# Patient Record
Sex: MALE | Race: WHITE | NOT HISPANIC OR LATINO | Employment: FULL TIME | URBAN - METROPOLITAN AREA
[De-identification: names, ages, dates, MRNs, and addresses within clinical notes are randomized per-mention and may not be internally consistent; named-entity substitution may affect disease eponyms.]

---

## 2018-04-26 ENCOUNTER — OFFICE VISIT (OUTPATIENT)
Dept: URGENT CARE | Facility: CLINIC | Age: 53
End: 2018-04-26
Payer: COMMERCIAL

## 2018-04-26 VITALS
HEART RATE: 100 BPM | OXYGEN SATURATION: 93 % | WEIGHT: 315 LBS | HEIGHT: 73 IN | SYSTOLIC BLOOD PRESSURE: 135 MMHG | RESPIRATION RATE: 22 BRPM | TEMPERATURE: 99 F | DIASTOLIC BLOOD PRESSURE: 86 MMHG | BODY MASS INDEX: 41.75 KG/M2

## 2018-04-26 DIAGNOSIS — R06.02 SHORTNESS OF BREATH: Primary | ICD-10-CM

## 2018-04-26 PROCEDURE — G0381 LEV 2 HOSP TYPE B ED VISIT: HCPCS | Performed by: PHYSICIAN ASSISTANT

## 2018-04-26 RX ORDER — ATORVASTATIN CALCIUM 40 MG/1
40 TABLET, FILM COATED ORAL DAILY
COMMUNITY
End: 2022-02-04

## 2018-04-26 RX ORDER — FUROSEMIDE 40 MG/1
60 TABLET ORAL 2 TIMES DAILY
Refills: 2 | COMMUNITY
Start: 2018-03-16 | End: 2022-02-24 | Stop reason: HOSPADM

## 2018-04-26 RX ORDER — LIRAGLUTIDE 6 MG/ML
1.8 INJECTION SUBCUTANEOUS DAILY
COMMUNITY
Start: 2018-04-21 | End: 2022-02-04

## 2018-04-26 RX ORDER — TRAMADOL HYDROCHLORIDE 50 MG/1
TABLET ORAL
COMMUNITY
Start: 2018-03-27 | End: 2020-01-23 | Stop reason: HOSPADM

## 2018-04-26 RX ORDER — METFORMIN HYDROCHLORIDE 500 MG/1
TABLET, EXTENDED RELEASE ORAL EVERY OTHER DAY
Status: ON HOLD | COMMUNITY
Start: 2018-03-16 | End: 2021-11-16 | Stop reason: CLARIF

## 2018-04-26 NOTE — PROGRESS NOTES
[facility] Now        NAME: Phuong Torres a 45 y o  male  : 1965    MRN: [id number]  DATE: 2018  TIME: 5:37 PM    Assessment and Plan   Shortness of breath [R06 02]  1  Shortness of breath           Patient Instructions       TO ER Now refused transport  Chief Complaint     Chief Complaint   Patient presents with    Cough     couch, chest congestion, fever, cold sweats and headache for 3 days         History of Present Illness       Pt with cough, wheezing and increasing SOB over the last 3 days  Today increased SOB  Has pacemaker  Denies selling of feet, Some chest pain over the last 2 days  Had tried nebulizer at home without success  Review of Systems   Review of Systems   Respiratory: Positive for cough  All other systems reviewed and are negative  Current Medications       Current Outpatient Prescriptions:     atorvastatin (LIPITOR) 40 mg tablet, Take 40 mg by mouth daily, Disp: , Rfl:     furosemide (LASIX) 40 mg tablet, Take 60 mg by mouth 2 (two) times a day, Disp: , Rfl: 2    metFORMIN (GLUCOPHAGE-XR) 500 mg 24 hr tablet, , Disp: , Rfl:     traMADol (ULTRAM) 50 mg tablet, , Disp: , Rfl:     VICTOZA 18 MG/3ML SOPN, , Disp: , Rfl:     Current Allergies     Allergies as of 2018    (No Known Allergies)            The following portions of the patient's history were reviewed and updated as appropriate: allergies, current medications, past family history, past medical history, past social history, past surgical history and problem list      Past Medical History:   Diagnosis Date    Diabetes mellitus ([address] )     High cholesterol     Hypertension        Past Surgical History:   Procedure Laterality Date    APPENDECTOMY      ATRIAL CARDIAC PACEMAKER INSERTION         No family history on file  Medications have been verified          Objective   /86   Pulse 100   Temp 99 °F (37 2 °C) (Tympanic)   Resp 22   Ht 6' 1" (1 854 m)   Wt (!) 171 kg (376 lb)   SpO2 93%   BMI 49 61 kg/m²        Physical Exam     Physical Exam   Constitutional: He appears well-developed and well-nourished  Cardiovascular: Normal rate, regular rhythm and normal heart sounds  Pulmonary/Chest: He has wheezes (bilaterally throughout)  Nursing note and vitals reviewed

## 2020-01-22 ENCOUNTER — HOSPITAL ENCOUNTER (INPATIENT)
Facility: HOSPITAL | Age: 55
LOS: 1 days | Discharge: HOME | DRG: 292 | End: 2020-01-23
Attending: EMERGENCY MEDICINE | Admitting: INTERNAL MEDICINE
Payer: COMMERCIAL

## 2020-01-22 ENCOUNTER — APPOINTMENT (EMERGENCY)
Dept: RADIOLOGY | Facility: HOSPITAL | Age: 55
DRG: 292 | End: 2020-01-22
Payer: COMMERCIAL

## 2020-01-22 DIAGNOSIS — R09.02 HYPOXIA: Primary | ICD-10-CM

## 2020-01-22 DIAGNOSIS — R06.02 SHORTNESS OF BREATH: ICD-10-CM

## 2020-01-22 DIAGNOSIS — R07.9 CHEST PAIN, UNSPECIFIED TYPE: ICD-10-CM

## 2020-01-22 DIAGNOSIS — I50.9 CHF (CONGESTIVE HEART FAILURE) (HCC): ICD-10-CM

## 2020-01-22 DIAGNOSIS — I50.33 ACUTE ON CHRONIC DIASTOLIC HEART FAILURE (HCC): ICD-10-CM

## 2020-01-22 DIAGNOSIS — G47.33 OSA (OBSTRUCTIVE SLEEP APNEA): ICD-10-CM

## 2020-01-22 PROBLEM — E66.01 MORBID OBESITY (HCC): Status: ACTIVE | Noted: 2020-01-22

## 2020-01-22 PROBLEM — I50.32 CHRONIC DIASTOLIC HEART FAILURE (HCC): Status: ACTIVE | Noted: 2020-01-22

## 2020-01-22 PROBLEM — IMO0002 TYPE 2 DM MILD NONPROLIFERATIVE RETINOPATHY, MACULAR EDEMA, UNCONTROL: Status: ACTIVE | Noted: 2020-01-22

## 2020-01-22 PROBLEM — I10 HYPERTENSION: Status: ACTIVE | Noted: 2020-01-22

## 2020-01-22 LAB
ALBUMIN SERPL BCP-MCNC: 3.6 G/DL (ref 3.5–5)
ALP SERPL-CCNC: 83 U/L (ref 46–116)
ALT SERPL W P-5'-P-CCNC: 32 U/L (ref 12–78)
ANION GAP SERPL CALCULATED.3IONS-SCNC: 4 MMOL/L (ref 4–13)
AST SERPL W P-5'-P-CCNC: 32 U/L (ref 5–45)
BASOPHILS # BLD AUTO: 0.05 THOUSANDS/ΜL (ref 0–0.1)
BASOPHILS NFR BLD AUTO: 1 % (ref 0–1)
BILIRUB SERPL-MCNC: 0.36 MG/DL (ref 0.2–1)
BUN SERPL-MCNC: 15 MG/DL (ref 5–25)
CALCIUM SERPL-MCNC: 8.3 MG/DL (ref 8.3–10.1)
CHLORIDE SERPL-SCNC: 104 MMOL/L (ref 100–108)
CO2 SERPL-SCNC: 34 MMOL/L (ref 21–32)
CREAT SERPL-MCNC: 1.16 MG/DL (ref 0.6–1.3)
EOSINOPHIL # BLD AUTO: 0.23 THOUSAND/ΜL (ref 0–0.61)
EOSINOPHIL NFR BLD AUTO: 3 % (ref 0–6)
ERYTHROCYTE [DISTWIDTH] IN BLOOD BY AUTOMATED COUNT: 14.1 % (ref 11.6–15.1)
FLUAV RNA NPH QL NAA+PROBE: NORMAL
FLUBV RNA NPH QL NAA+PROBE: NORMAL
GFR SERPL CREATININE-BSD FRML MDRD: 70 ML/MIN/1.73SQ M
GLUCOSE SERPL-MCNC: 85 MG/DL (ref 65–140)
GLUCOSE SERPL-MCNC: 98 MG/DL (ref 65–140)
HCT VFR BLD AUTO: 48.1 % (ref 36.5–49.3)
HGB BLD-MCNC: 15.4 G/DL (ref 12–17)
IMM GRANULOCYTES # BLD AUTO: 0.04 THOUSAND/UL (ref 0–0.2)
IMM GRANULOCYTES NFR BLD AUTO: 1 % (ref 0–2)
LYMPHOCYTES # BLD AUTO: 2.08 THOUSANDS/ΜL (ref 0.6–4.47)
LYMPHOCYTES NFR BLD AUTO: 24 % (ref 14–44)
MCH RBC QN AUTO: 28.9 PG (ref 26.8–34.3)
MCHC RBC AUTO-ENTMCNC: 32 G/DL (ref 31.4–37.4)
MCV RBC AUTO: 90 FL (ref 82–98)
MONOCYTES # BLD AUTO: 1.31 THOUSAND/ΜL (ref 0.17–1.22)
MONOCYTES NFR BLD AUTO: 15 % (ref 4–12)
NEUTROPHILS # BLD AUTO: 5.1 THOUSANDS/ΜL (ref 1.85–7.62)
NEUTS SEG NFR BLD AUTO: 56 % (ref 43–75)
NRBC BLD AUTO-RTO: 0 /100 WBCS
NT-PROBNP SERPL-MCNC: 356 PG/ML
PLATELET # BLD AUTO: 206 THOUSANDS/UL (ref 149–390)
PMV BLD AUTO: 9.5 FL (ref 8.9–12.7)
POTASSIUM SERPL-SCNC: 3.6 MMOL/L (ref 3.5–5.3)
PROT SERPL-MCNC: 7.3 G/DL (ref 6.4–8.2)
RBC # BLD AUTO: 5.32 MILLION/UL (ref 3.88–5.62)
RSV RNA NPH QL NAA+PROBE: NORMAL
SODIUM SERPL-SCNC: 142 MMOL/L (ref 136–145)
TROPONIN I SERPL-MCNC: <0.02 NG/ML
TROPONIN I SERPL-MCNC: <0.02 NG/ML
WBC # BLD AUTO: 8.81 THOUSAND/UL (ref 4.31–10.16)

## 2020-01-22 PROCEDURE — 94640 AIRWAY INHALATION TREATMENT: CPT

## 2020-01-22 PROCEDURE — 85025 COMPLETE CBC W/AUTO DIFF WBC: CPT | Performed by: EMERGENCY MEDICINE

## 2020-01-22 PROCEDURE — 82948 REAGENT STRIP/BLOOD GLUCOSE: CPT

## 2020-01-22 PROCEDURE — 84484 ASSAY OF TROPONIN QUANT: CPT | Performed by: EMERGENCY MEDICINE

## 2020-01-22 PROCEDURE — 99285 EMERGENCY DEPT VISIT HI MDM: CPT | Performed by: EMERGENCY MEDICINE

## 2020-01-22 PROCEDURE — 71046 X-RAY EXAM CHEST 2 VIEWS: CPT

## 2020-01-22 PROCEDURE — 99223 1ST HOSP IP/OBS HIGH 75: CPT | Performed by: INTERNAL MEDICINE

## 2020-01-22 PROCEDURE — 84484 ASSAY OF TROPONIN QUANT: CPT | Performed by: INTERNAL MEDICINE

## 2020-01-22 PROCEDURE — 36415 COLL VENOUS BLD VENIPUNCTURE: CPT

## 2020-01-22 PROCEDURE — 80053 COMPREHEN METABOLIC PANEL: CPT | Performed by: EMERGENCY MEDICINE

## 2020-01-22 PROCEDURE — 83880 ASSAY OF NATRIURETIC PEPTIDE: CPT | Performed by: EMERGENCY MEDICINE

## 2020-01-22 PROCEDURE — 87631 RESP VIRUS 3-5 TARGETS: CPT | Performed by: EMERGENCY MEDICINE

## 2020-01-22 PROCEDURE — 96374 THER/PROPH/DIAG INJ IV PUSH: CPT

## 2020-01-22 PROCEDURE — 36415 COLL VENOUS BLD VENIPUNCTURE: CPT | Performed by: INTERNAL MEDICINE

## 2020-01-22 PROCEDURE — 94760 N-INVAS EAR/PLS OXIMETRY 1: CPT

## 2020-01-22 PROCEDURE — 93005 ELECTROCARDIOGRAM TRACING: CPT

## 2020-01-22 PROCEDURE — 99285 EMERGENCY DEPT VISIT HI MDM: CPT

## 2020-01-22 RX ORDER — FUROSEMIDE 10 MG/ML
60 INJECTION INTRAMUSCULAR; INTRAVENOUS EVERY 12 HOURS
Status: DISCONTINUED | OUTPATIENT
Start: 2020-01-23 | End: 2020-01-23 | Stop reason: HOSPADM

## 2020-01-22 RX ORDER — METFORMIN HYDROCHLORIDE 500 MG/1
500 TABLET, EXTENDED RELEASE ORAL
Status: DISCONTINUED | OUTPATIENT
Start: 2020-01-23 | End: 2020-01-23 | Stop reason: HOSPADM

## 2020-01-22 RX ORDER — LEVALBUTEROL INHALATION SOLUTION 0.63 MG/3ML
0.63 SOLUTION RESPIRATORY (INHALATION)
Status: DISCONTINUED | OUTPATIENT
Start: 2020-01-23 | End: 2020-01-23 | Stop reason: HOSPADM

## 2020-01-22 RX ORDER — PREGABALIN 75 MG/1
75 CAPSULE ORAL
Status: ON HOLD | COMMUNITY
Start: 2018-09-10 | End: 2021-11-16 | Stop reason: CLARIF

## 2020-01-22 RX ORDER — FUROSEMIDE 10 MG/ML
40 INJECTION INTRAMUSCULAR; INTRAVENOUS ONCE
Status: COMPLETED | OUTPATIENT
Start: 2020-01-22 | End: 2020-01-22

## 2020-01-22 RX ORDER — ATORVASTATIN CALCIUM 40 MG/1
40 TABLET, FILM COATED ORAL
Status: DISCONTINUED | OUTPATIENT
Start: 2020-01-23 | End: 2020-01-23 | Stop reason: HOSPADM

## 2020-01-22 RX ORDER — PREGABALIN 75 MG/1
75 CAPSULE ORAL DAILY
Status: DISCONTINUED | OUTPATIENT
Start: 2020-01-23 | End: 2020-01-23 | Stop reason: HOSPADM

## 2020-01-22 RX ORDER — ALBUTEROL SULFATE 90 UG/1
AEROSOL, METERED RESPIRATORY (INHALATION)
COMMUNITY
Start: 2014-03-11 | End: 2020-01-23 | Stop reason: HOSPADM

## 2020-01-22 RX ORDER — LEVALBUTEROL INHALATION SOLUTION 0.63 MG/3ML
0.63 SOLUTION RESPIRATORY (INHALATION) EVERY 6 HOURS PRN
Status: DISCONTINUED | OUTPATIENT
Start: 2020-01-22 | End: 2020-01-22

## 2020-01-22 RX ORDER — MONTELUKAST SODIUM 10 MG/1
10 TABLET ORAL
COMMUNITY
Start: 2020-01-22 | End: 2022-02-04

## 2020-01-22 RX ORDER — ALBUTEROL SULFATE 90 UG/1
2 AEROSOL, METERED RESPIRATORY (INHALATION) EVERY 4 HOURS PRN
Status: DISCONTINUED | OUTPATIENT
Start: 2020-01-22 | End: 2020-01-23 | Stop reason: HOSPADM

## 2020-01-22 RX ORDER — LANOLIN ALCOHOL/MO/W.PET/CERES
3 CREAM (GRAM) TOPICAL
Status: DISCONTINUED | OUTPATIENT
Start: 2020-01-23 | End: 2020-01-23 | Stop reason: HOSPADM

## 2020-01-22 RX ORDER — LEVALBUTEROL INHALATION SOLUTION 0.63 MG/3ML
0.63 SOLUTION RESPIRATORY (INHALATION) EVERY 6 HOURS
Status: DISCONTINUED | OUTPATIENT
Start: 2020-01-22 | End: 2020-01-22

## 2020-01-22 RX ORDER — NITROGLYCERIN 0.4 MG/1
0.4 TABLET SUBLINGUAL ONCE
Status: COMPLETED | OUTPATIENT
Start: 2020-01-22 | End: 2020-01-22

## 2020-01-22 RX ORDER — LORAZEPAM 0.5 MG/1
0.5 TABLET ORAL EVERY 8 HOURS PRN
COMMUNITY
End: 2022-02-04

## 2020-01-22 RX ORDER — TRAMADOL HYDROCHLORIDE 50 MG/1
50 TABLET ORAL EVERY 6 HOURS PRN
Status: DISCONTINUED | OUTPATIENT
Start: 2020-01-22 | End: 2020-01-23 | Stop reason: HOSPADM

## 2020-01-22 RX ORDER — FUROSEMIDE 10 MG/ML
40 INJECTION INTRAMUSCULAR; INTRAVENOUS EVERY 12 HOURS
Status: DISCONTINUED | OUTPATIENT
Start: 2020-01-23 | End: 2020-01-22

## 2020-01-22 RX ORDER — MONTELUKAST SODIUM 10 MG/1
10 TABLET ORAL
Status: DISCONTINUED | OUTPATIENT
Start: 2020-01-22 | End: 2020-01-23 | Stop reason: HOSPADM

## 2020-01-22 RX ADMIN — NITROGLYCERIN 1 INCH: 20 OINTMENT TOPICAL at 16:51

## 2020-01-22 RX ADMIN — FUROSEMIDE 40 MG: 10 INJECTION, SOLUTION INTRAMUSCULAR; INTRAVENOUS at 16:52

## 2020-01-22 RX ADMIN — MONTELUKAST SODIUM 10 MG: 10 TABLET, FILM COATED ORAL at 23:38

## 2020-01-22 RX ADMIN — IPRATROPIUM BROMIDE 0.5 MG: 0.5 SOLUTION RESPIRATORY (INHALATION) at 20:21

## 2020-01-22 RX ADMIN — NITROGLYCERIN 0.4 MG: 0.4 TABLET SUBLINGUAL at 17:32

## 2020-01-22 RX ADMIN — NITROGLYCERIN 0.4 MG: 0.4 TABLET SUBLINGUAL at 16:51

## 2020-01-22 RX ADMIN — LEVALBUTEROL HYDROCHLORIDE 0.63 MG: 0.63 SOLUTION RESPIRATORY (INHALATION) at 20:21

## 2020-01-22 NOTE — ED NOTES
Respiratory at bedside   Pt refusing to put bipap on, states hx of anxiety and not being able to tolerate it     Ramses Tompkins, ANNETTE  01/22/20 9146

## 2020-01-22 NOTE — RESPIRATORY THERAPY NOTE
Pt refused bipap due to being claustrophobic   I informed he should he breathing became more labored that it would be beneficial for him to give it a try   Nurse in room and aware of refusal

## 2020-01-22 NOTE — ED PROVIDER NOTES
History  Chief Complaint   Patient presents with    Shortness of Breath     pt states cough and cold like symptoms started last week but today got worse with chest tightness  pt diaphoretic and labored breathing at time of triage     Dyspnea worse with the past week with 14 lb weight gain, orthopnea and PND  Began with cough and cold symptoms similar to why  Denies chest pain abdominal pain  Takes furosemide daily, no history of CHF  Using CPAP recently resolution of symptoms    Prior to Admission Medications   Prescriptions Last Dose Informant Patient Reported? Taking? Rivaroxaban (XARELTO PO)   Yes No   Sig: Xarelto   VICTOZA 18 MG/3ML SOPN  Self Yes No   albuterol (PROAIR HFA) 90 mcg/act inhaler   Yes Yes   Sig: Inhale   atorvastatin (LIPITOR) 40 mg tablet  Self Yes No   Sig: Take 40 mg by mouth daily   furosemide (LASIX) 40 mg tablet 1/22/2020 at Unknown time Self Yes Yes   Sig: Take 60 mg by mouth 2 (two) times a day   metFORMIN (GLUCOPHAGE-XR) 500 mg 24 hr tablet  Self Yes No   montelukast (SINGULAIR) 10 mg tablet   Yes Yes   Sig: Take 10 mg by mouth   pregabalin (LYRICA) 75 mg capsule   Yes Yes   Sig: Take 75 mg by mouth   traMADol (ULTRAM) 50 mg tablet  Self Yes No      Facility-Administered Medications: None       Past Medical History:   Diagnosis Date    Diabetes mellitus ([address] )     High cholesterol     Hyperlipidemia     Hypertension        Past Surgical History:   Procedure Laterality Date    APPENDECTOMY      ATRIAL CARDIAC PACEMAKER INSERTION         History reviewed  No pertinent family history  I have reviewed and agree with the history as documented  Social History     Tobacco Use    Smoking status: Never Smoker    Smokeless tobacco: Never Used   Substance Use Topics    Alcohol use: Never     Frequency: Never    Drug use: Never        Review of Systems   Constitutional: Negative for fever ( he has anxiety component)  Respiratory: Positive for shortness of breath      Cardiovascular: Negative for chest pain  Gastrointestinal: Negative for abdominal pain  All other systems reviewed and are negative  Physical Exam  Physical Exam   Constitutional: He is oriented to person, place, and time  Non-toxic appearance  He does not appear ill  Generally larger, elevated BMI with thick and obese abdomen   HENT:   Head: Normocephalic and atraumatic  Mouth/Throat: Oropharynx is clear and moist    Eyes: Pupils are equal, round, and reactive to light  Conjunctivae and EOM are normal    Neck: Neck supple  Cardiovascular: Regular rhythm and normal heart sounds  No murmur heard  Pulmonary/Chest: He exhibits no tenderness  Mildly tachypneic, decreased breath sounds at bases bilaterally, improves mid upper lungs bilaterally, lower extremity edema pitting up to knees bilaterally with chronic venous stasis changes bilaterally   Abdominal: Soft  Bowel sounds are normal  He exhibits no distension  There is no tenderness  Musculoskeletal: Normal range of motion  Right lower leg: He exhibits edema  He exhibits no tenderness  Left lower leg: He exhibits edema  He exhibits no tenderness  Neurological: He is alert and oriented to person, place, and time  No cranial nerve deficit  Coordination normal    Skin: Skin is warm and dry  Psychiatric: His behavior is normal  Judgment and thought content normal  His mood appears anxious  Nursing note and vitals reviewed        Vital Signs  ED Triage Vitals   Temperature Pulse Respirations Blood Pressure SpO2   01/22/20 1629 01/22/20 1620 01/22/20 1620 01/22/20 1620 01/22/20 1620   (!) 96 °F (35 6 °C) 89 (!) 26 137/87 90 %      Temp Source Heart Rate Source Patient Position - Orthostatic VS BP Location FiO2 (%)   01/22/20 1629 01/22/20 1620 01/22/20 1620 01/22/20 1620 --   Temporal Monitor Sitting Left arm       Pain Score       01/22/20 1620       7           Vitals:    01/22/20 1620 01/22/20 1656 01/22/20 1734   BP: 137/87 123/58 117/81 Pulse: 89 93 88   Patient Position - Orthostatic VS: Sitting Sitting Sitting         Visual Acuity      ED Medications  Medications   furosemide (LASIX) injection 40 mg (40 mg Intravenous Given 1/22/20 1652)   nitroglycerin (NITRO-BID) 2 % TD ointment 1 inch (1 inch Topical Given 1/22/20 1651)   nitroglycerin (NITROSTAT) SL tablet 0 4 mg (0 4 mg Sublingual Given 1/22/20 1651)   nitroglycerin (NITROSTAT) SL tablet 0 4 mg (0 4 mg Sublingual Given 1/22/20 1732)       Diagnostic Studies  Results Reviewed     Procedure Component Value Units Date/Time    NT-BNP PRO [[id number]]  (Abnormal) Collected:  01/22/20 1654    Lab Status:  Final result Specimen:  Blood from Arm, Left Updated:  01/22/20 1734     NT-proBNP 356 pg/mL     Influenza A/B and RSV PCR [[id number]]  (Normal) Collected:  01/22/20 1650    Lab Status:  Final result Specimen:  Nasopharyngeal Swab Updated:  01/22/20 1732     INFLUENZA A PCR None Detected     INFLUENZA B PCR None Detected     RSV PCR None Detected    Comprehensive metabolic panel [[id number]]  (Abnormal) Collected:  01/22/20 1628    Lab Status:  Final result Specimen:  Blood from Arm, Left Updated:  01/22/20 1655     Sodium 142 mmol/L      Potassium 3 6 mmol/L      Chloride 104 mmol/L      CO2 34 mmol/L      ANION GAP 4 mmol/L      BUN 15 mg/dL      Creatinine 1 16 mg/dL      Glucose 85 mg/dL      Calcium 8 3 mg/dL      AST 32 U/L      ALT 32 U/L      Alkaline Phosphatase 83 U/L      Total Protein 7 3 g/dL      Albumin 3 6 g/dL      Total Bilirubin 0 36 mg/dL      eGFR 70 ml/min/1 73sq m     Narrative:       Joe guidelines for Chronic Kidney Disease (CKD):     Stage 1 with normal or high GFR (GFR > 90 mL/min/1 73 square meters)    Stage 2 Mild CKD (GFR = 60-89 mL/min/1 73 square meters)    Stage 3A Moderate CKD (GFR = 45-59 mL/min/1 73 square meters)    Stage 3B Moderate CKD (GFR = 30-44 mL/min/1 73 square meters)    Stage 4 Severe CKD (GFR = 15-29 mL/min/1 73 square meters)    Stage 5 End Stage CKD (GFR <15 mL/min/1 73 square meters)  Note: GFR calculation is accurate only with a steady state creatinine    Troponin I [[id number]]  (Normal) Collected:  01/22/20 1628    Lab Status:  Final result Specimen:  Blood from Arm, Left Updated:  01/22/20 1651     Troponin I <0 02 ng/mL     CBC and differential [[id number]]  (Abnormal) Collected:  01/22/20 1628    Lab Status:  Final result Specimen:  Blood from Arm, Left Updated:  01/22/20 1633     WBC 8 81 Thousand/uL      RBC 5 32 Million/uL      Hemoglobin 15 4 g/dL      Hematocrit 48 1 %      MCV 90 fL      MCH 28 9 pg      MCHC 32 0 g/dL      RDW 14 1 %      MPV 9 5 fL      Platelets 684 Thousands/uL      nRBC 0 /100 WBCs      Neutrophils Relative 56 %      Immat GRANS % 1 %      Lymphocytes Relative 24 %      Monocytes Relative 15 %      Eosinophils Relative 3 %      Basophils Relative 1 %      Neutrophils Absolute 5 10 Thousands/µL      Immature Grans Absolute 0 04 Thousand/uL      Lymphocytes Absolute 2 08 Thousands/µL      Monocytes Absolute 1 31 Thousand/µL      Eosinophils Absolute 0 23 Thousand/µL      Basophils Absolute 0 05 Thousands/µL                  XR chest 2 views   ED Interpretation by Rand Dawson DO (01/22 1731)   CM, probable chf changes      Final Result by Ariel Chavarria MD (01/22 1742)      No acute abnormality in the chest       Unusual position of pacemaker lead as described above  Questionable right-sided pleural or extrapleural mass as described above              Workstation performed: [id number]                    Procedures  ECG 12 Lead Documentation Only  Date/Time: 1/22/2020 4:44 PM  Performed by: Rand Dawson DO  Authorized by: Rand Dawson DO     Comments:      4:20 p m  atrial fibrillation rate 93 rate axis 0° is normal intervals age indeterminate septal wall MI changes no ST elevation or depression Q-wave lead 3, lower voltage             ED Course  ED Course as of Jan 22 1829 Wed Jan 22, 2020   1731 Did not tolerate BiPAP, will use humidified high-flow oxygenation, respiratory aware      1755 Tolerating high-flow humidified oxygen 70% with pulse oximetry improved to 95%, feels better, appears more comfortable                                  MDM      Disposition  Final diagnoses:   Hypoxia   CHF (congestive heart failure) ([facility] [address] )     Time reflects when diagnosis was documented in both MDM as applicable and the Disposition within this note     Time User Action Codes Description Comment    1/22/2020  6:28 PM Ralph Marte Add [R09 02] Hypoxia     1/22/2020  6:28 PM Ry Stout Add [I50 9] CHF (congestive heart failure) [facility])       ED Disposition     ED Disposition Condition Date/Time Comment    Admit Stable Wed Jan 22, 2020  6:28 PM Case was discussed with teresita and the patient's admission status was agreed to be Admission Status: inpatient status to the service of Dr Monse Card   Follow-up Information    None         Patient's Medications   Discharge Prescriptions    No medications on file     No discharge procedures on file      ED Provider  Electronically Signed by           Silvia Avery DO  01/22/20 4274

## 2020-01-23 ENCOUNTER — APPOINTMENT (INPATIENT)
Dept: NON INVASIVE DIAGNOSTICS | Facility: HOSPITAL | Age: 55
DRG: 292 | End: 2020-01-23
Payer: COMMERCIAL

## 2020-01-23 VITALS
TEMPERATURE: 98.1 F | WEIGHT: 315 LBS | RESPIRATION RATE: 18 BRPM | SYSTOLIC BLOOD PRESSURE: 112 MMHG | HEART RATE: 78 BPM | BODY MASS INDEX: 40.43 KG/M2 | OXYGEN SATURATION: 99 % | HEIGHT: 74 IN | DIASTOLIC BLOOD PRESSURE: 58 MMHG

## 2020-01-23 LAB
ALBUMIN SERPL BCP-MCNC: 3.6 G/DL (ref 3.5–5)
ALP SERPL-CCNC: 70 U/L (ref 46–116)
ALT SERPL W P-5'-P-CCNC: 33 U/L (ref 12–78)
ANION GAP SERPL CALCULATED.3IONS-SCNC: 4 MMOL/L (ref 4–13)
AST SERPL W P-5'-P-CCNC: 44 U/L (ref 5–45)
BASOPHILS # BLD AUTO: 0.05 THOUSANDS/ΜL (ref 0–0.1)
BASOPHILS NFR BLD AUTO: 1 % (ref 0–1)
BILIRUB SERPL-MCNC: 0.98 MG/DL (ref 0.2–1)
BUN SERPL-MCNC: 13 MG/DL (ref 5–25)
CALCIUM SERPL-MCNC: 8.3 MG/DL (ref 8.3–10.1)
CHLORIDE SERPL-SCNC: 104 MMOL/L (ref 100–108)
CO2 SERPL-SCNC: 34 MMOL/L (ref 21–32)
CREAT SERPL-MCNC: 0.95 MG/DL (ref 0.6–1.3)
EOSINOPHIL # BLD AUTO: 0.17 THOUSAND/ΜL (ref 0–0.61)
EOSINOPHIL NFR BLD AUTO: 2 % (ref 0–6)
ERYTHROCYTE [DISTWIDTH] IN BLOOD BY AUTOMATED COUNT: 14.2 % (ref 11.6–15.1)
GFR SERPL CREATININE-BSD FRML MDRD: 89 ML/MIN/1.73SQ M
GLUCOSE SERPL-MCNC: 106 MG/DL (ref 65–140)
GLUCOSE SERPL-MCNC: 108 MG/DL (ref 65–140)
GLUCOSE SERPL-MCNC: 97 MG/DL (ref 65–140)
HCT VFR BLD AUTO: 49.5 % (ref 36.5–49.3)
HGB BLD-MCNC: 15.6 G/DL (ref 12–17)
IMM GRANULOCYTES # BLD AUTO: 0.03 THOUSAND/UL (ref 0–0.2)
IMM GRANULOCYTES NFR BLD AUTO: 0 % (ref 0–2)
LYMPHOCYTES # BLD AUTO: 1.73 THOUSANDS/ΜL (ref 0.6–4.47)
LYMPHOCYTES NFR BLD AUTO: 21 % (ref 14–44)
MAGNESIUM SERPL-MCNC: 2.2 MG/DL (ref 1.6–2.6)
MCH RBC QN AUTO: 28.9 PG (ref 26.8–34.3)
MCHC RBC AUTO-ENTMCNC: 31.5 G/DL (ref 31.4–37.4)
MCV RBC AUTO: 92 FL (ref 82–98)
MONOCYTES # BLD AUTO: 1 THOUSAND/ΜL (ref 0.17–1.22)
MONOCYTES NFR BLD AUTO: 12 % (ref 4–12)
NEUTROPHILS # BLD AUTO: 5.26 THOUSANDS/ΜL (ref 1.85–7.62)
NEUTS SEG NFR BLD AUTO: 64 % (ref 43–75)
NRBC BLD AUTO-RTO: 0 /100 WBCS
PHOSPHATE SERPL-MCNC: 3.8 MG/DL (ref 2.7–4.5)
PLATELET # BLD AUTO: 200 THOUSANDS/UL (ref 149–390)
PMV BLD AUTO: 9.7 FL (ref 8.9–12.7)
POTASSIUM SERPL-SCNC: 4.2 MMOL/L (ref 3.5–5.3)
PROT SERPL-MCNC: 7.6 G/DL (ref 6.4–8.2)
RBC # BLD AUTO: 5.4 MILLION/UL (ref 3.88–5.62)
SODIUM SERPL-SCNC: 142 MMOL/L (ref 136–145)
TROPONIN I SERPL-MCNC: <0.02 NG/ML
WBC # BLD AUTO: 8.24 THOUSAND/UL (ref 4.31–10.16)

## 2020-01-23 PROCEDURE — 85025 COMPLETE CBC W/AUTO DIFF WBC: CPT | Performed by: INTERNAL MEDICINE

## 2020-01-23 PROCEDURE — 84484 ASSAY OF TROPONIN QUANT: CPT | Performed by: INTERNAL MEDICINE

## 2020-01-23 PROCEDURE — 84100 ASSAY OF PHOSPHORUS: CPT | Performed by: INTERNAL MEDICINE

## 2020-01-23 PROCEDURE — 83735 ASSAY OF MAGNESIUM: CPT | Performed by: INTERNAL MEDICINE

## 2020-01-23 PROCEDURE — 94760 N-INVAS EAR/PLS OXIMETRY 1: CPT

## 2020-01-23 PROCEDURE — 80053 COMPREHEN METABOLIC PANEL: CPT | Performed by: INTERNAL MEDICINE

## 2020-01-23 PROCEDURE — 93306 TTE W/DOPPLER COMPLETE: CPT

## 2020-01-23 PROCEDURE — 82948 REAGENT STRIP/BLOOD GLUCOSE: CPT

## 2020-01-23 PROCEDURE — 99239 HOSP IP/OBS DSCHRG MGMT >30: CPT | Performed by: INTERNAL MEDICINE

## 2020-01-23 PROCEDURE — 94640 AIRWAY INHALATION TREATMENT: CPT

## 2020-01-23 PROCEDURE — 93306 TTE W/DOPPLER COMPLETE: CPT | Performed by: INTERNAL MEDICINE

## 2020-01-23 RX ORDER — LEVALBUTEROL INHALATION SOLUTION 0.63 MG/3ML
0.63 SOLUTION RESPIRATORY (INHALATION)
Qty: 15 VIAL | Refills: 0 | Status: ON HOLD | OUTPATIENT
Start: 2020-01-23 | End: 2022-02-10

## 2020-01-23 RX ORDER — TRAMADOL HYDROCHLORIDE 50 MG/1
50 TABLET ORAL DAILY
COMMUNITY
Start: 2018-11-10 | End: 2022-02-24 | Stop reason: HOSPADM

## 2020-01-23 RX ORDER — POTASSIUM CHLORIDE 20 MEQ/1
20 TABLET, EXTENDED RELEASE ORAL DAILY
Status: ON HOLD | COMMUNITY
Start: 2019-05-30 | End: 2020-01-23 | Stop reason: SDUPTHER

## 2020-01-23 RX ORDER — POTASSIUM CHLORIDE 20 MEQ/1
20 TABLET, EXTENDED RELEASE ORAL DAILY
Qty: 20 TABLET | Refills: 0 | Status: SHIPPED | OUTPATIENT
Start: 2020-01-23 | End: 2022-02-04

## 2020-01-23 RX ADMIN — MELATONIN 3 MG: at 00:15

## 2020-01-23 RX ADMIN — IPRATROPIUM BROMIDE 0.5 MG: 0.5 SOLUTION RESPIRATORY (INHALATION) at 13:25

## 2020-01-23 RX ADMIN — LEVALBUTEROL HYDROCHLORIDE 0.63 MG: 0.63 SOLUTION RESPIRATORY (INHALATION) at 07:54

## 2020-01-23 RX ADMIN — PREGABALIN 75 MG: 75 CAPSULE ORAL at 09:27

## 2020-01-23 RX ADMIN — PERFLUTREN 1 ML/MIN: 6.52 INJECTION, SUSPENSION INTRAVENOUS at 11:56

## 2020-01-23 RX ADMIN — FUROSEMIDE 60 MG: 10 INJECTION, SOLUTION INTRAMUSCULAR; INTRAVENOUS at 09:28

## 2020-01-23 RX ADMIN — LEVALBUTEROL HYDROCHLORIDE 0.63 MG: 0.63 SOLUTION RESPIRATORY (INHALATION) at 13:25

## 2020-01-23 RX ADMIN — IPRATROPIUM BROMIDE 0.5 MG: 0.5 SOLUTION RESPIRATORY (INHALATION) at 07:54

## 2020-01-23 NOTE — H&P
H&P- Elsy Rodriguez 1965, 62 y o  male MRN: [id number]    Unit/Bed#: ED 04 Encounter: [id number]    Primary Care Provider: Jasvir Malik DO   Date and time admitted to hospital: 1/22/2020  4:19 PM    * Shortness of breath  Assessment & Plan  Dyspnea on exertion for the last few days  Recent URI  Associated with wheezing  First set troponin and EKG nonischemic  Chest x-ray with no acute finding  Morbid obesity with DAYAN not on CPAP  Shortness of breath could be multifactorial including DAYAN, obesity ventilation syndrome, URI, diastolic CHF  Patient claims he had recent normal stress test as an outpatient  Patient placed on observation to rule out CAD, continue telemetry, trend troponin  Echocardiogram pending  Chest pain  Assessment & Plan  Atypical chest pain  Troponin and EKG nonischemic  Continue to trend troponin  Acute on chronic diastolic heart failure [facility])  Assessment & Plan  Wt Readings from Last 3 Encounters:   01/22/20 (!) 189 kg (416 lb 3 7 oz)   04/26/18 (!) 171 kg (376 lb)     Significant increased weight, dyspnea, orthopnea  Continue Lasix 60 mg IV q 12  Monitor serum electrolytes, renal function, urine output, daily weight  Cardiology consult  Morbid obesity ([facility] [address] )  Assessment & Plan  Morbid obesity; weight loss management as an outpatient    Type 2 DM mild nonproliferative retinopathy, macular edema, uncontrol (HCC)  Assessment & Plan  No results found for: HGBA1C    No results for input(s): POCGLU in the last 72 hours      Blood Sugar Average: Last 72 hrs:    Blood glucose check 4 times daily with corrective insulin    DAYAN (obstructive sleep apnea)  Assessment & Plan  DAYAN not on BiPAP/CPAP (claustrophobic)    VTE Prophylaxis: Rivaroxaban (Xarelto)  / sequential compression device   Code Status:  Full code  POLST: There is no POLST form on file for this patient (pre-hospital)    Anticipated Length of Stay:  Patient will be admitted on an Inpatient basis with an anticipated length of stay of  greater than 2 midnights  Justification for Hospital Stay:  Acute CHF    Total Time for Visit, including Counseling / Coordination of Care: 1 hour  Greater than 50% of this total time spent on direct patient counseling and coordination of care  History of Present Illness:    Nakul Cedillo is a 62 y o  male who presents with shortness of breath  Patient started to have shortness of breath for the last few days associated with orthopnea, increased weight, worsening lower extremity edema  Past medical history significant for atrial fibrillation on Xarelto, syncope status post ppm, diabetes, morbid obesity, DAYAN  Patient follows Cardiology as an outpatient for bilateral lymphedema  Patient claims he had stress test done less than a year ago as an outpatient and was normal   Patient is claustrophobic and does not use CPAP or BiPAP  Had recent URI  Denies fever, chills, headache, dizziness, syncope, abdominal pain, urinary or bowel habit change  Review of Systems:    Review of Systems   Constitutional: Negative  HENT: Negative  Eyes: Negative  Respiratory: Positive for shortness of breath  Negative for apnea, cough, choking, chest tightness, wheezing and stridor  Cardiovascular: Positive for chest pain  Negative for palpitations and leg swelling  Gastrointestinal: Negative  Endocrine: Negative  Genitourinary: Negative  Musculoskeletal: Negative  Neurological: Negative  Hematological: Negative  Psychiatric/Behavioral: Negative  Past Medical and Surgical History:     Past Medical History:   Diagnosis Date    Diabetes mellitus ([address] )     High cholesterol     Hyperlipidemia     Hypertension        Past Surgical History:   Procedure Laterality Date    APPENDECTOMY      ATRIAL CARDIAC PACEMAKER INSERTION         Meds/Allergies:    Prior to Admission medications    Medication Sig Start Date End Date Taking?  Authorizing Provider   albuterol [facility]) 90 mcg/act inhaler Inhale 3/11/14  Yes Historical Provider, MD   furosemide (LASIX) 40 mg tablet Take 60 mg by mouth 2 (two) times a day 3/16/18  Yes Historical Provider, MD   montelukast (SINGULAIR) 10 mg tablet Take 10 mg by mouth 1/22/20  Yes Historical Provider, MD   pregabalin (LYRICA) 75 mg capsule Take 75 mg by mouth 9/10/18  Yes Historical Provider, MD   atorvastatin (LIPITOR) 40 mg tablet Take 40 mg by mouth daily    Historical Provider, MD   metFORMIN (GLUCOPHAGE-XR) 500 mg 24 hr tablet  3/16/18   Historical Provider, MD   Rivaroxaban (Nadira Fitting PO) Xarelto    Historical Provider, MD   traMADol Lauryn Brighter) 50 mg tablet  3/27/18   Historical Provider, MD   Bernradames Phi 18 MG/3ML SOPN  4/21/18   Historical Provider, MD     I have reviewed home medications with patient personally  Allergies: No Known Allergies    Social History:     Substance Use History:   Social History     Substance and Sexual Activity   Alcohol Use Never    Frequency: Never     Social History     Tobacco Use   Smoking Status Never Smoker   Smokeless Tobacco Never Used     Social History     Substance and Sexual Activity   Drug Use Never       Family History:    non-contributory    Physical Exam:     Vitals:   Blood Pressure: 115/82 (01/22/20 1847)  Pulse: 96 (01/22/20 1847)  Temperature: (!) 96 °F (35 6 °C) (01/22/20 1629)  Temp Source: Temporal (01/22/20 1629)  Respirations: (!) 24 (01/22/20 1847)  Weight - Scale: (!) 189 kg (416 lb 3 7 oz) (01/22/20 1630)  SpO2: 96 % (01/22/20 1847)    Physical Exam    General appearance: alert, appears stated age and cooperative  Skin: Skin color, texture, turgor normal  No rashes or lesions  Head: Normocephalic, without obvious abnormality, atraumatic  Eyes: conjunctivae/corneas clear  PERRL, EOM's intact    Lungs: clear to auscultation bilaterally  Heart: regular rate and rhythm, S1, S2 normal, no murmur, click, rub or gallop  Abdomen: soft, non-tender; bowel sounds normal; no masses,  no organomegaly  Back: symmetric, no curvature  ROM normal  No CVA tenderness  Extremities: Grade 2-3 bilateral pretibial and pedal edema  Neurologic: Alert and oriented X 3, normal strength and tone  Normal symmetric reflexes  Normal coordination and gait  Psychiatric:  Normal mood and affect    Additional Data:     Lab Results: I have personally reviewed pertinent reports  Results from last 7 days   Lab Units 01/22/20  1628   WBC Thousand/uL 8 81   HEMOGLOBIN g/dL 15 4   HEMATOCRIT % 48 1   PLATELETS Thousands/uL 206   NEUTROS PCT % 56   LYMPHS PCT % 24   MONOS PCT % 15*   EOS PCT % 3     Results from last 7 days   Lab Units 01/22/20  1628   SODIUM mmol/L 142   POTASSIUM mmol/L 3 6   CHLORIDE mmol/L 104   CO2 mmol/L 34*   BUN mg/dL 15   CREATININE mg/dL 1 16   ANION GAP mmol/L 4   CALCIUM mg/dL 8 3   ALBUMIN g/dL 3 6   TOTAL BILIRUBIN mg/dL 0 36   ALK PHOS U/L 83   ALT U/L 32   AST U/L 32   GLUCOSE RANDOM mg/dL 85                       Imaging: I have personally reviewed pertinent reports  XR chest 2 views   ED Interpretation by Bebo Grier DO (01/22 1731)   CM, probable chf changes      Final Result by De Rubalcava MD (01/22 1742)      No acute abnormality in the chest       Unusual position of pacemaker lead as described above  Questionable right-sided pleural or extrapleural mass as described above  Workstation performed: [id number]             EKG, Pathology, and Other Studies Reviewed on Admission: yes    Allscripts / Epic Records Reviewed: Yes     ** Please Note: This note has been constructed using a voice recognition system   **

## 2020-01-23 NOTE — UTILIZATION REVIEW
Initial Clinical Review    Admission: Date/Time/Statement: Inpatient Admission Orders (From admission, onward)     Ordered        01/22/20 1827  Inpatient Admission (expected length of stay for this patient Order details is greater than two midnights)  Once                   Orders Placed This Encounter   Procedures    Inpatient Admission (expected length of stay for this patient Order details is greater than two midnights)     Standing Status:   Standing     Number of Occurrences:   1     Order Specific Question:   Admitting Physician     Answer:   Kike Zuleta [[id number]]     Order Specific Question:   Level of Care     Answer:   Med Surg [16]     Order Specific Question:   Estimated length of stay     Answer:   More than 2 Midnights     Order Specific Question:   Certification     Answer:   I certify that inpatient services are medically necessary for this patient for a duration of greater than two midnights  See H&P and MD Progress Notes for additional information about the patient's course of treatment  ED Arrival Information     Expected Arrival Acuity Means of Arrival Escorted By Service Admission Type    - 1/22/2020 16:13 Emergent Walk-In Spouse Hospitalist Emergency    Arrival Complaint    Difficulty Breathing        Chief Complaint   Patient presents with    Shortness of Breath     pt states cough and cold like symptoms started last week but today got worse with chest tightness  pt diaphoretic and labored breathing at time of triage     Assessment/Plan: 62year old male to the ED from home with complaints of shortness of breath with dyspnea worse in the past week and 14 lb weight gain  Admitted to inpatient for shortness of breath, chest pain, CHF  Recent URi with wheezing  Arrives tachypneic with decreased breath sounds bibasilarly with bilateral lower extremity edema  He appears anxious  Arrives to the ED with hypoxia  trialed on Bi pap, but didn't tolerate    Placed on high flow O2 70% with improved pulse ox to 95%  chec K ECHO  Give Lasix IV  Strict I/O    ED Triage Vitals   Temperature Pulse Respirations Blood Pressure SpO2   01/22/20 1629 01/22/20 1620 01/22/20 1620 01/22/20 1620 01/22/20 1620   (!) 96 °F (35 6 °C) 89 (!) 26 137/87 90 %      Temp Source Heart Rate Source Patient Position - Orthostatic VS BP Location FiO2 (%)   01/22/20 1629 01/22/20 1620 01/22/20 1620 01/22/20 1620 01/22/20 1847   Temporal Monitor Sitting Left arm 70      Pain Score       01/22/20 1620       7        Wt Readings from Last 1 Encounters:   01/23/20 (!) 182 kg (402 lb 1 9 oz)     Additional Vital Signs:   Date/Time  Temp  Pulse  Resp  BP  MAP (mmHg)  SpO2 O2 Device Patient Position - Orthostatic VS   01/23/20 1200    78  18  112/58  79  99 %  Lying   01/23/20 0800    70  21  121/63  86  96 %     01/23/20 0755    66  20      96 % Nasal cannula 5LNC    01/23/20 0354  97 2 °F (36 2 °C)Abnormal   62  20  128/68  93  94 % Nasal cannula Lying   01/23/20 0140    81  14      97 % Nasal cannula    01/22/20 2245            93 % Nasal cannula    01/22/20 2230    64    107/66  78  96 % Nasal cannula    01/22/20 2219  97 7 °F (36 5 °C)  70  16  107/66    97 % Nasal cannula    01/22/20 2059    75  22  119/66    96 % Nasal cannula Lying   01/22/20 2022            96 %     01/22/20 1847    96  24Abnormal   115/82    96 % High flow nasal cannula    01/22/20 1734    88  25Abnormal   117/81    94 % Nasal cannula Sitting   01/22/20 1656    93  25Abnormal   123/58           Pertinent Labs/Diagnostic Test Results:   CXR 1/22:    No acute abnormality in the chest     Unusual position of pacemaker lead as described above  Suggestion of faint 3 5 cm pleural or extrapleural mass in the lateral right hemithorax  This may be a pleural lipoma   Questionable right-sided pleural or extrapleural mass as described abov  EKG 1/22:  atrial fibrillation rate 93 rate axis 0° is normal intervals age indeterminate septal wall MI changes no ST elevation or depression Q-wave lead 3, lower voltage  Results from last 7 days   Lab Units 01/23/20  0445 01/22/20  1628   WBC Thousand/uL 8 24 8 81   HEMOGLOBIN g/dL 15 6 15 4   HEMATOCRIT % 49 5* 48 1   PLATELETS Thousands/uL 200 206   NEUTROS ABS Thousands/µL 5 26 5 10         Results from last 7 days   Lab Units 01/23/20  0445 01/22/20  1628   SODIUM mmol/L 142 142   POTASSIUM mmol/L 4 2 3 6   CHLORIDE mmol/L 104 104   CO2 mmol/L 34* 34*   ANION GAP mmol/L 4 4   BUN mg/dL 13 15   CREATININE mg/dL 0 95 1 16   EGFR ml/min/1 73sq m 89 70   CALCIUM mg/dL 8 3 8 3   MAGNESIUM mg/dL 2 2  --    PHOSPHORUS mg/dL 3 8  --      Results from last 7 days   Lab Units 01/23/20  0445 01/22/20  1628   AST U/L 44 32   ALT U/L 33 32   ALK PHOS U/L 70 83   TOTAL PROTEIN g/dL 7 6 7 3   ALBUMIN g/dL 3 6 3 6   TOTAL BILIRUBIN mg/dL 0 98 0 36     Results from last 7 days   Lab Units 01/23/20  0805 01/22/20  2058   POC GLUCOSE mg/dl 97 98     Results from last 7 days   Lab Units 01/23/20  0445 01/22/20  1628   GLUCOSE RANDOM mg/dL 108 85     Results from last 7 days   Lab Units 01/23/20  0445 01/22/20  2017 01/22/20  1628   TROPONIN I ng/mL <0 02 <0 02 <0 02     Results from last 7 days   Lab Units 01/22/20  1654   NT-PRO BNP pg/mL 356*     Results from last 7 days   Lab Units 01/22/20  1650   INFLUENZA A PCR  None Detected   RSV PCR  None Detected     ED Treatment:   Medication Administration from 01/22/2020 1613 to 01/22/2020 2215       Date/Time Order Dose Route Action     01/22/2020 1652 furosemide (LASIX) injection 40 mg 40 mg Intravenous Given     01/22/2020 1651 nitroglycerin (NITRO-BID) 2 % TD ointment 1 inch 1 inch Topical Given     01/22/2020 1651 nitroglycerin (NITROSTAT) SL tablet 0 4 mg 0 4 mg Sublingual Given     01/22/2020 1732 nitroglycerin (NITROSTAT) SL tablet 0 4 mg 0 4 mg Sublingual Given     01/22/2020 2021 ipratropium (ATROVENT) 0 02 % inhalation solution 0 5 mg 0 5 mg Nebulization Given 01/22/2020 2021 levalbuterol (XOPENEX) inhalation solution 0 63 mg 0 63 mg Nebulization Given        Past Medical History:   Diagnosis Date    Diabetes mellitus ([address] )     High cholesterol     Hyperlipidemia     Hypertension      Admitting Diagnosis: Shortness of breath [R06 02]  CHF (congestive heart failure) ([facility]) [I50 9]  Hypoxia [R09 02]  Difficulty breathing [R06 89]  Age/Sex: 62 y o  male  Admission Orders:  Tele  I/O SCDs  ECHO  Scheduled Medications:    Medications:  atorvastatin 40 mg Oral Daily With Dinner   furosemide 60 mg Intravenous Q12H   insulin lispro 1-5 Units Subcutaneous 4x Daily (AC & HS)   ipratropium 0 5 mg Nebulization TID   levalbuterol 0 63 mg Nebulization TID   melatonin 3 mg Oral HS   metFORMIN 500 mg Oral Daily With Breakfast   montelukast 10 mg Oral HS   pregabalin 75 mg Oral Daily   rivaroxaban 20 mg Oral Daily With Dinner     Continuous IV Infusions:     PRN Meds:    albuterol 2 puff Inhalation Q4H PRN   traMADol 50 mg Oral Q6H PRN       IP CONSULT TO PULMONOLOGY  IP CONSULT TO CARDIOLOGY    Network Utilization Review Department  [email or web address]  ATTENTION: Please call with any questions or concerns to [phone] and carefully listen to the prompts so that you are directed to the right person  All voicemails are confidential   Tony Flor all requests for admission clinical reviews, approved or denied determinations and any other requests to dedicated fax number below belonging to the campus where the patient is receiving treatment   List of dedicated fax numbers for the Facilities:  FACILITY NAME UR FAX NUMBER   ADMISSION DENIALS (Administrative/Medical Necessity) [phone]   [address]  (Maternity/NICU/Pediatrics) [phone]   Xander Aguero [phone]   [facility] Promise [phone]   Diaz Suresh [phone]   Elicia Crawley [phone]     Dhara Johnson [phone]   [facility]  [phone]   [address] [facility]  [phone]   [address] [address] [address] [facility] [phone]

## 2020-01-23 NOTE — PLAN OF CARE
Problem: PAIN - ADULT  Goal: Verbalizes/displays adequate comfort level or baseline comfort level  Description  Interventions:  - Encourage patient to monitor pain and request assistance  - Assess pain using appropriate pain scale  - Administer analgesics based on type and severity of pain and evaluate response  - Implement non-pharmacological measures as appropriate and evaluate response  - Consider cultural and social influences on pain and pain management  - Notify physician/advanced practitioner if interventions unsuccessful or patient reports new pain  Outcome: Adequate for Discharge     Problem: INFECTION - ADULT  Goal: Absence or prevention of progression during hospitalization  Description  INTERVENTIONS:  - Assess and monitor for signs and symptoms of infection  - Monitor lab/diagnostic results  - Monitor all insertion sites, i e  indwelling lines, tubes, and drains  - Administer medications as ordered  - Instruct and encourage patient and family to use good hand hygiene technique   Outcome: Adequate for Discharge     Problem: SAFETY ADULT  Goal: Patient will remain free of falls  Description  INTERVENTIONS:  - Assess patient frequently for physical needs  -  Identify cognitive and physical deficits and behaviors that affect risk of falls    -  [place] fall precautions as indicated by assessment   - Educate patient/family on patient safety including physical limitations  - Instruct patient to call for assistance with activity based on assessment  - Modify environment to reduce risk of injury  - Consider OT/PT consult to assist with strengthening/mobility  Outcome: Adequate for Discharge  Goal: Maintain or return to baseline ADL function  Description  INTERVENTIONS:  -  Assess patient's ability to carry out ADLs; assess patient's baseline for ADL function and identify physical deficits which impact ability to perform ADLs (bathing, care of mouth/teeth, toileting, grooming, dressing, etc )  - Assess/evaluate cause of self-care deficits   - Assess range of motion  - Assess patient's mobility; develop plan if impaired  - Assess patient's need for assistive devices and provide as appropriate  - Encourage maximum independence but intervene and supervise when necessary  - Involve family in performance of ADLs  - Assess for home care needs following discharge   - Consider OT consult to assist with ADL evaluation and planning for discharge  - Provide patient education as appropriate  Outcome: Adequate for Discharge  Goal: Maintain or return mobility status to optimal level  Description  INTERVENTIONS:  - Assess patient's baseline mobility status (ambulation, transfers, stairs, etc )    - Identify cognitive and physical deficits and behaviors that affect mobility  - Identify mobility aids required to assist with transfers and/or ambulation (gait belt, sit-to-stand, lift, walker, cane, etc )  - [place] fall precautions as indicated by assessment  - Record patient progress and toleration of activity level on Mobility SBAR; progress patient to next Phase/Stage  - Instruct patient to call for assistance with activity based on assessment  - Consider rehabilitation consult to assist with strengthening/weightbearing, etc   Outcome: Adequate for Discharge

## 2020-01-23 NOTE — NURSING NOTE
Patient ready for discharge and waiting for Family to pick him up  AAOx4, no pain, IV discontinued to  Lt antecub, dressed with dressing  No SOB on room air and no distress

## 2020-01-23 NOTE — ASSESSMENT & PLAN NOTE
Wt Readings from Last 3 Encounters:   01/23/20 (!) 182 kg (402 lb 1 9 oz)   04/26/18 (!) 171 kg (376 lb)     Patient given Lasix 60 mg IV q 12 and it showed significant improvement  Continue with 60 mg q 12 p o  Lasix and follow-up with cardiology and primary care physician as an outpatient  Continue potassium 20 mEq daily  Patient needs to check serum electrolytes and renal function within 1 week of discharge and follow-up with primary care physician

## 2020-01-23 NOTE — RESPIRATORY THERAPY NOTE
RT Protocol Note  No Grady 62 y o  male MRN: [id number]  Unit/Bed#: ED 04 Encounter: [id number]    Assessment    Principal Problem:    Shortness of breath  Active Problems:    DAYAN (obstructive sleep apnea)    Chest pain    Acute on chronic diastolic heart failure (HCC)    Type 2 DM mild nonproliferative retinopathy, macular edema, uncontrol (HCC)    Morbid obesity (HCC)      Home Pulmonary Medications:  Proair PRN       Past Medical History:   Diagnosis Date    Diabetes mellitus ([address] )     High cholesterol     Hyperlipidemia     Hypertension      Social History     Socioeconomic History    Marital status: /Civil Union     Spouse name: None    Number of children: None    Years of education: None    Highest education level: None   Occupational History    None   Social Needs    Financial resource strain: None    Food insecurity:     Worry: None     Inability: None    Transportation needs:     Medical: None     Non-medical: None   Tobacco Use    Smoking status: Never Smoker    Smokeless tobacco: Never Used   Substance and Sexual Activity    Alcohol use: Never     Frequency: Never    Drug use: Never    Sexual activity: None   Lifestyle    Physical activity:     Days per week: None     Minutes per session: None    Stress: None   Relationships    Social connections:     Talks on phone: None     Gets together: None     Attends Yazidi service: None     Active member of club or organization: None     Attends meetings of clubs or organizations: None     Relationship status: None    Intimate partner violence:     Fear of current or ex partner: None     Emotionally abused: None     Physically abused: None     Forced sexual activity: None   Other Topics Concern    None   Social History Narrative    None       Subjective         Objective    Physical Exam:   Assessment Type: Pre-treatment  General Appearance: Alert, Awake  Respiratory Pattern: Normal  Chest Assessment: Chest expansion symmetrical  Bilateral Breath Sounds: Diminished  Cough: None  O2 Device: 5 L NC, pt taken off HFNC placed on reg NC    Vitals:  Blood pressure 115/82, pulse 96, temperature (!) 96 °F (35 6 °C), temperature source Temporal, resp  rate (!) 24, weight (!) 189 kg (416 lb 3 7 oz), SpO2 96 %  Imaging and other studies: I have personally reviewed pertinent reports  O2 Device: 5 L NC, pt taken off HFNC placed on reg NC     Plan    Respiratory Plan: Mild Distress pathway    Pt admitted to hospital for SOB  Pt has hx of DAYAN and Heart problems  Pt was on HFNC but transitioned to 5L NC  Pt SpO2 to be kept greater than 87%  Pt ordered on Q6 tx but going to change to TID tx with PRN  He is tolerating NC well  CXR done lungs clear 3 5mm nodule in R hemithorax

## 2020-01-23 NOTE — ED NOTES
Pt placed on nasal cannula 5L at this time per respiratory, pt tolerating well  Will monitor for any problems       Kimberlee Romero RN  01/22/20 2039

## 2020-01-23 NOTE — ASSESSMENT & PLAN NOTE
Atypical chest pain  Troponin and EKG nonischemic  Echo done and showed normal EF  Difficult to assess wall motion abnormality  Reported as no significant difference from the echo in 2014  Continue to follow with Cardiology as an outpatient

## 2020-01-23 NOTE — ASSESSMENT & PLAN NOTE
Dyspnea on exertion for the last few days  Recent URI  Associated with wheezing  First set troponin and EKG nonischemic  Chest x-ray with no acute finding  Morbid obesity with DAYAN not on CPAP  Shortness of breath could be multifactorial including DAYAN, obesity ventilation syndrome, URI, diastolic CHF  Patient claims he had recent normal stress test as an outpatient  Patient placed on observation to rule out CAD, continue telemetry, trend troponin  Echocardiogram pending

## 2020-01-23 NOTE — PLAN OF CARE
Problem: PAIN - ADULT  Goal: Verbalizes/displays adequate comfort level or baseline comfort level  Description  Interventions:  - Encourage patient to monitor pain and request assistance  - Assess pain using appropriate pain scale  - Administer analgesics based on type and severity of pain and evaluate response  - Implement non-pharmacological measures as appropriate and evaluate response  - Consider cultural and social influences on pain and pain management  - Notify physician/advanced practitioner if interventions unsuccessful or patient reports new pain  Outcome: Progressing     Problem: INFECTION - ADULT  Goal: Absence or prevention of progression during hospitalization  Description  INTERVENTIONS:  - Assess and monitor for signs and symptoms of infection  - Monitor lab/diagnostic results  - Monitor all insertion sites, i e  indwelling lines, tubes, and drains  - Administer medications as ordered  - Instruct and encourage patient and family to use good hand hygiene technique   Outcome: Progressing     Problem: SAFETY ADULT  Goal: Patient will remain free of falls  Description  INTERVENTIONS:  - Assess patient frequently for physical needs  -  Identify cognitive and physical deficits and behaviors that affect risk of falls    -  [place] fall precautions as indicated by assessment   - Educate patient/family on patient safety including physical limitations  - Instruct patient to call for assistance with activity based on assessment  - Modify environment to reduce risk of injury  - Consider OT/PT consult to assist with strengthening/mobility  Outcome: Progressing  Goal: Maintain or return to baseline ADL function  Description  INTERVENTIONS:  -  Assess patient's ability to carry out ADLs; assess patient's baseline for ADL function and identify physical deficits which impact ability to perform ADLs (bathing, care of mouth/teeth, toileting, grooming, dressing, etc )  - Assess/evaluate cause of self-care deficits - Assess range of motion  - Assess patient's mobility; develop plan if impaired  - Assess patient's need for assistive devices and provide as appropriate  - Encourage maximum independence but intervene and supervise when necessary  - Involve family in performance of ADLs  - Assess for home care needs following discharge   - Consider OT consult to assist with ADL evaluation and planning for discharge  - Provide patient education as appropriate  Outcome: Progressing  Goal: Maintain or return mobility status to optimal level  Description  INTERVENTIONS:  - Assess patient's baseline mobility status (ambulation, transfers, stairs, etc )    - Identify cognitive and physical deficits and behaviors that affect mobility  - Identify mobility aids required to assist with transfers and/or ambulation (gait belt, sit-to-stand, lift, walker, cane, etc )  - [place] fall precautions as indicated by assessment  - Record patient progress and toleration of activity level on Mobility SBAR; progress patient to next Phase/Stage  - Instruct patient to call for assistance with activity based on assessment  - Consider rehabilitation consult to assist with strengthening/weightbearing, etc   Outcome: Progressing     Problem: DISCHARGE PLANNING  Goal: Discharge to home or other facility with appropriate resources  Description  INTERVENTIONS:  - Identify barriers to discharge w/patient and caregiver  - Arrange for needed discharge resources and transportation as appropriate  - Identify discharge learning needs (meds, wound care, etc )  - Arrange for interpretive services to assist at discharge as needed  - Refer to Case Management Department for coordinating discharge planning if the patient needs post-hospital services based on physician/advanced practitioner order or complex needs related to functional status, cognitive ability, or social support system  Outcome: Progressing     Problem: Knowledge Deficit  Goal: Patient/family/caregiver demonstrates understanding of disease process, treatment plan, medications, and discharge instructions  Description  Complete learning assessment and assess knowledge base    Interventions:  - Provide teaching at level of understanding  - Provide teaching via preferred learning methods  Outcome: Progressing     Problem: RESPIRATORY - ADULT  Goal: Achieves optimal ventilation and oxygenation  Description  INTERVENTIONS:  - Assess for changes in respiratory status  - Assess for changes in mentation and behavior  - Position to facilitate oxygenation and minimize respiratory effort  - Oxygen administered by appropriate delivery if ordered  - Encourage broncho-pulmonary hygiene including cough, deep breathe, Incentive Spirometry  - Assess and instruct to report SOB or any respiratory difficulty  - Respiratory Therapy support as indicated   Outcome: Progressing

## 2020-01-23 NOTE — DISCHARGE SUMMARY
Discharge- Liliana Garcia 1965, 62 y o  male MRN: [id number]    Unit/Bed#: -01 Encounter: [id number]    Primary Care Provider: Fady Fernandez DO   Date and time admitted to hospital: 1/22/2020  4:19 PM    * Shortness of breath  Assessment & Plan  Dyspnea on exertion for the last few days  Recent URI  Associated with wheezing  First set troponin and EKG nonischemic  Chest x-ray with no acute finding  Morbid obesity with DAYAN not on CPAP  Shortness of breath could be multifactorial including DAYAN, obesity ventilation syndrome, URI, diastolic CHF  Patient claims he had recent normal stress test as an outpatient  Patient placed on observation to rule out CAD, continue telemetry, trend troponin  Echocardiogram pending  Shortness of breath significantly improved with diuresis and bronchodilator  Patient agreed for BiPAP and will follow-up with his on pulmonary clinic follow-up  Continue to follow with primary care physician within a week of discharge  Chest pain  Assessment & Plan  Atypical chest pain  Troponin and EKG nonischemic  Echo done and showed normal EF  Difficult to assess wall motion abnormality  Reported as no significant difference from the echo in 2014  Continue to follow with Cardiology as an outpatient  Acute on chronic diastolic heart failure [facility])  Assessment & Plan  Wt Readings from Last 3 Encounters:   01/23/20 (!) 182 kg (402 lb 1 9 oz)   04/26/18 (!) 171 kg (376 lb)     Patient given Lasix 60 mg IV q 12 and it showed significant improvement  Continue with 60 mg q 12 p o  Lasix and follow-up with cardiology and primary care physician as an outpatient  Continue potassium 20 mEq daily  Patient needs to check serum electrolytes and renal function within 1 week of discharge and follow-up with primary care physician      Morbid obesity ([facility] [address] )  Assessment & Plan  Morbid obesity; weight loss management as an outpatient    Type 2 DM mild nonproliferative retinopathy, macular edema, uncontrol ([facility] [address] )  Assessment & Plan  No results found for: HGBA1C    Recent Labs     01/22/20 2058 01/23/20  0805 01/23/20  1212   POCGLU 98 97 106       Blood Sugar Average: Last 72 hrs:  (P) 747 [id number]  Continue outpatient regimen on discharge    DAYAN (obstructive sleep apnea)  Assessment & Plan  DAYAN not on BiPAP/CPAP (claustrophobic) however patient agreed for a BiPAP now and would follow with his pulmonary clinic as an outpatient  Discharging Physician / Practitioner: Andres Littlejohn MD  PCP: Samantha Mead DO  Admission Date:   Admission Orders (From admission, onward)     Ordered        01/22/20 1827  Inpatient Admission (expected length of stay for this patient Order details is greater than two midnights)  Once                   Discharge Date: 01/23/20    Disposition:      Other: Home    For Discharges to [facility] SNF:   · Not Applicable to this Patient - Not Applicable to this Patient    Reason for Admission:  Shortness of breath    Discharge Diagnoses:  chronic diastolic CHF    Please see assessment and plan section above for further details regarding discharge diagnoses  Resolved Problems  Date Reviewed: 4/26/2018    None          Consultations During Hospital Stay:  Deboraha Cabot TO PULMONOLOGY  IP CONSULT TO CARDIOLOGY     Procedures Performed:   * No surgery found *      Xr Chest 2 Views    Result Date: 1/22/2020  Narrative: CHEST INDICATION:   Chest Pain  COMPARISON:  None EXAM PERFORMED/VIEWS:  XR CHEST PA & LATERAL FINDINGS: The heart is enlarged  A single chamber pacemaker is present with the tip directed superiorly, possibly in the region of the pulmonary outflow tract  Pulmonary vessels unremarkable  The lungs are clear  No pneumothorax or pleural effusion  Suggestion of faint 3 5 cm pleural or extrapleural mass in the lateral right hemithorax  This may be a pleural lipoma    If clinically indicated, this can be evaluated more accurately with CT of  the chest  Osseous structures appear within normal limits for patient age  Impression: No acute abnormality in the chest  Unusual position of pacemaker lead as described above  Questionable right-sided pleural or extrapleural mass as described above  Workstation performed: [id number]        Medication Adjustments and Discharge Medications:  · Summary of Medication Adjustments made as a result of this hospitalization:  None  · Medication Dosing Tapers - Please refer to Discharge Medication List for details on any medication dosing tapers (if applicable to patient)  · Discharge Medication List: See after visit summary for reconciled discharge medications  Wound Care Recommendations:  When applicable, please see wound care section of After Visit Summary  Diet Recommendations at Discharge:  Diet -        Diet Orders   (From admission, onward)             Start     Ordered    01/23/20 1000  Diet [place]/CHO Controlled; Consistent Carbohydrate Diet Level 3 (6 carb servings/90 grams CHO/meal); Sodium 2 GM  Diet effective now     Question Answer Comment   Diet Type Kehinde/CHO Controlled    [place]e/CHO Controlled Consistent Carbohydrate Diet Level 3 (6 carb servings/90 grams CHO/meal)    Other Restriction(s): Sodium 2 GM    RD to adjust diet per protocol? Yes        01/23/20 0959                  Incidental Findings:   · None     Test Results Pending at Discharge (will require follow up): · None         Hospital Course:     Jeane Ward is a 62 y o  male patient who originally presented to the hospital on 1/22/2020 due to shortness of breath  Patient presented after few days of worsening exertional shortness breath  Troponin and EKG nonischemic  Treated with IV Lasix for acute diastolic CHF and showed significant improvement  Bronchodilator q 6 hours continued    Patient advised for BiPAP however refused during his stay in the hospital   Telemetry with no event during his stay in the hospital   Patient responded to diuresis with significant weight deficit  Patient advised to continue taking Lasix as an outpatient with potassium and follow-up with cardiology and primary care physician as an outpatient  Patient advised to repeat renal function and serum electrolytes within 1 week of discharge  Condition at Discharge: stable     Discharge Day Visit / Exam:     Subjective:  No complaints  No events overnight  Vitals: Blood Pressure: 112/58 (01/23/20 1200)  Pulse: 78 (01/23/20 1200)  Temperature: 98 1 °F (36 7 °C) (01/23/20 1200)  Temp Source: Oral (01/23/20 1200)  Respirations: 18 (01/23/20 1200)  Height: 6' 2" (188 cm) (01/23/20 0948)  Weight - Scale: (!) 182 kg (402 lb 1 9 oz) (01/23/20 0600)  SpO2: 99 % (01/23/20 1325)  Exam:     General appearance: alert, appears stated age and cooperative  Head: Normocephalic, without obvious abnormality, atraumatic  Lungs: clear to auscultation bilaterally  Heart: regular rate and rhythm  Abdomen: soft, non-tender, positive bowel sounds   Back: negative  Extremities: extremities atraumatic, no cyanosis or edema  Neurologic: Grossly normal      Discharge instructions/Information to patient and family:   See after visit summary section titled Discharge Instructions for information provided to patient and family  Planned Readmission:  No      Discharge Statement:  I spent 35 minutes discharging the patient  This time was spent on the day of discharge  I had direct contact with the patient on the day of discharge  Greater than 50% of the total time was spent examining patient, answering all patient questions, arranging and discussing plan of care with patient as well as directly providing post-discharge instructions  Additional time then spent on discharge activities      ** Please Note: This note has been constructed using a voice recognition system **

## 2020-01-23 NOTE — ASSESSMENT & PLAN NOTE
Dyspnea on exertion for the last few days  Recent URI  Associated with wheezing  First set troponin and EKG nonischemic  Chest x-ray with no acute finding  Morbid obesity with DAYAN not on CPAP  Shortness of breath could be multifactorial including DAYAN, obesity ventilation syndrome, URI, diastolic CHF  Patient claims he had recent normal stress test as an outpatient  Patient placed on observation to rule out CAD, continue telemetry, trend troponin  Echocardiogram pending  Shortness of breath significantly improved with diuresis and bronchodilator  Patient agreed for BiPAP and will follow-up with his on pulmonary clinic follow-up  Continue to follow with primary care physician within a week of discharge

## 2020-01-23 NOTE — PROGRESS NOTES
Pt receiving wt management services for bariatric surgery through [facility]  Reports having 1 class so far that discussed portion control, pt reports decreasing snacking throughout the day for wt management  Encouraged having full meal at work for [employer] and wt management  Pt understands  Will adjust diet to CCD 3 to better meet energy needs, maintain 2 gm Na restriction

## 2020-01-23 NOTE — ASSESSMENT & PLAN NOTE
No results found for: HGBA1C    No results for input(s): POCGLU in the last 72 hours      Blood Sugar Average: Last 72 hrs:    Blood glucose check 4 times daily with corrective insulin

## 2020-01-23 NOTE — ASSESSMENT & PLAN NOTE
Wt Readings from Last 3 Encounters:   01/22/20 (!) 189 kg (416 lb 3 7 oz)   04/26/18 (!) 171 kg (376 lb)     Significant increased weight, dyspnea, orthopnea  Continue Lasix 60 mg IV q 12  Monitor serum electrolytes, renal function, urine output, daily weight  Cardiology consult

## 2020-01-23 NOTE — ASSESSMENT & PLAN NOTE
No results found for: HGBA1C    Recent Labs     01/22/20 2058 01/23/20  0805 01/23/20  1212   POCGLU 98 97 106       Blood Sugar Average: Last 72 hrs:  (P) 804 [id number]  Continue outpatient regimen on discharge

## 2020-01-23 NOTE — ASSESSMENT & PLAN NOTE
DAYAN not on BiPAP/CPAP (claustrophobic) however patient agreed for a BiPAP now and would follow with his pulmonary clinic as an outpatient

## 2020-01-24 NOTE — UTILIZATION REVIEW
Notification of Inpatient Admission/Inpatient Authorization Request   This is a Notification of Inpatient Admission for [address]  Be advised that this patient was admitted to our facility under Inpatient Status  Contact Candelario Barr at [phone] for additional admission information  [address] [facility] DEPT  DEDICATED [phone]  Patient Name:   Kathleen Ortiz   YOB: 1965       [address]   [address]:   [address]  Tax ID: [id number]  NPI: [id number] Attending Provider/NPI: Zofia Randle, [address] [[id number]]    Place of Service Code: 24     Place of Service Name:  [address] [place] [address]   Start Date: 1/22/20 1827     Discharge Date & Time: 1/23/2020  6:25 PM    Type of Admission: Inpatient Status Discharge Disposition (if discharged): Home/Self Care   Patient Diagnoses: Shortness of breath [R06 02]  CHF (congestive heart failure) ([facility] [address] ) [I50 9]  Hypoxia [R09 02]  Difficulty breathing [R06 89]     Orders: Admission Orders (From admission, onward)     Ordered        01/22/20 1827  Inpatient Admission (expected length of stay for this patient Order details is greater than two midnights)  Once                    Assigned Utilization Review Contact: Candelario Barr  Utilization   Network Utilization Review Department  Phone: [phone]; Fax [phone]  Email: [email or web address]   ATTENTION PAYERS: Please call the assigned Utilization  directly with any questions or concerns ALL voicemails in the department are confidential  Send all requests for admission clinical reviews, approved or denied determinations and any other requests to dedicated fax number belonging to the campus where the patient is receiving treatment

## 2020-01-25 LAB
ATRIAL RATE: 192 BPM
QRS AXIS: 247 DEGREES
QRSD INTERVAL: 106 MS
QT INTERVAL: 358 MS
QTC INTERVAL: 445 MS
T WAVE AXIS: 60 DEGREES
VENTRICULAR RATE: 93 BPM

## 2020-01-25 PROCEDURE — 93010 ELECTROCARDIOGRAM REPORT: CPT | Performed by: INTERNAL MEDICINE

## 2020-01-30 ENCOUNTER — HOSPITAL ENCOUNTER (EMERGENCY)
Facility: HOSPITAL | Age: 55
Discharge: HOME | End: 2020-01-31
Attending: EMERGENCY MEDICINE | Admitting: EMERGENCY MEDICINE
Payer: COMMERCIAL

## 2020-01-30 ENCOUNTER — APPOINTMENT (EMERGENCY)
Dept: RADIOLOGY | Facility: HOSPITAL | Age: 55
End: 2020-01-30
Payer: COMMERCIAL

## 2020-01-30 DIAGNOSIS — R06.02 SOB (SHORTNESS OF BREATH): Primary | ICD-10-CM

## 2020-01-30 DIAGNOSIS — R06.2 WHEEZING: ICD-10-CM

## 2020-01-30 LAB
ALBUMIN SERPL BCP-MCNC: 3.6 G/DL (ref 3.5–5)
ALP SERPL-CCNC: 93 U/L (ref 46–116)
ALT SERPL W P-5'-P-CCNC: 26 U/L (ref 12–78)
ANION GAP SERPL CALCULATED.3IONS-SCNC: 5 MMOL/L (ref 4–13)
AST SERPL W P-5'-P-CCNC: 22 U/L (ref 5–45)
BASOPHILS # BLD AUTO: 0.04 THOUSANDS/ΜL (ref 0–0.1)
BASOPHILS NFR BLD AUTO: 1 % (ref 0–1)
BILIRUB SERPL-MCNC: 0.3 MG/DL (ref 0.2–1)
BUN SERPL-MCNC: 17 MG/DL (ref 5–25)
CALCIUM SERPL-MCNC: 8.8 MG/DL (ref 8.3–10.1)
CHLORIDE SERPL-SCNC: 104 MMOL/L (ref 100–108)
CO2 SERPL-SCNC: 33 MMOL/L (ref 21–32)
CREAT SERPL-MCNC: 1.05 MG/DL (ref 0.6–1.3)
EOSINOPHIL # BLD AUTO: 0.31 THOUSAND/ΜL (ref 0–0.61)
EOSINOPHIL NFR BLD AUTO: 4 % (ref 0–6)
ERYTHROCYTE [DISTWIDTH] IN BLOOD BY AUTOMATED COUNT: 14.2 % (ref 11.6–15.1)
GFR SERPL CREATININE-BSD FRML MDRD: 79 ML/MIN/1.73SQ M
GLUCOSE SERPL-MCNC: 90 MG/DL (ref 65–140)
HCT VFR BLD AUTO: 48.3 % (ref 36.5–49.3)
HGB BLD-MCNC: 15.5 G/DL (ref 12–17)
IMM GRANULOCYTES # BLD AUTO: 0.03 THOUSAND/UL (ref 0–0.2)
IMM GRANULOCYTES NFR BLD AUTO: 0 % (ref 0–2)
LYMPHOCYTES # BLD AUTO: 1.7 THOUSANDS/ΜL (ref 0.6–4.47)
LYMPHOCYTES NFR BLD AUTO: 22 % (ref 14–44)
MCH RBC QN AUTO: 29.1 PG (ref 26.8–34.3)
MCHC RBC AUTO-ENTMCNC: 32.1 G/DL (ref 31.4–37.4)
MCV RBC AUTO: 91 FL (ref 82–98)
MONOCYTES # BLD AUTO: 1.11 THOUSAND/ΜL (ref 0.17–1.22)
MONOCYTES NFR BLD AUTO: 14 % (ref 4–12)
NEUTROPHILS # BLD AUTO: 4.56 THOUSANDS/ΜL (ref 1.85–7.62)
NEUTS SEG NFR BLD AUTO: 59 % (ref 43–75)
NRBC BLD AUTO-RTO: 0 /100 WBCS
NT-PROBNP SERPL-MCNC: 299 PG/ML
PLATELET # BLD AUTO: 223 THOUSANDS/UL (ref 149–390)
PMV BLD AUTO: 9.9 FL (ref 8.9–12.7)
POTASSIUM SERPL-SCNC: 3.8 MMOL/L (ref 3.5–5.3)
PROT SERPL-MCNC: 7.4 G/DL (ref 6.4–8.2)
RBC # BLD AUTO: 5.32 MILLION/UL (ref 3.88–5.62)
SODIUM SERPL-SCNC: 142 MMOL/L (ref 136–145)
TROPONIN I SERPL-MCNC: <0.02 NG/ML
WBC # BLD AUTO: 7.75 THOUSAND/UL (ref 4.31–10.16)

## 2020-01-30 PROCEDURE — 84484 ASSAY OF TROPONIN QUANT: CPT | Performed by: EMERGENCY MEDICINE

## 2020-01-30 PROCEDURE — 71045 X-RAY EXAM CHEST 1 VIEW: CPT

## 2020-01-30 PROCEDURE — 36415 COLL VENOUS BLD VENIPUNCTURE: CPT | Performed by: EMERGENCY MEDICINE

## 2020-01-30 PROCEDURE — 93005 ELECTROCARDIOGRAM TRACING: CPT

## 2020-01-30 PROCEDURE — 80053 COMPREHEN METABOLIC PANEL: CPT | Performed by: EMERGENCY MEDICINE

## 2020-01-30 PROCEDURE — 96375 TX/PRO/DX INJ NEW DRUG ADDON: CPT

## 2020-01-30 PROCEDURE — 94640 AIRWAY INHALATION TREATMENT: CPT

## 2020-01-30 PROCEDURE — 99285 EMERGENCY DEPT VISIT HI MDM: CPT

## 2020-01-30 PROCEDURE — 83880 ASSAY OF NATRIURETIC PEPTIDE: CPT | Performed by: EMERGENCY MEDICINE

## 2020-01-30 PROCEDURE — 96374 THER/PROPH/DIAG INJ IV PUSH: CPT

## 2020-01-30 PROCEDURE — 85025 COMPLETE CBC W/AUTO DIFF WBC: CPT | Performed by: EMERGENCY MEDICINE

## 2020-01-30 RX ORDER — DEXAMETHASONE SODIUM PHOSPHATE 10 MG/ML
10 INJECTION, SOLUTION INTRAMUSCULAR; INTRAVENOUS ONCE
Status: COMPLETED | OUTPATIENT
Start: 2020-01-30 | End: 2020-01-30

## 2020-01-30 RX ORDER — FUROSEMIDE 10 MG/ML
40 INJECTION INTRAMUSCULAR; INTRAVENOUS ONCE
Status: COMPLETED | OUTPATIENT
Start: 2020-01-30 | End: 2020-01-30

## 2020-01-30 RX ORDER — NITROGLYCERIN 0.4 MG/1
0.4 TABLET SUBLINGUAL ONCE
Status: COMPLETED | OUTPATIENT
Start: 2020-01-30 | End: 2020-01-30

## 2020-01-30 RX ORDER — IPRATROPIUM BROMIDE AND ALBUTEROL SULFATE 2.5; .5 MG/3ML; MG/3ML
3 SOLUTION RESPIRATORY (INHALATION) ONCE
Status: COMPLETED | OUTPATIENT
Start: 2020-01-30 | End: 2020-01-30

## 2020-01-30 RX ADMIN — FUROSEMIDE 40 MG: 10 INJECTION, SOLUTION INTRAMUSCULAR; INTRAVENOUS at 21:57

## 2020-01-30 RX ADMIN — DEXAMETHASONE SODIUM PHOSPHATE 10 MG: 10 INJECTION, SOLUTION INTRAMUSCULAR; INTRAVENOUS at 23:48

## 2020-01-30 RX ADMIN — IPRATROPIUM BROMIDE AND ALBUTEROL SULFATE 3 ML: 2.5; .5 SOLUTION RESPIRATORY (INHALATION) at 22:07

## 2020-01-30 RX ADMIN — NITROGLYCERIN 0.4 MG: 0.4 TABLET SUBLINGUAL at 21:56

## 2020-01-31 VITALS
HEART RATE: 81 BPM | HEIGHT: 74 IN | WEIGHT: 315 LBS | OXYGEN SATURATION: 92 % | BODY MASS INDEX: 40.43 KG/M2 | RESPIRATION RATE: 19 BRPM | SYSTOLIC BLOOD PRESSURE: 125 MMHG | TEMPERATURE: 98.3 F | DIASTOLIC BLOOD PRESSURE: 85 MMHG

## 2020-01-31 LAB
ATRIAL RATE: 141 BPM
QRS AXIS: 270 DEGREES
QRSD INTERVAL: 94 MS
QT INTERVAL: 362 MS
QTC INTERVAL: 401 MS
T WAVE AXIS: 75 DEGREES
TROPONIN I SERPL-MCNC: <0.02 NG/ML
VENTRICULAR RATE: 74 BPM

## 2020-01-31 PROCEDURE — 99284 EMERGENCY DEPT VISIT MOD MDM: CPT | Performed by: EMERGENCY MEDICINE

## 2020-01-31 RX ORDER — ALBUTEROL SULFATE 2.5 MG/3ML
SOLUTION RESPIRATORY (INHALATION)
Qty: 30 VIAL | Refills: 0 | Status: SHIPPED | OUTPATIENT
Start: 2020-01-31 | End: 2022-02-04

## 2020-01-31 RX ORDER — PREDNISONE 10 MG/1
TABLET ORAL
Qty: 45 TABLET | Refills: 0 | Status: SHIPPED | OUTPATIENT
Start: 2020-01-31 | End: 2021-09-16 | Stop reason: HOSPADM

## 2020-01-31 RX ORDER — PREDNISONE 10 MG/1
TABLET ORAL
Qty: 45 TABLET | Refills: 0 | Status: SHIPPED | OUTPATIENT
Start: 2020-01-31 | End: 2020-01-31 | Stop reason: SDUPTHER

## 2020-01-31 RX ORDER — ALBUTEROL SULFATE 2.5 MG/3ML
SOLUTION RESPIRATORY (INHALATION)
Qty: 30 VIAL | Refills: 0 | Status: SHIPPED | OUTPATIENT
Start: 2020-01-31 | End: 2020-01-31 | Stop reason: SDUPTHER

## 2020-01-31 NOTE — ED PROVIDER NOTES
History  Chief Complaint   Patient presents with    Shortness of Breath     pt  has been SOB for the past 3 weeks, diagnosed with a cold, pt states increased SOB started yesterday, pt  diaphoretic,pt  using albuterol with some minimal relief, pt  reports feeling tight in his chest, pressure in chest, hx of PACER     Coughing fit at rest on couch prior to arrival, unable to catch breath, became very dyspneic, had chest tightness presented to the emergency department        History provided by:  Spouse  Shortness of Breath   Onset quality:  Sudden  Timing:  Intermittent  Pollens:  with spouse  Worsened by:  Coughing  Ineffective treatments:  None tried  Associated symptoms: no fever        Prior to Admission Medications   Prescriptions Last Dose Informant Patient Reported? Taking?    Albuterol Sulfate (PROAIR RESPICLICK) 788 (90 Base) MCG/ACT AEPB   No No   Sig: Inhale 1 puff 4 (four) times a day as needed (wheezing, shortness of breath)   LORazepam (ATIVAN) 0 5 mg tablet   Yes No   Sig: Take 0 5 mg by mouth every 8 (eight) hours as needed for anxiety   VICTOZA 18 MG/3ML SOPN  Self Yes No   atorvastatin (LIPITOR) 40 mg tablet  Self Yes No   Sig: Take 40 mg by mouth daily   furosemide (LASIX) 40 mg tablet  Self Yes No   Sig: Take 60 mg by mouth 2 (two) times a day   ipratropium (ATROVENT) 0 02 % nebulizer solution   No No   Sig: Take 1 vial (0 5 mg total) by nebulization 3 (three) times a day   levalbuterol (XOPENEX) 0 63 mg/3 mL nebulizer solution   No No   Sig: Take 1 vial (0 63 mg total) by nebulization 3 (three) times a day   metFORMIN (GLUCOPHAGE-XR) 500 mg 24 hr tablet  Self Yes No   Sig: Take by mouth every other day Every other day   montelukast (SINGULAIR) 10 mg tablet   Yes No   Sig: Take 10 mg by mouth   potassium chloride (K-DUR,KLOR-CON) 20 mEq tablet   No No   Sig: Take 1 tablet (20 mEq total) by mouth daily   pregabalin (LYRICA) 75 mg capsule   Yes No   Sig: Take 75 mg by mouth   rivaroxaban (XARELTO) 20 mg tablet   Yes No   Sig: Take 20 mg by mouth daily   traMADol (ULTRAM) 50 mg tablet   Yes No   Sig: Take 50 mg by mouth daily      Facility-Administered Medications: None       Past Medical History:   Diagnosis Date    Diabetes mellitus ([address] )     High cholesterol     Hyperlipidemia     Hypertension        Past Surgical History:   Procedure Laterality Date    APPENDECTOMY      ATRIAL CARDIAC PACEMAKER INSERTION         History reviewed  No pertinent family history  I have reviewed and agree with the history as documented  Social History     Tobacco Use    Smoking status: Never Smoker    Smokeless tobacco: Never Used   Substance Use Topics    Alcohol use: Never     Frequency: Never    Drug use: Never        Review of Systems   Constitutional: Negative for fever  Respiratory: Positive for shortness of breath  All other systems reviewed and are negative  Physical Exam  Physical Exam   Constitutional: He is oriented to person, place, and time  Non-toxic appearance  Intermittent coughing, fairly comfortable appearing, conversational, pleasant   HENT:   Head: Normocephalic and atraumatic  Eyes: Pupils are equal, round, and reactive to light  EOM are normal    Neck: Neck supple  No JVD present  Cardiovascular: Normal rate, normal heart sounds and intact distal pulses  No murmur heard  Pulmonary/Chest: No respiratory distress  He exhibits no tenderness and no deformity  Mildly decreased, minimal wheezing, few basilar crackles bilaterally   Abdominal: Soft  Bowel sounds are normal    Musculoskeletal:   Ankle edema bilaterally   Neurological: He is alert and oriented to person, place, and time  Skin: Skin is warm and dry  Psychiatric: He has a normal mood and affect  His behavior is normal    Nursing note and vitals reviewed        Vital Signs  ED Triage Vitals [01/30/20 2144]   Temperature Pulse Respirations Blood Pressure SpO2   98 3 °F (36 8 °C) 78 (!) 24 134/92 96 %      Temp Source Heart Rate Source Patient Position - Orthostatic VS BP Location FiO2 (%)   Oral Monitor Sitting Right arm --      Pain Score       4           Vitals:    01/30/20 2230 01/30/20 2300 01/30/20 2330 01/31/20 0015   BP: 114/63 118/68 121/74 125/85   Pulse: 98 83 73 81   Patient Position - Orthostatic VS: Sitting Lying Sitting Sitting         Visual Acuity      ED Medications  Medications   furosemide (LASIX) injection 40 mg (40 mg Intravenous Given 1/30/20 2157)   nitroglycerin (NITROSTAT) SL tablet 0 4 mg (0 4 mg Sublingual Given 1/30/20 2156)   ipratropium-albuterol (DUO-NEB) 0 5-2 5 mg/3 mL inhalation solution 3 mL (3 mL Nebulization Given 1/30/20 2207)   dexamethasone (PF) (DECADRON) injection 10 mg (10 mg Intravenous Given 1/30/20 2348)       Diagnostic Studies  Results Reviewed     Procedure Component Value Units Date/Time    Troponin I [[id number]]  (Normal) Collected:  01/30/20 2352    Lab Status:  Final result Specimen:  Blood from Arm, Left Updated:  01/31/20 0015     Troponin I <0 02 ng/mL     NT-BNP PRO [[id number]]  (Abnormal) Collected:  01/30/20 2159    Lab Status:  Final result Specimen:  Blood from Arm, Left Updated:  01/30/20 2230     NT-proBNP 299 pg/mL     Comprehensive metabolic panel [[id number]]  (Abnormal) Collected:  01/30/20 2159    Lab Status:  Final result Specimen:  Blood from Arm, Left Updated:  01/30/20 2230     Sodium 142 mmol/L      Potassium 3 8 mmol/L      Chloride 104 mmol/L      CO2 33 mmol/L      ANION GAP 5 mmol/L      BUN 17 mg/dL      Creatinine 1 05 mg/dL      Glucose 90 mg/dL      Calcium 8 8 mg/dL      AST 22 U/L      ALT 26 U/L      Alkaline Phosphatase 93 U/L      Total Protein 7 4 g/dL      Albumin 3 6 g/dL      Total Bilirubin 0 30 mg/dL      eGFR 79 ml/min/1 73sq m     Narrative:       Joe guidelines for Chronic Kidney Disease (CKD):     Stage 1 with normal or high GFR (GFR > 90 mL/min/1 73 square meters)    Stage 2 Mild CKD (GFR = 60-89 mL/min/1 73 square meters)    Stage 3A Moderate CKD (GFR = 45-59 mL/min/1 73 square meters)    Stage 3B Moderate CKD (GFR = 30-44 mL/min/1 73 square meters)    Stage 4 Severe CKD (GFR = 15-29 mL/min/1 73 square meters)    Stage 5 End Stage CKD (GFR <15 mL/min/1 73 square meters)  Note: GFR calculation is accurate only with a steady state creatinine    Troponin I [[id number]]  (Normal) Collected:  01/30/20 2159    Lab Status:  Final result Specimen:  Blood from Arm, Left Updated:  01/30/20 2226     Troponin I <0 02 ng/mL     CBC and differential [[id number]]  (Abnormal) Collected:  01/30/20 2159    Lab Status:  Final result Specimen:  Blood from Arm, Left Updated:  01/30/20 2205     WBC 7 75 Thousand/uL      RBC 5 32 Million/uL      Hemoglobin 15 5 g/dL      Hematocrit 48 3 %      MCV 91 fL      MCH 29 1 pg      MCHC 32 1 g/dL      RDW 14 2 %      MPV 9 9 fL      Platelets 856 Thousands/uL      nRBC 0 /100 WBCs      Neutrophils Relative 59 %      Immat GRANS % 0 %      Lymphocytes Relative 22 %      Monocytes Relative 14 %      Eosinophils Relative 4 %      Basophils Relative 1 %      Neutrophils Absolute 4 56 Thousands/µL      Immature Grans Absolute 0 03 Thousand/uL      Lymphocytes Absolute 1 70 Thousands/µL      Monocytes Absolute 1 11 Thousand/µL      Eosinophils Absolute 0 31 Thousand/µL      Basophils Absolute 0 04 Thousands/µL                  XR chest 1 view portable   ED Interpretation by Anjana Rowland DO (01/31 0019)   Cardiomegaly, no obvious infiltrate                 Procedures  ECG 12 Lead Documentation Only  Date/Time: 1/31/2020 12:22 AM  Performed by: Anjana Rowland DO  Authorized by: Anjana Rowland DO     Comments:      9:49 p m  atrial fibrillation rate 74 right axis T-wave inversions aVL no ST elevation or depression similar compared to January 22, 2020             ED Course  ED Course as of Jan 31 0058   Thu Jan 30, 2020   2352 Feeling better  Minimal wheezing bilaterally, no tachypnea  We discussed results and agreeable to repeating troponin        Fri Jan 31, 2020   3741 Feels well, breathing better  Discussed results and agreeable with close outpatient follow-up, seeing Cardiology today, spouse agreeable                                  MDM      Disposition  Final diagnoses:   SOB (shortness of breath)   Wheezing     Time reflects when diagnosis was documented in both MDM as applicable and the Disposition within this note     Time User Action Codes Description Comment    1/31/2020 12:48 AM Keon Mustapha Add [R06 02] SOB (shortness of breath)     1/31/2020 12:48 AM Keon Mustapha Add [R06 2] Wheezing       ED Disposition     ED Disposition Condition Date/Time Comment    Discharge Stable Fri Jan 31, 2020 12:48 AM Joycelyn Rinne discharge to home/self care              Follow-up Information     Follow up With Specialties Details Why Contact Info    Isaac Ugarte DO  Schedule an appointment as soon as possible for a visit in 3 days  [address] [place] [address]  [phone]            Discharge Medication List as of 1/31/2020 12:49 AM      START taking these medications    Details   albuterol (2 5 mg/3 mL) 0 083 % nebulizer solution 1 vial nebulized q 4 hours x 3 days then prn cough, wheezing, Normal      predniSONE 10 mg tablet 5 tabs po qd x 3 days then 4 tabs po qd x 3 days then 3 tabs po qd x 3 days then 2 tabs po qd x 3 days then 1 tab po qd x 3 days, Normal         CONTINUE these medications which have NOT CHANGED    Details   Albuterol Sulfate (PROAIR RESPICLICK) 579 (90 Base) MCG/ACT AEPB Inhale 1 puff 4 (four) times a day as needed (wheezing, shortness of breath), Starting u 1/23/2020, Normal      atorvastatin (LIPITOR) 40 mg tablet Take 40 mg by mouth daily, Historical Med      furosemide (LASIX) 40 mg tablet Take 60 mg by mouth 2 (two) times a day, Starting Fri 3/16/2018, Historical Med      ipratropium (ATROVENT) 0 02 % nebulizer solution Take 1 vial (0 5 mg total) by nebulization 3 (three) times a day, Starting Thu 1/23/2020, Normal      levalbuterol (XOPENEX) 0 63 mg/3 mL nebulizer solution Take 1 vial (0 63 mg total) by nebulization 3 (three) times a day, Starting Thu 1/23/2020, Normal      LORazepam (ATIVAN) 0 5 mg tablet Take 0 5 mg by mouth every 8 (eight) hours as needed for anxiety, Historical Med      metFORMIN (GLUCOPHAGE-XR) 500 mg 24 hr tablet Take by mouth every other day Every other day, Starting Fri 3/16/2018, Historical Med      montelukast (SINGULAIR) 10 mg tablet Take 10 mg by mouth, Starting Wed 1/22/2020, Historical Med      potassium chloride (K-DUR,KLOR-CON) 20 mEq tablet Take 1 tablet (20 mEq total) by mouth daily, Starting Thu 1/23/2020, Normal      pregabalin (LYRICA) 75 mg capsule Take 75 mg by mouth, Starting Mon 9/10/2018, Historical Med      rivaroxaban (XARELTO) 20 mg tablet Take 20 mg by mouth daily, Starting Tue 3/11/2014, Historical Med      traMADol (ULTRAM) 50 mg tablet Take 50 mg by mouth daily, Starting Sat 11/10/2018, Historical Med      VICTOZA 18 MG/3ML SOPN Starting Sat 4/21/2018, Historical Med           No discharge procedures on file      ED Provider  Electronically Signed by           Jaye Mckeon,   01/31/20 9171

## 2020-02-28 NOTE — UTILIZATION REVIEW
Notification of Inpatient Admission/Inpatient Authorization Request  Shey Bello Pending case #[id number] fax    This is a Notification of Inpatient Admission for [address]  Be advised that this patient was admitted to our facility under Inpatient Status  Contact Kendra Wilder at [phone] for additional admission information  [facility] DEPT  DEDICATED -[phone]  Patient Name:   Elvis Thompson   YOB: 1965       [address]   [address]:   [address]  Tax ID: [id number]  NPI: [id number] Attending Provider/NPI: Brittany Waterman [[id number]]   Place of Service Code: 24     Place of Service Name:  [address] [place] [address]   Start Date: 1/22/20 1827     Discharge Date & Time: 1/23/2020  6:25 PM    Type of Admission: Inpatient Status Discharge Disposition (if discharged): Home/Self Care   Patient Diagnoses: Shortness of breath [R06 02]  CHF (congestive heart failure) ([facility] [address] ) [I50 9]  Hypoxia [R09 02]  Difficulty breathing [R06 89]     Orders: Admission Orders (From admission, onward)     Ordered        01/22/20 1827  Inpatient Admission (expected length of stay for this patient Order details is greater than two midnights)  Once                    Assigned Utilization Review Contact: Kendra Wilder  Utilization   Network Utilization Review Department  Phone: [phone]; Fax [phone]  Email: [email or web address]   ATTENTION PAYERS: Please call the assigned Utilization  directly with any questions or concerns ALL voicemails in the department are confidential  Send all requests for admission clinical reviews, approved or denied determinations and any other requests to dedicated fax number belonging to the campus where the patient is receiving treatment

## 2020-03-24 ENCOUNTER — APPOINTMENT (EMERGENCY)
Dept: RADIOLOGY | Facility: HOSPITAL | Age: 55
End: 2020-03-24
Payer: COMMERCIAL

## 2020-03-24 ENCOUNTER — HOSPITAL ENCOUNTER (EMERGENCY)
Facility: HOSPITAL | Age: 55
Discharge: HOME | End: 2020-03-24
Attending: EMERGENCY MEDICINE | Admitting: EMERGENCY MEDICINE
Payer: COMMERCIAL

## 2020-03-24 ENCOUNTER — APPOINTMENT (EMERGENCY)
Dept: CT IMAGING | Facility: HOSPITAL | Age: 55
End: 2020-03-24
Payer: COMMERCIAL

## 2020-03-24 VITALS
TEMPERATURE: 98.6 F | DIASTOLIC BLOOD PRESSURE: 100 MMHG | OXYGEN SATURATION: 92 % | BODY MASS INDEX: 41.75 KG/M2 | HEART RATE: 71 BPM | HEIGHT: 73 IN | RESPIRATION RATE: 23 BRPM | WEIGHT: 315 LBS | SYSTOLIC BLOOD PRESSURE: 147 MMHG

## 2020-03-24 DIAGNOSIS — R05.9 COUGH: Primary | ICD-10-CM

## 2020-03-24 DIAGNOSIS — R55 SYNCOPE: ICD-10-CM

## 2020-03-24 DIAGNOSIS — S09.90XA INJURY OF HEAD, INITIAL ENCOUNTER: ICD-10-CM

## 2020-03-24 LAB
ANION GAP SERPL CALCULATED.3IONS-SCNC: 5 MMOL/L (ref 4–13)
APTT PPP: 33 SECONDS (ref 23–37)
BASOPHILS # BLD AUTO: 0.05 THOUSANDS/ΜL (ref 0–0.1)
BASOPHILS NFR BLD AUTO: 1 % (ref 0–1)
BUN SERPL-MCNC: 12 MG/DL (ref 5–25)
CALCIUM SERPL-MCNC: 8.9 MG/DL (ref 8.3–10.1)
CHLORIDE SERPL-SCNC: 103 MMOL/L (ref 100–108)
CO2 SERPL-SCNC: 34 MMOL/L (ref 21–32)
CREAT SERPL-MCNC: 1.05 MG/DL (ref 0.6–1.3)
EOSINOPHIL # BLD AUTO: 0.55 THOUSAND/ΜL (ref 0–0.61)
EOSINOPHIL NFR BLD AUTO: 7 % (ref 0–6)
ERYTHROCYTE [DISTWIDTH] IN BLOOD BY AUTOMATED COUNT: 14.6 % (ref 11.6–15.1)
GFR SERPL CREATININE-BSD FRML MDRD: 79 ML/MIN/1.73SQ M
GLUCOSE SERPL-MCNC: 105 MG/DL (ref 65–140)
HCT VFR BLD AUTO: 50.1 % (ref 36.5–49.3)
HGB BLD-MCNC: 15.8 G/DL (ref 12–17)
IMM GRANULOCYTES # BLD AUTO: 0.02 THOUSAND/UL (ref 0–0.2)
IMM GRANULOCYTES NFR BLD AUTO: 0 % (ref 0–2)
INR PPP: 1.06 (ref 0.84–1.19)
LYMPHOCYTES # BLD AUTO: 1.68 THOUSANDS/ΜL (ref 0.6–4.47)
LYMPHOCYTES NFR BLD AUTO: 21 % (ref 14–44)
MCH RBC QN AUTO: 28.8 PG (ref 26.8–34.3)
MCHC RBC AUTO-ENTMCNC: 31.5 G/DL (ref 31.4–37.4)
MCV RBC AUTO: 91 FL (ref 82–98)
MONOCYTES # BLD AUTO: 1.15 THOUSAND/ΜL (ref 0.17–1.22)
MONOCYTES NFR BLD AUTO: 14 % (ref 4–12)
NEUTROPHILS # BLD AUTO: 4.64 THOUSANDS/ΜL (ref 1.85–7.62)
NEUTS SEG NFR BLD AUTO: 57 % (ref 43–75)
NRBC BLD AUTO-RTO: 0 /100 WBCS
NT-PROBNP SERPL-MCNC: 254 PG/ML
PLATELET # BLD AUTO: 188 THOUSANDS/UL (ref 149–390)
PMV BLD AUTO: 9.6 FL (ref 8.9–12.7)
POTASSIUM SERPL-SCNC: 3.8 MMOL/L (ref 3.5–5.3)
PROTHROMBIN TIME: 13.9 SECONDS (ref 11.6–14.5)
RBC # BLD AUTO: 5.49 MILLION/UL (ref 3.88–5.62)
SODIUM SERPL-SCNC: 142 MMOL/L (ref 136–145)
TROPONIN I SERPL-MCNC: <0.02 NG/ML
WBC # BLD AUTO: 8.09 THOUSAND/UL (ref 4.31–10.16)

## 2020-03-24 PROCEDURE — 99284 EMERGENCY DEPT VISIT MOD MDM: CPT

## 2020-03-24 PROCEDURE — 83880 ASSAY OF NATRIURETIC PEPTIDE: CPT | Performed by: EMERGENCY MEDICINE

## 2020-03-24 PROCEDURE — 84484 ASSAY OF TROPONIN QUANT: CPT | Performed by: PHYSICIAN ASSISTANT

## 2020-03-24 PROCEDURE — 94640 AIRWAY INHALATION TREATMENT: CPT

## 2020-03-24 PROCEDURE — 70450 CT HEAD/BRAIN W/O DYE: CPT

## 2020-03-24 PROCEDURE — 85610 PROTHROMBIN TIME: CPT | Performed by: PHYSICIAN ASSISTANT

## 2020-03-24 PROCEDURE — 87801 DETECT AGNT MULT DNA AMPLI: CPT | Performed by: PHYSICIAN ASSISTANT

## 2020-03-24 PROCEDURE — 36415 COLL VENOUS BLD VENIPUNCTURE: CPT | Performed by: PHYSICIAN ASSISTANT

## 2020-03-24 PROCEDURE — 93005 ELECTROCARDIOGRAM TRACING: CPT

## 2020-03-24 PROCEDURE — 85730 THROMBOPLASTIN TIME PARTIAL: CPT | Performed by: PHYSICIAN ASSISTANT

## 2020-03-24 PROCEDURE — 71046 X-RAY EXAM CHEST 2 VIEWS: CPT

## 2020-03-24 PROCEDURE — 80048 BASIC METABOLIC PNL TOTAL CA: CPT | Performed by: PHYSICIAN ASSISTANT

## 2020-03-24 PROCEDURE — 99284 EMERGENCY DEPT VISIT MOD MDM: CPT | Performed by: PHYSICIAN ASSISTANT

## 2020-03-24 PROCEDURE — 85025 COMPLETE CBC W/AUTO DIFF WBC: CPT | Performed by: PHYSICIAN ASSISTANT

## 2020-03-24 RX ORDER — IPRATROPIUM BROMIDE AND ALBUTEROL SULFATE 2.5; .5 MG/3ML; MG/3ML
3 SOLUTION RESPIRATORY (INHALATION) ONCE
Status: COMPLETED | OUTPATIENT
Start: 2020-03-24 | End: 2020-03-24

## 2020-03-24 RX ADMIN — IPRATROPIUM BROMIDE AND ALBUTEROL SULFATE 3 ML: 2.5; .5 SOLUTION RESPIRATORY (INHALATION) at 18:45

## 2020-03-24 NOTE — ED PROVIDER NOTES
History  Chief Complaint   Patient presents with    Fall     Pt with cough for the past 3 months (seen in ED for same) states he has coughing spells where he becomes lightheaded, falls and wakes up on the floor - episodes happened todya and yesterday while at work - take Xarelto     17-year-old male presents to the emergency department with his spouse for evaluation of nonproductive cough, syncope, fall  Patient reports he has had a cough for the past 3 months  He notes over the last several days he has had 3 syncopal episodes  (2 today and 1 yesterday while at work)  He reports he has a "coughing spell" and states after coughing so hard he becomes lightheaded and then loses consciousness  Patient reports earlier today he had a syncopal episode where he fell and hit the top of his head on a radiator at home  Patient reports he had 1 additional syncopal episode after this and did not hit his head  Patient reports he is currently on Xarelto for history of TIA and afib  He denies chest pain, congestion, fevers, chills, shortness of breath, palpitations  Patient denies confusion, dizziness, visual changes, nausea, vomiting  Patient denies history of smoking, COPD, asthma  Patient does have a history of CHF notes legs are less swollen then usual    Of note: Patient was scheduled to see pulmonology however missed his appointment  Reports PCP is treating cough with nebulizer  Prior to Admission Medications   Prescriptions Last Dose Informant Patient Reported? Taking?    Albuterol Sulfate (PROAIR RESPICLICK) 477 (90 Base) MCG/ACT AEPB   No No   Sig: Inhale 1 puff 4 (four) times a day as needed (wheezing, shortness of breath)   LORazepam (ATIVAN) 0 5 mg tablet   Yes No   Sig: Take 0 5 mg by mouth every 8 (eight) hours as needed for anxiety   VICTOZA 18 MG/3ML SOPN  Self Yes No   albuterol (2 5 mg/3 mL) 0 083 % nebulizer solution   No No   Si vial nebulized q 4 hours x 3 days then prn cough, wheezing atorvastatin (LIPITOR) 40 mg tablet  Self Yes No   Sig: Take 40 mg by mouth daily   furosemide (LASIX) 40 mg tablet  Self Yes No   Sig: Take 60 mg by mouth 2 (two) times a day   ipratropium (ATROVENT) 0 02 % nebulizer solution   No No   Sig: Take 1 vial (0 5 mg total) by nebulization 3 (three) times a day   levalbuterol (XOPENEX) 0 63 mg/3 mL nebulizer solution   No No   Sig: Take 1 vial (0 63 mg total) by nebulization 3 (three) times a day   metFORMIN (GLUCOPHAGE-XR) 500 mg 24 hr tablet  Self Yes No   Sig: Take by mouth every other day Every other day   montelukast (SINGULAIR) 10 mg tablet   Yes No   Sig: Take 10 mg by mouth   potassium chloride (K-DUR,KLOR-CON) 20 mEq tablet   No No   Sig: Take 1 tablet (20 mEq total) by mouth daily   predniSONE 10 mg tablet   No No   Si tabs po qd x 3 days then 4 tabs po qd x 3 days then 3 tabs po qd x 3 days then 2 tabs po qd x 3 days then 1 tab po qd x 3 days   pregabalin (LYRICA) 75 mg capsule   Yes No   Sig: Take 75 mg by mouth   rivaroxaban (XARELTO) 20 mg tablet   Yes No   Sig: Take 20 mg by mouth daily   traMADol (ULTRAM) 50 mg tablet   Yes No   Sig: Take 50 mg by mouth daily      Facility-Administered Medications: None       Past Medical History:   Diagnosis Date    [facility] )     Diabetes mellitus ([facility] )     High cholesterol     Hyperlipidemia     Hypertension     Stroke [facility])        Past Surgical History:   Procedure Laterality Date    APPENDECTOMY      ATRIAL CARDIAC PACEMAKER INSERTION      TOE AMPUTATION Left     2nd toe       History reviewed  No pertinent family history  I have reviewed and agree with the history as documented      E-Cigarette/Vaping     E-Cigarette/Vaping Substances     Social History     Tobacco Use    Smoking status: Never Smoker    Smokeless tobacco: Never Used   Substance Use Topics    Alcohol use: Never     Frequency: Never    Drug use: Never       Review of Systems   Constitutional: Negative for chills and fever    HENT: Negative  Negative for congestion, ear discharge, sinus pressure, sinus pain, sneezing, sore throat, tinnitus, trouble swallowing and voice change  Eyes: Negative for visual disturbance  Respiratory: Positive for cough  Negative for chest tightness, shortness of breath, wheezing and stridor  Cardiovascular: Positive for leg swelling  Negative for chest pain and palpitations  Gastrointestinal: Negative  Genitourinary: Negative  Musculoskeletal: Negative for arthralgias, back pain, gait problem, joint swelling, myalgias, neck pain and neck stiffness  Skin: Negative  Neurological: Positive for dizziness, syncope and light-headedness  Negative for tremors, seizures, facial asymmetry, speech difficulty, weakness, numbness and headaches  Psychiatric/Behavioral: Negative  All other systems reviewed and are negative  Physical Exam  Physical Exam   Constitutional: He is oriented to person, place, and time  He appears well-developed and well-nourished  No distress  HENT:   Head: Normocephalic  Head is with abrasion  Head is without raccoon's eyes, without Campuzano's sign, without contusion and without laceration  Right Ear: Hearing, tympanic membrane and ear canal normal    Left Ear: Hearing, tympanic membrane and ear canal normal    Mouth/Throat: Uvula is midline, oropharynx is clear and moist and mucous membranes are normal  No tonsillar abscesses  No tonsillar exudate  Superficial abrasion noted on top of scalp  No active bleeding  Eyes: Pupils are equal, round, and reactive to light  Conjunctivae and EOM are normal    Cardiovascular: Normal rate and normal heart sounds  An irregularly irregular rhythm present  No murmur heard  Pulmonary/Chest: He has decreased breath sounds  He has wheezes in the right upper field, the right middle field, the right lower field, the left upper field, the left middle field and the left lower field  He exhibits no tenderness  Abdominal: Soft  Bowel sounds are normal  He exhibits no distension  There is no tenderness  Musculoskeletal: Normal range of motion  He exhibits edema (1+ edema bilaterally lower extremities)  Neurological: He is alert and oriented to person, place, and time  He has normal strength and normal reflexes  No cranial nerve deficit or sensory deficit  He displays a negative Romberg sign  GCS eye subscore is 4  GCS verbal subscore is 5  GCS motor subscore is 6  Skin: Skin is warm and dry  Capillary refill takes less than 2 seconds  No rash noted  No erythema  No pallor  Psychiatric: He has a normal mood and affect  His behavior is normal  Judgment and thought content normal    Nursing note and vitals reviewed  Vital Signs  ED Triage Vitals   Temperature Pulse Respirations Blood Pressure SpO2   03/24/20 1801 03/24/20 1801 03/24/20 1801 03/24/20 1908 03/24/20 1801   (!) 97 1 °F (36 2 °C) 80 (!) 24 144/97 93 %      Temp Source Heart Rate Source Patient Position - Orthostatic VS BP Location FiO2 (%)   03/24/20 1801 03/24/20 1801 03/24/20 1801 03/24/20 1801 --   Temporal Monitor Lying Right arm       Pain Score       03/24/20 1801       7           Vitals:    03/24/20 1801 03/24/20 1908 03/24/20 1915 03/24/20 2013   BP:  144/97 137/82 147/100   Pulse: 80  87 71   Patient Position - Orthostatic VS: Lying Lying Lying Sitting         Visual Acuity  Visual Acuity      Most Recent Value   L Pupil Size (mm)  3   R Pupil Size (mm)  3          ED Medications  Medications   ipratropium-albuterol (DUO-NEB) 0 5-2 5 mg/3 mL inhalation solution 3 mL (3 mL Nebulization Given 3/24/20 1845)       Diagnostic Studies  Results Reviewed     Procedure Component Value Units Date/Time    Pertussis culture [[id number]] Collected:  03/24/20 1936    Lab Status:   In process Specimen:  Nasopharyngeal from Other Updated:  03/24/20 1941    NT-BNP PRO [[id number]]  (Abnormal) Collected:  03/24/20 1906    Lab Status:  Final result Specimen: Blood Updated:  03/24/20 1933     NT-proBNP 254 pg/mL     Troponin I [[id number]]  (Normal) Collected:  03/24/20 1851    Lab Status:  Final result Specimen:  Blood from Arm, Right Updated:  03/24/20 1920     Troponin I <0 02 ng/mL     Basic metabolic panel [[id number]]  (Abnormal) Collected:  03/24/20 1851    Lab Status:  Final result Specimen:  Blood from Arm, Right Updated:  03/24/20 1914     Sodium 142 mmol/L      Potassium 3 8 mmol/L      Chloride 103 mmol/L      CO2 34 mmol/L      ANION GAP 5 mmol/L      BUN 12 mg/dL      Creatinine 1 05 mg/dL      Glucose 105 mg/dL      Calcium 8 9 mg/dL      eGFR 79 ml/min/1 73sq m     Narrative:       [place] guidelines for Chronic Kidney Disease (CKD):     Stage 1 with normal or high GFR (GFR > 90 mL/min/1 73 square meters)    Stage 2 Mild CKD (GFR = 60-89 mL/min/1 73 square meters)    Stage 3A Moderate CKD (GFR = 45-59 mL/min/1 73 square meters)    Stage 3B Moderate CKD (GFR = 30-44 mL/min/1 73 square meters)    Stage 4 Severe CKD (GFR = 15-29 mL/min/1 73 square meters)    Stage 5 End Stage CKD (GFR <15 mL/min/1 73 square meters)  Note: GFR calculation is accurate only with a steady state creatinine    Protime-INR [[id number]]  (Normal) Collected:  03/24/20 1851    Lab Status:  Final result Specimen:  Blood from Arm, Right Updated:  03/24/20 1912     Protime 13 9 seconds      INR 1 06    APTT [[id number]]  (Normal) Collected:  03/24/20 1851    Lab Status:  Final result Specimen:  Blood from Arm, Right Updated:  03/24/20 1912     PTT 33 seconds     CBC and differential [[id number]]  (Abnormal) Collected:  03/24/20 1851    Lab Status:  Final result Specimen:  Blood from Arm, Right Updated:  03/24/20 1900     WBC 8 09 Thousand/uL      RBC 5 49 Million/uL      Hemoglobin 15 8 g/dL      Hematocrit 50 1 %      MCV 91 fL      MCH 28 8 pg      MCHC 31 5 g/dL      RDW 14 6 %      MPV 9 6 fL      Platelets 666 Thousands/uL      nRBC 0 /100 WBCs Neutrophils Relative 57 %      Immat GRANS % 0 %      Lymphocytes Relative 21 %      Monocytes Relative 14 %      Eosinophils Relative 7 %      Basophils Relative 1 %      Neutrophils Absolute 4 64 Thousands/µL      Immature Grans Absolute 0 02 Thousand/uL      Lymphocytes Absolute 1 68 Thousands/µL      Monocytes Absolute 1 15 Thousand/µL      Eosinophils Absolute 0 55 Thousand/µL      Basophils Absolute 0 05 Thousands/µL                  XR chest 2 views   ED Interpretation by Ashley Barker PA-C (03/24 1955)   No acute cardiopulmonary findings  Final Result by Andrews Pulliam MD (03/24 2003)      No acute cardiopulmonary disease  Workstation performed: [id number]         CT head without contrast   Final Result by Renetta Nolen MD (03/24 1930)      No acute intracranial abnormality  Workstation performed: [id number]                    Procedures  ECG 12 Lead Documentation Only  Date/Time: 3/24/2020 7:36 PM  Performed by: Ashley Barker PA-C  Authorized by: Ashley Barker PA-C     Indications / Diagnosis:  Syncope  ECG reviewed by me, the ED Provider: yes    Patient location:  ED  Previous ECG:     Previous ECG:  Compared to current    Similarity:  No change  Interpretation:     Interpretation: normal    Rate:     ECG rate:  71    ECG rate assessment: normal    Rhythm:     Rhythm: atrial fibrillation    Ectopy:     Ectopy: none    QRS:     QRS axis:  Normal  Conduction:     Conduction: normal    ST segments:     ST segments:  Normal  T waves:     T waves: normal    Comments:      RBBB             ED Course  ED Course as of Mar 24 2022   Tue Mar 24, 2020   1903 CBC relatively unremarkable      1913 EKG: a fib, vent rate 71 bpm, no acute ischemic changes      1914 PT/INR and PTT all within normal limits      1915 BMP relatively unremarkable      1935 CT head: No acute intracranial abnormality  1942       1945 No acute cardiopulmonary findings on chest xary         1953 Results and findings discussed with patient  We discussed inpatient versus outpatient treatment  Patient requesting discharge  We discussed symptomatic treatment and symptoms that require prompt return to the ED for further evaluation and patient verbalized understanding  We discussed the significance and importance of follow-up with pulmonology  Patient reports he has nebulizer at home  Patient agreed with this treatment plan, he remained well ED and was discharged home  MDM  Number of Diagnoses or Management Options  Cough: new and requires workup  Injury of head, initial encounter: new and requires workup  Syncope: new and requires workup     Amount and/or Complexity of Data Reviewed  Clinical lab tests: ordered and reviewed  Tests in the radiology section of CPT®: ordered and reviewed          Disposition  Final diagnoses:   Cough   Syncope   Injury of head, initial encounter     Time reflects when diagnosis was documented in both MDM as applicable and the Disposition within this note     Time User Action Codes Description Comment    3/24/2020  7:56 PM Farideh Hernández [R05] Cough     3/24/2020  7:56 PM Farideh Hernández [R55] Syncope     3/24/2020  7:56 PM Farideh Sainz Add [S09 90XA] Injury of head, initial encounter       ED Disposition     ED Disposition Condition Date/Time Comment    Discharge Stable Tue Mar 24, 2020  7:56 PM Susanne Mora discharge to home/self care              Follow-up Information     Follow up With Specialties Details Why Contact Danial Hooper DO  In 1 week For continued care [address] [place] [address]  [phone]            Discharge Medication List as of 3/24/2020  7:57 PM      CONTINUE these medications which have NOT CHANGED    Details   albuterol (2 5 mg/3 mL) 0 083 % nebulizer solution 1 vial nebulized q 4 hours x 3 days then prn cough, wheezing, Normal      Albuterol Sulfate (PROAIR RESPICLICK) 393 (90 Base) MCG/ACT AEPB Inhale 1 puff 4 (four) times a day as needed (wheezing, shortness of breath), Starting Thu 1/23/2020, Normal      atorvastatin (LIPITOR) 40 mg tablet Take 40 mg by mouth daily, Historical Med      furosemide (LASIX) 40 mg tablet Take 60 mg by mouth 2 (two) times a day, Starting Fri 3/16/2018, Historical Med      ipratropium (ATROVENT) 0 02 % nebulizer solution Take 1 vial (0 5 mg total) by nebulization 3 (three) times a day, Starting Thu 1/23/2020, Normal      levalbuterol (XOPENEX) 0 63 mg/3 mL nebulizer solution Take 1 vial (0 63 mg total) by nebulization 3 (three) times a day, Starting Thu 1/23/2020, Normal      LORazepam (ATIVAN) 0 5 mg tablet Take 0 5 mg by mouth every 8 (eight) hours as needed for anxiety, Historical Med      metFORMIN (GLUCOPHAGE-XR) 500 mg 24 hr tablet Take by mouth every other day Every other day, Starting Fri 3/16/2018, Historical Med      montelukast (SINGULAIR) 10 mg tablet Take 10 mg by mouth, Starting Wed 1/22/2020, Historical Med      potassium chloride (K-DUR,KLOR-CON) 20 mEq tablet Take 1 tablet (20 mEq total) by mouth daily, Starting Thu 1/23/2020, Normal      predniSONE 10 mg tablet 5 tabs po qd x 3 days then 4 tabs po qd x 3 days then 3 tabs po qd x 3 days then 2 tabs po qd x 3 days then 1 tab po qd x 3 days, Normal      pregabalin (LYRICA) 75 mg capsule Take 75 mg by mouth, Starting Mon 9/10/2018, Historical Med      rivaroxaban (XARELTO) 20 mg tablet Take 20 mg by mouth daily, Starting Tue 3/11/2014, Historical Med      traMADol (ULTRAM) 50 mg tablet Take 50 mg by mouth daily, Starting Sat 11/10/2018, Historical Med      VICTOZA 18 MG/3ML SOPN Starting Sat 4/21/2018, Historical Med               PDMP Review     None          ED Provider  Electronically Signed by           Ann Marie Busby PA-C  03/24/20 2022

## 2020-03-26 ENCOUNTER — TELEPHONE (OUTPATIENT)
Dept: EMERGENCY DEPT | Facility: HOSPITAL | Age: 55
End: 2020-03-26

## 2020-03-26 DIAGNOSIS — J40 BRONCHITIS: Primary | ICD-10-CM

## 2020-03-26 LAB
ATRIAL RATE: 300 BPM
B PARAPERT DNA SPEC QL NAA+PROBE: NOT DETECTED
B PERT DNA SPEC QL NAA+PROBE: NOT DETECTED
QRS AXIS: 251 DEGREES
QRSD INTERVAL: 98 MS
QT INTERVAL: 390 MS
QTC INTERVAL: 423 MS
T WAVE AXIS: 72 DEGREES
VENTRICULAR RATE: 71 BPM

## 2020-03-26 RX ORDER — AZITHROMYCIN 250 MG/1
TABLET, FILM COATED ORAL
Qty: 6 TABLET | Refills: 0 | Status: SHIPPED | OUTPATIENT
Start: 2020-03-26 | End: 2020-03-30

## 2020-03-26 NOTE — TELEPHONE ENCOUNTER
Patient still complaining of cough, discussed negative pertussis PCR  Patient offered [facility] and I sent over a prescription to [facility]  Patient agreeable with treatment plan  Instructed to return if anything worsens

## 2020-05-13 ENCOUNTER — TRANSCRIBE ORDERS (OUTPATIENT)
Dept: ADMINISTRATIVE | Facility: HOSPITAL | Age: 55
End: 2020-05-13

## 2020-05-13 DIAGNOSIS — I48.20 CHRONIC ATRIAL FIBRILLATION (HCC): ICD-10-CM

## 2020-05-13 DIAGNOSIS — I10 ESSENTIAL HYPERTENSION, MALIGNANT: Primary | ICD-10-CM

## 2020-05-16 ENCOUNTER — TRANSCRIBE ORDERS (OUTPATIENT)
Dept: ADMINISTRATIVE | Facility: HOSPITAL | Age: 55
End: 2020-05-16

## 2020-05-16 DIAGNOSIS — E11.9 DM TYPE 2, GOAL A1C TO BE DETERMINED (HCC): ICD-10-CM

## 2020-05-16 DIAGNOSIS — Z95.0 PRESENCE OF CARDIAC PACEMAKER: ICD-10-CM

## 2020-05-16 DIAGNOSIS — I48.91 ATRIAL FIBRILLATION, UNSPECIFIED TYPE (HCC): ICD-10-CM

## 2020-05-16 DIAGNOSIS — R06.00 DYSPNEA, UNSPECIFIED TYPE: ICD-10-CM

## 2020-05-16 DIAGNOSIS — F41.1 ANXIETY STATE: Primary | ICD-10-CM

## 2020-05-16 DIAGNOSIS — R06.00 DOE (DYSPNEA ON EXERTION): ICD-10-CM

## 2020-05-16 DIAGNOSIS — E11.610 TYPE 2 DIABETES MELLITUS WITH CHARCOT'S JOINT OF LEFT FOOT (HCC): ICD-10-CM

## 2020-05-16 DIAGNOSIS — Z01.810 PRE-OPERATIVE CARDIOVASCULAR EXAMINATION: ICD-10-CM

## 2020-05-16 DIAGNOSIS — E66.01 MORBID OBESITY (HCC): ICD-10-CM

## 2020-05-16 DIAGNOSIS — G47.33 OSA (OBSTRUCTIVE SLEEP APNEA): ICD-10-CM

## 2020-05-16 DIAGNOSIS — I50.30: ICD-10-CM

## 2020-05-16 DIAGNOSIS — I10 ESSENTIAL HYPERTENSION WITH GOAL BLOOD PRESSURE LESS THAN 140/90: Primary | ICD-10-CM

## 2020-05-16 DIAGNOSIS — I10 ESSENTIAL HYPERTENSION WITH GOAL BLOOD PRESSURE LESS THAN 130/80: Primary | ICD-10-CM

## 2020-05-16 DIAGNOSIS — E78.5 DYSLIPIDEMIA, GOAL LDL BELOW 100: ICD-10-CM

## 2020-05-16 DIAGNOSIS — I48.20 CHRONIC ATRIAL FIBRILLATION (HCC): ICD-10-CM

## 2020-05-16 DIAGNOSIS — E55.9 VITAMIN D DEFICIENCY: Primary | ICD-10-CM

## 2020-05-19 ENCOUNTER — TELEPHONE (OUTPATIENT)
Dept: NON INVASIVE DIAGNOSTICS | Facility: HOSPITAL | Age: 55
End: 2020-05-19

## 2021-05-26 ENCOUNTER — APPOINTMENT (EMERGENCY)
Dept: CT IMAGING | Facility: HOSPITAL | Age: 56
End: 2021-05-26
Payer: COMMERCIAL

## 2021-05-26 ENCOUNTER — HOSPITAL ENCOUNTER (EMERGENCY)
Facility: HOSPITAL | Age: 56
Discharge: HOME | End: 2021-05-26
Attending: EMERGENCY MEDICINE | Admitting: EMERGENCY MEDICINE
Payer: COMMERCIAL

## 2021-05-26 VITALS
RESPIRATION RATE: 20 BRPM | DIASTOLIC BLOOD PRESSURE: 91 MMHG | TEMPERATURE: 98.1 F | OXYGEN SATURATION: 93 % | WEIGHT: 315 LBS | HEART RATE: 68 BPM | BODY MASS INDEX: 52.11 KG/M2 | SYSTOLIC BLOOD PRESSURE: 145 MMHG

## 2021-05-26 DIAGNOSIS — S31.109A OPEN ABDOMINAL WALL WOUND: Primary | ICD-10-CM

## 2021-05-26 LAB
ALBUMIN SERPL BCP-MCNC: 3.8 G/DL (ref 3.5–5)
ALP SERPL-CCNC: 70 U/L (ref 46–116)
ALT SERPL W P-5'-P-CCNC: 33 U/L (ref 12–78)
ANION GAP SERPL CALCULATED.3IONS-SCNC: 7 MMOL/L (ref 4–13)
AST SERPL W P-5'-P-CCNC: 25 U/L (ref 5–45)
BASOPHILS # BLD AUTO: 0.04 THOUSANDS/ΜL (ref 0–0.1)
BASOPHILS NFR BLD AUTO: 1 % (ref 0–1)
BILIRUB SERPL-MCNC: 0.67 MG/DL (ref 0.2–1)
BUN SERPL-MCNC: 8 MG/DL (ref 5–25)
CALCIUM SERPL-MCNC: 8.9 MG/DL (ref 8.3–10.1)
CHLORIDE SERPL-SCNC: 104 MMOL/L (ref 100–108)
CO2 SERPL-SCNC: 27 MMOL/L (ref 21–32)
CREAT SERPL-MCNC: 0.95 MG/DL (ref 0.6–1.3)
EOSINOPHIL # BLD AUTO: 0.22 THOUSAND/ΜL (ref 0–0.61)
EOSINOPHIL NFR BLD AUTO: 3 % (ref 0–6)
ERYTHROCYTE [DISTWIDTH] IN BLOOD BY AUTOMATED COUNT: 14.3 % (ref 11.6–15.1)
GFR SERPL CREATININE-BSD FRML MDRD: 88 ML/MIN/1.73SQ M
GLUCOSE SERPL-MCNC: 98 MG/DL (ref 65–140)
HCT VFR BLD AUTO: 49 % (ref 36.5–49.3)
HGB BLD-MCNC: 15.8 G/DL (ref 12–17)
IMM GRANULOCYTES # BLD AUTO: 0.02 THOUSAND/UL (ref 0–0.2)
IMM GRANULOCYTES NFR BLD AUTO: 0 % (ref 0–2)
LYMPHOCYTES # BLD AUTO: 1.8 THOUSANDS/ΜL (ref 0.6–4.47)
LYMPHOCYTES NFR BLD AUTO: 26 % (ref 14–44)
MCH RBC QN AUTO: 28.8 PG (ref 26.8–34.3)
MCHC RBC AUTO-ENTMCNC: 32.2 G/DL (ref 31.4–37.4)
MCV RBC AUTO: 89 FL (ref 82–98)
MONOCYTES # BLD AUTO: 0.77 THOUSAND/ΜL (ref 0.17–1.22)
MONOCYTES NFR BLD AUTO: 11 % (ref 4–12)
NEUTROPHILS # BLD AUTO: 3.98 THOUSANDS/ΜL (ref 1.85–7.62)
NEUTS SEG NFR BLD AUTO: 59 % (ref 43–75)
NRBC BLD AUTO-RTO: 0 /100 WBCS
PLATELET # BLD AUTO: 227 THOUSANDS/UL (ref 149–390)
PMV BLD AUTO: 10.3 FL (ref 8.9–12.7)
POTASSIUM SERPL-SCNC: 3.8 MMOL/L (ref 3.5–5.3)
PROT SERPL-MCNC: 7 G/DL (ref 6.4–8.2)
RBC # BLD AUTO: 5.48 MILLION/UL (ref 3.88–5.62)
SODIUM SERPL-SCNC: 138 MMOL/L (ref 136–145)
WBC # BLD AUTO: 6.83 THOUSAND/UL (ref 4.31–10.16)

## 2021-05-26 PROCEDURE — 80053 COMPREHEN METABOLIC PANEL: CPT | Performed by: EMERGENCY MEDICINE

## 2021-05-26 PROCEDURE — 74177 CT ABD & PELVIS W/CONTRAST: CPT

## 2021-05-26 PROCEDURE — 99284 EMERGENCY DEPT VISIT MOD MDM: CPT | Performed by: EMERGENCY MEDICINE

## 2021-05-26 PROCEDURE — 71260 CT THORAX DX C+: CPT

## 2021-05-26 PROCEDURE — 99284 EMERGENCY DEPT VISIT MOD MDM: CPT

## 2021-05-26 PROCEDURE — 36415 COLL VENOUS BLD VENIPUNCTURE: CPT | Performed by: EMERGENCY MEDICINE

## 2021-05-26 PROCEDURE — 85025 COMPLETE CBC W/AUTO DIFF WBC: CPT | Performed by: EMERGENCY MEDICINE

## 2021-05-26 RX ORDER — CEPHALEXIN 250 MG/1
500 CAPSULE ORAL ONCE
Status: DISCONTINUED | OUTPATIENT
Start: 2021-05-26 | End: 2021-05-26 | Stop reason: HOSPADM

## 2021-05-26 RX ORDER — CEPHALEXIN 500 MG/1
500 CAPSULE ORAL EVERY 6 HOURS SCHEDULED
Qty: 28 CAPSULE | Refills: 0 | Status: SHIPPED | OUTPATIENT
Start: 2021-05-26 | End: 2021-06-02

## 2021-05-26 RX ADMIN — IOHEXOL 100 ML: 350 INJECTION, SOLUTION INTRAVENOUS at 20:22

## 2021-05-26 RX ADMIN — IOHEXOL 50 ML: 240 INJECTION, SOLUTION INTRATHECAL; INTRAVASCULAR; INTRAVENOUS; ORAL at 20:22

## 2021-05-27 NOTE — DISCHARGE INSTRUCTIONS
Draining abdominal wound without obvious infection  Please call your surgeon tomorrow to arrange evaluation within 1-2 days  Change dressing daily and more frequently if soiled, use gauze

## 2021-05-27 NOTE — ED PROVIDER NOTES
History  Chief Complaint   Patient presents with    Surgical Problem Re-Evaluation     Pt had bariatric surgeryo on the  having discharge, swelling around surgical site    Arm Injury     Pt fell on left elbow months ago, was seen by PCP and ruled out for fx but is still having pain in left elbow      57-year-old male complains of central abdominal wound fluid leak all day copious amounts soaking 4 x 4 hourly, yellow fluid, no bleeding, no fever chills  Notes he is supposed to drink 64 oz fluid daily and drinking less secondary to discomfort, crampy pain fairly soon after  Urinating normally  Little rectal bleeding with straining while stooling, brown stool  Status post jet bypass [facility]   Also notes left elbow swelling ongoing for 1 month, had xrays      History provided by:  Patient and spouse  Medical Problem  Location:  Abdominal wound  Quality:  Leaking fluid  Onset quality:  Sudden (Today, this morning)  Timing:  Constant  Progression:  Unchanged  Chronicity:  New  Associated symptoms: no abdominal pain, no chest pain, no fever and no shortness of breath        Prior to Admission Medications   Prescriptions Last Dose Informant Patient Reported? Taking?    Albuterol Sulfate (PROAIR RESPICLICK) 530 (90 Base) MCG/ACT AEPB   No No   Sig: Inhale 1 puff 4 (four) times a day as needed (wheezing, shortness of breath)   LORazepam (ATIVAN) 0 5 mg tablet   Yes No   Sig: Take 0 5 mg by mouth every 8 (eight) hours as needed for anxiety   VICTOZA 18 MG/3ML SOPN  Self Yes No   albuterol (2 5 mg/3 mL) 0 083 % nebulizer solution   No No   Si vial nebulized q 4 hours x 3 days then prn cough, wheezing   atorvastatin (LIPITOR) 40 mg tablet  Self Yes No   Sig: Take 40 mg by mouth daily   furosemide (LASIX) 40 mg tablet  Self Yes No   Sig: Take 60 mg by mouth 2 (two) times a day   ipratropium (ATROVENT) 0 02 % nebulizer solution   No No   Sig: Take 1 vial (0 5 mg total) by nebulization 3 (three) times a day   levalbuterol (XOPENEX) 0 63 mg/3 mL nebulizer solution   No No   Sig: Take 1 vial (0 63 mg total) by nebulization 3 (three) times a day   metFORMIN (GLUCOPHAGE-XR) 500 mg 24 hr tablet  Self Yes No   Sig: Take by mouth every other day Every other day   montelukast (SINGULAIR) 10 mg tablet   Yes No   Sig: Take 10 mg by mouth   potassium chloride (K-DUR,KLOR-CON) 20 mEq tablet   No No   Sig: Take 1 tablet (20 mEq total) by mouth daily   predniSONE 10 mg tablet   No No   Si tabs po qd x 3 days then 4 tabs po qd x 3 days then 3 tabs po qd x 3 days then 2 tabs po qd x 3 days then 1 tab po qd x 3 days   pregabalin (LYRICA) 75 mg capsule   Yes No   Sig: Take 75 mg by mouth   rivaroxaban (XARELTO) 20 mg tablet   Yes No   Sig: Take 20 mg by mouth daily   traMADol (ULTRAM) 50 mg tablet   Yes No   Sig: Take 50 mg by mouth daily      Facility-Administered Medications: None       Past Medical History:   Diagnosis Date    [facility]     [facility]     High cholesterol     Hyperlipidemia     Hypertension     Stroke [facility])        Past Surgical History:   Procedure Laterality Date    APPENDECTOMY      ATRIAL CARDIAC PACEMAKER INSERTION      TOE AMPUTATION Left     2nd toe       History reviewed  No pertinent family history  I have reviewed and agree with the history as documented  E-Cigarette/Vaping     E-Cigarette/Vaping Substances     Social History     Tobacco Use    Smoking status: Never Smoker    Smokeless tobacco: Never Used   Substance Use Topics    Alcohol use: Never     Frequency: Never    Drug use: Never       Review of Systems   Constitutional: Negative for fever  Respiratory: Negative for shortness of breath  Cardiovascular: Negative for chest pain  Gastrointestinal: Negative for abdominal pain  All other systems reviewed and are negative  Physical Exam  Physical Exam  Vitals signs and nursing note reviewed     Constitutional:       Comments: Pleasant, comfortable-appearing, mildly decreased hearing   HENT:      Head: Normocephalic and atraumatic  Eyes:      Conjunctiva/sclera: Conjunctivae normal       Pupils: Pupils are equal, round, and reactive to light  Neck:      Musculoskeletal: Neck supple  Cardiovascular:      Rate and Rhythm: Normal rate and regular rhythm  Heart sounds: Normal heart sounds  Pulmonary:      Effort: Pulmonary effort is normal       Breath sounds: Normal breath sounds  Abdominal:      General: Bowel sounds are normal  There is no distension  Palpations: Abdomen is soft  Tenderness: There is no abdominal tenderness  Comments: Protuberant abdomen multiple laparoscopic surgical wounds, with central wound leaking clear yellow fluid expressed with superior aspect pressure, not tender, not red or infected appearing no purulence changes   Musculoskeletal: Normal range of motion  Comments: Left elbow protuberant olecranon soft tissues nontender, central abrasion intact elbow range of motion no deformity   Skin:     General: Skin is warm and dry  Neurological:      Mental Status: He is alert and oriented to person, place, and time  Cranial Nerves: No cranial nerve deficit  Coordination: Coordination normal    Psychiatric:         Behavior: Behavior normal          Thought Content:  Thought content normal          Judgment: Judgment normal          Vital Signs  ED Triage Vitals   Temperature Pulse Respirations Blood Pressure SpO2   05/26/21 1952 05/26/21 1958 05/26/21 1952 05/26/21 1952 05/26/21 1952   98 1 °F (36 7 °C) 60 20 145/86 97 %      Temp Source Heart Rate Source Patient Position - Orthostatic VS BP Location FiO2 (%)   05/26/21 1952 05/26/21 1952 05/26/21 1952 05/26/21 1952 --   Temporal Monitor Lying Right arm       Pain Score       05/26/21 1952       6           Vitals:    05/26/21 1952 05/26/21 1958 05/26/21 2000   BP: 145/86  145/91   Pulse:  60 68   Patient Position - Orthostatic VS: Lying Lying         Visual Acuity      ED Medications  Medications   cephalexin (KEFLEX) capsule 500 mg (has no administration in time range)   iohexol (OMNIPAQUE) 350 MG/ML injection (SINGLE-DOSE) 100 mL (100 mL Intravenous Given 5/26/21 2022)   iohexol (OMNIPAQUE) 240 MG/ML solution 50 mL (50 mL Oral Given 5/26/21 2022)       Diagnostic Studies  Results Reviewed     Procedure Component Value Units Date/Time    Comprehensive metabolic panel [[id number]] Collected: 05/26/21 2011    Lab Status: Final result Specimen: Blood from Arm, Right Updated: 05/26/21 2031     Sodium 138 mmol/L      Potassium 3 8 mmol/L      Chloride 104 mmol/L      CO2 27 mmol/L      ANION GAP 7 mmol/L      BUN 8 mg/dL      Creatinine 0 95 mg/dL      Glucose 98 mg/dL      Calcium 8 9 mg/dL      AST 25 U/L      ALT 33 U/L      Alkaline Phosphatase 70 U/L      Total Protein 7 0 g/dL      Albumin 3 8 g/dL      Total Bilirubin 0 67 mg/dL      eGFR 88 ml/min/1 73sq m     Narrative:      [place] guidelines for Chronic Kidney Disease (CKD):     Stage 1 with normal or high GFR (GFR > 90 mL/min/1 73 square meters)    Stage 2 Mild CKD (GFR = 60-89 mL/min/1 73 square meters)    Stage 3A Moderate CKD (GFR = 45-59 mL/min/1 73 square meters)    Stage 3B Moderate CKD (GFR = 30-44 mL/min/1 73 square meters)    Stage 4 Severe CKD (GFR = 15-29 mL/min/1 73 square meters)    Stage 5 End Stage CKD (GFR <15 mL/min/1 73 square meters)  Note: GFR calculation is accurate only with a steady state creatinine    CBC and differential [[id number]] Collected: 05/26/21 2011    Lab Status: Final result Specimen: Blood from Arm, Right Updated: 05/26/21 2017     WBC 6 83 Thousand/uL      RBC 5 48 Million/uL      Hemoglobin 15 8 g/dL      Hematocrit 49 0 %      MCV 89 fL      MCH 28 8 pg      MCHC 32 2 g/dL      RDW 14 3 %      MPV 10 3 fL      Platelets 212 Thousands/uL      nRBC 0 /100 WBCs      Neutrophils Relative 59 %      Immat GRANS % 0 % Lymphocytes Relative 26 %      Monocytes Relative 11 %      Eosinophils Relative 3 %      Basophils Relative 1 %      Neutrophils Absolute 3 98 Thousands/µL      Immature Grans Absolute 0 02 Thousand/uL      Lymphocytes Absolute 1 80 Thousands/µL      Monocytes Absolute 0 77 Thousand/µL      Eosinophils Absolute 0 22 Thousand/µL      Basophils Absolute 0 04 Thousands/µL                  CT chest abdomen pelvis w contrast   Final Result by Annie Koroma MD (05/26 2049)      Nonspecific areas of subcutaneous edema throughout the anterior abdominal wall without an associated collection  No acute findings in the chest       No apparent complication status post gastric bypass  Workstation performed: [id number]                    Procedures  Procedures         ED Course  ED Course as of May 26 2106   Wed May 26, 2021   2053 IMPRESSION:     Nonspecific areas of subcutaneous edema throughout the anterior abdominal wall without an associated collection      No acute findings in the chest      No apparent complication status post gastric bypass          CT chest abdomen pelvis w contrast   2057 We discussed results, probable seroma draining, possible infection will start antibiotics and patient will call surgeon tomorrow to arrange evaluation within the next few days, return immediately if worse or new symptoms, fever shaking chills                                SBIRT 20yo+      Most Recent Value   SBIRT (25 yo +)   In order to provide better care to our patients, we are screening all of our patients for alcohol and drug use  Would it be okay to ask you these screening questions?   No Filed at: 05/26/2021 2006                    MDM    Disposition  Final diagnoses:   Open abdominal wall wound     Time reflects when diagnosis was documented in both MDM as applicable and the Disposition within this note     Time User Action Codes Description Comment    5/26/2021  8:54 PM Syl Noland Add [S31 109A] Open abdominal wall wound       ED Disposition     ED Disposition Condition Date/Time Comment    Discharge Stable Wed May 26, 2021  8:54 PM Carlota Montoya discharge to home/self care  Follow-up Information     Follow up With Specialties Details Why Contact Info    Isaac Ugarte DO  Schedule an appointment as soon as possible for a visit in 1 week  [address] [place] [address]  [phone]            Patient's Medications   Discharge Prescriptions    CEPHALEXIN (KEFLEX) 500 MG CAPSULE    Take 1 capsule (500 mg total) by mouth every 6 (six) hours for 7 days       Start Date: 5/26/2021 End Date: 6/2/2021       Order Dose: 500 mg       Quantity: 28 capsule    Refills: 0     No discharge procedures on file      PDMP Review     None          ED Provider  Electronically Signed by           Anton Nevarez DO  05/26/21 2057       Anton Nevarez DO  05/26/21 2057       Anton Nevarez DO  05/26/21 2106

## 2021-05-27 NOTE — ED NOTES
Patient transported to [address] [place] [address], [address] [place] [address]  05/26/21 2018

## 2021-08-16 ENCOUNTER — OFFICE VISIT (OUTPATIENT)
Dept: OBGYN CLINIC | Facility: CLINIC | Age: 56
End: 2021-08-16
Payer: COMMERCIAL

## 2021-08-16 VITALS
HEIGHT: 73 IN | TEMPERATURE: 97.8 F | DIASTOLIC BLOOD PRESSURE: 70 MMHG | SYSTOLIC BLOOD PRESSURE: 120 MMHG | HEART RATE: 74 BPM | BODY MASS INDEX: 41.75 KG/M2 | WEIGHT: 315 LBS

## 2021-08-16 DIAGNOSIS — E11.42 TYPE 2 DIABETES MELLITUS WITH DIABETIC POLYNEUROPATHY, WITHOUT LONG-TERM CURRENT USE OF INSULIN (HCC): ICD-10-CM

## 2021-08-16 DIAGNOSIS — E11.42 DIABETIC POLYNEUROPATHY ASSOCIATED WITH TYPE 2 DIABETES MELLITUS (HCC): ICD-10-CM

## 2021-08-16 DIAGNOSIS — M25.572 PAIN, JOINT, ANKLE AND FOOT, LEFT: Primary | ICD-10-CM

## 2021-08-16 PROCEDURE — 99203 OFFICE O/P NEW LOW 30 MIN: CPT | Performed by: ORTHOPAEDIC SURGERY

## 2021-08-16 RX ORDER — HYDROXYZINE HYDROCHLORIDE 25 MG/1
25 TABLET, FILM COATED ORAL 4 TIMES DAILY PRN
COMMUNITY
Start: 2021-07-26

## 2021-08-16 NOTE — PROGRESS NOTES
ASSESSMENT/PLAN:    Diagnoses and all orders for this visit:    Pain, joint, ankle and foot, left    Diabetic polyneuropathy associated with type 2 diabetes mellitus ([facility] )    Type 2 diabetes mellitus with diabetic polyneuropathy, without long-term current use of insulin ([facility] )        Plan:  I would recommend follow-up with Dr Fina Huerta or his choice of Podiatry or Foot and Ankle surgery at [facility]  At this time, I have nothing to specifically offer him  He does have a small, punctate wound of the mid shaft of his left lower extremity which he states occurred yesterday  I have recommended he contact his primary care physician due to his diabetes  He is scheduled to be seen at [address] [facility] for a an endoscopist procedure today but states he will contact his primary care physician regarding his left lower extremity  In addition, I have suggested he contact his primary care physician to discuss referral to a podiatrist or foot and ankle surgeon at [facility] if he prefers  Otherwise, we will make arrangements for him to see Dr Fina Huerta  Return if symptoms worsen or fail to improve, for refer to Dr Fina Huerta       _____________________________________________________  CHIEF COMPLAINT:  Chief Complaint   Patient presents with    Left Great Toe - Pain         SUBJECTIVE:  Owen Erickson is a 60y o  year old male who presents   For evaluation of left great toe pain  He states he has had pain and swelling in the left great toe for approximately 8 months  He denies injury  He underwent amputation of his 2nd toe through an [address] in the past   He does have diabetic neuropathy  He states he has activity or weight-bearing related pain that he describes as an aching pain  He denies pain at rest   He denies any systemic symptoms  He denies any redness or warmth in the area    He had x-rays done by his primary care physician and presents with a note indicating the findings of the x-ray but the x-rays were not made available for my review  PAST MEDICAL HISTORY:  Past Medical History:   Diagnosis Date    Atrial fibrillation ([facility] )     Diabetes mellitus ([facility] )     High cholesterol     Hyperlipidemia     Hypertension     Stroke ([facility] )        PAST SURGICAL HISTORY:  Past Surgical History:   Procedure Laterality Date    APPENDECTOMY      ATRIAL CARDIAC PACEMAKER INSERTION      TOE AMPUTATION Left     2nd toe       FAMILY HISTORY:  History reviewed  No pertinent family history      SOCIAL HISTORY:  Social History     Tobacco Use    Smoking status: Never Smoker    Smokeless tobacco: Never Used   Vaping Use    Vaping Use: Never used   Substance Use Topics    Alcohol use: Never    Drug use: Never       MEDICATIONS:    Current Outpatient Medications:     Albuterol Sulfate (PROAIR RESPICLICK) 667 (90 Base) MCG/ACT AEPB, Inhale 1 puff 4 (four) times a day as needed (wheezing, shortness of breath), Disp: 1 each, Rfl: 0    atorvastatin (LIPITOR) 40 mg tablet, Take 40 mg by mouth daily, Disp: , Rfl:     furosemide (LASIX) 40 mg tablet, Take 60 mg by mouth 2 (two) times a day, Disp: , Rfl: 2    hydrOXYzine HCL (ATARAX) 25 mg tablet, Take 25 mg by mouth 4 (four) times a day as needed, Disp: , Rfl:     levalbuterol (XOPENEX) 0 63 mg/3 mL nebulizer solution, Take 1 vial (0 63 mg total) by nebulization 3 (three) times a day, Disp: 15 vial, Rfl: 0    LORazepam (ATIVAN) 0 5 mg tablet, Take 0 5 mg by mouth every 8 (eight) hours as needed for anxiety, Disp: , Rfl:     metFORMIN (GLUCOPHAGE-XR) 500 mg 24 hr tablet, Take by mouth every other day Every other day, Disp: , Rfl:     pregabalin (LYRICA) 75 mg capsule, Take 75 mg by mouth, Disp: , Rfl:     rivaroxaban (XARELTO) 20 mg tablet, Take 20 mg by mouth daily, Disp: , Rfl:     VICTOZA 18 MG/3ML SOPN, , Disp: , Rfl:     albuterol (2 5 mg/3 mL) 0 083 % nebulizer solution, 1 vial nebulized q 4 hours x 3 days then prn cough, wheezing (Patient not taking: Reported on 8/16/2021), Disp: 30 vial, Rfl: 0    ipratropium (ATROVENT) 0 02 % nebulizer solution, Take 1 vial (0 5 mg total) by nebulization 3 (three) times a day (Patient not taking: Reported on 8/16/2021), Disp: 15 vial, Rfl: 0    montelukast (SINGULAIR) 10 mg tablet, Take 10 mg by mouth (Patient not taking: Reported on 8/16/2021), Disp: , Rfl:     potassium chloride (K-DUR,KLOR-CON) 20 mEq tablet, Take 1 tablet (20 mEq total) by mouth daily (Patient not taking: Reported on 8/16/2021), Disp: 20 tablet, Rfl: 0    predniSONE 10 mg tablet, 5 tabs po qd x 3 days then 4 tabs po qd x 3 days then 3 tabs po qd x 3 days then 2 tabs po qd x 3 days then 1 tab po qd x 3 days (Patient not taking: Reported on 8/16/2021), Disp: 45 tablet, Rfl: 0    traMADol (ULTRAM) 50 mg tablet, Take 50 mg by mouth daily (Patient not taking: Reported on 8/16/2021), Disp: , Rfl:     ALLERGIES:  No Known Allergies    Review of systems:   Constitutional: Negative for fatigue, fever or loss of apetite  HENT: Negative  Respiratory: Negative for shortness of breath, dyspnea  Cardiovascular: Negative for chest pain/tightness  Gastrointestinal: Negative for abdominal pain, N/V  Endocrine: Negative for cold/heat intolerance, unexplained weight loss/gain  Genitourinary: Negative for flank pain, dysuria, hematuria  Musculoskeletal:   Positive as in the HPI   Skin: Negative for rash  Neurological:  Positive as in the HPI  Psychiatric/Behavioral: Negative for agitation  _____________________________________________________  PHYSICAL EXAMINATION:    Blood pressure 120/70, pulse 74, temperature 97 8 °F (36 6 °C), height 6' 1" (1 854 m), weight (!) 179 kg (395 lb)      General: well developed and well nourished, alert, oriented times 3 and appears comfortable  Psychiatric: Normal  HEENT: Benign  Cardiovascular: Regular    Pulmonary: No wheezing or stridor  Abdomen: Soft, Nontender  Skin:  There is a punctate wound of the mid tibia without drainage, erythema or warmth  Neurovascular: Motor and sensory exams are grossly intact although he does have subjectively decreased sensation of the lower extremities consistent with his diabetic neuropathy  Pulses were difficult to palpate  MUSCULOSKELETAL EXAMINATION:      The left foot exam demonstrates significant swelling over the dorsum of the great toe, primarily the IP joint  He denies any tenderness to palpation  He had good motion of the MTP joint without complaints  There is no instability to collateral testing of the IP joint he has diffuse swelling of the lower extremities  He has no tenderness to palpation of the 3rd, 4th or 5th toes with an absent 2nd toe consistent with his history of amputation  Metatarsals are nontender there is limited motion of the IP joint but no pain during attempted motion of the IP joint of the left great toe       _____________________________________________________  STUDIES REVIEWED:    No x-rays were available for my review  The patient has a note from his primary care physician indicating that the x-rays showed soft tissue swelling, degenerative changes and mild bunion deformity          Astrid Hess

## 2021-09-05 ENCOUNTER — APPOINTMENT (EMERGENCY)
Dept: CT IMAGING | Facility: HOSPITAL | Age: 56
End: 2021-09-05
Payer: COMMERCIAL

## 2021-09-05 ENCOUNTER — APPOINTMENT (EMERGENCY)
Dept: ULTRASOUND IMAGING | Facility: HOSPITAL | Age: 56
End: 2021-09-05
Payer: COMMERCIAL

## 2021-09-05 ENCOUNTER — HOSPITAL ENCOUNTER (EMERGENCY)
Facility: HOSPITAL | Age: 56
Discharge: HOME | End: 2021-09-05
Attending: EMERGENCY MEDICINE | Admitting: FAMILY MEDICINE
Payer: COMMERCIAL

## 2021-09-05 VITALS
WEIGHT: 315 LBS | HEIGHT: 73 IN | RESPIRATION RATE: 11 BRPM | OXYGEN SATURATION: 97 % | HEART RATE: 66 BPM | DIASTOLIC BLOOD PRESSURE: 67 MMHG | TEMPERATURE: 97.5 F | BODY MASS INDEX: 41.75 KG/M2 | SYSTOLIC BLOOD PRESSURE: 115 MMHG

## 2021-09-05 DIAGNOSIS — R10.11 RUQ PAIN: Primary | ICD-10-CM

## 2021-09-05 DIAGNOSIS — R91.1 PULMONARY NODULE: ICD-10-CM

## 2021-09-05 LAB
ALBUMIN SERPL BCP-MCNC: 3.3 G/DL (ref 3.5–5)
ALP SERPL-CCNC: 96 U/L (ref 46–116)
ALT SERPL W P-5'-P-CCNC: 20 U/L (ref 12–78)
ANION GAP SERPL CALCULATED.3IONS-SCNC: 4 MMOL/L (ref 4–13)
APTT PPP: 34 SECONDS (ref 23–37)
AST SERPL W P-5'-P-CCNC: 19 U/L (ref 5–45)
BASOPHILS # BLD AUTO: 0.05 THOUSANDS/ΜL (ref 0–0.1)
BASOPHILS NFR BLD AUTO: 1 % (ref 0–1)
BILIRUB SERPL-MCNC: 0.46 MG/DL (ref 0.2–1)
BILIRUB UR QL STRIP: NEGATIVE
BUN SERPL-MCNC: 11 MG/DL (ref 5–25)
CALCIUM ALBUM COR SERPL-MCNC: 8.8 MG/DL (ref 8.3–10.1)
CALCIUM SERPL-MCNC: 8.2 MG/DL (ref 8.3–10.1)
CHLORIDE SERPL-SCNC: 104 MMOL/L (ref 100–108)
CLARITY UR: CLEAR
CO2 SERPL-SCNC: 30 MMOL/L (ref 21–32)
COLOR UR: YELLOW
CREAT SERPL-MCNC: 0.85 MG/DL (ref 0.6–1.3)
EOSINOPHIL # BLD AUTO: 0.23 THOUSAND/ΜL (ref 0–0.61)
EOSINOPHIL NFR BLD AUTO: 4 % (ref 0–6)
ERYTHROCYTE [DISTWIDTH] IN BLOOD BY AUTOMATED COUNT: 14.5 % (ref 11.6–15.1)
GFR SERPL CREATININE-BSD FRML MDRD: 96 ML/MIN/1.73SQ M
GLUCOSE SERPL-MCNC: 101 MG/DL (ref 65–140)
GLUCOSE UR STRIP-MCNC: NEGATIVE MG/DL
HCT VFR BLD AUTO: 45.5 % (ref 36.5–49.3)
HGB BLD-MCNC: 14.8 G/DL (ref 12–17)
HGB UR QL STRIP.AUTO: NEGATIVE
IMM GRANULOCYTES # BLD AUTO: 0.02 THOUSAND/UL (ref 0–0.2)
IMM GRANULOCYTES NFR BLD AUTO: 0 % (ref 0–2)
INR PPP: 0.97 (ref 0.84–1.19)
KETONES UR STRIP-MCNC: NEGATIVE MG/DL
LACTATE SERPL-SCNC: 1.2 MMOL/L (ref 0.5–2)
LEUKOCYTE ESTERASE UR QL STRIP: NEGATIVE
LIPASE SERPL-CCNC: 65 U/L (ref 73–393)
LYMPHOCYTES # BLD AUTO: 1.82 THOUSANDS/ΜL (ref 0.6–4.47)
LYMPHOCYTES NFR BLD AUTO: 28 % (ref 14–44)
MAGNESIUM SERPL-MCNC: 2 MG/DL (ref 1.6–2.6)
MCH RBC QN AUTO: 28.8 PG (ref 26.8–34.3)
MCHC RBC AUTO-ENTMCNC: 32.5 G/DL (ref 31.4–37.4)
MCV RBC AUTO: 89 FL (ref 82–98)
MONOCYTES # BLD AUTO: 0.94 THOUSAND/ΜL (ref 0.17–1.22)
MONOCYTES NFR BLD AUTO: 15 % (ref 4–12)
NEUTROPHILS # BLD AUTO: 3.36 THOUSANDS/ΜL (ref 1.85–7.62)
NEUTS SEG NFR BLD AUTO: 52 % (ref 43–75)
NITRITE UR QL STRIP: NEGATIVE
NRBC BLD AUTO-RTO: 0 /100 WBCS
NT-PROBNP SERPL-MCNC: 1709 PG/ML
PH UR STRIP.AUTO: 7.5 [PH]
PLATELET # BLD AUTO: 235 THOUSANDS/UL (ref 149–390)
PMV BLD AUTO: 10.3 FL (ref 8.9–12.7)
POTASSIUM SERPL-SCNC: 3.5 MMOL/L (ref 3.5–5.3)
PROT SERPL-MCNC: 6.5 G/DL (ref 6.4–8.2)
PROT UR STRIP-MCNC: NEGATIVE MG/DL
PROTHROMBIN TIME: 12.8 SECONDS (ref 11.6–14.5)
RBC # BLD AUTO: 5.13 MILLION/UL (ref 3.88–5.62)
SODIUM SERPL-SCNC: 138 MMOL/L (ref 136–145)
SP GR UR STRIP.AUTO: <=1.005 (ref 1–1.03)
TROPONIN I SERPL-MCNC: <0.02 NG/ML
UROBILINOGEN UR QL STRIP.AUTO: 1 E.U./DL
WBC # BLD AUTO: 6.42 THOUSAND/UL (ref 4.31–10.16)

## 2021-09-05 PROCEDURE — 85730 THROMBOPLASTIN TIME PARTIAL: CPT | Performed by: PHYSICIAN ASSISTANT

## 2021-09-05 PROCEDURE — 96374 THER/PROPH/DIAG INJ IV PUSH: CPT

## 2021-09-05 PROCEDURE — 84484 ASSAY OF TROPONIN QUANT: CPT | Performed by: PHYSICIAN ASSISTANT

## 2021-09-05 PROCEDURE — 85610 PROTHROMBIN TIME: CPT | Performed by: PHYSICIAN ASSISTANT

## 2021-09-05 PROCEDURE — 71275 CT ANGIOGRAPHY CHEST: CPT

## 2021-09-05 PROCEDURE — 83690 ASSAY OF LIPASE: CPT | Performed by: PHYSICIAN ASSISTANT

## 2021-09-05 PROCEDURE — 93005 ELECTROCARDIOGRAM TRACING: CPT

## 2021-09-05 PROCEDURE — 81003 URINALYSIS AUTO W/O SCOPE: CPT | Performed by: PHYSICIAN ASSISTANT

## 2021-09-05 PROCEDURE — 99284 EMERGENCY DEPT VISIT MOD MDM: CPT

## 2021-09-05 PROCEDURE — 74177 CT ABD & PELVIS W/CONTRAST: CPT

## 2021-09-05 PROCEDURE — 85025 COMPLETE CBC W/AUTO DIFF WBC: CPT | Performed by: PHYSICIAN ASSISTANT

## 2021-09-05 PROCEDURE — 76705 ECHO EXAM OF ABDOMEN: CPT

## 2021-09-05 PROCEDURE — 96361 HYDRATE IV INFUSION ADD-ON: CPT

## 2021-09-05 PROCEDURE — 80053 COMPREHEN METABOLIC PANEL: CPT | Performed by: PHYSICIAN ASSISTANT

## 2021-09-05 PROCEDURE — 83605 ASSAY OF LACTIC ACID: CPT | Performed by: PHYSICIAN ASSISTANT

## 2021-09-05 PROCEDURE — 99285 EMERGENCY DEPT VISIT HI MDM: CPT | Performed by: PHYSICIAN ASSISTANT

## 2021-09-05 PROCEDURE — 96375 TX/PRO/DX INJ NEW DRUG ADDON: CPT

## 2021-09-05 PROCEDURE — G1004 CDSM NDSC: HCPCS

## 2021-09-05 PROCEDURE — 83880 ASSAY OF NATRIURETIC PEPTIDE: CPT | Performed by: PHYSICIAN ASSISTANT

## 2021-09-05 PROCEDURE — 83735 ASSAY OF MAGNESIUM: CPT | Performed by: PHYSICIAN ASSISTANT

## 2021-09-05 PROCEDURE — 36415 COLL VENOUS BLD VENIPUNCTURE: CPT | Performed by: PHYSICIAN ASSISTANT

## 2021-09-05 RX ORDER — DOXYCYCLINE HYCLATE 100 MG/1
100 CAPSULE ORAL EVERY 12 HOURS SCHEDULED
Qty: 14 CAPSULE | Refills: 0 | Status: SHIPPED | OUTPATIENT
Start: 2021-09-05 | End: 2021-09-12

## 2021-09-05 RX ORDER — FUROSEMIDE 10 MG/ML
20 INJECTION INTRAMUSCULAR; INTRAVENOUS ONCE
Status: COMPLETED | OUTPATIENT
Start: 2021-09-05 | End: 2021-09-05

## 2021-09-05 RX ORDER — MORPHINE SULFATE 4 MG/ML
4 INJECTION, SOLUTION INTRAMUSCULAR; INTRAVENOUS ONCE
Status: COMPLETED | OUTPATIENT
Start: 2021-09-05 | End: 2021-09-05

## 2021-09-05 RX ADMIN — FUROSEMIDE 20 MG: 10 INJECTION, SOLUTION INTRAMUSCULAR; INTRAVENOUS at 14:33

## 2021-09-05 RX ADMIN — IOHEXOL 25 ML: 240 INJECTION, SOLUTION INTRATHECAL; INTRAVASCULAR; INTRAVENOUS; ORAL at 14:06

## 2021-09-05 RX ADMIN — MORPHINE SULFATE 2 MG: 2 INJECTION, SOLUTION INTRAMUSCULAR; INTRAVENOUS at 15:25

## 2021-09-05 RX ADMIN — MORPHINE SULFATE 4 MG: 4 INJECTION INTRAVENOUS at 12:52

## 2021-09-05 RX ADMIN — SODIUM CHLORIDE 1000 ML: 0.9 INJECTION, SOLUTION INTRAVENOUS at 12:51

## 2021-09-05 RX ADMIN — IOHEXOL 100 ML: 350 INJECTION, SOLUTION INTRAVENOUS at 14:06

## 2021-09-05 NOTE — Clinical Note
Case was discussed with Dr Elmer Arias, Dr Jaylene Crigler  and the patient's admission status was agreed to be Admission Status: observation status to the service of Dr Elmer Arias

## 2021-09-05 NOTE — DISCHARGE INSTRUCTIONS
"Irregular pulmonary nodular opacity in the right lower lobe  This is likely infectious or inflammatory in nature  Recommend 3 month follow-up noncontrast CT of the chest to assess for stability/resolution "  We are treating you with antibiotics and then you will need to follow up with your PCP for a repeat CT of your chest      Please return if anything worsens put follow-up with General surgery to have outpatient evaluation of your gallbladder    We have given you a referral as well to a general surgeon about your RUQ pain, but if you wanted to you could call your bariatric surgeon for follow up for this

## 2021-09-05 NOTE — ED PROVIDER NOTES
History  Chief Complaint   Patient presents with    Abdominal Pain     pt c/o right upper abdominal pain radiating to back for past 8 days  hx bariatric surgery [id number] states complications with recent endoscopy showing infection and currently taking abx  denies travel/sob/fevers/cough/n/v/d     The patient is a 77-year-old male with a past medical history of AFib, cardiac pacemaker on Xarelto, hypertension, diabetes, acid reflux who presents emergency department with the complaint of right upper quadrant abdominal pain x1 week  Patient states that he had gastric bypass surgery completed at [facility] in May of this year approximately 4 months ago  He states that, 3 weeks ago he did have an endoscopy performed and was found to have inflammation and possible ulcer formation and was placed on several anti acid medications  Patient has been taking this as directed  He states that over the last week he has been having increasing right upper quadrant abdominal pain that intermittently radiates into his right back  He denies any falls or traumas  He states that the pain is constant but worse with deep inspiration and food intake  He denies any nausea or vomiting, fevers or chills, cough, chest pain or shortness of breath  Patient has been taking tramadol without relief at home  Patient has attempted to rest and take tramadol as well as Tylenol at home without relief        Abdominal Pain  Pain location:  RUQ  Pain quality: cramping and shooting    Pain radiates to:  Back  Pain severity:  Moderate  Onset quality:  Gradual  Timing:  Constant  Progression:  Worsening  Chronicity:  New  Relieved by:  Nothing  Worsened by:  Deep breathing, palpation and eating  Ineffective treatments:  Not moving  Associated symptoms: no anorexia, no belching, no chest pain, no chills, no constipation, no cough, no diarrhea, no dysuria, no fatigue, no fever, no flatus, no hematemesis, no hematochezia, no hematuria, no melena, no nausea, no shortness of breath, no sore throat and no vomiting    Risk factors: obesity and recent hospitalization        Prior to Admission Medications   Prescriptions Last Dose Informant Patient Reported? Taking?    Albuterol Sulfate (PROAIR RESPICLICK) 644 (90 Base) MCG/ACT AEPB   No No   Sig: Inhale 1 puff 4 (four) times a day as needed (wheezing, shortness of breath)   LORazepam (ATIVAN) 0 5 mg tablet   Yes No   Sig: Take 0 5 mg by mouth every 8 (eight) hours as needed for anxiety   VICTOZA 18 MG/3ML SOPN  Self Yes No   albuterol (2 5 mg/3 mL) 0 083 % nebulizer solution   No No   Si vial nebulized q 4 hours x 3 days then prn cough, wheezing   Patient not taking: Reported on 2021   atorvastatin (LIPITOR) 40 mg tablet  Self Yes No   Sig: Take 40 mg by mouth daily   furosemide (LASIX) 40 mg tablet  Self Yes No   Sig: Take 60 mg by mouth 2 (two) times a day   hydrOXYzine HCL (ATARAX) 25 mg tablet   Yes No   Sig: Take 25 mg by mouth 4 (four) times a day as needed   ipratropium (ATROVENT) 0 02 % nebulizer solution   No No   Sig: Take 1 vial (0 5 mg total) by nebulization 3 (three) times a day   Patient not taking: Reported on 2021   levalbuterol (XOPENEX) 0 63 mg/3 mL nebulizer solution   No No   Sig: Take 1 vial (0 63 mg total) by nebulization 3 (three) times a day   metFORMIN (GLUCOPHAGE-XR) 500 mg 24 hr tablet  Self Yes No   Sig: Take by mouth every other day Every other day   montelukast (SINGULAIR) 10 mg tablet   Yes No   Sig: Take 10 mg by mouth   Patient not taking: Reported on 2021   potassium chloride (K-DUR,KLOR-CON) 20 mEq tablet   No No   Sig: Take 1 tablet (20 mEq total) by mouth daily   Patient not taking: Reported on 2021   predniSONE 10 mg tablet   No No   Si tabs po qd x 3 days then 4 tabs po qd x 3 days then 3 tabs po qd x 3 days then 2 tabs po qd x 3 days then 1 tab po qd x 3 days   Patient not taking: Reported on 2021   pregabalin (LYRICA) 75 mg capsule   Yes No Sig: Take 75 mg by mouth   rivaroxaban (XARELTO) 20 mg tablet   Yes No   Sig: Take 20 mg by mouth daily   traMADol (ULTRAM) 50 mg tablet   Yes No   Sig: Take 50 mg by mouth daily   Patient not taking: Reported on 8/16/2021      Facility-Administered Medications: None       Past Medical History:   Diagnosis Date    Atrial fibrillation ([facility] )     Diabetes mellitus ([facility] )     High cholesterol     Hyperlipidemia     Hypertension     Stroke [facility])        Past Surgical History:   Procedure Laterality Date    APPENDECTOMY      ATRIAL CARDIAC PACEMAKER INSERTION      BARIATRIC SURGERY  05/2021    TOE AMPUTATION Left     2nd toe       History reviewed  No pertinent family history  I have reviewed and agree with the history as documented  E-Cigarette/Vaping    E-Cigarette Use Never User      E-Cigarette/Vaping Substances     Social History     Tobacco Use    Smoking status: Never Smoker    Smokeless tobacco: Never Used   Vaping Use    Vaping Use: Never used   Substance Use Topics    Alcohol use: Never    Drug use: Never       Review of Systems   Constitutional: Negative for chills, fatigue and fever  HENT: Negative for ear pain and sore throat  Eyes: Negative for pain and visual disturbance  Respiratory: Negative for cough and shortness of breath  Cardiovascular: Negative for chest pain and palpitations  Gastrointestinal: Positive for abdominal pain  Negative for anorexia, constipation, diarrhea, flatus, hematemesis, hematochezia, melena, nausea and vomiting  Genitourinary: Negative for dysuria and hematuria  Musculoskeletal: Negative for arthralgias and back pain  Skin: Negative for color change and rash  Neurological: Negative for seizures and syncope  All other systems reviewed and are negative  Physical Exam  Physical Exam  Vitals and nursing note reviewed  Constitutional:       Appearance: He is well-developed  He is obese  HENT:      Head: Normocephalic and atraumatic  Eyes:      Extraocular Movements: Extraocular movements intact  Conjunctiva/sclera: Conjunctivae normal       Pupils: Pupils are equal, round, and reactive to light  Cardiovascular:      Rate and Rhythm: Normal rate and regular rhythm  Heart sounds: Normal heart sounds  No murmur heard  Pulmonary:      Effort: Pulmonary effort is normal  No respiratory distress  Breath sounds: Normal breath sounds  Abdominal:      General: A surgical scar is present  Palpations: Abdomen is soft  Tenderness: There is abdominal tenderness in the right upper quadrant  There is no right CVA tenderness, left CVA tenderness, guarding or rebound  Negative signs include Huber's sign  Hernia: No hernia is present  Musculoskeletal:      Cervical back: Neck supple  Skin:     General: Skin is warm and dry  Capillary Refill: Capillary refill takes less than 2 seconds  Neurological:      General: No focal deficit present  Mental Status: He is alert and oriented to person, place, and time           Vital Signs  ED Triage Vitals [09/05/21 1230]   Temperature Pulse Respirations Blood Pressure SpO2   97 5 °F (36 4 °C) 65 17 129/71 95 %      Temp Source Heart Rate Source Patient Position - Orthostatic VS BP Location FiO2 (%)   Temporal Monitor Lying Right arm --      Pain Score       Worst Possible Pain           Vitals:    09/05/21 1230 09/05/21 1330 09/05/21 1515   BP: 129/71 117/69 115/67   Pulse: 65 (!) 51 66   Patient Position - Orthostatic VS: Lying  Lying         Visual Acuity      ED Medications  Medications   sodium chloride 0 9 % bolus 1,000 mL (0 mL Intravenous Stopped 9/5/21 1351)   morphine (PF) 4 mg/mL injection 4 mg (4 mg Intravenous Given 9/5/21 1252)   furosemide (LASIX) injection 20 mg (20 mg Intravenous Given 9/5/21 1433)   iohexol (OMNIPAQUE) 350 MG/ML injection (SINGLE-DOSE) 100 mL (100 mL Intravenous Given 9/5/21 1406)   iohexol (OMNIPAQUE) 240 MG/ML solution 50 mL (25 mL Oral Given 9/5/21 1406)   morphine injection 2 mg (2 mg Intravenous Given 9/5/21 1525)       Diagnostic Studies  Results Reviewed     Procedure Component Value Units Date/Time    UA w Reflex to Microscopic w Reflex to Culture [[id number]] Collected: 09/05/21 1453    Lab Status: Final result Specimen: Urine, Clean Catch Updated: 09/05/21 1501     Color, UA Yellow     Clarity, UA Clear     Specific Gravity, UA <=1 005     pH, UA 7 5     Leukocytes, UA Negative     Nitrite, UA Negative     Protein, UA Negative mg/dl      Glucose, UA Negative mg/dl      Ketones, UA Negative mg/dl      Urobilinogen, UA 1 0 E U /dl      Bilirubin, UA Negative     Blood, UA Negative    Comprehensive metabolic panel [[id number]]  (Abnormal) Collected: 09/05/21 1250    Lab Status: Final result Specimen: Blood from Line, Venous Updated: 09/05/21 1330     Sodium 138 mmol/L      Potassium 3 5 mmol/L      Chloride 104 mmol/L      CO2 30 mmol/L      ANION GAP 4 mmol/L      BUN 11 mg/dL      Creatinine 0 85 mg/dL      Glucose 101 mg/dL      Calcium 8 2 mg/dL      Corrected Calcium 8 8 mg/dL      AST 19 U/L      ALT 20 U/L      Alkaline Phosphatase 96 U/L      Total Protein 6 5 g/dL      Albumin 3 3 g/dL      Total Bilirubin 0 46 mg/dL      eGFR 96 ml/min/1 73sq m     Narrative:      [place] guidelines for Chronic Kidney Disease (CKD):     Stage 1 with normal or high GFR (GFR > 90 mL/min/1 73 square meters)    Stage 2 Mild CKD (GFR = 60-89 mL/min/1 73 square meters)    Stage 3A Moderate CKD (GFR = 45-59 mL/min/1 73 square meters)    Stage 3B Moderate CKD (GFR = 30-44 mL/min/1 73 square meters)    Stage 4 Severe CKD (GFR = 15-29 mL/min/1 73 square meters)    Stage 5 End Stage CKD (GFR <15 mL/min/1 73 square meters)  Note: GFR calculation is accurate only with a steady state creatinine    Magnesium [[id number]]  (Normal) Collected: 09/05/21 1250    Lab Status: Final result Specimen: Blood from Line, Venous Updated: 09/05/21 1330     Magnesium 2 0 mg/dL     NT-BNP PRO [[id number]]  (Abnormal) Collected: 09/05/21 1250    Lab Status: Final result Specimen: Blood from Line, Venous Updated: 09/05/21 1330     NT-proBNP 1,709 pg/mL     Lipase [[id number]]  (Abnormal) Collected: 09/05/21 1250    Lab Status: Final result Specimen: Blood from Line, Venous Updated: 09/05/21 1330     Lipase 65 u/L     Troponin I [[id number]]  (Normal) Collected: 09/05/21 1250    Lab Status: Final result Specimen: Blood from Line, Venous Updated: 09/05/21 1329     Troponin I <0 02 ng/mL     Lactic acid [[id number]]  (Normal) Collected: 09/05/21 1250    Lab Status: Final result Specimen: Blood from Line, Venous Updated: 09/05/21 1328     LACTIC ACID 1 2 mmol/L     Narrative:      Result may be elevated if tourniquet was used during collection      Protime-INR [[id number]]  (Normal) Collected: 09/05/21 1250    Lab Status: Final result Specimen: Blood from Line, Venous Updated: 09/05/21 1323     Protime 12 8 seconds      INR 0 97    APTT [[id number]]  (Normal) Collected: 09/05/21 1250    Lab Status: Final result Specimen: Blood from Line, Venous Updated: 09/05/21 1323     PTT 34 seconds     CBC and differential [[id number]]  (Abnormal) Collected: 09/05/21 1250    Lab Status: Final result Specimen: Blood from Line, Venous Updated: 09/05/21 1309     WBC 6 42 Thousand/uL      RBC 5 13 Million/uL      Hemoglobin 14 8 g/dL      Hematocrit 45 5 %      MCV 89 fL      MCH 28 8 pg      MCHC 32 5 g/dL      RDW 14 5 %      MPV 10 3 fL      Platelets 657 Thousands/uL      nRBC 0 /100 WBCs      Neutrophils Relative 52 %      Immat GRANS % 0 %      Lymphocytes Relative 28 %      Monocytes Relative 15 %      Eosinophils Relative 4 %      Basophils Relative 1 %      Neutrophils Absolute 3 36 Thousands/µL      Immature Grans Absolute 0 02 Thousand/uL      Lymphocytes Absolute 1 82 Thousands/µL      Monocytes Absolute 0 94 Thousand/µL      Eosinophils Absolute 0 23 Thousand/µL Basophils Absolute 0 05 Thousands/µL                  US gallbladder   Final Result by Ramirez Mota MD (09/05 1636)      Mild gallbladder wall thickening by strict ultrasound criteria, but no gallstones or other secondary signs of cholecystitis  Evidence of mild fatty liver  Workstation performed: [id number]         PE Study with CT Abdomen and Pelvis with contrast   Final Result by Domenico Elder DO (09/05 3997)      No pulmonary embolus to the level of the proximal segmental pulmonary arteries  Irregular pulmonary nodular opacity in the right lower lobe  This is likely infectious or inflammatory in nature  Recommend 3 month follow-up noncontrast CT of the chest to assess for stability/resolution  Mesenteric panniculitis, 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  Status post gastric bypass without CT evidence of complication  Gallbladder mildly distended  No gallstones are seen  There is no pericholecystic inflammatory change  If there is concern for acute cholecystitis, right upper quadrant ultrasound should be performed  The study was marked in [facility] for immediate notification  Workstation performed: [id number]                    Procedures  ECG 12 Lead Documentation Only    Date/Time: 9/5/2021 1:07 PM  Performed by: Ghazala Otero PA-C  Authorized by: Ghazala Otero PA-C     Indications / Diagnosis:  Ruq   Patient location:  ED  Previous ECG:     Previous ECG:  Unavailable  Interpretation:     Interpretation: abnormal    Rate:     ECG rate:  55    ECG rate assessment: normal    Rhythm:     Rhythm: paced    Ectopy:     Ectopy: PVCs    Conduction:     Conduction: abnormal      Abnormal conduction: complete LBBB               ED Course  ED Course as of Sep 05 1809   Sun Sep 05, 2021   1522 No pulmonary embolus to the level of the proximal segmental pulmonary arteries      Irregular pulmonary nodular opacity in the right lower lobe    This is likely infectious or inflammatory in nature  Recommend 3 month follow-up noncontrast CT of the chest to assess for stability/resolution      Mesenteric panniculitis, 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      Status post gastric bypass without CT evidence of complication      Gallbladder mildly distended  No gallstones are seen  There is no pericholecystic inflammatory change  If there is concern for acute cholecystitis, right upper quadrant ultrasound should be performed          1522 Ultrasound ordered of the RUQ       1522 Did give additional dose of lasix, but patient denies at SOB, chest pain, increasing lower leg edema    NT-proBNP(!): 1,709   1643 Pain with general surgery Dr Sanford, about ultrasound illustrating gall bladder wall thickening      1652 Recommendation was to admit for pain control and consult surgery per General surgery recommendations      1807 6:07 PM patient refusing to stay                                 SBIRT 22yo+      Most Recent Value   SBIRT (22 yo +)   In order to provide better care to our patients, we are screening all of our patients for alcohol and drug use  Would it be okay to ask you these screening questions? Yes Filed at: 09/05/2021 1345   Initial Alcohol Screen: US AUDIT-C    1  How often do you have a drink containing alcohol?  0 Filed at: 09/05/2021 1345   2  How many drinks containing alcohol do you have on a typical day you are drinking? 0 Filed at: 09/05/2021 1345   3a  Male UNDER 65: How often do you have five or more drinks on one occasion? 0 Filed at: 09/05/2021 1345   3b  FEMALE Any Age, or MALE 65+: How often do you have 4 or more drinks on one occassion? 0 Filed at: 09/05/2021 1345   Audit-C Score  0 Filed at: 09/05/2021 1345   ANGELO: How many times in the past year have you    Used an illegal drug or used a prescription medication for non-medical reasons?   Never Filed at: 09/05/2021 1345                    MDM  Number of Diagnoses or Management Options  Pulmonary nodule  RUQ pain  Diagnosis management comments: Patient had continuing pain in the right upper quadrant despite multiple doses of IV medications  CT scan illustrated distended gallbladder ultrasound illustrated wall thickening  Due to these findings and persistent pain in the right upper quadrant discussion with General surgery was obtained  Dr Cornelius Flor recommendation was to admit for pain control and observation  Dr Isaac Granados with medicine accepted admission       6:08 PM  Patient now was refusing to stay for observation  Patient was discharged on doxycycline due to the pulmonary nodular opacity  Patient was given referral to General surgery as an outpatient  Patient was educated on risks of leaving including worsening infection, sepsis  Was instructed to return immediately if anything worsens and he expressed understanding was in agreement         Amount and/or Complexity of Data Reviewed  Clinical lab tests: ordered and reviewed  Tests in the radiology section of CPT®: ordered and reviewed  Decide to obtain previous medical records or to obtain history from someone other than the patient: yes  Review and summarize past medical records: yes  Discuss the patient with other providers: yes  Independent visualization of images, tracings, or specimens: yes    Risk of Complications, Morbidity, and/or Mortality  Presenting problems: moderate  Diagnostic procedures: moderate  Management options: moderate    Patient Progress  Patient progress: stable      Disposition  Final diagnoses:   RUQ pain   Pulmonary nodule     Time reflects when diagnosis was documented in both MDM as applicable and the Disposition within this note     Time User Action Codes Description Comment    9/5/2021  3:32 PM Jaime Dasilva Add [R10 11] RUQ pain     9/5/2021  6:07 PM Jaime Dasilva Add [R91 1] Pulmonary nodule       ED Disposition     ED Disposition Condition Date/Time Comment    Discharge Stable Sun Sep 5, 2021  6:05 PM Case was discussed with Dr Roseanna Martinez, Dr Erin Schuster  and the patient's admission status was agreed to be Admission Status: observation status to the service of Dr Roseanna Martinez    Follow-up Information     Follow up With Specialties Details Why Contact Danial Woo DO General Surgery Schedule an appointment as soon as possible for a visit   [address] [place] [address]  [phone]            Patient's Medications   Discharge Prescriptions    DOXYCYCLINE HYCLATE (VIBRAMYCIN) 100 MG CAPSULE    Take 1 capsule (100 mg total) by mouth every 12 (twelve) hours for 7 days       Start Date: 9/5/2021  End Date: 9/12/2021       Order Dose: 100 mg       Quantity: 14 capsule    Refills: 0     No discharge procedures on file      PDMP Review       Value Time User    PDMP Reviewed  Yes 9/5/2021  3:44 PM Cindy Larry PA-C          ED Provider  Electronically Signed by           Cindy Larry PA-C  09/05/21 8513 Azalia Hameed PA-C  09/05/21 9680

## 2021-09-05 NOTE — ED ATTENDING ATTESTATION
9/5/2021  Mary Jon DO, saw and evaluated the patient  I have discussed the patient with the resident/non-physician practitioner and agree with the resident's/non-physician practitioner's findings, Plan of Care, and MDM as documented in the resident's/non-physician practitioner's note, except where noted  All available labs and Radiology studies were reviewed  I was present for key portions of any procedure(s) performed by the resident/non-physician practitioner and I was immediately available to provide assistance  At this point I agree with the current assessment done in the Emergency Department    I have conducted an independent evaluation of this patient    ED Course  ED Course as of Sep 05 1439   Sun Sep 05, 2021   1255 EKG 1254 atrial fibrillation rate 55 intermittently paced rhythm interpreted by me

## 2021-09-06 LAB
ATRIAL RATE: 277 BPM
QRS AXIS: 112 DEGREES
QRSD INTERVAL: 166 MS
QT INTERVAL: 476 MS
QTC INTERVAL: 455 MS
T WAVE AXIS: -70 DEGREES
VENTRICULAR RATE: 55 BPM

## 2021-09-07 DIAGNOSIS — R10.11 RIGHT UPPER QUADRANT PAIN: ICD-10-CM

## 2021-09-13 ENCOUNTER — HOSPITAL ENCOUNTER (OUTPATIENT)
Dept: NUCLEAR MEDICINE | Facility: HOSPITAL | Age: 56
Discharge: HOME | End: 2021-09-13
Attending: SURGERY
Payer: COMMERCIAL

## 2021-09-13 VITALS — WEIGHT: 315 LBS | BODY MASS INDEX: 45.78 KG/M2

## 2021-09-13 DIAGNOSIS — R10.11 RIGHT UPPER QUADRANT PAIN: ICD-10-CM

## 2021-09-13 PROCEDURE — A9537 TC99M MEBROFENIN: HCPCS

## 2021-09-13 PROCEDURE — 78227 HEPATOBIL SYST IMAGE W/DRUG: CPT

## 2021-09-13 RX ADMIN — SINCALIDE 3.1 MCG: 5 INJECTION, POWDER, LYOPHILIZED, FOR SOLUTION INTRAVENOUS at 09:44

## 2021-09-14 ENCOUNTER — ANESTHESIA EVENT (INPATIENT)
Dept: PERIOP | Facility: HOSPITAL | Age: 56
DRG: 616 | End: 2021-09-14
Payer: COMMERCIAL

## 2021-09-14 ENCOUNTER — APPOINTMENT (EMERGENCY)
Dept: RADIOLOGY | Facility: HOSPITAL | Age: 56
DRG: 616 | End: 2021-09-14
Payer: COMMERCIAL

## 2021-09-14 ENCOUNTER — HOSPITAL ENCOUNTER (INPATIENT)
Facility: HOSPITAL | Age: 56
LOS: 2 days | Discharge: HOME | DRG: 616 | End: 2021-09-16
Attending: EMERGENCY MEDICINE | Admitting: PODIATRIST
Payer: COMMERCIAL

## 2021-09-14 DIAGNOSIS — I50.33 ACUTE ON CHRONIC DIASTOLIC HEART FAILURE (HCC): ICD-10-CM

## 2021-09-14 DIAGNOSIS — M79.675 TOE PAIN, LEFT: ICD-10-CM

## 2021-09-14 DIAGNOSIS — M86.472 CHRONIC OSTEOMYELITIS OF LEFT FOOT WITH DRAINING SINUS (HCC): ICD-10-CM

## 2021-09-14 DIAGNOSIS — E11.42 TYPE 2 DIABETES MELLITUS WITH DIABETIC POLYNEUROPATHY, WITHOUT LONG-TERM CURRENT USE OF INSULIN (HCC): ICD-10-CM

## 2021-09-14 DIAGNOSIS — E87.6 ACUTE HYPOKALEMIA: ICD-10-CM

## 2021-09-14 DIAGNOSIS — L97.529: Primary | ICD-10-CM

## 2021-09-14 LAB
ANION GAP SERPL CALCULATED.3IONS-SCNC: 7 MMOL/L (ref 4–13)
ANION GAP SERPL CALCULATED.3IONS-SCNC: 8 MMOL/L (ref 4–13)
BASOPHILS # BLD AUTO: 0.03 THOUSANDS/ΜL (ref 0–0.1)
BASOPHILS NFR BLD AUTO: 0 % (ref 0–1)
BUN SERPL-MCNC: 14 MG/DL (ref 5–25)
BUN SERPL-MCNC: 14 MG/DL (ref 5–25)
CALCIUM SERPL-MCNC: 8.7 MG/DL (ref 8.3–10.1)
CALCIUM SERPL-MCNC: 9 MG/DL (ref 8.3–10.1)
CHLORIDE SERPL-SCNC: 100 MMOL/L (ref 100–108)
CHLORIDE SERPL-SCNC: 99 MMOL/L (ref 100–108)
CO2 SERPL-SCNC: 32 MMOL/L (ref 21–32)
CO2 SERPL-SCNC: 33 MMOL/L (ref 21–32)
CREAT SERPL-MCNC: 1.05 MG/DL (ref 0.6–1.3)
CREAT SERPL-MCNC: 1.14 MG/DL (ref 0.6–1.3)
CRP SERPL QL: 25.9 MG/L
EOSINOPHIL # BLD AUTO: 0.2 THOUSAND/ΜL (ref 0–0.61)
EOSINOPHIL NFR BLD AUTO: 3 % (ref 0–6)
ERYTHROCYTE [DISTWIDTH] IN BLOOD BY AUTOMATED COUNT: 14.2 % (ref 11.6–15.1)
ERYTHROCYTE [SEDIMENTATION RATE] IN BLOOD: 42 MM/HOUR (ref 0–19)
GFR SERPL CREATININE-BSD FRML MDRD: 70 ML/MIN/1.73SQ M
GFR SERPL CREATININE-BSD FRML MDRD: 78 ML/MIN/1.73SQ M
GLUCOSE SERPL-MCNC: 141 MG/DL (ref 65–140)
GLUCOSE SERPL-MCNC: 153 MG/DL (ref 65–140)
HCT VFR BLD AUTO: 46.2 % (ref 36.5–49.3)
HGB BLD-MCNC: 14.9 G/DL (ref 12–17)
IMM GRANULOCYTES # BLD AUTO: 0.03 THOUSAND/UL (ref 0–0.2)
IMM GRANULOCYTES NFR BLD AUTO: 0 % (ref 0–2)
LYMPHOCYTES # BLD AUTO: 1.97 THOUSANDS/ΜL (ref 0.6–4.47)
LYMPHOCYTES NFR BLD AUTO: 26 % (ref 14–44)
MCH RBC QN AUTO: 28.3 PG (ref 26.8–34.3)
MCHC RBC AUTO-ENTMCNC: 32.3 G/DL (ref 31.4–37.4)
MCV RBC AUTO: 88 FL (ref 82–98)
MONOCYTES # BLD AUTO: 0.57 THOUSAND/ΜL (ref 0.17–1.22)
MONOCYTES NFR BLD AUTO: 8 % (ref 4–12)
NEUTROPHILS # BLD AUTO: 4.7 THOUSANDS/ΜL (ref 1.85–7.62)
NEUTS SEG NFR BLD AUTO: 63 % (ref 43–75)
NRBC BLD AUTO-RTO: 0 /100 WBCS
PLATELET # BLD AUTO: 237 THOUSANDS/UL (ref 149–390)
PLATELET # BLD AUTO: 238 THOUSANDS/UL (ref 149–390)
PMV BLD AUTO: 9.3 FL (ref 8.9–12.7)
PMV BLD AUTO: 9.6 FL (ref 8.9–12.7)
POTASSIUM SERPL-SCNC: 2.6 MMOL/L (ref 3.5–5.3)
POTASSIUM SERPL-SCNC: 3.1 MMOL/L (ref 3.5–5.3)
RBC # BLD AUTO: 5.27 MILLION/UL (ref 3.88–5.62)
SODIUM SERPL-SCNC: 139 MMOL/L (ref 136–145)
SODIUM SERPL-SCNC: 140 MMOL/L (ref 136–145)
WBC # BLD AUTO: 7.5 THOUSAND/UL (ref 4.31–10.16)

## 2021-09-14 PROCEDURE — 80048 BASIC METABOLIC PNL TOTAL CA: CPT | Performed by: EMERGENCY MEDICINE

## 2021-09-14 PROCEDURE — 87077 CULTURE AEROBIC IDENTIFY: CPT | Performed by: STUDENT IN AN ORGANIZED HEALTH CARE EDUCATION/TRAINING PROGRAM

## 2021-09-14 PROCEDURE — 99284 EMERGENCY DEPT VISIT MOD MDM: CPT

## 2021-09-14 PROCEDURE — 87070 CULTURE OTHR SPECIMN AEROBIC: CPT | Performed by: STUDENT IN AN ORGANIZED HEALTH CARE EDUCATION/TRAINING PROGRAM

## 2021-09-14 PROCEDURE — 73630 X-RAY EXAM OF FOOT: CPT

## 2021-09-14 PROCEDURE — 85025 COMPLETE CBC W/AUTO DIFF WBC: CPT | Performed by: EMERGENCY MEDICINE

## 2021-09-14 PROCEDURE — 36415 COLL VENOUS BLD VENIPUNCTURE: CPT | Performed by: EMERGENCY MEDICINE

## 2021-09-14 PROCEDURE — 85652 RBC SED RATE AUTOMATED: CPT | Performed by: EMERGENCY MEDICINE

## 2021-09-14 PROCEDURE — 86140 C-REACTIVE PROTEIN: CPT | Performed by: EMERGENCY MEDICINE

## 2021-09-14 PROCEDURE — 80048 BASIC METABOLIC PNL TOTAL CA: CPT

## 2021-09-14 PROCEDURE — 87147 CULTURE TYPE IMMUNOLOGIC: CPT | Performed by: STUDENT IN AN ORGANIZED HEALTH CARE EDUCATION/TRAINING PROGRAM

## 2021-09-14 PROCEDURE — 87186 SC STD MICRODIL/AGAR DIL: CPT | Performed by: STUDENT IN AN ORGANIZED HEALTH CARE EDUCATION/TRAINING PROGRAM

## 2021-09-14 PROCEDURE — 99285 EMERGENCY DEPT VISIT HI MDM: CPT | Performed by: EMERGENCY MEDICINE

## 2021-09-14 PROCEDURE — 87205 SMEAR GRAM STAIN: CPT | Performed by: STUDENT IN AN ORGANIZED HEALTH CARE EDUCATION/TRAINING PROGRAM

## 2021-09-14 PROCEDURE — 85049 AUTOMATED PLATELET COUNT: CPT | Performed by: PODIATRIST

## 2021-09-14 RX ORDER — OXYCODONE HYDROCHLORIDE 5 MG/1
5 TABLET ORAL EVERY 4 HOURS PRN
Status: DISCONTINUED | OUTPATIENT
Start: 2021-09-14 | End: 2021-09-16 | Stop reason: HOSPADM

## 2021-09-14 RX ORDER — POTASSIUM CHLORIDE 20 MEQ/1
40 TABLET, EXTENDED RELEASE ORAL ONCE
Status: COMPLETED | OUTPATIENT
Start: 2021-09-14 | End: 2021-09-14

## 2021-09-14 RX ORDER — OXYCODONE HYDROCHLORIDE 10 MG/1
10 TABLET ORAL EVERY 4 HOURS PRN
Status: DISCONTINUED | OUTPATIENT
Start: 2021-09-14 | End: 2021-09-16 | Stop reason: HOSPADM

## 2021-09-14 RX ORDER — MONTELUKAST SODIUM 10 MG/1
10 TABLET ORAL DAILY
Status: DISCONTINUED | OUTPATIENT
Start: 2021-09-15 | End: 2021-09-16 | Stop reason: HOSPADM

## 2021-09-14 RX ORDER — POTASSIUM CHLORIDE AND SODIUM CHLORIDE 900; 300 MG/100ML; MG/100ML
125 INJECTION, SOLUTION INTRAVENOUS CONTINUOUS
Status: DISCONTINUED | OUTPATIENT
Start: 2021-09-14 | End: 2021-09-14

## 2021-09-14 RX ORDER — POTASSIUM CHLORIDE 20 MEQ/1
40 TABLET, EXTENDED RELEASE ORAL ONCE
Status: CANCELLED | OUTPATIENT
Start: 2021-09-14 | End: 2021-09-14

## 2021-09-14 RX ORDER — SODIUM CHLORIDE 9 MG/ML
50 INJECTION, SOLUTION INTRAVENOUS CONTINUOUS
Status: DISCONTINUED | OUTPATIENT
Start: 2021-09-15 | End: 2021-09-15

## 2021-09-14 RX ORDER — LORAZEPAM 1 MG/1
0.5 TABLET ORAL EVERY 8 HOURS PRN
Status: DISCONTINUED | OUTPATIENT
Start: 2021-09-14 | End: 2021-09-16 | Stop reason: HOSPADM

## 2021-09-14 RX ORDER — ALBUTEROL SULFATE 2.5 MG/3ML
5 SOLUTION RESPIRATORY (INHALATION) EVERY 6 HOURS PRN
Status: DISCONTINUED | OUTPATIENT
Start: 2021-09-14 | End: 2021-09-14

## 2021-09-14 RX ORDER — POTASSIUM CHLORIDE 20 MEQ/1
20 TABLET, EXTENDED RELEASE ORAL DAILY
Status: DISCONTINUED | OUTPATIENT
Start: 2021-09-15 | End: 2021-09-15

## 2021-09-14 RX ORDER — PREGABALIN 75 MG/1
75 CAPSULE ORAL DAILY
Status: DISCONTINUED | OUTPATIENT
Start: 2021-09-15 | End: 2021-09-16 | Stop reason: HOSPADM

## 2021-09-14 RX ORDER — HEPARIN SODIUM 5000 [USP'U]/ML
7500 INJECTION, SOLUTION INTRAVENOUS; SUBCUTANEOUS EVERY 8 HOURS SCHEDULED
Status: DISCONTINUED | OUTPATIENT
Start: 2021-09-14 | End: 2021-09-14 | Stop reason: SDUPTHER

## 2021-09-14 RX ORDER — LEVALBUTEROL INHALATION SOLUTION 0.63 MG/3ML
0.63 SOLUTION RESPIRATORY (INHALATION)
Status: DISCONTINUED | OUTPATIENT
Start: 2021-09-14 | End: 2021-09-14

## 2021-09-14 RX ORDER — ATORVASTATIN CALCIUM 40 MG/1
40 TABLET, FILM COATED ORAL DAILY
Status: DISCONTINUED | OUTPATIENT
Start: 2021-09-15 | End: 2021-09-16 | Stop reason: HOSPADM

## 2021-09-14 RX ORDER — TRAMADOL HYDROCHLORIDE 50 MG/1
50 TABLET ORAL DAILY
Status: DISCONTINUED | OUTPATIENT
Start: 2021-09-15 | End: 2021-09-16 | Stop reason: HOSPADM

## 2021-09-14 RX ORDER — OXYCODONE HYDROCHLORIDE 5 MG/1
5 TABLET ORAL EVERY 4 HOURS PRN
Status: DISCONTINUED | OUTPATIENT
Start: 2021-09-14 | End: 2021-09-14

## 2021-09-14 RX ORDER — ACETAMINOPHEN 325 MG/1
975 TABLET ORAL EVERY 6 HOURS SCHEDULED
Status: DISCONTINUED | OUTPATIENT
Start: 2021-09-14 | End: 2021-09-16 | Stop reason: HOSPADM

## 2021-09-14 RX ORDER — ALBUTEROL SULFATE 2.5 MG/3ML
2.5 SOLUTION RESPIRATORY (INHALATION) EVERY 6 HOURS PRN
Status: DISCONTINUED | OUTPATIENT
Start: 2021-09-14 | End: 2021-09-16 | Stop reason: HOSPADM

## 2021-09-14 RX ORDER — SODIUM CHLORIDE 9 MG/ML
50 INJECTION, SOLUTION INTRAVENOUS CONTINUOUS
Status: DISCONTINUED | OUTPATIENT
Start: 2021-09-14 | End: 2021-09-14

## 2021-09-14 RX ORDER — HYDROXYZINE HYDROCHLORIDE 25 MG/1
25 TABLET, FILM COATED ORAL EVERY 6 HOURS PRN
Status: DISCONTINUED | OUTPATIENT
Start: 2021-09-14 | End: 2021-09-16 | Stop reason: HOSPADM

## 2021-09-14 RX ADMIN — ACETAMINOPHEN 975 MG: 325 TABLET, FILM COATED ORAL at 23:37

## 2021-09-14 RX ADMIN — CEFAZOLIN SODIUM 3000 MG: 10 INJECTION, POWDER, FOR SOLUTION INTRAVENOUS at 18:33

## 2021-09-14 RX ADMIN — SODIUM CHLORIDE 50 ML/HR: 0.9 INJECTION, SOLUTION INTRAVENOUS at 23:38

## 2021-09-14 RX ADMIN — OXYCODONE HYDROCHLORIDE 10 MG: 10 TABLET ORAL at 23:37

## 2021-09-14 RX ADMIN — OXYCODONE HYDROCHLORIDE 5 MG: 5 TABLET ORAL at 16:42

## 2021-09-14 RX ADMIN — INSULIN DETEMIR 23 UNITS: 100 INJECTION, SOLUTION SUBCUTANEOUS at 22:39

## 2021-09-14 RX ADMIN — POTASSIUM CHLORIDE 40 MEQ: 1500 TABLET, EXTENDED RELEASE ORAL at 22:34

## 2021-09-14 RX ADMIN — POTASSIUM CHLORIDE 40 MEQ: 1500 TABLET, EXTENDED RELEASE ORAL at 16:06

## 2021-09-14 RX ADMIN — ACETAMINOPHEN 975 MG: 325 TABLET, FILM COATED ORAL at 19:38

## 2021-09-14 RX ADMIN — POTASSIUM CHLORIDE AND SODIUM CHLORIDE 125 ML/HR: 900; 300 INJECTION, SOLUTION INTRAVENOUS at 16:28

## 2021-09-14 RX ADMIN — OXYCODONE HYDROCHLORIDE 10 MG: 10 TABLET ORAL at 19:38

## 2021-09-14 NOTE — H&P
Podiatry - H&P  Anthony Marks 60 y o  male MRN: [id number]  Unit/Bed#: EWR 01 Encounter: [id number]  Admission Date: 09/14/21    ASSESSMENT:    Anthony Marks is a 60 y o  male with:    1  Left 4th toe DM ulcer with underlying OM - hodge 3  2  DM type 2  3  Obesity  4  HTN  5  CHF    PLAN:    · Patient will be admitted under podiatry service of Dr Jerardo Escobedo for IV antibiotics and amputation of left 4th toe tomorrow 9/15/21  · NPO midnight, Light IVFs in preparation for surgery tomorrow  · X-rays of left foot were personally reviewed  Per my read there is evidence of cortical erosion and osteomyelitis of the proximal phalanx of the left 4th toe  · Apply local wound care to left 4th toe, no purulence or overt signs severe infection noted  · Wound cultures taken of left 4th toe, await results  · Start patient on IV Ancef, p o  Metronidazole until wound cultures can be appreciated antibiotics can be adjusted  · Weight-bearing as tolerated to bilateral lower extremities  · Patient's home medical regimen was continued with exception of oral hypoglycemics  Continue home medical regimen until Internal Medicine recommendations can be appreciated  · Consult placed to Internal Medicine for medical management and preoperative clearance  · This plan was discussed with my attending Dr Jerardo Escobedo  Antibiotics started: Ancef and metronidazole  Pharmacologic VTE Prophylaxis: Enoxaparin (Lovenox)   Mechanical VTE Prophylaxis: sequential compression device       Disposition:  Patient requires >2 midnight stay for IV antibiotics and left 4th toe amputation  SUBJECTIVE:    History of Present Illness:    Anthony Marks is a 60 y o  male who is being admitted 9/14/2021 due to osteomyelitis of left 4th toe  Patient has a past medical history of diabetes mellitus, obesity, hypertension, acute on chronic heart failure  Patient reports he has had a wound left 4th and 5th toe for approximately 2 months now    He reports seeing his podiatrist Dr Theodore Mario for local wound care  He reports despite appropriate local wound care to the wound has not healed addressed  Patient reports increased recently in swelling the left forefoot as well as pain of left 4th toe  Patient was seen in the office by Dr Theodore Mario today who noted that wound on the lateral aspect of left 4th toe probe to bone and recommended the patient reports the emergency room for evaluation and admission to the hospital IV antibiotics as well as surgical workup for amputation of left 4th toe  Patient denies any nausea, vomiting, fever, chills, night sweats  Patient reports history of left 2nd toe amputation due to similar issue  Patient has no further podiatric complaints this time  Review of Systems:    Constitutional: Negative  HENT: Negative  Eyes: Negative  Respiratory: Negative  Cardiovascular: Negative  Gastrointestinal: Negative  Musculoskeletal:  Left 4th toe pain   Skin:  Ulceration left 5th toe, ulceration left 4th toe   Neurological:  Peripheral neuropathy  Psych: Negative  Past Medical and Surgical History:     Past Medical History:   Diagnosis Date    [facility]rial fibrillation [facility] )     Diabetes mellitus ([facility] )     High cholesterol     Hyperlipidemia     Hypertension     Stroke [facility])        Past Surgical History:   Procedure Laterality Date    APPENDECTOMY      ATRIAL CARDIAC PACEMAKER INSERTION      BARIATRIC SURGERY  05/2021    TOE AMPUTATION Left     2nd toe       Meds/Allergies:    (Not in a hospital admission)      No Known Allergies    Social History:    Social History     Marital Status: /Civil Union    Substance Use History:   Social History     Substance and Sexual Activity   Alcohol Use Never     Social History     Tobacco Use   Smoking Status Never Smoker   Smokeless Tobacco Never Used     Social History     Substance and Sexual Activity   Drug Use Never       Family History:    History reviewed   No pertinent family history  OBJECTIVE:    First Vitals:   Blood Pressure: 147/85 (09/14/21 1328)  Pulse: 82 (09/14/21 1328)  Temperature: 97 9 °F (36 6 °C) (09/14/21 1328)  Temp Source: Oral (09/14/21 1328)  Respirations: 19 (09/14/21 1328)  Height: 6' 1" (185 4 cm) (09/14/21 1325)  Weight - Scale: (!) 153 kg (337 lb 4 9 oz) (09/14/21 1325)  SpO2: 95 % (09/14/21 1328)    Current Vitals:   Blood Pressure: 147/85 (09/14/21 1328)  Pulse: 82 (09/14/21 1328)  Temperature: 97 9 °F (36 6 °C) (09/14/21 1328)  Temp Source: Oral (09/14/21 1328)  Respirations: 19 (09/14/21 1328)  Height: 6' 1" (185 4 cm) (09/14/21 1325)  Weight - Scale: (!) 153 kg (337 lb 4 9 oz) (09/14/21 1325)  SpO2: 95 % (09/14/21 1328)      Physical Exam:    General Appearance: Alert, cooperative, no distress  HEENT: Head normocephalic, atraumatic, without obvious abnormality  Heart: Normal rate and rhythm  No murmurs or gallops noted  Lungs: Non-labored breathing  No respiratory distress  No wheezing, rhonchi, or rales  Abdomen:  Soft, nontender, without distension  Psychiatric: AAOx3  Lower Extremity:  Vascular:   Right DP and PT pulses are present  Left DP and PT pulses are present  CRT < 3 seconds at the digits  +3/4 edema noted at bilateral lower extremities  Pedal hair is absent  Skin temperature is WNL bilaterally  Musculoskeletal:  MMT is 5/5 in all muscle compartments bilaterally  ROM at the 1st MPJ and ankle joint are reduced bilaterally with the leg extended  Pain on palpation of left 4th toe  No pain with range of motion of bilateral ankles  History of left 2nd toe amputation  Dermatological:  No open lesions noted of right lower extremity  Edema noted bilateral lower extremities  Superficial abrasion noted of the lateral aspect of left 5th toe  Lower extremity wound(s) as noted below:    Wound 1 located left 4th toe, measures approximately 0 6 cm x 0 4 cm x 0 4 cm  with sinus tracking or undermining   Wound bed appears fibrotic and underlying structures with slight Serous exudate  Deepest tissue noted Bone  Malodor is not noticed  Wound edge appears Attached  Katy-wound skin appears intact, erythematous and Edematous  Probe to bone is,  positive  Signs of infection are present at this time  Neurological:  Gross sensation is intact  Protective sensation is diminished  Patient Reports numbness and/or paresthesias  Clinical Images 09/14/21:    Left 4th toe    Left 5th toe      Additional Data:    Lab Results:   Admission on 09/14/2021   Component Date Value    WBC 09/14/2021 7 50     RBC 09/14/2021 5 27     Hemoglobin 09/14/2021 14 9     Hematocrit 09/14/2021 46 2     MCV 09/14/2021 88     MCH 09/14/2021 28 3     MCHC 09/14/2021 32 3     RDW 09/14/2021 14 2     MPV 09/14/2021 9 6     Platelets 79/64/5776 238     nRBC 09/14/2021 0     Neutrophils Relative 09/14/2021 63     Immat GRANS % 09/14/2021 0     Lymphocytes Relative 09/14/2021 26     Monocytes Relative 09/14/2021 8     Eosinophils Relative 09/14/2021 3     Basophils Relative 09/14/2021 0     Neutrophils Absolute 09/14/2021 4 70     Immature Grans Absolute 09/14/2021 0 03     Lymphocytes Absolute 09/14/2021 1 97     Monocytes Absolute 09/14/2021 0 57     Eosinophils Absolute 09/14/2021 0 20     Basophils Absolute 09/14/2021 0 03     Sed Rate 09/14/2021 42*       Cultures:            Imaging: I have personally reviewed pertinent reports in PACS  No results found  EKG, Pathology, and Other Studies: I have personally reviewed pertinent reports        Code Status: Prior  Advance Directive and Living Will:      Power of :    POLST:

## 2021-09-14 NOTE — ED PROVIDER NOTES
History  Chief Complaint   Patient presents with    Toe Pain     left foot 4th toe pain and drainage, hx of amputation of other toe  sent to ED for eval by podiatry  concern of osteomyelitis     60 y o  M w/h/o DM p/w left 4th toe wound  Pt sent in by Dr Priya Hatfield (podiatry) for r/o osteo  Between his 4th/5th toes on the left  Pt states he's had this wound for "a while "  Having drainage  He was evaluated by Dr Priya Hatfield today  Denies F/C, N/V  History provided by:  Patient   used: No    Toe Pain  Location:  Left, between 4th and 5th toes  Timing:  Constant  Progression:  Worsening  Chronicity:  New  Ineffective treatments:  Nothing tried  Associated symptoms: no fever, no nausea and no vomiting        Prior to Admission Medications   Prescriptions Last Dose Informant Patient Reported? Taking?    Albuterol Sulfate (PROAIR RESPICLICK) 687 (90 Base) MCG/ACT AEPB 2021  No Yes   Sig: Inhale 1 puff 4 (four) times a day as needed (wheezing, shortness of breath)   LORazepam (ATIVAN) 0 5 mg tablet 2021  Yes Yes   Sig: Take 0 5 mg by mouth every 8 (eight) hours as needed for anxiety   VICTOZA 18 MG/3ML SOPN 2021 Self Yes Yes   albuterol (2 5 mg/3 mL) 0 083 % nebulizer solution 2021  No Yes   Si vial nebulized q 4 hours x 3 days then prn cough, wheezing   atorvastatin (LIPITOR) 40 mg tablet 2021 Self Yes Yes   Sig: Take 40 mg by mouth daily   furosemide (LASIX) 40 mg tablet 2021 Self Yes Yes   Sig: Take 60 mg by mouth 2 (two) times a day   hydrOXYzine HCL (ATARAX) 25 mg tablet 2021  Yes Yes   Sig: Take 25 mg by mouth 4 (four) times a day as needed   ipratropium (ATROVENT) 0 02 % nebulizer solution 2021  No Yes   Sig: Take 1 vial (0 5 mg total) by nebulization 3 (three) times a day   levalbuterol (XOPENEX) 0 63 mg/3 mL nebulizer solution 2021  No Yes   Sig: Take 1 vial (0 63 mg total) by nebulization 3 (three) times a day   metFORMIN (GLUCOPHAGE-XR) 500 mg 24 hr tablet 2021 Self Yes Yes   Sig: Take by mouth every other day Every other day   montelukast (SINGULAIR) 10 mg tablet 2021  Yes Yes   Sig: Take 10 mg by mouth    potassium chloride (K-DUR,KLOR-CON) 20 mEq tablet 2021  No Yes   Sig: Take 1 tablet (20 mEq total) by mouth daily   predniSONE 10 mg tablet 2021  No Yes   Si tabs po qd x 3 days then 4 tabs po qd x 3 days then 3 tabs po qd x 3 days then 2 tabs po qd x 3 days then 1 tab po qd x 3 days   pregabalin (LYRICA) 75 mg capsule 2021  Yes Yes   Sig: Take 75 mg by mouth   rivaroxaban (XARELTO) 20 mg tablet 2021  Yes Yes   Sig: Take 20 mg by mouth daily   traMADol (ULTRAM) 50 mg tablet 2021  Yes Yes   Sig: Take 50 mg by mouth daily       Facility-Administered Medications: None       Past Medical History:   Diagnosis Date    [facility] )     Diabetes mellitus [facility]     High cholesterol     Hyperlipidemia     Hypertension     Stroke [facility])        Past Surgical History:   Procedure Laterality Date    APPENDECTOMY      ATRIAL CARDIAC PACEMAKER INSERTION      BARIATRIC SURGERY  2021    TOE AMPUTATION Left     2nd toe       History reviewed  No pertinent family history  I have reviewed and agree with the history as documented  E-Cigarette/Vaping    E-Cigarette Use Never User      E-Cigarette/Vaping Substances     Social History     Tobacco Use    Smoking status: Never Smoker    Smokeless tobacco: Never Used   Vaping Use    Vaping Use: Never used   Substance Use Topics    Alcohol use: Never    Drug use: Never       Review of Systems   Constitutional: Negative for fever  Gastrointestinal: Negative for nausea and vomiting  Musculoskeletal:        Left 4th toe pain   All other systems reviewed and are negative  Physical Exam  Physical Exam  Vitals and nursing note reviewed  Constitutional:       General: He is not in acute distress  Appearance: Normal appearance   He is not ill-appearing, toxic-appearing or diaphoretic  Interventions: He is not intubated  HENT:      Head: Normocephalic and atraumatic  No raccoon eyes  Right Ear: External ear normal       Left Ear: External ear normal       Nose: Nose normal    Eyes:      General: No scleral icterus  Conjunctiva/sclera:      Right eye: Right conjunctiva is not injected  Left eye: Left conjunctiva is not injected  Neck:      Vascular: No JVD  Trachea: Phonation normal  No tracheal deviation  Cardiovascular:      Rate and Rhythm: Normal rate and regular rhythm  Pulmonary:      Effort: Pulmonary effort is normal  No accessory muscle usage, respiratory distress or retractions  He is not intubated  Breath sounds: No stridor  Musculoskeletal:         General: No deformity  Skin:     Coloration: Skin is not cyanotic or jaundiced  Neurological:      Mental Status: He is alert  GCS: GCS eye subscore is 4  GCS verbal subscore is 5  GCS motor subscore is 6  Cranial Nerves: No dysarthria  Motor: No seizure activity        Gait: Gait normal    Psychiatric:         Mood and Affect: Mood normal          Behavior: Behavior normal          Vital Signs  ED Triage Vitals   Temperature Pulse Respirations Blood Pressure SpO2   09/14/21 1328 09/14/21 1328 09/14/21 1328 09/14/21 1328 09/14/21 1328   97 9 °F (36 6 °C) 82 19 147/85 95 %      Temp Source Heart Rate Source Patient Position - Orthostatic VS BP Location FiO2 (%)   09/14/21 1328 09/14/21 1328 09/14/21 1607 09/14/21 1607 --   Oral Monitor Sitting Right arm       Pain Score       09/14/21 1328       6           Vitals:    09/14/21 1328 09/14/21 1607   BP: 147/85 113/57   Pulse: 82 70   Patient Position - Orthostatic VS:  Sitting         Visual Acuity      ED Medications  Medications   sodium chloride 0 9 % with KCl 40 mEq/L infusion (premix) (125 mL/hr Intravenous New Bag 9/14/21 1628)   oxyCODONE (ROXICODONE) IR tablet 5 mg (5 mg Oral Given 9/14/21 1642)   potassium chloride (K-DUR,KLOR-CON) CR tablet 40 mEq (40 mEq Oral Given 9/14/21 1606)       Diagnostic Studies  Results Reviewed     Procedure Component Value Units Date/Time    Basic metabolic panel [[id number]]  (Abnormal) Collected: 09/14/21 1421    Lab Status: Final result Specimen: Blood from Arm, Right Updated: 09/14/21 1517     Sodium 139 mmol/L      Potassium 2 6 mmol/L      Chloride 99 mmol/L      CO2 32 mmol/L      ANION GAP 8 mmol/L      BUN 14 mg/dL      Creatinine 1 05 mg/dL      Glucose 153 mg/dL      Calcium 9 0 mg/dL      eGFR 78 ml/min/1 73sq m     Narrative:      [place] guidelines for Chronic Kidney Disease (CKD):     Stage 1 with normal or high GFR (GFR > 90 mL/min/1 73 square meters)    Stage 2 Mild CKD (GFR = 60-89 mL/min/1 73 square meters)    Stage 3A Moderate CKD (GFR = 45-59 mL/min/1 73 square meters)    Stage 3B Moderate CKD (GFR = 30-44 mL/min/1 73 square meters)    Stage 4 Severe CKD (GFR = 15-29 mL/min/1 73 square meters)    Stage 5 End Stage CKD (GFR <15 mL/min/1 73 square meters)  Note: GFR calculation is accurate only with a steady state creatinine    C-reactive protein [[id number]]  (Abnormal) Collected: 09/14/21 1421    Lab Status: Final result Specimen: Blood from Arm, Right Updated: 09/14/21 1515     CRP 25 9 mg/L     Wound culture and Gram stain [[id number]] Collected: 09/14/21 1451    Lab Status:  In process Specimen: Wound from Toe, Left Updated: 09/14/21 1455    Sedimentation rate, automated [[id number]]  (Abnormal) Collected: 09/14/21 1421    Lab Status: Final result Specimen: Blood from Arm, Right Updated: 09/14/21 1435     Sed Rate 42 mm/hour     CBC and differential [[id number]] Collected: 09/14/21 1421    Lab Status: Final result Specimen: Blood from Arm, Right Updated: 09/14/21 1426     WBC 7 50 Thousand/uL      RBC 5 27 Million/uL      Hemoglobin 14 9 g/dL      Hematocrit 46 2 %      MCV 88 fL      MCH 28 3 pg      MCHC 32 3 g/dL      RDW 14 2 %      MPV 9 6 fL      Platelets 217 Thousands/uL      nRBC 0 /100 WBCs      Neutrophils Relative 63 %      Immat GRANS % 0 %      Lymphocytes Relative 26 %      Monocytes Relative 8 %      Eosinophils Relative 3 %      Basophils Relative 0 %      Neutrophils Absolute 4 70 Thousands/µL      Immature Grans Absolute 0 03 Thousand/uL      Lymphocytes Absolute 1 97 Thousands/µL      Monocytes Absolute 0 57 Thousand/µL      Eosinophils Absolute 0 20 Thousand/µL      Basophils Absolute 0 03 Thousands/µL                  XR foot 3+ views LEFT   ED Interpretation by Anna Ambriz 24, DO (09/14 1536)   Abnormal   Erosion of proximal phalanx of the left 4th toe                 Procedures  Procedures         ED Course  ED Course as of Sep 14 1650   Tue Sep 14, 2021   [address] [place] Text sent to podiatry      [phone] want labs and xray  [address] coming to see pt       1522 Will replete  Potassium(!!): 2 6                                           MDM    Disposition  Final diagnoses: Toe pain, left   Acute hypokalemia   Skin ulcer of fourth toe of left foot ([facility] )     Time reflects when diagnosis was documented in both MDM as applicable and the Disposition within this note     Time User Action Codes Description Comment    9/14/2021  2:06 PM Lorelei Duggan Add [M79 675] Toe pain, left     9/14/2021  3:23 PM Alison Duggan 48 [E87 6] Acute hypokalemia     9/14/2021  3:28 PM Salud Duggan Add [L97 529] Skin ulcer of fourth toe of left foot ([address] )     9/14/2021  3:28 PM Salud Duggan Modify [M79 675] Toe pain, left     9/14/2021  3:28 PM Salud Duggan Modify [J69 972] Skin ulcer of fourth toe of left foot [facility])       ED Disposition     ED Disposition Condition Date/Time Comment    Admit Stable Tue Sep 14, 2021  3:29 PM Case was discussed with podiatry resident and the patient's admission status was agreed to be Admission Status: inpatient status to the service of Dr Acacia Parra   Follow-up Information    None         Patient's Medications   Discharge Prescriptions    No medications on file     No discharge procedures on file      PDMP Review       Value Time User    PDMP Reviewed  Yes 9/5/2021  3:44 PM Stephanie Jones PA-C          ED Provider  Electronically Signed by           Anna Ambriz 24, DO  09/14/21 3668

## 2021-09-15 ENCOUNTER — APPOINTMENT (INPATIENT)
Dept: RADIOLOGY | Facility: HOSPITAL | Age: 56
DRG: 616 | End: 2021-09-15
Payer: COMMERCIAL

## 2021-09-15 ENCOUNTER — ANESTHESIA (INPATIENT)
Dept: PERIOP | Facility: HOSPITAL | Age: 56
DRG: 616 | End: 2021-09-15
Payer: COMMERCIAL

## 2021-09-15 LAB
ANION GAP SERPL CALCULATED.3IONS-SCNC: 4 MMOL/L (ref 4–13)
BUN SERPL-MCNC: 13 MG/DL (ref 5–25)
CALCIUM SERPL-MCNC: 8.6 MG/DL (ref 8.3–10.1)
CHLORIDE SERPL-SCNC: 101 MMOL/L (ref 100–108)
CO2 SERPL-SCNC: 34 MMOL/L (ref 21–32)
CREAT SERPL-MCNC: 1.14 MG/DL (ref 0.6–1.3)
ERYTHROCYTE [DISTWIDTH] IN BLOOD BY AUTOMATED COUNT: 14.4 % (ref 11.6–15.1)
GFR SERPL CREATININE-BSD FRML MDRD: 70 ML/MIN/1.73SQ M
GLUCOSE SERPL-MCNC: 73 MG/DL (ref 65–140)
GLUCOSE SERPL-MCNC: 88 MG/DL (ref 65–140)
GLUCOSE SERPL-MCNC: 89 MG/DL (ref 65–140)
GLUCOSE SERPL-MCNC: 92 MG/DL (ref 65–140)
GLUCOSE SERPL-MCNC: 92 MG/DL (ref 65–140)
HCT VFR BLD AUTO: 48.2 % (ref 36.5–49.3)
HGB BLD-MCNC: 15.1 G/DL (ref 12–17)
MCH RBC QN AUTO: 28.1 PG (ref 26.8–34.3)
MCHC RBC AUTO-ENTMCNC: 31.3 G/DL (ref 31.4–37.4)
MCV RBC AUTO: 90 FL (ref 82–98)
PLATELET # BLD AUTO: 248 THOUSANDS/UL (ref 149–390)
PMV BLD AUTO: 9.7 FL (ref 8.9–12.7)
POTASSIUM SERPL-SCNC: 3.4 MMOL/L (ref 3.5–5.3)
RBC # BLD AUTO: 5.38 MILLION/UL (ref 3.88–5.62)
SODIUM SERPL-SCNC: 139 MMOL/L (ref 136–145)
WBC # BLD AUTO: 7.57 THOUSAND/UL (ref 4.31–10.16)

## 2021-09-15 PROCEDURE — 88311 DECALCIFY TISSUE: CPT | Performed by: PATHOLOGY

## 2021-09-15 PROCEDURE — 85027 COMPLETE CBC AUTOMATED: CPT | Performed by: PODIATRIST

## 2021-09-15 PROCEDURE — 99253 IP/OBS CNSLTJ NEW/EST LOW 45: CPT | Performed by: INTERNAL MEDICINE

## 2021-09-15 PROCEDURE — 80048 BASIC METABOLIC PNL TOTAL CA: CPT | Performed by: PODIATRIST

## 2021-09-15 PROCEDURE — 88305 TISSUE EXAM BY PATHOLOGIST: CPT | Performed by: PATHOLOGY

## 2021-09-15 PROCEDURE — 82948 REAGENT STRIP/BLOOD GLUCOSE: CPT

## 2021-09-15 PROCEDURE — 0Y6W0Z0 DETACHMENT AT LEFT 4TH TOE, COMPLETE, OPEN APPROACH: ICD-10-PCS | Performed by: PODIATRIST

## 2021-09-15 PROCEDURE — 73630 X-RAY EXAM OF FOOT: CPT

## 2021-09-15 RX ORDER — KETAMINE HYDROCHLORIDE 50 MG/ML
INJECTION, SOLUTION, CONCENTRATE INTRAMUSCULAR; INTRAVENOUS AS NEEDED
Status: DISCONTINUED | OUTPATIENT
Start: 2021-09-15 | End: 2021-09-15

## 2021-09-15 RX ORDER — ONDANSETRON 2 MG/ML
INJECTION INTRAMUSCULAR; INTRAVENOUS AS NEEDED
Status: DISCONTINUED | OUTPATIENT
Start: 2021-09-15 | End: 2021-09-15

## 2021-09-15 RX ORDER — PROMETHAZINE HYDROCHLORIDE 25 MG/ML
6.25 INJECTION, SOLUTION INTRAMUSCULAR; INTRAVENOUS ONCE AS NEEDED
Status: DISCONTINUED | OUTPATIENT
Start: 2021-09-15 | End: 2021-09-15 | Stop reason: HOSPADM

## 2021-09-15 RX ORDER — MIDAZOLAM HYDROCHLORIDE 2 MG/2ML
INJECTION, SOLUTION INTRAMUSCULAR; INTRAVENOUS AS NEEDED
Status: DISCONTINUED | OUTPATIENT
Start: 2021-09-15 | End: 2021-09-15

## 2021-09-15 RX ORDER — PROPOFOL 10 MG/ML
INJECTION, EMULSION INTRAVENOUS AS NEEDED
Status: DISCONTINUED | OUTPATIENT
Start: 2021-09-15 | End: 2021-09-15

## 2021-09-15 RX ORDER — MEPERIDINE HYDROCHLORIDE 25 MG/ML
12.5 INJECTION INTRAMUSCULAR; INTRAVENOUS; SUBCUTANEOUS ONCE AS NEEDED
Status: DISCONTINUED | OUTPATIENT
Start: 2021-09-15 | End: 2021-09-15 | Stop reason: HOSPADM

## 2021-09-15 RX ORDER — SODIUM CHLORIDE 9 MG/ML
125 INJECTION, SOLUTION INTRAVENOUS CONTINUOUS
Status: DISCONTINUED | OUTPATIENT
Start: 2021-09-15 | End: 2021-09-15

## 2021-09-15 RX ORDER — MAGNESIUM HYDROXIDE 1200 MG/15ML
LIQUID ORAL AS NEEDED
Status: DISCONTINUED | OUTPATIENT
Start: 2021-09-15 | End: 2021-09-15 | Stop reason: HOSPADM

## 2021-09-15 RX ORDER — HYDROMORPHONE HCL/PF 1 MG/ML
0.5 SYRINGE (ML) INJECTION
Status: DISCONTINUED | OUTPATIENT
Start: 2021-09-15 | End: 2021-09-15 | Stop reason: HOSPADM

## 2021-09-15 RX ORDER — FENTANYL CITRATE/PF 50 MCG/ML
50 SYRINGE (ML) INJECTION
Status: DISCONTINUED | OUTPATIENT
Start: 2021-09-15 | End: 2021-09-15 | Stop reason: HOSPADM

## 2021-09-15 RX ORDER — FENTANYL CITRATE 50 UG/ML
INJECTION, SOLUTION INTRAMUSCULAR; INTRAVENOUS AS NEEDED
Status: DISCONTINUED | OUTPATIENT
Start: 2021-09-15 | End: 2021-09-15

## 2021-09-15 RX ORDER — ONDANSETRON 2 MG/ML
4 INJECTION INTRAMUSCULAR; INTRAVENOUS ONCE AS NEEDED
Status: DISCONTINUED | OUTPATIENT
Start: 2021-09-15 | End: 2021-09-15 | Stop reason: HOSPADM

## 2021-09-15 RX ADMIN — CEFAZOLIN SODIUM 3000 MG: 10 INJECTION, POWDER, FOR SOLUTION INTRAVENOUS at 10:05

## 2021-09-15 RX ADMIN — ACETAMINOPHEN 975 MG: 325 TABLET, FILM COATED ORAL at 05:13

## 2021-09-15 RX ADMIN — FUROSEMIDE 60 MG: 40 TABLET ORAL at 08:40

## 2021-09-15 RX ADMIN — HYDROXYZINE HYDROCHLORIDE 25 MG: 25 TABLET ORAL at 03:27

## 2021-09-15 RX ADMIN — PROPOFOL 40 MG: 10 INJECTION, EMULSION INTRAVENOUS at 14:44

## 2021-09-15 RX ADMIN — OXYCODONE HYDROCHLORIDE 10 MG: 10 TABLET ORAL at 10:15

## 2021-09-15 RX ADMIN — MIDAZOLAM 2 MG: 1 INJECTION INTRAMUSCULAR; INTRAVENOUS at 14:31

## 2021-09-15 RX ADMIN — OXYCODONE HYDROCHLORIDE 10 MG: 10 TABLET ORAL at 20:17

## 2021-09-15 RX ADMIN — KETAMINE HYDROCHLORIDE 10 MG: 50 INJECTION INTRAMUSCULAR; INTRAVENOUS at 14:44

## 2021-09-15 RX ADMIN — SODIUM CHLORIDE 50 ML/HR: 0.9 INJECTION, SOLUTION INTRAVENOUS at 15:54

## 2021-09-15 RX ADMIN — KETAMINE HYDROCHLORIDE 20 MG: 50 INJECTION INTRAMUSCULAR; INTRAVENOUS at 14:37

## 2021-09-15 RX ADMIN — ACETAMINOPHEN 975 MG: 325 TABLET, FILM COATED ORAL at 11:48

## 2021-09-15 RX ADMIN — ATORVASTATIN CALCIUM 40 MG: 40 TABLET, FILM COATED ORAL at 08:35

## 2021-09-15 RX ADMIN — FENTANYL CITRATE 100 MCG: 50 INJECTION INTRAMUSCULAR; INTRAVENOUS at 14:31

## 2021-09-15 RX ADMIN — ONDANSETRON 4 MG: 2 INJECTION INTRAMUSCULAR; INTRAVENOUS at 14:44

## 2021-09-15 RX ADMIN — POTASSIUM CHLORIDE 20 MEQ: 1500 TABLET, EXTENDED RELEASE ORAL at 08:36

## 2021-09-15 RX ADMIN — TRAMADOL HYDROCHLORIDE 50 MG: 50 TABLET, FILM COATED ORAL at 08:35

## 2021-09-15 RX ADMIN — INSULIN DETEMIR 23 UNITS: 100 INJECTION, SOLUTION SUBCUTANEOUS at 23:37

## 2021-09-15 RX ADMIN — CEFAZOLIN SODIUM 3000 MG: 10 INJECTION, POWDER, FOR SOLUTION INTRAVENOUS at 17:56

## 2021-09-15 RX ADMIN — CEFAZOLIN SODIUM 3000 MG: 10 INJECTION, POWDER, FOR SOLUTION INTRAVENOUS at 01:52

## 2021-09-15 RX ADMIN — PREGABALIN 75 MG: 75 CAPSULE ORAL at 08:36

## 2021-09-15 RX ADMIN — ACETAMINOPHEN 975 MG: 325 TABLET, FILM COATED ORAL at 23:38

## 2021-09-15 RX ADMIN — MONTELUKAST 10 MG: 10 TABLET, FILM COATED ORAL at 08:36

## 2021-09-15 NOTE — UTILIZATION REVIEW
Inpatient Admission Authorization Request   NOTIFICATION OF INPATIENT ADMISSION/INPATIENT AUTHORIZATION REQUEST   SERVICING FACILITY:   [address] [place] [address], [facility], [facility]  Tax ID: [id number]  NPI: [id number]  Place of Service: Inpatient [address] [facility]  [address]  Place of Service Code: 24     ATTENDING PROVIDER:  Attending Name and NPI#: Asuncion Mitchell [[id number]]  Address: [address] [place] [address], [facility], [facility]  Phone: [phone]     UTILIZATION REVIEW CONTACT:  Olayinka Green Utilization   Network Utilization Review Department  Phone: [phone]  Fax: [phone]  Email: [email or web address]     PHYSICIAN ADVISORY SERVICES:  FOR [id number] REVIEW - MEDICAL NECESSITY DENIAL  Phone: [phone]  Fax: [phone]  Email: [email or web address]     TYPE OF REQUEST:  Inpatient Status     ADMISSION INFORMATION:  ADMISSION DATE/TIME: 9/14/21  3:55 PM  PATIENT DIAGNOSIS CODE/DESCRIPTION:  Acute on chronic diastolic heart failure (HCC) [I50 33]  Acute hypokalemia [E87 6]  SOB (shortness of breath) [R06 02]  Toe pain, left [M79 675]  Skin ulcer of fourth toe of left foot ([address] ) [L97 529]  Type 2 diabetes mellitus with diabetic polyneuropathy, without long-term current use of insulin (HCC) [E11 42]  Chronic osteomyelitis of left foot with draining sinus (HCC) [T37 977]  DISCHARGE DATE/TIME: No discharge date for patient encounter  DISCHARGE DISPOSITION (IF DISCHARGED): Home/Self Care     IMPORTANT INFORMATION:  Please contact the [facility] directly with any questions or concerns regarding this request  Department voicemails are confidential     Send requests for admission clinical reviews, concurrent reviews, approvals, and administrative denials due to lack of clinical to fax [phone]

## 2021-09-15 NOTE — UTILIZATION REVIEW
Initial Clinical Review    Admission: Date/Time/Statement:   Admission Orders (From admission, onward)     Ordered        09/14/21 1555  Inpatient Admission  Once                   Orders Placed This Encounter   Procedures    Inpatient Admission     Standing Status:   Standing     Number of Occurrences:   1     Order Specific Question:   Level of Care     Answer:   Med Surg [16]     Order Specific Question:   Estimated length of stay     Answer:   More than 2 Midnights     Order Specific Question:   Certification     Answer:   I certify that inpatient services are medically necessary for this patient for a duration of greater than two midnights  See H&P and MD Progress Notes for additional information about the patient's course of treatment  ED Arrival Information     Expected Arrival Acuity    9/14/2021 9/14/2021 13:15 Urgent         Means of arrival Escorted by Service Admission type    Walk-In Self Podiatry Urgent         Arrival complaint    ESRD (end stage renal disease) on dialysis [facility]) ESRD (end stage renal disease) on dialysis ([facility] [address] )  Harles Claude, DO 09/14/2021 0849  Associated Orders            IP CONSULT TO NEPHROLOGY      SBO (small bowel obstruction) [facility])            Chief Complaint   Patient presents with    Toe Pain     left foot 4th toe pain and drainage, hx of amputation of other toe  sent to ED for eval by podiatry  concern of osteomyelitis       Initial Presentation: 60  Y O male  Sent to  ED  From podiatry  Office with a  Wound on left 4th and 5th toe for  Approximately 2 months  Has been following Op with Podiatry  Area has not improved despite  OP local wound  Care  Has noticed increased swelling in left forefoot and left  4th toe  Saw podiatry the day of admission, wound probes to bone and sent to ED  PMH  Is  Left 2nd toe amputation for similar issue, DM, HTN and  Heart failure     Admit  Ip with   Left 4th toe DM  Ulcer with underlying osteo - ohdge  3 and plan is  KETURAH, monitor labs, wound cultures, IM consult and surgical intervention  Date:  9/15      Day 2:   Plan or  This pm  For left 4th digit amputation  Continue KETURAH      ED Triage Vitals   Temperature Pulse Respirations Blood Pressure SpO2   09/14/21 1328 09/14/21 1328 09/14/21 1328 09/14/21 1328 09/14/21 1328   97 9 °F (36 6 °C) 82 19 147/85 95 %      Temp Source Heart Rate Source Patient Position - Orthostatic VS BP Location FiO2 (%)   09/14/21 1328 09/14/21 1328 09/14/21 1607 09/14/21 1607 --   Oral Monitor Sitting Right arm       Pain Score       09/14/21 1328       6          Wt Readings from Last 1 Encounters:   09/14/21 (!) 153 kg (337 lb 4 9 oz)     Additional Vital Signs:   97 5 °F (36 4 °C)  49Abnormal   --  112/63  79  95 %  --  --     09/14/21 23:34:01  --  62  --  105/68  80  92 %  --  --   09/14/21 19:29:13  97 7 °F (36 5 °C)  65  18  120/74  89  93 %  --  --   09/14/21 1643  --  --  --  --  --  --  None (Room air)  --   09/14/21 1607  --  70  18  113/57  --  94 %  None (Room air)  Sitting   09/14/21 1328  97 9 °F (36 6 °C)  82  19  147/85  105  95 %  None (Room air)         Pertinent Labs/Diagnostic Test Results:   X ray  L foot   ( 9/15)    Cystic changes in the proximal 3rd 4th and 5th proximal phalanges   Possible bone erosion along the lateral cortex of the proximal phalanx of the 4th digit   If clinically appropriate, further evaluation with nuclear medicine imaging may be helpful for   further evaluation      Results from last 7 days   Lab Units 09/15/21  0522 09/14/21 2103 09/14/21  1421   WBC Thousand/uL 7 57  --  7 50   HEMOGLOBIN g/dL 15 1  --  14 9   HEMATOCRIT % 48 2  --  46 2   PLATELETS Thousands/uL 248 237 238   NEUTROS ABS Thousands/µL  --   --  4 70         Results from last 7 days   Lab Units 09/15/21  0523 09/14/21 2103 09/14/21  1421   SODIUM mmol/L 139 140 139   POTASSIUM mmol/L 3 4* 3 1* 2 6*   CHLORIDE mmol/L 101 100 99*   CO2 mmol/L 34* 33* 32   ANION GAP mmol/L 4 7 8   BUN mg/dL 13 14 14   CREATININE mg/dL 1 14 1 14 1 05   EGFR ml/min/1 73sq m 70 70 78   CALCIUM mg/dL 8 6 8 7 9 0         Results from last 7 days   Lab Units 09/15/21  0759   POC GLUCOSE mg/dl 92     Results from last 7 days   Lab Units 09/15/21  0523 09/14/21  2103 09/14/21  1421   GLUCOSE RANDOM mg/dL 88 141* 153*               Results from last 7 days   Lab Units 09/14/21  1421   CRP mg/L 25 9*   SED RATE mm/hour 42*               Results from last 7 days   Lab Units 09/14/21  1451   GRAM STAIN RESULT  No polys seen*  2+ Gram positive cocci in clusters*  2+ Gram negative rods*               ED Treatment:   Medication Administration from 09/14/2021 1234 to 09/14/2021 1913       Date/Time Order Dose Route Action Comments     09/14/2021 1606 potassium chloride (K-DUR,KLOR-CON) CR tablet 40 mEq 40 mEq Oral Given      09/14/2021 1628 sodium chloride 0 9 % with KCl 40 mEq/L infusion (premix) 125 mL/hr Intravenous New Bag      09/14/2021 1642 oxyCODONE (ROXICODONE) IR tablet 5 mg 5 mg Oral Given      09/14/2021 1833 ceFAZolin (ANCEF) 3,000 mg in dextrose 5 % 100 mL IVPB 3,000 mg Intravenous New Bag         Present on Admission:   Type 2 diabetes mellitus with diabetic polyneuropathy, without long-term current use of insulin ([facility])   Morbid obesity (HCC)   Acute on chronic diastolic heart failure ([facility])   DAYAN (obstructive sleep apnea)      Admitting Diagnosis: Acute on chronic diastolic heart failure ([facility]) [I50 33]  Acute hypokalemia [E87 6]  SOB (shortness of breath) [R06 02]  Toe pain, left [M79 675]  Skin ulcer of fourth toe of left foot ([facility] [address] ) [L97 529]  Type 2 diabetes mellitus with diabetic polyneuropathy, without long-term current use of insulin ([facility]) [E11 42]  Chronic osteomyelitis of left foot with draining sinus ([facility] [address] ) [E30 256]  Age/Sex: 60 y o  male  Admission Orders:  Scheduled Medications:  acetaminophen, 975 mg, Oral, Q6H [address]  atorvastatin, 40 mg, Oral, Daily  cefazolin, 3,000 mg, Intravenous, Q8H  furosemide, 60 mg, Oral, BID  insulin detemir, 23 Units, Subcutaneous, HS  insulin lispro, 2-12 Units, Subcutaneous, HS  insulin lispro, 5-25 Units, Subcutaneous, TID AC  montelukast, 10 mg, Oral, Daily  potassium chloride, 20 mEq, Oral, Daily  pregabalin, 75 mg, Oral, Daily  traMADol, 50 mg, Oral, Daily      Continuous IV Infusions:  sodium chloride, 50 mL/hr, Intravenous, Continuous      PRN Meds:  albuterol, 2 5 mg, Nebulization, Q6H PRN  hydrOXYzine HCL, 25 mg, Oral, Q6H PRN  LORazepam, 0 5 mg, Oral, Q8H PRN  oxyCODONE, 10 mg, Oral, Q4H PRN  oxyCODONE, 5 mg, Oral, Q4H PRN        IP CONSULT TO INTERNAL MEDICINE    Network Utilization Review Department  ATTENTION: Please call with any questions or concerns to [phone] and carefully listen to the prompts so that you are directed to the right person  All voicemails are confidential   [place] Patient all requests for admission clinical reviews, approved or denied determinations and any other requests to dedicated fax number below belonging to the campus where the patient is receiving treatment   List of dedicated fax numbers for the Facilities:  [address] DENIALS (Administrative/Medical Necessity) [phone]   [address] (Maternity/NICU/Pediatrics) [phone]   [address] Tammi Irizarry 9052 [address] [place] [address] Dheeraj Gray 1481 [address] [facility] [facility] [phone]

## 2021-09-15 NOTE — PLAN OF CARE
Problem: MOBILITY - ADULT  Goal: Maintain or return to baseline ADL function  Description: INTERVENTIONS:  -  Assess patient's ability to carry out ADLs; assess patient's baseline for ADL function and identify physical deficits which impact ability to perform ADLs (bathing, care of mouth/teeth, toileting, grooming, dressing, etc )  - Assess/evaluate cause of self-care deficits   - Assess range of motion  - Assess patient's mobility; develop plan if impaired  - Assess patient's need for assistive devices and provide as appropriate  - Encourage maximum independence but intervene and supervise when necessary  - Involve family in performance of ADLs  - Assess for home care needs following discharge   - Consider OT consult to assist with ADL evaluation and planning for discharge  - Provide patient education as appropriate  Outcome: Progressing  Goal: Maintains/Returns to pre admission functional level  Description: INTERVENTIONS:  - Perform BMAT or MOVE assessment daily    - Set and communicate daily mobility goal to care team and patient/family/caregiver  - Collaborate with rehabilitation services on mobility goals if consulted  - Perform Range of Motion 3 times a day  - Reposition patient every 2 hours    - Dangle patient 3 times a day  - Stand patient 3 times a day  - Ambulate patient 3 times a day  - Out of bed to chair 3 times a day   - Out of bed for meals 3 times a day  - Out of bed for toileting  - Record patient progress and toleration of activity level   Outcome: Progressing     Problem: PAIN - ADULT  Goal: Verbalizes/displays adequate comfort level or baseline comfort level  Description: Interventions:  - Encourage patient to monitor pain and request assistance  - Assess pain using appropriate pain scale  - Administer analgesics based on type and severity of pain and evaluate response  - Implement non-pharmacological measures as appropriate and evaluate response  - Consider cultural and social influences on pain and pain management  - Notify physician/advanced practitioner if interventions unsuccessful or patient reports new pain  Outcome: Progressing     Problem: INFECTION - ADULT  Goal: Absence or prevention of progression during hospitalization  Description: INTERVENTIONS:  - Assess and monitor for signs and symptoms of infection  - Monitor lab/diagnostic results  - Monitor all insertion sites, i e  indwelling lines, tubes, and drains  - Monitor endotracheal if appropriate and nasal secretions for changes in amount and color  - [place] appropriate cooling/warming therapies per order  - Administer medications as ordered  - Instruct and encourage patient and family to use good hand hygiene technique  - Identify and instruct in appropriate isolation precautions for identified infection/condition  Outcome: Progressing  Goal: Absence of fever/infection during neutropenic period  Description: INTERVENTIONS:  - Monitor WBC    Outcome: Progressing     Problem: Potential for Falls  Goal: Patient will remain free of falls  Description: INTERVENTIONS:  - Educate patient/family on patient safety including physical limitations  - Instruct patient to call for assistance with activity   - Consult OT/PT to assist with strengthening/mobility   - Keep Call bell within reach  - Keep bed low and locked with side rails adjusted as appropriate  - Keep care items and personal belongings within reach  - Initiate and maintain comfort rounds  - Make Fall Risk Sign visible to staff  - Offer Toileting every 2 Hours, in advance of need  - Initiate/Maintain  bed alarm  - Obtain necessary fall risk management equipment: yellow sock  - Apply yellow socks and bracelet for high fall risk patients  - Consider moving patient to room near nurses station  Outcome: Progressing

## 2021-09-15 NOTE — OP NOTE
OPERATIVE REPORT - Podiatry  PATIENT NAME: Chaya Monsalve    :  1965  MRN: [id number]  Pt Location: [facility] OR ROOM 03    SURGERY DATE: 9/15/2021    Surgeon(s) and Role: * Tomasa Nagy DPM - Primary     * WOLF Moreno - Assisting    Pre-op Diagnosis:  Chronic osteomyelitis of left foot with draining sinus ([address] ) [M86 472]    Post-Op Diagnosis Codes:     * Chronic osteomyelitis of left foot with draining sinus ([address] ) [M86 472]    Procedure(s) (LRB):  AMPUTATION LEFT 4TH TOE (Left)   Derotational flap (left)    Specimen(s):  ID Type Source Tests Collected by Time Destination   1 : left 4th toe Tissue Toe, Left TISSUE EXAM Tomasa Nagy DPM 9/15/2021 1440        Estimated Blood Loss:   25 mL    Drains:  * No LDAs found *    Anesthesia Type:   Choice with 13 ml of 1% Lidocaine and 0 5% Bupivacaine in a 1:1 mixture    Hemostasis:  Surgical dissection  Pneumatic ankle tourniquet placed but not infalted  Materials:  * No implants in log *  nylon    Operative Findings:  Viable bleeding tissue  No purulence noted  Head of 4th metatarsal was noted to be hard and viable  Complications:   None    Procedure and Technique:     Under mild sedation, the patient was brought into the operating room and placed on the operating room table in the supine position  A pneumatic tourniquet was then placed around the patient's left lower extremity with ample webril padding  A time out was performed to confirm the correct patient, procedure and site with all parties in agreement  Following IV sedation, a local block was performed consisting of 13 ml of 1% Lidocaine and 0 5% Bupivacaine in a 1:1 mixture  The foot was then scrubbed, prepped and draped in the usual aseptic manner  The foot was placed on the operating room table  Attention was directed to the left, 4th toe wound was noted to the lateral base of 4th toe    Due to soft tissue deficit noted to lateral aspect of 4th toe base, a derotational flap was found to be the best fit  Therefor decision was made to perform a derotation skin flap  A racket type of incision was made biased medially  Utilizing a sterile 15 blade, this incision was carried deep straight to bone  Soft tissue structures were then reflected off the proximal phalanx  Utilizing a sterile 15 blade, all capsular structures were severed and the toe was then disarticulated at the level of the MTPJ and then placed on the back table  It was noted that all tissue margins were bleeding and viable  No deep sinus tracts or areas of purulence were visualized  The remaining bone on the proximal aspect of the joint was noted to be of hard and viable quality  Any remaining tendinous structures were identified and severed proximally to remove any nidus of infection  The surgical incision was irrigated with copious amounts of normal sterile saline  Derotational flap was sutured to lateral aspect of incision and closure was obtained utilizing interrupted retention sutures utilizing 3-0  Nylon  The foot was then cleansed and dried  The incision site was dressed with Xeroform, 4x4 gauze, and ABD  This was then covered with a Kerlix and an coban wrap  The patient tolerated the procedure and anesthesia well and was transported to the PACU with vital signs stable  Dr Lorena Zayas was present during the entire procedure and participated in all key aspects  SIGNATURE: Lyn Ruiz  DATE: September 15, 2021  TIME: 3:39 PM      Portions of the record may have been created with voice recognition software  Occasional wrong word or "sound a like" substitutions may have occurred due to the inherent limitations of voice recognition software  Read the chart carefully and recognize, using context, where substitutions have occurred

## 2021-09-15 NOTE — PROGRESS NOTES
[facility] Podiatry - Progress Note  Patient: Diony Ocampo 60 y o  male   MRN: [id number]  PCP: Lydia Bey DO  Unit/Bed#: E5 -01 Encounter: [id number]  Date Of Visit: 09/15/21    ASSESSMENT:    Diony Ocampo is a 60 y o  male with:    1  Left 4th toe DM ulcer with underlying OM - hodge 3  2  DM type 2  3  Obesity  4  HTN  5  CHF      PLAN:    · Patient to go to OR today, 09/15/2020, for left 4th digit amputation with Dr Maged Garrett  · NPO/IVF since midnight  · Written consent to be obtained surgeon prior to procedure  · Continue antibiotic therapy  Will update postprocedure as indicated  · Weight-bearing status to be updated postprocedure  · PT/OT to be evaluated postprocedure  · Appreciate consulting services for recs  Disposition: Patient will require continued inpatient stay for the above    SUBJECTIVE:     The patient was seen, evaluated, and assessed at bedside today  The patient was awake, alert, and in no acute distress  No acute events overnight  The patient reports no pain from a understands the plan and risks involved for OR today, all questions and concerns addressed  Patient denies N/V/F/chills/SOB/CP  OBJECTIVE:     Vitals:   /68   Pulse 62   Temp 97 7 °F (36 5 °C)   Resp 18   Ht 6' 1" (1 854 m)   Wt (!) 153 kg (337 lb 4 9 oz)   SpO2 92%   BMI 44 50 kg/m²     Temp (24hrs), Av 8 °F (36 6 °C), Min:97 7 °F (36 5 °C), Max:97 9 °F (36 6 °C)      Physical Exam:     General:  Alert, cooperative, and in no distress  Lower extremity exam:  LLE dressing CDI, left intact for the OR      Additional Data:     Labs:    Results from last 7 days   Lab Units 09/15/21  0522 21  1421   WBC Thousand/uL 7 57 7 50   HEMOGLOBIN g/dL 15 1 14 9   HEMATOCRIT % 48 2 46 2   PLATELETS Thousands/uL 248 238   NEUTROS PCT %  --  63   LYMPHS PCT %  --  26   MONOS PCT %  --  8   EOS PCT %  --  3     Results from last 7 days   Lab Units 09/15/21  0523   POTASSIUM mmol/L 3 4*   CHLORIDE mmol/L 101   CO2 mmol/L 34*   BUN mg/dL 13   CREATININE mg/dL 1 14   CALCIUM mg/dL 8 6           * I Have Reviewed All Lab Data Listed Above  Recent Cultures (last 7 days):               Imaging: I have personally reviewed pertinent films in PACS  EKG, Pathology, and Other Studies: I have personally reviewed pertinent reports  ** Please Note: Portions of the record may have been created with voice recognition software  Occasional wrong word or "sound a like" substitutions may have occurred due to the inherent limitations of voice recognition software  Read the chart carefully and recognize, using context, where substitutions have occurred   **

## 2021-09-15 NOTE — ANESTHESIA PREPROCEDURE EVALUATION
Procedure:  AMPUTATION LEFT 4TH TOE (Left Toe)    Relevant Problems   CARDIO  (+) [place] scientifiic single lead pacemaker   (+) Hypertension      ENDO   (+) Type 2 diabetes mellitus with diabetic polyneuropathy, without long-term current use of insulin (HCC)      PULMONARY   (+) DAYAN (obstructive sleep apnea)      Other   (+) Chronic osteomyelitis of left foot with draining sinus [facility])        Physical Exam    Airway  Comment: Thick neck  Mallampati score: III  TM Distance: >3 FB  Neck ROM: full     Dental       Cardiovascular  Rhythm: regular, Rate: normal,     Pulmonary  Breath sounds clear to auscultation,     Other Findings        Anesthesia Plan  ASA Score- 3 Emergent    Anesthesia Type- general and IV sedation with anesthesia with ASA Monitors  Additional Monitors:   Airway Plan:           Plan Factors-Exercise tolerance (METS): >4 METS  Chart reviewed  Existing labs reviewed  Induction- intravenous  Postoperative Plan-     Informed Consent- Anesthetic plan and risks discussed with patient  I personally reviewed this patient with the CRNA  Discussed and agreed on the Anesthesia Plan with the CRNA  Ezequiel Chris

## 2021-09-16 VITALS
TEMPERATURE: 97.5 F | SYSTOLIC BLOOD PRESSURE: 107 MMHG | HEIGHT: 73 IN | BODY MASS INDEX: 41.75 KG/M2 | OXYGEN SATURATION: 94 % | HEART RATE: 57 BPM | RESPIRATION RATE: 18 BRPM | DIASTOLIC BLOOD PRESSURE: 54 MMHG | WEIGHT: 315 LBS

## 2021-09-16 PROBLEM — M86.472 CHRONIC OSTEOMYELITIS OF LEFT FOOT WITH DRAINING SINUS (HCC): Status: RESOLVED | Noted: 2021-09-14 | Resolved: 2021-09-16

## 2021-09-16 LAB
ANION GAP SERPL CALCULATED.3IONS-SCNC: 2 MMOL/L (ref 4–13)
BUN SERPL-MCNC: 11 MG/DL (ref 5–25)
CALCIUM SERPL-MCNC: 8.6 MG/DL (ref 8.3–10.1)
CHLORIDE SERPL-SCNC: 103 MMOL/L (ref 100–108)
CO2 SERPL-SCNC: 37 MMOL/L (ref 21–32)
CREAT SERPL-MCNC: 1.02 MG/DL (ref 0.6–1.3)
GFR SERPL CREATININE-BSD FRML MDRD: 81 ML/MIN/1.73SQ M
GLUCOSE SERPL-MCNC: 104 MG/DL (ref 65–140)
GLUCOSE SERPL-MCNC: 163 MG/DL (ref 65–140)
GLUCOSE SERPL-MCNC: 82 MG/DL (ref 65–140)
GLUCOSE SERPL-MCNC: 90 MG/DL (ref 65–140)
POTASSIUM SERPL-SCNC: 3.6 MMOL/L (ref 3.5–5.3)
SODIUM SERPL-SCNC: 142 MMOL/L (ref 136–145)

## 2021-09-16 PROCEDURE — 80048 BASIC METABOLIC PNL TOTAL CA: CPT | Performed by: STUDENT IN AN ORGANIZED HEALTH CARE EDUCATION/TRAINING PROGRAM

## 2021-09-16 PROCEDURE — 82948 REAGENT STRIP/BLOOD GLUCOSE: CPT

## 2021-09-16 PROCEDURE — 99232 SBSQ HOSP IP/OBS MODERATE 35: CPT | Performed by: INTERNAL MEDICINE

## 2021-09-16 RX ORDER — CEPHALEXIN 500 MG/1
500 CAPSULE ORAL EVERY 6 HOURS SCHEDULED
Qty: 40 CAPSULE | Refills: 0 | Status: SHIPPED | OUTPATIENT
Start: 2021-09-16 | End: 2021-09-26

## 2021-09-16 RX ADMIN — CEFAZOLIN SODIUM 3000 MG: 10 INJECTION, POWDER, FOR SOLUTION INTRAVENOUS at 10:57

## 2021-09-16 RX ADMIN — MONTELUKAST 10 MG: 10 TABLET, FILM COATED ORAL at 10:55

## 2021-09-16 RX ADMIN — FUROSEMIDE 60 MG: 40 TABLET ORAL at 10:54

## 2021-09-16 RX ADMIN — TRAMADOL HYDROCHLORIDE 50 MG: 50 TABLET, FILM COATED ORAL at 10:55

## 2021-09-16 RX ADMIN — ACETAMINOPHEN 975 MG: 325 TABLET, FILM COATED ORAL at 12:56

## 2021-09-16 RX ADMIN — PREGABALIN 75 MG: 75 CAPSULE ORAL at 10:55

## 2021-09-16 RX ADMIN — CEFAZOLIN SODIUM 3000 MG: 10 INJECTION, POWDER, FOR SOLUTION INTRAVENOUS at 02:17

## 2021-09-16 RX ADMIN — ACETAMINOPHEN 975 MG: 325 TABLET, FILM COATED ORAL at 06:17

## 2021-09-16 RX ADMIN — OXYCODONE HYDROCHLORIDE 10 MG: 10 TABLET ORAL at 02:14

## 2021-09-16 RX ADMIN — ATORVASTATIN CALCIUM 40 MG: 40 TABLET, FILM COATED ORAL at 10:54

## 2021-09-16 NOTE — DISCHARGE INSTRUCTIONS
Discharge Instructions - Podiatry    Weight Bearing Status: Weight bearing as tolerated on surgical shoes  Only minimal weight bearing until seen by Dr Weller Rater in the office  Minimal weight bearing  Rest and elevate as much as possible the left lower extremity until seen by Dr Weller Rater in the office  Follow-up appointment instructions: Please make an appointment within one week of discharge with Dr Weller Rater  Contact sooner if any increase in pain, or signs of infection occur    Wound Care: Leave dressings clean, dry, and intact  If dressings become wet change dressings immediately  Nursing Instructions: Please apply betadine paint to incision site followed by adaptic  Then cover with Gauze and secure with Kerlix and tape  Please change dressing 2 times a week       Antibiotics: Keflex for 10 days

## 2021-09-16 NOTE — PROGRESS NOTES
Podiatry - Progress Note  Patient: Willie Lim 60 y o  male   MRN: [id number]  PCP: Laila Freeman DO  Unit/Bed#: E5 -01 Encounter: [id number]  Date Of Visit: 21    ASSESSMENT:    Willie Lim is a 60 y o  male with:    1  Left 4th toe DM ulcer with underlying OM - hodge 3       -S/p 4th toe amputation with derotational skin flap (DOS: 9/15/21)  2  DM type 2  3  Obesity  4  HTN  5  CHF      PLAN:    · Post-surgical dressing located on surgical site were changed today  Incision site assessed to left 4th toe amputation site appears well coapted w/o dehiscence with all sutures intact, no SOI  · Pain is well controlled  Continue current pain management regimen  · Elevation on green foam wedges or pillows when non-ambulatory    Weightbearing status: wbat with surgical shoe       SUBJECTIVE:     The patient was seen, evaluated, and assessed at bedside today  The patient was awake, alert, and in no acute distress  The patient reports no pain at this time  Pain is well controlled with current pain management regimen  Patient reports normal appetite and using the restroom postoperatively  Patient denies N/V/F/chills/SOB/CP  OBJECTIVE:     Vitals:   /54   Pulse 57   Temp 97 5 °F (36 4 °C)   Resp 18   Ht 6' 1" (1 854 m)   Wt (!) 153 kg (337 lb 4 9 oz)   SpO2 94%   BMI 44 50 kg/m²     Temp (24hrs), Av 4 °F (36 3 °C), Min:96 7 °F (35 9 °C), Max:98 3 °F (36 8 °C)      Physical Exam:     General:  Alert, cooperative, and in no distress  Lower extremity exam:  Cardiovascular status at baseline  Neurological status at baseline  Musculoskeletal status at baseline  No erythema, edema, or lymphangitis noted today    Incision site assessed to left 4th toe appears well coapted w/o dehiscence with all sutures intact, no SOI  no drainage, no crepitus, no fluctuance noted at this time         Additional Data:     Labs:    Results from last 7 days   Lab Units 09/15/21  0522 21  6642 WBC Thousand/uL 7 57 7 50   HEMOGLOBIN g/dL 15 1 14 9   HEMATOCRIT % 48 2 46 2   PLATELETS Thousands/uL 248 238   NEUTROS PCT %  --  63   LYMPHS PCT %  --  26   MONOS PCT %  --  8   EOS PCT %  --  3     Results from last 7 days   Lab Units 09/15/21  0523   POTASSIUM mmol/L 3 4*   CHLORIDE mmol/L 101   CO2 mmol/L 34*   BUN mg/dL 13   CREATININE mg/dL 1 14   CALCIUM mg/dL 8 6           * I Have Reviewed All Lab Data Listed Above  Recent Cultures (last 7 days):     Results from last 7 days   Lab Units 09/14/21  1451   GRAM STAIN RESULT  No polys seen*  2+ Gram positive cocci in clusters*  2+ Gram negative rods*           Imaging: I have personally reviewed pertinent films in PACS  EKG, Pathology, and Other Studies: I have personally reviewed pertinent reports  ** Please Note: Portions of the record may have been created with voice recognition software  Occasional wrong word or "sound a like" substitutions may have occurred due to the inherent limitations of voice recognition software  Read the chart carefully and recognize, using context, where substitutions have occurred   **

## 2021-09-16 NOTE — PROGRESS NOTES
Progress Note - Kem Chol 60 y o  male MRN: [id number]    Unit/Bed#: E5 -01 Encounter: [id number]      Subjective: The patient is feeling reasonably well  He does have mild throbbing in his foot  He has no chest pain, shortness of breath, abdominal pain, nausea, or vomiting  Physical Exam:   Temp:  [96 7 °F (35 9 °C)-98 3 °F (36 8 °C)] 97 5 °F (36 4 °C)  HR:  [49-75] 57  Resp:  [12-18] 18  BP: ()/(51-63) 107/54    Gen:  Well-developed, obese, in no distress  Neck:  Supple  No lymphadenopathy, goiter, or bruit  Heart:  Irregular rhythm  No murmur, gallop, or rub  Lungs:  Clear to auscultation and percussion  No wheezing, rales, or rhonchi   Abd:  Soft with active bowel sounds  No mass, tenderness, or organomegaly  Extremities:  No clubbing, cyanosis, or edema  No calf tenderness  Left foot is wrapped  Neuro:  Alert and oriented  No focal sign  Skin:  Warm and dry      LABS:   CMP:   Lab Results   Component Value Date    SODIUM 142 09/16/2021    K 3 6 09/16/2021     09/16/2021    CO2 37 (H) 09/16/2021    BUN 11 09/16/2021    CREATININE 1 02 09/16/2021    CALCIUM 8 6 09/16/2021    EGFR 81 09/16/2021           Patient Active Problem List   Diagnosis    DAYAN (obstructive sleep apnea)    Acute on chronic diastolic heart failure ([facility] [address] )    Hypertension    Type 2 diabetes mellitus with diabetic polyneuropathy, without long-term current use of insulin (HCC)    Morbid obesity (HCC)    Pain, joint, ankle and foot, left    Chronic osteomyelitis of left foot with draining sinus (HCC)       Assessment/Plan:  1  Osteomyelitis left 4th toe, status post amputation  2  Type 2 diabetes  3  Chronic atrial fibrillation  4  Hypercholesterolemia  5  Obesity, status post gastric bypass surgery  6  Helicobacter pylori gastritis    The patient is doing well postoperatively  He is medically stable for discharge  He has not been using rivaroxaban for several weeks    Apparently this was placed on hold when he started treatment for Helicobacter pylori or eye  He will be discussing this with his outpatient doctors in the near future

## 2021-09-16 NOTE — DISCHARGE SUMMARY
PODIATRY DISCHARGE SUMMARY     Patient Name: Virginia Scruggs   Age & Sex: 60 y o  male   MRN: [id number]  Unit/Bed#: E5 -01   Encounter: [id number]  Length of Stay: 2 days    Principal Problem:    Chronic osteomyelitis of left foot with draining sinus (HCC)  Active Problems:    DAYAN (obstructive sleep apnea)    Acute on chronic diastolic heart failure ([facility] [address] )    Type 2 diabetes mellitus with diabetic polyneuropathy, without long-term current use of insulin ([facility] [address] )    Morbid obesity ([facility] [address] )      HPI from Admission:    Virginia Scruggs is a 60 y o  male who is being admitted 9/14/2021 due to osteomyelitis of left 4th toe  Patient has a past medical history of diabetes mellitus, obesity, hypertension, acute on chronic heart failure  Patient reports he has had a wound left 4th and 5th toe for approximately 2 months now  He reports seeing his podiatrist Dr Jed Grande for local wound care  He reports despite appropriate local wound care to the wound has not healed addressed  Patient reports increased recently in swelling the left forefoot as well as pain of left 4th toe  Patient was seen in the office by Dr Jed Garnde today who noted that wound on the lateral aspect of left 4th toe probe to bone and recommended the patient reports the emergency room for evaluation and admission to the hospital IV antibiotics as well as surgical workup for amputation of left 4th toe  Patient denies any nausea, vomiting, fever, chills, night sweats  Patient reports history of left 2nd toe amputation due to similar issue  Patient has no further podiatric complaints this time        [address] [facility] [place] is a 60 y o  male who was admitted on 9/14/2021 for left 4th toe amputation and IV antibiotics  Throughout hospital adm wound cx were taken, Pt was started on IV Ancef and PO metronidazole  On adm potassium was 2 6 which improved after Potassium chloride tablets   Internal medicine was consulted for pre op clearance and medical management  Pt was taken to OR 9/15/21 for left 4th toe amputation  Pt is hemodynamically stable  Pt d/c on Keflex for 10 days post op  DISCHARGE INFORMATION     PCP at Discharge: Hoda Shahid DO    Admitting Provider: Dr Nate Perez DPM  Admission Date: 9/14/2021     Discharge Provider: Dr Nate Perez DPM  Discharge Date: 09/16/21    Discharge Disposition: Home  Discharge Condition: Stable  Discharge with Lines: No  Discharge Diet: Diabetic diet   Activity Restrictions: WBAT with rest and elevation to LLE as much as possible  Test Results Pending at Discharge: none  Medications at Discharge: See after visit summary for reconciled discharge medications provided to patient and family  Discharge Diagnoses:  Principal Problem:    Chronic osteomyelitis of left foot with draining sinus (HCC)  Active Problems:    DAYAN (obstructive sleep apnea)    Acute on chronic diastolic heart failure (HCC)    Type 2 diabetes mellitus with diabetic polyneuropathy, without long-term current use of insulin (HCC)    Morbid obesity ([facility] [address] )      Consulting Providers:  SLIM    Diagnostic & Therapeutic Procedures Performed:  XR foot left 3+ views    Result Date: 9/15/2021  Impression: New postsurgical changes of left 4th toe resection  Otherwise, no new acute findings  Workstation performed: [id number]     XR foot 3+ views LEFT    Result Date: 9/15/2021  Impression: Cystic changes in the proximal 3rd 4th and 5th proximal phalanges  Possible bone erosion along the lateral cortex of the proximal phalanx of the 4th digit  If clinically appropriate, further evaluation with nuclear medicine imaging may be helpful for further evaluation   Workstation performed: [id number]       Code Status: Level 1 - Full Code  Advance Directive and Living Will:      Power of :    POLST:      FOLLOW-UP     PCP Outpatient Follow-up:  Dr Aime Rios Providers Follow-up:  none    Active Issues Requiring Follow-Up:  Post surgical check left 4th toe amputation  Discharge Statement:  I spent 25 minutes discharging the patient  This time was spent on the day of discharge  I had direct contact with the patient on the day of discharge  Additional documentation is required if more than 30 minutes were spent on discharge  Portions of the record may have been created with voice recognition software  Occasional wrong word or "sound a like" substitutions may have occurred due to the inherent limitations of voice recognition software    Read the chart carefully and recognize, using context, where substitutions have occurred   ==  Dmitri Reveles, [address] [place] [address]  Podiatric Medicine & Surgery

## 2021-09-17 LAB
BACTERIA WND AEROBE CULT: ABNORMAL
GRAM STN SPEC: ABNORMAL

## 2021-11-15 ENCOUNTER — HOSPITAL ENCOUNTER (INPATIENT)
Facility: HOSPITAL | Age: 56
LOS: 3 days | Discharge: HOME | DRG: 617 | End: 2021-11-18
Attending: EMERGENCY MEDICINE | Admitting: PODIATRIST
Payer: COMMERCIAL

## 2021-11-15 ENCOUNTER — ANESTHESIA EVENT (INPATIENT)
Dept: PERIOP | Facility: HOSPITAL | Age: 56
DRG: 617 | End: 2021-11-15
Payer: COMMERCIAL

## 2021-11-15 DIAGNOSIS — E87.6 ACUTE HYPOKALEMIA: ICD-10-CM

## 2021-11-15 DIAGNOSIS — M86.9 TOE OSTEOMYELITIS, LEFT (HCC): Primary | ICD-10-CM

## 2021-11-15 DIAGNOSIS — E11.42 TYPE 2 DIABETES MELLITUS WITH DIABETIC POLYNEUROPATHY, WITHOUT LONG-TERM CURRENT USE OF INSULIN (HCC): ICD-10-CM

## 2021-11-15 DIAGNOSIS — M86.172 OTHER ACUTE OSTEOMYELITIS, LEFT ANKLE AND FOOT (HCC): ICD-10-CM

## 2021-11-15 DIAGNOSIS — G89.18 ACUTE POST-OPERATIVE PAIN: ICD-10-CM

## 2021-11-15 PROBLEM — L97.524 DIABETIC ULCER OF TOE OF LEFT FOOT WITH NECROSIS OF BONE (HCC): Status: ACTIVE | Noted: 2021-11-15

## 2021-11-15 PROBLEM — E11.621 DIABETIC ULCER OF TOE OF LEFT FOOT WITH NECROSIS OF BONE (HCC): Status: ACTIVE | Noted: 2021-11-15

## 2021-11-15 PROBLEM — E11.9 DIABETES MELLITUS (HCC): Status: ACTIVE | Noted: 2020-01-22

## 2021-11-15 LAB
ANION GAP SERPL CALCULATED.3IONS-SCNC: 8 MMOL/L (ref 4–13)
BASOPHILS # BLD AUTO: 0.05 THOUSANDS/ΜL (ref 0–0.1)
BASOPHILS NFR BLD AUTO: 1 % (ref 0–1)
BUN SERPL-MCNC: 16 MG/DL (ref 5–25)
CALCIUM SERPL-MCNC: 9 MG/DL (ref 8.3–10.1)
CHLORIDE SERPL-SCNC: 96 MMOL/L (ref 100–108)
CO2 SERPL-SCNC: 35 MMOL/L (ref 21–32)
CREAT SERPL-MCNC: 0.92 MG/DL (ref 0.6–1.3)
EOSINOPHIL # BLD AUTO: 0.2 THOUSAND/ΜL (ref 0–0.61)
EOSINOPHIL NFR BLD AUTO: 2 % (ref 0–6)
ERYTHROCYTE [DISTWIDTH] IN BLOOD BY AUTOMATED COUNT: 13.6 % (ref 11.6–15.1)
GFR SERPL CREATININE-BSD FRML MDRD: 91 ML/MIN/1.73SQ M
GLUCOSE SERPL-MCNC: 148 MG/DL (ref 65–140)
GLUCOSE SERPL-MCNC: 172 MG/DL (ref 65–140)
GLUCOSE SERPL-MCNC: 98 MG/DL (ref 65–140)
HCT VFR BLD AUTO: 48 % (ref 36.5–49.3)
HGB BLD-MCNC: 15.7 G/DL (ref 12–17)
IMM GRANULOCYTES # BLD AUTO: 0.03 THOUSAND/UL (ref 0–0.2)
IMM GRANULOCYTES NFR BLD AUTO: 0 % (ref 0–2)
LYMPHOCYTES # BLD AUTO: 2.7 THOUSANDS/ΜL (ref 0.6–4.47)
LYMPHOCYTES NFR BLD AUTO: 27 % (ref 14–44)
MCH RBC QN AUTO: 28.2 PG (ref 26.8–34.3)
MCHC RBC AUTO-ENTMCNC: 32.7 G/DL (ref 31.4–37.4)
MCV RBC AUTO: 86 FL (ref 82–98)
MONOCYTES # BLD AUTO: 1.26 THOUSAND/ΜL (ref 0.17–1.22)
MONOCYTES NFR BLD AUTO: 12 % (ref 4–12)
NEUTROPHILS # BLD AUTO: 5.93 THOUSANDS/ΜL (ref 1.85–7.62)
NEUTS SEG NFR BLD AUTO: 58 % (ref 43–75)
NRBC BLD AUTO-RTO: 0 /100 WBCS
PLATELET # BLD AUTO: 280 THOUSANDS/UL (ref 149–390)
PMV BLD AUTO: 9.9 FL (ref 8.9–12.7)
POTASSIUM SERPL-SCNC: 2.8 MMOL/L (ref 3.5–5.3)
RBC # BLD AUTO: 5.56 MILLION/UL (ref 3.88–5.62)
SODIUM SERPL-SCNC: 139 MMOL/L (ref 136–145)
WBC # BLD AUTO: 10.17 THOUSAND/UL (ref 4.31–10.16)

## 2021-11-15 PROCEDURE — 99284 EMERGENCY DEPT VISIT MOD MDM: CPT | Performed by: EMERGENCY MEDICINE

## 2021-11-15 PROCEDURE — 82948 REAGENT STRIP/BLOOD GLUCOSE: CPT

## 2021-11-15 PROCEDURE — 87077 CULTURE AEROBIC IDENTIFY: CPT | Performed by: STUDENT IN AN ORGANIZED HEALTH CARE EDUCATION/TRAINING PROGRAM

## 2021-11-15 PROCEDURE — 87070 CULTURE OTHR SPECIMN AEROBIC: CPT | Performed by: STUDENT IN AN ORGANIZED HEALTH CARE EDUCATION/TRAINING PROGRAM

## 2021-11-15 PROCEDURE — 36415 COLL VENOUS BLD VENIPUNCTURE: CPT | Performed by: EMERGENCY MEDICINE

## 2021-11-15 PROCEDURE — 85025 COMPLETE CBC W/AUTO DIFF WBC: CPT | Performed by: EMERGENCY MEDICINE

## 2021-11-15 PROCEDURE — 87040 BLOOD CULTURE FOR BACTERIA: CPT | Performed by: PODIATRIST

## 2021-11-15 PROCEDURE — 99284 EMERGENCY DEPT VISIT MOD MDM: CPT

## 2021-11-15 PROCEDURE — 87205 SMEAR GRAM STAIN: CPT | Performed by: STUDENT IN AN ORGANIZED HEALTH CARE EDUCATION/TRAINING PROGRAM

## 2021-11-15 PROCEDURE — 80048 BASIC METABOLIC PNL TOTAL CA: CPT | Performed by: EMERGENCY MEDICINE

## 2021-11-15 PROCEDURE — 87186 SC STD MICRODIL/AGAR DIL: CPT | Performed by: STUDENT IN AN ORGANIZED HEALTH CARE EDUCATION/TRAINING PROGRAM

## 2021-11-15 RX ORDER — PREGABALIN 75 MG/1
75 CAPSULE ORAL DAILY
Status: DISCONTINUED | OUTPATIENT
Start: 2021-11-16 | End: 2021-11-16

## 2021-11-15 RX ORDER — INSULIN GLARGINE 100 [IU]/ML
40 INJECTION, SOLUTION SUBCUTANEOUS
Status: DISCONTINUED | OUTPATIENT
Start: 2021-11-15 | End: 2021-11-17

## 2021-11-15 RX ORDER — POTASSIUM CHLORIDE 20 MEQ/1
40 TABLET, EXTENDED RELEASE ORAL ONCE
Status: DISCONTINUED | OUTPATIENT
Start: 2021-11-15 | End: 2021-11-16

## 2021-11-15 RX ORDER — LORAZEPAM 0.5 MG/1
0.5 TABLET ORAL EVERY 8 HOURS PRN
Status: DISCONTINUED | OUTPATIENT
Start: 2021-11-15 | End: 2021-11-18 | Stop reason: HOSPADM

## 2021-11-15 RX ORDER — METRONIDAZOLE 500 MG/1
500 TABLET ORAL EVERY 8 HOURS SCHEDULED
Status: DISCONTINUED | OUTPATIENT
Start: 2021-11-15 | End: 2021-11-18 | Stop reason: HOSPADM

## 2021-11-15 RX ORDER — SENNOSIDES 8.6 MG
1 TABLET ORAL DAILY
Status: DISCONTINUED | OUTPATIENT
Start: 2021-11-16 | End: 2021-11-18 | Stop reason: HOSPADM

## 2021-11-15 RX ORDER — POLYETHYLENE GLYCOL 3350 17 G/17G
17 POWDER, FOR SOLUTION ORAL DAILY PRN
Status: DISCONTINUED | OUTPATIENT
Start: 2021-11-15 | End: 2021-11-18 | Stop reason: HOSPADM

## 2021-11-15 RX ORDER — CEFAZOLIN SODIUM 2 G/50ML
2000 SOLUTION INTRAVENOUS EVERY 8 HOURS
Status: DISCONTINUED | OUTPATIENT
Start: 2021-11-15 | End: 2021-11-18 | Stop reason: HOSPADM

## 2021-11-15 RX ORDER — ONDANSETRON 2 MG/ML
4 INJECTION INTRAMUSCULAR; INTRAVENOUS EVERY 6 HOURS PRN
Status: DISCONTINUED | OUTPATIENT
Start: 2021-11-15 | End: 2021-11-18 | Stop reason: HOSPADM

## 2021-11-15 RX ORDER — OXYCODONE HYDROCHLORIDE 5 MG/1
5 TABLET ORAL EVERY 4 HOURS PRN
Status: DISCONTINUED | OUTPATIENT
Start: 2021-11-15 | End: 2021-11-18 | Stop reason: HOSPADM

## 2021-11-15 RX ORDER — ATORVASTATIN CALCIUM 40 MG/1
40 TABLET, FILM COATED ORAL
Status: DISCONTINUED | OUTPATIENT
Start: 2021-11-15 | End: 2021-11-18 | Stop reason: HOSPADM

## 2021-11-15 RX ORDER — MONTELUKAST SODIUM 10 MG/1
10 TABLET ORAL DAILY
Status: DISCONTINUED | OUTPATIENT
Start: 2021-11-16 | End: 2021-11-18 | Stop reason: HOSPADM

## 2021-11-15 RX ORDER — ALBUTEROL SULFATE 2.5 MG/3ML
2.5 SOLUTION RESPIRATORY (INHALATION) EVERY 4 HOURS PRN
Status: DISCONTINUED | OUTPATIENT
Start: 2021-11-15 | End: 2021-11-18 | Stop reason: HOSPADM

## 2021-11-15 RX ORDER — POTASSIUM CHLORIDE 20 MEQ/1
20 TABLET, EXTENDED RELEASE ORAL DAILY
Status: DISCONTINUED | OUTPATIENT
Start: 2021-11-16 | End: 2021-11-18 | Stop reason: HOSPADM

## 2021-11-15 RX ORDER — HYDROXYZINE HYDROCHLORIDE 25 MG/1
25 TABLET, FILM COATED ORAL EVERY 6 HOURS PRN
Status: DISCONTINUED | OUTPATIENT
Start: 2021-11-15 | End: 2021-11-18 | Stop reason: HOSPADM

## 2021-11-15 RX ORDER — POTASSIUM CHLORIDE 20 MEQ/1
40 TABLET, EXTENDED RELEASE ORAL ONCE
Status: COMPLETED | OUTPATIENT
Start: 2021-11-15 | End: 2021-11-15

## 2021-11-15 RX ORDER — ACETAMINOPHEN 325 MG/1
650 TABLET ORAL EVERY 6 HOURS PRN
Status: DISCONTINUED | OUTPATIENT
Start: 2021-11-15 | End: 2021-11-18 | Stop reason: HOSPADM

## 2021-11-15 RX ADMIN — POTASSIUM CHLORIDE 40 MEQ: 1500 TABLET, EXTENDED RELEASE ORAL at 17:48

## 2021-11-15 RX ADMIN — INSULIN GLARGINE 40 UNITS: 100 INJECTION, SOLUTION SUBCUTANEOUS at 22:30

## 2021-11-15 RX ADMIN — CEFAZOLIN SODIUM 2000 MG: 2 SOLUTION INTRAVENOUS at 19:05

## 2021-11-15 RX ADMIN — OXYCODONE HYDROCHLORIDE 5 MG: 5 TABLET ORAL at 22:30

## 2021-11-15 RX ADMIN — ATORVASTATIN CALCIUM 40 MG: 40 TABLET, FILM COATED ORAL at 19:09

## 2021-11-15 RX ADMIN — METRONIDAZOLE 500 MG: 500 TABLET ORAL at 22:30

## 2021-11-15 RX ADMIN — OXYCODONE HYDROCHLORIDE 5 MG: 5 TABLET ORAL at 18:44

## 2021-11-16 ENCOUNTER — ANESTHESIA (INPATIENT)
Dept: PERIOP | Facility: HOSPITAL | Age: 56
DRG: 617 | End: 2021-11-16
Payer: COMMERCIAL

## 2021-11-16 ENCOUNTER — APPOINTMENT (INPATIENT)
Dept: RADIOLOGY | Facility: HOSPITAL | Age: 56
DRG: 617 | End: 2021-11-16
Payer: COMMERCIAL

## 2021-11-16 PROBLEM — F41.9 ANXIETY: Status: ACTIVE | Noted: 2021-11-16

## 2021-11-16 PROBLEM — E87.6 HYPOKALEMIA: Status: ACTIVE | Noted: 2021-11-16

## 2021-11-16 LAB
ATRIAL RATE: 60 BPM
ATRIAL RATE: 78 BPM
GLUCOSE SERPL-MCNC: 111 MG/DL (ref 65–140)
GLUCOSE SERPL-MCNC: 137 MG/DL (ref 65–140)
GLUCOSE SERPL-MCNC: 165 MG/DL (ref 65–140)
GLUCOSE SERPL-MCNC: 82 MG/DL (ref 65–140)
POTASSIUM SERPL-SCNC: 3.1 MMOL/L (ref 3.5–5.3)
QRS AXIS: 187 DEGREES
QRS AXIS: 227 DEGREES
QRSD INTERVAL: 102 MS
QRSD INTERVAL: 120 MS
QT INTERVAL: 424 MS
QT INTERVAL: 430 MS
QTC INTERVAL: 414 MS
QTC INTERVAL: 424 MS
T WAVE AXIS: 19 DEGREES
T WAVE AXIS: 34 DEGREES
VENTRICULAR RATE: 56 BPM
VENTRICULAR RATE: 60 BPM

## 2021-11-16 PROCEDURE — 0Y6U0Z0 DETACHMENT AT LEFT 3RD TOE, COMPLETE, OPEN APPROACH: ICD-10-PCS | Performed by: PODIATRIST

## 2021-11-16 PROCEDURE — 84132 ASSAY OF SERUM POTASSIUM: CPT | Performed by: INTERNAL MEDICINE

## 2021-11-16 PROCEDURE — 73630 X-RAY EXAM OF FOOT: CPT

## 2021-11-16 PROCEDURE — 82948 REAGENT STRIP/BLOOD GLUCOSE: CPT

## 2021-11-16 PROCEDURE — 93005 ELECTROCARDIOGRAM TRACING: CPT

## 2021-11-16 PROCEDURE — 88305 TISSUE EXAM BY PATHOLOGIST: CPT | Performed by: PATHOLOGY

## 2021-11-16 PROCEDURE — 99254 IP/OBS CNSLTJ NEW/EST MOD 60: CPT | Performed by: INTERNAL MEDICINE

## 2021-11-16 PROCEDURE — 88311 DECALCIFY TISSUE: CPT | Performed by: PATHOLOGY

## 2021-11-16 PROCEDURE — 93010 ELECTROCARDIOGRAM REPORT: CPT | Performed by: INTERNAL MEDICINE

## 2021-11-16 RX ORDER — PROPOFOL 10 MG/ML
INJECTION, EMULSION INTRAVENOUS AS NEEDED
Status: DISCONTINUED | OUTPATIENT
Start: 2021-11-16 | End: 2021-11-16

## 2021-11-16 RX ORDER — BUPIVACAINE HYDROCHLORIDE 5 MG/ML
INJECTION, SOLUTION PERINEURAL AS NEEDED
Status: DISCONTINUED | OUTPATIENT
Start: 2021-11-16 | End: 2021-11-16 | Stop reason: HOSPADM

## 2021-11-16 RX ORDER — POTASSIUM CHLORIDE 20 MEQ/1
40 TABLET, EXTENDED RELEASE ORAL ONCE
Status: COMPLETED | OUTPATIENT
Start: 2021-11-16 | End: 2021-11-16

## 2021-11-16 RX ORDER — SODIUM CHLORIDE 9 MG/ML
INJECTION, SOLUTION INTRAVENOUS CONTINUOUS PRN
Status: DISCONTINUED | OUTPATIENT
Start: 2021-11-16 | End: 2021-11-16

## 2021-11-16 RX ORDER — METOLAZONE 2.5 MG/1
2.5 TABLET ORAL 3 TIMES WEEKLY
COMMUNITY
Start: 2021-08-27 | End: 2022-02-04

## 2021-11-16 RX ORDER — OMEPRAZOLE 20 MG/1
20 CAPSULE, DELAYED RELEASE ORAL 2 TIMES DAILY
COMMUNITY
End: 2022-02-04

## 2021-11-16 RX ORDER — ONDANSETRON 2 MG/ML
4 INJECTION INTRAMUSCULAR; INTRAVENOUS ONCE AS NEEDED
Status: DISCONTINUED | OUTPATIENT
Start: 2021-11-16 | End: 2021-11-16 | Stop reason: HOSPADM

## 2021-11-16 RX ORDER — MIDAZOLAM HYDROCHLORIDE 2 MG/2ML
INJECTION, SOLUTION INTRAMUSCULAR; INTRAVENOUS AS NEEDED
Status: DISCONTINUED | OUTPATIENT
Start: 2021-11-16 | End: 2021-11-16

## 2021-11-16 RX ORDER — METOLAZONE 2.5 MG/1
2.5 TABLET ORAL 3 TIMES WEEKLY
Status: DISCONTINUED | OUTPATIENT
Start: 2021-11-17 | End: 2021-11-18 | Stop reason: HOSPADM

## 2021-11-16 RX ORDER — PANTOPRAZOLE SODIUM 20 MG/1
20 TABLET, DELAYED RELEASE ORAL
Status: DISCONTINUED | OUTPATIENT
Start: 2021-11-17 | End: 2021-11-18 | Stop reason: HOSPADM

## 2021-11-16 RX ORDER — LIDOCAINE HYDROCHLORIDE 20 MG/ML
INJECTION, SOLUTION EPIDURAL; INFILTRATION; INTRACAUDAL; PERINEURAL AS NEEDED
Status: DISCONTINUED | OUTPATIENT
Start: 2021-11-16 | End: 2021-11-16

## 2021-11-16 RX ORDER — FENTANYL CITRATE 50 UG/ML
INJECTION, SOLUTION INTRAMUSCULAR; INTRAVENOUS AS NEEDED
Status: DISCONTINUED | OUTPATIENT
Start: 2021-11-16 | End: 2021-11-16

## 2021-11-16 RX ORDER — LIDOCAINE HYDROCHLORIDE 10 MG/ML
INJECTION, SOLUTION EPIDURAL; INFILTRATION; INTRACAUDAL; PERINEURAL AS NEEDED
Status: DISCONTINUED | OUTPATIENT
Start: 2021-11-16 | End: 2021-11-16 | Stop reason: HOSPADM

## 2021-11-16 RX ORDER — FENTANYL CITRATE/PF 50 MCG/ML
25 SYRINGE (ML) INJECTION
Status: DISCONTINUED | OUTPATIENT
Start: 2021-11-16 | End: 2021-11-16 | Stop reason: HOSPADM

## 2021-11-16 RX ADMIN — MIDAZOLAM 2 MG: 1 INJECTION INTRAMUSCULAR; INTRAVENOUS at 17:08

## 2021-11-16 RX ADMIN — OXYCODONE HYDROCHLORIDE 5 MG: 5 TABLET ORAL at 14:42

## 2021-11-16 RX ADMIN — CEFAZOLIN SODIUM 2000 MG: 2 SOLUTION INTRAVENOUS at 10:35

## 2021-11-16 RX ADMIN — PREGABALIN 75 MG: 75 CAPSULE ORAL at 08:12

## 2021-11-16 RX ADMIN — METRONIDAZOLE 500 MG: 500 TABLET ORAL at 21:01

## 2021-11-16 RX ADMIN — METRONIDAZOLE 500 MG: 500 TABLET ORAL at 06:01

## 2021-11-16 RX ADMIN — OXYCODONE HYDROCHLORIDE 5 MG: 5 TABLET ORAL at 02:56

## 2021-11-16 RX ADMIN — ENOXAPARIN SODIUM 40 MG: 40 INJECTION SUBCUTANEOUS at 21:01

## 2021-11-16 RX ADMIN — MONTELUKAST 10 MG: 10 TABLET, FILM COATED ORAL at 08:12

## 2021-11-16 RX ADMIN — METRONIDAZOLE 500 MG: 500 TABLET ORAL at 14:40

## 2021-11-16 RX ADMIN — OXYCODONE HYDROCHLORIDE 5 MG: 5 TABLET ORAL at 08:15

## 2021-11-16 RX ADMIN — ONDANSETRON 4 MG: 2 INJECTION INTRAMUSCULAR; INTRAVENOUS at 17:19

## 2021-11-16 RX ADMIN — PROPOFOL 200 MG: 10 INJECTION, EMULSION INTRAVENOUS at 17:10

## 2021-11-16 RX ADMIN — POTASSIUM CHLORIDE 20 MEQ: 1500 TABLET, EXTENDED RELEASE ORAL at 08:12

## 2021-11-16 RX ADMIN — CEFAZOLIN SODIUM 2000 MG: 2 SOLUTION INTRAVENOUS at 02:56

## 2021-11-16 RX ADMIN — POTASSIUM CHLORIDE 40 MEQ: 1500 TABLET, EXTENDED RELEASE ORAL at 15:08

## 2021-11-16 RX ADMIN — CEFAZOLIN SODIUM 3000 MG: 2 SOLUTION INTRAVENOUS at 17:00

## 2021-11-16 RX ADMIN — FENTANYL CITRATE 25 MCG: 50 INJECTION INTRAMUSCULAR; INTRAVENOUS at 17:17

## 2021-11-16 RX ADMIN — LIDOCAINE HYDROCHLORIDE 100 MG: 20 INJECTION, SOLUTION EPIDURAL; INFILTRATION; INTRACAUDAL; PERINEURAL at 17:10

## 2021-11-16 RX ADMIN — ATORVASTATIN CALCIUM 40 MG: 40 TABLET, FILM COATED ORAL at 21:01

## 2021-11-16 RX ADMIN — INSULIN GLARGINE 40 UNITS: 100 INJECTION, SOLUTION SUBCUTANEOUS at 21:01

## 2021-11-16 RX ADMIN — SODIUM CHLORIDE: 0.9 INJECTION, SOLUTION INTRAVENOUS at 17:01

## 2021-11-16 RX ADMIN — SENNOSIDES 8.6 MG: 8.6 TABLET ORAL at 08:12

## 2021-11-17 LAB
ALBUMIN SERPL BCP-MCNC: 2.9 G/DL (ref 3.5–5)
ALP SERPL-CCNC: 91 U/L (ref 46–116)
ALT SERPL W P-5'-P-CCNC: 16 U/L (ref 12–78)
ANION GAP SERPL CALCULATED.3IONS-SCNC: 8 MMOL/L (ref 4–13)
AST SERPL W P-5'-P-CCNC: 19 U/L (ref 5–45)
BILIRUB SERPL-MCNC: 0.29 MG/DL (ref 0.2–1)
BUN SERPL-MCNC: 13 MG/DL (ref 5–25)
CALCIUM ALBUM COR SERPL-MCNC: 9.4 MG/DL (ref 8.3–10.1)
CALCIUM SERPL-MCNC: 8.5 MG/DL (ref 8.3–10.1)
CHLORIDE SERPL-SCNC: 103 MMOL/L (ref 100–108)
CO2 SERPL-SCNC: 30 MMOL/L (ref 21–32)
CREAT SERPL-MCNC: 0.93 MG/DL (ref 0.6–1.3)
ERYTHROCYTE [DISTWIDTH] IN BLOOD BY AUTOMATED COUNT: 13.9 % (ref 11.6–15.1)
GFR SERPL CREATININE-BSD FRML MDRD: 90 ML/MIN/1.73SQ M
GLUCOSE SERPL-MCNC: 139 MG/DL (ref 65–140)
GLUCOSE SERPL-MCNC: 176 MG/DL (ref 65–140)
GLUCOSE SERPL-MCNC: 64 MG/DL (ref 65–140)
GLUCOSE SERPL-MCNC: 69 MG/DL (ref 65–140)
GLUCOSE SERPL-MCNC: 76 MG/DL (ref 65–140)
GLUCOSE SERPL-MCNC: 94 MG/DL (ref 65–140)
GLUCOSE SERPL-MCNC: 97 MG/DL (ref 65–140)
HCT VFR BLD AUTO: 45 % (ref 36.5–49.3)
HGB BLD-MCNC: 14.3 G/DL (ref 12–17)
MCH RBC QN AUTO: 28.9 PG (ref 26.8–34.3)
MCHC RBC AUTO-ENTMCNC: 31.8 G/DL (ref 31.4–37.4)
MCV RBC AUTO: 91 FL (ref 82–98)
PLATELET # BLD AUTO: 232 THOUSANDS/UL (ref 149–390)
PMV BLD AUTO: 9.7 FL (ref 8.9–12.7)
POTASSIUM SERPL-SCNC: 3.4 MMOL/L (ref 3.5–5.3)
PROT SERPL-MCNC: 6.6 G/DL (ref 6.4–8.2)
RBC # BLD AUTO: 4.95 MILLION/UL (ref 3.88–5.62)
SODIUM SERPL-SCNC: 141 MMOL/L (ref 136–145)
WBC # BLD AUTO: 9.42 THOUSAND/UL (ref 4.31–10.16)

## 2021-11-17 PROCEDURE — 85027 COMPLETE CBC AUTOMATED: CPT | Performed by: STUDENT IN AN ORGANIZED HEALTH CARE EDUCATION/TRAINING PROGRAM

## 2021-11-17 PROCEDURE — 80053 COMPREHEN METABOLIC PANEL: CPT | Performed by: STUDENT IN AN ORGANIZED HEALTH CARE EDUCATION/TRAINING PROGRAM

## 2021-11-17 PROCEDURE — 99232 SBSQ HOSP IP/OBS MODERATE 35: CPT | Performed by: INTERNAL MEDICINE

## 2021-11-17 PROCEDURE — 82948 REAGENT STRIP/BLOOD GLUCOSE: CPT

## 2021-11-17 RX ORDER — INSULIN GLARGINE 100 [IU]/ML
20 INJECTION, SOLUTION SUBCUTANEOUS
Status: DISCONTINUED | OUTPATIENT
Start: 2021-11-17 | End: 2021-11-18 | Stop reason: HOSPADM

## 2021-11-17 RX ORDER — CIPROFLOXACIN 2 MG/ML
400 INJECTION, SOLUTION INTRAVENOUS EVERY 12 HOURS
Status: DISCONTINUED | OUTPATIENT
Start: 2021-11-17 | End: 2021-11-18 | Stop reason: HOSPADM

## 2021-11-17 RX ADMIN — SENNOSIDES 8.6 MG: 8.6 TABLET ORAL at 08:34

## 2021-11-17 RX ADMIN — CEFAZOLIN SODIUM 2000 MG: 2 SOLUTION INTRAVENOUS at 03:04

## 2021-11-17 RX ADMIN — CIPROFLOXACIN 400 MG: 2 INJECTION, SOLUTION INTRAVENOUS at 17:43

## 2021-11-17 RX ADMIN — OXYCODONE HYDROCHLORIDE 5 MG: 5 TABLET ORAL at 14:14

## 2021-11-17 RX ADMIN — MONTELUKAST 10 MG: 10 TABLET, FILM COATED ORAL at 08:34

## 2021-11-17 RX ADMIN — PANTOPRAZOLE SODIUM 20 MG: 20 TABLET, DELAYED RELEASE ORAL at 05:03

## 2021-11-17 RX ADMIN — METRONIDAZOLE 500 MG: 500 TABLET ORAL at 14:13

## 2021-11-17 RX ADMIN — METRONIDAZOLE 500 MG: 500 TABLET ORAL at 21:44

## 2021-11-17 RX ADMIN — METRONIDAZOLE 500 MG: 500 TABLET ORAL at 05:03

## 2021-11-17 RX ADMIN — FUROSEMIDE 60 MG: 40 TABLET ORAL at 16:41

## 2021-11-17 RX ADMIN — ENOXAPARIN SODIUM 40 MG: 40 INJECTION SUBCUTANEOUS at 08:34

## 2021-11-17 RX ADMIN — CEFAZOLIN SODIUM 2000 MG: 2 SOLUTION INTRAVENOUS at 19:13

## 2021-11-17 RX ADMIN — CEFAZOLIN SODIUM 2000 MG: 2 SOLUTION INTRAVENOUS at 10:51

## 2021-11-17 RX ADMIN — OXYCODONE HYDROCHLORIDE 5 MG: 5 TABLET ORAL at 03:04

## 2021-11-17 RX ADMIN — OXYCODONE HYDROCHLORIDE 5 MG: 5 TABLET ORAL at 21:47

## 2021-11-17 RX ADMIN — ATORVASTATIN CALCIUM 40 MG: 40 TABLET, FILM COATED ORAL at 16:41

## 2021-11-17 RX ADMIN — INSULIN GLARGINE 20 UNITS: 100 INJECTION, SOLUTION SUBCUTANEOUS at 21:44

## 2021-11-17 RX ADMIN — OXYCODONE HYDROCHLORIDE 5 MG: 5 TABLET ORAL at 08:38

## 2021-11-17 RX ADMIN — POTASSIUM CHLORIDE 20 MEQ: 1500 TABLET, EXTENDED RELEASE ORAL at 08:35

## 2021-11-18 VITALS
RESPIRATION RATE: 17 BRPM | HEIGHT: 73 IN | SYSTOLIC BLOOD PRESSURE: 124 MMHG | TEMPERATURE: 97.2 F | HEART RATE: 66 BPM | OXYGEN SATURATION: 100 % | BODY MASS INDEX: 41.75 KG/M2 | WEIGHT: 315 LBS | DIASTOLIC BLOOD PRESSURE: 86 MMHG

## 2021-11-18 PROBLEM — L97.524 DIABETIC ULCER OF TOE OF LEFT FOOT WITH NECROSIS OF BONE (HCC): Status: RESOLVED | Noted: 2021-11-15 | Resolved: 2021-11-18

## 2021-11-18 PROBLEM — E11.621 DIABETIC ULCER OF TOE OF LEFT FOOT WITH NECROSIS OF BONE (HCC): Status: RESOLVED | Noted: 2021-11-15 | Resolved: 2021-11-18

## 2021-11-18 LAB
GLUCOSE SERPL-MCNC: 100 MG/DL (ref 65–140)
GLUCOSE SERPL-MCNC: 117 MG/DL (ref 65–140)

## 2021-11-18 PROCEDURE — 82948 REAGENT STRIP/BLOOD GLUCOSE: CPT

## 2021-11-18 RX ORDER — OXYCODONE HYDROCHLORIDE AND ACETAMINOPHEN 5; 325 MG/1; MG/1
1 TABLET ORAL EVERY 4 HOURS PRN
Qty: 10 TABLET | Refills: 0 | Status: SHIPPED | OUTPATIENT
Start: 2021-11-18 | End: 2021-11-28

## 2021-11-18 RX ORDER — METHOCARBAMOL 500 MG/1
500 TABLET, FILM COATED ORAL EVERY 6 HOURS PRN
Status: DISCONTINUED | OUTPATIENT
Start: 2021-11-18 | End: 2021-11-18 | Stop reason: HOSPADM

## 2021-11-18 RX ORDER — TIZANIDINE 4 MG/1
4 TABLET ORAL ONCE
Status: COMPLETED | OUTPATIENT
Start: 2021-11-18 | End: 2021-11-18

## 2021-11-18 RX ADMIN — TIZANIDINE 4 MG: 4 TABLET ORAL at 09:32

## 2021-11-18 RX ADMIN — MONTELUKAST 10 MG: 10 TABLET, FILM COATED ORAL at 08:53

## 2021-11-18 RX ADMIN — ENOXAPARIN SODIUM 40 MG: 40 INJECTION SUBCUTANEOUS at 08:53

## 2021-11-18 RX ADMIN — METHOCARBAMOL 500 MG: 500 TABLET ORAL at 06:47

## 2021-11-18 RX ADMIN — CEFAZOLIN SODIUM 2000 MG: 2 SOLUTION INTRAVENOUS at 02:40

## 2021-11-18 RX ADMIN — CIPROFLOXACIN 400 MG: 2 INJECTION, SOLUTION INTRAVENOUS at 06:02

## 2021-11-18 RX ADMIN — FUROSEMIDE 60 MG: 40 TABLET ORAL at 07:47

## 2021-11-18 RX ADMIN — CEFAZOLIN SODIUM 2000 MG: 2 SOLUTION INTRAVENOUS at 10:26

## 2021-11-18 RX ADMIN — OXYCODONE HYDROCHLORIDE 5 MG: 5 TABLET ORAL at 07:47

## 2021-11-18 RX ADMIN — POTASSIUM CHLORIDE 20 MEQ: 1500 TABLET, EXTENDED RELEASE ORAL at 08:53

## 2021-11-18 RX ADMIN — PANTOPRAZOLE SODIUM 20 MG: 20 TABLET, DELAYED RELEASE ORAL at 06:02

## 2021-11-18 RX ADMIN — METRONIDAZOLE 500 MG: 500 TABLET ORAL at 06:02

## 2021-11-18 RX ADMIN — SENNOSIDES 8.6 MG: 8.6 TABLET ORAL at 08:53

## 2021-11-19 LAB
BACTERIA WND AEROBE CULT: ABNORMAL
GRAM STN SPEC: ABNORMAL
GRAM STN SPEC: ABNORMAL

## 2021-11-20 LAB
BACTERIA BLD CULT: NORMAL
BACTERIA BLD CULT: NORMAL

## 2021-11-22 ENCOUNTER — HOSPITAL ENCOUNTER (EMERGENCY)
Facility: HOSPITAL | Age: 56
Discharge: HOME | End: 2021-11-22
Attending: EMERGENCY MEDICINE | Admitting: EMERGENCY MEDICINE
Payer: COMMERCIAL

## 2021-11-22 VITALS
HEIGHT: 74 IN | SYSTOLIC BLOOD PRESSURE: 155 MMHG | OXYGEN SATURATION: 97 % | HEART RATE: 63 BPM | BODY MASS INDEX: 40.43 KG/M2 | DIASTOLIC BLOOD PRESSURE: 106 MMHG | TEMPERATURE: 98 F | RESPIRATION RATE: 18 BRPM | WEIGHT: 315 LBS

## 2021-11-22 DIAGNOSIS — M54.40 ACUTE RIGHT-SIDED LOW BACK PAIN WITH SCIATICA: Primary | ICD-10-CM

## 2021-11-22 PROCEDURE — 99284 EMERGENCY DEPT VISIT MOD MDM: CPT | Performed by: EMERGENCY MEDICINE

## 2021-11-22 PROCEDURE — 99283 EMERGENCY DEPT VISIT LOW MDM: CPT

## 2021-11-22 PROCEDURE — 96372 THER/PROPH/DIAG INJ SC/IM: CPT

## 2021-11-22 RX ORDER — OXYCODONE HYDROCHLORIDE AND ACETAMINOPHEN 5; 325 MG/1; MG/1
1 TABLET ORAL EVERY 4 HOURS PRN
Qty: 15 TABLET | Refills: 0 | Status: SHIPPED | OUTPATIENT
Start: 2021-11-22 | End: 2022-02-04

## 2021-11-22 RX ORDER — KETOROLAC TROMETHAMINE 30 MG/ML
30 INJECTION, SOLUTION INTRAMUSCULAR; INTRAVENOUS ONCE
Status: COMPLETED | OUTPATIENT
Start: 2021-11-22 | End: 2021-11-22

## 2021-11-22 RX ORDER — HYDROMORPHONE HCL/PF 1 MG/ML
1 SYRINGE (ML) INJECTION ONCE
Status: COMPLETED | OUTPATIENT
Start: 2021-11-22 | End: 2021-11-22

## 2021-11-22 RX ADMIN — HYDROMORPHONE HYDROCHLORIDE 1 MG: 1 INJECTION, SOLUTION INTRAMUSCULAR; INTRAVENOUS; SUBCUTANEOUS at 23:01

## 2021-11-22 RX ADMIN — KETOROLAC TROMETHAMINE 30 MG: 30 INJECTION, SOLUTION INTRAMUSCULAR at 23:00

## 2022-01-10 ENCOUNTER — APPOINTMENT (INPATIENT)
Dept: NON INVASIVE DIAGNOSTICS | Facility: HOSPITAL | Age: 57
DRG: 617 | End: 2022-01-10
Payer: COMMERCIAL

## 2022-01-10 ENCOUNTER — APPOINTMENT (INPATIENT)
Dept: RADIOLOGY | Facility: HOSPITAL | Age: 57
DRG: 617 | End: 2022-01-10
Payer: COMMERCIAL

## 2022-01-10 ENCOUNTER — ANESTHESIA EVENT (OUTPATIENT)
Dept: PERIOP | Facility: HOSPITAL | Age: 57
End: 2022-01-10

## 2022-01-10 ENCOUNTER — HOSPITAL ENCOUNTER (INPATIENT)
Facility: HOSPITAL | Age: 57
LOS: 2 days | Discharge: HOME | DRG: 617 | End: 2022-01-12
Attending: PODIATRIST | Admitting: PODIATRIST
Payer: COMMERCIAL

## 2022-01-10 DIAGNOSIS — E11.42 TYPE 2 DIABETES MELLITUS WITH DIABETIC POLYNEUROPATHY, WITH LONG-TERM CURRENT USE OF INSULIN (HCC): ICD-10-CM

## 2022-01-10 DIAGNOSIS — I48.91 ATRIAL FIBRILLATION, UNSPECIFIED TYPE (HCC): Primary | Chronic | ICD-10-CM

## 2022-01-10 DIAGNOSIS — M86.472 CHRONIC OSTEOMYELITIS OF LEFT FOOT WITH DRAINING SINUS (HCC): ICD-10-CM

## 2022-01-10 DIAGNOSIS — Z79.4 TYPE 2 DIABETES MELLITUS WITH DIABETIC POLYNEUROPATHY, WITH LONG-TERM CURRENT USE OF INSULIN (HCC): ICD-10-CM

## 2022-01-10 DIAGNOSIS — Z98.890 POST-OPERATIVE STATE: ICD-10-CM

## 2022-01-10 DIAGNOSIS — L03.115 CELLULITIS OF RIGHT LOWER EXTREMITY: ICD-10-CM

## 2022-01-10 DIAGNOSIS — M86.9 OSTEOMYELITIS OF THIRD TOE OF RIGHT FOOT (HCC): ICD-10-CM

## 2022-01-10 LAB
GLUCOSE SERPL-MCNC: 102 MG/DL (ref 65–140)
GLUCOSE SERPL-MCNC: 87 MG/DL (ref 65–140)

## 2022-01-10 PROCEDURE — 87070 CULTURE OTHR SPECIMN AEROBIC: CPT | Performed by: PODIATRIST

## 2022-01-10 PROCEDURE — 87040 BLOOD CULTURE FOR BACTERIA: CPT | Performed by: PODIATRIST

## 2022-01-10 PROCEDURE — 82948 REAGENT STRIP/BLOOD GLUCOSE: CPT

## 2022-01-10 PROCEDURE — 87186 SC STD MICRODIL/AGAR DIL: CPT | Performed by: PODIATRIST

## 2022-01-10 PROCEDURE — 87077 CULTURE AEROBIC IDENTIFY: CPT | Performed by: PODIATRIST

## 2022-01-10 PROCEDURE — 73630 X-RAY EXAM OF FOOT: CPT

## 2022-01-10 PROCEDURE — 87076 CULTURE ANAEROBE IDENT EACH: CPT | Performed by: PODIATRIST

## 2022-01-10 PROCEDURE — 87205 SMEAR GRAM STAIN: CPT | Performed by: PODIATRIST

## 2022-01-10 PROCEDURE — 94640 AIRWAY INHALATION TREATMENT: CPT

## 2022-01-10 PROCEDURE — 87075 CULTR BACTERIA EXCEPT BLOOD: CPT | Performed by: PODIATRIST

## 2022-01-10 RX ORDER — MONTELUKAST SODIUM 10 MG/1
10 TABLET ORAL EVERY EVENING
Status: DISCONTINUED | OUTPATIENT
Start: 2022-01-10 | End: 2022-01-12 | Stop reason: HOSPADM

## 2022-01-10 RX ORDER — LORAZEPAM 1 MG/1
0.5 TABLET ORAL EVERY 8 HOURS PRN
Status: DISCONTINUED | OUTPATIENT
Start: 2022-01-10 | End: 2022-01-12 | Stop reason: HOSPADM

## 2022-01-10 RX ORDER — POTASSIUM CHLORIDE 20 MEQ/1
20 TABLET, EXTENDED RELEASE ORAL DAILY
Status: DISCONTINUED | OUTPATIENT
Start: 2022-01-10 | End: 2022-01-12 | Stop reason: HOSPADM

## 2022-01-10 RX ORDER — HYDROXYZINE HYDROCHLORIDE 25 MG/1
25 TABLET, FILM COATED ORAL EVERY 6 HOURS PRN
Status: DISCONTINUED | OUTPATIENT
Start: 2022-01-10 | End: 2022-01-12 | Stop reason: HOSPADM

## 2022-01-10 RX ORDER — ALBUTEROL SULFATE 2.5 MG/3ML
5 SOLUTION RESPIRATORY (INHALATION) EVERY 4 HOURS PRN
Status: DISCONTINUED | OUTPATIENT
Start: 2022-01-10 | End: 2022-01-12

## 2022-01-10 RX ORDER — OXYCODONE HYDROCHLORIDE AND ACETAMINOPHEN 5; 325 MG/1; MG/1
1 TABLET ORAL EVERY 4 HOURS PRN
Status: DISCONTINUED | OUTPATIENT
Start: 2022-01-10 | End: 2022-01-12 | Stop reason: HOSPADM

## 2022-01-10 RX ORDER — POLYETHYLENE GLYCOL 3350 17 G/17G
17 POWDER, FOR SOLUTION ORAL DAILY
Status: DISCONTINUED | OUTPATIENT
Start: 2022-01-10 | End: 2022-01-12 | Stop reason: HOSPADM

## 2022-01-10 RX ORDER — METOLAZONE 5 MG/1
2.5 TABLET ORAL 3 TIMES WEEKLY
Status: DISCONTINUED | OUTPATIENT
Start: 2022-01-10 | End: 2022-01-11

## 2022-01-10 RX ORDER — TRAMADOL HYDROCHLORIDE 50 MG/1
50 TABLET ORAL DAILY
Status: DISCONTINUED | OUTPATIENT
Start: 2022-01-10 | End: 2022-01-12 | Stop reason: HOSPADM

## 2022-01-10 RX ORDER — SODIUM CHLORIDE 9 MG/ML
50 INJECTION, SOLUTION INTRAVENOUS CONTINUOUS
Status: DISCONTINUED | OUTPATIENT
Start: 2022-01-11 | End: 2022-01-11

## 2022-01-10 RX ORDER — HEPARIN SODIUM 5000 [USP'U]/ML
5000 INJECTION, SOLUTION INTRAVENOUS; SUBCUTANEOUS EVERY 8 HOURS SCHEDULED
Status: DISCONTINUED | OUTPATIENT
Start: 2022-01-10 | End: 2022-01-10

## 2022-01-10 RX ORDER — LEVALBUTEROL INHALATION SOLUTION 0.63 MG/3ML
0.63 SOLUTION RESPIRATORY (INHALATION)
Status: DISCONTINUED | OUTPATIENT
Start: 2022-01-10 | End: 2022-01-11

## 2022-01-10 RX ORDER — ATORVASTATIN CALCIUM 40 MG/1
40 TABLET, FILM COATED ORAL
Status: DISCONTINUED | OUTPATIENT
Start: 2022-01-10 | End: 2022-01-12 | Stop reason: HOSPADM

## 2022-01-10 RX ORDER — PANTOPRAZOLE SODIUM 40 MG/1
40 TABLET, DELAYED RELEASE ORAL
Status: DISCONTINUED | OUTPATIENT
Start: 2022-01-10 | End: 2022-01-12 | Stop reason: HOSPADM

## 2022-01-10 RX ORDER — SENNOSIDES 8.6 MG
1 TABLET ORAL DAILY
Status: DISCONTINUED | OUTPATIENT
Start: 2022-01-10 | End: 2022-01-12 | Stop reason: HOSPADM

## 2022-01-10 RX ADMIN — POTASSIUM CHLORIDE 20 MEQ: 1500 TABLET, EXTENDED RELEASE ORAL at 09:49

## 2022-01-10 RX ADMIN — SENNOSIDES 8.6 MG: 8.6 TABLET ORAL at 09:49

## 2022-01-10 RX ADMIN — CEFEPIME HYDROCHLORIDE 2000 MG: 2 INJECTION, POWDER, FOR SOLUTION INTRAVENOUS at 12:24

## 2022-01-10 RX ADMIN — LEVALBUTEROL HYDROCHLORIDE 0.63 MG: 0.63 SOLUTION RESPIRATORY (INHALATION) at 13:46

## 2022-01-10 RX ADMIN — INSULIN LISPRO 1 UNITS: 100 INJECTION, SOLUTION INTRAVENOUS; SUBCUTANEOUS at 21:02

## 2022-01-10 RX ADMIN — ATORVASTATIN CALCIUM 40 MG: 40 TABLET, FILM COATED ORAL at 18:05

## 2022-01-10 RX ADMIN — CEFEPIME HYDROCHLORIDE 2000 MG: 2 INJECTION, POWDER, FOR SOLUTION INTRAVENOUS at 21:01

## 2022-01-10 RX ADMIN — LEVALBUTEROL HYDROCHLORIDE 0.63 MG: 0.63 SOLUTION RESPIRATORY (INHALATION) at 19:03

## 2022-01-10 RX ADMIN — OXYCODONE HYDROCHLORIDE AND ACETAMINOPHEN 1 TABLET: 5; 325 TABLET ORAL at 16:34

## 2022-01-10 RX ADMIN — FUROSEMIDE 60 MG: 40 TABLET ORAL at 09:49

## 2022-01-10 RX ADMIN — METOLAZONE 2.5 MG: 5 TABLET ORAL at 09:49

## 2022-01-10 RX ADMIN — IPRATROPIUM BROMIDE 0.5 MG: 0.5 SOLUTION RESPIRATORY (INHALATION) at 19:03

## 2022-01-10 RX ADMIN — POLYETHYLENE GLYCOL 3350 17 G: 17 POWDER, FOR SOLUTION ORAL at 09:49

## 2022-01-10 RX ADMIN — PANTOPRAZOLE SODIUM 40 MG: 40 TABLET, DELAYED RELEASE ORAL at 09:49

## 2022-01-10 RX ADMIN — OXYCODONE HYDROCHLORIDE AND ACETAMINOPHEN 1 TABLET: 5; 325 TABLET ORAL at 12:24

## 2022-01-10 RX ADMIN — TRAMADOL HYDROCHLORIDE 50 MG: 50 TABLET, FILM COATED ORAL at 09:49

## 2022-01-10 RX ADMIN — IPRATROPIUM BROMIDE 0.5 MG: 0.5 SOLUTION RESPIRATORY (INHALATION) at 13:46

## 2022-01-10 RX ADMIN — RIVAROXABAN 20 MG: 20 TABLET, FILM COATED ORAL at 09:49

## 2022-01-10 NOTE — ANESTHESIA PREPROCEDURE EVALUATION
Procedure:  AMPUTATION TOE (Right Toe)    Relevant Problems   CARDIO   (+) Atrial fibrillation (HCC)   (+) Hypertension      NEURO/PSYCH   (+) Anxiety      PULMONARY   (+) DAYAN (obstructive sleep apnea)      Other   (+) Chronic osteomyelitis of left foot with draining sinus (HCC)   (+) Osteomyelitis of third toe of right foot (HCC)        Physical Exam    Airway    Mallampati score: III  TM Distance: >3 FB  Neck ROM: full     Dental   No notable dental hx     Cardiovascular  Rhythm: regular, Rate: normal, Cardiovascular exam normal    Pulmonary  Pulmonary exam normal Breath sounds clear to auscultation,     Other Findings        Anesthesia Plan  ASA Score- 3     Anesthesia Type- general with ASA Monitors.         Additional Monitors:   Airway Plan: LMA.          Plan Factors-    Chart reviewed. Patient summary reviewed.            Induction- intravenous.    Postoperative Plan- Plan for postoperative opioid use.     Informed Consent- Anesthetic plan and risks discussed with patient.

## 2022-01-10 NOTE — H&P
Agip U  91  H&P  David Murdocksydanuj Magallon 60 y o  male MRN: [id number]  Unit/Bed#: E5 -01 Encounter: [id number]  Admission Date: 01/10/22    ASSESSMENT:    Jazlyn Mcdaniels is a 60 y o  male with:    1  Right 3rd digit OM with positive probe to bone, previous wound cultures showed pseudomonas growth  Previous amputations left foot  2  PAD  3  T2DM last a1c 6 2 % 4/14/21  4  HTN  5  DAYAN    PLAN:    · MRI pending for surgical planning 1/11/2022  · LEADs pending  · Wound cultures pending  · A1c pending  · Appreciate SLIM for medical clearance and diabetes medication assistance  · Will discuss this plan with my attending and update as needed  Antibiotics started: cefepime  Pharmacologic VTE Prophylaxis: Xarelto  Mechanical VTE Prophylaxis: sequential compression device   Weight Bearing Status: WBAT      Disposition:  Patient requires >2 midnight stay for surgical planning and care  Code Status: Level 1 - Full Code      SUBJECTIVE    History of Present Illness:    Jazlyn Mcdaniels is a 60 y o  male with pmh of multiple amputations, PAD, diabetes who presents with new onset worsening right digit ulceration  He states he saw Dr Taryn Moy on Friday for the first time with this ulceration on Friday 1/ 7/2022 and cesario started him on oral antibiotics and ordered an MRI to be completed outpatient  He recommended admission to the hospital for antibiotic therapy and surgical planning  Mr Maya Otero was reluctant over the weekend and arrived this morning  He states he has been taking antibiotics as prescribed  He has been doing his own wound care at home  He has not had worsening redness  He denies fevers, chills, nausea, vomiting  Review of Systems:    Constitutional: Negative  HENT: Negative  Eyes: Negative  Respiratory: Negative  Cardiovascular: Negative  Gastrointestinal: Negative  Musculoskeletal: left foot amputation   Skin:right digit ulceration   Neurological: numbness  Psych: Negative  Past Medical and Surgical History:     Past Medical History:   Diagnosis Date    Atrial fibrillation (HCC)     Chronic diastolic (congestive) heart failure (HCC)     Diabetes mellitus ([facility] [address] )     High cholesterol     Hyperlipidemia     Hypertension     Stroke [facility])        Past Surgical History:   Procedure Laterality Date    APPENDECTOMY      ATRIAL CARDIAC PACEMAKER INSERTION      BARIATRIC SURGERY  05/2021    CA AMPUTATION TOE,I-P JT Left 11/16/2021    Procedure: AMPUTATION LESSER TOE;  Surgeon: Shakeel Brown DPM;  Location: [facility];  Service: Podiatry    TOE AMPUTATION Left     2nd toe    TOE AMPUTATION Left 9/15/2021    Procedure: AMPUTATION LEFT 4TH TOE;  Surgeon: Shakeel Brown DPM;  Location: [facility];  Service: Podiatry       Meds/Allergies:    Medications Prior to Admission   Medication    albuterol (2 5 mg/3 mL) 0 083 % nebulizer solution    Albuterol Sulfate (PROAIR RESPICLICK) 943 (90 Base) MCG/ACT AEPB    atorvastatin (LIPITOR) 40 mg tablet    furosemide (LASIX) 40 mg tablet    hydrOXYzine HCL (ATARAX) 25 mg tablet    LORazepam (ATIVAN) 0 5 mg tablet    omeprazole (PriLOSEC) 20 mg delayed release capsule    potassium chloride (K-DUR,KLOR-CON) 20 mEq tablet    traMADol (ULTRAM) 50 mg tablet    ipratropium (ATROVENT) 0 02 % nebulizer solution    levalbuterol (XOPENEX) 0 63 mg/3 mL nebulizer solution    metolazone (ZAROXOLYN) 2 5 mg tablet    montelukast (SINGULAIR) 10 mg tablet    oxyCODONE-acetaminophen (PERCOCET) 5-325 mg per tablet    rivaroxaban (XARELTO) 20 mg tablet    VICTOZA 18 MG/3ML SOPN       No Known Allergies    Social History:    Social History     Marital Status: /Civil Union    Substance Use History:   Social History     Substance and Sexual Activity   Alcohol Use Never     Social History     Tobacco Use   Smoking Status Never Smoker   Smokeless Tobacco Never Used     Social History     Substance and Sexual Activity   Drug Use Never Family History:    History reviewed  No pertinent family history  OBJECTIVE:    First Vitals:   Blood Pressure: 125/75 (01/10/22 0711)  Pulse: 92 (01/10/22 0711)  Temperature: 97 5 °F (36 4 °C) (01/10/22 0711)  Respirations: 18 (01/10/22 0711)  SpO2: 98 % (01/10/22 0711)    Current Vitals:   Blood Pressure: 125/75 (01/10/22 0711)  Pulse: 92 (01/10/22 0711)  Temperature: 97 5 °F (36 4 °C) (01/10/22 0711)  Respirations: 18 (01/10/22 0711)  SpO2: 98 % (01/10/22 0711)      Physical Exam :    General Appearance: Alert, cooperative, no distress  HEENT: Head normocephalic, atraumatic, without obvious abnormality  Heart: Normal rate and rhythm  Lungs: Non-labored breathing  No respiratory distress  Abdomen: Without distension  Psychiatric: AAOx3  Lower Extremity:  Vascular:   DP/PT pedal pulses on the left are present  DP/PT pedal pulses on the right are present  CRT brisk at the digits  Pedal hair is absent  negative edema noted at bilateral lower extremities  Skin temperature is abnormal warm to the right foot  lipodermatosclerosis noted of the bilateral lower extremities    Musculoskeletal:  MMT is 4/5 in all muscle compartments bilaterally  Passive ROM at the 1st MPJ and ankle joint are Decreased bilaterally with the leg extended  Active ROM at the lesser digits is diminished  No Pain on palpation of right 3rd digit  Left foot digital amputations    Dermatological:    Right 3rd digit ulceration with full thickness tissue loss  Exposed bone noted with undermining circumferentially with only exposed distal phalanx    Neurological:  Gross sensation is absent  Protective sensation is diminished  Patient Reports numbness and/or paresthesias  Clinical Images 01/10/22:      Right foot    Lab Results:   No visits with results within 1 Day(s) from this visit     Latest known visit with results is:   Admission on 11/15/2021, Discharged on 11/18/2021   Component Date Value    WBC 11/15/2021 10 17*    RBC 11/15/2021 5 56     Hemoglobin 11/15/2021 15 7     Hematocrit 11/15/2021 48 0     MCV 11/15/2021 86     MCH 11/15/2021 28 2     MCHC 11/15/2021 32 7     RDW 11/15/2021 13 6     MPV 11/15/2021 9 9     Platelets 42/04/5563 280     nRBC 11/15/2021 0     Neutrophils Relative 11/15/2021 58     Immat GRANS % 11/15/2021 0     Lymphocytes Relative 11/15/2021 27     Monocytes Relative 11/15/2021 12     Eosinophils Relative 11/15/2021 2     Basophils Relative 11/15/2021 1     Neutrophils Absolute 11/15/2021 5 93     Immature Grans Absolute 11/15/2021 0 03     Lymphocytes Absolute 11/15/2021 2 70     Monocytes Absolute 11/15/2021 1 26*    Eosinophils Absolute 11/15/2021 0 20     Basophils Absolute 11/15/2021 0 05     Sodium 11/15/2021 139     Potassium 11/15/2021 2 8*    Chloride 11/15/2021 96*    CO2 11/15/2021 35*    ANION GAP 11/15/2021 8     BUN 11/15/2021 16     Creatinine 11/15/2021 0 92     Glucose 11/15/2021 98     Calcium 11/15/2021 9 0     eGFR 11/15/2021 91     Blood Culture 11/15/2021 No Growth After 5 Days   Blood Culture 11/15/2021 No Growth After 5 Days       POC Glucose 11/15/2021 148*    Wound Culture 11/15/2021 1+ Growth of Pseudomonas aeruginosa*    Wound Culture 11/15/2021 Few Colonies of Staphylococcus aureus*    Wound Culture 11/15/2021 2+ Growth of      Gram Stain Result 11/15/2021 1+ Gram positive cocci in pairs and chains*    Gram Stain Result 11/15/2021 No polys seen*    POC Glucose 11/15/2021 172*    POC Glucose 11/16/2021 165*    Potassium 11/16/2021 3 1*    Ventricular Rate 11/16/2021 56     Atrial Rate 11/16/2021 60     QRSD Interval 11/16/2021 102     QT Interval 11/16/2021 430     QTC Interval 11/16/2021 414     QRS Axis 11/16/2021 187     T Wave Axis 11/16/2021 19     Ventricular Rate 11/16/2021 60     Atrial Rate 11/16/2021 78     QRSD Interval 11/16/2021 120     QT Interval 11/16/2021 424     QTC Interval 11/16/2021 424     QRS Axis 11/16/2021 227     T Wave Axis 11/16/2021 34     POC Glucose 11/16/2021 111     Case Report 11/16/2021                      Value:Surgical Pathology Report                         Case: [id number]                                   Authorizing Provider:  Shakeel Brown DPM        Collected:           11/16/2021 1734              Ordering Location:     [facility]        Received:            11/16/2021 [address]  Operating Room                                                     Pathologist:           Ruslan Diamond MD                                                        Specimen:    Toe, Left, LEFT LESSER TOE                                                                 Final Diagnosis 11/16/2021                      Value: This result contains rich text formatting which cannot be displayed here   Additional Information 11/16/2021                      Value: This result contains rich text formatting which cannot be displayed here  Rodriguez Gross Description 11/16/2021                      Value: This result contains rich text formatting which cannot be displayed here      POC Glucose 11/16/2021 82     POC Glucose 11/16/2021 137     WBC 11/17/2021 9 42     RBC 11/17/2021 4 95     Hemoglobin 11/17/2021 14 3     Hematocrit 11/17/2021 45 0     MCV 11/17/2021 91     MCH 11/17/2021 28 9     MCHC 11/17/2021 31 8     RDW 11/17/2021 13 9     Platelets 72/35/3750 232     MPV 11/17/2021 9 7     Sodium 11/17/2021 141     Potassium 11/17/2021 3 4*    Chloride 11/17/2021 103     CO2 11/17/2021 30     ANION GAP 11/17/2021 8     BUN 11/17/2021 13     Creatinine 11/17/2021 0 93     Glucose 11/17/2021 94     Calcium 11/17/2021 8 5     Corrected Calcium 11/17/2021 9 4     AST 11/17/2021 19     ALT 11/17/2021 16     Alkaline Phosphatase 11/17/2021 91     Total Protein 11/17/2021 6 6     Albumin 11/17/2021 2 9*    Total Bilirubin 11/17/2021 0 29     eGFR 11/17/2021 90     POC Glucose 11/17/2021 69     POC Glucose 11/17/2021 176*    POC Glucose 11/17/2021 64*    POC Glucose 11/17/2021 97     POC Glucose 11/17/2021 76     POC Glucose 11/17/2021 139     POC Glucose 11/18/2021 117     POC Glucose 11/18/2021 100        Cultures:            Imaging: I have personally reviewed pertinent films in PACS  No results found  EKG, Pathology, and Other Studies: I have personally reviewed pertinent reports

## 2022-01-10 NOTE — PLAN OF CARE
Problem: Potential for Falls  Goal: Patient will remain free of falls  Description: INTERVENTIONS:  - Educate patient/family on patient safety including physical limitations  - Instruct patient to call for assistance with activity   - Consult OT/PT to assist with strengthening/mobility   - Keep Call bell within reach  - Keep bed low and locked with side rails adjusted as appropriate  - Keep care items and personal belongings within reach  - Initiate and maintain comfort rounds  - Make Fall Risk Sign visible to staff  - Offer Toileting every  Hours, in advance of need  - Initiate/Maintain alarm  - Obtain necessary fall risk management equipment:   - Apply yellow socks and bracelet for high fall risk patients  - Consider moving patient to room near nurses station  Outcome: Progressing     Problem: PAIN - ADULT  Goal: Verbalizes/displays adequate comfort level or baseline comfort level  Description: Interventions:  - Encourage patient to monitor pain and request assistance  - Assess pain using appropriate pain scale  - Administer analgesics based on type and severity of pain and evaluate response  - Implement non-pharmacological measures as appropriate and evaluate response  - Consider cultural and social influences on pain and pain management  - Notify physician/advanced practitioner if interventions unsuccessful or patient reports new pain  Outcome: Progressing     Problem: INFECTION - ADULT  Goal: Absence or prevention of progression during hospitalization  Description: INTERVENTIONS:  - Assess and monitor for signs and symptoms of infection  - Monitor lab/diagnostic results  - Monitor all insertion sites, i e  indwelling lines, tubes, and drains  - Monitor endotracheal if appropriate and nasal secretions for changes in amount and color  - [place] appropriate cooling/warming therapies per order  - Administer medications as ordered  - Instruct and encourage patient and family to use good hand hygiene technique  - Identify and instruct in appropriate isolation precautions for identified infection/condition  Outcome: Progressing  Goal: Absence of fever/infection during neutropenic period  Description: INTERVENTIONS:  - Monitor WBC    Outcome: Progressing     Problem: DISCHARGE PLANNING  Goal: Discharge to home or other facility with appropriate resources  Description: INTERVENTIONS:  - Identify barriers to discharge w/patient and caregiver  - Arrange for needed discharge resources and transportation as appropriate  - Identify discharge learning needs (meds, wound care, etc )  - Arrange for interpretive services to assist at discharge as needed  - Refer to Case Management Department for coordinating discharge planning if the patient needs post-hospital services based on physician/advanced practitioner order or complex needs related to functional status, cognitive ability, or social support system  Outcome: Progressing     Problem: Knowledge Deficit  Goal: Patient/family/caregiver demonstrates understanding of disease process, treatment plan, medications, and discharge instructions  Description: Complete learning assessment and assess knowledge base    Interventions:  - Provide teaching at level of understanding  - Provide teaching via preferred learning methods  Outcome: Progressing     Problem: MUSCULOSKELETAL - ADULT  Goal: Maintain or return mobility to safest level of function  Description: INTERVENTIONS:  - Assess patient's ability to carry out ADLs; assess patient's baseline for ADL function and identify physical deficits which impact ability to perform ADLs (bathing, care of mouth/teeth, toileting, grooming, dressing, etc )  - Assess/evaluate cause of self-care deficits   - Assess range of motion  - Assess patient's mobility  - Assess patient's need for assistive devices and provide as appropriate  - Encourage maximum independence but intervene and supervise when necessary  - Involve family in performance of ADLs  - Assess for home care needs following discharge   - Consider OT consult to assist with ADL evaluation and planning for discharge  - Provide patient education as appropriate  Outcome: Progressing  Goal: Maintain proper alignment of affected body part  Description: INTERVENTIONS:  - Support, maintain and protect limb and body alignment  - Provide patient/ family with appropriate education  Outcome: Progressing

## 2022-01-11 ENCOUNTER — APPOINTMENT (INPATIENT)
Dept: NON INVASIVE DIAGNOSTICS | Facility: HOSPITAL | Age: 57
DRG: 617 | End: 2022-01-11
Payer: COMMERCIAL

## 2022-01-11 ENCOUNTER — ANESTHESIA (OUTPATIENT)
Dept: PERIOP | Facility: HOSPITAL | Age: 57
End: 2022-01-11

## 2022-01-11 ENCOUNTER — ANESTHESIA EVENT (INPATIENT)
Dept: PERIOP | Facility: HOSPITAL | Age: 57
DRG: 617 | End: 2022-01-11
Payer: COMMERCIAL

## 2022-01-11 LAB
ALBUMIN SERPL BCP-MCNC: 3.3 G/DL (ref 3.5–5)
ALP SERPL-CCNC: 97 U/L (ref 46–116)
ALT SERPL W P-5'-P-CCNC: 22 U/L (ref 12–78)
ANION GAP SERPL CALCULATED.3IONS-SCNC: 7 MMOL/L (ref 4–13)
AST SERPL W P-5'-P-CCNC: 39 U/L (ref 5–45)
BASOPHILS # BLD AUTO: 0.05 THOUSANDS/ΜL (ref 0–0.1)
BASOPHILS NFR BLD AUTO: 1 % (ref 0–1)
BILIRUB SERPL-MCNC: 0.49 MG/DL (ref 0.2–1)
BUN SERPL-MCNC: 14 MG/DL (ref 5–25)
CALCIUM ALBUM COR SERPL-MCNC: 9.4 MG/DL (ref 8.3–10.1)
CALCIUM SERPL-MCNC: 8.8 MG/DL (ref 8.3–10.1)
CHLORIDE SERPL-SCNC: 98 MMOL/L (ref 100–108)
CO2 SERPL-SCNC: 34 MMOL/L (ref 21–32)
CREAT SERPL-MCNC: 1.13 MG/DL (ref 0.6–1.3)
EOSINOPHIL # BLD AUTO: 0.2 THOUSAND/ΜL (ref 0–0.61)
EOSINOPHIL NFR BLD AUTO: 3 % (ref 0–6)
ERYTHROCYTE [DISTWIDTH] IN BLOOD BY AUTOMATED COUNT: 13.9 % (ref 11.6–15.1)
EST. AVERAGE GLUCOSE BLD GHB EST-MCNC: 120 MG/DL
GFR SERPL CREATININE-BSD FRML MDRD: 71 ML/MIN/1.73SQ M
GLUCOSE SERPL-MCNC: 110 MG/DL (ref 65–140)
GLUCOSE SERPL-MCNC: 110 MG/DL (ref 65–140)
GLUCOSE SERPL-MCNC: 125 MG/DL (ref 65–140)
GLUCOSE SERPL-MCNC: 151 MG/DL (ref 65–140)
GLUCOSE SERPL-MCNC: 161 MG/DL (ref 65–140)
GLUCOSE SERPL-MCNC: 75 MG/DL (ref 65–140)
GLUCOSE SERPL-MCNC: 94 MG/DL (ref 65–140)
HBA1C MFR BLD: 5.8 %
HCT VFR BLD AUTO: 47 % (ref 36.5–49.3)
HGB BLD-MCNC: 15.5 G/DL (ref 12–17)
IMM GRANULOCYTES # BLD AUTO: 0.02 THOUSAND/UL (ref 0–0.2)
IMM GRANULOCYTES NFR BLD AUTO: 0 % (ref 0–2)
LYMPHOCYTES # BLD AUTO: 1.7 THOUSANDS/ΜL (ref 0.6–4.47)
LYMPHOCYTES NFR BLD AUTO: 28 % (ref 14–44)
MCH RBC QN AUTO: 28.7 PG (ref 26.8–34.3)
MCHC RBC AUTO-ENTMCNC: 33 G/DL (ref 31.4–37.4)
MCV RBC AUTO: 87 FL (ref 82–98)
MONOCYTES # BLD AUTO: 0.82 THOUSAND/ΜL (ref 0.17–1.22)
MONOCYTES NFR BLD AUTO: 14 % (ref 4–12)
NEUTROPHILS # BLD AUTO: 3.23 THOUSANDS/ΜL (ref 1.85–7.62)
NEUTS SEG NFR BLD AUTO: 54 % (ref 43–75)
NRBC BLD AUTO-RTO: 0 /100 WBCS
PLATELET # BLD AUTO: 269 THOUSANDS/UL (ref 149–390)
PMV BLD AUTO: 9.3 FL (ref 8.9–12.7)
POTASSIUM SERPL-SCNC: 3.2 MMOL/L (ref 3.5–5.3)
PROT SERPL-MCNC: 7.5 G/DL (ref 6.4–8.2)
RBC # BLD AUTO: 5.4 MILLION/UL (ref 3.88–5.62)
SODIUM SERPL-SCNC: 139 MMOL/L (ref 136–145)
WBC # BLD AUTO: 6.02 THOUSAND/UL (ref 4.31–10.16)

## 2022-01-11 PROCEDURE — 83036 HEMOGLOBIN GLYCOSYLATED A1C: CPT | Performed by: PODIATRIST

## 2022-01-11 PROCEDURE — 93925 LOWER EXTREMITY STUDY: CPT

## 2022-01-11 PROCEDURE — 82948 REAGENT STRIP/BLOOD GLUCOSE: CPT

## 2022-01-11 PROCEDURE — 93925 LOWER EXTREMITY STUDY: CPT | Performed by: SURGERY

## 2022-01-11 PROCEDURE — 93923 UPR/LXTR ART STDY 3+ LVLS: CPT

## 2022-01-11 PROCEDURE — 85025 COMPLETE CBC W/AUTO DIFF WBC: CPT | Performed by: PODIATRIST

## 2022-01-11 PROCEDURE — 99254 IP/OBS CNSLTJ NEW/EST MOD 60: CPT | Performed by: INTERNAL MEDICINE

## 2022-01-11 PROCEDURE — 93922 UPR/L XTREMITY ART 2 LEVELS: CPT | Performed by: SURGERY

## 2022-01-11 PROCEDURE — 80053 COMPREHEN METABOLIC PANEL: CPT | Performed by: PODIATRIST

## 2022-01-11 RX ORDER — HEPARIN SODIUM 5000 [USP'U]/ML
5000 INJECTION, SOLUTION INTRAVENOUS; SUBCUTANEOUS EVERY 8 HOURS SCHEDULED
Status: DISCONTINUED | OUTPATIENT
Start: 2022-01-11 | End: 2022-01-12

## 2022-01-11 RX ORDER — SODIUM CHLORIDE 9 MG/ML
50 INJECTION, SOLUTION INTRAVENOUS CONTINUOUS
Status: DISCONTINUED | OUTPATIENT
Start: 2022-01-12 | End: 2022-01-11

## 2022-01-11 RX ADMIN — OXYCODONE HYDROCHLORIDE AND ACETAMINOPHEN 1 TABLET: 5; 325 TABLET ORAL at 16:28

## 2022-01-11 RX ADMIN — SODIUM CHLORIDE 50 ML/HR: 0.9 INJECTION, SOLUTION INTRAVENOUS at 05:49

## 2022-01-11 RX ADMIN — FUROSEMIDE 60 MG: 40 TABLET ORAL at 17:34

## 2022-01-11 RX ADMIN — CEFEPIME HYDROCHLORIDE 2000 MG: 2 INJECTION, POWDER, FOR SOLUTION INTRAVENOUS at 09:10

## 2022-01-11 RX ADMIN — OXYCODONE HYDROCHLORIDE AND ACETAMINOPHEN 1 TABLET: 5; 325 TABLET ORAL at 21:37

## 2022-01-11 RX ADMIN — POTASSIUM CHLORIDE 20 MEQ: 1500 TABLET, EXTENDED RELEASE ORAL at 09:14

## 2022-01-11 RX ADMIN — CEFEPIME HYDROCHLORIDE 2000 MG: 2 INJECTION, POWDER, FOR SOLUTION INTRAVENOUS at 02:41

## 2022-01-11 RX ADMIN — OXYCODONE HYDROCHLORIDE AND ACETAMINOPHEN 1 TABLET: 5; 325 TABLET ORAL at 02:46

## 2022-01-11 RX ADMIN — ATORVASTATIN CALCIUM 40 MG: 40 TABLET, FILM COATED ORAL at 16:32

## 2022-01-11 RX ADMIN — HEPARIN SODIUM 5000 UNITS: 5000 INJECTION INTRAVENOUS; SUBCUTANEOUS at 21:37

## 2022-01-11 RX ADMIN — OXYCODONE HYDROCHLORIDE AND ACETAMINOPHEN 1 TABLET: 5; 325 TABLET ORAL at 09:14

## 2022-01-11 RX ADMIN — CEFEPIME HYDROCHLORIDE 2000 MG: 2 INJECTION, POWDER, FOR SOLUTION INTRAVENOUS at 17:34

## 2022-01-11 NOTE — ASSESSMENT & PLAN NOTE
Patient scheduled for definitive toe amputation tomorrow, 01/11/2022  No objection to proceeding to surgery  Continue cefepime, recommend perioperative cultures  Continue cefepime VEEG Pruned and ready to be read after 4:00

## 2022-01-11 NOTE — ASSESSMENT & PLAN NOTE
Continue treatment with cefepime for cellulitis of the right lower extremity complicated with OM with positive probe to bone, previous wound cultures showed pseudomonas growth   Previous amputations left foot due to diabetic foot ulcer

## 2022-01-11 NOTE — PROGRESS NOTES
[facility] Podiatry - Progress Note  Patient: Arlyn Rizo 60 y o  male   MRN: [id number]  PCP: Aure Carreno, DO  Unit/Bed#: E5 -01 Encounter: [id number]  Date Of Visit: 22    ASSESSMENT:    Arlyn Rizo is a 60 y o  male with:    1  Right 3rd digit OM with positive probe to bone, previous wound cultures showed pseudomonas growth  Previous amputations left foot  2  PAD  3  T2DM last a1c 6 2 % 21  4  HTN  5  DAYAN      PLAN:    · MRI most likely unable to be performed due to pacemaker  · LEADs pending and should be completed prior to surgery  · a1c pending  · Appreciate SLIM for medical clearance  · Follow up wound cultures  · Consent in the chart  He understands risks of residual infection without treatment and benefits of pursuing surgery    Weight bearing status: WBAT bilatearlly   DVT prophylaxis: subq heparin  Antibiotics: cefepime IV    Disposition: Patient will require continued inpatient stay for the above    SUBJECTIVE:     The patient was seen, evaluated, and assessed at bedside today  The patient was awake, alert, and in no acute distress  No acute events overnight  The patient reports no pain at this time    Patient denies N/V/F/chills/SOB/CP  OBJECTIVE:     Vitals:   BP 94/60   Pulse 61   Temp (!) 97 3 °F (36 3 °C)   Resp 18   SpO2 96%     Temp (24hrs), Av 3 °F (36 3 °C), Min:97 3 °F (36 3 °C), Max:97 3 °F (36 3 °C)      Physical Exam :     General:  Alert, cooperative, and in no distress  Lower extremity exam:  vascualr status at baseline, neuro function at baseline, MSK function at baseline                Additional Data:     Labs:    Results from last 7 days   Lab Units 22  0456   WBC Thousand/uL 6 02   HEMOGLOBIN g/dL 15 5   HEMATOCRIT % 47 0   PLATELETS Thousands/uL 269   NEUTROS PCT % 54   LYMPHS PCT % 28   MONOS PCT % 14*   EOS PCT % 3     Results from last 7 days   Lab Units 22  0456   POTASSIUM mmol/L 3 2*   CHLORIDE mmol/L 98*   CO2 mmol/L 34* BUN mg/dL 14   CREATININE mg/dL 1 13   CALCIUM mg/dL 8 8   ALK PHOS U/L 97   ALT U/L 22   AST U/L 39           * I Have Reviewed All Lab Data Listed Above  Recent Cultures (last 7 days):     Results from last 7 days   Lab Units 01/10/22  1103 01/10/22  1055 01/10/22  1009   BLOOD CULTURE  Received in Microbiology Lab  Culture in Progress  Received in Microbiology Lab  Culture in Progress  --    GRAM STAIN RESULT   --   --  No Polys or Bacteria seen     Results from last 7 days   Lab Units 01/10/22  1009   ANAEROBIC CULTURE  Culture results to follow  Imaging: I have personally reviewed pertinent films in PACS  EKG, Pathology, and Other Studies: I have personally reviewed pertinent reports  ** Please Note: Portions of the record may have been created with voice recognition software  Occasional wrong word or "sound a like" substitutions may have occurred due to the inherent limitations of voice recognition software  Read the chart carefully and recognize, using context, where substitutions have occurred   **

## 2022-01-11 NOTE — ASSESSMENT & PLAN NOTE
Lab Results   Component Value Date    HGBA1C 6 2 (H) 04/14/2021       Recent Labs     01/10/22  2101 01/11/22  0801 01/11/22  1046 01/11/22  1607   POCGLU 161* 94 125 110       Blood Sugar Average: Last 72 hrs:  (P) 052 [id number]   Home regimen reviewed  Hold Metformin if applicable    Start SSI and Basal bolus protocol

## 2022-01-11 NOTE — UTILIZATION REVIEW
Initial Clinical Review    Admission: Date/Time/Statement:   Admission Orders (From admission, onward)     Ordered        01/10/22 0920  Inpatient Admission  Once                      Orders Placed This Encounter   Procedures    Inpatient Admission     Standing Status:   Standing     Number of Occurrences:   1     Order Specific Question:   Level of Care     Answer:   Med Surg [16]     Order Specific Question:   Estimated length of stay     Answer:   More than 2 Midnights     Order Specific Question:   Certification     Answer:   I certify that inpatient services are medically necessary for this patient for a duration of greater than two midnights  See H&P and MD Progress Notes for additional information about the patient's course of treatment  Initial Presentation: 60 y o  male with pmh of multiple amputations, PAD, diabetes who presents with new onset worsening right digit ulceration  He states he saw Dr Helen Houston on Friday for the first time with this ulceration on Friday 1/7/2022 and  started him on oral antibiotics and ordered an MRI to be completed outpatient  He recommended admission to the hospital for antibiotic therapy and surgical planning  Mr Valentín Kaur was reluctant over the weekend and arrived this morning  He states he has been taking antibiotics as prescribed  He has been doing his own wound care at home  He has not had worsening redness  Admit Inpatient med surg, Right 3rd digit OM with positive probe to bone, previous wound cultures showed pseudomonas growth  MRI pending for sx planning on 1/11/22, wound cxs and A1c pending, hospitalist for medical clearance, NPO, scd, accuchecks  Date: 1/11   Day 2: Podiatry Note  No acute events overnight, no pain  Lower extremity exam:  vascualr status at baseline, neuro function at baseline, MSK function at baseline  Cannot perform MRI d/t pacemaker  F/up on wound cxs, continue iv abx and sq heparin    WBAT as trinity          ED Triage Vitals   Temperature Pulse Respirations Blood Pressure SpO2   01/10/22 0711 01/10/22 0711 01/10/22 0711 01/10/22 0711 01/10/22 0711   97 5 °F (36 4 °C) 92 18 125/75 98 %      Temp src Heart Rate Source Patient Position - Orthostatic VS BP Location FiO2 (%)   -- -- -- -- --             Pain Score       01/10/22 0734       7          Wt Readings from Last 1 Encounters:   01/10/22 (!) 147 kg (323 lb)     Additional Vital Signs:   Date/Time Temp Pulse Resp BP MAP (mmHg) SpO2 O2 Device   01/11/22 08:00:58 97 3 °F (36 3 °C) Abnormal  61 18 94/60 71 96 % --   01/10/22 23:36:33 -- 58 -- 101/62 75 93 % --   01/10/22 1905 -- -- -- -- -- 92 % None (Room air)   01/10/22 16:23:01 -- 79 -- 101/63 76 96 % --     Pertinent Labs/Diagnostic Test Results:   1/10 xr right foot:  Osteomyelitis with destruction of the third distal phalanx  1/11 vas lower limb arterial duplex bl:  Pending  1/11 mri right foot/forefoot toes:  Pending        Results from last 7 days   Lab Units 01/11/22  0456   WBC Thousand/uL 6 02   HEMOGLOBIN g/dL 15 5   HEMATOCRIT % 47 0   PLATELETS Thousands/uL 269   NEUTROS ABS Thousands/µL 3 23     Results from last 7 days   Lab Units 01/11/22  0456   SODIUM mmol/L 139   POTASSIUM mmol/L 3 2*   CHLORIDE mmol/L 98*   CO2 mmol/L 34*   ANION GAP mmol/L 7   BUN mg/dL 14   CREATININE mg/dL 1 13   EGFR ml/min/1 73sq m 71   CALCIUM mg/dL 8 8     Results from last 7 days   Lab Units 01/11/22  0456   AST U/L 39   ALT U/L 22   ALK PHOS U/L 97   TOTAL PROTEIN g/dL 7 5   ALBUMIN g/dL 3 3*   TOTAL BILIRUBIN mg/dL 0 49     Results from last 7 days   Lab Units 01/11/22  0801 01/10/22  2101 01/10/22  1651 01/10/22  1118   POC GLUCOSE mg/dl 94 161* 102 87     Results from last 7 days   Lab Units 01/11/22  0456   GLUCOSE RANDOM mg/dL 110         Results from last 7 days   Lab Units 01/10/22  1103 01/10/22  1055 01/10/22  1009   BLOOD CULTURE  Received in Microbiology Lab  Culture in Progress  Received in Microbiology Lab  Culture in Progress    --    [facility] STAIN RESULT   --   --  No Polys or Bacteria seen       Past Medical History:   Diagnosis Date    Atrial fibrillation (HCC)     Chronic diastolic (congestive) heart failure (HCC)     Diabetes mellitus (HCC)     High cholesterol     Hyperlipidemia     Hypertension     Stroke [facility])      Present on Admission:   Atrial fibrillation (HCC)   DAYAN (obstructive sleep apnea)   Chronic diastolic heart failure (HCC)   Diabetes mellitus (HCC)      Admitting Diagnosis: Osteomyelitis of right foot (HCC) [M86 9]  Age/Sex: 60 y o  male  Admission Orders:  Scheduled Medications:  atorvastatin, 40 mg, Oral, Daily With Dinner  cefepime, 2,000 mg, Intravenous, Q8H  furosemide, 60 mg, Oral, BID  insulin lispro, 1-6 Units, Subcutaneous, HS  insulin lispro, 2-12 Units, Subcutaneous, TID AC  metolazone, 2 5 mg, Oral, Once per day on Mon Wed Fri  montelukast, 10 mg, Oral, QPM  pantoprazole, 40 mg, Oral, Early Morning  polyethylene glycol, 17 g, Oral, Daily  potassium chloride, 20 mEq, Oral, Daily  rivaroxaban, 20 mg, Oral, Daily  senna, 1 tablet, Oral, Daily  traMADol, 50 mg, Oral, Daily      Continuous IV Infusions:  sodium chloride, 50 mL/hr, Intravenous, Continuous      PRN Meds:  albuterol, 5 mg, Nebulization, Q4H PRN  hydrOXYzine HCL, 25 mg, Oral, Q6H PRN  LORazepam, 0 5 mg, Oral, Q8H PRN  oxyCODONE-acetaminophen, 1 tablet, Oral, Q4H PRN x3 thus far        IP CONSULT TO INTERNAL MEDICINE    Network Utilization Review Department  ATTENTION: Please call with any questions or concerns to [phone] and carefully listen to the prompts so that you are directed to the right person  All voicemails are confidential   Candia Siemens all requests for admission clinical reviews, approved or denied determinations and any other requests to dedicated fax number below belonging to the campus where the patient is receiving treatment   List of dedicated fax numbers for the Facilities:  FACILITY NAME UR FAX NUMBER   ADMISSION DENIALS (Administrative/Medical Necessity) [phone]   [address] (Maternity/NICU/Pediatrics) [address] [facility] [facility] [address] [facility]  [phone]   Rosalind Beth [address] [id number] [place] [address] [address]heeraj [address] [facility] [address] [facility] [phone]

## 2022-01-11 NOTE — CONSULTS
Jarek 1965, 60 y o  male MRN: [id number]  Unit/Bed#: E5 -01 Encounter: [id number]  Primary Care Provider: Haydee Emery DO   Date and time admitted to hospital: 1/10/2022  7:11 AM    Inpatient consult to Internal Medicine  Consult performed by: Navneet Saravia DO  Consult ordered by: Marleen Davis DPM          * Osteomyelitis of third toe of right foot [facility])  Assessment & Plan  Patient scheduled for definitive toe amputation tomorrow, 01/11/2022  No objection to proceeding to surgery  Continue cefepime, recommend perioperative cultures  Continue cefepime    Cellulitis of right lower extremity  Assessment & Plan  Continue treatment with cefepime for cellulitis of the right lower extremity complicated with OM with positive probe to bone, previous wound cultures showed pseudomonas growth  Previous amputations left foot due to diabetic foot ulcer      Atrial fibrillation [facility])  Assessment & Plan  Ventricular rate control  Okay to hold Xarelto  CHADS2 Vasc score of 5 - does not require bridging    Diabetes mellitus [facility])  Assessment & Plan  Lab Results   Component Value Date    HGBA1C 6 2 (H) 04/14/2021       Recent Labs     01/10/22  2101 01/11/22  0801 01/11/22  1046 01/11/22  1607   POCGLU 161* 94 125 110       Blood Sugar Average: Last 72 hrs:  (P) 586 [id number]   Home regimen reviewed  Hold Metformin if applicable    Start SSI and Basal bolus protocol    Chronic diastolic heart failure ([facility] [address] )  Assessment & Plan  Wt Readings from Last 3 Encounters:   01/10/22 (!) 147 kg (323 lb)   11/22/21 (!) 150 kg (331 lb)   11/15/21 (!) 147 kg (324 lb 1 2 oz)     Appears euvolemic  No evidence of heart failure, no chest pain or shortness of birth        DAYAN (obstructive sleep apnea)  Assessment & Plan  Resume CPAP at bedtime        VTE Prophylaxis: Heparin  / sequential compression device     Recommendations for Discharge:  · Recommend hold Xarelto given upcoming surgical procedure  · Place on DVT prophylaxis heparin  · Hold furosemide and metolazone  · Recheck labs in a m  · Repeat potassium  · Patient is considered intermediate to low risk for surgical procedure, he has no chest pain shortness of breath difficulty breathing  He can proceed to surgery without any additional surgical intervention    Counseling / Coordination of Care Time: 30 minutes  Greater than 50% of total time spent on patient counseling and coordination of care  Collaboration of Care: Were Recommendations Directly Discussed with Primary Treatment Team? - Yes     History of Present Illness:    Silvestre Hodges is a 60 y o  male who is originally admitted to the podiatry service due to osteomyelitis and skin and soft tissue infection of the right foot  We are consulted for medical management of diabetes mellitus, this is well controlled as an outpatient with an A1c of around 6 2  His other medical problems include diastolic congestive heart failure, denies any shortness of breath difficulty breathing, he does take metolazone Monday Wednesday Friday when he has significant weight gain or shortness of breath  At the time examination denies any chest pain, is able to ambulate without any significant dyspnea or angina  He does have a history of atrial fibrillation maintained on Xarelto,  Review of Systems:    Review of Systems   A complete and comprehensive 14 point organ system review has been performed and all other systems are negative other than stated above      Past Medical and Surgical History:     Past Medical History:   Diagnosis Date    Atrial fibrillation (HCC)     Chronic diastolic (congestive) heart failure (HCC)     Diabetes mellitus ([address] )     High cholesterol     Hyperlipidemia     Hypertension     Stroke [facility])        Past Surgical History:   Procedure Laterality Date    APPENDECTOMY      ATRIAL CARDIAC PACEMAKER INSERTION      BARIATRIC SURGERY  05/2021    KS AMPUTATION TOE,I-P JT Left 2021    Procedure: AMPUTATION LESSER TOE;  Surgeon: Ruby Tran DPM;  Location: [facility];  Service: Podiatry    TOE AMPUTATION Left     2nd toe    TOE AMPUTATION Left 9/15/2021    Procedure: AMPUTATION LEFT 4TH TOE;  Surgeon: Ruby Tran DPM;  Location: [facility];  Service: Podiatry       Meds/Allergies:    PTA meds:   Prior to Admission Medications   Prescriptions Last Dose Informant Patient Reported? Taking?    Albuterol Sulfate (PROAIR RESPICLICK) 704 (90 Base) MCG/ACT AEPB 1/10/2022 at Unknown time  No Yes   Sig: Inhale 1 puff 4 (four) times a day as needed (wheezing, shortness of breath)   LORazepam (ATIVAN) 0 5 mg tablet 2022 at Unknown time  Yes Yes   Sig: Take 0 5 mg by mouth every 8 (eight) hours as needed for anxiety   VICTOZA 18 MG/3ML SOPN Not Taking at Unknown time Self Yes No   Sig: Inject 1 8 mg under the skin daily     Patient not taking: Reported on 1/10/2022    albuterol (2 5 mg/3 mL) 0 083 % nebulizer solution 1/10/2022 at Unknown time  No Yes   Si vial nebulized q 4 hours x 3 days then prn cough, wheezing   atorvastatin (LIPITOR) 40 mg tablet 1/10/2022 at Unknown time Self Yes Yes   Sig: Take 40 mg by mouth daily     furosemide (LASIX) 40 mg tablet 2022 at Unknown time Self Yes Yes   Sig: Take 60 mg by mouth 2 (two) times a day   hydrOXYzine HCL (ATARAX) 25 mg tablet 2022 at Unknown time  Yes Yes   Sig: Take 25 mg by mouth 4 (four) times a day as needed   ipratropium (ATROVENT) 0 02 % nebulizer solution Not Taking at Unknown time  No No   Sig: Take 1 vial (0 5 mg total) by nebulization 3 (three) times a day   Patient not taking: Reported on 1/10/2022    levalbuterol (XOPENEX) 0 63 mg/3 mL nebulizer solution Not Taking at Unknown time  No No   Sig: Take 1 vial (0 63 mg total) by nebulization 3 (three) times a day   Patient not taking: Reported on 1/10/2022    metolazone (ZAROXOLYN) 2 5 mg tablet Not Taking at Unknown time  Yes No   Sig: Take 2 5 mg by mouth 3 (three) times a week   Patient not taking: Reported on 1/10/2022    montelukast (SINGULAIR) 10 mg tablet Not Taking at Unknown time  Yes No   Sig: Take 10 mg by mouth    Patient not taking: Reported on 1/10/2022    omeprazole (PriLOSEC) 20 mg delayed release capsule 1/9/2022 at Unknown time  Yes Yes   Sig: Take 20 mg by mouth 2 (two) times a day   oxyCODONE-acetaminophen (PERCOCET) 5-325 mg per tablet Not Taking at Unknown time  No No   Sig: Take 1 tablet by mouth every 4 (four) hours as needed for moderate pain for up to 15 doses Max Daily Amount: 6 tablets   Patient not taking: Reported on 1/10/2022    potassium chloride (K-DUR,KLOR-CON) 20 mEq tablet 1/9/2022 at Unknown time  No Yes   Sig: Take 1 tablet (20 mEq total) by mouth daily   rivaroxaban (XARELTO) 20 mg tablet More than a month at Unknown time  Yes No   Sig: Take 20 mg by mouth daily   traMADol (ULTRAM) 50 mg tablet Past Week at Unknown time  Yes Yes   Sig: Take 50 mg by mouth daily       Facility-Administered Medications: None       Allergies: No Known Allergies    Social History:     Marital Status: /Civil Union    Substance Use History:   Social History     Substance and Sexual Activity   Alcohol Use Never     Social History     Tobacco Use   Smoking Status Never Smoker   Smokeless Tobacco Never Used     Social History     Substance and Sexual Activity   Drug Use Never       Family History:    History reviewed  No pertinent family history      Physical Exam:     Vitals:   Blood Pressure: 132/84 (01/11/22 1724)  Pulse: 86 (01/11/22 1724)  Temperature: (!) 97 3 °F (36 3 °C) (01/11/22 0800)  Respirations: 18 (01/11/22 0800)  SpO2: 100 % (01/11/22 1724)    Physical Exam    General: well appearing, no acute distress  HEENT: atraumatic, PERRLA, moist mucosa, normal pharynx, normal tonsils and adenoids, normal tongue, no fluid in sinuses  Neck: Trachea midline, no carotid bruit, no masses  Respiratory: normal chest wall expansion, CTA B, no r/r/w, no rubs  Cardiovascular: RRR, no m/r/g, Normal S1 and S2  Abdomen: Soft, non-tender, non-distended, normal bowel sounds in all quadrants, no hepatosplenomegaly, no tympany  Rectal: deferred  Musculoskeletal: normal ROM in upper and lower extremities  Integumentary:  Dressing clear dry and intact   Heme/Lymph: no lymphadenopathy, no bruises  Neurological: Cranial Nerves II-XII grossly intact, no tics, normal sensation to pressure and light touch  Psychiatric: cooperative with normal mood, affect, and cognition      Additional Data:     Lab Results: I have personally reviewed pertinent reports  Results from last 7 days   Lab Units 01/11/22  0456   WBC Thousand/uL 6 02   HEMOGLOBIN g/dL 15 5   HEMATOCRIT % 47 0   PLATELETS Thousands/uL 269   NEUTROS PCT % 54   LYMPHS PCT % 28   MONOS PCT % 14*   EOS PCT % 3     Results from last 7 days   Lab Units 01/11/22  0456   SODIUM mmol/L 139   POTASSIUM mmol/L 3 2*   CHLORIDE mmol/L 98*   CO2 mmol/L 34*   BUN mg/dL 14   CREATININE mg/dL 1 13   ANION GAP mmol/L 7   CALCIUM mg/dL 8 8   ALBUMIN g/dL 3 3*   TOTAL BILIRUBIN mg/dL 0 49   ALK PHOS U/L 97   ALT U/L 22   AST U/L 39   GLUCOSE RANDOM mg/dL 110             Lab Results   Component Value Date/Time    HGBA1C 6 2 (H) 04/14/2021 10:43 AM    HGBA1C 6 2 (H) 02/06/2019 01:35 PM    HGBA1C 6 4 (H) 10/30/2018 10:16 AM     Results from last 7 days   Lab Units 01/11/22  1607 01/11/22  1046 01/11/22  0801 01/10/22  2101 01/10/22  1651 01/10/22  1118   POC GLUCOSE mg/dl 110 125 94 161* 102 87           Imaging: I have personally reviewed pertinent reports  VAS lower limb arterial duplex, complete bilateral   Final Result by Maite Pa DO (01/11 1316)      XR foot 3+ vw right   Final Result by Rosa Hammonds MD (01/10 1207)      Osteomyelitis with destruction of the third distal phalanx  The study was marked in [facility] for immediate notification              Workstation performed: [id number] EKG, Pathology, and Other Studies Reviewed on Admission:   · X-ray with osteomyelitis of the foot    ** Please Note: This note has been constructed using a voice recognition system   **

## 2022-01-11 NOTE — QUICK NOTE
Patient considered low to intermediate risk for surgical complication  Per ACC/AHA can proceed to surgery to surgery with out any additional work up  Formal consult to follow up

## 2022-01-11 NOTE — PLAN OF CARE
EMERGENCY DEPARTMENT HISTORY AND PHYSICAL EXAM      Date: 8/12/2021  Patient Name: Sagar Ramirez    History of Presenting Illness     Chief Complaint   Patient presents with    Headache       History Provided By: Patient    HPI: Sagar Ramirez, 66 y.o. male with a past medical history significant hypertension and ESRD presents to the ED with cc of global headache. Patient states that started last night. He says it is a 10 out of 10. It is a sharp pain. Is associated with nausea but no vomiting. He denies any fevers or chills. He denies any unilateral weakness. He denies chest pain. There are no other complaints, changes, or physical findings at this time. PCP: Gustabo Aguilar NP    No current facility-administered medications on file prior to encounter. Current Outpatient Medications on File Prior to Encounter   Medication Sig Dispense Refill    metoprolol succinate (TOPROL-XL) 25 mg XL tablet Take 1 Tablet by mouth daily.  minoxidiL (LONITEN) 2.5 mg tablet TAKE 1 TABLET BY MOUTH TWICE DAILY 180 Tablet 0    amLODIPine (NORVASC) 5 mg tablet TAKE 2 TABLETS BY MOUTH ONCE A  Tablet 0    glipiZIDE (GLUCOTROL) 10 mg tablet Take 1 Tablet by mouth two (2) times a day. 180 Tablet 0    sodium bicarbonate 650 mg tablet TAKE 1 TABLET BY MOUTH TWICE DAILY 60 Tablet 1    furosemide (LASIX) 40 mg tablet TAKE 2 TABLETS BY MOUTH DAILY 30 Tablet 1    allopurinoL (ZYLOPRIM) 100 mg tablet TAKE 2 TABLETS BY MOUTH DAILY 60 Tablet 3    hydrALAZINE (APRESOLINE) 50 mg tablet TAKE 2 TABLETS BY MOUTH THREE TIMES DAILY 90 Tablet 1    [DISCONTINUED] metoprolol succinate (TOPROL-XL) 50 mg XL tablet TAKE 1 TABLET BY MOUTH DAILY 30 Tablet 2    omeprazole (PRILOSEC) 40 mg capsule TAKE 1 CAPSULE BY MOUTH DAILY 90 Capsule 1    polyethylene glycol (Miralax) 17 gram packet Take 1 Packet by mouth daily. 10 Packet 0    apixaban (Eliquis) 2.5 mg tablet Take 1 Tablet by mouth two (2) times a day. Problem: Potential for Falls  Goal: Patient will remain free of falls  Description: INTERVENTIONS:  - Educate patient/family on patient safety including physical limitations  - Instruct patient to call for assistance with activity   - Consult OT/PT to assist with strengthening/mobility   - Keep Call bell within reach  - Keep bed low and locked with side rails adjusted as appropriate  - Keep care items and personal belongings within reach  - Initiate and maintain comfort rounds  - Make Fall Risk Sign visible to staff  - Offer Toileting every  Hours, in advance of need  - Initiate/Maintain alarm  - Obtain necessary fall risk management equipment:   - Apply yellow socks and bracelet for high fall risk patients  - Consider moving patient to room near nurses station  Outcome: Progressing     Problem: PAIN - ADULT  Goal: Verbalizes/displays adequate comfort level or baseline comfort level  Description: Interventions:  - Encourage patient to monitor pain and request assistance  - Assess pain using appropriate pain scale  - Administer analgesics based on type and severity of pain and evaluate response  - Implement non-pharmacological measures as appropriate and evaluate response  - Consider cultural and social influences on pain and pain management  - Notify physician/advanced practitioner if interventions unsuccessful or patient reports new pain  Outcome: Progressing     Problem: INFECTION - ADULT  Goal: Absence or prevention of progression during hospitalization  Description: INTERVENTIONS:  - Assess and monitor for signs and symptoms of infection  - Monitor lab/diagnostic results  - Monitor all insertion sites, i e  indwelling lines, tubes, and drains  - Monitor endotracheal if appropriate and nasal secretions for changes in amount and color  - Morton appropriate cooling/warming therapies per order  - Administer medications as ordered  - Instruct and encourage patient and family to use good hand hygiene technique  - 180 Tablet 1    glucose blood VI test strips (ASCENSIA AUTODISC VI, ONE TOUCH ULTRA TEST VI) strip Check blood glucose 3 times daily, give Accu chek Precious test strips 300 Strip 3    insulin glargine (Lantus Solostar U-100 Insulin) 100 unit/mL (3 mL) inpn INJECT 25 UNITS SUBCUTANEOUSLY ONCE nightly 9 mL 1    ergocalciferol (ERGOCALCIFEROL) 1,250 mcg (50,000 unit) capsule TAKE ONE CAPSULE BY MOUTH EVERY 7 DAYS 16 Cap 1    simvastatin (ZOCOR) 40 mg tablet Take 1 Tab by mouth nightly. 90 Tab 1    BD Ultra-Fine Mini Pen Needle 31 gauge x 3/16\" ndle USE TO INJECT  Pen Needle 3    fluticasone propionate (FLONASE) 50 mcg/actuation nasal spray 1 [place] by Both Nostrils route daily as needed.          Past History     Past Medical History:  Past Medical History:   Diagnosis Date    Acid reflux     Arthritis     Bladder cancer (HCC)     Congestive heart failure (HCC)     Deep vein thrombosis (DVT) of proximal vein of both lower extremities ([facility] [address] 2020    Diabetes mellitus (HCC)     GERD (gastroesophageal reflux disease)     Gout     High cholesterol     Hypertension     Kidney carcinoma, left (HCC)     Kidney disease     Pituitary mass ([address]     Reflux esophagitis     Unspecified deficiency anemia        Past Surgical History:  Past Surgical History:   Procedure Laterality Date    HX CYSTECTOMY  09    Dr. Juan M Mayfield HX NEPHRECTOMY Left 2009    Nephroureterectomy by Dr. Elke Lr HX OTHER SURGICAL      surgery on pituitary gland       Family History:  Family History   Problem Relation Age of Onset    Heart Disease Father     Heart Disease Mother        Social History:  Social History     Tobacco Use    Smoking status: Former Smoker     Packs/day: 0.50     Years: 40.00     Pack years: 20.00     Types: Cigarettes     Quit date: 1999     Years since quittin.6    Smokeless tobacco: Never Used   Vaping Use    Vaping Use: Never used   Substance Use Topics    Identify and instruct in appropriate isolation precautions for identified infection/condition  Outcome: Progressing  Goal: Absence of fever/infection during neutropenic period  Description: INTERVENTIONS:  - Monitor WBC    Outcome: Progressing     Problem: DISCHARGE PLANNING  Goal: Discharge to home or other facility with appropriate resources  Description: INTERVENTIONS:  - Identify barriers to discharge w/patient and caregiver  - Arrange for needed discharge resources and transportation as appropriate  - Identify discharge learning needs (meds, wound care, etc )  - Arrange for interpretive services to assist at discharge as needed  - Refer to Case Management Department for coordinating discharge planning if the patient needs post-hospital services based on physician/advanced practitioner order or complex needs related to functional status, cognitive ability, or social support system  Outcome: Progressing     Problem: Knowledge Deficit  Goal: Patient/family/caregiver demonstrates understanding of disease process, treatment plan, medications, and discharge instructions  Description: Complete learning assessment and assess knowledge base    Interventions:  - Provide teaching at level of understanding  - Provide teaching via preferred learning methods  Outcome: Progressing     Problem: MUSCULOSKELETAL - ADULT  Goal: Maintain or return mobility to safest level of function  Description: INTERVENTIONS:  - Assess patient's ability to carry out ADLs; assess patient's baseline for ADL function and identify physical deficits which impact ability to perform ADLs (bathing, care of mouth/teeth, toileting, grooming, dressing, etc )  - Assess/evaluate cause of self-care deficits   - Assess range of motion  - Assess patient's mobility  - Assess patient's need for assistive devices and provide as appropriate  - Encourage maximum independence but intervene and supervise when necessary  - Involve family in performance of ADLs  - Alcohol use: No    Drug use: No       Allergies:  No Known Allergies      Review of Systems     Review of Systems   Constitutional: Negative for fatigue and fever. HENT: Negative for rhinorrhea and sore throat. Respiratory: Negative for cough and shortness of breath. Cardiovascular: Negative for chest pain and palpitations. Gastrointestinal: Positive for nausea. Negative for abdominal pain, diarrhea and vomiting. Genitourinary: Negative for difficulty urinating and dysuria. Musculoskeletal: Negative for arthralgias and myalgias. Skin: Negative for color change and rash. Neurological: Positive for headaches. Negative for light-headedness. Physical Exam     Physical Exam  Vitals and nursing note reviewed. Constitutional:       General: He is awake. He is in acute distress. Appearance: Normal appearance. He is well-developed. He is obese. He is not ill-appearing, toxic-appearing or diaphoretic. Interventions: Face mask in place. HENT:      Head: Normocephalic and atraumatic. Eyes:      Conjunctiva/sclera: Conjunctivae normal.      Pupils: Pupils are equal, round, and reactive to light. Cardiovascular:      Rate and Rhythm: Normal rate and regular rhythm. Pulses: Normal pulses. Heart sounds: Normal heart sounds. Pulmonary:      Effort: Pulmonary effort is normal.      Breath sounds: Normal breath sounds. Abdominal:      General: Abdomen is flat. Palpations: Abdomen is soft. Tenderness: There is no abdominal tenderness. Musculoskeletal:         General: Normal range of motion. Cervical back: Normal range of motion and neck supple. Skin:     General: Skin is warm and dry. Neurological:      General: No focal deficit present. Mental Status: He is alert and oriented to person, place, and time. GCS: GCS eye subscore is 4. GCS verbal subscore is 5. GCS motor subscore is 6.    Psychiatric:         Mood and Affect: Mood and affect normal. Assess for home care needs following discharge   - Consider OT consult to assist with ADL evaluation and planning for discharge  - Provide patient education as appropriate  Outcome: Progressing  Goal: Maintain proper alignment of affected body part  Description: INTERVENTIONS:  - Support, maintain and protect limb and body alignment  - Provide patient/ family with appropriate education  Outcome: Progressing Behavior: Behavior normal. Behavior is cooperative. Thought Content: Thought content normal.         Lab and Diagnostic Study Results     Labs -     Recent Results (from the past 12 hour(s))   CBC WITH AUTOMATED DIFF    Collection Time: 08/12/21  8:24 AM   Result Value Ref Range    WBC 8.6 4.6 - 13.2 K/uL    RBC 4.16 (L) 4.70 - 5.50 M/uL    HGB 10.9 (L) 13.0 - 16.0 g/dL    HCT 34.8 (L) 36.0 - 48.0 %    MCV 83.7 74.0 - 97.0 FL    MCH 26.2 24.0 - 34.0 PG    MCHC 31.3 31.0 - 37.0 g/dL    RDW 15.5 (H) 11.6 - 14.5 %    PLATELET 717 (H) 380 - 420 K/uL    MPV 9.5 9.2 - 11.8 FL    NEUTROPHILS 80 (H) 40 - 73 %    LYMPHOCYTES 12 (L) 21 - 52 %    MONOCYTES 6 3 - 10 %    EOSINOPHILS 1 0 - 5 %    BASOPHILS 1 0 - 2 %    IMMATURE GRANULOCYTES 0 %    ABS. NEUTROPHILS 6.9 1.8 - 8.0 K/UL    ABS. LYMPHOCYTES 1.0 0.9 - 3.6 K/UL    ABS. MONOCYTES 0.6 0.05 - 1.2 K/UL    ABS. EOSINOPHILS 0.1 0.0 - 0.4 K/UL    ABS. BASOPHILS 0.1 0.0 - 0.1 K/UL    ABS. IMM. GRANS. 0.0 K/UL   METABOLIC PANEL, BASIC    Collection Time: 08/12/21  8:24 AM   Result Value Ref Range    Sodium 137 135 - 145 mmol/L    Potassium 4.4 3.2 - 5.1 mmol/L    Chloride 104 94 - 111 mmol/L    CO2 23 21 - 33 mmol/L    Anion gap 10 mmol/L    Glucose 188 (H) 70 - 110 mg/dL    BUN 60 (H) 9 - 21 mg/dL    Creatinine 3.30 (H) 0.8 - 1.50 mg/dL    BUN/Creatinine ratio 18      GFR est AA 22 ml/min/1.73m2    GFR est non-AA 18 ml/min/1.73m2    Calcium 9.4 8.5 - 10.5 mg/dL       Radiologic Studies -   @lastxrresult@  CT Results  (Last 48 hours)               08/12/21 0849  CT HEAD WO CONT Final result    Impression:      No acute intracranial abnormality. Chronic left sinusitis. Narrative:  EXAM: CT head       INDICATION: Headache.        COMPARISON: 11/21/2017       TECHNIQUE: Axial CT imaging of the head was performed without intravenous   contrast. Standard multiplanar coronal and sagittal reformatted images were   obtained and are included in interpretation. One or more dose reduction techniques were used on this CT: automated exposure   control, adjustment of the mAs and/or kVp according to patient size, and   iterative reconstruction techniques. The specific techniques used on this CT   exam have been documented in the patient's electronic medical record. Digital   Imaging and Communications in Medicine (DICOM) format image data are available   to nonaffiliated external healthcare facilities or entities on a secure, media   free, reciprocally searchable basis with patient authorization for at least a   12-month period after this study. _______________       FINDINGS:       BRAIN AND POSTERIOR FOSSA: No evidence of acute large vessel transcortical   infarct or acute parenchymal hemorrhage. No midline shift or hydrocephalus. Pituitary mass redemonstrated. EXTRA-AXIAL SPACES AND MENINGES: There are no abnormal extra-axial fluid   collections. CALVARIUM: Intact. SINUSES: Chronic left sinusitis. OTHER: None.       _______________               CXR Results  (Last 48 hours)    None            Medical Decision Making   - I am the first provider for this patient. - I reviewed the vital signs, available nursing notes, past medical history, past surgical history, family history and social history. - Initial assessment performed. The patients presenting problems have been discussed, and they are in agreement with the care plan formulated and outlined with them. I have encouraged them to ask questions as they arise throughout their visit. Vital Signs-Reviewed the patient's vital signs.   Patient Vitals for the past 12 hrs:   Temp Pulse Resp BP SpO2   08/12/21 0821 98.3 °F (36.8 °C) 73 21 (!) 184/79 100 %       Records Reviewed: Nursing Notes    The patient presents with headache with a differential diagnosis of  cerebral hemorrhage, CVA, migraine, tension headache and vascular headache      ED Course:          Provider Notes (Medical Decision Making):     Patient presented with a global headache. His blood pressure was slightly elevated. He was given meds and had good relief of his symptoms. CT of the head was negative for intracranial bleed. Patient be discharged home. MDM       Procedures   Medical Decision Makingedical Decision Making  Performed by: Blayne Lee MD  PROCEDURES:  Procedures       Disposition   Disposition:     Discharged    DISCHARGE PLAN:  1. Current Discharge Medication List      CONTINUE these medications which have NOT CHANGED    Details   metoprolol succinate (TOPROL-XL) 25 mg XL tablet Take 1 Tablet by mouth daily. minoxidiL (LONITEN) 2.5 mg tablet TAKE 1 TABLET BY MOUTH TWICE DAILY  Qty: 180 Tablet, Refills: 0    Associated Diagnoses: Essential hypertension      amLODIPine (NORVASC) 5 mg tablet TAKE 2 TABLETS BY MOUTH ONCE A DAY  Qty: 180 Tablet, Refills: 0    Associated Diagnoses: Essential hypertension      glipiZIDE (GLUCOTROL) 10 mg tablet Take 1 Tablet by mouth two (2) times a day. Qty: 180 Tablet, Refills: 0    Associated Diagnoses: Type 2 diabetes mellitus with stage 4 chronic kidney disease, with long-term current use of insulin (HCC)      sodium bicarbonate 650 mg tablet TAKE 1 TABLET BY MOUTH TWICE DAILY  Qty: 60 Tablet, Refills: 1      furosemide (LASIX) 40 mg tablet TAKE 2 TABLETS BY MOUTH DAILY  Qty: 30 Tablet, Refills: 1    Associated Diagnoses: Essential hypertension      allopurinoL (ZYLOPRIM) 100 mg tablet TAKE 2 TABLETS BY MOUTH DAILY  Qty: 60 Tablet, Refills: 3      hydrALAZINE (APRESOLINE) 50 mg tablet TAKE 2 TABLETS BY MOUTH THREE TIMES DAILY  Qty: 90 Tablet, Refills: 1      omeprazole (PRILOSEC) 40 mg capsule TAKE 1 CAPSULE BY MOUTH DAILY  Qty: 90 Capsule, Refills: 1    Associated Diagnoses: Medication refill      polyethylene glycol (Miralax) 17 gram packet Take 1 Packet by mouth daily.   Qty: 10 Packet, Refills: 0    Associated Diagnoses: Medication refill apixaban (Eliquis) 2.5 mg tablet Take 1 Tablet by mouth two (2) times a day. Qty: 180 Tablet, Refills: 1    Associated Diagnoses: Medication refill      glucose blood VI test strips (ASCENSIA AUTODISC VI, ONE TOUCH ULTRA TEST VI) strip Check blood glucose 3 times daily, give Accu chek Precious test strips  Qty: 300 Strip, Refills: 3    Associated Diagnoses: Type 2 diabetes mellitus with stage 4 chronic kidney disease, with long-term current use of insulin ([facility])      insulin glargine (Lantus Solostar U-100 Insulin) 100 unit/mL (3 mL) inpn INJECT 25 UNITS SUBCUTANEOUSLY ONCE nightly  Qty: 9 mL, Refills: 1      ergocalciferol (ERGOCALCIFEROL) 1,250 mcg (50,000 unit) capsule TAKE ONE CAPSULE BY MOUTH EVERY 7 DAYS  Qty: 16 Cap, Refills: 1    Associated Diagnoses: Stage 3 chronic kidney disease, unspecified whether stage 3a or 3b CKD ([facility])      simvastatin (ZOCOR) 40 mg tablet Take 1 Tab by mouth nightly. Qty: 90 Tab, Refills: 1      BD Ultra-Fine Mini Pen Needle 31 gauge x 3/16\" ndle USE TO INJECT TID  Qty: 100 Pen Needle, Refills: 3      fluticasone propionate (FLONASE) 50 mcg/actuation nasal spray 1 [place] by Both Nostrils route daily as needed. 2.   Follow-up Information     Follow up With Specialties Details Why Contact Info    Beth Smith NP Nurse Practitioner  As needed [address] [address]  [phone]          3. Return to ED if worse   4. Current Discharge Medication List            Diagnosis     Clinical Impression:   1. Acute nonintractable headache, unspecified headache type        Attestations:    Freddy Ponce MD    Please note that this dictation was completed with Marley Spoon, the [employer] voice recognition software. Quite often unanticipated grammatical, syntax, homophones, and other interpretive errors are inadvertently transcribed by the computer software. Please disregard these errors. Please excuse any errors that have escaped final proofreading.   Thank you.

## 2022-01-11 NOTE — ANESTHESIA PREPROCEDURE EVALUATION
Procedure:  AMPUTATION TOE (Right Toe)    Relevant Problems   CARDIO   (+) Atrial fibrillation (HCC)   (+) Hypertension      NEURO/PSYCH   (+) Anxiety      PULMONARY   (+) DAYAN (obstructive sleep apnea)      Other   (+) Chronic osteomyelitis of left foot with draining sinus (HCC)   (+) Osteomyelitis of third toe of right foot (HCC)        Physical Exam    Airway    Mallampati score: II  TM Distance: >3 FB  Neck ROM: full     Dental       Cardiovascular  Rhythm: irregular, Rate: normal, Cardiovascular exam normal    Pulmonary  Pulmonary exam normal Breath sounds clear to auscultation,     Other Findings        Anesthesia Plan  ASA Score- 3     Anesthesia Type- general with ASA Monitors  Additional Monitors:   Airway Plan: LMA  Comment: TIVA  Vs  LMA   Plan Factors-Exercise tolerance (METS): <4 METS  Chart reviewed  Existing labs reviewed  Patient is not a current smoker  Obstructive sleep apnea risk education given perioperatively  Induction- intravenous  Postoperative Plan-     Informed Consent- Anesthetic plan and risks discussed with patient

## 2022-01-11 NOTE — TREATMENT PLAN
Treatment update:    Surgery rescheduled for 1/12/22 at 7:30am  Will await pre-operative risk assessment by [facility]  Patient has not yet been evaluated at this time  Resume diet  Will place NPO at midnight

## 2022-01-11 NOTE — ASSESSMENT & PLAN NOTE
Wt Readings from Last 3 Encounters:   01/10/22 (!) 147 kg (323 lb)   11/22/21 (!) 150 kg (331 lb)   11/15/21 (!) 147 kg (324 lb 1 2 oz)     Appears euvolemic  No evidence of heart failure, no chest pain or shortness of birth  Prior to admission on furosemide and metolazone    Will hold furosemide and metolazone doses patient will currently be NPO

## 2022-01-12 ENCOUNTER — APPOINTMENT (INPATIENT)
Dept: RADIOLOGY | Facility: HOSPITAL | Age: 57
DRG: 617 | End: 2022-01-12
Payer: COMMERCIAL

## 2022-01-12 ENCOUNTER — ANESTHESIA (INPATIENT)
Dept: PERIOP | Facility: HOSPITAL | Age: 57
DRG: 617 | End: 2022-01-12
Payer: COMMERCIAL

## 2022-01-12 VITALS
HEART RATE: 64 BPM | TEMPERATURE: 97.5 F | DIASTOLIC BLOOD PRESSURE: 66 MMHG | RESPIRATION RATE: 16 BRPM | OXYGEN SATURATION: 96 % | SYSTOLIC BLOOD PRESSURE: 99 MMHG

## 2022-01-12 PROBLEM — M86.9 OSTEOMYELITIS OF THIRD TOE OF RIGHT FOOT (HCC): Status: RESOLVED | Noted: 2022-01-10 | Resolved: 2022-01-12

## 2022-01-12 PROBLEM — L03.115 CELLULITIS OF RIGHT LOWER EXTREMITY: Status: RESOLVED | Noted: 2022-01-10 | Resolved: 2022-01-12

## 2022-01-12 LAB
ANION GAP SERPL CALCULATED.3IONS-SCNC: 9 MMOL/L (ref 4–13)
BUN SERPL-MCNC: 15 MG/DL (ref 5–25)
CALCIUM SERPL-MCNC: 8.7 MG/DL (ref 8.3–10.1)
CHLORIDE SERPL-SCNC: 100 MMOL/L (ref 100–108)
CO2 SERPL-SCNC: 30 MMOL/L (ref 21–32)
CREAT SERPL-MCNC: 1.04 MG/DL (ref 0.6–1.3)
ERYTHROCYTE [DISTWIDTH] IN BLOOD BY AUTOMATED COUNT: 13.8 % (ref 11.6–15.1)
GFR SERPL CREATININE-BSD FRML MDRD: 78 ML/MIN/1.73SQ M
GLUCOSE SERPL-MCNC: 142 MG/DL (ref 65–140)
GLUCOSE SERPL-MCNC: 92 MG/DL (ref 65–140)
GLUCOSE SERPL-MCNC: 97 MG/DL (ref 65–140)
GLUCOSE SERPL-MCNC: 99 MG/DL (ref 65–140)
HCT VFR BLD AUTO: 48.3 % (ref 36.5–49.3)
HGB BLD-MCNC: 15.1 G/DL (ref 12–17)
MCH RBC QN AUTO: 27.8 PG (ref 26.8–34.3)
MCHC RBC AUTO-ENTMCNC: 31.3 G/DL (ref 31.4–37.4)
MCV RBC AUTO: 89 FL (ref 82–98)
PLATELET # BLD AUTO: 303 THOUSANDS/UL (ref 149–390)
PMV BLD AUTO: 9.6 FL (ref 8.9–12.7)
POTASSIUM SERPL-SCNC: 3.4 MMOL/L (ref 3.5–5.3)
RBC # BLD AUTO: 5.43 MILLION/UL (ref 3.88–5.62)
SODIUM SERPL-SCNC: 139 MMOL/L (ref 136–145)
WBC # BLD AUTO: 6.81 THOUSAND/UL (ref 4.31–10.16)

## 2022-01-12 PROCEDURE — 88311 DECALCIFY TISSUE: CPT | Performed by: PATHOLOGY

## 2022-01-12 PROCEDURE — 87186 SC STD MICRODIL/AGAR DIL: CPT | Performed by: PODIATRIST

## 2022-01-12 PROCEDURE — 87075 CULTR BACTERIA EXCEPT BLOOD: CPT | Performed by: PODIATRIST

## 2022-01-12 PROCEDURE — 73630 X-RAY EXAM OF FOOT: CPT

## 2022-01-12 PROCEDURE — 85027 COMPLETE CBC AUTOMATED: CPT | Performed by: INTERNAL MEDICINE

## 2022-01-12 PROCEDURE — 87077 CULTURE AEROBIC IDENTIFY: CPT | Performed by: PODIATRIST

## 2022-01-12 PROCEDURE — 87070 CULTURE OTHR SPECIMN AEROBIC: CPT | Performed by: PODIATRIST

## 2022-01-12 PROCEDURE — 88305 TISSUE EXAM BY PATHOLOGIST: CPT | Performed by: PATHOLOGY

## 2022-01-12 PROCEDURE — 82948 REAGENT STRIP/BLOOD GLUCOSE: CPT

## 2022-01-12 PROCEDURE — 87205 SMEAR GRAM STAIN: CPT | Performed by: PODIATRIST

## 2022-01-12 PROCEDURE — 0Y6T0Z1 DETACHMENT AT RIGHT 3RD TOE, HIGH, OPEN APPROACH: ICD-10-PCS | Performed by: PODIATRIST

## 2022-01-12 PROCEDURE — 80048 BASIC METABOLIC PNL TOTAL CA: CPT | Performed by: INTERNAL MEDICINE

## 2022-01-12 RX ORDER — SODIUM CHLORIDE, SODIUM LACTATE, POTASSIUM CHLORIDE, CALCIUM CHLORIDE 600; 310; 30; 20 MG/100ML; MG/100ML; MG/100ML; MG/100ML
125 INJECTION, SOLUTION INTRAVENOUS CONTINUOUS
Status: DISCONTINUED | OUTPATIENT
Start: 2022-01-12 | End: 2022-01-12 | Stop reason: HOSPADM

## 2022-01-12 RX ORDER — ONDANSETRON 2 MG/ML
4 INJECTION INTRAMUSCULAR; INTRAVENOUS ONCE AS NEEDED
Status: DISCONTINUED | OUTPATIENT
Start: 2022-01-12 | End: 2022-01-12 | Stop reason: HOSPADM

## 2022-01-12 RX ORDER — HYDROMORPHONE HCL/PF 1 MG/ML
0.5 SYRINGE (ML) INJECTION
Status: DISCONTINUED | OUTPATIENT
Start: 2022-01-12 | End: 2022-01-12 | Stop reason: HOSPADM

## 2022-01-12 RX ORDER — LIDOCAINE HYDROCHLORIDE 10 MG/ML
INJECTION, SOLUTION EPIDURAL; INFILTRATION; INTRACAUDAL; PERINEURAL AS NEEDED
Status: DISCONTINUED | OUTPATIENT
Start: 2022-01-12 | End: 2022-01-12 | Stop reason: HOSPADM

## 2022-01-12 RX ORDER — IPRATROPIUM BROMIDE AND ALBUTEROL SULFATE 2.5; .5 MG/3ML; MG/3ML
3 SOLUTION RESPIRATORY (INHALATION) EVERY 6 HOURS PRN
Status: DISCONTINUED | OUTPATIENT
Start: 2022-01-12 | End: 2022-01-12 | Stop reason: HOSPADM

## 2022-01-12 RX ORDER — MEPERIDINE HYDROCHLORIDE 25 MG/ML
12.5 INJECTION INTRAMUSCULAR; INTRAVENOUS; SUBCUTANEOUS
Status: DISCONTINUED | OUTPATIENT
Start: 2022-01-12 | End: 2022-01-12 | Stop reason: HOSPADM

## 2022-01-12 RX ORDER — CIPROFLOXACIN 750 MG/1
750 TABLET, FILM COATED ORAL EVERY 12 HOURS SCHEDULED
Qty: 10 TABLET | Refills: 0 | Status: SHIPPED | OUTPATIENT
Start: 2022-01-12 | End: 2022-01-13

## 2022-01-12 RX ORDER — BUPIVACAINE HYDROCHLORIDE 5 MG/ML
INJECTION, SOLUTION PERINEURAL AS NEEDED
Status: DISCONTINUED | OUTPATIENT
Start: 2022-01-12 | End: 2022-01-12 | Stop reason: HOSPADM

## 2022-01-12 RX ORDER — SODIUM CHLORIDE 9 MG/ML
INJECTION, SOLUTION INTRAVENOUS CONTINUOUS PRN
Status: DISCONTINUED | OUTPATIENT
Start: 2022-01-12 | End: 2022-01-12

## 2022-01-12 RX ORDER — FENTANYL CITRATE 50 UG/ML
INJECTION, SOLUTION INTRAMUSCULAR; INTRAVENOUS AS NEEDED
Status: DISCONTINUED | OUTPATIENT
Start: 2022-01-12 | End: 2022-01-12

## 2022-01-12 RX ORDER — FENTANYL CITRATE/PF 50 MCG/ML
25 SYRINGE (ML) INJECTION
Status: DISCONTINUED | OUTPATIENT
Start: 2022-01-12 | End: 2022-01-12 | Stop reason: HOSPADM

## 2022-01-12 RX ORDER — MIDAZOLAM HYDROCHLORIDE 2 MG/2ML
INJECTION, SOLUTION INTRAMUSCULAR; INTRAVENOUS AS NEEDED
Status: DISCONTINUED | OUTPATIENT
Start: 2022-01-12 | End: 2022-01-12

## 2022-01-12 RX ORDER — PROPOFOL 10 MG/ML
INJECTION, EMULSION INTRAVENOUS CONTINUOUS PRN
Status: DISCONTINUED | OUTPATIENT
Start: 2022-01-12 | End: 2022-01-12

## 2022-01-12 RX ORDER — POTASSIUM CHLORIDE 20 MEQ/1
20 TABLET, EXTENDED RELEASE ORAL ONCE
Status: COMPLETED | OUTPATIENT
Start: 2022-01-12 | End: 2022-01-12

## 2022-01-12 RX ORDER — CEPHALEXIN 500 MG/1
500 CAPSULE ORAL EVERY 6 HOURS SCHEDULED
Qty: 20 CAPSULE | Refills: 0 | Status: SHIPPED | OUTPATIENT
Start: 2022-01-12 | End: 2022-01-13

## 2022-01-12 RX ADMIN — FENTANYL CITRATE 25 MCG: 50 INJECTION INTRAMUSCULAR; INTRAVENOUS at 08:28

## 2022-01-12 RX ADMIN — POTASSIUM CHLORIDE 20 MEQ: 1500 TABLET, EXTENDED RELEASE ORAL at 09:17

## 2022-01-12 RX ADMIN — OXYCODONE HYDROCHLORIDE AND ACETAMINOPHEN 1 TABLET: 5; 325 TABLET ORAL at 15:57

## 2022-01-12 RX ADMIN — POTASSIUM CHLORIDE 20 MEQ: 1500 TABLET, EXTENDED RELEASE ORAL at 09:41

## 2022-01-12 RX ADMIN — SODIUM CHLORIDE: 0.9 INJECTION, SOLUTION INTRAVENOUS at 07:12

## 2022-01-12 RX ADMIN — SODIUM CHLORIDE, SODIUM LACTATE, POTASSIUM CHLORIDE, AND CALCIUM CHLORIDE 125 ML/HR: .6; .31; .03; .02 INJECTION, SOLUTION INTRAVENOUS at 01:29

## 2022-01-12 RX ADMIN — MIDAZOLAM 2 MG: 1 INJECTION INTRAMUSCULAR; INTRAVENOUS at 07:18

## 2022-01-12 RX ADMIN — CEFEPIME HYDROCHLORIDE 2000 MG: 2 INJECTION, POWDER, FOR SOLUTION INTRAVENOUS at 09:18

## 2022-01-12 RX ADMIN — PROPOFOL 30 MCG/KG/MIN: 10 INJECTION, EMULSION INTRAVENOUS at 07:21

## 2022-01-12 RX ADMIN — FENTANYL CITRATE 50 MCG: 50 INJECTION INTRAMUSCULAR; INTRAVENOUS at 07:24

## 2022-01-12 RX ADMIN — FENTANYL CITRATE 25 MCG: 50 INJECTION INTRAMUSCULAR; INTRAVENOUS at 08:16

## 2022-01-12 RX ADMIN — CEFEPIME HYDROCHLORIDE 2000 MG: 2 INJECTION, POWDER, FOR SOLUTION INTRAVENOUS at 01:29

## 2022-01-12 NOTE — ANESTHESIA POSTPROCEDURE EVALUATION
Post-Op Assessment Note    CV Status:  Stable    Pain management: adequate     Mental Status:  Alert and awake   Hydration Status:  Euvolemic   PONV Controlled:  Controlled   Airway Patency:  Patent      Post Op Vitals Reviewed: Yes      Staff: Anesthesiologist         No complications documented      /65 (01/12/22 0902)    Temp 97 5 °F (36 4 °C) (01/12/22 0902)    Pulse 70 (01/12/22 0902)   Resp 18 (01/12/22 0902)    SpO2 93 % (01/12/22 0902)

## 2022-01-12 NOTE — DISCHARGE SUMMARY
Discharge Summary -   Richard Campos 60 y o  male MRN: [id number]  Unit/Bed#: E5 -01 Encounter: [id number]    Admission Date: 1/10/2022     Admitting Diagnosis: Osteomyelitis of right foot ([address] ) [M86 9]    HPI as seen in H&P:     Procedures Performed: AMPUTATION TOE:     Hospital Course: Richard Campos is a 60 y o  male with pmh of multiple amputations, PAD, diabetes who presents with new onset worsening right digit ulceration  He states he saw Dr Helen Houston on Friday for the first time with this ulceration on Friday 1/ 7/2022 and cesario started him on oral antibiotics and ordered an MRI to be completed outpatient  He recommended admission to the hospital for antibiotic therapy and surgical planning  Mr Valentín Kaur was reluctant over the weekend and arrived this morning  He states he has been taking antibiotics as prescribed  He has been doing his own wound care at home  He has not had worsening redness  He denies fevers, chills, nausea, vomiting        Significant Findings, Care, Treatment and Services Provided: Osteomyelitis right 3rd digit distal phalanx  Previous cultures showing pseudomonas    Complications: none    Discharge Diagnosis: cellulitis and post operative right 3rd digit amputation    Condition at Discharge: good     Discharge instructions/Information to patient and family:   See after visit summary for information provided to patient and family  Provisions for Follow-Up Care/Important appointments: Follow up with puja nassar in one week  Take antibioitcs by mouth until then and do not change dressing    See after visit summary for information related to follow-up care and any pertinent home health orders  Disposition: Home    Planned Readmission: No    Discharge Statement   I spent 30 minutes discharging the patient  This time was spent on the day of discharge  I had direct contact with the patient on the day of discharge   The details of this patient's discharge     Discharge Medications:    Keflex/ciprofloxacin  See after visit summary for reconciled discharge medications provided to patient and family

## 2022-01-12 NOTE — NURSING NOTE
Discharge instructions reviewed and questions answered  Surgical shoe given to patient  Discharged to home

## 2022-01-12 NOTE — UTILIZATION REVIEW
Inpatient Admission Authorization Request   NOTIFICATION OF INPATIENT ADMISSION/INPATIENT AUTHORIZATION REQUEST   SERVICING FACILITY:   [address], [facility], [address] [facility]  Tax ID: [id number]  NPI: [id number]  Place of Service: Inpatient [facility]  [address]  Place of Service Code: 24     ATTENDING PROVIDER:  Attending Name and NPI#: Caroline Stoneport [[id number]]  Address: [address] [place] [address], [facility], [facility]  Phone: [phone]     UTILIZATION REVIEW CONTACT:  Kennedi Hammer Utilization   Network Utilization Review Department  Phone: [phone]  Fax: [phone]  Email: [email or web address]     PHYSICIAN ADVISORY SERVICES:  FOR VACN-FX-OZVK REVIEW - MEDICAL NECESSITY DENIAL  Phone: [phone]  Fax: [phone]  Email: [email or web address]     TYPE OF REQUEST:  Inpatient Status     ADMISSION INFORMATION:  ADMISSION DATE/TIME: 1/10/22  7:11 AM  PATIENT DIAGNOSIS CODE/DESCRIPTION:  Osteomyelitis of right foot ([facility] [address] ) [M86 9]  DISCHARGE DATE/TIME: No discharge date for patient encounter  DISCHARGE DISPOSITION (IF DISCHARGED): Home/Self Care     IMPORTANT INFORMATION:  Please contact the Kennedi Hammer directly with any questions or concerns regarding this request  Department voicemails are confidential     Send requests for admission clinical reviews, concurrent reviews, approvals, and administrative denials due to lack of clinical to fax [phone]         Initial Clinical Review     Admission: Date/Time/Statement:       Admission Orders (From admission, onward)              Ordered          01/10/22 0920   Inpatient Admission  Once                                Orders Placed This Encounter   Procedures    Inpatient Admission       Standing Status:   Standing       Number of Occurrences:   1       Order Specific Question:   Level of Care       Answer:   Med Surg [16]       Order Specific Question:   Estimated length of stay       Answer:   More than 2 Midnights       Order Specific Question:   Certification       Answer:   I certify that inpatient services are medically necessary for this patient for a duration of greater than two midnights  See H&P and MD Progress Notes for additional information about the patient's course of treatment       Initial Presentation: 60 y  o  male with pmh of multiple amputations, PAD, diabetes who presents with new onset worsening right digit ulceration  He states he saw Dr Fozia Miller on Friday for the first time with this ulceration on Friday 1/7/2022 and  started him on oral antibiotics and ordered an MRI to be completed outpatient  He recommended admission to the hospital for antibiotic therapy and surgical planning  Mr Long Section was reluctant over the weekend and arrived this morning  He states he has been taking antibiotics as prescribed  He has been doing his own wound care at home  He has not had worsening redness  Admit Inpatient med surg, Right 3rd digit OM with positive probe to bone, previous wound cultures showed pseudomonas growth  MRI pending for sx planning on 1/11/22, wound cxs and A1c pending, hospitalist for medical clearance, NPO, scd, accuchecks      Date: 1/11   Day 2: Podiatry Note  No acute events overnight, no pain  Lower extremity exam:  vascualr status at baseline, neuro function at baseline, MSK function at baseline  Cannot perform MRI d/t pacemaker  F/up on wound cxs, continue iv abx and sq heparin    WBAT as trinity                  ED Triage Vitals   Temperature Pulse Respirations Blood Pressure SpO2   01/10/22 0711 01/10/22 0711 01/10/22 0711 01/10/22 0711 01/10/22 0711   97 5 °F (36 4 °C) 92 18 125/75 98 %       Temp src Heart Rate Source Patient Position - Orthostatic VS BP Location FiO2 (%)   -- -- -- -- --                   Pain Score           01/10/22 0734           7                  Wt Readings from Last 1 Encounters:   01/10/22 (!) 147 kg (323 lb)    Additional Vital Signs:   Date/Time Temp Pulse Resp BP MAP (mmHg) SpO2 O2 Device   01/11/22 08:00:58 97 3 °F (36 3 °C) Abnormal  61 18 94/60 71 96 % --   01/10/22 23:36:33 -- 58 -- 101/62 75 93 % --   01/10/22 1905 -- -- -- -- -- 92 % None (Room air)   01/10/22 16:23:01 -- 79 -- 101/63 76 96 % --      Pertinent Labs/Diagnostic Test Results:   1/10 xr right foot:  Osteomyelitis with destruction of the third distal phalanx  1/11 vas lower limb arterial duplex bl:  Pending  1/11 mri right foot/forefoot toes:  Pending              Results from last 7 days   Lab Units 01/11/22  0456   WBC Thousand/uL 6 02   HEMOGLOBIN g/dL 15 5   HEMATOCRIT % 47 0   PLATELETS Thousands/uL 269   NEUTROS ABS Thousands/µL 3 23           Results from last 7 days   Lab Units 01/11/22  0456   SODIUM mmol/L 139   POTASSIUM mmol/L 3 2*   CHLORIDE mmol/L 98*   CO2 mmol/L 34*   ANION GAP mmol/L 7   BUN mg/dL 14   CREATININE mg/dL 1 13   EGFR ml/min/1 73sq m 71   CALCIUM mg/dL 8 8           Results from last 7 days   Lab Units 01/11/22  0456   AST U/L 39   ALT U/L 22   ALK PHOS U/L 97   TOTAL PROTEIN g/dL 7 5   ALBUMIN g/dL 3 3*   TOTAL BILIRUBIN mg/dL 0 49              Results from last 7 days   Lab Units 01/11/22  0801 01/10/22  2101 01/10/22  1651 01/10/22  1118   POC GLUCOSE mg/dl 94 161* 102 87           Results from last 7 days   Lab Units 01/11/22  0456   GLUCOSE RANDOM mg/dL 110                Results from last 7 days   Lab Units 01/10/22  1103 01/10/22  1055 01/10/22  1009   BLOOD CULTURE   Received in Microbiology Lab  Culture in Progress  Received in Microbiology Lab  Culture in Progress    --    GRAM STAIN RESULT    --   --  No Polys or Bacteria seen         Medical History        Past Medical History:   Diagnosis Date    Atrial fibrillation (HCC)      Chronic diastolic (congestive) heart failure (HCC)      Diabetes mellitus (HCC)      High cholesterol      Hyperlipidemia      Hypertension      Stroke [facility])           Present on Admission:   Atrial fibrillation ([facility] [address] )   DAYAN (obstructive sleep apnea)   Chronic diastolic heart failure (HCC)   Diabetes mellitus (HCC)        Admitting Diagnosis: Osteomyelitis of right foot (HCC) [M86 9]  Age/Sex: 60 y o  male  Admission Orders:  Scheduled Medications:  atorvastatin, 40 mg, Oral, Daily With Dinner  cefepime, 2,000 mg, Intravenous, Q8H  furosemide, 60 mg, Oral, BID  insulin lispro, 1-6 Units, Subcutaneous, HS  insulin lispro, 2-12 Units, Subcutaneous, TID AC  metolazone, 2 5 mg, Oral, Once per day on Mon Wed Fri  montelukast, 10 mg, Oral, QPM  pantoprazole, 40 mg, Oral, Early Morning  polyethylene glycol, 17 g, Oral, Daily  potassium chloride, 20 mEq, Oral, Daily  rivaroxaban, 20 mg, Oral, Daily  senna, 1 tablet, Oral, Daily  traMADol, 50 mg, Oral, Daily        Continuous IV Infusions:  sodium chloride, 50 mL/hr, Intravenous, Continuous        PRN Meds:  albuterol, 5 mg, Nebulization, Q4H PRN  hydrOXYzine HCL, 25 mg, Oral, Q6H PRN  LORazepam, 0 5 mg, Oral, Q8H PRN  oxyCODONE-acetaminophen, 1 tablet, Oral, Q4H PRN x3 thus far           IP CONSULT TO INTERNAL MEDICINE     Network Utilization Review Department  ATTENTION: Please call with any questions or concerns to [phone] and carefully listen to the prompts so that you are directed to the right person  All voicemails are confidential   Harlene Pair all requests for admission clinical reviews, approved or denied determinations and any other requests to dedicated fax number below belonging to the campus where the patient is receiving treatment   List of dedicated fax numbers for the Facilities:  [address] [place] [address] DENIALS (Administrative/Medical Necessity) [phone]   [address] (Maternity/NICU/Pediatrics) [address] [facility],[address] Floor [facility] [address] [address] [place] [address] Dheeraj Gray [address] [place] [address] [phone]

## 2022-01-12 NOTE — DISCHARGE INSTRUCTIONS
[facility]  Dr Dawood Colmenares Instructions    1  Take your prescribed medication as directed  2  Upon arrival at home, lie down and elevate your surgical foot on 2 pillows  3  Remain quiet, off your feet as much as possible, for the first 24-48 hours  This is when your feet first swell and may become painful  After 48 hours you may begin limited walking following these restrictions:   Weightbear as tolerated to surgical foot in a surgical shoe  4  Drink large quantities of water  Consume no alcohol  Continue a well-balanced diet  5  Report any unusual discomfort or fever to this office  6  A limited amount of discomfort and swelling is to be expected  In some cases the skin may take on a bruised appearance  The surgical solution that was applied to your foot prior to the operation is dark in color and the operation site may appear to be oozing when it actually is not  7  A slight amount of blood is to be expected, and is no cause for alarm  Do not remove the dressings  If there is active bleeding and if the bleeding persists, add additional gauze to the bandage, apply direct pressure, elevate your feet and call this office  8  Do not get the dressings wet  As regular bathing may be inconvenient, sponge baths are recommended  9  When anesthesia wears off and if any discomfort should be present, apply an ice pack directly over the operated area for 15 minute intervals for several hours or until the pain leaves  (USE IN EXCESS OF 15 MINUTES COULD CAUSE FROSTBITE)  Do not use hot water bags or electric pads  A convenient icepack can be made by placing ice cubes in a plastic bag and covering this with a towel  10  If necessary, take a mild laxative before retiring  11  Wear your special open shoes anytime you put weight on your foot, even if it is just to walk to the bathroom and back  It will probably be 2 or 3 weeks before you will be permitted to try regular shoes    12  Having performed the operation, we are interested in a prompt recovery  Please cooperate by following the above instructions  13  Please call to confirm your post-op appointment or call with any other questions

## 2022-01-12 NOTE — OP NOTE
OPERATIVE REPORT - Podiatry  PATIENT NAME: Rashel Ponce    :  1965  MRN: [id number]  Pt Location: [facility] OR ROOM 06    SURGERY DATE: 2022    Surgeon(s) and Role:     * Evaristo Patterson DPM - Primary     * Jackelyn Birch DPM - Assisting    Pre-op Diagnosis:  Osteomyelitis of third toe of right foot ([address] ) [M86 9]    Post-Op Diagnosis Codes:     * Osteomyelitis of third toe of right foot ([address] ) [M86 9]    Procedure(s) (LRB):  AMPUTATION TOE (Right)        Specimen(s):  ID Type Source Tests Collected by Time Destination   1 :  Tissue Toe, Right TISSUE EXAM Evaristo Patterson DPM 2022 0745    A :  Tissue Toe, Right ANAEROBIC CULTURE AND GRAM STAIN, CULTURE, TISSUE AND GRAM STAIN Evaristo Patterson DPM 2022 0745        Estimated Blood Loss:   Minimal    Drains:  * No LDAs found *    Implants:  * No implants in log *    Anesthesia Type:   Choice with 10 ml of 1% Lidocaine and 0 5% Bupivacaine in a 1:1 mixture    Hemostasis:  Surgical dissection  Materials:  Vicryl and nylon suture    Operative Findings:  Adequate bleeding for healing potential  Proximal phalanx whie and hard tot he touch  Some necortic tissue noted on plantar fat pad  Complications:   None    Procedure and Technique:     Under mild sedation, the patient was brought into the operating room and placed on the operating room table in the supine position  A time out was performed to confirm the correct patient, procedure and site with all parties in agreement  Following IV sedation, a digital block was performed consisting of 10 ml of 1% Lidocaine and 0 5% Bupivacaine in a 1:1 mixture  The foot was then scrubbed, prepped and draped in the usual aseptic manner  Attention was directed to the 3 digit where a fishmouth type of incision was made  Utilizing a sterile 15 blade, this incision was carried deep straight to bone  Soft tissue structures were then reflected off the proximal phalanx   Toe was disarticulated at the PIPJ and proximal phalanx cartilage was observed noting to be healthy and intact  The severed digit was then passed off to the back table  It was noted that all tissue margins were bleeding and viable  No deep sinus tracts or areas of purulence were visualized  The remaining bone on the proximal aspect of the cut was noted to be of hard and viable quality  Any remaining tendinous structures were identified and severed proximally to remove any nidus of infection  The surgical incision was irrigated with copious amounts of normal sterile saline  Subcutaneous closure was obtained utilizing 4-0 Vicryl in an interrupted suture technique  Skin edges were reapproximated and closure was obtained utilizing interrupted retention sutures utilizing 3-0  Nylon  The foot was then cleansed and dried  Placed directly on the incision 4x4 gauze  This was then covered with a Kerlix and an ACE wrap  The patient tolerated the procedure and anesthesia well and was transported to the PACU with vital signs stable  As with many limb salvage procedures, we contemplate the possibility of performing further stages to this procedure  Procedures may include debridements, delayed closure, plastic surgery techniques, or more proximal amputations  This procedure may be considered part of a multi-staged limb salvage treatment plan  Dr Devi Parekh was present during the entire procedure and participated in all key aspects  SIGNATURE: Alem Greer DPM  DATE: January 12, 2022  TIME: 8:02 AM      Portions of the record may have been created with voice recognition software  Occasional wrong word or "sound a like" substitutions may have occurred due to the inherent limitations of voice recognition software  Read the chart carefully and recognize, using context, where substitutions have occurred

## 2022-01-12 NOTE — PROGRESS NOTES
[facility] Podiatry - Progress Note  Patient: Nazanin Esposito 60 y o  male   MRN: [id number]  PCP: Shaun Owusu DO  Unit/Bed#: OR POOL Encounter: [id number]  Date Of Visit: 22    ASSESSMENT:    Nazanin Esposito is a 60 y o  male with:    1  Right 3rd digit OM with positive probe to bone, previous wound cultures showed pseudomonas growth  Previous amputations left foot  2  PAD  3  T2DM last a1c 6 2 % 21  4  HTN  5  DAYAN    PLAN:    · MRI unable to be performed due to pacemaker   · Leads showed adequate healing potential  · Consent in chart  · Medical clearance from slim    Weight bearing status: WBAT  DVT prophylaxis: subq heparin  Antibiotics: cefepime     Disposition: Patient will require continued inpatient stay for the above    SUBJECTIVE:     The patient was seen, evaluated, and assessed at bedside today  The patient was awake, alert, and in no acute distress  No acute events overnight  The patient reports ready for surgery  Patient denies N/V/F/chills/SOB/CP  OBJECTIVE:     Vitals:   /86 (BP Location: Left arm)   Pulse 80   Temp 97 5 °F (36 4 °C) (Temporal)   Resp 18   SpO2 100%     Temp (24hrs), Av 4 °F (36 3 °C), Min:97 3 °F (36 3 °C), Max:97 5 °F (36 4 °C)      Physical Exam :     General:  Alert, cooperative, and in no distress  Lower extremity exam:      Dressing clean dry and intact with brisk capillary refill        Additional Data:     Labs:    Results from last 7 days   Lab Units 22   WBC Thousand/uL 6 81   < > 6 02   HEMOGLOBIN g/dL 15 1   < > 15 5   HEMATOCRIT % 48 3   < > 47 0   PLATELETS Thousands/uL 303   < > 269   NEUTROS PCT %  --   --  54   LYMPHS PCT %  --   --  28   MONOS PCT %  --   --  14*   EOS PCT %  --   --  3    < > = values in this interval not displayed       Results from last 7 days   Lab Units 22   POTASSIUM mmol/L 3 4*   < > 3 2*   CHLORIDE mmol/L 100   < > 98*   CO2 mmol/L 30   < > 34*   BUN mg/dL 15   < > 14   CREATININE mg/dL 1 04   < > 1 13   CALCIUM mg/dL 8 7   < > 8 8   ALK PHOS U/L  --   --  97   ALT U/L  --   --  22   AST U/L  --   --  39    < > = values in this interval not displayed  * I Have Reviewed All Lab Data Listed Above  Recent Cultures (last 7 days):     Results from last 7 days   Lab Units 01/10/22  1103 01/10/22  1055 01/10/22  1009   BLOOD CULTURE  No Growth at 24 hrs  No Growth at 24 hrs   --    GRAM STAIN RESULT   --   --  No Polys or Bacteria seen   WOUND CULTURE   --   --  1+ Growth of      Results from last 7 days   Lab Units 01/10/22  1009   ANAEROBIC CULTURE  Culture results to follow  Imaging: I have personally reviewed pertinent films in PACS  EKG, Pathology, and Other Studies: I have personally reviewed pertinent reports  ** Please Note: Portions of the record may have been created with voice recognition software  Occasional wrong word or "sound a like" substitutions may have occurred due to the inherent limitations of voice recognition software  Read the chart carefully and recognize, using context, where substitutions have occurred   **

## 2022-01-13 DIAGNOSIS — L03.115 CELLULITIS OF RIGHT LOWER EXTREMITY: ICD-10-CM

## 2022-01-13 DIAGNOSIS — Z98.890 POST-OPERATIVE STATE: Primary | ICD-10-CM

## 2022-01-13 LAB
BACTERIA WND AEROBE CULT: ABNORMAL
BACTERIA WND AEROBE CULT: ABNORMAL
GRAM STN SPEC: ABNORMAL

## 2022-01-13 RX ORDER — CIPROFLOXACIN 750 MG/1
750 TABLET, FILM COATED ORAL EVERY 12 HOURS SCHEDULED
Qty: 14 TABLET | Refills: 0 | Status: SHIPPED | OUTPATIENT
Start: 2022-01-13 | End: 2022-01-13

## 2022-01-13 RX ORDER — CEPHALEXIN 500 MG/1
500 CAPSULE ORAL EVERY 6 HOURS SCHEDULED
Qty: 28 CAPSULE | Refills: 0 | Status: SHIPPED | OUTPATIENT
Start: 2022-01-13 | End: 2022-01-13

## 2022-01-13 RX ORDER — CIPROFLOXACIN 750 MG/1
750 TABLET, FILM COATED ORAL EVERY 12 HOURS SCHEDULED
Qty: 14 TABLET | Refills: 0 | Status: SHIPPED | OUTPATIENT
Start: 2022-01-13 | End: 2022-01-20

## 2022-01-13 RX ORDER — CEPHALEXIN 500 MG/1
500 CAPSULE ORAL EVERY 6 HOURS SCHEDULED
Qty: 28 CAPSULE | Refills: 0 | Status: SHIPPED | OUTPATIENT
Start: 2022-01-13 | End: 2022-01-20

## 2022-01-14 LAB
BACTERIA SPEC ANAEROBE CULT: ABNORMAL
BACTERIA SPEC ANAEROBE CULT: ABNORMAL

## 2022-01-15 LAB
BACTERIA BLD CULT: NORMAL
BACTERIA BLD CULT: NORMAL
BACTERIA SPEC ANAEROBE CULT: NO GROWTH

## 2022-01-16 LAB
BACTERIA TISS AEROBE CULT: ABNORMAL
GRAM STN SPEC: ABNORMAL

## 2022-02-02 ENCOUNTER — HOSPITAL ENCOUNTER (OUTPATIENT)
Dept: RADIOLOGY | Facility: HOSPITAL | Age: 57
Discharge: HOME | End: 2022-02-02
Payer: COMMERCIAL

## 2022-02-02 DIAGNOSIS — S16.1XXS STRAIN OF NECK MUSCLE, SEQUELA: ICD-10-CM

## 2022-02-02 PROCEDURE — 72050 X-RAY EXAM NECK SPINE 4/5VWS: CPT

## 2022-02-03 RX ORDER — METHYLPREDNISOLONE 4 MG/1
TABLET ORAL
COMMUNITY
End: 2022-02-04

## 2022-02-03 RX ORDER — ALBUTEROL SULFATE 90 UG/1
AEROSOL, METERED RESPIRATORY (INHALATION)
COMMUNITY
End: 2022-02-04

## 2022-02-03 RX ORDER — LEVOFLOXACIN 750 MG/1
TABLET ORAL
Status: ON HOLD | COMMUNITY
Start: 2021-11-15 | End: 2022-02-10

## 2022-02-03 RX ORDER — CYCLOBENZAPRINE HCL 10 MG
TABLET ORAL
Status: ON HOLD | COMMUNITY
End: 2022-02-10

## 2022-02-03 RX ORDER — BENZONATATE 100 MG/1
CAPSULE ORAL
COMMUNITY
End: 2022-02-04

## 2022-02-03 RX ORDER — SULFAMETHOXAZOLE AND TRIMETHOPRIM 800; 160 MG/1; MG/1
TABLET ORAL
COMMUNITY
Start: 2022-01-07 | End: 2022-02-04

## 2022-02-03 RX ORDER — CEPHALEXIN 500 MG/1
CAPSULE ORAL
Status: ON HOLD | COMMUNITY
End: 2022-02-10

## 2022-02-03 RX ORDER — MISOPROSTOL 200 UG/1
TABLET ORAL
COMMUNITY
Start: 2021-08-16 | End: 2022-02-04

## 2022-02-03 RX ORDER — AMOXICILLIN AND CLAVULANATE POTASSIUM 500; 125 MG/1; MG/1
TABLET, FILM COATED ORAL
COMMUNITY
End: 2022-02-04

## 2022-02-03 RX ORDER — FLUTICASONE PROPIONATE 44 MCG
AEROSOL WITH ADAPTER (GRAM) INHALATION
Status: ON HOLD | COMMUNITY
End: 2022-02-10

## 2022-02-03 RX ORDER — FLUOXETINE HYDROCHLORIDE 40 MG/1
CAPSULE ORAL
COMMUNITY
End: 2022-02-04

## 2022-02-03 RX ORDER — FLUOXETINE HYDROCHLORIDE 40 MG/1
CAPSULE ORAL
COMMUNITY
Start: 2022-01-19

## 2022-02-03 RX ORDER — METHOCARBAMOL 500 MG/1
TABLET, FILM COATED ORAL
COMMUNITY
End: 2022-02-04

## 2022-02-03 RX ORDER — SUCRALFATE 1 G/1
TABLET ORAL
COMMUNITY
Start: 2021-08-16 | End: 2022-02-04

## 2022-02-03 RX ORDER — ACETAMINOPHEN 500 MG
TABLET ORAL
Status: ON HOLD | COMMUNITY
End: 2022-02-10

## 2022-02-03 RX ORDER — LORAZEPAM 1 MG/1
TABLET ORAL
Status: ON HOLD | COMMUNITY
Start: 2022-01-19 | End: 2022-02-10

## 2022-02-03 RX ORDER — SULFAMETHOXAZOLE AND TRIMETHOPRIM 800; 160 MG/1; MG/1
TABLET ORAL
COMMUNITY
End: 2022-02-04

## 2022-02-03 RX ORDER — DOXYCYCLINE HYCLATE 100 MG/1
CAPSULE ORAL
Status: ON HOLD | COMMUNITY
End: 2022-02-10

## 2022-02-03 RX ORDER — LOSARTAN POTASSIUM 25 MG/1
TABLET ORAL
COMMUNITY
End: 2022-02-04

## 2022-02-03 RX ORDER — ONDANSETRON 4 MG/1
TABLET, FILM COATED ORAL
COMMUNITY
End: 2022-02-04

## 2022-02-03 RX ORDER — BUSPIRONE HYDROCHLORIDE 5 MG/1
TABLET ORAL
Status: ON HOLD | COMMUNITY
End: 2022-02-10

## 2022-02-04 ENCOUNTER — CONSULT (OUTPATIENT)
Dept: PAIN MEDICINE | Facility: CLINIC | Age: 57
End: 2022-02-04
Payer: COMMERCIAL

## 2022-02-04 VITALS
HEART RATE: 84 BPM | TEMPERATURE: 97.4 F | BODY MASS INDEX: 40.43 KG/M2 | WEIGHT: 315 LBS | DIASTOLIC BLOOD PRESSURE: 58 MMHG | HEIGHT: 74 IN | SYSTOLIC BLOOD PRESSURE: 98 MMHG | RESPIRATION RATE: 20 BRPM

## 2022-02-04 DIAGNOSIS — E11.42 TYPE 2 DIABETES MELLITUS WITH DIABETIC POLYNEUROPATHY, WITH LONG-TERM CURRENT USE OF INSULIN (HCC): ICD-10-CM

## 2022-02-04 DIAGNOSIS — M47.812 FACET ARTHROPATHY, CERVICAL: Primary | ICD-10-CM

## 2022-02-04 DIAGNOSIS — Z79.4 TYPE 2 DIABETES MELLITUS WITH DIABETIC POLYNEUROPATHY, WITH LONG-TERM CURRENT USE OF INSULIN (HCC): ICD-10-CM

## 2022-02-04 PROCEDURE — 99204 OFFICE O/P NEW MOD 45 MIN: CPT | Performed by: ANESTHESIOLOGY

## 2022-02-04 RX ORDER — BUPIVACAINE HCL/PF 2.5 MG/ML
10 VIAL (ML) INJECTION ONCE
Status: CANCELLED | OUTPATIENT
Start: 2022-02-04 | End: 2022-02-04

## 2022-02-04 RX ORDER — LIDOCAINE HYDROCHLORIDE 10 MG/ML
10 INJECTION, SOLUTION EPIDURAL; INFILTRATION; INTRACAUDAL; PERINEURAL ONCE
Status: CANCELLED | OUTPATIENT
Start: 2022-02-04 | End: 2022-02-04

## 2022-02-04 RX ORDER — LISINOPRIL 5 MG/1
TABLET ORAL
COMMUNITY
Start: 2022-02-02 | End: 2022-02-04

## 2022-02-04 RX ORDER — GABAPENTIN 100 MG/1
CAPSULE ORAL
COMMUNITY
Start: 2022-02-02 | End: 2022-03-16 | Stop reason: SDUPTHER

## 2022-02-04 RX ORDER — METHYLPREDNISOLONE ACETATE 80 MG/ML
80 INJECTION, SUSPENSION INTRA-ARTICULAR; INTRALESIONAL; INTRAMUSCULAR; PARENTERAL; SOFT TISSUE ONCE
Status: CANCELLED | OUTPATIENT
Start: 2022-02-04 | End: 2022-02-04

## 2022-02-04 NOTE — H&P (VIEW-ONLY)
Assessment  1  Facet arthropathy, cervical  -     Case request operating room: BLOCK MEDIAL BRANCH C3, C4, C5 #1; Standing  -     Case request operating room: BLOCK MEDIAL BRANCH C3, C4, C5 #1  -     Ambulatory referral to Physical Therapy; Future    2  Type 2 diabetes mellitus with diabetic polyneuropathy, with long-term current use of insulin (HCC)    Neck pain described primarily arthritic features  Yet to start physical therapy for cervical facet arthropathy seen on xray cervical spine  Distribution of pain follows the right C3-C6 medial branch nerve regions  + right sided facet loading maneuvers consistent with multilevel spondyloarthropathy and osteophytes seen in cervical spine  Reasonable at this time to trial medial branch blocks to target site of cervical facet mediated pain and pursue radiofrequency ablation of successful  Risks, benefits and alternatives of procedure in conjunction with multimodal pain therapy plan thoroughly discussed with patient  Questions answered to patient's satisfaction  Plan  -Right C3, C4, C5 medial branch blocks #1  -gabapentin 100 mg t i d  previously ordered for patient; counseled regarding sedative effects of taking this medication and provided up titration calendar  Counseled not to take medication while driving or operating heavy machinery/using stairs  -physical therapy for right-sided cervical facet arthropathy; Physician directed home exercise plan as per [employer] demonstrated and handouts provided that patient plans to participate with for 1 hour, twice a week for the next 6 weeks  There are risks associated with opioid medications, including dependence, addiction and tolerance  The patient understands and agrees to use these medications only as prescribed  Potential side effects of the medications include, but are not limited to, constipation, drowsiness, addiction, impaired judgment and risk of fatal overdose if not taken as prescribed   The patient was warned against driving while taking sedation medications  Sharing medications is a felony  At this point in time, the patient is showing no signs of addiction, abuse, diversion or suicidal ideation  [place] Prescription Drug Monitoring Program report was reviewed and was appropriate      Complete risks and benefits including bleeding, infection, tissue reaction, nerve injury and allergic reaction were discussed  The approach was demonstrated using models and literature was provided  Verbal and written consent was obtained  My impressions and treatment recommendations were discussed in detail with the patient who verbalized understanding and had no further questions  Discharge instructions were provided  I personally saw and examined the patient and I agree with the above discussed plan of care  My impressions and treatment recommendations were discussed in detail with the patient who verbalized understanding and had no further questions  Discharge instructions were provided  I personally saw and examined the patient and I agree with the above discussed plan of care      New Medications Ordered This Visit   Medications    acetaminophen (TYLENOL) 500 mg tablet     Sig: acetaminophen 500 mg tablet    busPIRone (BUSPAR) 5 mg tablet     Sig: buspirone 5 mg tablet   TAKE 1 TABLET BY MOUTH TWICE A DAY    cephalexin (KEFLEX) 500 mg capsule     Sig: cephalexin 500 mg capsule   TAKE 1 CAPSULE (500 MG TOTAL) BY MOUTH EVERY 6 (SIX) HOURS FOR 7 DAYS    cyclobenzaprine (FLEXERIL) 10 mg tablet     Sig: cyclobenzaprine 10 mg tablet    doxycycline hyclate (VIBRAMYCIN) 100 mg capsule     Sig: doxycycline hyclate 100 mg capsule   TAKE 1 CAPSULE (100 MG TOTAL) BY MOUTH EVERY 12 (TWELVE) HOURS FOR 7 DAYS    enoxaparin (LOVENOX) 60 mg/0 6 mL     Sig: enoxaparin 60 mg/0 6 mL subcutaneous syringe    FLUoxetine (PROzac) 40 MG capsule    fluticasone (Flovent HFA) 44 mcg/act inhaler     Sig: Flovent HFA 44 mcg/actuation aerosol inhaler    levofloxacin (LEVAQUIN) 750 mg tablet    LORazepam (ATIVAN) 1 mg tablet    gabapentin (NEURONTIN) 100 mg capsule       History of Present Illness    Dorothy Mendez is a 60 y o  male with pmhx of afib with pacemaker on xarelto, obesity, DAYAN, DM-2, HTN, depression/anxiety presenting with right-sided neck pain described primarily arthritic features  Describes progressive neck pain since November  The patient describes predominantly aching, nagging, indolent crampy, stabbing axial neck pain without radicular features of electric shock-like and shooting pain  He denies any weakness numbness and paresthesias  The pain is 8/10 nature and is worse with overhead maneuvers as well as lateral rotation and extension of the neck  The patient has been to physical therapy, and has failed conservative medical management including naproxen 500 mg b i d  and gabapentin 300 mg t i d  The pain is significant source of disability and compromises independent activities of daily living as well as overall function  The patient has difficulty with sleep disturbance as well since the pain often wakes him up  Has trialed gabapentin, flexeril and tylenol as well as tramadol with limited benefit but has never trialed cervical epidural steroid injections or medial branch blocks  He denies any bowel or bladder issues/incontinence, gait instability  I have personally reviewed and/or updated the patient's past medical history, past surgical history, family history, social history, current medications, allergies, and vital signs today  Review of Systems   Constitutional: Positive for activity change  HENT: Negative  Eyes: Negative  Respiratory: Negative  Cardiovascular: Negative  Gastrointestinal: Negative  Endocrine: Negative  Genitourinary: Negative  Musculoskeletal: Positive for arthralgias, myalgias, neck pain and neck stiffness  Skin: Negative      Allergic/Immunologic: Negative  Neurological: Negative for weakness and numbness  Hematological: Negative  Psychiatric/Behavioral: Negative  All other systems reviewed and are negative        Patient Active Problem List   Diagnosis    DAYAN (obstructive sleep apnea)    [facility] diastolic [facility] failure [facility]    Hypertension    [facility]    Morbid obesity (HCC)    Pain, joint, ankle and foot, left    Chronic osteomyelitis of left foot with draining sinus (HCC)    Atrial fibrillation (HCC)    Anxiety    Hypokalemia    Type 2 diabetes mellitus with diabetic polyneuropathy, with long-term current use of insulin (HCC)       Past Medical History:   Diagnosis Date    Atrial fibrillation (HCC)     Chronic diastolic (congestive) heart failure (HCC)     Diabetes mellitus ([facility]     High cholesterol     Hyperlipidemia     Hypertension     Pacemaker     Stroke [facility])        Past Surgical History:   Procedure Laterality Date    APPENDECTOMY      ATRIAL CARDIAC PACEMAKER INSERTION      BARIATRIC SURGERY  05/2021    MS AMPUTATION TOE,I-P JT Left 11/16/2021    Procedure: AMPUTATION LESSER TOE;  Surgeon: Priscila Myles DPM;  Location: [facility];  Service: Podiatry    TOE AMPUTATION Left     2nd toe    TOE AMPUTATION Left 9/15/2021    Procedure: AMPUTATION LEFT 4TH TOE;  Surgeon: Priscila Myles DPM;  [facility];  Service: Podiatry    TOE AMPUTATION Right 1/12/2022    Procedure: AMPUTATION TOE;  Surgeon: Evaristo Patterson DPM;  [facility]: [facility];  Service: Podiatry       Family History   Problem Relation Age of Onset    No Known Problems Mother     No Known Problems Father        Social History     Occupational History    Occupation: Maintenance Tech     Employer: [employer]   Tobacco Use    Smoking status: Never Smoker    Smokeless tobacco: Never Used   Vaping Use    Vaping Use: Never used   Substance and Sexual Activity    Alcohol use: Never    Drug use: Never    Sexual activity: Not on file       Current Outpatient Medications on File Prior to Visit   Medication Sig    acetaminophen (TYLENOL) 500 mg tablet acetaminophen 500 mg tablet    busPIRone (BUSPAR) 5 mg tablet buspirone 5 mg tablet   TAKE 1 TABLET BY MOUTH TWICE A DAY    cephalexin (KEFLEX) 500 mg capsule cephalexin 500 mg capsule   TAKE 1 CAPSULE (500 MG TOTAL) BY MOUTH EVERY 6 (SIX) HOURS FOR 7 DAYS    cyclobenzaprine (FLEXERIL) 10 mg tablet cyclobenzaprine 10 mg tablet    doxycycline hyclate (VIBRAMYCIN) 100 mg capsule doxycycline hyclate 100 mg capsule   TAKE 1 CAPSULE (100 MG TOTAL) BY MOUTH EVERY 12 (TWELVE) HOURS FOR 7 DAYS    enoxaparin (LOVENOX) 60 mg/0 6 mL enoxaparin 60 mg/0 6 mL subcutaneous syringe    FLUoxetine (PROzac) 40 MG capsule     fluticasone (Flovent HFA) 44 mcg/act inhaler Flovent HFA 44 mcg/actuation aerosol inhaler    furosemide (LASIX) 40 mg tablet Take 60 mg by mouth 2 (two) times a day    gabapentin (NEURONTIN) 100 mg capsule     hydrOXYzine HCL (ATARAX) 25 mg tablet Take 25 mg by mouth 4 (four) times a day as needed    levofloxacin (LEVAQUIN) 750 mg tablet     LORazepam (ATIVAN) 1 mg tablet     traMADol (ULTRAM) 50 mg tablet Take 50 mg by mouth daily     [DISCONTINUED] albuterol (2 5 mg/3 mL) 0 083 % nebulizer solution 1 vial nebulized q 4 hours x 3 days then prn cough, wheezing    [DISCONTINUED] albuterol (PROVENTIL HFA,VENTOLIN HFA) 90 mcg/act inhaler albuterol sulfate HFA 90 mcg/actuation aerosol inhaler    [DISCONTINUED] lisinopril (ZESTRIL) 5 mg tablet     [DISCONTINUED] LORazepam (ATIVAN) 0 5 mg tablet Take 0 5 mg by mouth every 8 (eight) hours as needed for anxiety    [DISCONTINUED] misoprostol (CYTOTEC) 200 mcg tablet misoprostol 200 mcg tablet   TAKE 1 TABLET BY MOUTH 4 TIMES A DAY    [DISCONTINUED] sucralfate (CARAFATE) 1 g tablet sucralfate 1 gram tablet   TAKE 1 TABLET BY MOUTH 4 TIMES A DAY    levalbuterol (XOPENEX) 0 63 mg/3 mL nebulizer solution Take 1 vial (0 63 mg total) by nebulization 3 (three) times a day (Patient not taking: Reported on 1/10/2022 )    rivaroxaban (XARELTO) 20 mg tablet Take 20 mg by mouth daily (Patient not taking: Reported on 2/4/2022 )    [DISCONTINUED] Albuterol Sulfate (PROAIR RESPICLICK) 405 (90 Base) MCG/ACT AEPB Inhale 1 puff 4 (four) times a day as needed (wheezing, shortness of breath)    [DISCONTINUED] amoxicillin-clavulanate (AUGMENTIN) 500-125 mg per tablet amoxicillin 500 mg-potassium clavulanate 125 mg tablet   TAKE 1 TABLET BY MOUTH EVERY 12 HOURS FOR 10 DAYS    [DISCONTINUED] atorvastatin (LIPITOR) 40 mg tablet Take 40 mg by mouth daily      [DISCONTINUED] benzonatate (TESSALON PERLES) 100 mg capsule benzonatate 100 mg capsule   TAKE 2 CAPS BY MOUTH 3 TIMES A DAY AS NEEDED FOR COUGH      [DISCONTINUED] FLUoxetine (PROzac) 40 MG capsule fluoxetine 40 mg capsule   1 CAP(S) ORALLY EVERY MORNING X4 WEEKS    [DISCONTINUED] ipratropium (ATROVENT) 0 02 % nebulizer solution Take 1 vial (0 5 mg total) by nebulization 3 (three) times a day (Patient not taking: Reported on 1/10/2022 )    [DISCONTINUED] losartan (COZAAR) 25 mg tablet losartan 25 mg tablet   TAKE 1 TABLET BY MOUTH EVERY DAY    [DISCONTINUED] methocarbamol (ROBAXIN) 500 mg tablet methocarbamol 500 mg tablet    [DISCONTINUED] methylprednisolone (MEDROL) 4 mg tablet methylprednisolone 4 mg tablets in a dose pack   TAKE 6 TABLETS ON DAY 1 AS DIRECTED ON PACKAGE AND DECREASE BY 1 TAB EACH DAY FOR A TOTAL OF 6 DAYS    [DISCONTINUED] metolazone (ZAROXOLYN) 2 5 mg tablet Take 2 5 mg by mouth 3 (three) times a week (Patient not taking: Reported on 1/10/2022 )    [DISCONTINUED] montelukast (SINGULAIR) 10 mg tablet Take 10 mg by mouth  (Patient not taking: Reported on 1/10/2022 )    [DISCONTINUED] omeprazole (PriLOSEC) 20 mg delayed release capsule Take 20 mg by mouth 2 (two) times a day    [DISCONTINUED] ondansetron (ZOFRAN) 4 mg tablet ondansetron HCl 4 mg tablet   1 TABLET BY MOUTH EVERY 8 HOURS AS NEEDED FOR NAUSEA AFTER SURGERY   [DISCONTINUED] oxyCODONE-acetaminophen (PERCOCET) 5-325 mg per tablet Take 1 tablet by mouth every 4 (four) hours as needed for moderate pain for up to 15 doses Max Daily Amount: 6 tablets (Patient not taking: Reported on 1/10/2022 )    [DISCONTINUED] potassium chloride (K-DUR,KLOR-CON) 20 mEq tablet Take 1 tablet (20 mEq total) by mouth daily    [DISCONTINUED] sulfamethoxazole-trimethoprim (BACTRIM DS) 800-160 mg per tablet sulfamethoxazole 800 mg-trimethoprim 160 mg tablet   Take 1 tablet every 12 hours by oral route around the clock for 10 days   [DISCONTINUED] sulfamethoxazole-trimethoprim (BACTRIM DS) 800-160 mg per tablet     [DISCONTINUED] VICTOZA 18 MG/3ML SOPN Inject 1 8 mg under the skin daily   (Patient not taking: Reported on 1/10/2022 )     No current facility-administered medications on file prior to visit  No Known Allergies      Physical Exam    BP 98/58   Pulse 84   Temp (!) 97 4 °F (36 3 °C)   Resp 20   Ht 6' 1 5" (1 867 m)   Wt (!) 146 kg (322 lb)   BMI 41 91 kg/m²     Constitutional: normal, well developed, well nourished, alert, in no distress and non-toxic and no overt pain behavior  and obese  Eyes: anicteric  HEENT: grossly intact  Neck: supple, symmetric, trachea midline and no masses   Pulmonary:even and unlabored  Cardiovascular:No edema or pitting edema present  Skin:Normal without rashes or lesions and well hydrated  Psychiatric:Mood and affect appropriate  Neurologic:Cranial Nerves II-XII grossly intact Sensation grossly intact; no clonus negative morton's  Reflexes 2+ and brisk  Spurling's maneuver negative bilaterally  Musculoskeletal:normal gait  5/5 strength bilaterally with AROM in upper extremities  Significant pain with right-sided cervical facet loading bilaterally and with lateral spine rotation  TTP over right-sided cervical paraspinal muscles   Negative epifanio's test, negative gaenslen's negative SIJ loading bilaterally      Imaging    Multilevel spondyloarthropathy/degenerative changes seen throughout cervical spine x-ray

## 2022-02-04 NOTE — PROGRESS NOTES
Assessment  1  Facet arthropathy, cervical  -     Case request operating room: BLOCK MEDIAL BRANCH C3, C4, C5 #1; Standing  -     Case request operating room: BLOCK MEDIAL BRANCH C3, C4, C5 #1  -     Ambulatory referral to Physical Therapy; Future    2  Type 2 diabetes mellitus with diabetic polyneuropathy, with long-term current use of insulin (HCC)    Neck pain described primarily arthritic features  Yet to start physical therapy for cervical facet arthropathy seen on xray cervical spine  Distribution of pain follows the right C3-C6 medial branch nerve regions  + right sided facet loading maneuvers consistent with multilevel spondyloarthropathy and osteophytes seen in cervical spine  Reasonable at this time to trial medial branch blocks to target site of cervical facet mediated pain and pursue radiofrequency ablation of successful  Risks, benefits and alternatives of procedure in conjunction with multimodal pain therapy plan thoroughly discussed with patient  Questions answered to patient's satisfaction  Plan  -Right C3, C4, C5 medial branch blocks #1  -gabapentin 100 mg t i d  previously ordered for patient; counseled regarding sedative effects of taking this medication and provided up titration calendar  Counseled not to take medication while driving or operating heavy machinery/using stairs  -physical therapy for right-sided cervical facet arthropathy; Physician directed home exercise plan as per [employer] demonstrated and handouts provided that patient plans to participate with for 1 hour, twice a week for the next 6 weeks  There are risks associated with opioid medications, including dependence, addiction and tolerance  The patient understands and agrees to use these medications only as prescribed  Potential side effects of the medications include, but are not limited to, constipation, drowsiness, addiction, impaired judgment and risk of fatal overdose if not taken as prescribed   The patient was warned against driving while taking sedation medications  Sharing medications is a felony  At this point in time, the patient is showing no signs of addiction, abuse, diversion or suicidal ideation  [place] Prescription Drug Monitoring Program report was reviewed and was appropriate      Complete risks and benefits including bleeding, infection, tissue reaction, nerve injury and allergic reaction were discussed  The approach was demonstrated using models and literature was provided  Verbal and written consent was obtained  My impressions and treatment recommendations were discussed in detail with the patient who verbalized understanding and had no further questions  Discharge instructions were provided  I personally saw and examined the patient and I agree with the above discussed plan of care  My impressions and treatment recommendations were discussed in detail with the patient who verbalized understanding and had no further questions  Discharge instructions were provided  I personally saw and examined the patient and I agree with the above discussed plan of care      New Medications Ordered This Visit   Medications    acetaminophen (TYLENOL) 500 mg tablet     Sig: acetaminophen 500 mg tablet    busPIRone (BUSPAR) 5 mg tablet     Sig: buspirone 5 mg tablet   TAKE 1 TABLET BY MOUTH TWICE A DAY    cephalexin (KEFLEX) 500 mg capsule     Sig: cephalexin 500 mg capsule   TAKE 1 CAPSULE (500 MG TOTAL) BY MOUTH EVERY 6 (SIX) HOURS FOR 7 DAYS    cyclobenzaprine (FLEXERIL) 10 mg tablet     Sig: cyclobenzaprine 10 mg tablet    doxycycline hyclate (VIBRAMYCIN) 100 mg capsule     Sig: doxycycline hyclate 100 mg capsule   TAKE 1 CAPSULE (100 MG TOTAL) BY MOUTH EVERY 12 (TWELVE) HOURS FOR 7 DAYS    enoxaparin (LOVENOX) 60 mg/0 6 mL     Sig: enoxaparin 60 mg/0 6 mL subcutaneous syringe    FLUoxetine (PROzac) 40 MG capsule    fluticasone (Flovent HFA) 44 mcg/act inhaler     Sig: Flovent HFA 44 mcg/actuation aerosol inhaler    levofloxacin (LEVAQUIN) 750 mg tablet    LORazepam (ATIVAN) 1 mg tablet    gabapentin (NEURONTIN) 100 mg capsule       History of Present Illness    Evelia Chanel is a 60 y o  male with pmhx of afib with pacemaker on xarelto, obesity, DAYAN, DM-2, HTN, depression/anxiety presenting with right-sided neck pain described primarily arthritic features  Describes progressive neck pain since November  The patient describes predominantly aching, nagging, indolent crampy, stabbing axial neck pain without radicular features of electric shock-like and shooting pain  He denies any weakness numbness and paresthesias  The pain is 8/10 nature and is worse with overhead maneuvers as well as lateral rotation and extension of the neck  The patient has been to physical therapy, and has failed conservative medical management including naproxen 500 mg b i d  and gabapentin 300 mg t i d  The pain is significant source of disability and compromises independent activities of daily living as well as overall function  The patient has difficulty with sleep disturbance as well since the pain often wakes him up  Has trialed gabapentin, flexeril and tylenol as well as tramadol with limited benefit but has never trialed cervical epidural steroid injections or medial branch blocks  He denies any bowel or bladder issues/incontinence, gait instability  I have personally reviewed and/or updated the patient's past medical history, past surgical history, family history, social history, current medications, allergies, and vital signs today  Review of Systems   Constitutional: Positive for activity change  HENT: Negative  Eyes: Negative  Respiratory: Negative  Cardiovascular: Negative  Gastrointestinal: Negative  Endocrine: Negative  Genitourinary: Negative  Musculoskeletal: Positive for arthralgias, myalgias, neck pain and neck stiffness  Skin: Negative      Allergic/Immunologic: Negative  Neurological: Negative for weakness and numbness  Hematological: Negative  Psychiatric/Behavioral: Negative  All other systems reviewed and are negative        Patient Active Problem List   Diagnosis    DAYAN (obstructive sleep apnea)    Chronic diastolic heart failure [facility] [address] [facility]    Hypertension    [facility]    Morbid obesity (HCC)    Pain, joint, ankle and foot, left    Chronic osteomyelitis of left foot with draining sinus (HCC)    Atrial fibrillation (HCC)    Anxiety    Hypokalemia    Type 2 diabetes mellitus with diabetic polyneuropathy, with long-term current use of insulin (HCC)       Past Medical History:   Diagnosis Date    Atrial fibrillation (HCC)     Chronic diastolic (congestive) heart failure (HCC)     Diabetes mellitus ([facility] [address] )     High cholesterol     Hyperlipidemia     Hypertension     Pacemaker     Stroke [facility])        Past Surgical History:   Procedure Laterality Date    APPENDECTOMY      ATRIAL CARDIAC PACEMAKER INSERTION      BARIATRIC SURGERY  05/2021    OH AMPUTATION TOE,I-P JT Left 11/16/2021    Procedure: AMPUTATION LESSER TOE;  Surgeon: Priscila Myles DPM;  [facility]: [facility];  Service: Podiatry    TOE AMPUTATION Left     2nd toe    TOE AMPUTATION Left 9/15/2021    Procedure: AMPUTATION LEFT 4TH TOE;  Surgeon: Priscila Myles DPM;  [facility];  Service: Podiatry    TOE AMPUTATION Right 1/12/2022    Procedure: AMPUTATION TOE;  Surgeon: Evaristo Patterson DPM;  [facility];  Service: Podiatry       Family History   Problem Relation Age of Onset    No Known Problems Mother     No Known Problems Father        Social History     Occupational History    Occupation: Maintenance Tech     Employer: [employer]   Tobacco Use    Smoking status: Never Smoker    Smokeless tobacco: Never Used   Vaping Use    Vaping Use: Never used   Substance and Sexual Activity    Alcohol use: Never    Drug use: Never    Sexual activity: Not on file       Current Outpatient Medications on File Prior to Visit   Medication Sig    acetaminophen (TYLENOL) 500 mg tablet acetaminophen 500 mg tablet    busPIRone (BUSPAR) 5 mg tablet buspirone 5 mg tablet   TAKE 1 TABLET BY MOUTH TWICE A DAY    cephalexin (KEFLEX) 500 mg capsule cephalexin 500 mg capsule   TAKE 1 CAPSULE (500 MG TOTAL) BY MOUTH EVERY 6 (SIX) HOURS FOR 7 DAYS    cyclobenzaprine (FLEXERIL) 10 mg tablet cyclobenzaprine 10 mg tablet    doxycycline hyclate (VIBRAMYCIN) 100 mg capsule doxycycline hyclate 100 mg capsule   TAKE 1 CAPSULE (100 MG TOTAL) BY MOUTH EVERY 12 (TWELVE) HOURS FOR 7 DAYS    enoxaparin (LOVENOX) 60 mg/0 6 mL enoxaparin 60 mg/0 6 mL subcutaneous syringe    FLUoxetine (PROzac) 40 MG capsule     fluticasone (Flovent HFA) 44 mcg/act inhaler Flovent HFA 44 mcg/actuation aerosol inhaler    furosemide (LASIX) 40 mg tablet Take 60 mg by mouth 2 (two) times a day    gabapentin (NEURONTIN) 100 mg capsule     hydrOXYzine HCL (ATARAX) 25 mg tablet Take 25 mg by mouth 4 (four) times a day as needed    levofloxacin (LEVAQUIN) 750 mg tablet     LORazepam (ATIVAN) 1 mg tablet     traMADol (ULTRAM) 50 mg tablet Take 50 mg by mouth daily     [DISCONTINUED] albuterol (2 5 mg/3 mL) 0 083 % nebulizer solution 1 vial nebulized q 4 hours x 3 days then prn cough, wheezing    [DISCONTINUED] albuterol (PROVENTIL HFA,VENTOLIN HFA) 90 mcg/act inhaler albuterol sulfate HFA 90 mcg/actuation aerosol inhaler    [DISCONTINUED] lisinopril (ZESTRIL) 5 mg tablet     [DISCONTINUED] LORazepam (ATIVAN) 0 5 mg tablet Take 0 5 mg by mouth every 8 (eight) hours as needed for anxiety    [DISCONTINUED] misoprostol (CYTOTEC) 200 mcg tablet misoprostol 200 mcg tablet   TAKE 1 TABLET BY MOUTH 4 TIMES A DAY    [DISCONTINUED] sucralfate (CARAFATE) 1 g tablet sucralfate 1 gram tablet   TAKE 1 TABLET BY MOUTH 4 TIMES A DAY    levalbuterol (XOPENEX) 0 63 mg/3 mL nebulizer solution Take 1 vial (0 63 mg total) by nebulization 3 (three) times a day (Patient not taking: Reported on 1/10/2022 )    rivaroxaban (XARELTO) 20 mg tablet Take 20 mg by mouth daily (Patient not taking: Reported on 2/4/2022 )    [DISCONTINUED] Albuterol Sulfate (PROAIR RESPICLICK) 243 (90 Base) MCG/ACT AEPB Inhale 1 puff 4 (four) times a day as needed (wheezing, shortness of breath)    [DISCONTINUED] amoxicillin-clavulanate (AUGMENTIN) 500-125 mg per tablet amoxicillin 500 mg-potassium clavulanate 125 mg tablet   TAKE 1 TABLET BY MOUTH EVERY 12 HOURS FOR 10 DAYS    [DISCONTINUED] atorvastatin (LIPITOR) 40 mg tablet Take 40 mg by mouth daily      [DISCONTINUED] benzonatate (TESSALON PERLES) 100 mg capsule benzonatate 100 mg capsule   TAKE 2 CAPS BY MOUTH 3 TIMES A DAY AS NEEDED FOR COUGH      [DISCONTINUED] FLUoxetine (PROzac) 40 MG capsule fluoxetine 40 mg capsule   1 CAP(S) ORALLY EVERY MORNING X4 WEEKS    [DISCONTINUED] ipratropium (ATROVENT) 0 02 % nebulizer solution Take 1 vial (0 5 mg total) by nebulization 3 (three) times a day (Patient not taking: Reported on 1/10/2022 )    [DISCONTINUED] losartan (COZAAR) 25 mg tablet losartan 25 mg tablet   TAKE 1 TABLET BY MOUTH EVERY DAY    [DISCONTINUED] methocarbamol (ROBAXIN) 500 mg tablet methocarbamol 500 mg tablet    [DISCONTINUED] methylprednisolone (MEDROL) 4 mg tablet methylprednisolone 4 mg tablets in a dose pack   TAKE 6 TABLETS ON DAY 1 AS DIRECTED ON PACKAGE AND DECREASE BY 1 TAB EACH DAY FOR A TOTAL OF 6 DAYS    [DISCONTINUED] metolazone (ZAROXOLYN) 2 5 mg tablet Take 2 5 mg by mouth 3 (three) times a week (Patient not taking: Reported on 1/10/2022 )    [DISCONTINUED] montelukast (SINGULAIR) 10 mg tablet Take 10 mg by mouth  (Patient not taking: Reported on 1/10/2022 )    [DISCONTINUED] omeprazole (PriLOSEC) 20 mg delayed release capsule Take 20 mg by mouth 2 (two) times a day    [DISCONTINUED] ondansetron (ZOFRAN) 4 mg tablet ondansetron HCl 4 mg tablet   1 TABLET BY MOUTH EVERY 8 HOURS AS NEEDED FOR NAUSEA AFTER SURGERY   [DISCONTINUED] oxyCODONE-acetaminophen (PERCOCET) 5-325 mg per tablet Take 1 tablet by mouth every 4 (four) hours as needed for moderate pain for up to 15 doses Max Daily Amount: 6 tablets (Patient not taking: Reported on 1/10/2022 )    [DISCONTINUED] potassium chloride (K-DUR,KLOR-CON) 20 mEq tablet Take 1 tablet (20 mEq total) by mouth daily    [DISCONTINUED] sulfamethoxazole-trimethoprim (BACTRIM DS) 800-160 mg per tablet sulfamethoxazole 800 mg-trimethoprim 160 mg tablet   Take 1 tablet every 12 hours by oral route around the clock for 10 days   [DISCONTINUED] sulfamethoxazole-trimethoprim (BACTRIM DS) 800-160 mg per tablet     [DISCONTINUED] VICTOZA 18 MG/3ML SOPN Inject 1 8 mg under the skin daily   (Patient not taking: Reported on 1/10/2022 )     No current facility-administered medications on file prior to visit  No Known Allergies      Physical Exam    BP 98/58   Pulse 84   Temp (!) 97 4 °F (36 3 °C)   Resp 20   Ht 6' 1 5" (1 867 m)   Wt (!) 146 kg (322 lb)   BMI 41 91 kg/m²     Constitutional: normal, well developed, well nourished, alert, in no distress and non-toxic and no overt pain behavior  and obese  Eyes: anicteric  HEENT: grossly intact  Neck: supple, symmetric, trachea midline and no masses   Pulmonary:even and unlabored  Cardiovascular:No edema or pitting edema present  Skin:Normal without rashes or lesions and well hydrated  Psychiatric:Mood and affect appropriate  Neurologic:Cranial Nerves II-XII grossly intact Sensation grossly intact; no clonus negative morton's  Reflexes 2+ and brisk  Spurling's maneuver negative bilaterally  Musculoskeletal:normal gait  5/5 strength bilaterally with AROM in upper extremities  Significant pain with right-sided cervical facet loading bilaterally and with lateral spine rotation  TTP over right-sided cervical paraspinal muscles   Negative epifanio's test, negative gaenslen's negative SIJ loading bilaterally      Imaging    Multilevel spondyloarthropathy/degenerative changes seen throughout cervical spine x-ray

## 2022-02-04 NOTE — PATIENT INSTRUCTIONS
Neck Exercises   AMBULATORY CARE:   Neck exercises  help reduce neck pain, and improve neck movement and strength  Neck exercises also help prevent long-term neck problems  What you need to know about neck exercises:   · Do the exercises every day,  or as often as directed by your healthcare provider  · Move slowly, gently, and smoothly  Avoid fast or jerky motions  · Stand and sit the way your healthcare provider shows you  Good posture may reduce your neck pain  Check your posture often, even when you are not doing your neck exercises  How to perform neck exercises safely:   · Exercise position:  You may sit or stand while you do neck exercises  Face forward  Your shoulders should be straight and relaxed, with a good posture  · Head tilts, forward and back:  Gently bow your head and try to touch your chin to your chest  Your healthcare provider may tell you to push on the back of your neck to help bow your head  Raise your chin back to the starting position  Tilt your head back as far as possible so you are looking up at the ceiling  Your healthcare provider may tell you to lift your chin to help tilt your head back  Return your head to the starting position  · Head tilts, side to side:  Tilt your head, bringing your ear toward your shoulder  Then tilt your head toward the other shoulder  · Head turns:  Turn your head to look over your shoulder  Tilt your chin down and try to touch it to your shoulder  Do not raise your shoulder to your chin  Face forward again  Do the same on the other side  · Head rolls:  Slowly bring your chin toward your chest  Next, roll your head to the right  Your ear should be positioned over your shoulder  Hold this position for 5 seconds  Roll your head back toward your chest and to the left into the same position  Hold for 5 seconds  Gently roll your head back and around in a clockwise [place] 3 times   Next, move your head in the reverse direction (counterclockwise) in a [place] 3 times  Do not shrug your shoulders upwards while you do this exercise  Contact your healthcare provider if:   · Your pain does not get better, or gets worse  · You have questions or concerns about your condition, care, or exercise program     © Copyright [employer] 2021 Information is for End User's use only and may not be sold, redistributed or otherwise used for commercial purposes  All illustrations and images included in CareNotes® are the copyrighted property of A D A M [employer] Inc  or Eli Mason   The above information is an  only  It is not intended as medical advice for individual conditions or treatments  Talk to your doctor, nurse or pharmacist before following any medical regimen to see if it is safe and effective for you  Cervical Facet Block   WHAT YOU NEED TO KNOW:   A cervical facet block is a procedure to inject medicine at the facet joints in your cervical (neck) spine  Facet joints are found at the back of each vertebrae  HOW TO PREPARE:   The week before your procedure:   · Arrange to have someone drive you home after your procedure  · Tell your healthcare provider about all the medicines you currently take  He or she will tell you if you need to stop any medicine before the procedure, and when to stop  He or she will tell you which medicines to take or not take on the day of the procedure  · Tell your provider about all your allergies  Tell him or her if you had an allergic reaction to anesthesia, antibiotics, or contrast liquid  · You may need to have blood and urine tests  Tests such as x-rays, a CT scan, or an MRI may also be done  The night before your procedure: You may be told not to eat or drink anything after midnight  The day of your procedure:   · Take only the medicines your healthcare provider told you to take      · You or a close family member will be asked to sign a legal document called a consent form  It gives healthcare providers permission to do the procedure or surgery  It also explains the problems that may happen, and your choices  Make sure all your questions are answered before you sign this form  · Healthcare providers may insert an intravenous tube (IV) into your vein  A vein in the arm is usually chosen  Through the IV tube, you may be given liquids and medicine  · An anesthesiologist will talk to you before your surgery  You may need medicine to keep you asleep or numb an area of your body during surgery  Tell healthcare providers if you or anyone in your family has had a problem with anesthesia in the past     WHAT WILL HAPPEN:   What will happen:   · You will lie on your stomach, with your head and body slightly turned to the side  Your healthcare provider will insert a thin needle near your cervical spine and into the facet joint  He or she will use an x-ray with contrast liquid or a CT scan to help guide the needle  · Your provider will place the needle tip inside or just outside the facet joint and inject the medicine  The medicine may include steroids and anesthesia  Your healthcare provider may inject medicine into more than one area  · Bandages will be placed over the areas where the needles were inserted  After your procedure: You will be taken to a recovery room to rest  Healthcare providers will watch you closely for any problems  Do not get out of bed until your healthcare provider says it is okay  When healthcare providers see that you are okay, you may be able to go home  · Bandages will cover the procedure area  The bandages keep the area clean and dry to prevent infection  A healthcare provider may remove the bandages soon after your procedure to check the injection sites  · Medicines may be given to treat pain, swelling, or fever, or to prevent an infection  CONTACT YOUR HEALTHCARE PROVIDER IF:   · You have a fever      · You have a skin infection or an infected wound on or near the back of your neck  · You have questions or concerns about your procedure  RISKS:   You may have bleeding at the injection site  Nerves, blood vessels, ligaments, muscles, and bones near your spine may be damaged  The medicine may spread past the facet joint and cause numbness in other areas  You may have trouble breathing  Even after you have this procedure, you may still have shoulder or back pain  You may also develop a headache from the procedure  CARE AGREEMENT:   You have the right to help plan your care  Learn about your health condition and how it may be treated  Discuss treatment options with your healthcare providers to decide what care you want to receive  You always have the right to refuse treatment  © Copyright [employer] 2021 Information is for End User's use only and may not be sold, redistributed or otherwise used for commercial purposes  All illustrations and images included in CareNotes® are the copyrighted property of A D A M , Inc  or [facility] Mitch Mason   The above information is an  only  It is not intended as medical advice for individual conditions or treatments  Talk to your doctor, nurse or pharmacist before following any medical regimen to see if it is safe and effective for you  Cervical Radiofrequency Ablation   WHAT YOU NEED TO KNOW:   What do I need to know about  cervicalradiofrequency ablation? cervicalradiofrequency ablation (RFA) is a procedure used to treat facet joint pain in your  neck  Facet joints are found at the back of each vertebra  A needle electrode is used to send electrical currents to the nerves in your facet joint  The electrical currents create heat that damages the nerve so it cannot send pain signals  How do I prepare for cervical RFA? Your healthcare provider will talk to you about how to prepare for this procedure   He may tell you not to eat or drink anything after midnight on the day of your procedure  He will tell you what medicines to take or not take on the day of your procedure  What will happen during cervical RFA? · You will lie on your stomach  You will be given local anesthesia to numb the area of your  Neck where the needle electrode will be inserted  You may be given a sedative to help keep you relaxed  You may still feel pressure or pushing during the procedure, but you should not feel any pain  Your healthcare provider will use fluoroscopy (a type of x-ray) to guide the needle electrode to the nerves near your facet joint  · Your healthcare provider may touch the affected nerve to make sure the needle electrode is in the right place  You will feel tingling or pressure when he does this  He will then apply local anesthesia to the nerve to numb it  This will prevent you from feeling pain when he applies heat to the nerve  Your healthcare provider will then apply heat to the nerve using the needle electrode  He may need to apply heat to more than one nerve  He will remove the needle electrode and apply a bandage over the area  What are the risks of  cervical RFA? You may have pain, numbness, tingling, or burning in the area where the lumbar RFA was done  These normally go away within 6 weeks  The needle electrode may injure your spinal nerves  This may cause permanent arm weakness or nerve pain  CARE AGREEMENT:   You have the right to help plan your care  Learn about your health condition and how it may be treated  Discuss treatment options with your healthcare providers to decide what care you want to receive  You always have the right to refuse treatment  The above information is an  only  It is not intended as medical advice for individual conditions or treatments  Talk to your doctor, nurse or pharmacist before following any medical regimen to see if it is safe and effective for you    © Copyright [employer] 2020 Information is for End User's use only and may not be sold, redistributed or otherwise used for commercial purposes   All illustrations and images included in CareNotes® are the copyrighted property of A D A M , Inc  or [facility] Mitch Tanner

## 2022-02-10 ENCOUNTER — HOSPITAL ENCOUNTER (OUTPATIENT)
Facility: HOSPITAL | Age: 57
Setting detail: OUTPATIENT SURGERY
Discharge: HOME | End: 2022-02-10
Attending: ANESTHESIOLOGY | Admitting: ANESTHESIOLOGY
Payer: COMMERCIAL

## 2022-02-10 ENCOUNTER — APPOINTMENT (OUTPATIENT)
Dept: RADIOLOGY | Facility: HOSPITAL | Age: 57
End: 2022-02-10
Payer: COMMERCIAL

## 2022-02-10 VITALS
DIASTOLIC BLOOD PRESSURE: 80 MMHG | BODY MASS INDEX: 40.43 KG/M2 | WEIGHT: 315 LBS | OXYGEN SATURATION: 97 % | HEART RATE: 68 BPM | SYSTOLIC BLOOD PRESSURE: 115 MMHG | RESPIRATION RATE: 20 BRPM | HEIGHT: 74 IN | TEMPERATURE: 97.8 F

## 2022-02-10 PROCEDURE — 64491 INJ PARAVERT F JNT C/T 2 LEV: CPT | Performed by: ANESTHESIOLOGY

## 2022-02-10 PROCEDURE — 64490 INJ PARAVERT F JNT C/T 1 LEV: CPT | Performed by: ANESTHESIOLOGY

## 2022-02-10 PROCEDURE — 76000 FLUOROSCOPY <1 HR PHYS/QHP: CPT

## 2022-02-10 RX ORDER — MISOPROSTOL 200 UG/1
200 TABLET ORAL 4 TIMES DAILY
COMMUNITY
End: 2022-02-24 | Stop reason: HOSPADM

## 2022-02-10 RX ORDER — ATORVASTATIN CALCIUM 40 MG/1
40 TABLET, FILM COATED ORAL DAILY
Status: ON HOLD | COMMUNITY
End: 2022-06-02

## 2022-02-10 RX ORDER — LIDOCAINE HYDROCHLORIDE 10 MG/ML
INJECTION, SOLUTION EPIDURAL; INFILTRATION; INTRACAUDAL; PERINEURAL AS NEEDED
Status: DISCONTINUED | OUTPATIENT
Start: 2022-02-10 | End: 2022-02-10 | Stop reason: HOSPADM

## 2022-02-10 RX ORDER — OMEPRAZOLE 20 MG/1
20 CAPSULE, DELAYED RELEASE ORAL DAILY
Status: ON HOLD | COMMUNITY
End: 2022-06-02

## 2022-02-10 RX ORDER — SUCRALFATE 1 G/1
1 TABLET ORAL 4 TIMES DAILY
COMMUNITY
End: 2022-06-04

## 2022-02-10 RX ORDER — METOLAZONE 2.5 MG/1
2.5 TABLET ORAL DAILY
COMMUNITY
End: 2022-02-24 | Stop reason: HOSPADM

## 2022-02-10 RX ORDER — DEXAMETHASONE SODIUM PHOSPHATE 10 MG/ML
INJECTION, SOLUTION INTRAMUSCULAR; INTRAVENOUS AS NEEDED
Status: DISCONTINUED | OUTPATIENT
Start: 2022-02-10 | End: 2022-02-10 | Stop reason: HOSPADM

## 2022-02-10 RX ORDER — BUPIVACAINE HYDROCHLORIDE 2.5 MG/ML
INJECTION, SOLUTION EPIDURAL; INFILTRATION; INTRACAUDAL AS NEEDED
Status: DISCONTINUED | OUTPATIENT
Start: 2022-02-10 | End: 2022-02-10 | Stop reason: HOSPADM

## 2022-02-10 RX ORDER — ALPRAZOLAM 0.5 MG/1
0.5 TABLET ORAL ONCE
Status: COMPLETED | OUTPATIENT
Start: 2022-02-10 | End: 2022-02-10

## 2022-02-10 RX ADMIN — ALPRAZOLAM 0.5 MG: 0.5 TABLET ORAL at 09:06

## 2022-02-10 NOTE — PROCEDURES
Pre-procedure Diagnosis: Cervical Facet Arthropathy/Facet syndrome  Post-procedure Diagnosis: Cervical Facet Arthropathy/Facet syndrome  Procedure Title(s):  [RIGHT C3, C4, C5] medial branch nerve blocks [(Diagnostic)]  Attending Surgeon:   Randell Westbrook MD  Anesthesia:   Local     Indications: The patient is a 60y o  year-old male with a diagnosis of Cervical Facet Arthropathy/Facet syndrome  The patient's history and physical exam were reviewed  The risks, benefits and alternatives to the procedure were discussed, and all questions were answered to the patient's satisfaction  The patient agreed to proceed, and written informed consent was obtained  Procedure in Detail: The patient was brought into the procedure room and placed in the prone position on the fluoroscopy table  The neck was prepped with chloraprep solution times one and draped in a sterile manner  AP fluoroscopic views were used to identify and alyce the centroid of the mid-articular pillars of the [C3, C4, C5] levels on the [RIGHT] side  The skin and subcutaneous tissues in these areas were anesthetized with 1% lidocaine  22-gauge 3 5 inch spinal needles were directed toward the targeted points until bone was contacted  After negative aspiration was confirmed, 0 5ml of a solution consisting of [[1mL dexamethasone [10mg/mL] and 3mL 0 25% bupivacaine]] was injected at each level  The needles were removed, and the patient's neck was cleaned  Bandages were placed over the points of needle insertion  Disposition: The patient tolerated the procedure well, and there were no apparent complications  The patient was taken to the recovery area where written discharge instructions for the procedure were given  The patient was given a pain diary to determine if the patient's pain improves following the injection  Once the diary is returned we will consider next appropriate course of treatment      Postoperative pain relief [WAS] significant      Estimated Blood Loss: None  Specimens Obtained: N/A

## 2022-02-10 NOTE — DISCHARGE INSTRUCTIONS
PLEASE SCHEDULE 2 WEEK FOLLOW UP BY CALLING THE [facility] [place]: [phone]      MEDIAL BRANCH BLOCK DISCHARGE INSTRUCTIONS      ACTIVITY  · Please do activities that will bring the normal pain that we are rating  For example, if vacuuming or walking increases the painm do that  Rudy twill give the most accurate response to the diary  · You may shower, but no tub baths today, or applied heat  CARE OF THE INJECTION SITE  · This area may be numb for several hours after the injection  · Notify the [facility] if you have any of the following: redness, drainage, swelling or fever above 100°F     SPECIAL INSTRUCTIONS  · Please return the MBB diary to our office by mail, fax, or drop it off  MEDICATIONS  · Please do not take any break through or short acting pain medications for 8 hours after the block  · Continue to take all routine medications  · Our office may have instructed you to hold some medications  · You may resume _______________________________________________  If you have any problems specifically related to your procedure, please call our office at [phone]  Problems not related to your procedure should be directed at your primary care physician  Cervical Radiofrequency Ablation   WHAT YOU NEED TO KNOW:   What do I need to know about  cervicalradiofrequency ablation? cervicalradiofrequency ablation (RFA) is a procedure used to treat facet joint pain in your  neck  Facet joints are found at the back of each vertebra  A needle electrode is used to send electrical currents to the nerves in your facet joint  The electrical currents create heat that damages the nerve so it cannot send pain signals  How do I prepare for cervical RFA? Your healthcare provider will talk to you about how to prepare for this procedure  He may tell you not to eat or drink anything after midnight on the day of your procedure   He will tell you what medicines to take or not take on the day of your procedure  What will happen during cervical RFA? · You will lie on your stomach  You will be given local anesthesia to numb the area of your  Neck where the needle electrode will be inserted  You may be given a sedative to help keep you relaxed  You may still feel pressure or pushing during the procedure, but you should not feel any pain  Your healthcare provider will use fluoroscopy (a type of x-ray) to guide the needle electrode to the nerves near your facet joint  · Your healthcare provider may touch the affected nerve to make sure the needle electrode is in the right place  You will feel tingling or pressure when he does this  He will then apply local anesthesia to the nerve to numb it  This will prevent you from feeling pain when he applies heat to the nerve  Your healthcare provider will then apply heat to the nerve using the needle electrode  He may need to apply heat to more than one nerve  He will remove the needle electrode and apply a bandage over the area  What are the risks of  cervical RFA? You may have pain, numbness, tingling, or burning in the area where the lumbar RFA was done  These normally go away within 6 weeks  The needle electrode may injure your spinal nerves  This may cause permanent arm weakness or nerve pain  CARE AGREEMENT:   You have the right to help plan your care  Learn about your health condition and how it may be treated  Discuss treatment options with your healthcare providers to decide what care you want to receive  You always have the right to refuse treatment  The above information is an  only  It is not intended as medical advice for individual conditions or treatments  Talk to your doctor, nurse or pharmacist before following any medical regimen to see if it is safe and effective for you    © Copyright [address] Information is for End User's use only and may not be sold, redistributed or otherwise used for commercial purposes   All illustrations and images included in CareNotes® are the copyrighted property of A D A M , Inc  or [facility] Mitch Tanner

## 2022-02-10 NOTE — OP NOTE
PERATIVE REPORT  PATIENT NAME: Evelia Chanel    :  1965  MRN: [id number]  Pt Location:  GI ROOM 01    SURGERY DATE: 2/10/2022    Surgeon(s) and Role: Barbara Ricks MD - Primary    Preop Diagnosis:  Facet arthropathy, cervical [M47 812]    Post-Op Diagnosis Codes:     * Facet arthropathy, cervical [M47 812]    Procedure(s) (LRB):  BLOCK MEDIAL BRANCH C3, C4, C5 #1 (Right)    Specimen(s):  * No specimens in log *    Estimated Blood Loss:   Minimal    Drains:  * No LDAs found *    Anesthesia Type:   Local    Operative Indications:  Facet arthropathy, cervical [H68 503]    Operative Findings:  Right C3, C4, C5 medial branch nerves identified under fluoroscopic guidance      Complications:   None    Procedure and Technique:  Please see detailed procedure note     I was present for the entire procedure    Patient Disposition:  PACU       SIGNATURE: Kitty Aragon MD  DATE: February 10, 2022  TIME: 9:47 AM

## 2022-02-10 NOTE — INTERVAL H&P NOTE
H&P reviewed  After examining the patient I find no changes in the patients condition since the H&P had been written      Vitals:    02/10/22 0900   BP: 122/68   Pulse: 55   Resp: 20   Temp: 97 8 °F (36 6 °C)   SpO2: 98%

## 2022-02-14 ENCOUNTER — NURSE TRIAGE (OUTPATIENT)
Dept: OTHER | Facility: OTHER | Age: 57
End: 2022-02-14

## 2022-02-14 NOTE — TELEPHONE ENCOUNTER
Regarding: Severe neck pain post procedure  ----- Message from Celeste Jacobson sent at 2/14/2022  4:44 PM EST -----  "I had a procedure on my neck and the pain is more severe now than before the procedure "

## 2022-02-14 NOTE — TELEPHONE ENCOUNTER
Patients reports severe pain (10) post procedure on 2/9/22 with no relief from Advil extra strength, Tylenol, and left over Tramadol  On-call provider advised patient to reach out to doctor's office Tuesday 2/15 AM with parameters to go to ED if any weakness, discharge from procedure site, or fever  Patient verbalized understanding    Reason for Disposition   [1] MODERATE neck pain (e g , interferes with normal activities AND [2] present > 3 days    Answer Assessment - Initial Assessment Questions  1  ONSET: "When did the pain begin?"       Saturday 2/12/22    2  LOCATION: "Where does it hurt?"       Right side of neck behind ear lobe    3  PATTERN "Does the pain come and go, or has it been constant since it started?"       Constant    4  SEVERITY: "How bad is the pain?"  (Scale 1-10; or mild, moderate, severe)    - NO PAIN (0): no pain or only slight stiffness     - MILD (1-3): doesn't interfere with normal activities     - MODERATE (4-7): interferes with normal activities or awakens from sleep     - SEVERE (8-10):  excruciating pain, unable to do any normal activities       Severe (10) Patient has used Advil Extra Strength and Tylenol with no relief  Patient has a couple of Tramadol and took them on Saturday 2/12; no relief  5  RADIATION: "Does the pain go anywhere else, shoot into your arms?"      Radiates down to right shoulder    6  CORD SYMPTOMS: "Any weakness or numbness of the arms or legs?"     Denies    7  CAUSE: "What do you think is causing the neck pain?"      Probably from the procedure    8  NECK OVERUSE: "Any recent activities that involved turning or twisting the neck?"      Denies    9  OTHER SYMPTOMS: "Do you have any other symptoms?" (e g , headache, fever, chest pain, difficulty breathing, neck swelling)      Denies      Protocols used: NECK PAIN OR STIFFNESS-ADULT-AH

## 2022-02-15 ENCOUNTER — TELEPHONE (OUTPATIENT)
Dept: OBGYN CLINIC | Facility: CLINIC | Age: 57
End: 2022-02-15

## 2022-02-16 ENCOUNTER — TELEPHONE (OUTPATIENT)
Dept: PAIN MEDICINE | Facility: CLINIC | Age: 57
End: 2022-02-16

## 2022-02-16 NOTE — TELEPHONE ENCOUNTER
Spoke to patient     States pain started on Saturday, when he lifts his neck up and turns neck to the right it hurts  Also stating his neck is still hurting   Took Advil extra strength and it does not do anything for him   No other symptoms   Please advise

## 2022-02-19 PROCEDURE — 99283 EMERGENCY DEPT VISIT LOW MDM: CPT

## 2022-02-20 ENCOUNTER — APPOINTMENT (EMERGENCY)
Dept: CT IMAGING | Facility: HOSPITAL | Age: 57
End: 2022-02-20
Payer: COMMERCIAL

## 2022-02-20 ENCOUNTER — HOSPITAL ENCOUNTER (EMERGENCY)
Facility: HOSPITAL | Age: 57
Discharge: HOME | End: 2022-02-20
Attending: EMERGENCY MEDICINE
Payer: COMMERCIAL

## 2022-02-20 VITALS
TEMPERATURE: 97.7 F | WEIGHT: 312 LBS | HEART RATE: 80 BPM | HEIGHT: 74 IN | SYSTOLIC BLOOD PRESSURE: 134 MMHG | BODY MASS INDEX: 40.04 KG/M2 | RESPIRATION RATE: 20 BRPM | DIASTOLIC BLOOD PRESSURE: 76 MMHG | OXYGEN SATURATION: 95 %

## 2022-02-20 DIAGNOSIS — M54.2 NECK PAIN: Primary | ICD-10-CM

## 2022-02-20 PROCEDURE — 72125 CT NECK SPINE W/O DYE: CPT

## 2022-02-20 PROCEDURE — 99285 EMERGENCY DEPT VISIT HI MDM: CPT | Performed by: EMERGENCY MEDICINE

## 2022-02-20 PROCEDURE — 96372 THER/PROPH/DIAG INJ SC/IM: CPT

## 2022-02-20 RX ORDER — LIDOCAINE 50 MG/G
1 PATCH TOPICAL ONCE
Status: DISCONTINUED | OUTPATIENT
Start: 2022-02-20 | End: 2022-02-20 | Stop reason: HOSPADM

## 2022-02-20 RX ORDER — HYDROMORPHONE HCL/PF 1 MG/ML
1 SYRINGE (ML) INJECTION ONCE
Status: COMPLETED | OUTPATIENT
Start: 2022-02-20 | End: 2022-02-20

## 2022-02-20 RX ORDER — LIDOCAINE 50 MG/G
1 PATCH TOPICAL DAILY
Qty: 10 PATCH | Refills: 0 | Status: SHIPPED | OUTPATIENT
Start: 2022-02-20

## 2022-02-20 RX ADMIN — HYDROMORPHONE HYDROCHLORIDE 1 MG: 1 INJECTION, SOLUTION INTRAMUSCULAR; INTRAVENOUS; SUBCUTANEOUS at 00:50

## 2022-02-20 RX ADMIN — LIDOCAINE 1 PATCH: 50 PATCH TOPICAL at 00:51

## 2022-02-20 NOTE — ED PROVIDER NOTES
History  Chief Complaint   Patient presents with    Neck Pain     pt c/o R sided stabbing neck pain  states he had a procedure done two weeks ago where they "stuck needles in his neck" and he is having severe pain where the procedure was     Patient is a 69-year-old male with a history of chronic neck pain, recently seen by pain management had cervical spine injections, since then has been having increased unrelenting pain which is not relieved by OTC anti-inflammatories, he denies any injury, no fever or chills, he does get occasional pins and needles down the right arm but denies any numbness, loss of strength or function          Prior to Admission Medications   Prescriptions Last Dose Informant Patient Reported? Taking?    FLUoxetine (PROzac) 40 MG capsule   Yes No   atorvastatin (LIPITOR) 40 mg tablet   Yes No   Sig: Take 40 mg by mouth daily   furosemide (LASIX) 40 mg tablet  Self Yes No   Sig: Take 60 mg by mouth 2 (two) times a day   gabapentin (NEURONTIN) 100 mg capsule   Yes No   hydrOXYzine HCL (ATARAX) 25 mg tablet   Yes No   Sig: Take 25 mg by mouth 4 (four) times a day as needed   metolazone (ZAROXOLYN) 2 5 mg tablet   Yes No   Sig: Take 2 5 mg by mouth daily   misoprostol (CYTOTEC) 200 mcg tablet   Yes No   Sig: Take 200 mcg by mouth 4 (four) times a day   omeprazole (PriLOSEC) 20 mg delayed release capsule   Yes No   Sig: Take 20 mg by mouth daily   sucralfate (CARAFATE) 1 g tablet   Yes No   Sig: Take 1 g by mouth 4 (four) times a day   traMADol (ULTRAM) 50 mg tablet   Yes No   Sig: Take 50 mg by mouth daily       Facility-Administered Medications: None       Past Medical History:   Diagnosis Date    Atrial fibrillation (HCC)     Chronic diastolic (congestive) heart failure (HCC)     Diabetes mellitus (HCC)     High cholesterol     Hyperlipidemia     Hypertension     Pacemaker     Stroke [facility])        Past Surgical History:   Procedure Laterality Date    APPENDECTOMY      ATRIAL CARDIAC PACEMAKER INSERTION      BARIATRIC SURGERY  05/2021    NERVE BLOCK Right 2/10/2022    Procedure: BLOCK MEDIAL BRANCH C3, C4, C5 #1;  Surgeon: Ninoska Sethi MD;  Location: [facility];  Service: Pain Management     AK AMPUTATION TOE,I-P JT Left 11/16/2021    Procedure: AMPUTATION LESSER TOE;  Surgeon: Coretta Sage DPM;  [facility];  Service: Podiatry    TOE AMPUTATION Left     2nd toe    TOE AMPUTATION Left 9/15/2021    Procedure: AMPUTATION LEFT 4TH TOE;  Surgeon: Coretta Sage DPM;  [facility];  Service: Podiatry    TOE AMPUTATION Right 1/12/2022    Procedure: AMPUTATION TOE;  Surgeon: Anastasiya Caldera DPM;  [facility];  Service: Podiatry       Family History   Problem Relation Age of Onset    No Known Problems Mother     No Known Problems Father      I have reviewed and agree with the history as documented  E-Cigarette/Vaping    E-Cigarette Use Never User      E-Cigarette/Vaping Substances     Social History     Tobacco Use    Smoking status: Never Smoker    Smokeless tobacco: Never Used   Vaping Use    Vaping Use: Never used   Substance Use Topics    Alcohol use: Never    Drug use: Never       Review of Systems   Constitutional: Negative  HENT: Negative  Eyes: Negative  Respiratory: Negative  Cardiovascular: Negative  Gastrointestinal: Negative  Endocrine: Negative  Genitourinary: Negative  Musculoskeletal: Positive for neck pain  Skin: Negative  Allergic/Immunologic: Negative  Neurological: Negative  Hematological: Negative  Psychiatric/Behavioral: Negative  Physical Exam  Physical Exam  Constitutional:       Appearance: He is well-developed  HENT:      Head: Normocephalic and atraumatic  Eyes:      Conjunctiva/sclera: Conjunctivae normal       Pupils: Pupils are equal, round, and reactive to light     Neck:        Comments: Tenderness to palpate in the soft tissue of the right cervical paraspinal musculature, no bony tenderness or abnormality, restricted range of motion secondary to pain  Cardiovascular:      Rate and Rhythm: Normal rate  Pulmonary:      Effort: Pulmonary effort is normal    Abdominal:      Palpations: Abdomen is soft  Musculoskeletal:         General: Normal range of motion  Cervical back: Normal range of motion and neck supple  Skin:     General: Skin is warm and dry  Neurological:      Mental Status: He is alert and oriented to person, place, and time  Vital Signs  ED Triage Vitals [02/20/22 0004]   Temperature Pulse Respirations Blood Pressure SpO2   97 7 °F (36 5 °C) 80 20 134/76 95 %      Temp Source Heart Rate Source Patient Position - Orthostatic VS BP Location FiO2 (%)   Temporal Monitor Sitting Right arm --      Pain Score       --           Vitals:    02/20/22 0004   BP: 134/76   Pulse: 80   Patient Position - Orthostatic VS: Sitting               ED Medications  Medications   lidocaine (LIDODERM) 5 % patch 1 patch (1 patch Topical Medication Applied 2/20/22 0051)   HYDROmorphone (DILAUDID) injection 1 mg (1 mg Intramuscular Given 2/20/22 0050)       Diagnostic Studies  Results Reviewed     None                 CT cervical spine without contrast   Final Result by Henry Walden DO (02/20 4696)      No cervical spine fracture or traumatic malalignment                     Workstation performed: [id number]                           ED Course  ED Course as of 02/20/22 0221   Kristin Mcwilliams Feb 20, 2022 0221 Imaging findings consistent with degenerative changes of the cervical spine, no acute findings, he did get some relief with the medications provided in the emergency department, was given a prescription for lidocaine derm patches and advised to follow-up with PCP and pain management, return if symptoms worsen, patient acknowledges understanding and agreement with this plan                                               Disposition  Final diagnoses:   Neck pain     Time reflects when diagnosis was documented in both MDM as applicable and the Disposition within this note     Time User Action Codes Description Comment    2/20/2022 12:54 AM Marianna Muniz Add [M54 2] Neck pain       ED Disposition     ED Disposition Condition Date/Time Comment    Discharge Stable Sun Feb 20, 2022 12:54 AM Nina Sahara discharge to home/self care  Follow-up Information     Follow up With Specialties Details Why Contact Info    [address], DO  In 2 days  [address]  [phone]            Discharge Medication List as of 2/20/2022 12:54 AM      START taking these medications    Details   lidocaine (Lidoderm) 5 % Apply 1 patch topically daily Remove & Discard patch within 12 hours or as directed by MD, Starting Sun 2/20/2022, Normal         CONTINUE these medications which have NOT CHANGED    Details   atorvastatin (LIPITOR) 40 mg tablet Take 40 mg by mouth daily, Historical Med      FLUoxetine (PROzac) 40 MG capsule Starting Wed 1/19/2022, Historical Med      furosemide (LASIX) 40 mg tablet Take 60 mg by mouth 2 (two) times a day, Starting Fri 3/16/2018, Historical Med      gabapentin (NEURONTIN) 100 mg capsule Starting Wed 2/2/2022, Historical Med      hydrOXYzine HCL (ATARAX) 25 mg tablet Take 25 mg by mouth 4 (four) times a day as needed, Starting Mon 7/26/2021, Historical Med      metolazone (ZAROXOLYN) 2 5 mg tablet Take 2 5 mg by mouth daily, Historical Med      misoprostol (CYTOTEC) 200 mcg tablet Take 200 mcg by mouth 4 (four) times a day, Historical Med      omeprazole (PriLOSEC) 20 mg delayed release capsule Take 20 mg by mouth daily, Historical Med      sucralfate (CARAFATE) 1 g tablet Take 1 g by mouth 4 (four) times a day, Historical Med      traMADol (ULTRAM) 50 mg tablet Take 50 mg by mouth daily , Starting Sat 11/10/2018, Historical Med             No discharge procedures on file      PDMP Review       Value Time User    PDMP Reviewed  Yes 11/22/2021 10:40 PM Zeynep Maher DO          ED Provider  Electronically Signed by           Jina Winters DO  02/20/22 0225

## 2022-02-22 ENCOUNTER — HOSPITAL ENCOUNTER (INPATIENT)
Facility: HOSPITAL | Age: 57
LOS: 1 days | Discharge: LEFT BEFORE DISCHARGE | DRG: 617 | End: 2022-02-24
Attending: PODIATRIST | Admitting: STUDENT IN AN ORGANIZED HEALTH CARE EDUCATION/TRAINING PROGRAM
Payer: COMMERCIAL

## 2022-02-22 DIAGNOSIS — E87.6 HYPOKALEMIA: ICD-10-CM

## 2022-02-22 DIAGNOSIS — M86.9 TOE OSTEOMYELITIS, RIGHT (HCC): Primary | ICD-10-CM

## 2022-02-22 PROBLEM — L08.9 TOE INFECTION: Status: ACTIVE | Noted: 2022-02-22

## 2022-02-22 LAB
ALBUMIN SERPL BCP-MCNC: 4.4 G/DL (ref 3–5.2)
ALP SERPL-CCNC: 104 U/L (ref 43–122)
ALT SERPL W P-5'-P-CCNC: 19 U/L
ANION GAP SERPL CALCULATED.3IONS-SCNC: 7 MMOL/L (ref 5–14)
ANION GAP SERPL CALCULATED.3IONS-SCNC: 9 MMOL/L (ref 5–14)
AST SERPL W P-5'-P-CCNC: 28 U/L (ref 17–59)
BILIRUB SERPL-MCNC: 0.82 MG/DL
BUN SERPL-MCNC: 18 MG/DL (ref 5–25)
BUN SERPL-MCNC: 19 MG/DL (ref 5–25)
CALCIUM SERPL-MCNC: 8.7 MG/DL (ref 8.4–10.2)
CALCIUM SERPL-MCNC: 8.9 MG/DL (ref 8.4–10.2)
CHLORIDE SERPL-SCNC: 93 MMOL/L (ref 97–108)
CHLORIDE SERPL-SCNC: 94 MMOL/L (ref 97–108)
CO2 SERPL-SCNC: 33 MMOL/L (ref 22–30)
CO2 SERPL-SCNC: 36 MMOL/L (ref 22–30)
CREAT SERPL-MCNC: 0.85 MG/DL (ref 0.7–1.5)
CREAT SERPL-MCNC: 0.92 MG/DL (ref 0.7–1.5)
CRP SERPL QL: 32.1 MG/L
ERYTHROCYTE [DISTWIDTH] IN BLOOD BY AUTOMATED COUNT: 14.7 %
ERYTHROCYTE [SEDIMENTATION RATE] IN BLOOD: 58 MM/HOUR (ref 0–19)
GFR SERPL CREATININE-BSD FRML MDRD: 91 ML/MIN/1.73SQ M
GFR SERPL CREATININE-BSD FRML MDRD: 96 ML/MIN/1.73SQ M
GLUCOSE P FAST SERPL-MCNC: 129 MG/DL (ref 70–99)
GLUCOSE SERPL-MCNC: 103 MG/DL (ref 65–140)
GLUCOSE SERPL-MCNC: 112 MG/DL (ref 70–99)
GLUCOSE SERPL-MCNC: 129 MG/DL (ref 70–99)
GLUCOSE SERPL-MCNC: 161 MG/DL (ref 65–140)
HCT VFR BLD AUTO: 43.5 % (ref 41–53)
HGB BLD-MCNC: 14.6 G/DL (ref 13.5–17.5)
MCH RBC QN AUTO: 28.9 PG (ref 26–34)
MCHC RBC AUTO-ENTMCNC: 33.6 G/DL (ref 31–36)
MCV RBC AUTO: 86 FL (ref 80–100)
PLATELET # BLD AUTO: 265 THOUSANDS/UL (ref 150–450)
PMV BLD AUTO: 8.2 FL (ref 8.9–12.7)
POTASSIUM SERPL-SCNC: 2.7 MMOL/L (ref 3.6–5)
POTASSIUM SERPL-SCNC: 3.2 MMOL/L (ref 3.6–5)
PROT SERPL-MCNC: 8.1 G/DL (ref 5.9–8.4)
RBC # BLD AUTO: 5.05 MILLION/UL (ref 4.5–5.9)
SODIUM SERPL-SCNC: 135 MMOL/L (ref 137–147)
SODIUM SERPL-SCNC: 137 MMOL/L (ref 137–147)
WBC # BLD AUTO: 10.1 THOUSAND/UL (ref 4.5–11)

## 2022-02-22 PROCEDURE — 86140 C-REACTIVE PROTEIN: CPT | Performed by: STUDENT IN AN ORGANIZED HEALTH CARE EDUCATION/TRAINING PROGRAM

## 2022-02-22 PROCEDURE — 87040 BLOOD CULTURE FOR BACTERIA: CPT | Performed by: STUDENT IN AN ORGANIZED HEALTH CARE EDUCATION/TRAINING PROGRAM

## 2022-02-22 PROCEDURE — 99219 PR INITIAL OBSERVATION CARE/DAY 50 MINUTES: CPT | Performed by: STUDENT IN AN ORGANIZED HEALTH CARE EDUCATION/TRAINING PROGRAM

## 2022-02-22 PROCEDURE — G0379 DIRECT REFER HOSPITAL OBSERV: HCPCS

## 2022-02-22 PROCEDURE — 85652 RBC SED RATE AUTOMATED: CPT | Performed by: STUDENT IN AN ORGANIZED HEALTH CARE EDUCATION/TRAINING PROGRAM

## 2022-02-22 PROCEDURE — 80053 COMPREHEN METABOLIC PANEL: CPT | Performed by: STUDENT IN AN ORGANIZED HEALTH CARE EDUCATION/TRAINING PROGRAM

## 2022-02-22 PROCEDURE — 85027 COMPLETE CBC AUTOMATED: CPT | Performed by: STUDENT IN AN ORGANIZED HEALTH CARE EDUCATION/TRAINING PROGRAM

## 2022-02-22 PROCEDURE — 80048 BASIC METABOLIC PNL TOTAL CA: CPT | Performed by: STUDENT IN AN ORGANIZED HEALTH CARE EDUCATION/TRAINING PROGRAM

## 2022-02-22 PROCEDURE — 82948 REAGENT STRIP/BLOOD GLUCOSE: CPT

## 2022-02-22 RX ORDER — CEFAZOLIN SODIUM 2 G/50ML
2000 SOLUTION INTRAVENOUS EVERY 8 HOURS
Status: DISCONTINUED | OUTPATIENT
Start: 2022-02-22 | End: 2022-02-24 | Stop reason: HOSPADM

## 2022-02-22 RX ORDER — POTASSIUM CHLORIDE 14.9 MG/ML
20 INJECTION INTRAVENOUS
Status: COMPLETED | OUTPATIENT
Start: 2022-02-22 | End: 2022-02-22

## 2022-02-22 RX ORDER — POTASSIUM CHLORIDE 20 MEQ/1
40 TABLET, EXTENDED RELEASE ORAL ONCE
Status: COMPLETED | OUTPATIENT
Start: 2022-02-22 | End: 2022-02-22

## 2022-02-22 RX ORDER — METOLAZONE 2.5 MG/1
2.5 TABLET ORAL DAILY
Status: DISCONTINUED | OUTPATIENT
Start: 2022-02-22 | End: 2022-02-24 | Stop reason: HOSPADM

## 2022-02-22 RX ORDER — ATORVASTATIN CALCIUM 40 MG/1
40 TABLET, FILM COATED ORAL DAILY
Status: DISCONTINUED | OUTPATIENT
Start: 2022-02-22 | End: 2022-02-24 | Stop reason: HOSPADM

## 2022-02-22 RX ORDER — ACETAMINOPHEN 325 MG/1
650 TABLET ORAL EVERY 6 HOURS PRN
Status: DISCONTINUED | OUTPATIENT
Start: 2022-02-22 | End: 2022-02-24 | Stop reason: HOSPADM

## 2022-02-22 RX ORDER — GABAPENTIN 100 MG/1
100 CAPSULE ORAL 3 TIMES DAILY
Status: DISCONTINUED | OUTPATIENT
Start: 2022-02-22 | End: 2022-02-24 | Stop reason: HOSPADM

## 2022-02-22 RX ORDER — HEPARIN SODIUM 5000 [USP'U]/ML
5000 INJECTION, SOLUTION INTRAVENOUS; SUBCUTANEOUS EVERY 8 HOURS SCHEDULED
Status: DISCONTINUED | OUTPATIENT
Start: 2022-02-22 | End: 2022-02-24 | Stop reason: HOSPADM

## 2022-02-22 RX ORDER — OXYCODONE HYDROCHLORIDE 5 MG/1
5 TABLET ORAL EVERY 4 HOURS PRN
Status: DISCONTINUED | OUTPATIENT
Start: 2022-02-22 | End: 2022-02-22

## 2022-02-22 RX ORDER — OXYCODONE HYDROCHLORIDE 5 MG/1
5 TABLET ORAL EVERY 4 HOURS PRN
Status: DISCONTINUED | OUTPATIENT
Start: 2022-02-22 | End: 2022-02-24 | Stop reason: HOSPADM

## 2022-02-22 RX ORDER — OXYCODONE HYDROCHLORIDE 10 MG/1
10 TABLET ORAL EVERY 4 HOURS PRN
Status: DISCONTINUED | OUTPATIENT
Start: 2022-02-22 | End: 2022-02-24 | Stop reason: HOSPADM

## 2022-02-22 RX ORDER — HYDROXYZINE HYDROCHLORIDE 25 MG/1
25 TABLET, FILM COATED ORAL 4 TIMES DAILY PRN
Status: DISCONTINUED | OUTPATIENT
Start: 2022-02-22 | End: 2022-02-24 | Stop reason: HOSPADM

## 2022-02-22 RX ORDER — FLUOXETINE HYDROCHLORIDE 20 MG/1
40 CAPSULE ORAL DAILY
Status: DISCONTINUED | OUTPATIENT
Start: 2022-02-22 | End: 2022-02-24 | Stop reason: HOSPADM

## 2022-02-22 RX ORDER — HYDROMORPHONE HCL/PF 1 MG/ML
0.5 SYRINGE (ML) INJECTION EVERY 4 HOURS PRN
Status: DISCONTINUED | OUTPATIENT
Start: 2022-02-22 | End: 2022-02-24 | Stop reason: HOSPADM

## 2022-02-22 RX ADMIN — CEFAZOLIN SODIUM 2000 MG: 2 SOLUTION INTRAVENOUS at 23:21

## 2022-02-22 RX ADMIN — METOLAZONE 2.5 MG: 2.5 TABLET ORAL at 11:32

## 2022-02-22 RX ADMIN — OXYCODONE HYDROCHLORIDE 5 MG: 5 TABLET ORAL at 11:36

## 2022-02-22 RX ADMIN — INSULIN LISPRO 2 UNITS: 100 INJECTION, SOLUTION INTRAVENOUS; SUBCUTANEOUS at 16:34

## 2022-02-22 RX ADMIN — OXYCODONE HYDROCHLORIDE 10 MG: 10 TABLET ORAL at 20:22

## 2022-02-22 RX ADMIN — FUROSEMIDE 60 MG: 40 TABLET ORAL at 20:23

## 2022-02-22 RX ADMIN — GABAPENTIN 100 MG: 100 CAPSULE ORAL at 16:34

## 2022-02-22 RX ADMIN — ATORVASTATIN CALCIUM 40 MG: 40 TABLET, FILM COATED ORAL at 11:33

## 2022-02-22 RX ADMIN — CEFAZOLIN SODIUM 2000 MG: 2 SOLUTION INTRAVENOUS at 13:44

## 2022-02-22 RX ADMIN — GABAPENTIN 100 MG: 100 CAPSULE ORAL at 22:17

## 2022-02-22 RX ADMIN — FLUOXETINE 40 MG: 20 CAPSULE ORAL at 11:32

## 2022-02-22 RX ADMIN — HYDROMORPHONE HYDROCHLORIDE 0.5 MG: 1 INJECTION, SOLUTION INTRAMUSCULAR; INTRAVENOUS; SUBCUTANEOUS at 13:11

## 2022-02-22 RX ADMIN — HEPARIN SODIUM 5000 UNITS: 5000 INJECTION INTRAVENOUS; SUBCUTANEOUS at 22:17

## 2022-02-22 RX ADMIN — GABAPENTIN 100 MG: 100 CAPSULE ORAL at 11:32

## 2022-02-22 RX ADMIN — HEPARIN SODIUM 5000 UNITS: 5000 INJECTION INTRAVENOUS; SUBCUTANEOUS at 13:44

## 2022-02-22 RX ADMIN — POTASSIUM CHLORIDE 20 MEQ: 14.9 INJECTION, SOLUTION INTRAVENOUS at 19:41

## 2022-02-22 RX ADMIN — POTASSIUM CHLORIDE 40 MEQ: 1500 TABLET, EXTENDED RELEASE ORAL at 16:34

## 2022-02-22 RX ADMIN — POTASSIUM CHLORIDE 20 MEQ: 14.9 INJECTION, SOLUTION INTRAVENOUS at 16:34

## 2022-02-22 RX ADMIN — FUROSEMIDE 60 MG: 40 TABLET ORAL at 11:33

## 2022-02-22 NOTE — ASSESSMENT & PLAN NOTE
Patient has been sent to hospital as direct admit by Podiatry for suspected osteomyelitis of right 2nd toe  Other than pain, patient minimally symptomatic at time of admission    -will obtain MRI  -obtain blood cultures  -Ancef  -podiatry consult placed, appreciate recommendations  -NPO at midnight for potential surgical procedure tomorrow  -obtain baseline labs

## 2022-02-22 NOTE — ASSESSMENT & PLAN NOTE
Wt Readings from Last 3 Encounters:   02/22/22 (!) 147 kg (324 lb 8 3 oz)   02/20/22 (!) 142 kg (312 lb)   02/10/22 (!) 146 kg (322 lb)         -continue home Lasix and metolazone

## 2022-02-22 NOTE — PLAN OF CARE
Problem: PAIN - ADULT  Goal: Verbalizes/displays adequate comfort level or baseline comfort level  Description: Interventions:  - Encourage patient to monitor pain and request assistance  - Assess pain using appropriate pain scale  - Administer analgesics based on type and severity of pain and evaluate response  - Implement non-pharmacological measures as appropriate and evaluate response  - Consider cultural and social influences on pain and pain management  - Notify physician/advanced practitioner if interventions unsuccessful or patient reports new pain  Outcome: Progressing     Problem: INFECTION - ADULT  Goal: Absence or prevention of progression during hospitalization  Description: INTERVENTIONS:  - Assess and monitor for signs and symptoms of infection  - Monitor lab/diagnostic results  - Monitor all insertion sites, i e  indwelling lines, tubes, and drains  - Monitor endotracheal if appropriate and nasal secretions for changes in amount and color  - Nunez appropriate cooling/warming therapies per order  - Administer medications as ordered  - Instruct and encourage patient and family to use good hand hygiene technique  - Identify and instruct in appropriate isolation precautions for identified infection/condition  Outcome: Progressing  Goal: Absence of fever/infection during neutropenic period  Description: INTERVENTIONS:  - Monitor WBC    Outcome: Progressing     Problem: SAFETY ADULT  Goal: Patient will remain free of falls  Description: INTERVENTIONS:  - Educate patient/family on patient safety including physical limitations  - Instruct patient to call for assistance with activity   - Consult OT/PT to assist with strengthening/mobility   - Keep Call bell within reach  - Keep bed low and locked with side rails adjusted as appropriate  - Keep care items and personal belongings within reach  - Initiate and maintain comfort rounds  - Make Fall Risk Sign visible to staff  - Apply yellow socks and bracelet for high fall risk patients  - Consider moving patient to room near nurses station  Outcome: Progressing  Goal: Maintain or return to baseline ADL function  Description: INTERVENTIONS:  -  Assess patient's ability to carry out ADLs; assess patient's baseline for ADL function and identify physical deficits which impact ability to perform ADLs (bathing, care of mouth/teeth, toileting, grooming, dressing, etc )  - Assess/evaluate cause of self-care deficits   - Assess range of motion  - Assess patient's mobility; develop plan if impaired  - Assess patient's need for assistive devices and provide as appropriate  - Encourage maximum independence but intervene and supervise when necessary  - Involve family in performance of ADLs  - Assess for home care needs following discharge   - Consider OT consult to assist with ADL evaluation and planning for discharge  - Provide patient education as appropriate  Outcome: Progressing  Goal: Maintains/Returns to pre admission functional level  Description: INTERVENTIONS:  - Perform BMAT or MOVE assessment daily    - Set and communicate daily mobility goal to care team and patient/family/caregiver     - Collaborate with rehabilitation services on mobility goals if consulted  - Out of bed for toileting  - Record patient progress and toleration of activity level   Outcome: Progressing     Problem: DISCHARGE PLANNING  Goal: Discharge to home or other facility with appropriate resources  Description: INTERVENTIONS:  - Identify barriers to discharge w/patient and caregiver  - Arrange for needed discharge resources and transportation as appropriate  - Identify discharge learning needs (meds, wound care, etc )  - Arrange for interpretive services to assist at discharge as needed  - Refer to Case Management Department for coordinating discharge planning if the patient needs post-hospital services based on physician/advanced practitioner order or complex needs related to functional status, cognitive ability, or social support system  Outcome: Progressing     Problem: Knowledge Deficit  Goal: Patient/family/caregiver demonstrates understanding of disease process, treatment plan, medications, and discharge instructions  Description: Complete learning assessment and assess knowledge base    Interventions:  - Provide teaching at level of understanding  - Provide teaching via preferred learning methods  Outcome: Progressing     Problem: SKIN/TISSUE INTEGRITY - ADULT  Goal: Incision(s), wounds(s) or drain site(s) healing without S/S of infection  Description: INTERVENTIONS  - Assess and document dressing, incision, wound bed, drain sites and surrounding tissue  - Provide patient and family education  Outcome: Progressing

## 2022-02-22 NOTE — CONSULTS
Consult Note- Podiatry   Ramon Gaytan 60 y o  male MRN: [id number]  Unit/Bed#: 7T [facility] 712-02 Encounter: [id number]    Assessment/Plan     Assessment:  1  R 2nd toe osteomyelitis of distal tuft  2  DM c DN  3  R 2nd digit ulcer down to bone  4  R 2nd toe infection     Plan:  - -pt eval and managed    - Number and complexity of problems addressed:  1 undiagnosed new problem with uncertain prognosis   as shown    - Amount/complexity of data reviewed and analyzed:     Category 1: prior patient notes were analyzed today before evaluating and managing patient  All PMH were discussed with pt today  - Risk of complications: moderate risk of morbidity from additional testing or treatment involved with this patient, which includes but not limited to:    - discussed anatomy, condition, treatment plan and options  They were instructed on proper foot care  The patient was seen today for greater than total of  45-59 minutes     This is total time spent today involving both face-to-face time and non face-to-face time  This time spent includes  reviewing their past medical history  , performing a medically appropriate examination and evaluation of the patient, counseling and educating the patient,  documenting all findings in EMR, and independently interpreting results and communicating results with  patient     and discussing their condition and treatment options, risks, and potential complications  I have discussed the findings of this examination with the patient  The discussion included a complete verbal explanation of the examination results, diagnosis and planned treatment(s)  A schedule for future care needs was explained  The patient has verbalized the understanding of these instructions at this time  If any questions should arise after returning home I have encouraged the patient to feel free to call the office  - xrays from office reviewed by me   Distal tuft of 2nd digit with erosive changes  - plan for partial R 2nd digit amputation tomorrow  - consent form signed  - NPO order placed  - all questions and concerns addressed    - thank you for the consult  History of Present Illness     HPI:  Zenobia Boswell is a 60 y o  male who presents with R 2nd toe wound for several weeks at this time  Pt had partial R 3rd toe amputation on 1/12/2022  Pt healed this well  Developed 2nd toe wound a couple weeks after  Little to no discomfort to the area  Increased swelling of toe  Redness to DIPJ level  Mild drainage  No other complaints  Inpatient consult to Podiatry  Consult performed by: Diana Guzman DPM  Consult ordered by: Georgia Loza DO        Review of Systems   Constitutional: Negative  HENT: Negative  Eyes: Negative  Respiratory: Negative  Cardiovascular: Negative  Gastrointestinal: Negative  Musculoskeletal: Negative   Skin: R 2nd toe wound   Neurological: Negative          Historical Information   Past Medical History:   Diagnosis Date    Atrial fibrillation ([facility] [address] )     Chronic diastolic (congestive) heart failure (HCC)     Diabetes mellitus ([facility] [address] )     High cholesterol     Hyperlipidemia     Hypertension     Pacemaker     Stroke [facility])      Past Surgical History:   Procedure Laterality Date    APPENDECTOMY      ATRIAL CARDIAC PACEMAKER INSERTION      BARIATRIC SURGERY  05/2021    NERVE BLOCK Right 2/10/2022    Procedure: BLOCK MEDIAL BRANCH C3, C4, C5 #1;  Surgeon: Rosemary Schultz MD;  Location: [facility];  Service: Pain Management     ID AMPUTATION TOE,I-P JT Left 11/16/2021    Procedure: AMPUTATION LESSER TOE;  Surgeon: Liu Cervantes DPM;  [facility];  Service: Podiatry    TOE AMPUTATION Left     2nd toe    TOE AMPUTATION Left 9/15/2021    Procedure: AMPUTATION LEFT 4TH TOE;  Surgeon: Liu Cervantes DPM;  [facility];  Service: Podiatry    TOE AMPUTATION Right 1/12/2022    Procedure: AMPUTATION TOE;  Surgeon: Diana Guzman DPM;  [facility];  Service: Podiatry     Social History   Social History     Substance and Sexual Activity   Alcohol Use Never     Social History     Substance and Sexual Activity   Drug Use Never     Social History     Tobacco Use   Smoking Status Never Smoker   Smokeless Tobacco Never Used     Family History:   Family History   Problem Relation Age of Onset    No Known Problems Mother     No Known Problems Father        Meds/Allergies   Medications Prior to Admission   Medication    atorvastatin (LIPITOR) 40 mg tablet    FLUoxetine (PROzac) 40 MG capsule    furosemide (LASIX) 40 mg tablet    gabapentin (NEURONTIN) 100 mg capsule    metolazone (ZAROXOLYN) 2 5 mg tablet    traMADol (ULTRAM) 50 mg tablet    hydrOXYzine HCL (ATARAX) 25 mg tablet    lidocaine (Lidoderm) 5 %    misoprostol (CYTOTEC) 200 mcg tablet    omeprazole (PriLOSEC) 20 mg delayed release capsule    sucralfate (CARAFATE) 1 g tablet     No Known Allergies    Objective   First Vitals:   Blood Pressure: 148/75 (02/22/22 1100)  Pulse: 97 (02/22/22 1100)  Temperature: 97 8 °F (36 6 °C) (02/22/22 1100)  Temp Source: Temporal (02/22/22 1100)  Respirations: 20 (02/22/22 1100)  Weight - Scale: (!) 147 kg (324 lb 8 3 oz) (02/22/22 1100)  SpO2: 100 % (02/22/22 1100)    Current Vitals:   Blood Pressure: 148/75 (02/22/22 1100)  Pulse: 97 (02/22/22 1100)  Temperature: 97 8 °F (36 6 °C) (02/22/22 1100)  Temp Source: Temporal (02/22/22 1100)  Respirations: 20 (02/22/22 1100)  Weight - Scale: (!) 147 kg (324 lb 8 3 oz) (02/22/22 1100)  SpO2: 100 % (02/22/22 1100)    /75 (BP Location: Right arm)   Pulse 97   Temp 97 8 °F (36 6 °C) (Temporal)   Resp 20   Wt (!) 147 kg (324 lb 8 3 oz)   SpO2 100%   BMI 42 23 kg/m²     General Appearance:    Alert, cooperative, no distress   Head:    Normocephalic, without obvious abnormality, atraumatic   Eyes:    PERRL, conjunctiva/corneas clear, EOM's intact            Nose:   Moist mucous membranes, no drainage or sinus tenderness Throat:   No tenderness, no exudates   Neck:   Supple, symmetrical, trachea midline, no JVD   Back:     Symmetric, no CVA tenderness   Lungs:     Clear to auscultation bilaterally, respirations unlabored   Chest wall:    No tenderness or deformity   Heart:    Regular rate and rhythm, S1 and S2 normal, no murmur, rub   or gallop   Abdomen:     Soft, non-tender, bowel sounds active all four quadrants,     no masses, no organomegaly     Extremities:   MMT is 4/5 to all compartments of the LE, +1/4 edema B/L, Digital ROM is intact,     Amputated L digits 2-4      Partial R 3rd toe amputation       Pulses:   R DP is +1/4, R PT is +1/4, L DP is +1/4, L PT is +1/4, CFT< 3sec to all digits  Thin/shiny skin noted to the B/L LE, pigmentary changes to B/L LE  Absent digital hair growth b/l  Nail thickening b/l  Skin:   R distal 2nd digit wound, probes to bone, erythema and edema of distal digit               Neurologic:   CNII-XII intact  Normal strength, sensation and reflexes       Throughout  Gross sensation is intact  Protective sensation is absent       Lab Results:   No visits with results within 1 Day(s) from this visit  Latest known visit with results is:   Admission on 01/10/2022, Discharged on 01/12/2022   Component Date Value    Blood Culture 01/10/2022 No Growth After 5 Days   Blood Culture 01/10/2022 No Growth After 5 Days       Wound Culture 01/10/2022 1+ Growth of      Wound Culture 01/10/2022 Few Colonies of Staphylococcus aureus*    Gram Stain Result 01/10/2022 No Polys or Bacteria seen     Anaerobic Culture 01/10/2022 Few Colonies of Pseudomonas aeruginosa*    Anaerobic Culture 01/10/2022 3+ Growth of Finegoldia magna*    POC Glucose 01/10/2022 87     POC Glucose 01/10/2022 102     Sodium 01/11/2022 139     Potassium 01/11/2022 3 2*    Chloride 01/11/2022 98*    CO2 01/11/2022 34*    ANION GAP 01/11/2022 7     BUN 01/11/2022 14     Creatinine 01/11/2022 1 13     Glucose 01/11/2022 110     Calcium 01/11/2022 8 8     Corrected Calcium 01/11/2022 9 4     AST 01/11/2022 39     ALT 01/11/2022 22     Alkaline Phosphatase 01/11/2022 97     Total Protein 01/11/2022 7 5     Albumin 01/11/2022 3 3*    Total Bilirubin 01/11/2022 0 49     eGFR 01/11/2022 71     WBC 01/11/2022 6 02     RBC 01/11/2022 5 40     Hemoglobin 01/11/2022 15 5     Hematocrit 01/11/2022 47 0     MCV 01/11/2022 87     MCH 01/11/2022 28 7     MCHC 01/11/2022 33 0     RDW 01/11/2022 13 9     MPV 01/11/2022 9 3     Platelets 86/07/4494 269     nRBC 01/11/2022 0     Neutrophils Relative 01/11/2022 54     Immat GRANS % 01/11/2022 0     Lymphocytes Relative 01/11/2022 28     Monocytes Relative 01/11/2022 14*    Eosinophils Relative 01/11/2022 3     Basophils Relative 01/11/2022 1     Neutrophils Absolute 01/11/2022 3 23     Immature Grans Absolute 01/11/2022 0 02     Lymphocytes Absolute 01/11/2022 1 70     Monocytes Absolute 01/11/2022 0 82     Eosinophils Absolute 01/11/2022 0 20     Basophils Absolute 01/11/2022 0 05     POC Glucose 01/10/2022 161*    POC Glucose 01/11/2022 94     Hemoglobin A1C 01/11/2022 5 8*    EAG 01/11/2022 120     POC Glucose 01/11/2022 125     POC Glucose 01/11/2022 110     POC Glucose 01/11/2022 75     POC Glucose 01/11/2022 151*    Sodium 01/12/2022 139     Potassium 01/12/2022 3 4*    Chloride 01/12/2022 100     CO2 01/12/2022 30     ANION GAP 01/12/2022 9     BUN 01/12/2022 15     Creatinine 01/12/2022 1 04     Glucose 01/12/2022 92     Calcium 01/12/2022 8 7     eGFR 01/12/2022 78     WBC 01/12/2022 6 81     RBC 01/12/2022 5 43     Hemoglobin 01/12/2022 15 1     Hematocrit 01/12/2022 48 3     MCV 01/12/2022 89     MCH 01/12/2022 27 8     MCHC 01/12/2022 31 3*    RDW 01/12/2022 13 8     Platelets 21/13/9874 303     MPV 01/12/2022 9 6     Anaerobic Culture 01/12/2022 No growth     Tissue Culture 01/12/2022 Growth in Broth culture only Enterococcus avium*    Gram Stain Result 01/12/2022 No Polys or Bacteria seen     Case Report 01/12/2022                      Value:Surgical Pathology Report                         Case: [id number]                                   Authorizing Provider:  Sunny Dee DPM          Collected:           01/12/2022 0745              Ordering Location:     [facility]        Received:            01/12/2022 [address] [place] [address] Room                                                     Pathologist:           [id number] Matti Ward MD                                                        Specimen:    Toe, Right                                                                                 Final Diagnosis 01/12/2022                      Value: This result contains rich text formatting which cannot be displayed here   Additional Information 01/12/2022                      Value: This result contains rich text formatting which cannot be displayed here  Maria Luisa Cardona Gross Description 01/12/2022                      Value: This result contains rich text formatting which cannot be displayed here   POC Glucose 01/12/2022 97     POC Glucose 01/12/2022 99     POC Glucose 01/12/2022 142*     Imaging: I have personally reviewed pertinent films in PACS  EKG, Pathology, and Other Studies: I have personally reviewed pertinent reports        Code Status: Level 3 - DNAR and DNI  Advance Directive and Living Will:      Power of :    POLST:

## 2022-02-22 NOTE — ASSESSMENT & PLAN NOTE
Lab Results   Component Value Date    HGBA1C 5 8 (H) 01/11/2022       No results for input(s): POCGLU in the last 72 hours      Blood Sugar Average: Last 72 hrs:    -hold home metformin  -sliding scale insulin while inpatient  -carb consistent diet

## 2022-02-22 NOTE — H&P
[facility]  H&P- Olga Levy 1965, 60 y o  male MRN: [id number]  Unit/Bed#: 7T [facility] 712-02 Encounter: [id number]  Primary Care Provider: Brenna Luciano DO   Date and time admitted to hospital: 2/22/2022 10:54 AM    * Toe osteomyelitis, right [facility])  Assessment & Plan  Patient has been sent to hospital as direct admit by Podiatry for suspected osteomyelitis of right 2nd toe  Other than pain, patient minimally symptomatic at time of admission    -will obtain MRI  -obtain blood cultures  -Ancef  -podiatry consult placed, appreciate recommendations  -NPO at midnight for potential surgical procedure tomorrow  -obtain baseline labs      Type 2 diabetes mellitus with diabetic polyneuropathy, with long-term current use of insulin ([facility])  Assessment & Plan  Lab Results   Component Value Date    HGBA1C 5 8 (H) 01/11/2022       No results for input(s): POCGLU in the last 72 hours  Blood Sugar Average: Last 72 hrs:    -hold home metformin  -sliding scale insulin while inpatient  -carb consistent diet      Anxiety  Assessment & Plan  -continue home Atarax and Prozac    Atrial fibrillation [facility])  Assessment & Plan  With history of AFib, not currently on any medications on outpatient basis  Morbid obesity ([address] )  Assessment & Plan  -discussed diet and exercise    Chronic diastolic heart failure [facility])  Assessment & Plan  Wt Readings from Last 3 Encounters:   02/22/22 (!) 147 kg (324 lb 8 3 oz)   02/20/22 (!) 142 kg (312 lb)   02/10/22 (!) 146 kg (322 lb)         -continue home Lasix and metolazone    VTE Pharmacologic Prophylaxis: VTE Score: 4 Moderate Risk (Score 3-4) - Pharmacological DVT Prophylaxis Ordered: heparin    Code Status: Level 3 - DNAR and DNI   Discussion with family:  Discussed with patient, all questions answered    Anticipated Length of Stay: Patient will be admitted on an observation basis with an anticipated length of stay of less than 2 midnights secondary to Osteomyelitis, requiring surgical intervention  Total Time for Visit, including Counseling / Coordination of Care: 45 minutes Greater than 50% of this total time spent on direct patient counseling and coordination of care  Chief Complaint:  Toe infection    History of Present Illness:  Freddie Jones is a 60 y o  male with a PMH of diabetes, history of osteomyelitis requiring amputation, anxiety, chronic diastolic heart failure, hyperlipidemia, who presents with right 2nd toe infection  Patient was told by Podiatry office yesterday to be directly admitted to [facility]  Patient came today due to bed availability issues  Patient states he was a podiatry office was told that he has osteomyelitis, currently I have no access to records to confirm this however will admit to medicine service  Patient reports he also had debridement done approximately 1 week ago has been treated with doxycycline  Other than pain, patient minimal symptoms at this time  Review of Systems:  Review of Systems   Constitutional: Negative  HENT: Negative  Eyes: Negative  Respiratory: Negative  Cardiovascular: Positive for leg swelling  Gastrointestinal: Negative  Genitourinary: Negative  Skin: Positive for wound  Neurological: Negative  Hematological: Negative  Psychiatric/Behavioral: Negative          Past Medical and Surgical History:   Past Medical History:   Diagnosis Date    Atrial fibrillation (HCC)     Chronic diastolic (congestive) heart failure (HCC)     Diabetes mellitus ([address] )     High cholesterol     Hyperlipidemia     Hypertension     Pacemaker     Stroke [facility])        Past Surgical History:   Procedure Laterality Date    APPENDECTOMY      ATRIAL CARDIAC PACEMAKER INSERTION      BARIATRIC SURGERY  05/2021    NERVE BLOCK Right 2/10/2022    Procedure: BLOCK MEDIAL BRANCH C3, C4, C5 #1;  Surgeon: Kyung Mercer MD;  [facility];  Service: Pain Management     HI AMPUTATION TOE,I-P JT Left 11/16/2021    Procedure: AMPUTATION LESSER TOE;  Surgeon: Darvin Gunn DPM;  [facility];  Service: Podiatry    TOE AMPUTATION Left     2nd toe    TOE AMPUTATION Left 9/15/2021    Procedure: AMPUTATION LEFT 4TH TOE;  Surgeon: Darvin Gunn DPM;  [facility];  Service: Podiatry    TOE AMPUTATION Right 1/12/2022    Procedure: AMPUTATION TOE;  Surgeon: Tahira Peña DPM;  [facility];  Service: Podiatry       Meds/Allergies:  Prior to Admission medications    Medication Sig Start Date End Date Taking? Authorizing Provider   atorvastatin (LIPITOR) 40 mg tablet Take 40 mg by mouth daily   Yes Historical Provider, MD   FLUoxetine (PROzac) 40 MG capsule  1/19/22  Yes Historical Provider, MD   furosemide (LASIX) 40 mg tablet Take 60 mg by mouth 2 (two) times a day 3/16/18  Yes Historical Provider, MD   gabapentin (NEURONTIN) 100 mg capsule  2/2/22  Yes Historical Provider, MD   metolazone (ZAROXOLYN) 2 5 mg tablet Take 2 5 mg by mouth daily   Yes Historical Provider, MD   traMADol (ULTRAM) 50 mg tablet Take 50 mg by mouth daily  11/10/18  Yes Historical Provider, MD   hydrOXYzine HCL (ATARAX) 25 mg tablet Take 25 mg by mouth 4 (four) times a day as needed 7/26/21   Historical Provider, MD   lidocaine (Lidoderm) 5 % Apply 1 patch topically daily Remove & Discard patch within 12 hours or as directed by MD 2/20/22   Cathie Alstrom, DO   misoprostol (CYTOTEC) 200 mcg tablet Take 200 mcg by mouth 4 (four) times a day  Patient not taking: Reported on 2/22/2022     Historical Provider, MD   omeprazole (PriLOSEC) 20 mg delayed release capsule Take 20 mg by mouth daily  Patient not taking: Reported on 2/22/2022     Historical Provider, MD   sucralfate (CARAFATE) 1 g tablet Take 1 g by mouth 4 (four) times a day  Patient not taking: Reported on 2/22/2022     Historical Provider, MD     I have reviewed home medications with patient personally      Allergies: No Known Allergies    Social History:  Marital Status: /Civil Union   Substance Use History:   Social History     Substance and Sexual Activity   Alcohol Use Never     Social History     Tobacco Use   Smoking Status Never Smoker   Smokeless Tobacco Never Used     Social History     Substance and Sexual Activity   Drug Use Never       Family History:  Family History   Problem Relation Age of Onset    No Known Problems Mother     No Known Problems Father        Physical Exam:     Vitals:   Blood Pressure: 148/75 (02/22/22 1100)  Pulse: 97 (02/22/22 1100)  Temperature: 97 8 °F (36 6 °C) (02/22/22 1100)  Temp Source: Temporal (02/22/22 1100)  Respirations: 20 (02/22/22 1100)  Weight - Scale: (!) 147 kg (324 lb 8 3 oz) (02/22/22 1100)  SpO2: 100 % (02/22/22 1100)    Physical Exam  Constitutional:       Appearance: He is obese  Cardiovascular:      Rate and Rhythm: Normal rate and regular rhythm  Pulses: Normal pulses  Heart sounds: Normal heart sounds  Pulmonary:      Effort: Pulmonary effort is normal       Breath sounds: Normal breath sounds  Abdominal:      General: Abdomen is flat  Bowel sounds are normal       Palpations: Abdomen is soft  Musculoskeletal:         General: Normal range of motion  Cervical back: Normal range of motion  Skin:     Comments: Wound right 2nd toe   Neurological:      General: No focal deficit present  Mental Status: He is alert and oriented to person, place, and time  Mental status is at baseline  Psychiatric:         Mood and Affect: Mood normal          Behavior: Behavior normal          Thought Content: Thought content normal          Judgment: Judgment normal           Additional Data:     Lab Results:                            Imaging: No pertinent imaging reviewed  MRI foot/forefoot toes right w wo contrast    (Results Pending)       EKG and Other Studies Reviewed on Admission:   · EKG: No EKG obtained      ** Please Note: This note has been constructed using a voice recognition system   **

## 2022-02-23 ENCOUNTER — ANESTHESIA EVENT (INPATIENT)
Dept: PERIOP | Facility: HOSPITAL | Age: 57
DRG: 617 | End: 2022-02-23
Payer: COMMERCIAL

## 2022-02-23 ENCOUNTER — APPOINTMENT (INPATIENT)
Dept: RADIOLOGY | Facility: HOSPITAL | Age: 57
DRG: 617 | End: 2022-02-23
Payer: COMMERCIAL

## 2022-02-23 ENCOUNTER — ANESTHESIA (INPATIENT)
Dept: PERIOP | Facility: HOSPITAL | Age: 57
DRG: 617 | End: 2022-02-23
Payer: COMMERCIAL

## 2022-02-23 LAB
ALBUMIN SERPL BCP-MCNC: 4.4 G/DL (ref 3–5.2)
ALP SERPL-CCNC: 104 U/L (ref 43–122)
ALT SERPL W P-5'-P-CCNC: 20 U/L
ANION GAP SERPL CALCULATED.3IONS-SCNC: 7 MMOL/L (ref 5–14)
AST SERPL W P-5'-P-CCNC: 29 U/L (ref 17–59)
BILIRUB SERPL-MCNC: 0.82 MG/DL
BUN SERPL-MCNC: 16 MG/DL (ref 5–25)
CALCIUM SERPL-MCNC: 8.9 MG/DL (ref 8.4–10.2)
CHLORIDE SERPL-SCNC: 92 MMOL/L (ref 97–108)
CO2 SERPL-SCNC: 38 MMOL/L (ref 22–30)
CREAT SERPL-MCNC: 0.99 MG/DL (ref 0.7–1.5)
ERYTHROCYTE [DISTWIDTH] IN BLOOD BY AUTOMATED COUNT: 14.8 %
GFR SERPL CREATININE-BSD FRML MDRD: 83 ML/MIN/1.73SQ M
GLUCOSE P FAST SERPL-MCNC: 106 MG/DL (ref 70–99)
GLUCOSE SERPL-MCNC: 106 MG/DL (ref 70–99)
GLUCOSE SERPL-MCNC: 109 MG/DL (ref 65–140)
GLUCOSE SERPL-MCNC: 114 MG/DL (ref 65–140)
GLUCOSE SERPL-MCNC: 115 MG/DL (ref 65–140)
GLUCOSE SERPL-MCNC: 119 MG/DL (ref 65–140)
GLUCOSE SERPL-MCNC: 180 MG/DL (ref 65–140)
HCT VFR BLD AUTO: 44.5 % (ref 41–53)
HGB BLD-MCNC: 15.3 G/DL (ref 13.5–17.5)
MAGNESIUM SERPL-MCNC: 1.9 MG/DL (ref 1.6–2.3)
MCH RBC QN AUTO: 29.4 PG (ref 26–34)
MCHC RBC AUTO-ENTMCNC: 34.5 G/DL (ref 31–36)
MCV RBC AUTO: 85 FL (ref 80–100)
PLATELET # BLD AUTO: 256 THOUSANDS/UL (ref 150–450)
PMV BLD AUTO: 8.3 FL (ref 8.9–12.7)
POTASSIUM SERPL-SCNC: 2.7 MMOL/L (ref 3.6–5)
POTASSIUM SERPL-SCNC: 3.1 MMOL/L (ref 3.6–5)
PROT SERPL-MCNC: 8.2 G/DL (ref 5.9–8.4)
RBC # BLD AUTO: 5.22 MILLION/UL (ref 4.5–5.9)
SODIUM SERPL-SCNC: 137 MMOL/L (ref 137–147)
WBC # BLD AUTO: 10.2 THOUSAND/UL (ref 4.5–11)

## 2022-02-23 PROCEDURE — 85027 COMPLETE CBC AUTOMATED: CPT | Performed by: STUDENT IN AN ORGANIZED HEALTH CARE EDUCATION/TRAINING PROGRAM

## 2022-02-23 PROCEDURE — 88304 TISSUE EXAM BY PATHOLOGIST: CPT | Performed by: PATHOLOGY

## 2022-02-23 PROCEDURE — 82948 REAGENT STRIP/BLOOD GLUCOSE: CPT

## 2022-02-23 PROCEDURE — 88311 DECALCIFY TISSUE: CPT | Performed by: PATHOLOGY

## 2022-02-23 PROCEDURE — 99232 SBSQ HOSP IP/OBS MODERATE 35: CPT | Performed by: STUDENT IN AN ORGANIZED HEALTH CARE EDUCATION/TRAINING PROGRAM

## 2022-02-23 PROCEDURE — 84132 ASSAY OF SERUM POTASSIUM: CPT | Performed by: INTERNAL MEDICINE

## 2022-02-23 PROCEDURE — 0Y6R0Z1 DETACHMENT AT RIGHT 2ND TOE, HIGH, OPEN APPROACH: ICD-10-PCS | Performed by: PODIATRIST

## 2022-02-23 PROCEDURE — 83735 ASSAY OF MAGNESIUM: CPT | Performed by: STUDENT IN AN ORGANIZED HEALTH CARE EDUCATION/TRAINING PROGRAM

## 2022-02-23 PROCEDURE — 73630 X-RAY EXAM OF FOOT: CPT

## 2022-02-23 PROCEDURE — 80053 COMPREHEN METABOLIC PANEL: CPT | Performed by: STUDENT IN AN ORGANIZED HEALTH CARE EDUCATION/TRAINING PROGRAM

## 2022-02-23 RX ORDER — MAGNESIUM HYDROXIDE 1200 MG/15ML
LIQUID ORAL AS NEEDED
Status: DISCONTINUED | OUTPATIENT
Start: 2022-02-23 | End: 2022-02-23 | Stop reason: HOSPADM

## 2022-02-23 RX ORDER — POTASSIUM CHLORIDE 14.9 MG/ML
20 INJECTION INTRAVENOUS ONCE
Status: COMPLETED | OUTPATIENT
Start: 2022-02-23 | End: 2022-02-23

## 2022-02-23 RX ORDER — POTASSIUM CHLORIDE 14.9 MG/ML
20 INJECTION INTRAVENOUS
Status: COMPLETED | OUTPATIENT
Start: 2022-02-23 | End: 2022-02-23

## 2022-02-23 RX ORDER — MIDAZOLAM HYDROCHLORIDE 2 MG/2ML
INJECTION, SOLUTION INTRAMUSCULAR; INTRAVENOUS AS NEEDED
Status: DISCONTINUED | OUTPATIENT
Start: 2022-02-23 | End: 2022-02-23

## 2022-02-23 RX ORDER — PROPOFOL 10 MG/ML
INJECTION, EMULSION INTRAVENOUS AS NEEDED
Status: DISCONTINUED | OUTPATIENT
Start: 2022-02-23 | End: 2022-02-23

## 2022-02-23 RX ORDER — FENTANYL CITRATE 50 UG/ML
INJECTION, SOLUTION INTRAMUSCULAR; INTRAVENOUS AS NEEDED
Status: DISCONTINUED | OUTPATIENT
Start: 2022-02-23 | End: 2022-02-23

## 2022-02-23 RX ORDER — SODIUM CHLORIDE, SODIUM LACTATE, POTASSIUM CHLORIDE, CALCIUM CHLORIDE 600; 310; 30; 20 MG/100ML; MG/100ML; MG/100ML; MG/100ML
20 INJECTION, SOLUTION INTRAVENOUS CONTINUOUS
Status: DISCONTINUED | OUTPATIENT
Start: 2022-02-23 | End: 2022-02-23

## 2022-02-23 RX ORDER — KETAMINE HYDROCHLORIDE 50 MG/ML
INJECTION, SOLUTION, CONCENTRATE INTRAMUSCULAR; INTRAVENOUS AS NEEDED
Status: DISCONTINUED | OUTPATIENT
Start: 2022-02-23 | End: 2022-02-23

## 2022-02-23 RX ORDER — ONDANSETRON 2 MG/ML
4 INJECTION INTRAMUSCULAR; INTRAVENOUS EVERY 8 HOURS PRN
Status: DISCONTINUED | OUTPATIENT
Start: 2022-02-23 | End: 2022-02-24 | Stop reason: HOSPADM

## 2022-02-23 RX ORDER — POTASSIUM CHLORIDE 20 MEQ/1
40 TABLET, EXTENDED RELEASE ORAL ONCE
Status: COMPLETED | OUTPATIENT
Start: 2022-02-23 | End: 2022-02-23

## 2022-02-23 RX ORDER — SODIUM CHLORIDE 9 MG/ML
INJECTION, SOLUTION INTRAVENOUS CONTINUOUS PRN
Status: DISCONTINUED | OUTPATIENT
Start: 2022-02-23 | End: 2022-02-23

## 2022-02-23 RX ORDER — FENTANYL CITRATE/PF 50 MCG/ML
25 SYRINGE (ML) INJECTION
Status: DISCONTINUED | OUTPATIENT
Start: 2022-02-23 | End: 2022-02-23 | Stop reason: HOSPADM

## 2022-02-23 RX ADMIN — SODIUM CHLORIDE: 0.9 INJECTION, SOLUTION INTRAVENOUS at 16:08

## 2022-02-23 RX ADMIN — OXYCODONE HYDROCHLORIDE 10 MG: 10 TABLET ORAL at 22:23

## 2022-02-23 RX ADMIN — OXYCODONE HYDROCHLORIDE 10 MG: 10 TABLET ORAL at 05:42

## 2022-02-23 RX ADMIN — ONDANSETRON 4 MG: 2 INJECTION INTRAMUSCULAR; INTRAVENOUS at 07:21

## 2022-02-23 RX ADMIN — POTASSIUM CHLORIDE 20 MEQ: 14.9 INJECTION, SOLUTION INTRAVENOUS at 12:59

## 2022-02-23 RX ADMIN — POTASSIUM CHLORIDE 20 MEQ: 14.9 INJECTION, SOLUTION INTRAVENOUS at 15:31

## 2022-02-23 RX ADMIN — KETAMINE HYDROCHLORIDE 20 MG: 50 INJECTION, SOLUTION INTRAMUSCULAR; INTRAVENOUS at 16:14

## 2022-02-23 RX ADMIN — FENTANYL CITRATE 25 MCG: 50 INJECTION, SOLUTION INTRAMUSCULAR; INTRAVENOUS at 16:26

## 2022-02-23 RX ADMIN — CEFAZOLIN SODIUM 2000 MG: 2 SOLUTION INTRAVENOUS at 05:34

## 2022-02-23 RX ADMIN — FENTANYL CITRATE 50 MCG: 50 INJECTION, SOLUTION INTRAMUSCULAR; INTRAVENOUS at 16:14

## 2022-02-23 RX ADMIN — CEFAZOLIN SODIUM 2000 MG: 2 SOLUTION INTRAVENOUS at 12:24

## 2022-02-23 RX ADMIN — HEPARIN SODIUM 5000 UNITS: 5000 INJECTION INTRAVENOUS; SUBCUTANEOUS at 21:21

## 2022-02-23 RX ADMIN — CEFAZOLIN SODIUM 2000 MG: 2 SOLUTION INTRAVENOUS at 21:21

## 2022-02-23 RX ADMIN — GABAPENTIN 100 MG: 100 CAPSULE ORAL at 18:55

## 2022-02-23 RX ADMIN — HYDROMORPHONE HYDROCHLORIDE 0.5 MG: 1 INJECTION, SOLUTION INTRAMUSCULAR; INTRAVENOUS; SUBCUTANEOUS at 08:46

## 2022-02-23 RX ADMIN — POTASSIUM CHLORIDE 40 MEQ: 1500 TABLET, EXTENDED RELEASE ORAL at 07:10

## 2022-02-23 RX ADMIN — FLUOXETINE 40 MG: 20 CAPSULE ORAL at 08:47

## 2022-02-23 RX ADMIN — HYDROXYZINE HYDROCHLORIDE 25 MG: 25 TABLET ORAL at 08:46

## 2022-02-23 RX ADMIN — PROPOFOL 50 MG: 10 INJECTION, EMULSION INTRAVENOUS at 16:15

## 2022-02-23 RX ADMIN — GABAPENTIN 100 MG: 100 CAPSULE ORAL at 08:47

## 2022-02-23 RX ADMIN — GABAPENTIN 100 MG: 100 CAPSULE ORAL at 23:01

## 2022-02-23 RX ADMIN — MIDAZOLAM 2 MG: 1 INJECTION INTRAMUSCULAR; INTRAVENOUS at 16:13

## 2022-02-23 RX ADMIN — POTASSIUM CHLORIDE 20 MEQ: 14.9 INJECTION, SOLUTION INTRAVENOUS at 07:10

## 2022-02-23 RX ADMIN — HEPARIN SODIUM 5000 UNITS: 5000 INJECTION INTRAVENOUS; SUBCUTANEOUS at 05:35

## 2022-02-23 NOTE — UTILIZATION REVIEW
Initial Clinical Review    Pt initially admitted as Observation on 02/22/22  Changed to Inpatient on 02/23/22  Pt requiring continued stay d/t osteomyelitis necessitating IV ABX & surgical intervention; severe hypokalemia necessitating continued monitoring and repletion of electrolytes  Admission: Date/Time/Statement:   Admission Orders (From admission, onward)     Ordered        02/23/22 0952  Inpatient Admission  Once            02/22/22 1120  Place in Observation  Once                      Orders Placed This Encounter   Procedures    Inpatient Admission     Standing Status:   Standing     Number of Occurrences:   1     Order Specific Question:   Level of Care     Answer:   Med Surg [16]     Order Specific Question:   Estimated length of stay     Answer:   More than 2 Midnights     Order Specific Question:   Certification     Answer:   I certify that inpatient services are medically necessary for this patient for a duration of greater than two midnights  See H&P and MD Progress Notes for additional information about the patient's course of treatment  Initial Presentation: 60 y o  male who presented from Podiatry to [address] [place] [address] as a direct admission  Admitted in observation status for evaluation and treatment of R toe osteomyelitis  PMHx:  has a past medical history of Atrial fibrillation ([facility] [address] [facility] ), Chronic diastolic (congestive) heart failure ([facility] [facility] ), Diabetes mellitus ([facility] ), High cholesterol, Hyperlipidemia, Hypertension, Pacemaker, and Stroke ([facility] )  Presented w/ R second toe osteomyelitis  On exam, wound on R second toe, obese  Plan MRI, blood cultures, IV ABX, NPO, SSI w/ meal, continue home meds, trend labs  Podiatry consulted  02/22/22 Podiatry Consult: Pt w/ R second toe wound, had a partial R 3rd toe amputation on 01/12/22 which is healing well  On exam, increased swelling, redness, mild drainage  Plan: NPO after midnight for toe amputation              02/23/22 Changed to Inpatient Status  Internal Medicine: Pt complains of hunger s/t NPO status and not having been to the OR yet  On exam, b/l LE edema, R 2nd toe wound  Plan: IV ABX, to OR w/ podiatry, follow blood cultures, SSI w/ meals post-op, accuchecks  02/23/22 Surgery  Procedure: AMPUTATION TOE  RIGHT SECOND (Right)  Indication: Toe osteomyelitis, right (HCC) [M86 9]  Anesthesia: IV Sedation with 5 ml of 1% Lidocaine and 0 5% Bupivacaine in a 1:1 mixture  ASA Score: 3, BMI 42 81  Findings: Consistent with Diagnosis      Date: 02/24/22 Day 2  Podiatry: Pt w/ no pain to surgical site  Surgical intervention was cure for R toe osteomyelitis  On exam, R 2nd digit w/ sutures intact, skin well coapted  Plan: changed dressings, they should remain intact until outpatient follow-up; WBAT surgical shoe         Wt Readings from Last 1 Encounters:   02/22/22 (!) 147 kg (324 lb 8 3 oz)     Vital Signs:   Date/Time Temp Pulse Resp BP MAP (mmHg) SpO2 Calculated FIO2 (%) - Nasal Cannula O2 Flow Rate (L/min) O2 Device   02/24/22 0733 97 8 °F (36 6 °C) 62 18 116/67 72 98 % -- -- None (Room air)   02/24/22 0400 96 7 °F (35 9 °C) Abnormal  68 18 123/72 78 -- -- -- --   02/24/22 0345 96 6 °F (35 9 °C) Abnormal  71 18 95/56 -- 97 % -- -- None (Room air)   02/23/22 2345 97 9 °F (36 6 °C) 60 18 121/79 -- 97 % -- -- None (Room air)   02/23/22 2245 96 8 °F (36 °C) Abnormal  68 18 137/75 82 94 % -- -- None (Room air)   02/23/22 2145 98 °F (36 7 °C) 63 18 115/78 86 96 % -- -- None (Room air)   02/23/22 2045 97 1 °F (36 2 °C) Abnormal  72 18 129/79 90 97 % -- -- None (Room air)   02/23/22 1945 96 7 °F (35 9 °C) Abnormal  76 18 142/84 95 95 % -- -- None (Room air)   02/23/22 1915 96 8 °F (36 °C) Abnormal  94 18 134/69 85 95 % -- -- None (Room air)   02/23/22 1845 97 °F (36 1 °C) Abnormal  82 18 126/78 92 96 % -- -- None (Room air)   02/23/22 1830 97 3 °F (36 3 °C) Abnormal  76 16 143/92 112 94 % -- -- None (Room air)   02/23/22 1815 97 1 °F (36 2 °C) Abnormal  76 16 144/95 113 94 % -- -- None (Room air)   02/23/22 1800 97 4 °F (36 3 °C) Abnormal  74 16 137/88 96 94 % -- -- None (Room air)   02/23/22 1730 -- 70 15 126/62 88 94 % 28 2 L/min Nasal cannula   02/23/22 1715 -- 77 13 129/62 87 93 % -- 3 L/min --   02/23/22 1700 -- 72 14 129/72 88 98 % -- -- --   02/23/22 1645 97 6 °F (36 4 °C) 66 12 112/59 77 93 % -- 6 L/min Simple mask   02/23/22 1525 97 6 °F (36 4 °C) 65 20 117/77 84 93 % -- -- None (Room air)   02/23/22 0710 97 1 °F (36 2 °C) Abnormal  71 19 115/78 -- 97 % -- -- None (Room air)   02/22/22 2303 97 1 °F (36 2 °C) Abnormal  80 20 117/75 -- 93 % -- -- None (Room air)   02/22/22 1512 96 5 °F (35 8 °C) Abnormal  66 20 117/71 88 94 % -- -- None (Room air)   02/22/22 1100 97 8 °F (36 6 °C) 97 20 148/75 89 100 % -- -- None (Room air)         Pertinent Labs/Diagnostic Test Results:   Results from last 7 days   Lab Units 02/23/22  0434 02/22/22  1432   WBC Thousand/uL 10 20 10 10   HEMOGLOBIN g/dL 15 3 14 6   HEMATOCRIT % 44 5 43 5   PLATELETS Thousands/uL 256 265     Results from last 7 days   Lab Units 02/24/22  0449 02/23/22  1116 02/23/22  0434 02/22/22 2037 02/22/22  1432   SODIUM mmol/L 138  --  137 137 135*   POTASSIUM mmol/L 3 0* 3 1* 2 7* 3 2* 2 7*   CHLORIDE mmol/L 96*  --  92* 94* 93*   CO2 mmol/L 36*  --  38* 36* 33*   ANION GAP mmol/L 6  --  7 7 9   BUN mg/dL 12  --  16 18 19   CREATININE mg/dL 0 78  --  0 99 0 92 0 85   EGFR ml/min/1 73sq m 99  --  83 91 96   CALCIUM mg/dL 8 5  --  8 9 8 9 8 7   MAGNESIUM mg/dL  --   --  1 9  --   --      Results from last 7 days   Lab Units 02/23/22  0434 02/22/22  1432   AST U/L 29 28   ALT U/L 20 19   ALK PHOS U/L 104 104   TOTAL PROTEIN g/dL 8 2 8 1   ALBUMIN g/dL 4 4 4 4   TOTAL BILIRUBIN mg/dL 0 82 0 82     Results from last 7 days   Lab Units 02/24/22  0531 02/23/22  2020 02/23/22  1822 02/23/22  1649 02/23/22  1140 02/23/22  0524 02/22/22 2002 02/22/22  1613   POC GLUCOSE mg/dl 105 180* 119 114 115 109 103 161* Results from last 7 days   Lab Units 02/24/22  0449 02/23/22  0434 02/22/22  2037 02/22/22  1432   GLUCOSE RANDOM mg/dL 93 106* 129* 112*     Results from last 7 days   Lab Units 02/22/22  1432   CRP mg/L 32 1*   SED RATE mm/hour 58*     Results from last 7 days   Lab Units 02/22/22  1436   BLOOD CULTURE  No Growth at 24 hrs  No Growth at 24 hrs  Past Medical History:   Diagnosis Date    Atrial fibrillation (HCC)     Chronic diastolic (congestive) heart failure (HCC)     Diabetes mellitus ([facility] )     High cholesterol     Hyperlipidemia     Hypertension     Pacemaker     Stroke [facility])      Present on Admission:   [facility] ([facility])   Anxiety   Morbid obesity (HCC)   Atrial fibrillation ([place] [address] )      Admitting Diagnosis: Abscess of second toe [L02 619]  Osteomyelitis ([place] [address] ) [M86 9]  Age/Sex: 60 y o  male  Admission Orders:  NPO  Accuchecks TID  Scheduled Medications:  atorvastatin, 40 mg, Oral, Daily  cefazolin, 2,000 mg, Intravenous, Q8H  FLUoxetine, 40 mg, Oral, Daily  furosemide, 60 mg, Oral, BID  gabapentin, 100 mg, Oral, TID  heparin (porcine), 5,000 Units, Subcutaneous, Q8H CRISTY  insulin lispro, 2-12 Units, Subcutaneous, TID AC  metolazone, 2 5 mg, Oral, Daily    Continuous IV Infusions: none     PRN Meds:  acetaminophen, 650 mg, Oral, Q6H PRN  HYDROmorphone, 0 5 mg, Intravenous, Q4H PRN; 2/22 x1, 2/23 x1  hydrOXYzine HCL, 25 mg, Oral, 4x Daily PRN; 2/23 x1  ondansetron, 4 mg, Intravenous, Q8H PRN; 2/23 x1  oxyCODONE, 10 mg, Oral, Q4H PRN; 2/22 x1, 2/23 x2, 2/24 x2  oxyCODONE, 5 mg, Oral, Q4H PRN; 2/22 x1  potassium chloride, 20 mEq, Intravenous, 2/22 x2, 2/23 x3  potassium chloride, 40 mEq, Oral; 2/22 x1, 2/23 x1, 2/24 x2        IP CONSULT TO PODIATRY    Network Utilization Review Department  ATTENTION: Please call with any questions or concerns to [phone] and carefully listen to the prompts so that you are directed to the right person   All voicemails are confidential   [facility] all requests for admission clinical reviews, approved or denied determinations and any other requests to dedicated fax number below belonging to the campus where the patient is receiving treatment   List of dedicated fax numbers for the Facilities:  [address] DENIALS (Administrative/Medical Necessity) [phone]   1000  [facility] (Maternity/NICU/Pediatrics) [phone]   [address] [place] [address]  [address] [facility] [place] [address] [address] [place] [address] [address] [facility] [phone]     ,

## 2022-02-23 NOTE — ANESTHESIA PREPROCEDURE EVALUATION
Procedure:  AMPUTATION TOE (Right Toe)    Relevant Problems   CARDIO   (+) Atrial fibrillation (HCC)   (+) Hypertension      ENDO   (+) Type 2 diabetes mellitus with diabetic polyneuropathy, with long-term current use of insulin (HCC)      NEURO/PSYCH   (+) Anxiety      PULMONARY   (+) DAYAN (obstructive sleep apnea)      Other   (+) Chronic diastolic heart failure (HCC)   (+) Chronic osteomyelitis of left foot with draining sinus (HCC)   (+) Diabetes mellitus (HCC)   (+) Morbid obesity (HCC)   (+) Toe osteomyelitis, right (HCC)        Physical Exam    Airway    Mallampati score: II  TM Distance: >3 FB  Neck ROM: full     Dental       Cardiovascular  Cardiovascular exam normal    Pulmonary  Pulmonary exam normal     Other Findings        Anesthesia Plan  ASA Score- 3     Anesthesia Type- IV sedation with anesthesia with ASA Monitors  Additional Monitors:   Airway Plan:           Plan Factors-Exercise tolerance (METS): <4 METS  Chart reviewed  EKG reviewed  Existing labs reviewed  Patient summary reviewed  Patient is not a current smoker  Patient not instructed to abstain from smoking on day of procedure  Patient did not smoke on day of surgery  Induction-     Postoperative Plan- Plan for postoperative opioid use  Informed Consent- Anesthetic plan and risks discussed with patient  I personally reviewed this patient with the CRNA  Discussed and agreed on the Anesthesia Plan with the CRNA             Lab Results   Component Value Date    HGBA1C 5 8 (H) 01/11/2022       Lab Results   Component Value Date    K 2 7 (LL) 02/23/2022    CL 92 (L) 02/23/2022    CO2 38 (H) 02/23/2022    BUN 16 02/23/2022    CREATININE 0 99 02/23/2022    GLUF 106 (H) 02/23/2022    CALCIUM 8 9 02/23/2022    CORRECTEDCA 9 4 01/11/2022    AST 29 02/23/2022    ALT 20 02/23/2022    ALKPHOS 104 02/23/2022    EGFR 83 02/23/2022       Lab Results   Component Value Date    WBC 10 20 02/23/2022    HGB 15 3 02/23/2022    HCT 44 5 02/23/2022    MCV 85 02/23/2022     02/23/2022   Atrial fibrillation  Premature ventricular complexes  Abnormal ECG  When compared with ECG of 16-NOV-2021 10:49,  Premature ventricular complexes is now Present     Confirmed by Percy Chiang ([id number]) on 11/16/2021 10:55:25 AM    Echo 2020   IMPRESSIONS:  Technically difficult study  Normal left ventricular size and overall systolic function  EF 55%  Poor endocardial definition limits accurate regional wall motion assessment even with the use of Definity echo contrast   No gross regional wall motion abnormalities  Mild to moderate concentric left ventricular hypertrophy  Grossly top normal to mildly dilated right ventricle with normal function  Mild left atrial enlargement  Aortic sclerosis without stenosis  Mitral and tricuspid valves were poorly visualized    Mild tricuspid regurgitation

## 2022-02-23 NOTE — ASSESSMENT & PLAN NOTE
Lab Results   Component Value Date    HGBA1C 5 8 (H) 01/11/2022       Recent Labs     02/22/22  1613 02/22/22 2002 02/23/22  0524 02/23/22  1140   POCGLU 161* 103 109 115       Blood Sugar Average: Last 72 hrs:    -hold home metformin  -sliding scale insulin while inpatient  -carb consistent diet  (P) 122

## 2022-02-23 NOTE — PLAN OF CARE
Problem: PAIN - ADULT  Goal: Verbalizes/displays adequate comfort level or baseline comfort level  Description: Interventions:  - Encourage patient to monitor pain and request assistance  - Assess pain using appropriate pain scale  - Administer analgesics based on type and severity of pain and evaluate response  - Implement non-pharmacological measures as appropriate and evaluate response  - Consider cultural and social influences on pain and pain management  - Notify physician/advanced practitioner if interventions unsuccessful or patient reports new pain  Outcome: Progressing     Problem: INFECTION - ADULT  Goal: Absence or prevention of progression during hospitalization  Description: INTERVENTIONS:  - Assess and monitor for signs and symptoms of infection  - Monitor lab/diagnostic results  - Monitor all insertion sites, i e  indwelling lines, tubes, and drains  - Monitor endotracheal if appropriate and nasal secretions for changes in amount and color  - [place] appropriate cooling/warming therapies per order  - Administer medications as ordered  - Instruct and encourage patient and family to use good hand hygiene technique  - Identify and instruct in appropriate isolation precautions for identified infection/condition  Outcome: Progressing  Goal: Absence of fever/infection during neutropenic period  Description: INTERVENTIONS:  - Monitor WBC    Outcome: Progressing     Problem: SAFETY ADULT  Goal: Patient will remain free of falls  Description: INTERVENTIONS:  - Educate patient/family on patient safety including physical limitations  - Instruct patient to call for assistance with activity   - Consult OT/PT to assist with strengthening/mobility   - Keep Call bell within reach  - Keep bed low and locked with side rails adjusted as appropriate  - Keep care items and personal belongings within reach  - Initiate and maintain comfort rounds  - Make Fall Risk Sign visible to staff  - Offer Toileting   - Obtain necessary fall risk management equipment:   - Apply yellow socks and bracelet for high fall risk patients  - Consider moving patient to room near nurses station  Outcome: Progressing  Goal: Maintain or return to baseline ADL function  Description: INTERVENTIONS:  -  Assess patient's ability to carry out ADLs; assess patient's baseline for ADL function and identify physical deficits which impact ability to perform ADLs (bathing, care of mouth/teeth, toileting, grooming, dressing, etc )  - Assess/evaluate cause of self-care deficits   - Assess range of motion  - Assess patient's mobility; develop plan if impaired  - Assess patient's need for assistive devices and provide as appropriate  - Encourage maximum independence but intervene and supervise when necessary  - Involve family in performance of ADLs  - Assess for home care needs following discharge   - Consider OT consult to assist with ADL evaluation and planning for discharge  - Provide patient education as appropriate  Outcome: Progressing  Goal: Maintains/Returns to pre admission functional level  Description: INTERVENTIONS:  - Perform BMAT or MOVE assessment daily    - Set and communicate daily mobility goal to care team and patient/family/caregiver     - Collaborate with rehabilitation services on mobility goals if consulted  - Perform Range of Motion   - Out of bed for toileting  - Record patient progress and toleration of activity level   Outcome: Progressing     Problem: DISCHARGE PLANNING  Goal: Discharge to home or other facility with appropriate resources  Description: INTERVENTIONS:  - Identify barriers to discharge w/patient and caregiver  - Arrange for needed discharge resources and transportation as appropriate  - Identify discharge learning needs (meds, wound care, etc )  - Arrange for interpretive services to assist at discharge as needed  - Refer to Case Management Department for coordinating discharge planning if the patient needs post-hospital services based on physician/advanced practitioner order or complex needs related to functional status, cognitive ability, or social support system  Outcome: Progressing     Problem: Knowledge Deficit  Goal: Patient/family/caregiver demonstrates understanding of disease process, treatment plan, medications, and discharge instructions  Description: Complete learning assessment and assess knowledge base    Interventions:  - Provide teaching at level of understanding  - Provide teaching via preferred learning methods  Outcome: Progressing     Problem: SKIN/TISSUE INTEGRITY - ADULT  Goal: Incision(s), wounds(s) or drain site(s) healing without S/S of infection  Description: INTERVENTIONS  - Assess and document dressing, incision, wound bed, drain sites and surrounding tissue  - Provide patient and family education  - Perform skin care/dressing changes   Outcome: Progressing

## 2022-02-23 NOTE — PROGRESS NOTES
[facility]  Progress Note - [employer] 1965, 60 y o  male MRN: [id number]  Unit/Bed#: 7T [facility] 712-02 Encounter: [id number]  Primary Care Provider: Sunday Merino DO   Date and time admitted to hospital: 2/22/2022 10:54 AM    * Toe osteomyelitis, right [facility])  Assessment & Plan  Patient has been sent to hospital as direct admit by Podiatry for suspected osteomyelitis of right 2nd toe  Other than pain, patient minimally symptomatic at time of admission    -monitor blood cultures  -Ancef  -podiatry consult placed, appreciate recommendations  -patient to undergo surgical procedure this afternoon      Type 2 diabetes mellitus with diabetic polyneuropathy, with long-term current use of insulin [facility])  Assessment & Plan  Lab Results   Component Value Date    HGBA1C 5 8 (H) 01/11/2022       Recent Labs     02/22/22  1613 02/22/22  2002 02/23/22  0524 02/23/22  1140   POCGLU 161* 103 109 115       Blood Sugar Average: Last 72 hrs:    -hold home metformin  -sliding scale insulin while inpatient  -carb consistent diet  (P) 122    Hypokalemia  Assessment & Plan  -continue to monitor and replete as necessary  -will hold home Lasix    Anxiety  Assessment & Plan  -continue home Atarax and Prozac    Atrial fibrillation ([facility] [address] )  Assessment & Plan  With history of AFib, not currently on any medications on outpatient basis  Morbid obesity ([facility] [address] )  Assessment & Plan  -discussed diet and exercise    Chronic diastolic heart failure [facility])  Assessment & Plan  Wt Readings from Last 3 Encounters:   02/22/22 (!) 147 kg (324 lb 8 3 oz)   02/20/22 (!) 142 kg (312 lb)   02/10/22 (!) 146 kg (322 lb)         -continue home Lasix and metolazone        VTE Pharmacologic Prophylaxis: VTE Score: 4 Moderate Risk (Score 3-4) - Pharmacological DVT Prophylaxis Ordered: heparin  Patient Centered Rounds: I performed bedside rounds with nursing staff today    Discussions with Specialists or Other Care Team Provider: Podiatry    Education and Discussions with Family / Patient:  Discussed with patient    Time Spent for Care: 30 minutes  More than 50% of total time spent on counseling and coordination of care as described above  Current Length of Stay: 0 day(s)  Current Patient Status: Inpatient   Certification Statement: The patient will continue to require additional inpatient hospital stay due to Surgical intervention for right toe wound  Discharge Plan: Anticipate discharge in 24-48 hrs to home  Code Status: Level 3 - DNAR and DNI    Subjective:   Patient seen examined bedside  Patient complaining that he is hungry but otherwise no other complaints this time  Objective:     Vitals:   Temp (24hrs), Av 9 °F (36 1 °C), Min:96 5 °F (35 8 °C), Max:97 1 °F (36 2 °C)    Temp:  [96 5 °F (35 8 °C)-97 1 °F (36 2 °C)] 97 1 °F (36 2 °C)  HR:  [66-80] 71  Resp:  [19-20] 19  BP: (115-117)/(71-78) 115/78  SpO2:  [93 %-97 %] 97 %  Body mass index is 42 81 kg/m²  Input and Output Summary (last 24 hours): Intake/Output Summary (Last 24 hours) at 2022 1328  Last data filed at 2022 0900  Gross per 24 hour   Intake 360 ml   Output --   Net 360 ml       Physical Exam:   Physical Exam  Constitutional:       Appearance: He is obese  Cardiovascular:      Rate and Rhythm: Normal rate and regular rhythm  Pulses: Normal pulses  Heart sounds: Normal heart sounds  Pulmonary:      Effort: Pulmonary effort is normal       Breath sounds: Normal breath sounds  Abdominal:      General: Abdomen is flat  Bowel sounds are normal       Palpations: Abdomen is soft  Musculoskeletal:         General: Normal range of motion  Cervical back: Normal range of motion  Right lower leg: Edema present  Left lower leg: Edema present  Skin:     Comments: Right 2nd toe wound   Neurological:      General: No focal deficit present  Mental Status: He is alert and oriented to person, place, and time   Mental status is at baseline  Psychiatric:         Mood and Affect: Mood normal          Thought Content: Thought content normal          Judgment: Judgment normal           Additional Data:     Labs:  Results from last 7 days   Lab Units 02/23/22  0434   WBC Thousand/uL 10 20   HEMOGLOBIN g/dL 15 3   HEMATOCRIT % 44 5   PLATELETS Thousands/uL 256     Results from last 7 days   Lab Units 02/23/22  1116 02/23/22  0434 02/23/22  0434   SODIUM mmol/L  --   --  137   POTASSIUM mmol/L 3 1*   < > 2 7*   CHLORIDE mmol/L  --   --  92*   CO2 mmol/L  --   --  38*   BUN mg/dL  --   --  16   CREATININE mg/dL  --   --  0 99   ANION GAP mmol/L  --   --  7   CALCIUM mg/dL  --   --  8 9   ALBUMIN g/dL  --   --  4 4   TOTAL BILIRUBIN mg/dL  --   --  0 82   ALK PHOS U/L  --   --  104   ALT U/L  --   --  20   AST U/L  --   --  29   GLUCOSE RANDOM mg/dL  --   --  106*    < > = values in this interval not displayed  Results from last 7 days   Lab Units 02/23/22  1140 02/23/22  0524 02/22/22 2002 02/22/22  1613   POC GLUCOSE mg/dl 115 109 103 161*               Lines/Drains:  Invasive Devices  Report    Peripheral Intravenous Line            Peripheral IV 02/23/22 Dorsal (posterior); Left Hand <1 day                      Imaging: No pertinent imaging reviewed  Recent Cultures (last 7 days):   Results from last 7 days   Lab Units 02/22/22  1436   BLOOD CULTURE  Received in Microbiology Lab  Culture in Progress  Received in Microbiology Lab  Culture in Progress         Last 24 Hours Medication List:   Current Facility-Administered Medications   Medication Dose Route Frequency Provider Last Rate    acetaminophen  650 mg Oral Q6H PRN Smiley Basil, DO      atorvastatin  40 mg Oral Daily Smiley Basil, DO      cefazolin  2,000 mg Intravenous Q8H Smiley Basil, DO 2,000 mg (02/23/22 1224)    FLUoxetine  40 mg Oral Daily Smiley Basil, DO      gabapentin  100 mg Oral TID Smiley Basil, DO      heparin (porcine)  5,000 Units Subcutaneous [facility] Nick Licea,       HYDROmorphone  0 5 mg Intravenous Q4H PRN Nick Licea DO      hydrOXYzine HCL  25 mg Oral 4x Daily PRN Nick Licea DO      insulin lispro  2-12 Units Subcutaneous TID AC Nick Licea,       metolazone  2 5 mg Oral Daily Nick Licea DO      ondansetron  4 mg Intravenous Q8H PRN Nick Licea DO      oxyCODONE  10 mg Oral Q4H PRN Nick Licea,       oxyCODONE  5 mg Oral Q4H PRN Nick Licea DO      potassium chloride  20 mEq Intravenous Q2H Nick Licea DO 20 mEq (02/23/22 1259)        Today, Patient Was Seen By: Nick Licea DO    **Please Note: This note may have been constructed using a voice recognition system  **

## 2022-02-23 NOTE — OP NOTE
OPERATIVE REPORT - Podiatry  PATIENT NAME: Rashel Ponce    :  1965  MRN: [id number]  Pt Location:  OR ROOM 10    SURGERY DATE: 2022    Surgeon(s) and Role:     * Evaristo Patterson DPM - Primary     * Sofie Benítez DPM - Assisting    Pre-op Diagnosis:  Toe osteomyelitis, right ([address] ) [M86 9]    Post-Op Diagnosis Codes:     * Toe osteomyelitis, right ([address] ) [M86 9]    Procedure(s) (LRB):  AMPUTATION TOE  RIGHT SECOND (Right)    Specimen(s):  ID Type Source Tests Collected by Time Destination   1 : RIGHT SECOND TOE PROXIMAL MARGIN Tissue Toe, Right TISSUE EXAM Evaristo Patterson DPM 2022 1621        Estimated Blood Loss:   Minimal    Drains:  * No LDAs found *    Anesthesia Type:   IV Sedation with Anesthesia with 5 ml of 1% Lidocaine and 0 5% Bupivacaine in a 1:1 mixture    Hemostasis:  Direct compression    Materials:  * No implants in log *      Injectables:  None    Operative Findings:  Consistent with Diagnosis    Complications:   None    Procedure and Technique:     Under mild sedation, the patient was brought into the operating room and remained on hospital bed in the supine position  IV sedation was achieved by anesthesia team and a universal timeout was performed where all parties are in agreement of correct patient, correct procedure and correct site  A digital block was performed consisting of 5 ml of local anesthetic  The foot was then prepped and draped in the usual aseptic manner  An esmarch bandage was used to exsangunate the toe  Attention was directed to the right, 2nd toe where a fishmouth type of incision was made  Utilizing a sterile 15 blade, this incision was carried deep straight to bone  Soft tissue structures were then reflected off the proximal phalanx head  Utilizing a sterile 15 blade, all capsular structures were severed and the toe was then disarticulated at the level of the proximal interphalangeal joint and then placed on the back table to be sent for culture   It was noted that all tissue margins were bleeding and viable  No deep sinus tracts or areas of purulence were visualized  The remaining bone on the proximal aspect of the joint was noted to be of hard and viable quality  Any remaining tendinous structures were identified and severed proximally to remove any nidus of infection  The surgical incision was irrigated with copious amounts of normal sterile saline  Subcutaneous closure was obtained utilizing 3-0 vicryl  Skin edges were reapproximated and closure was obtained utilizing 3-0 nylon  The foot was then cleansed and dried  The incision site was dressed with xeroform, gauze  This was then covered with a Vasquez and coban wrap  The patient tolerated the procedure and anesthesia well without immediate complications and transferred to PACU with vital signs stable  As with many limb salvage procedures, we contemplate the possibility of performing further stages to this procedure  Procedures may include debridements, delayed closure, plastic surgery techniques, or more proximal amputations  This procedure may be considered part of a multi-staged limb salvage treatment plan  Dr Yecenia Barnes was present during the entire procedure and participated in all key aspects  SIGNATURE: Sanam Bello DPM  DATE: February 23, 2022  TIME: 4:48 PM      Portions of the record may have been created with voice recognition software  Occasional wrong word or "sound a like" substitutions may have occurred due to the inherent limitations of voice recognition software  Read the chart carefully and recognize, using context, where substitutions have occurred

## 2022-02-23 NOTE — ASSESSMENT & PLAN NOTE
Patient has been sent to hospital as direct admit by Podiatry for suspected osteomyelitis of right 2nd toe  Other than pain, patient minimally symptomatic at time of admission    -monitor blood cultures  -Ancef  -podiatry consult placed, appreciate recommendations  -patient to undergo surgical procedure this afternoon

## 2022-02-23 NOTE — PLAN OF CARE
Problem: PAIN - ADULT  Goal: Verbalizes/displays adequate comfort level or baseline comfort level  Description: Interventions:  - Encourage patient to monitor pain and request assistance  - Assess pain using appropriate pain scale  - Administer analgesics based on type and severity of pain and evaluate response  - Implement non-pharmacological measures as appropriate and evaluate response  - Consider cultural and social influences on pain and pain management  - Notify physician/advanced practitioner if interventions unsuccessful or patient reports new pain  Outcome: Progressing     Problem: INFECTION - ADULT  Goal: Absence or prevention of progression during hospitalization  Description: INTERVENTIONS:  - Assess and monitor for signs and symptoms of infection  - Monitor lab/diagnostic results  - Monitor all insertion sites, i e  indwelling lines, tubes, and drains  - Monitor endotracheal if appropriate and nasal secretions for changes in amount and color  - [place] appropriate cooling/warming therapies per order  - Administer medications as ordered  - Instruct and encourage patient and family to use good hand hygiene technique  - Identify and instruct in appropriate isolation precautions for identified infection/condition  Outcome: Progressing     Problem: DISCHARGE PLANNING  Goal: Discharge to home or other facility with appropriate resources  Description: INTERVENTIONS:  - Identify barriers to discharge w/patient and caregiver  - Arrange for needed discharge resources and transportation as appropriate  - Identify discharge learning needs (meds, wound care, etc )  - Arrange for interpretive services to assist at discharge as needed  - Refer to Case Management Department for coordinating discharge planning if the patient needs post-hospital services based on physician/advanced practitioner order or complex needs related to functional status, cognitive ability, or social support system  Outcome: Progressing Problem: Knowledge Deficit  Goal: Patient/family/caregiver demonstrates understanding of disease process, treatment plan, medications, and discharge instructions  Description: Complete learning assessment and assess knowledge base    Interventions:  - Provide teaching at level of understanding  - Provide teaching via preferred learning methods  Outcome: Progressing

## 2022-02-23 NOTE — ANESTHESIA POSTPROCEDURE EVALUATION
Post-Op Assessment Note    CV Status:  Stable  Pain Score: 0    Pain management: adequate     Mental Status:  Alert and awake   Hydration Status:  Euvolemic   PONV Controlled:  Controlled   Airway Patency:  Patent      Post Op Vitals Reviewed: Yes      Staff: CRNA         No complications documented      /59 (02/23/22 1645) 112/59   Temp 97 6 °F (36 4 °C) (02/23/22 1645) 97 6   Pulse 66 (02/23/22 1645)   Resp 12 (02/23/22 1645)    SpO2 93 % (02/23/22 1645)

## 2022-02-24 VITALS
WEIGHT: 315 LBS | OXYGEN SATURATION: 98 % | DIASTOLIC BLOOD PRESSURE: 67 MMHG | BODY MASS INDEX: 41.75 KG/M2 | TEMPERATURE: 97.8 F | HEIGHT: 73 IN | RESPIRATION RATE: 18 BRPM | HEART RATE: 62 BPM | SYSTOLIC BLOOD PRESSURE: 116 MMHG

## 2022-02-24 LAB
ANION GAP SERPL CALCULATED.3IONS-SCNC: 6 MMOL/L (ref 5–14)
BUN SERPL-MCNC: 12 MG/DL (ref 5–25)
CALCIUM SERPL-MCNC: 8.5 MG/DL (ref 8.4–10.2)
CHLORIDE SERPL-SCNC: 96 MMOL/L (ref 97–108)
CO2 SERPL-SCNC: 36 MMOL/L (ref 22–30)
CREAT SERPL-MCNC: 0.78 MG/DL (ref 0.7–1.5)
GFR SERPL CREATININE-BSD FRML MDRD: 99 ML/MIN/1.73SQ M
GLUCOSE SERPL-MCNC: 105 MG/DL (ref 65–140)
GLUCOSE SERPL-MCNC: 170 MG/DL (ref 65–140)
GLUCOSE SERPL-MCNC: 93 MG/DL (ref 70–99)
POTASSIUM SERPL-SCNC: 3 MMOL/L (ref 3.6–5)
SODIUM SERPL-SCNC: 138 MMOL/L (ref 137–147)

## 2022-02-24 PROCEDURE — 82948 REAGENT STRIP/BLOOD GLUCOSE: CPT

## 2022-02-24 PROCEDURE — 80048 BASIC METABOLIC PNL TOTAL CA: CPT

## 2022-02-24 PROCEDURE — 99239 HOSP IP/OBS DSCHRG MGMT >30: CPT | Performed by: STUDENT IN AN ORGANIZED HEALTH CARE EDUCATION/TRAINING PROGRAM

## 2022-02-24 PROCEDURE — 97166 OT EVAL MOD COMPLEX 45 MIN: CPT

## 2022-02-24 PROCEDURE — 97163 PT EVAL HIGH COMPLEX 45 MIN: CPT

## 2022-02-24 RX ORDER — POTASSIUM CHLORIDE 20 MEQ/1
80 TABLET, EXTENDED RELEASE ORAL ONCE
Status: COMPLETED | OUTPATIENT
Start: 2022-02-24 | End: 2022-02-24

## 2022-02-24 RX ORDER — POTASSIUM CHLORIDE 20 MEQ/1
40 TABLET, EXTENDED RELEASE ORAL DAILY
Qty: 10 TABLET | Refills: 0 | Status: SHIPPED | OUTPATIENT
Start: 2022-02-24 | End: 2022-03-22

## 2022-02-24 RX ORDER — OXYCODONE HYDROCHLORIDE 5 MG/1
5 TABLET ORAL EVERY 4 HOURS PRN
Qty: 10 TABLET | Refills: 0 | Status: SHIPPED | OUTPATIENT
Start: 2022-02-24 | End: 2022-03-06

## 2022-02-24 RX ORDER — CEPHALEXIN 500 MG/1
500 CAPSULE ORAL EVERY 6 HOURS SCHEDULED
Qty: 20 CAPSULE | Refills: 0 | Status: SHIPPED | OUTPATIENT
Start: 2022-02-24 | End: 2022-03-01

## 2022-02-24 RX ADMIN — CEFAZOLIN SODIUM 2000 MG: 2 SOLUTION INTRAVENOUS at 04:24

## 2022-02-24 RX ADMIN — FLUOXETINE 40 MG: 20 CAPSULE ORAL at 08:53

## 2022-02-24 RX ADMIN — POTASSIUM CHLORIDE 80 MEQ: 1500 TABLET, EXTENDED RELEASE ORAL at 09:32

## 2022-02-24 RX ADMIN — OXYCODONE HYDROCHLORIDE 10 MG: 10 TABLET ORAL at 05:05

## 2022-02-24 RX ADMIN — OXYCODONE HYDROCHLORIDE 10 MG: 10 TABLET ORAL at 09:32

## 2022-02-24 RX ADMIN — HEPARIN SODIUM 5000 UNITS: 5000 INJECTION INTRAVENOUS; SUBCUTANEOUS at 05:06

## 2022-02-24 RX ADMIN — GABAPENTIN 100 MG: 100 CAPSULE ORAL at 08:53

## 2022-02-24 RX ADMIN — ATORVASTATIN CALCIUM 40 MG: 40 TABLET, FILM COATED ORAL at 08:53

## 2022-02-24 RX ADMIN — METOLAZONE 2.5 MG: 2.5 TABLET ORAL at 08:53

## 2022-02-24 NOTE — DISCHARGE SUMMARY
[facility]  Discharge- Westcliffe Dye 1965, 60 y o  male MRN: [id number]  Unit/Bed#: 7T [facility] 712-02 Encounter: [id number]  Primary Care Provider: Shaun Owusu DO   Date and time admitted to hospital: 2/22/2022 10:54 AM    * Toe osteomyelitis, right [facility])  Assessment & Plan  Patient has been sent to hospital as direct admit by Podiatry for suspected osteomyelitis of right 2nd toe  Other than pain, patient minimally symptomatic at time of admission    Patient underwent right 2nd toe amputation on 02/13, surgical care as per Podiatry, okay to discharge  Will discharge patient home with Keflex 500 mg p o  Q 6 hours for 5 days      Hypokalemia  Assessment & Plan  -continue to monitor and replete as necessary  -will hold home Lasix    Patient sign AMA paperwork, informed him that we should be monitoring his hypokalemia for least 24 hours given that he is been repeatedly hypokalemic despite repeated repletion attempts  Patient understands risk of leaving early  Will discharge patient with 5 days of potassium 40 mEq p o  Daily  Repeat BMP ordered for 1 week  Advised patient to stop Lasix on discharge   Follow-up with PCP within 1 week    Type 2 diabetes mellitus with diabetic polyneuropathy, with long-term current use of insulin [facility])  Assessment & Plan  Lab Results   Component Value Date    HGBA1C 5 8 (H) 01/11/2022       Recent Labs     02/23/22  1649 02/23/22  1822 02/23/22  2020 02/24/22  0531   POCGLU 114 119 180* 105       Blood Sugar Average: Last 72 hrs:    Continue home medications on discharge  (P) 125 75    Anxiety  Assessment & Plan  -continue home Atarax and Prozac    Atrial fibrillation ([facility] )  Assessment & Plan  With history of AFib, not currently on any medications on outpatient basis      Morbid obesity ([facility] )  Assessment & Plan  -discussed diet and exercise        Medical Problems             Resolved Problems  Date Reviewed: 2/24/2022    None Discharging Physician / Practitioner: Marlon Villalpando DO  PCP: Brissa Martinez DO  Admission Date:   Admission Orders (From admission, onward)     Ordered        02/23/22 0952  Inpatient Admission  Once            02/22/22 1120  Place in Observation  Once                      Discharge Date: 02/24/22    Consultations During Hospital Stay:  · Podiatry    Procedures Performed:   · Right 2nd toe amputation    Significant Findings / Test Results:   · Hypokalemia    Incidental Findings:   · None    Test Results Pending at Discharge (will require follow up): · None     Outpatient Tests Requested:  · Follow-up BMP ordered for 1 week    Complications:  None    Reason for Admission:  Right 2nd toe infection    Hospital Course:   Freddie Jones is a 60 y o  male patient who originally presented to the hospital on 2/22/2022 as a direct admit secondary to right 2nd toe infection  Patient was started on IV antibiotics, seen by Podiatry who completed right 2nd toe amputation on 02/23 and achieved surgical cure     Of note, patient was also noted to be significantly hypokalemic throughout hospitalization  Home Lasix was stopped however despite this patient continued to have significant hypokalemia  Patient was advised to continue hospitalization for least another 24 hours to monitor potassium levels however patient opted to leave AMA  Understands risk of leaving before medically advised  Will order potassium replacement on discharge and follow up BMP and magnesium in 1 week  Patient was advised to follow-up with PCP  Patient was advised to continue holding Lasix on discharge until he has discussion with PCP  Patient will also be discharged with short course of Keflex 500 mg p o  Q 6 hours X5 days  Please see above list of diagnoses and related plan for additional information  Condition at Discharge: good    Discharge Day Visit / Exam:   Subjective:  Patient seen examined bedside    Patient with no acute events overnight  Patient states he wants to go home  Vitals: Blood Pressure: 116/67 (02/24/22 0733)  Pulse: 62 (02/24/22 0733)  Temperature: 97 8 °F (36 6 °C) (02/24/22 0733)  Temp Source: Temporal (02/24/22 0733)  Respirations: 18 (02/24/22 0733)  Height: 6' 1" (185 4 cm) (02/22/22 1512)  Weight - Scale: (!) 147 kg (324 lb 8 3 oz) (02/22/22 1100)  SpO2: 98 % (02/24/22 0733)  Exam:   Physical Exam  HENT:      Head: Normocephalic  Cardiovascular:      Rate and Rhythm: Normal rate and regular rhythm  Pulses: Normal pulses  Heart sounds: Normal heart sounds  Pulmonary:      Effort: Pulmonary effort is normal       Breath sounds: Normal breath sounds  Abdominal:      General: Abdomen is flat  Bowel sounds are normal       Palpations: Abdomen is soft  Musculoskeletal:      Cervical back: Normal range of motion  Comments: Right foot wrapped   Neurological:      General: No focal deficit present  Mental Status: He is alert  Mental status is at baseline  Psychiatric:         Mood and Affect: Mood normal          Thought Content: Thought content normal           Discussion with Family:  Discussed with patient    Discharge instructions/Information to patient and family:   See after visit summary for information provided to patient and family  Provisions for Follow-Up Care:  See after visit summary for information related to follow-up care and any pertinent home health orders  Disposition:   Home    Planned Readmission: no     Discharge Statement:  I spent 45 minutes discharging the patient  This time was spent on the day of discharge  I had direct contact with the patient on the day of discharge  Greater than 50% of the total time was spent examining patient, answering all patient questions, arranging and discussing plan of care with patient as well as directly providing post-discharge instructions  Additional time then spent on discharge activities      Discharge Medications:  See after visit summary for reconciled discharge medications provided to patient and/or family        **Please Note: This note may have been constructed using a voice recognition system**

## 2022-02-24 NOTE — PHYSICAL THERAPY NOTE
Physical Therapy Evaluation    Patient's Name: Nicholas Moon    Admitting Diagnosis  Abscess of second toe [L02 619]  Osteomyelitis ([facility] [address] ) [M86 9]    Problem List  Patient Active Problem List   Diagnosis    DAYAN (obstructive sleep apnea)    Chronic diastolic heart failure (HCC)    Hypertension    Diabetes mellitus ([facility] )    Morbid obesity [facility] )    Pain, joint, ankle and foot, left    Chronic osteomyelitis of left foot with draining sinus (HCC)    Atrial fibrillation ([facility] )    Anxiety    Hypokalemia    Type 2 diabetes mellitus with diabetic polyneuropathy, with long-term current use of insulin ([facility] )    Toe osteomyelitis, right ([facility] )       Past Medical History  Past Medical History:   Diagnosis Date    Atrial fibrillation (HCC)     Chronic diastolic (congestive) heart failure ([facility] )     Diabetes mellitus ([facility] )     High cholesterol     Hyperlipidemia     Hypertension     Pacemaker     Stroke [facility])        Past Surgical History  Past Surgical History:   Procedure Laterality Date    APPENDECTOMY      ATRIAL CARDIAC PACEMAKER INSERTION      BARIATRIC SURGERY  05/2021    NERVE BLOCK Right 2/10/2022    Procedure: BLOCK MEDIAL BRANCH C3, C4, C5 #1;  Surgeon: James Shabazz MD;  Location:  ENDO;  Service: Pain Management     NC AMPUTATION TOE,I-P JT Left 11/16/2021    Procedure: AMPUTATION LESSER TOE;  Surgeon: Prasad Woo DPM;  Location: [facility];  Service: Podiatry    TOE AMPUTATION Left     2nd toe    TOE AMPUTATION Left 9/15/2021    Procedure: AMPUTATION LEFT 4TH TOE;  Surgeon: Prasad Woo DPM;  [facility];  Service: Podiatry    TOE AMPUTATION Right 1/12/2022    Procedure: AMPUTATION TOE;  Surgeon: Kvng Oakes DPM;  [facility];  Service: Podiatry    TOE AMPUTATION Right 2/23/2022    Procedure: AMPUTATION TOE  RIGHT SECOND;  Surgeon: Kvng Oaeks DPM;  Location: [address] [place] [address] MAIN OR;  Service: Podiatry       Recent Imaging  XR foot right 3+ views   Final Result by Yifan Garcia MD (02/24 3182)      No acute osseous abnormality  Postsurgical changes of recent 2nd middle and distal phalangeal resection  Workstation performed: [id number]             Recent Vital Signs  Vitals:    02/23/22 2345 02/24/22 0345 02/24/22 0400 02/24/22 0733   BP: 121/79 95/56 123/72 116/67   BP Location: Right arm Right arm Right arm Right arm   Pulse: 60 71 68 62   Resp: 18 18 18 18   Temp: 97 9 °F (36 6 °C) (!) 96 6 °F (35 9 °C) (!) 96 7 °F (35 9 °C) 97 8 °F (36 6 °C)   TempSrc: Temporal Temporal Temporal Temporal   SpO2: 97% 97%  98%   Weight:       Height:            02/24/22 0840   PT Last Visit   PT Visit Date 02/24/22   Note Type   Note type Evaluation   Pain Assessment   Pain Assessment Tool 0-10   Pain Score 7   Pain Location/Orientation Orientation: Right;Location: Foot   Restrictions/Precautions   Weight Bearing Precautions Per Order Yes   RLE Weight Bearing Per Order WBAT   Braces or Orthoses   (surgical shoe)   Other Precautions Pain   Home Living   Type of Home House   Home Layout Two level   Bathroom Shower/Tub Tub/shower unit   Prior Function   Level of Thomas Independent with ADLs and functional mobility   Lives With Spouse; Son   Dante Help From Family   ADL Assistance Independent   IADLs Independent   General   Family/Caregiver Present No   Cognition   Overall Cognitive Status WFL   Arousal/Participation Alert   Orientation Level Oriented X4   Memory Within functional limits   Following Commands Follows all commands and directions without difficulty   RLE Assessment   RLE Assessment   (4/5)   LLE Assessment   LLE Assessment   (4/5)   Coordination   Movements are Fluid and Coordinated 1   Sensation X   Light Touch   RLE Light Touch Impaired   RLE Light Touch Comments neuropathy   LLE Light Touch Impaired   LLE Light Touch Comments neuropathy   Bed Mobility   Supine to Sit 6  Modified independent   Sit to Supine 6  Modified independent   Transfers   Sit to Stand 6 Modified independent   Additional items Increased time required   Stand to Sit 6  Modified independent   Additional items Increased time required   Additional Comments with no AD   Ambulation/Elevation   Gait pattern Step through pattern;Decreased toe off;Decreased heel strike; Short stride   Gait Assistance 6  Modified independent   Assistive Device None   Distance 100ft   Balance   Static Sitting Fair +   Dynamic Sitting Fair +   Static Standing Fair   Dynamic Standing Fair   Ambulatory Fair   Endurance Deficit   Endurance Deficit Yes   Endurance Deficit Description foot pain   Activity Tolerance   Activity Tolerance Patient limited by pain   Medical Staff Made Aware spoke to CM   Nurse Made Aware spoke to RN   Assessment   Prognosis Good   Problem List Decreased strength;Decreased range of motion;Decreased endurance; Impaired balance;Decreased mobility; Impaired judgement;Decreased safety awareness;Decreased coordination; Impaired sensation;Obesity;Pain;Orthopedic restrictions;Decreased skin integrity   Barriers to Discharge Inaccessible home environment   Goals   Patient Goals to go home ASAP   Recommendation   PT Discharge Recommendation No rehabilitation needs   AM-PAC Basic Mobility Inpatient   Turning in Bed Without Bedrails 4   Lying on Back to Sitting on Edge of Flat Bed 4   Moving Bed to Chair 4   Standing Up From Chair 4   Walk in Room 4   Climb 3-5 Stairs 4   Basic Mobility Inpatient Raw Score 24   Basic Mobility Standardized Score 57 68   Highest Level Of Mobility   JH-HLM Goal 8: Walk 250 feet or more   JH-HLM Highest Level of Mobility 7: Walk 25 feet or more   JH-HLM Goal Achieved No   End of Consult   Patient Position at End of Consult Bedside chair; All needs within reach         [address] Fidel Willingham is a 60 y o  male admitted to [facility] on 2/22/2022 for Toe osteomyelitis, right ([facility] )  Pt  has a past medical history of Atrial fibrillation ([facility] ), Chronic diastolic (congestive) heart failure ([facility] ), Diabetes mellitus ([facility] ), High cholesterol, Hyperlipidemia, Hypertension, Pacemaker, and Stroke ([facility] )    PT was consulted and pt was seen on 2/24/2022 for mobility assessment and d/c planning  Pt presents supine in bed alert and agreeable to therapy  He has decreased sensation to the distal LEs  Mild weakness to BLEs  Endurance is limited due to pain and mild fatigue  Balance is fair with use of no AD  Impairments limiting pt at this time include weakness, impaired balance, decreased endurance, increased fall risk, new onset of impairment of functional mobility, pain, decreased activity tolerance, decreased safety awareness, impaired judgement, ortheopedic restrictions and decreased sensation  Pt is currently functioning at a modified independent assistance level for bed mobility, modified independent assistance level for transfers, modified independent assistance level for ambulation with no assistive device  The patient's AM-PAC Basic Mobility Inpatient Short Form Raw Score is 24  A Raw score of greater than 16 suggests the patient may benefit from discharge to home  Please also refer to the recommendation of the Physical Therapist for safe discharge planning      Recommendations                                                                                                                DME: None    Discharge Disposition:  Home with no needs      Kiko Catalan PT, DPT

## 2022-02-24 NOTE — ASSESSMENT & PLAN NOTE
Patient has been sent to hospital as direct admit by Podiatry for suspected osteomyelitis of right 2nd toe  Other than pain, patient minimally symptomatic at time of admission    Patient underwent right 2nd toe amputation on 02/13, surgical care as per Podiatry, okay to discharge    Will discharge patient home with Keflex 500 mg p o  Q 6 hours for 5 days

## 2022-02-24 NOTE — ASSESSMENT & PLAN NOTE
-continue to monitor and replete as necessary  -will hold home Lasix    Patient sign AMA paperwork, informed him that we should be monitoring his hypokalemia for least 24 hours given that he is been repeatedly hypokalemic despite repeated repletion attempts  Patient understands risk of leaving early  Will discharge patient with 5 days of potassium 40 mEq p o   Daily  Repeat BMP ordered for 1 week  Advised patient to stop Lasix on discharge   Follow-up with PCP within 1 week

## 2022-02-24 NOTE — ASSESSMENT & PLAN NOTE
Lab Results   Component Value Date    HGBA1C 5 8 (H) 01/11/2022       Recent Labs     02/23/22  1649 02/23/22  1822 02/23/22 2020 02/24/22  0531   POCGLU 114 119 180* 105       Blood Sugar Average: Last 72 hrs:    Continue home medications on discharge  (P) 125 75

## 2022-02-24 NOTE — DISCHARGE INSTRUCTIONS
Oxycodone, Rapid Release (ETH-Oxydose, Oxy IR, Roxicodone, Roxybond  Oxaydo) - (By mouth)     Why this medicine is used:   Treats moderate to severe pain  This medicine is a narcotic pain reliever  Contact a nurse or doctor right away if you have:  · Anxiety, restlessness, seeing or hearing things that are not there  · Fast heartbeat, fever, sweating, muscle spasms, twitching, nausea, vomiting, diarrhea  · Blue lips, fingernails, or skin, trouble breathing  · Changes in skin color, dark freckles, cold feeling, tiredness, weight loss  · Extreme dizziness or weakness, shallow breathing, slow heartbeat, sweating, cold or clammy skin, seizures  · Lightheadedness, dizziness, fainting     Common side effects:  · Constipation, stomach pain  · Sleepiness    © [facility] 2021 Information is for End User's use only and may not be sold, redistributed or otherwise used for commercial purposes  Cephalexin (By mouth)   Cephalexin (cqe-t-XBL-in)  Treats infections  This medicine is a cephalosporin antibiotic  Brand Name(s): Keflex   There may be other brand names for this medicine  When This Medicine Should Not Be Used: This medicine is not right for everyone  Do not use this medicine if you had an allergic reaction to cephalexin or another cephalosporin medicine  How to Use This Medicine:   Capsule, Tablet, Tablet for Suspension, Liquid  · Your doctor will tell you how much medicine to use  Do not use more than directed  · Read and follow the patient instructions that come with this medicine  Talk to your doctor or pharmacist if you have any questions  · You may take your medicine with food or milk to avoid stomach upset  · Oral liquid: Shake well just before use  Measure the oral liquid medicine with a marked measuring spoon, oral syringe, or medicine cup    · Take all of the medicine in your prescription to clear up your infection, even if you feel better after the first few doses   · Missed dose: Take a dose as soon as you remember  If it is almost time for your next dose, wait until then and take a regular dose  Do not take extra medicine to make up for a missed dose  · Capsule or tablet: Store at room temperature away from heat, moisture, and direct light  · Oral liquid: Store in the refrigerator for 14 days  After 14 days, throw away any unused medicine  Do not freeze  Drugs and Foods to Avoid:   Ask your doctor or pharmacist before using any other medicine, including over-the-counter medicines, vitamins, and herbal products  · Some medicines and foods can affect how cephalexin works  Tell your doctor if you are also using  ? Metformin  ? Probenecid  Warnings While Using This Medicine:   · Tell your doctor if you are pregnant or breastfeeding, or if you have kidney disease, liver disease, or a history of digestive problems, such as colitis  Tell your doctor if you are allergic to penicillin  · This medicine can cause diarrhea  Call your doctor if the diarrhea becomes severe, does not stop, or is bloody  Do not take any medicine to stop diarrhea until you have talked to your doctor  Diarrhea can occur 2 months or more after you stop taking this medicine  · Tell any doctor or dentist who treats you that you are using this medicine  This medicine may affect certain medical test results  · Call your doctor if your symptoms do not improve or if they get worse  · Keep all medicine out of the reach of children  Never share your medicine with anyone    Possible Side Effects While Using This Medicine:   Call your doctor right away if you notice any of these side effects:  · Allergic reaction: Itching or hives, swelling in your face or hands, swelling or tingling in your mouth or throat, chest tightness, trouble breathing  · Blistering, peeling, red skin rash  · Severe diarrhea, especially if bloody or ongoing  · Severe stomach pain, vomiting  If you notice these less serious side effects, talk with your doctor:   · Mild diarrhea or nausea  If you notice other side effects that you think are caused by this medicine, tell your doctor  Call your doctor for medical advice about side effects  You may report side effects to FDA at 3-713-FDA-7452    © Copyright [employer] 2021 Information is for End User's use only and may not be sold, redistributed or otherwise used for commercial purposes  The above information is an  only  It is not intended as medical advice for individual conditions or treatments  Talk to your doctor, nurse or pharmacist before following any medical regimen to see if it is safe and effective for you  Potassium Chloride (By mouth)   Potassium Chloride (awc-KXO-wq-um KLOR-chapis)  Prevents and treats low potassium levels in the blood  Brand Name(s): K-Tab, [address] [place], Klor-Con, Klor-Con 10, Klor-Con 8, Klor-Con M10, Klor-Con M15, Klor-Con M20, Klor-Con Sprinkle   There may be other brand names for this medicine  When This Medicine Should Not Be Used: This medicine is not right for everyone  Do not use if you had an allergic reaction to potassium  How to Use This Medicine:   Tablet, Long Acting Capsule, Powder, Liquid, Long Acting Tablet, Granule  · Your doctor will tell you how much medicine to use  Do not use more than directed  · Take this medicine with food or right after eating, to avoid stomach upset  · Powder or oral liquid:  You must mix with at least one-half cup (4 ounces) of water or juice  You could damage your stomach if you take the medicine without mixing it with water or juice  · Tablet or capsule: Do not chew, crush, or break  Swallow it whole with full glass of water  · Extended-release capsule: Swallow whole with a full glass of water  If you cannot swallow the extended-release capsule, you may open it and pour the medicine into a small amount of soft food such as pudding, yogurt, or applesauce   Stir this mixture well and swallow it without chewing  · Extended-release tablet:  Swallow whole with a full glass of water  If you have trouble swallowing, ask your doctor or pharmacist if you may crush the tablet  Some brands of this medicine must be swallowed whole, but other brands may be crushed  · If you mix the medicine in water or soft food, do not mix until you are ready to take your dose  Do not save mixed medicine for later use  · Carefully follow your doctor's instructions about any special diet  · Missed dose: Take a dose as soon as you remember  If it is almost time for your next dose, wait until then and take a regular dose  Do not take extra medicine to make up for a missed dose  · Store the medicine in a closed container at room temperature, away from heat, moisture, and direct light  Drugs and Foods to Avoid:   Ask your doctor or pharmacist before using any other medicine, including over-the-counter medicines, vitamins, and herbal products  · Some foods and medicines can affect how potassium chloride works  Tell you doctor if you are using any of the following:  ? Potassium-sparing diuretic (water pill)  ? ACE inhibitor blood pressure medicine  ? Salt substitute  ? Digoxin  Warnings While Using This Medicine:   · Tell your doctor if you are pregnant or breastfeeding or if you have kidney disease, heart disease, or problems with your digestive system  · This medicine may cause the following problems:  ? Bleeding or ulcers in the digestive system  ? Potassium levels that are too high  · Your doctor will do lab tests at regular visits to check on the effects of this medicine  Keep all appointments  · Keep all medicine out of the reach of children  Never share your medicine with anyone    Possible Side Effects While Using This Medicine:   Call your doctor right away if you notice any of these side effects:  · Allergic reaction: Itching or hives, swelling in your face or hands, swelling or tingling in your mouth or throat, chest tightness, trouble breathing  · Bloody or black, tarry stools  · Confusion, weakness, uneven heartbeat, trouble breathing, numbness in your hands, feet, or lips  · Severe stomach pain or vomiting  · Throat pain, feeling as if pill is stuck in the throat  If you notice these less serious side effects, talk with your doctor:   · Mild nausea, diarrhea, gas  If you notice other side effects that you think are caused by this medicine, tell your doctor  Call your doctor for medical advice about side effects  You may report side effects to FDA at 5-729-EUG-0695    © Copyright [employer] 2021 Information is for End User's use only and may not be sold, redistributed or otherwise used for commercial purposes  The above information is an  only  It is not intended as medical advice for individual conditions or treatments  Talk to your doctor, nurse or pharmacist before following any medical regimen to see if it is safe and effective for you  Acute Wounds   WHAT YOU NEED TO KNOW:   An acute wound is an injury that causes a break in the skin  As your wound begins to heal, it is normal to have some swelling, pain, and redness  Your body's immune system is working to keep your wound from getting infected  Your wound may develop a scab  The scab protects your wound as it heals  DISCHARGE INSTRUCTIONS:   Call your local emergency number (911 in the [address] [place] [address],[address]) if:   · You suddenly have trouble breathing or have chest pain  Seek care immediately if:   · Blood soaks through your bandage  · You have pus or a foul odor coming from the wound  · Your stitches come apart or your wound reopens  Call your doctor if:   · You continue to have pain even after you have taken pain medicine  · You have muscle, joint, or body aches, sweating, or a fever  · You have increased swelling, redness, or bleeding in your wound  · Your skin is itchy, swollen, or you have a rash      · You have questions or concerns about your condition or care  Medicines: You may need any of the following:  · Antibiotics  may be given to prevent or treat an infection  · Td vaccine  is a booster shot used to help prevent tetanus and diphtheria  The Td booster may be given to adolescents and adults every 10 years or for certain wounds and injuries  · Prescription pain medicine  may be given  Ask your healthcare provider how to take this medicine safely  Some prescription pain medicines contain acetaminophen  Do not take other medicines that contain acetaminophen without talking to your healthcare provider  Too much acetaminophen may cause liver damage  Prescription pain medicine may cause constipation  Ask your healthcare provider how to prevent or treat constipation  · Acetaminophen  decreases pain and fever  It is available without a doctor's order  Ask how much to take and how often to take it  Follow directions  Read the labels of all other medicines you are using to see if they also contain acetaminophen, or ask your doctor or pharmacist  Acetaminophen can cause liver damage if not taken correctly  Do not use more than 4 grams (4,000 milligrams) total of acetaminophen in one day  · NSAIDs , such as ibuprofen, help decrease swelling, pain, and fever  This medicine is available with or without a doctor's order  NSAIDs can cause stomach bleeding or kidney problems in certain people  If you take blood thinner medicine, always ask if NSAIDs are safe for you  Always read the medicine label and follow directions  Do not give these medicines to children under 10months of age without direction from your child's healthcare provider  · Take your medicine as directed  Contact your healthcare provider if you think your medicine is not helping or if you have side effects  Tell him or her if you are allergic to any medicine  Keep a list of the medicines, vitamins, and herbs you take   Include the amounts, and when and why you take them  Bring the list or the pill bottles to follow-up visits  Carry your medicine list with you in case of an emergency  Care for your wound as directed: Follow your healthcare provider's instructions on caring for your type of wound  The following care items are for most wounds:  · Keep your wound covered with a clean and dry bandage  Change your bandage if it becomes wet or dirty  This will decrease the risk for infection in your wound  Follow your healthcare provider's instructions for changing your dressing  · Do not soak in a tub or swim  until your healthcare provider says it is okay  Your wound may open if you get it too wet  Dirt from the water can also get into your wound and cause an infection  · Keep pets away from your wound  Pets carry germs that can cause a wound infection  · Do not pick or scratch scabs  Let scabs fall off on their own  You may damage new skin that is forming under the scab  You may have a worse scar after the damage  · Eat healthy foods and drink liquids as directed  Healthy foods give your body the nutrients it needs to heal your wound  Liquids prevent dehydration that can decrease the blood supply to your wound  Healthy foods include fruits, vegetables, grains (breads and cereals), dairy, and protein foods  Protein foods include meat, fish, nuts, and soy products  Protein, calories, vitamin C, and zinc help wounds heal  Ask your healthcare provider for more information about the foods you should eat to improve healing  Follow up with your doctor as directed:  Write down your questions so you remember to ask them during your visits  © Copyright [employer] 2021 Information is for End User's use only and may not be sold, redistributed or otherwise used for commercial purposes   All illustrations and images included in CareNotes® are the copyrighted property of [employer]  or [facility] Mitch Mason   The above information is an  only  It is not intended as medical advice for individual conditions or treatments  Talk to your doctor, nurse or pharmacist before following any medical regimen to see if it is safe and effective for you  Type 2 Diabetes Management for Adults   WHAT YOU NEED TO KNOW:   Type 2 diabetes is a disease that affects how your body uses glucose (sugar)  Either your body cannot make enough insulin, or it cannot use the insulin correctly  It is important to keep diabetes controlled to prevent damage to your heart, blood vessels, and other organs  DISCHARGE INSTRUCTIONS:   What you can do to manage your blood sugar levels:   · Talk to your care team if you become stressed about diabetes care  Sometimes being able to fit diabetes care into your life can cause increased stress  The stress can cause you not to take care of yourself properly  Your care team can help by offering tips about self-care  Your care team may suggest you talk to a mental health provider  The provider can listen and offer help with self-care issues  · Make healthy food choices  Work with a dietitian to develop a meal plan that works for you and your schedule  A dietitian can help you learn how to eat the right amount of carbohydrates during your meals and snacks  Carbohydrates can raise your blood sugar if you eat too many at one time  Some foods that contain carbohydrates include breads, cereals, rice, pasta, and sweets  · Drink water  Water can help your kidneys function properly  Decrease the amount of drinks with sugar substitutes you have, such as diet sodas  Avoid sugary drinks, such as regular sodas and fruit juice  · Get regular physical activity  Physical activity can help you get to your target blood sugar level goal and manage your weight  Get at least 150 minutes of moderate to vigorous aerobic physical activity each week  Do not miss more than 2 days in a row   Do not sit longer than 30 minutes at a time  Your healthcare provider can help you create an activity plan  The plan can include the best activities for you and can help you build your strength and endurance  · Maintain a healthy weight  Ask your healthcare provider how much you should weigh  Ask him or her to help you create a safe weight loss plan if you are overweight  · Check your blood sugar level as directed and as needed  Ask your healthcare provider what your blood sugar levels should be  Ask him or her to help you create a plan for times to check your level  · Take your diabetes medicine or insulin as directed  You may need diabetes medicine, insulin, or both to help control your blood sugar levels  Your healthcare provider will teach you how and when to take your diabetes medicine or insulin  · Go to all follow-up appointments  Your healthcare provider may need to check your A1c every 3 months  An A1c test shows the average amount of sugar in your blood over the past 2 to 3 months  Your healthcare provider will tell you what your A1c level should be  What you need to know about high blood sugar:  High blood sugar may not cause any symptoms  It may cause you to feel more thirsty than usual or urinate more often than usual  Over time, high blood sugar levels can damage your nerves, blood vessels, tissues, and organs  The following can increase your blood sugar levels:  · Large meals or large amounts of carbohydrates at one time    · Decreased physical activity    · Stress    · Illness     · A lower dose of medicine or insulin, or a late dose    What you need to know about low blood sugar: You can prevent symptoms such as shakiness, dizziness, irritability, or confusion by preventing your blood sugar from going too low  · Treat low blood sugar right away  ? Drink 4 ounces of juice or have 1 tube of glucose gel  ? Check your blood sugar again 10 to 15 minutes later  ?  When your blood sugar goes back to normal, eat a meal or snack to prevent another decrease  ·          · Keep glucose gel, raisins, or even hard candy with you at all times to treat low blood sugar  · Your blood sugar can get too low if you take diabetes medicine or insulin and do not eat enough food  · If you use insulin, check your blood sugar before you exercise  ? If your blood sugar is below 100 mg/dL, eat 4 crackers, 2 ounces of raisins, or drink 4 ounces of juice  ? Check your blood sugar every 30 minutes if you exercise more than 1 hour  ? You may need a snack during or after exercise  Other things you can do to manage your diabetes:   · Wear medical alert jewelry or carry a card that says you have diabetes  Your provider can tell you where to get the items  · Be safe when you drive  If you feel like your blood sugar is low while you are driving, pull over and check your blood sugar level  Treat low blood sugar before you start driving again, if needed  Keep snacks such as raisins and crackers in your car  You can also keep glucose gel in your car  · Know the risks if you choose to drink alcohol  Alcohol can cause your blood sugar levels to be low if you use insulin  Alcohol can cause high blood sugar levels and weight gain if you drink too much  Women should limit alcohol to 1 drink a day  Men should limit alcohol to 2 drinks a day  A drink of alcohol is 12 ounces of beer, 5 ounces of wine, or 1½ ounces of liquor  · Do not smoke  Nicotine can damage blood vessels and make it more difficult to manage your diabetes  Do not use e-cigarettes or smokeless tobacco in place of cigarettes or to help you quit  They still contain nicotine  Ask your healthcare provider for information if you currently smoke and need help quitting  · Have screenings for complications of diabetes and other conditions that happen with diabetes    You will need to be screened for kidney problems, high cholesterol, high blood pressure, blood vessel problems, eye problems, and eating disorders  Some screenings may begin right away and some may happen within the first 5 years of diagnosis  You will need to continue screenings through your lifetime  Keep your follow-up appointments with all providers  · Ask about vaccines  You have a higher risk for serious illness if you get the flu, pneumonia, COVID-19, or hepatitis  Ask your healthcare provider if you should get a flu, pneumonia, or hepatitis B vaccine, and when to get the COVID-19 vaccine  Follow up with your healthcare provider as directed: You may need to have blood tests done before your follow-up visit  The test results will show if changes need to be made in your treatment or self-care  Write down your questions so you remember to ask them during your visit  Talk to your provider if you cannot afford your medicine  © Copyright [employer] 2021 Information is for End User's use only and may not be sold, redistributed or otherwise used for commercial purposes  All illustrations and images included in CareNotes® are the copyrighted property of [employer]  or Eli Mason   The above information is an  only  It is not intended as medical advice for individual conditions or treatments  Talk to your doctor, nurse or pharmacist before following any medical regimen to see if it is safe and effective for you

## 2022-02-24 NOTE — PROGRESS NOTES
Progress Note - Wound   Nina Kimble 60 y o  male MRN: [id number]  Unit/Bed#: 7T [facility] 712-02 Encounter: [id number]      Assessment:   1  R 2nd digit osteomyelitis          A  S/p partial toe amputation (DOS: 2/23/2022)  2  DM c DN    Plan:   -pt eval and managed    - Number and complexity of problems addressed:  1 undiagnosed new problem with uncertain prognosis   as shown    - Amount/complexity of data reviewed and analyzed:     Category 1: prior patient notes were analyzed today before evaluating and managing patient  All PMH were discussed with pt today  - Risk of complications: moderate risk of morbidity from additional testing or treatment involved with this patient, which includes but not limited to:    - discussed anatomy, condition, treatment plan and options  They were instructed on proper foot care  The patient was seen today for greater than total of  45-59 minutes     This is total time spent today involving both face-to-face time and non face-to-face time  This time spent includes  reviewing their past medical history  , performing a medically appropriate examination and evaluation of the patient, counseling and educating the patient,  documenting all findings in EMR, and independently interpreting results and communicating results with  patient     and discussing their condition and treatment options, risks, and potential complications  I have discussed the findings of this examination with the patient  The discussion included a complete verbal explanation of the examination results, diagnosis and planned treatment(s)  A schedule for future care needs was explained  The patient has verbalized the understanding of these instructions at this time   If any questions should arise after returning home I have encouraged the patient to feel free to call the office      - surgery yesterday is surgical cure  - dressings changed today  - dressings to remain intact until pt sees Dr Blanca Noel as outp  - WBAT in surgical shoe    - I spoke with Dr Rajiv Richardson, pt ok for d/c      - thank you for the consult  Subjective:  Pt eval and managed  Pt without pain to surgical site  Denies n/v/f/c    Objective:    Vitals: Blood pressure 116/67, pulse 62, temperature 97 8 °F (36 6 °C), temperature source Temporal, resp  rate 18, height 6' 1" (1 854 m), weight (!) 147 kg (324 lb 8 3 oz), SpO2 98 %  ,Body mass index is 42 81 kg/m²  Physical Exam: R 2nd digit with sutures intact, skin well coapted  Lab, Imaging and other studies: I have personally reviewed pertinent reports  Wound 09/15/21 Foot Left (Active)       Wound 01/12/22 Foot Right (Active)   Wound Image   02/22/22 1102   Wound Description Beefy red;Dark edges;Drainage;Edema;Pink; White 02/23/22 0800   Katy-wound Assessment Clean;Dry; Intact 02/23/22 0800   Dressing Open to air 02/23/22 0800       Wound 02/10/22 Neck Right (Active)       Wound 02/23/22 Foot Right (Active)   Wound Description KARLA 02/24/22 0151   Wound Site Closure Unable to assess 02/24/22 0151   Treatments Elevated 02/24/22 0151   Dressing Pressure dressing 02/24/22 0151   Dressing Status Clean;Dry; Intact 02/24/22 0151

## 2022-02-24 NOTE — PLAN OF CARE
Problem: PAIN - ADULT  Goal: Verbalizes/displays adequate comfort level or baseline comfort level  Description: Interventions:  - Encourage patient to monitor pain and request assistance  - Assess pain using appropriate pain scale  - Administer analgesics based on type and severity of pain and evaluate response  - Implement non-pharmacological measures as appropriate and evaluate response  - Consider cultural and social influences on pain and pain management  - Notify physician/advanced practitioner if interventions unsuccessful or patient reports new pain  2/24/2022 1058 by Fidel Ricketts RN  Outcome: Completed  2/24/2022 1045 by Fidel Ricketts RN  Outcome: Progressing     Problem: INFECTION - ADULT  Goal: Absence or prevention of progression during hospitalization  Description: INTERVENTIONS:  - Assess and monitor for signs and symptoms of infection  - Monitor lab/diagnostic results  - Monitor all insertion sites, i e  indwelling lines, tubes, and drains  - Monitor endotracheal if appropriate and nasal secretions for changes in amount and color  - [place] appropriate cooling/warming therapies per order  - Administer medications as ordered  - Instruct and encourage patient and family to use good hand hygiene technique  - Identify and instruct in appropriate isolation precautions for identified infection/condition  2/24/2022 1058 by Fidel Ricketts RN  Outcome: Completed  2/24/2022 1045 by Fidel Ricketts RN  Outcome: Progressing  Goal: Absence of fever/infection during neutropenic period  Description: INTERVENTIONS:  - Monitor WBC    2/24/2022 1058 by Fidel Ricketts RN  Outcome: Completed  2/24/2022 1045 by Fidel Ricketts RN  Outcome: Progressing     Problem: SAFETY ADULT  Goal: Patient will remain free of falls  Description: INTERVENTIONS:  - Educate patient/family on patient safety including physical limitations  - Instruct patient to call for assistance with activity   - Consult OT/PT to assist with strengthening/mobility   - Keep Call bell within reach  - Keep bed low and locked with side rails adjusted as appropriate  - Keep care items and personal belongings within reach  - Initiate and maintain comfort rounds  - Make Fall Risk Sign visible to staff    - Apply yellow socks and bracelet for high fall risk patients  - Consider moving patient to room near nurses station  2/24/2022 1058 by Jimmie See RN  Outcome: Completed  2/24/2022 1045 by Jimmie See RN  Outcome: Progressing  Goal: Maintain or return to baseline ADL function  Description: INTERVENTIONS:  -  Assess patient's ability to carry out ADLs; assess patient's baseline for ADL function and identify physical deficits which impact ability to perform ADLs (bathing, care of mouth/teeth, toileting, grooming, dressing, etc )  - Assess/evaluate cause of self-care deficits   - Assess range of motion  - Assess patient's mobility; develop plan if impaired  - Assess patient's need for assistive devices and provide as appropriate  - Encourage maximum independence but intervene and supervise when necessary  - Involve family in performance of ADLs  - Assess for home care needs following discharge   - Consider OT consult to assist with ADL evaluation and planning for discharge  - Provide patient education as appropriate  2/24/2022 1058 by Jimmie See RN  Outcome: Completed  2/24/2022 1045 by Jimmie See RN  Outcome: Progressing  Goal: Maintains/Returns to pre admission functional level  Description: INTERVENTIONS:  - Perform BMAT or MOVE assessment daily    - Set and communicate daily mobility goal to care team and patient/family/caregiver     - Collaborate with rehabilitation services on mobility goals if consulted  -  - Out of bed for toileting  - Record patient progress and toleration of activity level   2/24/2022 1058 by Jimmie See RN  Outcome: Completed  2/24/2022 1045 by Jimmie See RN  Outcome: Progressing     Problem: DISCHARGE PLANNING  Goal: Discharge to home or other facility with appropriate resources  Description: INTERVENTIONS:  - Identify barriers to discharge w/patient and caregiver  - Arrange for needed discharge resources and transportation as appropriate  - Identify discharge learning needs (meds, wound care, etc )  - Arrange for interpretive services to assist at discharge as needed  - Refer to Case Management Department for coordinating discharge planning if the patient needs post-hospital services based on physician/advanced practitioner order or complex needs related to functional status, cognitive ability, or social support system  2/24/2022 1058 by Stuart Reynolds RN  Outcome: Completed  2/24/2022 1045 by Stuart Reynolds RN  Outcome: Progressing     Problem: Knowledge Deficit  Goal: Patient/family/caregiver demonstrates understanding of disease process, treatment plan, medications, and discharge instructions  Description: Complete learning assessment and assess knowledge base    Interventions:  - Provide teaching at level of understanding  - Provide teaching via preferred learning methods  2/24/2022 1058 by Stuart Reynolds RN  Outcome: Completed  2/24/2022 1045 by Stuart Reynolds RN  Outcome: Progressing     Problem: SKIN/TISSUE INTEGRITY - ADULT  Goal: Incision(s), wounds(s) or drain site(s) healing without S/S of infection  Description: INTERVENTIONS  - Assess and document dressing, incision, wound bed, drain sites and surrounding tissue    2/24/2022 1058 by Stuart Reynolds RN  Outcome: Completed  2/24/2022 1045 by Stuart Reynolds RN  Outcome: Progressing

## 2022-02-24 NOTE — PLAN OF CARE
Problem: PAIN - ADULT  Goal: Verbalizes/displays adequate comfort level or baseline comfort level  Description: Interventions:  - Encourage patient to monitor pain and request assistance  - Assess pain using appropriate pain scale  - Administer analgesics based on type and severity of pain and evaluate response  - Implement non-pharmacological measures as appropriate and evaluate response  - Consider cultural and social influences on pain and pain management  - Notify physician/advanced practitioner if interventions unsuccessful or patient reports new pain  Outcome: Progressing     Problem: INFECTION - ADULT  Goal: Absence or prevention of progression during hospitalization  Description: INTERVENTIONS:  - Assess and monitor for signs and symptoms of infection  - Monitor lab/diagnostic results  - Monitor all insertion sites, i e  indwelling lines, tubes, and drains  - Monitor endotracheal if appropriate and nasal secretions for changes in amount and color  - [place] appropriate cooling/warming therapies per order  - Administer medications as ordered  - Instruct and encourage patient and family to use good hand hygiene technique  - Identify and instruct in appropriate isolation precautions for identified infection/condition  Outcome: Progressing     Problem: DISCHARGE PLANNING  Goal: Discharge to home or other facility with appropriate resources  Description: INTERVENTIONS:  - Identify barriers to discharge w/patient and caregiver  - Arrange for needed discharge resources and transportation as appropriate  - Identify discharge learning needs (meds, wound care, etc )  - Arrange for interpretive services to assist at discharge as needed  - Refer to Case Management Department for coordinating discharge planning if the patient needs post-hospital services based on physician/advanced practitioner order or complex needs related to functional status, cognitive ability, or social support system  Outcome: Progressing

## 2022-02-24 NOTE — PLAN OF CARE
Problem: PAIN - ADULT  Goal: Verbalizes/displays adequate comfort level or baseline comfort level  Description: Interventions:  - Encourage patient to monitor pain and request assistance  - Assess pain using appropriate pain scale  - Administer analgesics based on type and severity of pain and evaluate response  - Implement non-pharmacological measures as appropriate and evaluate response  - Consider cultural and social influences on pain and pain management  - Notify physician/advanced practitioner if interventions unsuccessful or patient reports new pain  Outcome: Progressing     Problem: INFECTION - ADULT  Goal: Absence or prevention of progression during hospitalization  Description: INTERVENTIONS:  - Assess and monitor for signs and symptoms of infection  - Monitor lab/diagnostic results  - Monitor all insertion sites, i e  indwelling lines, tubes, and drains  - Monitor endotracheal if appropriate and nasal secretions for changes in amount and color  - [place] appropriate cooling/warming therapies per order  - Administer medications as ordered  - Instruct and encourage patient and family to use good hand hygiene technique  - Identify and instruct in appropriate isolation precautions for identified infection/condition  Outcome: Progressing  Goal: Absence of fever/infection during neutropenic period  Description: INTERVENTIONS:  - Monitor WBC    Outcome: Progressing     Problem: SAFETY ADULT  Goal: Patient will remain free of falls  Description: INTERVENTIONS:  - Educate patient/family on patient safety including physical limitations  - Instruct patient to call for assistance with activity   - Consult OT/PT to assist with strengthening/mobility   - Keep Call bell within reach  - Keep bed low and locked with side rails adjusted as appropriate  - Keep care items and personal belongings within reach  - Initiate and maintain comfort rounds  - Make Fall Risk Sign visible to staff  - Offer Toileting   - Obtain necessary fall risk management equipment:   - Apply yellow socks and bracelet for high fall risk patients  - Consider moving patient to room near nurses station  Outcome: Progressing  Goal: Maintain or return to baseline ADL function  Description: INTERVENTIONS:  -  Assess patient's ability to carry out ADLs; assess patient's baseline for ADL function and identify physical deficits which impact ability to perform ADLs (bathing, care of mouth/teeth, toileting, grooming, dressing, etc )  - Assess/evaluate cause of self-care deficits   - Assess range of motion  - Assess patient's mobility; develop plan if impaired  - Assess patient's need for assistive devices and provide as appropriate  - Encourage maximum independence but intervene and supervise when necessary  - Involve family in performance of ADLs  - Assess for home care needs following discharge   - Consider OT consult to assist with ADL evaluation and planning for discharge  - Provide patient education as appropriate  Outcome: Progressing  Goal: Maintains/Returns to pre admission functional level  Description: INTERVENTIONS:  - Perform BMAT or MOVE assessment daily    - Set and communicate daily mobility goal to care team and patient/family/caregiver     - Collaborate with rehabilitation services on mobility goals if consulted  - Perform Range of Motion   - Out of bed for toileting  - Record patient progress and toleration of activity level   Outcome: Progressing     Problem: DISCHARGE PLANNING  Goal: Discharge to home or other facility with appropriate resources  Description: INTERVENTIONS:  - Identify barriers to discharge w/patient and caregiver  - Arrange for needed discharge resources and transportation as appropriate  - Identify discharge learning needs (meds, wound care, etc )  - Arrange for interpretive services to assist at discharge as needed  - Refer to Case Management Department for coordinating discharge planning if the patient needs post-hospital services based on physician/advanced practitioner order or complex needs related to functional status, cognitive ability, or social support system  Outcome: Progressing     Problem: Knowledge Deficit  Goal: Patient/family/caregiver demonstrates understanding of disease process, treatment plan, medications, and discharge instructions  Description: Complete learning assessment and assess knowledge base    Interventions:  - Provide teaching at level of understanding  - Provide teaching via preferred learning methods  Outcome: Progressing     Problem: SKIN/TISSUE INTEGRITY - ADULT  Goal: Incision(s), wounds(s) or drain site(s) healing without S/S of infection  Description: INTERVENTIONS  - Assess and document dressing, incision, wound bed, drain sites and surrounding tissue  - Provide patient and family education  - Perform skin care/dressing changes every prn  Outcome: Progressing

## 2022-02-24 NOTE — OCCUPATIONAL THERAPY NOTE
Occupational Therapy Evaluation     Patient Name: Selvin GASTELUMZMELVI Date: 2/24/2022  Problem List  Principal Problem:    Toe osteomyelitis, right ([facility] )  Active Problems:     Morbid obesity ([facility] )    Atrial fibrillation ([facility] )    Anxiety    Hypokalemia    Type 2 diabetes mellitus with diabetic polyneuropathy, with long-term current use of insulin (HCC)    Past Medical History  Past Medical History:   Diagnosis Date    Atrial fibrillation (HCC)     Chronic diastolic (congestive) heart failure (HCC)     Diabetes mellitus ([facility] )     High cholesterol     Hyperlipidemia     Hypertension     Pacemaker     Stroke [facility])      Past Surgical History  Past Surgical History:   Procedure Laterality Date    APPENDECTOMY      ATRIAL CARDIAC PACEMAKER INSERTION      BARIATRIC SURGERY  05/2021    NERVE BLOCK Right 2/10/2022    Procedure: BLOCK MEDIAL BRANCH C3, C4, C5 #1;  Surgeon: Huntley Boas, MD;  Location:  ENDO;  Service: Pain Management     NV AMPUTATION TOE,I-P JT Left 11/16/2021    Procedure: AMPUTATION LESSER TOE;  Surgeon: Dinora Jenkins DPM;  Location: [facility];  Service: Podiatry    TOE AMPUTATION Left     2nd toe    TOE AMPUTATION Left 9/15/2021    Procedure: AMPUTATION LEFT 4TH TOE;  Surgeon: Dinora Jenkins DPM;  [facility];  Service: Podiatry    TOE AMPUTATION Right 1/12/2022    Procedure: AMPUTATION TOE;  Surgeon: Laurita Webb DPM;  [facility];  Service: Podiatry    TOE AMPUTATION Right 2/23/2022    Procedure: AMPUTATION TOE  RIGHT SECOND;  Surgeon: Laurita Webb DPM;  Location: [address] [place] [address] MAIN OR;  Service: Podiatry             02/24/22 0937   OT Last Visit   OT Visit Date 02/24/22   Note Type   Note type Evaluation   Restrictions/Precautions   Weight Bearing Precautions Per Order Yes   RLE Weight Bearing Per Order WBAT   Braces or Orthoses   (surgical shoe )   Other Precautions Pain   Pain Assessment   Pain Assessment Tool 0-10   Pain Score 6   Pain Location/Orientation Orientation: Right;Location: Foot   Pain Onset/Description Onset: Ongoing   Home Living   Type of Home House   Home Layout Two level   Bathroom Shower/Tub Tub/shower unit   Bathroom Toilet Standard   Home Equipment   (NA)   Prior Function   Level of [place] Independent with ADLs and functional mobility   Lives With Spouse; Kana Howell Help From Family   ADL Assistance Independent   IADLs Independent   Psychosocial   Psychosocial (WDL) WDL   ADL   Grooming Assistance 7  Independent   UB Bathing Assistance 7  Independent   LB Bathing Assistance 6  Modified Independent   UB Dressing Assistance 7  Independent   LB Dressing Assistance 6  Modified independent   Toileting Assistance  6  Modified independent   Bed Mobility   Supine to Sit 6  Modified independent   Sit to Supine 6  Modified independent   Transfers   Sit to Stand 7  Independent   Stand to Sit 7  Independent   Functional Mobility   Functional Mobility 7  Independent   Balance   Static Sitting Good   Dynamic Sitting Fair +   Static Standing Fair +   Dynamic Standing Fair +   Ambulatory Fair +   Activity Tolerance   Activity Tolerance Patient limited by pain   RUE Assessment   RUE Assessment WFL   LUE Assessment   LUE Assessment WFL   Hand Function   Gross Motor Coordination Functional   Fine Motor Coordination Functional   Sensation   Light Touch No apparent deficits   Proprioception   Proprioception No apparent deficits   Perception   Inattention/Neglect Appears intact   Cognition   Overall Cognitive Status WFL   Arousal/Participation Alert; Cooperative   Attention Within functional limits   Orientation Level Oriented X4   Memory Within functional limits   Following Commands Follows all commands and directions without difficulty   Assessment   Limitation Decreased high-level ADLs   Assessment   Pt is a 60 y o  male seen for OT evaluation s/p admit to [facility] w/ Toe osteomyelitis, right ([address] ), status post R second toe amputation, currently orders for surgical shoe with ambulation  See above for extensive list of comorbidities affecting Pt's functional performance at time of assessment  Personal factors affecting Pt at time of IE include:difficulty performing IADLS  and health management   Prior to admission, Pt was living with family, independent with ADls and mobility  Upon evaluation: Pt able to complete sit-->stand transfers, toilet transfers, ambulation, and bathroom mobility independently without assist  Pt educated re: proper donning/doffing, and hygiene of surgical shoe, demonstrated good understanding  Pt slightly impulsive at times, cues for pacing, however no overt loss of balance  At this time, Pt is primarily limited in higher level ADLs, reports family can assists PRN  Pt has no further acute OT needs at this time        Plan   OT Frequency Eval only   Recommendation   OT Discharge Recommendation No rehabilitation needs   AM-PAC Daily Activity Inpatient   Lower Body Dressing 4   Bathing 4   Toileting 4   Upper Body Dressing 4   Grooming 4   Eating 4   Daily Activity Raw Score 24   Daily Activity Standardized Score (Calc for Raw Score >=11) 57 54 done

## 2022-02-24 NOTE — NURSING NOTE
Pt signed out [place], dr Shauna Ulloa in to speak with pt , risks explained, pt states understanding, encouraged to eat food high in potassium, also explained the importance of taking his medication, pt states understanding, will go for lab work and follow up, will  his medication from his pharmacy, no distress, home with wife

## 2022-02-25 NOTE — UTILIZATION REVIEW
Notification of Discharge   This is a Notification of Discharge from our facility [address]  Please be advised that this patient has been discharge from our facility  Below you will find the admission and discharge date and time including the patients disposition  UTILIZATION REVIEW CONTACT:  Aj Bang  Utilization   Network Utilization Review Department  Phone: [phone] x carefully listen to the prompts  All voicemails are confidential   Email: [email or web address]     PHYSICIAN ADVISORY SERVICES:  FOR TUEF-KE-SZNM REVIEW - MEDICAL NECESSITY DENIAL  Phone: [phone]  Fax: [phone]  Email: [email or web address]     PRESENTATION DATE: 2/22/2022 10:54 AM  OBERVATION ADMISSION DATE:   INPATIENT ADMISSION DATE: 2/23/22  9:52 AM   DISCHARGE DATE: 2/24/2022 12:11 PM  DISPOSITION: Left against medical advice or discontinued care Left against medical advice or discontinued care      IMPORTANT INFORMATION:  Send all requests for admission clinical reviews, approved or denied determinations and any other requests to dedicated fax number below belonging to the campus where the patient is receiving treatment   List of dedicated fax numbers:  [address] [place] [address] DENIALS (Administrative/Medical Necessity) [phone]   [address] [place] [address] (Maternity/NICU/Pediatrics) [phone]   [facility] [phone]   [address] [place] [address] [phone]   [address] [place] [address] [phone]   [address] [place] [address] [address] [place] [address],[address] [phone]   [facility]  [phone]   [address] [place] [address], [address]  [address] [facility] [address] [facility] [phone]

## 2022-02-27 LAB
BACTERIA BLD CULT: NORMAL
BACTERIA BLD CULT: NORMAL

## 2022-03-15 RX ORDER — OXYCODONE HYDROCHLORIDE 5 MG/1
TABLET ORAL
COMMUNITY
End: 2022-03-16

## 2022-03-15 RX ORDER — SILDENAFIL 25 MG/1
25 TABLET, FILM COATED ORAL
COMMUNITY
Start: 2022-03-10 | End: 2022-03-16

## 2022-03-16 ENCOUNTER — OFFICE VISIT (OUTPATIENT)
Dept: PAIN MEDICINE | Facility: CLINIC | Age: 57
End: 2022-03-16
Payer: COMMERCIAL

## 2022-03-16 VITALS
WEIGHT: 315 LBS | TEMPERATURE: 97.8 F | HEART RATE: 96 BPM | SYSTOLIC BLOOD PRESSURE: 94 MMHG | RESPIRATION RATE: 20 BRPM | BODY MASS INDEX: 41.75 KG/M2 | HEIGHT: 73 IN | DIASTOLIC BLOOD PRESSURE: 58 MMHG

## 2022-03-16 DIAGNOSIS — M54.2 CERVICALGIA: ICD-10-CM

## 2022-03-16 DIAGNOSIS — M47.812 FACET ARTHROPATHY, CERVICAL: ICD-10-CM

## 2022-03-16 DIAGNOSIS — M47.812 CERVICAL SPONDYLOSIS: Primary | ICD-10-CM

## 2022-03-16 PROCEDURE — 99214 OFFICE O/P EST MOD 30 MIN: CPT | Performed by: ANESTHESIOLOGY

## 2022-03-16 RX ORDER — BUPIVACAINE HCL/PF 2.5 MG/ML
10 VIAL (ML) INJECTION ONCE
Status: CANCELLED | OUTPATIENT
Start: 2022-03-16 | End: 2022-03-16

## 2022-03-16 RX ORDER — LIDOCAINE HYDROCHLORIDE 10 MG/ML
10 INJECTION, SOLUTION EPIDURAL; INFILTRATION; INTRACAUDAL; PERINEURAL ONCE
Status: CANCELLED | OUTPATIENT
Start: 2022-03-16 | End: 2022-03-16

## 2022-03-16 RX ORDER — METHYLPREDNISOLONE ACETATE 80 MG/ML
80 INJECTION, SUSPENSION INTRA-ARTICULAR; INTRALESIONAL; INTRAMUSCULAR; PARENTERAL; SOFT TISSUE ONCE
Status: CANCELLED | OUTPATIENT
Start: 2022-03-16 | End: 2022-03-16

## 2022-03-16 RX ORDER — GABAPENTIN 300 MG/1
300 CAPSULE ORAL 3 TIMES DAILY
Qty: 90 CAPSULE | Refills: 0 | Status: SHIPPED | OUTPATIENT
Start: 2022-03-16 | End: 2022-04-25

## 2022-03-16 NOTE — H&P (VIEW-ONLY)
Assessment  1  Cervical spondylosis - Right  -     Case request operating room: BLOCK MEDIAL BRANCH c3, c4, c5 #2; Standing  -     Case request operating room: BLOCK MEDIAL BRANCH c3, c4, c5 #2  -     gabapentin (NEURONTIN) 300 mg capsule; Take 1 capsule (300 mg total) by mouth 3 (three) times a day    2  Facet arthropathy, cervical - Right  -     Case request operating room: BLOCK MEDIAL BRANCH c3, c4, c5 #2; Standing  -     Case request operating room: BLOCK MEDIAL BRANCH c3, c4, c5 #2  -     gabapentin (NEURONTIN) 300 mg capsule; Take 1 capsule (300 mg total) by mouth 3 (three) times a day    3  Cervicalgia - Right  -     Case request operating room: BLOCK MEDIAL BRANCH c3, c4, c5 #2; Standing  -     Case request operating room: BLOCK MEDIAL BRANCH c3, c4, c5 #2  -     gabapentin (NEURONTIN) 300 mg capsule; Take 1 capsule (300 mg total) by mouth 3 (three) times a day    Greater than 90% relief of pain with improved ability to participate with IADLs after Right C3, C4, C5 medial branch blocks #1 for a few days; pain relief has gradually waned  Previously reported the following symptomatology:     Right sided neck pain described primarily arthritic features  Has yet to begin physical therapy for his neck  Cervical facet arthropathy seen on xray cervical spine  Distribution of pain follows the right C3-C6 medial branch nerve regions  + right sided facet loading maneuvers consistent with multilevel spondyloarthropathy and osteophytes seen in cervical spine  Reasonable at this time to trial medial branch blocks to target site of cervical facet mediated pain and pursue radiofrequency ablation of successful  Risks, benefits and alternatives of procedure in conjunction with multimodal pain therapy plan thoroughly discussed with patient  Questions answered to patient's satisfaction      Plan  -Right C3, C4, C5 medial branch blocks #2; RTC 2 weeks post procedure  -gabapentin increased to 300 mg t i d  from order previously rx for patient; counseled regarding sedative effects of taking this medication and provided up titration calendar  Counseled not to take medication while driving or operating heavy machinery/using stairs  -physical therapy for right-sided cervical facet arthropathy; Physician directed home exercise plan as per [employer] demonstrated and handouts provided that patient plans to participate with for 1 hour, twice a week for the next 6 weeks  There are risks associated with opioid medications, including dependence, addiction and tolerance  The patient understands and agrees to use these medications only as prescribed  Potential side effects of the medications include, but are not limited to, constipation, drowsiness, addiction, impaired judgment and risk of fatal overdose if not taken as prescribed  The patient was warned against driving while taking sedation medications  Sharing medications is a felony  At this point in time, the patient is showing no signs of addiction, abuse, diversion or suicidal ideation  [place] Prescription Drug Monitoring Program report was reviewed and was appropriate      Complete risks and benefits including bleeding, infection, tissue reaction, nerve injury and allergic reaction were discussed  The approach was demonstrated using models and literature was provided  Verbal and written consent was obtained  My impressions and treatment recommendations were discussed in detail with the patient who verbalized understanding and had no further questions  Discharge instructions were provided  I personally saw and examined the patient and I agree with the above discussed plan of care  My impressions and treatment recommendations were discussed in detail with the patient who verbalized understanding and had no further questions  Discharge instructions were provided  I personally saw and examined the patient and I agree with the above discussed plan of care      New Medications Ordered This Visit   Medications    gabapentin (NEURONTIN) 300 mg capsule     Sig: Take 1 capsule (300 mg total) by mouth 3 (three) times a day     Dispense:  90 capsule     Refill:  0       History of Present Illness    Greater than 90% relief of pain with improved ability to participate with IADLs after Right C3, C4, C5 medial branch blocks #1 for a few days; pain relief has gradually waned  Previously reported the following symptomatology:     Elsy Rodriguez is a 60 y o  male with pmhx of afib with pacemaker on xarelto, obesity, DAYAN, DM-2, HTN, depression/anxiety presenting with right-sided neck pain described primarily arthritic features  Describes progressive neck pain since November  The patient describes predominantly aching, nagging, indolent crampy, stabbing axial neck pain without radicular features of electric shock-like and shooting pain  He denies any weakness numbness and paresthesias  The pain is 8/10 nature and is worse with overhead maneuvers as well as lateral rotation and extension of the neck  The patient has not yet been to physical therapy, and has failed conservative medical management including naproxen 500 mg b i d  and gabapentin 300 mg t i d  The pain is significant source of disability and compromises independent activities of daily living as well as overall function  The patient has difficulty with sleep disturbance as well since the pain often wakes him up  Has trialed gabapentin, flexeril and tylenol as well as tramadol with limited benefit but has never trialed cervical epidural steroid injections or medial branch blocks  He denies any bowel or bladder issues/incontinence, gait instability  I have personally reviewed and/or updated the patient's past medical history, past surgical history, family history, social history, current medications, allergies, and vital signs today  Review of Systems   Constitutional: Positive for activity change  HENT: Negative  Eyes: Negative  Respiratory: Negative  Cardiovascular: Negative  Gastrointestinal: Negative  Endocrine: Negative  Genitourinary: Negative  Musculoskeletal: Positive for arthralgias, myalgias, neck pain and neck stiffness  Skin: Negative  Allergic/Immunologic: Negative  Neurological: Negative for weakness and numbness  Hematological: Negative  Psychiatric/Behavioral: Negative  All other systems reviewed and are negative        Patient Active Problem List   Diagnosis    DAYAN (obstructive sleep apnea)    Chronic diastolic heart failure (HCC)    Hypertension    Diabetes mellitus ([facility] [address] )    Morbid obesity (HCC)    Pain, joint, ankle and foot, left    Chronic osteomyelitis of left foot with draining sinus (HCC)    Atrial fibrillation (HCC)    Anxiety    Hypokalemia    Type 2 diabetes mellitus with diabetic polyneuropathy, with long-term current use of insulin (HCC)    Toe osteomyelitis, right (HCC)    Cervical spondylosis    Cervicalgia - Right       Past Medical History:   Diagnosis Date    Atrial fibrillation (HCC)     Chronic diastolic (congestive) heart failure ([facility] [address] )     Diabetes mellitus ([facility] [address] )     High cholesterol     Hyperlipidemia     Hypertension     Pacemaker     Stroke [facility])        Past Surgical History:   Procedure Laterality Date    APPENDECTOMY      ATRIAL CARDIAC PACEMAKER INSERTION      BARIATRIC SURGERY  05/2021    NERVE BLOCK Right 2/10/2022    Procedure: BLOCK MEDIAL BRANCH C3, C4, C5 #1;  Surgeon: Roro Preciado MD;  Location:  [facility];  Service: Pain Management     OH AMPUTATION TOE,I-P JT Left 11/16/2021    Procedure: AMPUTATION LESSER TOE;  Surgeon: Catrina Ledesma DPM;  [facility];  Service: Podiatry    TOE AMPUTATION Left     2nd toe    TOE AMPUTATION Left 9/15/2021    Procedure: AMPUTATION LEFT 4TH TOE;  Surgeon: Catrina Ledesma DPM;  [facility];  Service: Podiatry    TOE AMPUTATION Right 1/12/2022    Procedure: AMPUTATION TOE;  Surgeon: Cameron Null DPM;  [facility];  Service: Podiatry    TOE AMPUTATION Right 2/23/2022    Procedure: AMPUTATION TOE  RIGHT SECOND;  Surgeon: Cameron Null DPM;  [facility];  Service: Podiatry       Family History   Problem Relation Age of Onset    No Known Problems Mother     No Known Problems Father        Social History     Occupational History    Occupation: Maintenance Tech     Employer: [employer]   Tobacco Use    Smoking status: Never Smoker    Smokeless tobacco: Never Used   Vaping Use    Vaping Use: Never used   Substance and Sexual Activity    Alcohol use: Never    Drug use: Never    Sexual activity: Not on file       Current Outpatient Medications on File Prior to Visit   Medication Sig    atorvastatin (LIPITOR) 40 mg tablet Take 40 mg by mouth daily    doxycycline hyclate (VIBRA-TABS) 100 mg tablet doxycycline hyclate 100 mg tablet   TAKE ONE TABLET TWO TIMES A DAY BY MOUTH AS DIRECTED    FLUoxetine (PROzac) 40 MG capsule     gentamicin (GARAMYCIN) 0 1 % cream gentamicin 0 1 % topical cream   APPLY 1 GRAM BY TOPICAL ROUTE TO THE AFFECTED AREA 3 TIMES A DAY FOR 30 DAYS    hydrOXYzine HCL (ATARAX) 25 mg tablet Take 25 mg by mouth 4 (four) times a day as needed    lidocaine (Lidoderm) 5 % Apply 1 patch topically daily Remove & Discard patch within 12 hours or as directed by MD    lisinopril (ZESTRIL) 5 mg tablet lisinopril 5 mg tablet   TAKE BY MOUTH 1 TABLET IN THE MORNING      misoprostol (CYTOTEC) 200 mcg tablet Take 200 mcg by mouth 4 (four) times a day    omeprazole (PriLOSEC) 20 mg delayed release capsule Take 20 mg by mouth daily      ranitidine (ZANTAC) 150 mg tablet Take 150 mg by mouth daily    sucralfate (CARAFATE) 1 g tablet Take 1 g by mouth 4 (four) times a day      [DISCONTINUED] gabapentin (NEURONTIN) 100 mg capsule     [DISCONTINUED] sildenafil (VIAGRA) 25 MG tablet Take 25 mg by mouth    potassium chloride (K-DUR,KLOR-CON) 20 mEq tablet Take 2 tablets (40 mEq total) by mouth daily for 5 days    [DISCONTINUED] oxyCODONE (ROXICODONE) 5 immediate release tablet oxycodone 5 mg tablet   PLEASE SEE ATTACHED FOR DETAILED DIRECTIONS     No current facility-administered medications on file prior to visit  No Known Allergies      Physical Exam    BP 94/58   Pulse 96   Temp 97 8 °F (36 6 °C)   Resp 20   Ht 6' 1" (1 854 m)   Wt (!) 150 kg (331 lb)   BMI 43 67 kg/m²     Constitutional: normal, well developed, well nourished, alert, in no distress and non-toxic and no overt pain behavior  and obese  Eyes: anicteric  HEENT: grossly intact  Neck: supple, symmetric, trachea midline and no masses   Pulmonary:even and unlabored  Cardiovascular:No edema or pitting edema present  Skin:Normal without rashes or lesions and well hydrated  Psychiatric:Mood and affect appropriate  Neurologic:Cranial Nerves II-XII grossly intact Sensation grossly intact; no clonus negative morton's  Reflexes 2+ and brisk  Spurling's maneuver negative bilaterally  Musculoskeletal:normal gait  5/5 strength bilaterally with AROM in upper extremities  Significant pain with right-sided cervical facet loading bilaterally and with lateral spine rotation  TTP over right-sided cervical paraspinal muscles  Negative epifanio's test, negative gaenslen's negative SIJ loading bilaterally      Imaging    Multilevel spondyloarthropathy/degenerative changes seen throughout cervical spine x-ray

## 2022-03-16 NOTE — PATIENT INSTRUCTIONS
Cervical Facet Block   WHAT YOU NEED TO KNOW:   A cervical facet block is a procedure to inject medicine at the facet joints in your cervical (neck) spine  Facet joints are found at the back of each vertebrae  HOW TO PREPARE:   The week before your procedure:   · Arrange to have someone drive you home after your procedure  · Tell your healthcare provider about all the medicines you currently take  He or she will tell you if you need to stop any medicine before the procedure, and when to stop  He or she will tell you which medicines to take or not take on the day of the procedure  · Tell your provider about all your allergies  Tell him or her if you had an allergic reaction to anesthesia, antibiotics, or contrast liquid  · You may need to have blood and urine tests  Tests such as x-rays, a CT scan, or an MRI may also be done  The night before your procedure: You may be told not to eat or drink anything after midnight  The day of your procedure:   · Take only the medicines your healthcare provider told you to take  · You or a close family member will be asked to sign a legal document called a consent form  It gives healthcare providers permission to do the procedure or surgery  It also explains the problems that may happen, and your choices  Make sure all your questions are answered before you sign this form  · Healthcare providers may insert an intravenous tube (IV) into your vein  A vein in the arm is usually chosen  Through the IV tube, you may be given liquids and medicine  · An anesthesiologist will talk to you before your surgery  You may need medicine to keep you asleep or numb an area of your body during surgery  Tell healthcare providers if you or anyone in your family has had a problem with anesthesia in the past     WHAT WILL HAPPEN:   What will happen:   · You will lie on your stomach, with your head and body slightly turned to the side   Your healthcare provider will insert a thin needle near your cervical spine and into the facet joint  He or she will use an x-ray with contrast liquid or a CT scan to help guide the needle  · Your provider will place the needle tip inside or just outside the facet joint and inject the medicine  The medicine may include steroids and anesthesia  Your healthcare provider may inject medicine into more than one area  · Bandages will be placed over the areas where the needles were inserted  After your procedure: You will be taken to a recovery room to rest  Healthcare providers will watch you closely for any problems  Do not get out of bed until your healthcare provider says it is okay  When healthcare providers see that you are okay, you may be able to go home  · Bandages will cover the procedure area  The bandages keep the area clean and dry to prevent infection  A healthcare provider may remove the bandages soon after your procedure to check the injection sites  · Medicines may be given to treat pain, swelling, or fever, or to prevent an infection  CONTACT YOUR HEALTHCARE PROVIDER IF:   · You have a fever  · You have a skin infection or an infected wound on or near the back of your neck  · You have questions or concerns about your procedure  RISKS:   You may have bleeding at the injection site  Nerves, blood vessels, ligaments, muscles, and bones near your spine may be damaged  The medicine may spread past the facet joint and cause numbness in other areas  You may have trouble breathing  Even after you have this procedure, you may still have shoulder or back pain  You may also develop a headache from the procedure  CARE AGREEMENT:   You have the right to help plan your care  Learn about your health condition and how it may be treated  Discuss treatment options with your healthcare providers to decide what care you want to receive  You always have the right to refuse treatment     © Copyright [employer] 2022 Information is for End User's use only and may not be sold, redistributed or otherwise used for commercial purposes  All illustrations and images included in CareNotes® are the copyrighted property of [employer] M [employer] Inc  or Eli Tanner  The above information is an  only  It is not intended as medical advice for individual conditions or treatments  Talk to your doctor, nurse or pharmacist before following any medical regimen to see if it is safe and effective for you  Cervical Radiofrequency Ablation   WHAT YOU NEED TO KNOW:   What do I need to know about  cervicalradiofrequency ablation? cervicalradiofrequency ablation (RFA) is a procedure used to treat facet joint pain in your  neck  Facet joints are found at the back of each vertebra  A needle electrode is used to send electrical currents to the nerves in your facet joint  The electrical currents create heat that damages the nerve so it cannot send pain signals  How do I prepare for cervical RFA? Your healthcare provider will talk to you about how to prepare for this procedure  He may tell you not to eat or drink anything after midnight on the day of your procedure  He will tell you what medicines to take or not take on the day of your procedure  What will happen during cervical RFA? · You will lie on your stomach  You will be given local anesthesia to numb the area of your  Neck where the needle electrode will be inserted  You may be given a sedative to help keep you relaxed  You may still feel pressure or pushing during the procedure, but you should not feel any pain  Your healthcare provider will use fluoroscopy (a type of x-ray) to guide the needle electrode to the nerves near your facet joint  · Your healthcare provider may touch the affected nerve to make sure the needle electrode is in the right place  You will feel tingling or pressure when he does this  He will then apply local anesthesia to the nerve to numb it   This will prevent you from feeling pain when he applies heat to the nerve  Your healthcare provider will then apply heat to the nerve using the needle electrode  He may need to apply heat to more than one nerve  He will remove the needle electrode and apply a bandage over the area  What are the risks of  cervical RFA? You may have pain, numbness, tingling, or burning in the area where the lumbar RFA was done  These normally go away within 6 weeks  The needle electrode may injure your spinal nerves  This may cause permanent arm weakness or nerve pain  CARE AGREEMENT:   You have the right to help plan your care  Learn about your health condition and how it may be treated  Discuss treatment options with your healthcare providers to decide what care you want to receive  You always have the right to refuse treatment  The above information is an  only  It is not intended as medical advice for individual conditions or treatments  Talk to your doctor, nurse or pharmacist before following any medical regimen to see if it is safe and effective for you  © Copyright [address] Information is for End User's use only and may not be sold, redistributed or otherwise used for commercial purposes  All illustrations and images included in CareNotes® are the copyrighted property of A D A M , Inc  or [employer] [facility]      Neck Exercises   AMBULATORY CARE:   Neck exercises  help reduce neck pain, and improve neck movement and strength  Neck exercises also help prevent long-term neck problems  What you need to know about neck exercises:   · Do the exercises every day,  or as often as directed by your healthcare provider  · Move slowly, gently, and smoothly  Avoid fast or jerky motions  · Stand and sit the way your healthcare provider shows you  Good posture may reduce your neck pain  Check your posture often, even when you are not doing your neck exercises      How to perform neck exercises safely:   · Exercise position:  You may sit or stand while you do neck exercises  Face forward  Your shoulders should be straight and relaxed, with a good posture  · Head tilts, forward and back:  Gently bow your head and try to touch your chin to your chest  Your healthcare provider may tell you to push on the back of your neck to help bow your head  Raise your chin back to the starting position  Tilt your head back as far as possible so you are looking up at the ceiling  Your healthcare provider may tell you to lift your chin to help tilt your head back  Return your head to the starting position  · Head tilts, side to side:  Tilt your head, bringing your ear toward your shoulder  Then tilt your head toward the other shoulder  · Head turns:  Turn your head to look over your shoulder  Tilt your chin down and try to touch it to your shoulder  Do not raise your shoulder to your chin  Face forward again  Do the same on the other side  · Head rolls:  Slowly bring your chin toward your chest  Next, roll your head to the right  Your ear should be positioned over your shoulder  Hold this position for 5 seconds  Roll your head back toward your chest and to the left into the same position  Hold for 5 seconds  Gently roll your head back and around in a clockwise [place] 3 times  Next, move your head in the reverse direction (counterclockwise) in a [place] 3 times  Do not shrug your shoulders upwards while you do this exercise  Contact your healthcare provider if:   · Your pain does not get better, or gets worse  · You have questions or concerns about your condition, care, or exercise program     © Copyright [employer] 2022 Information is for End User's use only and may not be sold, redistributed or otherwise used for commercial purposes  All illustrations and images included in CareNotes® are the copyrighted property of [employer]  or Eli Tanner  The above information is an  only   It is not intended as medical advice for individual conditions or treatments  Talk to your doctor, nurse or pharmacist before following any medical regimen to see if it is safe and effective for you

## 2022-03-16 NOTE — PROGRESS NOTES
Assessment  1  Cervical spondylosis - Right  -     Case request operating room: BLOCK MEDIAL BRANCH c3, c4, c5 #2; Standing  -     Case request operating room: BLOCK MEDIAL BRANCH c3, c4, c5 #2  -     gabapentin (NEURONTIN) 300 mg capsule; Take 1 capsule (300 mg total) by mouth 3 (three) times a day    2  Facet arthropathy, cervical - Right  -     Case request operating room: BLOCK MEDIAL BRANCH c3, c4, c5 #2; Standing  -     Case request operating room: BLOCK MEDIAL BRANCH c3, c4, c5 #2  -     gabapentin (NEURONTIN) 300 mg capsule; Take 1 capsule (300 mg total) by mouth 3 (three) times a day    3  Cervicalgia - Right  -     Case request operating room: BLOCK MEDIAL BRANCH c3, c4, c5 #2; Standing  -     Case request operating room: BLOCK MEDIAL BRANCH c3, c4, c5 #2  -     gabapentin (NEURONTIN) 300 mg capsule; Take 1 capsule (300 mg total) by mouth 3 (three) times a day    Greater than 90% relief of pain with improved ability to participate with IADLs after Right C3, C4, C5 medial branch blocks #1 for a few days; pain relief has gradually waned  Previously reported the following symptomatology:     Right sided neck pain described primarily arthritic features  Has yet to begin physical therapy for his neck  Cervical facet arthropathy seen on xray cervical spine  Distribution of pain follows the right C3-C6 medial branch nerve regions  + right sided facet loading maneuvers consistent with multilevel spondyloarthropathy and osteophytes seen in cervical spine  Reasonable at this time to trial medial branch blocks to target site of cervical facet mediated pain and pursue radiofrequency ablation of successful  Risks, benefits and alternatives of procedure in conjunction with multimodal pain therapy plan thoroughly discussed with patient  Questions answered to patient's satisfaction      Plan  -Right C3, C4, C5 medial branch blocks #2; RTC 2 weeks post procedure  -gabapentin increased to 300 mg t i d  from order previously rx for patient; counseled regarding sedative effects of taking this medication and provided up titration calendar  Counseled not to take medication while driving or operating heavy machinery/using stairs  -physical therapy for right-sided cervical facet arthropathy; Physician directed home exercise plan as per AAOS demonstrated and handouts provided that patient plans to participate with for 1 hour, twice a week for the next 6 weeks  There are risks associated with opioid medications, including dependence, addiction and tolerance  The patient understands and agrees to use these medications only as prescribed  Potential side effects of the medications include, but are not limited to, constipation, drowsiness, addiction, impaired judgment and risk of fatal overdose if not taken as prescribed  The patient was warned against driving while taking sedation medications  Sharing medications is a felony  At this point in time, the patient is showing no signs of addiction, abuse, diversion or suicidal ideation  [facility] Prescription Drug Monitoring Program report was reviewed and was appropriate      Complete risks and benefits including bleeding, infection, tissue reaction, nerve injury and allergic reaction were discussed  The approach was demonstrated using models and literature was provided  Verbal and written consent was obtained  My impressions and treatment recommendations were discussed in detail with the patient who verbalized understanding and had no further questions  Discharge instructions were provided  I personally saw and examined the patient and I agree with the above discussed plan of care  My impressions and treatment recommendations were discussed in detail with the patient who verbalized understanding and had no further questions  Discharge instructions were provided  I personally saw and examined the patient and I agree with the above discussed plan of care      New Medications Ordered This Visit   Medications    gabapentin (NEURONTIN) 300 mg capsule     Sig: Take 1 capsule (300 mg total) by mouth 3 (three) times a day     Dispense:  90 capsule     Refill:  0       History of Present Illness    Greater than 90% relief of pain with improved ability to participate with IADLs after Right C3, C4, C5 medial branch blocks #1 for a few days; pain relief has gradually waned  Previously reported the following symptomatology:     Ramila Simmons is a 60 y o  male with pmhx of afib with pacemaker on xarelto, obesity, DAYAN, DM-2, HTN, depression/anxiety presenting with right-sided neck pain described primarily arthritic features  Describes progressive neck pain since November  The patient describes predominantly aching, nagging, indolent crampy, stabbing axial neck pain without radicular features of electric shock-like and shooting pain  He denies any weakness numbness and paresthesias  The pain is 8/10 nature and is worse with overhead maneuvers as well as lateral rotation and extension of the neck  The patient has not yet been to physical therapy, and has failed conservative medical management including naproxen 500 mg b i d  and gabapentin 300 mg t i d  The pain is significant source of disability and compromises independent activities of daily living as well as overall function  The patient has difficulty with sleep disturbance as well since the pain often wakes him up  Has trialed gabapentin, flexeril and tylenol as well as tramadol with limited benefit but has never trialed cervical epidural steroid injections or medial branch blocks  He denies any bowel or bladder issues/incontinence, gait instability  I have personally reviewed and/or updated the patient's past medical history, past surgical history, family history, social history, current medications, allergies, and vital signs today  Review of Systems   Constitutional: Positive for activity change  HENT: Negative  Eyes: Negative  Respiratory: Negative  Cardiovascular: Negative  Gastrointestinal: Negative  Endocrine: Negative  Genitourinary: Negative  Musculoskeletal: Positive for arthralgias, myalgias, neck pain and neck stiffness  Skin: Negative  Allergic/Immunologic: Negative  Neurological: Negative for weakness and numbness  Hematological: Negative  Psychiatric/Behavioral: Negative  All other systems reviewed and are negative        Patient Active Problem List   Diagnosis    DAYAN (obstructive sleep apnea)    Chronic diastolic heart failure (HCC)    Hypertension    Diabetes mellitus ([facility] [address] )    Morbid obesity (HCC)    Pain, joint, ankle and foot, left    Chronic osteomyelitis of left foot with draining sinus (HCC)    Atrial fibrillation (HCC)    Anxiety    Hypokalemia    Type 2 diabetes mellitus with diabetic polyneuropathy, with long-term current use of insulin (HCC)    Toe osteomyelitis, right (HCC)    Cervical spondylosis    Cervicalgia - Right       Past Medical History:   Diagnosis Date    Atrial fibrillation (HCC)     Chronic diastolic (congestive) heart failure ([facility] [address] )     Diabetes mellitus ([facility] [address] )     High cholesterol     Hyperlipidemia     Hypertension     Pacemaker     Stroke [facility])        Past Surgical History:   Procedure Laterality Date    APPENDECTOMY      ATRIAL CARDIAC PACEMAKER INSERTION      BARIATRIC SURGERY  05/2021    NERVE BLOCK Right 2/10/2022    Procedure: BLOCK MEDIAL BRANCH C3, C4, C5 #1;  Surgeon: Laurita Hines MD;  Location:  [facility];  Service: Pain Management     MS AMPUTATION TOE,I-P JT Left 11/16/2021    Procedure: AMPUTATION LESSER TOE;  Surgeon: Lizzie Tejada DPM;  [facility];  Service: Podiatry    TOE AMPUTATION Left     2nd toe    TOE AMPUTATION Left 9/15/2021    Procedure: AMPUTATION LEFT 4TH TOE;  Surgeon: Lizzie Tejada DPM;  [facility];  Service: Podiatry    TOE AMPUTATION Right 1/12/2022    Procedure: AMPUTATION TOE;  Surgeon: Sunny Dee DPM;  [facility];  Service: Podiatry    TOE AMPUTATION Right 2/23/2022    Procedure: AMPUTATION TOE  RIGHT SECOND;  Surgeon: Sunny Dee DPM;  Location: [address] [place] [address] MAIN OR;  Service: Podiatry       Family History   Problem Relation Age of Onset    No Known Problems Mother     No Known Problems Father        Social History     Occupational History    Occupation: Maintenance Tech     Employer: [employer]   Tobacco Use    Smoking status: Never Smoker    Smokeless tobacco: Never Used   Vaping Use    Vaping Use: Never used   Substance and Sexual Activity    Alcohol use: Never    Drug use: Never    Sexual activity: Not on file       Current Outpatient Medications on File Prior to Visit   Medication Sig    atorvastatin (LIPITOR) 40 mg tablet Take 40 mg by mouth daily    doxycycline hyclate (VIBRA-TABS) 100 mg tablet doxycycline hyclate 100 mg tablet   TAKE ONE TABLET TWO TIMES A DAY BY MOUTH AS DIRECTED    FLUoxetine (PROzac) 40 MG capsule     gentamicin (GARAMYCIN) 0 1 % cream gentamicin 0 1 % topical cream   APPLY 1 GRAM BY TOPICAL ROUTE TO THE AFFECTED AREA 3 TIMES A DAY FOR 30 DAYS    hydrOXYzine HCL (ATARAX) 25 mg tablet Take 25 mg by mouth 4 (four) times a day as needed    lidocaine (Lidoderm) 5 % Apply 1 patch topically daily Remove & Discard patch within 12 hours or as directed by MD    lisinopril (ZESTRIL) 5 mg tablet lisinopril 5 mg tablet   TAKE BY MOUTH 1 TABLET IN THE MORNING      misoprostol (CYTOTEC) 200 mcg tablet Take 200 mcg by mouth 4 (four) times a day    omeprazole (PriLOSEC) 20 mg delayed release capsule Take 20 mg by mouth daily      ranitidine (ZANTAC) 150 mg tablet Take 150 mg by mouth daily    sucralfate (CARAFATE) 1 g tablet Take 1 g by mouth 4 (four) times a day      [DISCONTINUED] gabapentin (NEURONTIN) 100 mg capsule     [DISCONTINUED] sildenafil (VIAGRA) 25 MG tablet Take 25 mg by mouth    potassium chloride (K-DUR,KLOR-CON) 20 mEq tablet Take 2 tablets (40 mEq total) by mouth daily for 5 days    [DISCONTINUED] oxyCODONE (ROXICODONE) 5 immediate release tablet oxycodone 5 mg tablet   PLEASE SEE ATTACHED FOR DETAILED DIRECTIONS     No current facility-administered medications on file prior to visit  No Known Allergies      Physical Exam    BP 94/58   Pulse 96   Temp 97 8 °F (36 6 °C)   Resp 20   Ht 6' 1" (1 854 m)   Wt (!) 150 kg (331 lb)   BMI 43 67 kg/m²     Constitutional: normal, well developed, well nourished, alert, in no distress and non-toxic and no overt pain behavior  and obese  Eyes: anicteric  HEENT: grossly intact  Neck: supple, symmetric, trachea midline and no masses   Pulmonary:even and unlabored  Cardiovascular:No edema or pitting edema present  Skin:Normal without rashes or lesions and well hydrated  Psychiatric:Mood and affect appropriate  Neurologic:Cranial Nerves II-XII grossly intact Sensation grossly intact; no clonus negative morton's  Reflexes 2+ and brisk  Spurling's maneuver negative bilaterally  Musculoskeletal:normal gait  5/5 strength bilaterally with AROM in upper extremities  Significant pain with right-sided cervical facet loading bilaterally and with lateral spine rotation  TTP over right-sided cervical paraspinal muscles  Negative epifanio's test, negative gaenslen's negative SIJ loading bilaterally      Imaging    Multilevel spondyloarthropathy/degenerative changes seen throughout cervical spine x-ray

## 2022-03-16 NOTE — H&P (VIEW-ONLY)
Assessment  1  Cervical spondylosis - Right  -     Case request operating room: BLOCK MEDIAL BRANCH c3, c4, c5 #2; Standing  -     Case request operating room: BLOCK MEDIAL BRANCH c3, c4, c5 #2  -     gabapentin (NEURONTIN) 300 mg capsule; Take 1 capsule (300 mg total) by mouth 3 (three) times a day    2  Facet arthropathy, cervical - Right  -     Case request operating room: BLOCK MEDIAL BRANCH c3, c4, c5 #2; Standing  -     Case request operating room: BLOCK MEDIAL BRANCH c3, c4, c5 #2  -     gabapentin (NEURONTIN) 300 mg capsule; Take 1 capsule (300 mg total) by mouth 3 (three) times a day    3  Cervicalgia - Right  -     Case request operating room: BLOCK MEDIAL BRANCH c3, c4, c5 #2; Standing  -     Case request operating room: BLOCK MEDIAL BRANCH c3, c4, c5 #2  -     gabapentin (NEURONTIN) 300 mg capsule; Take 1 capsule (300 mg total) by mouth 3 (three) times a day    Greater than 90% relief of pain with improved ability to participate with IADLs after Right C3, C4, C5 medial branch blocks #1 for a few days; pain relief has gradually waned  Previously reported the following symptomatology:     Right sided neck pain described primarily arthritic features  Has yet to begin physical therapy for his neck  Cervical facet arthropathy seen on xray cervical spine  Distribution of pain follows the right C3-C6 medial branch nerve regions  + right sided facet loading maneuvers consistent with multilevel spondyloarthropathy and osteophytes seen in cervical spine  Reasonable at this time to trial medial branch blocks to target site of cervical facet mediated pain and pursue radiofrequency ablation of successful  Risks, benefits and alternatives of procedure in conjunction with multimodal pain therapy plan thoroughly discussed with patient  Questions answered to patient's satisfaction      Plan  -Right C3, C4, C5 medial branch blocks #2; RTC 2 weeks post procedure  -gabapentin increased to 300 mg t i d  from order previously rx for patient; counseled regarding sedative effects of taking this medication and provided up titration calendar  Counseled not to take medication while driving or operating heavy machinery/using stairs  -physical therapy for right-sided cervical facet arthropathy; Physician directed home exercise plan as per AAOS demonstrated and handouts provided that patient plans to participate with for 1 hour, twice a week for the next 6 weeks  There are risks associated with opioid medications, including dependence, addiction and tolerance  The patient understands and agrees to use these medications only as prescribed  Potential side effects of the medications include, but are not limited to, constipation, drowsiness, addiction, impaired judgment and risk of fatal overdose if not taken as prescribed  The patient was warned against driving while taking sedation medications  Sharing medications is a felony  At this point in time, the patient is showing no signs of addiction, abuse, diversion or suicidal ideation  [place] Prescription Drug Monitoring Program report was reviewed and was appropriate      Complete risks and benefits including bleeding, infection, tissue reaction, nerve injury and allergic reaction were discussed  The approach was demonstrated using models and literature was provided  Verbal and written consent was obtained  My impressions and treatment recommendations were discussed in detail with the patient who verbalized understanding and had no further questions  Discharge instructions were provided  I personally saw and examined the patient and I agree with the above discussed plan of care  My impressions and treatment recommendations were discussed in detail with the patient who verbalized understanding and had no further questions  Discharge instructions were provided  I personally saw and examined the patient and I agree with the above discussed plan of care      New Medications Ordered This Visit   Medications    gabapentin (NEURONTIN) 300 mg capsule     Sig: Take 1 capsule (300 mg total) by mouth 3 (three) times a day     Dispense:  90 capsule     Refill:  0       History of Present Illness    Greater than 90% relief of pain with improved ability to participate with IADLs after Right C3, C4, C5 medial branch blocks #1 for a few days; pain relief has gradually waned  Previously reported the following symptomatology:     Aminah Thompson is a 60 y o  male with pmhx of afib with pacemaker on xarelto, obesity, DAYAN, DM-2, HTN, depression/anxiety presenting with right-sided neck pain described primarily arthritic features  Describes progressive neck pain since November  The patient describes predominantly aching, nagging, indolent crampy, stabbing axial neck pain without radicular features of electric shock-like and shooting pain  He denies any weakness numbness and paresthesias  The pain is 8/10 nature and is worse with overhead maneuvers as well as lateral rotation and extension of the neck  The patient has not yet been to physical therapy, and has failed conservative medical management including naproxen 500 mg b i d  and gabapentin 300 mg t i d  The pain is significant source of disability and compromises independent activities of daily living as well as overall function  The patient has difficulty with sleep disturbance as well since the pain often wakes him up  Has trialed gabapentin, flexeril and tylenol as well as tramadol with limited benefit but has never trialed cervical epidural steroid injections or medial branch blocks  He denies any bowel or bladder issues/incontinence, gait instability  I have personally reviewed and/or updated the patient's past medical history, past surgical history, family history, social history, current medications, allergies, and vital signs today  Review of Systems   Constitutional: Positive for activity change  HENT: Negative  Eyes: Negative  Respiratory: Negative  Cardiovascular: Negative  Gastrointestinal: Negative  Endocrine: Negative  Genitourinary: Negative  Musculoskeletal: Positive for arthralgias, myalgias, neck pain and neck stiffness  Skin: Negative  Allergic/Immunologic: Negative  Neurological: Negative for weakness and numbness  Hematological: Negative  Psychiatric/Behavioral: Negative  All other systems reviewed and are negative        Patient Active Problem List   Diagnosis    DAYAN (obstructive sleep apnea)    Chronic diastolic heart failure (HCC)    Hypertension    [facility] mellitus [facility] [address] [facility]    Morbid obesity (HCC)    Pain, joint, ankle and foot, left    Chronic osteomyelitis of left foot with draining sinus (HCC)    Atrial fibrillation (HCC)    Anxiety    Hypokalemia    Type 2 diabetes mellitus with diabetic polyneuropathy, with long-term current use of insulin (HCC)    Toe osteomyelitis, right (HCC)    Cervical spondylosis    Cervicalgia - Right       Past Medical History:   Diagnosis Date    Atrial fibrillation (HCC)     Chronic diastolic (congestive) heart failure ([facility] [address] )     Diabetes mellitus ([facility] [address] )     High cholesterol     Hyperlipidemia     Hypertension     Pacemaker     Stroke [facility])        Past Surgical History:   Procedure Laterality Date    APPENDECTOMY      ATRIAL CARDIAC PACEMAKER INSERTION      BARIATRIC SURGERY  05/2021    NERVE BLOCK Right 2/10/2022    Procedure: BLOCK MEDIAL BRANCH C3, C4, C5 #1;  Surgeon: René Nettles MD;  Location:  [facility];  Service: Pain Management     CT AMPUTATION TOE,I-P JT Left 11/16/2021    Procedure: AMPUTATION LESSER TOE;  Surgeon: Rajan Dempsey DPM;  [facility];  Service: Podiatry    TOE AMPUTATION Left     2nd toe    TOE AMPUTATION Left 9/15/2021    Procedure: AMPUTATION LEFT 4TH TOE;  Surgeon: Rajan Dempsey DPM;  [facility];  Service: Podiatry    TOE AMPUTATION Right 1/12/2022    Procedure: AMPUTATION TOE;  Surgeon: Simon Hand DPM;  [facility];  Service: Podiatry    TOE AMPUTATION Right 2/23/2022    Procedure: AMPUTATION TOE  RIGHT SECOND;  Surgeon: Simon Hand DPM;  Location: [facility] MAIN OR;  Service: Podiatry       Family History   Problem Relation Age of Onset    No Known Problems Mother     No Known Problems Father        Social History     Occupational History    Occupation: Maintenance Tech     Employer: [employer]   Tobacco Use    Smoking status: Never Smoker    Smokeless tobacco: Never Used   Vaping Use    Vaping Use: Never used   Substance and Sexual Activity    Alcohol use: Never    Drug use: Never    Sexual activity: Not on file       Current Outpatient Medications on File Prior to Visit   Medication Sig    atorvastatin (LIPITOR) 40 mg tablet Take 40 mg by mouth daily    doxycycline hyclate (VIBRA-TABS) 100 mg tablet doxycycline hyclate 100 mg tablet   TAKE ONE TABLET TWO TIMES A DAY BY MOUTH AS DIRECTED    FLUoxetine (PROzac) 40 MG capsule     gentamicin (GARAMYCIN) 0 1 % cream gentamicin 0 1 % topical cream   APPLY 1 GRAM BY TOPICAL ROUTE TO THE AFFECTED AREA 3 TIMES A DAY FOR 30 DAYS    hydrOXYzine HCL (ATARAX) 25 mg tablet Take 25 mg by mouth 4 (four) times a day as needed    lidocaine (Lidoderm) 5 % Apply 1 patch topically daily Remove & Discard patch within 12 hours or as directed by MD    lisinopril (ZESTRIL) 5 mg tablet lisinopril 5 mg tablet   TAKE BY MOUTH 1 TABLET IN THE MORNING      misoprostol (CYTOTEC) 200 mcg tablet Take 200 mcg by mouth 4 (four) times a day    omeprazole (PriLOSEC) 20 mg delayed release capsule Take 20 mg by mouth daily      ranitidine (ZANTAC) 150 mg tablet Take 150 mg by mouth daily    sucralfate (CARAFATE) 1 g tablet Take 1 g by mouth 4 (four) times a day      [DISCONTINUED] gabapentin (NEURONTIN) 100 mg capsule     [DISCONTINUED] sildenafil (VIAGRA) 25 MG tablet Take 25 mg by mouth    potassium chloride (K-DUR,KLOR-CON) 20 mEq tablet Take 2 tablets (40 mEq total) by mouth daily for 5 days    [DISCONTINUED] oxyCODONE (ROXICODONE) 5 immediate release tablet oxycodone 5 mg tablet   PLEASE SEE ATTACHED FOR DETAILED DIRECTIONS     No current facility-administered medications on file prior to visit  No Known Allergies      Physical Exam    BP 94/58   Pulse 96   Temp 97 8 °F (36 6 °C)   Resp 20   Ht 6' 1" (1 854 m)   Wt (!) 150 kg (331 lb)   BMI 43 67 kg/m²     Constitutional: normal, well developed, well nourished, alert, in no distress and non-toxic and no overt pain behavior  and obese  Eyes: anicteric  HEENT: grossly intact  Neck: supple, symmetric, trachea midline and no masses   Pulmonary:even and unlabored  Cardiovascular:No edema or pitting edema present  Skin:Normal without rashes or lesions and well hydrated  Psychiatric:Mood and affect appropriate  Neurologic:Cranial Nerves II-XII grossly intact Sensation grossly intact; no clonus negative morton's  Reflexes 2+ and brisk  Spurling's maneuver negative bilaterally  Musculoskeletal:normal gait  5/5 strength bilaterally with AROM in upper extremities  Significant pain with right-sided cervical facet loading bilaterally and with lateral spine rotation  TTP over right-sided cervical paraspinal muscles  Negative epifanio's test, negative gaenslen's negative SIJ loading bilaterally      Imaging    Multilevel spondyloarthropathy/degenerative changes seen throughout cervical spine x-ray

## 2022-03-22 ENCOUNTER — HOSPITAL ENCOUNTER (OUTPATIENT)
Facility: HOSPITAL | Age: 57
Setting detail: OUTPATIENT SURGERY
Discharge: HOME | End: 2022-03-22
Attending: ANESTHESIOLOGY | Admitting: ANESTHESIOLOGY
Payer: COMMERCIAL

## 2022-03-22 ENCOUNTER — APPOINTMENT (OUTPATIENT)
Dept: RADIOLOGY | Facility: HOSPITAL | Age: 57
End: 2022-03-22
Payer: COMMERCIAL

## 2022-03-22 VITALS
SYSTOLIC BLOOD PRESSURE: 134 MMHG | HEIGHT: 73 IN | OXYGEN SATURATION: 95 % | TEMPERATURE: 98 F | HEART RATE: 81 BPM | DIASTOLIC BLOOD PRESSURE: 86 MMHG | WEIGHT: 315 LBS | BODY MASS INDEX: 41.75 KG/M2 | RESPIRATION RATE: 18 BRPM

## 2022-03-22 LAB — GLUCOSE SERPL-MCNC: 102 MG/DL (ref 65–140)

## 2022-03-22 PROCEDURE — 64491 INJ PARAVERT F JNT C/T 2 LEV: CPT | Performed by: ANESTHESIOLOGY

## 2022-03-22 PROCEDURE — 82948 REAGENT STRIP/BLOOD GLUCOSE: CPT

## 2022-03-22 PROCEDURE — 64490 INJ PARAVERT F JNT C/T 1 LEV: CPT | Performed by: ANESTHESIOLOGY

## 2022-03-22 RX ORDER — BUPIVACAINE HYDROCHLORIDE 2.5 MG/ML
INJECTION, SOLUTION EPIDURAL; INFILTRATION; INTRACAUDAL AS NEEDED
Status: DISCONTINUED | OUTPATIENT
Start: 2022-03-22 | End: 2022-03-22 | Stop reason: HOSPADM

## 2022-03-22 RX ORDER — DEXAMETHASONE SODIUM PHOSPHATE 10 MG/ML
INJECTION, SOLUTION INTRAMUSCULAR; INTRAVENOUS AS NEEDED
Status: DISCONTINUED | OUTPATIENT
Start: 2022-03-22 | End: 2022-03-22 | Stop reason: HOSPADM

## 2022-03-22 RX ORDER — ALPRAZOLAM 0.5 MG/1
0.5 TABLET ORAL ONCE
Status: COMPLETED | OUTPATIENT
Start: 2022-03-22 | End: 2022-03-22

## 2022-03-22 RX ORDER — LIDOCAINE HYDROCHLORIDE 10 MG/ML
INJECTION, SOLUTION EPIDURAL; INFILTRATION; INTRACAUDAL; PERINEURAL AS NEEDED
Status: DISCONTINUED | OUTPATIENT
Start: 2022-03-22 | End: 2022-03-22 | Stop reason: HOSPADM

## 2022-03-22 RX ADMIN — ALPRAZOLAM 0.5 MG: 0.5 TABLET ORAL at 09:13

## 2022-03-22 NOTE — INTERVAL H&P NOTE
H&P reviewed  After examining the patient I find no changes in the patients condition since the H&P had been written      Vitals:    03/22/22 0915   BP: 119/79   Pulse: 76   Resp: 18   Temp: 97 6 °F (36 4 °C)   SpO2: 93%

## 2022-03-22 NOTE — OP NOTE
OPERATIVE REPORT  PATIENT NAME: No Grady    :  1965  MRN: [id number]  Pt Location:  GI ROOM 01    SURGERY DATE: 3/22/2022    Surgeon(s) and Role: Estefani La MD - Primary    Preop Diagnosis:  Cervical spondylosis [S68 760]  Facet arthropathy, cervical [M47 812]  Cervicalgia [M54 2]    Post-Op Diagnosis Codes:     * Cervical spondylosis [R52 391]     * Facet arthropathy, cervical [M47 812]     * Cervicalgia [M54 2]    Procedure(s) (LRB):  BLOCK MEDIAL BRANCH C3, C4, C5 #2 (Right)    Specimen(s):  * No specimens in log *    Estimated Blood Loss:   Minimal    Drains:  * No LDAs found *    Anesthesia Type:   Local    Operative Indications:  Cervical spondylosis [M47 812]  Facet arthropathy, cervical [M47 812]  Cervicalgia [M54 2]    Operative Findings:  Right C3, C4, C5 medial branch nerve regions identified under fluoroscopic guidance      Complications:   None    Procedure and Technique:  Please see detailed procedure note     I was present for the entire procedure    Patient Disposition:  PACU       SIGNATURE: Jose Greenberg MD  DATE: 2022  TIME: 9:45 AM

## 2022-03-22 NOTE — DISCHARGE INSTRUCTIONS
PLEASE SCHEDULE 2 WEEK FOLLOW UP BY CALLING THE [facility] AT [place]: [phone]      MEDIAL BRANCH BLOCK DISCHARGE INSTRUCTIONS      ACTIVITY  · Please do activities that will bring the normal pain that we are rating  For example, if vacuuming or walking increases the painm do that  Rudy twill give the most accurate response to the diary  · You may shower, but no tub baths today, or applied heat  CARE OF THE INJECTION SITE  · This area may be numb for several hours after the injection  · Notify the [facility] if you have any of the following: redness, drainage, swelling or fever above 100°F     SPECIAL INSTRUCTIONS  · Please return the MBB diary to our office by mail, fax, or drop it off  MEDICATIONS  · Please do not take any break through or short acting pain medications for 8 hours after the block  · Continue to take all routine medications  · Our office may have instructed you to hold some medications  · You may resume _______________________________________________  If you have any problems specifically related to your procedure, please call our office at [phone]  Problems not related to your procedure should be directed at your primary care physician  Cervical Radiofrequency Ablation   WHAT YOU NEED TO KNOW:   What do I need to know about  cervicalradiofrequency ablation? cervicalradiofrequency ablation (RFA) is a procedure used to treat facet joint pain in your  neck  Facet joints are found at the back of each vertebra  A needle electrode is used to send electrical currents to the nerves in your facet joint  The electrical currents create heat that damages the nerve so it cannot send pain signals  How do I prepare for cervical RFA? Your healthcare provider will talk to you about how to prepare for this procedure  He may tell you not to eat or drink anything after midnight on the day of your procedure   He will tell you what medicines to take or not take on the day of your procedure  What will happen during cervical RFA? · You will lie on your stomach  You will be given local anesthesia to numb the area of your  Neck where the needle electrode will be inserted  You may be given a sedative to help keep you relaxed  You may still feel pressure or pushing during the procedure, but you should not feel any pain  Your healthcare provider will use fluoroscopy (a type of x-ray) to guide the needle electrode to the nerves near your facet joint  · Your healthcare provider may touch the affected nerve to make sure the needle electrode is in the right place  You will feel tingling or pressure when he does this  He will then apply local anesthesia to the nerve to numb it  This will prevent you from feeling pain when he applies heat to the nerve  Your healthcare provider will then apply heat to the nerve using the needle electrode  He may need to apply heat to more than one nerve  He will remove the needle electrode and apply a bandage over the area  What are the risks of  cervical RFA? You may have pain, numbness, tingling, or burning in the area where the lumbar RFA was done  These normally go away within 6 weeks  The needle electrode may injure your spinal nerves  This may cause permanent arm weakness or nerve pain  CARE AGREEMENT:   You have the right to help plan your care  Learn about your health condition and how it may be treated  Discuss treatment options with your healthcare providers to decide what care you want to receive  You always have the right to refuse treatment  The above information is an  only  It is not intended as medical advice for individual conditions or treatments  Talk to your doctor, nurse or pharmacist before following any medical regimen to see if it is safe and effective for you    © Copyright [address] Information is for End User's use only and may not be sold, redistributed or otherwise used for commercial purposes   All illustrations and images included in CareNotes® are the copyrighted property of A D A M , Inc  or [facility] Mitch Tanner

## 2022-03-22 NOTE — PROCEDURES
Pre-procedure Diagnosis: Cervical Facet Arthropathy  Post-procedure Diagnosis: Cervical Facet Arthropathy  Procedure Title(s):  [RIGHT C3, C4, C5] medial branch nerve blocks [(Confirmatory)]  Attending Surgeon:   Jose Greenberg MD  Anesthesia:   Local     Indications: The patient is a 60y o  year-old male with a diagnosis of cervical facet arthropathy  The patient's history and physical exam were reviewed  The risks, benefits and alternatives to the procedure were discussed, and all questions were answered to the patient's satisfaction  The patient agreed to proceed, and written informed consent was obtained  Procedure in Detail: The patient was brought into the procedure room and placed in the prone position on the fluoroscopy table  The neck was prepped with chloraprep solution times one and draped in a sterile manner  AP fluoroscopic views were used to identify and alyce the centroid of the mid-articular pillars of the [C3, C4, C5] levels on the [RIGHT] side  The skin and subcutaneous tissues in these areas were anesthetized with 1% lidocaine  22-gauge 3 5 inch spinal needles were directed toward the targeted points until bone was contacted  After negative aspiration was confirmed, 0 5ml of a mixture of (3mL 0 25% bupivicaine and 1mL (10mg/mL) dexamethasone) was injected at each level  The needles were removed, and the patient's neck was cleaned  Bandages were placed over the points of needle insertion  Disposition: The patient tolerated the procedure well, and there were no apparent complications  The patient was taken to the recovery area where written discharge instructions for the procedure were given  The patient was given a pain diary to determine if the patient's pain improves following the injection  Once the diary is returned we will consider next appropriate course of treatment  Postoperative pain relief [WAS] significant      Estimated Blood Loss: None  Specimens Obtained: N/A

## 2022-03-24 ENCOUNTER — TELEPHONE (OUTPATIENT)
Dept: PAIN MEDICINE | Facility: CLINIC | Age: 57
End: 2022-03-24

## 2022-03-24 NOTE — TELEPHONE ENCOUNTER
Patient calling stating he had procedure on Tuesday-felt great, went to work last night and pain came back (was doing work on his computer)   Rating pain a 9  Had mbb#2  c3 c4 c5 #2 03/22/2022

## 2022-03-25 ENCOUNTER — PREP FOR PROCEDURE (OUTPATIENT)
Dept: PAIN MEDICINE | Facility: CLINIC | Age: 57
End: 2022-03-25

## 2022-03-25 DIAGNOSIS — M47.812 CERVICAL SPONDYLOSIS: Primary | ICD-10-CM

## 2022-04-07 ENCOUNTER — HOSPITAL ENCOUNTER (OUTPATIENT)
Facility: HOSPITAL | Age: 57
Setting detail: OUTPATIENT SURGERY
Discharge: HOME | End: 2022-04-07
Attending: ANESTHESIOLOGY | Admitting: ANESTHESIOLOGY
Payer: COMMERCIAL

## 2022-04-07 ENCOUNTER — APPOINTMENT (OUTPATIENT)
Dept: RADIOLOGY | Facility: HOSPITAL | Age: 57
End: 2022-04-07
Payer: COMMERCIAL

## 2022-04-07 ENCOUNTER — TELEPHONE (OUTPATIENT)
Dept: RADIOLOGY | Facility: CLINIC | Age: 57
End: 2022-04-07

## 2022-04-07 VITALS
DIASTOLIC BLOOD PRESSURE: 71 MMHG | BODY MASS INDEX: 41.75 KG/M2 | HEIGHT: 73 IN | OXYGEN SATURATION: 96 % | WEIGHT: 315 LBS | SYSTOLIC BLOOD PRESSURE: 102 MMHG | HEART RATE: 57 BPM | TEMPERATURE: 97.8 F | RESPIRATION RATE: 18 BRPM

## 2022-04-07 LAB — GLUCOSE SERPL-MCNC: 81 MG/DL (ref 65–140)

## 2022-04-07 PROCEDURE — 64633 DESTROY CERV/THOR FACET JNT: CPT | Performed by: ANESTHESIOLOGY

## 2022-04-07 PROCEDURE — 64634 DESTROY C/TH FACET JNT ADDL: CPT | Performed by: ANESTHESIOLOGY

## 2022-04-07 PROCEDURE — 82948 REAGENT STRIP/BLOOD GLUCOSE: CPT

## 2022-04-07 RX ORDER — BUPIVACAINE HYDROCHLORIDE 2.5 MG/ML
INJECTION, SOLUTION EPIDURAL; INFILTRATION; INTRACAUDAL AS NEEDED
Status: DISCONTINUED | OUTPATIENT
Start: 2022-04-07 | End: 2022-04-07 | Stop reason: HOSPADM

## 2022-04-07 RX ORDER — OXYCODONE HYDROCHLORIDE AND ACETAMINOPHEN 5; 325 MG/1; MG/1
1 TABLET ORAL ONCE
Status: COMPLETED | OUTPATIENT
Start: 2022-04-07 | End: 2022-04-07

## 2022-04-07 RX ORDER — ALPRAZOLAM 0.5 MG/1
0.5 TABLET ORAL ONCE
Status: COMPLETED | OUTPATIENT
Start: 2022-04-07 | End: 2022-04-07

## 2022-04-07 RX ORDER — DEXAMETHASONE SODIUM PHOSPHATE 10 MG/ML
INJECTION, SOLUTION INTRAMUSCULAR; INTRAVENOUS AS NEEDED
Status: DISCONTINUED | OUTPATIENT
Start: 2022-04-07 | End: 2022-04-07 | Stop reason: HOSPADM

## 2022-04-07 RX ORDER — LIDOCAINE HYDROCHLORIDE 20 MG/ML
INJECTION, SOLUTION EPIDURAL; INFILTRATION; INTRACAUDAL; PERINEURAL AS NEEDED
Status: DISCONTINUED | OUTPATIENT
Start: 2022-04-07 | End: 2022-04-07 | Stop reason: HOSPADM

## 2022-04-07 RX ADMIN — OXYCODONE HYDROCHLORIDE AND ACETAMINOPHEN 1 TABLET: 5; 325 TABLET ORAL at 08:38

## 2022-04-07 RX ADMIN — ALPRAZOLAM 0.5 MG: 0.5 TABLET ORAL at 08:38

## 2022-04-07 NOTE — PROCEDURES
Pre-procedure Diagnosis: Cervical Facet Arthropathy  Post-procedure Diagnosis: Cervical Facet Arthropathy  Procedure Title(s):  1  Radiofrequency ablation of [RIGHT C3, C4, C5] medial branch nerves      2  Intraoperative fluoroscopy  Attending Surgeon:   Ajay De La Cruz MD  Anesthesia:   Local     Indications: The patient is a 60y o  year-old male with a diagnosis of cervical facet arthropathy  The patient's history and physical exam were reviewed  The patient has previously undergone diagnostic and confirmatory cervical medial branch blocks  The risks, benefits and alternatives to the procedure were discussed, and all questions were answered to the patient's satisfaction  The patient agreed to proceed, and written informed consent was obtained  Procedure in Detail: The patient was brought into the procedure room, magnet placed over pacemaker and placed in the prone position on the fluoroscopy table  Asynchronous pacing at 100bpm  EKG and pulse oximeter monitoring was utilized throughout the course of the procedure and during post op area  The area of the cervical spine was prepped with chloraprep solution times two and draped in a sterile manner  AP fluoroscopic views were used to identify and alyce the midpoint of the articular pillars of the [C3, C4, C5] levels on the [RIGHT] side  The skin and subcutaneous tissues in these areas were anesthetized with 1% lidocaine  A 20-gauge, 100mm length, 10mm active tip radiofrequency probe was advanced toward the targeted points until bone was contacted  At this point, lateral fluoroscopic views were obtained, and the needle tips were advanced to the centroid of the facets at each level  Motor stimulation was performed at 2 Hz and 1 2 volts generating a twitch in the neck muscles with no motor activity in the upper extremities  This was repeated for each level      0 5ml of 2% lidocaine was injected prior to lesioning, which was performed for 90 seconds at 80 degrees centigrade  The lesion was repeated after slight repositioning of needles under fluoroscopic guidance  Once the lesion was complete, 1 ml of a solution consisting of 5mL 0 25% bupivacaine and 1mL Dexamethasone (10mg/mL) was injected through each probe  The probes were removed with a 1% lidocaine flush  The patient's neck was cleaned, and bandages were placed at the needle insertion sites  The needles were removed, and the patient's neck was cleaned  Bandages were placed over the points of needle insertion  Magnet on pacemaker removed and patient pacing reverted back to preop settings at 63bpm     Disposition: The patient tolerated the procedure well, and there were no apparent complications  The patient was taken to the recovery area where written discharge instructions for the procedure were given       Estimated Blood Loss: None  Specimens Obtained: N/A

## 2022-04-07 NOTE — TREATMENT PLAN
Spoke with [facility] office; they will reach out to schedule remote interrogation with patient next week  Cleared ok to discharge home if vss, and asymptomatic  Relayed information for follow up to patient

## 2022-04-07 NOTE — INTERVAL H&P NOTE
H&P reviewed  After examining the patient I find no changes in the patients condition since the H&P had been written      Vitals:    04/07/22 0835   BP: 118/62   Pulse: 60   Resp: 18   Temp: 97 6 °F (36 4 °C)   SpO2: 95%

## 2022-04-07 NOTE — DISCHARGE INSTRUCTIONS
YOUR 2 WEEK FOLLOW UP HAS BEEN SCHEDULED; IF YOU WISH TO CHANGE THE FOLLOW UP, PLEASE CALL THE [facility] AT [facility]: [phone]      Cervical Radiofrequency Ablation   WHAT YOU NEED TO KNOW:   Cervical radiofrequency ablation (RFA) is a procedure used to treat facet joint pain in your neck  Facet joints are found at the back of each vertebra  A needle electrode is used to send electrical currents to the nerves in your facet joint  The electrical currents create heat that damages the nerve so it cannot send pain signals  DISCHARGE INSTRUCTIONS:   Seek care immediately if:   · You cannot move your arm  · You cannot control your urine or bowel movements  · You have severe pain in your neck  Contact your healthcare provider if:   · You have arm weakness  · You develop new symptoms  · You have questions or concerns about your condition or care  Medicines:   · Pain medicine  may be given  Ask how to take this medicine safely  · Take your medicine as directed  Contact your healthcare provider if you think your medicine is not helping or if you have side effects  Tell him or her if you are allergic to any medicine  Keep a list of the medicines, vitamins, and herbs you take  Include the amounts, and when and why you take them  Bring the list or the pill bottles to follow-up visits  Carry your medicine list with you in case of an emergency  Follow up with your healthcare provider as directed:  Write down your questions so you remember to ask them during your visits  Activity:  Do not drive a car or operate machinery within 24 hours after your procedure  Ask your healthcare provider about any other activities you should avoid  © Copyright [address] Information is for End User's use only and may not be sold, redistributed or otherwise used for commercial purposes   All illustrations and images included in CareNotes® are the copyrighted property of A  D A M , Inc  or [facility]  The above information is an  only  It is not intended as medical advice for individual conditions or treatments  Talk to your doctor, nurse or pharmacist before following any medical regimen to see if it is safe and effective for you

## 2022-04-23 DIAGNOSIS — M47.812 CERVICAL SPONDYLOSIS: ICD-10-CM

## 2022-04-23 DIAGNOSIS — M47.812 FACET ARTHROPATHY, CERVICAL: ICD-10-CM

## 2022-04-23 DIAGNOSIS — M54.2 CERVICALGIA: ICD-10-CM

## 2022-04-25 RX ORDER — GABAPENTIN 300 MG/1
CAPSULE ORAL
Qty: 90 CAPSULE | Refills: 0 | Status: SHIPPED | OUTPATIENT
Start: 2022-04-25

## 2022-04-26 ENCOUNTER — APPOINTMENT (INPATIENT)
Dept: RADIOLOGY | Facility: HOSPITAL | Age: 57
DRG: 617 | End: 2022-04-26
Payer: COMMERCIAL

## 2022-04-26 ENCOUNTER — HOSPITAL ENCOUNTER (INPATIENT)
Facility: HOSPITAL | Age: 57
LOS: 3 days | Discharge: HOME HEALTH | DRG: 617 | End: 2022-04-29
Attending: PODIATRIST | Admitting: PODIATRIST
Payer: COMMERCIAL

## 2022-04-26 DIAGNOSIS — I48.91 ATRIAL FIBRILLATION, UNSPECIFIED TYPE (HCC): ICD-10-CM

## 2022-04-26 DIAGNOSIS — M86.9 TOE OSTEOMYELITIS, RIGHT (HCC): ICD-10-CM

## 2022-04-26 DIAGNOSIS — M25.572 PAIN, JOINT, ANKLE AND FOOT, LEFT: ICD-10-CM

## 2022-04-26 DIAGNOSIS — E11.42 TYPE 2 DIABETES MELLITUS WITH DIABETIC POLYNEUROPATHY, WITH LONG-TERM CURRENT USE OF INSULIN (HCC): Primary | ICD-10-CM

## 2022-04-26 DIAGNOSIS — Z79.4 TYPE 2 DIABETES MELLITUS WITH DIABETIC POLYNEUROPATHY, WITH LONG-TERM CURRENT USE OF INSULIN (HCC): Primary | ICD-10-CM

## 2022-04-26 DIAGNOSIS — I50.32 CHRONIC DIASTOLIC HEART FAILURE (HCC): ICD-10-CM

## 2022-04-26 DIAGNOSIS — E11.628 DIABETIC INFECTION OF LEFT FOOT (HCC): ICD-10-CM

## 2022-04-26 DIAGNOSIS — L08.9 DIABETIC INFECTION OF LEFT FOOT (HCC): ICD-10-CM

## 2022-04-26 LAB
ALBUMIN SERPL BCP-MCNC: 3.5 G/DL (ref 3.5–5)
ALP SERPL-CCNC: 91 U/L (ref 46–116)
ALT SERPL W P-5'-P-CCNC: 28 U/L (ref 12–78)
ANION GAP SERPL CALCULATED.3IONS-SCNC: 5 MMOL/L (ref 4–13)
AST SERPL W P-5'-P-CCNC: 32 U/L (ref 5–45)
BASOPHILS # BLD AUTO: 0.05 THOUSANDS/ΜL (ref 0–0.1)
BASOPHILS NFR BLD AUTO: 1 % (ref 0–1)
BILIRUB SERPL-MCNC: 0.67 MG/DL (ref 0.2–1)
BUN SERPL-MCNC: 15 MG/DL (ref 5–25)
CALCIUM SERPL-MCNC: 8.8 MG/DL (ref 8.3–10.1)
CHLORIDE SERPL-SCNC: 96 MMOL/L (ref 100–108)
CO2 SERPL-SCNC: 36 MMOL/L (ref 21–32)
CREAT SERPL-MCNC: 0.94 MG/DL (ref 0.6–1.3)
EOSINOPHIL # BLD AUTO: 0.11 THOUSAND/ΜL (ref 0–0.61)
EOSINOPHIL NFR BLD AUTO: 1 % (ref 0–6)
ERYTHROCYTE [DISTWIDTH] IN BLOOD BY AUTOMATED COUNT: 13.3 % (ref 11.6–15.1)
GFR SERPL CREATININE-BSD FRML MDRD: 89 ML/MIN/1.73SQ M
GLUCOSE SERPL-MCNC: 99 MG/DL (ref 65–140)
HCT VFR BLD AUTO: 45.5 % (ref 36.5–49.3)
HGB BLD-MCNC: 15.1 G/DL (ref 12–17)
IMM GRANULOCYTES # BLD AUTO: 0.03 THOUSAND/UL (ref 0–0.2)
IMM GRANULOCYTES NFR BLD AUTO: 0 % (ref 0–2)
LYMPHOCYTES # BLD AUTO: 2.22 THOUSANDS/ΜL (ref 0.6–4.47)
LYMPHOCYTES NFR BLD AUTO: 27 % (ref 14–44)
MCH RBC QN AUTO: 29.4 PG (ref 26.8–34.3)
MCHC RBC AUTO-ENTMCNC: 33.2 G/DL (ref 31.4–37.4)
MCV RBC AUTO: 89 FL (ref 82–98)
MONOCYTES # BLD AUTO: 0.97 THOUSAND/ΜL (ref 0.17–1.22)
MONOCYTES NFR BLD AUTO: 12 % (ref 4–12)
NEUTROPHILS # BLD AUTO: 4.75 THOUSANDS/ΜL (ref 1.85–7.62)
NEUTS SEG NFR BLD AUTO: 59 % (ref 43–75)
NRBC BLD AUTO-RTO: 0 /100 WBCS
PLATELET # BLD AUTO: 241 THOUSANDS/UL (ref 149–390)
PMV BLD AUTO: 9.9 FL (ref 8.9–12.7)
POTASSIUM SERPL-SCNC: 3 MMOL/L (ref 3.5–5.3)
PROT SERPL-MCNC: 7.6 G/DL (ref 6.4–8.2)
RBC # BLD AUTO: 5.13 MILLION/UL (ref 3.88–5.62)
SODIUM SERPL-SCNC: 137 MMOL/L (ref 136–145)
WBC # BLD AUTO: 8.13 THOUSAND/UL (ref 4.31–10.16)

## 2022-04-26 PROCEDURE — 87040 BLOOD CULTURE FOR BACTERIA: CPT

## 2022-04-26 PROCEDURE — 73630 X-RAY EXAM OF FOOT: CPT

## 2022-04-26 PROCEDURE — 85025 COMPLETE CBC W/AUTO DIFF WBC: CPT

## 2022-04-26 PROCEDURE — 80053 COMPREHEN METABOLIC PANEL: CPT

## 2022-04-26 RX ORDER — LORAZEPAM 1 MG/1
1 TABLET ORAL 3 TIMES DAILY PRN
Status: DISCONTINUED | OUTPATIENT
Start: 2022-04-26 | End: 2022-04-29 | Stop reason: HOSPADM

## 2022-04-26 RX ORDER — METRONIDAZOLE 500 MG/1
500 TABLET ORAL EVERY 8 HOURS SCHEDULED
Status: DISCONTINUED | OUTPATIENT
Start: 2022-04-26 | End: 2022-04-28

## 2022-04-26 RX ORDER — SUCRALFATE 1 G/1
1 TABLET ORAL 4 TIMES DAILY
Status: DISCONTINUED | OUTPATIENT
Start: 2022-04-26 | End: 2022-04-29 | Stop reason: HOSPADM

## 2022-04-26 RX ORDER — GABAPENTIN 300 MG/1
300 CAPSULE ORAL 3 TIMES DAILY
Status: DISCONTINUED | OUTPATIENT
Start: 2022-04-26 | End: 2022-04-29 | Stop reason: HOSPADM

## 2022-04-26 RX ORDER — HYDROMORPHONE HCL/PF 1 MG/ML
0.5 SYRINGE (ML) INJECTION EVERY 4 HOURS PRN
Status: DISCONTINUED | OUTPATIENT
Start: 2022-04-26 | End: 2022-04-28

## 2022-04-26 RX ORDER — ACETAMINOPHEN 325 MG/1
650 TABLET ORAL EVERY 6 HOURS PRN
Status: DISCONTINUED | OUTPATIENT
Start: 2022-04-26 | End: 2022-04-28

## 2022-04-26 RX ORDER — ONDANSETRON 2 MG/ML
4 INJECTION INTRAMUSCULAR; INTRAVENOUS EVERY 6 HOURS PRN
Status: DISCONTINUED | OUTPATIENT
Start: 2022-04-26 | End: 2022-04-29 | Stop reason: HOSPADM

## 2022-04-26 RX ORDER — OXYCODONE HYDROCHLORIDE 10 MG/1
10 TABLET ORAL EVERY 6 HOURS PRN
Status: DISCONTINUED | OUTPATIENT
Start: 2022-04-26 | End: 2022-04-28

## 2022-04-26 RX ORDER — POLYETHYLENE GLYCOL 3350 17 G/17G
17 POWDER, FOR SOLUTION ORAL DAILY PRN
Status: DISCONTINUED | OUTPATIENT
Start: 2022-04-26 | End: 2022-04-29 | Stop reason: HOSPADM

## 2022-04-26 RX ORDER — PANTOPRAZOLE SODIUM 20 MG/1
20 TABLET, DELAYED RELEASE ORAL
Status: DISCONTINUED | OUTPATIENT
Start: 2022-04-27 | End: 2022-04-29 | Stop reason: HOSPADM

## 2022-04-26 RX ORDER — OXYCODONE HYDROCHLORIDE 5 MG/1
5 TABLET ORAL EVERY 4 HOURS PRN
Status: DISCONTINUED | OUTPATIENT
Start: 2022-04-26 | End: 2022-04-29 | Stop reason: HOSPADM

## 2022-04-26 RX ORDER — CEFAZOLIN SODIUM 2 G/50ML
2000 SOLUTION INTRAVENOUS EVERY 8 HOURS
Status: DISCONTINUED | OUTPATIENT
Start: 2022-04-26 | End: 2022-04-29 | Stop reason: HOSPADM

## 2022-04-26 RX ADMIN — CEFAZOLIN SODIUM 2000 MG: 2 SOLUTION INTRAVENOUS at 22:36

## 2022-04-26 RX ADMIN — OXYCODONE HYDROCHLORIDE 10 MG: 10 TABLET ORAL at 21:23

## 2022-04-26 RX ADMIN — GABAPENTIN 300 MG: 300 CAPSULE ORAL at 21:23

## 2022-04-26 RX ADMIN — ACETAMINOPHEN 650 MG: 325 TABLET ORAL at 20:43

## 2022-04-26 RX ADMIN — HYDROMORPHONE HYDROCHLORIDE 0.5 MG: 1 INJECTION, SOLUTION INTRAMUSCULAR; INTRAVENOUS; SUBCUTANEOUS at 23:20

## 2022-04-26 RX ADMIN — SUCRALFATE 1 G: 1 TABLET ORAL at 21:22

## 2022-04-26 RX ADMIN — ENOXAPARIN SODIUM 40 MG: 40 INJECTION SUBCUTANEOUS at 21:17

## 2022-04-26 RX ADMIN — METRONIDAZOLE 500 MG: 500 TABLET ORAL at 21:22

## 2022-04-26 NOTE — H&P
Agip U  89  H&P  David Freddie La 60 y o  male MRN: [id number]  Unit/Bed#: E5 -01 Encounter: [id number]  Admission Date: 04/26/22    ASSESSMENT:    Karen De La Paz is a 60 y o  male with:    1  Infected left hallux diabetic ulcer with cellulitis  2  T2DM (last HbA1c 3/1/22; 5 9%)  3  HTN  4  A-fib  5  DAYAN    PLAN:    · Patient being admitted under the Podiatry service of Dr Tho Fung  · Tentative plan for likely left transmetatarsal amputation; timing and final plan pending MRI findings  · Begin IV antibiotics  · F/u Xray  · F/u MRI  · F/u blood cultures  · Local wound care consisting of betadine DSD to L hallux, betadine HELENE to R toe fissures  Appreciate nursing assistance with dressing changes  · Home medications continued until [facility] consult can be appreciated  · F/u [facility] consult for medical management and likely surgical clearance  · This plan was discussed with Dr Tho Fung  Antibiotics started: IV Ancef & PO Flagyl  Pharmacologic VTE Prophylaxis: Enoxaparin (Lovenox)   Mechanical VTE Prophylaxis: sequential compression device   Weight Bearing Status: WBAT, surgical shoe left foot      Disposition:  Patient requires >2 midnight stay for IV antibiotics and likely surgery as described above  Code Status: Level 1 - Full Code      SUBJECTIVE    History of Present Illness:    Karen De La Paz is a 60 y o  male with pmh of T2DM, HTN, CHF, A-fib, Anxiety, and DAYAN who presents with a left hallux wound with associated, redness, swelling, and pain  He reports he has had the wound for about 1 week  He saw his podiatrist Dr Tho Fung yesterday and had the wound debrided and was instructed to go to the hospital for the infection  He has a history of multiple toe amputations to B/L feet related to diabetic foot ulcers  He denies nausea, vomiting, diarrhea, chills, shortness of breath, chest pain  Review of Systems:    Constitutional: Negative  HENT: Negative  Eyes: Negative      Respiratory: Negative  Cardiovascular: Negative  Gastrointestinal: Negative  Musculoskeletal: Multiple previous toe amputations B/L   Skin:Left great toe ulcer; fissures to right toes   Neurological: Diabetic peripheral neuropathy  Psych: Negative       Past Medical and Surgical History:     Past Medical History:   Diagnosis Date    Atrial fibrillation [facility] [address] )     Chronic diastolic (congestive) heart failure ([facility] [address] )     Diabetes mellitus ([facility] [address] )     High cholesterol     Hyperlipidemia     Hypertension     Pacemaker     Stroke [facility])        Past Surgical History:   Procedure Laterality Date    APPENDECTOMY      ATRIAL CARDIAC PACEMAKER INSERTION      BARIATRIC SURGERY  05/2021    FL GUIDED NEEDLE PLAC BX/ASP/INJ  3/22/2022    NERVE BLOCK Right 2/10/2022    Procedure: BLOCK MEDIAL BRANCH C3, C4, C5 #1;  Surgeon: Puma Martins MD;  [facility]: [facility];  Service: Pain Management     NERVE BLOCK Right 3/22/2022    Procedure: BLOCK MEDIAL BRANCH C3, C4, C5 #2;  Surgeon: Puma Martins MD;  [facility];  Service: Pain Management     FL AMPUTATION TOE,I-P JT Left 11/16/2021    Procedure: AMPUTATION LESSER TOE;  Surgeon: Delicia Tate DPM;  [facility];  Service: Podiatry   [address] [place] [address] Right 4/7/2022    Procedure: Right C3, C4, C5 RFA;  Surgeon: Puma Martins MD;  [facility]: [facility];  Service: Pain Management     TOE AMPUTATION Left     2nd toe    TOE AMPUTATION Left 9/15/2021    Procedure: AMPUTATION LEFT 4TH TOE;  Surgeon: Delicia Tate DPM;  [facility];  Service: Podiatry    TOE AMPUTATION Right 1/12/2022    Procedure: AMPUTATION TOE;  Surgeon: Paulina Greenwood DPM;  [facility];  Service: Podiatry    TOE AMPUTATION Right 2/23/2022    Procedure: AMPUTATION TOE  RIGHT SECOND;  Surgeon: Paulina Greenwood DPM;  Location: [address] [place] [address] MAIN OR;  Service: Podiatry       Meds/Allergies:    Medications Prior to Admission   Medication    omeprazole (PriLOSEC) 20 mg delayed release capsule    sucralfate (CARAFATE) 1 g tablet    atorvastatin (LIPITOR) 40 mg tablet    busPIRone (BUSPAR) 5 mg tablet    doxycycline hyclate (VIBRA-TABS) 100 mg tablet    FLUoxetine (PROzac) 40 MG capsule    gabapentin (NEURONTIN) 300 mg capsule    gentamicin (GARAMYCIN) 0 1 % cream    hydrOXYzine HCL (ATARAX) 25 mg tablet    lidocaine (Lidoderm) 5 %    lisinopril (ZESTRIL) 5 mg tablet    LORazepam (ATIVAN) 1 mg tablet    metolazone (ZAROXOLYN) 2 5 mg tablet    misoprostol (CYTOTEC) 200 mcg tablet    potassium chloride (K-DUR,KLOR-CON) 20 mEq tablet    ranitidine (ZANTAC) 150 mg tablet    sildenafil (VIAGRA) 25 MG tablet    sildenafil (VIAGRA) 25 MG tablet    traMADol (ULTRAM) 50 mg tablet       No Known Allergies    Social History:    Social History     Marital Status: /Civil Union    Substance Use History:   Social History     Substance and Sexual Activity   Alcohol Use Never     Social History     Tobacco Use   Smoking Status Never Smoker   Smokeless Tobacco Never Used     Social History     Substance and Sexual Activity   Drug Use Never       Family History:    Family History   Problem Relation Age of Onset    No Known Problems Mother     No Known Problems Father          OBJECTIVE:    First Vitals:   Blood Pressure: 134/84 (04/26/22 1939)  Pulse: 82 (04/26/22 1939)  Temperature: 98 1 °F (36 7 °C) (04/26/22 1939)  Temp Source: Oral (04/26/22 1939)  Respirations: 18 (04/26/22 1939)  SpO2: 93 % (04/26/22 1939)    Current Vitals:   Blood Pressure: 134/84 (04/26/22 1939)  Pulse: 82 (04/26/22 1939)  Temperature: 98 1 °F (36 7 °C) (04/26/22 1939)  Temp Source: Oral (04/26/22 1939)  Respirations: 18 (04/26/22 1939)  SpO2: 93 % (04/26/22 1939)      Physical Exam  Constitutional:       Appearance: Normal appearance       :    General Appearance: Alert, cooperative, no distress  HEENT: Head normocephalic, atraumatic, without obvious abnormality  Heart: Normal rate and rhythm    Lungs: Non-labored breathing  No respiratory distress  Abdomen: Without distension  Psychiatric: AAOx3  Lower Extremity:  Vascular:    DP & PT pulses palpable B/L  Capillary refill time <3 seconds B/L  Skin temperature warm WNL B/L  Varicose veins and chronic venous stasis changes noted to B/L lower extremities  Musculoskeletal:  MMT is 5/5 in all muscle compartments bilaterally  Passive ROM at the 1st MPJ and ankle joint are Decreased bilaterally with the leg extended  Prior amputations to right toes 2 & 3 and left toes 2, 3 , and 4  Dermatological:  Skin fissures noted to right hallux and 4th toe  LE Wound Exam:   Wound #: 1  Location: left hallux  Length 3cm: Width 3 5cm: Depth 0 2cm:   Deepest Tissue Noted in Base: subcutaneous  Probe to Bone: No  Peripheral Skin Description: Attached  Granulation: 70% Fibrotic Tissue: 30% Necrotic Tissue: 0%   Drainage Amount: minimal, serous  Signs of Infection: Yes    Erythema and edema noted to left hallux extending into the foot  Fluctuance vs edema noted to left forefoot  No crepitus, no purulence, no lymphangitis noted from wound  Neurological:  Gross sensation is intact  Protective sensation is absent  Patient Reports numbness and/or paresthesias  Clinical Images 04/26/22:    Left hallux      Left foot      Right toes      Lab Results:   No visits with results within 1 Day(s) from this visit  Latest known visit with results is:   Admission on 04/07/2022, Discharged on 04/07/2022   Component Date Value    POC Glucose 04/07/2022 81        Cultures:            Imaging: I have personally reviewed pertinent films in PACS  No results found  EKG, Pathology, and Other Studies: I have personally reviewed pertinent reports

## 2022-04-27 ENCOUNTER — ANESTHESIA EVENT (INPATIENT)
Dept: PERIOP | Facility: HOSPITAL | Age: 57
DRG: 617 | End: 2022-04-27
Payer: COMMERCIAL

## 2022-04-27 PROBLEM — Z01.818 ENCOUNTER FOR PERIOPERATIVE CONSULTATION: Status: ACTIVE | Noted: 2022-04-27

## 2022-04-27 PROBLEM — Z95.0 PACEMAKER: Status: ACTIVE | Noted: 2022-04-27

## 2022-04-27 PROBLEM — Z98.84 HISTORY OF BARIATRIC SURGERY: Status: ACTIVE | Noted: 2022-04-27

## 2022-04-27 LAB
ANION GAP SERPL CALCULATED.3IONS-SCNC: 9 MMOL/L (ref 4–13)
BUN SERPL-MCNC: 15 MG/DL (ref 5–25)
CALCIUM SERPL-MCNC: 8.7 MG/DL (ref 8.3–10.1)
CHLORIDE SERPL-SCNC: 97 MMOL/L (ref 100–108)
CO2 SERPL-SCNC: 33 MMOL/L (ref 21–32)
CREAT SERPL-MCNC: 1.16 MG/DL (ref 0.6–1.3)
FLUAV RNA RESP QL NAA+PROBE: NEGATIVE
FLUBV RNA RESP QL NAA+PROBE: NEGATIVE
GFR SERPL CREATININE-BSD FRML MDRD: 69 ML/MIN/1.73SQ M
GLUCOSE SERPL-MCNC: 97 MG/DL (ref 65–140)
POTASSIUM SERPL-SCNC: 2.9 MMOL/L (ref 3.5–5.3)
RSV RNA RESP QL NAA+PROBE: NEGATIVE
SARS-COV-2 RNA RESP QL NAA+PROBE: NEGATIVE
SODIUM SERPL-SCNC: 139 MMOL/L (ref 136–145)

## 2022-04-27 PROCEDURE — 0241U HB NFCT DS VIR RESP RNA 4 TRGT: CPT | Performed by: INTERNAL MEDICINE

## 2022-04-27 PROCEDURE — 99254 IP/OBS CNSLTJ NEW/EST MOD 60: CPT | Performed by: INTERNAL MEDICINE

## 2022-04-27 PROCEDURE — 80048 BASIC METABOLIC PNL TOTAL CA: CPT

## 2022-04-27 RX ORDER — SACCHAROMYCES BOULARDII 250 MG
250 CAPSULE ORAL 2 TIMES DAILY
Status: DISCONTINUED | OUTPATIENT
Start: 2022-04-27 | End: 2022-04-29 | Stop reason: HOSPADM

## 2022-04-27 RX ORDER — SODIUM CHLORIDE 9 MG/ML
50 INJECTION, SOLUTION INTRAVENOUS CONTINUOUS
Status: DISCONTINUED | OUTPATIENT
Start: 2022-04-28 | End: 2022-04-27

## 2022-04-27 RX ORDER — POTASSIUM CHLORIDE 14.9 MG/ML
20 INJECTION INTRAVENOUS
Status: COMPLETED | OUTPATIENT
Start: 2022-04-27 | End: 2022-04-28

## 2022-04-27 RX ORDER — POTASSIUM CHLORIDE 20 MEQ/1
40 TABLET, EXTENDED RELEASE ORAL ONCE
Status: COMPLETED | OUTPATIENT
Start: 2022-04-27 | End: 2022-04-27

## 2022-04-27 RX ORDER — CHLORHEXIDINE GLUCONATE 0.12 MG/ML
15 RINSE ORAL ONCE
Status: DISCONTINUED | OUTPATIENT
Start: 2022-04-27 | End: 2022-04-28 | Stop reason: HOSPADM

## 2022-04-27 RX ADMIN — GABAPENTIN 300 MG: 300 CAPSULE ORAL at 20:38

## 2022-04-27 RX ADMIN — POTASSIUM CHLORIDE 20 MEQ: 14.9 INJECTION, SOLUTION INTRAVENOUS at 20:39

## 2022-04-27 RX ADMIN — Medication 250 MG: at 20:37

## 2022-04-27 RX ADMIN — HYDROMORPHONE HYDROCHLORIDE 0.5 MG: 1 INJECTION, SOLUTION INTRAMUSCULAR; INTRAVENOUS; SUBCUTANEOUS at 15:39

## 2022-04-27 RX ADMIN — OXYCODONE HYDROCHLORIDE 10 MG: 10 TABLET ORAL at 05:40

## 2022-04-27 RX ADMIN — METRONIDAZOLE 500 MG: 500 TABLET ORAL at 14:09

## 2022-04-27 RX ADMIN — GABAPENTIN 300 MG: 300 CAPSULE ORAL at 08:57

## 2022-04-27 RX ADMIN — HYDROMORPHONE HYDROCHLORIDE 0.5 MG: 1 INJECTION, SOLUTION INTRAMUSCULAR; INTRAVENOUS; SUBCUTANEOUS at 22:45

## 2022-04-27 RX ADMIN — METRONIDAZOLE 500 MG: 500 TABLET ORAL at 05:40

## 2022-04-27 RX ADMIN — CEFAZOLIN SODIUM 2000 MG: 2 SOLUTION INTRAVENOUS at 14:09

## 2022-04-27 RX ADMIN — ENOXAPARIN SODIUM 40 MG: 40 INJECTION SUBCUTANEOUS at 17:27

## 2022-04-27 RX ADMIN — SUCRALFATE 1 G: 1 TABLET ORAL at 22:41

## 2022-04-27 RX ADMIN — SUCRALFATE 1 G: 1 TABLET ORAL at 11:43

## 2022-04-27 RX ADMIN — PANTOPRAZOLE SODIUM 20 MG: 20 TABLET, DELAYED RELEASE ORAL at 05:40

## 2022-04-27 RX ADMIN — CEFAZOLIN SODIUM 2000 MG: 2 SOLUTION INTRAVENOUS at 20:38

## 2022-04-27 RX ADMIN — SUCRALFATE 1 G: 1 TABLET ORAL at 08:57

## 2022-04-27 RX ADMIN — SUCRALFATE 1 G: 1 TABLET ORAL at 17:27

## 2022-04-27 RX ADMIN — GABAPENTIN 300 MG: 300 CAPSULE ORAL at 15:39

## 2022-04-27 RX ADMIN — OXYCODONE HYDROCHLORIDE 10 MG: 10 TABLET ORAL at 11:43

## 2022-04-27 RX ADMIN — OXYCODONE HYDROCHLORIDE 10 MG: 10 TABLET ORAL at 18:55

## 2022-04-27 RX ADMIN — ENOXAPARIN SODIUM 40 MG: 40 INJECTION SUBCUTANEOUS at 08:57

## 2022-04-27 RX ADMIN — METRONIDAZOLE 500 MG: 500 TABLET ORAL at 22:41

## 2022-04-27 RX ADMIN — CEFAZOLIN SODIUM 2000 MG: 2 SOLUTION INTRAVENOUS at 05:40

## 2022-04-27 RX ADMIN — HYDROMORPHONE HYDROCHLORIDE 0.5 MG: 1 INJECTION, SOLUTION INTRAMUSCULAR; INTRAVENOUS; SUBCUTANEOUS at 09:03

## 2022-04-27 RX ADMIN — POTASSIUM CHLORIDE 40 MEQ: 1500 TABLET, EXTENDED RELEASE ORAL at 20:38

## 2022-04-27 RX ADMIN — POTASSIUM CHLORIDE 20 MEQ: 14.9 INJECTION, SOLUTION INTRAVENOUS at 22:41

## 2022-04-27 NOTE — UTILIZATION REVIEW
Initial Clinical Review    Admission: Date/Time/Statement:   Admission Orders (From admission, onward)     Ordered        04/26/22 2004  Inpatient Admission  Once                      Orders Placed This Encounter   Procedures    Inpatient Admission     Standing Status:   Standing     Number of Occurrences:   1     Order Specific Question:   Level of Care     Answer:   Med Surg [16]     Order Specific Question:   Estimated length of stay     Answer:   More than 2 Midnights     Order Specific Question:   Certification     Answer:   I certify that inpatient services are medically necessary for this patient for a duration of greater than two midnights  See H&P and MD Progress Notes for additional information about the patient's course of treatment  Initial Presentation: 60 y o  male  Directly admitted at podiatry recommendation with  A left hallux wound with redness, swelling and pain for about 1 week  Saw podiatry the day prior to admission and wound  Was debrided, admission recommended  PMH  Is  Multiple toe amputations both feet related to diabetic foot ulcers, HTN, Afib, anxiety, DAYAN, CHF and DM2  Admit  Ip with Infected left hallux diabetic ulcer with cellulitis and plan is  KETURAH,  MRI, blood cultures, local wound care and possibly  L  TMA  L  Foot        L  Hallux        R  foot      Date:    4/27       Day 2:   Planning   Left  Foot  TMA    4/28/22  Follow  Up  Blood cultures  MRI pending  Can be  WBAT  For  Now,  Anticipate  NWB  Post  Op  Continue  KETURAH  Elevate and off load L foot  On green foam wedges or pillows  When not ambulating            Wt Readings from Last 1 Encounters:   04/26/22 (!) 143 kg (315 lb)     Additional Vital Signs:   -- 63 -- 125/72 90 98 % --    04/27/22 0105 97 4 °F (36 3 °C) Abnormal  70 18 103/66 78 96 % None (Room air)   04/27/22 01:04:12 97 4 °F (36 3 °C) Abnormal  67 -- 103/66 78 99 % --   04/26/22 19:39:51 98 1 °F (36 7 °C) 82 18 134/84 101 93 % -- Pertinent Labs/Diagnostic Test Results:   XR foot 3+ vw left    (Results Pending)   MRI inpatient order    (Results Pending)         Results from last 7 days   Lab Units 04/26/22  2101   WBC Thousand/uL 8 13   HEMOGLOBIN g/dL 15 1   HEMATOCRIT % 45 5   PLATELETS Thousands/uL 241   NEUTROS ABS Thousands/µL 4 75         Results from last 7 days   Lab Units 04/27/22  0549 04/26/22  2101   SODIUM mmol/L 139 137   POTASSIUM mmol/L 2 9* 3 0*   CHLORIDE mmol/L 97* 96*   CO2 mmol/L 33* 36*   ANION GAP mmol/L 9 5   BUN mg/dL 15 15   CREATININE mg/dL 1 16 0 94   EGFR ml/min/1 73sq m 69 89   CALCIUM mg/dL 8 7 8 8     Results from last 7 days   Lab Units 04/26/22  2101   AST U/L 32   ALT U/L 28   ALK PHOS U/L 91   TOTAL PROTEIN g/dL 7 6   ALBUMIN g/dL 3 5   TOTAL BILIRUBIN mg/dL 0 67         Results from last 7 days   Lab Units 04/27/22  0549 04/26/22  2101   GLUCOSE RANDOM mg/dL 97 99               Results from last 7 days   Lab Units 04/26/22  2202   BLOOD CULTURE  Received in Microbiology Lab  Culture in Progress  Received in Microbiology Lab  Culture in Progress               Present on Admission:   Diabetic infection of left foot ([address] )      Admitting Diagnosis: Cellulitis of great toe, right [L03 031]  Age/Sex: 60 y o  male  Admission Orders:  Scheduled Medications:  cefazolin, 2,000 mg, Intravenous, Q8H  enoxaparin, 40 mg, Subcutaneous, BID  gabapentin, 300 mg, Oral, TID  metroNIDAZOLE, 500 mg, Oral, Q8H CRISTY  pantoprazole, 20 mg, Oral, Early Morning  sucralfate, 1 g, Oral, 4x Daily      Continuous IV Infusions:     PRN Meds:  acetaminophen, 650 mg, Oral, Q6H PRN  HYDROmorphone, 0 5 mg, Intravenous, Q4H PRN  LORazepam, 1 mg, Oral, TID PRN  naloxone, 0 04 mg, Intravenous, Q1MIN PRN  ondansetron, 4 mg, Intravenous, Q6H PRN  oxyCODONE, 10 mg, Oral, Q6H PRN  oxyCODONE, 5 mg, Oral, Q4H PRN  polyethylene glycol, 17 g, Oral, Daily PRN        IP CONSULT TO INTERNAL MEDICINE    Network Utilization Review Department  ATTENTION: Please call with any questions or concerns to [phone] and carefully listen to the prompts so that you are directed to the right person  All voicemails are confidential   Kapil Uriarte all requests for admission clinical reviews, approved or denied determinations and any other requests to dedicated fax number below belonging to the campus where the patient is receiving treatment   List of dedicated fax numbers for the Facilities:  [address] DENIALS (Administrative/Medical Necessity) [phone]   [address] (Maternity/NICU/Pediatrics) [phone]   [address] [place] [address]  [address] [facility] [place] [address] [address] [place] [address] [address] [facility] [place] [address] [phone]

## 2022-04-27 NOTE — ANESTHESIA PREPROCEDURE EVALUATION
Procedure:  AMPUTATION FOOT (Left Foot)  APPLICATION VAC DRESSING (Left Foot)    Relevant Problems   CARDIO   (+) Atrial fibrillation (HCC)   (+) Hypertension      ENDO   (+) Type 2 diabetes mellitus with diabetic polyneuropathy, with long-term current use of insulin (HCC)      MUSCULOSKELETAL   (+) Cervical spondylosis      NEURO/PSYCH   (+) Anxiety      PULMONARY   (+) DAYAN (obstructive sleep apnea)      Other   (+) Chronic osteomyelitis of left foot with draining sinus (HCC)   (+) Toe osteomyelitis, right (HCC)        Physical Exam    Airway    Mallampati score: III  TM Distance: >3 FB  Neck ROM: full     Dental       Cardiovascular  Rhythm: irregular, Rate: normal, Cardiovascular exam normal    Pulmonary  Pulmonary exam normal Breath sounds clear to auscultation,     Other Findings        Anesthesia Plan  ASA Score- 3     Anesthesia Type- general with ASA Monitors  Additional Monitors:   Airway Plan: LMA  Plan Factors-Exercise tolerance (METS): >4 METS  Chart reviewed  Existing labs reviewed  Patient is not a current smoker  Obstructive sleep apnea risk education given perioperatively  Induction- intravenous  Postoperative Plan-     Informed Consent- Anesthetic plan and risks discussed with patient

## 2022-04-27 NOTE — UTILIZATION REVIEW
Inpatient Admission Authorization Request   NOTIFICATION OF INPATIENT ADMISSION/INPATIENT AUTHORIZATION REQUEST   SERVICING FACILITY:   [address] [place] [address], [place] [address] [address] [facility]  Tax ID: [id number]  NPI: [id number]  Place of Service: Inpatient [address] [facility]  [address]  Place of Service Code: 24     ATTENDING PROVIDER:  Attending Name and NPI#: Inga Rich [[id number]]  Address: [address] [place] [address] [address] [facility]  Phone: [phone]     UTILIZATION REVIEW CONTACT:  Deja Cutler Utilization   Network Utilization Review Department  Phone: [phone]  Fax: [phone]  Email: [email or web address]     PHYSICIAN ADVISORY SERVICES:  FOR AWXM-EN-LXFA REVIEW - MEDICAL NECESSITY DENIAL  Phone: [phone]  Fax: [phone]  Email: [email or web address]     TYPE OF REQUEST:  Inpatient Status     ADMISSION INFORMATION:  ADMISSION DATE/TIME: 4/26/22  7:18 PM  PATIENT DIAGNOSIS CODE/DESCRIPTION:  Cellulitis of great toe, right [L03 031]  DISCHARGE DATE/TIME: No discharge date for patient encounter  IMPORTANT INFORMATION:  Please contact the Deja Cutler directly with any questions or concerns regarding this request  Department voicemails are confidential     Send requests for admission clinical reviews, concurrent reviews, approvals, and administrative denials due to lack of clinical to fax [phone]

## 2022-04-27 NOTE — PLAN OF CARE
Problem: PAIN - ADULT  Goal: Verbalizes/displays adequate comfort level or baseline comfort level  Description: Interventions:  - Encourage patient to monitor pain and request assistance  - Assess pain using appropriate pain scale  - Administer analgesics based on type and severity of pain and evaluate response  - Implement non-pharmacological measures as appropriate and evaluate response  - Consider cultural and social influences on pain and pain management  - Notify physician/advanced practitioner if interventions unsuccessful or patient reports new pain  Outcome: Progressing     Problem: INFECTION - ADULT  Goal: Absence or prevention of progression during hospitalization  Description: INTERVENTIONS:  - Assess and monitor for signs and symptoms of infection  - Monitor lab/diagnostic results  - Monitor all insertion sites, i e  indwelling lines, tubes, and drains  - Monitor endotracheal if appropriate and nasal secretions for changes in amount and color  - [place] appropriate cooling/warming therapies per order  - Administer medications as ordered  - Instruct and encourage patient and family to use good hand hygiene technique  - Identify and instruct in appropriate isolation precautions for identified infection/condition  Outcome: Progressing  Goal: Absence of fever/infection during neutropenic period  Description: INTERVENTIONS:  - Monitor WBC    Outcome: Progressing     Problem: Knowledge Deficit  Goal: Patient/family/caregiver demonstrates understanding of disease process, treatment plan, medications, and discharge instructions  Description: Complete learning assessment and assess knowledge base    Interventions:  - Provide teaching at level of understanding  - Provide teaching via preferred learning methods  Outcome: Progressing

## 2022-04-27 NOTE — PROGRESS NOTES
Podiatry - Progress Note  Patient: Elmira Rey 60 y o  male   MRN: [id number]  PCP: Raenell Rinne, DO  Unit/Bed#: E5 -67 Encounter: [id number]  Date Of Visit: 22    ASSESSMENT:    Elmira Rey is a 60 y o  male with:    1  Left hallux diabetic ulcer  2  Left foot cellulitis due to #1  3  T2DM (last HbA1c 3/1/22; 5 9%)  4  HTN  5  A-fib  6  DAYAN      PLAN:    · Patient will require left foot TMA, plan for 22  · MRI pending at this time  · Medicine consult for pre-operative risk assessment  · Follow-up blood cultures  · Left foot XR with cortical reaction noted at the 1st distal phalanx  · Elevation and offloading on green foam wedges or pillows when non-ambulatory  · Appreciate consulting services for recommendations and management  Antibiotics started: Ancef and metronidazole  Pharmacologic VTE Prophylaxis: Enoxaparin (Lovenox)   Mechanical VTE Prophylaxis: sequential compression device   Weightbearing status: WBAT, anticipate NWB post-operatively  Disposition:  Patient requires continued stay for reasons above  SUBJECTIVE:     The patient was seen, evaluated, and assessed at bedside today  The patient was awake, alert, and in no acute distress  No acute events overnight  The patient reports no complaints on exam today  Patient denies N/V/F/chills/SOB/CP  OBJECTIVE:     Vitals:   /72   Pulse 63   Temp (!) 97 4 °F (36 3 °C) (Oral)   Resp 18   SpO2 98%     Temp (24hrs), Av 6 °F (36 4 °C), Min:97 4 °F (36 3 °C), Max:98 1 °F (36 7 °C)      Physical Exam:     General: Alert, cooperative and no distress  Lungs: Non labored breathing  Abdomen: Soft, non-tender  Lower extremity exam:  Cardiovascular status at baseline  Neurological status at baseline  Musculoskeletal status at baseline  No calf tenderness noted  Dressing c/d/I to left foot without strikethrough and without erythema at dressing borders      Additional Data:     Labs:    Results from last 7 days Lab Units 04/26/22  2101   WBC Thousand/uL 8 13   HEMOGLOBIN g/dL 15 1   HEMATOCRIT % 45 5   PLATELETS Thousands/uL 241   NEUTROS PCT % 59   LYMPHS PCT % 27   MONOS PCT % 12   EOS PCT % 1     Results from last 7 days   Lab Units 04/27/22  0549 04/26/22  2101 04/26/22  2101   POTASSIUM mmol/L 2 9*   < > 3 0*   CHLORIDE mmol/L 97*   < > 96*   CO2 mmol/L 33*   < > 36*   BUN mg/dL 15   < > 15   CREATININE mg/dL 1 16   < > 0 94   CALCIUM mg/dL 8 7   < > 8 8   ALK PHOS U/L  --   --  91   ALT U/L  --   --  28   AST U/L  --   --  32    < > = values in this interval not displayed  * I Have Reviewed All Lab Data Listed Above  Recent Cultures (last 7 days):     Results from last 7 days   Lab Units 04/26/22  2202   BLOOD CULTURE  Received in Microbiology Lab  Culture in Progress  Received in Microbiology Lab  Culture in Progress  Imaging: I have personally reviewed pertinent films in PACS  EKG, Pathology, and Other Studies: I have personally reviewed pertinent reports  ** Please Note: Portions of the record may have been created with voice recognition software  Occasional wrong word or "sound a like" substitutions may have occurred due to the inherent limitations of voice recognition software  Read the chart carefully and recognize, using context, where substitutions have occurred   **

## 2022-04-27 NOTE — PLAN OF CARE
Problem: PAIN - ADULT  Goal: Verbalizes/displays adequate comfort level or baseline comfort level  Description: Interventions:  - Encourage patient to monitor pain and request assistance  - Assess pain using appropriate pain scale  - Administer analgesics based on type and severity of pain and evaluate response  - Implement non-pharmacological measures as appropriate and evaluate response  - Consider cultural and social influences on pain and pain management  - Notify physician/advanced practitioner if interventions unsuccessful or patient reports new pain  Outcome: Progressing     Problem: INFECTION - ADULT  Goal: Absence or prevention of progression during hospitalization  Description: INTERVENTIONS:  - Assess and monitor for signs and symptoms of infection  - Monitor lab/diagnostic results  - Monitor all insertion sites, i e  indwelling lines, tubes, and drains  - Monitor endotracheal if appropriate and nasal secretions for changes in amount and color  - [place] appropriate cooling/warming therapies per order  - Administer medications as ordered  - Instruct and encourage patient and family to use good hand hygiene technique  - Identify and instruct in appropriate isolation precautions for identified infection/condition  Outcome: Progressing  Goal: Absence of fever/infection during neutropenic period  Description: INTERVENTIONS:  - Monitor WBC    Outcome: Progressing     Problem: SAFETY ADULT  Goal: Patient will remain free of falls  Description: INTERVENTIONS:  - Educate patient/family on patient safety including physical limitations  - Instruct patient to call for assistance with activity   - Consult OT/PT to assist with strengthening/mobility   - Keep Call bell within reach  - Keep bed low and locked with side rails adjusted as appropriate  - Keep care items and personal belongings within reach  - Initiate and maintain comfort rounds  - Make Fall Risk Sign visible to staff  - Offer Toileting every 2 Hours, in advance of need    Outcome: Progressing  Goal: Maintain or return to baseline ADL function  Description: INTERVENTIONS:  -  Assess patient's ability to carry out ADLs; assess patient's baseline for ADL function and identify physical deficits which impact ability to perform ADLs (bathing, care of mouth/teeth, toileting, grooming, dressing, etc )  - Assess/evaluate cause of self-care deficits   - Assess range of motion  - Assess patient's mobility; develop plan if impaired  - Assess patient's need for assistive devices and provide as appropriate  - Encourage maximum independence but intervene and supervise when necessary  - Involve family in performance of ADLs  - Assess for home care needs following discharge   - Consider OT consult to assist with ADL evaluation and planning for discharge  - Provide patient education as appropriate  Outcome: Progressing  Goal: Maintains/Returns to pre admission functional level  Description: INTERVENTIONS:  - Perform BMAT or MOVE assessment daily    - Set and communicate daily mobility goal to care team and patient/family/caregiver  - Collaborate with rehabilitation services on mobility goals if consulted  - Perform Range of Motion 3 times a day      - Record patient progress and toleration of activity level   Outcome: Progressing     Problem: DISCHARGE PLANNING  Goal: Discharge to home or other facility with appropriate resources  Description: INTERVENTIONS:  - Identify barriers to discharge w/patient and caregiver  - Arrange for needed discharge resources and transportation as appropriate  - Identify discharge learning needs (meds, wound care, etc )  - Arrange for interpretive services to assist at discharge as needed  - Refer to Case Management Department for coordinating discharge planning if the patient needs post-hospital services based on physician/advanced practitioner order or complex needs related to functional status, cognitive ability, or social support system  Outcome: Progressing     Problem: Knowledge Deficit  Goal: Patient/family/caregiver demonstrates understanding of disease process, treatment plan, medications, and discharge instructions  Description: Complete learning assessment and assess knowledge base    Interventions:  - Provide teaching at level of understanding  - Provide teaching via preferred learning methods  Outcome: Progressing

## 2022-04-28 ENCOUNTER — APPOINTMENT (INPATIENT)
Dept: RADIOLOGY | Facility: HOSPITAL | Age: 57
DRG: 617 | End: 2022-04-28
Payer: COMMERCIAL

## 2022-04-28 ENCOUNTER — ANESTHESIA (INPATIENT)
Dept: PERIOP | Facility: HOSPITAL | Age: 57
DRG: 617 | End: 2022-04-28
Payer: COMMERCIAL

## 2022-04-28 LAB
ANION GAP SERPL CALCULATED.3IONS-SCNC: 9 MMOL/L (ref 4–13)
BUN SERPL-MCNC: 11 MG/DL (ref 5–25)
CALCIUM SERPL-MCNC: 8.7 MG/DL (ref 8.3–10.1)
CHLORIDE SERPL-SCNC: 102 MMOL/L (ref 100–108)
CO2 SERPL-SCNC: 31 MMOL/L (ref 21–32)
CREAT SERPL-MCNC: 0.93 MG/DL (ref 0.6–1.3)
ERYTHROCYTE [DISTWIDTH] IN BLOOD BY AUTOMATED COUNT: 13.6 % (ref 11.6–15.1)
GFR SERPL CREATININE-BSD FRML MDRD: 90 ML/MIN/1.73SQ M
GLUCOSE SERPL-MCNC: 104 MG/DL (ref 65–140)
GLUCOSE SERPL-MCNC: 105 MG/DL (ref 65–140)
GLUCOSE SERPL-MCNC: 115 MG/DL (ref 65–140)
GLUCOSE SERPL-MCNC: 89 MG/DL (ref 65–140)
GLUCOSE SERPL-MCNC: 97 MG/DL (ref 65–140)
GLUCOSE SERPL-MCNC: 98 MG/DL (ref 65–140)
HCT VFR BLD AUTO: 45.6 % (ref 36.5–49.3)
HGB BLD-MCNC: 14.6 G/DL (ref 12–17)
MCH RBC QN AUTO: 28.3 PG (ref 26.8–34.3)
MCHC RBC AUTO-ENTMCNC: 32 G/DL (ref 31.4–37.4)
MCV RBC AUTO: 89 FL (ref 82–98)
PLATELET # BLD AUTO: 258 THOUSANDS/UL (ref 149–390)
PMV BLD AUTO: 9.2 FL (ref 8.9–12.7)
POTASSIUM SERPL-SCNC: 3.4 MMOL/L (ref 3.5–5.3)
RBC # BLD AUTO: 5.15 MILLION/UL (ref 3.88–5.62)
SODIUM SERPL-SCNC: 142 MMOL/L (ref 136–145)
WBC # BLD AUTO: 8.07 THOUSAND/UL (ref 4.31–10.16)

## 2022-04-28 PROCEDURE — 0Y6N0ZC DETACHMENT AT LEFT FOOT, PARTIAL 3RD RAY, OPEN APPROACH: ICD-10-PCS | Performed by: PODIATRIST

## 2022-04-28 PROCEDURE — C1781 MESH (IMPLANTABLE): HCPCS | Performed by: PODIATRIST

## 2022-04-28 PROCEDURE — 99252 IP/OBS CONSLTJ NEW/EST SF 35: CPT | Performed by: INTERNAL MEDICINE

## 2022-04-28 PROCEDURE — 0Y6N0ZD DETACHMENT AT LEFT FOOT, PARTIAL 4TH RAY, OPEN APPROACH: ICD-10-PCS | Performed by: PODIATRIST

## 2022-04-28 PROCEDURE — 88311 DECALCIFY TISSUE: CPT | Performed by: PATHOLOGY

## 2022-04-28 PROCEDURE — 80048 BASIC METABOLIC PNL TOTAL CA: CPT | Performed by: INTERNAL MEDICINE

## 2022-04-28 PROCEDURE — 88305 TISSUE EXAM BY PATHOLOGIST: CPT | Performed by: PATHOLOGY

## 2022-04-28 PROCEDURE — 0Y6N0Z9 DETACHMENT AT LEFT FOOT, PARTIAL 1ST RAY, OPEN APPROACH: ICD-10-PCS | Performed by: PODIATRIST

## 2022-04-28 PROCEDURE — 73630 X-RAY EXAM OF FOOT: CPT

## 2022-04-28 PROCEDURE — 82948 REAGENT STRIP/BLOOD GLUCOSE: CPT

## 2022-04-28 PROCEDURE — 0Y6N0ZB DETACHMENT AT LEFT FOOT, PARTIAL 2ND RAY, OPEN APPROACH: ICD-10-PCS | Performed by: PODIATRIST

## 2022-04-28 PROCEDURE — 85027 COMPLETE CBC AUTOMATED: CPT | Performed by: INTERNAL MEDICINE

## 2022-04-28 PROCEDURE — 0Y6N0ZF DETACHMENT AT LEFT FOOT, PARTIAL 5TH RAY, OPEN APPROACH: ICD-10-PCS | Performed by: PODIATRIST

## 2022-04-28 DEVICE — (32 SQ CM) - ALLOGRAFT TISSUE WRAP DS WET 4 X 8 CM: Type: IMPLANTABLE DEVICE | Site: FOOT | Status: FUNCTIONAL

## 2022-04-28 RX ORDER — HYDROMORPHONE HCL/PF 1 MG/ML
0.5 SYRINGE (ML) INJECTION
Status: DISCONTINUED | OUTPATIENT
Start: 2022-04-28 | End: 2022-04-29

## 2022-04-28 RX ORDER — MIDAZOLAM HYDROCHLORIDE 2 MG/2ML
INJECTION, SOLUTION INTRAMUSCULAR; INTRAVENOUS AS NEEDED
Status: DISCONTINUED | OUTPATIENT
Start: 2022-04-28 | End: 2022-04-28

## 2022-04-28 RX ORDER — FENTANYL CITRATE 50 UG/ML
INJECTION, SOLUTION INTRAMUSCULAR; INTRAVENOUS AS NEEDED
Status: DISCONTINUED | OUTPATIENT
Start: 2022-04-28 | End: 2022-04-28

## 2022-04-28 RX ORDER — PROPOFOL 10 MG/ML
INJECTION, EMULSION INTRAVENOUS AS NEEDED
Status: DISCONTINUED | OUTPATIENT
Start: 2022-04-28 | End: 2022-04-28

## 2022-04-28 RX ORDER — ONDANSETRON 2 MG/ML
INJECTION INTRAMUSCULAR; INTRAVENOUS AS NEEDED
Status: DISCONTINUED | OUTPATIENT
Start: 2022-04-28 | End: 2022-04-28

## 2022-04-28 RX ORDER — POTASSIUM CHLORIDE 20 MEQ/1
40 TABLET, EXTENDED RELEASE ORAL ONCE
Status: COMPLETED | OUTPATIENT
Start: 2022-04-28 | End: 2022-04-28

## 2022-04-28 RX ORDER — ACETAMINOPHEN 325 MG/1
650 TABLET ORAL EVERY 6 HOURS SCHEDULED
Status: DISCONTINUED | OUTPATIENT
Start: 2022-04-28 | End: 2022-04-29

## 2022-04-28 RX ORDER — ONDANSETRON 2 MG/ML
4 INJECTION INTRAMUSCULAR; INTRAVENOUS ONCE AS NEEDED
Status: DISCONTINUED | OUTPATIENT
Start: 2022-04-28 | End: 2022-04-28 | Stop reason: HOSPADM

## 2022-04-28 RX ORDER — SODIUM CHLORIDE 9 MG/ML
125 INJECTION, SOLUTION INTRAVENOUS CONTINUOUS
Status: DISCONTINUED | OUTPATIENT
Start: 2022-04-28 | End: 2022-04-28

## 2022-04-28 RX ORDER — OXYCODONE HYDROCHLORIDE 10 MG/1
10 TABLET ORAL EVERY 4 HOURS PRN
Status: DISCONTINUED | OUTPATIENT
Start: 2022-04-28 | End: 2022-04-29 | Stop reason: HOSPADM

## 2022-04-28 RX ORDER — LIDOCAINE HYDROCHLORIDE 20 MG/ML
INJECTION, SOLUTION EPIDURAL; INFILTRATION; INTRACAUDAL; PERINEURAL AS NEEDED
Status: DISCONTINUED | OUTPATIENT
Start: 2022-04-28 | End: 2022-04-28

## 2022-04-28 RX ORDER — FENTANYL CITRATE/PF 50 MCG/ML
50 SYRINGE (ML) INJECTION
Status: DISCONTINUED | OUTPATIENT
Start: 2022-04-28 | End: 2022-04-28 | Stop reason: HOSPADM

## 2022-04-28 RX ORDER — HYDROMORPHONE HCL/PF 1 MG/ML
0.5 SYRINGE (ML) INJECTION
Status: DISCONTINUED | OUTPATIENT
Start: 2022-04-28 | End: 2022-04-28 | Stop reason: HOSPADM

## 2022-04-28 RX ADMIN — CHLORHEXIDINE GLUCONATE 0.12% ORAL RINSE 15 ML: 1.2 LIQUID ORAL at 09:00

## 2022-04-28 RX ADMIN — METRONIDAZOLE 500 MG: 500 TABLET ORAL at 13:16

## 2022-04-28 RX ADMIN — ACETAMINOPHEN 650 MG: 325 TABLET ORAL at 23:24

## 2022-04-28 RX ADMIN — ENOXAPARIN SODIUM 40 MG: 40 INJECTION SUBCUTANEOUS at 17:22

## 2022-04-28 RX ADMIN — POTASSIUM CHLORIDE 40 MEQ: 20 TABLET, EXTENDED RELEASE ORAL at 15:13

## 2022-04-28 RX ADMIN — Medication 250 MG: at 17:22

## 2022-04-28 RX ADMIN — GABAPENTIN 300 MG: 300 CAPSULE ORAL at 20:21

## 2022-04-28 RX ADMIN — MIDAZOLAM 2 MG: 1 INJECTION INTRAMUSCULAR; INTRAVENOUS at 09:27

## 2022-04-28 RX ADMIN — OXYCODONE HYDROCHLORIDE 10 MG: 10 TABLET ORAL at 17:22

## 2022-04-28 RX ADMIN — FENTANYL CITRATE 50 MCG: 50 INJECTION, SOLUTION INTRAMUSCULAR; INTRAVENOUS at 11:17

## 2022-04-28 RX ADMIN — ONDANSETRON 8 MG: 2 INJECTION INTRAMUSCULAR; INTRAVENOUS at 09:49

## 2022-04-28 RX ADMIN — SODIUM CHLORIDE 125 ML/HR: 0.9 INJECTION, SOLUTION INTRAVENOUS at 09:18

## 2022-04-28 RX ADMIN — GABAPENTIN 300 MG: 300 CAPSULE ORAL at 15:13

## 2022-04-28 RX ADMIN — HYDROMORPHONE HYDROCHLORIDE 0.5 MG: 1 INJECTION, SOLUTION INTRAMUSCULAR; INTRAVENOUS; SUBCUTANEOUS at 18:31

## 2022-04-28 RX ADMIN — PROPOFOL 200 MG: 10 INJECTION, EMULSION INTRAVENOUS at 09:31

## 2022-04-28 RX ADMIN — CEFAZOLIN SODIUM 2000 MG: 2 SOLUTION INTRAVENOUS at 13:09

## 2022-04-28 RX ADMIN — LIDOCAINE HYDROCHLORIDE 100 MG: 20 INJECTION, SOLUTION EPIDURAL; INFILTRATION; INTRACAUDAL; PERINEURAL at 09:31

## 2022-04-28 RX ADMIN — CEFAZOLIN SODIUM 2000 MG: 2 SOLUTION INTRAVENOUS at 20:24

## 2022-04-28 RX ADMIN — SUCRALFATE 1 G: 1 TABLET ORAL at 22:09

## 2022-04-28 RX ADMIN — SUCRALFATE 1 G: 1 TABLET ORAL at 17:22

## 2022-04-28 RX ADMIN — FENTANYL CITRATE 50 MCG: 50 INJECTION INTRAMUSCULAR; INTRAVENOUS at 09:31

## 2022-04-28 RX ADMIN — OXYCODONE HYDROCHLORIDE 10 MG: 10 TABLET ORAL at 13:16

## 2022-04-28 RX ADMIN — FENTANYL CITRATE 25 MCG: 50 INJECTION INTRAMUSCULAR; INTRAVENOUS at 10:21

## 2022-04-28 RX ADMIN — OXYCODONE HYDROCHLORIDE 10 MG: 10 TABLET ORAL at 22:08

## 2022-04-28 RX ADMIN — PANTOPRAZOLE SODIUM 20 MG: 20 TABLET, DELAYED RELEASE ORAL at 05:08

## 2022-04-28 RX ADMIN — OXYCODONE HYDROCHLORIDE 10 MG: 10 TABLET ORAL at 04:55

## 2022-04-28 RX ADMIN — FENTANYL CITRATE 50 MCG: 50 INJECTION, SOLUTION INTRAMUSCULAR; INTRAVENOUS at 11:29

## 2022-04-28 RX ADMIN — SODIUM CHLORIDE 125 ML/HR: 0.9 INJECTION, SOLUTION INTRAVENOUS at 05:00

## 2022-04-28 RX ADMIN — FENTANYL CITRATE 50 MCG: 50 INJECTION, SOLUTION INTRAMUSCULAR; INTRAVENOUS at 11:01

## 2022-04-28 RX ADMIN — HYDROMORPHONE HYDROCHLORIDE 0.5 MG: 1 INJECTION, SOLUTION INTRAMUSCULAR; INTRAVENOUS; SUBCUTANEOUS at 15:10

## 2022-04-28 RX ADMIN — ACETAMINOPHEN 650 MG: 325 TABLET ORAL at 18:31

## 2022-04-28 RX ADMIN — METRONIDAZOLE 500 MG: 500 TABLET ORAL at 05:08

## 2022-04-28 RX ADMIN — FENTANYL CITRATE 25 MCG: 50 INJECTION INTRAMUSCULAR; INTRAVENOUS at 10:01

## 2022-04-28 RX ADMIN — HYDROMORPHONE HYDROCHLORIDE 0.5 MG: 1 INJECTION, SOLUTION INTRAMUSCULAR; INTRAVENOUS; SUBCUTANEOUS at 22:58

## 2022-04-28 RX ADMIN — CEFAZOLIN SODIUM 2000 MG: 2 SOLUTION INTRAVENOUS at 04:57

## 2022-04-28 NOTE — ANESTHESIA POSTPROCEDURE EVALUATION
Post-Op Assessment Note    CV Status:  Stable    Pain management: adequate     Mental Status:  Awake   Hydration Status:  Stable   PONV Controlled:  None   Airway Patency:  Patent      Post Op Vitals Reviewed: Yes      Staff: Anesthesiologist         No complications documented      BP      Temp     Pulse     Resp 20 (04/28/22 1110)    SpO2      /53   Pulse 77   Temp (!) 96 7 °F (35 9 °C) (Temporal)   Resp 20   SpO2 96%

## 2022-04-28 NOTE — PROGRESS NOTES
[address] [place] [address]  Progress Note - Ingris Camposer 1965, 60 y o  male MRN: [id number]  Unit/Bed#: E5 -01 Encounter: [id number]  Primary Care Provider: Vanessa Montelongo DO   Date and time admitted to hospital: 4/26/2022  7:18 PM    * Diabetic infection of left foot [facility])  Assessment & Plan  Lab Results   Component Value Date    HGBA1C 5 9 (H) 03/01/2022       Recent Labs     04/28/22  0730 04/28/22  1137 04/28/22  1250   POCGLU 105 89 98       Blood Sugar Average: Last 72 hrs:  (P) 65 [id number]   · Patient presents with diabetic infection of the left foot  · Previous cultures with Enterococcus avium, and a few colonies of Pseudomonas  · Continue Ancef  · S/p TMA today with podiatry with application of incisional wound vac   · Glucose remains controlled off of medication    Atrial fibrillation ([address] )  Assessment & Plan  · Ventricular rate controlled  · Prior to admission on Xarelto which has been held  · Pt reported that he has intermittent atrial fibrillation and given low burden, was cleared by his cardiologist to hold anticoagulation  · Would recommend resuming once patient is stable from a surgical perspective  · CHADS2 Vasc score of 4 - no history of stroke, no acute indication for bridging    Chronic diastolic heart failure (HCC)  Assessment & Plan  Wt Readings from Last 3 Encounters:   04/26/22 (!) 143 kg (315 lb)   04/07/22 (!) 148 kg (326 lb)   03/22/22 (!) 150 kg (331 lb)       · History of chronic diastolic congestive heart failure  · Followed by [facility] Cardiology by Dr Krista Farley MD  · Per records reviewed the patient is typically on 60 mg of furosemide b i d    · Resume diuretics 4/29  · Does take Zaroxolyn once weekly      History of bariatric surgery  Assessment & Plan  · history of bariatric surgery from May 2021  · Based upon records suspect Mary Lou-en-Y  · Continue nutritional supplementation  · Patient reports a weight loss of approximately 65 lbs since that surgery  · Avoid nonsteroidal anti-inflammatories given risk of marginal ulcer  · Continue nutritional supplementation  · BMI 41 56    Pacemaker  Assessment & Plan  noted    Hypokalemia  Assessment & Plan  Likely diuretic induced  Will give additional KDUR 40meq PO today  Furosemide on hold  Recent Labs     22  2101 22  0549 22  0602   K 3 0* 2 9* 3 4*         Morbid obesity (HCC)  Assessment & Plan  History of morbid obesity status post bariatric surgery- BMI 41  Continue ongoing weight loss efforts    Hypertension  Assessment & Plan  Prior to admission on lisinopril- currently on hold and blood pressure remains stable off of medication     DAYAN (obstructive sleep apnea)  Assessment & Plan  Uses BiPAP at bedtime        VTE Pharmacologic Prophylaxis: VTE Score: 5 High Risk (Score >/= 5) - Pharmacological DVT Prophylaxis Ordered: enoxaparin (Lovenox)  Sequential Compression Devices Ordered  Patient Centered Rounds: I performed bedside rounds with nursing staff today  Discussions with Specialists or Other Care Team Provider: d/w RN     Education and Discussions with Family / Patient: Patient declined call to   Time Spent for Care: 30 minutes  More than 50% of total time spent on counseling and coordination of care as described above  Current Length of Stay: 2 day(s)  Current Patient Status: Inpatient   Discharge Plan: Anticipate discharge in 48-72 hrs to per podiatry    Code Status: Level 1 - Full Code    Subjective:   Pt sitting on the edge of the bed and reports he just returned from the OR and he is worried his foot looks shorter than what he anticipated and is wondering if they had to take more of his foot than what was expected   He denies any pain, sob, chest pain, n/v      Objective:     Vitals:   Temp (24hrs), Av 1 °F (36 2 °C), Min:96 7 °F (35 9 °C), Max:97 5 °F (36 4 °C)    Temp:  [96 7 °F (35 9 °C)-97 5 °F (36 4 °C)] 97 5 °F (36 4 °C)  HR:  [52-77] 52  Resp: [13-20] 13  BP: (100-117)/(52-79) 102/62  SpO2:  [91 %-98 %] 97 %  There is no height or weight on file to calculate BMI  Input and Output Summary (last 24 hours): Intake/Output Summary (Last 24 hours) at 4/28/2022 1457  Last data filed at 4/28/2022 1046  Gross per 24 hour   Intake 1000 ml   Output 200 ml   Net 800 ml       Physical Exam:   Physical Exam  Constitutional:       General: He is not in acute distress  Appearance: He is obese  Cardiovascular:      Rate and Rhythm: Normal rate and regular rhythm  Pulses: Normal pulses  Heart sounds: Normal heart sounds  No murmur heard  Pulmonary:      Effort: No respiratory distress  Breath sounds: Normal breath sounds  No wheezing or rales  Abdominal:      General: Bowel sounds are normal  There is no distension  Palpations: Abdomen is soft  Tenderness: There is no abdominal tenderness  Musculoskeletal:         General: Swelling (b/l LE mild ) present  No tenderness  Skin:     General: Skin is warm and dry  Comments: Left foot dressing dry and intact    Neurological:      General: No focal deficit present  Mental Status: He is alert  Mental status is at baseline  Psychiatric:         Attention and Perception: Attention normal          Mood and Affect: Mood normal           Additional Data:     Labs:  Results from last 7 days   Lab Units 04/28/22  0602 04/26/22  2101 04/26/22  2101   WBC Thousand/uL 8 07   < > 8 13   HEMOGLOBIN g/dL 14 6   < > 15 1   HEMATOCRIT % 45 6   < > 45 5   PLATELETS Thousands/uL 258   < > 241   NEUTROS PCT %  --   --  59   LYMPHS PCT %  --   --  27   MONOS PCT %  --   --  12   EOS PCT %  --   --  1    < > = values in this interval not displayed       Results from last 7 days   Lab Units 04/28/22  0602 04/27/22  0549 04/26/22 2101   SODIUM mmol/L 142   < > 137   POTASSIUM mmol/L 3 4*   < > 3 0*   CHLORIDE mmol/L 102   < > 96*   CO2 mmol/L 31   < > 36*   BUN mg/dL 11   < > 15 CREATININE mg/dL 0 93   < > 0 94   ANION GAP mmol/L 9   < > 5   CALCIUM mg/dL 8 7   < > 8 8   ALBUMIN g/dL  --   --  3 5   TOTAL BILIRUBIN mg/dL  --   --  0 67   ALK PHOS U/L  --   --  91   ALT U/L  --   --  28   AST U/L  --   --  32   GLUCOSE RANDOM mg/dL 97   < > 99    < > = values in this interval not displayed  Results from last 7 days   Lab Units 04/28/22  1250 04/28/22  1137 04/28/22  0730   POC GLUCOSE mg/dl 98 89 105               Lines/Drains:  Invasive Devices  Report    Peripheral Intravenous Line            Peripheral IV 04/26/22 Right 1 day                      Imaging: Reviewed radiology reports from this admission including: xray(s)    Recent Cultures (last 7 days):   Results from last 7 days   Lab Units 04/26/22  2202   BLOOD CULTURE  No Growth at 24 hrs  No Growth at 24 hrs         Last 24 Hours Medication List:   Current Facility-Administered Medications   Medication Dose Route Frequency Provider Last Rate    acetaminophen  650 mg Oral Q6H PRN Tiney Lance Pelt, DPM      cefazolin  2,000 mg Intravenous Q8H [facility], DPM 2,000 mg (04/28/22 1309)    enoxaparin  40 mg Subcutaneous BID [facility], DPM      gabapentin  300 mg Oral TID Tiney Lance Glovelier, DPM      HYDROmorphone  0 5 mg Intravenous Q4H [facility], D[facility]      insulin lispro  2-12 Units Subcutaneous TID [facility], DPM      LORazepam  1 mg Oral TID PRN Tiney Lance Pelt, DPM      naloxone  0 04 mg Intravenous Q1MIN [facility], DPM      ondansetron  4 mg Intravenous Q6H PRN Tiney Lance Glovelier, DPM      oxyCODONE  10 mg Oral Q6H PRN Tiney Lance Glovelier, DPM      oxyCODONE  5 mg Oral Q4H PRN Tiney Lance Pelt, DPM      pantoprazole  20 mg Oral Early Morning Tiney Lance Pelt, DPM      polyethylene glycol  17 g Oral Daily PRN Tiney Lance Pelt, DPM      saccharomyces boulardii  250 mg Oral BID Tiney Lance Pelt, DPM      sucralfate  1 g Oral 4x Daily Tiney Lance Glovelier, [place] Today, Patient Was Seen By: KATHRYN Watt    **Please Note: This note may have been constructed using a voice recognition system  **

## 2022-04-28 NOTE — PLAN OF CARE
Problem: PAIN - ADULT  Goal: Verbalizes/displays adequate comfort level or baseline comfort level  Description: Interventions:  - Encourage patient to monitor pain and request assistance  - Assess pain using appropriate pain scale  - Administer analgesics based on type and severity of pain and evaluate response  - Implement non-pharmacological measures as appropriate and evaluate response  - Consider cultural and social influences on pain and pain management  - Notify physician/advanced practitioner if interventions unsuccessful or patient reports new pain  4/28/2022 0748 by Carlitos Vuong  Outcome: Progressing  4/28/2022 0747 by Carlitos Vuong  Outcome: Progressing     Problem: INFECTION - ADULT  Goal: Absence or prevention of progression during hospitalization  Description: INTERVENTIONS:  - Assess and monitor for signs and symptoms of infection  - Monitor lab/diagnostic results  - Monitor all insertion sites, i e  indwelling lines, tubes, and drains  - Monitor endotracheal if appropriate and nasal secretions for changes in amount and color  - [place] appropriate cooling/warming therapies per order  - Administer medications as ordered  - Instruct and encourage patient and family to use good hand hygiene technique  - Identify and instruct in appropriate isolation precautions for identified infection/condition  4/28/2022 0748 by Carlitos Vuong  Outcome: Progressing  4/28/2022 0747 by Carlitos Vuong  Outcome: Progressing  Goal: Absence of fever/infection during neutropenic period  Description: INTERVENTIONS:  - Monitor WBC    4/28/2022 0748 by Carlitos Vuong  Outcome: Progressing  4/28/2022 0747 by Carlitos Vuong  Outcome: Progressing     Problem: SAFETY ADULT  Goal: Patient will remain free of falls  Description: INTERVENTIONS:  - Educate patient/family on patient safety including physical limitations  - Instruct patient to call for assistance with activity   - Consult OT/PT to assist with strengthening/mobility   - Keep Call bell within reach  - Keep bed low and locked with side rails adjusted as appropriate  - Keep care items and personal belongings within reach  - Initiate and maintain comfort rounds  - Make Fall Risk Sign visible to staff  - Offer Toileting every 2 Hours, in advance of need  - Obtain necessary fall risk management equipment  - Apply yellow socks and bracelet for high fall risk patients  - Consider moving patient to room near nurses station  4/28/2022 0748 by Adryan Mccartney  Outcome: Progressing  4/28/2022 0747 by Erla Ace  Outcome: Progressing  Goal: Maintain or return to baseline ADL function  Description: INTERVENTIONS:  -  Assess patient's ability to carry out ADLs; assess patient's baseline for ADL function and identify physical deficits which impact ability to perform ADLs (bathing, care of mouth/teeth, toileting, grooming, dressing, etc )  - Assess/evaluate cause of self-care deficits   - Assess range of motion  - Assess patient's mobility; develop plan if impaired  - Assess patient's need for assistive devices and provide as appropriate  - Encourage maximum independence but intervene and supervise when necessary  - Involve family in performance of ADLs  - Assess for home care needs following discharge   - Consider OT consult to assist with ADL evaluation and planning for discharge  - Provide patient education as appropriate  4/28/2022 0748 by Adryan Mccartney  Outcome: Progressing  4/28/2022 0747 by Erla Ace  Outcome: Progressing  Goal: Maintains/Returns to pre admission functional level  Description: INTERVENTIONS:  - Perform BMAT or MOVE assessment daily    - Set and communicate daily mobility goal to care team and patient/family/caregiver  - Collaborate with rehabilitation services on mobility goals if consulted  - Perform Range of Motion 3 times a day  - Reposition patient every 2 hours    - Dangle patient 3 times a day  - Stand patient 3 times a day  - Ambulate patient 3 times a day  - Out of bed to chair 3 times a day   - Out of bed for meals 3 times a day  - Out of bed for toileting  - Record patient progress and toleration of activity level   4/28/2022 0748 by [facility]  Outcome: Progressing  4/28/2022 0747 by [facility]  Outcome: Progressing     Problem: DISCHARGE PLANNING  Goal: Discharge to home or other facility with appropriate resources  Description: INTERVENTIONS:  - Identify barriers to discharge w/patient and caregiver  - Arrange for needed discharge resources and transportation as appropriate  - Identify discharge learning needs (meds, wound care, etc )  - Arrange for interpretive services to assist at discharge as needed  - Refer to Case Management Department for coordinating discharge planning if the patient needs post-hospital services based on physician/advanced practitioner order or complex needs related to functional status, cognitive ability, or social support system  4/28/2022 0748 by [facility]  Outcome: Progressing  4/28/2022 0747 by [facility]  Outcome: Progressing     Problem: Knowledge Deficit  Goal: Patient/family/caregiver demonstrates understanding of disease process, treatment plan, medications, and discharge instructions  Description: Complete learning assessment and assess knowledge base    Interventions:  - Provide teaching at level of understanding  - Provide teaching via preferred learning methods  4/28/2022 0748 by [facility]  Outcome: Progressing  4/28/2022 0747 by [facility]  Outcome: Progressing

## 2022-04-28 NOTE — ASSESSMENT & PLAN NOTE
Wt Readings from Last 3 Encounters:   04/26/22 (!) 143 kg (315 lb)   04/07/22 (!) 148 kg (326 lb)   03/22/22 (!) 150 kg (331 lb)       History of chronic diastolic congestive heart failure  Followed by [facility] Cardiology by Dr Toni Napier MD  Euvolemic by physical exam  Patient below dry weight  Per records reviewed the patient is typically on 60 mg of furosemide b i d  Currently with hypokalemia likely diuretic induced  Given patient will be NPO, can hold diuretics  Does take Zaroxolyn once weekly  Discontinue maintenance IV fluid for now this patient is tolerating p o

## 2022-04-28 NOTE — ASSESSMENT & PLAN NOTE
Wt Readings from Last 3 Encounters:   04/26/22 (!) 143 kg (315 lb)   04/07/22 (!) 148 kg (326 lb)   03/22/22 (!) 150 kg (331 lb)       · History of chronic diastolic congestive heart failure  · Followed by [facility] Cardiology by Dr Dominique Carrillo MD  · Per records reviewed the patient is typically on 60 mg of furosemide b i d    · Resume diuretics 4/29  · Does take Zaroxolyn once weekly

## 2022-04-28 NOTE — ASSESSMENT & PLAN NOTE
Likely diuretic induced  Will give additional KDUR 40meq PO today  Furosemide on hold  Recent Labs     04/26/22  2101 04/27/22  0549 04/28/22  0602   K 3 0* 2 9* 3 4*

## 2022-04-28 NOTE — ASSESSMENT & PLAN NOTE
Patient with history of permanent pacemaker placement  Will need to arrange with MRI department if this is condition

## 2022-04-28 NOTE — DISCHARGE INSTRUCTIONS
Discharge Instructions - Podiatry    Weight Bearing Status: Strict Non-weight bearing to left foot with rolling walker                   Pain: Continue analgesics as directed    Follow-up appointment instructions: Please make an appointment within one week of discharge with Dr Corey Freedman  Contact sooner if any increase in pain, or signs of infection occur    Wound Care: Leave dressings clean, dry, and intact between professional dressing changes  Incisional Wound Vac [place] applied at 125mmHg   To be removed in the office by Dr Corey Freedman 5/3/22

## 2022-04-28 NOTE — ASSESSMENT & PLAN NOTE
History of morbid obesity status post bariatric surgery- BMI 41  Continue ongoing weight loss efforts

## 2022-04-28 NOTE — ASSESSMENT & PLAN NOTE
Ventricular rate controlled  Prior to admission on Xarelto which he has held for the last 6 days- reports that he has intermittent atrial fibrillation and given low burden, was cleared by his cardiologist to hold anticoagulation  Would recommend resuming once patient is stable from a surgical perspective  CHADS2 Vasc score of 4 - no history of stroke, no acute indication for bridging  He remains in normal sinus rhythm on my examination

## 2022-04-28 NOTE — ASSESSMENT & PLAN NOTE
Prior to admission on lisinopril- currently on hold and blood pressure remains stable off of medication

## 2022-04-28 NOTE — ASSESSMENT & PLAN NOTE
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

## 2022-04-28 NOTE — ASSESSMENT & PLAN NOTE
Lab Results   Component Value Date    HGBA1C 5 9 (H) 03/01/2022       Recent Labs     04/28/22  0730 04/28/22  1137 04/28/22  1250   POCGLU 105 89 98       Blood Sugar Average: Last 72 hrs:  (P) 87 [id number]   · Patient presents with diabetic infection of the left foot    · Previous cultures with Enterococcus avium, and a few colonies of Pseudomonas  · Continue Ancef  · S/p TMA today with podiatry with application of incisional wound vac   · Glucose remains controlled off of medication

## 2022-04-28 NOTE — PLAN OF CARE
Problem: PAIN - ADULT  Goal: Verbalizes/displays adequate comfort level or baseline comfort level  Description: Interventions:  - Encourage patient to monitor pain and request assistance  - Assess pain using appropriate pain scale  - Administer analgesics based on type and severity of pain and evaluate response  - Implement non-pharmacological measures as appropriate and evaluate response  - Consider cultural and social influences on pain and pain management  - Notify physician/advanced practitioner if interventions unsuccessful or patient reports new pain  Outcome: Progressing     Problem: INFECTION - ADULT  Goal: Absence or prevention of progression during hospitalization  Description: INTERVENTIONS:  - Assess and monitor for signs and symptoms of infection  - Monitor lab/diagnostic results  - Monitor all insertion sites, i e  indwelling lines, tubes, and drains  - Monitor endotracheal if appropriate and nasal secretions for changes in amount and color  - [place] appropriate cooling/warming therapies per order  - Administer medications as ordered  - Instruct and encourage patient and family to use good hand hygiene technique  - Identify and instruct in appropriate isolation precautions for identified infection/condition  Outcome: Progressing  Goal: Absence of fever/infection during neutropenic period  Description: INTERVENTIONS:  - Monitor WBC    Outcome: Progressing     Problem: SAFETY ADULT  Goal: Patient will remain free of falls  Description: INTERVENTIONS:  - Educate patient/family on patient safety including physical limitations  - Instruct patient to call for assistance with activity   - Consult OT/PT to assist with strengthening/mobility   - Keep Call bell within reach  - Keep bed low and locked with side rails adjusted as appropriate  - Keep care items and personal belongings within reach  - Initiate and maintain comfort rounds  - Make Fall Risk Sign visible to staff  - Offer Toileting every 2 Hours, in advance of need  - - Apply yellow socks and bracelet for high fall risk patients  - Consider moving patient to room near nurses station  Outcome: Progressing  Goal: Maintain or return to baseline ADL function  Description: INTERVENTIONS:  -  Assess patient's ability to carry out ADLs; assess patient's baseline for ADL function and identify physical deficits which impact ability to perform ADLs (bathing, care of mouth/teeth, toileting, grooming, dressing, etc )  - Assess/evaluate cause of self-care deficits   - Assess range of motion  - Assess patient's mobility; develop plan if impaired  - Assess patient's need for assistive devices and provide as appropriate  - Encourage maximum independence but intervene and supervise when necessary  - Involve family in performance of ADLs  - Assess for home care needs following discharge   - Consider OT consult to assist with ADL evaluation and planning for discharge  - Provide patient education as appropriate  Outcome: Progressing  Goal: Maintains/Returns to pre admission functional level  Description: INTERVENTIONS:  - Perform BMAT or MOVE assessment daily    - Set and communicate daily mobility goal to care team and patient/family/caregiver  - Collaborate with rehabilitation services on mobility goals if consulted  - Perform Range of Motion 3 times a day  - Reposition patient every 2 hours    - Dangle patient 3 times a day  - Stand patient 3 times a day  - Ambulate patient 3 times a day  - Out of bed to chair 3 times a day   - Out of bed for meals 3 times a day  - Out of bed for toileting  - Record patient progress and toleration of activity level   Outcome: Progressing     Problem: DISCHARGE PLANNING  Goal: Discharge to home or other facility with appropriate resources  Description: INTERVENTIONS:  - Identify barriers to discharge w/patient and caregiver  - Arrange for needed discharge resources and transportation as appropriate  - Identify discharge learning needs (meds, wound care, etc )  - Arrange for interpretive services to assist at discharge as needed  - Refer to Case Management Department for coordinating discharge planning if the patient needs post-hospital services based on physician/advanced practitioner order or complex needs related to functional status, cognitive ability, or social support system  Outcome: Progressing     Problem: Knowledge Deficit  Goal: Patient/family/caregiver demonstrates understanding of disease process, treatment plan, medications, and discharge instructions  Description: Complete learning assessment and assess knowledge base    Interventions:  - Provide teaching at level of understanding  - Provide teaching via preferred learning methods  Outcome: Progressing

## 2022-04-28 NOTE — ASSESSMENT & PLAN NOTE
Lab Results   Component Value Date    HGBA1C 5 9 (H) 03/01/2022       No results for input(s): POCGLU in the last 72 hours  Blood Sugar Average: Last 72 hrs:   Patient presents with diabetic infection of the left foot    Previous cultures with Enterococcus avium, and a few colonies of Pseudomonas  Continue Ancef and Flagyl  Patient does not appear systemically ill, hopeful for definitive cure with surgical managed  Will start on probiotics  Await perioperative culture

## 2022-04-28 NOTE — ASSESSMENT & PLAN NOTE
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

## 2022-04-28 NOTE — ASSESSMENT & PLAN NOTE
· Ventricular rate controlled  · Prior to admission on Xarelto which has been held  · Pt reported that he has intermittent atrial fibrillation and given low burden, was cleared by his cardiologist to hold anticoagulation  · Would recommend resuming once patient is stable from a surgical perspective  · CHADS2 Vasc score of 4 - no history of stroke, no acute indication for bridging

## 2022-04-28 NOTE — ASSESSMENT & PLAN NOTE
Patient remains loaded intermediate risk for cardiovascular complication  Denies any chest pain, shortness of breath difficulty breathing  He has good performance status    Per AHA/ACC guidelines patient can proceed to surgery without any further intervention  Recommend continuing DVT prophylaxis per professional guidelines  Restart Xarelto once safe from a surgical perspective  No evidence of acute heart failure, diuretics on hold given NPO status overnight  Recommend post operative, incentive spirometry  Patient proceed to surgery without additional workup  Xarelto has been on hold for 6 days

## 2022-04-28 NOTE — ASSESSMENT & PLAN NOTE
Likely diuretic induced  Will replete intravenously and orally  Furosemide on hold  Recent Labs     04/26/22  2101 04/27/22  0549   K 3 0* 2 9*

## 2022-04-28 NOTE — CONSULTS
Jarek 1965, 60 y o  male MRN: [id number]  Unit/Bed#: E5 -01 Encounter: [id number]  Primary Care Provider: Ilsa Hodge DO   Date and time admitted to hospital: 4/26/2022  7:18 PM    Inpatient consult to Internal Medicine  Consult performed by: Virginie Oh DO  Consult ordered by: Hugo Umana DPM          * Diabetic infection of left foot [facility])  Assessment & Plan  Lab Results   Component Value Date    HGBA1C 5 9 (H) 03/01/2022       No results for input(s): POCGLU in the last 72 hours  Blood Sugar Average: Last 72 hrs:   Patient presents with diabetic infection of the left foot  Previous cultures with Enterococcus avium, and a few colonies of Pseudomonas  Continue Ancef and Flagyl  Patient does not appear systemically ill, hopeful for definitive cure with surgical managed  Will start on probiotics  Await perioperative culture    Encounter for perioperative consultation  Assessment & Plan  Patient remains loaded intermediate risk for cardiovascular complication  Denies any chest pain, shortness of breath difficulty breathing  He has good performance status    Per AHA/ACC guidelines patient can proceed to surgery without any further intervention  Recommend continuing DVT prophylaxis per professional guidelines  Restart Xarelto once safe from a surgical perspective  No evidence of acute heart failure, diuretics on hold given NPO status overnight  Recommend post operative, incentive spirometry  Patient proceed to surgery without additional workup  Xarelto has been on hold for 6 days    History of bariatric surgery  Assessment & Plan  Reported history of bariatric surgery from May 2021  Based upon records suspect Mary Lou-en-Y  Continue nutritional supplementation  Patient reports a weight loss of approximately 65 lbs since that surgery  Avoid nonsteroidal anti-inflammatories given risk of marginal ulcer  Continue nutritional supplementation    Pacemaker  Assessment & Plan  Patient with history of permanent pacemaker placement  Will need to arrange with MRI department if this is condition    Hypokalemia  Assessment & Plan  Likely diuretic induced  Will replete intravenously and orally  Furosemide on hold  Recent Labs     04/26/22  2101 04/27/22  0549   K 3 0* 2 9*         Atrial fibrillation (HCC)  Assessment & Plan  Ventricular rate controlled  Prior to admission on Xarelto which he has held for the last 6 days- reports that he has intermittent atrial fibrillation and given low burden, was cleared by his cardiologist to hold anticoagulation  Would recommend resuming once patient is stable from a surgical perspective  CHADS2 Vasc score of 4 - no history of stroke, no acute indication for bridging  He remains in normal sinus rhythm on my examination    Morbid obesity ([address] )  Assessment & Plan  History of morbid obesity status post bariatric surgery  Continue ongoing weight loss efforts    Hypertension  Assessment & Plan  Prior to admission on lisinopril    Chronic diastolic heart failure (HCC)  Assessment & Plan  Wt Readings from Last 3 Encounters:   04/26/22 (!) 143 kg (315 lb)   04/07/22 (!) 148 kg (326 lb)   03/22/22 (!) 150 kg (331 lb)       History of chronic diastolic congestive heart failure  Followed by [facility] by Dr Jerri Carmen MD  Euvolemic by physical exam  Patient below dry weight  Per records reviewed the patient is typically on 60 mg of furosemide b i d  Currently with hypokalemia likely diuretic induced    Given patient will be NPO, can hold diuretics  Does take Zaroxolyn once weekly  Discontinue maintenance IV fluid for now this patient is tolerating p o       DAYAN (obstructive sleep apnea)  Assessment & Plan  Will check COVID-19 screening  Uses BiPAP at bedtime        VTE Prophylaxis: Heparin  / sequential compression device     Recommendations for Discharge:  · Resume Xarelto once safe from a surgical standpoint  · Can proceed to surgery without any additional workup  ·     Counseling / Coordination of Care Time: 45 minutes  Greater than 50% of total time spent on patient counseling and coordination of care  Collaboration of Care: Were Recommendations Directly Discussed with Primary Treatment Team? - Yes     History of Present Illness:    Mega Ruiz is a 60 y o  male who is originally admitted to the podiatry service due to left foot osteomyelitis requiring TMA  This is planned for 04/28/2022  We are consulted for medical management and preoperative clearance  The patient had recent radiograph demonstrating cortical reaction noted at the 1st distal phalanx  He is plan for operative management given concern for left hallux diabetic ulcer as well as left foot cellulitis secondary to diabetic foot infection  The patient has a history of gastric bypass surgery, hypertension, chronic diastolic heart failure  He reports approximately 65 lb weight loss after having bariatric surgery  Per records review this appears to Mary Lou-en-Y  The patient reports holding his Xarelto over the last 6 days at the discretion of his cardiologist   He does have a permanent pacemaker in reports very low burden of atrial fibrillation  The patient has a history of diabetes mellitus, he reports his A1c is very well controlled  He hears to a diabetic diet  He does have a history of chronic diastolic congestive heart failure, does take [facility] Monday Wednesday Friday  The patient denies any chest pain, no shortness of breath difficulty breathing  He has not any chest pain  He reports good performance status and can ambulate a city block without any significant shortness of breath difficulty breathing  Review of Systems:    Review of Systems   A complete and comprehensive 14 point organ system review has been performed and all other systems are negative other than stated above      Past Medical and Surgical History: Past Medical History:   Diagnosis Date    Atrial fibrillation (HCC)     Chronic diastolic (congestive) heart failure [address] [address] )     Diabetes mellitus ([facility] [address] )     High cholesterol     Hyperlipidemia     Hypertension     Pacemaker     Stroke [facility])        Past Surgical History:   Procedure Laterality Date    APPENDECTOMY      ATRIAL CARDIAC PACEMAKER INSERTION      BARIATRIC SURGERY  05/2021    FL GUIDED NEEDLE PLAC BX/ASP/INJ  3/22/2022    NERVE BLOCK Right 2/10/2022    Procedure: BLOCK MEDIAL BRANCH C3, C4, C5 #1;  Surgeon: Roseanna Alfonso MD;  [facility];  Service: Pain Management     NERVE BLOCK Right 3/22/2022    Procedure: BLOCK MEDIAL BRANCH C3, C4, C5 #2;  Surgeon: Roseanna Alfonso MD;  [facility];  Service: Pain Management     [place] AMPUTATION TOE,I-P JT Left 11/16/2021    Procedure: AMPUTATION LESSER TOE;  Surgeon: Libia Wallis DPM;  [facility];  Service: Podiatry   [address] [place] [address] Right 4/7/2022    Procedure: Right C3, C4, C5 RFA;  Surgeon: Roseanna Alfonso MD;  [facility];  Service: Pain Management     TOE AMPUTATION Left     2nd toe    TOE AMPUTATION Left 9/15/2021    Procedure: AMPUTATION LEFT 4TH TOE;  Surgeon: Libia Wallis DPM;  [facility];  Service: Podiatry    TOE AMPUTATION Right 1/12/2022    Procedure: AMPUTATION TOE;  Surgeon: Heriberto Coronado DPM;  [facility];  Service: Podiatry    TOE AMPUTATION Right 2/23/2022    Procedure: AMPUTATION TOE  RIGHT SECOND;  Surgeon: Heriberto Coronado DPM;  Location: [facility] OR;  Service: Podiatry       Meds/Allergies:    PTA meds:   Prior to Admission Medications   Prescriptions Last Dose Informant Patient Reported? Taking?    FLUoxetine (PROzac) 40 MG capsule   Yes No   LORazepam (ATIVAN) 1 mg tablet   Yes No   Sig: Take 1 mg by mouth 3 (three) times a day   atorvastatin (LIPITOR) 40 mg tablet Not Taking at Unknown time  Yes No   Sig: Take 40 mg by mouth daily   Patient not taking: Reported on 4/26/2022    busPIRone (BUSPAR) 5 mg tablet Not Taking at Unknown time  Yes No   Sig: TAKE BY MOUTH 1 TABLET IN THE MORNING AND 1 TABLET BEFORE BEDTIME  Patient not taking: Reported on 4/26/2022   doxycycline hyclate (VIBRA-TABS) 100 mg tablet   Yes No   Sig: doxycycline hyclate 100 mg tablet   TAKE ONE TABLET TWO TIMES A DAY BY MOUTH AS DIRECTED   gabapentin (NEURONTIN) 300 mg capsule   No No   Sig: TAKE 1 CAPSULE BY MOUTH THREE TIMES A DAY   gentamicin (GARAMYCIN) 0 1 % cream Not Taking at Unknown time  Yes No   Sig: gentamicin 0 1 % topical cream   APPLY 1 GRAM BY TOPICAL ROUTE TO THE AFFECTED AREA 3 TIMES A DAY FOR 30 DAYS   Patient not taking: Reported on 4/26/2022   hydrOXYzine HCL (ATARAX) 25 mg tablet Not Taking at Unknown time  Yes No   Sig: Take 25 mg by mouth 4 (four) times a day as needed   Patient not taking: Reported on 4/26/2022    lidocaine (Lidoderm) 5 % Not Taking at Unknown time  No No   Sig: Apply 1 patch topically daily Remove & Discard patch within 12 hours or as directed by MD   Patient not taking: Reported on 4/26/2022    lisinopril (ZESTRIL) 5 mg tablet   Yes No   Sig: lisinopril 5 mg tablet   TAKE BY MOUTH 1 TABLET IN THE MORNING    metolazone (ZAROXOLYN) 2 5 mg tablet Not Taking at Unknown time  Yes No   Sig: TAKE 1 TAB BY MOUTH ONCE A DAY ON MONDAY, WEDNESDAY, AND FRIDAY ONLY     Patient not taking: Reported on 4/26/2022   misoprostol (CYTOTEC) 200 mcg tablet Not Taking at Unknown time  Yes No   Sig: Take 200 mcg by mouth 4 (four) times a day   Patient not taking: Reported on 4/26/2022    omeprazole (PriLOSEC) 20 mg delayed release capsule 4/25/2022 at Unknown time  Yes Yes   Sig: Take 20 mg by mouth daily     potassium chloride (K-DUR,KLOR-CON) 20 mEq tablet   No No   Sig: Take 2 tablets (40 mEq total) by mouth daily for 5 days   ranitidine (ZANTAC) 150 mg tablet   Yes No   Sig: Take 150 mg by mouth daily   sildenafil (VIAGRA) 25 MG tablet Not Taking at Unknown time  Yes No Sig: sildenafil 25 mg tablet   take 1 tablet by mouth daily if needed for ERECTILE DYSFUNCTION   Patient not taking: Reported on 4/26/2022   sildenafil (VIAGRA) 25 MG tablet Not Taking at Unknown time  Yes No   Sig: take 1 tablet by mouth daily if needed for ERECTILE DYSFUNCTION   Patient not taking: Reported on 4/26/2022   sucralfate (CARAFATE) 1 g tablet 4/25/2022 at Unknown time  Yes Yes   Sig: Take 1 g by mouth 4 (four) times a day     traMADol (ULTRAM) 50 mg tablet Not Taking at Unknown time  Yes No   Patient not taking: Reported on 4/26/2022       Facility-Administered Medications: None       Allergies: No Known Allergies    Social History:     Marital Status: /Civil Union    Substance Use History:   Social History     Substance and Sexual Activity   Alcohol Use Never     Social History     Tobacco Use   Smoking Status Never Smoker   Smokeless Tobacco Never Used     Social History     Substance and Sexual Activity   Drug Use Never       Family History:    Family History   Problem Relation Age of Onset    No Known Problems Mother     No Known Problems Father        Physical Exam:     Vitals:   Blood Pressure: 93/59 (04/27/22 1421)  Pulse: (!) 49 (04/27/22 1421)  Temperature: (!) 97 4 °F (36 3 °C) (04/27/22 1421)  Temp Source: Oral (04/27/22 1421)  Respirations: 16 (04/27/22 1421)  SpO2: 98 % (04/27/22 1421)    Physical Exam      General: well appearing, no acute distress, obese  HEENT: atraumatic, PERRLA, moist mucosa, normal pharynx, normal tonsils and adenoids, normal tongue, no fluid in sinuses  Neck: Trachea midline, no carotid bruit, no masses  Respiratory: normal chest wall expansion, CTA B, no r/r/w, no rubs  Cardiovascular: RRR, no m/r/g, Normal S1 and S2  Abdomen: Soft, non-tender, non-distended, normal bowel sounds in all quadrants, no hepatosplenomegaly, no tympany  Rectal: deferred  Musculoskeletal: normal ROM in upper and lower extremities  Integumentary:  1+ lower extremity edema nonpitting, left foot diabetic foot ulcer Heme/Lymph: no lymphadenopathy, no bruises  Neurological: Cranial Nerves II-XII grossly intact, no tics, normal sensation to pressure and light touch  Psychiatric: cooperative with normal mood, affect, and cognition      Additional Data:     Lab Results: I have personally reviewed pertinent reports  Results from last 7 days   Lab Units 04/26/22  2101   WBC Thousand/uL 8 13   HEMOGLOBIN g/dL 15 1   HEMATOCRIT % 45 5   PLATELETS Thousands/uL 241   NEUTROS PCT % 59   LYMPHS PCT % 27   MONOS PCT % 12   EOS PCT % 1     Results from last 7 days   Lab Units 04/27/22  0549 04/26/22 2101 04/26/22 2101   SODIUM mmol/L 139   < > 137   POTASSIUM mmol/L 2 9*   < > 3 0*   CHLORIDE mmol/L 97*   < > 96*   CO2 mmol/L 33*   < > 36*   BUN mg/dL 15   < > 15   CREATININE mg/dL 1 16   < > 0 94   ANION GAP mmol/L 9   < > 5   CALCIUM mg/dL 8 7   < > 8 8   ALBUMIN g/dL  --   --  3 5   TOTAL BILIRUBIN mg/dL  --   --  0 67   ALK PHOS U/L  --   --  91   ALT U/L  --   --  28   AST U/L  --   --  32   GLUCOSE RANDOM mg/dL 97   < > 99    < > = values in this interval not displayed  Lab Results   Component Value Date/Time    HGBA1C 5 9 (H) 03/01/2022 12:34 PM    HGBA1C 5 8 (H) 01/11/2022 04:56 AM    HGBA1C 5 9 (H) 11/23/2021 10:10 AM    HGBA1C 6 2 (H) 04/14/2021 10:43 AM               Imaging: I have personally reviewed pertinent reports        XR foot 3+ vw left   Final Result by Andra Pleitez MD (04/27 7436)      No lytic lesion or periosteal reaction in the 1st proximal and distal phalanges      Workstation performed: [id number]         MRI inpatient order    (Results Pending)       EKG, Pathology, and Other Studies Reviewed on Admission:   · Reviewed x-ray with no lytic lesion or pressed reaction in the 1st proximal and distal phalanges  · Concern for osteomyelitis  · Reviewed all previous cardiology records  · [facility] records    ** Please Note: This note has been constructed using a voice recognition system   **

## 2022-04-28 NOTE — PROGRESS NOTES
Podiatry - Progress Note  Patient: Ramy Cuellar 60 y o  male   MRN: [id number]  PCP: Frandy Montenegro DO  Unit/Bed#: E5 -36 Encounter: [id number]  Date Of Visit: 22    ASSESSMENT:    Ramy Cuellar is a 60 y o  male with:    1  Left hallux diabetic ulcer  2  Left foot cellulitis due to #1  3  T2DM (last HbA1c 3/1/22; 5 9%)  4  HTN  5  A-fib  6  DAYAN      PLAN:    · Patient to go to OR today,22, for  left foot TMA with Dr Alverto Mercer  · I spent time to discuss with the patient the surgical procedure  I discussed risks as failure to heal, eventual more proximal amputation or need for more surgeries to attempt limb salvage  No guarantees were given in light of his  comorbidities before patient signed the consent form  He verbalized he understood there is risk of failure to salvage the limb  · Confirmed NPO status  · H&P, vitals, and current labs reviewed  No acute changes noted  · Alternatives, risks, and complications discussed with patient  · All questions answered  No guarantees given to outcome of procedure  SUBJECTIVE:     The patient was seen, evaluated, and assessed at bedside today  The patient was awake, alert, and in no acute distress  Patient confirmed NPO status  All questions and concerns regarding the surgical procedure addressed  Patient understands risks vs benefits of procedure and remains amenable with plan for surgery today  Patient denies N/V/F/chills/SOB/CP  OBJECTIVE:     Vitals:   /79   Pulse 65   Temp (!) 97 2 °F (36 2 °C)   Resp 17   SpO2 91%     Temp (24hrs), Av 3 °F (36 3 °C), Min:97 2 °F (36 2 °C), Max:97 4 °F (36 3 °C)      Physical Exam:     General:  Alert, cooperative, and in no distress  Lower extremity exam:  Cardiovascular status at baseline  Neurological status at baseline  Musculoskeletal status at baseline  No calf tenderness noted bilaterally  Dressing left intact to the Operating Room           Additional Data: Labs:    Results from last 7 days   Lab Units 04/28/22  0602 04/26/22  2101 04/26/22  2101   WBC Thousand/uL 8 07   < > 8 13   HEMOGLOBIN g/dL 14 6   < > 15 1   HEMATOCRIT % 45 6   < > 45 5   PLATELETS Thousands/uL 258   < > 241   NEUTROS PCT %  --   --  59   LYMPHS PCT %  --   --  27   MONOS PCT %  --   --  12   EOS PCT %  --   --  1    < > = values in this interval not displayed  Results from last 7 days   Lab Units 04/27/22  0549 04/26/22  2101 04/26/22  2101   POTASSIUM mmol/L 2 9*   < > 3 0*   CHLORIDE mmol/L 97*   < > 96*   CO2 mmol/L 33*   < > 36*   BUN mg/dL 15   < > 15   CREATININE mg/dL 1 16   < > 0 94   CALCIUM mg/dL 8 7   < > 8 8   ALK PHOS U/L  --   --  91   ALT U/L  --   --  28   AST U/L  --   --  32    < > = values in this interval not displayed  * I Have Reviewed All Lab Data Listed Above  Recent Cultures (last 7 days):     Results from last 7 days   Lab Units 04/26/22  2202   BLOOD CULTURE  Received in Microbiology Lab  Culture in Progress  Received in Microbiology Lab  Culture in Progress  Imaging: I have personally reviewed pertinent films in PACS  EKG, Pathology, and Other Studies: I have personally reviewed pertinent reports  ** Please Note: Portions of the record may have been created with voice recognition software  Occasional wrong word or "sound a like" substitutions may have occurred due to the inherent limitations of voice recognition software  Read the chart carefully and recognize, using context, where substitutions have occurred   **

## 2022-04-28 NOTE — OP NOTE
OPERATIVE REPORT - Podiatry  PATIENT NAME: Elsy Rodriguez    :  1965  MRN: [id number]  Pt Location: [facility] OR ROOM 07    SURGERY DATE: 2022    Surgeon(s) and Role: Berenice Coates DPM - Primary     * Lyn Moreno - Assisting    Pre-op Diagnosis:  Diabetic infection of left foot ([facility] [address] ) [E23 552, L08 9]    Post-Op Diagnosis Codes:     * Diabetic infection of left foot ([address] ) [E11 628, L08 9]    Procedure(s) (LRB):  LEFT TRANSMETATARSAL AMPUTATION  (Left) with application of prevena incisional wound vac  Specimen(s):  ID Type Source Tests Collected by Time Destination   1 : LEFT FOOT 1ST AND 5TH TOES Tissue Foot, Left TISSUE EXAM [place], [place] 2022 9478        Estimated Blood Loss:   200 mL    Drains:  * No LDAs found *    Anesthesia Type:   Choice with 19 ml of 1% Lidocaine and 0 5% Bupivacaine in a 1:1 mixture    Hemostasis:  Surgical dissection  Pneumatic ankle tourniquet placed for 30 minutes    Materials:  Implant Name Type Inv  Item Serial No   Lot No  LRB No  Used Action   (32 SQ CM) - ALLOGRAFT TISSUE WRAP DS WET 4 X 8 CM - [id number]  (32 SQ CM) - ALLOGRAFT TISSUE WRAP DS WET 4 X 8 CM [id number] [employer]  Left 1 Implanted     nylon    Operative Findings:  Per note bellow    Complications:   None    Procedure and Technique:     Under mild sedation, the patient was brought into the operating room and placed on the operating room table in the supine position  A pneumatic tourniquet was then placed around the patient's left lower extremity with ample webril padding  A time out was performed to confirm the correct patient, procedure and site with all parties in agreement  Following IV sedation, a local block was performed consisting of 19 ml of 1% Lidocaine and 0 5% Bupivacaine in a 1:1 mixture  The foot was then scrubbed, prepped and draped in the usual aseptic manner    An esmarch bandage was utilized to [employer] the patients foot and the tourniquet was then inflated  The esmarch bandage was removed  and the foot was placed on the operating room table  A fishmouth type incision was drawn out over the forefoot  Utilizing a #15 blade a full thickness incision was made down to bone  The forefoot was then sharply dissected out to isolate the metatarsal shafts  Utilizing a key elevator soft tissues were freed from each of the metatarsals  A sagittal saw was then used to make the transmetatarsal amputation osteotomies  Each metatarsal was cut in a dorsal distal to plantar proximal fashion with care taken to maintain the metatarsal parabola  The entire forefoot was then removed from the table and passed off  Any residual prominences were removed utilizing a rongeur  The remaining wound bed was then inspected  No remaining purulent sinus tracts were visualized  Any necrotic or nonviable soft tissues were sharply excised from the wound utilizing 15 blade  Any residual exposed tendons were excised proximally to prevent any infection from tracking proximally  Bones proximal to the amputation site was noted to be of hard viable quality  The remaining surgical wound was red granular with adequate bleeding noted  The surgical incision was irrigated with copious amounts of normal sterile saline  Allograft was applied to wound base prior to closure    Skin edges were reapproximated and closure was obtained utilizing interrupted retention sutures utilizing 2-0 and 3-0  Nylon  Prevena incisional vac was then applied  The foot was then cleansed and dried  The foot was then dressed with ABD  This was then covered with an ACE wrap  The tourniquet was deflated and normal hyperemic flush was noted to all digits  The patient tolerated the procedure and anesthesia well and was transported to the PACU with vital signs stable  Dr Ruben Carrero was present during the entire procedure and participated in all key aspects        SIGNATURE: Brock Amor, [facility]  DATE: April 28, 2022  TIME: 11:00 AM      Portions of the record may have been created with voice recognition software  Occasional wrong word or "sound a like" substitutions may have occurred due to the inherent limitations of voice recognition software  Read the chart carefully and recognize, using context, where substitutions have occurred

## 2022-04-28 NOTE — UTILIZATION REVIEW
Continued Stay Review    Date:   04/28/2022                        Current Patient Class: Inpatient  Current Level of Care: Med/Surg    HPI:56 y o  male initially admitted on 04/26/2022     Assessment/Plan:  Cellulitis right great toe  04/28/2022  to OR today,04/28/22, for  left foot TMA  Continue NPO  SURGERY DATE: 4/28/2022  Procedure(s) (LRB):  LEFT TRANSMETATARSAL AMPUTATION  (Left) with application of prevena incisional wound vac    Anesthesia Type:   Mild sedation with 19 ml of 1% Lidocaine and 0 5% Bupivacaine in a 1:1 mixture        Vital Signs:   Date/Time Temp Pulse Resp BP MAP (mmHg) SpO2 Calculated FIO2 (%) - Nasal Cannula Nasal Cannula O2 Flow Rate (L/min) O2 Device Cardiac (WDL) Patient Position - Orthostatic VS   04/28/22 12:51:06 97 5 °F (36 4 °C) 52 Abnormal  -- 102/62 75 97 % -- -- -- -- --   04/28/22 1140 96 9 °F (36 1 °C) Abnormal  62 13 108/54 76 96 % 32 3 L/min Nasal cannula -- --   04/28/22 1125 -- 64 14 100/52 74 98 % 32 3 L/min Nasal cannula -- --   04/28/22 1110 -- 58 20 107/53 76 98 % 32 3 L/min Nasal cannula -- --   04/28/22 1055 96 7 °F (35 9 °C) Abnormal  77 13 111/53 77 96 % 32 3 L/min Nasal cannula X --   04/28/22 07:30:47 97 2 °F (36 2 °C) Abnormal  55 -- 114/52 73 97 % -- -- None (Room air) -- Lying       Pertinent Labs/Diagnostic Results:   Results from last 7 days   Lab Units 04/27/22 2057   SARS-COV-2  Negative     Results from last 7 days   Lab Units 04/28/22  0602 04/26/22 2101   WBC Thousand/uL 8 07 8 13   HEMOGLOBIN g/dL 14 6 15 1   HEMATOCRIT % 45 6 45 5   PLATELETS Thousands/uL 258 241   NEUTROS ABS Thousands/µL  --  4 75     Results from last 7 days   Lab Units 04/28/22  0602 04/27/22  0549 04/26/22 2101   SODIUM mmol/L 142 139 137   POTASSIUM mmol/L 3 4* 2 9* 3 0*   CHLORIDE mmol/L 102 97* 96*   CO2 mmol/L 31 33* 36*   ANION GAP mmol/L 9 9 5   BUN mg/dL 11 15 15   CREATININE mg/dL 0 93 1 16 0 94   EGFR ml/min/1 73sq m 90 69 89   CALCIUM mg/dL 8 7 8 7 8 8 Results from last 7 days   Lab Units 04/26/22  2101   AST U/L 32   ALT U/L 28   ALK PHOS U/L 91   TOTAL PROTEIN g/dL 7 6   ALBUMIN g/dL 3 5   TOTAL BILIRUBIN mg/dL 0 67     Results from last 7 days   Lab Units 04/28/22  1250 04/28/22  1137 04/28/22  0730   POC GLUCOSE mg/dl 98 89 105     Results from last 7 days   Lab Units 04/28/22  0602 04/27/22  0549 04/26/22  2101   GLUCOSE RANDOM mg/dL 97 97 99       Results from last 7 days   Lab Units 04/27/22  2057   INFLUENZA A PCR  Negative   INFLUENZA B PCR  Negative   RSV PCR  Negative     Results from last 7 days   Lab Units 04/26/22  2202   BLOOD CULTURE  No Growth at 24 hrs  No Growth at 24 hrs  Medications:   Scheduled Medications:  cefazolin, 2,000 mg, Intravenous, Q8H  enoxaparin, 40 mg, Subcutaneous, BID  gabapentin, 300 mg, Oral, TID  insulin lispro, 2-12 Units, Subcutaneous, TID AC  pantoprazole, 20 mg, Oral, Early Morning  saccharomyces boulardii, 250 mg, Oral, BID  sucralfate, 1 g, Oral, 4x Daily      Continuous IV Infusions:     PRN Meds:  acetaminophen, 650 mg, Oral, Q6H PRN  HYDROmorphone, 0 5 mg, Intravenous, Q4H PRN  LORazepam, 1 mg, Oral, TID PRN  naloxone, 0 04 mg, Intravenous, Q1MIN PRN  ondansetron, 4 mg, Intravenous, Q6H PRN  oxyCODONE, 10 mg, Oral, Q6H PRN  oxyCODONE, 5 mg, Oral, Q4H PRN  polyethylene glycol, 17 g, Oral, Daily PRN        Discharge Plan: D    Network Utilization Review Department  ATTENTION: Please call with any questions or concerns to [phone] and carefully listen to the prompts so that you are directed to the right person  All voicemails are confidential   Jaymie Parekh all requests for admission clinical reviews, approved or denied determinations and any other requests to dedicated fax number below belonging to the campus where the patient is receiving treatment   List of dedicated fax numbers for the Facilities:  FACILITY NAME UR FAX NUMBER   ADMISSION DENIALS (Administrative/Medical Necessity) [phone]   PARENT CHILD HEALTH (Maternity/NICU/Pediatrics) [address] [facility] [facility] [address] [facility]  [phone]   Rosalind Beth [address] [facility] [place] [address] [id number] [place] [address] [address] [facility] [place] [address] [phone]

## 2022-04-29 VITALS
TEMPERATURE: 98 F | SYSTOLIC BLOOD PRESSURE: 107 MMHG | HEART RATE: 69 BPM | OXYGEN SATURATION: 90 % | DIASTOLIC BLOOD PRESSURE: 68 MMHG | RESPIRATION RATE: 18 BRPM

## 2022-04-29 PROBLEM — E87.5 HYPERKALEMIA: Status: RESOLVED | Noted: 2022-04-29 | Resolved: 2022-04-29

## 2022-04-29 PROBLEM — L08.9 DIABETIC INFECTION OF LEFT FOOT (HCC): Status: RESOLVED | Noted: 2022-04-26 | Resolved: 2022-04-29

## 2022-04-29 PROBLEM — E11.628 DIABETIC INFECTION OF LEFT FOOT (HCC): Status: RESOLVED | Noted: 2022-04-26 | Resolved: 2022-04-29

## 2022-04-29 PROBLEM — E87.6 HYPOKALEMIA: Status: RESOLVED | Noted: 2021-11-16 | Resolved: 2022-04-29

## 2022-04-29 PROBLEM — E87.5 HYPERKALEMIA: Status: ACTIVE | Noted: 2022-04-29

## 2022-04-29 LAB
ANION GAP SERPL CALCULATED.3IONS-SCNC: 7 MMOL/L (ref 4–13)
BUN SERPL-MCNC: 12 MG/DL (ref 5–25)
CALCIUM SERPL-MCNC: 8.6 MG/DL (ref 8.3–10.1)
CHLORIDE SERPL-SCNC: 104 MMOL/L (ref 100–108)
CO2 SERPL-SCNC: 26 MMOL/L (ref 21–32)
CREAT SERPL-MCNC: 0.94 MG/DL (ref 0.6–1.3)
DME PARACHUTE DELIVERY DATE REQUESTED: NORMAL
DME PARACHUTE ITEM DESCRIPTION: NORMAL
DME PARACHUTE ORDER STATUS: NORMAL
DME PARACHUTE SUPPLIER NAME: NORMAL
DME PARACHUTE SUPPLIER PHONE: NORMAL
ERYTHROCYTE [DISTWIDTH] IN BLOOD BY AUTOMATED COUNT: 13.7 % (ref 11.6–15.1)
GFR SERPL CREATININE-BSD FRML MDRD: 89 ML/MIN/1.73SQ M
GLUCOSE SERPL-MCNC: 115 MG/DL (ref 65–140)
GLUCOSE SERPL-MCNC: 84 MG/DL (ref 65–140)
GLUCOSE SERPL-MCNC: 87 MG/DL (ref 65–140)
GLUCOSE SERPL-MCNC: 96 MG/DL (ref 65–140)
HCT VFR BLD AUTO: 42.6 % (ref 36.5–49.3)
HGB BLD-MCNC: 13.3 G/DL (ref 12–17)
MCH RBC QN AUTO: 28.5 PG (ref 26.8–34.3)
MCHC RBC AUTO-ENTMCNC: 31.2 G/DL (ref 31.4–37.4)
MCV RBC AUTO: 91 FL (ref 82–98)
PLATELET # BLD AUTO: 252 THOUSANDS/UL (ref 149–390)
PMV BLD AUTO: 9.3 FL (ref 8.9–12.7)
POTASSIUM SERPL-SCNC: 4.1 MMOL/L (ref 3.5–5.3)
POTASSIUM SERPL-SCNC: 6.2 MMOL/L (ref 3.5–5.3)
RBC # BLD AUTO: 4.67 MILLION/UL (ref 3.88–5.62)
SODIUM SERPL-SCNC: 137 MMOL/L (ref 136–145)
WBC # BLD AUTO: 9.64 THOUSAND/UL (ref 4.31–10.16)

## 2022-04-29 PROCEDURE — 97163 PT EVAL HIGH COMPLEX 45 MIN: CPT

## 2022-04-29 PROCEDURE — 82948 REAGENT STRIP/BLOOD GLUCOSE: CPT

## 2022-04-29 PROCEDURE — 97116 GAIT TRAINING THERAPY: CPT

## 2022-04-29 PROCEDURE — 85027 COMPLETE CBC AUTOMATED: CPT | Performed by: STUDENT IN AN ORGANIZED HEALTH CARE EDUCATION/TRAINING PROGRAM

## 2022-04-29 PROCEDURE — 80048 BASIC METABOLIC PNL TOTAL CA: CPT | Performed by: STUDENT IN AN ORGANIZED HEALTH CARE EDUCATION/TRAINING PROGRAM

## 2022-04-29 PROCEDURE — 99252 IP/OBS CONSLTJ NEW/EST SF 35: CPT | Performed by: INTERNAL MEDICINE

## 2022-04-29 PROCEDURE — 84132 ASSAY OF SERUM POTASSIUM: CPT | Performed by: NURSE PRACTITIONER

## 2022-04-29 PROCEDURE — 97166 OT EVAL MOD COMPLEX 45 MIN: CPT

## 2022-04-29 RX ORDER — HYDROMORPHONE HCL/PF 1 MG/ML
1 SYRINGE (ML) INJECTION
Status: DISCONTINUED | OUTPATIENT
Start: 2022-04-29 | End: 2022-04-29 | Stop reason: HOSPADM

## 2022-04-29 RX ORDER — DOXYCYCLINE HYCLATE 100 MG/1
100 CAPSULE ORAL EVERY 12 HOURS SCHEDULED
Qty: 10 CAPSULE | Refills: 0 | Status: SHIPPED | OUTPATIENT
Start: 2022-04-29 | End: 2022-05-04

## 2022-04-29 RX ORDER — FUROSEMIDE 40 MG/1
40 TABLET ORAL 2 TIMES DAILY
Status: ON HOLD | COMMUNITY
End: 2022-06-02

## 2022-04-29 RX ORDER — OXYCODONE HYDROCHLORIDE 5 MG/1
5 TABLET ORAL EVERY 4 HOURS PRN
Qty: 24 TABLET | Refills: 0 | Status: SHIPPED | OUTPATIENT
Start: 2022-04-29 | End: 2022-05-02

## 2022-04-29 RX ORDER — ACETAMINOPHEN 325 MG/1
975 TABLET ORAL EVERY 8 HOURS SCHEDULED
Status: DISCONTINUED | OUTPATIENT
Start: 2022-04-29 | End: 2022-04-29 | Stop reason: HOSPADM

## 2022-04-29 RX ORDER — METOLAZONE 5 MG/1
2.5 TABLET ORAL 3 TIMES WEEKLY
Status: DISCONTINUED | OUTPATIENT
Start: 2022-04-29 | End: 2022-04-29

## 2022-04-29 RX ADMIN — OXYCODONE HYDROCHLORIDE 10 MG: 10 TABLET ORAL at 15:56

## 2022-04-29 RX ADMIN — GABAPENTIN 300 MG: 300 CAPSULE ORAL at 08:58

## 2022-04-29 RX ADMIN — PANTOPRAZOLE SODIUM 20 MG: 20 TABLET, DELAYED RELEASE ORAL at 05:24

## 2022-04-29 RX ADMIN — ACETAMINOPHEN 650 MG: 325 TABLET ORAL at 05:24

## 2022-04-29 RX ADMIN — OXYCODONE HYDROCHLORIDE 10 MG: 10 TABLET ORAL at 05:24

## 2022-04-29 RX ADMIN — Medication 250 MG: at 17:18

## 2022-04-29 RX ADMIN — SUCRALFATE 1 G: 1 TABLET ORAL at 11:06

## 2022-04-29 RX ADMIN — CEFAZOLIN SODIUM 2000 MG: 2 SOLUTION INTRAVENOUS at 14:42

## 2022-04-29 RX ADMIN — POLYETHYLENE GLYCOL 3350 17 G: 17 POWDER, FOR SOLUTION ORAL at 08:57

## 2022-04-29 RX ADMIN — ACETAMINOPHEN 975 MG: 325 TABLET ORAL at 14:39

## 2022-04-29 RX ADMIN — GABAPENTIN 300 MG: 300 CAPSULE ORAL at 15:57

## 2022-04-29 RX ADMIN — SUCRALFATE 1 G: 1 TABLET ORAL at 17:18

## 2022-04-29 RX ADMIN — OXYCODONE HYDROCHLORIDE 10 MG: 10 TABLET ORAL at 11:07

## 2022-04-29 RX ADMIN — ENOXAPARIN SODIUM 40 MG: 40 INJECTION SUBCUTANEOUS at 08:56

## 2022-04-29 RX ADMIN — CEFAZOLIN SODIUM 2000 MG: 2 SOLUTION INTRAVENOUS at 04:30

## 2022-04-29 RX ADMIN — SUCRALFATE 1 G: 1 TABLET ORAL at 08:58

## 2022-04-29 RX ADMIN — RIVAROXABAN 20 MG: 20 TABLET, FILM COATED ORAL at 15:56

## 2022-04-29 RX ADMIN — Medication 250 MG: at 08:58

## 2022-04-29 NOTE — PROGRESS NOTES
Podiatry - Progress Note  Patient: Jeane Ward 60 y o  male   MRN: [id number]  PCP: Robb Lazo DO  Unit/Bed#: E5 -42 Encounter: [id number]  Date Of Visit: 22    ASSESSMENT:    Jeane Ward is a 60 y o  male with:    1  Left hallux diabetic ulcer        S/p transmetatarsal amputation DOS:22  2  Left foot cellulitis due to #1  3  T2DM (last HbA1c 3/1/22; 5 9%)  4  HTN  5  A-fib  6  DAYAN        PLAN:    · Post operative dressing consisting of Prevena incisional wound vac and dsd were left intact  Pt is stable for discharge per Podiatry view point pending PT/OT recommendations  Dressing to remain intact until 5/3  · F/u PT OT recomendations  · Elevation and offloading on green foam wedges or pillows when non-ambulatory  · Appreciate consulting services for recommendations and management  Antibiotics started: Ancef  Pharmacologic VTE Prophylaxis: Enoxaparin (Lovenox)   Mechanical VTE Prophylaxis: sequential compression device   Weightbearing status: NWB LLE    Disposition:  Patient requires continued stay for above reason  SUBJECTIVE:     The patient was seen, evaluated, and assessed at bedside today  The patient was awake, alert, and in no acute distress  No acute events overnight  The patient reports little to no pain to sx site  Patient denies N/V/F/chills/SOB/CP  OBJECTIVE:     Vitals:   /56 (BP Location: Right arm)   Pulse 69   Temp 98 °F (36 7 °C) (Oral)   Resp 18   SpO2 90%     Temp (24hrs), Av 3 °F (36 3 °C), Min:96 7 °F (35 9 °C), Max:98 °F (36 7 °C)      Physical Exam:     General: Alert, cooperative and no distress  Lungs: Non labored breathing  Abdomen: Soft, non-tender  Lower extremity exam:  Cardiovascular status at baseline  Neurological status at baseline  Musculoskeletal status at baseline  No calf tenderness noted  dressinsg are dry, clean and intact  Wound vac is working without a problem            Additional Data:     Labs:    Results from last 7 days   Lab Units 04/29/22  0538 04/28/22  0602 04/26/22  2101   WBC Thousand/uL 9 64   < > 8 13   HEMOGLOBIN g/dL 13 3   < > 15 1   HEMATOCRIT % 42 6   < > 45 5   PLATELETS Thousands/uL 252   < > 241   NEUTROS PCT %  --   --  59   LYMPHS PCT %  --   --  27   MONOS PCT %  --   --  12   EOS PCT %  --   --  1    < > = values in this interval not displayed  Results from last 7 days   Lab Units 04/29/22  0509 04/27/22  0549 04/26/22  2101   POTASSIUM mmol/L 6 2*   < > 3 0*   CHLORIDE mmol/L 104   < > 96*   CO2 mmol/L 26   < > 36*   BUN mg/dL 12   < > 15   CREATININE mg/dL 0 94   < > 0 94   CALCIUM mg/dL 8 6   < > 8 8   ALK PHOS U/L  --   --  91   ALT U/L  --   --  28   AST U/L  --   --  32    < > = values in this interval not displayed  * I Have Reviewed All Lab Data Listed Above  Recent Cultures (last 7 days):     Results from last 7 days   Lab Units 04/26/22  2202   BLOOD CULTURE  No Growth at 48 hrs  No Growth at 48 hrs  Imaging: I have personally reviewed pertinent films in PACS  EKG, Pathology, and Other Studies: I have personally reviewed pertinent reports  ** Please Note: Portions of the record may have been created with voice recognition software  Occasional wrong word or "sound a like" substitutions may have occurred due to the inherent limitations of voice recognition software  Read the chart carefully and recognize, using context, where substitutions have occurred   **

## 2022-04-29 NOTE — DISCHARGE SUMMARY
Discharge Summary -   Elsy Rodriguez 60 y o  male MRN: [id number]  Unit/Bed#: E5 -01 Encounter: [id number]    Admission Date: 4/26/2022     Admitting Diagnosis: Cellulitis of great toe, right [L03 [id number]]    HPI: "Elsy Rodriguez is a 60 y o  male with pmh of T2DM, HTN, CHF, A-fib, Anxiety, and DAYAN who presents with a left hallux wound with associated, redness, swelling, and pain  He reports he has had the wound for about 1 week  He saw his podiatrist Dr Alvarado Magallanes yesterday and had the wound debrided and was instructed to go to the hospital for the infection  He has a history of multiple toe amputations to B/L feet related to diabetic foot ulcers  He denies nausea, vomiting, diarrhea, chills, shortness of breath, chest pain "    Procedures Performed: LEFT TRANSMETATARSAL AMPUTATION :     Hospital Course:  Was admitted on 04/26/2022 and started on IV antibiotics  Medicine was consulted for preoperative risk assessment once the patient was deemed acceptable risk for surgery the decision was made to perform a transmetatarsal amputation given previous digital amputations to the foot  The patient underwent left transmetatarsal amputation with an incisional VAC  Postoperatively physical therapy and occupational therapy were consulted and the patient was deemed acceptable for discharge home with home health care  Case Management was coordinated with for visiting nurses to provide dressing changes at home as well  The patient was discharged with a rolling walker to remain nonweightbearing to his left extremity, as well as doxycycline for antibiotics and Xarelto for anticoagulation  Significant Findings, Care, Treatment and Services Provided:   - Patient underwent left transmetatarsal amputation which she was deemed acceptable risk for medicine   - an incisional VAC was placed over the amputation site and patient was discharged with the [facility] intact    - will have home health and home PT upon discharge  Complications: None  Discharge Diagnosis: Cellulitis of great toe, right [L03 [id number]]    Condition at Discharge: stable     Discharge instructions/Information to patient and family:   See after visit summary for information provided to patient and family  Provisions for Follow-Up Care/Important appointments:  See after visit summary for information related to follow-up care and any pertinent home health orders  Disposition: Home    Planned Readmission: No    Discharge Statement   I spent 30 minutes discharging the patient  This time was spent on the day of discharge  I had direct contact with the patient on the day of discharge  The details of this patient's discharge     Discharge Medications:  See after visit summary for reconciled discharge medications provided to patient and family

## 2022-04-29 NOTE — PLAN OF CARE
Problem: PHYSICAL THERAPY ADULT  Goal: Performs mobility at highest level of function for planned discharge setting  See evaluation for individualized goals  Description: Treatment/Interventions: Functional transfer training,Elevations,Therapeutic exercise,Endurance training,Patient/family training,Equipment eval/education,Bed mobility,Gait training,Compensatory technique education,Continued evaluation,Spoke to nursing,OT,Spoke to case management  Equipment Recommended: Tai Tomas) Walker       See flowsheet documentation for full assessment, interventions and recommendations  Outcome: Progressing  Note: Prognosis: Good  Problem List: Decreased endurance,Impaired balance,Decreased mobility,Impaired sensation,Obesity,Decreased skin integrity,Orthopedic restrictions,Pain  Assessment: Antonio Bethea is a 60 y o  male who is originally admitted to the podiatry service due to left foot osteomyelitis requiring TMA  PT consulted for mobility training  NWB  Prior to admission independent without AD use   + working, driving  No hx of falls, no DME  Currently presents with functional limitations related to NWB status, decreased functional mobility, balance, activity tolerance and locomotion related to same  [place] for transfers and ambulation using Nils RW  Hop to pattern  Able to maintain NWB compliance 100% of the time  Risk for falls given NWB status  Cues to slow pace with RW needed  Given impairments will benefit from continued PT in order to optimize functional outcomes and compliance with NWB status  Will need Nils RW at d/c  HHPT for continued mobility training  The patient's -[facility] Basic Mobility Inpatient Short Form Raw Score is 17  A Raw score of greater than 16 suggests the patient may benefit from discharge to home  Please also refer to the recommendation of the Physical Therapist for safe discharge planning  HHPT  See treatment note to initiate stair training     Barriers to Discharge: Inaccessible home environment  Barriers to Discharge Comments: CHRISS, stairs to 2nd floor     PT Discharge Recommendation: Home with home health rehabilitation          See flowsheet documentation for full assessment

## 2022-04-29 NOTE — OCCUPATIONAL THERAPY NOTE
Occupational Therapy Evaluation     Patient Name: Estela TAVERAS Date: 4/29/2022  Problem List  Active Problems:    DAYAN (obstructive sleep apnea)    Chronic diastolic heart failure ([facility] )    Hypertension    Morbid obesity ([facility] )    Atrial fibrillation ([facility] )    Pacemaker    History of bariatric surgery    Past Medical History  Past Medical History:   Diagnosis Date    Atrial fibrillation ([facility] )     Chronic diastolic (congestive) heart failure ([facility] [facility] )     Diabetes mellitus ([facility] [facility] )     High cholesterol     Hyperlipidemia     Hypertension     Pacemaker     Stroke [facility])      Past Surgical History  Past Surgical History:   Procedure Laterality Date    APPENDECTOMY      ATRIAL CARDIAC PACEMAKER INSERTION      BARIATRIC SURGERY  05/2021    FL GUIDED NEEDLE PLAC BX/ASP/INJ  3/22/2022    FOOT AMPUTATION Left 4/28/2022    Procedure: LEFT TRANSMETATARSAL AMPUTATION ;  Surgeon: Donna Mclean DPM;  [facility];  Service: Podiatry    NERVE BLOCK Right 2/10/2022    Procedure: BLOCK MEDIAL BRANCH C3, C4, C5 #1;  Surgeon: Pardeep Zayas MD;  [facility];  Service: Pain Management     NERVE BLOCK Right 3/22/2022    Procedure: BLOCK MEDIAL BRANCH C3, C4, C5 #2;  Surgeon: Pardeep Zayas MD;  [facility];  Service: Pain Management     NJ AMPUTATION TOE,I-P JT Left 11/16/2021    Procedure: AMPUTATION LESSER TOE;  Surgeon: Sawyer uMniz DPM;  [facility];  Service: Podiatry    RADIOFREQUENCY ABLATION Right 4/7/2022    Procedure: Right C3, C4, C5 RFA;  Surgeon: Pardeep Zayas MD;  [facility];  Service: Pain Management     TOE AMPUTATION Left     2nd toe    TOE AMPUTATION Left 9/15/2021    Procedure: AMPUTATION LEFT 4TH TOE;  Surgeon: Sawyer Muniz DPM;  [facility];  Service: Podiatry    TOE AMPUTATION Right 1/12/2022    Procedure: AMPUTATION TOE;  Surgeon: Lukas Baugh DPM;  [facility];  Service: Podiatry    TOE AMPUTATION Right 2/23/2022    Procedure: AMPUTATION TOE  RIGHT SECOND;  Surgeon: Chance Ramirez DPM;  Location: [address] [place] [address] MAIN OR;  Service: Podiatry           04/29/22 1123   OT Last Visit   OT Visit Date 04/29/22   Note Type   Note type Evaluation   Restrictions/Precautions   Weight Bearing Precautions Per Order Yes   LLE Weight Bearing Per Order NWB   Other Precautions Fall Risk;Pain;WBS;Multiple lines  (wound vac, hilario)   Pain Assessment   Pain Assessment Tool 0-10   Pain Score 7   Pain Location/Orientation Orientation: Left; Location: Foot   Patient's Stated Pain Goal No pain   Hospital Pain Intervention(s) Repositioned; Ambulation/increased activity; Emotional support;Rest;Elevated   Multiple Pain Sites No   Home Living   Type of Home House   Home Layout Two level;Bed/bath upstairs; Able to live on main level with bedroom/bathroom  (1 CHRISS; Full bath on 1st, FOS to 2nd floor bedroom)   Bathroom Shower/Tub Tub/shower unit   Bathroom Toilet Standard   Bathroom Equipment   (Denies DME)   [address]   (Denies DME)   Additional Comments Pt lives with spouse in a two level house with 1 CHRISS and FOS to 2nd floor bedroom  Pt has full bath on 1st floor, can stay on 1st floor if needed at D/C  Spouse works, (+) home alone at times  Prior Function   Level of [place] Independent with ADLs and functional mobility   Lives With Spouse   Receives Help From Family   ADL Assistance Independent   IADLs Needs assistance  (shares w/ spouse)   Falls in the last 6 months 0   Vocational Full time employment   Comments At baseline, pt was I w/ ADLs and functional transfers/mobility w/o use of AD  Pt and spouse share IADLs  (+)   FT employed  Denies falls PTA  Lifestyle   Autonomy At baseline, pt was I w/ ADLs and functional transfers/mobility w/o use of AD  Pt and spouse share IADLs  (+)   FT employed  Denies falls PTA     Reciprocal Relationships Spouse, son   Service to Others 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 shift    Intrinsic Gratification Hiking   Psychosocial   Psychosocial (WDL) WDL   ADL   Where Assessed Edge of bed   Eating Assistance 7  Independent   Grooming Assistance 7  Independent   UB Bathing Assistance 5  Supervision/Setup   LB Bathing Assistance 4  Minimal Assistance   UB Dressing Assistance 5  Supervision/Setup   LB Dressing Assistance 4  Minimal [address]  4  Minimal [address] 4  Minimal Assistance   Bed Mobility   Supine to Sit 5  Supervision   Additional items HOB elevated; Bedrails; Increased time required;Verbal cues   Sit to Supine 5  Supervision   Additional items Increased time required;Verbal cues   Additional Comments Pt lying supine at end of session  Call bell and phone within reach  All needs met and pt reports no further questions for OT at this time  Transfers   Sit to Stand 4  Minimal assistance   Additional items Assist x 1; Increased time required;Verbal cues   Stand to Sit 4  Minimal assistance   Additional items Assist x 1; Increased time required;Verbal cues   Additional Comments Cues for safe technique and hand placement  Good compliance to NWB L LE   Functional Mobility   Functional Mobility 4  Minimal assistance   Additional Comments Assist x1 for balance/steadying w/ use of RW  Good compliance to NWB L LE  Assist for wound vac line management   Additional items Rolling walker  (Bariatric RW)   Balance   Static Sitting Fair +   Dynamic Sitting Fair   Static Standing Fair -   Dynamic Standing Poor +   Ambulatory Poor +   Activity Tolerance   Activity Tolerance Patient tolerated treatment well; Other (Comment); Patient limited by pain  [facility])   Medical Staff Made Aware CHOLO Aggarwal; Angella Beckman DPM   Nurse Made Aware yes;  ANNETTE Alegre   RUE Assessment   RUE Assessment WFL   RUE Strength   RUE Overall Strength Within Functional Limits - strength 5/5   LUE Assessment   LUE Assessment WFL   LUE Strength   LUE Overall Strength Within Functional Limits - strength 5/5   Hand Function   Gross Motor Coordination Functional   Fine Motor Coordination Functional   Sensation   Light Touch Partial deficits in the RLE;Partial deficits in the LLE   Proprioception   Proprioception No apparent deficits   Vision-Basic Assessment   Current Vision Wears glasses only for reading   Vision - Complex Assessment   Ocular Range of Motion [facility]   Acuity Able to read clock/calendar on wall without difficulty; Able to read employee name badge without difficulty   Perception   Inattention/Neglect Appears intact   Cognition   Overall Cognitive Status [facility]   Arousal/Participation Alert; Cooperative   Attention Within functional limits   Orientation Level Oriented X4   Memory Within functional limits   Following Commands Follows all commands and directions without difficulty   Comments Pleasant and cooperative  Engages in conversation appropriately   Assessment   Limitation Decreased ADL status; Decreased endurance;Decreased self-care trans;Decreased high-level ADLs  (WBS, pain)   Prognosis Good   Assessment Pt is a 60 y o  male seen for OT evaluation s/p adm to [address] on 4/26/2022 w/ left hallux wound with associated, redness, swelling, and pain  Pt now s/p L TMA on 4/48/22  NWB L LE  Comorbidities affecting pts functional performance include a significant PMH of A-Fib, Chronic diastolic CHF, DM, HLD, HTN, Pacemaker, and Stroke  Pt with active OT orders and activity orders  Pt lives with spouse in a two level house with 1 CHRISS and FOS to 2nd floor bedroom  Pt has full bath on 1st floor, can stay on 1st floor if needed at D/C  Spouse works, (+) home alone at times  At baseline, pt was I w/ ADLs and functional transfers/mobility w/o use of AD  Pt and spouse share IADLs  (+)   FT employed  Denies falls PTA   Upon evaluation, pt currently requires Supervision for UB ADLs, Min A for LB ADLs, Min A for toileting, Supervision for bed mobility, and Min A for functional mobility/transfers 2* the following deficits impacting occupational performance: decreased balance, decreased tolerance, impaired sensation and increased pain  These impairments, as well at pts WBS, fall risk, steps to enter environment, limited home support, difficulty performing ADLS and difficulty performing IADLS  limit pts ability to safely engage in all baseline areas of occupation  Based on the aforementioned OT evaluation, functional performance deficits, and assessments, pt has been identified as a Moderate complexity evaluation  Pt to continue to benefit from continued acute OT services during hospital stay to address defined deficits and to maximize level of functional independence in the following Occupational Performance areas: bathing/shower, toilet hygiene, dressing, health maintenance, functional mobility, community mobility, clothing management, meal prep and household maintenance  From OT standpoint, recommend Home w/ social support and continued PT upon D/C  OT will continue to follow pt 2-3x/wk to address the following goals to  w/in 10-14 days:   Goals   Patient Goals To go home today   LTG Time Frame 10-14   Long Term Goal Please refer to LTGs listed below   Plan   Treatment Interventions ADL retraining;Functional transfer training; Endurance training;Patient/family training;Equipment evaluation/education; Compensatory technique education; Activityengagement   Goal Expiration Date 22   OT Treatment Day 0   OT Frequency 2-3x/wk   Recommendation   OT Discharge Recommendation No rehabilitation needs  (Home w/ social support and continued PT)   OT - OK to Discharge Yes  (when medically cleared)   Additional Comments  The patient's raw score on the AM-PAC Daily Activity inpatient short form is 20, standardized score is 42 03, greater than 39 4  Patients at this level are likely to benefit from discharge to home   Please refer to the recommendation of the Occupational Therapist for safe discharge planning  AM-PAC Daily Activity Inpatient   Lower Body Dressing 3   Bathing 3   Toileting 3   Upper Body Dressing 3   Grooming 4   Eating 4   Daily Activity Raw Score 20   Daily Activity Standardized Score (Calc for Raw Score >=11) 42 03   AM-PAC Applied Cognition Inpatient   Following a Speech/Presentation 4   Understanding Ordinary Conversation 4   Taking Medications 4   Remembering Where Things Are Placed or Put Away 4   Remembering List of 4-5 Errands 4   Taking Care of Complicated Tasks 4   Applied Cognition Raw Score 24   Applied Cognition Standardized Score 62 21       GOALS    1  Pt will improve activity tolerance to G for min 30 min txment sessions for increase engagement in functional tasks    2  Pt will complete bed mobility at a Mod I level w/ G balance/safety demonstrated to decrease caregiver assistance required     3  Pt will complete UB dressing/self care w/ mod I using adaptive device and DME as needed     4  Pt will complete LB dressing/self care w/ mod I using adaptive device and DME as needed    5  Pt will complete toileting w/ mod I w/ G hygiene/thoroughness using DME as needed    6  Pt will improve functional transfers to Mod I on/off all surfaces using DME as needed w/ G balance/safety     7  Pt will improve functional mobility during ADL/IADL/leisure tasks to Mod I using DME as needed w/ G balance/safety     8  Pt will demonstrate G carryover of pt/caregiver education and training as appropriate w/o cues w/ good tolerance to increase safety during functional tasks    9  Pt will verbalize 3 potential fall hazards and identify appropriate compensatory techniques to decrease fall risk in home environment     10  Pt will increase standing tolerance to 8-10 mins with Fair+ dynamic standing balance to increase safety during participation in ADLs     11   Pt will demonstrate 100% adherence to WBS during all functional activities w/o cues from therapist         Mik Turner OTR/L

## 2022-04-29 NOTE — PLAN OF CARE
Problem: PAIN - ADULT  Goal: Verbalizes/displays adequate comfort level or baseline comfort level  Description: Interventions:  - Encourage patient to monitor pain and request assistance  - Assess pain using appropriate pain scale  - Administer analgesics based on type and severity of pain and evaluate response  - Implement non-pharmacological measures as appropriate and evaluate response  - Consider cultural and social influences on pain and pain management  - Notify physician/advanced practitioner if interventions unsuccessful or patient reports new pain  Outcome: Progressing     Problem: INFECTION - ADULT  Goal: Absence or prevention of progression during hospitalization  Description: INTERVENTIONS:  - Assess and monitor for signs and symptoms of infection  - Monitor lab/diagnostic results  - Monitor all insertion sites, i e  indwelling lines, tubes, and drains  - Monitor endotracheal if appropriate and nasal secretions for changes in amount and color  - Hoven appropriate cooling/warming therapies per order  - Administer medications as ordered  - Instruct and encourage patient and family to use good hand hygiene technique  - Identify and instruct in appropriate isolation precautions for identified infection/condition  Outcome: Progressing  Goal: Absence of fever/infection during neutropenic period  Description: INTERVENTIONS:  - Monitor WBC    Outcome: Progressing     Problem: SAFETY ADULT  Goal: Patient will remain free of falls  Description: INTERVENTIONS:  - Educate patient/family on patient safety including physical limitations  - Instruct patient to call for assistance with activity   - Consult OT/PT to assist with strengthening/mobility   - Keep Call bell within reach  - Keep bed low and locked with side rails adjusted as appropriate  - Keep care items and personal belongings within reach  - Initiate and maintain comfort rounds  - Make Fall Risk Sign visible to staff  - Offer Toileting every 2 Hours, in advance of need  - Obtain necessary fall risk management equipment  - Apply yellow socks and bracelet for high fall risk patients  - Consider moving patient to room near nurses station  Outcome: Progressing  Goal: Maintain or return to baseline ADL function  Description: INTERVENTIONS:  -  Assess patient's ability to carry out ADLs; assess patient's baseline for ADL function and identify physical deficits which impact ability to perform ADLs (bathing, care of mouth/teeth, toileting, grooming, dressing, etc )  - Assess/evaluate cause of self-care deficits   - Assess range of motion  - Assess patient's mobility; develop plan if impaired  - Assess patient's need for assistive devices and provide as appropriate  - Encourage maximum independence but intervene and supervise when necessary  - Involve family in performance of ADLs  - Assess for home care needs following discharge   - Consider OT consult to assist with ADL evaluation and planning for discharge  - Provide patient education as appropriate  Outcome: Progressing  Goal: Maintains/Returns to pre admission functional level  Description: INTERVENTIONS:  - Perform BMAT or MOVE assessment daily    - Set and communicate daily mobility goal to care team and patient/family/caregiver  - Collaborate with rehabilitation services on mobility goals if consulted  - Perform Range of Motion 3 times a day  - Reposition patient every 2 hours    - Dangle patient 3 times a day  - Stand patient 3 times a day  - Ambulate patient 3 times a day  - Out of bed to chair 3 times a day   - Out of bed for meals 3 times a day  - Out of bed for toileting  - Record patient progress and toleration of activity level   Outcome: Progressing     Problem: DISCHARGE PLANNING  Goal: Discharge to home or other facility with appropriate resources  Description: INTERVENTIONS:  - Identify barriers to discharge w/patient and caregiver  - Arrange for needed discharge resources and transportation as appropriate  - Identify discharge learning needs (meds, wound care, etc )  - Arrange for interpretive services to assist at discharge as needed  - Refer to Case Management Department for coordinating discharge planning if the patient needs post-hospital services based on physician/advanced practitioner order or complex needs related to functional status, cognitive ability, or social support system  Outcome: Progressing     Problem: Knowledge Deficit  Goal: Patient/family/caregiver demonstrates understanding of disease process, treatment plan, medications, and discharge instructions  Description: Complete learning assessment and assess knowledge base    Interventions:  - Provide teaching at level of understanding  - Provide teaching via preferred learning methods  Outcome: Progressing     Problem: Potential for Falls  Goal: Patient will remain free of falls  Description: INTERVENTIONS:  - Educate patient/family on patient safety including physical limitations  - Instruct patient to call for assistance with activity   - Consult OT/PT to assist with strengthening/mobility   - Keep Call bell within reach  - Keep bed low and locked with side rails adjusted as appropriate  - Keep care items and personal belongings within reach  - Initiate and maintain comfort rounds  - Make Fall Risk Sign visible to staff  - Offer Toileting every 2 Hours, in advance of need  - Obtain necessary fall risk management equipment  - Apply yellow socks and bracelet for high fall risk patients  - Consider moving patient to room near nurses station  Outcome: Progressing

## 2022-04-29 NOTE — ASSESSMENT & PLAN NOTE
· Noted to have a K of 6 8 on am labs- suspect erroneous value- repeat stat K level   · Check EKG   · Add tele- if K returns normal d/c tele

## 2022-04-29 NOTE — CASE MANAGEMENT
Case Management Discharge Planning Note    Patient name Brittany Gold  Location [address] [address] [facility] [address] MRN [id number]  : 1965 Date 2022       Current Admission Date: 2022  Current Admission Diagnosis:Atrial fibrillation [facility])   Patient Active Problem List    Diagnosis Date Noted    Pacemaker 2022    History of bariatric surgery 2022    Encounter for perioperative consultation 2022    Cervical spondylosis 2022    Cervicalgia - Right 2022    Toe osteomyelitis, right ([facility] ) 2022    Type 2 diabetes mellitus with diabetic polyneuropathy, with long-term current use of insulin ([facility] ) 2022    Anxiety 2021    Atrial fibrillation ([facility] )     Chronic osteomyelitis of left foot with draining sinus ([facility] ) 2021    Pain, joint, ankle and foot, left 2021    DAYAN (obstructive sleep apnea) 2020    Chronic diastolic heart failure ([facility] ) 2020    Hypertension 2020    Diabetes mellitus ([facility] ) 2020    Morbid obesity ([facility] ) 2020      LOS (days): 3  Geometric Mean LOS (GMLOS) (days):   Days to GMLOS:     OBJECTIVE:  Risk of Unplanned Readmission Score: 17         Current admission status: Inpatient   Preferred Pharmacy:   [address] [facility], [address] [address]  Phone: [phone] Fax: [phone]    Primary Care Provider: Mendel Anguiano DO    Primary Insurance: [employer] Insurance:     DISCHARGE DETAILS:    Discharge planning discussed with[de-identified] patient  Freedom of Choice: Yes  Comments - Freedom of Choice: Pt has used [facility] at home in the past and would like to use them again                       Requested  [employer] [address]         Is the patient interested in [address] at discharge?: Yes  [address] requested[de-identified] Jed Nick Name[de-identified] [address] [facility] Provider[de-identified] Referring Provider  Home Health Services Needed[de-identified] Wound/Ostomy Care,Strengthening/Theraputic Exercises to Improve Function,Post-Op Care and Assessment,Gait/ADL Training  Homebound Criteria Met[de-identified] Uses an Assist Device (i e  cane, walker, etc)  Supporting Clincal Findings[de-identified] Limited Endurance,Fatigues Easliy in Short Distances    DME Referral Provided  Referral made for DME?: Yes  DME referral completed for the following items[de-identified] Cleavon Gone  DME Supplier Name[de-identified] [facility]    Other Referral/Resources/Interventions Provided:  Interventions: HHC,DME         Treatment Team Recommendation: Home with [employer]  Discharge Destination Plan[de-identified] Home with [employer]

## 2022-04-29 NOTE — UTILIZATION REVIEW
Continued Stay Review    Date:     4/29/22                          Current Patient Class:    Inpatient  Current Level of Care:    Med surg    HPI:56 y o  male initially admitted on     4/26   With    Infected left hallux diabetic ulcer with cellulitis    SURGERY DATE: 4/28/2022  Procedure(s) (LRB):  LEFT TRANSMETATARSAL AMPUTATION  (Left) with application of prevena incisional wound vac  Assessment/Plan:   4/29   POD   # 1  Continue post op care  Continue  PT/OT  Continue pain control as needed  Denies operative pain at present  Remains  NWB  LLE  Wound  VAC  Intact  LLE        Vital Signs:   98 °F (36 7 °C) 69 18 114/56 75 90 % -- -- -- --         Pertinent Labs/Diagnostic Results:   Results from last 7 days   Lab Units 04/27/22 2057   SARS-COV-2  Negative     Results from last 7 days   Lab Units 04/29/22  0538 04/28/22  0602 04/26/22  2101   WBC Thousand/uL 9 64 8 07 8 13   HEMOGLOBIN g/dL 13 3 14 6 15 1   HEMATOCRIT % 42 6 45 6 45 5   PLATELETS Thousands/uL 252 258 241   NEUTROS ABS Thousands/µL  --   --  4 75         Results from last 7 days   Lab Units 04/29/22  0959 04/29/22  0509 04/28/22  0602 04/27/22  0549 04/26/22  2101   SODIUM mmol/L  --  137 142 139 137   POTASSIUM mmol/L 4 1 6 2* 3 4* 2 9* 3 0*   CHLORIDE mmol/L  --  104 102 97* 96*   CO2 mmol/L  --  26 31 33* 36*   ANION GAP mmol/L  --  7 9 9 5   BUN mg/dL  --  12 11 15 15   CREATININE mg/dL  --  0 94 0 93 1 16 0 94   EGFR ml/min/1 73sq m  --  89 90 69 89   CALCIUM mg/dL  --  8 6 8 7 8 7 8 8     Results from last 7 days   Lab Units 04/26/22  2101   AST U/L 32   ALT U/L 28   ALK PHOS U/L 91   TOTAL PROTEIN g/dL 7 6   ALBUMIN g/dL 3 5   TOTAL BILIRUBIN mg/dL 0 67     Results from last 7 days   Lab Units 04/29/22  1124 04/29/22  0722 04/28/22  2251 04/28/22  1548 04/28/22  1250 04/28/22  1137 04/28/22  0730   POC GLUCOSE mg/dl 87 115 104 115 98 89 105     Results from last 7 days   Lab Units 04/29/22  0509 04/28/22  0602 04/27/22  0549 04/26/22  2101   GLUCOSE RANDOM mg/dL 84 97 97 99               Results from last 7 days   Lab Units 04/27/22  2057   INFLUENZA A PCR  Negative   INFLUENZA B PCR  Negative   RSV PCR  Negative                             Results from last 7 days   Lab Units 04/26/22  2202   BLOOD CULTURE  No Growth at 48 hrs  No Growth at 48 hrs  Medications:   Scheduled Medications:  acetaminophen, 975 mg, Oral, Q8H [facility]  cefazolin, 2,000 mg, Intravenous, Q8H  furosemide, 60 mg, Oral, BID (diuretic)  gabapentin, 300 mg, Oral, TID  insulin lispro, 2-12 Units, Subcutaneous, TID AC  pantoprazole, 20 mg, Oral, Early Morning  rivaroxaban, 20 mg, Oral, Daily With Dinner  saccharomyces boulardii, 250 mg, Oral, BID  sucralfate, 1 g, Oral, 4x Daily      Continuous IV Infusions:     PRN Meds:  HYDROmorphone, 1 mg, Intravenous, Q3H PRN  LORazepam, 1 mg, Oral, TID PRN  naloxone, 0 04 mg, Intravenous, Q1MIN PRN  ondansetron, 4 mg, Intravenous, Q6H PRN  oxyCODONE, 10 mg, Oral, Q4H PRN  oxyCODONE, 5 mg, Oral, Q4H PRN  polyethylene glycol, 17 g, Oral, Daily PRN        Discharge Plan:    [facility]    Network Utilization Review Department  ATTENTION: Please call with any questions or concerns to [phone] and carefully listen to the prompts so that you are directed to the right person  All voicemails are confidential   Ruben Los all requests for admission clinical reviews, approved or denied determinations and any other requests to dedicated fax number below belonging to the campus where the patient is receiving treatment   List of dedicated fax numbers for the Facilities:  [address] [place] [address] DENIALS (Administrative/Medical Necessity) [phone]   [address] (Maternity/NICU/Pediatrics) [phone]   [address] [address] Hank Beth [address] [phone] Eric Geiger [facility] [address] [id number] [place] [address] Dheeraj Gray 1481 [address] [facility] [phone]

## 2022-04-29 NOTE — PLAN OF CARE
Problem: PAIN - ADULT  Goal: Verbalizes/displays adequate comfort level or baseline comfort level  Description: Interventions:  - Encourage patient to monitor pain and request assistance  - Assess pain using appropriate pain scale  - Administer analgesics based on type and severity of pain and evaluate response  - Implement non-pharmacological measures as appropriate and evaluate response  - Consider cultural and social influences on pain and pain management  - Notify physician/advanced practitioner if interventions unsuccessful or patient reports new pain  Outcome: Progressing     Problem: INFECTION - ADULT  Goal: Absence or prevention of progression during hospitalization  Description: INTERVENTIONS:  - Assess and monitor for signs and symptoms of infection  - Monitor lab/diagnostic results  - Monitor all insertion sites, i e  indwelling lines, tubes, and drains  - Monitor endotracheal if appropriate and nasal secretions for changes in amount and color  - [place] appropriate cooling/warming therapies per order  - Administer medications as ordered  - Instruct and encourage patient and family to use good hand hygiene technique  - Identify and instruct in appropriate isolation precautions for identified infection/condition  Outcome: Progressing  Goal: Absence of fever/infection during neutropenic period  Description: INTERVENTIONS:  - Monitor WBC    Outcome: Progressing     Problem: SAFETY ADULT  Goal: Patient will remain free of falls  Description: INTERVENTIONS:  - Educate patient/family on patient safety including physical limitations  - Instruct patient to call for assistance with activity   - Consult OT/PT to assist with strengthening/mobility   - Keep Call bell within reach  - Keep bed low and locked with side rails adjusted as appropriate  - Keep care items and personal belongings within reach  - Initiate and maintain comfort rounds  - Make Fall Risk Sign visible to staff  - Offer Toileting every 2 Hours, in advance of need  - Obtain necessary fall risk management equipment  - Apply yellow socks and bracelet for high fall risk patients  - Consider moving patient to room near nurses station  Outcome: Progressing  Goal: Maintain or return to baseline ADL function  Description: INTERVENTIONS:  -  Assess patient's ability to carry out ADLs; assess patient's baseline for ADL function and identify physical deficits which impact ability to perform ADLs (bathing, care of mouth/teeth, toileting, grooming, dressing, etc )  - Assess/evaluate cause of self-care deficits   - Assess range of motion  - Assess patient's mobility; develop plan if impaired  - Assess patient's need for assistive devices and provide as appropriate  - Encourage maximum independence but intervene and supervise when necessary  - Involve family in performance of ADLs  - Assess for home care needs following discharge   - Consider OT consult to assist with ADL evaluation and planning for discharge  - Provide patient education as appropriate  Outcome: Progressing  Goal: Maintains/Returns to pre admission functional level  Description: INTERVENTIONS:  - Perform BMAT or MOVE assessment daily    - Set and communicate daily mobility goal to care team and patient/family/caregiver  - Collaborate with rehabilitation services on mobility goals if consulted  - Perform Range of Motion 3 times a day  - Reposition patient every 2 hours    - Dangle patient 3 times a day  - Stand patient 3 times a day  - Ambulate patient 3 times a day  - Out of bed to chair 3 times a day   - Out of bed for meals 3 times a day  - Out of bed for toileting  - Record patient progress and toleration of activity level   Outcome: Progressing     Problem: DISCHARGE PLANNING  Goal: Discharge to home or other facility with appropriate resources  Description: INTERVENTIONS:  - Identify barriers to discharge w/patient and caregiver  - Arrange for needed discharge resources and transportation as appropriate  - Identify discharge learning needs (meds, wound care, etc )  - Arrange for interpretive services to assist at discharge as needed  - Refer to Case Management Department for coordinating discharge planning if the patient needs post-hospital services based on physician/advanced practitioner order or complex needs related to functional status, cognitive ability, or social support system  Outcome: Progressing     Problem: Knowledge Deficit  Goal: Patient/family/caregiver demonstrates understanding of disease process, treatment plan, medications, and discharge instructions  Description: Complete learning assessment and assess knowledge base    Interventions:  - Provide teaching at level of understanding  - Provide teaching via preferred learning methods  Outcome: Progressing     Problem: Potential for Falls  Goal: Patient will remain free of falls  Description: INTERVENTIONS:  - Educate patient/family on patient safety including physical limitations  - Instruct patient to call for assistance with activity   - Consult OT/PT to assist with strengthening/mobility   - Keep Call bell within reach  - Keep bed low and locked with side rails adjusted as appropriate  - Keep care items and personal belongings within reach  - Initiate and maintain comfort rounds  - Make Fall Risk Sign visible to staff  - Offer Toileting every 2 Hours, in advance of need  - Obtain necessary fall risk management equipment  - Apply yellow socks and bracelet for high fall risk patients  - Consider moving patient to room near nurses station  Outcome: Progressing

## 2022-04-29 NOTE — ASSESSMENT & PLAN NOTE
Lab Results   Component Value Date    HGBA1C 5 9 (H) 03/01/2022       Recent Labs     04/28/22  1548 04/28/22  2251 04/29/22  0722 04/29/22  1124   POCGLU 115 104 115 87       Blood Sugar Average: Last 72 hrs:  (P) 101 [id number]   · Patient presents with diabetic infection of the left foot    · Previous cultures with Enterococcus avium, and a few colonies of Pseudomonas  · S/p TMA 4/28 with podiatry with application of incisional wound vac   · Glucose remains controlled off of medication

## 2022-04-29 NOTE — NURSING NOTE
Discharge instructions reviewed with pt  He is aware of follow-up appointments and prescriptions to be filled  Prevena wound vac in place and operating, management discussed with pt and he is comfortable with care  Walker ordered and delivered to pt room prior to discharge  Pt has no unaddressed questions or concerns, son is providing ride home

## 2022-04-29 NOTE — PLAN OF CARE
Problem: OCCUPATIONAL THERAPY ADULT  Goal: Performs self-care activities at highest level of function for planned discharge setting  See evaluation for individualized goals  Description: Treatment Interventions: ADL retraining,Functional transfer training,Endurance training,Patient/family training,Equipment evaluation/education,Compensatory technique education,Activityengagement          See flowsheet documentation for full assessment, interventions and recommendations  Note: Limitation: Decreased ADL status,Decreased endurance,Decreased self-care trans,Decreased high-level ADLs (WBS, pain)  Prognosis: Good  Assessment: Pt is a 60 y o  male seen for OT evaluation s/p adm to [facility] on 4/26/2022 w/ left hallux wound with associated, redness, swelling, and pain  Pt now s/p L TMA on 4/48/22  NWB L LE  Comorbidities affecting pts functional performance include a significant PMH of A-Fib, Chronic diastolic CHF, DM, HLD, HTN, Pacemaker, and Stroke  Pt with active OT orders and activity orders  Pt lives with spouse in a two level house with 1 CHRISS and FOS to 2nd floor bedroom  Pt has full bath on 1st floor, can stay on 1st floor if needed at D/C  Spouse works, (+) home alone at times  At baseline, pt was I w/ ADLs and functional transfers/mobility w/o use of AD  Pt and spouse share IADLs  (+)   FT employed  Denies falls PTA  Upon evaluation, pt currently requires Supervision for UB ADLs, Min A for LB ADLs, Min A for toileting, Supervision for bed mobility, and Min A for functional mobility/transfers 2* the following deficits impacting occupational performance: decreased balance, decreased tolerance, impaired sensation and increased pain  These impairments, as well at pts WBS, fall risk, steps to enter environment, limited home support, difficulty performing ADLS and difficulty performing IADLS  limit pts ability to safely engage in all baseline areas of occupation   Based on the aforementioned OT evaluation, functional performance deficits, and assessments, pt has been identified as a Moderate complexity evaluation  Pt to continue to benefit from continued acute OT services during hospital stay to address defined deficits and to maximize level of functional independence in the following Occupational Performance areas: bathing/shower, toilet hygiene, dressing, health maintenance, functional mobility, community mobility, clothing management, meal prep and household maintenance  From OT standpoint, recommend Home w/ social support and continued PT upon D/C   OT will continue to follow pt 2-3x/wk to address the following goals to  w/in 10-14 days:     OT Discharge Recommendation: No rehabilitation needs (Home w/ social support and continued PT)  OT - OK to Discharge: Yes (when medically cleared)

## 2022-04-29 NOTE — PHYSICAL THERAPY NOTE
PT EVALUATION 11:00-11:15 ( 15 minutes)  Treat 11:15-11:27 ( 12 minutes)    60 y o     [id number]    Cellulitis of great toe, right [L03 [id number]]    Past Medical History:   Diagnosis Date    Atrial fibrillation (HCC)     [facility]congest[facility]     Diabetes mellitus [facility]     High cholesterol     Hyperlipidemia     Hypertension     Pacemaker     Stroke [facility])          Past Surgical History:   Procedure Laterality Date    APPENDECTOMY      ATRIAL CARDIAC PACEMAKER INSERTION      BARIATRIC SURGERY  05/2021    FL GUIDED NEEDLE PLAC BX/ASP/INJ  3/22/2022    FOOT AMPUTATION Left 4/28/2022    Procedure: LEFT TRANSMETATARSAL AMPUTATION ;  Surgeon: Uday Sargent DPM;  [facility];  Service: Podiatry    NERVE BLOCK Right 2/10/2022    Procedure: BLOCK MEDIAL BRANCH C3, C4, C5 #1;  Surgeon: Alejandrina Samayoa MD;  [facility];  Service: Pain Management     NERVE BLOCK Right 3/22/2022    Procedure: BLOCK MEDIAL BRANCH C3, C4, C5 #2;  Surgeon: Alejandrina Samayoa MD;  [facility];  Service: Pain Management     MO AMPUTATION TOE,I-P JT Left 11/16/2021    Procedure: AMPUTATION LESSER TOE;  Surgeon: Joan Greenberg DPM;  [facility];  Service: Podiatry   [address] [place] [address] Right 4/7/2022    Procedure: Right C3, C4, C5 RFA;  Surgeon: Alejandrina Samayoa MD;  [facility];  Service: Pain Management     TOE AMPUTATION Left     2nd toe    TOE AMPUTATION Left 9/15/2021    Procedure: AMPUTATION LEFT 4TH TOE;  Surgeon: Joan Greenberg DPM;  [facility];  Service: Podiatry    TOE AMPUTATION Right 1/12/2022    Procedure: AMPUTATION TOE;  Surgeon: Divya Dietz DPM;  [facility];  Service: Podiatry    TOE AMPUTATION Right 2/23/2022    Procedure: AMPUTATION TOE  RIGHT SECOND;  Surgeon: Divya Dietz DPM;  Location: [address] [place] [address] MAIN OR;  Service: Podiatry        04/29/22 1100   PT Last Visit   PT Visit Date 04/29/22   Note Type   Note type Evaluation  (and treatment  )   Pain Assessment   Pain Score 7   Restrictions/Precautions   LLE Weight Bearing Per Order NWB  (LLE)   Other Precautions Fall Risk;Pain;Multiple lines  (Masimo)   Home Living   Type of [address] Two level;Bed/bath upstairs; Able to live on main level with bedroom/bathroom  (1 CHRISS, flight steps to bed bath)   Bathroom Shower/Tub Tub/shower unit   Bathroom Toilet Standard   Bathroom Equipment   (none)   Bathroom Accessibility Accessible  (full bath on first)   Home Equipment   (none)   Additional Comments resides with spouse in 2 story home wiht 1 CHRISS  Can stay on first floor with full bath  Flight to B/B   Prior Function   Level of [place] Independent with ADLs and functional mobility   Lives With Spouse; Family   Receives Help From Family   ADL Assistance Independent   IADLs Needs assistance   Falls in the last 6 months 0   Vocational Full time employment   Comments I PTA without AD   + drives  General   Additional Pertinent History Pt is 59 y/o male admitted s/p TMA  NWB post op  Family/Caregiver Present No   Cognition   Overall Cognitive Status WFL   Comments Pleasant   Subjective   Subjective Agreeable to PT eval  Hopeful to go home  NWB  RUE Assessment   RUE Assessment WFL   LUE Assessment   LUE Assessment WFL   RLE Assessment   RLE Assessment WFL   LLE Assessment   LLE Assessment WFL  (ankle N/T 2* TMA  + wound vac  )   Vision-Basic Assessment   Current Vision Wears glasses only for reading   Light Touch   RLE Light Touch Impaired   RLE Light Touch Comments neuropathy   LLE Light Touch Impaired   LLE Light Touch Comments neuropathy   Bed Mobility   Supine to Sit 5  Supervision   Additional items Increased time required   Transfers   Sit to Stand 4  Minimal assistance   Additional items Assist x 1; Increased time required;Verbal cues   Stand to Sit 4  Minimal assistance   Additional items Assist x 1; Increased time required;Verbal cues   Additional Comments Cues for hand and operative leg placement to maintain NWB during transiitons  Ambulation/Elevation   Gait pattern Improper Weight shift;Decreased foot clearance  (hop to patter)   Gait Assistance 4  Minimal assist   Additional items Assist x 1;Verbal cues; Tactile cues   Assistive Device Bariatric Rolling walker   Distance Amb with Nils RW 35'x1  Cues to slow pace  Maintains % of the time  Balance   Static Sitting Fair +   Dynamic Sitting Fair   Static Standing Fair -   Dynamic Standing Poor +   Ambulatory Poor +   Activity Tolerance   Activity Tolerance Patient tolerated treatment well;Patient limited by pain  (NWB)   Medical Staff Made Aware Marco Guy  OT-Erin:  Pt seen for co-evaluation/treatment with skilled Occupational Therapist 2* clinically unstable/unpredictable presentation, medical complexity, fall risk, WBS, functional/physical limitations, impaired functional balance, decreased safety awareness, limited activity tolerance which is decline from PLOF and may impact overall functional mobility/mobility safety  Amina  Nurse Made Aware yes   Assessment   Prognosis Good   Problem List Decreased endurance; Impaired balance;Decreased mobility; Impaired sensation;Obesity; Decreased skin integrity;Orthopedic restrictions;Pain   Assessment Liliana Garcia is a 60 y o  male who is originally admitted to the podiatry service due to left foot osteomyelitis requiring TMA  PT consulted for mobility training  NWB  Prior to admission independent without AD use   + working, driving  No hx of falls, no DME  Currently presents with functional limitations related to NWB status, decreased functional mobility, balance, activity tolerance and locomotion related to same  [place] for transfers and ambulation using Nils RW  Hop to pattern  Able to maintain NWB compliance 100% of the time  Risk for falls given NWB status  Cues to slow pace with RW needed    Given impairments will benefit from continued PT in order to optimize functional outcomes and compliance with NWB status  Will need Nils RW at d/c  HHPT for continued mobility training  The patient's AM-PAC Basic Mobility Inpatient Short Form Raw Score is 17  A Raw score of greater than 16 suggests the patient may benefit from discharge to home  Please also refer to the recommendation of the Physical Therapist for safe discharge planning  HHPT  See treatment note to initiate stair training  Barriers to Discharge Inaccessible home environment   Barriers to Discharge Comments CHRISS, stairs to 2nd floor   Goals   Patient Goals go home today   STG Expiration Date 05/06/22   Short Term Goal #1 7 days: 1)  Pt will perform bed mobility with Clifford demonstrating appropriate technique 100% of the time in order to improve function  2)  Perform all transfers with Clifford demonstrating safe and appropriate technique 100% of the time in order to improve ability to negotiate safely in home environment  3) Amb with least restrictive AD > 50'x1 with mod I in order to demonstrate ability to negotiate in home environment  4)  Improve overall strength and balance 1/2 grade in order to optimize ability to perform functional tasks and reduce fall risk  5) Increase activity tolerance to 45 minutes in order to improve endurance to functional tasks  6)  Negotiate stairs using most appropriate technique and S in order to be able to negotiate safely in home environment  7) PT for ongoing patient and family/caregiver education, DME needs and d/c planning in order to promote highest level of function in least restrictive environment  PT Treatment Day 1   Plan   Treatment/Interventions Functional transfer training;Elevations; Therapeutic exercise; Endurance training;Patient/family training;Equipment eval/education; Bed mobility;Gait training; Compensatory technique education;Continued evaluation;Spoke to nursing;OT;Spoke to case management   PT Frequency 3-5x/wk   Recommendation   PT Discharge Recommendation Home with home health rehabilitation   [address]  Recommended HD Bariatric wheeled walker   [address] in Bed Without Bedrails 3   Lying on Back to Sitting on Edge of Flat Bed 3   Moving Bed to Chair 3   Standing Up From Chair 3   Walk in Room 3   Climb 3-5 Stairs 2   Basic Mobility Inpatient Raw Score 17   Basic Mobility Standardized Score 39 67   Highest Level Of Mobility   JH-HL Goal 5: Stand one or more mins   JH-HLM Highest Level of Mobility 7: Walk 25 feet or more   JH-HLM Goal Achieved Yes   Additional Treatment Session   Start Time 1115   End Time 1127   Treatment Assessment 12 minutes gait/stair training  Transfers sit to stand with S, cues for hand placement  Amb 10'x2 to and from staxi chair wtih close S/CG  Performed one step up in reverse with use of CHRISTIANO RW support with Johan  Use of Staxi chair to simulate car transfers  Education provided reinforcing importance of NWB status  HHPT may be beneficial for continued mobility training  Can have first floor set up  Equipment Use Christiano RW   Additional Treatment Day 1   End of Consult   Patient Position at End of Consult Supine; All needs within reach     Hx/personal factors: co-morbidities, inaccessible home, home alone, mutliple lines, use of AD, pain, WB restrictions, fall risk and obesity  Examination: dec mobility, dec balance, dec endurance, dec amb, risk for falls, pain, assessed body system, balance, endurance, amb, D/C disposition & fall risk, WB restrictions  Clinical: unpredictable (ongoing medical status, risk for falls and pain mgt), NWB Status, more restrictive AD use    Complexity: high      Angela Mason, PT

## 2022-04-29 NOTE — PROGRESS NOTES
[facility]  Progress Note - Alan Hooksa 1965, 60 y o  male MRN: [id number]  Unit/Bed#: E5 -01 Encounter: [id number]  Primary Care Provider: Sean Goff DO   Date and time admitted to hospital: 4/26/2022  7:18 PM    * Diabetic infection of left foot [facility])  Assessment & Plan  Lab Results   Component Value Date    HGBA1C 5 9 (H) 03/01/2022       Recent Labs     04/28/22  1548 04/28/22  2251 04/29/22  0722 04/29/22  1124   POCGLU 115 104 115 87       Blood Sugar Average: Last 72 hrs:  (P) 101 [id number]   · Patient presents with diabetic infection of the left foot  · Previous cultures with Enterococcus avium, and a few colonies of Pseudomonas  · S/p TMA 4/28 with podiatry with application of incisional wound vac   · Glucose remains controlled off of medication    Hyperkalemia  Assessment & Plan  · Noted to have a K of 6 8 on am labs- suspect erroneous value- repeat stat K level   · Check EKG   · Add tele- if K returns normal d/c tele     Atrial fibrillation [facility])  Assessment & Plan  · Resume xarelto   · Rate controlled     Chronic diastolic heart failure [facility])  Assessment & Plan  Wt Readings from Last 3 Encounters:   04/26/22 (!) 143 kg (315 lb)   04/07/22 (!) 148 kg (326 lb)   03/22/22 (!) 150 kg (331 lb)       · History of chronic diastolic congestive heart failure  · Followed by [facility] by Dr Yifan Rubin MD  · Per records reviewed the patient is typically on 60 mg of furosemide b i d    · Resume diuretics 4/29  · Does take Zaroxolyn once weekly      History of bariatric surgery  Assessment & Plan  · history of bariatric surgery from May 2021  · Based upon records suspect Mary Lou-en-Y  · Continue nutritional supplementation  · Patient reports a weight loss of approximately 65 lbs since that surgery  · Avoid nonsteroidal anti-inflammatories given risk of marginal ulcer  · Continue nutritional supplementation  · BMI 41 56    Pacemaker  Assessment & Plan  noted    Morbid obesity ([facility] [address] )  Assessment & Plan  History of morbid obesity status post bariatric surgery- BMI 41  Continue ongoing weight loss efforts    Hypertension  Assessment & Plan  Prior to admission on lisinopril- currently on hold and blood pressure remains stable off of medication     DAYAN (obstructive sleep apnea)  Assessment & Plan  Uses BiPAP at bedtime        VTE Pharmacologic Prophylaxis: VTE Score: 5 High Risk (Score >/= 5) - Pharmacological DVT Prophylaxis Ordered: rivaroxaban (Xarelto)  Sequential Compression Devices Ordered  Patient Centered Rounds: I performed bedside rounds with nursing staff today  Discussions with Specialists or Other Care Team Provider: d/w RN       Time Spent for Care: 30 minutes  More than 50% of total time spent on counseling and coordination of care as described above  Current Length of Stay: 3 day(s)  Current Patient Status: Inpatient     Code Status: Level 1 - Full Code    Subjective:   Pt lying in bed  Reports he is having 10/10 pain in left foot since surgery  When he takes pain meds his pain decreases to a 8/10  Denies any cp, sob, nausea, vomiting, diarrhea or constipation  Objective:     Vitals:   Temp (24hrs), Av 5 °F (36 4 °C), Min:97 3 °F (36 3 °C), Max:98 °F (36 7 °C)    Temp:  [97 3 °F (36 3 °C)-98 °F (36 7 °C)] 98 °F (36 7 °C)  HR:  [58-83] 69  Resp:  [18-21] 18  BP: (114-151)/(56-73) 114/56  SpO2:  [90 %-98 %] 90 %  There is no height or weight on file to calculate BMI  Input and Output Summary (last 24 hours):   No intake or output data in the 24 hours ending 22 1432    Physical Exam:   Physical Exam  Constitutional:       General: He is not in acute distress  Appearance: He is obese  Cardiovascular:      Rate and Rhythm: Normal rate and regular rhythm  Pulses: Normal pulses  Heart sounds: Normal heart sounds  No murmur heard  Pulmonary:      Effort: No respiratory distress        Breath sounds: Normal breath sounds  No wheezing or rales  Abdominal:      General: Bowel sounds are normal  There is no distension  Palpations: Abdomen is soft  Tenderness: There is no abdominal tenderness  Musculoskeletal:         General: Swelling present  No tenderness  Right lower leg: Edema (mild) present  Left lower leg: Edema (mild) present  Skin:     General: Skin is warm and dry  Comments: Left foot surgical dressing and wound vac intact    Neurological:      General: No focal deficit present  Mental Status: He is alert  Mental status is at baseline  Psychiatric:         Attention and Perception: Attention normal          Mood and Affect: Mood normal           Additional Data:     Labs:  Results from last 7 days   Lab Units 04/29/22  0538 04/28/22  0602 04/26/22  2101   WBC Thousand/uL 9 64   < > 8 13   HEMOGLOBIN g/dL 13 3   < > 15 1   HEMATOCRIT % 42 6   < > 45 5   PLATELETS Thousands/uL 252   < > 241   NEUTROS PCT %  --   --  59   LYMPHS PCT %  --   --  27   MONOS PCT %  --   --  12   EOS PCT %  --   --  1    < > = values in this interval not displayed  Results from last 7 days   Lab Units 04/29/22  0959 04/29/22  0509 04/29/22  0509 04/27/22  0549 04/26/22  2101   SODIUM mmol/L  --   --  137   < > 137   POTASSIUM mmol/L 4 1   < > 6 2*   < > 3 0*   CHLORIDE mmol/L  --   --  104   < > 96*   CO2 mmol/L  --   --  26   < > 36*   BUN mg/dL  --   --  12   < > 15   CREATININE mg/dL  --   --  0 94   < > 0 94   ANION GAP mmol/L  --   --  7   < > 5   CALCIUM mg/dL  --   --  8 6   < > 8 8   ALBUMIN g/dL  --   --   --   --  3 5   TOTAL BILIRUBIN mg/dL  --   --   --   --  0 67   ALK PHOS U/L  --   --   --   --  91   ALT U/L  --   --   --   --  28   AST U/L  --   --   --   --  32   GLUCOSE RANDOM mg/dL  --   --  84   < > 99    < > = values in this interval not displayed           Results from last 7 days   Lab Units 04/29/22  1124 04/29/22  0722 04/28/22  2251 04/28/22  1548 04/28/22  1250 04/28/22  1137 04/28/22  0730   POC GLUCOSE mg/dl 87 115 104 115 98 89 105               Lines/Drains:  Invasive Devices  Report    Peripheral Intravenous Line            Peripheral IV 04/26/22 Right 2 days                  Telemetry:  Telemetry Orders (From admission, onward)             24 Hour Telemetry Monitoring  Continuous x 24 Hours (Telem)        References:    Telemetry Guidelines   Question:  Reason for 24 Hour Telemetry  Answer:  Metabolic/Electrolyte Disturbance with High Probability of Dysrhythmia (K level <3 or >6, or KCL infusion >=10mEq/hr)                            Imaging: Reviewed radiology reports from this admission including: xray(s)    Recent Cultures (last 7 days):   Results from last 7 days   Lab Units 04/26/22  2202   BLOOD CULTURE  No Growth at 48 hrs  No Growth at 48 hrs         Last 24 Hours Medication List:   Current Facility-Administered Medications   Medication Dose Route Frequency Provider Last Rate    acetaminophen  975 mg Oral Q8H [facility] KATHRYN Dumont      cefazolin  2,000 mg Intravenous Q8H [facility], DPM 2,000 mg (04/29/22 0430)    furosemide  60 mg Oral BID (diuretic) KATHRYN Solis      gabapentin  300 mg Oral TID Cathlean Mask Pelt, DPM      HYDROmorphone  1 mg Intravenous Q3H PRN KATHRYN Solis      insulin lispro  2-12 Units Subcutaneous TID [facility], DPM      LORazepam  1 mg Oral TID PRN Cathlean Mask Pelt, DPM      naloxone  0 04 mg Intravenous Q1MIN PRN [facility], DPM      ondansetron  4 mg Intravenous Q6H PRN [facility] [place] [place]      oxyCODONE  10 mg Oral Q4H PRN [facility]lean Mask [facility] [place] [place]      oxyCODONE  5 mg Oral Q4H PRN KATHRYN Solis      pantoprazole  20 mg Oral Early Morning Cathlean Mask Glovelier, DPM      polyethylene glycol  17 g Oral Daily PRN [facility], DPM      rivaroxaban  20 mg Oral Daily With [employer] [facility] KATHRYN Fischer      saccharomyces boulardii  250 mg Oral BID [facility], DPM      sucralfate  1 g Oral 4x Daily Renee Friedman DPM          Today, Patient Was Seen By: KATHRYN Gracia    **Please Note: This note may have been constructed using a voice recognition system  **

## 2022-04-29 NOTE — ASSESSMENT & PLAN NOTE
Wt Readings from Last 3 Encounters:   04/26/22 (!) 143 kg (315 lb)   04/07/22 (!) 148 kg (326 lb)   03/22/22 (!) 150 kg (331 lb)       · History of chronic diastolic congestive heart failure  · Followed by [facility] Cardiology by Dr Toni Napier MD  · Per records reviewed the patient is typically on 60 mg of furosemide b i d    · Resume diuretics 4/29  · Does take Zaroxolyn once weekly

## 2022-04-30 ENCOUNTER — HOSPITAL ENCOUNTER (EMERGENCY)
Facility: HOSPITAL | Age: 57
Discharge: HOME | End: 2022-04-30
Attending: EMERGENCY MEDICINE | Admitting: EMERGENCY MEDICINE
Payer: COMMERCIAL

## 2022-04-30 VITALS
HEART RATE: 78 BPM | TEMPERATURE: 97.6 F | BODY MASS INDEX: 41.56 KG/M2 | OXYGEN SATURATION: 96 % | WEIGHT: 315 LBS | DIASTOLIC BLOOD PRESSURE: 78 MMHG | SYSTOLIC BLOOD PRESSURE: 107 MMHG | RESPIRATION RATE: 18 BRPM

## 2022-04-30 DIAGNOSIS — Z51.89 ENCOUNTER FOR WOUND RE-CHECK: Primary | ICD-10-CM

## 2022-04-30 PROCEDURE — 99283 EMERGENCY DEPT VISIT LOW MDM: CPT

## 2022-04-30 PROCEDURE — 96372 THER/PROPH/DIAG INJ SC/IM: CPT

## 2022-04-30 PROCEDURE — 99284 EMERGENCY DEPT VISIT MOD MDM: CPT | Performed by: EMERGENCY MEDICINE

## 2022-04-30 RX ORDER — FENTANYL CITRATE 50 UG/ML
50 INJECTION, SOLUTION INTRAMUSCULAR; INTRAVENOUS ONCE
Status: COMPLETED | OUTPATIENT
Start: 2022-04-30 | End: 2022-04-30

## 2022-04-30 RX ADMIN — FENTANYL CITRATE 50 MCG: 50 INJECTION INTRAMUSCULAR; INTRAVENOUS at 17:55

## 2022-04-30 NOTE — ED PROVIDER NOTES
History  Chief Complaint   Patient presents with    Wound Check     patient was discharged from [address] [facility] and discharged yesterday with a woudn vac, last night vac began alarming  and they came in to have it checked  57-year-old male with past medical history as below who presents to the emergency department for evaluation of a wound check after patient reports that his wound VAC last night began alarming and he wanted to have his Wound Vac checked today  States he was discharged yesterday from [facility]  Reports VAC was initially working but then stopped and began alarming  States he called on- told him to come here  Patient denies any pain to the leg  No fevers or chills  No other concerns today  Prior to Admission Medications   Prescriptions Last Dose Informant Patient Reported? Taking? FLUoxetine (PROzac) 40 MG capsule   Yes No   LORazepam (ATIVAN) 1 mg tablet   Yes No   Sig: Take 1 mg by mouth 3 (three) times a day   atorvastatin (LIPITOR) 40 mg tablet   Yes No   Sig: Take 40 mg by mouth daily   Patient not taking: Reported on 4/26/2022    busPIRone (BUSPAR) 5 mg tablet   Yes No   Sig: TAKE BY MOUTH 1 TABLET IN THE MORNING AND 1 TABLET BEFORE BEDTIME     Patient not taking: Reported on 4/26/2022   doxycycline hyclate (VIBRAMYCIN) 100 mg capsule   No No   Sig: Take 1 capsule (100 mg total) by mouth every 12 (twelve) hours for 5 days   furosemide (LASIX) 40 mg tablet   Yes No   Sig: Take 40 mg by mouth 2 (two) times a day   gabapentin (NEURONTIN) 300 mg capsule   No No   Sig: TAKE 1 CAPSULE BY MOUTH THREE TIMES A DAY   gentamicin (GARAMYCIN) 0 1 % cream   Yes No   Sig: gentamicin 0 1 % topical cream   APPLY 1 GRAM BY TOPICAL ROUTE TO THE AFFECTED AREA 3 TIMES A DAY FOR 30 DAYS   Patient not taking: Reported on 4/26/2022   hydrOXYzine HCL (ATARAX) 25 mg tablet   Yes No   Sig: Take 25 mg by mouth 4 (four) times a day as needed   Patient not taking: Reported on 4/26/2022 lidocaine (Lidoderm) 5 %   No No   Sig: Apply 1 patch topically daily Remove & Discard patch within 12 hours or as directed by MD   Patient not taking: Reported on 4/26/2022    lisinopril (ZESTRIL) 5 mg tablet   Yes No   Sig: lisinopril 5 mg tablet   TAKE BY MOUTH 1 TABLET IN THE MORNING    metolazone (ZAROXOLYN) 2 5 mg tablet   Yes No   Sig: TAKE 1 TAB BY MOUTH ONCE A DAY ON MONDAY, WEDNESDAY, AND FRIDAY ONLY     Patient not taking: Reported on 4/26/2022   misoprostol (CYTOTEC) 200 mcg tablet   Yes No   Sig: Take 200 mcg by mouth 4 (four) times a day   Patient not taking: Reported on 4/26/2022    omeprazole (PriLOSEC) 20 mg delayed release capsule   Yes No   Sig: Take 20 mg by mouth daily     oxyCODONE (ROXICODONE) 5 immediate release tablet   No No   Sig: Take 1 tablet (5 mg total) by mouth every 4 (four) hours as needed for moderate pain for up to 10 days Max Daily Amount: 30 mg   potassium chloride (K-DUR,KLOR-CON) 20 mEq tablet   No No   Sig: Take 2 tablets (40 mEq total) by mouth daily for 5 days   ranitidine (ZANTAC) 150 mg tablet   Yes No   Sig: Take 150 mg by mouth daily   rivaroxaban (XARELTO) 20 mg tablet   No No   Sig: Take 1 tablet (20 mg total) by mouth daily with dinner   sildenafil (VIAGRA) 25 MG tablet   Yes No   Sig: sildenafil 25 mg tablet   take 1 tablet by mouth daily if needed for ERECTILE DYSFUNCTION   Patient not taking: Reported on 4/26/2022   sildenafil (VIAGRA) 25 MG tablet   Yes No   Sig: take 1 tablet by mouth daily if needed for ERECTILE DYSFUNCTION   Patient not taking: Reported on 4/26/2022   sucralfate (CARAFATE) 1 g tablet   Yes No   Sig: Take 1 g by mouth 4 (four) times a day     traMADol (ULTRAM) 50 mg tablet   Yes No   Patient not taking: Reported on 4/26/2022       Facility-Administered Medications: None       Past Medical History:   Diagnosis Date    Atrial fibrillation (HCC)     Chronic diastolic (congestive) heart failure (HCC)     Diabetes mellitus (HCC)     High cholesterol     Hyperlipidemia     Hypertension     Pacemaker     Stroke [facility])        Past Surgical History:   Procedure Laterality Date    APPENDECTOMY      ATRIAL CARDIAC PACEMAKER INSERTION      BARIATRIC SURGERY  05/2021    FL GUIDED NEEDLE PLAC BX/ASP/INJ  3/22/2022    FOOT AMPUTATION Left 4/28/2022    Procedure: LEFT TRANSMETATARSAL AMPUTATION ;  Surgeon: Sarah Carreon DPM;  [facility];  Service: Podiatry    NERVE BLOCK Right 2/10/2022    Procedure: BLOCK MEDIAL BRANCH C3, C4, C5 #1;  Surgeon: René Nettles MD;  [facility];  Service: Pain Management     NERVE BLOCK Right 3/22/2022    Procedure: BLOCK MEDIAL BRANCH C3, C4, C5 #2;  Surgeon: René Nettles MD;  [facility];  Service: Pain Management     NM AMPUTATION TOE,I-P JT Left 11/16/2021    Procedure: AMPUTATION LESSER TOE;  Surgeon: Rajan Dempsey DPM;  [facility];  Service: Podiatry    RADIOFREQUENCY ABLATION Right 4/7/2022    Procedure: Right C3, C4, C5 RFA;  Surgeon: René Nettles MD;  [facility];  Service: Pain Management     TOE AMPUTATION Left     2nd toe    TOE AMPUTATION Left 9/15/2021    Procedure: AMPUTATION LEFT 4TH TOE;  Surgeon: Rajan Dempsey DPM;  [facility];  Service: Podiatry    TOE AMPUTATION Right 1/12/2022    Procedure: AMPUTATION TOE;  Surgeon: Jose Antonio Nicole DPM;  [facility];  Service: Podiatry    TOE AMPUTATION Right 2/23/2022    Procedure: AMPUTATION TOE  RIGHT SECOND;  Surgeon: Jose Antonio Nicole DPM;  Location: [address] [place] [address] MAIN OR;  Service: Podiatry       Family History   Problem Relation Age of Onset    No Known Problems Mother     No Known Problems Father      I have reviewed and agree with the history as documented      E-Cigarette/Vaping    E-Cigarette Use Never User      E-Cigarette/Vaping Substances     Social History     Tobacco Use    Smoking status: Never Smoker    Smokeless tobacco: Never Used   Vaping Use    Vaping Use: Never used Substance Use Topics    Alcohol use: Never    Drug use: Never       Review of Systems   Constitutional: Negative for activity change, appetite change, chills, diaphoresis and fever  HENT: Negative for congestion, rhinorrhea and sore throat  Eyes: Negative for visual disturbance  Respiratory: Negative for chest tightness and shortness of breath  Cardiovascular: Negative for chest pain, palpitations and leg swelling  Gastrointestinal: Negative for abdominal pain, constipation, diarrhea, nausea and vomiting  Genitourinary: Negative for difficulty urinating and hematuria  Musculoskeletal: Negative for back pain and neck pain  Skin: Positive for wound  Negative for color change and rash  Neurological: Negative for dizziness, weakness and headaches  Psychiatric/Behavioral: Negative for behavioral problems  Physical Exam  Physical Exam  Vitals and nursing note reviewed  Constitutional:       Appearance: He is well-developed  He is not diaphoretic  HENT:      Head: Normocephalic and atraumatic  Right Ear: External ear normal       Left Ear: External ear normal       Nose: Nose normal    Cardiovascular:      Rate and Rhythm: Normal rate  Pulses: Normal pulses  Pulmonary:      Effort: Pulmonary effort is normal       Breath sounds: No wheezing  Musculoskeletal:         General: No tenderness or deformity  Normal range of motion  Skin:     General: Skin is warm and dry  Capillary Refill: Capillary refill takes less than 2 seconds  Findings: No erythema or rash  Comments: Wound VAC in place in the left lower extremity with tubing noted   Neurological:      Mental Status: He is alert  Motor: No abnormal muscle tone     Psychiatric:         Behavior: Behavior normal          Vital Signs  ED Triage Vitals   Temperature Pulse Respirations Blood Pressure SpO2   04/30/22 1626 04/30/22 1626 04/30/22 1626 04/30/22 1626 04/30/22 1626   97 6 °F (36 4 °C) 78 18 107/78 96 %      Temp Source Heart Rate Source Patient Position - Orthostatic VS BP Location FiO2 (%)   04/30/22 1626 04/30/22 1626 -- 04/30/22 1626 --   Temporal Monitor  Right arm       Pain Score       04/30/22 1755       10 - Worst Possible Pain           Vitals:    04/30/22 1626   BP: 107/78   Pulse: 78         Visual Acuity      ED Medications  Medications   fentanyl citrate (PF) 100 MCG/2ML 50 mcg (50 mcg Intramuscular Given 4/30/22 1755)       Diagnostic Studies  Results Reviewed     None                 No orders to display              Procedures  Procedures         ED Course            MDM  Number of Diagnoses or Management Options  Diagnosis management comments: 78-year-old male with past medical history as below who presents to the emergency department for evaluation of a wound check after patient reports that his wound VAC last night began alarming and he wanted to have his Vac checked today  States he was discharged yesterday from [facility]  Vital signs on arrival here in nonconcerning  Patient has exam as above  Attempted to evaluate the VAC and unfortunately VAC was not operating correctly  Appears that the [facility] itself works but the canister in which it collects fluid does not function when it was connected  Discussed patient with surgery on-call who advised contacting Podiatry who did the surgery  Patient is postop day 2 and discussed this patient with Podiatry on-call, Dr Mckinley Castaneda, who advised  remove the vacuum and dressings entirely, and then applying Adaptic (or Xeroform if unavailable) and dry sterile dressings and have patient follow-up with Podiatry outpatient  Patient was informed and dressings were placed after VAC removed         Amount and/or Complexity of Data Reviewed  Review and summarize past medical records: yes    Risk of Complications, Morbidity, and/or Mortality  Presenting problems: low  Diagnostic procedures: low  Management options: low        Disposition  Final diagnoses:   None     ED Disposition     None      Follow-up Information    None         Patient's Medications   Discharge Prescriptions    No medications on file       No discharge procedures on file      PDMP Review       Value Time User    PDMP Reviewed  Yes 3/16/2022  1:28 PM Toño Jones MD          ED Provider  Electronically Signed by           Yasmeen Kyle MD  04/30/22 6000

## 2022-04-30 NOTE — DISCHARGE INSTRUCTIONS
Thank you for letting us take care of you  You have been evaluated for a problem with your wound VAC  Please follow-up with Podiatry  At this time, you have no clinical evidence of symptoms or problems that will require hospitalization, however you should be evaluated soon by a primary care physician, and contact information has been provided  Follow up with your primary care physician  This is important as many medical conditions can be managed as an outpatient, in addition to routine health screening  Seeing your primary doctor often can help identify changes in the medical issue that brought you to the ED for care today  If you experience any new symptoms or acute worsening of current symptoms, please return to the ED

## 2022-04-30 NOTE — ED NOTES
Patient would like a bigger walking shoe, believes this one is too small  Dressing was reinforced in triage and boot reposition to fit better        Lucio Reynolds RN  04/30/22 9061

## 2022-05-02 ENCOUNTER — OFFICE VISIT (OUTPATIENT)
Dept: PAIN MEDICINE | Facility: CLINIC | Age: 57
End: 2022-05-02
Payer: COMMERCIAL

## 2022-05-02 VITALS
BODY MASS INDEX: 41.75 KG/M2 | WEIGHT: 315 LBS | RESPIRATION RATE: 20 BRPM | HEART RATE: 98 BPM | HEIGHT: 73 IN | DIASTOLIC BLOOD PRESSURE: 78 MMHG | SYSTOLIC BLOOD PRESSURE: 136 MMHG | TEMPERATURE: 97.7 F

## 2022-05-02 DIAGNOSIS — M54.12 CERVICAL RADICULOPATHY: Primary | ICD-10-CM

## 2022-05-02 LAB
BACTERIA BLD CULT: NORMAL
BACTERIA BLD CULT: NORMAL

## 2022-05-02 PROCEDURE — 99214 OFFICE O/P EST MOD 30 MIN: CPT | Performed by: ANESTHESIOLOGY

## 2022-05-02 RX ORDER — 0.9 % SODIUM CHLORIDE 0.9 %
1 VIAL (ML) INJECTION ONCE
Status: CANCELLED | OUTPATIENT
Start: 2022-05-02 | End: 2022-05-02

## 2022-05-02 RX ORDER — METHYLPREDNISOLONE ACETATE 80 MG/ML
80 INJECTION, SUSPENSION INTRA-ARTICULAR; INTRALESIONAL; INTRAMUSCULAR; PARENTERAL; SOFT TISSUE ONCE
Status: CANCELLED | OUTPATIENT
Start: 2022-05-02 | End: 2022-05-02

## 2022-05-02 RX ORDER — OMADACYCLINE 150 MG/1
TABLET, FILM COATED ORAL
COMMUNITY
Start: 2022-04-26

## 2022-05-02 RX ORDER — LIDOCAINE HYDROCHLORIDE 10 MG/ML
5 INJECTION, SOLUTION EPIDURAL; INFILTRATION; INTRACAUDAL; PERINEURAL ONCE
Status: CANCELLED | OUTPATIENT
Start: 2022-05-02 | End: 2022-05-02

## 2022-05-02 NOTE — PROGRESS NOTES
Assessment  1  Cervical radiculopathy  -     Case request operating room: BLOCK / INJECTION EPIDURAL STEROID CERVICAL C7-T1; Standing  -     Case request operating room: BLOCK / INJECTION EPIDURAL STEROID CERVICAL C7-T1    Greater than 40% relief of pain with improved ability to participate with IADLs after Right C3, C4, C5 medial branch  Nerve radiofrequency ablation performed nearly 4 weeks ago  Continues to described proximal radicular features of right-sided pain in C6 and C7 dermatomal distribution where there is moderate level of degenerative disc disease on MRI  Positive Spurling's maneuver, right greater than left-sided  Otherwise there is 5/5 strength bilaterally with active range of motion movements  Negative Jean Carlos's, reflexes 2+ and brisk  Risks, benefits alternatives to epidural steroid injections over discussed with patient  Handouts provided  And questions answered to patient's satisfaction  Previously reported the following symptomatology:     Right sided neck pain described primarily arthritic features  Has yet to begin physical therapy for his neck  Cervical facet arthropathy seen on xray cervical spine  Distribution of pain follows the right C3-C6 medial branch nerve regions  + right sided facet loading maneuvers consistent with multilevel spondyloarthropathy and osteophytes seen in cervical spine  Reasonable at this time to trial medial branch blocks to target site of cervical facet mediated pain and pursue radiofrequency ablation of successful  Risks, benefits and alternatives of procedure in conjunction with multimodal pain therapy plan thoroughly discussed with patient  Questions answered to patient's satisfaction  Plan  - C7-T1 interlaminar epidural steroid injection;  Patient off Xarelto secondary to recent left lower extremity amputation  Follows up with PCP next week to resume anticoagulation   RTC 2 weeks post procedure  -gabapentin increased to 300 mg t i d  from order previously rx for patient; counseled regarding sedative effects of taking this medication and provided up titration calendar  Counseled not to take medication while driving or operating heavy machinery/using stairs  -physical therapy for right-sided cervical facet arthropathy/radiculopathy; Physician directed home exercise plan as per [facility] demonstrated and handouts provided that patient plans to participate with for 1 hour, twice a week for the next 6 weeks  There are risks associated with opioid medications, including dependence, addiction and tolerance  The patient understands and agrees to use these medications only as prescribed  Potential side effects of the medications include, but are not limited to, constipation, drowsiness, addiction, impaired judgment and risk of fatal overdose if not taken as prescribed  The patient was warned against driving while taking sedation medications  Sharing medications is a felony  At this point in time, the patient is showing no signs of addiction, abuse, diversion or suicidal ideation  [facility] Prescription Drug Monitoring Program report was reviewed and was appropriate      Complete risks and benefits including bleeding, infection, tissue reaction, nerve injury and allergic reaction were discussed  The approach was demonstrated using models and literature was provided  Verbal and written consent was obtained  My impressions and treatment recommendations were discussed in detail with the patient who verbalized understanding and had no further questions  Discharge instructions were provided  I personally saw and examined the patient and I agree with the above discussed plan of care  My impressions and treatment recommendations were discussed in detail with the patient who verbalized understanding and had no further questions  Discharge instructions were provided   I personally saw and examined the patient and I agree with the above discussed plan of care     New Medications Ordered This Visit   Medications    Nuzyra 150 MG TABS     Sig: TAKE 3 TABLETS BY MOUTH DAILY FOR 2 DAYS THEN TAKE 2 TABLETS DAILY FOR 8 DAYS       History of Present Illness    Greater than 40% relief of pain with improved ability to participate with IADLs after Right C3, C4, C5 medial branch  Nerve radiofrequency ablation performed nearly 4 weeks ago  Continues to described proximal radicular features of right-sided pain in C6 and C7 dermatomal distribution where there is moderate level of degenerative disc disease on MRI  Previously reported the following symptomatology:     Jennifer Alaniz is a 60 y o  male with pmhx of afib with pacemaker on xarelto, obesity, DAYAN, DM-2, HTN, depression/anxiety presenting with right-sided neck pain described primarily arthritic features  Describes progressive neck pain since November  The patient describes predominantly aching, nagging, indolent crampy, stabbing axial neck pain without radicular features of electric shock-like and shooting pain  He denies any weakness numbness and paresthesias  The pain is 8/10 nature and is worse with overhead maneuvers as well as lateral rotation and extension of the neck  The patient has not yet been to physical therapy, and has failed conservative medical management including naproxen 500 mg b i d  and gabapentin 300 mg t i d  The pain is significant source of disability and compromises independent activities of daily living as well as overall function  The patient has difficulty with sleep disturbance as well since the pain often wakes him up  Has trialed gabapentin, flexeril and tylenol as well as tramadol with limited benefit but has never trialed cervical epidural steroid injections or medial branch blocks  He denies any bowel or bladder issues/incontinence, gait instability      I have personally reviewed and/or updated the patient's past medical history, past surgical history, family history, social history, current medications, allergies, and vital signs today  Review of Systems   Constitutional: Positive for activity change  HENT: Negative  Eyes: Negative  Respiratory: Negative  Cardiovascular: Negative  Gastrointestinal: Negative  Endocrine: Negative  Genitourinary: Negative  Musculoskeletal: Positive for arthralgias, myalgias, neck pain and neck stiffness  Skin: Negative  Allergic/Immunologic: Negative  Neurological: Negative for weakness and numbness  Hematological: Negative  Psychiatric/Behavioral: Negative  All other systems reviewed and are negative        Patient Active Problem List   Diagnosis    DAYAN (obstructive sleep apnea)    Chronic diastolic heart failure (HCC)    Hypertension    Diabetes mellitus [facility]    Morbid obesity (HCC)    Pain, joint, ankle and foot, left    Chronic osteomyelitis of left foot with draining sinus (HCC)    [facility])    Anxiety    Type 2 diabetes mellitus with diabetic polyneuropathy, with long-term current use of insulin (HCC)    Toe osteomyelitis, right (HCC)    Cervical spondylosis    Cervicalgia - Right    Diabetic infection of left foot ([facility] )    Pacemaker    History of bariatric surgery    Encounter for perioperative consultation    Hyperkalemia       Past Medical History:   Diagnosis Date    Atrial fibrillation ([facility] )     [facility]     Diabetes mellitus [facility]     High cholesterol     Hyperlipidemia     Hypertension     Pacemaker     Stroke [facility])        Past Surgical History:   Procedure Laterality Date    APPENDECTOMY      ATRIAL CARDIAC PACEMAKER INSERTION      BARIATRIC SURGERY  05/2021    FL GUIDED NEEDLE PLAC BX/ASP/INJ  3/22/2022    FOOT AMPUTATION Left 4/28/2022    Procedure: LEFT TRANSMETATARSAL AMPUTATION ;  Surgeon: Yari Thornton DPM;  [facility];  Service: Podiatry    NERVE BLOCK Right 2/10/2022    Procedure: BLOCK MEDIAL BRANCH C3, C4, C5 #1;  Surgeon: Devon Jiang MD;  [facility];  Service: Pain Management     NERVE BLOCK Right 3/22/2022    Procedure: BLOCK MEDIAL BRANCH C3, C4, C5 #2;  Surgeon: Devon Jiang MD;  [facility];  Service: Pain Management     VA AMPUTATION TOE,I-P JT Left 11/16/2021    Procedure: AMPUTATION LESSER TOE;  Surgeon: Bree Chong DPM;  [facility];  Service: Podiatry    RADIOFREQUENCY ABLATION Right 4/7/2022    Procedure: Right C3, C4, C5 RFA;  Surgeon: Devon Jiang MD;  [facility];  Service: Pain Management     TOE AMPUTATION Left     2nd toe    TOE AMPUTATION Left 9/15/2021    Procedure: AMPUTATION LEFT 4TH TOE;  Surgeon: Bree Chong DPM;  [facility];  Service: Podiatry    TOE AMPUTATION Right 1/12/2022    Procedure: AMPUTATION TOE;  Surgeon: Tobin Wynne DPM;  [facility];  Service: Podiatry    TOE AMPUTATION Right 2/23/2022    Procedure: AMPUTATION TOE  RIGHT SECOND;  Surgeon: Tobin Wynne DPM;  Location: [address] [place] [address] MAIN OR;  Service: Podiatry       Family History   Problem Relation Age of Onset    No Known Problems Mother     No Known Problems Father        Social History     Occupational History    Occupation: Maintenance Tech     Employer: [employer]   Tobacco Use    Smoking status: Never Smoker    Smokeless tobacco: Never Used   Vaping Use    Vaping Use: Never used   Substance and Sexual Activity    Alcohol use: Never    Drug use: Never    Sexual activity: Not on file       Current Outpatient Medications on File Prior to Visit   Medication Sig    atorvastatin (LIPITOR) 40 mg tablet Take 40 mg by mouth daily      busPIRone (BUSPAR) 5 mg tablet TAKE BY MOUTH 1 TABLET IN THE MORNING AND 1 TABLET BEFORE BEDTIME      doxycycline hyclate (VIBRAMYCIN) 100 mg capsule Take 1 capsule (100 mg total) by mouth every 12 (twelve) hours for 5 days    FLUoxetine (PROzac) 40 MG capsule     furosemide (LASIX) 40 mg tablet Take 40 mg by mouth 2 (two) times a day    gabapentin (NEURONTIN) 300 mg capsule TAKE 1 CAPSULE BY MOUTH THREE TIMES A DAY    gentamicin (GARAMYCIN) 0 1 % cream gentamicin 0 1 % topical cream   APPLY 1 GRAM BY TOPICAL ROUTE TO THE AFFECTED AREA 3 TIMES A DAY FOR 30 DAYS    hydrOXYzine HCL (ATARAX) 25 mg tablet Take 25 mg by mouth 4 (four) times a day as needed      lidocaine (Lidoderm) 5 % Apply 1 patch topically daily Remove & Discard patch within 12 hours or as directed by MD    lisinopril (ZESTRIL) 5 mg tablet lisinopril 5 mg tablet   TAKE BY MOUTH 1 TABLET IN THE MORNING   LORazepam (ATIVAN) 1 mg tablet Take 1 mg by mouth 3 (three) times a day    metolazone (ZAROXOLYN) 2 5 mg tablet TAKE 1 TAB BY MOUTH ONCE A DAY ON MONDAY, WEDNESDAY, AND FRIDAY ONLY      misoprostol (CYTOTEC) 200 mcg tablet Take 200 mcg by mouth 4 (four) times a day      Nuzyra 150 MG TABS TAKE 3 TABLETS BY MOUTH DAILY FOR 2 DAYS THEN TAKE 2 TABLETS DAILY FOR 8 DAYS    omeprazole (PriLOSEC) 20 mg delayed release capsule Take 20 mg by mouth daily      sucralfate (CARAFATE) 1 g tablet Take 1 g by mouth 4 (four) times a day      traMADol (ULTRAM) 50 mg tablet      [DISCONTINUED] oxyCODONE (ROXICODONE) 5 immediate release tablet Take 1 tablet (5 mg total) by mouth every 4 (four) hours as needed for moderate pain for up to 10 days Max Daily Amount: 30 mg    [DISCONTINUED] rivaroxaban (XARELTO) 20 mg tablet Take 1 tablet (20 mg total) by mouth daily with dinner    [DISCONTINUED] sildenafil (VIAGRA) 25 MG tablet sildenafil 25 mg tablet   take 1 tablet by mouth daily if needed for ERECTILE DYSFUNCTION    potassium chloride (K-DUR,KLOR-CON) 20 mEq tablet Take 2 tablets (40 mEq total) by mouth daily for 5 days    ranitidine (ZANTAC) 150 mg tablet Take 150 mg by mouth daily    [DISCONTINUED] sildenafil (VIAGRA) 25 MG tablet take 1 tablet by mouth daily if needed for ERECTILE DYSFUNCTION (Patient not taking: Reported on 4/26/2022)     No current facility-administered medications on file prior to visit  No Known Allergies      Physical Exam    /78   Pulse 98   Temp 97 7 °F (36 5 °C)   Resp 20   Ht 6' 1" (1 854 m)   Wt (!) 148 kg (325 lb 3 2 oz)   BMI 42 90 kg/m²     Constitutional: normal, well developed, well nourished, alert, in no distress and non-toxic and no overt pain behavior  and obese  Eyes: anicteric  HEENT: grossly intact  Neck: supple, symmetric, trachea midline and no masses   Pulmonary:even and unlabored  Cardiovascular:No edema or pitting edema present  Skin:Normal without rashes or lesions and well hydrated  Psychiatric:Mood and affect appropriate  Neurologic:Cranial Nerves II-XII grossly intact Sensation grossly intact; no clonus negative morton's  Reflexes 2+ and brisk  Spurling's maneuver negative bilaterally  Musculoskeletal:normal gait  5/5 strength bilaterally with AROM in upper extremities  Significant pain with right-sided cervical facet loading bilaterally and with lateral spine rotation  TTP over right-sided cervical paraspinal muscles  Negative epifanio's test, negative gaenslen's negative SIJ loading bilaterally      Imaging    Multilevel spondyloarthropathy/degenerative changes seen throughout cervical spine x-ray

## 2022-05-02 NOTE — H&P (VIEW-ONLY)
Assessment  1  Cervical radiculopathy  -     Case request operating room: BLOCK / INJECTION EPIDURAL STEROID CERVICAL C7-T1; Standing  -     Case request operating room: BLOCK / INJECTION EPIDURAL STEROID CERVICAL C7-T1    Greater than 40% relief of pain with improved ability to participate with IADLs after Right C3, C4, C5 medial branch  Nerve radiofrequency ablation performed nearly 4 weeks ago  Continues to described proximal radicular features of right-sided pain in C6 and C7 dermatomal distribution where there is moderate level of degenerative disc disease on MRI  Positive Spurling's maneuver, right greater than left-sided  Otherwise there is 5/5 strength bilaterally with active range of motion movements  Negative Jean Carlos's, reflexes 2+ and brisk  Risks, benefits alternatives to epidural steroid injections over discussed with patient  Handouts provided  And questions answered to patient's satisfaction  Previously reported the following symptomatology:     Right sided neck pain described primarily arthritic features  Has yet to begin physical therapy for his neck  Cervical facet arthropathy seen on xray cervical spine  Distribution of pain follows the right C3-C6 medial branch nerve regions  + right sided facet loading maneuvers consistent with multilevel spondyloarthropathy and osteophytes seen in cervical spine  Reasonable at this time to trial medial branch blocks to target site of cervical facet mediated pain and pursue radiofrequency ablation of successful  Risks, benefits and alternatives of procedure in conjunction with multimodal pain therapy plan thoroughly discussed with patient  Questions answered to patient's satisfaction  Plan  - C7-T1 interlaminar epidural steroid injection;  Patient off Xarelto secondary to recent left lower extremity amputation  Follows up with PCP next week to resume anticoagulation   RTC 2 weeks post procedure  -gabapentin increased to 300 mg t i d  from order previously rx for patient; counseled regarding sedative effects of taking this medication and provided up titration calendar  Counseled not to take medication while driving or operating heavy machinery/using stairs  -physical therapy for right-sided cervical facet arthropathy/radiculopathy; Physician directed home exercise plan as per [facility] demonstrated and handouts provided that patient plans to participate with for 1 hour, twice a week for the next 6 weeks  There are risks associated with opioid medications, including dependence, addiction and tolerance  The patient understands and agrees to use these medications only as prescribed  Potential side effects of the medications include, but are not limited to, constipation, drowsiness, addiction, impaired judgment and risk of fatal overdose if not taken as prescribed  The patient was warned against driving while taking sedation medications  Sharing medications is a felony  At this point in time, the patient is showing no signs of addiction, abuse, diversion or suicidal ideation  [place] Prescription Drug Monitoring Program report was reviewed and was appropriate      Complete risks and benefits including bleeding, infection, tissue reaction, nerve injury and allergic reaction were discussed  The approach was demonstrated using models and literature was provided  Verbal and written consent was obtained  My impressions and treatment recommendations were discussed in detail with the patient who verbalized understanding and had no further questions  Discharge instructions were provided  I personally saw and examined the patient and I agree with the above discussed plan of care  My impressions and treatment recommendations were discussed in detail with the patient who verbalized understanding and had no further questions  Discharge instructions were provided   I personally saw and examined the patient and I agree with the above discussed plan of care     New Medications Ordered This Visit   Medications    Nuzyra 150 MG TABS     Sig: TAKE 3 TABLETS BY MOUTH DAILY FOR 2 DAYS THEN TAKE 2 TABLETS DAILY FOR 8 DAYS       History of Present Illness    Greater than 40% relief of pain with improved ability to participate with IADLs after Right C3, C4, C5 medial branch  Nerve radiofrequency ablation performed nearly 4 weeks ago  Continues to described proximal radicular features of right-sided pain in C6 and C7 dermatomal distribution where there is moderate level of degenerative disc disease on MRI  Previously reported the following symptomatology:     Tristen Lovell is a 60 y o  male with pmhx of afib with pacemaker on xarelto, obesity, DAYAN, DM-2, HTN, depression/anxiety presenting with right-sided neck pain described primarily arthritic features  Describes progressive neck pain since November  The patient describes predominantly aching, nagging, indolent crampy, stabbing axial neck pain without radicular features of electric shock-like and shooting pain  He denies any weakness numbness and paresthesias  The pain is 8/10 nature and is worse with overhead maneuvers as well as lateral rotation and extension of the neck  The patient has not yet been to physical therapy, and has failed conservative medical management including naproxen 500 mg b i d  and gabapentin 300 mg t i d  The pain is significant source of disability and compromises independent activities of daily living as well as overall function  The patient has difficulty with sleep disturbance as well since the pain often wakes him up  Has trialed gabapentin, flexeril and tylenol as well as tramadol with limited benefit but has never trialed cervical epidural steroid injections or medial branch blocks  He denies any bowel or bladder issues/incontinence, gait instability      I have personally reviewed and/or updated the patient's past medical history, past surgical history, family history, social history, current medications, allergies, and vital signs today  Review of Systems   Constitutional: Positive for activity change  HENT: Negative  Eyes: Negative  Respiratory: Negative  Cardiovascular: Negative  Gastrointestinal: Negative  Endocrine: Negative  Genitourinary: Negative  Musculoskeletal: Positive for arthralgias, myalgias, neck pain and neck stiffness  Skin: Negative  Allergic/Immunologic: Negative  Neurological: Negative for weakness and numbness  Hematological: Negative  Psychiatric/Behavioral: Negative  All other systems reviewed and are negative        Patient Active Problem List   Diagnosis    DAYAN (obstructive sleep apnea)    Chronic diastolic heart failure (HCC)    Hypertension    [facility] mellitus [facility]    Morbid obesity (HCC)    Pain, joint, ankle and foot, left    Chronic osteomyelitis of left foot with draining sinus (HCC)    [facility]rial fibrillation ([facility]    Anxiety    Type 2 diabetes mellitus with diabetic polyneuropathy, with long-term current use of insulin (HCC)    Toe osteomyelitis, right (HCC)    Cervical spondylosis    Cervicalgia - Right    Diabetic infection of left foot ([facility] )    Pacemaker    History of bariatric surgery    Encounter for perioperative consultation    Hyperkalemia       Past Medical History:   Diagnosis Date    Atrial fibrillation ([facility] )     [facility]     Diabetes mellitus [facility]     High cholesterol     Hyperlipidemia     Hypertension     Pacemaker     Stroke [facility])        Past Surgical History:   Procedure Laterality Date    APPENDECTOMY      ATRIAL CARDIAC PACEMAKER INSERTION      BARIATRIC SURGERY  05/2021    FL GUIDED NEEDLE PLAC BX/ASP/INJ  3/22/2022    FOOT AMPUTATION Left 4/28/2022    Procedure: LEFT TRANSMETATARSAL AMPUTATION ;  Surgeon: Kel Carpenter DPM;  [facility];  Service: Podiatry    NERVE BLOCK Right 2/10/2022    Procedure: BLOCK MEDIAL BRANCH C3, C4, C5 #1;  Surgeon: Roro Preciado MD;  [facility];  Service: Pain Management     NERVE BLOCK Right 3/22/2022    Procedure: BLOCK MEDIAL BRANCH C3, C4, C5 #2;  Surgeon: Roro Preciado MD;  [facility];  Service: Pain Management     NC AMPUTATION TOE,I-P JT Left 11/16/2021    Procedure: AMPUTATION LESSER TOE;  Surgeon: Catrina Ledesma DPM;  [facility];  Service: Podiatry    RADIOFREQUENCY ABLATION Right 4/7/2022    Procedure: Right C3, C4, C5 RFA;  Surgeon: Roro Preciado MD;  [facility];  Service: Pain Management     TOE AMPUTATION Left     2nd toe    TOE AMPUTATION Left 9/15/2021    Procedure: AMPUTATION LEFT 4TH TOE;  Surgeon: Catrina Ledesma DPM;  [facility];  Service: Podiatry    TOE AMPUTATION Right 1/12/2022    Procedure: AMPUTATION TOE;  Surgeon: Jazlyn Benavides DPM;  [facility];  Service: Podiatry    TOE AMPUTATION Right 2/23/2022    Procedure: AMPUTATION TOE  RIGHT SECOND;  Surgeon: Jazlyn Benavides DPM;  Location: [facility];  Service: Podiatry       Family History   Problem Relation Age of Onset    No Known Problems Mother     No Known Problems Father        Social History     Occupational History    Occupation: Maintenance Tech     Employer: [email or web address]   Tobacco Use    Smoking status: Never Smoker    Smokeless tobacco: Never Used   Vaping Use    Vaping Use: Never used   Substance and Sexual Activity    Alcohol use: Never    Drug use: Never    Sexual activity: Not on file       Current Outpatient Medications on File Prior to Visit   Medication Sig    atorvastatin (LIPITOR) 40 mg tablet Take 40 mg by mouth daily      busPIRone (BUSPAR) 5 mg tablet TAKE BY MOUTH 1 TABLET IN THE MORNING AND 1 TABLET BEFORE BEDTIME      doxycycline hyclate (VIBRAMYCIN) 100 mg capsule Take 1 capsule (100 mg total) by mouth every 12 (twelve) hours for 5 days    FLUoxetine (PROzac) 40 MG capsule     furosemide (LASIX) 40 mg tablet Take 40 mg by mouth 2 (two) times a day    gabapentin (NEURONTIN) 300 mg capsule TAKE 1 CAPSULE BY MOUTH THREE TIMES A DAY    gentamicin (GARAMYCIN) 0 1 % cream gentamicin 0 1 % topical cream   APPLY 1 GRAM BY TOPICAL ROUTE TO THE AFFECTED AREA 3 TIMES A DAY FOR 30 DAYS    hydrOXYzine HCL (ATARAX) 25 mg tablet Take 25 mg by mouth 4 (four) times a day as needed      lidocaine (Lidoderm) 5 % Apply 1 patch topically daily Remove & Discard patch within 12 hours or as directed by MD    lisinopril (ZESTRIL) 5 mg tablet lisinopril 5 mg tablet   TAKE BY MOUTH 1 TABLET IN THE MORNING   LORazepam (ATIVAN) 1 mg tablet Take 1 mg by mouth 3 (three) times a day    metolazone (ZAROXOLYN) 2 5 mg tablet TAKE 1 TAB BY MOUTH ONCE A DAY ON MONDAY, WEDNESDAY, AND FRIDAY ONLY      misoprostol (CYTOTEC) 200 mcg tablet Take 200 mcg by mouth 4 (four) times a day      Nuzyra 150 MG TABS TAKE 3 TABLETS BY MOUTH DAILY FOR 2 DAYS THEN TAKE 2 TABLETS DAILY FOR 8 DAYS    omeprazole (PriLOSEC) 20 mg delayed release capsule Take 20 mg by mouth daily      sucralfate (CARAFATE) 1 g tablet Take 1 g by mouth 4 (four) times a day      traMADol (ULTRAM) 50 mg tablet      [DISCONTINUED] oxyCODONE (ROXICODONE) 5 immediate release tablet Take 1 tablet (5 mg total) by mouth every 4 (four) hours as needed for moderate pain for up to 10 days Max Daily Amount: 30 mg    [DISCONTINUED] rivaroxaban (XARELTO) 20 mg tablet Take 1 tablet (20 mg total) by mouth daily with dinner    [DISCONTINUED] sildenafil (VIAGRA) 25 MG tablet sildenafil 25 mg tablet   take 1 tablet by mouth daily if needed for ERECTILE DYSFUNCTION    potassium chloride (K-DUR,KLOR-CON) 20 mEq tablet Take 2 tablets (40 mEq total) by mouth daily for 5 days    ranitidine (ZANTAC) 150 mg tablet Take 150 mg by mouth daily    [DISCONTINUED] sildenafil (VIAGRA) 25 MG tablet take 1 tablet by mouth daily if needed for ERECTILE DYSFUNCTION (Patient not taking: Reported on 4/26/2022)     No current facility-administered medications on file prior to visit  No Known Allergies      Physical Exam    /78   Pulse 98   Temp 97 7 °F (36 5 °C)   Resp 20   Ht 6' 1" (1 854 m)   Wt (!) 148 kg (325 lb 3 2 oz)   BMI 42 90 kg/m²     Constitutional: normal, well developed, well nourished, alert, in no distress and non-toxic and no overt pain behavior  and obese  Eyes: anicteric  HEENT: grossly intact  Neck: supple, symmetric, trachea midline and no masses   Pulmonary:even and unlabored  Cardiovascular:No edema or pitting edema present  Skin:Normal without rashes or lesions and well hydrated  Psychiatric:Mood and affect appropriate  Neurologic:Cranial Nerves II-XII grossly intact Sensation grossly intact; no clonus negative morton's  Reflexes 2+ and brisk  Spurling's maneuver negative bilaterally  Musculoskeletal:normal gait  5/5 strength bilaterally with AROM in upper extremities  Significant pain with right-sided cervical facet loading bilaterally and with lateral spine rotation  TTP over right-sided cervical paraspinal muscles  Negative epifanio's test, negative gaenslen's negative SIJ loading bilaterally      Imaging    Multilevel spondyloarthropathy/degenerative changes seen throughout cervical spine x-ray

## 2022-05-02 NOTE — PATIENT INSTRUCTIONS
Neck Exercises   AMBULATORY CARE:   Neck exercises  help reduce neck pain, and improve neck movement and strength  Neck exercises also help prevent long-term neck problems  What you need to know about neck exercises:   · Do the exercises every day,  or as often as directed by your healthcare provider  · Move slowly, gently, and smoothly  Avoid fast or jerky motions  · Stand and sit the way your healthcare provider shows you  Good posture may reduce your neck pain  Check your posture often, even when you are not doing your neck exercises  How to perform neck exercises safely:   · Exercise position:  You may sit or stand while you do neck exercises  Face forward  Your shoulders should be straight and relaxed, with a good posture  · Head tilts, forward and back:  Gently bow your head and try to touch your chin to your chest  Your healthcare provider may tell you to push on the back of your neck to help bow your head  Raise your chin back to the starting position  Tilt your head back as far as possible so you are looking up at the ceiling  Your healthcare provider may tell you to lift your chin to help tilt your head back  Return your head to the starting position  · Head tilts, side to side:  Tilt your head, bringing your ear toward your shoulder  Then tilt your head toward the other shoulder  · Head turns:  Turn your head to look over your shoulder  Tilt your chin down and try to touch it to your shoulder  Do not raise your shoulder to your chin  Face forward again  Do the same on the other side  · Head rolls:  Slowly bring your chin toward your chest  Next, roll your head to the right  Your ear should be positioned over your shoulder  Hold this position for 5 seconds  Roll your head back toward your chest and to the left into the same position  Hold for 5 seconds  Gently roll your head back and around in a clockwise [place] 3 times   Next, move your head in the reverse direction (counterclockwise) in a [place] 3 times  Do not shrug your shoulders upwards while you do this exercise  Contact your healthcare provider if:   · Your pain does not get better, or gets worse  · You have questions or concerns about your condition, care, or exercise program     © Copyright [employer] 2022 Information is for End User's use only and may not be sold, redistributed or otherwise used for commercial purposes  All illustrations and images included in CareNotes® are the copyrighted property of A D A [employer] Inc  or [facility] Mitch Mason   The above information is an  only  It is not intended as medical advice for individual conditions or treatments  Talk to your doctor, nurse or pharmacist before following any medical regimen to see if it is safe and effective for you

## 2022-05-05 NOTE — UTILIZATION REVIEW
Notification of Discharge   This is a Notification of Discharge from our facility [address]  Please be advised that this patient has been discharge from our facility  Below you will find the admission and discharge date and time including the patients disposition  UTILIZATION REVIEW CONTACT:  Yariel Hathaway  Utilization   Network Utilization Review Department  Phone: [phone] x carefully listen to the prompts  All voicemails are confidential   Email: [email or web address]     PHYSICIAN ADVISORY SERVICES:  FOR YGUE-GI-RIIC REVIEW - MEDICAL NECESSITY DENIAL  Phone: [phone]  Fax: [phone]  Email: [email or web address]     PRESENTATION DATE: 4/26/2022  7:18 PM  OBERVATION ADMISSION DATE: n/a  INPATIENT ADMISSION DATE: 4/26/22  7:18 PM   DISCHARGE DATE: 4/29/2022  7:00 PM  DISPOSITION: Home/Self Care Home/Self Care      IMPORTANT INFORMATION:  Send all requests for admission clinical reviews, approved or denied determinations and any other requests to dedicated fax number below belonging to the campus where the patient is receiving treatment   List of dedicated fax numbers:  [address] DENIALS (Administrative/Medical Necessity) [phone]   [address] (Maternity/NICU/Pediatrics) [phone]   Heidi Luevano [phone]   Anna Marie Macias [phone]   [address] [place] [address] [phone]   [address] [phone]   [facility]  [phone]   [address] [place] [address] [phone]

## 2022-05-09 ENCOUNTER — TELEPHONE (OUTPATIENT)
Dept: PAIN MEDICINE | Facility: CLINIC | Age: 57
End: 2022-05-09

## 2022-05-09 NOTE — TELEPHONE ENCOUNTER
Patient left me a message stating he had foot surgery last week and is now having complications  He requested to cancel procedure  I called back and left message stating procedure has been removed from schedule as well as follow-up appt  Advised patient to call back when ready to re-schedule

## 2022-05-10 ENCOUNTER — TELEPHONE (OUTPATIENT)
Dept: PAIN MEDICINE | Facility: CLINIC | Age: 57
End: 2022-05-10

## 2022-05-10 NOTE — TELEPHONE ENCOUNTER
Patient reached out to r/s procedure  Procedure r/s to 5/19/2022  Patient aware of instructions  No food/drink one hour prior  Wear comfortable clothing  A  is required  Denies blood thinners  Continue medications  Call if prescribed antibiotics  Refrain from vaccinations  Call with questions

## 2022-05-19 ENCOUNTER — HOSPITAL ENCOUNTER (OUTPATIENT)
Facility: HOSPITAL | Age: 57
Setting detail: OUTPATIENT SURGERY
Discharge: HOME | End: 2022-05-19
Attending: ANESTHESIOLOGY | Admitting: ANESTHESIOLOGY
Payer: COMMERCIAL

## 2022-05-19 ENCOUNTER — APPOINTMENT (OUTPATIENT)
Dept: RADIOLOGY | Facility: HOSPITAL | Age: 57
End: 2022-05-19
Payer: COMMERCIAL

## 2022-05-19 VITALS
TEMPERATURE: 98 F | BODY MASS INDEX: 41.75 KG/M2 | HEART RATE: 70 BPM | DIASTOLIC BLOOD PRESSURE: 60 MMHG | SYSTOLIC BLOOD PRESSURE: 108 MMHG | WEIGHT: 315 LBS | OXYGEN SATURATION: 94 % | HEIGHT: 73 IN | RESPIRATION RATE: 18 BRPM

## 2022-05-19 LAB — GLUCOSE SERPL-MCNC: 94 MG/DL (ref 65–140)

## 2022-05-19 PROCEDURE — 82948 REAGENT STRIP/BLOOD GLUCOSE: CPT

## 2022-05-19 PROCEDURE — 62321 NJX INTERLAMINAR CRV/THRC: CPT | Performed by: ANESTHESIOLOGY

## 2022-05-19 RX ORDER — METHYLPREDNISOLONE ACETATE 80 MG/ML
80 INJECTION, SUSPENSION INTRA-ARTICULAR; INTRALESIONAL; INTRAMUSCULAR; PARENTERAL; SOFT TISSUE ONCE
Status: COMPLETED | OUTPATIENT
Start: 2022-05-19 | End: 2022-05-19

## 2022-05-19 RX ORDER — LIDOCAINE HYDROCHLORIDE 10 MG/ML
5 INJECTION, SOLUTION EPIDURAL; INFILTRATION; INTRACAUDAL; PERINEURAL ONCE
Status: COMPLETED | OUTPATIENT
Start: 2022-05-19 | End: 2022-05-19

## 2022-05-19 RX ORDER — ALPRAZOLAM 0.5 MG/1
0.5 TABLET ORAL ONCE
Status: COMPLETED | OUTPATIENT
Start: 2022-05-19 | End: 2022-05-19

## 2022-05-19 RX ORDER — 0.9 % SODIUM CHLORIDE 0.9 %
1 VIAL (ML) INJECTION ONCE
Status: COMPLETED | OUTPATIENT
Start: 2022-05-19 | End: 2022-05-19

## 2022-05-19 RX ADMIN — ALPRAZOLAM 0.5 MG: 0.5 TABLET ORAL at 08:50

## 2022-05-19 NOTE — OP NOTE
OPERATIVE REPORT  PATIENT NAME: Kurt Sierra    :  1965  MRN: [id number]  Pt Location:  GI ROOM 01    SURGERY DATE: 2022    Surgeon(s) and Role: Muna Rashid MD - Primary    Preop Diagnosis:  Cervical radiculopathy [M54 12]    Post-Op Diagnosis Codes:     * Cervical radiculopathy [M54 12]    Procedure(s) (LRB):  BLOCK / INJECTION EPIDURAL STEROID CERVICAL C7-T1 (N/A)    Specimen(s):  * No specimens in log *    Estimated Blood Loss:   Minimal    Drains:  * No LDAs found *    Anesthesia Type:   Local    Operative Indications:  Cervical radiculopathy [M54 12]    Operative Findings:  C7-T1 epidural space accessed under fluoroscopic guidance      Complications:   None    Procedure and Technique:  Please see detailed procedure note     I was present for the entire procedure    Patient Disposition:  PACU       SIGNATURE: Hakan Cruz MD  DATE: May 19, 2022  TIME: 9:24 AM

## 2022-05-19 NOTE — DISCHARGE INSTRUCTIONS
YOUR 2 WEEK FOLLOW UP HAS BEEN SCHEDULED; IF YOU WISH TO CHANGE THE FOLLOW UP, PLEASE CALL THE [facility] AT [place]: [phone]    Epidural Steroid Injection   AMBULATORY CARE:   What you need to know about an epidural steroid injection (NII):  An NII is a procedure to inject steroid medicine into the epidural space  The epidural space is between your spinal cord and vertebrae  Steroids reduce inflammation and fluid buildup in your spine that may be causing pain  You may be given pain medicine along with the steroids  How to prepare for an NII:  Your healthcare provider will talk to you about how to prepare for your procedure  He or she will tell you what medicines to take or not take on the day of your procedure  You may need to stop taking blood thinners or other medicines several days before your procedure  You may need to adjust any diabetes medicine you take on the day of your procedure  Steroid medicine can increase your blood sugar level  Arrange for someone to drive you home when you are discharged  What will happen during an NII:   You will be given medicine to numb the procedure area  You will be awake for the procedure, but you will not feel pain  You may also be given medicine to help you relax  Contrast liquid will be used to help your healthcare provider see the area better  Tell the healthcare provider if you have ever had an allergic reaction to contrast liquid  Your healthcare provider may place the needle into your neck area, middle of your back, or tailbone area  He may inject the medicine next to the nerves that are causing your pain  He may instead inject the medicine into a larger area of the epidural space  This helps the medicine spread to more nerves  Your healthcare provider will use a fluoroscope to help guide the needle to the right place  A fluoroscope is a type of x-ray   After the procedure, a bandage will be placed over the injection site to prevent infection  What will happen after an NII:  You will have a bandage over the injection site to prevent infection  Your healthcare provider will tell you when you can bathe and any activity guidelines  You will be able to go home  Risks of an NII:  You may have temporary or permanent nerve damage or paralysis  You may have bleeding or develop a serious infection, such as meningitis (swelling of the brain coverings)  An abscess may also develop  An abscess is a pus-filled area under the skin  You may need surgery to fix the abscess  You may have a seizure, anxiety, or trouble sleeping  If you are a man, you may have temporary erectile dysfunction (not able to have an erection)  Call your local emergency number (911 in the [address]) if:   You have a seizure  You have trouble moving your legs  Seek care immediately if:   Blood soaks through your bandage  You have a fever or chills, severe back pain, and the procedure area is sensitive to the touch  You cannot control when you urinate or have a bowel movement  Call your doctor if:   You have weakness or numbness in your legs  Your wound is red, swollen, or draining pus  You have nausea or are vomiting  Your face or neck is red and you feel warm  You have more pain than you had before the procedure  You have swelling in your hands or feet  You have questions or concerns about your condition or care  Care for your wound as directed: You may remove the bandage before you go to bed the day of your procedure  You may take a shower, but do not take a bath for at least 24 hours  Self-care:   Do not drive,  use machines, or do strenuous activity for 24 hours after your procedure or as directed  Continue other treatments  as directed  Steroid injections alone will not control your pain  The injections are meant to be used with other treatments, such as physical therapy      Follow up with your doctor as directed:  Write down your questions so you remember to ask them during your visits  © Copyright [employer] 2022 Information is for End User's use only and may not be sold, redistributed or otherwise used for commercial purposes  All illustrations and images included in CareNotes® are the copyrighted property of A D A M , Inc  or Eli Tanner  The above information is an  only  It is not intended as medical advice for individual conditions or treatments  Talk to your doctor, nurse or pharmacist before following any medical regimen to see if it is safe and effective for you

## 2022-05-19 NOTE — INTERVAL H&P NOTE
H&P reviewed  After examining the patient I find no changes in the patients condition since the H&P had been written      Vitals:    05/19/22 0847   BP: 121/61   Pulse: 75   Resp: 18   Temp: 98 °F (36 7 °C)   SpO2: 95%

## 2022-05-19 NOTE — PROCEDURES
Pre-procedure Diagnosis: Cervical Radiculopathy  Post-procedure Diagnosis: Cervical Radiculopathy  Procedure Title(s):  1  C7-T1 interlaminar epidural steroid injection      2  Intraoperative fluoroscopy  Attending Surgeon:   Patricio Hammer MD  Anesthesia:   Local     Indications: The patient is a 60y o  year-old male with a diagnosis of Cervical Radiculopathy  The patient's history and physical exam were reviewed  The risks, benefits and alternatives to the procedure were discussed, and all questions were answered to the patient's satisfaction  The patient agreed to proceed, and written informed consent was obtained  Procedure in Detail: The patient was brought into the procedure room and placed in the prone position on the fluoroscopy table  The area of the cervical spine was prepped with chlorhexidine gluconate solution times one and draped in a sterile manner  The C7-T1 interspace was identified and marked under AP fluoroscopy  The skin and subcutaneous tissues in the area were anesthetized with 1% lidocaine  A 18-gauge Tuohy epidural needle was directed toward the interspace under fluoroscopic guidance until the ligamentum flavum was engaged  The C-arm was oblique to the right to obtain a contra-lateral oblique view  From this point, a loss of resistance technique with saline was used to identify entrance of the needle into the epidural space  Once an appropriate loss was obtained, negative aspiration was confirmed  Then, after negative aspiration, a solution consisting of 1-mL depo-medrol (80mg/mL) and 2-mL preservative-free saline was easily injected  The needle was removed with a 1% lidocaine flush  The patient's back was cleaned and a bandage was placed over the site of needle insertion  Disposition: The patient tolerated the procedure well, and there were no apparent complications  The patient was taken to the recovery area where written discharge instructions for the procedure were given  Estimated Blood Loss: None  Specimens Obtained: N/A

## 2022-05-26 ENCOUNTER — TELEPHONE (OUTPATIENT)
Dept: PAIN MEDICINE | Facility: CLINIC | Age: 57
End: 2022-05-26

## 2022-06-02 ENCOUNTER — HOSPITAL ENCOUNTER (INPATIENT)
Facility: HOSPITAL | Age: 57
LOS: 2 days | Discharge: HOME | DRG: 617 | End: 2022-06-04
Attending: PODIATRIST | Admitting: PODIATRIST
Payer: COMMERCIAL

## 2022-06-02 ENCOUNTER — APPOINTMENT (INPATIENT)
Dept: RADIOLOGY | Facility: HOSPITAL | Age: 57
DRG: 617 | End: 2022-06-02
Payer: COMMERCIAL

## 2022-06-02 DIAGNOSIS — Z01.818 PREOPERATIVE CLEARANCE: ICD-10-CM

## 2022-06-02 DIAGNOSIS — E11.42 TYPE 2 DIABETES MELLITUS WITH DIABETIC POLYNEUROPATHY, WITH LONG-TERM CURRENT USE OF INSULIN (HCC): ICD-10-CM

## 2022-06-02 DIAGNOSIS — Z95.0 PACEMAKER: Primary | ICD-10-CM

## 2022-06-02 DIAGNOSIS — I48.91 ATRIAL FIBRILLATION, UNSPECIFIED TYPE (HCC): ICD-10-CM

## 2022-06-02 DIAGNOSIS — M86.9 TOE OSTEOMYELITIS, RIGHT (HCC): ICD-10-CM

## 2022-06-02 DIAGNOSIS — Z91.89 INCREASED INFECTION RISK: ICD-10-CM

## 2022-06-02 DIAGNOSIS — Z79.4 TYPE 2 DIABETES MELLITUS WITH DIABETIC POLYNEUROPATHY, WITH LONG-TERM CURRENT USE OF INSULIN (HCC): ICD-10-CM

## 2022-06-02 PROBLEM — M86.171 ACUTE OSTEOMYELITIS OF TOE OF RIGHT FOOT (HCC): Status: ACTIVE | Noted: 2022-06-02

## 2022-06-02 LAB
ALBUMIN SERPL BCP-MCNC: 2.9 G/DL (ref 3.5–5)
ALP SERPL-CCNC: 81 U/L (ref 46–116)
ALT SERPL W P-5'-P-CCNC: 22 U/L (ref 12–78)
ANION GAP SERPL CALCULATED.3IONS-SCNC: 6 MMOL/L (ref 4–13)
AST SERPL W P-5'-P-CCNC: 19 U/L (ref 5–45)
BASOPHILS # BLD AUTO: 0.04 THOUSANDS/ΜL (ref 0–0.1)
BASOPHILS NFR BLD AUTO: 0 % (ref 0–1)
BILIRUB SERPL-MCNC: 0.46 MG/DL (ref 0.2–1)
BUN SERPL-MCNC: 14 MG/DL (ref 5–25)
CALCIUM ALBUM COR SERPL-MCNC: 9.2 MG/DL (ref 8.3–10.1)
CALCIUM SERPL-MCNC: 8.3 MG/DL (ref 8.3–10.1)
CHLORIDE SERPL-SCNC: 106 MMOL/L (ref 100–108)
CO2 SERPL-SCNC: 29 MMOL/L (ref 21–32)
CREAT SERPL-MCNC: 0.8 MG/DL (ref 0.6–1.3)
EOSINOPHIL # BLD AUTO: 0.44 THOUSAND/ΜL (ref 0–0.61)
EOSINOPHIL NFR BLD AUTO: 4 % (ref 0–6)
ERYTHROCYTE [DISTWIDTH] IN BLOOD BY AUTOMATED COUNT: 14.2 % (ref 11.6–15.1)
GFR SERPL CREATININE-BSD FRML MDRD: 98 ML/MIN/1.73SQ M
GLUCOSE SERPL-MCNC: 100 MG/DL (ref 65–140)
GLUCOSE SERPL-MCNC: 90 MG/DL (ref 65–140)
GLUCOSE SERPL-MCNC: 90 MG/DL (ref 65–140)
HCT VFR BLD AUTO: 41.4 % (ref 36.5–49.3)
HGB BLD-MCNC: 13.2 G/DL (ref 12–17)
IMM GRANULOCYTES # BLD AUTO: 0.03 THOUSAND/UL (ref 0–0.2)
IMM GRANULOCYTES NFR BLD AUTO: 0 % (ref 0–2)
LYMPHOCYTES # BLD AUTO: 1.87 THOUSANDS/ΜL (ref 0.6–4.47)
LYMPHOCYTES NFR BLD AUTO: 18 % (ref 14–44)
MCH RBC QN AUTO: 29 PG (ref 26.8–34.3)
MCHC RBC AUTO-ENTMCNC: 31.9 G/DL (ref 31.4–37.4)
MCV RBC AUTO: 91 FL (ref 82–98)
MONOCYTES # BLD AUTO: 1.07 THOUSAND/ΜL (ref 0.17–1.22)
MONOCYTES NFR BLD AUTO: 11 % (ref 4–12)
NEUTROPHILS # BLD AUTO: 6.76 THOUSANDS/ΜL (ref 1.85–7.62)
NEUTS SEG NFR BLD AUTO: 67 % (ref 43–75)
NRBC BLD AUTO-RTO: 0 /100 WBCS
PLATELET # BLD AUTO: 244 THOUSANDS/UL (ref 149–390)
PMV BLD AUTO: 9.9 FL (ref 8.9–12.7)
POTASSIUM SERPL-SCNC: 3.8 MMOL/L (ref 3.5–5.3)
PROT SERPL-MCNC: 6.7 G/DL (ref 6.4–8.2)
RBC # BLD AUTO: 4.55 MILLION/UL (ref 3.88–5.62)
SODIUM SERPL-SCNC: 141 MMOL/L (ref 136–145)
WBC # BLD AUTO: 10.21 THOUSAND/UL (ref 4.31–10.16)

## 2022-06-02 PROCEDURE — 82948 REAGENT STRIP/BLOOD GLUCOSE: CPT

## 2022-06-02 PROCEDURE — 85025 COMPLETE CBC W/AUTO DIFF WBC: CPT | Performed by: STUDENT IN AN ORGANIZED HEALTH CARE EDUCATION/TRAINING PROGRAM

## 2022-06-02 PROCEDURE — 73630 X-RAY EXAM OF FOOT: CPT

## 2022-06-02 PROCEDURE — 80053 COMPREHEN METABOLIC PANEL: CPT | Performed by: STUDENT IN AN ORGANIZED HEALTH CARE EDUCATION/TRAINING PROGRAM

## 2022-06-02 RX ORDER — HEPARIN SODIUM 5000 [USP'U]/ML
5000 INJECTION, SOLUTION INTRAVENOUS; SUBCUTANEOUS EVERY 8 HOURS SCHEDULED
Status: DISCONTINUED | OUTPATIENT
Start: 2022-06-02 | End: 2022-06-04 | Stop reason: HOSPADM

## 2022-06-02 RX ORDER — INSULIN LISPRO 100 [IU]/ML
2-12 INJECTION, SOLUTION INTRAVENOUS; SUBCUTANEOUS
Status: DISCONTINUED | OUTPATIENT
Start: 2022-06-02 | End: 2022-06-04 | Stop reason: HOSPADM

## 2022-06-02 RX ORDER — OXYCODONE HYDROCHLORIDE 5 MG/1
5 TABLET ORAL EVERY 4 HOURS PRN
Status: DISCONTINUED | OUTPATIENT
Start: 2022-06-02 | End: 2022-06-04 | Stop reason: HOSPADM

## 2022-06-02 RX ORDER — CEFAZOLIN SODIUM 2 G/50ML
2000 SOLUTION INTRAVENOUS EVERY 8 HOURS
Status: DISCONTINUED | OUTPATIENT
Start: 2022-06-02 | End: 2022-06-04 | Stop reason: HOSPADM

## 2022-06-02 RX ADMIN — HEPARIN SODIUM 5000 UNITS: 5000 INJECTION INTRAVENOUS; SUBCUTANEOUS at 21:59

## 2022-06-02 RX ADMIN — CEFAZOLIN SODIUM 2000 MG: 2 SOLUTION INTRAVENOUS at 22:39

## 2022-06-02 RX ADMIN — OXYCODONE HYDROCHLORIDE 5 MG: 5 TABLET ORAL at 21:59

## 2022-06-02 RX ADMIN — HEPARIN SODIUM 5000 UNITS: 5000 INJECTION INTRAVENOUS; SUBCUTANEOUS at 17:14

## 2022-06-02 NOTE — H&P
Podiatry - H&P  Cris Burgos 60 y o  male MRN: [id number]  Unit/Bed#: E5 -01 Encounter: [id number]  Admission Date: 06/02/22    ASSESSMENT:    Cris Burgos is a 60 y o  male with:    1  Right 4th digit wound with cellulitis - Rodriguez 3  2  Right hallux eschar  3  Healing left TMA incision  - s/p left TMA 4/28/22  4  T2DM  5  A-Fib  6  DAYAN    PLAN:    · Patient to be admitted under podiatry service of Dr Mi Gutierrez for right 4th toe amputation and IV antibiotics  · B/l foot XR ordered to assess for underlying pathology  · Wound care orders placed, appreciate nursing assistance with local care to left foot and right hallux  · Medicine consult for medical co-management and pre-operative risk assessment  · Continuation of all home medications aside from oral hyperglycemics  · Plan discussed with Dr Ashley Toscano started: Ancef  Pharmacologic VTE Prophylaxis: Heparin   Mechanical VTE Prophylaxis: sequential compression device   Weightbearing status:  NWB LLE    Disposition:  Patient requires >2 midnight stay for reasons stated above  SUBJECTIVE:    History of Present Illness:    Cris Burgos is a 60 y o  male who is being admitted 6/2/2022 due to an infected right 4th toe wound  Patient has a past medical history of T2DM, Afib, DAYAN, multiple digital and foot amputations  Patient reports that over the past week his toe became more painful and swollen  He states that he follows with Dr Mi Gutierrez weekly for care of his left TMA  During his last visit he noted that his right 4th digit "felt like someone was pulling my toe backwards " Dr Mi Gutierrez recommended he be admitted and have a 4th toe amputation given signs of infection and tissue loss  He has been applying antibiotic ointment and a bandage to his right 4th toe daily  Patient denies nausea, vomiting, chest pain, shortness of breath, chills, fever  Review of Systems:    Constitutional: Negative  HENT: Negative  Eyes: Negative  Respiratory: Negative  Cardiovascular: Negative  Gastrointestinal: Negative  Musculoskeletal: Right 2/3 partial digit amps, left foot TMA   Skin: Healing left foot TMA incision, right foot wound   Neurological: Parasthesias  Psych: Negative       Past Medical and Surgical History:     Past Medical History:   Diagnosis Date    Atrial fibrillation ([facility] [address] )     Chronic diastolic (congestive) heart failure (HCC)     Diabetes mellitus ([facility] [address] )     High cholesterol     Hyperlipidemia     Hypertension     Pacemaker     Stroke [facility])        Past Surgical History:   Procedure Laterality Date    APPENDECTOMY      ATRIAL CARDIAC PACEMAKER INSERTION      BARIATRIC SURGERY  05/2021    EPIDURAL BLOCK INJECTION N/A 5/19/2022    Procedure: BLOCK / INJECTION EPIDURAL STEROID CERVICAL C7-T1;  Surgeon: Yair Stoll MD;  [facility];  Service: Pain Management     FL GUIDED NEEDLE PLAC BX/ASP/INJ  3/22/2022    FOOT AMPUTATION Left 4/28/2022    Procedure: LEFT TRANSMETATARSAL AMPUTATION ;  Surgeon: Braulio Horvath DPM;  [facility];  Service: Podiatry    NERVE BLOCK Right 2/10/2022    Procedure: BLOCK MEDIAL BRANCH C3, C4, C5 #1;  Surgeon: Yair Stoll MD;  [facility];  Service: Pain Management     NERVE BLOCK Right 3/22/2022    Procedure: BLOCK MEDIAL BRANCH C3, C4, C5 #2;  Surgeon: Yair Stoll MD;  [facility];  Service: Pain Management     IN AMPUTATION TOE,I-P JT Left 11/16/2021    Procedure: AMPUTATION LESSER TOE;  Surgeon: Martina Matos DPM;  [facility];  Service: [place] Right 4/7/2022    Procedure: Right C3, C4, C5 RFA;  Surgeon: Yair Stoll MD;  [facility];  Service: Pain Management     TOE AMPUTATION Left     2nd toe    TOE AMPUTATION Left 9/15/2021    Procedure: AMPUTATION LEFT 4TH TOE;  Surgeon: Martina Matos DPM;  [facility];  Service: Podiatry    TOE AMPUTATION Right 1/12/2022 Procedure: AMPUTATION TOE;  Surgeon: Kadie Severino DPM;  [facility];  Service: Podiatry    TOE AMPUTATION Right 2/23/2022    Procedure: AMPUTATION TOE  RIGHT SECOND;  Surgeon: Kadie Severino DPM;  Location: [address] [place] [address] MAIN OR;  Service: Podiatry       Meds/Allergies:    Medications Prior to Admission   Medication    busPIRone (BUSPAR) 5 mg tablet    FLUoxetine (PROzac) 40 MG capsule    gabapentin (NEURONTIN) 300 mg capsule    gentamicin (GARAMYCIN) 0 1 % cream    hydrOXYzine HCL (ATARAX) 25 mg tablet    lisinopril (ZESTRIL) 5 mg tablet    lidocaine (Lidoderm) 5 %    metolazone (ZAROXOLYN) 2 5 mg tablet    misoprostol (CYTOTEC) 200 mcg tablet    Nuzyra 150 MG TABS    oxyCODONE-acetaminophen (PERCOCET) 5-325 mg per tablet    potassium chloride (K-DUR,KLOR-CON) 20 mEq tablet    Regranex 0 01 % gel    sucralfate (CARAFATE) 1 g tablet    traMADol (ULTRAM) 50 mg tablet       No Known Allergies    Social History:    Social History     Marital Status: /Civil Union    Substance Use History:   Social History     Substance and Sexual Activity   Alcohol Use Never     Social History     Tobacco Use   Smoking Status Never Smoker   Smokeless Tobacco Never Used     Social History     Substance and Sexual Activity   Drug Use Never       Family History:    Family History   Problem Relation Age of Onset    No Known Problems Mother     No Known Problems Father          OBJECTIVE:    First Vitals:   Blood Pressure: 116/72 (06/02/22 1515)  Pulse: 81 (06/02/22 1515)  Temperature: 97 8 °F (36 6 °C) (06/02/22 1515)  SpO2: 96 % (06/02/22 1515)    Current Vitals:   Blood Pressure: 116/72 (06/02/22 1515)  Pulse: 81 (06/02/22 1515)  Temperature: 97 8 °F (36 6 °C) (06/02/22 1515)  SpO2: 96 % (06/02/22 1515)    Physical Exam    General Appearance: Alert, cooperative, no distress  HEENT: Head normocephalic, atraumatic, without obvious abnormality  Heart: Normal rate and rhythm  No murmurs or gallops noted    Lungs: Non-labored breathing  No respiratory distress  No wheezing, rhonchi, or rales  Abdomen:  Soft, nontender, without distension  Psychiatric: AAOx3  Lower Extremity:    Vascular:   DP: Right: 2+ Left: 2+  PT: Right: 2+ Left: 2+  CRT < 3 seconds at the digits  +2/4 edema noted at bilateral lower extremities  Pedal hair is absent  Skin temperature is WNL bilaterally  Musculoskeletal:  MMT is 5/5 in all muscle compartments bilaterally  ROM at the ankle joint is wnl with the leg extended  Pain on palpation of right 4th digit globally  Right 2nd/3rd partial digit amps, left foot TMA    Dermatological:    Left TMA incision with overlying eschar and some pinpoint areas of superficial dehiscence  No deep probing  No sinus tracts and without signs of active infection: no purulence, no malodor, no ascending erythema, no crepitus, no fluctuance  Right distal tip hallux eschar without signs of active infection: no purulence, no malodor, no ascending erythema, no crepitus, no fluctuance  Lower extremity wound(s) as noted below:    Wound #: 1  Location: distal right 4th toe  Length 1cm: Width 1cm: Depth 0 2cm:   Deepest Tissue Noted in Base: subq  Probe to Bone: No  Peripheral Skin Description: Attached  Granulation: 0% Fibrotic Tissue: 100% Necrotic Tissue: 0%   Drainage Amount: minimal, serosanguinous  Signs of Infection: Yes      Neurological:  Gross sensation is diminished  Light touch is absent  Protective sensation is absent      Clinical Images 06/02/22:              Additional Data:    Lab Results:   Admission on 06/02/2022   Component Date Value    WBC 06/02/2022 10 21 (A)    RBC 06/02/2022 4 55     Hemoglobin 06/02/2022 13 2     Hematocrit 06/02/2022 41 4     MCV 06/02/2022 91     MCH 06/02/2022 29 0     MCHC 06/02/2022 31 9     RDW 06/02/2022 14 2     MPV 06/02/2022 9 9     Platelets 61/30/3064 244     nRBC 06/02/2022 0     Neutrophils Relative 06/02/2022 67     Immat GRANS % 06/02/2022 0     Lymphocytes Relative 06/02/2022 18     Monocytes Relative 06/02/2022 11     Eosinophils Relative 06/02/2022 4     Basophils Relative 06/02/2022 0     Neutrophils Absolute 06/02/2022 6 76     Immature Grans Absolute 06/02/2022 0 03     Lymphocytes Absolute 06/02/2022 1 87     Monocytes Absolute 06/02/2022 1 07     Eosinophils Absolute 06/02/2022 0 44     Basophils Absolute 06/02/2022 0 04     POC Glucose 06/02/2022 100        Cultures:            Imaging: I have personally reviewed pertinent reports in PACS  No results found  EKG, Pathology, and Other Studies: I have personally reviewed pertinent reports  Code Status: Level 1 - Full Code  Time Spent for Care: 30 minutes  More than 50% of total time spent on counseling and coordination of care as described above

## 2022-06-02 NOTE — PLAN OF CARE
Problem: Potential for Falls  Goal: Patient will remain free of falls  Description: INTERVENTIONS:  - Educate patient/family on patient safety including physical limitations  - Instruct patient to call for assistance with activity   - Consult OT/PT to assist with strengthening/mobility   - Keep Call bell within reach  - Keep bed low and locked with side rails adjusted as appropriate  - Keep care items and personal belongings within reach  - Initiate and maintain comfort rounds  - Make Fall Risk Sign visible to staff    - Obtain necessary fall risk management equipment: none  - Apply yellow socks and bracelet for high fall risk patients  - Consider moving patient to room near nurses station  Outcome: Progressing     Problem: PAIN - ADULT  Goal: Verbalizes/displays adequate comfort level or baseline comfort level  Description: Interventions:  - Encourage patient to monitor pain and request assistance  - Assess pain using appropriate pain scale  - Administer analgesics based on type and severity of pain and evaluate response  - Implement non-pharmacological measures as appropriate and evaluate response  - Consider cultural and social influences on pain and pain management  - Notify physician/advanced practitioner if interventions unsuccessful or patient reports new pain  Outcome: Progressing     Problem: INFECTION - ADULT  Goal: Absence or prevention of progression during hospitalization  Description: INTERVENTIONS:  - Assess and monitor for signs and symptoms of infection  - Monitor lab/diagnostic results  - Monitor all insertion sites, i e  indwelling lines, tubes, and drains  - Monitor endotracheal if appropriate and nasal secretions for changes in amount and color  - [place] appropriate cooling/warming therapies per order  - Administer medications as ordered  - Instruct and encourage patient and family to use good hand hygiene technique  - Identify and instruct in appropriate isolation precautions for identified infection/condition  Outcome: Progressing     Problem: DISCHARGE PLANNING  Goal: Discharge to home or other facility with appropriate resources  Description: INTERVENTIONS:  - Identify barriers to discharge w/patient and caregiver  - Arrange for needed discharge resources and transportation as appropriate  - Identify discharge learning needs (meds, wound care, etc )  - Arrange for interpretive services to assist at discharge as needed  - Refer to Case Management Department for coordinating discharge planning if the patient needs post-hospital services based on physician/advanced practitioner order or complex needs related to functional status, cognitive ability, or social support system  Outcome: Progressing     Problem: Knowledge Deficit  Goal: Patient/family/caregiver demonstrates understanding of disease process, treatment plan, medications, and discharge instructions  Description: Complete learning assessment and assess knowledge base    Interventions:  - Provide teaching at level of understanding  - Provide teaching via preferred learning methods  Outcome: Progressing     Problem: SKIN/TISSUE INTEGRITY - ADULT  Goal: Skin Integrity remains intact(Skin Breakdown Prevention)  Description: Assess:  -Perform Ramírez assessment every  8 hours    -Inspect skin when repositioning, toileting, and assisting with ADLS    -Assess extremities for adequate circulation and sensation     Bed Management:  -Have minimal linens on bed & keep smooth, unwrinkled  -Change linens as needed when moist or perspiring  -Avoid sitting or lying in one position for more than 2 hours while in bed  -Keep HOB at  30 degrees           Activity:  -Mobilize patient 4  times a day  -Encourage activity and walks on unit  -Encourage or provide ROM exercises   -Turn and reposition patient every 2  Hours  -Use appropriate equipment to lift or move patient in bed      Skin Care:    Next Steps:      Outcome: Progressing

## 2022-06-03 ENCOUNTER — APPOINTMENT (INPATIENT)
Dept: RADIOLOGY | Facility: HOSPITAL | Age: 57
DRG: 617 | End: 2022-06-03
Payer: COMMERCIAL

## 2022-06-03 ENCOUNTER — ANESTHESIA (INPATIENT)
Dept: PERIOP | Facility: HOSPITAL | Age: 57
DRG: 617 | End: 2022-06-03
Payer: COMMERCIAL

## 2022-06-03 ENCOUNTER — ANESTHESIA EVENT (INPATIENT)
Dept: PERIOP | Facility: HOSPITAL | Age: 57
DRG: 617 | End: 2022-06-03
Payer: COMMERCIAL

## 2022-06-03 LAB
ANION GAP SERPL CALCULATED.3IONS-SCNC: 5 MMOL/L (ref 4–13)
BUN SERPL-MCNC: 13 MG/DL (ref 5–25)
CALCIUM SERPL-MCNC: 8.6 MG/DL (ref 8.3–10.1)
CHLORIDE SERPL-SCNC: 107 MMOL/L (ref 100–108)
CO2 SERPL-SCNC: 29 MMOL/L (ref 21–32)
CREAT SERPL-MCNC: 0.84 MG/DL (ref 0.6–1.3)
ERYTHROCYTE [DISTWIDTH] IN BLOOD BY AUTOMATED COUNT: 14.4 % (ref 11.6–15.1)
GFR SERPL CREATININE-BSD FRML MDRD: 96 ML/MIN/1.73SQ M
GLUCOSE SERPL-MCNC: 100 MG/DL (ref 65–140)
GLUCOSE SERPL-MCNC: 101 MG/DL (ref 65–140)
GLUCOSE SERPL-MCNC: 147 MG/DL (ref 65–140)
GLUCOSE SERPL-MCNC: 147 MG/DL (ref 65–140)
GLUCOSE SERPL-MCNC: 79 MG/DL (ref 65–140)
HCT VFR BLD AUTO: 42.5 % (ref 36.5–49.3)
HGB BLD-MCNC: 13.3 G/DL (ref 12–17)
MCH RBC QN AUTO: 27.9 PG (ref 26.8–34.3)
MCHC RBC AUTO-ENTMCNC: 31.3 G/DL (ref 31.4–37.4)
MCV RBC AUTO: 89 FL (ref 82–98)
PLATELET # BLD AUTO: 246 THOUSANDS/UL (ref 149–390)
PMV BLD AUTO: 9.8 FL (ref 8.9–12.7)
POTASSIUM SERPL-SCNC: 3.9 MMOL/L (ref 3.5–5.3)
RBC # BLD AUTO: 4.76 MILLION/UL (ref 3.88–5.62)
SODIUM SERPL-SCNC: 141 MMOL/L (ref 136–145)
WBC # BLD AUTO: 7.84 THOUSAND/UL (ref 4.31–10.16)

## 2022-06-03 PROCEDURE — 80048 BASIC METABOLIC PNL TOTAL CA: CPT | Performed by: STUDENT IN AN ORGANIZED HEALTH CARE EDUCATION/TRAINING PROGRAM

## 2022-06-03 PROCEDURE — 99253 IP/OBS CNSLTJ NEW/EST LOW 45: CPT | Performed by: HOSPITALIST

## 2022-06-03 PROCEDURE — 85027 COMPLETE CBC AUTOMATED: CPT | Performed by: STUDENT IN AN ORGANIZED HEALTH CARE EDUCATION/TRAINING PROGRAM

## 2022-06-03 PROCEDURE — 88311 DECALCIFY TISSUE: CPT | Performed by: PATHOLOGY

## 2022-06-03 PROCEDURE — 88305 TISSUE EXAM BY PATHOLOGIST: CPT | Performed by: PATHOLOGY

## 2022-06-03 PROCEDURE — 73630 X-RAY EXAM OF FOOT: CPT

## 2022-06-03 PROCEDURE — 82948 REAGENT STRIP/BLOOD GLUCOSE: CPT

## 2022-06-03 PROCEDURE — 0Y6V0Z0 DETACHMENT AT RIGHT 4TH TOE, COMPLETE, OPEN APPROACH: ICD-10-PCS | Performed by: PODIATRIST

## 2022-06-03 RX ORDER — ONDANSETRON 2 MG/ML
4 INJECTION INTRAMUSCULAR; INTRAVENOUS ONCE AS NEEDED
Status: DISCONTINUED | OUTPATIENT
Start: 2022-06-03 | End: 2022-06-03 | Stop reason: HOSPADM

## 2022-06-03 RX ORDER — SODIUM CHLORIDE 9 MG/ML
INJECTION, SOLUTION INTRAVENOUS CONTINUOUS PRN
Status: DISCONTINUED | OUTPATIENT
Start: 2022-06-03 | End: 2022-06-03

## 2022-06-03 RX ORDER — GLYCOPYRROLATE 0.2 MG/ML
INJECTION INTRAMUSCULAR; INTRAVENOUS AS NEEDED
Status: DISCONTINUED | OUTPATIENT
Start: 2022-06-03 | End: 2022-06-03

## 2022-06-03 RX ORDER — PROPOFOL 10 MG/ML
INJECTION, EMULSION INTRAVENOUS CONTINUOUS PRN
Status: DISCONTINUED | OUTPATIENT
Start: 2022-06-03 | End: 2022-06-03

## 2022-06-03 RX ORDER — SODIUM CHLORIDE, SODIUM LACTATE, POTASSIUM CHLORIDE, CALCIUM CHLORIDE 600; 310; 30; 20 MG/100ML; MG/100ML; MG/100ML; MG/100ML
100 INJECTION, SOLUTION INTRAVENOUS CONTINUOUS
Status: DISCONTINUED | OUTPATIENT
Start: 2022-06-03 | End: 2022-06-03

## 2022-06-03 RX ORDER — FENTANYL CITRATE/PF 50 MCG/ML
50 SYRINGE (ML) INJECTION
Status: DISCONTINUED | OUTPATIENT
Start: 2022-06-03 | End: 2022-06-03 | Stop reason: HOSPADM

## 2022-06-03 RX ORDER — MIDAZOLAM HYDROCHLORIDE 2 MG/2ML
INJECTION, SOLUTION INTRAMUSCULAR; INTRAVENOUS AS NEEDED
Status: DISCONTINUED | OUTPATIENT
Start: 2022-06-03 | End: 2022-06-03

## 2022-06-03 RX ORDER — PROPOFOL 10 MG/ML
INJECTION, EMULSION INTRAVENOUS AS NEEDED
Status: DISCONTINUED | OUTPATIENT
Start: 2022-06-03 | End: 2022-06-03

## 2022-06-03 RX ORDER — LIDOCAINE HYDROCHLORIDE 20 MG/ML
INJECTION, SOLUTION EPIDURAL; INFILTRATION; INTRACAUDAL; PERINEURAL AS NEEDED
Status: DISCONTINUED | OUTPATIENT
Start: 2022-06-03 | End: 2022-06-03

## 2022-06-03 RX ORDER — CHLORHEXIDINE GLUCONATE 0.12 MG/ML
15 RINSE ORAL ONCE
Status: COMPLETED | OUTPATIENT
Start: 2022-06-03 | End: 2022-06-03

## 2022-06-03 RX ADMIN — HEPARIN SODIUM 5000 UNITS: 5000 INJECTION INTRAVENOUS; SUBCUTANEOUS at 21:36

## 2022-06-03 RX ADMIN — PROPOFOL 50 MCG/KG/MIN: 10 INJECTION, EMULSION INTRAVENOUS at 16:14

## 2022-06-03 RX ADMIN — Medication 10 MG: at 16:20

## 2022-06-03 RX ADMIN — MIDAZOLAM HYDROCHLORIDE 2 MG: 1 INJECTION, SOLUTION INTRAMUSCULAR; INTRAVENOUS at 16:08

## 2022-06-03 RX ADMIN — PROPOFOL 80 MG: 10 INJECTION, EMULSION INTRAVENOUS at 16:13

## 2022-06-03 RX ADMIN — SODIUM CHLORIDE, SODIUM LACTATE, POTASSIUM CHLORIDE, AND CALCIUM CHLORIDE 100 ML/HR: .6; .31; .03; .02 INJECTION, SOLUTION INTRAVENOUS at 08:47

## 2022-06-03 RX ADMIN — CHLORHEXIDINE GLUCONATE 0.12% ORAL RINSE 15 ML: 1.2 LIQUID ORAL at 15:35

## 2022-06-03 RX ADMIN — OXYCODONE HYDROCHLORIDE 5 MG: 5 TABLET ORAL at 21:47

## 2022-06-03 RX ADMIN — CEFAZOLIN SODIUM 2000 MG: 2 SOLUTION INTRAVENOUS at 06:16

## 2022-06-03 RX ADMIN — Medication 20 MG: at 16:12

## 2022-06-03 RX ADMIN — OXYCODONE HYDROCHLORIDE 5 MG: 5 TABLET ORAL at 12:25

## 2022-06-03 RX ADMIN — CEFAZOLIN SODIUM 2000 MG: 2 SOLUTION INTRAVENOUS at 14:39

## 2022-06-03 RX ADMIN — GLYCOPYRROLATE 0.1 MCG: 0.2 INJECTION, SOLUTION INTRAMUSCULAR; INTRAVENOUS at 16:08

## 2022-06-03 RX ADMIN — HEPARIN SODIUM 5000 UNITS: 5000 INJECTION INTRAVENOUS; SUBCUTANEOUS at 06:16

## 2022-06-03 RX ADMIN — SODIUM CHLORIDE: 0.9 INJECTION, SOLUTION INTRAVENOUS at 15:52

## 2022-06-03 RX ADMIN — LIDOCAINE HYDROCHLORIDE 40 MG: 20 INJECTION, SOLUTION EPIDURAL; INFILTRATION; INTRACAUDAL at 16:13

## 2022-06-03 RX ADMIN — CEFAZOLIN SODIUM 2000 MG: 2 SOLUTION INTRAVENOUS at 21:32

## 2022-06-03 NOTE — ASSESSMENT & PLAN NOTE
Lab Results   Component Value Date    HGBA1C 5 9 (H) 03/01/2022       Recent Labs     06/02/22  1607 06/02/22  2109 06/03/22  0739   POCGLU 100 90 147*       Blood Sugar Average: Last 72 hrs:  (P) 997 [id number]     Monitor blood glucose  ISS  Accuchecks  Diabetic diet when allowed to eat  Hold oral diabetic agents

## 2022-06-03 NOTE — UTILIZATION REVIEW
Inpatient Admission Authorization Request   NOTIFICATION OF INPATIENT ADMISSION/INPATIENT AUTHORIZATION REQUEST   SERVICING FACILITY:   [address] [place] [address]  [address] [place] [address], [address]   Tax ID: [id number]  NPI: [id number]  [facility]  [address]  Place of Service Code: 24     ATTENDING PROVIDER:  Attending Name and NPI#: Pat Santiago [[id number]]  Address: [address] [place] [address], [address]   Phone: [phone]     UTILIZATION REVIEW CONTACT:  Virgie Pastor, Utilization   Network Utilization Review Department  Phone: [phone]  Fax: [phone]  Email: [email or web address]     PHYSICIAN ADVISORY SERVICES:  FOR SQNV-IS-NGZK REVIEW - MEDICAL NECESSITY DENIAL  Phone: [phone]  Fax: [phone]  Email: [email or web address]     TYPE OF REQUEST:  Inpatient Status     ADMISSION INFORMATION:  ADMISSION DATE/TIME: 6/2/22  3:09 PM  PATIENT DIAGNOSIS CODE/DESCRIPTION:  Cellulitis of foot, right [L03 115]  Osteomyelitis ([address] ) [M86 9]  DISCHARGE DATE/TIME: No discharge date for patient encounter  IMPORTANT INFORMATION:  Please contact the Virgie Pastor directly with any questions or concerns regarding this request  Department voicemails are confidential     Send requests for admission clinical reviews, concurrent reviews, approvals, and administrative denials due to lack of clinical to fax [phone]

## 2022-06-03 NOTE — ASSESSMENT & PLAN NOTE
Wt Readings from Last 3 Encounters:   05/19/22 (!) 147 kg (325 lb)   05/02/22 (!) 148 kg (325 lb 3 2 oz)   04/30/22 (!) 143 kg (315 lb)     Not in acute decompensated HF at this time    Resume furosemide, ACEi after OR

## 2022-06-03 NOTE — ANESTHESIA POSTPROCEDURE EVALUATION
Post-Op Assessment Note    CV Status:  Stable    Pain management: adequate     Mental Status:  Alert and awake   Hydration Status:  Euvolemic   PONV Controlled:  Controlled   Airway Patency:  Patent      Post Op Vitals Reviewed: Yes      Staff: Anesthesiologist         No complications documented      /72 (06/03/22 1742)    Temp (!) 97 3 °F (36 3 °C) (06/03/22 1742)    Pulse 62 (06/03/22 1742)   Resp      SpO2 93 % (06/03/22 1742)    /72   Pulse 62   Temp (!) 97 3 °F (36 3 °C)   Resp 18   SpO2 93%

## 2022-06-03 NOTE — ANESTHESIA PREPROCEDURE EVALUATION
Procedure:  AMPUTATION right 4th TOE (Right Toe)    Relevant Problems   CARDIO   (+) Atrial fibrillation (HCC)   (+) Hypertension      ENDO   (+) Type 2 diabetes mellitus with diabetic polyneuropathy, with long-term current use of insulin (HCC)      GI/HEPATIC   (+) History of bariatric surgery      MUSCULOSKELETAL   (+) Cervical spondylosis      NEURO/PSYCH   (+) Anxiety      PULMONARY   (+) DAYAN (obstructive sleep apnea)      Cardiovascular and Mediastinum   (+) Chronic diastolic heart failure (HCC)      Other   (+) Acute osteomyelitis of toe of right foot (HCC)   (+) Chronic osteomyelitis of left foot with draining sinus (HCC)   (+) Morbid obesity with BMI of 40 0-44 9, adult (HCC)   (+) Toe osteomyelitis, right (HCC)        Physical Exam    Airway    Mallampati score: II  TM Distance: >3 FB  Neck ROM: full     Dental   No notable dental hx     Cardiovascular  Rhythm: irregular, Rate: normal, Cardiovascular exam normal    Pulmonary  Pulmonary exam normal Breath sounds clear to auscultation,     Other Findings        Anesthesia Plan  ASA Score- 4 Emergent    Anesthesia Type- general with ASA Monitors  Additional Monitors:   Airway Plan:           Plan Factors-Exercise tolerance (METS): <4 METS  Chart reviewed  EKG reviewed  Existing labs reviewed  Patient summary reviewed  Patient is not a current smoker  Obstructive sleep apnea risk education given perioperatively  Induction- intravenous  Postoperative Plan-     Informed Consent- Anesthetic plan and risks discussed with patient

## 2022-06-03 NOTE — PLAN OF CARE
Problem: Potential for Falls  Goal: Patient will remain free of falls  Description: INTERVENTIONS:  - Educate patient/family on patient safety including physical limitations  - Instruct patient to call for assistance with activity   - Consult OT/PT to assist with strengthening/mobility   - Keep Call bell within reach  - Keep bed low and locked with side rails adjusted as appropriate  - Keep care items and personal belongings within reach  - Initiate and maintain comfort rounds  - Make Fall Risk Sign visible to staff  - Offer Toileting every 2 Hours, in advance of need  - Initiate/Maintain bed alarm  - Obtain necessary fall risk management equipment: bed alarm   - Apply yellow socks and bracelet for high fall risk patients  - Consider moving patient to room near nurses station  Outcome: Progressing     Problem: PAIN - ADULT  Goal: Verbalizes/displays adequate comfort level or baseline comfort level  Description: Interventions:  - Encourage patient to monitor pain and request assistance  - Assess pain using appropriate pain scale  - Administer analgesics based on type and severity of pain and evaluate response  - Implement non-pharmacological measures as appropriate and evaluate response  - Consider cultural and social influences on pain and pain management  - Notify physician/advanced practitioner if interventions unsuccessful or patient reports new pain  Outcome: Progressing     Problem: INFECTION - ADULT  Goal: Absence or prevention of progression during hospitalization  Description: INTERVENTIONS:  - Assess and monitor for signs and symptoms of infection  - Monitor lab/diagnostic results  - Monitor all insertion sites, i e  indwelling lines, tubes, and drains  - Monitor endotracheal if appropriate and nasal secretions for changes in amount and color  - [place] appropriate cooling/warming therapies per order  - Administer medications as ordered  - Instruct and encourage patient and family to use good hand hygiene technique  - Identify and instruct in appropriate isolation precautions for identified infection/condition  Outcome: Progressing     Problem: DISCHARGE PLANNING  Goal: Discharge to home or other facility with appropriate resources  Description: INTERVENTIONS:  - Identify barriers to discharge w/patient and caregiver  - Arrange for needed discharge resources and transportation as appropriate  - Identify discharge learning needs (meds, wound care, etc )  - Arrange for interpretive services to assist at discharge as needed  - Refer to Case Management Department for coordinating discharge planning if the patient needs post-hospital services based on physician/advanced practitioner order or complex needs related to functional status, cognitive ability, or social support system  Outcome: Progressing     Problem: Knowledge Deficit  Goal: Patient/family/caregiver demonstrates understanding of disease process, treatment plan, medications, and discharge instructions  Description: Complete learning assessment and assess knowledge base    Interventions:  - Provide teaching at level of understanding  - Provide teaching via preferred learning methods  Outcome: Progressing     Problem: SKIN/TISSUE INTEGRITY - ADULT  Goal: Skin Integrity remains intact(Skin Breakdown Prevention)  Description: Assess:  -Perform Ramírez assessment every shift  -Clean and moisturize skin every shift  -Inspect skin when repositioning, toileting, and assisting with ADLS  -Assess under medical devices such as  every   -Assess extremities for adequate circulation and sensation     Bed Management:  -Have minimal linens on bed & keep smooth, unwrinkled  -Change linens as needed when moist or perspiring  -Avoid sitting or lying in one position for more than 2 hours while in bed  -Keep HOB at 30 degrees     Toileting:  -Offer bedside commode  -Assess for incontinence every shift  -Use incontinent care products after each incontinent episode such as foam cleanser    Activity:  -Mobilize patient 3 times a day  -Encourage activity and walks on unit  -Encourage or provide ROM exercises   -Turn and reposition patient every 2 Hours  -Use appropriate equipment to lift or move patient in bed  -Instruct/ Assist with weight shifting every  when out of bed in chair  -Consider limitation of chair time 2 hour intervals    Skin Care:  -Avoid use of baby powder, tape, friction and shearing, hot water or constrictive clothing  -Relieve pressure over bony prominences using allevyn  -Do not massage red bony areas    Next Steps:  -Teach patient strategies to minimize risks such as skin breakdown   -Consider consults to  interdisciplinary teams such as wound care  Outcome: Progressing

## 2022-06-03 NOTE — PROGRESS NOTES
Podiatry - Progress Note  Patient: Brittany Gold 60 y o  male   MRN: [id number]  PCP: Mendel Anguiano DO  Unit/Bed#: E5 -01 Encounter: [id number]  Date Of Visit: 22    ASSESSMENT:    Brittany Gold is a 60 y o  male with:    1  Right 4th digit wound with cellulitis - Rodriguez 3  2  Right hallux eschar  3  Healing left TMA incision  - s/p left TMA 22  4  T2DM  5  A-Fib  6  DAYAN      PLAN:    · Patient to go to OR today,22, for  Right 4th toe amputation with Dr Brandie Norwood  · Consent placed in chart  To be signed with surgeon prior to procedure  · Confirmed NPO status  · H&P, vitals, and current labs reviewed  No acute changes noted  · Alternatives, risks, and complications discussed with patient  · All questions answered  No guarantees given to outcome of procedure  · Continue IV abx  · Continue local wound care  · Await preoperative clearance from internal medicine  Antibiotics started: Ancef  Pharmacologic VTE Prophylaxis: Heparin   Mechanical VTE Prophylaxis: sequential compression device   Weightbearing status:  NWB LLE       SUBJECTIVE:     The patient was seen, evaluated, and assessed at bedside today  The patient was awake, alert, and in no acute distress  Patient confirmed NPO status  All questions and concerns regarding the surgical procedure addressed  Patient understands risks vs benefits of procedure and remains amenable with plan for surgery today  Patient denies N/V/F/chills/SOB/CP  OBJECTIVE:     Vitals:   /76   Pulse 73   Temp (!) 97 3 °F (36 3 °C)   Resp 19   SpO2 95%     Temp (24hrs), Av 6 °F (36 4 °C), Min:97 3 °F (36 3 °C), Max:97 8 °F (36 6 °C)      Physical Exam:     General:  Alert, cooperative, and in no distress  Lower extremity exam:  Cardiovascular status at baseline  Neurological status at baseline  Musculoskeletal status at baseline  No calf tenderness noted bilaterally  Dressing left intact to the Operating Room       Additional Data:     Labs:    Results from last 7 days   Lab Units 06/03/22  0627 06/02/22  1608   WBC Thousand/uL 7 84 10 21*   HEMOGLOBIN g/dL 13 3 13 2   HEMATOCRIT % 42 5 41 4   PLATELETS Thousands/uL 246 244   NEUTROS PCT %  --  67   LYMPHS PCT %  --  18   MONOS PCT %  --  11   EOS PCT %  --  4     Results from last 7 days   Lab Units 06/02/22  1608   POTASSIUM mmol/L 3 8   CHLORIDE mmol/L 106   CO2 mmol/L 29   BUN mg/dL 14   CREATININE mg/dL 0 80   CALCIUM mg/dL 8 3   ALK PHOS U/L 81   ALT U/L 22   AST U/L 19           * I Have Reviewed All Lab Data Listed Above  Recent Cultures (last 7 days):               Imaging: I have personally reviewed pertinent films in PACS  EKG, Pathology, and Other Studies: I have personally reviewed pertinent reports  ** Please Note: Portions of the record may have been created with voice recognition software  Occasional wrong word or "sound a like" substitutions may have occurred due to the inherent limitations of voice recognition software  Read the chart carefully and recognize, using context, where substitutions have occurred   **

## 2022-06-03 NOTE — UTILIZATION REVIEW
Initial Clinical Review    Admission: Date/Time/Statement:   Admission Orders (From admission, onward)     Ordered        06/02/22 1529  Inpatient Admission  Once                      Orders Placed This Encounter   Procedures    Inpatient Admission     Standing Status:   Standing     Number of Occurrences:   1     Order Specific Question:   Level of Care     Answer:   Med Surg [16]     Order Specific Question:   Estimated length of stay     Answer:   More than 2 Midnights     Order Specific Question:   Certification     Answer:   I certify that inpatient services are medically necessary for this patient for a duration of greater than two midnights  See H&P and MD Progress Notes for additional information about the patient's course of treatment  Initial Presentation: 60 y o  male  Directly admitted from podiatry office with an infected right 4th toe wound  Over the past week,  Right 4th toe has become more painful and swollen  Follows weekly with podiatry  During his last visit he noted that his right 4th digit "felt like someone was pulling my toe backwards " Dr Kolb Aw recommended he be admitted and have a 4th toe amputation given signs of infection and tissue loss  He has been applying antibiotic ointment and a bandage to his right 4th toe daily  Additional PMH  Is  DM2, DAYAN, Afib and multiple digital and foot amputations  Admit  Ip with Right 4th digit wound with cellulitis - Rodriguez  3, right hallux eschar and S/P  Left  TMA  4/28/22 and plan is  KETURAH, IM consult,   NWB  LLE, local wound care left foot and right hallux, F/U imaging and continue home meds  Date:     6/3        Day 2:   Continue  KETURAH  Plan  OR  This PM for right toe  Amputation  X ray shows acute osteo R foot           Additional Vital Signs:   97 3 °F (36 3 °C) Abnormal  73 19 112/76 88 95 %    06/02/22 23:38:49 97 8 °F (36 6 °C) 71 18 114/79 91 96 %   06/02/22 15:15:31 97 8 °F (36 6 °C) 81 -- 116/72 87 96 %         Pertinent Labs/Diagnostic Test Results:   XR foot 3+ vw right   Final Result by Arturo Salas DO (06/03 1014)   Cellulitis of the 4th toe with superimposed osteomyelitis of the distal 4th phalanx  The study was marked in [facility] for immediate notification  Workstation performed: [id number]         XR foot 3+ vw left   Final Result by Arturo Salas DO (06/03 1009)   Status post transmetatarsal amputation of the 1st through 5th rays  Mild cortical irregularity of the osteotomy sites  Although the findings are likely postoperative in nature, superimposed infection not excluded  If there is continued concern for osteomyelitis, recommend follow-up MRI of the foot with gadolinium or nuclear medicine white blood cell scan  The study was marked in [facility] for immediate notification        Workstation performed: [id number]               Results from last 7 days   Lab Units 06/03/22  0627 06/02/22  1608   WBC Thousand/uL 7 84 10 21*   HEMOGLOBIN g/dL 13 3 13 2   HEMATOCRIT % 42 5 41 4   PLATELETS Thousands/uL 246 244   NEUTROS ABS Thousands/µL  --  6 76         Results from last 7 days   Lab Units 06/03/22  0627 06/02/22  1608   SODIUM mmol/L 141 141   POTASSIUM mmol/L 3 9 3 8   CHLORIDE mmol/L 107 106   CO2 mmol/L 29 29   ANION GAP mmol/L 5 6   BUN mg/dL 13 14   CREATININE mg/dL 0 84 0 80   EGFR ml/min/1 73sq m 96 98   CALCIUM mg/dL 8 6 8 3     Results from last 7 days   Lab Units 06/02/22  1608   AST U/L 19   ALT U/L 22   ALK PHOS U/L 81   TOTAL PROTEIN g/dL 6 7   ALBUMIN g/dL 2 9*   TOTAL BILIRUBIN mg/dL 0 46     Results from last 7 days   Lab Units 06/03/22  0739 06/02/22  2109 06/02/22  1607   POC GLUCOSE mg/dl 147* 90 100     Results from last 7 days   Lab Units 06/03/22  0627 06/02/22  1608   GLUCOSE RANDOM mg/dL 101 90           Present on Admission:   Acute osteomyelitis of toe of right foot ([facility] [address] )   Atrial fibrillation (HCC)   Chronic diastolic heart failure ([facility] [address] )      Admitting Diagnosis: Cellulitis of foot, right [L03 115]  Osteomyelitis (HCC) [M86 9]  Age/Sex: 60 y o  male  Admission Orders:  Scheduled Medications:  cefazolin, 2,000 mg, Intravenous, Q8H  chlorhexidine, 15 mL, Swish & Spit, Once  heparin (porcine), 5,000 Units, Subcutaneous, Q8H CRISTY  insulin lispro, 2-12 Units, Subcutaneous, TID AC  insulin lispro, 2-12 Units, Subcutaneous, HS      Continuous IV Infusions:  lactated ringers, 100 mL/hr, Intravenous, Continuous      PRN Meds:  morphine injection, 2 mg, Intravenous, Q1H PRN  oxyCODONE, 5 mg, Oral, Q4H PRN        IP CONSULT TO INTERNAL MEDICINE    Network Utilization Review Department  ATTENTION: Please call with any questions or concerns to [phone] and carefully listen to the prompts so that you are directed to the right person  All voicemails are confidential   [place] all requests for admission clinical reviews, approved or denied determinations and any other requests to dedicated fax number below belonging to the campus where the patient is receiving treatment   List of dedicated fax numbers for the Facilities:  [address] DENIALS (Administrative/Medical Necessity) [phone]   [address] (Maternity/NICU/Pediatrics) [phone]   [address]  [address] [facility] [place] [address] [address] [place] [address] [address] [facility] [facility] [phone]

## 2022-06-03 NOTE — OP NOTE
OPERATIVE REPORT - Podiatry  PATIENT NAME: Brittany Gold    :  1965  MRN: [id number]  Pt Location: [facility] OR ROOM 05    SURGERY DATE: 6/3/2022    Surgeon(s) and Role: * Nayana Cash DPM - Primary     * Haja Burnham DPM - Assisting    Pre-op Diagnosis:  Toe osteomyelitis, right ([address] ) [M86 9]    Post-Op Diagnosis Codes:     * Toe osteomyelitis, right ([address] ) [M86 9]    Procedure(s) (LRB):  AMPUTATION right 4th TOE (Right)    Specimen(s):  ID Type Source Tests Collected by Time Destination   1 : right fourth toe Tissue Toe, Right TISSUE EXAM [place] Tara Madden, [place] 6/3/2022 1632        Estimated Blood Loss:   2 mL    Drains:  * No LDAs found *    Anesthesia Type:   Choice with 10 ml of 1% Lidocaine and 0 5% Bupivacaine in a 1:1 mixture    Hemostasis:  -Manual compression  -atraumatic technique    Materials:  * No implants in log *  -3-0 nylon    Operative Findings:  Consistent with diagnosis    Complications:   None    Procedure and Technique:     Under mild sedation, the patient was brought into the operating room and remained on the stretcher in the supine position  IV sedation was achieved by anesthesia team and a universal timeout was performed where all parties are in agreement of correct patient, correct procedure and correct site  A digital block was performed consisting of 10 ml of 1% Lidocaine and 0 5% Bupivacaine in a 1:1 mixture  The foot was then prepped and draped in the usual aseptic manner  Attention was then directed to the patient's right 4th digit  A surgical marker was utilized to draw out a racquet shaped incision in a fashion to the best removed the patient's 4th digit  A surgical blade was then utilized to make a full-thickness incision in accordance with the drawn incision line  The fourth MTPJ was identified  utilizing the surgical blade  Soft tissue structures from the base of the proximal phalanx were carefully freed allowing a surgical blade    The 4th digit was disarticulated the 4th MTPJ and was passed off the field for pathological examination  The underlying wound bed was inspected and all nonviable, devitalized, poorly vascularized, necrotic, fibrotic, and otherwise unhealthy tissue was sharply excised utilizing a surgical blade and forceps  The incision site was then irrigated with bulb syringe and normal sterile saline  The wound bed was inspected and was found to be free of any unhealthy tissue  Skin was reapproximated utilizing 3-0 nylon horizontal mattress and simple interrupted techniques  The foot was then cleansed and dried the incision site was dressed with Betadine-soaked Adaptic, 4 x 4 gauze, ABD, Kerlix, and a 4 in Ace bandage  Normal hyperemic response was noted to all digits  The patient tolerated the procedure and anesthesia well without immediate complications and transferred to PACU with vital signs stable  As with many limb salvage procedures, we contemplate the possibility of performing further stages to this procedure  Procedures may include debridements, delayed closure, plastic surgery techniques, or more proximal amputations  This procedure may be considered part of a multi-staged limb salvage treatment plan  Dr Corey Freedman was present during the entire procedure and participated in all murphy aspects  SIGNATUREValeta Post, DPM  DATE: Prema 3, 2022  TIME: 5:39 PM      Portions of the record may have been created with voice recognition software  Occasional wrong word or "sound a like" substitutions may have occurred due to the inherent limitations of voice recognition software  Read the chart carefully and recognize, using context, where substitutions have occurred

## 2022-06-03 NOTE — PLAN OF CARE
Problem: Potential for Falls  Goal: Patient will remain free of falls  Description: INTERVENTIONS:  - Educate patient/family on patient safety including physical limitations  - Instruct patient to call for assistance with activity   - Consult OT/PT to assist with strengthening/mobility   - Keep Call bell within reach  - Keep bed low and locked with side rails adjusted as appropriate  - Keep care items and personal belongings within reach  - Initiate and maintain comfort rounds  - Make Fall Risk Sign visible to staff  - Apply yellow socks and bracelet for high fall risk patients  - Consider moving patient to room near nurses station  6/3/2022 0740 by Mayank Grier RN  Outcome: Progressing  6/3/2022 0739 by Mayank Grier RN  Outcome: Progressing     Problem: PAIN - ADULT  Goal: Verbalizes/displays adequate comfort level or baseline comfort level  Description: Interventions:  - Encourage patient to monitor pain and request assistance  - Assess pain using appropriate pain scale  - Administer analgesics based on type and severity of pain and evaluate response  - Implement non-pharmacological measures as appropriate and evaluate response  - Consider cultural and social influences on pain and pain management  - Notify physician/advanced practitioner if interventions unsuccessful or patient reports new pain  6/3/2022 0740 by Mayank Grier RN  Outcome: Progressing  6/3/2022 0739 by Mayank Grier RN  Outcome: Progressing     Problem: INFECTION - ADULT  Goal: Absence or prevention of progression during hospitalization  Description: INTERVENTIONS:  - Assess and monitor for signs and symptoms of infection  - Monitor lab/diagnostic results  - Monitor all insertion sites, i e  indwelling lines, tubes, and drains  - Monitor endotracheal if appropriate and nasal secretions for changes in amount and color  - [place] appropriate cooling/warming therapies per order  - Administer medications as ordered  - Instruct and encourage patient and family to use good hand hygiene technique  - Identify and instruct in appropriate isolation precautions for identified infection/condition  6/3/2022 0740 by Mayank Grier RN  Outcome: Progressing  6/3/2022 0739 by Mayank Grier RN  Outcome: Progressing     Problem: DISCHARGE PLANNING  Goal: Discharge to home or other facility with appropriate resources  Description: INTERVENTIONS:  - Identify barriers to discharge w/patient and caregiver  - Arrange for needed discharge resources and transportation as appropriate  - Identify discharge learning needs (meds, wound care, etc )  - Arrange for interpretive services to assist at discharge as needed  - Refer to Case Management Department for coordinating discharge planning if the patient needs post-hospital services based on physician/advanced practitioner order or complex needs related to functional status, cognitive ability, or social support system  6/3/2022 0740 by Mayank Grier RN  Outcome: Progressing  6/3/2022 0739 by Mayank Grier RN  Outcome: Progressing     Problem: Knowledge Deficit  Goal: Patient/family/caregiver demonstrates understanding of disease process, treatment plan, medications, and discharge instructions  Description: Complete learning assessment and assess knowledge base    Interventions:  - Provide teaching at level of understanding  - Provide teaching via preferred learning methods  6/3/2022 0740 by Mayank Grier RN  Outcome: Progressing  6/3/2022 0739 by Mayank Grier RN  Outcome: Progressing

## 2022-06-03 NOTE — CONSULTS
Jarek 1965, 60 y o  male MRN: [id number]  Unit/Bed#: E5 -01 Encounter: [id number]  Primary Care Provider: Frandy Montenegro DO   Date and time admitted to hospital: 6/2/2022  3:09 PM    Consults    Type 2 diabetes mellitus with diabetic polyneuropathy, with long-term current use of insulin [facility])  Assessment & Plan  Lab Results   Component Value Date    HGBA1C 5 9 (H) 03/01/2022       Recent Labs     06/02/22  1607 06/02/22  2109 06/03/22  0739   POCGLU 100 90 147*       Blood Sugar Average: Last 72 hrs:  (P) 403 [id number]     Monitor blood glucose  ISS  Accuchecks  Diabetic diet when allowed to eat  Hold oral diabetic agents      Atrial fibrillation ([facility] [address] )  Assessment & Plan  Noted, not on AC prior to admission  Defer to cardiologist   Heart rates controlled  Chronic diastolic heart failure [facility])  Assessment & Plan  Wt Readings from Last 3 Encounters:   05/19/22 (!) 147 kg (325 lb)   05/02/22 (!) 148 kg (325 lb 3 2 oz)   04/30/22 (!) 143 kg (315 lb)     Not in acute decompensated HF at this time  Resume furosemide, ACEi after OR        * Acute osteomyelitis of toe of right foot ([address] )  Assessment & Plan  On cefazolin per primary service  For OR for amputation today - R 4th toe  Care per primary service      RCRI score is 1 for history of diastolic heart failure  Patient is not currently in decompensated heart failure  Patient has no chest pain or shortness of breath  Patient had good exercise tolerance prior to admission  Patient tolerated anesthesia recently for amputations without issues per his report  No obvious medical contraindication for surgery as planned  Reason for consult  Perioperative medical management    HPI:  Ramy Cuellar is a 60 y o  male who presents with osteomyelitis of 4th toe of R foot  Patient has hx of L TMA, and multiple amputations in the past   He denies fever at this time    He reports that the right 4th toe however is becoming more sore, more painful  Denies purulence drainage at this time  Podiatry has admitted the patient for amputation today  We are being asked to see the patient for perioperative management  Patient denies chest pain  Patient denies shortness of breath  Patient reports good exercise tolerance prior to admission  He tolerated anesthesia for prior amputations without issue  Patient denies fevers  Patient denies chills  Patient denies abdominal pain    HISTORICAL INFO:  Past Medical History:   Diagnosis Date    Atrial fibrillation (HCC)     Chronic diastolic (congestive) heart failure (HCC)     Diabetes mellitus ([facility] [address] )     High cholesterol     Hyperlipidemia     Hypertension     Pacemaker     Stroke [facility])      Past Surgical History:   Procedure Laterality Date    APPENDECTOMY      ATRIAL CARDIAC PACEMAKER INSERTION      BARIATRIC SURGERY  05/2021    EPIDURAL BLOCK INJECTION N/A 5/19/2022    Procedure: BLOCK / INJECTION EPIDURAL STEROID CERVICAL C7-T1;  Surgeon: Roseanna Alfonso MD;  [facility];  Service: Pain Management     FL GUIDED NEEDLE PLAC BX/ASP/INJ  3/22/2022    FOOT AMPUTATION Left 4/28/2022    Procedure: LEFT TRANSMETATARSAL AMPUTATION ;  Surgeon: Carole Gomez DPM;  [facility];  Service: Podiatry    NERVE BLOCK Right 2/10/2022    Procedure: BLOCK MEDIAL BRANCH C3, C4, C5 #1;  Surgeon: Roseanna Alfonso MD;  [facility];  Service: Pain Management     NERVE BLOCK Right 3/22/2022    Procedure: BLOCK MEDIAL BRANCH C3, C4, C5 #2;  Surgeon: Roseanna Alfonso MD;  [facility];  Service: Pain Management     OR AMPUTATION TOE,I-P JT Left 11/16/2021    Procedure: AMPUTATION LESSER TOE;  Surgeon: Libia Wallis DPM;  [facility];  Service: [place] Right 4/7/2022    Procedure: Right C3, C4, C5 RFA;  Surgeon: Roseanna Alfonso MD;  [facility]: [facility];  Service: Pain Management     TOE AMPUTATION Left     2nd toe    TOE AMPUTATION Left 9/15/2021    Procedure: AMPUTATION LEFT 4TH TOE;  Surgeon: Josr Valdez DPM;  [facility];  Service: Podiatry    TOE AMPUTATION Right 1/12/2022    Procedure: AMPUTATION TOE;  Surgeon: Deepa Fuentes DPM;  [facility];  Service: Podiatry    TOE AMPUTATION Right 2/23/2022    Procedure: AMPUTATION TOE  RIGHT SECOND;  Surgeon: Deepa Fuentes DPM;  Location: [facility] OR;  Service: Podiatry       SOCIAL HISTORY:  Social History     Substance and Sexual Activity   Alcohol Use Never     Social History     Substance and Sexual Activity   Drug Use Never     Social History     Tobacco Use   Smoking Status Never Smoker   Smokeless Tobacco Never Used     Family History   Problem Relation Age of Onset    No Known Problems Mother     No Known Problems Father        MEDS & ALLERGIES:  Medications Prior to Admission   Medication    busPIRone (BUSPAR) 5 mg tablet    FLUoxetine (PROzac) 40 MG capsule    gabapentin (NEURONTIN) 300 mg capsule    gentamicin (GARAMYCIN) 0 1 % cream    hydrOXYzine HCL (ATARAX) 25 mg tablet    lisinopril (ZESTRIL) 5 mg tablet    lidocaine (Lidoderm) 5 %    metolazone (ZAROXOLYN) 2 5 mg tablet    misoprostol (CYTOTEC) 200 mcg tablet    Nuzyra 150 MG TABS    oxyCODONE-acetaminophen (PERCOCET) 5-325 mg per tablet    potassium chloride (K-DUR,KLOR-CON) 20 mEq tablet    Regranex 0 01 % gel    sucralfate (CARAFATE) 1 g tablet    traMADol (ULTRAM) 50 mg tablet     No Known Allergies    Review of Systems    OBJECTIVE:  Vitals: Blood pressure 112/76, pulse 73, temperature (!) 97 3 °F (36 3 °C), resp  rate 19, SpO2 95 %  No intake or output data in the 24 hours ending 06/03/22 0937  Invasive Devices  Report    Peripheral Intravenous Line  Duration           Peripheral IV 06/02/22 Dorsal (posterior); Left Hand <1 day                Physical Exam    General: well appearing, no acute distress, obese    HEENT: atraumatic, PERRLA, moist mucosa, normal pharynx, normal tonsils and adenoids, normal tongue, no fluid in sinuses  Neck: Trachea midline, no carotid bruit, no masses  Respiratory: normal chest wall expansion, CTA B, no r/r/w, no rubs  Cardiovascular: RRR, no m/r/g, Normal S1 and S2  Abdomen: Soft, non-tender, non-distended, normal bowel sounds in all quadrants, no hepatosplenomegaly, no tympany  Rectal: deferred  Musculoskeletal: normal ROM in upper and lower extremities  Evidence of prior amputations (L TMA)  R 4th toe significantly swollen   Distal wound  Integumentary: warm, dry, and pink, with no rash, purpura, or petechia  Heme/Lymph: no lymphadenopathy, no bruises  Neurological: Cranial Nerves II-XII grossly intact; no focal deficits in sensation or strength, A  x O x 3  Psychiatric: cooperative with normal mood, affect, and cognition    Lab Results:   Admission on 06/02/2022   Component Date Value    WBC 06/02/2022 10 21 (A)    RBC 06/02/2022 4 55     Hemoglobin 06/02/2022 13 2     Hematocrit 06/02/2022 41 4     MCV 06/02/2022 91     MCH 06/02/2022 29 0     MCHC 06/02/2022 31 9     RDW 06/02/2022 14 2     MPV 06/02/2022 9 9     Platelets 23/53/1518 244     nRBC 06/02/2022 0     Neutrophils Relative 06/02/2022 67     Immat GRANS % 06/02/2022 0     Lymphocytes Relative 06/02/2022 18     Monocytes Relative 06/02/2022 11     Eosinophils Relative 06/02/2022 4     Basophils Relative 06/02/2022 0     Neutrophils Absolute 06/02/2022 6 76     Immature Grans Absolute 06/02/2022 0 03     Lymphocytes Absolute 06/02/2022 1 87     Monocytes Absolute 06/02/2022 1 07     Eosinophils Absolute 06/02/2022 0 44     Basophils Absolute 06/02/2022 0 04     Sodium 06/02/2022 141     Potassium 06/02/2022 3 8     Chloride 06/02/2022 106     CO2 06/02/2022 29     ANION GAP 06/02/2022 6     BUN 06/02/2022 14     Creatinine 06/02/2022 0 80     Glucose 06/02/2022 90     Calcium 06/02/2022 8 3     Corrected Calcium 06/02/2022 9  2     AST 06/02/2022 19     ALT 06/02/2022 22     Alkaline Phosphatase 06/02/2022 81     Total Protein 06/02/2022 6 7     Albumin 06/02/2022 2 9 (A)    Total Bilirubin 06/02/2022 0 46     eGFR 06/02/2022 98     POC Glucose 06/02/2022 100     POC Glucose 06/02/2022 90     WBC 06/03/2022 7 84     RBC 06/03/2022 4 76     Hemoglobin 06/03/2022 13 3     Hematocrit 06/03/2022 42 5     MCV 06/03/2022 89     MCH 06/03/2022 27 9     MCHC 06/03/2022 31 3 (A)    RDW 06/03/2022 14 4     Platelets 37/45/8792 246     MPV 06/03/2022 9 8     Sodium 06/03/2022 141     Potassium 06/03/2022 3 9     Chloride 06/03/2022 107     CO2 06/03/2022 29     ANION GAP 06/03/2022 5     BUN 06/03/2022 13     Creatinine 06/03/2022 0 84     Glucose 06/03/2022 101     Calcium 06/03/2022 8 6     eGFR 06/03/2022 96     POC Glucose 06/03/2022 147 (A)     Imaging: FL guide & loc dx/ther proc    Result Date: 5/19/2022  Narrative: C-ARM - epidural steroid injection INDICATION: proc  Procedure guidance  COMPARISON:  4/7/2022 IMAGES:  2 FLUOROSCOPY TIME:   14 SECONDS TECHNIQUE: FINDINGS: Fluoroscopy was provided for procedure guidance  Osseous and soft tissue detail limited by technique  Impression: Fluoroscopy provided for procedure guidance  Please see surgeon's separate procedure notes for details  Workstation performed: [id number]     EKG, Pathology, and Other Studies: I have personally reviewed pertinent reports  Prior EKG, Echo in chart  Assessment:  Principal Problem:    Acute osteomyelitis of toe of right foot (HCC)  Active Problems:    Chronic diastolic heart failure (HCC)    Atrial fibrillation (HCC)    Type 2 diabetes mellitus with diabetic polyneuropathy, with long-term current use of insulin (HCC)  Resolved Problems:    * No resolved hospital problems   *      "This note has been constructed using a voice recognition system"      Arjun Goff MD  6/3/2022,9:37 AM

## 2022-06-03 NOTE — ASSESSMENT & PLAN NOTE
Noted, not on [facility] prior to admission  Defer to cardiologist   Heart rates controlled

## 2022-06-04 VITALS
HEART RATE: 63 BPM | TEMPERATURE: 97.4 F | SYSTOLIC BLOOD PRESSURE: 101 MMHG | OXYGEN SATURATION: 97 % | RESPIRATION RATE: 18 BRPM | DIASTOLIC BLOOD PRESSURE: 65 MMHG

## 2022-06-04 PROBLEM — M86.171 ACUTE OSTEOMYELITIS OF TOE OF RIGHT FOOT (HCC): Status: RESOLVED | Noted: 2022-06-02 | Resolved: 2022-06-04

## 2022-06-04 LAB
GLUCOSE SERPL-MCNC: 131 MG/DL (ref 65–140)
GLUCOSE SERPL-MCNC: 97 MG/DL (ref 65–140)

## 2022-06-04 PROCEDURE — 82948 REAGENT STRIP/BLOOD GLUCOSE: CPT

## 2022-06-04 RX ORDER — CEPHALEXIN 500 MG/1
500 CAPSULE ORAL EVERY 6 HOURS SCHEDULED
Qty: 28 CAPSULE | Refills: 0 | Status: SHIPPED | OUTPATIENT
Start: 2022-06-04 | End: 2022-06-11

## 2022-06-04 RX ADMIN — CEFAZOLIN SODIUM 2000 MG: 2 SOLUTION INTRAVENOUS at 05:49

## 2022-06-04 RX ADMIN — HEPARIN SODIUM 5000 UNITS: 5000 INJECTION INTRAVENOUS; SUBCUTANEOUS at 05:53

## 2022-06-04 NOTE — PLAN OF CARE
Problem: Potential for Falls  Goal: Patient will remain free of falls  Description: INTERVENTIONS:  - Educate patient/family on patient safety including physical limitations  - Instruct patient to call for assistance with activity   - Consult OT/PT to assist with strengthening/mobility   - Keep Call bell within reach  - Keep bed low and locked with side rails adjusted as appropriate  - Keep care items and personal belongings within reach  - Initiate and maintain comfort rounds  - Make Fall Risk Sign visible to staff  - Apply yellow socks and bracelet for high fall risk patients  - Consider moving patient to room near nurses station  Outcome: Progressing     Problem: PAIN - ADULT  Goal: Verbalizes/displays adequate comfort level or baseline comfort level  Description: Interventions:  - Encourage patient to monitor pain and request assistance  - Assess pain using appropriate pain scale  - Administer analgesics based on type and severity of pain and evaluate response  - Implement non-pharmacological measures as appropriate and evaluate response  - Consider cultural and social influences on pain and pain management  - Notify physician/advanced practitioner if interventions unsuccessful or patient reports new pain  Outcome: Progressing     Problem: INFECTION - ADULT  Goal: Absence or prevention of progression during hospitalization  Description: INTERVENTIONS:  - Assess and monitor for signs and symptoms of infection  - Monitor lab/diagnostic results  - Monitor all insertion sites, i e  indwelling lines, tubes, and drains  - Monitor endotracheal if appropriate and nasal secretions for changes in amount and color  - [place] appropriate cooling/warming therapies per order  - Administer medications as ordered  - Instruct and encourage patient and family to use good hand hygiene technique  - Identify and instruct in appropriate isolation precautions for identified infection/condition  Outcome: Progressing     Problem: DISCHARGE PLANNING  Goal: Discharge to home or other facility with appropriate resources  Description: INTERVENTIONS:  - Identify barriers to discharge w/patient and caregiver  - Arrange for needed discharge resources and transportation as appropriate  - Identify discharge learning needs (meds, wound care, etc )  - Arrange for interpretive services to assist at discharge as needed  - Refer to Case Management Department for coordinating discharge planning if the patient needs post-hospital services based on physician/advanced practitioner order or complex needs related to functional status, cognitive ability, or social support system  Outcome: Progressing     Problem: Knowledge Deficit  Goal: Patient/family/caregiver demonstrates understanding of disease process, treatment plan, medications, and discharge instructions  Description: Complete learning assessment and assess knowledge base    Interventions:  - Provide teaching at level of understanding  - Provide teaching via preferred learning methods  Outcome: Progressing

## 2022-06-04 NOTE — QUICK NOTE
I stopped by to see Leia Stone  He told me he is going home today  He is dressed and sitting alongside the bed  He states he feels very well after his status post right 4th toe amputation without acute complaints  He denies any chest pain, shortness with fevers or chills  There is no lab work obtained today better reviewed lab work from yesterday  He is afebrile and blood pressure is stable  He is on room air  Spoke with Podiatry, plan is to discharge patient home today  He may continue all home medications as previously prescribed no qualms for discharge from Internal Medicine standpoint

## 2022-06-04 NOTE — PLAN OF CARE
Problem: Potential for Falls  Goal: Patient will remain free of falls  Description: INTERVENTIONS:  - Educate patient/family on patient safety including physical limitations  - Instruct patient to call for assistance with activity   - Consult OT/PT to assist with strengthening/mobility   - Keep Call bell within reach  - Keep bed low and locked with side rails adjusted as appropriate  - Keep care items and personal belongings within reach  - Initiate and maintain comfort rounds  - Make Fall Risk Sign visible to staff  - Apply yellow socks and bracelet for high fall risk patients  - Consider moving patient to room near nurses station  Outcome: Progressing     Problem: PAIN - ADULT  Goal: Verbalizes/displays adequate comfort level or baseline comfort level  Description: Interventions:  - Encourage patient to monitor pain and request assistance  - Assess pain using appropriate pain scale  - Administer analgesics based on type and severity of pain and evaluate response  - Implement non-pharmacological measures as appropriate and evaluate response  - Consider cultural and social influences on pain and pain management  - Notify physician/advanced practitioner if interventions unsuccessful or patient reports new pain  Outcome: Progressing     Problem: INFECTION - ADULT  Goal: Absence or prevention of progression during hospitalization  Description: INTERVENTIONS:  - Assess and monitor for signs and symptoms of infection  - Monitor lab/diagnostic results  - Monitor all insertion sites, i e  indwelling lines, tubes, and drains  - Monitor endotracheal if appropriate and nasal secretions for changes in amount and color  - [place] appropriate cooling/warming therapies per order  - Administer medications as ordered  - Instruct and encourage patient and family to use good hand hygiene technique  - Identify and instruct in appropriate isolation precautions for identified infection/condition  Outcome: Progressing     Problem: DISCHARGE PLANNING  Goal: Discharge to home or other facility with appropriate resources  Description: INTERVENTIONS:  - Identify barriers to discharge w/patient and caregiver  - Arrange for needed discharge resources and transportation as appropriate  - Identify discharge learning needs (meds, wound care, etc )  - Arrange for interpretive services to assist at discharge as needed  - Refer to Case Management Department for coordinating discharge planning if the patient needs post-hospital services based on physician/advanced practitioner order or complex needs related to functional status, cognitive ability, or social support system  Outcome: Progressing     Problem: Knowledge Deficit  Goal: Patient/family/caregiver demonstrates understanding of disease process, treatment plan, medications, and discharge instructions  Description: Complete learning assessment and assess knowledge base    Interventions:  - Provide teaching at level of understanding  - Provide teaching via preferred learning methods  Outcome: Progressing     Problem: MOBILITY - ADULT  Goal: Maintain or return to baseline ADL function  Description: INTERVENTIONS:  -  Assess patient's ability to carry out ADLs; assess patient's baseline for ADL function and identify physical deficits which impact ability to perform ADLs (bathing, care of mouth/teeth, toileting, grooming, dressing, etc )  - Assess/evaluate cause of self-care deficits   - Assess range of motion  - Assess patient's mobility; develop plan if impaired  - Assess patient's need for assistive devices and provide as appropriate  - Encourage maximum independence but intervene and supervise when necessary  - Involve family in performance of ADLs  - Assess for home care needs following discharge   - Consider OT consult to assist with ADL evaluation and planning for discharge  - Provide patient education as appropriate  Outcome: Progressing  Goal: Maintains/Returns to pre admission functional level  Description: INTERVENTIONS:  - Perform BMAT or MOVE assessment daily    - Set and communicate daily mobility goal to care team and patient/family/caregiver  - Collaborate with rehabilitation services on mobility goals if consulted  - Perform Range of Motion  times a day  - Reposition patient every  hours    - Dangle patient  times a day  - Stand patient  times a day  - Ambulate patient  times a day  - Out of bed to chair  times a day   - Out of bed for meals times a day  - Out of bed for toileting  - Record patient progress and toleration of activity level   Outcome: Progressing

## 2022-06-04 NOTE — DISCHARGE SUMMARY
PODIATRY DISCHARGE SUMMARY     Patient Name: Arleen Durand   Age & Sex: 60 y o  male   MRN: [id number]  Unit/Bed#: E5 -01   Encounter: [id number]  Length of Stay: 2 days    Active Problems:    Chronic diastolic heart failure ([facility] )    Morbid obesity with BMI of 40 0-44 9, adult ([facility] )    Atrial fibrillation ([facility] )    Type 2 diabetes mellitus with diabetic polyneuropathy, with long-term current use of insulin ([facility] )      HPI from Admission:  Arleen Durand is a 60 y o  male who is being admitted 6/2/2022 due to an infected right 4th toe wound  Patient has a past medical history of T2DM, Afib, DAYAN, multiple digital and foot amputations  Patient reports that over the past week his toe became more painful and swollen  He states that he follows with Dr Basil Essex weekly for care of his left TMA  During his last visit he noted that his right 4th digit "felt like someone was pulling my toe backwards " Dr Basil Essex recommended he be admitted and have a 4th toe amputation given signs of infection and tissue loss  He has been applying antibiotic ointment and a bandage to his right 4th toe daily  Patient denies nausea, vomiting, chest pain, shortness of breath, chills, fever  [id number] [facility] is a 60 y o  male who was admitted on 6/2/2022 for right 4th digit wound with right foot cellulitis  On admission, the patient bilateral feet were x-rayed  The right foot x-ray showed signs of osteomyelitis to the right 4th digit  The patient was seen by the Internal Medicine team for surgical clearance and was deemed an acceptable risk for surgery  On 06/03/2022 the patient underwent a right 4th digit amputation with no immediate complications from surgery  On 06/04/2022 the patient was evaluated at bedside and his incision site was found to be stable with no local signs of infection with all sutures in the proper place and intact    He was deemed acceptable for discharge and was discharged with a 7 day course of p o  Keflex 500 mg to be taken 4 times daily  He was also discharged with a toe unloading shoe and was instructed to be weight-bearing as tolerated to his right heel only  Physical Exam     Right foot incision site appears well coapted the skin edges well approximated  All sutures in place and intact  No active bleeding noted and no local signs of infection noted  Incision site remained stable  All other physical exam findings remain at baseline      Visit Vitals  /65   Pulse 63   Temp (!) 97 4 °F (36 3 °C)   Resp 18   SpO2 97%   Smoking Status Never Smoker       DISCHARGE INFORMATION     PCP at Discharge: Shannon Johnston DO    Admitting Provider: Dr Dontae Díaz  Admission Date: 6/2/2022     Discharge Provider: Dr Dontae Díaz  Discharge Date: 06/04/22    Discharge Disposition: Home  Discharge Condition: Stable  Discharge with Lines: No  Discharge Diet: Diabetic  Activity Restrictions: WBAT to right heel with Darco wedge shoe  Test Results Pending at Discharge: None  Medications at Discharge: See after visit summary for reconciled discharge medications provided to patient and family  Discharge Diagnoses: Active Problems:    Chronic diastolic heart failure (HCC)    Morbid obesity with BMI of 40 0-44 9, adult (HCC)    Atrial fibrillation ([facility] [address] )    Type 2 diabetes mellitus with diabetic polyneuropathy, with long-term current use of insulin [facility])      Consulting Providers:  Internal medicine    Diagnostic Imaging Performed:  XR foot 3+ vw left    Result Date: 6/3/2022  Impression: Status post transmetatarsal amputation of the 1st through 5th rays  Mild cortical irregularity of the osteotomy sites  Although the findings are likely postoperative in nature, superimposed infection not excluded  If there is continued concern for osteomyelitis, recommend follow-up MRI of the foot with gadolinium or nuclear medicine white blood cell scan   The study was marked in [facility] for immediate notification  Workstation performed: [id number]     XR foot 3+ vw right    Result Date: 6/3/2022  Impression: Cellulitis of the 4th toe with superimposed osteomyelitis of the distal 4th phalanx  The study was marked in [facility] for immediate notification  Workstation performed: [id number]       Procedures Performed:  Procedure(s):  AMPUTATION right 4th TOE      Code Status: Level 1 - Full Code  Advance Directive and Living Will:      Power of :    POLST:      FOLLOW-UP     PCP Outpatient Follow-up:  Yes    Active Issues Requiring Follow-Up:  Surgical incision right foot    Discharge Statement:  I spent 25 minutes discharging the patient  This time was spent on the day of discharge  I had direct contact with the patient on the day of discharge  Additional documentation is required if more than 30 minutes were spent on discharge  Portions of the record may have been created with voice recognition software  Occasional wrong word or "sound a like" substitutions may have occurred due to the inherent limitations of voice recognition software    Read the chart carefully and recognize, using context, where substitutions have occurred   ==  Ilana Campbell, [address] [facility]  Podiatric Medicine & Surgery

## 2022-06-07 NOTE — UTILIZATION REVIEW
Notification of Discharge   This is a Notification of Discharge from our facility [address]  Please be advised that this patient has been discharge from our facility  Below you will find the admission and discharge date and time including the patients disposition  UTILIZATION REVIEW CONTACT:  Nallely Carreno  Utilization   Network Utilization Review Department  Phone: [phone] x carefully listen to the prompts  All voicemails are confidential   Email: [email or web address]     PHYSICIAN ADVISORY SERVICES:  FOR [facility] REVIEW - MEDICAL NECESSITY DENIAL  Phone: [phone]  Fax: [phone]  Email: [email or web address]     PRESENTATION DATE: 6/2/2022  3:09 PM    INPATIENT ADMISSION DATE: 6/2/22  3:09 PM   DISCHARGE DATE: 6/4/2022  2:23 PM  DISPOSITION: Home/Self Care Home/Self Care      IMPORTANT INFORMATION:  Send all requests for admission clinical reviews, approved or denied determinations and any other requests to dedicated fax number below belonging to the campus where the patient is receiving treatment   List of dedicated fax numbers:  [address] [place] [address] DENIALS (Administrative/Medical Necessity) [phone]   [address] (Maternity/NICU/Pediatrics) [phone]   [facility] [phone]   [address] [facility] [phone]   [address] [place] [address] [phone]   [address] [place] [address] [phone]   Evita Jeronimo [phone]   [address] [place] [address] [phone]   [facility]  [phone]   [address] [place] [address], [facility]  [phone]   Bucky Champion [phone]   [address] [place] [address] [phone]          Omer Villegas, DPM   Resident   Podiatry   Discharge Summary       Cosign Needed   Date of Service:  6/4/2022  2:23 PM                 Cosign Needed        Expand All Collapse All        Show:Clear all  [x]Manual[x]Template[x]Copied    Added by:  [x]Anthony Quijano DPM      []Juwan for details      PODIATRY DISCHARGE SUMMARY      Patient Name: Jose Mckay   Age & Sex: 60 y o  male   MRN: [id number]  Unit/Bed#: E5 -01   Encounter: [id number]  Length of Stay: 2 days     Active Problems:    Chronic diastolic heart failure ([facility] )    Morbid obesity with BMI of 40 0-44 9, adult (HCC)    Atrial fibrillation ([facility] )    Type 2 diabetes mellitus with diabetic polyneuropathy, with long-term current use of insulin ([facility] )        HPI from Admission:  Brody Alonso a 60 y  o  male who is being admitted 6/2/2022 due to an infected right 4th toe wound  Patient has a past medical history of T2DM, Afib, DAYAN, multiple digital and foot amputations  Patient reports that over the past week his toe became more painful and swollen  He states that he follows with Dr Aly Hernandez weekly for care of his left TMA  During his last visit he noted that his right 4th digit "felt like someone was pulling my toe backwards " Dr Aly Hernandez recommended he be admitted and have a 4th toe amputation given signs of infection and tissue loss  He has been applying antibiotic ointment and a bandage to his right 4th toe daily  Patient denies nausea, vomiting, chest pain, shortness of breath, chills, fever      [facility] Dandy Muller is a 60 y o  male who was admitted on 6/2/2022 for right 4th digit wound with right foot cellulitis  On admission, the patient bilateral feet were x-rayed  The right foot x-ray showed signs of osteomyelitis to the right 4th digit  The patient was seen by the Internal Medicine team for surgical clearance and was deemed an acceptable risk for surgery  On 06/03/2022 the patient underwent a right 4th digit amputation with no immediate complications from surgery    On 06/04/2022 the patient was evaluated at bedside and his incision site was found to be stable with no local signs of infection with all sutures in the proper place and intact  He was deemed acceptable for discharge and was discharged with a 7 day course of p o  Keflex 500 mg to be taken 4 times daily  He was also discharged with a toe unloading shoe and was instructed to be weight-bearing as tolerated to his right heel only        Physical Exam            Right foot incision site appears well coapted the skin edges well approximated  All sutures in place and intact  No active bleeding noted and no local signs of infection noted  Incision site remained stable                 All other physical exam findings remain at baseline        Visit Vitals  /65   Pulse 63   Temp (!) 97 4 °F (36 3 °C)   Resp 18   SpO2 97%   Smoking Status Never Smoker         DISCHARGE INFORMATION      PCP at Discharge: Isael Mcgee DO     Admitting Provider: Dr Delroy Peña  Admission Date: 6/2/2022      Discharge Provider: Dr Delroy Peña  Discharge Date: 06/04/22     Discharge Disposition: Home  Discharge Condition: Stable  Discharge with Lines: No  Discharge Diet: Diabetic  Activity Restrictions: WBAT to right heel with Darco wedge shoe  Test Results Pending at Discharge: None  Medications at Discharge: See after visit summary for reconciled discharge medications provided to patient and family        Discharge Diagnoses: Active Problems:    Chronic diastolic heart failure (HCC)    Morbid obesity with BMI of 40 0-44 9, adult ([facility])    Atrial fibrillation ([facility] )    Type 2 diabetes mellitus with diabetic polyneuropathy, with long-term current use of insulin ([facility])        Consulting Providers:  Internal medicine     Diagnostic Imaging Performed:  XR foot 3+ vw left     Result Date: 6/3/2022  Impression: Status post transmetatarsal amputation of the 1st through 5th rays  Mild cortical irregularity of the osteotomy sites  Although the findings are likely postoperative in nature, superimposed infection not excluded    If there is continued concern for osteomyelitis, recommend follow-up MRI of the foot with gadolinium or nuclear medicine white blood cell scan  The study was marked in [facility] for immediate notification  Workstation performed: [id number]      XR foot 3+ vw right     Result Date: 6/3/2022  Impression: Cellulitis of the 4th toe with superimposed osteomyelitis of the distal 4th phalanx  The study was marked in [facility] for immediate notification  Workstation performed: [id number]         Procedures Performed:  Procedure(s):  AMPUTATION right 4th TOE        Code Status: Level 1 - Full Code  Advance Directive and Living Will:      Power of :    POLST:       FOLLOW-UP      PCP Outpatient Follow-up:  Yes     Active Issues Requiring Follow-Up:  Surgical incision right foot     Discharge Statement:  I spent 25 minutes discharging the patient  This time was spent on the day of discharge  I had direct contact with the patient on the day of discharge   Additional documentation is required if more than 30 minutes were spent on discharge          Portions of the record may have been created with voice recognition software   Occasional wrong word or "sound a like" substitutions may have occurred due to the inherent limitations of voice recognition software   Read the chart carefully and recognize, using context, where substitutions have occurred   ==  Modesta Homans, DPM   [address]  Podiatric Medicine & Surgery

## 2022-06-14 ENCOUNTER — HOSPITAL ENCOUNTER (EMERGENCY)
Facility: HOSPITAL | Age: 57
Discharge: HOME | End: 2022-06-14
Attending: EMERGENCY MEDICINE
Payer: COMMERCIAL

## 2022-06-14 VITALS
RESPIRATION RATE: 16 BRPM | HEIGHT: 73 IN | TEMPERATURE: 97.9 F | BODY MASS INDEX: 41.75 KG/M2 | WEIGHT: 315 LBS | DIASTOLIC BLOOD PRESSURE: 64 MMHG | HEART RATE: 85 BPM | SYSTOLIC BLOOD PRESSURE: 132 MMHG | OXYGEN SATURATION: 95 %

## 2022-06-14 DIAGNOSIS — F41.9 ANXIETY: Primary | ICD-10-CM

## 2022-06-14 LAB
ALBUMIN SERPL BCP-MCNC: 3.6 G/DL (ref 3.5–5)
ALP SERPL-CCNC: 97 U/L (ref 46–116)
ALT SERPL W P-5'-P-CCNC: 23 U/L (ref 12–78)
ANION GAP SERPL CALCULATED.3IONS-SCNC: 9 MMOL/L (ref 4–13)
AST SERPL W P-5'-P-CCNC: 19 U/L (ref 5–45)
BASOPHILS # BLD AUTO: 0.05 THOUSANDS/ΜL (ref 0–0.1)
BASOPHILS NFR BLD AUTO: 1 % (ref 0–1)
BILIRUB SERPL-MCNC: 0.41 MG/DL (ref 0.2–1)
BUN SERPL-MCNC: 13 MG/DL (ref 5–25)
CALCIUM SERPL-MCNC: 8.5 MG/DL (ref 8.3–10.1)
CHLORIDE SERPL-SCNC: 106 MMOL/L (ref 100–108)
CO2 SERPL-SCNC: 25 MMOL/L (ref 21–32)
CREAT SERPL-MCNC: 0.8 MG/DL (ref 0.6–1.3)
EOSINOPHIL # BLD AUTO: 0.22 THOUSAND/ΜL (ref 0–0.61)
EOSINOPHIL NFR BLD AUTO: 3 % (ref 0–6)
ERYTHROCYTE [DISTWIDTH] IN BLOOD BY AUTOMATED COUNT: 14.4 % (ref 11.6–15.1)
GFR SERPL CREATININE-BSD FRML MDRD: 98 ML/MIN/1.73SQ M
GLUCOSE SERPL-MCNC: 67 MG/DL (ref 65–140)
HCT VFR BLD AUTO: 46 % (ref 36.5–49.3)
HGB BLD-MCNC: 14.8 G/DL (ref 12–17)
IMM GRANULOCYTES # BLD AUTO: 0.01 THOUSAND/UL (ref 0–0.2)
IMM GRANULOCYTES NFR BLD AUTO: 0 % (ref 0–2)
LYMPHOCYTES # BLD AUTO: 2.6 THOUSANDS/ΜL (ref 0.6–4.47)
LYMPHOCYTES NFR BLD AUTO: 36 % (ref 14–44)
MAGNESIUM SERPL-MCNC: 2.1 MG/DL (ref 1.6–2.6)
MCH RBC QN AUTO: 28.5 PG (ref 26.8–34.3)
MCHC RBC AUTO-ENTMCNC: 32.2 G/DL (ref 31.4–37.4)
MCV RBC AUTO: 89 FL (ref 82–98)
MONOCYTES # BLD AUTO: 0.66 THOUSAND/ΜL (ref 0.17–1.22)
MONOCYTES NFR BLD AUTO: 9 % (ref 4–12)
NEUTROPHILS # BLD AUTO: 3.65 THOUSANDS/ΜL (ref 1.85–7.62)
NEUTS SEG NFR BLD AUTO: 51 % (ref 43–75)
NRBC BLD AUTO-RTO: 0 /100 WBCS
PLATELET # BLD AUTO: 275 THOUSANDS/UL (ref 149–390)
PMV BLD AUTO: 9.9 FL (ref 8.9–12.7)
POTASSIUM SERPL-SCNC: 4 MMOL/L (ref 3.5–5.3)
PROT SERPL-MCNC: 7.3 G/DL (ref 6.4–8.2)
RBC # BLD AUTO: 5.2 MILLION/UL (ref 3.88–5.62)
SODIUM SERPL-SCNC: 140 MMOL/L (ref 136–145)
WBC # BLD AUTO: 7.19 THOUSAND/UL (ref 4.31–10.16)

## 2022-06-14 PROCEDURE — 36415 COLL VENOUS BLD VENIPUNCTURE: CPT | Performed by: PHYSICIAN ASSISTANT

## 2022-06-14 PROCEDURE — 96374 THER/PROPH/DIAG INJ IV PUSH: CPT

## 2022-06-14 PROCEDURE — 80053 COMPREHEN METABOLIC PANEL: CPT | Performed by: PHYSICIAN ASSISTANT

## 2022-06-14 PROCEDURE — 85025 COMPLETE CBC W/AUTO DIFF WBC: CPT | Performed by: PHYSICIAN ASSISTANT

## 2022-06-14 PROCEDURE — 99283 EMERGENCY DEPT VISIT LOW MDM: CPT

## 2022-06-14 PROCEDURE — 83735 ASSAY OF MAGNESIUM: CPT | Performed by: PHYSICIAN ASSISTANT

## 2022-06-14 PROCEDURE — 96376 TX/PRO/DX INJ SAME DRUG ADON: CPT

## 2022-06-14 PROCEDURE — 99284 EMERGENCY DEPT VISIT MOD MDM: CPT | Performed by: PHYSICIAN ASSISTANT

## 2022-06-14 RX ORDER — LORAZEPAM 2 MG/ML
2 INJECTION INTRAMUSCULAR ONCE
Status: COMPLETED | OUTPATIENT
Start: 2022-06-14 | End: 2022-06-14

## 2022-06-14 RX ORDER — LORAZEPAM 1 MG/1
1 TABLET ORAL EVERY 6 HOURS PRN
Qty: 12 TABLET | Refills: 0 | Status: SHIPPED | OUTPATIENT
Start: 2022-06-14 | End: 2022-06-17

## 2022-06-14 RX ORDER — LORAZEPAM 2 MG/ML
1 INJECTION INTRAMUSCULAR ONCE
Status: COMPLETED | OUTPATIENT
Start: 2022-06-14 | End: 2022-06-14

## 2022-06-14 RX ORDER — LORAZEPAM 2 MG/ML
1 INJECTION INTRAMUSCULAR ONCE
Status: DISCONTINUED | OUTPATIENT
Start: 2022-06-14 | End: 2022-06-14

## 2022-06-14 RX ADMIN — LORAZEPAM 1 MG: 2 INJECTION INTRAMUSCULAR; INTRAVENOUS at 21:27

## 2022-06-14 RX ADMIN — LORAZEPAM 2 MG: 2 INJECTION INTRAMUSCULAR; INTRAVENOUS at 22:27

## 2022-06-15 ENCOUNTER — TELEPHONE (OUTPATIENT)
Dept: EMERGENCY DEPT | Facility: HOSPITAL | Age: 57
End: 2022-06-15

## 2022-06-15 DIAGNOSIS — F41.9 ANXIETY: Primary | ICD-10-CM

## 2022-06-15 NOTE — DISCHARGE INSTRUCTIONS
Your condition tonight was most consistent with anxiety  Please take the medications as below and follow-up with your primary for further treatment and evaluation    If at any time any fever for your safety or have thoughts of hurting yourself or others, please call   [phone]

## 2022-06-15 NOTE — ED PROVIDER NOTES
History  Chief Complaint   Patient presents with    Anxiety     Pt is having a anxiety attack that has been going through for the last few weeks       The patient is a 57-year-old male department today with a chief complaint of anxiety  The patient is here escorted by his daughter  The patient states that last year he has been going through many obstacles  The patient states that he did lose one of his daughters tragically over last year  He states that he has been going to counseling and also using his [employer] for resources  He states that as of recently things have been worsening  He states that over last 2 months he has been doing with severe medical issues  He has a history of peripheral neuropathy secondary to diabetes and for 2 months ago he had 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  He states that 2 weeks ago he lost and toes from his right  He states that due to these issues he has been out of work  He did have follow-up today with his podiatrist   Patient was hopeful that he would be able to return to work however unfortunately his time off had to be extended secondary to needing to keep the stitches in longer  The patient states that this was to starting him  Patient states that this has been going on for several months but has been gradually worsening  He states he has been having difficulty with sleeping  He states he has racing thoughts at night  He states that he misses his daughter  He states that the medical issues are not helping  He states that he has never had to stay out of work this long and this is also causing stress  He denies any chest pain, shortness of breath, fevers chills falls or traumas  Patient does not currently take anything    Patient denies any history of self-harming, SI, HI, hallucinations  Patient is not in fear for safety and daughter at bedside does not fever safety at home  Wishing for a due to his anxiety increased and worsening    Patient has seen Psychiatry/Psychology in the past but has not done so in a long time  Anxiety  Presenting symptoms: no paranoid behavior, no self-mutilation, no suicidal thoughts, no suicidal threats and no suicide attempt    Presenting symptoms comment:  Patient is having difficulty with sleep patterns, having racing thoughts and sometimes feeling as though he is having panic attacks were shaky  Patient accompanied by:  Family member  Onset quality:  Gradual  Timing:  Constant  Progression:  Worsening  Context: stressful life event    Treatment compliance:  Untreated  Relieved by:  Nothing  Worsened by:  Lack of sleep  Ineffective treatments: Therapy  Associated symptoms: no abdominal pain, no chest pain, no hypersomnia and no hyperventilation    Risk factors: no family hx of mental illness, no family violence, no hx of mental illness, no hx of suicide attempts, no neurological disease and no recent psychiatric admission        Prior to Admission Medications   Prescriptions Last Dose Informant Patient Reported? Taking?    FLUoxetine (PROzac) 40 MG capsule   Yes No   Nuzyra 150 MG TABS   Yes No   Sig: TAKE 3 TABLETS BY MOUTH DAILY FOR 2 DAYS THEN TAKE 2 TABLETS DAILY FOR 8 DAYS   busPIRone (BUSPAR) 5 mg tablet   Yes No   Sig: TAKE BY MOUTH 1 TABLET IN THE MORNING AND 1 TABLET BEFORE BEDTIME    gabapentin (NEURONTIN) 300 mg capsule   No No   Sig: TAKE 1 CAPSULE BY MOUTH THREE TIMES A DAY   gentamicin (GARAMYCIN) 0 1 % cream   Yes No   Sig: gentamicin 0 1 % topical cream   APPLY 1 GRAM BY TOPICAL ROUTE TO THE AFFECTED AREA 3 TIMES A DAY FOR 30 DAYS   hydrOXYzine HCL (ATARAX) 25 mg tablet   Yes No   Sig: Take 25 mg by mouth 4 (four) times a day as needed     lidocaine (Lidoderm) 5 %   No No   Sig: Apply 1 patch topically daily Remove & Discard patch within 12 hours or as directed by MD   lisinopril (ZESTRIL) 5 mg tablet   Yes No   Sig: lisinopril 5 mg tablet   TAKE BY MOUTH 1 TABLET IN THE MORNING    metolazone (ZAROXOLYN) 2 5 mg tablet   Yes No   Sig: TAKE 1 TAB BY MOUTH ONCE A DAY ON MONDAY, WEDNESDAY, AND FRIDAY ONLY     misoprostol (CYTOTEC) 200 mcg tablet   Yes No   Sig: Take 200 mcg by mouth 4 (four) times a day     oxyCODONE-acetaminophen (PERCOCET) 5-325 mg per tablet   Yes No   Sig: Take 1 tablet by mouth every 6 (six) hours   potassium chloride (K-DUR,KLOR-CON) 20 mEq tablet   No No   Sig: Take 2 tablets (40 mEq total) by mouth daily for 5 days      Facility-Administered Medications: None       Past Medical History:   Diagnosis Date    Atrial fibrillation (HCC)     Chronic diastolic (congestive) heart failure (HCC)     Diabetes mellitus ([facility] [address] )     High cholesterol     Hyperlipidemia     Hypertension     Pacemaker     [facility]roke [facility])        Past Surgical History:   Procedure Laterality Date    APPENDECTOMY      ATRIAL CARDIAC PACEMAKER INSERTION      BARIATRIC SURGERY  05/2021    EPIDURAL BLOCK INJECTION N/A 5/19/2022    Procedure: BLOCK / INJECTION EPIDURAL STEROID CERVICAL C7-T1;  Surgeon: Ajay De La Cruz MD;  [facility];  Service: Pain Management     FL GUIDED NEEDLE PLAC BX/ASP/INJ  3/22/2022    FOOT AMPUTATION Left 4/28/2022    Procedure: LEFT TRANSMETATARSAL AMPUTATION ;  Surgeon: Stephen Madden DPM;  [facility];  Service: Podiatry    NERVE BLOCK Right 2/10/2022    Procedure: BLOCK MEDIAL BRANCH C3, C4, C5 #1;  Surgeon: Ajay De La Cruz MD;  [facility];  Service: Pain Management     NERVE BLOCK Right 3/22/2022    Procedure: BLOCK MEDIAL BRANCH C3, C4, C5 #2;  Surgeon: Ajay De La Cruz MD;  [facility];  Service: Pain Management     MS AMPUTATION TOE,I-P JT Left 11/16/2021    Procedure: AMPUTATION LESSER TOE;  Surgeon: Baldomero Hensley DPM;  [facility];  Service: Podiatry    RADIOFREQUENCY ABLATION Right 4/7/2022    Procedure: Right C3, C4, C5 RFA;  Surgeon: Ajay De La Cruz MD;  [facility];  Service: Pain Management     TOE AMPUTATION Left     2nd toe    TOE AMPUTATION Left 9/15/2021    Procedure: AMPUTATION LEFT 4TH TOE;  Surgeon: Muriel Hidalgo DPM;  [facility];  Service: Podiatry    TOE AMPUTATION Right 1/12/2022    Procedure: AMPUTATION TOE;  Surgeon: Shravan Winter DPM;  [facility];  Service: Podiatry    TOE AMPUTATION Right 2/23/2022    Procedure: AMPUTATION TOE  RIGHT SECOND;  Surgeon: Shravan Winter DPM;  [facility];  Service: Podiatry    TOE AMPUTATION Right 6/3/2022    Procedure: AMPUTATION right 4th TOE;  Surgeon: Yari Thornton DPM;  [facility];  Service: Podiatry       Family History   Problem Relation Age of Onset    No Known Problems Mother     No Known Problems Father      I have reviewed and agree with the history as documented  E-Cigarette/Vaping    E-Cigarette Use Never User      E-Cigarette/Vaping Substances     Social History     Tobacco Use    Smoking status: Never Smoker    Smokeless tobacco: Never Used   Vaping Use    Vaping Use: Never used   Substance Use Topics    Alcohol use: Never    Drug use: Never       Review of Systems   Constitutional: Negative for chills and fever  HENT: Negative for ear pain and sore throat  Eyes: Negative for pain and visual disturbance  Respiratory: Negative for cough and shortness of breath  Cardiovascular: Negative for chest pain and palpitations  Gastrointestinal: Negative for abdominal pain and vomiting  Genitourinary: Negative for dysuria and hematuria  Musculoskeletal: Negative for arthralgias and back pain  Skin: Negative for color change and rash  Neurological: Negative for seizures and syncope  Psychiatric/Behavioral: Negative for paranoia, self-injury and suicidal ideas  All other systems reviewed and are negative  Physical Exam  Physical Exam  Vitals and nursing note reviewed  Constitutional:       Appearance: He is well-developed  HENT:      Head: Normocephalic and atraumatic     Eyes: Extraocular Movements: Extraocular movements intact  Conjunctiva/sclera: Conjunctivae normal       Pupils: Pupils are equal, round, and reactive to light  Cardiovascular:      Rate and Rhythm: Normal rate and regular rhythm  Heart sounds: No murmur heard  Pulmonary:      Effort: Pulmonary effort is normal  No respiratory distress  Breath sounds: Normal breath sounds  Abdominal:      Palpations: Abdomen is soft  Tenderness: There is no abdominal tenderness  Musculoskeletal:      Cervical back: Neck supple  Skin:     General: Skin is warm and dry  Capillary Refill: Capillary refill takes less than 2 seconds  Neurological:      General: No focal deficit present  Mental Status: He is alert and oriented to person, place, and time  Gait: Gait normal    Psychiatric:         Mood and Affect: Mood is anxious  Affect is tearful  Thought Content: Thought content is not paranoid or delusional  Thought content does not include homicidal or suicidal ideation  Thought content does not include homicidal or suicidal plan           Cognition and Memory: Cognition normal          Judgment: Judgment normal          Vital Signs  ED Triage Vitals [06/14/22 2052]   Temperature Pulse Respirations Blood Pressure SpO2   97 9 °F (36 6 °C) 85 16 132/64 95 %      Temp src Heart Rate Source Patient Position - Orthostatic VS BP Location FiO2 (%)   -- -- -- Left arm --      Pain Score       No Pain           Vitals:    06/14/22 2052   BP: 132/64   Pulse: 85         Visual Acuity      ED Medications  Medications   LORazepam (ATIVAN) injection 1 mg (1 mg Intravenous Given 6/14/22 2127)   LORazepam (ATIVAN) injection 2 mg (2 mg Intravenous Given 6/14/22 2227)       Diagnostic Studies  Results Reviewed     Procedure Component Value Units Date/Time    Comprehensive metabolic panel [[id number]] Collected: 06/14/22 2127    Lab Status: Final result Specimen: Blood from Arm, Right Updated: 06/14/22 2210 Sodium 140 mmol/L      Potassium 4 0 mmol/L      Chloride 106 mmol/L      CO2 25 mmol/L      ANION GAP 9 mmol/L      BUN 13 mg/dL      Creatinine 0 80 mg/dL      Glucose 67 mg/dL      Calcium 8 5 mg/dL      AST 19 U/L      ALT 23 U/L      Alkaline Phosphatase 97 U/L      Total Protein 7 3 g/dL      Albumin 3 6 g/dL      Total Bilirubin 0 41 mg/dL      eGFR 98 ml/min/1 73sq m     Narrative:      National Kidney Disease Foundation guidelines for Chronic Kidney Disease (CKD):     Stage 1 with normal or high GFR (GFR > 90 mL/min/1 73 square meters)    Stage 2 Mild CKD (GFR = 60-89 mL/min/1 73 square meters)    Stage 3A Moderate CKD (GFR = 45-59 mL/min/1 73 square meters)    Stage 3B Moderate CKD (GFR = 30-44 mL/min/1 73 square meters)    Stage 4 Severe CKD (GFR = 15-29 mL/min/1 73 square meters)    Stage 5 End Stage CKD (GFR <15 mL/min/1 73 square meters)  Note: GFR calculation is accurate only with a steady state creatinine    Magnesium [[id number]]  (Normal) Collected: 06/14/22 2127    Lab Status: Final result Specimen: Blood from Arm, Right Updated: 06/14/22 2210     Magnesium 2 1 mg/dL     CBC and differential [[id number]] Collected: 06/14/22 2127    Lab Status: Final result Specimen: Blood from Arm, Right Updated: 06/14/22 2132     WBC 7 19 Thousand/uL      RBC 5 20 Million/uL      Hemoglobin 14 8 g/dL      Hematocrit 46 0 %      MCV 89 fL      MCH 28 5 pg      MCHC 32 2 g/dL      RDW 14 4 %      MPV 9 9 fL      Platelets 715 Thousands/uL      nRBC 0 /100 WBCs      Neutrophils Relative 51 %      Immat GRANS % 0 %      Lymphocytes Relative 36 %      Monocytes Relative 9 %      Eosinophils Relative 3 %      Basophils Relative 1 %      Neutrophils Absolute 3 65 Thousands/µL      Immature Grans Absolute 0 01 Thousand/uL      Lymphocytes Absolute 2 60 Thousands/µL      Monocytes Absolute 0 66 Thousand/µL      Eosinophils Absolute 0 22 Thousand/µL      Basophils Absolute 0 05 Thousands/µL                  No orders to display              Procedures  Procedures         ED Course  ED Course as of 06/15/22 1349   Tue Jun 14, 202214, 2022 2218 Patient is feeling a little bit improved, patient daughter is at bedside, will re-dose the Ativan and give small prescription for at home  Did offer crisis evaluation, patient still seemed the home no SI or HI at this time the daughter not concern for safety at home                               SBIRT 22yo+    Flowsheet Row Most Recent Value   SBIRT (25 yo +)    In order to provide better care to our patients, we are screening all of our patients for alcohol and drug use  Would it be okay to ask you these screening questions? Yes Filed at: 06/14/2022 2133   Initial Alcohol Screen: US AUDIT-C     1  How often do you have a drink containing alcohol? 0 Filed at: 06/14/2022 2133   2  How many drinks containing alcohol do you have on a typical day you are drinking? 0 Filed at: 06/14/2022 2133   3a  Male UNDER 65: How often do you have five or more drinks on one occasion? 0 Filed at: 06/14/2022 2133   3b  FEMALE Any Age, or MALE 65+: How often do you have 4 or more drinks on one occassion? 0 Filed at: 06/14/2022 2133   Audit-C Score 0 Filed at: 06/14/2022 2133   ANGELO: How many times in the past year have you    Used an illegal drug or used a prescription medication for non-medical reasons? Never Filed at: 06/14/2022 2133                    MDM  Number of Diagnoses or Management Options  Anxiety  Diagnosis management comments: Patient at bedside seem to improve with Ativan use  Lab work was unremarkable  Patient seems to be going through a lot of stressful events as an outpatient  Patient was offered crisis evaluation tonight, given number to call tomorrow  Patient would prefer to follow up with someone as an outpatient  Patient did not have any SI or HI at the time no warrant for 302  Patient and family member feel safe with outpatient treatment and going home tonight    Did instruct them if anything would change to return immediately  Referral was then placed for outpatient psychology follow-up  Patient followed with them in the past        Amount and/or Complexity of Data Reviewed  Clinical lab tests: ordered and reviewed  Decide to obtain previous medical records or to obtain history from someone other than the patient: yes  Review and summarize past medical records: yes  Independent visualization of images, tracings, or specimens: yes    Risk of Complications, Morbidity, and/or Mortality  Presenting problems: moderate  Diagnostic procedures: moderate  Management options: moderate    Patient Progress  Patient progress: stable      Disposition  Final diagnoses:   Anxiety     Time reflects when diagnosis was documented in both MDM as applicable and the Disposition within this note     Time User Action Codes Description Comment    6/14/2022 10:19 PM Blanca Gilliam Add [F41 9] Anxiety       ED Disposition     ED Disposition   Discharge    Condition   Stable    Date/Time   Tue Jun 14, 2022 10:19 PM    Comment   Julianna Cifuentes discharge to home/self care                 Follow-up Information     Follow up With Specialties Details Why Contact Info    Pauline Diaz DO  Schedule an appointment as soon as possible for a visit   [facility] [address] [place] [id number]  [phone]            Discharge Medication List as of 6/14/2022 10:23 PM      START taking these medications    Details   LORazepam (Ativan) 1 mg tablet Take 1 tablet (1 mg total) by mouth every 6 (six) hours as needed for anxiety for up to 3 days, Starting Tue 6/14/2022, Until Fri 6/17/2022 at 2359, Normal         CONTINUE these medications which have NOT CHANGED    Details   busPIRone (BUSPAR) 5 mg tablet TAKE BY MOUTH 1 TABLET IN THE MORNING AND 1 TABLET BEFORE BEDTIME , Historical Med      FLUoxetine (PROzac) 40 MG capsule Starting Wed 1/19/2022, Historical Med      gabapentin (NEURONTIN) 300 mg capsule TAKE 1 CAPSULE BY MOUTH THREE TIMES A DAY, Normal      gentamicin (GARAMYCIN) 0 1 % cream gentamicin 0 1 % topical cream   APPLY 1 GRAM BY TOPICAL ROUTE TO THE AFFECTED AREA 3 TIMES A DAY FOR 30 DAYS, Historical Med      hydrOXYzine HCL (ATARAX) 25 mg tablet Take 25 mg by mouth 4 (four) times a day as needed  , Starting Mon 7/26/2021, Historical Med      lidocaine (Lidoderm) 5 % Apply 1 patch topically daily Remove & Discard patch within 12 hours or as directed by MD, Starting Sun 2/20/2022, Normal      lisinopril (ZESTRIL) 5 mg tablet lisinopril 5 mg tablet   TAKE BY MOUTH 1 TABLET IN THE MORNING , Historical Med      metolazone (ZAROXOLYN) 2 5 mg tablet TAKE 1 TAB BY MOUTH ONCE A DAY ON MONDAY, WEDNESDAY, AND FRIDAY ONLY , Historical Med      misoprostol (CYTOTEC) 200 mcg tablet Take 200 mcg by mouth 4 (four) times a day  , Starting Mon 11/8/2021, Historical Med      Nuzyra 150 MG TABS TAKE 3 TABLETS BY MOUTH DAILY FOR 2 DAYS THEN TAKE 2 TABLETS DAILY FOR 8 DAYS, Historical Med      oxyCODONE-acetaminophen (PERCOCET) 5-325 mg per tablet Take 1 tablet by mouth every 6 (six) hours, Starting Tue 5/3/2022, Historical Med      potassium chloride (K-DUR,KLOR-CON) 20 mEq tablet Take 2 tablets (40 mEq total) by mouth daily for 5 days, Starting u 2/24/2022, Until Tue 3/22/2022, Normal             No discharge procedures on file      PDMP Review       Value Time User    PDMP Reviewed  Yes 6/14/2022 10:21 PM Oliver Ahumada, PA-C          ED Provider  Electronically Signed by           Oliver Ahumada, PA-C  06/15/22 4090

## 2022-06-15 NOTE — TELEPHONE ENCOUNTER
Called in today to the patient's psychologist office, requesting patient have follow-up    Patient referral placed, will fax to Farzaneh Isidro with [facility] in [facility]

## 2023-01-02 ENCOUNTER — APPOINTMENT (OUTPATIENT)
Dept: URGENT CARE | Facility: MEDICAL CENTER | Age: 58
End: 2023-01-02

## 2023-01-18 ENCOUNTER — OCCMED (OUTPATIENT)
Dept: URGENT CARE | Facility: MEDICAL CENTER | Age: 58
End: 2023-01-18
Payer: OTHER MISCELLANEOUS

## 2023-01-18 PROCEDURE — 99213 OFFICE O/P EST LOW 20 MIN: CPT | Performed by: FAMILY MEDICINE

## 2023-02-01 ENCOUNTER — TELEPHONE (OUTPATIENT)
Dept: PAIN MEDICINE | Facility: CLINIC | Age: 58
End: 2023-02-01

## 2023-02-01 NOTE — TELEPHONE ENCOUNTER
Caller:  Soham Beck (pt's wife)    Doctor: Bria    Reason for call: request for repeat injection for right cervical spine pain    Call back#: [phone]

## 2023-02-01 NOTE — TELEPHONE ENCOUNTER
Pts wife Joby Pierre stating she is waiting for a call to see if pt can come in for an appt    Please call [phone]

## 2023-02-07 RX ORDER — PRAZOSIN HYDROCHLORIDE 1 MG/1
CAPSULE ORAL
COMMUNITY

## 2023-02-07 RX ORDER — PRAZOSIN HYDROCHLORIDE 1 MG/1
CAPSULE ORAL
COMMUNITY
Start: 2023-01-03

## 2023-02-07 RX ORDER — LORAZEPAM 0.5 MG/1
TABLET ORAL
COMMUNITY
Start: 2023-01-02

## 2023-02-07 RX ORDER — SILDENAFIL 25 MG/1
TABLET, FILM COATED ORAL
COMMUNITY
Start: 2022-10-07

## 2023-02-07 RX ORDER — OXYCODONE HYDROCHLORIDE 5 MG/1
TABLET ORAL
COMMUNITY
Start: 2022-12-17

## 2023-02-07 RX ORDER — FLUOXETINE HYDROCHLORIDE 20 MG/1
CAPSULE ORAL
COMMUNITY
Start: 2023-01-25

## 2023-02-07 RX ORDER — COLLAGENASE SANTYL 250 [ARB'U]/G
OINTMENT TOPICAL
COMMUNITY

## 2023-02-07 RX ORDER — CARBAMAZEPINE 200 MG/1
TABLET ORAL
COMMUNITY

## 2023-02-08 ENCOUNTER — OFFICE VISIT (OUTPATIENT)
Dept: PAIN MEDICINE | Facility: CLINIC | Age: 58
End: 2023-02-08

## 2023-02-08 VITALS
SYSTOLIC BLOOD PRESSURE: 104 MMHG | HEIGHT: 73 IN | TEMPERATURE: 98.5 F | BODY MASS INDEX: 41.75 KG/M2 | HEART RATE: 75 BPM | RESPIRATION RATE: 20 BRPM | WEIGHT: 315 LBS | DIASTOLIC BLOOD PRESSURE: 70 MMHG

## 2023-02-08 DIAGNOSIS — M47.812 CERVICAL SPONDYLOSIS: ICD-10-CM

## 2023-02-08 DIAGNOSIS — L08.9 DIABETIC INFECTION OF LEFT FOOT (HCC): ICD-10-CM

## 2023-02-08 DIAGNOSIS — M54.2 CERVICALGIA: Primary | ICD-10-CM

## 2023-02-08 DIAGNOSIS — E11.628 DIABETIC INFECTION OF LEFT FOOT (HCC): ICD-10-CM

## 2023-02-08 DIAGNOSIS — Z95.0 PACEMAKER: ICD-10-CM

## 2023-02-08 RX ORDER — LIDOCAINE HYDROCHLORIDE 10 MG/ML
10 INJECTION, SOLUTION EPIDURAL; INFILTRATION; INTRACAUDAL; PERINEURAL ONCE
OUTPATIENT
Start: 2023-02-08 | End: 2023-02-08

## 2023-02-08 RX ORDER — METHYLPREDNISOLONE ACETATE 80 MG/ML
80 INJECTION, SUSPENSION INTRA-ARTICULAR; INTRALESIONAL; INTRAMUSCULAR; PARENTERAL; SOFT TISSUE ONCE
OUTPATIENT
Start: 2023-02-08 | End: 2023-02-08

## 2023-02-08 RX ORDER — BUPIVACAINE HCL/PF 2.5 MG/ML
5 VIAL (ML) INJECTION ONCE
OUTPATIENT
Start: 2023-02-08 | End: 2023-02-08

## 2023-02-08 NOTE — H&P (VIEW-ONLY)
Assessment  1  Cervicalgia - Right    2  Pacemaker    3  Cervical spondylosis    Greater than 90% relief of pain with improved ability to participate with IADLs after Right C3, C4, C5 medial branch nerve radiofrequency ablation for nearly 10 months; pain has returned  Previously reported the following symptomatology:     Right sided neck pain described primarily arthritic features  Has yet to begin physical therapy for his neck  Cervical facet arthropathy seen on xray cervical spine  Distribution of pain follows the right C3-C6 medial branch nerve regions  + right sided facet loading maneuvers consistent with multilevel spondyloarthropathy and osteophytes seen in cervical spine  Reasonable at this time to trial medial branch blocks to target site of cervical facet mediated pain and pursue radiofrequency ablation of successful  Risks, benefits and alternatives of procedure in conjunction with multimodal pain therapy plan thoroughly discussed with patient  Questions answered to patient's satisfaction  Plan  -Right C3, C4, C5 medial branch nerve radiofrequency ablation; patient has pacemaker; recommend interrogation post procedure  RTC 2 weeks post procedure  -gabapentin increased to 300 mg t i d  from order previously rx for patient; counseled regarding sedative effects of taking this medication and provided up titration calendar  Counseled not to take medication while driving or operating heavy machinery/using stairs  -physical therapy for right-sided cervical facet arthropathy; Physician directed home exercise plan as per [facility] demonstrated and handouts provided that patient plans to participate with for 1 hour, twice a week for the next 6 weeks  There are risks associated with opioid medications, including dependence, addiction and tolerance  The patient understands and agrees to use these medications only as prescribed   Potential side effects of the medications include, but are not limited to, constipation, drowsiness, addiction, impaired judgment and risk of fatal overdose if not taken as prescribed  The patient was warned against driving while taking sedation medications  Sharing medications is a felony  At this point in time, the patient is showing no signs of addiction, abuse, diversion or suicidal ideation  [place] Pres[facility] Drug Monitoring Program report was reviewed and was appropriate      Complete risks and benefits including bleeding, infection, tissue reaction, nerve injury and allergic reaction were discussed  The approach was demonstrated using models and literature was provided  Verbal and written consent was obtained  My impressions and treatment recommendations were discussed in detail with the patient who verbalized understanding and had no further questions  Discharge instructions were provided  I personally saw and examined the patient and I agree with the above discussed plan of care  New Medications Ordered This Visit   Medications   • carBAMazepine (TEGretol) 200 mg tablet     Sig: carbamazepine 200 mg tablet   • collagenase (Santyl) ointment     Sig: Santyl 250 unit/gram topical ointment   APPLY TO CLEANSED AFFECTED AREA BY TOPICAL ROUTE ONCE DAILY   • FLUoxetine (PROzac) 20 mg capsule   • LORazepam (ATIVAN) 0 5 mg tablet     Sig: TAKE 1 TABLET BY MOUTH TWICE A DAY TAKE 1 TABLET IN THE AFTERNOON   • oxyCODONE (ROXICODONE) 5 immediate release tablet   • prazosin (MINIPRESS) 1 mg capsule     Sig: TAKE 1 CAPSULE BY MOUTH EVERY DAY AT NIGHT   • prazosin (MINIPRESS) 1 mg capsule     Sig: prazosin 1 mg capsule   • sildenafil (VIAGRA) 25 MG tablet     Sig: sildenafil 25 mg tablet   TAKE 1 TABLET BY MOUTH DAILY AS NEEDED FOR ERECTILE DYSFUNCTION       History of Present Illness    Greater than 90% relief of pain with improved ability to participate with IADLs after Right C3, C4, C5 medial branch nerve radiofrequency ablation for nearly 10 months; pain has returned  Previously reported the following symptomatology:     Laurin Alpers is a 59 y o  male with pmhx of afib with pacemaker on xarelto, obesity, DAYAN, DM-2, HTN, depression/anxiety presenting with right-sided neck pain described primarily arthritic features  Describes progressive neck pain since November  The patient describes predominantly aching, nagging, indolent crampy, stabbing axial neck pain without radicular features of electric shock-like and shooting pain  He denies any weakness numbness and paresthesias  The pain is 8/10 nature and is worse with overhead maneuvers as well as lateral rotation and extension of the neck  The patient has not yet been to physical therapy, and has failed conservative medical management including naproxen 500 mg b i d  and gabapentin 300 mg t i d  The pain is significant source of disability and compromises independent activities of daily living as well as overall function  The patient has difficulty with sleep disturbance as well since the pain often wakes him up  Has trialed gabapentin, flexeril and tylenol as well as tramadol with limited benefit but has never trialed cervical epidural steroid injections or medial branch blocks  He denies any bowel or bladder issues/incontinence, gait instability  I have personally reviewed and/or updated the patient's past medical history, past surgical history, family history, social history, current medications, allergies, and vital signs today  Review of Systems   Constitutional: Positive for activity change  HENT: Negative  Eyes: Negative  Respiratory: Negative  Cardiovascular: Negative  Gastrointestinal: Negative  Endocrine: Negative  Genitourinary: Negative  Musculoskeletal: Positive for arthralgias, myalgias, neck pain and neck stiffness  Skin: Negative  Allergic/Immunologic: Negative  Neurological: Negative for weakness and numbness  Hematological: Negative      Psychiatric/Behavioral: Negative  All other systems reviewed and are negative        Patient Active Problem List   Diagnosis   • DAYAN (obstructive sleep apnea)   • Chronic diastolic heart failure ([facility])   • Hypertension   • Diabetes mellitus ([facility] )   • Morbid obesity with BMI of 40 0-44 9, adult ([facility])   • Pain, joint, ankle and foot, left   • Chronic osteomyelitis of left foot with draining sinus ([facility])   • Atrial fibrillation ([facility])   • Anxiety   • Type 2 diabetes mellitus with diabetic polyneuropathy, with long-term current use of insulin ([facility])   • Toe osteomyelitis, right ([facility])   • Cervical spondylosis   • Cervicalgia - Right   • Diabetic infection of left foot ([facility] )   • Pacemaker   • History of bariatric surgery   • Encounter for perioperative consultation   • Hyperkalemia       Past Medical History:   Diagnosis Date   • Atrial fibrillation ([facility] )    • Chronic diastolic (congestive) heart failure ([facility] )    • Diabetes mellitus ([facility] )    • High cholesterol    • Hyperlipidemia    • Hypertension    • Pacemaker    • Stroke [facility])        Past Surgical History:   Procedure Laterality Date   • APPENDECTOMY     • ATRIAL CARDIAC PACEMAKER INSERTION     • BARIATRIC SURGERY  05/2021   • EPIDURAL BLOCK INJECTION N/A 5/19/2022    Procedure: BLOCK / INJECTION EPIDURAL STEROID CERVICAL C7-T1;  Surgeon: Phi Lee MD;  [facility]: [facility];  Service: Pain Management    • FL GUIDED NEEDLE PLAC BX/ASP/INJ  3/22/2022   • FOOT AMPUTATION Left 4/28/2022    Procedure: LEFT TRANSMETATARSAL AMPUTATION ;  Surgeon: Yulissa Madden DPM;  [facility];  Service: Podiatry   • NERVE BLOCK Right 2/10/2022    Procedure: BLOCK MEDIAL BRANCH C3, C4, C5 #1;  Surgeon: Phi Lee MD;  [facility];  Service: Pain Management    • NERVE BLOCK Right 3/22/2022    Procedure: BLOCK MEDIAL BRANCH C3, C4, C5 #2;  Surgeon: Phi Lee MD;  [facility];  Service: Pain Management    • FL AMPUTATION TOE INTERPHALANGEAL JOINT Left 11/16/2021 Procedure: AMPUTATION LESSER TOE;  Surgeon: Lukasz Ferguson DPM;  [facility];  Service: Podiatry   • RADIOFREQUENCY ABLATION Right 4/7/2022    Procedure: Right C3, C4, C5 RFA;  Surgeon: Viktor Carey MD;  [facility];  Service: Pain Management    • TOE AMPUTATION Left     2nd toe   • TOE AMPUTATION Left 9/15/2021    Procedure: AMPUTATION LEFT 4TH TOE;  Surgeon: Lukasz Ferguson DPM;  [facility];  Service: Podiatry   • TOE AMPUTATION Right 1/12/2022    Procedure: AMPUTATION TOE;  Surgeon: Rajni Bell DPM;  [facility];  Service: Podiatry   • TOE AMPUTATION Right 2/23/2022    Procedure: AMPUTATION TOE  RIGHT SECOND;  Surgeon: Rajni Bell DPM;  Location: [address] [place] [address] MAIN OR;  Service: Podiatry   • TOE AMPUTATION Right 6/3/2022    Procedure: AMPUTATION right 4th TOE;  Surgeon: Lizz Phillips DPM;  [facility];  Service: Podiatry       Family History   Problem Relation Age of Onset   • No Known Problems Mother    • No Known Problems Father        Social History     Occupational History   • Occupation: Maintenance Tech     Employer: [employer]   Tobacco Use   • Smoking status: Never   • Smokeless tobacco: Never   Vaping Use   • Vaping Use: Never used   Substance and Sexual Activity   • Alcohol use: Never   • Drug use: Never   • Sexual activity: Yes       Current Outpatient Medications on File Prior to Visit   Medication Sig   • busPIRone (BUSPAR) 5 mg tablet TAKE BY MOUTH 1 TABLET IN THE MORNING AND 1 TABLET BEFORE BEDTIME     • carBAMazepine (TEGretol) 200 mg tablet carbamazepine 200 mg tablet   • collagenase (Santyl) ointment Santyl 250 unit/gram topical ointment   APPLY TO CLEANSED AFFECTED AREA BY TOPICAL ROUTE ONCE DAILY   • FLUoxetine (PROzac) 20 mg capsule    • FLUoxetine (PROzac) 40 MG capsule    • gabapentin (NEURONTIN) 300 mg capsule TAKE 1 CAPSULE BY MOUTH THREE TIMES A DAY   • gentamicin (GARAMYCIN) 0 1 % cream gentamicin 0 1 % topical cream   APPLY 1 GRAM BY TOPICAL ROUTE TO THE AFFECTED AREA 3 TIMES A DAY FOR 30 DAYS   • hydrOXYzine HCL (ATARAX) 25 mg tablet Take 25 mg by mouth 4 (four) times a day as needed     • lidocaine (Lidoderm) 5 % Apply 1 patch topically daily Remove & Discard patch within 12 hours or as directed by MD   • lisinopril (ZESTRIL) 5 mg tablet lisinopril 5 mg tablet   TAKE BY MOUTH 1 TABLET IN THE MORNING  • LORazepam (ATIVAN) 0 5 mg tablet TAKE 1 TABLET BY MOUTH TWICE A DAY TAKE 1 TABLET IN THE AFTERNOON   • metolazone (ZAROXOLYN) 2 5 mg tablet TAKE 1 TAB BY MOUTH ONCE A DAY ON MONDAY, WEDNESDAY, AND FRIDAY ONLY  • misoprostol (CYTOTEC) 200 mcg tablet Take 200 mcg by mouth 4 (four) times a day     • Nuzyra 150 MG TABS TAKE 3 TABLETS BY MOUTH DAILY FOR 2 DAYS THEN TAKE 2 TABLETS DAILY FOR 8 DAYS   • prazosin (MINIPRESS) 1 mg capsule TAKE 1 CAPSULE BY MOUTH EVERY DAY AT NIGHT   • oxyCODONE (ROXICODONE) 5 immediate release tablet  (Patient not taking: Reported on 2/8/2023)   • oxyCODONE-acetaminophen (PERCOCET) 5-325 mg per tablet Take 1 tablet by mouth every 6 (six) hours (Patient not taking: Reported on 2/8/2023)   • potassium chloride (K-DUR,KLOR-CON) 20 mEq tablet Take 2 tablets (40 mEq total) by mouth daily for 5 days   • prazosin (MINIPRESS) 1 mg capsule prazosin 1 mg capsule (Patient not taking: Reported on 2/8/2023)   • sildenafil (VIAGRA) 25 MG tablet sildenafil 25 mg tablet   TAKE 1 TABLET BY MOUTH DAILY AS NEEDED FOR ERECTILE DYSFUNCTION (Patient not taking: Reported on 2/8/2023)   • [DISCONTINUED] LORazepam (Ativan) 1 mg tablet Take 1 tablet (1 mg total) by mouth every 6 (six) hours as needed for anxiety for up to 3 days     No current facility-administered medications on file prior to visit         No Known Allergies      Physical Exam    /70   Pulse 75   Temp 98 5 °F (36 9 °C)   Resp 20   Ht 6' 1" (1 854 m)   Wt (!) 147 kg (323 lb 12 8 oz)   BMI 42 72 kg/m²     Constitutional: normal, well developed, well nourished, alert, in no distress and non-toxic and no overt pain behavior  and obese  Eyes: anicteric  HEENT: grossly intact  Neck: supple, symmetric, trachea midline and no masses   Pulmonary:even and unlabored  Cardiovascular:No edema or pitting edema present  Skin:Normal without rashes or lesions and well hydrated  Psychiatric:Mood and affect appropriate  Neurologic:Cranial Nerves II-XII grossly intact Sensation grossly intact; no clonus negative morton's  Reflexes 2+ and brisk  Spurling's maneuver negative bilaterally  Musculoskeletal:normal gait  5/5 strength bilaterally with AROM in upper extremities  Significant pain with right-sided cervical facet loading bilaterally and with lateral spine rotation  TTP over right-sided cervical paraspinal muscles  Negative epifanio's test, negative gaenslen's negative SIJ loading bilaterally      Imaging    Multilevel spondyloarthropathy/degenerative changes seen throughout cervical spine x-ray

## 2023-02-08 NOTE — DISCHARGE INSTR - AVS FIRST PAGE
YOUR 2 WEEK FOLLOW UP HAS BEEN SCHEDULED; IF YOU WISH TO CHANGE THE FOLLOW UP, PLEASE CALL THE [facility] AT [place]: [phone]      Cervical Radiofrequency Ablation   WHAT YOU NEED TO KNOW:   Cervical radiofrequency ablation (RFA) is a procedure used to treat facet joint pain in your neck  Facet joints are found at the back of each vertebra  A needle electrode is used to send electrical currents to the nerves in your facet joint  The electrical currents create heat that damages the nerve so it cannot send pain signals  DISCHARGE INSTRUCTIONS:   Seek care immediately if:   You cannot move your arm  You cannot control your urine or bowel movements  You have severe pain in your neck  Contact your healthcare provider if:   You have arm weakness  You develop new symptoms  You have questions or concerns about your condition or care  Medicines:   Pain medicine  may be given  Ask how to take this medicine safely  Take your medicine as directed  Contact your healthcare provider if you think your medicine is not helping or if you have side effects  Tell him or her if you are allergic to any medicine  Keep a list of the medicines, vitamins, and herbs you take  Include the amounts, and when and why you take them  Bring the list or the pill bottles to follow-up visits  Carry your medicine list with you in case of an emergency  Follow up with your healthcare provider as directed:  Write down your questions so you remember to ask them during your visits  Activity:  Do not drive a car or operate machinery within 24 hours after your procedure  Ask your healthcare provider about any other activities you should avoid  © Copyright [address] Information is for End User's use only and may not be sold, redistributed or otherwise used for commercial purposes   All illustrations and images included in CareNotes® are the copyrighted property of [employer]  or [employer] [facility]  The above information is an  only  It is not intended as medical advice for individual conditions or treatments  Talk to your doctor, nurse or pharmacist before following any medical regimen to see if it is safe and effective for you

## 2023-02-08 NOTE — PATIENT INSTRUCTIONS
Cervical Radiofrequency Ablation   WHAT YOU NEED TO KNOW:   What do I need to know about  cervicalradiofrequency ablation? cervicalradiofrequency ablation (RFA) is a procedure used to treat facet joint pain in your  neck  Facet joints are found at the back of each vertebra  A needle electrode is used to send electrical currents to the nerves in your facet joint  The electrical currents create heat that damages the nerve so it cannot send pain signals  How do I prepare for cervical RFA? Your healthcare provider will talk to you about how to prepare for this procedure  He may tell you not to eat or drink anything after midnight on the day of your procedure  He will tell you what medicines to take or not take on the day of your procedure  What will happen during cervical RFA? You will lie on your stomach  You will be given local anesthesia to numb the area of your  Neck where the needle electrode will be inserted  You may be given a sedative to help keep you relaxed  You may still feel pressure or pushing during the procedure, but you should not feel any pain  Your healthcare provider will use fluoroscopy (a type of x-ray) to guide the needle electrode to the nerves near your facet joint  Your healthcare provider may touch the affected nerve to make sure the needle electrode is in the right place  You will feel tingling or pressure when he does this  He will then apply local anesthesia to the nerve to numb it  This will prevent you from feeling pain when he applies heat to the nerve  Your healthcare provider will then apply heat to the nerve using the needle electrode  He may need to apply heat to more than one nerve  He will remove the needle electrode and apply a bandage over the area  What are the risks of  cervical RFA? You may have pain, numbness, tingling, or burning in the area where the lumbar RFA was done  These normally go away within 6 weeks  The needle electrode may injure your spinal nerves  This may cause permanent arm weakness or nerve pain  CARE AGREEMENT:   You have the right to help plan your care  Learn about your health condition and how it may be treated  Discuss treatment options with your healthcare providers to decide what care you want to receive  You always have the right to refuse treatment  The above information is an  only  It is not intended as medical advice for individual conditions or treatments  Talk to your doctor, nurse or pharmacist before following any medical regimen to see if it is safe and effective for you  © Copyright [address] Information is for End User's use only and may not be sold, redistributed or otherwise used for commercial purposes   All illustrations and images included in CareNotes® are the copyrighted property of [employer]  or [address] [place] [address]

## 2023-02-08 NOTE — PROGRESS NOTES
Assessment  1  Cervicalgia - Right    2  Pacemaker    3  Cervical spondylosis    Greater than 90% relief of pain with improved ability to participate with IADLs after Right C3, C4, C5 medial branch nerve radiofrequency ablation for nearly 10 months; pain has returned  Previously reported the following symptomatology:     Right sided neck pain described primarily arthritic features  Has yet to begin physical therapy for his neck  Cervical facet arthropathy seen on xray cervical spine  Distribution of pain follows the right C3-C6 medial branch nerve regions  + right sided facet loading maneuvers consistent with multilevel spondyloarthropathy and osteophytes seen in cervical spine  Reasonable at this time to trial medial branch blocks to target site of cervical facet mediated pain and pursue radiofrequency ablation of successful  Risks, benefits and alternatives of procedure in conjunction with multimodal pain therapy plan thoroughly discussed with patient  Questions answered to patient's satisfaction  Plan  -Right C3, C4, C5 medial branch nerve radiofrequency ablation; patient has pacemaker; recommend interrogation post procedure  RTC 2 weeks post procedure  -gabapentin increased to 300 mg t i d  from order previously rx for patient; counseled regarding sedative effects of taking this medication and provided up titration calendar  Counseled not to take medication while driving or operating heavy machinery/using stairs  -physical therapy for right-sided cervical facet arthropathy; Physician directed home exercise plan as per [employer] demonstrated and handouts provided that patient plans to participate with for 1 hour, twice a week for the next 6 weeks  There are risks associated with opioid medications, including dependence, addiction and tolerance  The patient understands and agrees to use these medications only as prescribed   Potential side effects of the medications include, but are not limited to, constipation, drowsiness, addiction, impaired judgment and risk of fatal overdose if not taken as prescribed  The patient was warned against driving while taking sedation medications  Sharing medications is a felony  At this point in time, the patient is showing no signs of addiction, abuse, diversion or suicidal ideation  [place] Prescription Drug Monitoring Program report was reviewed and was appropriate      Complete risks and benefits including bleeding, infection, tissue reaction, nerve injury and allergic reaction were discussed  The approach was demonstrated using models and literature was provided  Verbal and written consent was obtained  My impressions and treatment recommendations were discussed in detail with the patient who verbalized understanding and had no further questions  Discharge instructions were provided  I personally saw and examined the patient and I agree with the above discussed plan of care  New Medications Ordered This Visit   Medications   • carBAMazepine (TEGretol) 200 mg tablet     Sig: carbamazepine 200 mg tablet   • collagenase (Santyl) ointment     Sig: Santyl 250 unit/gram topical ointment   APPLY TO CLEANSED AFFECTED AREA BY TOPICAL ROUTE ONCE DAILY   • FLUoxetine (PROzac) 20 mg capsule   • LORazepam (ATIVAN) 0 5 mg tablet     Sig: TAKE 1 TABLET BY MOUTH TWICE A DAY TAKE 1 TABLET IN THE AFTERNOON   • oxyCODONE (ROXICODONE) 5 immediate release tablet   • prazosin (MINIPRESS) 1 mg capsule     Sig: TAKE 1 CAPSULE BY MOUTH EVERY DAY AT NIGHT   • prazosin (MINIPRESS) 1 mg capsule     Sig: prazosin 1 mg capsule   • sildenafil (VIAGRA) 25 MG tablet     Sig: sildenafil 25 mg tablet   TAKE 1 TABLET BY MOUTH DAILY AS NEEDED FOR ERECTILE DYSFUNCTION       History of Present Illness    Greater than 90% relief of pain with improved ability to participate with IADLs after Right C3, C4, C5 medial branch nerve radiofrequency ablation for nearly 10 months; pain has returned  Previously reported the following symptomatology:     Madina Vanessa is a 59 y o  male with pmhx of afib with pacemaker on xarelto, obesity, DAYAN, DM-2, HTN, depression/anxiety presenting with right-sided neck pain described primarily arthritic features  Describes progressive neck pain since November  The patient describes predominantly aching, nagging, indolent crampy, stabbing axial neck pain without radicular features of electric shock-like and shooting pain  He denies any weakness numbness and paresthesias  The pain is 8/10 nature and is worse with overhead maneuvers as well as lateral rotation and extension of the neck  The patient has not yet been to physical therapy, and has failed conservative medical management including naproxen 500 mg b i d  and gabapentin 300 mg t i d  The pain is significant source of disability and compromises independent activities of daily living as well as overall function  The patient has difficulty with sleep disturbance as well since the pain often wakes him up  Has trialed gabapentin, flexeril and tylenol as well as tramadol with limited benefit but has never trialed cervical epidural steroid injections or medial branch blocks  He denies any bowel or bladder issues/incontinence, gait instability  I have personally reviewed and/or updated the patient's past medical history, past surgical history, family history, social history, current medications, allergies, and vital signs today  Review of Systems   Constitutional: Positive for activity change  HENT: Negative  Eyes: Negative  Respiratory: Negative  Cardiovascular: Negative  Gastrointestinal: Negative  Endocrine: Negative  Genitourinary: Negative  Musculoskeletal: Positive for arthralgias, myalgias, neck pain and neck stiffness  Skin: Negative  Allergic/Immunologic: Negative  Neurological: Negative for weakness and numbness  Hematological: Negative      Psychiatric/Behavioral: Negative  All other systems reviewed and are negative        Patient Active Problem List   Diagnosis   • DAYAN (obstructive sleep apnea)   • Chronic diastolic heart failure ([facility])   • Hypertension   • Diabetes mellitus ([facility] )   • Morbid obesity with BMI of 40 0-44 9, adult ([facility])   • Pain, joint, ankle and foot, left   • Chronic osteomyelitis of left foot with draining sinus ([facility])   • Atrial fibrillation ([facility])   • Anxiety   • Type 2 diabetes mellitus with diabetic polyneuropathy, with long-term current use of insulin ([facility])   • Toe osteomyelitis, right ([facility])   • Cervical spondylosis   • Cervicalgia - Right   • Diabetic infection of left foot ([facility] )   • Pacemaker   • History of bariatric surgery   • Encounter for perioperative consultation   • Hyperkalemia       Past Medical History:   Diagnosis Date   • Atrial fibrillation ([facility] )    • Chronic diastolic (congestive) heart failure ([facility] )    • Diabetes mellitus ([facility] )    • High cholesterol    • Hyperlipidemia    • Hypertension    • Pacemaker    • Stroke [facility])        Past Surgical History:   Procedure Laterality Date   • APPENDECTOMY     • ATRIAL CARDIAC PACEMAKER INSERTION     • BARIATRIC SURGERY  05/2021   • EPIDURAL BLOCK INJECTION N/A 5/19/2022    Procedure: BLOCK / INJECTION EPIDURAL STEROID CERVICAL C7-T1;  Surgeon: Juliann Paget, MD;  Location:  ENDO;  Service: Pain Management    • FL GUIDED NEEDLE PLAC BX/ASP/INJ  3/22/2022   • FOOT AMPUTATION Left 4/28/2022    Procedure: LEFT TRANSMETATARSAL AMPUTATION ;  Surgeon: Kady Madden DPM;  Location: [facility] OR;  Service: Podiatry   • NERVE BLOCK Right 2/10/2022    Procedure: BLOCK MEDIAL BRANCH C3, C4, C5 #1;  Surgeon: Juliann Paget, MD;  Location: [facility];  Service: Pain Management    • NERVE BLOCK Right 3/22/2022    Procedure: BLOCK MEDIAL BRANCH C3, C4, C5 #2;  Surgeon: Juliann Paget, MD;  Location:  [facility];  Service: Pain Management    • DC AMPUTATION TOE INTERPHALANGEAL JOINT Left 11/16/2021 Procedure: AMPUTATION LESSER TOE;  Surgeon: Caroline Bhakta DPM;  [facility];  Service: Podiatry   • RADIOFREQUENCY ABLATION Right 4/7/2022    Procedure: Right C3, C4, C5 RFA;  Surgeon: Hilary Ponce MD;  [facility]: [facility];  Service: Pain Management    • TOE AMPUTATION Left     2nd toe   • TOE AMPUTATION Left 9/15/2021    Procedure: AMPUTATION LEFT 4TH TOE;  Surgeon: Caroline Bhakta DPM;  [facility];  Service: Podiatry   • TOE AMPUTATION Right 1/12/2022    Procedure: AMPUTATION TOE;  Surgeon: Klaudia Winters DPM;  [facility];  Service: Podiatry   • TOE AMPUTATION Right 2/23/2022    Procedure: AMPUTATION TOE  RIGHT SECOND;  Surgeon: Klaudia Winters DPM;  Location: [facility] MAIN OR;  Service: Podiatry   • TOE AMPUTATION Right 6/3/2022    Procedure: AMPUTATION right 4th TOE;  Surgeon: Samantha Owen DPM;  [facility];  Service: Podiatry       Family History   Problem Relation Age of Onset   • No Known Problems Mother    • No Known Problems Father        Social History     Occupational History   • Occupation: Maintenance Tech     Employer: [employer]   Tobacco Use   • Smoking status: Never   • Smokeless tobacco: Never   Vaping Use   • Vaping Use: Never used   Substance and Sexual Activity   • Alcohol use: Never   • Drug use: Never   • Sexual activity: Yes       Current Outpatient Medications on File Prior to Visit   Medication Sig   • busPIRone (BUSPAR) 5 mg tablet TAKE BY MOUTH 1 TABLET IN THE MORNING AND 1 TABLET BEFORE BEDTIME     • carBAMazepine (TEGretol) 200 mg tablet carbamazepine 200 mg tablet   • collagenase (Santyl) ointment Santyl 250 unit/gram topical ointment   APPLY TO CLEANSED AFFECTED AREA BY TOPICAL ROUTE ONCE DAILY   • FLUoxetine (PROzac) 20 mg capsule    • FLUoxetine (PROzac) 40 MG capsule    • gabapentin (NEURONTIN) 300 mg capsule TAKE 1 CAPSULE BY MOUTH THREE TIMES A DAY   • gentamicin (GARAMYCIN) 0 1 % cream gentamicin 0 1 % topical cream   APPLY 1 GRAM BY TOPICAL ROUTE TO THE AFFECTED AREA 3 TIMES A DAY FOR 30 DAYS   • hydrOXYzine HCL (ATARAX) 25 mg tablet Take 25 mg by mouth 4 (four) times a day as needed     • lidocaine (Lidoderm) 5 % Apply 1 patch topically daily Remove & Discard patch within 12 hours or as directed by MD   • lisinopril (ZESTRIL) 5 mg tablet lisinopril 5 mg tablet   TAKE BY MOUTH 1 TABLET IN THE MORNING  • LORazepam (ATIVAN) 0 5 mg tablet TAKE 1 TABLET BY MOUTH TWICE A DAY TAKE 1 TABLET IN THE AFTERNOON   • metolazone (ZAROXOLYN) 2 5 mg tablet TAKE 1 TAB BY MOUTH ONCE A DAY ON MONDAY, WEDNESDAY, AND FRIDAY ONLY  • misoprostol (CYTOTEC) 200 mcg tablet Take 200 mcg by mouth 4 (four) times a day     • Nuzyra 150 MG TABS TAKE 3 TABLETS BY MOUTH DAILY FOR 2 DAYS THEN TAKE 2 TABLETS DAILY FOR 8 DAYS   • prazosin (MINIPRESS) 1 mg capsule TAKE 1 CAPSULE BY MOUTH EVERY DAY AT NIGHT   • oxyCODONE (ROXICODONE) 5 immediate release tablet  (Patient not taking: Reported on 2/8/2023)   • oxyCODONE-acetaminophen (PERCOCET) 5-325 mg per tablet Take 1 tablet by mouth every 6 (six) hours (Patient not taking: Reported on 2/8/2023)   • potassium chloride (K-DUR,KLOR-CON) 20 mEq tablet Take 2 tablets (40 mEq total) by mouth daily for 5 days   • prazosin (MINIPRESS) 1 mg capsule prazosin 1 mg capsule (Patient not taking: Reported on 2/8/2023)   • sildenafil (VIAGRA) 25 MG tablet sildenafil 25 mg tablet   TAKE 1 TABLET BY MOUTH DAILY AS NEEDED FOR ERECTILE DYSFUNCTION (Patient not taking: Reported on 2/8/2023)   • [DISCONTINUED] LORazepam (Ativan) 1 mg tablet Take 1 tablet (1 mg total) by mouth every 6 (six) hours as needed for anxiety for up to 3 days     No current facility-administered medications on file prior to visit         No Known Allergies      Physical Exam    /70   Pulse 75   Temp 98 5 °F (36 9 °C)   Resp 20   Ht 6' 1" (1 854 m)   Wt (!) 147 kg (323 lb 12 8 oz)   BMI 42 72 kg/m²     Constitutional: normal, well developed, well nourished, alert, in no distress and non-toxic and no overt pain behavior  and obese  Eyes: anicteric  HEENT: grossly intact  Neck: supple, symmetric, trachea midline and no masses   Pulmonary:even and unlabored  Cardiovascular:No edema or pitting edema present  Skin:Normal without rashes or lesions and well hydrated  Psychiatric:Mood and affect appropriate  Neurologic:Cranial Nerves II-XII grossly intact Sensation grossly intact; no clonus negative morton's  Reflexes 2+ and brisk  Spurling's maneuver negative bilaterally  Musculoskeletal:normal gait  5/5 strength bilaterally with AROM in upper extremities  Significant pain with right-sided cervical facet loading bilaterally and with lateral spine rotation  TTP over right-sided cervical paraspinal muscles  Negative epifanio's test, negative gaenslen's negative SIJ loading bilaterally      Imaging    Multilevel spondyloarthropathy/degenerative changes seen throughout cervical spine x-ray

## 2023-02-09 ENCOUNTER — HOSPITAL ENCOUNTER (OUTPATIENT)
Facility: HOSPITAL | Age: 58
Setting detail: OUTPATIENT SURGERY
Discharge: HOME | End: 2023-02-09
Attending: ANESTHESIOLOGY | Admitting: ANESTHESIOLOGY

## 2023-02-09 ENCOUNTER — APPOINTMENT (OUTPATIENT)
Dept: RADIOLOGY | Facility: HOSPITAL | Age: 58
End: 2023-02-09

## 2023-02-09 ENCOUNTER — TELEPHONE (OUTPATIENT)
Dept: RADIOLOGY | Facility: CLINIC | Age: 58
End: 2023-02-09

## 2023-02-09 ENCOUNTER — TELEPHONE (OUTPATIENT)
Dept: PAIN MEDICINE | Facility: CLINIC | Age: 58
End: 2023-02-09

## 2023-02-09 VITALS
HEIGHT: 73 IN | SYSTOLIC BLOOD PRESSURE: 110 MMHG | TEMPERATURE: 97.8 F | RESPIRATION RATE: 20 BRPM | HEART RATE: 81 BPM | WEIGHT: 315 LBS | OXYGEN SATURATION: 92 % | BODY MASS INDEX: 41.75 KG/M2 | DIASTOLIC BLOOD PRESSURE: 59 MMHG

## 2023-02-09 RX ORDER — FUROSEMIDE 40 MG/1
40 TABLET ORAL 2 TIMES DAILY
COMMUNITY

## 2023-02-09 RX ORDER — BUPIVACAINE HYDROCHLORIDE 2.5 MG/ML
INJECTION, SOLUTION EPIDURAL; INFILTRATION; INTRACAUDAL AS NEEDED
Status: DISCONTINUED | OUTPATIENT
Start: 2023-02-09 | End: 2023-02-09 | Stop reason: HOSPADM

## 2023-02-09 RX ORDER — LIDOCAINE HYDROCHLORIDE 10 MG/ML
INJECTION, SOLUTION EPIDURAL; INFILTRATION; INTRACAUDAL; PERINEURAL AS NEEDED
Status: DISCONTINUED | OUTPATIENT
Start: 2023-02-09 | End: 2023-02-09 | Stop reason: HOSPADM

## 2023-02-09 RX ORDER — LIDOCAINE HYDROCHLORIDE 20 MG/ML
INJECTION, SOLUTION EPIDURAL; INFILTRATION; INTRACAUDAL; PERINEURAL AS NEEDED
Status: DISCONTINUED | OUTPATIENT
Start: 2023-02-09 | End: 2023-02-09 | Stop reason: HOSPADM

## 2023-02-09 RX ORDER — METHYLPREDNISOLONE ACETATE 80 MG/ML
INJECTION, SUSPENSION INTRA-ARTICULAR; INTRALESIONAL; INTRAMUSCULAR; SOFT TISSUE AS NEEDED
Status: DISCONTINUED | OUTPATIENT
Start: 2023-02-09 | End: 2023-02-09 | Stop reason: HOSPADM

## 2023-02-09 NOTE — TREATMENT PLAN
Patient to speak with [facility] Cardiology office to schedule remote interrogation next week  Voiced understanding  To report to ED if develops palpitations, sweating, lethargy, nausea, dizziness

## 2023-02-09 NOTE — PROCEDURES
Pre-procedure Diagnosis:       Cervical Facet Arthropathy  Post-procedure Diagnosis:     Cervical Facet Arthropathy  Procedure Title(s):                  1  Radiofrequency ablation of [RIGHT C3, C4, C5] medial branch nerves                                                  2  Intraoperative fluoroscopy  Attending Surgeon:                 Sallie Angulo MD  Anesthesia:                             Local      Indications: The patient is a 60y o  year-old male with a diagnosis of cervical facet arthropathy  The patient's history and physical exam were reviewed  The patient has previously undergone diagnostic and confirmatory cervical medial branch blocks  The risks, benefits and alternatives to the procedure were discussed, and all questions were answered to the patient's satisfaction  The patient agreed to proceed, and written informed consent was obtained      Procedure in Detail: The patient was brought into the procedure room, magnet placed over pacemaker and placed in the prone position on the fluoroscopy table  Asynchronous pacing at  80bpm  EKG and pulse oximeter monitoring was utilized throughout the course of the procedure and during post op area  The area of the cervical spine was prepped with chloraprep solution times two and draped in a sterile manner      AP fluoroscopic views were used to identify and alyce the midpoint of the articular pillars of the [C3, C4, C5] levels on the [RIGHT] side  The skin and subcutaneous tissues in these areas were anesthetized with 1% lidocaine  A 20-gauge, 100mm length, 10mm active tip radiofrequency probe was advanced toward the targeted points until bone was contacted  At this point, lateral fluoroscopic views were obtained, and the needle tips were advanced to the centroid of the facets at each level        Motor stimulation was performed at 2 Hz and 1 2 volts generating a twitch in the neck muscles with no motor activity in the upper extremities   This was repeated for each level      0 5ml of 2% lidocaine was injected prior to lesioning, which was performed for 90 seconds at 80 degrees centigrade  The lesion was repeated after slight repositioning of needles under fluoroscopic guidance  Once the lesion was complete, 1 ml of a solution consisting of 5mL 0 25% bupivacaine and 1mL depomedrol (80mg/mL) was injected through each probe  The probes were removed with a 1% lidocaine flush  The patient's neck was cleaned, and bandages were placed at the needle insertion sites      The needles were removed, and the patient's neck was cleaned  Bandages were placed over the points of needle insertion  Magnet on pacemaker removed and patient pacing reverted back to preop settings at 80bpm      Disposition: The patient tolerated the procedure well, and there were no apparent complications   The patient was taken to the recovery area where written discharge instructions for the procedure were given       Estimated Blood Loss: None  Specimens Obtained: N/A

## 2023-02-09 NOTE — OP NOTE
OPERATIVE REPORT  PATIENT NAME: Eric Sandhu    :  1965  MRN: [id number]  Pt Location:  GI ROOM 01    SURGERY DATE: 2023    Surgeon(s) and Role: Issa Baldwin MD - Primary    Preop Diagnosis:  Cervical spondylosis [M47 812]  Cervicalgia [M54 2]    Post-Op Diagnosis Codes:     * Cervical spondylosis [M47 812]     * Cervicalgia [M54 2]    Procedure(s):  Right - RHIZOTOMY CERVICAL MEDIAL BRANCH NERVES RIGHT C3  C4  C5    Specimen(s):  * No specimens in log *    Estimated Blood Loss:   Minimal    Drains:  * No LDAs found *    Anesthesia Type:   Local    Operative Indications:  Cervical spondylosis [M47 812]  Cervicalgia [M54 2]    Operative Findings:  Right C3, C4, C5 medial branch nerve regions identified under fluoroscopic guidance   Appropriate motor testing performed prior to radiofrequency lesioning of medial branch nerve regions    Complications:   None    Procedure and Technique:  Please see detailed procedure note    I was present for the entire procedure    Patient Disposition:  PACU     SIGNATURE: Jorge Toney MD  DATE: 2023  TIME: 2:13 PM

## 2023-02-09 NOTE — INTERVAL H&P NOTE
H&P reviewed  After examining the patient I find no changes in the patients condition since the H&P had been written      Vitals:    02/09/23 1322   BP: 119/62   Pulse: 73   Resp: 20   Temp: 97 7 °F (36 5 °C)   SpO2: 95%

## 2023-02-14 ENCOUNTER — HOSPITAL ENCOUNTER (OUTPATIENT)
Dept: RADIOLOGY | Facility: HOSPITAL | Age: 58
Discharge: HOME | End: 2023-02-14

## 2023-02-14 DIAGNOSIS — Z95.0 MRI SAFE CARDIAC PACEMAKER IN SITU: ICD-10-CM

## 2023-02-14 DIAGNOSIS — S57.82XA CRUSHING INJURY OF LEFT FOREARM, INITIAL ENCOUNTER: ICD-10-CM

## 2023-02-14 DIAGNOSIS — R20.2 PARESTHESIA: ICD-10-CM

## 2023-02-14 NOTE — NURSING NOTE
Device interrogation for MRI done by CLEVE Truong clinical specialist  Device set to MRI safe mode--device off  Pt in prone position due to nature of the study  MRI left forearm without contrast complete  Pt tolerated well  VSS  Throughout scan  Pt alert and oriented x4 on room air  No complaints or visible signs of distress  Device reprogrammed to prior settings per Cardiology by Rosalia Herrera RN

## 2023-02-14 NOTE — NURSING NOTE
Device interrogation for MRI  Normal device function prior to MRI  Leads and device meet all requirements per policy for MRI  Device programmed OVO per Cardiology for MRI  Patient has no complaints  Vital signs monitored throughout by A  Samson Glass RN  Normal device function post MRI  Device reprogrammed to prior settings per Cardiology

## 2023-03-02 ENCOUNTER — APPOINTMENT (OUTPATIENT)
Dept: URGENT CARE | Facility: MEDICAL CENTER | Age: 58
End: 2023-03-02

## 2023-03-13 ENCOUNTER — OFFICE VISIT (OUTPATIENT)
Dept: OBGYN CLINIC | Facility: MEDICAL CENTER | Age: 58
End: 2023-03-13

## 2023-03-13 VITALS
HEIGHT: 73 IN | SYSTOLIC BLOOD PRESSURE: 114 MMHG | BODY MASS INDEX: 41.75 KG/M2 | HEART RATE: 85 BPM | WEIGHT: 315 LBS | DIASTOLIC BLOOD PRESSURE: 75 MMHG

## 2023-03-13 DIAGNOSIS — G56.22 ULNAR NEURITIS, LEFT: Primary | ICD-10-CM

## 2023-03-13 NOTE — PROGRESS NOTES
ORTHOPAEDIC HAND, WRIST, AND ELBOW OFFICE  VISIT       ASSESSMENT/PLAN:       Left forearm/elbow compressive injury with ulnar neuritis      The patient verbalized understanding of exam findings and treatment plan  We engaged in the shared decision-making process and treatment options were discussed at length with the patient  Surgical and conservative management discussed today along with risks and benefits  Diagnoses and all orders for this visit:    Ulnar neuritis, left        Follow Up:  Return in about 6 weeks (around 4/24/2023)  General Discussions:  Conservative treatment measures were discussed today  These include tincture of time, activity modifications, gentle compression as needed  Recommend continued monitoring of his symptoms over the next months  If his symptoms persist past 6 months will consider investigation into ulnar nerve symptoms  Follow up in 6-8 weeks for clinical recheck  No restrictions in the interim  ____________________________________________________________________________________________________________________________________________      CHIEF COMPLAINT:  Right forearm and finger numbess    SUBJECTIVE:  Corin Chase is a 59y o  year old LHD male who presents today for evaluation of left elbow/forearm/finger numbness and tingling  He works as a motor technician  In December of 2022 his left elbow/forearm were compressed in an small elevator type of conveyer machine  He describes acute onset of pain and swelling  He was seen at [facility] with concern for what sounds like a compartment syndrome  His swelling eventually subsided  Over the past months he is able to work but continues with tingling and pins/needles in the left forearm into the 4th and 5th fingers  This increases with activity especially with driving and using his left hand         Pain/symptom timing:  Worse during the day when active, driving  Pain/symptom context:  Worse with activites and work  Pain/symptom modifying factors:  Rest makes better, activities make worse  Pain/symptom associated signs/symptoms: none    Prior treatment   · NSAIDsNo   · Injections No   · Bracing/Orthotics No    Physical Therapy Yes and No     I have personally reviewed all the relevant PMH, PSH, SH, FH, Medications and allergies      PAST MEDICAL HISTORY:  Past Medical History:   Diagnosis Date   • Atrial fibrillation ([facility] [address] )    • Chronic diastolic (congestive) heart failure ([facility] [address] )    • Diabetes mellitus ([facility] )    • High cholesterol    • Hyperlipidemia    • Hypertension    • Pacemaker    • Stroke [facility])        PAST SURGICAL HISTORY:  Past Surgical History:   Procedure Laterality Date   • APPENDECTOMY     • ATRIAL CARDIAC PACEMAKER INSERTION     • BARIATRIC SURGERY  05/2021   • EPIDURAL BLOCK INJECTION N/A 5/19/2022    Procedure: BLOCK / INJECTION EPIDURAL STEROID CERVICAL C7-T1;  Surgeon: Susan Huff MD;  Location: [facility];  Service: Pain Management    • FL GUIDED NEEDLE PLAC BX/ASP/INJ  3/22/2022   • FOOT AMPUTATION Left 4/28/2022    Procedure: LEFT TRANSMETATARSAL AMPUTATION ;  Surgeon: Darlyn Lea DPM;  [facility];  Service: Podiatry   • NERVE BLOCK Right 2/10/2022    Procedure: BLOCK MEDIAL BRANCH C3, C4, C5 #1;  Surgeon: Susan Huff MD;  [facility]: [facility];  Service: Pain Management    • NERVE BLOCK Right 3/22/2022    Procedure: BLOCK MEDIAL BRANCH C3, C4, C5 #2;  Surgeon: Susan Huff MD;  [facility];  Service: Pain Management    • NJ AMPUTATION TOE INTERPHALANGEAL JOINT Left 11/16/2021    Procedure: AMPUTATION LESSER TOE;  Surgeon: Briana Barrientos DPM;  [facility];  Service: Podiatry   • RADIOFREQUENCY ABLATION Right 4/7/2022    Procedure: Right C3, C4, C5 RFA;  Surgeon: Susan Huff MD;  [facility]: [facility];  Service: Pain Management    • RHIZOTOMY Right 2/9/2023    Procedure: RHIZOTOMY CERVICAL MEDIAL BRANCH NERVES RIGHT C3, C4, C5;  Surgeon: Susan Huff MD;  Location: [facility];  Service: Pain Management    • TOE AMPUTATION Left     2nd toe   • TOE AMPUTATION Left 9/15/2021    Procedure: AMPUTATION LEFT 4TH TOE;  Surgeon: Berenice Perry DPM;  [facility];  Service: Podiatry   • TOE AMPUTATION Right 1/12/2022    Procedure: AMPUTATION TOE;  Surgeon: Ayesha Jimenes DPM;  [facility];  Service: Podiatry   • TOE AMPUTATION Right 2/23/2022    Procedure: AMPUTATION TOE  RIGHT SECOND;  Surgeon: Ayesha Jimenes DPM;  Location: [address] [place] [address] MAIN OR;  Service: Podiatry   • TOE AMPUTATION Right 6/3/2022    Procedure: AMPUTATION right 4th TOE;  Surgeon: Vivian Ferguson DPM;  [facility];  Service: Podiatry       FAMILY HISTORY:  Family History   Problem Relation Age of Onset   • No Known Problems Mother    • No Known Problems Father        SOCIAL HISTORY:  Social History     Tobacco Use   • Smoking status: Never   • Smokeless tobacco: Never   Vaping Use   • Vaping Use: Never used   Substance Use Topics   • Alcohol use: Never   • Drug use: Never       MEDICATIONS:    Current Outpatient Medications:   •  busPIRone (BUSPAR) 5 mg tablet, TAKE BY MOUTH 1 TABLET IN THE MORNING AND 1 TABLET BEFORE BEDTIME , Disp: , Rfl:   •  carBAMazepine (TEGretol) 200 mg tablet, carbamazepine 200 mg tablet, Disp: , Rfl:   •  collagenase (Santyl) ointment, Santyl 250 unit/gram topical ointment  APPLY TO CLEANSED AFFECTED AREA BY TOPICAL ROUTE ONCE DAILY, Disp: , Rfl:   •  FLUoxetine (PROzac) 20 mg capsule, , Disp: , Rfl:   •  FLUoxetine (PROzac) 40 MG capsule, , Disp: , Rfl:   •  furosemide (LASIX) 40 mg tablet, Take 40 mg by mouth 2 (two) times a day, Disp: , Rfl:   •  gabapentin (NEURONTIN) 300 mg capsule, TAKE 1 CAPSULE BY MOUTH THREE TIMES A DAY, Disp: 90 capsule, Rfl: 0  •  gentamicin (GARAMYCIN) 0 1 % cream, gentamicin 0 1 % topical cream  APPLY 1 GRAM BY TOPICAL ROUTE TO THE AFFECTED AREA 3 TIMES A DAY FOR 30 DAYS, Disp: , Rfl:   •  hydrOXYzine HCL (ATARAX) 25 mg tablet, Take 25 mg by mouth 4 (four) times a day as needed  , Disp: , Rfl:   •  lidocaine (Lidoderm) 5 %, Apply 1 patch topically daily Remove & Discard patch within 12 hours or as directed by MD, Disp: 10 patch, Rfl: 0  •  lisinopril (ZESTRIL) 5 mg tablet, lisinopril 5 mg tablet  TAKE BY MOUTH 1 TABLET IN THE MORNING , Disp: , Rfl:   •  LORazepam (ATIVAN) 0 5 mg tablet, TAKE 1 TABLET BY MOUTH TWICE A DAY TAKE 1 TABLET IN THE AFTERNOON, Disp: , Rfl:   •  metolazone (ZAROXOLYN) 2 5 mg tablet, TAKE 1 TAB BY MOUTH ONCE A DAY ON MONDAY, WEDNESDAY, AND FRIDAY ONLY , Disp: , Rfl:   •  misoprostol (CYTOTEC) 200 mcg tablet, Take 200 mcg by mouth 4 (four) times a day  , Disp: , Rfl:   •  Nuzyra 150 MG TABS, TAKE 3 TABLETS BY MOUTH DAILY FOR 2 DAYS THEN TAKE 2 TABLETS DAILY FOR 8 DAYS, Disp: , Rfl:   •  oxyCODONE (ROXICODONE) 5 immediate release tablet, , Disp: , Rfl:   •  oxyCODONE-acetaminophen (PERCOCET) 5-325 mg per tablet, Take 1 tablet by mouth every 6 (six) hours (Patient not taking: Reported on 2/8/2023), Disp: , Rfl:   •  potassium chloride (K-DUR,KLOR-CON) 20 mEq tablet, Take 2 tablets (40 mEq total) by mouth daily for 5 days, Disp: 10 tablet, Rfl: 0  •  prazosin (MINIPRESS) 1 mg capsule, TAKE 1 CAPSULE BY MOUTH EVERY DAY AT NIGHT, Disp: , Rfl:   •  prazosin (MINIPRESS) 1 mg capsule, prazosin 1 mg capsule (Patient not taking: Reported on 2/8/2023), Disp: , Rfl:   •  sildenafil (VIAGRA) 25 MG tablet, sildenafil 25 mg tablet  TAKE 1 TABLET BY MOUTH DAILY AS NEEDED FOR ERECTILE DYSFUNCTION (Patient not taking: Reported on 2/8/2023), Disp: , Rfl:     ALLERGIES:  No Known Allergies        REVIEW OF SYSTEMS:  Review of Systems    VITALS:  Vitals:    03/13/23 0826   BP: 114/75   Pulse: 85       LABS:  HgA1c:   Lab Results   Component Value Date    HGBA1C 5 8 (H) 01/03/2023     BMP:   Lab Results   Component Value Date    CALCIUM 8 5 06/14/2022    K 4 0 06/14/2022    CO2 25 06/14/2022     06/14/2022    BUN 13 06/14/2022 CREATININE 0 80 06/14/2022       _____________________________________________________  PHYSICAL EXAMINATION:  General: well developed and well nourished, alert, oriented times 3 and appears comfortable  Psychiatric: Normal  HEENT: Normocephalic, Atraumatic Trachea Midline, No torticollis  Pulmonary: No audible wheezing or respiratory distress   Abdomen/GI: Non tender, non distended   Cardiovascular: No pitting edema, 2+ radial pulse   Skin: No masses, erythema, lacerations, fluctation, ulcerations  Neurovascular: Sensation Intact to the Median, Ulnar, Radial Nerve, Motor Intact to the Median, Ulnar, Radial Nerve and Pulses Intact  Musculoskeletal: Normal, except as noted in detailed exam and in HPI  MUSCULOSKELETAL EXAMINATION:  Left Elbow:  There is no swelling present  There is no ecchymosis noted  The ROM is 0 degrees of extension, 95 degress of flexion, Full and symmetric supination/pronation  There is negative varus / negative valgus instability   cubital tunnel non tender to palpation and negative Tinels    Two point discrimination intact 5mm in all digits    Ulnar nerve motor 5/5    ___________________________________________________  STUDIES REVIEWED:  MRI done at outside facility Radiology report reviewed which indicates mild medial elbow soft tissue swelling and negates any structural injuries    PROCEDURES PERFORMED:    No Procedures performed today    _____________________________________________________      Jarett Carreon    I,:   am acting as a scribe while in the presence of the attending physician :       I,:   personally performed the services described in this documentation    as scribed in my presence :

## 2023-03-27 ENCOUNTER — APPOINTMENT (EMERGENCY)
Dept: RADIOLOGY | Facility: HOSPITAL | Age: 58
End: 2023-03-27

## 2023-03-27 ENCOUNTER — HOSPITAL ENCOUNTER (EMERGENCY)
Facility: HOSPITAL | Age: 58
Discharge: HOME | End: 2023-03-27
Attending: EMERGENCY MEDICINE

## 2023-03-27 VITALS
TEMPERATURE: 99.1 F | HEIGHT: 73 IN | BODY MASS INDEX: 41.75 KG/M2 | HEART RATE: 96 BPM | OXYGEN SATURATION: 97 % | DIASTOLIC BLOOD PRESSURE: 76 MMHG | WEIGHT: 315 LBS | RESPIRATION RATE: 18 BRPM | SYSTOLIC BLOOD PRESSURE: 121 MMHG

## 2023-03-27 DIAGNOSIS — L03.119 CELLULITIS OF FOOT: Primary | ICD-10-CM

## 2023-03-27 LAB
ALBUMIN SERPL BCP-MCNC: 3.8 G/DL (ref 3.5–5)
ALP SERPL-CCNC: 90 U/L (ref 34–104)
ALT SERPL W P-5'-P-CCNC: 14 U/L (ref 7–52)
ANION GAP SERPL CALCULATED.3IONS-SCNC: 8 MMOL/L (ref 4–13)
APTT PPP: 34 SECONDS (ref 23–37)
AST SERPL W P-5'-P-CCNC: 21 U/L (ref 13–39)
BASOPHILS # BLD AUTO: 0.05 THOUSANDS/ÂΜL (ref 0–0.1)
BASOPHILS NFR BLD AUTO: 1 % (ref 0–1)
BILIRUB SERPL-MCNC: 0.3 MG/DL (ref 0.2–1)
BUN SERPL-MCNC: 18 MG/DL (ref 5–25)
CALCIUM SERPL-MCNC: 8.5 MG/DL (ref 8.4–10.2)
CHLORIDE SERPL-SCNC: 101 MMOL/L (ref 96–108)
CO2 SERPL-SCNC: 28 MMOL/L (ref 21–32)
CREAT SERPL-MCNC: 0.88 MG/DL (ref 0.6–1.3)
EOSINOPHIL # BLD AUTO: 0.32 THOUSAND/ÂΜL (ref 0–0.61)
EOSINOPHIL NFR BLD AUTO: 3 % (ref 0–6)
ERYTHROCYTE [DISTWIDTH] IN BLOOD BY AUTOMATED COUNT: 13.5 % (ref 11.6–15.1)
GFR SERPL CREATININE-BSD FRML MDRD: 94 ML/MIN/1.73SQ M
GLUCOSE SERPL-MCNC: 98 MG/DL (ref 65–140)
HCT VFR BLD AUTO: 42.2 % (ref 36.5–49.3)
HGB BLD-MCNC: 13.2 G/DL (ref 12–17)
IMM GRANULOCYTES # BLD AUTO: 0.04 THOUSAND/UL (ref 0–0.2)
IMM GRANULOCYTES NFR BLD AUTO: 0 % (ref 0–2)
INR PPP: 0.94 (ref 0.84–1.19)
LACTATE SERPL-SCNC: 1.4 MMOL/L (ref 0.5–2)
LYMPHOCYTES # BLD AUTO: 1.42 THOUSANDS/ÂΜL (ref 0.6–4.47)
LYMPHOCYTES NFR BLD AUTO: 14 % (ref 14–44)
MCH RBC QN AUTO: 27.8 PG (ref 26.8–34.3)
MCHC RBC AUTO-ENTMCNC: 31.3 G/DL (ref 31.4–37.4)
MCV RBC AUTO: 89 FL (ref 82–98)
MONOCYTES # BLD AUTO: 1.01 THOUSAND/ÂΜL (ref 0.17–1.22)
MONOCYTES NFR BLD AUTO: 10 % (ref 4–12)
NEUTROPHILS # BLD AUTO: 7.26 THOUSANDS/ÂΜL (ref 1.85–7.62)
NEUTS SEG NFR BLD AUTO: 72 % (ref 43–75)
NRBC BLD AUTO-RTO: 0 /100 WBCS
PLATELET # BLD AUTO: 365 THOUSANDS/UL (ref 149–390)
PMV BLD AUTO: 9.1 FL (ref 8.9–12.7)
POTASSIUM SERPL-SCNC: 3.3 MMOL/L (ref 3.5–5.3)
PROT SERPL-MCNC: 7 G/DL (ref 6.4–8.4)
PROTHROMBIN TIME: 12.7 SECONDS (ref 11.6–14.5)
RBC # BLD AUTO: 4.74 MILLION/UL (ref 3.88–5.62)
SODIUM SERPL-SCNC: 137 MMOL/L (ref 135–147)
WBC # BLD AUTO: 10.1 THOUSAND/UL (ref 4.31–10.16)

## 2023-03-27 RX ORDER — CEPHALEXIN 500 MG/1
500 CAPSULE ORAL EVERY 12 HOURS SCHEDULED
Qty: 20 CAPSULE | Refills: 0 | Status: SHIPPED | OUTPATIENT
Start: 2023-03-27 | End: 2023-04-06

## 2023-03-27 RX ORDER — CEPHALEXIN 250 MG/1
500 CAPSULE ORAL ONCE
Status: COMPLETED | OUTPATIENT
Start: 2023-03-27 | End: 2023-03-27

## 2023-03-27 RX ORDER — HYDROMORPHONE HCL/PF 1 MG/ML
0.5 SYRINGE (ML) INJECTION ONCE
Status: COMPLETED | OUTPATIENT
Start: 2023-03-27 | End: 2023-03-27

## 2023-03-27 RX ORDER — HYDROMORPHONE HCL/PF 1 MG/ML
1 SYRINGE (ML) INJECTION ONCE
Status: COMPLETED | OUTPATIENT
Start: 2023-03-27 | End: 2023-03-27

## 2023-03-27 RX ORDER — OXYCODONE HYDROCHLORIDE AND ACETAMINOPHEN 5; 325 MG/1; MG/1
1 TABLET ORAL EVERY 4 HOURS PRN
Qty: 12 TABLET | Refills: 0 | Status: SHIPPED | OUTPATIENT
Start: 2023-03-27

## 2023-03-27 RX ADMIN — CEPHALEXIN 500 MG: 250 CAPSULE ORAL at 20:18

## 2023-03-27 RX ADMIN — HYDROMORPHONE HYDROCHLORIDE 1 MG: 1 INJECTION, SOLUTION INTRAMUSCULAR; INTRAVENOUS; SUBCUTANEOUS at 20:14

## 2023-03-27 RX ADMIN — HYDROMORPHONE HYDROCHLORIDE 0.5 MG: 1 INJECTION, SOLUTION INTRAMUSCULAR; INTRAVENOUS; SUBCUTANEOUS at 18:57

## 2023-03-27 NOTE — Clinical Note
Cinthia Carter was seen and treated in our emergency department on 3/27/2023  Diagnosis:     Gabriele Varela  may return to work on return date  He may return on this date: 04/03/2023         If you have any questions or concerns, please don't hesitate to call        Libia Wallace DO    ______________________________           _______________          _______________  Hospital Representative                              Date                                Time

## 2023-03-27 NOTE — ED PROVIDER NOTES
History  Chief Complaint   Patient presents with   • Leg Pain     For couple weeks been having increased pain, swelling and discoloration to left leg and foot  Pt had partial amputation of foot years ago so has neuropathy in that foot and unable to tell all the time if pain is there     Patient is a 70-year-old male presenting to the emergency department complaining of worsening left foot pain, swelling and redness, has been worsening over the past few weeks, he does have a history of type 2 diabetes, with partial foot amputation approximately 1 year ago, reports discharge from open wounds, increasing pain, difficulty ambulating, no fever, no injury          Prior to Admission Medications   Prescriptions Last Dose Informant Patient Reported? Taking?    FLUoxetine (PROzac) 20 mg capsule   Yes No   FLUoxetine (PROzac) 40 MG capsule   Yes No   LORazepam (ATIVAN) 0 5 mg tablet   Yes No   Sig: TAKE 1 TABLET BY MOUTH TWICE A DAY TAKE 1 TABLET IN THE AFTERNOON   Nuzyra 150 MG TABS   Yes No   Sig: TAKE 3 TABLETS BY MOUTH DAILY FOR 2 DAYS THEN TAKE 2 TABLETS DAILY FOR 8 DAYS   busPIRone (BUSPAR) 5 mg tablet   Yes No   Sig: TAKE BY MOUTH 1 TABLET IN THE MORNING AND 1 TABLET BEFORE BEDTIME    carBAMazepine (TEGretol) 200 mg tablet   Yes No   Sig: carbamazepine 200 mg tablet   collagenase (Santyl) ointment   Yes No   Sig: Santyl 250 unit/gram topical ointment   APPLY TO CLEANSED AFFECTED AREA BY TOPICAL ROUTE ONCE DAILY   furosemide (LASIX) 40 mg tablet   Yes No   Sig: Take 40 mg by mouth 2 (two) times a day   gabapentin (NEURONTIN) 300 mg capsule   No No   Sig: TAKE 1 CAPSULE BY MOUTH THREE TIMES A DAY   gentamicin (GARAMYCIN) 0 1 % cream   Yes No   Sig: gentamicin 0 1 % topical cream   APPLY 1 GRAM BY TOPICAL ROUTE TO THE AFFECTED AREA 3 TIMES A DAY FOR 30 DAYS   hydrOXYzine HCL (ATARAX) 25 mg tablet   Yes No   Sig: Take 25 mg by mouth 4 (four) times a day as needed     lidocaine (Lidoderm) 5 %   No No   Sig: Apply 1 patch topically daily Remove & Discard patch within 12 hours or as directed by MD   lisinopril (ZESTRIL) 5 mg tablet   Yes No   Sig: lisinopril 5 mg tablet   TAKE BY MOUTH 1 TABLET IN THE MORNING    metolazone (ZAROXOLYN) 2 5 mg tablet   Yes No   Sig: TAKE 1 TAB BY MOUTH ONCE A DAY ON MONDAY, WEDNESDAY, AND FRIDAY ONLY     misoprostol (CYTOTEC) 200 mcg tablet   Yes No   Sig: Take 200 mcg by mouth 4 (four) times a day     oxyCODONE (ROXICODONE) 5 immediate release tablet   Yes No   Patient not taking: Reported on 2/8/2023   oxyCODONE-acetaminophen (PERCOCET) 5-325 mg per tablet   Yes No   Sig: Take 1 tablet by mouth every 6 (six) hours   Patient not taking: Reported on 2/8/2023   pantoprazole (PROTONIX) 20 mg tablet   Yes No   Sig: Take 20 mg by mouth daily   potassium chloride (K-DUR,KLOR-CON) 20 mEq tablet   No No   Sig: Take 2 tablets (40 mEq total) by mouth daily for 5 days   prazosin (MINIPRESS) 1 mg capsule   Yes No   Sig: TAKE 1 CAPSULE BY MOUTH EVERY DAY AT NIGHT   prazosin (MINIPRESS) 1 mg capsule   Yes No   Sig: prazosin 1 mg capsule   Patient not taking: Reported on 2/8/2023   sildenafil (VIAGRA) 25 MG tablet   Yes No   Sig: sildenafil 25 mg tablet   TAKE 1 TABLET BY MOUTH DAILY AS NEEDED FOR ERECTILE DYSFUNCTION   Patient not taking: Reported on 2/8/2023   sucralfate (CARAFATE) 1 g tablet   Yes No   Sig: TAKE 1 TABLET BY MOUTH 4 TIMES A DAY BEFORE MEALS AND AT BEDTIME      Facility-Administered Medications: None       Past Medical History:   Diagnosis Date   • Atrial fibrillation (HCC)    • Chronic diastolic (congestive) heart failure (HCC)    • Diabetes mellitus ([facility] [address] )    • High cholesterol    • Hyperlipidemia    • Hypertension    • Pacemaker    • Stroke [facility])        Past Surgical History:   Procedure Laterality Date   • APPENDECTOMY     • ATRIAL CARDIAC PACEMAKER INSERTION     • BARIATRIC SURGERY  05/2021   • EPIDURAL BLOCK INJECTION N/A 5/19/2022    Procedure: BLOCK / INJECTION EPIDURAL STEROID CERVICAL C7-T1; Surgeon: Gary Dickinson MD;  [facility]: [facility];  Service: Pain Management    • FL GUIDED NEEDLE PLAC BX/ASP/INJ  3/22/2022   • FOOT AMPUTATION Left 4/28/2022    Procedure: LEFT TRANSMETATARSAL AMPUTATION ;  Surgeon: Kenji Madden DPM;  [facility];  Service: Podiatry   • NERVE BLOCK Right 2/10/2022    Procedure: BLOCK MEDIAL BRANCH C3, C4, C5 #1;  Surgeon: Gary Dickinson MD;  [facility]: [facility];  Service: Pain Management    • NERVE BLOCK Right 3/22/2022    Procedure: BLOCK MEDIAL BRANCH C3, C4, C5 #2;  Surgeon: Gary Dickinson MD;  [facility];  Service: Pain Management    • NC AMPUTATION TOE INTERPHALANGEAL JOINT Left 11/16/2021    Procedure: AMPUTATION LESSER TOE;  Surgeon: Tommy Macias DPM;  [facility];  Service: Podiatry   • RADIOFREQUENCY ABLATION Right 4/7/2022    Procedure: Right C3, C4, C5 RFA;  Surgeon: Gary Dickinson MD;  [facility]: [facility];  Service: Pain Management    • RHIZOTOMY Right 2/9/2023    Procedure: RHIZOTOMY CERVICAL MEDIAL BRANCH NERVES RIGHT C3, C4, C5;  Surgeon: Gary Dickinson MD;  [facility]: [facility];  Service: Pain Management    • TOE AMPUTATION Left     2nd toe   • TOE AMPUTATION Left 9/15/2021    Procedure: AMPUTATION LEFT 4TH TOE;  Surgeon: Tommy Macias DPM;  [facility];  Service: Podiatry   • TOE AMPUTATION Right 1/12/2022    Procedure: AMPUTATION TOE;  Surgeon: Bishnu Chong DPM;  [facility];  Service: Podiatry   • TOE AMPUTATION Right 2/23/2022    Procedure: AMPUTATION TOE  RIGHT SECOND;  Surgeon: Bishnu Chong DPM;  Location: [address] [place] [address] MAIN OR;  Service: Podiatry   • TOE AMPUTATION Right 6/3/2022    Procedure: AMPUTATION right 4th TOE;  Surgeon: Court Libman, DPM;  [facility];  Service: Podiatry       Family History   Problem Relation Age of Onset   • No Known Problems Mother    • No Known Problems Father      I have reviewed and agree with the history as documented      E-Cigarette/Vaping   • E-Cigarette Use Never User      E-Cigarette/Vaping Substances     Social History     Tobacco Use   • Smoking status: Never   • Smokeless tobacco: Never   Vaping Use   • Vaping Use: Never used   Substance Use Topics   • Alcohol use: Never   • Drug use: Never       Review of Systems   Constitutional: Negative  HENT: Negative  Eyes: Negative  Respiratory: Negative  Cardiovascular: Negative  Gastrointestinal: Negative  Endocrine: Negative  Genitourinary: Negative  Musculoskeletal: Negative  Skin: Positive for color change and wound  Allergic/Immunologic: Negative  Neurological: Negative  Hematological: Negative  Psychiatric/Behavioral: Negative  Physical Exam  Physical Exam  Constitutional:       Appearance: He is well-developed  He is obese  HENT:      Head: Normocephalic and atraumatic  Eyes:      Conjunctiva/sclera: Conjunctivae normal       Pupils: Pupils are equal, round, and reactive to light  Cardiovascular:      Rate and Rhythm: Normal rate  Pulmonary:      Effort: Pulmonary effort is normal    Abdominal:      Palpations: Abdomen is soft  Musculoskeletal:         General: Normal range of motion  Cervical back: Normal range of motion and neck supple  Feet:    Feet:      Comments: Partial foot amputation, increased erythema, tenderness, open wounds with drainage, see attached photo  Skin:     General: Skin is warm and dry  Neurological:      Mental Status: He is alert and oriented to person, place, and time                       Vital Signs  ED Triage Vitals [03/27/23 1810]   Temperature Pulse Respirations Blood Pressure SpO2   99 1 °F (37 3 °C) 96 18 121/76 97 %      Temp Source Heart Rate Source Patient Position - Orthostatic VS BP Location FiO2 (%)   Oral Monitor Sitting Left arm --      Pain Score       10 - Worst Possible Pain           Vitals:    03/27/23 1810   BP: 121/76   Pulse: 96   Patient Position - Orthostatic VS: Sitting Visual Acuity      ED Medications  Medications   HYDROmorphone (DILAUDID) injection 0 5 mg (0 5 mg Intravenous Given 3/27/23 1857)   HYDROmorphone (DILAUDID) injection 1 mg (1 mg Intravenous Given 3/27/23 2014)   cephalexin (KEFLEX) capsule 500 mg (500 mg Oral Given 3/27/23 2018)       Diagnostic Studies  Results Reviewed     Procedure Component Value Units Date/Time    Protime-INR [[id number]]  (Normal) Collected: 03/27/23 1844    Lab Status: Final result Specimen: Blood from Arm, Right Updated: 03/27/23 1934     Protime 12 7 seconds      INR 0 94    APTT [[id number]]  (Normal) Collected: 03/27/23 1844    Lab Status: Final result Specimen: Blood from Arm, Right Updated: 03/27/23 1934     PTT 34 seconds     Comprehensive metabolic panel [[id number]]  (Abnormal) Collected: 03/27/23 1844    Lab Status: Final result Specimen: Blood from Arm, Right Updated: 03/27/23 1926     Sodium 137 mmol/L      Potassium 3 3 mmol/L      Chloride 101 mmol/L      CO2 28 mmol/L      ANION GAP 8 mmol/L      BUN 18 mg/dL      Creatinine 0 88 mg/dL      Glucose 98 mg/dL      Calcium 8 5 mg/dL      AST 21 U/L      ALT 14 U/L      Alkaline Phosphatase 90 U/L      Total Protein 7 0 g/dL      Albumin 3 8 g/dL      Total Bilirubin 0 30 mg/dL      eGFR 94 ml/min/1 73sq m     Narrative:      [place] guidelines for Chronic Kidney Disease (CKD):   •  Stage 1 with normal or high GFR (GFR > 90 mL/min/1 73 square meters)  •  Stage 2 Mild CKD (GFR = 60-89 mL/min/1 73 square meters)  •  Stage 3A Moderate CKD (GFR = 45-59 mL/min/1 73 square meters)  •  Stage 3B Moderate CKD (GFR = 30-44 mL/min/1 73 square meters)  •  Stage 4 Severe CKD (GFR = 15-29 mL/min/1 73 square meters)  •  Stage 5 End Stage CKD (GFR <15 mL/min/1 73 square meters)  Note: GFR calculation is accurate only with a steady state creatinine    Lactic acid [[id number]]  (Normal) Collected: 03/27/23 1844    Lab Status: Final result Specimen: Blood from Arm, Right Updated: 03/27/23 1911     LACTIC ACID 1 4 mmol/L     Narrative:      Result may be elevated if tourniquet was used during collection  Blood culture #1 [[id number]] Collected: 03/27/23 1857    Lab Status: In process Specimen: Blood from Arm, Left Updated: 03/27/23 1909    CBC and differential [[id number]]  (Abnormal) Collected: 03/27/23 1844    Lab Status: Final result Specimen: Blood from Arm, Right Updated: 03/27/23 1907     WBC 10 10 Thousand/uL      RBC 4 74 Million/uL      Hemoglobin 13 2 g/dL      Hematocrit 42 2 %      MCV 89 fL      MCH 27 8 pg      MCHC 31 3 g/dL      RDW 13 5 %      MPV 9 1 fL      Platelets 993 Thousands/uL      nRBC 0 /100 WBCs      Neutrophils Relative 72 %      Immat GRANS % 0 %      Lymphocytes Relative 14 %      Monocytes Relative 10 %      Eosinophils Relative 3 %      Basophils Relative 1 %      Neutrophils Absolute 7 26 Thousands/µL      Immature Grans Absolute 0 04 Thousand/uL      Lymphocytes Absolute 1 42 Thousands/µL      Monocytes Absolute 1 01 Thousand/µL      Eosinophils Absolute 0 32 Thousand/µL      Basophils Absolute 0 05 Thousands/µL     Blood culture #2 [[id number]] Collected: 03/27/23 1845    Lab Status: In process Specimen: Blood from Arm, Right Updated: 03/27/23 1903                 XR foot 3+ views LEFT   ED Interpretation by David Alanis DO (03/27 2123)   No obvious osteomyelitis                 Procedures  Procedures         ED Course                               SBIRT 22yo+    Flowsheet Row Most Recent Value   SBIRT (23 yo +)    In order to provide better care to our patients, we are screening all of our patients for alcohol and drug use  Would it be okay to ask you these screening questions? Yes Filed at: 03/27/2023 2027   Initial Alcohol Screen: US AUDIT-C     1  How often do you have a drink containing alcohol? 0 Filed at: 03/27/2023 2027   2  How many drinks containing alcohol do you have on a typical day you are drinking?   0 Filed at: 03/27/2023 2027   3a  Male UNDER 65: How often do you have five or more drinks on one occasion? 0 Filed at: 03/27/2023 2027   3b  FEMALE Any Age, or MALE 65+: How often do you have 4 or more drinks on one occassion? 0 Filed at: 03/27/2023 2027   Audit-C Score 0 Filed at: 03/27/2023 2027   ANGELO: How many times in the past year have you    Used an illegal drug or used a prescription medication for non-medical reasons? Never Filed at: 03/27/2023 2027                    Medical Decision Making  Patient is a 53-year-old male, known diabetic, presenting to the emergency department complaining of pain, swelling, increased redness to his left foot which has a previous partial amputation, he noted some drainage over the past few days as well, no fever, no injury or trauma, has not been seen by podiatry recently, on exam patient's foot is mildly erythematous, swollen, tender to palpate, there is an ulceration on the distal surface, there is serosanguineous drainage, x-ray shows no obvious evidence of osteomyelitis, laboratory findings are unremarkable with no leukocytosis, normal blood sugar, discussed treatment plan options with patient including admission for IV antibiotics and podiatry evaluation, versus oral antibiotics with outpatient podiatry follow-up, since patient has not been on oral antibiotics recently he opted to try this method, a referral was placed for follow-up with podiatry, patient was strongly cautioned to return if symptoms worsen in any way or fail to improve with oral antibiotics, or if he is unable to get podiatry follow-up in a timely manner, patient acknowledges understanding and agreement with this plan    Cellulitis of foot: acute illness or injury  Amount and/or Complexity of Data Reviewed  Labs: ordered  Radiology: ordered and independent interpretation performed  Risk  Prescription drug management            Disposition  Final diagnoses:   Cellulitis of foot     Time reflects when diagnosis was documented in both MDM as applicable and the Disposition within this note     Time User Action Codes Description Comment    3/27/2023  8:10 PM [place] Add [[id number]] Cellulitis of foot       ED Disposition     ED Disposition   Discharge    Condition   Stable    Date/Time   Mon Mar 27, 2023  8:10 PM    Comment   Marita Irene discharge to home/self care  Follow-up Information     Follow up With Specialties Details Why Contact Info    Inderjit Marr DPM Podiatry, Wound Care In 2 days  [address], [place] [address]  [phone]            Discharge Medication List as of 3/27/2023  8:14 PM      START taking these medications    Details   cephalexin (KEFLEX) 500 mg capsule Take 1 capsule (500 mg total) by mouth every 12 (twelve) hours for 10 days, Starting Mon 3/27/2023, Until Thu 4/6/2023, Normal      !! oxyCODONE-acetaminophen (Percocet) 5-325 mg per tablet Take 1 tablet by mouth every 4 (four) hours as needed for moderate pain for up to 12 doses Max Daily Amount: 6 tablets, Starting Mon 3/27/2023, Normal       !! - Potential duplicate medications found  Please discuss with provider  CONTINUE these medications which have NOT CHANGED    Details   busPIRone (BUSPAR) 5 mg tablet TAKE BY MOUTH 1 TABLET IN THE MORNING AND 1 TABLET BEFORE BEDTIME , Historical Med      carBAMazepine (TEGretol) 200 mg tablet carbamazepine 200 mg tablet, Historical Med      collagenase (Santyl) ointment Santyl 250 unit/gram topical ointment   APPLY TO CLEANSED AFFECTED AREA BY TOPICAL ROUTE ONCE DAILY, Historical Med      !! FLUoxetine (PROzac) 20 mg capsule Starting Wed 1/25/2023, Historical Med      !!  FLUoxetine (PROzac) 40 MG capsule Starting Wed 1/19/2022, Historical Med      furosemide (LASIX) 40 mg tablet Take 40 mg by mouth 2 (two) times a day, Historical Med      gabapentin (NEURONTIN) 300 mg capsule TAKE 1 CAPSULE BY MOUTH THREE TIMES A DAY, Normal      gentamicin (GARAMYCIN) 0 1 % cream gentamicin 0 1 % topical cream   APPLY 1 GRAM BY TOPICAL ROUTE TO THE AFFECTED AREA 3 TIMES A DAY FOR 30 DAYS, Historical Med      hydrOXYzine HCL (ATARAX) 25 mg tablet Take 25 mg by mouth 4 (four) times a day as needed  , Starting Mon 7/26/2021, Historical Med      lidocaine (Lidoderm) 5 % Apply 1 patch topically daily Remove & Discard patch within 12 hours or as directed by MD, Starting Sun 2/20/2022, Normal      lisinopril (ZESTRIL) 5 mg tablet lisinopril 5 mg tablet   TAKE BY MOUTH 1 TABLET IN THE MORNING , Historical Med      LORazepam (ATIVAN) 0 5 mg tablet TAKE 1 TABLET BY MOUTH TWICE A DAY TAKE 1 TABLET IN THE AFTERNOON, Historical Med      metolazone (ZAROXOLYN) 2 5 mg tablet TAKE 1 TAB BY MOUTH ONCE A DAY ON MONDAY, WEDNESDAY, AND FRIDAY ONLY , Historical Med      misoprostol (CYTOTEC) 200 mcg tablet Take 200 mcg by mouth 4 (four) times a day  , Starting Mon 11/8/2021, Historical Med      Nuzyra 150 MG TABS TAKE 3 TABLETS BY MOUTH DAILY FOR 2 DAYS THEN TAKE 2 TABLETS DAILY FOR 8 DAYS, Historical Med      oxyCODONE (ROXICODONE) 5 immediate release tablet Starting Sat 12/17/2022, Historical Med      !! oxyCODONE-acetaminophen (PERCOCET) 5-325 mg per tablet Take 1 tablet by mouth every 6 (six) hours, Starting Tue 5/3/2022, Historical Med      pantoprazole (PROTONIX) 20 mg tablet Take 20 mg by mouth daily, Starting Thu 3/2/2023, Historical Med      potassium chloride (K-DUR,KLOR-CON) 20 mEq tablet Take 2 tablets (40 mEq total) by mouth daily for 5 days, Starting Thu 2/24/2022, Until Tue 3/22/2022, Normal      !! prazosin (MINIPRESS) 1 mg capsule TAKE 1 CAPSULE BY MOUTH EVERY DAY AT NIGHT, Historical Med      !! prazosin (MINIPRESS) 1 mg capsule prazosin 1 mg capsule, Historical Med      sildenafil (VIAGRA) 25 MG tablet sildenafil 25 mg tablet   TAKE 1 TABLET BY MOUTH DAILY AS NEEDED FOR ERECTILE DYSFUNCTION, Historical Med      sucralfate (CARAFATE) 1 g tablet TAKE 1 TABLET BY MOUTH 4 TIMES A DAY BEFORE MEALS AND AT BEDTIME, Historical Med       !! - Potential duplicate medications found  Please discuss with provider                PDMP Review       Value Time User    PDMP Reviewed  Yes 6/14/2022 10:21 PM Juan Miguel Marsh PA-C          ED Provider  Electronically Signed by           Melinda De Guzman DO  03/27/23 2126

## 2023-03-28 ENCOUNTER — OFFICE VISIT (OUTPATIENT)
Dept: PODIATRY | Facility: CLINIC | Age: 58
End: 2023-03-28

## 2023-03-28 VITALS
BODY MASS INDEX: 41.75 KG/M2 | HEIGHT: 73 IN | DIASTOLIC BLOOD PRESSURE: 78 MMHG | HEART RATE: 85 BPM | WEIGHT: 315 LBS | SYSTOLIC BLOOD PRESSURE: 139 MMHG

## 2023-03-28 DIAGNOSIS — L03.119 CELLULITIS OF FOOT: ICD-10-CM

## 2023-03-28 DIAGNOSIS — E11.42 TYPE 2 DIABETES MELLITUS WITH DIABETIC POLYNEUROPATHY, WITH LONG-TERM CURRENT USE OF INSULIN (HCC): Primary | ICD-10-CM

## 2023-03-28 DIAGNOSIS — E11.621 DIABETIC ULCER OF LEFT MIDFOOT ASSOCIATED WITH TYPE 2 DIABETES MELLITUS, WITH BONE INVOLVEMENT WITHOUT EVIDENCE OF NECROSIS (HCC): ICD-10-CM

## 2023-03-28 DIAGNOSIS — Z79.4 TYPE 2 DIABETES MELLITUS WITH DIABETIC POLYNEUROPATHY, WITH LONG-TERM CURRENT USE OF INSULIN (HCC): Primary | ICD-10-CM

## 2023-03-28 DIAGNOSIS — L97.426 DIABETIC ULCER OF LEFT MIDFOOT ASSOCIATED WITH TYPE 2 DIABETES MELLITUS, WITH BONE INVOLVEMENT WITHOUT EVIDENCE OF NECROSIS (HCC): ICD-10-CM

## 2023-03-28 RX ORDER — TRAMADOL HYDROCHLORIDE 50 MG/1
50 TABLET ORAL EVERY 6 HOURS PRN
COMMUNITY
Start: 2023-03-17

## 2023-03-28 NOTE — PROGRESS NOTES
Assessment/Plan:     Diagnoses and all orders for this visit:    Type 2 diabetes mellitus with diabetic polyneuropathy, with long-term current use of insulin (HCC)  -     VAS lower limb arterial duplex, complete bilateral; Future    Diabetic ulcer of left midfoot associated with type 2 diabetes mellitus, with bone involvement without evidence of necrosis (HCC)  -     MRI foot/forefoot toes left wo contrast; Future  -     Cam Boot    Other orders  -     traMADol (ULTRAM) 50 mg tablet; Take 50 mg by mouth every 6 (six) hours as needed  -     Debridement          Imaging Reviewed at this visit (I personally reviewed/independently interpreted images and reports in PACS)  · XR left foot 3/28/23 3v: TMA with irregular interval cortication, cannot exclude chronic OM  No CHRISS     IMPRESSION:  · Left foot diabetic ulceration    · NIDDM, A1c 5 8% (1/3/23)  · H/o L TMA  R multiple to amputations     PLAN:  · I reviewed clinical exam and radiographic imaging (XR) with patient in detail today  I have discussed with the patient the pathophysiology of this diagnosis and reviewed how the examination correlates with this diagnosis  · I reviewed ED note from 3/27/23: XR, cephalexin, f/u pod outpt  Labs reviewed with WBC WNL  · Left DFU appears without purulence or erythema however it does probe deep to bone about 3cm  XR as above  MRI ordered to assess for OM as wound dose probe deep to bone as well  · LEADs ordered  · Recommend daily dsg chg with iodoform and dsd  · Elevation, stay off of foot NWB as much as possible   · CAM boot dispensed for protected WB  · C/w abx  · F/u one week for MRI review    Debridement   Universal Protocol:  Consent: Verbal consent obtained    Risks and benefits: risks, benefits and alternatives were discussed  Consent given by: patient  Patient understanding: patient states understanding of the procedure being performed  Patient consent: the patient's understanding of the procedure matches consent given  Patient identity confirmed: verbally with patient      Performed by: physician  Debridement type: surgical  Level of debridement: subcutaneous tissue    Pre-debridement measurements  Length (cm): 2  Width (cm): 2  Depth (cm): 2  Surface Area (cm^2): 4  Volume (cm^3): 8    Post-debridement measurements  Length (cm): 2  Width (cm): 2  Depth (cm): 3  Percent debrided: 100%  Surface Area (cm^2): 4  Area debrided (cm^2): 4  Volume (cm^3): 12  Tissue and other material debrided: adipose and subcutaneous tissue  Devitalized tissue debrided: biofilm, callus and slough  Instrument(s) utilized: curette  Bleeding: small  Hemostasis obtained with: pressure  Procedural pain (0-10): insensate  Post-procedural pain: insensate   Response to treatment: procedure was tolerated well  Debridement Comments: Left foot ulcer        Subjective:      Patient ID: Shoshana Perez is a 59 y o  male  Mika Melton presents to clinic today concerning left foot ulceration  NIDDM, A1c 5 8% 1/3/23  Notes ulcer has been present for a few weeks  Went to ED yesterday due to redness and drainage and pain  Discharged with po abx  Did rest foot yesterday and it felt better  Does not have custom shoes or filler  Has not reported to podiatrist in many months  The following portions of the patient's history were reviewed and updated as appropriate: allergies, current medications, past family history, past medical history, past social history, past surgical history and problem list     Review of Systems   Constitutional: Negative for activity change, chills and fever  HENT: Negative  Respiratory: Negative for cough, chest tightness and shortness of breath  Cardiovascular: Positive for leg swelling (Chronic B/L)  Negative for chest pain  Endocrine: Negative  Genitourinary: Negative  Skin: Positive for wound (Left foot)  Neurological:        PN   Psychiatric/Behavioral: Negative  Negative for agitation and behavioral problems  "    Objective:      /78 (BP Location: Left arm, Patient Position: Sitting, Cuff Size: Large)   Pulse 85   Ht 6' 1\" (1 854 m)   Wt (!) 153 kg (338 lb)   BMI 44 59 kg/m²          Physical Exam  Cardiovascular:      Pulses: Pulses are weak  Dorsalis pedis pulses are 1+ on the right side and 1+ on the left side  Posterior tibial pulses are 0 on the right side and 0 on the left side  Comments: Chronic venous stasis dermatitis with B/L LE brawny edema  Feet:      Right foot:      Skin integrity: Callus (dorsal 5th toe) and dry skin present  Left foot:      Skin integrity: Ulcer (plantar-distal TMA, central, [id number] with deep probe to bone) and dry skin present  Neurological:      Comments: N/T/B to B/L LE           Diabetic Foot Exam    Patient's shoes and socks removed  Right Foot/Ankle   Right Foot Inspection  Skin Exam: skin intact, dry skin, callus (dorsal 5th toe) and callus (dorsal 5th toe)  Toe Exam: right toe deformity (Multiple toe amputations)  Sensory   Vibration: absent  Proprioception: absent  Monofilament testing: absent    Vascular  Capillary refills: < 3 seconds  The right DP pulse is 1+  The right PT pulse is 0  Left Foot/Ankle  Left Foot Inspection  Skin Exam: skin intact, dry skin and ulcer (plantar-distal TMA, central, [id number] with deep probe to bone)  Toe Exam: left toe deformity (TMA)  Sensory   Vibration: absent  Proprioception: absent  Monofilament testing: absent    Vascular  Capillary refills: < 3 seconds  The left DP pulse is 1+  The left PT pulse is 0       Assign Risk Category  Deformity present  Loss of protective sensation  Weak pulses  Risk: 3    "

## 2023-03-28 NOTE — PATIENT INSTRUCTIONS
Left foot wound care instructions  Do not shower  Pack wound daily with idoform packing and dry sterile dressing  Wear CAM boot at all times however stay off of your foot as much as possible

## 2023-03-30 ENCOUNTER — TELEPHONE (OUTPATIENT)
Dept: PODIATRY | Facility: CLINIC | Age: 58
End: 2023-03-30

## 2023-03-30 NOTE — TELEPHONE ENCOUNTER
Caller: [facility]    Doctor: Heydi You DPM    Reason for call: [facility] canceled the MRI that was scheduled for 3/30/23 (STAT)  It was canceled because he has a pacemaker  The office needs to call [facility]  back with the pacemaker information  He will then go on a waiting list and MRI will reach out to the patient to schedule      Call back#: [phone] Scheduling

## 2023-04-02 LAB
BACTERIA BLD CULT: NORMAL
BACTERIA BLD CULT: NORMAL

## 2023-04-05 ENCOUNTER — OFFICE VISIT (OUTPATIENT)
Dept: PODIATRY | Age: 58
End: 2023-04-05

## 2023-04-05 VITALS — BODY MASS INDEX: 41.75 KG/M2 | WEIGHT: 315 LBS | HEIGHT: 73 IN

## 2023-04-05 DIAGNOSIS — L97.426 DIABETIC ULCER OF LEFT MIDFOOT ASSOCIATED WITH TYPE 2 DIABETES MELLITUS, WITH BONE INVOLVEMENT WITHOUT EVIDENCE OF NECROSIS (HCC): ICD-10-CM

## 2023-04-05 DIAGNOSIS — E11.42 TYPE 2 DIABETES MELLITUS WITH DIABETIC POLYNEUROPATHY, WITH LONG-TERM CURRENT USE OF INSULIN (HCC): ICD-10-CM

## 2023-04-05 DIAGNOSIS — E11.621 DIABETIC ULCER OF LEFT MIDFOOT ASSOCIATED WITH TYPE 2 DIABETES MELLITUS, WITH BONE INVOLVEMENT WITHOUT EVIDENCE OF NECROSIS (HCC): ICD-10-CM

## 2023-04-05 DIAGNOSIS — M79.672 LEFT FOOT PAIN: Primary | ICD-10-CM

## 2023-04-05 DIAGNOSIS — L03.119 CELLULITIS OF FOOT: ICD-10-CM

## 2023-04-05 DIAGNOSIS — Z79.4 TYPE 2 DIABETES MELLITUS WITH DIABETIC POLYNEUROPATHY, WITH LONG-TERM CURRENT USE OF INSULIN (HCC): ICD-10-CM

## 2023-04-05 PROBLEM — L97.429 DIABETIC ULCER OF LEFT MIDFOOT ASSOCIATED WITH TYPE 2 DIABETES MELLITUS (HCC): Status: ACTIVE | Noted: 2023-03-28

## 2023-04-05 PROBLEM — L03.116 CELLULITIS OF LEFT FOOT: Status: ACTIVE | Noted: 2023-04-05

## 2023-04-05 NOTE — PROGRESS NOTES
Assessment/Plan:     Diagnoses and all orders for this visit:    Left foot pain  -     X-ray foot left 3+ views; Future    Cellulitis of foot    Type 2 diabetes mellitus with diabetic polyneuropathy, with long-term current use of insulin (HCC)    Diabetic ulcer of left midfoot associated with type 2 diabetes mellitus, with bone involvement without evidence of necrosis ([facility] [address] )          Imaging Reviewed at this visit (I personally reviewed/independently interpreted images and reports in PACS)  · XR left foot 3/28/23 3v: TMA with irregular interval cortication, cannot exclude chronic OM  No CHRISS     IMPRESSION:  · Left foot diabetic ulceration    · NIDDM, A1c 5 8% (1/3/23)  · H/o L TMA  R multiple to amputations     PLAN:  · Left foot is red, hot and swollen with pain  Patient feels lethargic  Recommend reporting to ED  Will need CBC, CMP, ESR, CRP, Left foot XR, wcx, blood cultures  Will need admission for LEADs and MRI plus IV abx  I fear he is at high risk for limb loss    · MRI was ordered stat last appt however this could not be done due to cardiac issues and the imaging is delayed  · LEADs were scheduled but not for another few weeks  · Elevation, stay off of foot NWB as much as possible   · CAM boot dispensed for protected WB      Subjective:      Patient ID: Melodie Schumacher is a 59 y o  male  [place] presents to clinic today concerning f/u left foot ulceration  NIDDM, A1c 5 8% 1/3/23  Notes pain, redness and increased drainage for left foot ulcerations and lethargy over the past day  Notes the MRI, which was initially ordered stat, was unable to be done due to cardiac issue (needs to be monitored due to pace maker)  Did not get LEADs done yet         The following portions of the patient's history were reviewed and updated as appropriate: allergies, current medications, past family history, past medical history, past social history, past surgical history and problem list     Review of Systems   Constitutional: "Negative for activity change, chills and fever  HENT: Negative  Respiratory: Negative for cough, chest tightness and shortness of breath  Cardiovascular: Positive for leg swelling (Chronic B/L)  Negative for chest pain  Endocrine: Negative  Genitourinary: Negative  Skin: Positive for wound (Left foot)  Neurological:        PN   Psychiatric/Behavioral: Negative  Negative for agitation and behavioral problems  Objective:      Ht 6' 1\" (1 854 m)   Wt (!) 153 kg (338 lb)   BMI 44 59 kg/m²          Physical Exam  Cardiovascular:      Pulses: Pulses are weak  Dorsalis pedis pulses are 1+ on the right side and 1+ on the left side  Posterior tibial pulses are 0 on the right side and 0 on the left side  Comments: Chronic venous stasis dermatitis with B/L LE brawny edema  Feet:      Right foot:      Skin integrity: Callus (dorsal 5th toe) and dry skin present  Left foot:      Skin integrity: Ulcer (plantar-distal TMA, central, [id number] with deep probe to bone) and dry skin present  Neurological:      Comments: N/T/B to B/L LE           Patient's shoes and socks removed  Right Foot/Ankle   Right Foot Inspection  Skin Exam: skin intact, dry skin, callus (dorsal 5th toe) and callus (dorsal 5th toe)  Toe Exam: right toe deformity (Multiple toe amputations)  Sensory   Vibration: absent  Proprioception: absent  Monofilament testing: absent    Vascular  Capillary refills: < 3 seconds  The right DP pulse is 1+  The right PT pulse is 0  Left Foot/Ankle  Left Foot Inspection  Skin Exam: skin intact, dry skin and ulcer (plantar-distal TMA, central, [id number] with deep probe to bone)  Toe Exam: swelling (Left foot and LE), tenderness (Left foot), erythema (Left foot) and left toe deformity (TMA)  Sensory   Vibration: absent  Proprioception: absent  Monofilament testing: absent    Vascular  Capillary refills: < 3 seconds  The left DP pulse is 1+   The left PT " pulse is 0       Assign Risk Category  Deformity present  Loss of protective sensation  Weak pulses  Risk: 3

## 2023-04-06 PROBLEM — S92.302A: Status: ACTIVE | Noted: 2023-04-06

## 2023-04-07 ENCOUNTER — ANESTHESIA EVENT (INPATIENT)
Dept: PERIOP | Facility: HOSPITAL | Age: 58
End: 2023-04-07

## 2023-04-07 ENCOUNTER — ANESTHESIA (INPATIENT)
Dept: PERIOP | Facility: HOSPITAL | Age: 58
End: 2023-04-07

## 2023-04-07 RX ORDER — GLYCOPYRROLATE 0.2 MG/ML
INJECTION INTRAMUSCULAR; INTRAVENOUS AS NEEDED
Status: DISCONTINUED | OUTPATIENT
Start: 2023-04-07 | End: 2023-04-07

## 2023-04-07 RX ORDER — ONDANSETRON 2 MG/ML
INJECTION INTRAMUSCULAR; INTRAVENOUS AS NEEDED
Status: DISCONTINUED | OUTPATIENT
Start: 2023-04-07 | End: 2023-04-07

## 2023-04-07 RX ORDER — SODIUM CHLORIDE, SODIUM LACTATE, POTASSIUM CHLORIDE, CALCIUM CHLORIDE 600; 310; 30; 20 MG/100ML; MG/100ML; MG/100ML; MG/100ML
INJECTION, SOLUTION INTRAVENOUS CONTINUOUS PRN
Status: DISCONTINUED | OUTPATIENT
Start: 2023-04-07 | End: 2023-04-07

## 2023-04-07 RX ORDER — PROPOFOL 10 MG/ML
INJECTION, EMULSION INTRAVENOUS CONTINUOUS PRN
Status: DISCONTINUED | OUTPATIENT
Start: 2023-04-07 | End: 2023-04-07

## 2023-04-07 RX ORDER — PHENYLEPHRINE HYDROCHLORIDE 10 MG/ML
INJECTION INTRAVENOUS AS NEEDED
Status: DISCONTINUED | OUTPATIENT
Start: 2023-04-07 | End: 2023-04-07

## 2023-04-07 RX ORDER — DEXMEDETOMIDINE HYDROCHLORIDE 100 UG/ML
INJECTION, SOLUTION INTRAVENOUS AS NEEDED
Status: DISCONTINUED | OUTPATIENT
Start: 2023-04-07 | End: 2023-04-07

## 2023-04-07 RX ORDER — MIDAZOLAM HYDROCHLORIDE 2 MG/2ML
INJECTION, SOLUTION INTRAMUSCULAR; INTRAVENOUS AS NEEDED
Status: DISCONTINUED | OUTPATIENT
Start: 2023-04-07 | End: 2023-04-07

## 2023-04-07 RX ORDER — KETAMINE HCL IN NACL, ISO-OSM 100MG/10ML
SYRINGE (ML) INJECTION AS NEEDED
Status: DISCONTINUED | OUTPATIENT
Start: 2023-04-07 | End: 2023-04-07

## 2023-04-07 RX ORDER — PROPOFOL 10 MG/ML
INJECTION, EMULSION INTRAVENOUS AS NEEDED
Status: DISCONTINUED | OUTPATIENT
Start: 2023-04-07 | End: 2023-04-07

## 2023-04-07 RX ORDER — FENTANYL CITRATE 50 UG/ML
INJECTION, SOLUTION INTRAMUSCULAR; INTRAVENOUS AS NEEDED
Status: DISCONTINUED | OUTPATIENT
Start: 2023-04-07 | End: 2023-04-07

## 2023-04-07 RX ORDER — LIDOCAINE HYDROCHLORIDE 10 MG/ML
INJECTION, SOLUTION EPIDURAL; INFILTRATION; INTRACAUDAL; PERINEURAL AS NEEDED
Status: DISCONTINUED | OUTPATIENT
Start: 2023-04-07 | End: 2023-04-07

## 2023-04-07 RX ADMIN — LIDOCAINE HYDROCHLORIDE 25 MG: 10 INJECTION, SOLUTION EPIDURAL; INFILTRATION; INTRACAUDAL at 09:05

## 2023-04-07 RX ADMIN — SODIUM CHLORIDE, SODIUM LACTATE, POTASSIUM CHLORIDE, AND CALCIUM CHLORIDE: .6; .31; .03; .02 INJECTION, SOLUTION INTRAVENOUS at 08:58

## 2023-04-07 RX ADMIN — FENTANYL CITRATE 50 MCG: 50 INJECTION INTRAMUSCULAR; INTRAVENOUS at 09:10

## 2023-04-07 RX ADMIN — FENTANYL CITRATE 50 MCG: 50 INJECTION INTRAMUSCULAR; INTRAVENOUS at 09:05

## 2023-04-07 RX ADMIN — PHENYLEPHRINE HYDROCHLORIDE 100 MCG: 10 INJECTION INTRAVENOUS at 09:47

## 2023-04-07 RX ADMIN — PROPOFOL 20 MG: 10 INJECTION, EMULSION INTRAVENOUS at 09:05

## 2023-04-07 RX ADMIN — Medication 20 MG: at 09:20

## 2023-04-07 RX ADMIN — PROPOFOL 75 MCG/KG/MIN: 10 INJECTION, EMULSION INTRAVENOUS at 09:05

## 2023-04-07 RX ADMIN — ONDANSETRON 4 MG: 2 INJECTION INTRAMUSCULAR; INTRAVENOUS at 09:15

## 2023-04-07 RX ADMIN — DEXMEDETOMIDINE HYDROCHLORIDE 20 MCG: 100 INJECTION, SOLUTION, CONCENTRATE INTRAVENOUS at 09:05

## 2023-04-07 RX ADMIN — Medication 30 MG: at 09:15

## 2023-04-07 RX ADMIN — GLYCOPYRROLATE 0.2 MG: 0.2 INJECTION, SOLUTION INTRAMUSCULAR; INTRAVENOUS at 08:58

## 2023-04-07 RX ADMIN — MIDAZOLAM 2 MG: 1 INJECTION INTRAMUSCULAR; INTRAVENOUS at 08:58

## 2023-04-07 NOTE — ANESTHESIA POSTPROCEDURE EVALUATION
Post-Op Assessment Note    CV Status:  Stable    Pain management: adequate     Mental Status:  Alert and awake   Hydration Status:  Euvolemic   PONV Controlled:  Controlled   Airway Patency:  Patent      Post Op Vitals Reviewed: Yes      Staff: CRNA         No notable events documented      BP   96/55   Temp   98 4   Pulse  68   Resp   18   SpO2   98%

## 2023-04-07 NOTE — ANESTHESIA PREPROCEDURE EVALUATION
Procedure:  2ND RAY RESECTION FOOT (Left: Foot)    Relevant Problems   ANESTHESIA (within normal limits)      CARDIO   (+) Atrial fibrillation (HCC)   (+) Hypertension   (+) Pacemaker      ENDO   (+) Type 2 diabetes mellitus with diabetic polyneuropathy, with long-term current use of insulin (HCC)      GI/HEPATIC   (+) History of bariatric surgery      MUSCULOSKELETAL   (+) Cervical spondylosis      NEURO/PSYCH   (+) Anxiety      PULMONARY   (+) DAYAN (obstructive sleep apnea) (noncompliant with CPAP)      Cardiovascular and Mediastinum   (+) Chronic diastolic heart failure (HCC)      Endocrine   (+) Diabetic ulcer of left midfoot associated with type 2 diabetes mellitus (HCC)      Musculoskeletal and Integument   (+) Cellulitis of left foot   (+) Closed fracture of shaft of metatarsal bone of left foot      Other   (+) Chronic osteomyelitis of left foot with draining sinus (HCC)   (+) Morbid obesity with BMI of 40 0-44 9, adult (HCC)   (+) Toe osteomyelitis, right (HCC)      Atrial fibrillation  Premature ventricular complexes  Abnormal ECG  When compared with ECG of 16-NOV-2021 10:49,  Premature ventricular complexes is now Present     Confirmed by Mona Gurrola ([id number]) on 11/16/2021 10:55:25 AM  Physical Exam    Airway    Mallampati score: II  TM Distance: >3 FB  Neck ROM: full     Dental   No notable dental hx     Cardiovascular  Rhythm: irregular, Rate: normal,     Pulmonary  Breath sounds clear to auscultation,     Other Findings        Anesthesia Plan  ASA Score- 3     Anesthesia Type- IV sedation with anesthesia with ASA Monitors  Additional Monitors:   Airway Plan:           Plan Factors-Exercise tolerance (METS): >4 METS  Chart reviewed  EKG reviewed  Patient summary reviewed  Patient is not a current smoker  Obstructive sleep apnea risk education given perioperatively  Induction-     Postoperative Plan-     Informed Consent- Anesthetic plan and risks discussed with patient    I personally reviewed this patient with the CRNA  Discussed and agreed on the Anesthesia Plan with the LELE Henriquez

## 2023-04-10 PROBLEM — M86.172 ACUTE OSTEOMYELITIS OF LEFT FOOT (HCC): Status: ACTIVE | Noted: 2023-04-10

## 2023-04-18 ENCOUNTER — HOSPITAL ENCOUNTER (EMERGENCY)
Facility: HOSPITAL | Age: 58
Discharge: HOME | End: 2023-04-18
Attending: EMERGENCY MEDICINE | Admitting: EMERGENCY MEDICINE

## 2023-04-18 VITALS
WEIGHT: 315 LBS | SYSTOLIC BLOOD PRESSURE: 147 MMHG | DIASTOLIC BLOOD PRESSURE: 88 MMHG | OXYGEN SATURATION: 100 % | RESPIRATION RATE: 18 BRPM | TEMPERATURE: 98.8 F | HEART RATE: 78 BPM | BODY MASS INDEX: 43.54 KG/M2

## 2023-04-18 VITALS
RESPIRATION RATE: 16 BRPM | HEART RATE: 89 BPM | TEMPERATURE: 98.2 F | OXYGEN SATURATION: 97 % | DIASTOLIC BLOOD PRESSURE: 81 MMHG | SYSTOLIC BLOOD PRESSURE: 145 MMHG

## 2023-04-18 DIAGNOSIS — F41.9 ANXIETY: Primary | ICD-10-CM

## 2023-04-18 DIAGNOSIS — G47.00 INSOMNIA: ICD-10-CM

## 2023-04-18 RX ORDER — LORAZEPAM 2 MG/ML
1 INJECTION INTRAMUSCULAR ONCE
Status: COMPLETED | OUTPATIENT
Start: 2023-04-18 | End: 2023-04-18

## 2023-04-18 RX ADMIN — LORAZEPAM 1 MG: 2 INJECTION INTRAMUSCULAR; INTRAVENOUS at 10:01

## 2023-04-18 NOTE — DISCHARGE INSTRUCTIONS
You may take the Ativan you have been prescribed up to 2 tablets 1 mg every 8 hours as needed for anxiety and difficulty sleeping

## 2023-04-18 NOTE — ED ATTENDING ATTESTATION
4/18/2023  IDiaz DO, saw and evaluated the patient  I have discussed the patient with the resident/non-physician practitioner and agree with the resident's/non-physician practitioner's findings, Plan of Care, and MDM as documented in the resident's/non-physician practitioner's note, except where noted  All available labs and Radiology studies were reviewed  I was present for key portions of any procedure(s) performed by the resident/non-physician practitioner and I was immediately available to provide assistance  At this point I agree with the current assessment done in the Emergency Department  I have conducted an independent evaluation of this patient a history and physical is as follows:    ED Course     Patient had a stable ED course       Critical Care Time  Procedures

## 2023-04-18 NOTE — DISCHARGE INSTRUCTIONS
Please see your doctor tomorrow  Take your Ativan as prescribed    Return with any new or worsening symptoms

## 2023-04-18 NOTE — ED PROVIDER NOTES
"History  Chief Complaint   Patient presents with   • Anxiety     States he has not slept for 3 days, was here earlier today and states he was not given anything and instructed to take his prescribed medications at home  Patient also reports he has appointment with psychiatry tomorrow who is the one that prescribes his lorazepam for him  20-year-old male presents the emergency department for evaluation of anxiety and insomnia  Patient states the symptoms have been ongoing for the past several days after recent discharge from a foot operation  Patient states while he was in the hospital he had an MRI that \"did not go so well\" he believes this triggered his anxiety  Also states he is anxious to get back  To work  Reports since returning home anxiety seem to have worsened  He denies any suicidal homicidal ideations  He denies any hallucinations  No drug or alcohol use  Reports he was seen in our emergency department this morning for the same  That time he was instructed to take his Ativan  States he was informed he could take 2 tabs of his Ativan however he is only prescribed 1 tab which is 0 5 mg tablet and he did not feel comfortable taking 2  Reports he one 0 5 mg tablet Ativan after discharge this morning around 6 am without improvement of symptoms  Reports he has an appointment with his prescribing physician tomorrow  Requesting a liquid form of Ativan  Does not wish to be evaluated by crisis  Feels safe to return home        History provided by:  Patient  Anxiety  Presenting symptoms: no aggressive behavior, no agitation, no bizarre behavior, no delusions, no depression, no disorganized speech, no disorganized thought process, no hallucinations, no homicidal ideas, no paranoid behavior, no self-mutilation, no suicidal thoughts, no suicidal threats and no suicide attempt    Onset quality:  Gradual  Timing:  Constant  Progression:  Worsening  Chronicity:  New  Treatment compliance:  Most of the " time  Associated symptoms: anxiety and insomnia    Associated symptoms: no abdominal pain, no anhedonia, no appetite change, no chest pain, not distractible, no euphoric mood, no fatigue, no feelings of worthlessness, no headaches, no hypersomnia, no hyperventilation, no irritability, no poor judgment, no school problems and no weight change        Prior to Admission Medications   Prescriptions Last Dose Informant Patient Reported? Taking?    FLUoxetine (PROzac) 40 MG capsule   Yes No   Sig: Take 40 mg by mouth daily   LORazepam (ATIVAN) 0 5 mg tablet   Yes No   Sig: every 8 (eight) hours as needed   acetaminophen (TYLENOL) 325 mg tablet   No No   Sig: Take 2 tablets (650 mg total) by mouth every 6 (six) hours as needed for mild pain, headaches or fever   busPIRone (BUSPAR) 5 mg tablet   Yes No   Sig: TAKE BY MOUTH 1 TABLET IN THE MORNING AND 1 TABLET BEFORE BEDTIME    carBAMazepine (TEGretol) 200 mg tablet   Yes No   furosemide (LASIX) 40 mg tablet   Yes No   Sig: Take 40 mg by mouth 2 (two) times a day   oxyCODONE (ROXICODONE) 5 immediate release tablet   No No   Sig: Take 1 tablet (5 mg total) by mouth every 6 (six) hours as needed for moderate pain or severe pain for up to 10 days Max Daily Amount: 20 mg   potassium chloride (K-DUR,KLOR-CON) 20 mEq tablet   No No   Sig: Take 2 tablets (40 mEq total) by mouth daily for 5 days   sulfamethoxazole-trimethoprim (BACTRIM DS) 800-160 mg per tablet   No No   Sig: Take 1 tablet by mouth every 12 (twelve) hours for 5 days      Facility-Administered Medications: None       Past Medical History:   Diagnosis Date   • Atrial fibrillation (HCC)    • Chronic diastolic (congestive) heart failure (HCC)    • Diabetes mellitus (HCC)    • High cholesterol    • Hyperlipidemia    • Pacemaker    • Stroke [facility])        Past Surgical History:   Procedure Laterality Date   • APPENDECTOMY     • ATRIAL CARDIAC PACEMAKER INSERTION     • BARIATRIC SURGERY  05/2021   • EPIDURAL BLOCK INJECTION N/A 5/19/2022    Procedure: BLOCK / INJECTION EPIDURAL STEROID CERVICAL C7-T1;  Surgeon: Yessenia Inman MD;  [facility];  Service: Pain Management    • FL GUIDED NEEDLE PLAC BX/ASP/INJ  3/22/2022   • FOOT AMPUTATION Left 4/28/2022    Procedure: LEFT TRANSMETATARSAL AMPUTATION ;  Surgeon: Arpan Madden DPM;  [facility];  Service: Podiatry   • NERVE BLOCK Right 2/10/2022    Procedure: BLOCK MEDIAL BRANCH C3, C4, C5 #1;  Surgeon: Yessenia Inman MD;  [facility];  Service: Pain Management    • NERVE BLOCK Right 3/22/2022    Procedure: BLOCK MEDIAL BRANCH C3, C4, C5 #2;  Surgeon: Yessenia Inman MD;  [facility];  Service: Pain Management    • MD AMPUTATION FOOT TRANSMETARSAL Left 4/12/2023    Procedure: REVISION LEFT TRANSMETATARSAL (TMA) AMPUTATION, REMOVAL OF UNVIABLE TISSUE AND BONE,;  Surgeon: Elsa Price DPM;  [facility];  Service: Podiatry   • MD AMPUTATION METATARSAL W/TOE SINGLE Left 4/7/2023    Procedure: 2ND RAY RESECTION FOOT;  Surgeon: Elsa Price DPM;  [facility];  Service: Podiatry   • MD AMPUTATION TOE INTERPHALANGEAL JOINT Left 11/16/2021    Procedure: AMPUTATION LESSER TOE;  Surgeon: Beatriz Ramos DPM;  [facility];  Service: Podiatry   • RADIOFREQUENCY ABLATION Right 4/7/2022    Procedure: Right C3, C4, C5 RFA;  Surgeon: Yessenia Inman MD;  [facility];  Service: Pain Management    • RHIZOTOMY Right 2/9/2023    Procedure: RHIZOTOMY CERVICAL MEDIAL BRANCH NERVES RIGHT C3, C4, C5;  Surgeon: Yessenia Inman MD;  [facility];  Service: Pain Management    • TOE AMPUTATION Left     2nd toe   • TOE AMPUTATION Left 9/15/2021    Procedure: AMPUTATION LEFT 4TH TOE;  Surgeon: Beatriz Ramos DPM;  [facility];  Service: Podiatry   • TOE AMPUTATION Right 1/12/2022    Procedure: AMPUTATION TOE;  Surgeon: Andrea Clarke DPM;  [facility];  Service: Podiatry   • TOE AMPUTATION Right 2/23/2022 Procedure: AMPUTATION TOE  RIGHT SECOND;  Surgeon: Judy Kwon DPM;  Location: [address] [place] [address] OR;  Service: Podiatry   • TOE AMPUTATION Right 6/3/2022    Procedure: AMPUTATION right 4th TOE;  Surgeon: Oda Mortimer, DPM;  Location: [facility] OR;  Service: Podiatry       Family History   Problem Relation Age of Onset   • No Known Problems Mother    • No Known Problems Father      I have reviewed and agree with the history as documented  E-Cigarette/Vaping   • E-Cigarette Use Never User      E-Cigarette/Vaping Substances     Social History     Tobacco Use   • Smoking status: Never   • Smokeless tobacco: Never   Vaping Use   • Vaping Use: Never used   Substance Use Topics   • Alcohol use: Never   • Drug use: Never       Review of Systems   Constitutional: Negative for appetite change, fatigue and irritability  Cardiovascular: Negative for chest pain  Gastrointestinal: Negative for abdominal pain  Neurological: Negative  Negative for headaches  Psychiatric/Behavioral: Positive for sleep disturbance  Negative for agitation, hallucinations, homicidal ideas, paranoia, self-injury and suicidal ideas  The patient is nervous/anxious and has insomnia  All other systems reviewed and are negative  Physical Exam  Physical Exam  Vitals and nursing note reviewed  Constitutional:       General: He is not in acute distress  Appearance: Normal appearance  He is not ill-appearing, toxic-appearing or diaphoretic  HENT:      Head: Normocephalic  Eyes:      Conjunctiva/sclera: Conjunctivae normal    Musculoskeletal:         General: Normal range of motion  Skin:     General: Skin is warm and dry  Findings: No rash  Neurological:      General: No focal deficit present  Mental Status: He is alert and oriented to person, place, and time  Psychiatric:         Attention and Perception: Attention normal          Mood and Affect: Mood is anxious  Affect is tearful           Speech: Speech normal  Behavior: Behavior normal  Behavior is cooperative  Thought Content: Thought content normal  Thought content is not paranoid  Thought content does not include homicidal or suicidal ideation  Thought content does not include homicidal or suicidal plan  Cognition and Memory: Cognition normal          Judgment: Judgment normal          Vital Signs  ED Triage Vitals [04/18/23 0925]   Temperature Pulse Respirations Blood Pressure SpO2   98 2 °F (36 8 °C) 89 16 145/81 97 %      Temp Source Heart Rate Source Patient Position - Orthostatic VS BP Location FiO2 (%)   Oral Monitor Sitting Right arm --      Pain Score       No Pain           Vitals:    04/18/23 0925   BP: 145/81   Pulse: 89   Patient Position - Orthostatic VS: Sitting         Visual Acuity      ED Medications  Medications   LORazepam (ATIVAN) injection 1 mg (1 mg Intramuscular Given 4/18/23 1001)       Diagnostic Studies  Results Reviewed     None                 No orders to display              Procedures  Procedures         ED Course                       SBIRT 20yo+    Flowsheet Row Most Recent Value   Initial Alcohol Screen: US AUDIT-C     1  How often do you have a drink containing alcohol? 0 Filed at: 04/18/2023 0913   2  How many drinks containing alcohol do you have on a typical day you are drinking? 0 Filed at: 04/18/2023 0913   3a  Male UNDER 65: How often do you have five or more drinks on one occasion? 0 Filed at: 04/18/2023 0913   3b  FEMALE Any Age, or MALE 65+: How often do you have 4 or more drinks on one occassion? 0 Filed at: 04/18/2023 0913   Audit-C Score 0 Filed at: 04/18/2023 9833   ANGELO: How many times in the past year have you    Used an illegal drug or used a prescription medication for non-medical reasons? Never Filed at: 04/18/2023 0913                    Medical Decision Making  57-year-old male presents emergency department for evaluation of anxiety and insomnia  History of similar on Ativan    Symptoms have been worsening over the last several days  Vitals and medical record reviewed  Patient had no SI or HI  No drug or alcohol use  No hallucinations  He refused crisis evaluation  Does have Ativan at home, is taking as prescribed  Has an appointment with his prescribing physician tomorrow  He was treated with IM Ativan in the emergency department today  Educated on return precautions and appropriate follow-up and verbalized understanding  He is clinically and hemodynamically stable for discharge    Anxiety: chronic illness or injury with exacerbation, progression, or side effects of treatment  Risk  Prescription drug management  Disposition  Final diagnoses:   Anxiety     Time reflects when diagnosis was documented in both MDM as applicable and the Disposition within this note     Time User Action Codes Description Comment    4/18/2023  9:49 AM Narayan Llanes Add [F41 9] Anxiety       ED Disposition     ED Disposition   Discharge    Condition   Stable    Date/Time   Tue Apr 18, 2023  9:49 AM    Comment   Trey Hale discharge to home/self care                 Follow-up Information     Follow up With Specialties Details Why Contact Info    [address], DO    [address]  [phone]            Discharge Medication List as of 4/18/2023  9:50 AM      CONTINUE these medications which have NOT CHANGED    Details   acetaminophen (TYLENOL) 325 mg tablet Take 2 tablets (650 mg total) by mouth every 6 (six) hours as needed for mild pain, headaches or fever, Starting u 4/13/2023, No Print      busPIRone (BUSPAR) 5 mg tablet TAKE BY MOUTH 1 TABLET IN THE MORNING AND 1 TABLET BEFORE BEDTIME , Historical Med      carBAMazepine (TEGretol) 200 mg tablet Historical Med      FLUoxetine (PROzac) 40 MG capsule Take 40 mg by mouth daily, Starting Wed 1/19/2022, Historical Med      furosemide (LASIX) 40 mg tablet Take 40 mg by mouth 2 (two) times a day, Historical Med LORazepam (ATIVAN) 0 5 mg tablet every 8 (eight) hours as needed, Starting Mon 1/2/2023, Historical Med      oxyCODONE (ROXICODONE) 5 immediate release tablet Take 1 tablet (5 mg total) by mouth every 6 (six) hours as needed for moderate pain or severe pain for up to 10 days Max Daily Amount: 20 mg, Starting Thu 4/13/2023, Until Sun 4/23/2023 at 2359, Normal      potassium chloride (K-DUR,KLOR-CON) 20 mEq tablet Take 2 tablets (40 mEq total) by mouth daily for 5 days, Starting Thu 2/24/2022, Until Tue 3/22/2022, Normal      sulfamethoxazole-trimethoprim (BACTRIM DS) 800-160 mg per tablet Take 1 tablet by mouth every 12 (twelve) hours for 5 days, Starting u 4/13/2023, Until Tue 4/18/2023, Normal             No discharge procedures on file      PDMP Review       Value Time User    PDMP Reviewed  Yes 4/6/2023 10:19 PM Dulce Alonso PA-C          ED Provider  Electronically Signed by           Saira Ley PA-C  04/18/23 1802

## 2023-04-18 NOTE — ED PROVIDER NOTES
History  Chief Complaint   Patient presents with   • Medical Problem     Stated has insomnia and anxiety since having surgery  Last took his ativan at 9am yesterday  Patient is a 56-year-old male presents to the emergency department due to anxiety and difficulty sleeping patient reports he has not been able to sleep over the past 3 days and he is feeling very anxious patient adamantly denies any suicidal or homicidal ideation or thoughts of self-harm  Recently hospitalized and had foot surgery no issues with the foot  No other medical complaints  History provided by:  Patient  Medical Problem  Location:  Anxiety difficulty sleeping  Severity:  Moderate  Onset quality:  Gradual  Duration:  3 days  Timing:  Constant  Progression:  Worsening  Chronicity:  Recurrent  Associated symptoms: no abdominal pain, no chest pain, no congestion, no cough, no diarrhea, no ear pain, no fatigue, no fever, no headaches, no myalgias, no nausea, no rash, no rhinorrhea, no shortness of breath, no sore throat, no vomiting and no wheezing        Prior to Admission Medications   Prescriptions Last Dose Informant Patient Reported? Taking?    FLUoxetine (PROzac) 40 MG capsule   Yes No   Sig: Take 40 mg by mouth daily   LORazepam (ATIVAN) 0 5 mg tablet   Yes No   Sig: every 8 (eight) hours as needed   acetaminophen (TYLENOL) 325 mg tablet   No No   Sig: Take 2 tablets (650 mg total) by mouth every 6 (six) hours as needed for mild pain, headaches or fever   busPIRone (BUSPAR) 5 mg tablet   Yes No   Sig: TAKE BY MOUTH 1 TABLET IN THE MORNING AND 1 TABLET BEFORE BEDTIME    carBAMazepine (TEGretol) 200 mg tablet   Yes No   furosemide (LASIX) 40 mg tablet   Yes No   Sig: Take 40 mg by mouth 2 (two) times a day   oxyCODONE (ROXICODONE) 5 immediate release tablet   No No   Sig: Take 1 tablet (5 mg total) by mouth every 6 (six) hours as needed for moderate pain or severe pain for up to 10 days Max Daily Amount: 20 mg   potassium chloride (K-DUR,KLOR-CON) 20 mEq tablet   No No   Sig: Take 2 tablets (40 mEq total) by mouth daily for 5 days   sulfamethoxazole-trimethoprim (BACTRIM DS) 800-160 mg per tablet   No No   Sig: Take 1 tablet by mouth every 12 (twelve) hours for 5 days      Facility-Administered Medications: None       Past Medical History:   Diagnosis Date   • Atrial fibrillation (HCC)    • Chronic diastolic (congestive) heart failure ([facility] [address] )    • Diabetes mellitus ([facility] )    • High cholesterol    • Hyperlipidemia    • Pacemaker    • Stroke [facility])        Past Surgical History:   Procedure Laterality Date   • APPENDECTOMY     • ATRIAL CARDIAC PACEMAKER INSERTION     • BARIATRIC SURGERY  05/2021   • EPIDURAL BLOCK INJECTION N/A 5/19/2022    Procedure: BLOCK / INJECTION EPIDURAL STEROID CERVICAL C7-T1;  Surgeon: Wendy Francisco MD;  [facility]: [facility];  Service: Pain Management    • FL GUIDED NEEDLE PLAC BX/ASP/INJ  3/22/2022   • FOOT AMPUTATION Left 4/28/2022    Procedure: LEFT TRANSMETATARSAL AMPUTATION ;  Surgeon: Doris Madden DPM;  [facility];  Service: Podiatry   • NERVE BLOCK Right 2/10/2022    Procedure: BLOCK MEDIAL BRANCH C3, C4, C5 #1;  Surgeon: Wendy Francisco MD;  [facility]: [facility];  Service: Pain Management    • NERVE BLOCK Right 3/22/2022    Procedure: BLOCK MEDIAL BRANCH C3, C4, C5 #2;  Surgeon: Wendy Francisco MD;  [facility];  Service: Pain Management    • [place] AMPUTATION FOOT TRANSMETARSAL Left 4/12/2023    Procedure: REVISION LEFT TRANSMETATARSAL (TMA) AMPUTATION, REMOVAL OF UNVIABLE TISSUE AND BONE,;  Surgeon: Jayden Beasley DPM;  [facility];  Service: Podiatry   • [place] AMPUTATION METATARSAL W/TOE SINGLE Left 4/7/2023    Procedure: 2ND RAY RESECTION FOOT;  Surgeon: Jayden Beasley DPM;  [facility];  Service: Podiatry   • [place] AMPUTATION TOE INTERPHALANGEAL JOINT Left 11/16/2021    Procedure: AMPUTATION LESSER TOE;  Surgeon: Dickson Alejo DPM;  [facility];  Service: Podiatry ankle pain/injury • RADIOFREQUENCY ABLATION Right 4/7/2022    Procedure: Right C3, C4, C5 RFA;  Surgeon: Paul Harris MD;  [facility];  Service: Pain Management    • RHIZOTOMY Right 2/9/2023    Procedure: RHIZOTOMY CERVICAL MEDIAL BRANCH NERVES RIGHT C3, C4, C5;  Surgeon: Paul Harris MD;  [facility];  Service: Pain Management    • TOE AMPUTATION Left     2nd toe   • TOE AMPUTATION Left 9/15/2021    Procedure: AMPUTATION LEFT 4TH TOE;  Surgeon: Rodolfo Ortiz DPM;  [facility];  Service: Podiatry   • TOE AMPUTATION Right 1/12/2022    Procedure: AMPUTATION TOE;  Surgeon: Gary Wolfe DPM;  [facility];  Service: Podiatry   • TOE AMPUTATION Right 2/23/2022    Procedure: AMPUTATION TOE  RIGHT SECOND;  Surgeon: Gary Wolfe DPM;  [facility]: [facility];  Service: Podiatry   • TOE AMPUTATION Right 6/3/2022    Procedure: AMPUTATION right 4th TOE;  Surgeon: Joseph Anton DPM;  [facility];  Service: Podiatry       Family History   Problem Relation Age of Onset   • No Known Problems Mother    • No Known Problems Father      I have reviewed and agree with the history as documented  E-Cigarette/Vaping   • E-Cigarette Use Never User      E-Cigarette/Vaping Substances     Social History     Tobacco Use   • Smoking status: Never   • Smokeless tobacco: Never   Vaping Use   • Vaping Use: Never used   Substance Use Topics   • Alcohol use: Never   • Drug use: Never       Review of Systems   Constitutional: Negative for activity change, appetite change, chills, fatigue and fever  HENT: Negative for congestion, ear pain, rhinorrhea and sore throat  Eyes: Negative for discharge, redness and visual disturbance  Respiratory: Negative for cough, chest tightness, shortness of breath and wheezing  Cardiovascular: Negative for chest pain and palpitations  Gastrointestinal: Negative for abdominal pain, constipation, diarrhea, nausea and vomiting     Endocrine: Negative for polydipsia and polyuria  Genitourinary: Negative for difficulty urinating, dysuria, frequency, hematuria and urgency  Musculoskeletal: Negative for arthralgias and myalgias  Skin: Negative for color change, pallor and rash  Neurological: Negative for dizziness, weakness, light-headedness, numbness and headaches  Hematological: Negative for adenopathy  Does not bruise/bleed easily  Psychiatric/Behavioral: Positive for sleep disturbance  Negative for self-injury and suicidal ideas  The patient is nervous/anxious  All other systems reviewed and are negative  Physical Exam  Physical Exam  Vitals and nursing note reviewed  Constitutional:       Appearance: He is well-developed  HENT:      Head: Normocephalic and atraumatic  Right Ear: External ear normal       Left Ear: External ear normal       Nose: Nose normal    Eyes:      Conjunctiva/sclera: Conjunctivae normal       Pupils: Pupils are equal, round, and reactive to light  Cardiovascular:      Rate and Rhythm: Normal rate and regular rhythm  Heart sounds: Normal heart sounds  Pulmonary:      Effort: Pulmonary effort is normal  No respiratory distress  Breath sounds: Normal breath sounds  No wheezing or rales  Chest:      Chest wall: No tenderness  Abdominal:      General: Bowel sounds are normal  There is no distension  Palpations: Abdomen is soft  Tenderness: There is no abdominal tenderness  There is no guarding  Musculoskeletal:         General: Normal range of motion  Cervical back: Normal range of motion and neck supple  Skin:     General: Skin is warm and dry  Neurological:      Mental Status: He is alert and oriented to person, place, and time  Cranial Nerves: No cranial nerve deficit  Sensory: No sensory deficit           Vital Signs  ED Triage Vitals [04/18/23 0542]   Temperature Pulse Respirations Blood Pressure SpO2   98 8 °F (37 1 °C) 78 18 147/88 100 %      Temp src Heart Rate Source Patient Position - Orthostatic VS BP Location FiO2 (%)   -- Monitor Sitting Left arm --      Pain Score       --           Vitals:    04/18/23 0542   BP: 147/88   Pulse: 78   Patient Position - Orthostatic VS: Sitting         Visual Acuity      ED Medications  Medications - No data to display    Diagnostic Studies  Results Reviewed     None                 No orders to display              Procedures  Procedures         ED Course                               SBIRT 20yo+    Flowsheet Row Most Recent Value   Initial Alcohol Screen: US AUDIT-C     1  How often do you have a drink containing alcohol? 0 Filed at: 04/18/2023 0543   2  How many drinks containing alcohol do you have on a typical day you are drinking? 0 Filed at: 04/18/2023 0543   3a  Male UNDER 65: How often do you have five or more drinks on one occasion? 0 Filed at: 04/18/2023 0543   3b  FEMALE Any Age, or MALE 65+: How often do you have 4 or more drinks on one occassion? 0 Filed at: 04/18/2023 0543   Audit-C Score 0 Filed at: 04/18/2023 0543   ANGELO: How many times in the past year have you    Used an illegal drug or used a prescription medication for non-medical reasons? Never Filed at: 04/18/2023 0543                    Medical Decision Making  Patient is clinically hemodynamically stable in the emergency department he did have a bottle of Ativan 0 5mg with him in the ED however reported that he has not taken this medication at all over the past 24 hours as he did not feel it was helping him  Advised patient that he may take up to 2 tablets of this at a time for 1 mg dose of Ativan to help with sleep and anxiety as needed every 8 hours  Patient reassured that this medication should help his anxiety and insomnia  Advised prompt follow-up with primary physician and psychiatrist for further evaluation and treatment return precautions and anticipatory guidance discussed        Anxiety: acute illness or injury  Insomnia: acute illness or injury      Disposition  Final diagnoses:   Anxiety   Insomnia     Time reflects when diagnosis was documented in both MDM as applicable and the Disposition within this note     Time User Action Codes Description Comment    4/18/2023  5:39 AM Senora Hanks Add [F41 9] Anxiety     4/18/2023  5:39 AM Senora Hanks Add [G47 00] Insomnia       ED Disposition     ED Disposition   Discharge    Condition   Stable    Date/Time   Tue Apr 18, 2023  5:39 AM    Comment   Opal Bryan discharge to home/self care                 Follow-up Information     Follow up With Specialties Details Why Contact Info    Isaac Ugarte,   Schedule an appointment as soon as possible for a visit in 2 days  [address] [place] [address]  [phone]            Discharge Medication List as of 4/18/2023  5:45 AM      CONTINUE these medications which have NOT CHANGED    Details   acetaminophen (TYLENOL) 325 mg tablet Take 2 tablets (650 mg total) by mouth every 6 (six) hours as needed for mild pain, headaches or fever, Starting Thu 4/13/2023, No Print      busPIRone (BUSPAR) 5 mg tablet TAKE BY MOUTH 1 TABLET IN THE MORNING AND 1 TABLET BEFORE BEDTIME , Historical Med      carBAMazepine (TEGretol) 200 mg tablet Historical Med      FLUoxetine (PROzac) 40 MG capsule Take 40 mg by mouth daily, Starting Wed 1/19/2022, Historical Med      furosemide (LASIX) 40 mg tablet Take 40 mg by mouth 2 (two) times a day, Historical Med      LORazepam (ATIVAN) 0 5 mg tablet every 8 (eight) hours as needed, Starting Mon 1/2/2023, Historical Med      oxyCODONE (ROXICODONE) 5 immediate release tablet Take 1 tablet (5 mg total) by mouth every 6 (six) hours as needed for moderate pain or severe pain for up to 10 days Max Daily Amount: 20 mg, Starting Thu 4/13/2023, Until Sun 4/23/2023 at 2359, Normal      potassium chloride (K-DUR,KLOR-CON) 20 mEq tablet Take 2 tablets (40 mEq total) by mouth daily for 5 days, Starting Thu 2/24/2022, Until Tue 3/22/2022, Normal      sulfamethoxazole-trimethoprim (BACTRIM DS) 800-160 mg per tablet Take 1 tablet by mouth every 12 (twelve) hours for 5 days, Starting Thu 4/13/2023, Until Tue 4/18/2023, Normal             No discharge procedures on file      PDMP Review       Value Time User    PDMP Reviewed  Yes 4/6/2023 10:19 PM Sharee Humphrey PA-C          ED Provider  Electronically Signed by           Osbaldo Garcia DO  04/18/23 5922

## 2023-04-19 ENCOUNTER — HOSPITAL ENCOUNTER (EMERGENCY)
Facility: HOSPITAL | Age: 58
Discharge: HOME | End: 2023-04-19
Attending: EMERGENCY MEDICINE | Admitting: EMERGENCY MEDICINE

## 2023-04-19 VITALS
WEIGHT: 315 LBS | HEART RATE: 88 BPM | DIASTOLIC BLOOD PRESSURE: 84 MMHG | RESPIRATION RATE: 16 BRPM | TEMPERATURE: 98 F | BODY MASS INDEX: 43.54 KG/M2 | OXYGEN SATURATION: 99 % | SYSTOLIC BLOOD PRESSURE: 128 MMHG

## 2023-04-19 DIAGNOSIS — F41.9 ANXIETY: Primary | ICD-10-CM

## 2023-04-19 DIAGNOSIS — G47.00 INSOMNIA: ICD-10-CM

## 2023-04-19 RX ORDER — DIPHENHYDRAMINE HYDROCHLORIDE 50 MG/ML
25 INJECTION INTRAMUSCULAR; INTRAVENOUS ONCE
Status: COMPLETED | OUTPATIENT
Start: 2023-04-19 | End: 2023-04-19

## 2023-04-19 RX ORDER — LORAZEPAM 2 MG/ML
1 INJECTION INTRAMUSCULAR ONCE
Status: COMPLETED | OUTPATIENT
Start: 2023-04-19 | End: 2023-04-19

## 2023-04-19 RX ADMIN — DIPHENHYDRAMINE HYDROCHLORIDE 25 MG: 50 INJECTION, SOLUTION INTRAMUSCULAR; INTRAVENOUS at 18:08

## 2023-04-19 RX ADMIN — LORAZEPAM 1 MG: 2 INJECTION INTRAMUSCULAR; INTRAVENOUS at 18:08

## 2023-04-19 NOTE — ED PROVIDER NOTES
History  Chief Complaint   Patient presents with   • Anxiety     Patient seen here yesterday for same issue, instructed to follow up with psychiatry and they cancelled his appointment due to an emergency so he came back to the ED for ativan to control his anxiety  59year-old male presents to the ED for evaluation of anxiety  Patient was seen and evaluated in the ED yesterday for the same  He has been diagnosed with anxiety and takes Ativan at home for it  Patient reports that for the last 4-5 nights he has been unable to sleep  He does not want to take his medications from home because he does not want to be perceived as taking more than he was prescribed  He denies any suicidal ideation or homicidal ideation but states that he is very anxious and feels stressed out as a result  Patient states that after he was discharged yesterday he was scheduled to have an outpatient appointment with his psychiatrist today however that appointment was canceled  He does have a follow-up appointment with his PCP on Friday morning  His wife reports that after he was discharged yesterday he was able to go home and sleep for a few hours however woke up in the middle the night and was pacing  Patient was admitted on 10 April earlier in the month and had a procedure done on the left lower extremity secondary to osteomyelitis  His wife states that he has been full weightbearing and ambulatory without any shortness of breath or chest pain  Prior to Admission Medications   Prescriptions Last Dose Informant Patient Reported? Taking?    FLUoxetine (PROzac) 40 MG capsule   Yes No   Sig: Take 40 mg by mouth daily   LORazepam (ATIVAN) 0 5 mg tablet   Yes No   Sig: every 8 (eight) hours as needed   acetaminophen (TYLENOL) 325 mg tablet   No No   Sig: Take 2 tablets (650 mg total) by mouth every 6 (six) hours as needed for mild pain, headaches or fever   busPIRone (BUSPAR) 5 mg tablet   Yes No   Sig: TAKE BY MOUTH 1 TABLET IN THE MORNING AND 1 TABLET BEFORE BEDTIME    carBAMazepine (TEGretol) 200 mg tablet   Yes No   furosemide (LASIX) 40 mg tablet   Yes No   Sig: Take 40 mg by mouth 2 (two) times a day   oxyCODONE (ROXICODONE) 5 immediate release tablet   No No   Sig: Take 1 tablet (5 mg total) by mouth every 6 (six) hours as needed for moderate pain or severe pain for up to 10 days Max Daily Amount: 20 mg   potassium chloride (K-DUR,KLOR-CON) 20 mEq tablet   No No   Sig: Take 2 tablets (40 mEq total) by mouth daily for 5 days   sulfamethoxazole-trimethoprim (BACTRIM DS) 800-160 mg per tablet   No No   Sig: Take 1 tablet by mouth every 12 (twelve) hours for 5 days      Facility-Administered Medications: None       Past Medical History:   Diagnosis Date   • Atrial fibrillation (HCC)    • Chronic diastolic (congestive) heart failure (HCC)    • Diabetes mellitus ([facility] [address] )    • High cholesterol    • Hyperlipidemia    • Pacemaker    • Stroke [facility])        Past Surgical History:   Procedure Laterality Date   • APPENDECTOMY     • ATRIAL CARDIAC PACEMAKER INSERTION     • BARIATRIC SURGERY  05/2021   • EPIDURAL BLOCK INJECTION N/A 5/19/2022    Procedure: BLOCK / INJECTION EPIDURAL STEROID CERVICAL C7-T1;  Surgeon: Marco Amaya MD;  [facility];  Service: Pain Management    • FL GUIDED NEEDLE PLAC BX/ASP/INJ  3/22/2022   • FOOT AMPUTATION Left 4/28/2022    Procedure: LEFT TRANSMETATARSAL AMPUTATION ;  Surgeon: Geoffrey Madden DPM;  [facility];  Service: Podiatry   • NERVE BLOCK Right 2/10/2022    Procedure: BLOCK MEDIAL BRANCH C3, C4, C5 #1;  Surgeon: Marco Amaya MD;  [facility];  Service: Pain Management    • NERVE BLOCK Right 3/22/2022    Procedure: BLOCK MEDIAL BRANCH C3, C4, C5 #2;  Surgeon: Marco Amaya MD;  [facility];  Service: Pain Management    • [place] AMPUTATION FOOT TRANSMETARSAL Left 4/12/2023    Procedure: REVISION LEFT TRANSMETATARSAL (TMA) AMPUTATION, REMOVAL OF UNVIABLE TISSUE AND BONE,;  Surgeon: Loretta Ansari DPM;  [facility];  Service: Podiatry   • GA AMPUTATION METATARSAL W/TOE SINGLE Left 4/7/2023    Procedure: 2ND RAY RESECTION FOOT;  Surgeon: Loretta Ansari DPM;  [facility];  Service: Podiatry   • GA AMPUTATION TOE INTERPHALANGEAL JOINT Left 11/16/2021    Procedure: AMPUTATION LESSER TOE;  Surgeon: Santiago Almaraz DPM;  [facility];  Service: Podiatry   • RADIOFREQUENCY ABLATION Right 4/7/2022    Procedure: Right C3, C4, C5 RFA;  Surgeon: Elder Kam MD;  [facility];  Service: Pain Management    • RHIZOTOMY Right 2/9/2023    Procedure: RHIZOTOMY CERVICAL MEDIAL BRANCH NERVES RIGHT C3, C4, C5;  Surgeon: Elder Kam MD;  [facility];  Service: Pain Management    • TOE AMPUTATION Left     2nd toe   • TOE AMPUTATION Left 9/15/2021    Procedure: AMPUTATION LEFT 4TH TOE;  Surgeon: Santiago Almaraz DPM;  [facility];  Service: Podiatry   • TOE AMPUTATION Right 1/12/2022    Procedure: AMPUTATION TOE;  Surgeon: Sara Cedillo DPM;  [facility];  Service: Podiatry   • TOE AMPUTATION Right 2/23/2022    Procedure: AMPUTATION TOE  RIGHT SECOND;  Surgeon: Sara Cedillo DPM;  Location: [address] [place] [address] MAIN OR;  Service: Podiatry   • TOE AMPUTATION Right 6/3/2022    Procedure: AMPUTATION right 4th TOE;  Surgeon: Venu Montelongo DPM;  [facility];  Service: Podiatry       Family History   Problem Relation Age of Onset   • No Known Problems Mother    • No Known Problems Father      I have reviewed and agree with the history as documented  E-Cigarette/Vaping   • E-Cigarette Use Never User      E-Cigarette/Vaping Substances     Social History     Tobacco Use   • Smoking status: Never   • Smokeless tobacco: Never   Vaping Use   • Vaping Use: Never used   Substance Use Topics   • Alcohol use: Never   • Drug use: Never       Review of Systems   Constitutional: Negative for chills and fever     HENT: Negative for ear pain and sore throat  Eyes: Negative for pain and visual disturbance  Respiratory: Negative for cough and shortness of breath  Cardiovascular: Negative for chest pain and palpitations  Gastrointestinal: Negative for abdominal pain and vomiting  Genitourinary: Negative for dysuria and hematuria  Musculoskeletal: Negative for arthralgias and back pain  Skin: Negative for color change and rash  Neurological: Negative for seizures and syncope  Psychiatric/Behavioral: Positive for sleep disturbance  The patient is nervous/anxious  All other systems reviewed and are negative  Physical Exam  Physical Exam  Vitals and nursing note reviewed  Constitutional:       General: He is not in acute distress  Appearance: Normal appearance  He is well-developed and normal weight  He is not toxic-appearing  HENT:      Head: Normocephalic and atraumatic  Nose: Nose normal       Mouth/Throat:      Mouth: Mucous membranes are moist    Eyes:      Extraocular Movements: Extraocular movements intact  Conjunctiva/sclera: Conjunctivae normal    Cardiovascular:      Rate and Rhythm: Normal rate and regular rhythm  Heart sounds: No murmur heard  Pulmonary:      Effort: Pulmonary effort is normal  No respiratory distress  Breath sounds: Normal breath sounds  No stridor  No wheezing or rhonchi  Abdominal:      General: Abdomen is flat  Bowel sounds are normal  There is no distension  Palpations: Abdomen is soft  There is no mass  Tenderness: There is no abdominal tenderness  Hernia: No hernia is present  Musculoskeletal:         General: Signs of injury present  No swelling, tenderness or deformity  Normal range of motion  Cervical back: Normal range of motion and neck supple  No rigidity  Right lower leg: No edema  Left lower leg: No edema  Comments: LLE in surgical shoe     Skin:     General: Skin is warm and dry        Capillary Refill: Capillary refill takes less than 2 seconds  Coloration: Skin is not jaundiced or pale  Findings: No bruising, erythema, lesion or rash  Neurological:      General: No focal deficit present  Mental Status: He is alert and oriented to person, place, and time  Mental status is at baseline  Cranial Nerves: No cranial nerve deficit  Sensory: No sensory deficit  Motor: No weakness  Coordination: Coordination normal       Gait: Gait normal       Deep Tendon Reflexes: Reflexes normal    Psychiatric:         Mood and Affect: Mood normal       Comments: Anxious, no SI or HI         Vital Signs  ED Triage Vitals [04/19/23 1654]   Temperature Pulse Respirations Blood Pressure SpO2   98 °F (36 7 °C) 88 16 128/84 99 %      Temp src Heart Rate Source Patient Position - Orthostatic VS BP Location FiO2 (%)   -- -- Lying Right arm --      Pain Score       10 - Worst Possible Pain           Vitals:    04/19/23 1654   BP: 128/84   Pulse: 88   Patient Position - Orthostatic VS: Lying         Visual Acuity      ED Medications  Medications   LORazepam (ATIVAN) injection 1 mg (1 mg Intramuscular Given 4/19/23 1808)   diphenhydrAMINE (BENADRYL) injection 25 mg (25 mg Intramuscular Given 4/19/23 1808)       Diagnostic Studies  Results Reviewed     None                 No orders to display              Procedures  Procedures         ED Course         Patient had a stable ED course  He was given Ativan and Benadryl IM and discharged home  Patient is encouraged to follow-up with his PCP to keep that appointment in the morning and to reestablish a new appointment this psychiatrist   Patient agrees to comply  His wife is present and states that she does not have any reservations to take the patient home and is comfortable with the plan for discharge  SBIRT 22yo+    Flowsheet Row Most Recent Value   Initial Alcohol Screen: US AUDIT-C     1   How often do you have a drink containing alcohol? 0 Filed at: 04/19/2023 1651   2  How many drinks containing alcohol do you have on a typical day you are drinking? 0 Filed at: 04/19/2023 1651   3a  Male UNDER 65: How often do you have five or more drinks on one occasion? 0 Filed at: 04/19/2023 1651   3b  FEMALE Any Age, or MALE 65+: How often do you have 4 or more drinks on one occassion? 0 Filed at: 04/19/2023 1651   Audit-C Score 0 Filed at: 04/19/2023 1651   ANGELO: How many times in the past year have you    Used an illegal drug or used a prescription medication for non-medical reasons? Never Filed at: 04/19/2023 1651                    Medical Decision Making  DDx: Adjustment disorder, anxiety, insomnia    Risk  Prescription drug management  Disposition  Final diagnoses:   Anxiety   Insomnia     Time reflects when diagnosis was documented in both MDM as applicable and the Disposition within this note     Time User Action Codes Description Comment    4/19/2023  6:07 PM Bonny Patel [F41 9] Anxiety     4/19/2023  6:07 PM Oscar Hernández [G47 00] Insomnia       ED Disposition     ED Disposition   Discharge    Condition   Stable    Date/Time   Wed Apr 19, 2023  6:07 PM    Comment   Kayy Overcast discharge to home/self care                 Follow-up Information     Follow up With Specialties Details Why Contact Danial Reardon, DO   keep your appt with your pcp tomorrow [address] [place] [address]  [phone]            Discharge Medication List as of 4/19/2023  6:18 PM      CONTINUE these medications which have NOT CHANGED    Details   acetaminophen (TYLENOL) 325 mg tablet Take 2 tablets (650 mg total) by mouth every 6 (six) hours as needed for mild pain, headaches or fever, Starting u 4/13/2023, No Print      busPIRone (BUSPAR) 5 mg tablet TAKE BY MOUTH 1 TABLET IN THE MORNING AND 1 TABLET BEFORE BEDTIME , Historical Med      carBAMazepine (TEGretol) 200 mg tablet Historical Med      FLUoxetine (PROzac) 40 MG capsule Take 40 mg by mouth daily, Starting Wed 1/19/2022, Historical Med      furosemide (LASIX) 40 mg tablet Take 40 mg by mouth 2 (two) times a day, Historical Med      LORazepam (ATIVAN) 0 5 mg tablet every 8 (eight) hours as needed, Starting Mon 1/2/2023, Historical Med      oxyCODONE (ROXICODONE) 5 immediate release tablet Take 1 tablet (5 mg total) by mouth every 6 (six) hours as needed for moderate pain or severe pain for up to 10 days Max Daily Amount: 20 mg, Starting u 4/13/2023, Until Sun 4/23/2023 at 2359, Normal      potassium chloride (K-DUR,KLOR-CON) 20 mEq tablet Take 2 tablets (40 mEq total) by mouth daily for 5 days, Starting Thu 2/24/2022, Until Tue 3/22/2022, Normal         STOP taking these medications       sulfamethoxazole-trimethoprim (BACTRIM DS) 800-160 mg per tablet Comments:   Reason for Stopping:               No discharge procedures on file      PDMP Review       Value Time User    PDMP Reviewed  Yes 4/6/2023 10:19 PM Hamida Diaz PA-C          ED Provider  Electronically Signed by           Nicolas Keller DO  04/19/23 0812

## 2023-04-20 ENCOUNTER — HOSPITAL ENCOUNTER (EMERGENCY)
Facility: HOSPITAL | Age: 58
Discharge: HOME | End: 2023-04-20
Attending: EMERGENCY MEDICINE | Admitting: EMERGENCY MEDICINE

## 2023-04-20 VITALS
TEMPERATURE: 97.7 F | OXYGEN SATURATION: 95 % | SYSTOLIC BLOOD PRESSURE: 116 MMHG | DIASTOLIC BLOOD PRESSURE: 73 MMHG | RESPIRATION RATE: 18 BRPM | HEART RATE: 95 BPM

## 2023-04-20 DIAGNOSIS — G47.00 INSOMNIA: ICD-10-CM

## 2023-04-20 DIAGNOSIS — F41.9 ANXIETY: Primary | ICD-10-CM

## 2023-04-20 RX ORDER — LORAZEPAM 1 MG/1
1 TABLET ORAL EVERY 8 HOURS PRN
Qty: 8 TABLET | Refills: 0 | Status: SHIPPED | OUTPATIENT
Start: 2023-04-20 | End: 2023-04-27

## 2023-04-20 RX ORDER — LORAZEPAM 2 MG/ML
1 INJECTION INTRAMUSCULAR ONCE
Status: COMPLETED | OUTPATIENT
Start: 2023-04-20 | End: 2023-04-20

## 2023-04-20 RX ORDER — TRAZODONE HYDROCHLORIDE 50 MG/1
50 TABLET ORAL
Qty: 30 TABLET | Refills: 0 | Status: SHIPPED | OUTPATIENT
Start: 2023-04-20

## 2023-04-20 RX ADMIN — LORAZEPAM 1 MG: 2 INJECTION INTRAMUSCULAR; INTRAVENOUS at 17:13

## 2023-04-20 NOTE — DISCHARGE INSTRUCTIONS
Please see your physician tomorrow as scheduled   increase Ativan to 1 mg every 8 hours for the next 2 days   inform your psychiatrist of ER visit to arrange sooner appointment   return [place] if worse or any new symptoms

## 2023-04-20 NOTE — ED PROVIDER NOTES
History  Chief Complaint   Patient presents with   • Anxiety     C/o increased anxiety since surgery last Thursday  Pt states he has been taking prescribed medications for anxiety with no change in symptoms      58-year-old male accompanied by wife describes severe anxiety and persistent insomnia  No hallucinations  No suicidal or thoughts of self-harm  Notes improved with injection of Ativan yesterday emergency department  Has appointment with primary care physician tomorrow and arranging with his psychiatrist near future  No other complaints  States anxiety worsened after recent hospitalization for foot surgery  Wife present is doing dressings daily and notes healing very well  No additional concerns  History provided by:  Patient  Anxiety  Onset quality:  Gradual  Timing:  Constant  Progression:  Worsening  Chronicity:  Chronic  Context: stressful life event    Relieved by: Anti-anxiety medications and benzodiazepines  Associated symptoms: no abdominal pain and no chest pain        Prior to Admission Medications   Prescriptions Last Dose Informant Patient Reported? Taking?    FLUoxetine (PROzac) 40 MG capsule   Yes No   Sig: Take 40 mg by mouth daily   LORazepam (ATIVAN) 0 5 mg tablet   Yes No   Sig: every 8 (eight) hours as needed   acetaminophen (TYLENOL) 325 mg tablet   No No   Sig: Take 2 tablets (650 mg total) by mouth every 6 (six) hours as needed for mild pain, headaches or fever   busPIRone (BUSPAR) 5 mg tablet   Yes No   Sig: TAKE BY MOUTH 1 TABLET IN THE MORNING AND 1 TABLET BEFORE BEDTIME    carBAMazepine (TEGretol) 200 mg tablet   Yes No   furosemide (LASIX) 40 mg tablet   Yes No   Sig: Take 40 mg by mouth 2 (two) times a day   oxyCODONE (ROXICODONE) 5 immediate release tablet   No No   Sig: Take 1 tablet (5 mg total) by mouth every 6 (six) hours as needed for moderate pain or severe pain for up to 10 days Max Daily Amount: 20 mg   potassium chloride (K-DUR,KLOR-CON) 20 mEq tablet   No No Sig: Take 2 tablets (40 mEq total) by mouth daily for 5 days      Facility-Administered Medications: None       Past Medical History:   Diagnosis Date   • Atrial fibrillation (HCC)    • Chronic diastolic (congestive) heart failure ([facility] [address] )    • Diabetes mellitus ([facility] [address] )    • High cholesterol    • Hyperlipidemia    • Pacemaker    • Stroke [facility])        Past Surgical History:   Procedure Laterality Date   • APPENDECTOMY     • ATRIAL CARDIAC PACEMAKER INSERTION     • BARIATRIC SURGERY  05/2021   • EPIDURAL BLOCK INJECTION N/A 5/19/2022    Procedure: BLOCK / INJECTION EPIDURAL STEROID CERVICAL C7-T1;  Surgeon: Darcy Rojas MD;  [facility];  Service: Pain Management    • FL GUIDED NEEDLE PLAC BX/ASP/INJ  3/22/2022   • FOOT AMPUTATION Left 4/28/2022    Procedure: LEFT TRANSMETATARSAL AMPUTATION ;  Surgeon: Gin Carrillo DPM;  [facility];  Service: Podiatry   • NERVE BLOCK Right 2/10/2022    Procedure: BLOCK MEDIAL BRANCH C3, C4, C5 #1;  Surgeon: Darcy Rojas MD;  [facility];  Service: Pain Management    • NERVE BLOCK Right 3/22/2022    Procedure: BLOCK MEDIAL BRANCH C3, C4, C5 #2;  Surgeon: Darcy Rojas MD;  [facility];  Service: Pain Management    • AR AMPUTATION FOOT TRANSMETARSAL Left 4/12/2023    Procedure: REVISION LEFT TRANSMETATARSAL (TMA) AMPUTATION, REMOVAL OF UNVIABLE TISSUE AND BONE,;  Surgeon: Eusebio Ferris DPM;  [facility];  Service: Podiatry   • AR AMPUTATION METATARSAL W/TOE SINGLE Left 4/7/2023    Procedure: 2ND RAY RESECTION FOOT;  Surgeon: Eusebio Ferris DPM;  [facility];  Service: Podiatry   • AR AMPUTATION TOE INTERPHALANGEAL JOINT Left 11/16/2021    Procedure: AMPUTATION LESSER TOE;  Surgeon: Litzy Grady DPM;  [facility];  Service: Podiatry   • RADIOFREQUENCY ABLATION Right 4/7/2022    Procedure: Right C3, C4, C5 RFA;  Surgeon: Darcy Rojas MD;  [facility];  Service: Pain Management    • RHIZOTOMY Right 2/9/2023    Procedure: RHIZOTOMY CERVICAL MEDIAL BRANCH NERVES RIGHT C3, C4, C5;  Surgeon: Gary Dickinson MD;  Location:  ENDO;  Service: Pain Management    • TOE AMPUTATION Left     2nd toe   • TOE AMPUTATION Left 9/15/2021    Procedure: AMPUTATION LEFT 4TH TOE;  Surgeon: Tommy Macias DPM;  [facility];  Service: Podiatry   • TOE AMPUTATION Right 1/12/2022    Procedure: AMPUTATION TOE;  Surgeon: Bishnu Chong DPM;  [facility];  Service: Podiatry   • TOE AMPUTATION Right 2/23/2022    Procedure: AMPUTATION TOE  RIGHT SECOND;  Surgeon: Bishnu Chong DPM;  Location: [facility] MAIN OR;  Service: Podiatry   • TOE AMPUTATION Right 6/3/2022    Procedure: AMPUTATION right 4th TOE;  Surgeon: Court Libman, DPM;  [facility];  Service: Podiatry       Family History   Problem Relation Age of Onset   • No Known Problems Mother    • No Known Problems Father      I have reviewed and agree with the history as documented  E-Cigarette/Vaping   • E-Cigarette Use Never User      E-Cigarette/Vaping Substances     Social History     Tobacco Use   • Smoking status: Never   • Smokeless tobacco: Never   Vaping Use   • Vaping Use: Never used   Substance Use Topics   • Alcohol use: Never   • Drug use: Never       Review of Systems   Cardiovascular: Negative for chest pain  Gastrointestinal: Negative for abdominal pain  All other systems reviewed and are negative  Physical Exam  Physical Exam  Vitals and nursing note reviewed  Constitutional:       Comments: Pleasant, comfortable-appearing   HENT:      Head: Normocephalic and atraumatic  Mouth/Throat:      Mouth: Mucous membranes are moist       Pharynx: Oropharynx is clear  Eyes:      Conjunctiva/sclera: Conjunctivae normal       Pupils: Pupils are equal, round, and reactive to light  Cardiovascular:      Rate and Rhythm: Normal rate and regular rhythm  Heart sounds: Normal heart sounds     Pulmonary:      Effort: Pulmonary effort is normal       Breath sounds: Normal breath sounds  Abdominal:      General: Bowel sounds are normal  There is no distension  Palpations: Abdomen is soft  Tenderness: There is no abdominal tenderness  Musculoskeletal:         General: No deformity  Cervical back: Neck supple  Comments: Left lower extremity dressing is neat and clean, postop shoe applied   Skin:     General: Skin is warm and dry  Neurological:      General: No focal deficit present  Mental Status: He is alert and oriented to person, place, and time  Cranial Nerves: No cranial nerve deficit  Coordination: Coordination normal    Psychiatric:         Behavior: Behavior normal          Thought Content: Thought content normal          Judgment: Judgment normal          Vital Signs  ED Triage Vitals [04/20/23 1654]   Temperature Pulse Respirations Blood Pressure SpO2   97 7 °F (36 5 °C) 93 18 116/73 96 %      Temp Source Heart Rate Source Patient Position - Orthostatic VS BP Location FiO2 (%)   Temporal Monitor Sitting Right arm --      Pain Score       No Pain           Vitals:    04/20/23 1654 04/20/23 1700   BP: 116/73 116/73   Pulse: 93 95   Patient Position - Orthostatic VS: Sitting          Visual Acuity      ED Medications  Medications   LORazepam (ATIVAN) injection 1 mg (1 mg Intramuscular Given 4/20/23 1713)       Diagnostic Studies  Results Reviewed     None                 No orders to display              Procedures  Procedures         ED Course                               SBIRT 20yo+    Flowsheet Row Most Recent Value   Initial Alcohol Screen: US AUDIT-C     1  How often do you have a drink containing alcohol? 0 Filed at: 04/20/2023 1656   2  How many drinks containing alcohol do you have on a typical day you are drinking? 0 Filed at: 04/20/2023 1656   3a  Male UNDER 65: How often do you have five or more drinks on one occasion?  0 Filed at: 04/20/2023 1656   Audit-C Score 0 Filed at: 04/20/2023 1656   ANGELO: How many times in the past year have you    Used an illegal drug or used a prescription medication for non-medical reasons? Never Filed at: 04/20/2023 1656                    Medical Decision Making  Anxiety: chronic illness or injury with exacerbation, progression, or side effects of treatment  Insomnia: acute illness or injury  Amount and/or Complexity of Data Reviewed  Independent Historian: spouse  External Data Reviewed: notes  Risk  Prescription drug management  Disposition  Final diagnoses:   Anxiety   Insomnia     Time reflects when diagnosis was documented in both MDM as applicable and the Disposition within this note     Time User Action Codes Description Comment    4/20/2023  5:09 PM Merry Randall Add [F41 9] Anxiety     4/20/2023  5:09 PM Merry Randall Add [G47 00] Insomnia       ED Disposition     ED Disposition   Discharge    Condition   Stable    Date/Time   Thu Apr 20, 2023  5:09 PM    Comment   Opal Adams discharge to home/self care  Follow-up Information     Follow up With Specialties Details Why Contact Info    Isaac Ugarte, DO  Go in 1 day  [address] [place] [address]  [phone]            Discharge Medication List as of 4/20/2023  5:15 PM      START taking these medications    Details   !! LORazepam (Ativan) 1 mg tablet Take 1 tablet (1 mg total) by mouth every 8 (eight) hours as needed for anxiety for up to 7 days, Starting Thu 4/20/2023, Until Thu 4/27/2023 at 2359, Normal      traZODone (DESYREL) 50 mg tablet Take 1 tablet (50 mg total) by mouth daily at bedtime, Starting Thu 4/20/2023, Normal       !! - Potential duplicate medications found  Please discuss with provider        CONTINUE these medications which have NOT CHANGED    Details   acetaminophen (TYLENOL) 325 mg tablet Take 2 tablets (650 mg total) by mouth every 6 (six) hours as needed for mild pain, headaches or fever, Starting Thu 4/13/2023, No Print busPIRone (BUSPAR) 5 mg tablet TAKE BY MOUTH 1 TABLET IN THE MORNING AND 1 TABLET BEFORE BEDTIME , Historical Med      carBAMazepine (TEGretol) 200 mg tablet Historical Med      FLUoxetine (PROzac) 40 MG capsule Take 40 mg by mouth daily, Starting Wed 1/19/2022, Historical Med      furosemide (LASIX) 40 mg tablet Take 40 mg by mouth 2 (two) times a day, Historical Med      !! LORazepam (ATIVAN) 0 5 mg tablet every 8 (eight) hours as needed, Starting Mon 1/2/2023, Historical Med      oxyCODONE (ROXICODONE) 5 immediate release tablet Take 1 tablet (5 mg total) by mouth every 6 (six) hours as needed for moderate pain or severe pain for up to 10 days Max Daily Amount: 20 mg, Starting Thu 4/13/2023, Until Sun 4/23/2023 at 2359, Normal      potassium chloride (K-DUR,KLOR-CON) 20 mEq tablet Take 2 tablets (40 mEq total) by mouth daily for 5 days, Starting Thu 2/24/2022, Until Tue 3/22/2022, Normal       !! - Potential duplicate medications found  Please discuss with provider  No discharge procedures on file      PDMP Review       Value Time User    PDMP Reviewed  Yes 4/6/2023 10:19 PM Fabby Long PA-C          ED Provider  Electronically Signed by           Judy Anthony DO  04/20/23 9550

## 2023-04-24 ENCOUNTER — TELEPHONE (OUTPATIENT)
Dept: PODIATRY | Age: 58
End: 2023-04-24

## 2023-04-24 ENCOUNTER — OFFICE VISIT (OUTPATIENT)
Dept: PODIATRY | Age: 58
End: 2023-04-24

## 2023-04-24 VITALS — WEIGHT: 315 LBS | HEIGHT: 73 IN | BODY MASS INDEX: 41.75 KG/M2

## 2023-04-24 DIAGNOSIS — Z91.199 NONCOMPLIANCE: ICD-10-CM

## 2023-04-24 DIAGNOSIS — E11.42 TYPE 2 DIABETES MELLITUS WITH DIABETIC POLYNEUROPATHY, WITH LONG-TERM CURRENT USE OF INSULIN (HCC): Primary | ICD-10-CM

## 2023-04-24 DIAGNOSIS — Z79.4 TYPE 2 DIABETES MELLITUS WITH DIABETIC POLYNEUROPATHY, WITH LONG-TERM CURRENT USE OF INSULIN (HCC): Primary | ICD-10-CM

## 2023-04-24 DIAGNOSIS — Z89.432 STATUS POST TRANSMETATARSAL AMPUTATION OF FOOT, LEFT (HCC): ICD-10-CM

## 2023-04-24 RX ORDER — BUSPIRONE HYDROCHLORIDE 10 MG/1
TABLET ORAL
COMMUNITY
Start: 2023-04-21

## 2023-04-24 RX ORDER — DULOXETIN HYDROCHLORIDE 30 MG/1
CAPSULE, DELAYED RELEASE ORAL
COMMUNITY
Start: 2023-04-21

## 2023-04-24 NOTE — TELEPHONE ENCOUNTER
Caller: Nazanin Garduno    Doctor: Nelson Neely DPM    Reason for call: Terry BarrosKeyes is requesting that something be called in for pain as he has been in pain since he left our office this morning      Call back#: [phone]

## 2023-04-24 NOTE — PATIENT INSTRUCTIONS
Left foot post-operative instructions:  Non weight bearing (STRICTLY)  May change dressing to foot every other day as follows  Wash with antibacterial soap and water, pat dry  Apply xeroform and dry dressing  Do not shower  Do not return to work  Report to ED if local signs of infection arise
2018

## 2023-04-24 NOTE — PROGRESS NOTES
Assessment/Plan:     Diagnoses and all orders for this visit:    Type 2 diabetes mellitus with diabetic polyneuropathy, with long-term current use of insulin ([address] )    Noncompliance    Status post transmetatarsal amputation of foot, left (HCC)    Other orders  -     busPIRone (BUSPAR) 10 mg tablet; TAKE 1 TABLET BY MOUTH 3 TIMES A DAY, MORNING, NOON, AND BEFORE BEDTIME  -     DULoxetine (CYMBALTA) 30 mg delayed release capsule; take 1 capsule by mouth every morning DO NOT CRUSH, CHEW, AND/OR DIVIDE             IMPRESSION:  · Left foot 2nd metatarsal OM s/p 2nd metatarsal excision and revision TMA (DOS 4/7/23 & 4/12/23)  Patient has incisional dehiscence and full thickness wounds due to noncompliance with WB recommendations  · NIDDM, A1c 5 8% (1/3/23)  · H/o L TMA  R multiple to amputations     PLAN:  · Left foot revisional TMA site appears as follows: medial and lateral incision well healed  Central incisions (dorsal to plantar) with dehiscence and broken staples however fortunately without deep probe to acute SOI  Patient has been extremely noncompliant with WB recommendations (he has been fully weight bearing instead of strict non weight bearing) since being in the hospital and upon d/c    · I can aftercare recommendations for wound care (xeroform dsd  May not shower)  · Strict non weight bearing to left foot  · I placed amb referral to wound care center due to unexpected post-surgical appearance of incision  · F/u one week at wound care center  Report to ED if SOI arise     Subjective:      Patient ID: Duaine Paget is a 59 y o  male  Tyler Sarmiento presents to clinic today concerning f/u s/p left 2nd metatarsal excision (due to OM) and revision TMA (DOS 4/7/23 & 4/12/23)  Patient has not been compliant with his WB recommendations (as per in hospital notes and was seen walking in to clinic on foot)  Notes his anxiety has been high  Notes his blood sugars are ok         The following portions of the patient's history "were reviewed and updated as appropriate: allergies, current medications, past family history, past medical history, past social history, past surgical history and problem list     Review of Systems   Constitutional: Negative for activity change, chills and fever  HENT: Negative  Respiratory: Negative for cough, chest tightness and shortness of breath  Cardiovascular: Positive for leg swelling (Chronic B/L)  Negative for chest pain  Endocrine: Negative  Genitourinary: Negative  Skin: Positive for wound (Left TMA site)  Neurological:        PN   Psychiatric/Behavioral: Negative  Negative for agitation and behavioral problems  Objective:      Ht 6' 1\" (1 854 m)   Wt (!) 150 kg (330 lb)   BMI 43 54 kg/m²          Physical Exam  Constitutional:       Appearance: Normal appearance  He is ill-appearing (chronic)  Comments: Anxious   Cardiovascular:      Comments: Chronic venous stasis dermatitis with B/L LE brawny edema  Diminished pedal pulses due to edema  Absent pedal hair  Pulmonary:      Effort: No respiratory distress  Musculoskeletal:         General: No tenderness or deformity  Normal range of motion  Comments: S/p left TMA    Skin:     Capillary Refill: Capillary refill takes less than 2 seconds  Comments: B/L LE skin is atrophic - thin, dry and shiny in appearance  Left revisional TMA site appears with healed medial and lateral incision however central dorsal incision has dehisced  Fortunately no deep probe nor purulence or erythema  There are broken staples present  Neurological:      General: No focal deficit present  Mental Status: He is alert and oriented to person, place, and time        Comments: N/T/B to B/L LE   Psychiatric:         Mood and Affect: Mood normal          Behavior: Behavior normal              "

## 2023-04-25 ENCOUNTER — TELEPHONE (OUTPATIENT)
Dept: PODIATRY | Age: 58
End: 2023-04-25

## 2023-05-03 ENCOUNTER — TELEPHONE (OUTPATIENT)
Dept: OBGYN CLINIC | Facility: CLINIC | Age: 58
End: 2023-05-03

## 2023-05-04 ENCOUNTER — OFFICE VISIT (OUTPATIENT)
Dept: WOUND CARE | Facility: CLINIC | Age: 58
End: 2023-05-04

## 2023-05-04 VITALS
HEIGHT: 73 IN | DIASTOLIC BLOOD PRESSURE: 60 MMHG | SYSTOLIC BLOOD PRESSURE: 102 MMHG | WEIGHT: 315 LBS | RESPIRATION RATE: 18 BRPM | BODY MASS INDEX: 41.75 KG/M2 | HEART RATE: 60 BPM | TEMPERATURE: 96.6 F

## 2023-05-04 DIAGNOSIS — T81.31XA DEHISCENCE OF OPERATIVE WOUND, INITIAL ENCOUNTER: ICD-10-CM

## 2023-05-04 DIAGNOSIS — Z89.432 STATUS POST PARTIAL AMPUTATION OF FOOT, LEFT (HCC): ICD-10-CM

## 2023-05-04 DIAGNOSIS — Z91.199 NONCOMPLIANCE: ICD-10-CM

## 2023-05-04 DIAGNOSIS — Z79.4 TYPE 2 DIABETES MELLITUS WITH DIABETIC POLYNEUROPATHY, WITH LONG-TERM CURRENT USE OF INSULIN (HCC): Primary | ICD-10-CM

## 2023-05-04 DIAGNOSIS — E66.01 MORBID OBESITY WITH BMI OF 40.0-44.9, ADULT (HCC): ICD-10-CM

## 2023-05-04 DIAGNOSIS — E11.42 TYPE 2 DIABETES MELLITUS WITH DIABETIC POLYNEUROPATHY, WITH LONG-TERM CURRENT USE OF INSULIN (HCC): Primary | ICD-10-CM

## 2023-05-04 RX ORDER — LIDOCAINE HYDROCHLORIDE 40 MG/ML
5 SOLUTION TOPICAL ONCE
Status: COMPLETED | OUTPATIENT
Start: 2023-05-04 | End: 2023-05-04

## 2023-05-04 RX ADMIN — LIDOCAINE HYDROCHLORIDE 5 ML: 40 SOLUTION TOPICAL at 14:43

## 2023-05-04 NOTE — PATIENT INSTRUCTIONS
Orders Placed This Encounter   Procedures    Wound off loading     Get offloading knee scooter  No weightbearing to left foot  Standing Status:   Future     Standing Expiration Date:   5/4/2024    Wound cleansing and dressings     Wash your hands with soap and water  Remove old dressing, discard into plastic bag and place in trash  Cleanse the wound with normal saline prior to applying a clean dressing  Do not use tissue or cotton balls  Do not scrub the wound  Pat dry using gauze  Lightly Pack deepest anterior portion of wound with Aquacel AG (wound is 1 2cm deep on 5/4/23)  Apply aquacel AG on top of open wound area of the left foot wound  Cover with ABD pad  Secure with audi wrap and tape  Change dressing three times per week  Apply surepress  Eat a low sodium diet  Do not salt your food  Avoid processed foods high in sodium  Standing Status:   Future     Standing Expiration Date:   5/4/2024    Wound compression and edema control     Surepress    Apply compression wrap to your affected Leg(s) from mid-foot to knee making sure to cover the heel  Apply in the morning and re-wrap as needed during the day if wrap becomes loose  Remove at bedtime and elevate legs or lie down  Avoid prolonged standing in one place  Elevate leg(s) above the level of the heart when sitting or as much as possible       Standing Status:   Future     Standing Expiration Date:   5/4/2024    Debridement     This order was created via procedure documentation    Referral to [address] [place] [address]     Standing Status:   Future     Standing Expiration Date:   5/4/2024     Referral Priority:   Routine     Referral Type:   Home Health     Referral Reason:   Specialty Services Required     Requested Specialty:   [address]     Number of Visits Requested:   1     Expiration Date:   5/4/2024

## 2023-05-04 NOTE — PROGRESS NOTES
Patient ID: Shon Preciado is a 59 y o  male Date of Birth 1965       Chief Complaint   Patient presents with    New Patient Visit     Left foot wound       Allergies  Patient has no known allergies  Diagnosis:  1  Type 2 diabetes mellitus with diabetic polyneuropathy, with long-term current use of insulin ([facility])  -     lidocaine (XYLOCAINE) 4 % topical solution 5 mL  -     Wound off loading; Future  -     Wound cleansing and dressings; Future  -     Wound compression and edema control; Future    2  Morbid obesity with BMI of 40 0-44 9, adult ([facility])  -     lidocaine (XYLOCAINE) 4 % topical solution 5 mL  -     Wound off loading; Future  -     Wound cleansing and dressings; Future  -     Wound compression and edema control; Future    3  Status post partial amputation of foot, left ([facility])  -     lidocaine (XYLOCAINE) 4 % topical solution 5 mL  -     Wound off loading; Future  -     Wound cleansing and dressings; Future  -     Wound compression and edema control; Future    4  Noncompliance  -     lidocaine (XYLOCAINE) 4 % topical solution 5 mL  -     Wound off loading; Future  -     Wound cleansing and dressings; Future  -     Wound compression and edema control; Future    5  Dehiscence of operative wound, initial encounter  -     lidocaine (XYLOCAINE) 4 % topical solution 5 mL  -     Wound off loading; Future  -     Wound cleansing and dressings; Future  -     Wound compression and edema control; Future        Diagnosis ICD-10-CM Associated Orders   1  Type 2 diabetes mellitus with diabetic polyneuropathy, with long-term current use of insulin ([facility])  E11 42 lidocaine (XYLOCAINE) 4 % topical solution 5 mL    Z79 4 Wound off loading     Wound cleansing and dressings     Wound compression and edema control      2   Morbid obesity with BMI of 40 0-44 9, adult ([facility])  E66 01 lidocaine (XYLOCAINE) 4 % topical solution 5 mL    Z68 41 Wound off loading     Wound cleansing and dressings     Wound compression and edema control      3  Status post partial amputation of foot, left ([facility])  Z89 432 lidocaine (XYLOCAINE) 4 % topical solution 5 mL     Wound off loading     Wound cleansing and dressings     Wound compression and edema control      4  Noncompliance  Z91 199 lidocaine (XYLOCAINE) 4 % topical solution 5 mL     Wound off loading     Wound cleansing and dressings     Wound compression and edema control      5  Dehiscence of operative wound, initial encounter  T81 31XA lidocaine (XYLOCAINE) 4 % topical solution 5 mL     Wound off loading     Wound cleansing and dressings     Wound compression and edema control             Assessment & Plan:  See wound care orders (Aquacel Ag, maxorb Ag, compression)   Left foot revisional TMA site (DOS 4/12/23) appears as follows: about 90% incisional healing with central incision (dorsal) and small lateral area with dehiscence and about 1 5cm deep probe (central), fortunately no acute SOI  I discussed risk of limb loss with patient  There is component of PVD with his current wound   Patient has been extremely noncompliant with WB recommendations (he has been fully weight bearing instead of strict non weight bearing) since being in the hospital and upon d/c  I again stressed strict non weight bearing to left foot   Low sodium and sugar diet recommended   LE elevation above heart   VNA ordered   F/u one week for recheck  Report to ED if SOI arise       Subjective:   Sharon Perez presents to clinic today concerning first wound care visit s/p left 2nd metatarsal excision (due to OM) and revision TMA (DOS 4/7/23 & 4/12/23)  Patient has not been compliant with his WB recommendations (as per in hospital notes and was seen walking in to clinic on foot)   A1c 5 8% 1/3/23      The following portions of the patient's history were reviewed and updated as appropriate:   Patient Active Problem List   Diagnosis    DAYAN (obstructive sleep apnea)    Chronic diastolic heart failure [facility] )    Hypertension    Diabetes mellitus ([facility] )    Morbid obesity with BMI of 40 0-44 9, adult (HCC)    Pain, joint, ankle and foot, left    Chronic osteomyelitis of left foot with draining sinus (HCC)    Atrial fibrillation (HCC)    Anxiety    Type 2 diabetes mellitus with diabetic polyneuropathy, with long-term current use of insulin (HCC)    Toe osteomyelitis, right (HCC)    Cervical spondylosis    Cervicalgia - Right    [facility] foot [facility] )    Pacemaker    History of bariatric surgery    Encounter for perioperative consultation    Hyperkalemia    Diabetic ulcer of left midfoot associated with type 2 diabetes mellitus ([facility] )    Cellulitis of left foot    Closed fracture of shaft of metatarsal bone of left foot    Acute osteomyelitis of left foot ([facility])     Past Medical History:   Diagnosis Date    Atrial fibrillation (HCC)     Chronic diastolic (congestive) heart failure (HCC)     Diabetes mellitus ([place] [address] )     High cholesterol     Hyperlipidemia     Pacemaker     Stroke [facility])      Past Surgical History:   Procedure Laterality Date    APPENDECTOMY      ATRIAL CARDIAC PACEMAKER INSERTION      BARIATRIC SURGERY  05/2021    EPIDURAL BLOCK INJECTION N/A 5/19/2022    Procedure: BLOCK / INJECTION EPIDURAL STEROID CERVICAL C7-T1;  Surgeon: Vanessa Rogel MD;  [facility];  Service: Pain Management     FL GUIDED NEEDLE PLAC BX/ASP/INJ  3/22/2022    FOOT AMPUTATION Left 4/28/2022    Procedure: LEFT TRANSMETATARSAL AMPUTATION ;  Surgeon: Kian Madden DPM;  [facility];  Service: Podiatry    NERVE BLOCK Right 2/10/2022    Procedure: BLOCK MEDIAL BRANCH C3, C4, C5 #1;  Surgeon: Vanessa Rogel MD;  [facility];  Service: Pain Management     NERVE BLOCK Right 3/22/2022    Procedure: BLOCK MEDIAL BRANCH C3, C4, C5 #2;  Surgeon: Vanessa Rogel MD;  [facility];  Service: Pain Management     NH AMPUTATION FOOT TRANSMETARSAL Left 4/12/2023    Procedure: REVISION LEFT TRANSMETATARSAL (TMA) AMPUTATION, REMOVAL OF UNVIABLE TISSUE AND BONE,;  Surgeon: Keshia Moeller DPM;  [facility];  Service: [address] W/TOE SINGLE Left 4/7/2023    Procedure: 2ND RAY RESECTION FOOT;  Surgeon: Keshia Moeller DPM;  [facility];  Service: Podiatry    TX AMPUTATION TOE INTERPHALANGEAL JOINT Left 11/16/2021    Procedure: AMPUTATION LESSER TOE;  Surgeon: Deysi Manuel DPM;  [facility];  Service: Podiatry    [address] Right 4/7/2022    Procedure: Right C3, C4, C5 RFA;  Surgeon: Dominga Goff MD;  [facility];  Service: Pain Management     RHIZOTOMY Right 2/9/2023    Procedure: RHIZOTOMY CERVICAL MEDIAL BRANCH NERVES RIGHT C3, C4, C5;  Surgeon: Dominga Goff MD;  [facility];  Service: Pain Management     TOE AMPUTATION Left     2nd toe    TOE AMPUTATION Left 9/15/2021    Procedure: AMPUTATION LEFT 4TH TOE;  Surgeon: Deysi Manuel DPM;  [facility];  Service: Podiatry    TOE AMPUTATION Right 1/12/2022    Procedure: AMPUTATION TOE;  Surgeon: Sonja Green DPM;  [facility];  Service: Podiatry    TOE AMPUTATION Right 2/23/2022    Procedure: AMPUTATION TOE  RIGHT SECOND;  Surgeon: Sonja Green DPM;  Location: [address] [place] [address] MAIN OR;  Service: Podiatry    TOE AMPUTATION Right 6/3/2022    Procedure: AMPUTATION right 4th TOE;  Surgeon: Chrissy Montaño DPM;  [facility];  Service: Podiatry     Social History     Socioeconomic History    Marital status: /Civil Union     Spouse name: Not on file    Number of children: Not on file    Years of education: Not on file    Highest education level: Not on file   Occupational History    Occupation: Maintenance Tech     Employer: [employer]   Tobacco Use    Smoking status: Never    Smokeless tobacco: Never   Vaping Use    Vaping Use: Never used   Substance and Sexual Activity    Alcohol use: Never    Drug use: Never • Sexual activity: Yes   Other Topics Concern   • Not on file   Social History Narrative   • Not on file     Social Determinants of Health     Financial Resource Strain: Not on file   Food Insecurity: No Food Insecurity   • Worried About Running Out of Food in the Last Year: Never true   • Ran Out of Food in the Last Year: Never true   Transportation Needs: No Transportation Needs   • Lack of Transportation (Medical): No   • Lack of Transportation (Non-Medical):  No   Physical Activity: Not on file   Stress: Not on file   Social Connections: Not on file   Intimate Partner Violence: Not on file   Housing Stability: Low Risk    • Unable to Pay for Housing in the Last Year: No   • Number of Places Lived in the Last Year: 1   • Unstable Housing in the Last Year: No        Current Outpatient Medications:   •  acetaminophen (TYLENOL) 325 mg tablet, Take 2 tablets (650 mg total) by mouth every 6 (six) hours as needed for mild pain, headaches or fever, Disp: , Rfl: 0  •  busPIRone (BUSPAR) 10 mg tablet, TAKE 1 TABLET BY MOUTH 3 TIMES A DAY, MORNING, NOON, AND BEFORE BEDTIME, Disp: , Rfl:   •  busPIRone (BUSPAR) 5 mg tablet, TAKE BY MOUTH 1 TABLET IN THE MORNING AND 1 TABLET BEFORE BEDTIME , Disp: , Rfl:   •  carBAMazepine (TEGretol) 200 mg tablet, , Disp: , Rfl:   •  DULoxetine (CYMBALTA) 30 mg delayed release capsule, take 1 capsule by mouth every morning DO NOT CRUSH, CHEW, AND/OR DIVIDE, Disp: , Rfl:   •  FLUoxetine (PROzac) 40 MG capsule, Take 40 mg by mouth daily, Disp: , Rfl:   •  furosemide (LASIX) 40 mg tablet, Take 40 mg by mouth 2 (two) times a day, Disp: , Rfl:   •  LORazepam (ATIVAN) 0 5 mg tablet, every 8 (eight) hours as needed, Disp: , Rfl:   •  LORazepam (Ativan) 1 mg tablet, Take 1 tablet (1 mg total) by mouth every 8 (eight) hours as needed for anxiety for up to 7 days, Disp: 8 tablet, Rfl: 0  •  potassium chloride (K-DUR,KLOR-CON) 20 mEq tablet, Take 2 tablets (40 mEq total) by mouth daily for 5 days, "Disp: 10 tablet, Rfl: 0    traZODone (DESYREL) 50 mg tablet, Take 1 tablet (50 mg total) by mouth daily at bedtime, Disp: 30 tablet, Rfl: 0  No current facility-administered medications for this visit  Family History   Problem Relation Age of Onset    No Known Problems Mother     No Known Problems Father       Review of Systems   Constitutional: Negative for activity change, chills and fever  HENT: Negative  Respiratory: Negative for cough, chest tightness and shortness of breath  Cardiovascular: Positive for leg swelling (Chronic B/L)  Negative for chest pain  Endocrine: Negative  Genitourinary: Negative  Skin: Positive for wound (Left TMA site)  Neurological:        PN   Psychiatric/Behavioral: Negative  Negative for agitation and behavioral problems  Objective:  /60   Pulse 60   Temp (!) 96 6 °F (35 9 °C)   Resp 18   Ht 6' 1\" (1 854 m)   Wt (!) 150 kg (330 lb 11 oz)   BMI 43 63 kg/m²     Physical Exam  Constitutional:       Appearance: Normal appearance  He is ill-appearing (chronic)  Comments: Anxious   Cardiovascular:      Comments: Chronic venous stasis dermatitis with B/L LE brawny edema  Diminished pedal pulses due to edema  Absent pedal hair  Pulmonary:      Effort: No respiratory distress  Musculoskeletal:         General: No tenderness or deformity  Normal range of motion  Comments: S/p left TMA    Skin:     Capillary Refill: Capillary refill takes less than 2 seconds  Comments: B/L LE skin is atrophic - thin, dry and shiny in appearance  Left revisional TMA site appears with about 90% incisional healing with central incision (dorsal) and small lateral area with dehiscence and about 1 5cm deep probe centrally  No erythema, edema, purulence  Neurological:      General: No focal deficit present  Mental Status: He is alert and oriented to person, place, and time        Comments: N/T/B to B/L LE   Psychiatric:         Mood and Affect: Mood " normal          Behavior: Behavior normal              Wound 04/12/23 Foot Left (Active)   Wound Image   05/04/23 1424    [id number] 2cm             Debridement   Wound 04/12/23 Foot Left    Universal Protocol:  Consent: Verbal consent obtained  Risks and benefits: risks, benefits and alternatives were discussed  Consent given by: patient  Patient understanding: patient states understanding of the procedure being performed  Patient consent: the patient's understanding of the procedure matches consent given  Patient identity confirmed: verbally with patient      Performed by: physician  Debridement type: surgical  Level of debridement: subcutaneous tissue    Pre-debridement measurements  Length (cm): 6  Width (cm): 16  Depth (cm): 1 2  Surface Area (cm^2): 96  Volume (cm^3): 115 2    Post-debridement measurements  Length (cm): 6  Width (cm): 16  Depth (cm): 1 3  Percent debrided: 30%  Surface Area (cm^2): 96  Area debrided (cm^2): 28 8  Volume (cm^3): 124 8  Tissue and other material debrided: adipose and subcutaneous tissue  Devitalized tissue debrided: biofilm and slough  Instrument(s) utilized: blade and forceps  Bleeding: small  Hemostasis obtained with: not applicable  Procedural pain (0-10): insensate  Post-procedural pain: insensate   Response to treatment: procedure was tolerated well           Results from last 6 Months   Lab Units 04/05/23  1246   WOUND CULTURE  2+ Growth of Stenotrophomonas maltophilia*  2+ Growth of       Wound Instructions:  Orders Placed This Encounter   Procedures    Wound off loading     Get offloading knee scooter  No weightbearing to left foot  Standing Status:   Future     Standing Expiration Date:   5/4/2024    Wound cleansing and dressings     Wash your hands with soap and water  Remove old dressing, discard into plastic bag and place in trash  Cleanse the wound with normal saline prior to applying a clean dressing  Do not use tissue or cotton balls   Do not scrub the "wound  Pat dry using gauze  Lightly Pack deepest anterior portion of wound with Aquacel AG (wound is 1 2cm deep on 5/4/23)  Apply aquacel AG on top of open wound area of the left foot wound  Cover with ABD pad  Secure with audi wrap and tape  Change dressing three times per week  Apply surepress  Eat a low sodium diet  Do not salt your food  Avoid processed foods high in sodium  Standing Status:   Future     Standing Expiration Date:   5/4/2024    Wound compression and edema control     Surepress    Apply compression wrap to your affected Leg(s) from mid-foot to knee making sure to cover the heel  Apply in the morning and re-wrap as needed during the day if wrap becomes loose  Remove at bedtime and elevate legs or lie down  Avoid prolonged standing in one place  Elevate leg(s) above the level of the heart when sitting or as much as possible  Standing Status:   Future     Standing Expiration Date:   5/4/2024    Debridement     This order was created via procedure documentation         Rohan Dexter DPM    Portions of the record may have been created with voice recognition software  Occasional wrong word or \"sound a like\" substitutions may have occurred due to the inherent limitations of voice recognition software  Read the chart carefully and recognize, using context, where substitutions have occurred      "

## 2023-05-08 ENCOUNTER — TELEPHONE (OUTPATIENT)
Dept: PODIATRY | Facility: CLINIC | Age: 58
End: 2023-05-08

## 2023-05-08 NOTE — TELEPHONE ENCOUNTER
Caller: Nimesh Banegas (Cy's wife)    Doctor: Janak Hunt DPM    Reason for call: The disability paperwork was faxed and received  A copy needs to be faxed to [facility]) @ [phone]    Call back#: [phone] [employer]) when it is faxed

## 2023-05-09 ENCOUNTER — NURSE TRIAGE (OUTPATIENT)
Dept: OTHER | Facility: OTHER | Age: 58
End: 2023-05-09

## 2023-05-09 NOTE — TELEPHONE ENCOUNTER
Patient is looking to make an appt to be seen and potentially have an antibiotic prescribed for an infection on his foot   Family will call back in AM

## 2023-05-10 ENCOUNTER — TELEPHONE (OUTPATIENT)
Dept: PODIATRY | Facility: CLINIC | Age: 58
End: 2023-05-10

## 2023-05-10 NOTE — TELEPHONE ENCOUNTER
Caller: Patient wife    Reji  / Bobbi Campos    Reason for call: PCP said his foot is infected  What should they do?     Call back#: [phone]

## 2023-05-10 NOTE — TELEPHONE ENCOUNTER
Talked to patient and let them know go to the ED per Dr Maye Armendariz  Also informed the [facility] paper work was received and will be taken care of

## 2023-05-19 ENCOUNTER — TELEPHONE (OUTPATIENT)
Dept: OBGYN CLINIC | Facility: HOSPITAL | Age: 58
End: 2023-05-19

## 2023-05-19 NOTE — TELEPHONE ENCOUNTER
Caller: [employer]    Doctor: Josette Vora    Reason for call: calling to see if patient has a current appt, advised no  Call was dropped      Call back#: n a

## 2023-05-19 NOTE — TELEPHONE ENCOUNTER
Caller: [employer]    Doctor: n/a    Reason for call: call back and stated they hit a button and the call was dropped   Stated they did not need any further information from the office at this time    Call back#: n/a

## 2023-05-22 NOTE — TELEPHONE ENCOUNTER
Caller: [employer]    Doctor: Laura Stevens     Reason for call: calling to see if patient rescheduled missed appt   Advised no    Call back#: n/a

## 2023-05-25 ENCOUNTER — HOSPITAL ENCOUNTER (OUTPATIENT)
Dept: CT IMAGING | Facility: HOSPITAL | Age: 58
End: 2023-05-25

## 2023-05-25 ENCOUNTER — PATIENT OUTREACH (OUTPATIENT)
Dept: CASE MANAGEMENT | Facility: OTHER | Age: 58
End: 2023-05-25

## 2023-05-25 DIAGNOSIS — I95.9 HYPOTENSION, UNSPECIFIED: ICD-10-CM

## 2023-05-25 DIAGNOSIS — D64.9 ANEMIA, UNSPECIFIED: ICD-10-CM

## 2023-05-25 DIAGNOSIS — R63.4 ABNORMAL WEIGHT LOSS: ICD-10-CM

## 2023-05-25 DIAGNOSIS — R63.0 ANOREXIA: ICD-10-CM

## 2023-05-25 RX ADMIN — IOHEXOL 100 ML: 350 INJECTION, SOLUTION INTRAVENOUS at 16:06

## 2023-05-25 NOTE — PROGRESS NOTES
Email received for patient with increased ED utilization within last 3 months:      *5 ED visits for anxiety    *2 admissions for diabetic foot ulcer / cellulitis   *2 ED visits where patient left without being triaged    Patient scheduled to see PCP today at 2pm and wound center at 3  Call placed to [facility] for care management referral  Patient name, insurance information and reason for referral provided to care coordinator  Care Coordinator reports patient had nurse care manager in the past that left the company    Care Coordinator to place referral

## 2023-06-01 ENCOUNTER — OFFICE VISIT (OUTPATIENT)
Dept: WOUND CARE | Facility: CLINIC | Age: 58
End: 2023-06-01

## 2023-06-01 VITALS
TEMPERATURE: 96.9 F | SYSTOLIC BLOOD PRESSURE: 104 MMHG | RESPIRATION RATE: 20 BRPM | HEART RATE: 72 BPM | DIASTOLIC BLOOD PRESSURE: 60 MMHG

## 2023-06-01 DIAGNOSIS — E11.42 TYPE 2 DIABETES MELLITUS WITH DIABETIC POLYNEUROPATHY, WITH LONG-TERM CURRENT USE OF INSULIN (HCC): Primary | ICD-10-CM

## 2023-06-01 DIAGNOSIS — Z91.199 NONCOMPLIANCE: ICD-10-CM

## 2023-06-01 DIAGNOSIS — E66.01 MORBID OBESITY WITH BMI OF 40.0-44.9, ADULT (HCC): ICD-10-CM

## 2023-06-01 DIAGNOSIS — Z89.432 STATUS POST PARTIAL AMPUTATION OF FOOT, LEFT (HCC): ICD-10-CM

## 2023-06-01 DIAGNOSIS — Z79.4 TYPE 2 DIABETES MELLITUS WITH DIABETIC POLYNEUROPATHY, WITH LONG-TERM CURRENT USE OF INSULIN (HCC): Primary | ICD-10-CM

## 2023-06-01 DIAGNOSIS — T81.31XD DEHISCENCE OF OPERATIVE WOUND, SUBSEQUENT ENCOUNTER: ICD-10-CM

## 2023-06-01 NOTE — PATIENT INSTRUCTIONS
Orders Placed This Encounter   Procedures    Wound cleansing and dressings       Get offloading knee scooter  No weightbearing to left foot  Off-loading Instructions:    Keep weight and pressure off wound at all times  Wear off-loading device as directed by your physician (CAM boot)  Put on immediately when rising in the morning and remove when going to bed  Wound cleansing and dressings      Wash your hands with soap and water  Remove old dressing, discard into plastic bag and place in trash  Cleanse the wound with normal saline prior to applying a clean dressing  Do not use tissue or cotton balls  Do not scrub the wound  Pat dry using gauze  Lightly Pack deepest anterior portion of wound with endoform  Apply gauze on top of open wound area of the left foot wound  Cover with ABD pad  Secure with audi wrap and tape  Change dressing three times per week        Eat a low sodium diet  Do not salt your food  Avoid processed foods high in sodium  Wound compression and edema control      Surepress     Apply compression wrap to your affected Leg(s) from mid-foot to knee making sure to cover the heel  Apply in the morning and re-wrap as needed during the day if wrap becomes loose  Remove at bedtime and elevate legs or lie down  Avoid prolonged standing in one place  Elevate leg(s) above the level of the heart when sitting or as much as possible      Follow up in 3 weeks     Standing Status:   Future     Standing Expiration Date:   6/1/2024

## 2023-06-01 NOTE — PROGRESS NOTES
Patient ID: Kari Simmons is a 59 y o  male Date of Birth 1965       Chief Complaint   Patient presents with   • Follow Up Wound Care Visit     Left foot wound       Allergies:  Patient has no known allergies  Diagnosis:  1  Type 2 diabetes mellitus with diabetic polyneuropathy, with long-term current use of insulin ([facility] [address] )    2  Morbid obesity with BMI of 40 0-44 9, adult ([facility] [address] )    3  Status post partial amputation of foot, left ([facility] [address] )    4  Dehiscence of operative wound, subsequent encounter  -     Wound cleansing and dressings; Future    5  Noncompliance       Diagnosis ICD-10-CM Associated Orders   1  Type 2 diabetes mellitus with diabetic polyneuropathy, with long-term current use of insulin ([facility])  E11 42     Z79 4       2  Morbid obesity with BMI of 40 0-44 9, adult ([facility] [address] )  E66 01     Z68 41       3  Status post partial amputation of foot, left ([facility] [address] )  Z89 [id number]       4  Dehiscence of operative wound, subsequent encounter  T81  31XD Wound cleansing and dressings      5  Noncompliance  Z91 [id number]            Assessment & Plan:  See wound orders   (endoform, maxorb Ag, compression)  • Left foot revisional TMA site (DOS 4/12/23) appears as follows: about 90% incisional healing with central incision (dorsal) with dehiscence now only with about 0 3cm deep probe (central), fortunately no acute SOI  I discussed risk of limb loss with patient  There is component of PVD with his current wound  Patient did not want the compression on today however I again stressed the importance of this for wound healing  • Patient has still been extremely noncompliant with WB recommendations (he has been fully weight bearing instead of strict non weight bearing) since being in the hospital and upon d/c    • I recommended returning to [facility] boot for WBAT  • Low sodium and sugar diet recommended  LE elevation above heart  • F/u 3 weeks for recheck   Report to ED if SOI arise     Subjective:   Cy presents to clinic today concerning first wound care visit s/p left 2nd metatarsal excision (due to OM) and revision TMA (DOS 4/7/23 & 4/12/23)  Patient has not been compliant with his WB recommendations and ambulates in sneaker today  A1c 5 8% 1/3/23  Did not f/u one week as instructed  Note slots of anxiety  Denies N/V/C/F/SOB/CP        The following portions of the patient's history were reviewed and updated as appropriate:   Patient Active Problem List   Diagnosis   • DAYAN (obstructive sleep apnea)   • Chronic diastolic heart failure ([facility])   • Hypertension   • Diabetes mellitus ([facility] [address] )   • Morbid obesity with BMI of 40 0-44 9, adult ([facility])   • Pain, joint, ankle and foot, left   • Chronic osteomyelitis of left foot with draining sinus ([facility])   • Atrial fibrillation ([facility])   • Anxiety   • Type 2 diabetes mellitus with diabetic polyneuropathy, with long-term current use of insulin ([facility])   • Toe osteomyelitis, right ([facility])   • Cervical spondylosis   • Cervicalgia - Right   • Diabetic infection of left foot ([facility])   • Pacemaker   • History of bariatric surgery   • Encounter for perioperative consultation   • Hyperkalemia   • Diabetic ulcer of left midfoot associated with type 2 diabetes mellitus ([facility] [address] )   • Cellulitis of left foot   • Closed fracture of shaft of metatarsal bone of left foot   • Acute osteomyelitis of left foot ([facility])     Past Medical History:   Diagnosis Date   • Atrial fibrillation ([facility])    • Chronic diastolic (congestive) heart failure ([facility])    • Diabetes mellitus ([facility] [address] )    • High cholesterol    • Hyperlipidemia    • Pacemaker    • Stroke [facility])      Past Surgical History:   Procedure Laterality Date   • APPENDECTOMY     • ATRIAL CARDIAC PACEMAKER INSERTION     • BARIATRIC SURGERY  05/2021   • EPIDURAL BLOCK INJECTION N/A 5/19/2022    Procedure: BLOCK / INJECTION EPIDURAL STEROID CERVICAL C7-T1;  Surgeon: Magan Dumont MD;  Location: [facility];  Service: Pain Management    • FL GUIDED NEEDLE PLAC BX/ASP/INJ  3/22/2022   • FOOT AMPUTATION Left 4/28/2022    Procedure: LEFT TRANSMETATARSAL AMPUTATION ;  Surgeon: Jinx Nyhan Rocchio, DPM;  [facility];  Service: Podiatry   • NERVE BLOCK Right 2/10/2022    Procedure: BLOCK MEDIAL BRANCH C3, C4, C5 #1;  Surgeon: Jose Desouza MD;  [facility];  Service: Pain Management    • NERVE BLOCK Right 3/22/2022    Procedure: BLOCK MEDIAL BRANCH C3, C4, C5 #2;  Surgeon: Jose Desouza MD;  [facility];  Service: Pain Management    • HI AMPUTATION FOOT TRANSMETARSAL Left 4/12/2023    Procedure: REVISION LEFT TRANSMETATARSAL (TMA) AMPUTATION, REMOVAL OF UNVIABLE TISSUE AND BONE,;  Surgeon: Mekhi Corral DPM;  [facility];  Service: Podiatry   • HI AMPUTATION METATARSAL W/TOE SINGLE Left 4/7/2023    Procedure: 2ND RAY RESECTION FOOT;  Surgeon: Mekhi Corral DPM;  [facility];  Service: Podiatry   • HI AMPUTATION TOE INTERPHALANGEAL JOINT Left 11/16/2021    Procedure: AMPUTATION LESSER TOE;  Surgeon: Jamarcus Shepard DPM;  [facility];  Service: Podiatry   • RADIOFREQUENCY ABLATION Right 4/7/2022    Procedure: Right C3, C4, C5 RFA;  Surgeon: Jose Desouza MD;  [facility];  Service: Pain Management    • RHIZOTOMY Right 2/9/2023    Procedure: RHIZOTOMY CERVICAL MEDIAL BRANCH NERVES RIGHT C3, C4, C5;  Surgeon: Jose Desouza MD;  [facility];  Service: Pain Management    • TOE AMPUTATION Left     2nd toe   • TOE AMPUTATION Left 9/15/2021    Procedure: AMPUTATION LEFT 4TH TOE;  Surgeon: Jamarcus Shepard DPM;  [facility];  Service: Podiatry   • TOE AMPUTATION Right 1/12/2022    Procedure: AMPUTATION TOE;  Surgeon: Jaya Turner DPM;  [facility];  Service: Podiatry   • TOE AMPUTATION Right 2/23/2022    Procedure: AMPUTATION TOE  RIGHT SECOND;  Surgeon: Jaya Turner DPM;  Location: [address] [place] [address] MAIN OR;  Service: Podiatry   • TOE AMPUTATION Right 6/3/2022    Procedure: AMPUTATION right 4th TOE;  Surgeon: Lubna Rodarte DPM;  [facility];  Service: Podiatry     Social History     Socioeconomic History   • Marital status: /Civil Union     Spouse name: None   • Number of children: None   • Years of education: None   • Highest education level: None   Occupational History   • Occupation:      Employer: [employer]   Tobacco Use   • Smoking status: Never   • Smokeless tobacco: Never   Vaping Use   • Vaping Use: Never used   Substance and Sexual Activity   • Alcohol use: Never   • Drug use: Never   • Sexual activity: Yes   Other Topics Concern   • None   Social History Narrative   • None     Social Determinants of Health     Financial Resource Strain: Not on file   Food Insecurity: No Food Insecurity (4/11/2023)    Hunger Vital Sign    • Worried About Running Out of Food in the Last Year: Never true    • Ran Out of Food in the Last Year: Never true   Transportation Needs: No Transportation Needs (4/11/2023)    PRAPARE - Transportation    • Lack of Transportation (Medical): No    • Lack of Transportation (Non-Medical):  No   Physical Activity: Not on file   Stress: Not on file   Social Connections: Not on file   Intimate Partner Violence: Not on file   Housing Stability: Low Risk  (4/11/2023)    Housing Stability Vital Sign    • Unable to Pay for Housing in the Last Year: No    • Number of Places Lived in the Last Year: 1    • Unstable Housing in the Last Year: No        Current Outpatient Medications:   •  acetaminophen (TYLENOL) 325 mg tablet, Take 2 tablets (650 mg total) by mouth every 6 (six) hours as needed for mild pain, headaches or fever, Disp: , Rfl: 0  •  busPIRone (BUSPAR) 10 mg tablet, TAKE 1 TABLET BY MOUTH 3 TIMES A DAY, MORNING, NOON, AND BEFORE BEDTIME, Disp: , Rfl:   •  busPIRone (BUSPAR) 5 mg tablet, TAKE BY MOUTH 1 TABLET IN THE MORNING AND 1 TABLET BEFORE BEDTIME , Disp: , Rfl:   •  carBAMazepine (TEGretol) 200 mg tablet, , Disp: , Rfl:   •  DULoxetine (CYMBALTA) 30 mg delayed release capsule, take 1 capsule by mouth every morning DO NOT CRUSH, CHEW, AND/OR DIVIDE, Disp: , Rfl:   •  FLUoxetine (PROzac) 40 MG capsule, Take 40 mg by mouth daily, Disp: , Rfl:   •  furosemide (LASIX) 40 mg tablet, Take 40 mg by mouth 2 (two) times a day, Disp: , Rfl:   •  LORazepam (ATIVAN) 0 5 mg tablet, every 8 (eight) hours as needed, Disp: , Rfl:   •  LORazepam (Ativan) 1 mg tablet, Take 1 tablet (1 mg total) by mouth every 8 (eight) hours as needed for anxiety for up to 7 days, Disp: 8 tablet, Rfl: 0  •  potassium chloride (K-DUR,KLOR-CON) 20 mEq tablet, Take 2 tablets (40 mEq total) by mouth daily for 5 days, Disp: 10 tablet, Rfl: 0  •  traZODone (DESYREL) 50 mg tablet, Take 1 tablet (50 mg total) by mouth daily at bedtime, Disp: 30 tablet, Rfl: 0  Family History   Problem Relation Age of Onset   • No Known Problems Mother    • No Known Problems Father       Review of Systems   Constitutional: Negative for activity change, chills and fever  HENT: Negative  Respiratory: Negative for cough, chest tightness and shortness of breath  Cardiovascular: Positive for leg swelling (Chronic B/L)  Negative for chest pain  Endocrine: Negative  Genitourinary: Negative  Skin: Positive for wound (Left TMA site)  Neurological:        PN   Psychiatric/Behavioral: Negative  Negative for agitation and behavioral problems  Objective:  /60   Pulse 72   Temp (!) 96 9 °F (36 1 °C)   Resp 20     Physical Exam  Constitutional:       Appearance: Normal appearance  He is ill-appearing (chronic)  Comments: Anxious   Cardiovascular:      Comments: Chronic venous stasis dermatitis with B/L LE brawny edema  Diminished pedal pulses due to edema  Absent pedal hair  Pulmonary:      Effort: No respiratory distress  Musculoskeletal:         General: No tenderness or deformity  Normal range of motion  Comments: S/p left TMA    Skin:     Capillary Refill: Capillary refill takes less than 2 seconds  Comments: B/L LE skin is atrophic - thin, dry and shiny in appearance  Left revisional TMA site appears with about 90% incisional healing with central incision (dorsal) with about 0 3cm deep probe centrally  No erythema, edema, purulence  Neurological:      General: No focal deficit present  Mental Status: He is alert and oriented to person, place, and time  Comments: N/T/B to B/L LE   Psychiatric:         Mood and Affect: Mood normal          Behavior: Behavior normal              Wound 04/12/23 Foot Left (Active)   Wound Image   06/01/23 1609   Wound Description Eschar;Black 06/01/23 1611   Katy-wound Assessment Callus;Dry;Scaly 06/01/23 1611   Wound Length (cm) 2 cm 06/01/23 1611   Wound Width (cm) 0 5 cm 06/01/23 1611   Wound Depth (cm) 0 1 cm 06/01/23 1611   Wound Surface Area (cm^2) 1 cm^2 06/01/23 1611   Wound Volume (cm^3) 0 1 cm^3 06/01/23 1611   Calculated Wound Volume (cm^3) 0 1 cm^3 06/01/23 1611   Change in Wound Size % 99 91 06/01/23 1611   Wound Site Closure Dylon; Sutures 05/04/23 1439   Drainage Amount None 06/01/23 1611   Drainage Description Serosanguineous 05/04/23 1439   Non-staged Wound Description Full thickness 06/01/23 1611   Treatments Cleansed 06/01/23 1611         Debridement   Wound 04/12/23 Foot Left    Universal Protocol:  Consent: Verbal consent obtained  Risks and benefits: risks, benefits and alternatives were discussed  Consent given by: patient  Patient understanding: patient states understanding of the procedure being performed  Patient consent: the patient's understanding of the procedure matches consent given  Patient identity confirmed: verbally with patient      Performed by: physician  Debridement type: surgical  Level of debridement: subcutaneous tissue    Pre-debridement measurements  Length (cm): 2  Width (cm): 0 5  Depth (cm): 0 1  Surface Area (cm^2):  1  Volume (cm^3): 0 1    Post-debridement measurements  Length (cm): 2  Width (cm): 0 5  Depth (cm): 0 3  Percent debrided: 100%  Surface Area (cm^2): 1  Area debrided (cm^2): 1  Volume (cm^3): 0 3  Tissue and other material debrided: subcutaneous tissue  Devitalized tissue debrided: biofilm, callus, slough and eschar  Instrument(s) utilized: blade  Bleeding: small  Hemostasis obtained with: not applicable  Procedural pain (0-10): insensate  Post-procedural pain: insensate   Response to treatment: procedure was tolerated well           Results from last 6 Months   Lab Units 04/05/23  1246   WOUND CULTURE  2+ Growth of Stenotrophomonas maltophilia*  2+ Growth of         Wound Instructions:  Orders Placed This Encounter   Procedures   • Wound cleansing and dressings       Get offloading knee scooter  No weightbearing to left foot  Off-loading Instructions:    Keep weight and pressure off wound at all times  Wear off-loading device as directed by your physician (CAM boot)  Put on immediately when rising in the morning and remove when going to bed                 • Wound cleansing and dressings      Wash your hands with soap and water  Remove old dressing, discard into plastic bag and place in trash  Cleanse the wound with normal saline prior to applying a clean dressing  Do not use tissue or cotton balls  Do not scrub the wound  Pat dry using gauze  Lightly Pack deepest anterior portion of wound with endoform  Apply gauze on top of open wound area of the left foot wound  Cover with ABD pad  Secure with audi wrap and tape  Change dressing three times per week        Eat a low sodium diet  Do not salt your food  Avoid processed foods high in sodium            • Wound compression and edema control      Surepress     Apply compression wrap to your affected Leg(s) from mid-foot to knee making sure to cover the heel  Apply in the morning and re-wrap as needed during the day if wrap becomes loose   Remove at bedtime and elevate legs or lie down      Avoid prolonged standing in one place      Elevate leg(s) above "the level of the heart when sitting or as much as possible  Follow up in 3 weeks     Standing Status:   Future     Standing Expiration Date:   6/1/2024   • Debridement     This order was created via procedure documentation         [employer], DPM      Portions of the record may have been created with voice recognition software  Occasional wrong word or \"sound a like\" substitutions may have occurred due to the inherent limitations of voice recognition software  Read the chart carefully and recognize, using context, where substitutions have occurred      "

## 2023-06-02 ENCOUNTER — NURSE TRIAGE (OUTPATIENT)
Dept: OTHER | Facility: OTHER | Age: 58
End: 2023-06-02

## 2023-06-02 NOTE — TELEPHONE ENCOUNTER
"    Reason for Disposition  • [1] SEVERE pain (e g , excruciating, unable to do any normal activities) AND [2] not improved after 2 hours of pain medicine    Answer Assessment - Initial Assessment Questions  1  ONSET: \"When did the pain start? \"       chronic  2  LOCATION: \"Where is the pain located? \"       Left foot  3  PAIN: \"How bad is the pain? \"    (Scale 1-10; or mild, moderate, severe)   - MILD (1-3): doesn't interfere with normal activities  - MODERATE (4-7): interferes with normal activities (e g , work or school) or awakens from sleep, limping     - SEVERE (8-10): excruciating pain, unable to do any normal activities, unable to walk  Severe   5  CAUSE: \"What do you think is causing the foot pain? \"      Had surgery  6  OTHER SYMPTOMS: \"Do you have any other symptoms? \" (e g , leg pain, rash, fever, numbness)    No fever or chills, no recent drainage    Using advil but no relief    Protocols used: FOOT PAIN-ADULT-      "

## 2023-06-07 ENCOUNTER — HOSPITAL ENCOUNTER (EMERGENCY)
Facility: HOSPITAL | Age: 58
Discharge: HOME | End: 2023-06-07
Attending: STUDENT IN AN ORGANIZED HEALTH CARE EDUCATION/TRAINING PROGRAM | Admitting: STUDENT IN AN ORGANIZED HEALTH CARE EDUCATION/TRAINING PROGRAM
Payer: COMMERCIAL

## 2023-06-07 ENCOUNTER — APPOINTMENT (EMERGENCY)
Dept: RADIOLOGY | Facility: HOSPITAL | Age: 58
End: 2023-06-07
Payer: COMMERCIAL

## 2023-06-07 VITALS
SYSTOLIC BLOOD PRESSURE: 106 MMHG | TEMPERATURE: 97.5 F | HEART RATE: 92 BPM | OXYGEN SATURATION: 98 % | WEIGHT: 265.21 LBS | BODY MASS INDEX: 35.15 KG/M2 | RESPIRATION RATE: 22 BRPM | DIASTOLIC BLOOD PRESSURE: 72 MMHG | HEIGHT: 73 IN

## 2023-06-07 DIAGNOSIS — F41.9 ANXIETY: ICD-10-CM

## 2023-06-07 DIAGNOSIS — R06.02 SHORTNESS OF BREATH: Primary | ICD-10-CM

## 2023-06-07 LAB
ANION GAP SERPL CALCULATED.3IONS-SCNC: 13 MMOL/L (ref 4–13)
BASOPHILS # BLD AUTO: 0.04 THOUSANDS/ÂΜL (ref 0–0.1)
BASOPHILS NFR BLD AUTO: 1 % (ref 0–1)
BUN SERPL-MCNC: 11 MG/DL (ref 5–25)
CALCIUM SERPL-MCNC: 9.6 MG/DL (ref 8.4–10.2)
CARDIAC TROPONIN I PNL SERPL HS: 4 NG/L (ref 8–18)
CHLORIDE SERPL-SCNC: 99 MMOL/L (ref 96–108)
CO2 SERPL-SCNC: 25 MMOL/L (ref 21–32)
CREAT SERPL-MCNC: 0.87 MG/DL (ref 0.6–1.3)
EOSINOPHIL # BLD AUTO: 0.09 THOUSAND/ÂΜL (ref 0–0.61)
EOSINOPHIL NFR BLD AUTO: 1 % (ref 0–6)
ERYTHROCYTE [DISTWIDTH] IN BLOOD BY AUTOMATED COUNT: 14.7 % (ref 11.6–15.1)
GFR SERPL CREATININE-BSD FRML MDRD: 94 ML/MIN/1.73SQ M
GLUCOSE SERPL-MCNC: 99 MG/DL (ref 65–140)
HCT VFR BLD AUTO: 37.9 % (ref 36.5–49.3)
HGB BLD-MCNC: 12 G/DL (ref 12–17)
IMM GRANULOCYTES # BLD AUTO: 0.03 THOUSAND/UL (ref 0–0.2)
IMM GRANULOCYTES NFR BLD AUTO: 0 % (ref 0–2)
INR PPP: 1.34 (ref 0.84–1.19)
LYMPHOCYTES # BLD AUTO: 1.72 THOUSANDS/ÂΜL (ref 0.6–4.47)
LYMPHOCYTES NFR BLD AUTO: 21 % (ref 14–44)
MAGNESIUM SERPL-MCNC: 1.9 MG/DL (ref 1.9–2.7)
MCH RBC QN AUTO: 25.7 PG (ref 26.8–34.3)
MCHC RBC AUTO-ENTMCNC: 31.7 G/DL (ref 31.4–37.4)
MCV RBC AUTO: 81 FL (ref 82–98)
MONOCYTES # BLD AUTO: 0.83 THOUSAND/ÂΜL (ref 0.17–1.22)
MONOCYTES NFR BLD AUTO: 10 % (ref 4–12)
NEUTROPHILS # BLD AUTO: 5.66 THOUSANDS/ÂΜL (ref 1.85–7.62)
NEUTS SEG NFR BLD AUTO: 67 % (ref 43–75)
NRBC BLD AUTO-RTO: 0 /100 WBCS
PLATELET # BLD AUTO: 433 THOUSANDS/UL (ref 149–390)
PMV BLD AUTO: 9.7 FL (ref 8.9–12.7)
POTASSIUM SERPL-SCNC: 3.7 MMOL/L (ref 3.5–5.3)
PROTHROMBIN TIME: 16.7 SECONDS (ref 11.6–14.5)
RBC # BLD AUTO: 4.67 MILLION/UL (ref 3.88–5.62)
SODIUM SERPL-SCNC: 137 MMOL/L (ref 135–147)
WBC # BLD AUTO: 8.37 THOUSAND/UL (ref 4.31–10.16)

## 2023-06-07 PROCEDURE — 80048 BASIC METABOLIC PNL TOTAL CA: CPT | Performed by: STUDENT IN AN ORGANIZED HEALTH CARE EDUCATION/TRAINING PROGRAM

## 2023-06-07 PROCEDURE — 93005 ELECTROCARDIOGRAM TRACING: CPT

## 2023-06-07 PROCEDURE — 83735 ASSAY OF MAGNESIUM: CPT | Performed by: STUDENT IN AN ORGANIZED HEALTH CARE EDUCATION/TRAINING PROGRAM

## 2023-06-07 PROCEDURE — 36415 COLL VENOUS BLD VENIPUNCTURE: CPT | Performed by: STUDENT IN AN ORGANIZED HEALTH CARE EDUCATION/TRAINING PROGRAM

## 2023-06-07 PROCEDURE — 71045 X-RAY EXAM CHEST 1 VIEW: CPT

## 2023-06-07 PROCEDURE — 84484 ASSAY OF TROPONIN QUANT: CPT | Performed by: STUDENT IN AN ORGANIZED HEALTH CARE EDUCATION/TRAINING PROGRAM

## 2023-06-07 PROCEDURE — 85610 PROTHROMBIN TIME: CPT | Performed by: STUDENT IN AN ORGANIZED HEALTH CARE EDUCATION/TRAINING PROGRAM

## 2023-06-07 PROCEDURE — 85025 COMPLETE CBC W/AUTO DIFF WBC: CPT | Performed by: STUDENT IN AN ORGANIZED HEALTH CARE EDUCATION/TRAINING PROGRAM

## 2023-06-07 RX ORDER — LORAZEPAM 2 MG/ML
1 INJECTION INTRAMUSCULAR ONCE
Status: COMPLETED | OUTPATIENT
Start: 2023-06-07 | End: 2023-06-07

## 2023-06-07 RX ADMIN — LORAZEPAM 1 MG: 2 INJECTION INTRAMUSCULAR; INTRAVENOUS at 02:05

## 2023-06-07 RX ADMIN — LORAZEPAM 1 MG: 2 INJECTION INTRAMUSCULAR; INTRAVENOUS at 00:56

## 2023-06-07 NOTE — ED PROVIDER NOTES
History  Chief Complaint   Patient presents with   • Shortness of Breath     Pt into ed with reports of shortness of breath that started earlier tonight  Pt reports having bad anxiety and changing medication for the first time tonight, took him off prozac and put him on paxil  Pt took paxil 20mg at approx 2000, and then the shortness of breath started  History provided by:  Patient and relative  Shortness of Breath  Severity:  Moderate  Onset quality:  Sudden  Duration:  3 hours  Timing:  Constant  Progression:  Unchanged  Chronicity:  New  Relieved by:  None tried  Worsened by:  Nothing  Ineffective treatments:  None tried  Associated symptoms: no abdominal pain, no chest pain, no cough, no diaphoresis, no fever, no headaches, no neck pain, no rash, no sore throat, no sputum production, no vomiting and no wheezing       58 yo M  Hx of AF on Eliquis, anxiety  Presents with shortness of breath  Began this evening while at rest  Denies chest pain  Began Paxil tonight  Transitioned from Prozac  Denies SI/HI  Prior to Admission Medications   Prescriptions Last Dose Informant Patient Reported? Taking?    LORazepam (Ativan) 1 mg tablet 6/6/2023  No Yes   Sig: Take 1 tablet (1 mg total) by mouth every 8 (eight) hours as needed for anxiety for up to 7 days   acetaminophen (TYLENOL) 325 mg tablet Unknown  No No   Sig: Take 2 tablets (650 mg total) by mouth every 6 (six) hours as needed for mild pain, headaches or fever   busPIRone (BUSPAR) 10 mg tablet 6/6/2023  Yes Yes   Sig: TAKE 1 TABLET BY MOUTH 3 TIMES A DAY, MORNING, NOON, AND BEFORE BEDTIME   busPIRone (BUSPAR) 5 mg tablet 6/6/2023  Yes Yes   Sig: TAKE BY MOUTH 1 TABLET IN THE MORNING AND 1 TABLET BEFORE BEDTIME    furosemide (LASIX) 40 mg tablet Not Taking  Yes No   Sig: Take 40 mg by mouth 2 (two) times a day   Patient not taking: Reported on 6/7/2023   potassium chloride (K-DUR,KLOR-CON) 20 mEq tablet Not Taking  No No   Sig: Take 2 tablets (40 mEq total) by mouth daily for 5 days   Patient not taking: Reported on 6/7/2023      Facility-Administered Medications: None       Past Medical History:   Diagnosis Date   • Atrial fibrillation (HCC)    • Chronic diastolic (congestive) heart failure ([facility] [address] )    • Diabetes mellitus ([facility] [address] )    • High cholesterol    • Hyperlipidemia    • Pacemaker    • Stroke [facility])        Past Surgical History:   Procedure Laterality Date   • APPENDECTOMY     • ATRIAL CARDIAC PACEMAKER INSERTION     • BARIATRIC SURGERY  05/2021   • EPIDURAL BLOCK INJECTION N/A 5/19/2022    Procedure: BLOCK / INJECTION EPIDURAL STEROID CERVICAL C7-T1;  Surgeon: Ketty Ortiz MD;  [facility]: [facility];  Service: Pain Management    • FL GUIDED NEEDLE PLAC BX/ASP/INJ  3/22/2022   • FOOT AMPUTATION Left 4/28/2022    Procedure: LEFT TRANSMETATARSAL AMPUTATION ;  Surgeon: Robert Crafword DPM;  [facility];  Service: Podiatry   • NERVE BLOCK Right 2/10/2022    Procedure: BLOCK MEDIAL BRANCH C3, C4, C5 #1;  Surgeon: Ketty Ortiz MD;  [facility];  Service: Pain Management    • NERVE BLOCK Right 3/22/2022    Procedure: BLOCK MEDIAL BRANCH C3, C4, C5 #2;  Surgeon: Ketty Ortiz MD;  [facility];  Service: Pain Management    • ID AMPUTATION FOOT TRANSMETARSAL Left 4/12/2023    Procedure: REVISION LEFT TRANSMETATARSAL (TMA) AMPUTATION, REMOVAL OF UNVIABLE TISSUE AND BONE,;  Surgeon: Librado Frye DPM;  [facility];  Service: Podiatry   • ID AMPUTATION METATARSAL W/TOE SINGLE Left 4/7/2023    Procedure: 2ND RAY RESECTION FOOT;  Surgeon: Librado Frye DPM;  [facility];  Service: Podiatry   • ID AMPUTATION TOE INTERPHALANGEAL JOINT Left 11/16/2021    Procedure: AMPUTATION LESSER TOE;  Surgeon: Marizol Zaldivar DPM;  [facility];  Service: Podiatry   • RADIOFREQUENCY ABLATION Right 4/7/2022    Procedure: Right C3, C4, C5 RFA;  Surgeon: Ketty Ortiz MD;  [facility];  Service: Pain Management    • RHIZOTOMY Right 2/9/2023    Procedure: RHIZOTOMY CERVICAL MEDIAL BRANCH NERVES RIGHT C3, C4, C5;  Surgeon: Nguyen Chen MD;  Location:  END[facility];  Service: Pain Management    • TOE AMPUTATION Left     2nd toe   • TOE AMPUTATION Left 9/15/2021    Procedure: AMPUTATION LEFT 4TH TOE;  Surgeon: Eagle Shrestha DPM;  [facility];  Service: Podiatry   • TOE AMPUTATION Right 1/12/2022    Procedure: AMPUTATION TOE;  Surgeon: Shar Raymundo DPM;  [facility];  Service: Podiatry   • TOE AMPUTATION Right 2/23/2022    Procedure: AMPUTATION TOE  RIGHT SECOND;  Surgeon: Shar Raymundo DPM;  Location: [facility] OR;  Service: Podiatry   • TOE AMPUTATION Right 6/3/2022    Procedure: AMPUTATION right 4th TOE;  Surgeon: Rose Cross DPM;  [facility];  Service: Podiatry       Family History   Problem Relation Age of Onset   • No Known Problems Mother    • No Known Problems Father      I have reviewed and agree with the history as documented  E-Cigarette/Vaping   • E-Cigarette Use Never User      E-Cigarette/Vaping Substances     Social History     Tobacco Use   • Smoking status: Never   • Smokeless tobacco: Never   Vaping Use   • Vaping Use: Never used   Substance Use Topics   • Alcohol use: Never   • Drug use: Never       Review of Systems   Constitutional: Negative for activity change, appetite change, diaphoresis, fatigue and fever  HENT: Negative for congestion, rhinorrhea, sinus pressure, sinus pain and sore throat  Eyes: Negative for photophobia, pain, discharge, redness, itching and visual disturbance  Respiratory: Positive for shortness of breath  Negative for cough, sputum production, chest tightness and wheezing  Cardiovascular: Negative for chest pain, palpitations and leg swelling  Gastrointestinal: Negative for abdominal distention, abdominal pain, diarrhea, nausea and vomiting     Genitourinary: Negative for decreased urine volume, difficulty urinating, flank pain, frequency and urgency  Musculoskeletal: Negative for arthralgias, myalgias, neck pain and neck stiffness  Skin: Negative for color change, pallor, rash and wound  Neurological: Negative for dizziness, syncope, weakness, light-headedness and headaches  Hematological: Bruises/bleeds easily  Psychiatric/Behavioral: Negative for agitation, confusion and decreased concentration  The patient is nervous/anxious  All other systems reviewed and are negative  Physical Exam  Physical Exam  Vitals and nursing note reviewed  Constitutional:       General: He is not in acute distress  Appearance: He is not ill-appearing or toxic-appearing  Interventions: He is not intubated  Comments: The patient is very anxious   HENT:      Head: Normocephalic and atraumatic  Mouth/Throat:      Mouth: Mucous membranes are moist       Pharynx: Oropharynx is clear  No pharyngeal swelling or oropharyngeal exudate  Eyes:      Extraocular Movements: Extraocular movements intact  Pupils: Pupils are equal, round, and reactive to light  Cardiovascular:      Rate and Rhythm: Tachycardia present  Rhythm irregular  Pulses: Normal pulses  Heart sounds: No murmur heard  Pulmonary:      Effort: Pulmonary effort is normal  No tachypnea, bradypnea, accessory muscle usage or respiratory distress  He is not intubated  Breath sounds: Normal breath sounds  No stridor  No decreased breath sounds, wheezing, rhonchi or rales  Chest:      Chest wall: No tenderness  Abdominal:      General: Bowel sounds are normal       Palpations: Abdomen is soft  Tenderness: There is no abdominal tenderness  There is no guarding or rebound  Musculoskeletal:      Cervical back: Neck supple  Right lower leg: No tenderness  No edema  Left lower leg: No tenderness  No edema  Skin:     General: Skin is warm and dry  Capillary Refill: Capillary refill takes less than 2 seconds        Coloration: Skin is not cyanotic or pale  Findings: No ecchymosis, erythema or rash  Nails: There is no clubbing  Neurological:      General: No focal deficit present  Mental Status: He is alert and oriented to person, place, and time  Cranial Nerves: No cranial nerve deficit  Motor: No weakness  Psychiatric:         Mood and Affect: Mood is anxious  Behavior: Behavior normal  Behavior is not agitated           Vital Signs  ED Triage Vitals   Temperature Pulse Respirations Blood Pressure SpO2   06/07/23 0019 06/07/23 0019 06/07/23 0019 06/07/23 0019 06/07/23 0019   (!) 97 2 °F (36 2 °C) 99 22 108/74 100 %      Temp Source Heart Rate Source Patient Position - Orthostatic VS BP Location FiO2 (%)   06/07/23 0019 06/07/23 0019 06/07/23 0019 06/07/23 0019 --   Temporal Monitor Sitting Left arm       Pain Score       06/07/23 0130       No Pain         Vitals:    06/07/23 0019 06/07/23 0130 06/07/23 0159   BP: 108/74 108/72 106/72   Pulse: 99 96 92   Patient Position - Orthostatic VS: Sitting       ED Medications  Medications   LORazepam (ATIVAN) injection 1 mg (1 mg Intravenous Given 6/7/23 0056)   LORazepam (ATIVAN) injection 1 mg (1 mg Intravenous Given 6/7/23 0205)     Diagnostic Studies  Results Reviewed     Procedure Component Value Units Date/Time    High Sensitivity Troponin I Random [[id number]]  (Abnormal) Collected: 06/07/23 0057    Lab Status: Final result Specimen: Blood from Arm, Right Updated: 06/07/23 0132     HS TnI random 4 ng/L     Basic metabolic panel [[id number]] Collected: 06/07/23 0057    Lab Status: Final result Specimen: Blood from Arm, Right Updated: 06/07/23 0124     Sodium 137 mmol/L      Potassium 3 7 mmol/L      Chloride 99 mmol/L      CO2 25 mmol/L      ANION GAP 13 mmol/L      BUN 11 mg/dL      Creatinine 0 87 mg/dL      Glucose 99 mg/dL      Calcium 9 6 mg/dL      eGFR 94 ml/min/1 73sq m     Narrative:      [place] guidelines for Chronic Kidney Disease (CKD): •  Stage 1 with normal or high GFR (GFR > 90 mL/min/1 73 square meters)  •  Stage 2 Mild CKD (GFR = 60-89 mL/min/1 73 square meters)  •  Stage 3A Moderate CKD (GFR = 45-59 mL/min/1 73 square meters)  •  Stage 3B Moderate CKD (GFR = 30-44 mL/min/1 73 square meters)  •  Stage 4 Severe CKD (GFR = 15-29 mL/min/1 73 square meters)  •  Stage 5 End Stage CKD (GFR <15 mL/min/1 73 square meters)  Note: GFR calculation is accurate only with a steady state creatinine    Magnesium [[id number]]  (Normal) Collected: 06/07/23 0057    Lab Status: Final result Specimen: Blood from Arm, Right Updated: 06/07/23 0124     Magnesium 1 9 mg/dL     Protime-INR [[id number]]  (Abnormal) Collected: 06/07/23 0057    Lab Status: Final result Specimen: Blood from Arm, Right Updated: 06/07/23 0121     Protime 16 7 seconds      INR 1 34    CBC and differential [[id number]]  (Abnormal) Collected: 06/07/23 0057    Lab Status: Final result Specimen: Blood from Arm, Right Updated: 06/07/23 0103     WBC 8 37 Thousand/uL      RBC 4 67 Million/uL      Hemoglobin 12 0 g/dL      Hematocrit 37 9 %      MCV 81 fL      MCH 25 7 pg      MCHC 31 7 g/dL      RDW 14 7 %      MPV 9 7 fL      Platelets 412 Thousands/uL      nRBC 0 /100 WBCs      Neutrophils Relative 67 %      Immat GRANS % 0 %      Lymphocytes Relative 21 %      Monocytes Relative 10 %      Eosinophils Relative 1 %      Basophils Relative 1 %      Neutrophils Absolute 5 66 Thousands/µL      Immature Grans Absolute 0 03 Thousand/uL      Lymphocytes Absolute 1 72 Thousands/µL      Monocytes Absolute 0 83 Thousand/µL      Eosinophils Absolute 0 09 Thousand/µL      Basophils Absolute 0 04 Thousands/µL              XR chest 1 view portable   ED Interpretation by Luana Patel DO (06/07 0150)   No acute findings noted on CXR      Final Result by Renea Haines MD (06/07 1217)      No acute cardiopulmonary disease                    Workstation performed: [id number]                Procedures  ECG 12 Lead Documentation Only    Date/Time: 6/7/2023 12:16 AM    Performed by: Kishor Tinsley DO  Authorized by: Kishor Tinsley DO    Indications / Diagnosis: Anxiety, shortness of breath  ECG reviewed by me, the ED Provider: yes    Patient location:  ED  Previous ECG:     Previous ECG:  Unavailable  Interpretation:     Interpretation: non-specific    Rate:     ECG rate:  101    ECG rate assessment: tachycardic    Rhythm:     Rhythm: atrial fibrillation    Ectopy:     Ectopy: none    QRS:     QRS axis:  Left    QRS intervals:  Normal  Conduction:     Conduction: normal    ST segments:     ST segments:  Normal  T waves:     T waves: normal        ED Course  ED Course as of 06/07/23 2219 Wed Jun 07, 2023   0106 No leukocytosis  Hemoglobin is within normal limits  Mild thrombocytosis  0135 No significant electrolyte abnormalities  Renal function is within normal limits  Troponin negative  0135 ECG showing atrial fibrillation  Heart rate 101 bpm   Left axis deviation  No acute ischemic changes  0150 Chest x-ray negative for acute findings   0204 Upon reevaluation, the patient states that IV Ativan moderately improved his symptoms  Has been having worsening anxiety/depression since the death of his daughter  He has also lost a large amount of weight hence the reason for transitioning to Paxil from Prozac  Requesting a subsequent dose and discharge  SBIRT 20yo+    Flowsheet Row Most Recent Value   Initial Alcohol Screen: US AUDIT-C     1  How often do you have a drink containing alcohol? 0 Filed at: 06/07/2023 0019   2  How many drinks containing alcohol do you have on a typical day you are drinking? 0 Filed at: 06/07/2023 0019   3a  Male UNDER 65: How often do you have five or more drinks on one occasion? 0 Filed at: 06/07/2023 0019   3b  FEMALE Any Age, or MALE 65+: How often do you have 4 or more drinks on one occassion?  0 Filed at: 06/07/2023 0019   Audit-C Score 0 Filed at: 06/07/2023 6464 ANGELO: How many times in the past year have you    Used an illegal drug or used a prescription medication for non-medical reasons? Never Filed at: 06/07/2023 0019        Medical Decision Making  The differential diagnoses include but are not limited to ACS, PE, serotonin syndrome  Vital signs reviewed  Normal mental status but appears anxious  No focal deficits  ECG without ischemic changes  Troponin negative  Symptoms improved with IV Ativan  Low suspicion for serotonin syndrome  Symptoms may be due to changes in medication? PCP follow up encouraged  Stable for discharge  Anxiety: acute illness or injury  Shortness of breath: self-limited or minor problem  Amount and/or Complexity of Data Reviewed  Labs: ordered  Decision-making details documented in ED Course  Radiology: ordered and independent interpretation performed  Decision-making details documented in ED Course  ECG/medicine tests: ordered and independent interpretation performed  Decision-making details documented in ED Course  Risk  Prescription drug management  Disposition  Final diagnoses:   Shortness of breath   Anxiety     Time reflects when diagnosis was documented in both MDM as applicable and the Disposition within this note     Time User Action Codes Description Comment    6/7/2023  2:09 AM Jonny Perry Add [R06 02] Shortness of breath     6/7/2023  2:09 AM Jonny Perry Add [F41 9] Anxiety       ED Disposition     ED Disposition   Discharge    Condition   Stable    Date/Time   Wed Jun 7, 2023  2:09 AM    Comment   Yves Close discharge to home/self care                 Follow-up Information    None         Discharge Medication List as of 6/7/2023  2:10 AM      CONTINUE these medications which have NOT CHANGED    Details   !! busPIRone (BUSPAR) 10 mg tablet TAKE 1 TABLET BY MOUTH 3 TIMES A DAY, MORNING, NOON, AND BEFORE BEDTIME, Historical Med      !! busPIRone (BUSPAR) 5 mg tablet TAKE BY MOUTH 1 TABLET IN THE MORNING AND 1 TABLET BEFORE BEDTIME , Historical Med      LORazepam (Ativan) 1 mg tablet Take 1 tablet (1 mg total) by mouth every 8 (eight) hours as needed for anxiety for up to 7 days, Starting Thu 4/20/2023, Until Wed 6/7/2023 at 2359, Normal      acetaminophen (TYLENOL) 325 mg tablet Take 2 tablets (650 mg total) by mouth every 6 (six) hours as needed for mild pain, headaches or fever, Starting Thu 4/13/2023, No Print      furosemide (LASIX) 40 mg tablet Take 40 mg by mouth 2 (two) times a day, Historical Med      potassium chloride (K-DUR,KLOR-CON) 20 mEq tablet Take 2 tablets (40 mEq total) by mouth daily for 5 days, Starting Thu 2/24/2022, Until Tue 3/22/2022, Normal       !! - Potential duplicate medications found  Please discuss with provider  No discharge procedures on file      PDMP Review       Value Time User    PDMP Reviewed  Yes 4/6/2023 10:19 PM Sivan Giles PA-C          ED Provider  Electronically Signed by           Pranay Shaw DO  06/07/23 3912

## 2023-06-07 NOTE — DISCHARGE INSTRUCTIONS
Laboratory and imaging studies are obtained did not show any significant abnormalities  Increase your Ativan dose to 1 mg  Continue all other prescribed medications and follow-up with your primary care provider

## 2023-06-12 LAB
QRS AXIS: -79 DEGREES
QRS AXIS: -88 DEGREES
QRSD INTERVAL: 92 MS
QRSD INTERVAL: 92 MS
QT INTERVAL: 304 MS
QT INTERVAL: 340 MS
QTC INTERVAL: 409 MS
QTC INTERVAL: 440 MS
T WAVE AXIS: 22 DEGREES
T WAVE AXIS: 40 DEGREES
VENTRICULAR RATE: 101 BPM
VENTRICULAR RATE: 109 BPM

## 2023-06-12 PROCEDURE — 93010 ELECTROCARDIOGRAM REPORT: CPT | Performed by: INTERNAL MEDICINE

## 2023-06-14 ENCOUNTER — HOSPITAL ENCOUNTER (EMERGENCY)
Facility: HOSPITAL | Age: 58
Discharge: HOME | End: 2023-06-14
Attending: EMERGENCY MEDICINE | Admitting: EMERGENCY MEDICINE
Payer: COMMERCIAL

## 2023-06-14 VITALS
SYSTOLIC BLOOD PRESSURE: 113 MMHG | HEIGHT: 73 IN | DIASTOLIC BLOOD PRESSURE: 72 MMHG | WEIGHT: 251.54 LBS | OXYGEN SATURATION: 97 % | TEMPERATURE: 97.9 F | BODY MASS INDEX: 33.34 KG/M2 | HEART RATE: 88 BPM | RESPIRATION RATE: 16 BRPM

## 2023-06-14 DIAGNOSIS — R63.4 WEIGHT LOSS: Primary | ICD-10-CM

## 2023-06-14 LAB
ALBUMIN SERPL BCP-MCNC: 3.9 G/DL (ref 3.5–5)
ALP SERPL-CCNC: 115 U/L (ref 34–104)
ALT SERPL W P-5'-P-CCNC: 9 U/L (ref 7–52)
ANION GAP SERPL CALCULATED.3IONS-SCNC: 9 MMOL/L (ref 4–13)
AST SERPL W P-5'-P-CCNC: 14 U/L (ref 13–39)
BASOPHILS # BLD AUTO: 0.03 THOUSANDS/ÂΜL (ref 0–0.1)
BASOPHILS NFR BLD AUTO: 0 % (ref 0–1)
BILIRUB SERPL-MCNC: 0.66 MG/DL (ref 0.2–1)
BUN SERPL-MCNC: 10 MG/DL (ref 5–25)
CALCIUM SERPL-MCNC: 9.3 MG/DL (ref 8.4–10.2)
CHLORIDE SERPL-SCNC: 98 MMOL/L (ref 96–108)
CO2 SERPL-SCNC: 29 MMOL/L (ref 21–32)
CREAT SERPL-MCNC: 0.76 MG/DL (ref 0.6–1.3)
EOSINOPHIL # BLD AUTO: 0.12 THOUSAND/ÂΜL (ref 0–0.61)
EOSINOPHIL NFR BLD AUTO: 2 % (ref 0–6)
ERYTHROCYTE [DISTWIDTH] IN BLOOD BY AUTOMATED COUNT: 14.9 % (ref 11.6–15.1)
GFR SERPL CREATININE-BSD FRML MDRD: 99 ML/MIN/1.73SQ M
GLUCOSE SERPL-MCNC: 101 MG/DL (ref 65–140)
HCT VFR BLD AUTO: 36.1 % (ref 36.5–49.3)
HGB BLD-MCNC: 11.3 G/DL (ref 12–17)
IMM GRANULOCYTES # BLD AUTO: 0.02 THOUSAND/UL (ref 0–0.2)
IMM GRANULOCYTES NFR BLD AUTO: 0 % (ref 0–2)
LIPASE SERPL-CCNC: 22 U/L (ref 11–82)
LYMPHOCYTES # BLD AUTO: 1.44 THOUSANDS/ÂΜL (ref 0.6–4.47)
LYMPHOCYTES NFR BLD AUTO: 19 % (ref 14–44)
MAGNESIUM SERPL-MCNC: 2.1 MG/DL (ref 1.9–2.7)
MCH RBC QN AUTO: 25.3 PG (ref 26.8–34.3)
MCHC RBC AUTO-ENTMCNC: 31.3 G/DL (ref 31.4–37.4)
MCV RBC AUTO: 81 FL (ref 82–98)
MONOCYTES # BLD AUTO: 0.84 THOUSAND/ÂΜL (ref 0.17–1.22)
MONOCYTES NFR BLD AUTO: 11 % (ref 4–12)
NEUTROPHILS # BLD AUTO: 5 THOUSANDS/ÂΜL (ref 1.85–7.62)
NEUTS SEG NFR BLD AUTO: 68 % (ref 43–75)
NRBC BLD AUTO-RTO: 0 /100 WBCS
PLATELET # BLD AUTO: 386 THOUSANDS/UL (ref 149–390)
PMV BLD AUTO: 9.6 FL (ref 8.9–12.7)
POTASSIUM SERPL-SCNC: 3.4 MMOL/L (ref 3.5–5.3)
PROT SERPL-MCNC: 7.5 G/DL (ref 6.4–8.4)
RBC # BLD AUTO: 4.47 MILLION/UL (ref 3.88–5.62)
SODIUM SERPL-SCNC: 136 MMOL/L (ref 135–147)
TSH SERPL DL<=0.05 MIU/L-ACNC: 1.51 UIU/ML (ref 0.45–4.5)
WBC # BLD AUTO: 7.45 THOUSAND/UL (ref 4.31–10.16)

## 2023-06-14 PROCEDURE — 83735 ASSAY OF MAGNESIUM: CPT | Performed by: EMERGENCY MEDICINE

## 2023-06-14 PROCEDURE — 80053 COMPREHEN METABOLIC PANEL: CPT | Performed by: EMERGENCY MEDICINE

## 2023-06-14 PROCEDURE — 85025 COMPLETE CBC W/AUTO DIFF WBC: CPT | Performed by: EMERGENCY MEDICINE

## 2023-06-14 PROCEDURE — 84443 ASSAY THYROID STIM HORMONE: CPT | Performed by: EMERGENCY MEDICINE

## 2023-06-14 PROCEDURE — 83690 ASSAY OF LIPASE: CPT | Performed by: EMERGENCY MEDICINE

## 2023-06-14 PROCEDURE — 36415 COLL VENOUS BLD VENIPUNCTURE: CPT | Performed by: EMERGENCY MEDICINE

## 2023-06-14 RX ORDER — PANTOPRAZOLE SODIUM 40 MG/10ML
40 INJECTION, POWDER, LYOPHILIZED, FOR SOLUTION INTRAVENOUS ONCE
Status: DISCONTINUED | OUTPATIENT
Start: 2023-06-14 | End: 2023-06-14 | Stop reason: HOSPADM

## 2023-06-15 ENCOUNTER — HOSPITAL ENCOUNTER (INPATIENT)
Facility: HOSPITAL | Age: 58
LOS: 3 days | Discharge: HOME | End: 2023-06-18
Attending: INTERNAL MEDICINE | Admitting: EMERGENCY MEDICINE
Payer: COMMERCIAL

## 2023-06-15 ENCOUNTER — HOSPITAL ENCOUNTER (EMERGENCY)
Facility: HOSPITAL | Age: 58
Discharge: HOME | End: 2023-06-15
Attending: EMERGENCY MEDICINE
Payer: COMMERCIAL

## 2023-06-15 VITALS
TEMPERATURE: 97.3 F | HEART RATE: 101 BPM | RESPIRATION RATE: 18 BRPM | SYSTOLIC BLOOD PRESSURE: 143 MMHG | OXYGEN SATURATION: 100 % | DIASTOLIC BLOOD PRESSURE: 92 MMHG

## 2023-06-15 DIAGNOSIS — F13.20 SEVERE BENZODIAZEPINE USE DISORDER (HCC): ICD-10-CM

## 2023-06-15 DIAGNOSIS — F13.10 BENZODIAZEPINE ABUSE (HCC): Primary | ICD-10-CM

## 2023-06-15 DIAGNOSIS — D50.9 MICROCYTIC ANEMIA: ICD-10-CM

## 2023-06-15 DIAGNOSIS — F41.9 ANXIETY: ICD-10-CM

## 2023-06-15 DIAGNOSIS — I48.91 ATRIAL FIBRILLATION, UNSPECIFIED TYPE (HCC): Chronic | ICD-10-CM

## 2023-06-15 DIAGNOSIS — I48.21 PERMANENT ATRIAL FIBRILLATION (HCC): ICD-10-CM

## 2023-06-15 DIAGNOSIS — Z98.84 HISTORY OF BARIATRIC SURGERY: ICD-10-CM

## 2023-06-15 PROBLEM — F13.939 BENZODIAZEPINE WITHDRAWAL WITH COMPLICATION (HCC): Status: ACTIVE | Noted: 2023-06-15

## 2023-06-15 LAB
ALBUMIN SERPL BCP-MCNC: 4.1 G/DL (ref 3.5–5)
ALP SERPL-CCNC: 114 U/L (ref 34–104)
ALT SERPL W P-5'-P-CCNC: 8 U/L (ref 7–52)
AMPHETAMINES SERPL QL SCN: NEGATIVE
ANION GAP SERPL CALCULATED.3IONS-SCNC: 10 MMOL/L (ref 4–13)
AST SERPL W P-5'-P-CCNC: 11 U/L (ref 13–39)
BARBITURATES UR QL: NEGATIVE
BASOPHILS # BLD AUTO: 0.03 THOUSANDS/ÂΜL (ref 0–0.1)
BASOPHILS NFR BLD AUTO: 0 % (ref 0–1)
BENZODIAZ UR QL: NEGATIVE
BILIRUB SERPL-MCNC: 0.78 MG/DL (ref 0.2–1)
BUN SERPL-MCNC: 7 MG/DL (ref 5–25)
CALCIUM SERPL-MCNC: 9.6 MG/DL (ref 8.4–10.2)
CHLORIDE SERPL-SCNC: 99 MMOL/L (ref 96–108)
CO2 SERPL-SCNC: 28 MMOL/L (ref 21–32)
COCAINE UR QL: NEGATIVE
CREAT SERPL-MCNC: 0.77 MG/DL (ref 0.6–1.3)
EOSINOPHIL # BLD AUTO: 0.1 THOUSAND/ÂΜL (ref 0–0.61)
EOSINOPHIL NFR BLD AUTO: 1 % (ref 0–6)
ERYTHROCYTE [DISTWIDTH] IN BLOOD BY AUTOMATED COUNT: 14.9 % (ref 11.6–15.1)
GFR SERPL CREATININE-BSD FRML MDRD: 99 ML/MIN/1.73SQ M
GLUCOSE SERPL-MCNC: 109 MG/DL (ref 65–140)
HCT VFR BLD AUTO: 36 % (ref 36.5–49.3)
HGB BLD-MCNC: 11.6 G/DL (ref 12–17)
IMM GRANULOCYTES # BLD AUTO: 0.02 THOUSAND/UL (ref 0–0.2)
IMM GRANULOCYTES NFR BLD AUTO: 0 % (ref 0–2)
LYMPHOCYTES # BLD AUTO: 1.49 THOUSANDS/ÂΜL (ref 0.6–4.47)
LYMPHOCYTES NFR BLD AUTO: 19 % (ref 14–44)
MAGNESIUM SERPL-MCNC: 2 MG/DL (ref 1.9–2.7)
MCH RBC QN AUTO: 25.7 PG (ref 26.8–34.3)
MCHC RBC AUTO-ENTMCNC: 32.2 G/DL (ref 31.4–37.4)
MCV RBC AUTO: 80 FL (ref 82–98)
METHADONE UR QL: NEGATIVE
MONOCYTES # BLD AUTO: 1.14 THOUSAND/ÂΜL (ref 0.17–1.22)
MONOCYTES NFR BLD AUTO: 14 % (ref 4–12)
NEUTROPHILS # BLD AUTO: 5.18 THOUSANDS/ÂΜL (ref 1.85–7.62)
NEUTS SEG NFR BLD AUTO: 66 % (ref 43–75)
NRBC BLD AUTO-RTO: 0 /100 WBCS
OPIATES UR QL SCN: NEGATIVE
OXYCODONE+OXYMORPHONE UR QL SCN: NEGATIVE
PCP UR QL: NEGATIVE
PLATELET # BLD AUTO: 386 THOUSANDS/UL (ref 149–390)
PMV BLD AUTO: 9.6 FL (ref 8.9–12.7)
POTASSIUM SERPL-SCNC: 3.4 MMOL/L (ref 3.5–5.3)
PROT SERPL-MCNC: 7.6 G/DL (ref 6.4–8.4)
RBC # BLD AUTO: 4.52 MILLION/UL (ref 3.88–5.62)
SODIUM SERPL-SCNC: 137 MMOL/L (ref 135–147)
THC UR QL: NEGATIVE
WBC # BLD AUTO: 7.96 THOUSAND/UL (ref 4.31–10.16)

## 2023-06-15 PROCEDURE — 99285 EMERGENCY DEPT VISIT HI MDM: CPT

## 2023-06-15 PROCEDURE — 80053 COMPREHEN METABOLIC PANEL: CPT

## 2023-06-15 PROCEDURE — 85025 COMPLETE CBC W/AUTO DIFF WBC: CPT

## 2023-06-15 PROCEDURE — 99284 EMERGENCY DEPT VISIT MOD MDM: CPT

## 2023-06-15 PROCEDURE — 36415 COLL VENOUS BLD VENIPUNCTURE: CPT

## 2023-06-15 PROCEDURE — 93005 ELECTROCARDIOGRAM TRACING: CPT

## 2023-06-15 PROCEDURE — 83735 ASSAY OF MAGNESIUM: CPT

## 2023-06-15 PROCEDURE — 80307 DRUG TEST PRSMV CHEM ANLYZR: CPT

## 2023-06-15 RX ORDER — ENOXAPARIN SODIUM 100 MG/ML
40 INJECTION SUBCUTANEOUS DAILY
Status: DISCONTINUED | OUTPATIENT
Start: 2023-06-16 | End: 2023-06-16

## 2023-06-15 RX ORDER — LANOLIN ALCOHOL/MO/W.PET/CERES
100 CREAM (GRAM) TOPICAL DAILY
Status: DISCONTINUED | OUTPATIENT
Start: 2023-06-16 | End: 2023-06-18 | Stop reason: HOSPADM

## 2023-06-15 RX ORDER — ACETAMINOPHEN 325 MG/1
650 TABLET ORAL EVERY 6 HOURS PRN
Status: DISCONTINUED | OUTPATIENT
Start: 2023-06-15 | End: 2023-06-18 | Stop reason: HOSPADM

## 2023-06-15 RX ORDER — PHENOBARBITAL SODIUM 130 MG/ML
260 INJECTION INTRAMUSCULAR ONCE
Status: COMPLETED | OUTPATIENT
Start: 2023-06-16 | End: 2023-06-15

## 2023-06-15 RX ORDER — FOLIC ACID 1 MG/1
1 TABLET ORAL DAILY
Status: DISCONTINUED | OUTPATIENT
Start: 2023-06-16 | End: 2023-06-18 | Stop reason: HOSPADM

## 2023-06-15 RX ORDER — POTASSIUM CHLORIDE 20 MEQ/1
20 TABLET, EXTENDED RELEASE ORAL ONCE
Status: DISCONTINUED | OUTPATIENT
Start: 2023-06-15 | End: 2023-06-15

## 2023-06-15 RX ORDER — POTASSIUM CHLORIDE 20 MEQ/1
40 TABLET, EXTENDED RELEASE ORAL ONCE
Status: COMPLETED | OUTPATIENT
Start: 2023-06-15 | End: 2023-06-15

## 2023-06-15 RX ORDER — LORAZEPAM 0.5 MG/1
0.5 TABLET ORAL ONCE
Status: COMPLETED | OUTPATIENT
Start: 2023-06-15 | End: 2023-06-15

## 2023-06-15 RX ORDER — ONDANSETRON 2 MG/ML
4 INJECTION INTRAMUSCULAR; INTRAVENOUS EVERY 6 HOURS PRN
Status: DISCONTINUED | OUTPATIENT
Start: 2023-06-15 | End: 2023-06-18 | Stop reason: HOSPADM

## 2023-06-15 RX ORDER — TRAZODONE HYDROCHLORIDE 50 MG/1
50 TABLET ORAL
Status: DISCONTINUED | OUTPATIENT
Start: 2023-06-15 | End: 2023-06-18 | Stop reason: HOSPADM

## 2023-06-15 RX ADMIN — POTASSIUM CHLORIDE 40 MEQ: 1500 TABLET, EXTENDED RELEASE ORAL at 23:56

## 2023-06-15 RX ADMIN — PHENOBARBITAL SODIUM 260 MG: 130 INJECTION INTRAMUSCULAR at 23:56

## 2023-06-15 RX ADMIN — LORAZEPAM 0.5 MG: 0.5 TABLET ORAL at 20:45

## 2023-06-15 NOTE — DISCHARGE INSTRUCTIONS
You were seen in the ED for benzodiazepine use  Return to the ED for any worsening symptoms or new symptoms  Follow up with your primary care doctor as soon as possible  Make sure to go to [facility] heart Emergency department at [address], [address], [address] [place] [address] for detox

## 2023-06-15 NOTE — ED NOTES
Provider at bedside       Zeferino Gutierrez, [address] [facility]  06/14/23 2020

## 2023-06-15 NOTE — ED PROVIDER NOTES
History  Chief Complaint   Patient presents with   • Detox Evaluation     Pt reports having a partial foot amputation in April and was prescribed ativan  Pt reports feeling as though he is now addicted to it and has also had a 93 lb weight loss over 8 weeks  64-year-old male patient with history of A-fib on Eliquis, diabetes presenting with request for detox from benzodiazepine  Patient states that since April he has been taking 2 5 to 3 mg of lorazepam daily  Patient states that he tried to be off of it however started to get shaky  Patient states that he currently has no symptoms other than weight loss of 93 pounds with the past 3 months  Patient was seen in multiple ED's and had negative work-ups for this  Currently denies any chest pain, shortness of breath, abdominal pain, nausea, vomiting, diarrhea  Patient requesting detox  Currently asymptomatic  Prior to Admission Medications   Prescriptions Last Dose Informant Patient Reported? Taking?    LORazepam (Ativan) 1 mg tablet   No No   Sig: Take 1 tablet (1 mg total) by mouth every 8 (eight) hours as needed for anxiety for up to 7 days   acetaminophen (TYLENOL) 325 mg tablet   No No   Sig: Take 2 tablets (650 mg total) by mouth every 6 (six) hours as needed for mild pain, headaches or fever   busPIRone (BUSPAR) 10 mg tablet   Yes No   Sig: TAKE 1 TABLET BY MOUTH 3 TIMES A DAY, MORNING, NOON, AND BEFORE BEDTIME   busPIRone (BUSPAR) 5 mg tablet   Yes No   Sig: TAKE BY MOUTH 1 TABLET IN THE MORNING AND 1 TABLET BEFORE BEDTIME    furosemide (LASIX) 40 mg tablet   Yes No   Sig: Take 40 mg by mouth 2 (two) times a day   Patient not taking: Reported on 6/7/2023   potassium chloride (K-DUR,KLOR-CON) 20 mEq tablet   No No   Sig: Take 2 tablets (40 mEq total) by mouth daily for 5 days   Patient not taking: Reported on 6/7/2023      Facility-Administered Medications: None       Past Medical History:   Diagnosis Date   • Atrial fibrillation (HCC)    • Chronic diastolic (congestive) heart failure (HCC)    • Diabetes mellitus ([facility] [address] )    • High cholesterol    • Hyperlipidemia    • Pacemaker    • Stroke [facility])        Past Surgical History:   Procedure Laterality Date   • APPENDECTOMY     • ATRIAL CARDIAC PACEMAKER INSERTION     • BARIATRIC SURGERY  05/2021   • EPIDURAL BLOCK INJECTION N/A 5/19/2022    Procedure: BLOCK / INJECTION EPIDURAL STEROID CERVICAL C7-T1;  Surgeon: Jalen Christina MD;  [facility];  Service: Pain Management    • FL GUIDED NEEDLE PLAC BX/ASP/INJ  3/22/2022   • FOOT AMPUTATION Left 4/28/2022    Procedure: LEFT TRANSMETATARSAL AMPUTATION ;  Surgeon: Blanca Madden DPM;  [facility];  Service: Podiatry   • NERVE BLOCK Right 2/10/2022    Procedure: BLOCK MEDIAL BRANCH C3, C4, C5 #1;  Surgeon: Jalen Christina MD;  [facility];  Service: Pain Management    • NERVE BLOCK Right 3/22/2022    Procedure: BLOCK MEDIAL BRANCH C3, C4, C5 #2;  Surgeon: Jalen Christina MD;  [facility];  Service: Pain Management    • MN AMPUTATION FOOT TRANSMETARSAL Left 4/12/2023    Procedure: REVISION LEFT TRANSMETATARSAL (TMA) AMPUTATION, REMOVAL OF UNVIABLE TISSUE AND BONE,;  Surgeon: Clarita Miranda DPM;  [facility];  Service: Podiatry   • MN AMPUTATION METATARSAL W/TOE SINGLE Left 4/7/2023    Procedure: 2ND RAY RESECTION FOOT;  Surgeon: Clarita Miranda DPM;  [facility];  Service: Podiatry   • MN AMPUTATION TOE INTERPHALANGEAL JOINT Left 11/16/2021    Procedure: AMPUTATION LESSER TOE;  Surgeon: Basil Baldwin DPM;  [facility];  Service: Podiatry   • RADIOFREQUENCY ABLATION Right 4/7/2022    Procedure: Right C3, C4, C5 RFA;  Surgeon: Jalen Christina MD;  [facility];  Service: Pain Management    • RHIZOTOMY Right 2/9/2023    Procedure: RHIZOTOMY CERVICAL MEDIAL BRANCH NERVES RIGHT C3, C4, C5;  Surgeon: Jalen Christina MD;  [facility];  Service: Pain Management    • TOE AMPUTATION Left 2nd toe   • TOE AMPUTATION Left 9/15/2021    Procedure: AMPUTATION LEFT 4TH TOE;  Surgeon: Oj Schwartz DPM;  [facility];  Service: Podiatry   • TOE AMPUTATION Right 1/12/2022    Procedure: AMPUTATION TOE;  Surgeon: Julianna Jennings DPM;  [facility];  Service: Podiatry   • TOE AMPUTATION Right 2/23/2022    Procedure: AMPUTATION TOE  RIGHT SECOND;  Surgeon: Julianna Jennings DPM;  Location: [address] [place] [address] MAIN OR;  Service: Podiatry   • TOE AMPUTATION Right 6/3/2022    Procedure: AMPUTATION right 4th TOE;  Surgeon: Gilles Sharma DPM;  [facility];  Service: Podiatry       Family History   Problem Relation Age of Onset   • No Known Problems Mother    • No Known Problems Father      I have reviewed and agree with the history as documented  E-Cigarette/Vaping   • E-Cigarette Use Never User      E-Cigarette/Vaping Substances     Social History     Tobacco Use   • Smoking status: Never   • Smokeless tobacco: Never   Vaping Use   • Vaping Use: Never used   Substance Use Topics   • Alcohol use: Never   • Drug use: Never        Review of Systems   Constitutional: Positive for unexpected weight change  All other systems reviewed and are negative  Physical Exam  ED Triage Vitals [06/15/23 0153]   Temperature Pulse Respirations Blood Pressure SpO2   (!) 97 3 °F (36 3 °C) 101 18 143/92 100 %      Temp Source Heart Rate Source Patient Position - Orthostatic VS BP Location FiO2 (%)   Oral Monitor Sitting Left arm --      Pain Score       No Pain             Orthostatic Vital Signs  Vitals:    06/15/23 0153   BP: 143/92   Pulse: 101   Patient Position - Orthostatic VS: Sitting       Physical Exam  Vitals reviewed  Constitutional:       Appearance: Normal appearance  HENT:      Head: Normocephalic and atraumatic  Nose: Nose normal       Mouth/Throat:      Mouth: Mucous membranes are moist       Pharynx: Oropharynx is clear  Eyes:      Extraocular Movements: Extraocular movements intact  Conjunctiva/sclera: Conjunctivae normal    Cardiovascular:      Rate and Rhythm: Normal rate and regular rhythm  Pulses: Normal pulses  Heart sounds: Normal heart sounds  Pulmonary:      Effort: Pulmonary effort is normal       Breath sounds: Normal breath sounds  Abdominal:      General: Bowel sounds are normal       Palpations: Abdomen is soft  Tenderness: There is no abdominal tenderness  Musculoskeletal:         General: Normal range of motion  Cervical back: Normal range of motion  Skin:     General: Skin is warm and dry  Neurological:      General: No focal deficit present  Mental Status: He is alert and oriented to person, place, and time  Mental status is at baseline  Gait: Gait abnormal (mild limp due to LLE toe amputation)  ED Medications  Medications - No data to display    Diagnostic Studies  Results Reviewed     None                 No orders to display         Procedures  Procedures      ED Course                                       Medical Decision Making  60 y/o male patient, hx of anxiety, DM, gastric sleeve, presenting with benzo abuse  Requesting detox  Patient also had recent weight loss  Patient had negative workups from ED, including yesterday  Today requesting for detox for benzo, uses 3 mg ativan daily  Spoke with detox, intially accepted admission however patient declined as his car was in [place] and did not want to leave it in [place]  Discussed with patient, urged patient to stay for trasnfer to [place] but patient insisted on leaving and driving to [place] ED  Currently stable  Gave information for [place] ED and address  Patient expressed understanding       Benzodiazepine abuse [facility]):     Details: use 3 mg daily, requesting detox   Amount and/or Complexity of Data Reviewed  Discussion of management or test interpretation with external provider(s): F/u with [facility] heart ED          Disposition  Final diagnoses: Benzodiazepine abuse ([facility] [address] )     Time reflects when diagnosis was documented in both MDM as applicable and the Disposition within this note     Time User Action Codes Description Comment    6/15/2023  2:56 AM Elvi Rivas Add [F13 10] Benzodiazepine abuse [facility])       ED Disposition     ED Disposition   Discharge    Condition   Stable    Date/Time   Thu Jhonny 15, 2023  2:56 AM    Comment   Rehana Mahajan discharge to home/self care  Follow-up Information     Follow up With Specialties Details Why Contact Danial Morse DO  Schedule an appointment as soon as possible for a visit   [address] [place] [address]  [phone]            Discharge Medication List as of 6/15/2023  3:00 AM      CONTINUE these medications which have NOT CHANGED    Details   acetaminophen (TYLENOL) 325 mg tablet Take 2 tablets (650 mg total) by mouth every 6 (six) hours as needed for mild pain, headaches or fever, Starting Thu 4/13/2023, No Print      !! busPIRone (BUSPAR) 10 mg tablet TAKE 1 TABLET BY MOUTH 3 TIMES A DAY, MORNING, NOON, AND BEFORE BEDTIME, Historical Med      !! busPIRone (BUSPAR) 5 mg tablet TAKE BY MOUTH 1 TABLET IN THE MORNING AND 1 TABLET BEFORE BEDTIME , Historical Med      furosemide (LASIX) 40 mg tablet Take 40 mg by mouth 2 (two) times a day, Historical Med      LORazepam (Ativan) 1 mg tablet Take 1 tablet (1 mg total) by mouth every 8 (eight) hours as needed for anxiety for up to 7 days, Starting Thu 4/20/2023, Until Wed 6/7/2023 at 2359, Normal      potassium chloride (K-DUR,KLOR-CON) 20 mEq tablet Take 2 tablets (40 mEq total) by mouth daily for 5 days, Starting Thu 2/24/2022, Until Tue 3/22/2022, Normal       !! - Potential duplicate medications found  Please discuss with provider  No discharge procedures on file      PDMP Review       Value Time User    PDMP Reviewed  Yes 4/6/2023 10:19 PM Kavon Valdez PA-C           ED Provider  Attending physically available and jsoe Washington I managed the patient along with the ED Attending      Electronically Signed by         Mira Parham MD  06/15/23 7541

## 2023-06-15 NOTE — ED NOTES
Pt offered food and water, declined at this time   No signs of distress noted     Bryce Marie RN  06/15/23 7804

## 2023-06-15 NOTE — DISCHARGE INSTRUCTIONS
Please follow-up with your family doctor regarding your outpatient findings  You could consider seeing a GI doctor about appetite issues; please consider discussing with your PCP about GI   Your labwork today aside from slightly low potassium of 3 4 (normal of 3 5) was normal  Your thyroid hormone was normal

## 2023-06-15 NOTE — ED PROVIDER NOTES
History  Chief Complaint   Patient presents with   • Weight Loss     Pt arrives from home stating he has lost approx  90 pounds in 9 weeks  Pt reports loss of appetite for approx 2 months  Hx gastric bypass surgery 1 year ago  HPI   57M w hx of afib, gastric bypass, anxiety, DM2 presenting with weight loss  States he has lost approx 90 lbs over the past 3 months  States he has been having difficulties sleeping and eating  Patient states if he doesn't take lorazepam, he gets nauseous and he wants to get off of lorazepam  He says he does not have an appetite  Also says that when he eats, he has pain in his upper abdomen  Denies fevers, black/bloody stools, urinary symptoms  Patient had a CT chest/abd/pelvis with IV and enteric contrast on 5/25/23 for weight loss/anorexia and it did not show acute processes  Prior to Admission Medications   Prescriptions Last Dose Informant Patient Reported? Taking?    LORazepam (Ativan) 1 mg tablet   No No   Sig: Take 1 tablet (1 mg total) by mouth every 8 (eight) hours as needed for anxiety for up to 7 days   acetaminophen (TYLENOL) 325 mg tablet   No No   Sig: Take 2 tablets (650 mg total) by mouth every 6 (six) hours as needed for mild pain, headaches or fever   busPIRone (BUSPAR) 10 mg tablet   Yes No   Sig: TAKE 1 TABLET BY MOUTH 3 TIMES A DAY, MORNING, NOON, AND BEFORE BEDTIME   busPIRone (BUSPAR) 5 mg tablet   Yes No   Sig: TAKE BY MOUTH 1 TABLET IN THE MORNING AND 1 TABLET BEFORE BEDTIME    furosemide (LASIX) 40 mg tablet   Yes No   Sig: Take 40 mg by mouth 2 (two) times a day   Patient not taking: Reported on 6/7/2023   potassium chloride (K-DUR,KLOR-CON) 20 mEq tablet   No No   Sig: Take 2 tablets (40 mEq total) by mouth daily for 5 days   Patient not taking: Reported on 6/7/2023      Facility-Administered Medications: None       Past Medical History:   Diagnosis Date   • Atrial fibrillation (HCC)    • Chronic diastolic (congestive) heart failure (HCC)    • Diabetes mellitus ([facility] [address] )    • High cholesterol    • Hyperlipidemia    • Pacemaker    • Stroke [facility])        Past Surgical History:   Procedure Laterality Date   • APPENDECTOMY     • ATRIAL CARDIAC PACEMAKER INSERTION     • BARIATRIC SURGERY  05/2021   • EPIDURAL BLOCK INJECTION N/A 5/19/2022    Procedure: BLOCK / INJECTION EPIDURAL STEROID CERVICAL C7-T1;  Surgeon: Radha Aldana MD;  [facility];  Service: Pain Management    • FL GUIDED NEEDLE PLAC BX/ASP/INJ  3/22/2022   • FOOT AMPUTATION Left 4/28/2022    Procedure: LEFT TRANSMETATARSAL AMPUTATION ;  Surgeon: Lizzy Madden DPM;  [facility];  Service: Podiatry   • NERVE BLOCK Right 2/10/2022    Procedure: BLOCK MEDIAL BRANCH C3, C4, C5 #1;  Surgeon: Radha Aldana MD;  [facility];  Service: Pain Management    • NERVE BLOCK Right 3/22/2022    Procedure: BLOCK MEDIAL BRANCH C3, C4, C5 #2;  Surgeon: Radha Aldana MD;  [facility];  Service: Pain Management    • MD AMPUTATION FOOT TRANSMETARSAL Left 4/12/2023    Procedure: REVISION LEFT TRANSMETATARSAL (TMA) AMPUTATION, REMOVAL OF UNVIABLE TISSUE AND BONE,;  Surgeon: Sprign Tucker DPM;  [facility];  Service: Podiatry   • MD AMPUTATION METATARSAL W/TOE SINGLE Left 4/7/2023    Procedure: 2ND RAY RESECTION FOOT;  Surgeon: Spring Tucker DPM;  [facility];  Service: Podiatry   • MD AMPUTATION TOE INTERPHALANGEAL JOINT Left 11/16/2021    Procedure: AMPUTATION LESSER TOE;  Surgeon: May Rutherford DPM;  [facility];  Service: Podiatry   • RADIOFREQUENCY ABLATION Right 4/7/2022    Procedure: Right C3, C4, C5 RFA;  Surgeon: Radha Aldana MD;  [facility];  Service: Pain Management    • RHIZOTOMY Right 2/9/2023    Procedure: RHIZOTOMY CERVICAL MEDIAL BRANCH NERVES RIGHT C3, C4, C5;  Surgeon: Radha Aldana MD;  [facility];  Service: Pain Management    • TOE AMPUTATION Left     2nd toe   • TOE AMPUTATION Left 9/15/2021    Procedure: AMPUTATION LEFT 4TH TOE;  Surgeon: Aris Hoff DPM;  [facility];  Service: Podiatry   • TOE AMPUTATION Right 1/12/2022    Procedure: AMPUTATION TOE;  Surgeon: Cleatus Fabry, DPM;  [facility];  Service: Podiatry   • TOE AMPUTATION Right 2/23/2022    Procedure: AMPUTATION TOE  RIGHT SECOND;  Surgeon: Cleatus Fabry, DPM;  Location: [address] [place] [address] MAIN OR;  Service: Podiatry   • TOE AMPUTATION Right 6/3/2022    Procedure: AMPUTATION right 4th TOE;  Surgeon: Anjelica Ritchie DPM;  [facility];  Service: Podiatry       Family History   Problem Relation Age of Onset   • No Known Problems Mother    • No Known Problems Father      I have reviewed and agree with the history as documented  E-Cigarette/Vaping   • E-Cigarette Use Never User      E-Cigarette/Vaping Substances     Social History     Tobacco Use   • Smoking status: Never   • Smokeless tobacco: Never   Vaping Use   • Vaping Use: Never used   Substance Use Topics   • Alcohol use: Never   • Drug use: Never       Review of Systems   Constitutional: Positive for appetite change  Negative for chills and fever  HENT: Negative for ear pain and sore throat  Eyes: Negative for pain and visual disturbance  Respiratory: Negative for cough and shortness of breath  Cardiovascular: Negative for chest pain and palpitations  Gastrointestinal: Positive for abdominal pain  Negative for vomiting  Genitourinary: Negative for dysuria and hematuria  Musculoskeletal: Negative for arthralgias and back pain  Skin: Negative for color change and rash  Neurological: Negative for seizures and syncope  Psychiatric/Behavioral: Positive for sleep disturbance  The patient is nervous/anxious  All other systems reviewed and are negative  Physical Exam  Physical Exam  Vitals and nursing note reviewed  Constitutional:       Appearance: He is well-developed  HENT:      Head: Normocephalic and atraumatic        Right Ear: External ear normal  Left Ear: External ear normal       Nose: Nose normal    Eyes:      Conjunctiva/sclera: Conjunctivae normal    Cardiovascular:      Rate and Rhythm: Normal rate and regular rhythm  Pulmonary:      Effort: Pulmonary effort is normal  No respiratory distress  Breath sounds: Normal breath sounds  Abdominal:      Palpations: Abdomen is soft  Tenderness: There is no abdominal tenderness  Musculoskeletal:      Cervical back: Normal range of motion and neck supple  Skin:     General: Skin is warm and dry  Neurological:      General: No focal deficit present  Mental Status: He is alert  Mental status is at baseline  Psychiatric:         Mood and Affect: Mood is anxious         Vital Signs  ED Triage Vitals [06/14/23 1946]   Temperature Pulse Respirations Blood Pressure SpO2   97 9 °F (36 6 °C) 95 18 (!) 113/102 100 %      Temp Source Heart Rate Source Patient Position - Orthostatic VS BP Location FiO2 (%)   Temporal Monitor Lying Right arm --      Pain Score       No Pain           Vitals:    06/14/23 1946 06/14/23 2044   BP: (!) 113/102 113/72   Pulse: 95 88   Patient Position - Orthostatic VS: Lying Lying         Visual Acuity      ED Medications  Medications - No data to display    Diagnostic Studies  Results Reviewed     Procedure Component Value Units Date/Time    TSH [[id number]]  (Normal) Collected: 06/14/23 2040    Lab Status: Final result Specimen: Blood from Arm, Left Updated: 06/14/23 2125     TSH 3RD GENERATON 1 512 uIU/mL     Comprehensive metabolic panel [[id number]]  (Abnormal) Collected: 06/14/23 2040    Lab Status: Final result Specimen: Blood from Arm, Left Updated: 06/14/23 2112     Sodium 136 mmol/L      Potassium 3 4 mmol/L      Chloride 98 mmol/L      CO2 29 mmol/L      ANION GAP 9 mmol/L      BUN 10 mg/dL      Creatinine 0 76 mg/dL      Glucose 101 mg/dL      Calcium 9 3 mg/dL      AST 14 U/L      ALT 9 U/L      Alkaline Phosphatase 115 U/L      Total Protein 7 5 g/dL Albumin 3 9 g/dL      Total Bilirubin 0 66 mg/dL      eGFR 99 ml/min/1 73sq m     Narrative:      National Kidney Disease Foundation guidelines for Chronic Kidney Disease (CKD):   •  Stage 1 with normal or high GFR (GFR > 90 mL/min/1 73 square meters)  •  Stage 2 Mild CKD (GFR = 60-89 mL/min/1 73 square meters)  •  Stage 3A Moderate CKD (GFR = 45-59 mL/min/1 73 square meters)  •  Stage 3B Moderate CKD (GFR = 30-44 mL/min/1 73 square meters)  •  Stage 4 Severe CKD (GFR = 15-29 mL/min/1 73 square meters)  •  Stage 5 End Stage CKD (GFR <15 mL/min/1 73 square meters)  Note: GFR calculation is accurate only with a steady state creatinine    Lipase [[id number]]  (Normal) Collected: 06/14/23 2040    Lab Status: Final result Specimen: Blood from Arm, Left Updated: 06/14/23 2112     Lipase 22 u/L     Magnesium [[id number]]  (Normal) Collected: 06/14/23 2040    Lab Status: Final result Specimen: Blood from Arm, Left Updated: 06/14/23 2111     Magnesium 2 1 mg/dL     CBC and differential [[id number]]  (Abnormal) Collected: 06/14/23 2040    Lab Status: Final result Specimen: Blood from Arm, Left Updated: 06/14/23 2053     WBC 7 45 Thousand/uL      RBC 4 47 Million/uL      Hemoglobin 11 3 g/dL      Hematocrit 36 1 %      MCV 81 fL      MCH 25 3 pg      MCHC 31 3 g/dL      RDW 14 9 %      MPV 9 6 fL      Platelets 821 Thousands/uL      nRBC 0 /100 WBCs      Neutrophils Relative 68 %      Immat GRANS % 0 %      Lymphocytes Relative 19 %      Monocytes Relative 11 %      Eosinophils Relative 2 %      Basophils Relative 0 %      Neutrophils Absolute 5 00 Thousands/µL      Immature Grans Absolute 0 02 Thousand/uL      Lymphocytes Absolute 1 44 Thousands/µL      Monocytes Absolute 0 84 Thousand/µL      Eosinophils Absolute 0 12 Thousand/µL      Basophils Absolute 0 03 Thousands/µL                  No orders to display              Procedures  Procedures         ED Course  ED Course as of 06/15/23 0057   Wed Jun 14, 2023 2059 WBC: 7 45 2059 Hemoglobin(!): 11 3   2117 Sodium: 136   2117 Potassium(!): 3 4   2117 Magnesium: 2 1   2117 Lipase: 22   2126 TSH 3RD GENERATON: 1 512       SBIRT 20yo+    Flowsheet Row Most Recent Value   Initial Alcohol Screen: US AUDIT-C     1  How often do you have a drink containing alcohol? 0 Filed at: 06/14/2023 2006   2  How many drinks containing alcohol do you have on a typical day you are drinking? 0 Filed at: 06/14/2023 2006   3a  Male UNDER 65: How often do you have five or more drinks on one occasion? 0 Filed at: 06/14/2023 2006   3b  FEMALE Any Age, or MALE 65+: How often do you have 4 or more drinks on one occassion? 0 Filed at: 06/14/2023 2006   Audit-C Score 0 Filed at: 06/14/2023 2006   ANGELO: How many times in the past year have you    Used an illegal drug or used a prescription medication for non-medical reasons? Never Filed at: 06/14/2023 2006                    Medical Decision Making  57M presenting with weight loss  Recently had CT chest/abd/pelvis w IV and po contrast which was unremarkable  I discussed with patient that I see limited utility in repeating the study  Obtained labwork to eval for electrolyte abnl, anemia, hypothyroidism related to weight loss / decreased appetite  Labwork with mild hypokalemia 3 4  Mild anemia 11 3 which is around patient's baseline  Magnesium wnl  TSH wnl  IV fluids and protonix ordered for symptomatic management, however patient declined  I advised he follow closely with his PCP for further testing and to discuss slowly weaning off of lorazepam since he has been on it for years  Amount and/or Complexity of Data Reviewed  Labs: ordered  Decision-making details documented in ED Course  Risk  OTC drugs  Prescription drug management        Disposition  Final diagnoses:   Weight loss     Time reflects when diagnosis was documented in both MDM as applicable and the Disposition within this note     Time User Action Codes Description Comment    6/14/2023 9:42 PM Aron Escobedo Add [R63 4] Weight loss       ED Disposition     ED Disposition   Discharge    Condition   Stable    Date/Time   Wed Jun 14, 2023  9:41 PM    Comment   Bernabe Christie discharge to home/self care  Follow-up Information     Follow up With Specialties Details Why Contact Info    Isaac Ugarte, DO  Schedule an appointment as soon as possible for a visit   [address] [place] [address]  [phone]            Discharge Medication List as of 6/14/2023  9:43 PM      CONTINUE these medications which have NOT CHANGED    Details   acetaminophen (TYLENOL) 325 mg tablet Take 2 tablets (650 mg total) by mouth every 6 (six) hours as needed for mild pain, headaches or fever, Starting Thu 4/13/2023, No Print      !! busPIRone (BUSPAR) 10 mg tablet TAKE 1 TABLET BY MOUTH 3 TIMES A DAY, MORNING, NOON, AND BEFORE BEDTIME, Historical Med      !! busPIRone (BUSPAR) 5 mg tablet TAKE BY MOUTH 1 TABLET IN THE MORNING AND 1 TABLET BEFORE BEDTIME , Historical Med      furosemide (LASIX) 40 mg tablet Take 40 mg by mouth 2 (two) times a day, Historical Med      LORazepam (Ativan) 1 mg tablet Take 1 tablet (1 mg total) by mouth every 8 (eight) hours as needed for anxiety for up to 7 days, Starting Thu 4/20/2023, Until Wed 6/7/2023 at 2359, Normal      potassium chloride (K-DUR,KLOR-CON) 20 mEq tablet Take 2 tablets (40 mEq total) by mouth daily for 5 days, Starting Thu 2/24/2022, Until Tue 3/22/2022, Normal       !! - Potential duplicate medications found  Please discuss with provider  No discharge procedures on file      PDMP Review       Value Time User    PDMP Reviewed  Yes 4/6/2023 10:19 PM Ben Martin PA-C          ED Provider  Electronically Signed by           Sanjeev Moser MD  06/15/23 99

## 2023-06-15 NOTE — ED NOTES
"Pt refusing meds & fluids; patient stating that he is here for his anxiety and weight loss and \"not my stomach\"     Emily Patterson, RN  06/14/23 2047    "

## 2023-06-15 NOTE — ED ATTENDING ATTESTATION
6/15/2023  IAlfredo MD, saw and evaluated the patient  I have discussed the patient with the resident/non-physician practitioner and agree with the resident's/non-physician practitioner's findings, Plan of Care, and MDM as documented in the resident's/non-physician practitioner's note, except where noted  All available labs and Radiology studies were reviewed  I was present for key portions of any procedure(s) performed by the resident/non-physician practitioner and I was immediately available to provide assistance  At this point I agree with the current assessment done in the Emergency Department  I have conducted an independent evaluation of this patient a history and physical is as follows:    ED Course  ED Course as of 06/15/23 0259   u Jhonny 15, 2023   0215 Per resident h&p 60 YO M presents for benzodiazepine addiction; lorazepam 3 5 mg daily; feels sick when he attempts to withdraw; weight loss over last 3 months; O: NL exam I/P will speak to [facility] detox unit       Emergency Department Note- Clotilde Donnelly 59 y o  male MRN: [id number]    Unit/Bed#: [facility] Encounter: [id number]    Clotilde Donnelly is a 59 y o  male who presents with   Chief Complaint   Patient presents with   • Detox Evaluation     Pt reports having a partial foot amputation in April and was prescribed ativan  Pt reports feeling as though he is now addicted to it and has also had a 93 lb weight loss over 8 weeks  History of Present Illness   HPI:  Clotilde Donnelly is a 59 y o  male who presents for evaluation of:  Wanting to obtain benzodiazepine addiction rehabilitation  Patient states that he has been addicted to benzodiazepines ever since he had a partial foot amputation several months ago  Patient feels sick when he does not get his typical daily benzodiazepine intake, 3 to 4 mg daily of lorazepam   He denies depression, hallucinations, and any thoughts of self-harm    Patient has been losing weight over a period of months; he is status post gastric bypass surgery last year  Review of Systems   Constitutional: Negative for fatigue and fever  HENT: Negative for congestion and sore throat  Respiratory: Negative for cough and shortness of breath  Cardiovascular: Negative for chest pain and palpitations  Gastrointestinal: Negative for abdominal pain and nausea  Genitourinary: Negative for flank pain and frequency  Neurological: Negative for light-headedness and headaches  Psychiatric/Behavioral: Positive for decreased concentration  Negative for dysphoric mood and hallucinations  The patient is nervous/anxious  All other systems reviewed and are negative        Historical Information   Past Medical History:   Diagnosis Date   • Atrial fibrillation ([address] )    • Chronic diastolic (congestive) heart failure ([address] )    • Diabetes mellitus ([facility] )    • High cholesterol    • Hyperlipidemia    • Pacemaker    • Stroke [facility])      Past Surgical History:   Procedure Laterality Date   • APPENDECTOMY     • ATRIAL CARDIAC PACEMAKER INSERTION     • BARIATRIC SURGERY  05/2021   • EPIDURAL BLOCK INJECTION N/A 5/19/2022    Procedure: BLOCK / INJECTION EPIDURAL STEROID CERVICAL C7-T1;  Surgeon: Jessica Ham MD;  Location:  ENDO;  Service: Pain Management    • FL GUIDED NEEDLE PLAC BX/ASP/INJ  3/22/2022   • FOOT AMPUTATION Left 4/28/2022    Procedure: LEFT TRANSMETATARSAL AMPUTATION ;  Surgeon: Gilles Sharma DPM;  [facility];  Service: Podiatry   • NERVE BLOCK Right 2/10/2022    Procedure: BLOCK MEDIAL BRANCH C3, C4, C5 #1;  Surgeon: Jessica Ham MD;  Location: [facility];  Service: Pain Management    • NERVE BLOCK Right 3/22/2022    Procedure: BLOCK MEDIAL BRANCH C3, C4, C5 #2;  Surgeon: Jessica Ham MD;  Location:  ENDO;  Service: Pain Management    • NC AMPUTATION FOOT TRANSMETARSAL Left 4/12/2023    Procedure: REVISION LEFT TRANSMETATARSAL (TMA) AMPUTATION, REMOVAL OF UNVIABLE TISSUE AND BONE,; Surgeon: Sarah Darling DPM;  Location: [facility];  Service: Podiatry   • WV AMPUTATION METATARSAL W/TOE SINGLE Left 4/7/2023    Procedure: 2ND RAY RESECTION FOOT;  Surgeon: Sarah Darling DPM;  Location: [facility];  Service: Podiatry   • WV AMPUTATION TOE INTERPHALANGEAL JOINT Left 11/16/2021    Procedure: AMPUTATION LESSER TOE;  Surgeon: Yvonne Madison DPM;  [facility];  Service: Podiatry   • RADIOFREQUENCY ABLATION Right 4/7/2022    Procedure: Right C3, C4, C5 RFA;  Surgeon: Elizabeth Benjamin MD;  [facility]: [facility];  Service: Pain Management    • RHIZOTOMY Right 2/9/2023    Procedure: RHIZOTOMY CERVICAL MEDIAL BRANCH NERVES RIGHT C3, C4, C5;  Surgeon: Elizabeth Benjamin MD;  [facility]: [facility];  Service: Pain Management    • TOE AMPUTATION Left     2nd toe   • TOE AMPUTATION Left 9/15/2021    Procedure: AMPUTATION LEFT 4TH TOE;  Surgeon: Yvonne Madison DPM;  [facility];  Service: Podiatry   • TOE AMPUTATION Right 1/12/2022    Procedure: AMPUTATION TOE;  Surgeon: Won Simons DPM;  [facility];  Service: Podiatry   • TOE AMPUTATION Right 2/23/2022    Procedure: AMPUTATION TOE  RIGHT SECOND;  Surgeon: Won Simons DPM;  Location: [address] [place] [address] MAIN OR;  Service: Podiatry   • TOE AMPUTATION Right 6/3/2022    Procedure: AMPUTATION right 4th TOE;  Surgeon: Tennille Nelson DPM;  [facility];  Service: Podiatry     Social History   Social History     Substance and Sexual Activity   Alcohol Use Never     Social History     Substance and Sexual Activity   Drug Use Never     Social History     Tobacco Use   Smoking Status Never   Smokeless Tobacco Never     Family History:   Family History   Problem Relation Age of Onset   • No Known Problems Mother    • No Known Problems Father        Meds/Allergies   PTA meds:   Prior to Admission Medications   Prescriptions Last Dose Informant Patient Reported? Taking?    LORazepam (Ativan) 1 mg tablet   No No   Sig: Take 1 tablet (1 mg total) by mouth every 8 (eight) hours as needed for anxiety for up to 7 days   acetaminophen (TYLENOL) 325 mg tablet   No No   Sig: Take 2 tablets (650 mg total) by mouth every 6 (six) hours as needed for mild pain, headaches or fever   busPIRone (BUSPAR) 10 mg tablet   Yes No   Sig: TAKE 1 TABLET BY MOUTH 3 TIMES A DAY, MORNING, NOON, AND BEFORE BEDTIME   busPIRone (BUSPAR) 5 mg tablet   Yes No   Sig: TAKE BY MOUTH 1 TABLET IN THE MORNING AND 1 TABLET BEFORE BEDTIME    furosemide (LASIX) 40 mg tablet   Yes No   Sig: Take 40 mg by mouth 2 (two) times a day   Patient not taking: Reported on 6/7/2023   potassium chloride (K-DUR,KLOR-CON) 20 mEq tablet   No No   Sig: Take 2 tablets (40 mEq total) by mouth daily for 5 days   Patient not taking: Reported on 6/7/2023      Facility-Administered Medications: None     No Known Allergies    Objective   First Vitals:   Blood Pressure: 143/92 (06/15/23 0153)  Pulse: 101 (06/15/23 0153)  Temperature: (!) 97 3 °F (36 3 °C) (06/15/23 0153)  Temp Source: Oral (06/15/23 0153)  Respirations: 18 (06/15/23 0153)  SpO2: 100 % (06/15/23 0153)    Current Vitals:   Blood Pressure: 143/92 (06/15/23 0153)  Pulse: 101 (06/15/23 0153)  Temperature: (!) 97 3 °F (36 3 °C) (06/15/23 0153)  Temp Source: Oral (06/15/23 0153)  Respirations: 18 (06/15/23 0153)  SpO2: 100 % (06/15/23 0153)    No intake or output data in the 24 hours ending 06/15/23 0259    Invasive Devices     None                 Physical Exam  Vitals and nursing note reviewed  Constitutional:       General: He is not in acute distress  Appearance: Normal appearance  He is well-developed  HENT:      Head: Normocephalic and atraumatic  Right Ear: External ear normal       Left Ear: External ear normal       Nose: Nose normal       Mouth/Throat:      Pharynx: No oropharyngeal exudate  Eyes:      Conjunctiva/sclera: Conjunctivae normal       Pupils: Pupils are equal, round, and reactive to light     Cardiovascular:      Rate and Rhythm: Normal rate and regular rhythm  Pulmonary:      Effort: Pulmonary effort is normal  No respiratory distress  Abdominal:      General: Abdomen is flat  There is no distension  Palpations: Abdomen is soft  Musculoskeletal:         General: No deformity  Normal range of motion  Cervical back: Normal range of motion and neck supple  Comments: Partial left foot amputation   Skin:     General: Skin is warm and dry  Capillary Refill: Capillary refill takes less than 2 seconds  Neurological:      General: No focal deficit present  Mental Status: He is alert and oriented to person, place, and time  Mental status is at baseline  Coordination: Coordination normal    Psychiatric:         Mood and Affect: Mood normal          Behavior: Behavior normal          Thought Content: Thought content normal          Judgment: Judgment normal            Medical Decision Makin    Anxiety secondary to benzodiazepine addiction: Care and case discussed with [facility] detox unit; patient now states in the ED that he does not want inpatient admission for benzodiazepine withdrawal rehabilitation    Recent Results (from the past 36 hour(s))   CBC and differential    Collection Time: 23  8:40 PM   Result Value Ref Range    WBC 7 45 4 31 - 10 16 Thousand/uL    RBC 4 47 3 88 - 5 62 Million/uL    Hemoglobin 11 3 (L) 12 0 - 17 0 g/dL    Hematocrit 36 1 (L) 36 5 - 49 3 %    MCV 81 (L) 82 - 98 fL    MCH 25 3 (L) 26 8 - 34 3 pg    MCHC 31 3 (L) 31 4 - 37 4 g/dL    RDW 14 9 11 6 - 15 1 %    MPV 9 6 8 9 - 12 7 fL    Platelets 452 040 - 774 Thousands/uL    nRBC 0 /100 WBCs    Neutrophils Relative 68 43 - 75 %    Immat GRANS % 0 0 - 2 %    Lymphocytes Relative 19 14 - 44 %    Monocytes Relative 11 4 - 12 %    Eosinophils Relative 2 0 - 6 %    Basophils Relative 0 0 - 1 %    Neutrophils Absolute 5 00 1 85 - 7 62 Thousands/µL    Immature Grans Absolute 0 02 0 00 - 0 20 Thousand/uL "Lymphocytes Absolute 1 44 0 60 - 4 47 Thousands/µL    Monocytes Absolute 0 84 0 17 - 1 22 Thousand/µL    Eosinophils Absolute 0 12 0 00 - 0 61 Thousand/µL    Basophils Absolute 0 03 0 00 - 0 10 Thousands/µL   Comprehensive metabolic panel    Collection Time: 06/14/23  8:40 PM   Result Value Ref Range    Sodium 136 135 - 147 mmol/L    Potassium 3 4 (L) 3 5 - 5 3 mmol/L    Chloride 98 96 - 108 mmol/L    CO2 29 21 - 32 mmol/L    ANION GAP 9 4 - 13 mmol/L    BUN 10 5 - 25 mg/dL    Creatinine 0 76 0 60 - 1 30 mg/dL    Glucose 101 65 - 140 mg/dL    Calcium 9 3 8 4 - 10 2 mg/dL    AST 14 13 - 39 U/L    ALT 9 7 - 52 U/L    Alkaline Phosphatase 115 (H) 34 - 104 U/L    Total Protein 7 5 6 4 - 8 4 g/dL    Albumin 3 9 3 5 - 5 0 g/dL    Total Bilirubin 0 66 0 20 - 1 00 mg/dL    eGFR 99 ml/min/1 73sq m   Magnesium    Collection Time: 06/14/23  8:40 PM   Result Value Ref Range    Magnesium 2 1 1 9 - 2 7 mg/dL   Lipase    Collection Time: 06/14/23  8:40 PM   Result Value Ref Range    Lipase 22 11 - 82 u/L   TSH    Collection Time: 06/14/23  8:40 PM   Result Value Ref Range    TSH 3RD GENERATON 1 512 0 450 - 4 500 uIU/mL     No orders to display         Portions of the record may have been created with voice recognition software  Occasional wrong word or \"sound a like\" substitutions may have occurred due to the inherent limitations of voice recognition software  Read the chart carefully and recognize, using context, where substitutions have occurred        Critical Care Time  Procedures    "

## 2023-06-16 PROBLEM — E43 SEVERE PROTEIN-CALORIE MALNUTRITION (HCC): Status: ACTIVE | Noted: 2023-06-16

## 2023-06-16 LAB
ALBUMIN SERPL BCP-MCNC: 3.4 G/DL (ref 3.5–5)
ALP SERPL-CCNC: 100 U/L (ref 34–104)
ALT SERPL W P-5'-P-CCNC: 6 U/L (ref 7–52)
ANION GAP SERPL CALCULATED.3IONS-SCNC: 10 MMOL/L (ref 4–13)
AST SERPL W P-5'-P-CCNC: 11 U/L (ref 13–39)
ATRIAL RATE: 115 BPM
ATRIAL RATE: 288 BPM
BILIRUB SERPL-MCNC: 0.6 MG/DL (ref 0.2–1)
BUN SERPL-MCNC: 6 MG/DL (ref 5–25)
CALCIUM ALBUM COR SERPL-MCNC: 9.5 MG/DL (ref 8.3–10.1)
CALCIUM SERPL-MCNC: 9 MG/DL (ref 8.4–10.2)
CHLORIDE SERPL-SCNC: 103 MMOL/L (ref 96–108)
CO2 SERPL-SCNC: 27 MMOL/L (ref 21–32)
CREAT SERPL-MCNC: 0.64 MG/DL (ref 0.6–1.3)
ERYTHROCYTE [DISTWIDTH] IN BLOOD BY AUTOMATED COUNT: 15.1 % (ref 11.6–15.1)
FERRITIN SERPL-MCNC: 457 NG/ML (ref 24–336)
GFR SERPL CREATININE-BSD FRML MDRD: 107 ML/MIN/1.73SQ M
GLUCOSE SERPL-MCNC: 87 MG/DL (ref 65–140)
HCT VFR BLD AUTO: 34.6 % (ref 36.5–49.3)
HGB BLD-MCNC: 10.6 G/DL (ref 12–17)
IRON SATN MFR SERPL: 10 % (ref 20–50)
IRON SERPL-MCNC: 22 UG/DL (ref 65–175)
MAGNESIUM SERPL-MCNC: 2.1 MG/DL (ref 1.9–2.7)
MCH RBC QN AUTO: 24.7 PG (ref 26.8–34.3)
MCHC RBC AUTO-ENTMCNC: 30.6 G/DL (ref 31.4–37.4)
MCV RBC AUTO: 81 FL (ref 82–98)
PLATELET # BLD AUTO: 336 THOUSANDS/UL (ref 149–390)
PMV BLD AUTO: 10 FL (ref 8.9–12.7)
POTASSIUM SERPL-SCNC: 3 MMOL/L (ref 3.5–5.3)
PROT SERPL-MCNC: 6.5 G/DL (ref 6.4–8.4)
QRS AXIS: -54 DEGREES
QRS AXIS: -62 DEGREES
QRSD INTERVAL: 102 MS
QRSD INTERVAL: 106 MS
QT INTERVAL: 378 MS
QT INTERVAL: 388 MS
QTC INTERVAL: 413 MS
QTC INTERVAL: 415 MS
RBC # BLD AUTO: 4.29 MILLION/UL (ref 3.88–5.62)
SODIUM SERPL-SCNC: 140 MMOL/L (ref 135–147)
T WAVE AXIS: 0 DEGREES
T WAVE AXIS: 13 DEGREES
TIBC SERPL-MCNC: 222 UG/DL (ref 250–450)
VENTRICULAR RATE: 69 BPM
VENTRICULAR RATE: 72 BPM
WBC # BLD AUTO: 5.36 THOUSAND/UL (ref 4.31–10.16)

## 2023-06-16 PROCEDURE — 83540 ASSAY OF IRON: CPT

## 2023-06-16 PROCEDURE — 83036 HEMOGLOBIN GLYCOSYLATED A1C: CPT

## 2023-06-16 PROCEDURE — 99291 CRITICAL CARE FIRST HOUR: CPT | Performed by: EMERGENCY MEDICINE

## 2023-06-16 PROCEDURE — 99223 1ST HOSP IP/OBS HIGH 75: CPT | Performed by: INTERNAL MEDICINE

## 2023-06-16 PROCEDURE — 82728 ASSAY OF FERRITIN: CPT

## 2023-06-16 PROCEDURE — 83550 IRON BINDING TEST: CPT

## 2023-06-16 PROCEDURE — 93010 ELECTROCARDIOGRAM REPORT: CPT | Performed by: INTERNAL MEDICINE

## 2023-06-16 PROCEDURE — 83735 ASSAY OF MAGNESIUM: CPT

## 2023-06-16 PROCEDURE — 99254 IP/OBS CNSLTJ NEW/EST MOD 60: CPT | Performed by: STUDENT IN AN ORGANIZED HEALTH CARE EDUCATION/TRAINING PROGRAM

## 2023-06-16 PROCEDURE — 80053 COMPREHEN METABOLIC PANEL: CPT

## 2023-06-16 PROCEDURE — 85027 COMPLETE CBC AUTOMATED: CPT

## 2023-06-16 RX ORDER — PHENOBARBITAL 64.8 MG/1
64.8 TABLET ORAL ONCE
Status: DISCONTINUED | OUTPATIENT
Start: 2023-06-16 | End: 2023-06-16

## 2023-06-16 RX ORDER — POLYETHYLENE GLYCOL 3350 17 G/17G
17 POWDER, FOR SOLUTION ORAL DAILY PRN
Status: DISCONTINUED | OUTPATIENT
Start: 2023-06-16 | End: 2023-06-17

## 2023-06-16 RX ORDER — PANTOPRAZOLE SODIUM 40 MG/1
40 TABLET, DELAYED RELEASE ORAL
Status: DISCONTINUED | OUTPATIENT
Start: 2023-06-17 | End: 2023-06-18 | Stop reason: HOSPADM

## 2023-06-16 RX ORDER — PHENOBARBITAL SODIUM 65 MG/ML
65 INJECTION INTRAMUSCULAR ONCE
Status: COMPLETED | OUTPATIENT
Start: 2023-06-16 | End: 2023-06-16

## 2023-06-16 RX ORDER — HYDROXYZINE 50 MG/1
50 TABLET, FILM COATED ORAL EVERY 6 HOURS PRN
Status: DISCONTINUED | OUTPATIENT
Start: 2023-06-16 | End: 2023-06-18 | Stop reason: HOSPADM

## 2023-06-16 RX ORDER — FERROUS SULFATE 325(65) MG
325 TABLET ORAL
Status: DISCONTINUED | OUTPATIENT
Start: 2023-06-17 | End: 2023-06-18 | Stop reason: HOSPADM

## 2023-06-16 RX ORDER — DOCUSATE SODIUM 100 MG/1
100 CAPSULE, LIQUID FILLED ORAL 2 TIMES DAILY
Status: DISCONTINUED | OUTPATIENT
Start: 2023-06-16 | End: 2023-06-18 | Stop reason: HOSPADM

## 2023-06-16 RX ORDER — HYDROXYZINE 50 MG/1
50 TABLET, FILM COATED ORAL EVERY 6 HOURS PRN
Status: DISCONTINUED | OUTPATIENT
Start: 2023-06-16 | End: 2023-06-16

## 2023-06-16 RX ORDER — MAGNESIUM HYDROXIDE/ALUMINUM HYDROXICE/SIMETHICONE 120; 1200; 1200 MG/30ML; MG/30ML; MG/30ML
30 SUSPENSION ORAL EVERY 4 HOURS PRN
Status: DISCONTINUED | OUTPATIENT
Start: 2023-06-16 | End: 2023-06-18 | Stop reason: HOSPADM

## 2023-06-16 RX ORDER — GABAPENTIN 300 MG/1
300 CAPSULE ORAL 3 TIMES DAILY
Status: DISCONTINUED | OUTPATIENT
Start: 2023-06-16 | End: 2023-06-17

## 2023-06-16 RX ORDER — PHENOBARBITAL 64.8 MG/1
64.8 TABLET ORAL ONCE
Status: COMPLETED | OUTPATIENT
Start: 2023-06-16 | End: 2023-06-16

## 2023-06-16 RX ORDER — POTASSIUM CHLORIDE 14.9 MG/ML
20 INJECTION INTRAVENOUS ONCE
Status: COMPLETED | OUTPATIENT
Start: 2023-06-16 | End: 2023-06-17

## 2023-06-16 RX ORDER — MIRTAZAPINE 15 MG/1
15 TABLET, FILM COATED ORAL
Status: DISCONTINUED | OUTPATIENT
Start: 2023-06-16 | End: 2023-06-17

## 2023-06-16 RX ADMIN — PHENOBARBITAL SODIUM 65 MG: 65 INJECTION INTRAMUSCULAR; INTRAVENOUS at 08:09

## 2023-06-16 RX ADMIN — GABAPENTIN 300 MG: 300 CAPSULE ORAL at 21:06

## 2023-06-16 RX ADMIN — PHENOBARBITAL 64.8 MG: 64.8 TABLET ORAL at 05:17

## 2023-06-16 RX ADMIN — RIVAROXABAN 20 MG: 20 TABLET, FILM COATED ORAL at 16:46

## 2023-06-16 RX ADMIN — THIAMINE HCL TAB 100 MG 100 MG: 100 TAB at 07:48

## 2023-06-16 RX ADMIN — PHENOBARBITAL SODIUM 65 MG: 65 INJECTION INTRAMUSCULAR; INTRAVENOUS at 10:30

## 2023-06-16 RX ADMIN — POLYETHYLENE GLYCOL 3350 17 G: 17 POWDER, FOR SOLUTION ORAL at 15:14

## 2023-06-16 RX ADMIN — POTASSIUM CHLORIDE 20 MEQ: 14.9 INJECTION, SOLUTION INTRAVENOUS at 10:06

## 2023-06-16 RX ADMIN — GABAPENTIN 300 MG: 300 CAPSULE ORAL at 12:11

## 2023-06-16 RX ADMIN — ALUMINUM HYDROXIDE, MAGNESIUM HYDROXIDE, AND SIMETHICONE 30 ML: 200; 200; 20 SUSPENSION ORAL at 15:14

## 2023-06-16 RX ADMIN — MIRTAZAPINE 15 MG: 15 TABLET, FILM COATED ORAL at 21:06

## 2023-06-16 RX ADMIN — FOLIC ACID 1 MG: 1 TABLET ORAL at 07:49

## 2023-06-16 RX ADMIN — HYDROXYZINE HYDROCHLORIDE 50 MG: 50 TABLET, FILM COATED ORAL at 16:46

## 2023-06-16 RX ADMIN — DOCUSATE SODIUM 100 MG: 100 CAPSULE, LIQUID FILLED ORAL at 21:06

## 2023-06-16 RX ADMIN — MULTIPLE VITAMINS W/ MINERALS TAB 1 TABLET: TAB ORAL at 07:48

## 2023-06-16 RX ADMIN — GABAPENTIN 300 MG: 300 CAPSULE ORAL at 15:26

## 2023-06-16 RX ADMIN — ENOXAPARIN SODIUM 40 MG: 40 INJECTION SUBCUTANEOUS at 08:09

## 2023-06-16 NOTE — ASSESSMENT & PLAN NOTE
"Lab Results   Component Value Date    HGBA1C 5 7 (H) 06/16/2023       No results for input(s): \"POCGLU\" in the last 72 hours      Blood Sugar Average: Last 72 hrs:     · Patient reports history of type 2 diabetes, previously on medication but no longer takes  · Most recent hemoglobin A1c-5 8  · Has peripheral neuropathy, peripheral vascular disease, and history of 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  · Diabetes improved after gastric bypass  · Monitor blood glucose  · Hemoglobin A1c: 5 7  · Continue to monitor  "

## 2023-06-16 NOTE — ASSESSMENT & PLAN NOTE
· Patient has history of persistent A-fib with pacemaker  · Previously on Xarelto, stopped taking few months ago  · Per EKG, patient continues to be in atrial fibrillation, HR 69, with occasional ventricular paced complexes  · Cards consulted: appreciate recommendation  · Stop Lovenox, Initiate Xarelto 20 mg daily  · Potassium supplementation 20 mg daily  · Outpatient follow-up  · Monitor on telemetry  · Recommended continued follow-up with outpatient cardiology

## 2023-06-16 NOTE — ASSESSMENT & PLAN NOTE
Recent Labs     06/15/23  2055 06/16/23  0452 06/17/23  0842   K 3 4* 3 0* 3 7     · Repleted  · Continue to monitor and replete as needed
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
## 2023-06-16 NOTE — ASSESSMENT & PLAN NOTE
· Patient with a history of chronic benzodiazepine use   · Last use the evening of 6/15  · Received Ativan 0 5 mg in the ED PTA  · Initiate SEWS protocol for medical management of benzodiazepine withdrawal  · Received 260 mg of phenobarbital as initial dose  · Total Pheno 455 mg (06/17; 1215)  · Admits improved withdrawal symptoms; SEWs protocol discontinued  · Continuous pulse ox and telemetry monitoring  · Encourage continued cessation of benzodiazepine use after withdrawal is fully treated

## 2023-06-16 NOTE — PROGRESS NOTES
06/16/23 0150   Referral Data   Referral Reason Drug/Alcohol [facility] [address]   Readmission Root Cause   30 Day Readmission No   Patient Information   Mental Status Alert   Primary Caregiver Self   Support System Immediate family   Restorationist/Cultural Requests Orthodoxy   Activities of Daily Living Prior to Admission   Functional Status Independent   Living Arrangement House   Ambulation Independent   Access to Firearms   Access to Firearms No   Income [facility] [place] of Transport to Appts: Drives Self                       6/16/2023    1413     Substance Abuse Addendum Details     History of Withdrawal Symptoms Other withdrawal symptoms (specify in comment)History of Withdrawal Symptoms  Other withdrawal symptoms (specify in comment)  The comment is dizziness and feeling sick  Taken on 6/16/23 7732   Medical Complications --   Sober Supports --   Present Treatment Patient reports that he will be going to [facility] in [place]   Substance Abuse Treatment Hx Denies past historySubstance Abuse Treatment Hx  Denies past history  The comment is Patient has not been in treatment for substance abuse  Taken on 6/16/23 1413   ASAM Level & Criteria --   Additional Comments --   Stage of Change     Stage of Change Contemplation     Additional Substance Use Detail    Questions Responses   Problems Due to Past Use of Alcohol? No   Problems Due to Past Use of Substances?  Yes   Substance Use Assessment Substance use within the past 12 months   Cannabis frequency Never used   Comment:  Never used on 6/16/2023    Cocaine frequency Never used   Comment:  Never used on 6/16/2023    Benzodiazepine Frequency Daily   Benzodiazepine 1st Use 2023   Benzodiazepine Last Use & Amount less then 5 mg a few days prior to admission   Benzodiazepine Longest Abstinence n/a   Inhalant frequency Never used   Comment:  Never used on 6/16/2023    Hallucinogen frequency Never used   Comment:  Never used on 6/16/2023    Ecstasy frequency Never used   Comment:  Never used on 6/16/2023    Other drug frequency Never used   Comment:  Never used on 6/16/2023        Patient is a 38-year-old male patient with history of A-fib on Eliquis, diabetes presenting with request for detox from benzodiazepine  Patient has reported that he was taking approximately 2 5 to 3 mg of lorazepam daily  Patient states that he tried to be off of it however started to get shaky  Patient reports that he was prescribed this medication due to loss of his daughter and to manage his symptoms of anxiety  Patient was prescribed medication by his psychiatrist, prior to this prescription patient had not used any substances  Patient states that he currently has no symptoms other than weight loss of 93 pounds with the past 3 months  Patient reports having had foot surgery and being out of work for this surgery  Patient is employed as a  and works the night shift  CM reviewed CHELSI's with patient  Patient signed CHELSI's for his wife Wing Isidro and son Ashlee Rubin); patient signed [facility]SI for [facility] and his insurance [employer]  Patient resides with his wife and plans to discharge to his home address  Patient reports having 3 sons  Patient drives and will drive home upon discharge, as his vehicle is parked outdoors  Patient drove himself to the ED  AUDIT, BAT, UDS, and PAWSS Reviewed  Audit:       0 PAWSS: 0 BAT:  0 UDS:  negative      Clinical Impression:  Patient is alert and cooperative  Patient appears to have insight and is receptive to getting help  Patient has some unresolved grief and attends weekly bereavement groups through his [facility]  Patient is motivated to get better and move forward with his life and treatment

## 2023-06-16 NOTE — ASSESSMENT & PLAN NOTE
· Patient reports being started on Ativan during a previous hospitalization and continued to take once outpatient  · Currently endorses taking 1 5 to 3 mg of Ativan daily  · Wants to stop taking benzodiazepines and is considering starting another medication for anxiety management  · Case management for assistance in discharge planning: outpatient follow-up   · See benzodiazepine withdrawal

## 2023-06-16 NOTE — CONSULTS
ENCOUNTER DATE: 06/16/23 3:49 PM  PATIENT NAME: Raul Ordoñez   1965    [id number]  Age: 59 y o  Sex: male  [place] PROVIDER & AUTHOR: Nica Patel MD  INPATIENT ATTENDING PHYSICIAN: Reji CARLSON*; PRIMARYCARE PHYSICIAN: Isaac Ugarte DO  DATE OF ADMISSION: 6/15/2023  7:59 PM; LENGTH OF STAY: 1 days  *-*-*-*-*-*-*-*-*-*-*-*-*-*-*-*-*-*-*-*-*-*-*-*-*-*-*-*-*-*-*-*-*-*-*-*-*-*-*-*-*-*-*-*-*-*-*-*-*-*-*-*-*-*-   REASON FOR CONSULTATION:   Comanagement of anticoagulation and atrial fibrillation and establishment of outpatient cardiology follow-up  *-*-*-*-*-*-*-*-*-*-*-*-*-*-*-*-*-*-*-*-*-*-*-*-*-*-*-*-*-*-*-*-*-*-*-*-*-*-*-*-*-*-*-*-*-*-*-*-*-*-*-*-*-*-  CARDIAC ASSESSMENT:     1  Permanent, longstanding persistent atrial fibrillation  2  Benzodiazepines dependence  3  Primary hypertension  4  Chronic diastolic heart failure  5  Severe dyslipidemia  6  Diabetes mellitus  7  Hypokalemia  8  Diabetic neuropathy  9  History of osteomyelitis of left foot, status post metatarsal amputation  10  Severe anxiety and some depression  11  Obesity, history of gastric bypass surgery  12  Post gastric bypass malabsorption syndrome  13   Anemia       Patient Active Problem List   Diagnosis   • DAYAN (obstructive sleep apnea)   • Chronic diastolic heart failure ([facility])   • Hypertension   • Diabetes mellitus ([facility] )   • Morbid obesity with BMI of 40 0-44 9, adult ([facility])   • Pain, joint, ankle and foot, left   • Chronic osteomyelitis of left foot with draining sinus ([facility])   • Atrial fibrillation ([facility])   • Anxiety   • Hypokalemia   • Type 2 diabetes mellitus with diabetic polyneuropathy, with long-term current use of insulin (HCC)   • Toe osteomyelitis, right (HCC)   • Cervical spondylosis   • Cervicalgia - Right   • Diabetic infection of left foot (HCC)   • Pacemaker   • History of bariatric surgery   • Encounter for perioperative consultation   • Hyperkalemia   • Diabetic ulcer of left midfoot associated with type 2 diabetes mellitus ([facility] )   • Cellulitis of left foot   • Closed fracture of shaft of metatarsal bone of left foot   • Acute osteomyelitis of left foot (HCC)   • Moderate benzodiazepine use disorder (HCC)   • Benzodiazepine withdrawal with complication (HCC)   • Microcytic anemia   • Severe protein-calorie malnutrition ([facility] )        Patient is a 47-year-old gentleman with longstanding persistent/permanent atrial fibrillation, chronic diastolic heart failure and peripheral venous insufficiency and obstructive sleep apnea  Also has comorbidities of morbid obesity, diabetes with peripheral neuropathy and history of foot ulceration and multiple other qualities  He is currently reasonably well compensated  He has chronic lower extremity edema and venous static abnormalities  He is not on any cardiac specific medications besides current diuretic therapy  He was on Xarelto but has not been taking it recently  His diabetes seems to be well controlled  CARDIAC PLAN:     --Recommend initiating Xarelto at 20 mg once daily  -- Agree with continuing daily potassium supplementation at 20 mg daily  Can reinitiate home diuretic therapy with furosemide  -- Patient stable for discharge from cardiac perspective will need optimization of medical regimen as outpatient  We will arrange for follow-up in cardiology office at [facility] along with a pacemaker check in 1 month time  Subsequent visits can be arranged at [facility] office if possible  -- Recommend regular exercises and weight control and care for peripheral venous insufficiency  Some tips are provided below  How can you care for yourself at home? • Wear compression stockings during the day to help relieve symptoms  They improve blood flow and are the main treatment for varicose veins  • Prop up your legs at or above the level of your heart when possible   This helps keep the blood from pooling in your lower legs and improves blood flow to the rest of your body   • Avoid sitting and standing for long periods  This puts added stress on your veins  • Get regular exercise, and control your weight  Walk, bicycle, or swim to improve blood flow in your legs  • If you bump your leg so hard that you know it is likely to bruise, prop up your leg and put ice or a cold pack on the area for 10 to 20 minutes at a time  Try to do this every 1 to 2 hours for the next 3 days (when you are awake) or until the swelling goes down  Put a thin cloth between the ice and your skin  • If you cut or scratch the skin over a vein, it may bleed a lot  Prop up your leg and apply firm pressure with a clean bandage over the site of the bleeding  Continue to apply pressure for a full 15 minutes  Do not check sooner to see if the bleeding has stopped  If the bleeding has not stopped after 15 minutes, apply pressure again for another 15 minutes  You can repeat this up to 3 times for a total of 45 minutes  • If you have a blood clot in a varicose vein, you may have tenderness and swelling over the vein  The vein may feel firm  Be sure to call your doctor or nurse call line right away if you have these symptoms  If your doctor has told you how to care for the clot, follow his or her instructions  Care may include the following:           -- Prop up your leg and apply heat with a warm, damp cloth or a heating pad set on low (put a towel or cloth between your leg and the heating pad to prevent burns)  --  Ask your doctor if you can take an over-the-counter pain medicine, such as acetaminophen (Tylenol), ibuprofen (Advil, Motrin), or naproxen (Aleve)  Be safe with medicines  Read and follow all instructions on the label  *-*-*-*-*-*-*-*-*-*-*-*-*-*-*-*-*-*-*-*-*-*-*-*-*-*-*-*-*-*-*-*-*-*-*-*-*-*-*-*-*-*-*-*-*-*-*-*-*-*-*-*-*-*-  HISTORY OF PRESENT ILLNESS     Patient is a 39-year-old gentleman  with medical history significant for:  1  Chronic diastolic heart failure  2    Longstanding persistent atrial fibrillation  3  Essential hypertension   4  Diabetes mellitus type 2 in obese ([address] )   5  Mixed hyperlipidemia   6  Current use of long term anticoagulation  7  Sick sinus syndrome, status post pacemaker implantation  8  Obstructive sleep apnea, on CPAP therapy  9  History of weight loss surgery  10  Diabetic neuropathy and foot ulcer, status post tarsometatarsal amputation  11  Degenerative joint disease  12  Opioid addiction to benzodiazepine  13  History of TIA  14  Chronic venous insufficiency    Patient is currently admitted to detox unit after presenting for detoxification from benzodiazepine addiction  He has several cardiac comorbidities as outlined above and he has followed with [facility] cardiologist Dr Yasmin Wilkerson in the past   He has been on chronic anticoagulation with Xarelto but this was discontinued prior to his left foot partial amputation  From a cardiac perspective he has had no recent chest pain or shortness of breath or palpitations  He has chronic left lower extremity edema  He has known history of sleep apnea and uses CPAP  Patient reports that he was first prescribed benzodiazepines while he was grieving the passing away of his daughter about a year and a half back  He was not taking them regularly  Recently in April following his left foot partial amputation he started taking these medications and became addicted to them  He reports feeling anxious and shaky following taking this medication  He has tried to wean himself off it but has not been able to do so so he came to the emergency room for voluntary admission for detoxification  He denies previous history of coronary artery disease or congestive heart failure  He denies being a smoker or drinking alcohol significantly  Reports having 1 cup of coffee a day  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      He says that he would like to switch cardiology follow-up to [facility] either in [facility] or [facility]  He has a [employer]tr[employer] dual-chamber pacemaker  Prior to this admission only cardiac medication he was on was furosemide 40 mg twice daily and potassium chloride 20 mg daily  He was on Eliquis but has not restarted taking it following the amputation surgery  His last cardiac evaluation was a regadenoson nuclear stress test in December 2020 and demonstrated moderate-sized severe intensity fixed apical defect without evidence of ischemia  His left ventricular function and regional wall motion were normal   Post-rest ejection fraction was determined at 62%  There has been no recent echocardiogram   Previous echo in 2016 demonstrated normal left and right heart systolic function, no significant valvular stenosis or regurgitation and no obvious pulmonary hypertension or pericardial effusion    *-*-*-*-*-*-*-*-*-*-*-*-*-*-*-*-*-*-*-*-*-*-*-*-*-*-*-*-*-*-*-*-*-*-*-*-*-*-*-*-*-*-*-*-*-*-*-*-*-*-*-*-*-*  PAST MEDICAL HISTORY:     Past Medical History:   Diagnosis Date   • Atrial fibrillation ([facility] )    • Chronic diastolic (congestive) heart failure ([facility] )    • Diabetes mellitus ([facility] )    • High cholesterol    • Hyperlipidemia    • Pacemaker    • Stroke [facility])     PAST SURGICAL HISTORY     Past Surgical History:   Procedure Laterality Date   • APPENDECTOMY     • ATRIAL CARDIAC PACEMAKER INSERTION     • BARIATRIC SURGERY  05/2021   • EPIDURAL BLOCK INJECTION N/A 5/19/2022    Procedure: BLOCK / INJECTION EPIDURAL STEROID CERVICAL C7-T1;  Surgeon: Tena Dutta MD;  [facility]: [facility];  Service: Pain Management    • FL GUIDED NEEDLE PLAC BX/ASP/INJ  3/22/2022   • FOOT AMPUTATION Left 4/28/2022    Procedure: LEFT TRANSMETATARSAL AMPUTATION ;  Surgeon: Ana Madden DPM;  [facility];  Service: Podiatry   • NERVE BLOCK Right 2/10/2022    Procedure: BLOCK MEDIAL BRANCH C3, C4, C5 #1;  Surgeon: Fantasma Crain Rosey Sainz MD;  [facility];  Service: Pain Management    • NERVE BLOCK Right 3/22/2022    Procedure: BLOCK MEDIAL BRANCH C3, C4, C5 #2;  Surgeon: Subhash Rubalcava MD;  [facility];  Service: Pain Management    • OH AMPUTATION FOOT TRANSMETARSAL Left 4/12/2023    Procedure: REVISION LEFT TRANSMETATARSAL (TMA) AMPUTATION, REMOVAL OF UNVIABLE TISSUE AND BONE,;  Surgeon: Lake Terry DPM;  Location: [facility];  Service: Podiatry   • OH AMPUTATION METATARSAL W/TOE SINGLE Left 4/7/2023    Procedure: 2ND RAY RESECTION FOOT;  Surgeon: Lake Terry DPM;  [facility];  Service: Podiatry   • OH AMPUTATION TOE INTERPHALANGEAL JOINT Left 11/16/2021    Procedure: AMPUTATION LESSER TOE;  Surgeon: Michael Taveras DPM;  [facility];  Service: Podiatry   • RADIOFREQUENCY ABLATION Right 4/7/2022    Procedure: Right C3, C4, C5 RFA;  Surgeon: Subhash Rubalcava MD;  [facility]: [facility];  Service: Pain Management    • RHIZOTOMY Right 2/9/2023    Procedure: RHIZOTOMY CERVICAL MEDIAL BRANCH NERVES RIGHT C3, C4, C5;  Surgeon: Subhash Rubalcava MD;  [facility];  Service: Pain Management    • TOE AMPUTATION Left     2nd toe   • TOE AMPUTATION Left 9/15/2021    Procedure: AMPUTATION LEFT 4TH TOE;  Surgeon: Michael Taveras DPM;  [facility];  Service: Podiatry   • TOE AMPUTATION Right 1/12/2022    Procedure: AMPUTATION TOE;  Surgeon: Anthony Mota DPM;  [facility];  Service: Podiatry   • TOE AMPUTATION Right 2/23/2022    Procedure: AMPUTATION TOE  RIGHT SECOND;  Surgeon: Anthony Mota DPM;  Location: [address] [place] [address] MAIN OR;  Service: Podiatry   • TOE AMPUTATION Right 6/3/2022    Procedure: AMPUTATION right 4th TOE;  Surgeon: Porfirio Gottlieb DPM;  [facility];  Service: Podiatry          FAMILY HISTORY     Family History   Problem Relation Age of Onset   • No Known Problems Mother    • No Known Problems Father      SOCIAL HISTORY     Social History     Tobacco Use   Smoking Status Never   Smokeless Tobacco Never      Social History     Substance and Sexual Activity   Alcohol Use Never     Social History     Substance and Sexual Activity   Drug Use Never    [unfilled]     *-*-*-*-*-*-*-*-*-*-*-*-*-*-*-*-*-*-*-*-*-*-*-*-*-*-*-*-*-*-*-*-*-*-*-*-*-*-*-*-*-*-*-*-*-*-*-*-*-*-*-*-*-*  ALLERGIES     No Known Allergies  CURRENT SCHEDULED MEDICATIONS       Current Facility-Administered Medications:   •  acetaminophen (TYLENOL) tablet 650 mg, 650 mg, Oral, Q6H PRN, Vivienne Caldwell PA-C  •  aluminum-magnesium hydroxide-simethicone (MYLANTA) oral suspension 30 mL, 30 mL, Oral, Q4H PRN, Tatum Jaramillo MD, 30 mL at 06/16/23 1514  •  docusate sodium (COLACE) capsule 100 mg, 100 mg, Oral, BID, Tatum Jolly MD  •  [START ON 6/17/2023] ferrous sulfate tablet 325 mg, 325 mg, Oral, Daily With Breakfast, Tatum Jolly MD  •  folic acid (FOLVITE) tablet 1 mg, 1 mg, Oral, Daily, Vivienne Caldwell PA-C, 1 mg at 06/16/23 0749  •  gabapentin (NEURONTIN) capsule 300 mg, 300 mg, Oral, TID, Khloe Price MD, 300 mg at 06/16/23 1526  •  hydrOXYzine HCL (ATARAX) tablet 50 mg, 50 mg, Oral, Q6H PRN, Khloe Price MD  •  mirtazapine (REMERON) tablet 15 mg, 15 mg, Oral, HS, Khloe Price MD  •  multivitamin-minerals (CENTRUM) tablet 1 tablet, 1 tablet, Oral, Daily, Vivienne Caldwell PA-C, 1 tablet at 06/16/23 0748  •  ondansetron (ZOFRAN) injection 4 mg, 4 mg, Intravenous, Q6H PRN, Vivienne Caldwell PA-C  •  [START ON 6/17/2023] pantoprazole (PROTONIX) EC tablet 40 mg, 40 mg, Oral, Early Morning, Tatum Jolly MD  •  polyethylene glycol (MIRALAX) packet 17 g, 17 g, Oral, Daily PRN, Tatum Jolly MD, 17 g at 06/16/23 1514  •  rivaroxaban (XARELTO) tablet 20 mg, 20 mg, Oral, Daily With Danyel Chowdhury MD  •  thiamine tablet 100 mg, 100 mg, Oral, Daily, Vivienne Caldwell PA-C, 100 mg at 06/16/23 6366  •  traZODone "(DESYREL) tablet 50 mg, 50 mg, Oral, HS PRN, Silvia Vega PA-C     *-*-*-*-*-*-*-*-*-*-*-*-*-*-*-*-*-*-*-*-*-*-*-*-*-*-*-*-*-*-*-*-*-*-*-*-*-*-*-*-*-*-*-*-*-*-*-*-*-*-*-*-*-*   REVIEW OF SYSTEMS     Positive for: As noted above in HPI  Also significant for significant anxiety and limited agitation and stress  Denies suicidal ideation  Negative for: All remaining as reviewed below and in HPI  SYSTEM SYMPTOMS REVIEWED:  General--weight change, fever, night sweats  Respiratoryl-- Wheezing, shortness of breath, cough, URI symptoms, sputum, blood  Cardiovascular--chest pain, syncope, dyspnea on exertion, edema, decline in exercise tolerance, claudication   Gastrointestinal--persistent vomiting, diarrhea, abdominal distention, blood in stool   Muscular or skeletal--joint pain or swelling   Neurologic--headaches, syncope, abnormal movement  Hematologic--history of easy bruising and bleeding   Endocrine--thyroid enlargement, heat or cold intolerance, polyuria   Psychiatric--anxiety, depression      *-*-*-*-*-*-*-*-*-*-*-*-*-*-*-*-*-*-*-*-*-*-*-*-*-*-*-*-*-*-*-*-*-*-*-*-*-*-*-*-*-*-*-*-*-*-*-*-*-*-*-*-*-*-   VITAL SIGNS       Vitals:    06/15/23 2347 23 0500 23 0802 23 1534   BP:  103/70 113/65 103/82   BP Location:  Right arm Right arm Right arm   Pulse:  75 66 90   Resp:  18 18 18   Temp:  98 °F (36 7 °C) 98 °F (36 7 °C) (!) 97 °F (36 1 °C)   TempSrc:  Temporal Temporal Temporal   SpO2:  99% 100% 99%   Weight: 113 kg (250 lb)      Height: 6' 1\" (1 854 m)        TEMPERATURE HISTORY 24H: Temp (24hrs), Av 8 °F (36 6 °C), Min:97 °F (36 1 °C), Max:98 °F (36 7 °C)     BLOOD PRESSURE HISTORY: Systolic (26RJE), JRL:821 , Min:103 , TPA:852    Diastolic (69XEF), FXD:97, Min:65, Max:87      Weight    06/15/23 2010 06/15/23 2347   Weight: 114 kg (251 lb 1 7 oz) 113 kg (250 lb)      Body mass index is 32 98 kg/m²   Wt Readings from Last 10 Encounters:   06/15/23 113 kg (250 lb)   23 114 kg (251 lb 8 7 " oz)   06/07/23 120 kg (265 lb 3 4 oz)   05/04/23 (!) 150 kg (330 lb 11 oz)   04/24/23 (!) 150 kg (330 lb)   04/19/23 (!) 150 kg (330 lb)   04/18/23 (!) 150 kg (330 lb)   04/12/23 (!) 150 kg (330 lb)   04/07/23 (!) 150 kg (330 lb)   04/05/23 (!) 153 kg (338 lb)      Intake/Output Summary (Last 24 hours) at 6/16/2023 1549  Last data filed at 6/16/2023 0815  Gross per 24 hour   Intake 240 ml   Output --   Net 240 ml        *-*-*-*-*-*-*-*-*-*-*-*-*-*-*-*-*-*-*-*-*-*-*-*-*-*-*-*-*-*-*-*-*-*-*-*-*-*-*-*-*-*-*-*-*-*-*-*-*-*-*-*-*-*-   PHYSICAL EXAMINATION:     General Appearance:    Alert, cooperative, no distress, appears stated age, tall, large build, obese slightly anxious appearing  Head, Eyes, ENT:    No obvious abnormality, moist mucous mebranes  Neck:   Supple, no carotid bruit or JVD   Back:     Symmetric, no curvature  Lungs:     Respirations unlabored  Clear to auscultation bilaterally,    Chest wall:    No tenderness or deformity   Heart:     Irregularly irregular rhythm, distant heart sounds, unable to appreciate murmur   Abdomen:     Soft, non-tender,    Extremities:   Extremities warm, has grade 2 chronic lower extremity edema in left lower extremity  There is moderate venous stasis and discoloration in left lower extremity  He has amputation at the level of the metatarsals  Trace edema and venous static abnormalities noted in right lower extremity  Skin:    Signs of venous insufficiency  *-*-*-*-*-*-*-*-*-*-*-*-*-*-*-*-*-*-*-*-*-*-*-*-*-*-*-*-*-*-*-*-*-*-*-*-*-*-*-*-*-*-*-*-*-*-*-*-*-*-*-*-*-*-   LABORATORY DATA:   I have personally reviewed pertinent labs      CMP:  Results from last 7 days   Lab Units 06/16/23  0452 06/15/23  2055 06/14/23  2040   SODIUM mmol/L 140 137 136   POTASSIUM mmol/L 3 0* 3 4* 3 4*   CHLORIDE mmol/L 103 99 98   CO2 mmol/L 27 28 29   BUN mg/dL 6 7 10   CREATININE mg/dL 0 64 0 77 0 76   CALCIUM mg/dL 9 0 9 6 9 3   ALK PHOS U/L 100 114* 115*   ALT U/L 6* 8 9   AST U/L "11* 11* 14        Results from last 7 days   Lab Units 06/16/23  0452 06/15/23  2055 06/14/23  2040   MAGNESIUM mg/dL 2 1 2 0 2 1        Cardiac Profile:       Invalid input(s): \"CK\", \"CKMBP\"          Results from last 7 days   Lab Units 06/14/23 2040   TSH 3RD GENERATON uIU/mL 1 512        Blood Gas Analysis:             CBC:   Results from last 7 days   Lab Units 06/16/23  0452 06/15/23  2055 06/14/23  2040   WBC Thousand/uL 5 36 7 96 7 45   HEMOGLOBIN g/dL 10 6* 11 6* 11 3*   HEMATOCRIT % 34 6* 36 0* 36 1*   PLATELETS Thousands/uL 336 386 386     PT/INR: No results found for: \"PT\", \"INR\", Microbiology:            *-*-*-*-*-*-*-*-*-*-*-*-*-*-*-*-*-*-*-*-*-*-*-*-*-*-*-*-*-*-*-*-*-*-*-*-*-*-*-*-*-*-*-*-*-*-*-*-*-*-*-*-*-*-  TELEMETRY, LAST ECG:  Telemetry reviewed    Not on telemetry     Results for orders placed or performed during the hospital encounter of 06/15/23   ECG 12 lead   Result Value    Ventricular Rate 69    Atrial Rate 115    MN Interval     QRSD Interval 106    QT Interval 388    QTC Interval 415    P Axis     QRS Axis -47    T Wave Axis 13    Narrative    Atrial fibrillation with occasional ventricular-paced complexes  Left axis deviation  Abnormal ECG  When compared with ECG of 15-ADRIENNE-2023 21:10, (unconfirmed)  Electronic ventricular pacemaker is now Present  Confirmed by Lori Ramos ([id number]) on 6/16/2023 5:25:50 AM        *-*-*-*-*-*-*-*-*-*-*-*-*-*-*-*-*-*-*-*-*-*-*-*-*-*-*-*-*-*-*-*-*-*-*-*-*-*-*-*-*-*-*-*-*-*-*-*-*-*-*-*-*-*-  IMAGING STUDIES REPORTS: Imaging studies results reviewed    No valid procedures specified  No Chest XR results available for this patient      *-*-*-*-*-*-*-*-*-*-*-*-*-*-*-*-*-*-*-*-*-*-*-*-*-*-*-*-*-*-*-*-*-*-*-*-*-*-*-*-*-*-*-*-*-*-*-*-*-*-*-*-*-*-  AVAILABLE OLD CARDIAC TESTS REPORTS:   Results for orders placed during the hospital encounter of 01/22/20    Echo complete with contrast if indicated    Narrative  [address]  [address]  [place], [place] [address]    Echocardiogram  2D, M-mode, Doppler, and Color Doppler    Study date:  2020    Patient: Birdie Candelaria  MR number: [id number]  Account number: [de-identified]  : 1965  Age: 62 years  Gender: Male  Status: Inpatient  Location: [facility] Echo Lab  Height: 74 in  Weight: 402 lb  BP: 121/ 63 mmHg    Indications: Atrial Fibrillation    Diagnoses: I48 0 - Atrial fibrillation    Sonographer:  ARTHUR Dale  Referring Physician:  Valerie Zhang MD  Group:  [facility]  Interpreting Physician:  Mark Posadas DO    IMPRESSIONS:  Technically difficult study  Normal left ventricular size and overall systolic function  EF 55%  Poor endocardial definition limits accurate regional wall motion assessment even with the use of Definity echo contrast   No gross regional wall motion abnormalities  Mild to moderate concentric left ventricular hypertrophy  Grossly top normal to mildly dilated right ventricle with normal function  Mild left atrial enlargement  Aortic sclerosis without stenosis  Mitral and tricuspid valves were poorly visualized  Mild tricuspid regurgitation  SUMMARY    LEFT VENTRICLE:  Size was normal   Systolic function was normal  Ejection fraction was estimated to be 55 %  Although no diagnostic regional wall motion abnormality was identified, this possibility cannot be completely excluded on the basis of this study  Wall thickness was mildly to moderately increased  Left ventricular diastolic function not assessed due to atrial fibrillation  RIGHT VENTRICLE:  Visually the right ventricle appears top normal to mildly dilated however it is not well visualized and measurements could not be taken  Systolic function was normal     LEFT ATRIUM:  The atrium was mildly dilated  RIGHT ATRIUM:  Not well visualized  AORTIC VALVE:  Aortic sclerosis without stenosis  There was no regurgitation      TRICUSPID VALVE:  Not well visualized  There was trace regurgitation  COMPARISONS:  Compared to prior report dated 3/5/14 there is no significant change  HISTORY: PRIOR HISTORY: DAYAN, SOB, CHF, DM2, Morbid Obesity, HTN    PROCEDURE: The study was performed in the [address] [place] [address] Echo Lab  This was a routine study  The study included complete 2D imaging, M-mode, complete spectral Doppler, and color Doppler  The heart rate was 63 bpm, at the start of the  study  Intravenous contrast ( 1 0 ml of Definity) was administered  Echocardiographic views were limited due to poor acoustic window availability, decreased penetration, and lung interference  This was a technically difficult study  LEFT VENTRICLE: Size was normal  Systolic function was normal  Ejection fraction was estimated to be 55 %  Although no diagnostic regional wall motion abnormality was identified, this possibility cannot be completely excluded on the basis  of this study  Wall thickness was mildly to moderately increased  DOPPLER: Left ventricular diastolic function not assessed due to atrial fibrillation  RIGHT VENTRICLE: Visually the right ventricle appears top normal to mildly dilated however it is not well visualized and measurements could not be taken  Systolic function was normal     LEFT ATRIUM: The atrium was mildly dilated  RIGHT ATRIUM: Not well visualized  MITRAL VALVE: Not well visualized  AORTIC VALVE: Aortic sclerosis without stenosis  DOPPLER: There was no regurgitation  TRICUSPID VALVE: Not well visualized  DOPPLER: There was trace regurgitation  PULMONIC VALVE: Not well visualized  PERICARDIUM: Not well visualized  AORTA: The root exhibited normal size  SYSTEMIC VEINS: IVC: Not assessed      SYSTEM MEASUREMENT TABLES    2D  %FS: 30 04 %  AV Diam: 2 77 cm  Ao asc: 3 16 cm  EDV(Teich): 133 05 ml  EF(Teich): 56 86 %  ESV(Teich): 57 4 ml  IVSd: 1 48 cm  LA Diam: 4 41 cm  LVEDV MOD A4C: 74 9 ml  LVEF MOD A4C: 59 12 %  LVESV MOD A4C: 30 62 ml  LVIDd: 5 26 cm  LVIDs: 3 68 cm  LVLd A4C: 7 37 cm  LVLs A4C: 6 27 cm  LVPWd: 1 27 cm  RWT: 0 48  SV MOD A4C: 44 28 ml  SV(Teich): 75 65 ml    PW  E': 0 13 m/s    [facility]    Prepared and electronically signed by    Tomasz Francis DO  Signed 23-Jan-2020 12:52:12    No results found for this or any previous visit  No results found for this or any previous visit  No results found for this or any previous visit  *-*-*-*-*-*-*-*-*-*-*-*-*-*-*-*-*-*-*-*-*-*-*-*-*-*-*-*-*-*-*-*-*-*-*-*-*-*-*-*-*-*-*-*-*-*-*-*-*-*-*-*-*-*-            *-*-*-*-*-*-*-*-*-*-*-*-*-*-*-*-*-*-*-*-*-*-*-*-*-*-*-*-*-*-*-*-*-*-*-*-*-*-*-*-*-*-*-*-*-*-*-*-*-*-*-*-*-*-  SIGNATURES:   @UYJ@   Nica Patel MD     CC:   Isaac Ugarte DO   No ref   provider found

## 2023-06-16 NOTE — ASSESSMENT & PLAN NOTE
· Patient with history of anxiety, was started on Ativan 0 5 mg PRN during last hospitalization   · Denies SI/HI  · No continue observation indicated at this time  · Patient expressed wish to stop benzodiazepines  · Psychiatry consulted per patient request- recommendations appreciated  · Gabapentin 300 mg TID: Pt requested 600mg TID was which ordered  · Atarax 50 mg q6hrs prn for anxiety  · Remeron 15 mg QHS for anxiety and depression  · Outpatient follow-up with Dr Rachel Durant

## 2023-06-16 NOTE — MALNUTRITION/BMI
This medical record reflects one or more clinical indicators suggestive of malnutrition and/or morbid obesity  Malnutrition Findings:   Adult Malnutrition type: Social/enviromental  Adult Degree of Malnutrition: Other severe protein calorie malnutrition  Malnutrition Characteristics: Inadequate energy, Weight loss                  360 Statement: Severe protein calorie malnutrition related to weight loss and poor PO intake as evidenced by 26% wt loss in 2 months and inadequate energy intake for >1 month treated with regular diet and Ensure Max TID  BMI Findings: Body mass index is 32 98 kg/m²  See Nutrition note dated 6/16/2023 for additional details  Completed nutrition assessment is viewable in the nutrition documentation

## 2023-06-16 NOTE — PLAN OF CARE
Problem: Potential for Falls  Goal: Patient will remain free of falls  Description: INTERVENTIONS:  - Educate patient/family on patient safety including physical limitations  - Instruct patient to call for assistance with activity   - Consult OT/PT to assist with strengthening/mobility   - Keep Call bell within reach  - Keep bed low and locked with side rails adjusted as appropriate  - Keep care items and personal belongings within reach  - Initiate and maintain comfort rounds  - Make Fall Risk Sign visible to staff  - Offer Toileting every 2 Hours, in advance of need  - Initiate/Maintain bed alarm  - Obtain necessary fall risk management equipment: alarm   - Apply yellow socks and bracelet for high fall risk patients  - Consider moving patient to room near nurses station  Outcome: Progressing     Problem: PAIN - ADULT  Goal: Verbalizes/displays adequate comfort level or baseline comfort level  Description: Interventions:  - Encourage patient to monitor pain and request assistance  - Assess pain using appropriate pain scale  - Administer analgesics based on type and severity of pain and evaluate response  - Implement non-pharmacological measures as appropriate and evaluate response  - Consider cultural and social influences on pain and pain management  - Notify physician/advanced practitioner if interventions unsuccessful or patient reports new pain  Outcome: Progressing     Problem: INFECTION - ADULT  Goal: Absence or prevention of progression during hospitalization  Description: INTERVENTIONS:  - Assess and monitor for signs and symptoms of infection  - Monitor lab/diagnostic results  - Monitor all insertion sites, i e  indwelling lines, tubes, and drains  - Monitor endotracheal if appropriate and nasal secretions for changes in amount and color  - Reedy appropriate cooling/warming therapies per order  - Administer medications as ordered  - Instruct and encourage patient and family to use good hand hygiene technique  - Identify and instruct in appropriate isolation precautions for identified infection/condition  Outcome: Progressing  Goal: Absence of fever/infection during neutropenic period  Description: INTERVENTIONS:  - Monitor WBC    Outcome: Progressing     Problem: DISCHARGE PLANNING  Goal: Discharge to home or other facility with appropriate resources  Description: INTERVENTIONS:  - Identify barriers to discharge w/patient and caregiver  - Arrange for needed discharge resources and transportation as appropriate  - Identify discharge learning needs (meds, wound care, etc )  - Arrange for interpretive services to assist at discharge as needed  - Refer to Case Management Department for coordinating discharge planning if the patient needs post-hospital services based on physician/advanced practitioner order or complex needs related to functional status, cognitive ability, or social support system  Outcome: Progressing     Problem: Knowledge Deficit  Goal: Patient/family/caregiver demonstrates understanding of disease process, treatment plan, medications, and discharge instructions  Description: Complete learning assessment and assess knowledge base    Interventions:  - Provide teaching at level of understanding  - Provide teaching via preferred learning methods  Outcome: Progressing     Problem: SUBSTANCE USE/ABUSE  Goal: By discharge, will develop insight into their chemical dependency and sustain motivation to continue in recovery  Description: INTERVENTIONS:  - Attends all daily group sessions and scheduled AA groups  - Actively practices coping skills through participation in the therapeutic community and adherence to program rules  - Reviews and completes assignments from individual treatment plan  - Assist patient development of understanding of their personal cycle of addiction and relapse triggers  Outcome: Progressing  Goal: By discharge, patient will have ongoing treatment plan addressing chemical dependency  Description: INTERVENTIONS:  - Assist patient with resources and/or appointments for ongoing recovery based living  Outcome: Progressing

## 2023-06-16 NOTE — ASSESSMENT & PLAN NOTE
Recent Labs     06/14/23  2040 06/15/23  2055 06/16/23  0452 06/17/23  0842   HGB 11 3* 11 6* 10 6* 11 5*   MCV 81* 80* 81* 82     · In the setting of previous gastric bypass  · Iron panel pending  · No acute signs of bleed  · Continue to monitor

## 2023-06-16 NOTE — ASSESSMENT & PLAN NOTE
· History of gastric bypass surgery  · Reports decreased appetite since starting benzodiazepine use  · Notes recent weight loss  · Nutritional consult per patient request: appreciated recommendation

## 2023-06-16 NOTE — ED PROVIDER NOTES
History  Chief Complaint   Patient presents with   • Detox Evaluation     Pt came to ER for detox evaluation from ativan  Pt reports he takes 3-4 mg of Ativan per day  Pt reports he last took 1 5 mg Ativan at 1200  Pt reports that he feels anxious and shaky  Pt reports that he was administered Ativan while in patient and developed a tolerance to it  Pt reports that the medication was originally prescribed after his daughter passed away a year ago  Patient is a 52-year-old male coming in requesting detox from Ativan  Patient reports he has been taking 2 to 4 mg of Ativan per day, for the last 2 months after he underwent surgery for a partial amputation  Patient reports that he has been taking it every day since then, has been trying to wean himself off, the latest attempt approximately 4 days ago, but started getting extremely anxious, and shaking  Patient was seen at [facility] earlier today, where they told him that he would have to be transferred by ambulance, he at that time he declined, and drove himself over to the emergency department here  Detox Evaluation  Severity:  Mild  Associated symptoms: no abdominal pain, no headaches, no loss of consciousness, no palpitations, no shortness of breath, no suicidal ideation and no vomiting        Prior to Admission Medications   Prescriptions Last Dose Informant Patient Reported? Taking?    LORazepam (Ativan) 1 mg tablet   No No   Sig: Take 1 tablet (1 mg total) by mouth every 8 (eight) hours as needed for anxiety for up to 7 days   acetaminophen (TYLENOL) 325 mg tablet   No No   Sig: Take 2 tablets (650 mg total) by mouth every 6 (six) hours as needed for mild pain, headaches or fever   busPIRone (BUSPAR) 10 mg tablet   Yes No   Sig: TAKE 1 TABLET BY MOUTH 3 TIMES A DAY, MORNING, NOON, AND BEFORE BEDTIME   busPIRone (BUSPAR) 5 mg tablet   Yes No   Sig: TAKE BY MOUTH 1 TABLET IN THE MORNING AND 1 TABLET BEFORE BEDTIME    furosemide (LASIX) 40 mg tablet   Yes No   Sig: Take 40 mg by mouth 2 (two) times a day   Patient not taking: Reported on 6/7/2023   potassium chloride (K-DUR,KLOR-CON) 20 mEq tablet   No No   Sig: Take 2 tablets (40 mEq total) by mouth daily for 5 days   Patient not taking: Reported on 6/7/2023      Facility-Administered Medications: None       Past Medical History:   Diagnosis Date   • Atrial fibrillation (HCC)    • Chronic diastolic (congestive) heart failure ([facility] )    • Diabetes mellitus ([facility] )    • High cholesterol    • Hyperlipidemia    • Pacemaker    • Stroke [facility])        Past Surgical History:   Procedure Laterality Date   • APPENDECTOMY     • ATRIAL CARDIAC PACEMAKER INSERTION     • BARIATRIC SURGERY  05/2021   • EPIDURAL BLOCK INJECTION N/A 5/19/2022    Procedure: BLOCK / INJECTION EPIDURAL STEROID CERVICAL C7-T1;  Surgeon: Jose Hernandez MD;  [facility]: [facility];  Service: Pain Management    • FL GUIDED NEEDLE PLAC BX/ASP/INJ  3/22/2022   • FOOT AMPUTATION Left 4/28/2022    Procedure: LEFT TRANSMETATARSAL AMPUTATION ;  Surgeon: Nelida Madden DPM;  [facility];  Service: Podiatry   • NERVE BLOCK Right 2/10/2022    Procedure: BLOCK MEDIAL BRANCH C3, C4, C5 #1;  Surgeon: Jose Hernandez MD;  [facility];  Service: Pain Management    • NERVE BLOCK Right 3/22/2022    Procedure: BLOCK MEDIAL BRANCH C3, C4, C5 #2;  Surgeon: Jose Hernandez MD;  [facility];  Service: Pain Management    • [place] AMPUTATION FOOT TRANSMETARSAL Left 4/12/2023    Procedure: REVISION LEFT TRANSMETATARSAL (TMA) AMPUTATION, REMOVAL OF UNVIABLE TISSUE AND BONE,;  Surgeon: Santana Bee DPM;  [facility]: [facility];  Service: Podiatry   • [place] AMPUTATION METATARSAL W/TOE SINGLE Left 4/7/2023    Procedure: 2ND RAY RESECTION FOOT;  Surgeon: Santana Bee DPM;  [facility]: [facility];  Service: Podiatry   • [place] AMPUTATION TOE INTERPHALANGEAL JOINT Left 11/16/2021    Procedure: AMPUTATION LESSER TOE;  Surgeon: Lina Milner DPM;  [facility]; Service: Podiatry   • RADIOFREQUENCY ABLATION Right 4/7/2022    Procedure: Right C3, C4, C5 RFA;  Surgeon: Subhash Rubalcava MD;  [facility];  Service: Pain Management    • RHIZOTOMY Right 2/9/2023    Procedure: RHIZOTOMY CERVICAL MEDIAL BRANCH NERVES RIGHT C3, C4, C5;  Surgeon: Subhash Rubalcava MD;  [facility];  Service: Pain Management    • TOE AMPUTATION Left     2nd toe   • TOE AMPUTATION Left 9/15/2021    Procedure: AMPUTATION LEFT 4TH TOE;  Surgeon: Michael Taveras DPM;  [facility];  Service: Podiatry   • TOE AMPUTATION Right 1/12/2022    Procedure: AMPUTATION TOE;  Surgeon: Anthony Mota DPM;  [facility];  Service: Podiatry   • TOE AMPUTATION Right 2/23/2022    Procedure: AMPUTATION TOE  RIGHT SECOND;  Surgeon: Anthony Mota DPM;  Location: [address] [place] [address] MAIN OR;  Service: Podiatry   • TOE AMPUTATION Right 6/3/2022    Procedure: AMPUTATION right 4th TOE;  Surgeon: Porfirio Gottlieb DPM;  [facility];  Service: Podiatry       Family History   Problem Relation Age of Onset   • No Known Problems Mother    • No Known Problems Father      I have reviewed and agree with the history as documented  E-Cigarette/Vaping   • E-Cigarette Use Never User      E-Cigarette/Vaping Substances     Social History     Tobacco Use   • Smoking status: Never   • Smokeless tobacco: Never   Vaping Use   • Vaping Use: Never used   Substance Use Topics   • Alcohol use: Never   • Drug use: Never       Review of Systems   Constitutional: Negative for chills, fatigue and fever  Respiratory: Negative for shortness of breath  Cardiovascular: Negative for palpitations  Gastrointestinal: Negative for abdominal pain and vomiting  Neurological: Negative for loss of consciousness and headaches  Psychiatric/Behavioral: Negative for suicidal ideas  Physical Exam  Physical Exam  Vitals reviewed  Constitutional:       Appearance: Normal appearance  He is normal weight     HENT:      Head: Normocephalic and atraumatic  Right Ear: External ear normal       Left Ear: External ear normal       Nose: Nose normal    Eyes:      Conjunctiva/sclera: Conjunctivae normal    Cardiovascular:      Rate and Rhythm: Normal rate  Pulmonary:      Effort: Pulmonary effort is normal    Musculoskeletal:         General: Normal range of motion  Cervical back: Normal range of motion  Skin:     General: Skin is warm and dry  Neurological:      Mental Status: He is alert           Vital Signs  ED Triage Vitals   Temperature Pulse Respirations Blood Pressure SpO2   06/15/23 2010 06/15/23 2010 06/15/23 2010 06/15/23 2010 06/15/23 2010   97 8 °F (36 6 °C) 93 16 132/80 98 %      Temp Source Heart Rate Source Patient Position - Orthostatic VS BP Location FiO2 (%)   06/15/23 2010 06/15/23 2010 06/15/23 2010 06/15/23 2010 --   Tympanic Monitor Sitting Left arm       Pain Score       06/15/23 2307       No Pain           Vitals:    06/15/23 2307 06/15/23 2343 06/16/23 0500 06/16/23 0802   BP: 136/74 136/74 103/70 113/65   Pulse: 88 88 75 66   Patient Position - Orthostatic VS: Sitting Lying Lying Lying         Visual Acuity  Visual Acuity    Flowsheet Row Most Recent Value   L Pupil Size (mm) 3   R Pupil Size (mm) 3   L Pupil Shape Round   R Pupil Shape Round          ED Medications  Medications   acetaminophen (TYLENOL) tablet 650 mg (has no administration in time range)   ondansetron (ZOFRAN) injection 4 mg (has no administration in time range)   traZODone (DESYREL) tablet 50 mg (has no administration in time range)   thiamine tablet 100 mg ( Oral Canceled Entry 5/75/20 2128)   folic acid (FOLVITE) tablet 1 mg ( Oral Canceled Entry 6/16/23 0900)   multivitamin-minerals (CENTRUM) tablet 1 tablet ( Oral Canceled Entry 6/16/23 0900)   enoxaparin (LOVENOX) subcutaneous injection 40 mg (40 mg Subcutaneous Given 6/16/23 0809)   potassium chloride 20 mEq IVPB (premix) (20 mEq Intravenous New Bag 6/16/23 1006)   gabapentin (NEURONTIN) capsule 300 mg (300 mg Oral Given 6/16/23 1211)   mirtazapine (REMERON) tablet 15 mg (has no administration in time range)   hydrOXYzine HCL (ATARAX) tablet 50 mg (has no administration in time range)   ferrous sulfate tablet 325 mg (has no administration in time range)   LORazepam (ATIVAN) tablet 0 5 mg (0 5 mg Oral Given 6/15/23 2045)   potassium chloride (K-DUR,KLOR-CON) CR tablet 40 mEq (40 mEq Oral Given 6/15/23 2356)   PHENobarbital injection 260 mg (260 mg Intravenous Given 6/15/23 2356)   PHENobarbital tablet 64 8 mg (64 8 mg Oral Given 6/16/23 0517)   PHENobarbital injection 65 mg (65 mg Intravenous Given 6/16/23 0809)   PHENobarbital injection 65 mg (65 mg Intravenous Given 6/16/23 1030)       Diagnostic Studies  Results Reviewed     Procedure Component Value Units Date/Time    Rapid drug screen, urine [[id number]]  (Normal) Collected: 06/15/23 2121    Lab Status: Final result Specimen: Urine, Clean Catch Updated: 06/15/23 2142     Amph/Meth UR Negative     Barbiturate Ur Negative     Benzodiazepine Urine Negative     Cocaine Urine Negative     Methadone Urine Negative     Opiate Urine Negative     PCP Ur Negative     THC Urine Negative     Oxycodone Urine Negative    Narrative:      FOR MEDICAL PURPOSES ONLY  IF CONFIRMATION NEEDED PLEASE CONTACT THE LAB WITHIN 5 DAYS      Drug Screen Cutoff Levels:  AMPHETAMINE/METHAMPHETAMINES  1000 ng/mL  BARBITURATES     200 ng/mL  BENZODIAZEPINES     200 ng/mL  COCAINE      300 ng/mL  METHADONE      300 ng/mL  OPIATES      300 ng/mL  PHENCYCLIDINE     25 ng/mL  THC       50 ng/mL  OXYCODONE      100 ng/mL    Comprehensive metabolic panel [[id number]]  (Abnormal) Collected: 06/15/23 2055    Lab Status: Final result Specimen: Blood from Line, Venous Updated: 06/15/23 2116     Sodium 137 mmol/L      Potassium 3 4 mmol/L      Chloride 99 mmol/L      CO2 28 mmol/L      ANION GAP 10 mmol/L      BUN 7 mg/dL      Creatinine 0 77 mg/dL      Glucose 109 mg/dL Calcium 9 6 mg/dL      AST 11 U/L      ALT 8 U/L      Alkaline Phosphatase 114 U/L      Total Protein 7 6 g/dL      Albumin 4 1 g/dL      Total Bilirubin 0 78 mg/dL      eGFR 99 ml/min/1 73sq m     Narrative:      [place] guidelines for Chronic Kidney Disease (CKD):   •  Stage 1 with normal or high GFR (GFR > 90 mL/min/1 73 square meters)  •  Stage 2 Mild CKD (GFR = 60-89 mL/min/1 73 square meters)  •  Stage 3A Moderate CKD (GFR = 45-59 mL/min/1 73 square meters)  •  Stage 3B Moderate CKD (GFR = 30-44 mL/min/1 73 square meters)  •  Stage 4 Severe CKD (GFR = 15-29 mL/min/1 73 square meters)  •  Stage 5 End Stage CKD (GFR <15 mL/min/1 73 square meters)  Note: GFR calculation is accurate only with a steady state creatinine    Magnesium [[id number]]  (Normal) Collected: 06/15/23 2055    Lab Status: Final result Specimen: Blood from Line, Venous Updated: 06/15/23 2116     Magnesium 2 0 mg/dL     CBC and differential [[id number]]  (Abnormal) Collected: 06/15/23 2055    Lab Status: Final result Specimen: Blood from Line, Venous Updated: 06/15/23 2100     WBC 7 96 Thousand/uL      RBC 4 52 Million/uL      Hemoglobin 11 6 g/dL      Hematocrit 36 0 %      MCV 80 fL      MCH 25 7 pg      MCHC 32 2 g/dL      RDW 14 9 %      MPV 9 6 fL      Platelets 026 Thousands/uL      nRBC 0 /100 WBCs      Neutrophils Relative 66 %      Immat GRANS % 0 %      Lymphocytes Relative 19 %      Monocytes Relative 14 %      Eosinophils Relative 1 %      Basophils Relative 0 %      Neutrophils Absolute 5 18 Thousands/µL      Immature Grans Absolute 0 02 Thousand/uL      Lymphocytes Absolute 1 49 Thousands/µL      Monocytes Absolute 1 14 Thousand/µL      Eosinophils Absolute 0 10 Thousand/µL      Basophils Absolute 0 03 Thousands/µL                  No orders to display              Procedures  Procedures         ED Course                               SBIRT 20yo+    Flowsheet Row Most Recent Value   Initial Alcohol Screen: US AUDIT-C     1  How often do you have a drink containing alcohol? 0 Filed at: 06/15/2023 2048   2  How many drinks containing alcohol do you have on a typical day you are drinking? 0 Filed at: 06/15/2023 2048   3a  Male UNDER 65: How often do you have five or more drinks on one occasion? 0 Filed at: 06/15/2023 2048   3b  FEMALE Any Age, or MALE 65+: How often do you have 4 or more drinks on one occassion? 0 Filed at: 06/15/2023 2048   Audit-C Score 0 Filed at: 06/15/2023 2048   ANGELO: How many times in the past year have you    Used an illegal drug or used a prescription medication for non-medical reasons? Never Filed at: 06/15/2023 2048                    Medical Decision Making  Comes in for ativan detox  Admitted for further treatment and disposition    Amount and/or Complexity of Data Reviewed  Labs: ordered  Risk  OTC drugs  Prescription drug management  Decision regarding hospitalization  Disposition  Final diagnoses:   Benzodiazepine abuse ([facility]     Time reflects when diagnosis was documented in both MDM as applicable and the Disposition within this note     Time User Action Codes Description Comment    6/15/2023 10:23 PM Shivam Bates Add [F13 10] Benzodiazepine abuse ([facility] [address] )     6/15/2023 11:16 PM Messi Aguirre [F41 9] Anxiety     6/15/2023 11:17 PM Jace Barger Add [F13 20] Severe benzodiazepine use disorder ([facility] [address] )     6/15/2023 11:17 PM Messi Aguirre [Z98 [id number]] History of bariatric surgery       ED Disposition     ED Disposition   Admit    Condition   Stable    Date/Time   Thu Jhonny 15, 2023 10:23 PM    Comment   Case was discussed with RAFAL FerrerC and the patient's admission status was agreed to be Admission Status: inpatient status to the service of Dr Shayla Morel              Follow-up Information    None         Current Discharge Medication List      CONTINUE these medications which have NOT CHANGED    Details   acetaminophen (TYLENOL) 325 mg tablet Take 2 tablets (650 mg total) by mouth every 6 (six) hours as needed for mild pain, headaches or fever  Refills: 0    Associated Diagnoses: Osteomyelitis of left foot, unspecified type ([facility] [address] )      ! ! busPIRone (BUSPAR) 10 mg tablet TAKE 1 TABLET BY MOUTH 3 TIMES A DAY, MORNING, NOON, AND BEFORE BEDTIME      !! busPIRone (BUSPAR) 5 mg tablet TAKE BY MOUTH 1 TABLET IN THE MORNING AND 1 TABLET BEFORE BEDTIME       furosemide (LASIX) 40 mg tablet Take 40 mg by mouth 2 (two) times a day      LORazepam (Ativan) 1 mg tablet Take 1 tablet (1 mg total) by mouth every 8 (eight) hours as needed for anxiety for up to 7 days  Qty: 8 tablet, Refills: 0    Associated Diagnoses: Anxiety      potassium chloride (K-DUR,KLOR-CON) 20 mEq tablet Take 2 tablets (40 mEq total) by mouth daily for 5 days  Qty: 10 tablet, Refills: 0    Associated Diagnoses: Hypokalemia       !! - Potential duplicate medications found  Please discuss with provider  No discharge procedures on file      PDMP Review       Value Time User    PDMP Reviewed  Yes 6/16/2023  8:41 AM Jessica Hatch MD          ED Provider  Electronically Signed by           Magdiel Palm PA-C  06/16/23 2804

## 2023-06-16 NOTE — UTILIZATION REVIEW
Initial Clinical Review    Admission: Date/Time/Statement:   Admission Orders (From admission, onward)     Ordered        06/15/23 2224  INPATIENT ADMISSION  Once                      Orders Placed This Encounter   Procedures   • INPATIENT ADMISSION     Standing Status:   Standing     Number of Occurrences:   1     Order Specific Question:   Level of Care     Answer:   Med Surg [16]     Order Specific Question:   Estimated length of stay     Answer:   More than 2 Midnights     Order Specific Question:   Certification     Answer:   I certify that inpatient services are medically necessary for this patient for a duration of greater than two midnights  See H&P and MD Progress Notes for additional information about the patient's course of treatment  ED Arrival Information     Expected   -    Arrival   6/15/2023 19:42    Acuity   Urgent            Means of arrival   Walk-In    Escorted by   [facility]    Admission type   Emergency            Arrival complaint   detox eval            Chief Complaint   Patient presents with   • Detox Evaluation     Pt came to ER for detox evaluation from ativan  Pt reports he takes 3-4 mg of Ativan per day  Pt reports he last took 1 5 mg Ativan at 1200  Pt reports that he feels anxious and shaky  Pt reports that he was administered Ativan while in patient and developed a tolerance to it  Pt reports that the medication was originally prescribed after his daughter passed away a year ago  Initial Presentation: 59 y o  male who presented to [address] [facility] ED  Inpatient admission to medical detox unit for evaluation and treatment of benzodiazepine detoxification  PMHx:  has a past medical history of Atrial fibrillation, Chronic diastolic (congestive) heart failure, Diabetes mellitus, High cholesterol, Hyperlipidemia, Pacemaker, and Stroke)  Presented w/ request for detox from opioids   Uses 1 5 to 3 mg of Ativan per daily, last use The evening of 6/15  On exam, agitation, nervous/ anxious  SEWS  Plan: SEWS monitoring w/ symptomatic supportive care, micro induction of phenobarbital when clinically indicated, telemetry, continuous pulse ox, Trend labs, replete electrolytes as needed  Continue home meds  Date: 06/16/23 Day 2: Pt reports feeling anxiety and restlessness but feels better with phenobarbital   On exam, anxious, agitated; SEWS 6  Plan: continue SEWS monitoring w/ symptomatic supportive care,  telemetry, continue Phenobarbital; continuous pulse ox, Trend labs, replete electrolytes as needed  Continue home meds        ED Triage Vitals   Temperature Pulse Respirations Blood Pressure SpO2   06/15/23 2010 06/15/23 2010 06/15/23 2010 06/15/23 2010 06/15/23 2010   97 8 °F (36 6 °C) 93 16 132/80 98 %      Temp Source Heart Rate Source Patient Position - Orthostatic VS BP Location FiO2 (%)   06/15/23 2010 06/15/23 2010 06/15/23 2010 06/15/23 2010 --   Tympanic Monitor Sitting Left arm       Pain Score       06/15/23 2307       No Pain          Wt Readings from Last 1 Encounters:   06/15/23 113 kg (250 lb)     Additional Vital Signs:   Date/Time Temp Pulse Resp BP MAP (mmHg) SpO2 O2 Device Patient Position - Orthostatic VS   06/16/23 0802 98 °F (36 7 °C) 66 18 113/65 -- 100 % None (Room air) Lying   06/16/23 0500 98 °F (36 7 °C) 75 18 103/70 -- 99 % None (Room air) Lying   06/15/23 2343 97 9 °F (36 6 °C) 88 18 136/74 -- 100 % None (Room air) Lying   06/15/23 2307 97 9 °F (36 6 °C) 88 16 136/74 94 100 % None (Room air) Sitting   06/15/23 2104 97 8 °F (36 6 °C) 79 18 132/87 102 100 % None (Room air) Lying   06/15/23 2010 97 8 °F (36 6 °C) 93 16 132/80 -- 98 % None (Room air) Sitting         EKG: Atrial fibrillation  Left axis deviation  Septal infarct (cited on or before 29-APR-2022)  Abnormal ECG  When compared with ECG of 07-JUN-2023 00:16,  Questionable change in initial forces of Anterior leads      Pertinent Labs/Diagnostic Test Results:   No orders to display         Results from last 7 days   Lab Units 06/16/23  0452 06/15/23  2055 06/14/23  2040   WBC Thousand/uL 5 36 7 96 7 45   HEMOGLOBIN g/dL 10 6* 11 6* 11 3*   HEMATOCRIT % 34 6* 36 0* 36 1*   PLATELETS Thousands/uL 336 386 386   NEUTROS ABS Thousands/µL  --  5 18 5 00         Results from last 7 days   Lab Units 06/16/23  0452 06/15/23  2055 06/14/23  2040   SODIUM mmol/L 140 137 136   POTASSIUM mmol/L 3 0* 3 4* 3 4*   CHLORIDE mmol/L 103 99 98   CO2 mmol/L 27 28 29   ANION GAP mmol/L 10 10 9   BUN mg/dL 6 7 10   CREATININE mg/dL 0 64 0 77 0 76   EGFR ml/min/1 73sq m 107 99 99   CALCIUM mg/dL 9 0 9 6 9 3   MAGNESIUM mg/dL 2 1 2 0 2 1     Results from last 7 days   Lab Units 06/16/23  0452 06/15/23  2055 06/14/23  2040   AST U/L 11* 11* 14   ALT U/L 6* 8 9   ALK PHOS U/L 100 114* 115*   TOTAL PROTEIN g/dL 6 5 7 6 7 5   ALBUMIN g/dL 3 4* 4 1 3 9   TOTAL BILIRUBIN mg/dL 0 60 0 78 0 66         Results from last 7 days   Lab Units 06/16/23  0452 06/15/23  2055 06/14/23  2040   GLUCOSE RANDOM mg/dL 87 109 101       Results from last 7 days   Lab Units 06/14/23  2040   TSH 3RD GENERATON uIU/mL 1 512       Results from last 7 days   Lab Units 06/16/23  0452   FERRITIN ng/mL 457*   IRON SATURATION % 10*   IRON ug/dL 22*   TIBC ug/dL 222*       Results from last 7 days   Lab Units 06/14/23  2040   LIPASE u/L 22       Results from last 7 days   Lab Units 06/15/23  2121   AMPH/METH  Negative   BARBITURATE UR  Negative   BENZODIAZEPINE UR  Negative   COCAINE UR  Negative   METHADONE URINE  Negative   OPIATE UR  Negative   PCP UR  Negative   THC UR  Negative       ED Treatment:   Medication Administration from 06/15/2023 1942 to 06/15/2023 2254       Date/Time Order Dose Route Action     06/15/2023 2045 EDT LORazepam (ATIVAN) tablet 0 5 mg 0 5 mg Oral Given          Present on Admission:  • Atrial fibrillation (HCC)  • Anxiety  • Moderate benzodiazepine use disorder ([address] )  • Benzodiazepine withdrawal with complication (HCC)  • Hypokalemia  • Microcytic anemia      Admitting Diagnosis: Benzodiazepine abuse ([facility] [address] ) [F13 10]  Persons encountering health services in other specified circumstances [Z76 89]     Age/Sex: 59 y o  male     Admission Orders: SCDs; SEWs; continuous pulse ox; Cardiopulmonary monitoring; regular diet    Scheduled Medications:  enoxaparin, 40 mg, Subcutaneous, Daily  folic acid, 1 mg, Oral, Daily  gabapentin, 300 mg, Oral, TID  mirtazapine, 15 mg, Oral, HS  multivitamin-minerals, 1 tablet, Oral, Daily  potassium chloride, 20 mEq, Intravenous, Once  thiamine, 100 mg, Oral, Daily  PHENobarbital injection 260 mg  Dose: 260 mg  Freq: Once Route: IV  Start: 06/16/23 0000 End: 06/15/23 2356    PHENobarbital injection 65 mg  Dose: 65 mg  Freq: Once Route: IV  Start: 06/16/23 1030 End: 06/16/23 1030    PHENobarbital injection 65 mg  Dose: 65 mg  Freq: Once Route: IV  Start: 06/16/23 0815 End: 06/16/23 0809    PHENobarbital tablet 64 8 mg  Dose: 64 8 mg  Freq: Once Route: PO  Start: 06/16/23 0515 End: 06/16/23 0517    Continuous IV Infusions: none     PRN Meds:  acetaminophen, 650 mg, Oral, Q6H PRN  hydrOXYzine HCL, 50 mg, Oral, Q6H PRN  ondansetron, 4 mg, Intravenous, Q6H PRN  traZODone, 50 mg, Oral, HS PRN        IP CONSULT TO CASE MANAGEMENT  IP CONSULT TO PSYCHIATRY  IP CONSULT TO NUTRITION SERVICES    Network Utilization Review Department  ATTENTION: Please call with any questions or concerns to [phone] and carefully listen to the prompts so that you are directed to the right person  All voicemails are confidential   Jeanna Mcgee all requests for admission clinical reviews, approved or denied determinations and any other requests to dedicated fax number below belonging to the campus where the patient is receiving treatment   List of dedicated fax numbers for the Facilities:  [address] [place] [address] DENIALS (Administrative/Medical Necessity) [phone]   [address] [place] [address] (Maternity/NICU/Pediatrics) Celeste Godinez [address] Ursula Wan  [phone]   [address] [place] [address] Kristian AlvarezM[facility] Hospital[facility] mig[facility] [address] [facility] [address] [facility] [address] [facility] [phone]

## 2023-06-16 NOTE — CONSULTS
Pt stated he was eating almost nothing while taking lorazepam  Had lost about 60 lbs with gastric bypass  Some of wt loss may also have been malabsorption d/t RNY procedure  Educated on increased protein intake as well as strength training to regain muscle mass  Also reviewed rules of the pouch post WLS  Pt appreciative of the information  Reported drinking Ensure Max at breakfast and ate eggs

## 2023-06-16 NOTE — H&P
"HISTORY & PHYSICAL EXAM  DEPARTMENT OF MEDICAL TOXICOLOGY  LEVEL 4 MEDICAL DETOX UNIT  Vania Call 59 y o  male MRN: [id number]  Unit/Bed#:  DETOX 513-01 Encounter: [id number]      Reason for Admission/Principal Problem: Ethanol withdrawal, Ethanol use disorder  Admitting Provider: Niurka Hickman PA-C  Attending Provider: Froylan Redd*   6/15/2023  7:59 PM        * Benzodiazepine withdrawal with complication [facility])  Assessment & Plan  · Patient with a history of chronic benzodiazepine use   · Last use the evening of 6/15  · Received Ativan 0 5 mg in the ED PTA  · Initiate SEWS protocol for medical management of benzodiazepine withdrawal  · Current benzo withdrawal signs/symptoms include anxiety and agitation  · Received 260 mg of phenobarbital as initial dose  · Continue monitoring under protocol and administer phenobarbital as indicated  · Continuous pulse ox and telemetry monitoring  · Encourage continued cessation of benzodiazepine use after withdrawal is fully treated      Moderate benzodiazepine use disorder ([facility] )  Assessment & Plan  · Patient reports being started on Ativan during a previous hospitalization and continued to take once outpatient  · Currently endorses taking 1 5 to 3 mg of Ativan daily  · Wants to stop taking benzodiazepines and is considering starting another medication for anxiety management    · Case management for assistance in discharge planning   · See benzodiazepine withdrawal      Type 2 diabetes mellitus with diabetic polyneuropathy, with long-term current use of insulin ([facility] )  Assessment & Plan  Lab Results   Component Value Date    HGBA1C 5 8 (H) 01/03/2023       No results for input(s): \"POCGLU\" in the last 72 hours      Blood Sugar Average: Last 72 hrs:     · Patient reports history of type 2 diabetes, previously on medication but no longer takes  · Most recent hemoglobin A1c-5 8  · Has peripheral neuropathy, peripheral vascular disease, and 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  · Diabetes improved after gastric bypass    · Monitor blood glucose  · Hemoglobin A1c pending  · Continue to monitor    Atrial fibrillation [facility])  Assessment & Plan  · Patient has history of persistent A-fib with pacemaker  · Previously on Xarelto, stopped taking few months ago  · Per EKG, patient continues to be in atrial fibrillation, HR 69, with occasional ventricular paced complexes    · Lovenox for DVT prophylaxis/anticoagulation while inpatient  · Monitor on telemetry  · Recommended continued follow-up with outpatient cardiology    Hypokalemia  Assessment & Plan  Recent Labs     06/14/23  2040 06/15/23  2055   K 3 4* 3 4*     · Repleted  · Continue to monitor and replete as needed    Microcytic anemia  Assessment & Plan  Recent Labs     06/14/23  2040 06/15/23  2055   HGB 11 3* 11 6*   MCV 81* 80*     · In the setting of previous gastric bypass  · Iron panel pending  · No acute signs of bleed  · Continue to monitor    Anxiety  Assessment & Plan  · Patient with history of anxiety, was started on Ativan 0 5 mg PRN during last hospitalization   · Denies SI/HI  · No continue observation indicated at this time  · Patient expressed wish to stop benzodiazepines    · Psychiatry consulted per patient request- recommendations appreciated    History of bariatric surgery  Assessment & Plan  · History of gastric bypass surgery  · Reports decreased appetite since starting benzodiazepine use  · Notes recent weight loss    · Nutritional consult per patient request             VTE Prophylaxis: Enoxaparin (Lovenox)  / sequential compression device   Code Status: Full code       Anticipated Length of Stay:  Patient will be admitted on an Inpatient basis with an anticipated length of stay of  2-3  midnights     Justification for Hospital Stay: Benzodiazepine withdrawal     For any questions or concerns, please Tiger Text the advanced practitioner in the role of [facility]-DETOX-AP On Call      This patient qualifies for Level IV medically managed intensive inpatient services under the criteria set by the American Society of Addiction Medicine, including dimensions 1-3  The patient is in withdrawal (or is intoxicated with high risk of withdrawal), with severe and unstable medical and/or psychiatric (dual diagnosis) problems, requiring requires 24-hour medical and nursing care and the full resources of a [address] patient to medical detox unit and continue supportive care and stabilization of acute ethanol withdrawal per medical toxicology/detox treatment pathway  Monitor ethanol withdrawal severity via the Severity of Ethanol Withdrawal Scale (SEWS) Q4 hours and then hourly if/when SEWS > 6  Treat withdrawal per pathway and reassess Q30-60 minutes  Mild SEWS Score 1-6  Administer medications* (IV or PO; PO preferred):  • If initial SEWS score: diazepam 10mg PO/IV x 1 AND phenobarbital 65 mg PO/IV x 1  • If repeat SEWS score 1-6: phenobarbital 65 mg PO/IV q1 hour x 5 doses maximum   Reassessment:   • SEWS q1 hour after each dose until SEWS 0 x 2 hours  • VS q1 hours (until SEWS 0, then q4 hours)  • Notify provider for bedside evaluation if 5-dose maximum is reached, RASS of -3 to -5, or SEWS score escalates to moderate or severe  Moderate SEWS Score 7-12  Administer medications* (IV):  • If initial SEWS score: diazepam 10mg IV x 1 AND phenobarbital 260 mg IV x 1  • If repeat SEWS score 7-12 or score escalated from mild: phenobarbital 130 mg IV q30 minutes x 5 doses maximum   Reassessment:  • SEWS q30 minutes after each dose until SEWS < 7 (then hourly until SEWS 0 x 2 hours)  • VS q30 minutes until SEWS < 7 (then hourly until SEWS 0, then q4 hours)  • Notify provider for bedside evaluation if 5-dose maximum is reached, RASS of -3 to -5, or SEWS score escalates to severe     Severe SEWS Score ? 13  Administer medications* (IV):  • If initial SEWS score: Diazepam 10 mg IV x 1 AND phenobarbital 650 mg IV piggyback x 1 over 15-30 minutes  • If repeat SEWS score ? 13 or score escalated from mild or moderate: phenobarbital 130 mg IV q30 minutes x 5 doses maximum   Reassessment:  • SEWS q30 minutes after each dose until SEWS < 7 (then hourly until SEWS 0 x 2 hours)   • VS q30 minutes until SEWS < 7 (then hourly until SEWS 0, then q4 hours)  • Notify provider for bedside evaluation if 5-dose maximum is reached or RASS of -3 to -5   *Hold medications and notify provider if CNS depression, respirations < 10/min, or RASS of -3 to -5  Medications to be administered adjunctively if more than 2 grams of phenobarbital is needed for stabilization of withdrawal; require attending approval    • Dexmedetomidine infusion 0 1-1mcg/kg/hr IV infusion, titratable to reduced agitation (Goal: RASS -2)  • Ketamine   o Acute agitated delirium: 1-2 mg/kg IV or 4-5 mg/kg IM  o Refractory withdrawal: 0 1-1mg/kg/hr IV infusion, titratable to reduced agitation (Goal: RASS -2)    Further evaluation, screening and treatment:  Evaluate complete metabolic panel, transaminases, INR, and lipase  Assess hepatic ultrasound for any sign of alcoholic liver disease or cirrhosis, and ultimately refer for further hepatic evaluation and care as/if indicated  Additional medications for ethanol associated malnutrition: Thiamine 100 mg IV daily, increase to 500 mg TID for signs/symptoms of Wernicke's Encephalopathy or Wernicke Korsakoff Syndrome   Folic acid 1 mg IV daily   Multivitamin PO daily      Will offer first monthly injection of Naltrexone 380 mg IM, once patient is stabilized, as it has been shown to assist in decreasing cravings for ethanol  Evaluate and treat for coexisting substance use, such as opioids and nicotine  Discuss risk factors for infectious disease, such as history of intravenous drug abuse, and offer hepatitis and HIV screening if indicated       Case management consultation to assist with coordination of subsequent treatment after discharge  Hx and PE limited by: None, patient alert and cooperative     HPI: iLlliam Velez is a 59y o  year old male with PMHx of 2 diabetes and atrial fibrillation with pacemaker, who presented to the [address] [place] [address] ED due to the diazepam withdrawal   Patient reports that he was started on Ativan as needed for anxiety during previous hospitalization and has continued to take daily for several months  Currently endorses using 1 5 to 3 mg of Ativan per day  Patient reports he wants to stop taking benzodiazepines as he feels he is addicted and wants to try different medication for his anxiety  Patient received Ativan 0 5 mg in the ED  Patient was admitted to [facility] detox unit for benzodiazepine detoxification  He was started on SEWS protocol with symptom triggered phenobarbital along with symptomatic management of withdrawal  Patient received an initial dose of 260 mg phenobarbital   Patient currently endorses anxiety and agitation as well as withdrawal symptoms  Benzodiazepine(s) used: Ativan  Quantity and frequency of benzo use: 1 5 to 3 mg daily  Concomitant EtOH use: no  Date/Time of last benzo use: The evening of 6/15  Current signs and symptoms of withdrawal: anxiety    SEWS Total Score: 6 (6/15/2023 11:43 PM)        Benzodiazepine Withdrawal History  Previous benzodiazepine withdrawal? no  Prior inpatient treatment for benzodiazepine withdrawal? no  Prior outpatient treatment for benzodiazepine withdrawal? no  History of seizures with prior benzodiazepine withdrawal? no  Other current treatment for benzodiazepine use disorder?  no  Co-existing substance use? no    Review of PDMP: yes     Social History     Substance and Sexual Activity   Alcohol Use Never     Social History     Substance and Sexual Activity   Drug Use Never     Social History     Tobacco Use   Smoking Status Never   Smokeless Tobacco Never       Review of Systems   Constitutional: Positive for appetite change and unexpected weight change  Negative for chills, diaphoresis and fever  Notes loss of appetite and substantial weight loss over the past few months   HENT: Negative for congestion and rhinorrhea  Respiratory: Negative for cough, chest tightness and shortness of breath  Cardiovascular: Negative for chest pain and palpitations  Gastrointestinal: Negative for abdominal pain, constipation, diarrhea, nausea and vomiting  Genitourinary: Negative for difficulty urinating  Musculoskeletal: Negative for arthralgias, gait problem and myalgias  Neurological: Negative for dizziness, tremors, seizures and headaches  Psychiatric/Behavioral: Positive for agitation  Negative for hallucinations, self-injury and suicidal ideas  The patient is nervous/anxious          Historical Information   Past Medical History:   Diagnosis Date   • Atrial fibrillation ([facility] )    • Chronic diastolic (congestive) heart failure ([facility] )    • Diabetes mellitus ([facility] )    • High cholesterol    • Hyperlipidemia    • Pacemaker    • Stroke [facility])      Past Surgical History:   Procedure Laterality Date   • APPENDECTOMY     • ATRIAL CARDIAC PACEMAKER INSERTION     • BARIATRIC SURGERY  05/2021   • EPIDURAL BLOCK INJECTION N/A 5/19/2022    Procedure: BLOCK / INJECTION EPIDURAL STEROID CERVICAL C7-T1;  Surgeon: Louise Aguayo MD;  Location:  ENDO;  Service: Pain Management    • FL GUIDED NEEDLE PLAC BX/ASP/INJ  3/22/2022   • FOOT AMPUTATION Left 4/28/2022    Procedure: LEFT TRANSMETATARSAL AMPUTATION ;  Surgeon: Damien Vuong DPM;  Location: [facility];  Service: Podiatry   • NERVE BLOCK Right 2/10/2022    Procedure: BLOCK MEDIAL BRANCH C3, C4, C5 #1;  Surgeon: Louise Aguayo MD;  Location: [facility] ENDO;  Service: Pain Management    • NERVE BLOCK Right 3/22/2022    Procedure: BLOCK MEDIAL BRANCH C3, C4, C5 #2;  Surgeon: Louise Aguayo MD;  Location: [facility] END[facility];  Service: Pain Management    • AL AMPUTATION FOOT TRANSMETARSAL Left 4/12/2023    Procedure: REVISION LEFT TRANSMETATARSAL (TMA) AMPUTATION, REMOVAL OF UNVIABLE TISSUE AND BONE,;  Surgeon: José Antonio Andino DPM;  Location: [facility] MAIN OR;  Service: Podiatry   • AL AMPUTATION METATARSAL W/TOE SINGLE Left 4/7/2023    Procedure: 2ND RAY RESECTION FOOT;  Surgeon: José Antonio Andino DPM;  Location: [facility]IN OR;  Service: Podiatry   • AL AMPUTATION TOE INTERPHALANGEAL JOINT Left 11/16/2021    Procedure: AMPUTATION LESSER TOE;  Surgeon: Martell Richardson DPM;  Location: [facility];  Service: Podiatry   • RADIOFREQUENCY ABLATION Right 4/7/2022    Procedure: Right C3, C4, C5 RFA;  Surgeon: Rohit Ortega MD;  Location: [facility];  Service: Pain Management    • RHIZOTOMY Right 2/9/2023    Procedure: RHIZOTOMY CERVICAL MEDIAL BRANCH NERVES RIGHT C3, C4, C5;  Surgeon: Rohit Ortega MD;  Location: [facility];  Service: Pain Management    • TOE AMPUTATION Left     2nd toe   • TOE AMPUTATION Left 9/15/2021    Procedure: AMPUTATION LEFT 4TH TOE;  Surgeon: Martell Richardson DPM;  Location: [facility];  Service: Podiatry   • TOE AMPUTATION Right 1/12/2022    Procedure: AMPUTATION TOE;  Surgeon: Violeta Addison DPM;  Location: [facility];  Service: Podiatry   • TOE AMPUTATION Right 2/23/2022    Procedure: AMPUTATION TOE  RIGHT SECOND;  Surgeon: Violeta Addison DPM;  Location: [address] [place] [address] MAIN OR;  Service: Podiatry   • TOE AMPUTATION Right 6/3/2022    Procedure: AMPUTATION right 4th TOE;  Surgeon: Jaime Hdz DPM;  Location: [facility];  Service: Podiatry     Family History   Problem Relation Age of Onset   • No Known Problems Mother    • No Known Problems Father      Social History   Marital Status: /Civil Union   Occupation:   Patient Pre-hospital Living Situation: Stable  Patient Pre-hospital Level of Mobility: Independent  Patient Pre-hospital Diet Restrictions: None    No Known Allergies    Prior to Admission medications Medication Sig Start Date End Date Taking? Authorizing Provider   acetaminophen (TYLENOL) 325 mg tablet Take 2 tablets (650 mg total) by mouth every 6 (six) hours as needed for mild pain, headaches or fever 4/13/23   Zoey Franks PA-C   busPIRone (BUSPAR) 10 mg tablet TAKE 1 TABLET BY MOUTH 3 TIMES A DAY, MORNING, NOON, AND BEFORE BEDTIME 4/21/23   Historical Provider, MD   busPIRone (BUSPAR) 5 mg tablet TAKE BY MOUTH 1 TABLET IN THE MORNING AND 1 TABLET BEFORE BEDTIME  3/22/22   Historical Provider, MD   furosemide (LASIX) 40 mg tablet Take 40 mg by mouth 2 (two) times a day  Patient not taking: Reported on 6/7/2023    Historical Provider, MD   LORazepam (Ativan) 1 mg tablet Take 1 tablet (1 mg total) by mouth every 8 (eight) hours as needed for anxiety for up to 7 days 4/20/23 6/7/23  Jhoana Stout DO   potassium chloride (K-DUR,KLOR-CON) 20 mEq tablet Take 2 tablets (40 mEq total) by mouth daily for 5 days  Patient not taking: Reported on 6/7/2023 2/24/22 3/22/22  Hilario Harris DO       Current Facility-Administered Medications   Medication Dose Route Frequency   • acetaminophen (TYLENOL) tablet 650 mg  650 mg Oral Q6H PRN   • enoxaparin (LOVENOX) subcutaneous injection 40 mg  40 mg Subcutaneous Daily   • folic acid (FOLVITE) tablet 1 mg  1 mg Oral Daily   • multivitamin-minerals (CENTRUM) tablet 1 tablet  1 tablet Oral Daily   • ondansetron (ZOFRAN) injection 4 mg  4 mg Intravenous Q6H PRN   • thiamine tablet 100 mg  100 mg Oral Daily   • traZODone (DESYREL) tablet 50 mg  50 mg Oral HS PRN       Continuous Infusions:          Objective     No intake or output data in the 24 hours ending 06/16/23 0016    Invasive Devices:   Peripheral IV 06/15/23 Left Antecubital (Active)   Site Assessment WDL 06/15/23 2327   Dressing Type Transparent 06/15/23 2327   Line Status Saline locked 06/15/23 2327   Dressing Status Clean;Dry; Intact 06/15/23 2327       Vitals   Vitals:    06/15/23 2010 06/15/23 2104 06/15/23 2307 06/15/23 2343   BP: 132/80 132/87 136/74 136/74   TempSrc: Tympanic Oral Temporal Temporal   Pulse: 93 79 88 88   Resp: 16 18 16 18   Patient Position - Orthostatic VS: Sitting Lying Sitting Lying   Temp: 97 8 °F (36 6 °C) 97 8 °F (36 6 °C) 97 9 °F (36 6 °C) 97 9 °F (36 6 °C)       Physical Exam  Constitutional:       General: He is not in acute distress  Appearance: He is not diaphoretic  Eyes:      Pupils: Pupils are equal, round, and reactive to light  Cardiovascular:      Rate and Rhythm: Normal rate and regular rhythm  Pulses: Normal pulses  Heart sounds: Normal heart sounds  No murmur heard  No gallop  Pulmonary:      Effort: Pulmonary effort is normal  No respiratory distress  Breath sounds: Normal breath sounds  No wheezing or rales  Abdominal:      General: Abdomen is flat  Bowel sounds are normal  There is no distension  Palpations: Abdomen is soft  Tenderness: There is no abdominal tenderness  There is no guarding  Musculoskeletal:         General: Normal range of motion  Comments: Partial amputation of left foot   Skin:     General: Skin is warm and dry  Capillary Refill: Capillary refill takes less than 2 seconds  Neurological:      Mental Status: He is alert and oriented to person, place, and time  Motor: No tremor  Coordination: Finger-Nose-Finger Test normal    Psychiatric:         Attention and Perception: Attention normal          Mood and Affect: Mood is anxious  Speech: Speech normal          Behavior: Behavior is agitated  Behavior is cooperative  Thought Content: Thought content normal        Data:    EKG, Pathology, and Other Studies: I have personally reviewed pertinent reports      EKG: Atrial fibrillation with occasional ventricular paced complexes, heart rate 69    Lab Results:  CBC ETOH     Lab Results   Component Value Date    WBC 7 96 06/15/2023    RBC 4 52 06/15/2023    HGB 11 6 (L) 06/15/2023    HCT 36 0 "(L) 06/15/2023    MCV 80 (L) 06/15/2023    MCH 25 7 (L) 06/15/2023    MCHC 32 2 06/15/2023    RDW 14 9 06/15/2023     06/15/2023    MPV 9 6 06/15/2023      Lab Results   Component Value Date    LACTICACID 1 2 04/10/2023      CMP UA         Component Value Date/Time    K 3 4 (L) 06/15/2023 2055    CL 99 06/15/2023 2055    CO2 28 06/15/2023 2055    BUN 7 06/15/2023 2055    CREATININE 0 77 06/15/2023 2055         Component Value Date/Time    CALCIUM 9 6 06/15/2023 2055    ALKPHOS 114 (H) 06/15/2023 2055    AST 11 (L) 06/15/2023 2055    ALT 8 06/15/2023 2055      Lab Results   Component Value Date    CLARITYU Clear 09/05/2021    COLORU Yellow 09/05/2021    SPECGRAV <=1 005 09/05/2021    PHUR 7 5 09/05/2021    GLUCOSEU Negative 09/05/2021    KETONESU Negative 09/05/2021    BLOODU Negative 09/05/2021    PROTEIN UA Negative 09/05/2021    NITRITE Negative 09/05/2021    BILIRUBINUR Negative 09/05/2021    UROBILINOGEN 1 0 09/05/2021    LEUKOCYTESUR Negative 09/05/2021        Liver Function Test: ASA     Lab Results   Component Value Date    TBILI 0 78 06/15/2023    ALKPHOS 114 (H) 06/15/2023    AST 11 (L) 06/15/2023    ALT 8 06/15/2023    TP 7 6 06/15/2023    ALB 4 1 06/15/2023      No results found for: \"SALICYLATE\"   Troponin APAP     Lab Results   Component Value Date    TROPONINI <0 02 09/05/2021      No results found for: \"ACTMNPHEN\"   VBG HCG     No results found for: \"PHVEN\", \"IBM9RST\", \"PO2VEN\", \"MZS8BTF\", \"BEVEN\", \"J3TEDEYGH\", \"T9TNUEH\"   No results found for: \"HCGQUANT\"   ABG Urine Drug Screen     No results found for: \"PHART\", \"ION6WCW\", \"PO2ART\", \"DMR1BUQ\", \"BEART\", \"E1ULPVXFW\", \"O2HGB\", \"SOURC\", \"NII\", \"VTAC\", \"ACRATE\", \"INSPIREDAIR\", \"PEEP\"   Lab Results   Component Value Date    AMPMETHUR Negative 06/15/2023    BARBTUR Negative 06/15/2023    BDZUR Negative 06/15/2023    COCAINEUR Negative 06/15/2023    METHADONEUR Negative 06/15/2023    OPIATEUR Negative 06/15/2023    PCPUR Negative 06/15/2023    THCUR " Negative 06/15/2023    OXYCODONEUR Negative 06/15/2023      Lactate INR     Lab Results   Component Value Date    LACTICACID 1 2 04/10/2023      Lab Results   Component Value Date    INR 1 34 (H) 06/07/2023      PTT Protime     Lab Results   Component Value Date/Time    PTT 34 04/05/2023 12:46 PM        Lab Results   Component Value Date/Time    PROTIME 16 7 (H) 06/07/2023 12:57 AM              Imaging Studies: I have personally reviewed pertinent reports  Counseling / Coordination of Care  Total floor / unit time spent today 60 minutes  Greater than 50% of total time was spent with the patient and / or family counseling and / or coordination of care  ** Please Note: This note has been constructed using a voice recognition system   **

## 2023-06-17 PROBLEM — K59.09 OTHER CONSTIPATION: Status: ACTIVE | Noted: 2023-06-17

## 2023-06-17 PROBLEM — F13.939 BENZODIAZEPINE WITHDRAWAL WITH COMPLICATION (HCC): Status: RESOLVED | Noted: 2023-06-15 | Resolved: 2023-06-17

## 2023-06-17 LAB
ANION GAP SERPL CALCULATED.3IONS-SCNC: 8 MMOL/L (ref 4–13)
BUN SERPL-MCNC: 8 MG/DL (ref 5–25)
CALCIUM SERPL-MCNC: 9.5 MG/DL (ref 8.4–10.2)
CHLORIDE SERPL-SCNC: 102 MMOL/L (ref 96–108)
CO2 SERPL-SCNC: 31 MMOL/L (ref 21–32)
CREAT SERPL-MCNC: 0.79 MG/DL (ref 0.6–1.3)
ERYTHROCYTE [DISTWIDTH] IN BLOOD BY AUTOMATED COUNT: 15 % (ref 11.6–15.1)
EST. AVERAGE GLUCOSE BLD GHB EST-MCNC: 117 MG/DL
GFR SERPL CREATININE-BSD FRML MDRD: 98 ML/MIN/1.73SQ M
GLUCOSE SERPL-MCNC: 127 MG/DL (ref 65–140)
HBA1C MFR BLD: 5.7 %
HCT VFR BLD AUTO: 37.8 % (ref 36.5–49.3)
HGB BLD-MCNC: 11.5 G/DL (ref 12–17)
MCH RBC QN AUTO: 24.9 PG (ref 26.8–34.3)
MCHC RBC AUTO-ENTMCNC: 30.4 G/DL (ref 31.4–37.4)
MCV RBC AUTO: 82 FL (ref 82–98)
PLATELET # BLD AUTO: 388 THOUSANDS/UL (ref 149–390)
PMV BLD AUTO: 9.7 FL (ref 8.9–12.7)
POTASSIUM SERPL-SCNC: 3.7 MMOL/L (ref 3.5–5.3)
RBC # BLD AUTO: 4.61 MILLION/UL (ref 3.88–5.62)
SODIUM SERPL-SCNC: 141 MMOL/L (ref 135–147)
WBC # BLD AUTO: 6.4 THOUSAND/UL (ref 4.31–10.16)

## 2023-06-17 PROCEDURE — 85027 COMPLETE CBC AUTOMATED: CPT | Performed by: EMERGENCY MEDICINE

## 2023-06-17 PROCEDURE — 80048 BASIC METABOLIC PNL TOTAL CA: CPT | Performed by: EMERGENCY MEDICINE

## 2023-06-17 PROCEDURE — 99232 SBSQ HOSP IP/OBS MODERATE 35: CPT | Performed by: PHYSICIAN ASSISTANT

## 2023-06-17 RX ORDER — BISACODYL 5 MG/1
5 TABLET, DELAYED RELEASE ORAL DAILY PRN
Status: DISCONTINUED | OUTPATIENT
Start: 2023-06-17 | End: 2023-06-18 | Stop reason: HOSPADM

## 2023-06-17 RX ORDER — LANOLIN ALCOHOL/MO/W.PET/CERES
6 CREAM (GRAM) TOPICAL
Status: DISCONTINUED | OUTPATIENT
Start: 2023-06-17 | End: 2023-06-18 | Stop reason: HOSPADM

## 2023-06-17 RX ORDER — GABAPENTIN 300 MG/1
600 CAPSULE ORAL 3 TIMES DAILY
Status: DISCONTINUED | OUTPATIENT
Start: 2023-06-17 | End: 2023-06-18 | Stop reason: HOSPADM

## 2023-06-17 RX ORDER — GABAPENTIN 300 MG/1
300 CAPSULE ORAL ONCE
Status: COMPLETED | OUTPATIENT
Start: 2023-06-17 | End: 2023-06-17

## 2023-06-17 RX ADMIN — MULTIPLE VITAMINS W/ MINERALS TAB 1 TABLET: TAB ORAL at 08:17

## 2023-06-17 RX ADMIN — FOLIC ACID 1 MG: 1 TABLET ORAL at 08:17

## 2023-06-17 RX ADMIN — TRAZODONE HYDROCHLORIDE 50 MG: 50 TABLET ORAL at 01:18

## 2023-06-17 RX ADMIN — ALUMINUM HYDROXIDE, MAGNESIUM HYDROXIDE, AND SIMETHICONE 30 ML: 200; 200; 20 SUSPENSION ORAL at 08:23

## 2023-06-17 RX ADMIN — GABAPENTIN 600 MG: 300 CAPSULE ORAL at 20:07

## 2023-06-17 RX ADMIN — THIAMINE HCL TAB 100 MG 100 MG: 100 TAB at 08:17

## 2023-06-17 RX ADMIN — GABAPENTIN 600 MG: 300 CAPSULE ORAL at 08:17

## 2023-06-17 RX ADMIN — HYDROXYZINE HYDROCHLORIDE 50 MG: 50 TABLET, FILM COATED ORAL at 13:55

## 2023-06-17 RX ADMIN — GABAPENTIN 600 MG: 300 CAPSULE ORAL at 16:24

## 2023-06-17 RX ADMIN — DOCUSATE SODIUM 100 MG: 100 CAPSULE, LIQUID FILLED ORAL at 08:17

## 2023-06-17 RX ADMIN — FERROUS SULFATE TAB 325 MG (65 MG ELEMENTAL FE) 325 MG: 325 (65 FE) TAB at 08:17

## 2023-06-17 RX ADMIN — GABAPENTIN 300 MG: 300 CAPSULE ORAL at 02:52

## 2023-06-17 RX ADMIN — Medication 6 MG: at 21:09

## 2023-06-17 RX ADMIN — POLYETHYLENE GLYCOL 3350 17 G: 17 POWDER, FOR SOLUTION ORAL at 08:24

## 2023-06-17 RX ADMIN — DOCUSATE SODIUM 100 MG: 100 CAPSULE, LIQUID FILLED ORAL at 17:08

## 2023-06-17 RX ADMIN — ALUMINUM HYDROXIDE, MAGNESIUM HYDROXIDE, AND SIMETHICONE 30 ML: 200; 200; 20 SUSPENSION ORAL at 02:09

## 2023-06-17 RX ADMIN — Medication 6 MG: at 02:36

## 2023-06-17 RX ADMIN — PANTOPRAZOLE SODIUM 40 MG: 40 TABLET, DELAYED RELEASE ORAL at 05:48

## 2023-06-17 RX ADMIN — TRAZODONE HYDROCHLORIDE 50 MG: 50 TABLET ORAL at 21:09

## 2023-06-17 RX ADMIN — HYDROXYZINE HYDROCHLORIDE 50 MG: 50 TABLET, FILM COATED ORAL at 01:18

## 2023-06-17 RX ADMIN — BISACODYL 5 MG: 5 TABLET, COATED ORAL at 17:08

## 2023-06-17 RX ADMIN — RIVAROXABAN 20 MG: 20 TABLET, FILM COATED ORAL at 16:24

## 2023-06-17 NOTE — PLAN OF CARE
Problem: Potential for Falls  Goal: Patient will remain free of falls  Description: INTERVENTIONS:  - Educate patient/family on patient safety including physical limitations  - Instruct patient to call for assistance with activity   - Consult OT/PT to assist with strengthening/mobility   - Keep Call bell within reach  - Keep bed low and locked with side rails adjusted as appropriate  - Keep care items and personal belongings within reach  - Initiate and maintain comfort rounds    - Apply yellow socks and bracelet for high fall risk patients  - Consider moving patient to room near nurses station  Outcome: Adequate for Discharge

## 2023-06-17 NOTE — ASSESSMENT & PLAN NOTE
· Admits lack of bowel movement for several days in setting of acute alcohol use  · Denies any N/V, abdominal pain, abdominal distention or back pain  · Colace, MiraLAX and Prune juice was given x 1  · Will continue to monitor for improvement

## 2023-06-17 NOTE — PLAN OF CARE
Problem: Potential for Falls  Goal: Patient will remain free of falls  Description: INTERVENTIONS:  - Educate patient/family on patient safety including physical limitations  - Instruct patient to call for assistance with activity   - Consult OT/PT to assist with strengthening/mobility   - Keep Call bell within reach  - Keep bed low and locked with side rails adjusted as appropriate  - Keep care items and personal belongings within reach  - Initiate and maintain comfort rounds  - Make Fall Risk Sign visible to staff  - Offer Toileting every 2 Hours, in advance of need  - Initiate/Maintain bed alarm  - Obtain necessary fall risk management equipment: alarm   - Apply yellow socks and bracelet for high fall risk patients  - Consider moving patient to room near nurses station  Outcome: Progressing     Problem: PAIN - ADULT  Goal: Verbalizes/displays adequate comfort level or baseline comfort level  Description: Interventions:  - Encourage patient to monitor pain and request assistance  - Assess pain using appropriate pain scale  - Administer analgesics based on type and severity of pain and evaluate response  - Implement non-pharmacological measures as appropriate and evaluate response  - Consider cultural and social influences on pain and pain management  - Notify physician/advanced practitioner if interventions unsuccessful or patient reports new pain  Outcome: Progressing     Problem: INFECTION - ADULT  Goal: Absence or prevention of progression during hospitalization  Description: INTERVENTIONS:  - Assess and monitor for signs and symptoms of infection  - Monitor lab/diagnostic results  - Monitor all insertion sites, i e  indwelling lines, tubes, and drains  - Monitor endotracheal if appropriate and nasal secretions for changes in amount and color  - [place] appropriate cooling/warming therapies per order  - Administer medications as ordered  - Instruct and encourage patient and family to use good hand hygiene technique  - Identify and instruct in appropriate isolation precautions for identified infection/condition  Outcome: Progressing  Goal: Absence of fever/infection during neutropenic period  Description: INTERVENTIONS:  - Monitor WBC    Outcome: Progressing     Problem: DISCHARGE PLANNING  Goal: Discharge to home or other facility with appropriate resources  Description: INTERVENTIONS:  - Identify barriers to discharge w/patient and caregiver  - Arrange for needed discharge resources and transportation as appropriate  - Identify discharge learning needs (meds, wound care, etc )  - Arrange for interpretive services to assist at discharge as needed  - Refer to Case Management Department for coordinating discharge planning if the patient needs post-hospital services based on physician/advanced practitioner order or complex needs related to functional status, cognitive ability, or social support system  Outcome: Progressing     Problem: Knowledge Deficit  Goal: Patient/family/caregiver demonstrates understanding of disease process, treatment plan, medications, and discharge instructions  Description: Complete learning assessment and assess knowledge base    Interventions:  - Provide teaching at level of understanding  - Provide teaching via preferred learning methods  Outcome: Progressing     Problem: SUBSTANCE USE/ABUSE  Goal: By discharge, will develop insight into their chemical dependency and sustain motivation to continue in recovery  Description: INTERVENTIONS:  - Attends all daily group sessions and scheduled AA groups  - Actively practices coping skills through participation in the therapeutic community and adherence to program rules  - Reviews and completes assignments from individual treatment plan  - Assist patient development of understanding of their personal cycle of addiction and relapse triggers  Outcome: Progressing  Goal: By discharge, patient will have ongoing treatment plan addressing chemical dependency  Description: INTERVENTIONS:  - Assist patient with resources and/or appointments for ongoing recovery based living  Outcome: Progressing     Problem: Nutrition/Hydration-ADULT  Goal: Nutrient/Hydration intake appropriate for improving, restoring or maintaining nutritional needs  Description: Monitor and assess patient's nutrition/hydration status for malnutrition  Collaborate with interdisciplinary team and initiate plan and interventions as ordered  Monitor patient's weight and dietary intake as ordered or per policy  Utilize nutrition screening tool and intervene as necessary  Determine patient's food preferences and provide high-protein, high-caloric foods as appropriate       INTERVENTIONS:  - Monitor oral intake, urinary output, labs, and treatment plans  - Assess nutrition and hydration status and recommend course of action  - Evaluate amount of meals eaten  - Assist patient with eating if necessary   - Allow adequate time for meals  - Recommend/ encourage appropriate diets, oral nutritional supplements, and vitamin/mineral supplements  - Order, calculate, and assess calorie counts as needed  - Recommend, monitor, and adjust tube feedings and TPN/PPN based on assessed needs  - Assess need for intravenous fluids  - Provide specific nutrition/hydration education as appropriate  - Include patient/family/caregiver in decisions related to nutrition  Outcome: Progressing

## 2023-06-17 NOTE — PROGRESS NOTES
"PROGRESS NOTE  DEPARTMENT OF MEDICAL TOXICOLOGY  LEVEL 4 MEDICAL DETOX UNIT  Cara Pena 59 y o  male MRN: [id number]  Unit/Bed#: 5T DETOX 513-01 Encounter: [id number]      Reason for Admission/Principal Problem: Benzodiazepine withdrawal with complication  Rounding Provider: Marques Guerra PA-C  Attending Provider: Radha Pinedo MD   6/15/2023  7:59 PM           Other constipation  Assessment & Plan  · Admits lack of bowel movement for several days in setting of acute alcohol use  · Denies any N/V, abdominal pain, abdominal distention or back pain  · Colace, MiraLAX and Prune juice was given x 1  · Will continue to monitor for improvement    Microcytic anemia  Assessment & Plan  Recent Labs     06/14/23  2040 06/15/23  2055 06/16/23  0452 06/17/23  0842   HGB 11 3* 11 6* 10 6* 11 5*   MCV 81* 80* 81* 82     · In the setting of previous gastric bypass  · Iron panel pending  · No acute signs of bleed  · Continue to monitor    Moderate benzodiazepine use disorder ([facility] [address] )  Assessment & Plan  · Patient reports being started on Ativan during a previous hospitalization and continued to take once outpatient  · Currently endorses taking 1 5 to 3 mg of Ativan daily  · Wants to stop taking benzodiazepines and is considering starting another medication for anxiety management  · Case management for assistance in discharge planning   · See benzodiazepine withdrawal      History of bariatric surgery  Assessment & Plan  · History of gastric bypass surgery  · Reports decreased appetite since starting benzodiazepine use  · Notes recent weight loss  · Nutritional consult per patient request    Type 2 diabetes mellitus with diabetic polyneuropathy, with long-term current use of insulin ([facility])  Assessment & Plan  Lab Results   Component Value Date    HGBA1C 5 7 (H) 06/16/2023       No results for input(s): \"POCGLU\" in the last 72 hours      Blood Sugar Average: Last 72 hrs:     · Patient reports history of type 2 " diabetes, previously on medication but no longer takes  · Most recent hemoglobin A1c-5 8  · Has peripheral neuropathy, peripheral vascular disease, and history of 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  · Diabetes improved after gastric bypass  · Monitor blood glucose  · Hemoglobin A1c: 5 7  · Continue to monitor    Hypokalemia  Assessment & Plan  Recent Labs     06/14/23  2040 06/15/23  2055 06/16/23  0452 06/17/23  0842   K 3 4* 3 4* 3 0* 3 7     · Repleted  · Continue to monitor and replete as needed    Anxiety  Assessment & Plan  · Patient with history of anxiety, was started on Ativan 0 5 mg PRN during last hospitalization   · Denies SI/HI  · No continue observation indicated at this time  · Patient expressed wish to stop benzodiazepines  · Psychiatry consulted per patient request- recommendations appreciated  · Gabapentin 300 mg TID: Pt requested 600mg TID was which ordered  · Atarax 50 mg q6hrs prn for anxiety  · Remeron 15 mg QHS for anxiety and depression  · Outpatient follow-up with Dr Genia Higgins    Atrial fibrillation [facility])  Assessment & Plan  · Patient has history of persistent A-fib with pacemaker  · Previously on Xarelto, stopped taking few months ago  · Per EKG, patient continues to be in atrial fibrillation, HR 69, with occasional ventricular paced complexes  · Cards consulted: appreciate recommendation  · Stop Lovenox, Initiate Xarelto 20 mg daily  · Potassium supplementation 20 mg daily  · Outpatient follow-up  · Monitor on telemetry  · Recommended continued follow-up with outpatient cardiology    * Benzodiazepine withdrawal with complication (HCC)-resolved as of 6/17/2023  Assessment & Plan  · Patient with a history of chronic benzodiazepine use   · Last use the evening of 6/15  · Received Ativan 0 5 mg in the ED PTA  · Initiate SEWS protocol for medical management of benzodiazepine withdrawal  · Received 260 mg of phenobarbital as initial dose  · Total Pheno 455 mg (06/17; 1215)  · Admits improved withdrawal symptoms; SEWs protocol discontinued  · Continuous pulse ox and telemetry monitoring  · Encourage continued cessation of benzodiazepine use after withdrawal is fully treated        VTE Pharmacologic Prophylaxis:   Pharmacologic: Rivaroxaban (Xarelto)  Mechanical VTE Prophylaxis in Place: yes    Code Status: Level 1 - Full Code    Patient Centered Rounds: I have performed bedside rounds with nursing staff today  Discussions with Specialists or Other Care Team Provider: Nursing Staff, Supervising Physician     Education and Discussions with Family / Patient: Care discussed with Patient    Time Spent for Care: 30 minutes  More than 50% of total time spent on counseling and coordination of care as described above  Current Length of Stay: 2 day(s)    Current Patient Status: Inpatient     Certification Statement: The patient will continue to require additional inpatient hospital stay due to Benzodiazepine withdrawal with complication Discharge Plan: OP rehab      Subjective:   Patient was seen and evaluated this morning and admits had a pretty rough night  Admits did not tolerate the Remeron very well  Admits that he requested to have his gabapentin increased from 300 to 600 mg with improvement of his anxiety  Admits was finally able to sleep later this morning  Currently patient is denying any anxiety or tremors  Patient admits has not had a bowel movement in several days and was just given Colace, MiraLAX and prune juice  Admits his lack of bowel movement most likely due to decreased p o  intake while consuming alcohol  Denies any other complaint on review of systems      Objective:     Clinical Opiate Withdrawal Scale  Pulse: 62    SEWS Total Score: 3 (6/16/2023  9:00 PM)        Last 24 Hours Medication List:   Current Facility-Administered Medications   Medication Dose Route Frequency Provider Last Rate   • acetaminophen  650 mg Oral Q6H PRN Jan Hussein PA-C     • aluminum-magnesium hydroxide-simethicone  30 mL Oral Q4H PRN Harris Cottrell MD     • docusate sodium  100 mg Oral BID Harris Cottrell MD     • ferrous sulfate  325 mg Oral Daily With Breakfast Harris Cottrell MD     • folic acid  1 mg Oral Daily Ning Call PA-C     • gabapentin  600 mg Oral TID Ning Call PA-C     • hydrOXYzine HCL  50 mg Oral Q6H PRN Georgina Davis MD     • melatonin  6 mg Oral HS PRN Ning Call PA-C     • mirtazapine  15 mg Oral HS Georgina Davis MD     • multivitamin-minerals  1 tablet Oral Daily Ning Call PA-C     • ondansetron  4 mg Intravenous Q6H PRN Ning Call PA-C     • pantoprazole  40 mg Oral Early Morning Harris Cottrell MD     • polyethylene glycol  17 g Oral Daily PRN Harris Cottrell MD     • rivaroxaban  20 mg Oral Daily With Karen Costello MD     • thiamine  100 mg Oral Daily Ning Call PA-C     • traZODone  50 mg Oral HS PRN Ning Call PA-C           Vitals:   Temp (24hrs), Av 5 °F (36 4 °C), Min:97 °F (36 1 °C), Max:97 9 °F (36 6 °C)    Temp:  [97 °F (36 1 °C)-97 9 °F (36 6 °C)] 97 9 °F (36 6 °C)  HR:  [62-90] 62  Resp:  [16-18] 16  BP: (101-105)/(52-82) 101/52  SpO2:  [92 %-99 %] 92 %  Body mass index is 32 98 kg/m²  Input and Output Summary (last 24 hours):No intake or output data in the 24 hours ending 23 1243    Physical Exam:   Physical Exam  Vitals and nursing note reviewed  Constitutional:       Appearance: He is not ill-appearing or diaphoretic  HENT:      Head: Normocephalic and atraumatic  Nose: Nose normal  No congestion or rhinorrhea  Mouth/Throat:      Mouth: Mucous membranes are moist    Eyes:      General: No scleral icterus  Extraocular Movements: Extraocular movements intact  Conjunctiva/sclera: Conjunctivae normal       Pupils: Pupils are equal, round, and reactive to light     Cardiovascular: Rate and Rhythm: Normal rate and regular rhythm  Pulses: Normal pulses  Heart sounds: Normal heart sounds  No murmur heard  Pulmonary:      Effort: Pulmonary effort is normal  No respiratory distress  Breath sounds: Normal breath sounds  No stridor  Abdominal:      General: Bowel sounds are normal       Palpations: Abdomen is soft  Tenderness: There is no abdominal tenderness  Hernia: No hernia is present  Musculoskeletal:         General: No swelling or tenderness  Normal range of motion  Cervical back: Normal range of motion and neck supple  No rigidity or tenderness  Right lower leg: No edema  Left lower leg: No edema  Skin:     General: Skin is warm  Capillary Refill: Capillary refill takes less than 2 seconds  Coloration: Skin is not jaundiced  Findings: No bruising or erythema  Neurological:      Mental Status: He is alert and oriented to person, place, and time  GCS: GCS eye subscore is 4  GCS verbal subscore is 5  GCS motor subscore is 6  Motor: No tremor  Coordination: Finger-Nose-Finger Test and Heel to Ardean Karley Test normal       Gait: Gait is intact  Psychiatric:         Attention and Perception: He does not perceive auditory or visual hallucinations  Thought Content: Thought content does not include homicidal or suicidal ideation  Thought content does not include homicidal or suicidal plan  Additional Data:     Labs:   Results from last 7 days   Lab Units 06/17/23  0842 06/16/23  0452 06/15/23  2055   WBC Thousand/uL 6 40   < > 7 96   HEMOGLOBIN g/dL 11 5*   < > 11 6*   HEMATOCRIT % 37 8   < > 36 0*   PLATELETS Thousands/uL 388   < > 386   NEUTROS PCT %  --   --  66   LYMPHS PCT %  --   --  19   MONOS PCT %  --   --  14*   EOS PCT %  --   --  1    < > = values in this interval not displayed        Results from last 7 days   Lab Units 06/17/23  0842 06/16/23  0452   SODIUM mmol/L 141 140   POTASSIUM mmol/L 3 7 3 0*   CHLORIDE mmol/L 102 103   CO2 mmol/L 31 27   BUN mg/dL 8 6   CREATININE mg/dL 0 79 0 64   ANION GAP mmol/L 8 10   CALCIUM mg/dL 9 5 9 0   ALBUMIN g/dL  --  3 4*   TOTAL BILIRUBIN mg/dL  --  0 60   ALK PHOS U/L  --  100   ALT U/L  --  6*   AST U/L  --  11*   GLUCOSE RANDOM mg/dL 127 87                Results from last 7 days   Lab Units 06/16/23  0452   HEMOGLOBIN A1C % 5 7*               * I Have Reviewed All Lab Data Listed Above  * Additional Pertinent Lab Tests Reviewed: [facility] 64 Admission Reviewed      Imaging Studies: I have personally reviewed pertinent reports  Recent Cultures (last 7 days): Today, Patient Was Seen By: Eron Enamorado PA-C    ** Please Note: Dictation voice to text software may have been used in the creation of this document   **

## 2023-06-18 VITALS
OXYGEN SATURATION: 97 % | BODY MASS INDEX: 33.13 KG/M2 | HEIGHT: 73 IN | WEIGHT: 250 LBS | DIASTOLIC BLOOD PRESSURE: 53 MMHG | RESPIRATION RATE: 18 BRPM | HEART RATE: 58 BPM | TEMPERATURE: 97.3 F | SYSTOLIC BLOOD PRESSURE: 90 MMHG

## 2023-06-18 PROBLEM — E87.6 HYPOKALEMIA: Status: RESOLVED | Noted: 2021-11-16 | Resolved: 2023-06-18

## 2023-06-18 LAB
ERYTHROCYTE [DISTWIDTH] IN BLOOD BY AUTOMATED COUNT: 14.9 % (ref 11.6–15.1)
HCT VFR BLD AUTO: 35.4 % (ref 36.5–49.3)
HGB BLD-MCNC: 10.9 G/DL (ref 12–17)
MCH RBC QN AUTO: 25.1 PG (ref 26.8–34.3)
MCHC RBC AUTO-ENTMCNC: 30.8 G/DL (ref 31.4–37.4)
MCV RBC AUTO: 81 FL (ref 82–98)
PLATELET # BLD AUTO: 335 THOUSANDS/UL (ref 149–390)
PMV BLD AUTO: 9.7 FL (ref 8.9–12.7)
RBC # BLD AUTO: 4.35 MILLION/UL (ref 3.88–5.62)
WBC # BLD AUTO: 5.46 THOUSAND/UL (ref 4.31–10.16)

## 2023-06-18 PROCEDURE — 85027 COMPLETE CBC AUTOMATED: CPT

## 2023-06-18 PROCEDURE — 99239 HOSP IP/OBS DSCHRG MGMT >30: CPT | Performed by: PHYSICIAN ASSISTANT

## 2023-06-18 RX ORDER — PANTOPRAZOLE SODIUM 40 MG/1
40 TABLET, DELAYED RELEASE ORAL
Qty: 30 TABLET | Refills: 0 | Status: SHIPPED | OUTPATIENT
Start: 2023-06-19

## 2023-06-18 RX ORDER — FERROUS SULFATE 325(65) MG
325 TABLET ORAL
Qty: 30 TABLET | Refills: 0 | Status: SHIPPED | OUTPATIENT
Start: 2023-06-19

## 2023-06-18 RX ORDER — CALCIUM CARBONATE 500 MG/1
500 TABLET, CHEWABLE ORAL DAILY PRN
Status: DISCONTINUED | OUTPATIENT
Start: 2023-06-18 | End: 2023-06-18 | Stop reason: HOSPADM

## 2023-06-18 RX ORDER — HYDROXYZINE 50 MG/1
50 TABLET, FILM COATED ORAL EVERY 6 HOURS PRN
Qty: 30 TABLET | Refills: 0 | Status: SHIPPED | OUTPATIENT
Start: 2023-06-18

## 2023-06-18 RX ORDER — MIRTAZAPINE 15 MG/1
15 TABLET, FILM COATED ORAL
Qty: 30 TABLET | Refills: 0 | Status: SHIPPED | OUTPATIENT
Start: 2023-06-18 | End: 2023-07-05 | Stop reason: SDUPTHER

## 2023-06-18 RX ORDER — GABAPENTIN 300 MG/1
600 CAPSULE ORAL 3 TIMES DAILY
Qty: 180 CAPSULE | Refills: 0 | Status: SHIPPED | OUTPATIENT
Start: 2023-06-18 | End: 2023-07-05 | Stop reason: SDUPTHER

## 2023-06-18 RX ADMIN — DOCUSATE SODIUM 100 MG: 100 CAPSULE, LIQUID FILLED ORAL at 08:47

## 2023-06-18 RX ADMIN — GABAPENTIN 600 MG: 300 CAPSULE ORAL at 08:47

## 2023-06-18 RX ADMIN — CALCIUM CARBONATE (ANTACID) CHEW TAB 500 MG 500 MG: 500 CHEW TAB at 09:11

## 2023-06-18 RX ADMIN — FERROUS SULFATE TAB 325 MG (65 MG ELEMENTAL FE) 325 MG: 325 (65 FE) TAB at 08:47

## 2023-06-18 RX ADMIN — PANTOPRAZOLE SODIUM 40 MG: 40 TABLET, DELAYED RELEASE ORAL at 05:03

## 2023-06-18 RX ADMIN — FOLIC ACID 1 MG: 1 TABLET ORAL at 08:47

## 2023-06-18 RX ADMIN — THIAMINE HCL TAB 100 MG 100 MG: 100 TAB at 08:47

## 2023-06-18 RX ADMIN — MULTIPLE VITAMINS W/ MINERALS TAB 1 TABLET: TAB ORAL at 08:47

## 2023-06-18 NOTE — CASE MANAGEMENT
Case Management Discharge Planning Note    Patient name Whitney Motta  Location 5T DETOX 513/5T DETOX 51* MRN [id number]  : 1965 Date 2023       Current Admission Date: 6/15/2023  Current Admission Diagnosis:Type 2 diabetes mellitus with diabetic polyneuropathy, with long-term current use of insulin [facility])   Patient Active Problem List    Diagnosis Date Noted   • Other constipation 2023   • Moderate benzodiazepine use disorder ([place] [address] ) 06/15/2023   • Microcytic anemia 06/15/2023   • Acute osteomyelitis of left foot ([place] [address] ) 04/10/2023   • Closed fracture of shaft of metatarsal bone of left foot 2023   • Cellulitis of left foot 2023   • Diabetic ulcer of left midfoot associated with type 2 diabetes mellitus ([place] [address] ) 2023   • Hyperkalemia 2022   • Pacemaker 2022   • History of bariatric surgery 2022   • Encounter for perioperative consultation 2022   • Diabetic infection of left foot ([place] [address] ) 2022   • Cervical spondylosis 2022   • Cervicalgia - Right 2022   • Toe osteomyelitis, right ([place] [address] ) 2022   • Type 2 diabetes mellitus with diabetic polyneuropathy, with long-term current use of insulin ([place] [address] ) 2022   • Anxiety 2021   • Hypokalemia 2021   • Atrial fibrillation (HCC)    • Chronic osteomyelitis of left foot with draining sinus ([place] [address] ) 2021   • Pain, joint, ankle and foot, left 2021   • DAYAN (obstructive sleep apnea) 2020   • Chronic diastolic heart failure ([place] [address] ) 2020   • Hypertension 2020   • Diabetes mellitus ([place] [address] ) 2020   • Morbid obesity with BMI of 40 0-44 9, adult ([place] [address] ) 2020      LOS (days): 3  Geometric Mean LOS (GMLOS) (days):   Days to GMLOS:     OBJECTIVE:  Risk of Unplanned Readmission Score: 46 35         Current admission status: Inpatient   Preferred Pharmacy:   [facility] [place]  [address] [place] [address]  Phone: [phone] Fax: [phone]    Primary Care Provider: Jenise Silvestre DO    Primary Insurance: Diane Null  Secondary Insurance:     DISCHARGE DETAILS:    Discharge planning discussed with[de-identified] Sheridan Edwards of Choice: Yes                   Contacts  Patient Contacts: wife, anson  Relationship to Patient[de-identified] Family  Contact Method: Phone  Reason/Outcome: Emergency Contact, Discharge Planning              Other Referral/Resources/Interventions Provided:  Referrals Provided[de-identified] Crisis Hotline, Therapist, Psychiatrist    Would you like to participate in our [address] service program?  : No - Declined         Pt to D/C today  Pt denies SI/HI/AVH  Pt oriented x3  Pt to d/c to his home and will drive himself upon discharge  Pt to follow up with [facility] referral  Scripts sent to preferred pharmacy       Discharge Address: [place] [address] [place] [address]  Phone: [phone]

## 2023-06-18 NOTE — PLAN OF CARE
D- Met with father for a family session.  0830 to 0915, 45 minute duration.    A- Gathered information on pt hx for psychosocial assessment.  Discussed precipitants to hospitalization.  Reviewed treatment goals and hospital procedures.      R- Father was forthcoming with information.  Pt lives with adoptive mother and father, 10 y/o bio brother, twin bio sister, 5 y/o adoptive brother and 8 y/o adoptive sister.  Pt is emotionally connected with family members and plays well with siblings.  Pt does not play well with peers.  Pt has poor social skills and does not understand social cues.  Pt will either play solo or parallel play when around peers.  Father reported an increase in pt's aggressive and ooc bxs and pt was removed from school because of this.  Pt currently attends [employer].  Father stated pt is \"running on flight or fight mode\".  Father brought pt to intake due to increased aggressive behaviors at home.  On mother's day pt became physically aggressive towards mother; hitting, kicking and punching her.  On 5/15 pt's siblings went to mother reporting that pt had a knife and put it in the toaster.  Father thinks pt attempted to hide the knife in the toaster when he heard siblings tell mother.  Father stated the knife was the largest  knife in the kitchen.  Pt did not have easy access to this knife.  The knife was in the  block on the counter, pt had to climb up onto the counter to get it.  When mother went to the kitchen pt stated \"don't worry I'm not going to kill you\".  Father was concerned that pt had to physically plan on how to get the knife and the comment made to mother seemed premeditated.  Pt may have been upset with mother because she had timed him out in his room.  This is the first time pt has made a comment about causing physical harm.      Pt does not do well with transitions and is always fearful that parents will not return.  Pt is likely fearful that he will not be going home  Problem: SUBSTANCE USE/ABUSE  Goal: By discharge, will develop insight into their chemical dependency and sustain motivation to continue in recovery  Description: INTERVENTIONS:  - Attends all daily group sessions and scheduled AA groups  - Actively practices coping skills through participation in the therapeutic community and adherence to program rules  - Reviews and completes assignments from individual treatment plan  - Assist patient development of understanding of their personal cycle of addiction and relapse triggers  Outcome: Progressing  Goal: By discharge, patient will have ongoing treatment plan addressing chemical dependency  Description: INTERVENTIONS:  - Assist patient with resources and/or appointments for ongoing recovery based living  Outcome: Progressing [facility] Alaina Nieto, LPC  5/17/2018

## 2023-06-18 NOTE — DISCHARGE INSTR - AVS FIRST PAGE
Please follow-up with the [facility]'s psychiatric clinic with Dr Anna Marie Barnes  Please abstain from benzodiazepine use as explained to you  Please continue taking your Xarelto as instructed  Call for follow-up with your cardiologist as instructed

## 2023-06-18 NOTE — DISCHARGE SUMMARY
MEDICAL DETOX UNIT, LEVEL 4  Department of Medical Toxicology  Reason for Admission/Principal Problem: Benzodiazepine withdrawal   Admitting provider: PABLITO Tan MD   6/15/2023  7:59 PM       Discharging Physician / Practitioner: Tara Stafford PA-C  PCP: Amber Bowers DO  Admission Date:   Admission Orders (From admission, onward)     Ordered        06/15/23 2224  INPATIENT ADMISSION  Once                      Discharge Date: 06/17/23    Medical Problems     Resolved Problems  Date Reviewed: 6/17/2023          Resolved    * (Principal) Benzodiazepine withdrawal with complication ([facility] [address] ) 7/66/6240     Resolved by  Sadie Barnes PA-C          * Benzodiazepine withdrawal with complication (HCC)-resolved as of 6/17/2023  Assessment & Plan  · Patient with a history of chronic benzodiazepine use   · Last use the evening of 6/15  · Received Ativan 0 5 mg in the ED PTA  · Initiate SEWS protocol for medical management of benzodiazepine withdrawal  · Received 260 mg of phenobarbital as initial dose  · Total Pheno 455 mg (06/17; 1215)  · Admits improved withdrawal symptoms; SEWs protocol discontinued  · Continuous pulse ox and telemetry monitoring  · Encourage continued cessation of benzodiazepine use after withdrawal is fully treated      Moderate benzodiazepine use disorder ([facility] [address] )  Assessment & Plan  · Patient reports being started on Ativan during a previous hospitalization and continued to take once outpatient  · Currently endorses taking 1 5 to 3 mg of Ativan daily  · Wants to stop taking benzodiazepines and is considering starting another medication for anxiety management  · Case management for assistance in discharge planning   · See benzodiazepine withdrawal      Type 2 diabetes mellitus with diabetic polyneuropathy, with long-term current use of insulin [facility])  Assessment & Plan  Lab Results   Component Value Date    HGBA1C 5 7 (H) 06/16/2023       No results for input(s): "\"POCGLU\" in the last 72 hours      Blood Sugar Average: Last 72 hrs:     · Patient reports history of type 2 diabetes, previously on medication but no longer takes  · Most recent hemoglobin A1c-5 8  · Has peripheral neuropathy, peripheral vascular disease, and history of 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  · Diabetes improved after gastric bypass  · Monitor blood glucose  · Hemoglobin A1c: 5 7  · Continue to monitor    Atrial fibrillation ([address] )  Assessment & Plan  · Patient has history of persistent A-fib with pacemaker  · Previously on Xarelto, stopped taking few months ago  · Per EKG, patient continues to be in atrial fibrillation, HR 69, with occasional ventricular paced complexes  · Cards consulted: appreciate recommendation  · Stop Lovenox, Initiate Xarelto 20 mg daily  · Potassium supplementation 20 mg daily  · Outpatient follow-up  · Monitor on telemetry  · Recommended continued follow-up with outpatient cardiology    Hypokalemia  Assessment & Plan  Recent Labs     06/14/23  2040 06/15/23  2055 06/16/23  0452 06/17/23  0842   K 3 4* 3 4* 3 0* 3 7     · Repleted  · Continue to monitor and replete as needed    Microcytic anemia  Assessment & Plan  Recent Labs     06/14/23  2040 06/15/23  2055 06/16/23  0452 06/17/23  0842   HGB 11 3* 11 6* 10 6* 11 5*   MCV 81* 80* 81* 82     · In the setting of previous gastric bypass  · Iron panel pending  · No acute signs of bleed  · Continue to monitor    Anxiety  Assessment & Plan  · Patient with history of anxiety, was started on Ativan 0 5 mg PRN during last hospitalization   · Denies SI/HI  · No continue observation indicated at this time  · Patient expressed wish to stop benzodiazepines  · Psychiatry consulted per patient request- recommendations appreciated  · Gabapentin 300 mg TID: Pt requested 600mg TID was which ordered  · Atarax 50 mg q6hrs prn for anxiety  · Remeron 15 mg QHS for anxiety and depression  · Outpatient follow-up with Dr Shoshana Alejandre " Michael    History of bariatric surgery  Assessment & Plan  · History of gastric bypass surgery  · Reports decreased appetite since starting benzodiazepine use  · Notes recent weight loss  · Nutritional consult per patient request    Other constipation  Assessment & Plan  · Admits lack of bowel movement for several days in setting of acute alcohol use  · Denies any N/V, abdominal pain, abdominal distention or back pain  · Colace, MiraLAX and Prune juice was given x 1  · Will continue to monitor for improvement        Consultations During Hospital Stay:  · Case Management   · Psychiatry   · Nutritional services   · Cardiology     Procedures Performed:   · None    Significant Findings / Test Results:   · Iron deficiency anemia  · Hgb A1c- 5 7   · Hypokalemia- repleated     Incidental Findings:   · None     Test Results Pending at Discharge (will require follow up): · None     Outpatient Tests/Follow ups Requested:  · PCP follow up   · Cardiology follow up   · Psychiatry follow up     Complications:  None    Reason for Admission: Benzodiazepine withdrawal     Hospital Course:     Lilliam Velez is a 59 y o  male patient who originally presented to the hospital on 6/15/2023 due to benzodiazepine withdrawal  Patient initially presented to the [address] [place] [address] ED requesting detoxification from benzodiazepines  Patient was admitted to the [facility] medical detox unit under [facility] protocol for medically assisted benzodiazepine withdrawal and received a total of 455 mg phenobarbital without complication  Patient's withdrawal symptoms subsequently resolved, and he has remained without objective evidence of benzodiazepine withdrawal at this time  During this hospitalization, patient was found to have hypokalemia, which resolved with electrolyte supplementation  Patient was seen by psychiatry for management of his anxiety and was started on gabapentin and hydroxyzine    Patient was also seen by cardiology who restarted his home Xarelto due to "atrial fibrillation with pacemaker in place  Case management was consulted for assistance with rehab resources, and patient will be discharged home with outpatient services  ***     Please see above list of diagnoses and related plan for additional information  Condition at Discharge: {condition:39356}     Discharge Day Visit / Exam:     Subjective:  ***  Vitals: Blood Pressure: 104/72 (06/17/23 1631)  Pulse: (!) 52 (06/17/23 1631)  Temperature: 98 °F (36 7 °C) (06/17/23 1631)  Temp Source: Temporal (06/17/23 1631)  Respirations: 18 (06/17/23 1631)  Height: 6' 1\" (185 4 cm) (06/15/23 2347)  Weight - Scale: 113 kg (250 lb) (06/15/23 2347)  SpO2: 100 % (06/17/23 1631)  Exam:   Physical Exam  ( *** Be Sure to Include Physical Exam: Delete this entire line when you have entered your exam)  Discussion with Family: Spoke to patient regarding treatment plan     Discharge instructions/Information to patient and family:   See after visit summary for information provided to patient and family  Provisions for Follow-Up Care:  See after visit summary for information related to follow-up care and any pertinent home health orders  Disposition:     Home    For Discharges to [facility] SNF:   · Not Applicable to this Patient - Not Applicable to this Patient    Planned Readmission: None     Discharge Statement:  I spent *** minutes discharging the patient  This time was spent on the day of discharge  I had direct contact with the patient on the day of discharge  Greater than 50% of the total time was spent examining patient, answering all patient questions, arranging and discussing plan of care with patient as well as directly providing post-discharge instructions  Additional time then spent on discharge activities  Discharge Medications:  See after visit summary for reconciled discharge medications provided to patient and family        ** Please Note: This note has been constructed using a voice " recognition system **

## 2023-06-18 NOTE — DISCHARGE SUMMARY
MEDICAL DETOX UNIT, LEVEL 4  Department of Medical Toxicology  Reason for Admission/Principal Problem: Benzodiazepine withdrawal   Admitting provider: PABLITO Perez MD   6/15/2023  7:59 PM       Discharging Physician / Practitioner: Nery Fu PA-C  PCP: Juan Carlos Brown DO  Admission Date:   Admission Orders (From admission, onward)     Ordered        06/15/23 2224  INPATIENT ADMISSION  Once                      Discharge Date: 06/18/23    Medical Problems     Resolved Problems  Date Reviewed: 6/18/2023          Resolved    * (Principal) Benzodiazepine withdrawal with complication ([facility] [address] ) 4/12/3478     Resolved by  Nery Fu PA-C          Other constipation  Assessment & Plan  · Admits lack of bowel movement for several days in setting of acute alcohol use  · Denies any N/V, abdominal pain, abdominal distention or back pain  · Colace, MiraLAX and Prune juice was given x 1  · Will continue to monitor for improvement    Microcytic anemia  Assessment & Plan  Recent Labs     06/14/23  2040 06/15/23  2055 06/16/23  0452 06/17/23  0842   HGB 11 3* 11 6* 10 6* 11 5*   MCV 81* 80* 81* 82     · In the setting of previous gastric bypass  · Iron panel pending  · No acute signs of bleed  · Continue to monitor    Moderate benzodiazepine use disorder ([facility] )  Assessment & Plan  · Patient reports being started on Ativan during a previous hospitalization and continued to take once outpatient  · Currently endorses taking 1 5 to 3 mg of Ativan daily  · Wants to stop taking benzodiazepines and is considering starting another medication for anxiety management  · Case management for assistance in discharge planning: outpatient follow-up   · See benzodiazepine withdrawal      History of bariatric surgery  Assessment & Plan  · History of gastric bypass surgery  · Reports decreased appetite since starting benzodiazepine use  · Notes recent weight loss  · Nutritional consult per "patient request: appreciated recommendation    Type 2 diabetes mellitus with diabetic polyneuropathy, with long-term current use of insulin ([facility] [address] )  Assessment & Plan  Lab Results   Component Value Date    HGBA1C 5 7 (H) 06/16/2023       No results for input(s): \"POCGLU\" in the last 72 hours      Blood Sugar Average: Last 72 hrs:     · Patient reports history of type 2 diabetes, previously on medication but no longer takes  · Most recent hemoglobin A1c-5 8  · Has peripheral neuropathy, peripheral vascular disease, and history of 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  · Diabetes improved after gastric bypass  · Monitor blood glucose  · Hemoglobin A1c: 5 7  · Continue to monitor    Hypokalemia  Assessment & Plan  Recent Labs     06/15/23  2055 06/16/23  0452 06/17/23  0842   K 3 4* 3 0* 3 7     · Repleted  · Continue to monitor and replete as needed    Anxiety  Assessment & Plan  · Patient with history of anxiety, was started on Ativan 0 5 mg PRN during last hospitalization   · Denies SI/HI  · No continue observation indicated at this time  · Patient expressed wish to stop benzodiazepines  · Psychiatry consulted per patient request- recommendations appreciated  · Gabapentin 300 mg TID: Pt requested 600mg TID was which ordered  · Atarax 50 mg q6hrs prn for anxiety  · Remeron 15 mg QHS for anxiety and depression  · Outpatient follow-up with Dr Griselda Sander    Atrial fibrillation [facility])  Assessment & Plan  · Patient has history of persistent A-fib with pacemaker  · Previously on Xarelto, stopped taking few months ago  · Per EKG, patient continues to be in atrial fibrillation, HR 69, with occasional ventricular paced complexes  · Cards consulted: appreciate recommendation  · Stop Lovenox, Initiate Xarelto 20 mg daily  · Potassium supplementation 20 mg daily  · Outpatient follow-up  · Monitor on telemetry  · Recommended continued follow-up with outpatient cardiology    * Benzodiazepine withdrawal with complication " (HCC)-resolved as of 6/17/2023  Assessment & Plan  · Patient with a history of chronic benzodiazepine use   · Last use the evening of 6/15  · Received Ativan 0 5 mg in the ED PTA  · Initiate SEWS protocol for medical management of benzodiazepine withdrawal  · Received 260 mg of phenobarbital as initial dose  · Total Pheno 455 mg (06/17; 1215)  · Admits improved withdrawal symptoms; SEWs protocol discontinued  · Continuous pulse ox and telemetry monitoring  · Encourage continued cessation of benzodiazepine use after withdrawal is fully treated        Consultations During Hospital Stay:  · Case Management   · Psychiatry   · Nutritional services   · Cardiology     Procedures Performed:   · None    Significant Findings / Test Results:   · Iron deficiency anemia  · Hgb A1c- 5 7   · Hypokalemia- repleated     Incidental Findings:   · None     Test Results Pending at Discharge (will require follow up): · None     Outpatient Tests/Follow ups Requested:  · PCP follow up   · Cardiology follow up   · Psychiatry follow up     Complications:  None    Reason for Admission: Benzodiazepine withdrawal     Hospital Course:     Terry Mabry is a 59 y o  male patient who originally presented to the hospital on 6/15/2023 due to benzodiazepine withdrawal  Patient initially presented to the [address] [place] [address] ED requesting detoxification from benzodiazepines  Patient was admitted to the [facility] medical detox unit under SEWS protocol for medically assisted benzodiazepine withdrawal and received a total of 455 mg phenobarbital without complication  Patient's withdrawal symptoms subsequently resolved, and he has remained without objective evidence of benzodiazepine withdrawal at this time  During this hospitalization, patient was found to have hypokalemia, which resolved with electrolyte supplementation  Patient was seen by psychiatry for management of his anxiety and was started on gabapentin  Remeron and hydroxyzine    Patient was also seen by "cardiology who restarted his home Xarelto due to atrial fibrillation with pacemaker in place in addition with Poatssium  Pt was also seen by Nutritionist due to pt stating he was not eating while taking lorazepam, had lost about 60 lbs with gastric bypass  He was recommended to increased protein intake as well as strength training to regain muscle mass  Case management was consulted for assistance with rehab resources, and patient will be discharged home with outpatient services  Please see above list of diagnoses and related plan for additional information  Condition at Discharge: good     Discharge Day Visit / Exam:     Subjective: Patient was seen and evaluated this morning and continues to deny any complaint on review of systems  Patient admits feeling much better but was endorsing heartburn after eating cunningham this morning and was requesting Tums  Otherwise denies any abdominal pain, nausea, vomiting or any other complaint on review of systems  Patient admits complete improvement of his withdrawal symptoms  Vitals: Blood Pressure: 90/53 (06/18/23 0737)  Pulse: 58 (06/18/23 0737)  Temperature: (!) 97 3 °F (36 3 °C) (06/18/23 0737)  Temp Source: Temporal (06/18/23 0737)  Respirations: 18 (06/18/23 0737)  Height: 6' 1\" (185 4 cm) (06/15/23 2347)  Weight - Scale: 113 kg (250 lb) (06/15/23 2347)  SpO2: 97 % (06/18/23 0737)  Exam:   Physical Exam  Vitals and nursing note reviewed  Constitutional:       Appearance: He is not ill-appearing, toxic-appearing or diaphoretic  HENT:      Head: Normocephalic and atraumatic  Nose: Nose normal  No congestion or rhinorrhea  Mouth/Throat:      Mouth: Mucous membranes are moist    Eyes:      General: No scleral icterus  Extraocular Movements: Extraocular movements intact  Conjunctiva/sclera: Conjunctivae normal       Pupils: Pupils are equal, round, and reactive to light  Cardiovascular:      Rate and Rhythm: Normal rate and regular rhythm      " Pulses: Normal pulses  Heart sounds: Normal heart sounds  No murmur heard  Pulmonary:      Effort: Pulmonary effort is normal  No respiratory distress  Breath sounds: Normal breath sounds  No stridor  No wheezing  Abdominal:      General: Bowel sounds are normal       Palpations: Abdomen is soft  Tenderness: There is no abdominal tenderness  Musculoskeletal:         General: No swelling or tenderness  Normal range of motion  Cervical back: Normal range of motion and neck supple  No rigidity or tenderness  Right lower leg: No edema  Left lower leg: No edema  Skin:     General: Skin is warm  Capillary Refill: Capillary refill takes less than 2 seconds  Coloration: Skin is not jaundiced  Findings: No rash  Neurological:      Mental Status: He is alert and oriented to person, place, and time  GCS: GCS eye subscore is 4  GCS verbal subscore is 5  GCS motor subscore is 6  Cranial Nerves: Cranial nerves 2-12 are intact  Sensory: Sensation is intact  Motor: Motor function is intact  No tremor  Gait: Gait is intact  Psychiatric:         Attention and Perception: He does not perceive auditory or visual hallucinations  Thought Content: Thought content does not include homicidal or suicidal ideation  Thought content does not include homicidal or suicidal plan  Discussion with Family: Spoke to patient regarding treatment plan     Discharge instructions/Information to patient and family:   See after visit summary for information provided to patient and family  Provisions for Follow-Up Care:  See after visit summary for information related to follow-up care and any pertinent home health orders        Disposition:     Home    For Discharges to [facility] SNF:   · Not Applicable to this Patient - Not Applicable to this Patient    Planned Readmission: None     Discharge Statement:  I spent 45 minutes discharging the patient  This time was spent on the day of discharge  I had direct contact with the patient on the day of discharge  Greater than 50% of the total time was spent examining patient, answering all patient questions, arranging and discussing plan of care with patient as well as directly providing post-discharge instructions  Additional time then spent on discharge activities  Discharge Medications:  See after visit summary for reconciled discharge medications provided to patient and family        ** Please Note: This note has been constructed using a voice recognition system **

## 2023-06-18 NOTE — DISCHARGE INSTR - OTHER ORDERS
HOW TO GET SUBSTANCE ABUSE HELP:  If you or someone you know has a drug or alcohol problem, there is help:  [place] [address]: [address]: [phone]  An assessment is the first step  In addition to those listed there are other programs available in the area but assessment is best to determine an appropriate level of care  If you DO NOT have Medical Assistance ([place]) or [employer], an assessment can be scheduled at one of these providers:  [address] [facility]  [facility] [address], [address]  [phone]   [facility]  [address] , [place], [address]  [facility]  [address]  [facility]  [address]  [address] [facility], [address] [facility]   [address] , [place], [address] [facility]  [address] [facility] [address] [facility] , [place], [address] [facility]  [address] [facility] , [address], [place], [address]  [phone]     If you [address], an assessment can be scheduled at one of these providers:  [place] on Alcohol & Drug Abuse  [address] , [place], [address] [facility]  [address]  [facility] [address], [address]  [address]  D&A Intake Unit  [address] [facility] [address] , 1st Floor, [facility], [address]  [phone]  [address], [address] [facility],6Th Floor, [place], [address] [facility] [place] [address] [facility]  [address] , [place], [address]  [facility] 84  [address] [facility]  [phone]   NET ([facility])  [address] [place] [address], [facility], [address]  [address] [place], 105 [facility]   [address] , [place], 98 [facility]  [phone]   [facility] - [address] , [place], [facility]  [address] , [address], [place], [address]  [phone]     If you [facility], an assessment can be scheduled at one of these providers  Please contact these Providers to determine if they are in your network plan:  [facility] D&A Intake Unit  620 [facility] [address] , 1st Floor, [facility], [address]  [address] [facility]  [address] , [place], [address]  [phone]   [address] [facility]  [address]  [place] [phone]   NET ([facility])  [address] [place] [address], [facility], [address]  [address] [place] [address] [facility]  [address] , [address], [place], [address] [facility]

## 2023-06-19 ENCOUNTER — TELEPHONE (OUTPATIENT)
Dept: PSYCHIATRY | Facility: CLINIC | Age: 58
End: 2023-06-19

## 2023-06-19 NOTE — UTILIZATION REVIEW
NOTIFICATION OF ADMISSION DISCHARGE   This is a Notification of Discharge from [address]  Please be advised that this patient has been discharge from our facility  Below you will find the admission and discharge date and time including the patient’s disposition  UTILIZATION REVIEW CONTACT:  Aide Barton MA  Utilization   Network Utilization Review Department  Phone: [phone] x carefully listen to the prompts  All voicemails are confidential   Email: [email or web address]     ADMISSION INFORMATION  PRESENTATION DATE: 6/15/2023  7:59 PM  OBERVATION ADMISSION DATE:   INPATIENT ADMISSION DATE: 6/15/23 10:24 PM   DISCHARGE DATE: 6/18/2023 11:32 AM   DISPOSITION:Home/Self Care    IMPORTANT INFORMATION:  Send all requests for admission clinical reviews, approved or denied determinations and any other requests to dedicated fax number below belonging to the campus where the patient is receiving treatment   List of dedicated fax numbers:  [address] [place] [address] DENIALS (Administrative/Medical Necessity) [phone]   [address] (Maternity/NICU/Pediatrics) [phone]   [facility] [phone]   [place] [address] [phone]   [address] [phone]   220 [facility] [phone]   90 [facility] [phone]   [address] [place] [address] 119 [phone]   [facility]  [phone]   [address] [facility] [phone]   412 [facility] 850 [facility] [phone]

## 2023-06-21 NOTE — TELEPHONE ENCOUNTER
Pt appt on 8/ has been c/x due needing a sooner appt   Patient has been r/s with Dr Rodriguez on 7/7 at 6

## 2023-06-22 ENCOUNTER — OFFICE VISIT (OUTPATIENT)
Dept: WOUND CARE | Facility: CLINIC | Age: 58
End: 2023-06-22
Payer: COMMERCIAL

## 2023-06-22 ENCOUNTER — TELEPHONE (OUTPATIENT)
Dept: PSYCHIATRY | Facility: CLINIC | Age: 58
End: 2023-06-22

## 2023-06-22 VITALS
SYSTOLIC BLOOD PRESSURE: 102 MMHG | RESPIRATION RATE: 18 BRPM | HEART RATE: 62 BPM | TEMPERATURE: 96.4 F | DIASTOLIC BLOOD PRESSURE: 60 MMHG

## 2023-06-22 DIAGNOSIS — Z89.432 STATUS POST PARTIAL AMPUTATION OF FOOT, LEFT (HCC): Primary | ICD-10-CM

## 2023-06-22 DIAGNOSIS — Z79.4 TYPE 2 DIABETES MELLITUS WITH DIABETIC POLYNEUROPATHY, WITH LONG-TERM CURRENT USE OF INSULIN (HCC): ICD-10-CM

## 2023-06-22 DIAGNOSIS — T81.31XD DEHISCENCE OF OPERATIVE WOUND, SUBSEQUENT ENCOUNTER: ICD-10-CM

## 2023-06-22 DIAGNOSIS — E11.42 TYPE 2 DIABETES MELLITUS WITH DIABETIC POLYNEUROPATHY, WITH LONG-TERM CURRENT USE OF INSULIN (HCC): ICD-10-CM

## 2023-06-22 PROCEDURE — 99212 OFFICE O/P EST SF 10 MIN: CPT | Performed by: STUDENT IN AN ORGANIZED HEALTH CARE EDUCATION/TRAINING PROGRAM

## 2023-06-22 RX ORDER — APIXABAN 5 MG/1
TABLET, FILM COATED ORAL
COMMUNITY
Start: 2023-05-25

## 2023-06-22 RX ORDER — BUSPIRONE HYDROCHLORIDE 15 MG/1
TABLET ORAL
COMMUNITY
Start: 2023-05-30

## 2023-06-22 RX ORDER — ZOLPIDEM TARTRATE 5 MG/1
5 TABLET ORAL
COMMUNITY
Start: 2023-06-06

## 2023-06-22 RX ORDER — PRAZOSIN HYDROCHLORIDE 2 MG/1
CAPSULE ORAL
COMMUNITY
Start: 2023-05-27

## 2023-06-22 RX ORDER — SILDENAFIL 25 MG/1
TABLET, FILM COATED ORAL
COMMUNITY
Start: 2023-05-25

## 2023-06-22 RX ORDER — CITALOPRAM 20 MG/1
20 TABLET ORAL EVERY MORNING
COMMUNITY
Start: 2023-06-07

## 2023-06-22 RX ORDER — ESZOPICLONE 1 MG/1
1 TABLET, FILM COATED ORAL
COMMUNITY
Start: 2023-06-01

## 2023-06-22 RX ORDER — LACTULOSE 10 G/15ML
SOLUTION ORAL
COMMUNITY
Start: 2023-05-01

## 2023-06-22 NOTE — PROGRESS NOTES
Patient ID: Clotilde Donnelly is a 59 y o  male Date of Birth 1965       Chief Complaint   Patient presents with   • Follow Up Wound Care Visit     Left foot diabetic ulcer  Allergies:  Ativan [lorazepam]    Diagnosis:  1  Status post partial amputation of foot, left ([facility])  -     Wound cleansing and dressings; Future; Expected date: 06/22/2023  -     Diabetic Shoe  -     Diabetic Shoe Inserts    2  Dehiscence of operative wound, subsequent encounter  -     Wound cleansing and dressings; Future; Expected date: 06/22/2023    3  Type 2 diabetes mellitus with diabetic polyneuropathy, with long-term current use of insulin ([facility])  -     Diabetic Shoe  -     Diabetic Shoe Inserts       Diagnosis ICD-10-CM Associated Orders   1  Status post partial amputation of foot, left ([facility])  [id number] Wound cleansing and dressings     Diabetic Shoe     Diabetic Shoe Inserts      2  Dehiscence of operative wound, subsequent encounter  T81  31XD Wound cleansing and dressings      3  Type 2 diabetes mellitus with diabetic polyneuropathy, with long-term current use of insulin ([facility])  E11 42 Diabetic Shoe    Z79 4 Diabetic Shoe Inserts           Assessment & Plan:  See wound orders    - Wound has healed  - I ordered DM shoes and inserts with left forefoot filler  - He may return to work however must limit ambulation until he gets his custom shoes  He should check his feet daily to make sure no new ulcerations appear  - F/u 9-10 weeks in office for at risk foot care    Subjective:   Cy presents to clinic today concerning left foot incision dehiscence due to noncompliance s/p left 2nd metatarsal excision (due to OM) and revision TMA (DOS 4/7/23 & 4/12/23)  Patient has not been compliant with his WB recommendations and ambulates in sneaker today  Notes anxiety is much better since recent admission  Denies N/V/C/F/SOB/CP        The following portions of the patient's history were reviewed and updated as appropriate:   Patient Active Problem List   Diagnosis   • DAYAN (obstructive sleep apnea)   • Chronic diastolic heart failure ([facility])   • Hypertension   • Diabetes mellitus ([facility] )   • Morbid obesity with BMI of 40 0-44 9, adult ([facility])   • Pain, joint, ankle and foot, left   • Chronic osteomyelitis of left foot with draining sinus ([facility])   • Atrial fibrillation ([facility])   • Anxiety   • Type 2 diabetes mellitus with diabetic polyneuropathy, with long-term current use of insulin ([facility])   • Toe osteomyelitis, right ([facility])   • Cervical spondylosis   • Cervicalgia - Right   • Diabetic infection of left foot ([facility])   • Pacemaker   • History of bariatric surgery   • Encounter for perioperative consultation   • Hyperkalemia   • Diabetic ulcer of left midfoot associated with type 2 diabetes mellitus ([facility] )   • Cellulitis of left foot   • Closed fracture of shaft of metatarsal bone of left foot   • Acute osteomyelitis of left foot ([facility])   • Moderate benzodiazepine use disorder ([facility])   • Microcytic anemia   • Other constipation   • Status post partial amputation of foot, left ([facility] )     Past Medical History:   Diagnosis Date   • Atrial fibrillation ([place] [address] )    • Benzodiazepine withdrawal with complication ([place] [address] ) 1/58/1975   • Chronic diastolic (congestive) heart failure ([facility] )    • Diabetes mellitus ([place] [address] )    • High cholesterol    • Hyperlipidemia    • Pacemaker    • Stroke [facility])      Past Surgical History:   Procedure Laterality Date   • APPENDECTOMY     • ATRIAL CARDIAC PACEMAKER INSERTION     • BARIATRIC SURGERY  05/2021   • EPIDURAL BLOCK INJECTION N/A 5/19/2022    Procedure: BLOCK / INJECTION EPIDURAL STEROID CERVICAL C7-T1;  Surgeon: Jacque Bundy MD;  [facility];  Service: Pain Management    • FL GUIDED NEEDLE PLAC BX/ASP/INJ  3/22/2022   • FOOT AMPUTATION Left 4/28/2022    Procedure: LEFT TRANSMETATARSAL AMPUTATION ;  Surgeon: Sanford Madden DPM;  [facility];  Service: Podiatry   • NERVE BLOCK Right 2/10/2022    Procedure: BLOCK MEDIAL BRANCH C3, C4, C5 #1;  Surgeon: January Uriostegui MD;  [facility];  Service: Pain Management    • NERVE BLOCK Right 3/22/2022    Procedure: BLOCK MEDIAL BRANCH C3, C4, C5 #2;  Surgeon: January Uriostegui MD;  [facility];  Service: Pain Management    • LA AMPUTATION FOOT TRANSMETARSAL Left 4/12/2023    Procedure: REVISION LEFT TRANSMETATARSAL (TMA) AMPUTATION, REMOVAL OF UNVIABLE TISSUE AND BONE,;  Surgeon: Kathleen Orbegon DPM;  [facility];  Service: Podiatry   • LA AMPUTATION METATARSAL W/TOE SINGLE Left 4/7/2023    Procedure: 2ND RAY RESECTION FOOT;  Surgeon: Kathleen Obregon DPM;  [facility];  Service: Podiatry   • LA AMPUTATION TOE INTERPHALANGEAL JOINT Left 11/16/2021    Procedure: AMPUTATION LESSER TOE;  Surgeon: Emanuel Jacob DPM;  [facility];  Service: Podiatry   • RADIOFREQUENCY ABLATION Right 4/7/2022    Procedure: Right C3, C4, C5 RFA;  Surgeon: January Uriostegui MD;  [facility];  Service: Pain Management    • RHIZOTOMY Right 2/9/2023    Procedure: RHIZOTOMY CERVICAL MEDIAL BRANCH NERVES RIGHT C3, C4, C5;  Surgeon: January Uriostegui MD;  [facility];  Service: Pain Management    • TOE AMPUTATION Left     2nd toe   • TOE AMPUTATION Left 9/15/2021    Procedure: AMPUTATION LEFT 4TH TOE;  Surgeon: Emanuel Jacob DPM;  [facility];  Service: Podiatry   • TOE AMPUTATION Right 1/12/2022    Procedure: AMPUTATION TOE;  Surgeon: Roselyn Resendez DPM;  [facility];  Service: Podiatry   • TOE AMPUTATION Right 2/23/2022    Procedure: AMPUTATION TOE  RIGHT SECOND;  Surgeon: Roselyn Resendez DPM;  Location: [address] [place] [address] MAIN OR;  Service: Podiatry   • TOE AMPUTATION Right 6/3/2022    Procedure: AMPUTATION right 4th TOE;  Surgeon: Marcy Alejandro DPM;  [facility];  Service: Podiatry     Social History     Socioeconomic History   • Marital status: /Civil Union     Spouse name: Not on file   • Number of children: Not on file   • Years of education: Not on file   • Highest education level: Not on file   Occupational History   • Occupation:      Employer: [employer]   Tobacco Use   • Smoking status: Never   • Smokeless tobacco: Never   Vaping Use   • Vaping Use: Never used   Substance and Sexual Activity   • Alcohol use: Never   • Drug use: Never   • Sexual activity: Yes   Other Topics Concern   • Not on file   Social History Narrative   • Not on file     Social Determinants of Health     Financial Resource Strain: Not on file   Food Insecurity: No Food Insecurity (4/11/2023)    Hunger Vital Sign    • Worried About Running Out of Food in the Last Year: Never true    • Ran Out of Food in the Last Year: Never true   Transportation Needs: No Transportation Needs (4/11/2023)    PRAPARE - Transportation    • Lack of Transportation (Medical): No    • Lack of Transportation (Non-Medical):  No   Physical Activity: Not on file   Stress: Not on file   Social Connections: Not on file   Intimate Partner Violence: Not on file   Housing Stability: Low Risk  (4/11/2023)    Housing Stability Vital Sign    • Unable to Pay for Housing in the Last Year: No    • Number of Places Lived in the Last Year: 1    • Unstable Housing in the Last Year: No        Current Outpatient Medications:   •  sildenafil (VIAGRA) 25 MG tablet, take 1 tablet by mouth once daily if needed for ERECTILE DYSFUNCTION, Disp: , Rfl:   •  acetaminophen (TYLENOL) 325 mg tablet, Take 2 tablets (650 mg total) by mouth every 6 (six) hours as needed for mild pain, headaches or fever, Disp: , Rfl: 0  •  busPIRone (BUSPAR) 15 mg tablet, TAKE 1 TABLET BY MOUTH IN THE MORNING AT AT NOON IN THE EVENING AND BEFORE BEDTIME, Disp: , Rfl:   •  citalopram (CeleXA) 20 mg tablet, Take 20 mg by mouth every morning, Disp: , Rfl:   •  Eliquis 5 MG, TAKE 1 TABLET BY MOUTH IN THE MORNING AND BEFORE BEDTIME, Disp: , Rfl:   •  eszopiclone (LUNESTA) 1 mg tablet, Take 1 mg by mouth daily at bedtime, Disp: , Rfl:   •  ferrous sulfate 325 (65 Fe) mg tablet, Take 1 tablet (325 mg total) by mouth daily with breakfast Do not start before June 19, 2023 , Disp: 30 tablet, Rfl: 0  •  gabapentin (NEURONTIN) 300 mg capsule, Take 2 capsules (600 mg total) by mouth 3 (three) times a day, Disp: 180 capsule, Rfl: 0  •  hydrOXYzine HCL (ATARAX) 50 mg tablet, Take 1 tablet (50 mg total) by mouth every 6 (six) hours as needed (anxiety and insomnia), Disp: 30 tablet, Rfl: 0  •  lactulose (CHRONULAC) 10 g/15 mL solution, TAKE 15 ML BY MOUTH 2 TIMES A DAY AS NEEDED FOR CONSTIPATION  SEVERE CONSTIPATION, Disp: , Rfl:   •  mirtazapine (REMERON) 15 mg tablet, Take 1 tablet (15 mg total) by mouth daily at bedtime, Disp: 30 tablet, Rfl: 0  •  pantoprazole (PROTONIX) 40 mg tablet, Take 1 tablet (40 mg total) by mouth daily in the early morning Do not start before June 19, 2023 , Disp: 30 tablet, Rfl: 0  •  potassium chloride (K-DUR,KLOR-CON) 20 mEq tablet, Take 2 tablets (40 mEq total) by mouth daily for 5 days, Disp: 10 tablet, Rfl: 0  •  prazosin (MINIPRESS) 2 mg capsule, TAKE 1 CAPSULE BY MOUTH EVERY DAY AT NIGHT, Disp: , Rfl:   •  zolpidem (AMBIEN) 5 mg tablet, Take 5 mg by mouth daily at bedtime as needed, Disp: , Rfl:   Family History   Problem Relation Age of Onset   • No Known Problems Mother    • No Known Problems Father       Review of Systems   Constitutional: Negative for activity change, chills and fever  HENT: Negative  Respiratory: Negative for cough, chest tightness and shortness of breath  Cardiovascular: Positive for leg swelling (Chronic B/L)  Negative for chest pain  Endocrine: Negative  Genitourinary: Negative  Skin: Negative for wound (Left TMA site resolved)  Neurological:        PN   Psychiatric/Behavioral: Negative  Negative for agitation and behavioral problems           Objective:  /60   Pulse 62   Temp (!) 96 4 °F (35 8 °C)   Resp 18     Physical Exam  Constitutional: Appearance: Normal appearance  He is not ill-appearing  Comments: Anxious   Cardiovascular:      Comments: Chronic venous stasis dermatitis with B/L LE brawny edema  Diminished pedal pulses due to edema  Absent pedal hair  Pulmonary:      Effort: No respiratory distress  Musculoskeletal:         General: No tenderness or deformity  Normal range of motion  Comments: S/p left TMA    Skin:     Capillary Refill: Capillary refill takes less than 2 seconds  Comments: B/L LE skin is atrophic - thin, dry and shiny in appearance  Left revisional TMA site appears fully healed  Neurological:      General: No focal deficit present  Mental Status: He is alert and oriented to person, place, and time  Comments: N/T/B to B/L LE   Psychiatric:         Mood and Affect: Mood normal          Behavior: Behavior normal              Wound 04/12/23 Foot Left (Active)   Wound Image   06/22/23 1158   Wound Description Epithelialization 06/22/23 1203   Katy-wound Assessment Callus;Dry;Scaly 06/22/23 1203   Wound Length (cm) 0 cm 06/22/23 1203   Wound Width (cm) 0 cm 06/22/23 1203   Wound Depth (cm) 0 cm 06/22/23 1203   Wound Surface Area (cm^2) 0 cm^2 06/22/23 1203   Wound Volume (cm^3) 0 cm^3 06/22/23 1203   Calculated Wound Volume (cm^3) 0 cm^3 06/22/23 1203   Change in Wound Size % 100 06/22/23 1203   Wound Site Closure [place]; Sutures 05/04/23 1439   Drainage Amount None 06/22/23 1203   Drainage Description Serosanguineous 05/04/23 1439   Non-staged Wound Description Full thickness 06/01/23 1611   Treatments Cleansed 06/01/23 1611           Results from last 6 Months   Lab Units 04/05/23  1246   WOUND CULTURE  2+ Growth of Stenotrophomonas maltophilia*  2+ Growth of         Wound Instructions:  Orders Placed This Encounter   Procedures   • Wound cleansing and dressings     Your wound is healed today  Follow up as needed for any open ulcers or wounds  Wear appropriate footwear       Standing Status: " Future     Standing Expiration Date:   6/22/2024   • Diabetic Shoe   • Diabetic Shoe Inserts     With Left forefoot filler     Order Specific Question:   Type     Answer:   Custom Fabricated         Maxim Case, DPM      Portions of the record may have been created with voice recognition software  Occasional wrong word or \"sound a like\" substitutions may have occurred due to the inherent limitations of voice recognition software  Read the chart carefully and recognize, using context, where substitutions have occurred      "

## 2023-06-22 NOTE — TELEPHONE ENCOUNTER
Patient contacted the office seeking information on his follow up appointment  Patient stated he knows he has an appointment scheduled but forgot the date and time of the appointment  Writer verified that appointment is on 7/7/23 at 11:00a  Writer provided Patient with the address to the Provider office

## 2023-06-22 NOTE — PATIENT INSTRUCTIONS
Orders Placed This Encounter   Procedures    Wound cleansing and dressings     Your wound is healed today  Follow up as needed for any open ulcers or wounds  Wear appropriate footwear       Standing Status:   Future     Standing Expiration Date:   6/22/2024      [facility] - call for shoes  [address] [facility] Audie Castillo  [phone]

## 2023-06-26 ENCOUNTER — DOCUMENTATION (OUTPATIENT)
Dept: PODIATRY | Age: 58
End: 2023-06-26

## 2023-07-05 ENCOUNTER — OFFICE VISIT (OUTPATIENT)
Dept: PSYCHIATRY | Facility: CLINIC | Age: 58
End: 2023-07-05

## 2023-07-05 VITALS
DIASTOLIC BLOOD PRESSURE: 76 MMHG | SYSTOLIC BLOOD PRESSURE: 126 MMHG | BODY MASS INDEX: 34.01 KG/M2 | HEIGHT: 74 IN | WEIGHT: 265 LBS | HEART RATE: 76 BPM

## 2023-07-05 DIAGNOSIS — F43.21 COMPLICATED BEREAVEMENT: ICD-10-CM

## 2023-07-05 DIAGNOSIS — F32.1 CURRENT MODERATE EPISODE OF MAJOR DEPRESSIVE DISORDER WITHOUT PRIOR EPISODE (HCC): Primary | ICD-10-CM

## 2023-07-05 DIAGNOSIS — F41.1 GENERALIZED ANXIETY DISORDER: ICD-10-CM

## 2023-07-05 RX ORDER — GABAPENTIN 300 MG/1
600 CAPSULE ORAL 3 TIMES DAILY
Qty: 180 CAPSULE | Refills: 2 | Status: SHIPPED | OUTPATIENT
Start: 2023-07-05 | End: 2023-10-03

## 2023-07-05 RX ORDER — MIRTAZAPINE 15 MG/1
30 TABLET, FILM COATED ORAL
Qty: 60 TABLET | Refills: 2 | Status: SHIPPED | OUTPATIENT
Start: 2023-07-05 | End: 2023-10-03

## 2023-07-05 RX ORDER — BUSPIRONE HYDROCHLORIDE 10 MG/1
10 TABLET ORAL 3 TIMES DAILY
Qty: 90 TABLET | Refills: 3 | Status: SHIPPED | OUTPATIENT
Start: 2023-07-05

## 2023-07-05 RX ORDER — HYDROXYZINE 50 MG/1
25 TABLET, FILM COATED ORAL EVERY 6 HOURS PRN
Qty: 30 TABLET | Refills: 2 | Status: CANCELLED | OUTPATIENT
Start: 2023-07-05

## 2023-07-05 NOTE — PATIENT INSTRUCTIONS
Please call the office nursing staff for medication issues including refills, problems obtaining medications, side effects, etc at [phone]. Please return for a follow up appointment as discussed. If you are running late or are unable to attend your appointment, please call our [facility] office at [phone]. If you are in psychological crisis including not limited to suicidal/homicidal thoughts or thoughts of self-injury, do not hesitate to call/contact your [facility] hotline (see below) or go to the nearest emergency department. [facility] Crisis: [address] Crisis: [address] Crisis: [address] Crisis: [address] Crisis: [address] Crisis: [address] Crisis: [phone]  [place] Suicide Prevention Hotline: 9-764.763.7897    Body mass index is 34.49 kg/m². Recommend incorporating a more whole foods plant-predominant diet along with decreasing consumption of red meats and processed foods  Per AHA guidelines, recommend moderate-vigorous intensity exercise for 30 minutes a day for 5 days a week or a total of 150 min/week    Follow these tips to establish healthy sleep habits:    - Keep a consistent sleep schedule. Get up at the same time every day, even on weekends or during vacations. - Set a bedtime that is early enough for you to get at least 7-8 hours of sleep. - Don’t go to bed unless you are sleepy. - If you don’t fall asleep after 20 minutes, get out of bed and take a brief "break". Go to a quiet activity without a lot of light exposure. It is especially important to not get on electronics. During this "break," you might consider sitting in a chair and reading a boring book or listening to soft music, until your eyelids start to feel heavy.   Then, would go back to sleep and try again.      - Establish a relaxing bedtime routine.  - Use your bed only for sleep and sex. - Make your bedroom quiet and relaxing. Keep the room at a comfortable, cool temperature. - Limit exposure to bright light in the evenings. - Turn off electronic devices at least 30 minutes before bedtime.  - Avoid watching stressful or suspenseful TV programs right before bedtime.  - Don’t eat a large meal before bedtime. If you are hungry at night, eat a light, healthy snack.  - Exercise regularly and maintain a healthy diet. - Avoid consuming caffeine in the afternoon or evening.  - Avoid consuming alcohol before bedtime.  - Reduce your fluid intake before bedtime.  - Avoid daytime napping.

## 2023-07-05 NOTE — BH TREATMENT PLAN
TREATMENT PLAN (Medication Management Only)        [address] [facility]    Name and Date of Birth:  Gifty Sparks 59 y.o. 1965  Date of Treatment Plan: July 5, 2023  Diagnosis/Diagnoses:    1. Current moderate episode of major depressive disorder without prior episode ([address])    2. Complicated bereavement    3. Generalized anxiety disorder    4. Anxiety      Strengths/Personal Resources for Self-Care: supportive family, supportive friends, taking medications as prescribed, ability to adapt to life changes, ability to communicate needs, ability to communicate well, ability to reason, ability to understand psychiatric illness, average or above intelligence, financial means, general fund of knowledge, motivation for treatment, Jainism affiliation, special hobby/interest, stable employment, willingness to work on problems, work skills. Area/Areas of need (in own words): "I want to work on my anxiety" , "I still miss my daughter"  1. Long Term Goal: Improve depression, improve anxiety, cope with the passing of daughter, improve communication with wife  Target Date:6 months - 1/5/2024  Person/Persons responsible for completion of goal: Patient, Dr. April Goldstein  2. Short Term Objective (s) - How will we reach this goal?:   A. Provider new recommended medication/dosage changes and/or continue medication(s): continue current medications as prescribed. Continue gabapentin 600 mg TID and buspirone 10 mg TID. Increase Remeron to 30 mg QHS. Discontinue Hydroxyzine. B. Attend medication management appointments regularly. C. Eat a healthy diet , exercise regularly, get a good night's sleep.   Target Date:6 months - 1/5/2024  Person/Persons Responsible for Completion of Goal: Patient, Dr. Valerio Neigh: improving gradually  Treatment Modality: medication management every 3 months  Review due 180 days from date of this plan: 6 months - 1/5/2024  Expected length of service: ongoing treatment  My Physician/PA/NP and I have developed this plan together and I agree to work on the goals and objectives. I understand the treatment goals that were developed for my treatment.     Flaquito Cummings MD

## 2023-07-05 NOTE — PSYCH
Psychiatric Evaluation - Behavioral Health   MRN: [id number]    Chief Complaint: "I'm doing much better" , "I want to work on my anxiety" , "I still miss my daughter"    History of Present Illness     This is an intake psychiatric evaluation for the patient Kiko Salgado. Dayan Whittaker (who prefers to be called Jeison Martinez) is a 57-year-old male,  to his wife Umang Giraldo  for over 32 years, has 3 sons, is currently living in a house with his wife and 2 dogs in the \[facility]\"", currently employed as a  for [employer]. Jeison Martinez reports a past medical history that includes atrial fibrillation, type 2 diabetes with diabetic polyneuropathy status post multiple foot surgeries in the setting of foot osteomyelitis, obstructive sleep apnea, hypertension, chronic diastolic heart failure, and is approximately 2 years status post bariatric surgery. Cy possesses a past psychiatric history of unspecified depression and unspecified anxiety and was previously following outpatient with a psychiatry nurse practitioner Rosemary Sotomayor in [facility]. Jeison Martinez had recently presented to the [address] inpatient detox on 6/15/23 for supervised benzodiazepine withdrawal. For approximately 10 weeks prior to hospitalization he had been taking 1.5 mg to 3 mg of Ativan daily. At that time he expressed a desire to stop taking benzodiazepines and start another medication for anxiety management. During his stay on the inpatient medical detox unit he was treated for benzodiazepine withdrawal using symptom triggered phenobarbital administration. He was seen by this writer on 6/16/2023 for consult psychiatry services.   At that time he was started on Remeron 15 mg at nighttime for symptoms of depression and anxiety and to promote appetite (as he had lost approximately 90 lbs during the 10 weeks leading up to hospitalization), gabapentin 300 mg 3 times daily for anxiety and neuropathy, and Atarax 50 mg every 6 hours as needed for anxiety. Cy expressed a desire to follow with [facility] upon discharge and is being seen in the office today. The following is copied from my Paige Lopez MD) initial consult note 23:  Yordan Ayala reports that he has been struggling with symptoms of depression and anxiety that have been ongoing for the past two years and reports that his symptoms were acutely exacerbated in the last 10 weeks. Nuris Buneo reports in particular during the last 10 weeks he has struggled with sleep (the patient reports that he falls asleep easily but has difficulty staying asleep and gets up frequently throughout the night) and significantly decreased appetite (the patient reports that he has had basically no appetite for the past 10 weeks and in that time has lost approximately 90 pounds).      Cy reports that he has been struggling with mental health for the past two years the untimely passing of his daughter (she passed at the age of 21years old). Cy reports that as a teenager his daughter began dating a boyfriend who struggled with drugs and who influenced her to use drugs. Cy reports that he did his best to support his daughter and supported her through drug and alcohol rehab. He reports that one year and a half ago he was informed that his daughter had  and was reportedly found down by her boyfriend. Cy reports that his daughter was found to have heroin and fentanyl in her system at the time. Cy reports an investigation is ongoing and there are inconsistencies in the boyfriend's story. Yordan Ayala has struggled to cope with her passing since then. He reports that he becomes emotional when thinking of her.  He firmly believes that she is in heaven, but laments that he is not able to see her.     Cy reports that shortly after the passing of his daughter he went to see a psychiatrist. He reports that he was prescribed Ativan at that time (per chart review he was prescribed Ativan 1 mg TID PRN anxiety in June 2021) but states he did not take the medication. He reports that he has consistently been following with psychiatry since that time and currently follows outpatient with psychiatry nurse practitioner Jori Zimmer. He recalls being trialed on Ativan, Prozac, Buspar, Paxil, Ambien, and Lunesta. He expresses that he does not like to take medications in general and often felt frustrated that he was being trialed on multiple medications which appeared to have limited effect.     Cy reports that he continues to cope with the passing of his daughter he continues to follow with outpatient psychiatry. He goes to grief counseling and receives support through his Scientologist (he identifies as a Episcopalian). He reports he has seen a therapist in the past two years for his symptoms, but has not seen one recently. Cy reports that his family has been supportive and reports that he has childhood friends who have been supportive as well. Cy reports on a daily basis he has interests in life that he looks forward to (he enjoys working around [employer], he enjoys sports, he enjoys spending time with his family, he enjoys going to [employer], he enjoys his job as a , he enjoys visiting [place] where he was raised and spending time with childhood friends). He reports that through family support and kayla he has hope for the future. He reports no struggles with energy or concentration. He reports he has not experienced any suicidal thoughts.     Cy reports that his symptoms of depression and anxiety acutely worsened in the last 10 weeks. On 4/12/23 he underwent left transmetatarsal revision in the setting of foot osteomyelitis. He reports that following the surgery there were complications with blood loss. He reports that the experience was distressing and following the procedure he received multiple doses of Ativan in the hospital setting.  Cy reports that upon discharge from the hospital in April 2023 he experienced significantly worse anxiety than he had felt in the past. He reports that for the past 10 weeks he has been using 1.5 mg to 3 mg of ativan on a daily basis. He reports that he has struggled to sleep, has been frequently pacing, and has not been eating. He reports he experienced multiple panic attacks where he felt his chest tightening, experienced palpitations and shortness of breath. He presented to the ED on multiple occasions (per chart review he presented to [facility] on 4/14/23, 4/18/23, 4/19/23, 4/20/23, 5/6/23, and 6/7/23 in the setting of severe anxiety). 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 Richard Leung was seen today 7/5/23 for a comprehensive psychiatric evaluation today. He is noted to be restless in behavior and mildly anxious in affect. Samm Castillo is mostly organized in thought without flight of ideas or loosening of associations. He is talkative and at times tangential. Speech not appear to be pressured or rapid and Samm Castillo responds well to verbal redirecting. During today's examination, Samm Castillo does not exhibit objective evidence of bernie psychosis. Samm Castillo is not currently irritable, grandiose, labile, or pathologically euphoric. Samm Castillo does endorse paranoia, ideas of reference, or delusional beliefs. Today, Rich reports that he feels "relaxed". He reports that he has made steady improvements in his mood and anxiety since his inpatient detox stay, however he continues to experience ongoing symptoms of depression and anxiety. He continues to mourn the loss of his daughter Jose Mercer, who passed away unexpectedly approximately two years ago (cause of death was uncertain however she was found to have heroin and fentanyl in her system at the time of death).      At the time of interview, Richard Leung reports that his sleep has improved significantly on Remeron and reports that he is sleeping well throughout the morning (he currently works the third shift from 11:00 PM to 7:00AM). He reports that he has multiple life interests that he enjoys and he reports that over the past two weeks he has been involved in family gatherings and also adds that he recently went to his son's graduation on Friday 6/30/23. Cy denies feelings of hopelessness. He reports chronic struggles with energy, which he attributes to his chronic medical conditions (as noted above). He reports that several days out of the week he struggles with concentration. He reports that several days out of the week he continues to struggle with appetite. The patient's weight was reviewed and he has gained 15 lbs in since he was seen on the detox unit on 6/16/23. Cy reports that he is going to follow up with his surgeon who performed his gastric bypass as he continues to have difficulty eating. He reports that he has been drinking protein shakes and has been doing his best to eat a balanced diet with fruits and vegetables. He also reports that over the last two weeks he has been engaging in light exercise. Cy denies thoughts of suicide or homicide and denies thoughts of hurting himself or others. PHQ 9 was completed and the patient scored a 10, indicative of moderate depression. At the time of interview, Jeison Martinez reports he continues to struggle with anxiety. He reports feeling nervous, anxious, or on edge nearly every day and reports difficulty relaxing nearly every day, but reports that his symptoms have been much better controlled with his current medication regimen. Cy reports anxiety surrounding his family, his job, and his future. Cy reports a past history of panic attacks following his left TMA in April 2023 where he would pace for hours on end and he would present to the emergency department as he felt his chest tightening and experience palpitations and shortness of breath.  As noted above, he presented to [facility] on 4/14/23, 4/18/23, 4/19/23, 4/20/23, 5/6/23, and 6/7/23 in the setting of severe anxiety. At this time Nic Camarena reports his symptoms have significantly improved and reports that he is no longer having these panic episodes. He reports that there are still many unresolved questions regarding his daughter's passing and reports that there is an ongoing homicidal investigation, which is a source of stress for him. Nic Camarena is also worried about his 11year old grandson (who currently lives with the father of the child) and reports he is hoping he will be able to have custody of his grandson in the future. Cy reports that he has ongoing conflicts communicating with his wife. Cy reports that he and his late daughter had a good relationship up until her passing. He reports that shortly prior to his daughter's passing, his daughter had confided in him that she had been having some conflicts with his wife and was feeling unloved by her. Cy also adds that his wife was much less impacted by the loss of their daughter. Cy also adds that since he was discharged from the detox unit that he returned to his job as a  at [employer] and is worried about being able to perform his job duties. Nic Camarena feels that he needs to be strong for his family and struggles to attend to his own needs. ANDRES 7 was completed and the patient scored a 10, indicative of moderate anxiety. At the time of interview today, medications were reviewed in detail. PDMP was reviewed in detail and the patient has not received any controlled substance prescriptions since his discharge from detox 6/18/23. Cy reports he is currently taking Remeron 15 mg QHS (for depression, anxiety, sleep, and appetite), buspirone 10 mg TID (prescribed by his previous psychiatric provider for anxiety), and gabapentin 600 mg TID (for anxiety and neuropathic pain).  He reports that he tried the hydroxyzine 50 mg Q6HR PRN anxiety, but felt physically ill on the medication and stopped taking it. He denies using benzodiazepines (he was previously prescribed Ativan) or hypnotics (he was previously prescribed Lunesta) since his discharge from the detox unit. Today, Black Tim denies any acute or chronic history suggestive of an underlying affective (bipolar) organization. Black Tim denies previous episodes of elevated/expansive mood, lengthy periods without sleep, grandiosity, or intense and prolonged irritability. Black Tim denies atypical periods of increased goal-directed behavior, excessive spending, or sexual promiscuity. The patient has no history of pathologic impulsivity or extreme mood lability. Today, Black Tim denies past or present visual and auditory hallucinations. Black Tim denies recent ETOH or illicit substance abuse since he was last seen in the hospital on 6/16/23. Medication treatment options were discussed with Black Tim in detail. Black Tim agrees to an increase in Remeron to better control his symptoms of depression and anxiety. He would like to pursue outpatient therapy treatment if possible as well. At the time of interview, Black Tim consents for safety. Psychiatric Review Of Systems:    Appetite: Patient reports that several days out of the week he will struggle with his appetite  Adverse eating: Denies. Reports he has been eating a balanced diet and reports he has been drinking protein shakes and has been eating fruits and vegetables  Weight changes: Prior to his hospitalization on 6/15/23, Black Tim had lost approximately 90 pounds (weighing 250 lbs). Today he has gained 15 lbs and currently weighs 265 lbs. Insomnia/sleeplessness: Patient denies and reports he is sleeping well at night  Fatigue/anergy: Patient reports chronic struggles with energy that he attributes to his chronic medical conditions  Anhedonia/lack of interest: Patient reports multiple life interests and enjoys spending time with his family and friends.  Over the past two weeks he has been involved in family gatherings and also adds that he recently went to his son's graduation on Friday 6/30/23. Attention/concentration: Patient reports several days out of the week he struggles with concentration  Psychomotor agitation/retardation: Denies  Somatic symptoms: Denies  Anxiety/panic attack: Yes. Cy reports he continues to struggle with anxiety. He reports feeling nervous, anxious, or on edge nearly every day and reports difficulty relaxing nearly every day, but reports that his symptoms have been much better controlled with his current medication regimen. Cy reports anxiety surrounding his family, his job, and his future. Cy reports a past history of panic attacks following his left TMA in April 2023 where he would pace for hours on end and he would present to the emergency department as he felt his chest tightening and experience palpitations and shortness of breath. As noted above, he presented to [facility] on 4/14/23, 4/18/23, 4/19/23, 4/20/23, 5/6/23, and 6/7/23 in the setting of severe anxiety. At this time Felicia Lund reports his symptoms have significantly improved and reports that he is no longer having these panic episodes.    Gracia/hypomania: Denies  Hopelessness/helplessness/worthlessness: Denies  Self-injurious behavior/high-risk behavior: Denies  Suicidal ideation: Denies  Homicidal ideation: Denies  Auditory hallucinations: Denies  Visual hallucinations: Denies  Other perceptual disturbances: no  Delusional thinking: Denies  Obsessive/compulsive symptoms: Denies    Medical Review Of Systems:  Patient reports ongoing left foot pain following his surgery in April 2023  Complete review of systems is negative except as noted above.    --------------------------------------  Past Medical History:   Diagnosis Date   • Atrial fibrillation ([address])    • Benzodiazepine withdrawal with complication ([address]) 7/90/9078   • Chronic diastolic (congestive) heart failure ([address])    • Diabetes mellitus ([address])    • High cholesterol    • Hyperlipidemia    • Pacemaker    • Stroke [facility])       Past Surgical History:   Procedure Laterality Date   • APPENDECTOMY     • ATRIAL CARDIAC PACEMAKER INSERTION     • BARIATRIC SURGERY  05/2021   • EPIDURAL BLOCK INJECTION N/A 5/19/2022    Procedure: BLOCK / INJECTION EPIDURAL STEROID CERVICAL C7-T1;  Surgeon: Piper Still MD;  [facility]: [facility];  Service: Pain Management    • FL GUIDED NEEDLE PLAC BX/ASP/INJ  3/22/2022   • FOOT AMPUTATION Left 4/28/2022    Procedure: LEFT TRANSMETATARSAL AMPUTATION.;  Surgeon: Juliette Madden DPM;  Location[facility];  Service: Podiatry   • NERVE BLOCK Right 2/10/2022    Procedure: BLOCK MEDIAL BRANCH C3, C4, C5 #1;  Surgeon: Piper Still MD;  [facility];  Service: Pain Management    • NERVE BLOCK Right 3/22/2022    Procedure: BLOCK MEDIAL BRANCH C3, C4, C5 #2;  Surgeon: Piper Still MD;  [facility]: [facility];  Service: Pain Management    • MT AMPUTATION FOOT TRANSMETARSAL Left 4/12/2023    Procedure: REVISION LEFT TRANSMETATARSAL (TMA) AMPUTATION, REMOVAL OF UNVIABLE TISSUE AND BONE,;  Surgeon: Asya Quijano DPM;  Location: [facility];  Service: Podiatry   • MT AMPUTATION METATARSAL W/TOE SINGLE Left 4/7/2023    Procedure: 2ND RAY RESECTION FOOT;  Surgeon: Asya Quijano DPM;  Location: [facility];  Service: Podiatry   • MT AMPUTATION TOE INTERPHALANGEAL JOINT Left 11/16/2021    Procedure: AMPUTATION LESSER TOE;  Surgeon: Carlton Thomson DPM;  [facility];  Service: Podiatry   • RADIOFREQUENCY ABLATION Right 4/7/2022    Procedure: Right C3, C4, C5 RFA;  Surgeon: Piper Still MD;  [facility]: [facility];  Service: Pain Management    • RHIZOTOMY Right 2/9/2023    Procedure: RHIZOTOMY CERVICAL MEDIAL BRANCH NERVES RIGHT C3, C4, C5;  Surgeon: Piper Still MD;  [facility]: [facility];  Service: Pain Management    • TOE AMPUTATION Left     2nd toe   • TOE AMPUTATION Left 9/15/2021    Procedure: AMPUTATION LEFT 4TH TOE;  Surgeon: Shelby Simmons DPM;  [facility];  Service: Podiatry   • TOE AMPUTATION Right 1/12/2022    Procedure: AMPUTATION TOE;  Surgeon: Michael Gupta DPM;  [facility];  Service: Podiatry   • TOE AMPUTATION Right 2/23/2022    Procedure: AMPUTATION TOE  RIGHT SECOND;  Surgeon: Michael Gupta DPM;  Location: [address] [place] [address] MAIN OR;  Service: Podiatry   • TOE AMPUTATION Right 6/3/2022    Procedure: AMPUTATION right 4th TOE;  Surgeon: Baldev Alfred DPM;  [facility];  Service: Podiatry     Family History   Problem Relation Age of Onset   • No Known Problems Mother    • No Known Problems Father        History Review: The following portions of the patient's history were reviewed and updated as appropriate: allergies, current medications, past family history, past medical history, past social history, past surgical history and problem list.    Visit Vitals  /76   Pulse 76   Ht 6' 1.5" (1.867 m)   Wt 120 kg (265 lb)   BMI 34.49 kg/m²   Smoking Status Never   BSA 2.44 m²      Wt Readings from Last 6 Encounters:   07/05/23 120 kg (265 lb)   06/15/23 113 kg (250 lb)   06/14/23 114 kg (251 lb 8.7 oz)   06/07/23 120 kg (265 lb 3.4 oz)   05/04/23 (!) 150 kg (330 lb 11 oz)   04/24/23 (!) 150 kg (330 lb)        Mental Status Evaluation:    Appearance Patient is a 59year old bald  male.  Alert, good eye contact, appears stated age, casually dressed fair grooming and hygiene, no acute distress   Behavior pleasant, cooperative, mildly anxious   Speech normal rate, normal volume, normal pitch, fluent, clear, coherent   Mood "comfortable"   Affect Mildly anxious, otherwise appropriate   Thought Processes organized, logical, coherent, goal directed   Associations intact associations   Thought Content normal, no overt delusions   Perceptual Disturbances: no auditory hallucinations, no visual hallucinations, does not appear responding to internal stimuli   Abnormal Thoughts  Risk Potential Suicidal ideation - None  Homicidal ideation - None  Potential for aggression - No   Orientation oriented to person, place, time/date and situation   Memory recent and remote memory grossly intact   Consciousness alert and awake   Attention Span Concentration Span attention span and concentration are age appropriate   Intellect appears to be of average intelligence   Insight intact and good   Judgement intact and good   Muscle Strength and  Gait normal muscle strength and normal muscle tone, normal gait and normal balance   Motor Activity no abnormal movements   Language no difficulty naming common objects, no difficulty repeating a phrase, no difficulty writing a sentence   Fund of Knowledge adequate knowledge of current events  adequate fund of knowledge regarding past history  adequate fund of knowledge regarding vocabulary        Meds/Allergies    Allergies   Allergen Reactions   • Ativan [Lorazepam] Anxiety     Current Outpatient Medications   Medication Instructions   • acetaminophen (TYLENOL) 650 mg, Oral, Every 6 hours PRN   • busPIRone (BUSPAR) 10 mg, Oral, 3 times daily   • Eliquis 5 MG TAKE 1 TABLET BY MOUTH IN THE MORNING AND BEFORE BEDTIME   • ferrous sulfate 325 mg, Oral, Daily with breakfast   • gabapentin (NEURONTIN) 600 mg, Oral, 3 times daily   • hydrOXYzine HCL (ATARAX) 50 mg, Oral, Every 6 hours PRN   • mirtazapine (REMERON) 30 mg, Oral, Daily at bedtime   • pantoprazole (PROTONIX) 40 mg, Oral, Daily (early morning)        Labs & Imaging:  I have personally reviewed all pertinent laboratory tests and imaging results.    Admission on 06/15/2023, Discharged on 06/18/2023   Component Date Value Ref Range Status   • WBC 06/15/2023 7.96  4.31 - 10.16 Thousand/uL Final   • RBC 06/15/2023 4.52  3.88 - 5.62 Million/uL Final   • Hemoglobin 06/15/2023 11.6 (L)  12.0 - 17.0 g/dL Final   • Hematocrit 06/15/2023 36.0 (L)  36.5 - 49.3 % Final   • MCV 06/15/2023 80 (L)  82 - 98 fL Final   • [facility] 06/15/2023 25.7 (L)  26.8 - 34.3 pg Final   • MCHC 06/15/2023 32.2  31.4 - 37.4 g/dL Final   • RDW 06/15/2023 14.9  11.6 - 15.1 % Final   • MPV 06/15/2023 9.6  8.9 - 12.7 fL Final   • Platelets 06/99/4584 386  149 - 390 Thousands/uL Final   • nRBC 06/15/2023 0  /100 WBCs Final   • Neutrophils Relative 06/15/2023 66  43 - 75 % Final   • Immat GRANS % 06/15/2023 0  0 - 2 % Final   • Lymphocytes Relative 06/15/2023 19  14 - 44 % Final   • Monocytes Relative 06/15/2023 14 (H)  4 - 12 % Final   • Eosinophils Relative 06/15/2023 1  0 - 6 % Final   • Basophils Relative 06/15/2023 0  0 - 1 % Final   • Neutrophils Absolute 06/15/2023 5.18  1.85 - 7.62 Thousands/µL Final   • Immature Grans Absolute 06/15/2023 0.02  0.00 - 0.20 Thousand/uL Final   • Lymphocytes Absolute 06/15/2023 1.49  0.60 - 4.47 Thousands/µL Final   • Monocytes Absolute 06/15/2023 1.14  0.17 - 1.22 Thousand/µL Final   • Eosinophils Absolute 06/15/2023 0.10  0.00 - 0.61 Thousand/µL Final   • Basophils Absolute 06/15/2023 0.03  0.00 - 0.10 Thousands/µL Final   • Sodium 06/15/2023 137  135 - 147 mmol/L Final   • Potassium 06/15/2023 3.4 (L)  3.5 - 5.3 mmol/L Final   • Chloride 06/15/2023 99  96 - 108 mmol/L Final   • CO2 06/15/2023 28  21 - 32 mmol/L Final   • ANION GAP 06/15/2023 10  4 - 13 mmol/L Final   • BUN 06/15/2023 7  5 - 25 mg/dL Final   • Creatinine 06/15/2023 0.77  0.60 - 1.30 mg/dL Final    Standardized to IDMS reference method   • Glucose 06/15/2023 109  65 - 140 mg/dL Final    If the patient is fasting, the ADA then defines impaired fasting glucose as > 100 mg/dL and diabetes as > or equal to 123 mg/dL. • Calcium 06/15/2023 9.6  8.4 - 10.2 mg/dL Final   • AST 06/15/2023 11 (L)  13 - 39 U/L Final   • ALT 06/15/2023 8  7 - 52 U/L Final    Specimen collection should occur prior to Sulfasalazine administration due to the potential for falsely depressed results.     • Alkaline Phosphatase 06/15/2023 114 (H)  34 - 104 U/L Final   • Total Protein 06/15/2023 7.6  6.4 - 8.4 g/dL Final   • Albumin 06/15/2023 4.1  3.5 - 5.0 g/dL Final   • Total Bilirubin 06/15/2023 0.78  0.20 - 1.00 mg/dL Final    Use of this assay is not recommended for patients undergoing treatment with eltrombopag due to the potential for falsely elevated results. N-acetyl-p-benzoquinone imine (metabolite of Acetaminophen) will generate erroneously low results in samples for patients that have taken an overdose of Acetaminophen. • eGFR 06/15/2023 99  ml/min/1.73sq m Final   • Magnesium 06/15/2023 2.0  1.9 - 2.7 mg/dL Final   • Amph/Meth UR 06/15/2023 Negative  Negative Final   • Barbiturate Ur 06/15/2023 Negative  Negative Final   • Benzodiazepine Urine 06/15/2023 Negative  Negative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

## 2023-07-06 ENCOUNTER — HOSPITAL ENCOUNTER (OUTPATIENT)
Dept: RADIOLOGY | Facility: HOSPITAL | Age: 58
Discharge: HOME | End: 2023-07-06
Attending: STUDENT IN AN ORGANIZED HEALTH CARE EDUCATION/TRAINING PROGRAM
Payer: COMMERCIAL

## 2023-07-06 ENCOUNTER — OFFICE VISIT (OUTPATIENT)
Dept: WOUND CARE | Facility: CLINIC | Age: 58
End: 2023-07-06
Payer: COMMERCIAL

## 2023-07-06 VITALS
DIASTOLIC BLOOD PRESSURE: 62 MMHG | TEMPERATURE: 97.1 F | SYSTOLIC BLOOD PRESSURE: 112 MMHG | RESPIRATION RATE: 16 BRPM | HEART RATE: 72 BPM

## 2023-07-06 DIAGNOSIS — Z79.4 TYPE 2 DIABETES MELLITUS WITH DIABETIC POLYNEUROPATHY, WITH LONG-TERM CURRENT USE OF INSULIN (HCC): Primary | ICD-10-CM

## 2023-07-06 DIAGNOSIS — Z89.432 STATUS POST PARTIAL AMPUTATION OF FOOT, LEFT (HCC): ICD-10-CM

## 2023-07-06 DIAGNOSIS — M79.672 LEFT FOOT PAIN: ICD-10-CM

## 2023-07-06 DIAGNOSIS — E11.42 TYPE 2 DIABETES MELLITUS WITH DIABETIC POLYNEUROPATHY, WITH LONG-TERM CURRENT USE OF INSULIN (HCC): Primary | ICD-10-CM

## 2023-07-06 PROCEDURE — 99024 POSTOP FOLLOW-UP VISIT: CPT | Performed by: STUDENT IN AN ORGANIZED HEALTH CARE EDUCATION/TRAINING PROGRAM

## 2023-07-06 PROCEDURE — 99212 OFFICE O/P EST SF 10 MIN: CPT | Performed by: STUDENT IN AN ORGANIZED HEALTH CARE EDUCATION/TRAINING PROGRAM

## 2023-07-06 PROCEDURE — 73630 X-RAY EXAM OF FOOT: CPT

## 2023-07-06 NOTE — PATIENT INSTRUCTIONS
Orders Placed This Encounter   Procedures    Wound cleansing and dressings     Your wound remains healed today. Dr. Kev Turicos has ordered an xray to evaluate the pain you're having in your left foot. Please get this xray completed when possible. If you have open wounds please contact us for an appointment. For diabetic foot care needs and non-wound related foot issues please see Dr. Kev Turcios in her office.      Standing Status:   Future     Standing Expiration Date:   7/6/2024

## 2023-07-06 NOTE — PROGRESS NOTES
Patient ID: Dolores Schulte is a 59 y.o. male Date of Birth 1965       Chief Complaint   Patient presents with   • Follow Up Wound Care Visit     Follow up visit for wound to left foot. Allergies:  Ativan [lorazepam]    Diagnosis:  1. Type 2 diabetes mellitus with diabetic polyneuropathy, with long-term current use of insulin ([facility])  -     Wound cleansing and dressings; Future    2. Status post partial amputation of foot, left ([facility])  -     Wound cleansing and dressings; Future  -     XR foot 3+ vw left; Future; Expected date: 07/06/2023    3. Left foot pain  -     XR foot 3+ vw left; Future; Expected date: 07/06/2023       Diagnosis ICD-10-CM Associated Orders   1. Type 2 diabetes mellitus with diabetic polyneuropathy, with long-term current use of insulin ([facility])  E11.42 Wound cleansing and dressings    Z79.4       2. Status post partial amputation of foot, left ([facility])  [id number] Wound cleansing and dressings     XR foot 3+ vw left      3. Left foot pain  M79.672 XR foot 3+ vw left           Assessment & Plan:  - Patient has new left foot pain since d/c from wound care. No current open wound to the foot. Patient denies trauma, systemic SOI. There are no local signs of infection today. I would like to get an XR to assess for possible fracture or other osseous/joint abnormality. Pain possibly due to new WB/overload. - Left foot XR ordered  - Remain off of foot as much as possible. Return to [facility]  - F/u in my office in 5d at [facility] for recheck. Subjective:   Cy presents to wound care today due to acute left foot pain. Patient was discharged 2 weeks ago as wound has healed. He was released back to work and has been wearing worn out DM shoes w/o filler. Has not yet gotten his new DM shoes with new filler. Notes pain started this past week and feels as if he is stepping on a hard objection with WB. Notes throbbing the more he steps on it.        The following portions of the patient's history were reviewed and updated as appropriate:   Patient Active Problem List   Diagnosis   • DAYAN (obstructive sleep apnea)   • Chronic diastolic heart failure ([facility])   • Hypertension   • Diabetes mellitus ([address])   • Morbid obesity with BMI of 40.0-44.9, adult ([facility])   • Pain, joint, ankle and foot, left   • Chronic osteomyelitis of left foot with draining sinus ([facility])   • Atrial fibrillation ([facility])   • Anxiety   • Type 2 diabetes mellitus with diabetic polyneuropathy, with long-term current use of insulin ([facility])   • Toe osteomyelitis, right ([facility])   • Cervical spondylosis   • Cervicalgia - Right   • Diabetic infection of left foot ([facility])   • Pacemaker   • History of bariatric surgery   • Encounter for perioperative consultation   • Hyperkalemia   • Diabetic ulcer of left midfoot associated with type 2 diabetes mellitus ([address])   • Cellulitis of left foot   • Closed fracture of shaft of metatarsal bone of left foot   • Acute osteomyelitis of left foot ([facility])   • Moderate benzodiazepine use disorder ([facility])   • Microcytic anemia   • Other constipation   • Status post partial amputation of foot, left ([facility])     Past Medical History:   Diagnosis Date   • Atrial fibrillation ([address])    • Benzodiazepine withdrawal with complication ([address]) 7/81/9813   • Chronic diastolic (congestive) heart failure ([address])    • Diabetes mellitus ([address])    • High cholesterol    • Hyperlipidemia    • Pacemaker    • Stroke [facility])      Past Surgical History:   Procedure Laterality Date   • APPENDECTOMY     • ATRIAL CARDIAC PACEMAKER INSERTION     • BARIATRIC SURGERY  05/2021   • EPIDURAL BLOCK INJECTION N/A 5/19/2022    Procedure: BLOCK / INJECTION EPIDURAL STEROID CERVICAL C7-T1;  Surgeon: Eliezer Hernandez MD;  Location:  ENDO;  Service: Pain Management    • FL GUIDED NEEDLE PLAC BX/ASP/INJ  3/22/2022   • FOOT AMPUTATION Left 4/28/2022    Procedure: LEFT TRANSMETATARSAL AMPUTATION.;  Surgeon: Renny Madden DPM;  Location[facility];  Service: Podiatry   • NERVE BLOCK Right 2/10/2022    Procedure: BLOCK MEDIAL BRANCH C3, C4, C5 #1;  Surgeon: Jaswinder Elliott MD;  Location: [facility];  Service: Pain Management    • NERVE BLOCK Right 3/22/2022    Procedure: BLOCK MEDIAL BRANCH C3, C4, C5 #2;  Surgeon: Jaswinder Elliott MD;  Location: [facility];  Service: Pain Management    • CO AMPUTATION FOOT TRANSMETARSAL Left 4/12/2023    Procedure: REVISION LEFT TRANSMETATARSAL (TMA) AMPUTATION, REMOVAL OF UNVIABLE TISSUE AND BONE,;  Surgeon: Carlitos Gresham DPM;  Location: [facility];  Service: Podiatry   • CO AMPUTATION METATARSAL W/TOE SINGLE Left 4/7/2023    Procedure: 2ND RAY RESECTION FOOT;  Surgeon: Carlitos Gresham DPM;  Location: [facility];  Service: Podiatry   • CO AMPUTATION TOE INTERPHALANGEAL JOINT Left 11/16/2021    Procedure: AMPUTATION LESSER TOE;  Surgeon: Aniya Haro DPM;  [facility];  Service: Podiatry   • RADIOFREQUENCY ABLATION Right 4/7/2022    Procedure: Right C3, C4, C5 RFA;  Surgeon: Jaswinder Elliott MD;  Location: [facility];  Service: Pain Management    • RHIZOTOMY Right 2/9/2023    Procedure: RHIZOTOMY CERVICAL MEDIAL BRANCH NERVES RIGHT C3, C4, C5;  Surgeon: Jaswinder Elliott MD;  Location: [facility];  Service: Pain Management    • TOE AMPUTATION Left     2nd toe   • TOE AMPUTATION Left 9/15/2021    Procedure: AMPUTATION LEFT 4TH TOE;  Surgeon: Aniya Haro DPM;  [facility];  Service: Podiatry   • TOE AMPUTATION Right 1/12/2022    Procedure: AMPUTATION TOE;  Surgeon: Nancy Palomino DPM;  [facility];  Service: Podiatry   • TOE AMPUTATION Right 2/23/2022    Procedure: AMPUTATION TOE  RIGHT SECOND;  Surgeon: Nancy Palomino DPM;  Location: [address] [place] [address] MAIN OR;  Service: Podiatry   • TOE AMPUTATION Right 6/3/2022    Procedure: AMPUTATION right 4th TOE;  Surgeon: Juan Antonio Carbone DPM;  [facility];  Service: Podiatry     Social History     Socioeconomic History   • Marital status: /Civil Union     Spouse name: Not on file   • Number of children: Not on file   • Years of education: Not on file   • Highest education level: Not on file   Occupational History   • Occupation:      Employer: [employer]   Tobacco Use   • Smoking status: Never   • Smokeless tobacco: Never   Vaping Use   • Vaping Use: Never used   Substance and Sexual Activity   • Alcohol use: Never   • Drug use: Never   • Sexual activity: Yes   Other Topics Concern   • Not on file   Social History Narrative   • Not on file     Social Determinants of Health     Financial Resource Strain: Not on file   Food Insecurity: No Food Insecurity (4/11/2023)    Hunger Vital Sign    • Worried About Running Out of Food in the Last Year: Never true    • Ran Out of Food in the Last Year: Never true   Transportation Needs: No Transportation Needs (4/11/2023)    PRAPARE - Transportation    • Lack of Transportation (Medical): No    • Lack of Transportation (Non-Medical):  No   Physical Activity: Not on file   Stress: Not on file   Social Connections: Not on file   Intimate Partner Violence: Not on file   Housing Stability: Low Risk  (4/11/2023)    Housing Stability Vital Sign    • Unable to Pay for Housing in the Last Year: No    • Number of Places Lived in the Last Year: 1    • Unstable Housing in the Last Year: No        Current Outpatient Medications:   •  acetaminophen (TYLENOL) 325 mg tablet, Take 2 tablets (650 mg total) by mouth every 6 (six) hours as needed for mild pain, headaches or fever, Disp: , Rfl: 0  •  busPIRone (BUSPAR) 10 mg tablet, Take 1 tablet (10 mg total) by mouth 3 (three) times a day, Disp: 90 tablet, Rfl: 3  •  Eliquis 5 MG, TAKE 1 TABLET BY MOUTH IN THE MORNING AND BEFORE BEDTIME, Disp: , Rfl:   •  ferrous sulfate 325 (65 Fe) mg tablet, Take 1 tablet (325 mg total) by mouth daily with breakfast Do not start before June 19, 2023., Disp: 30 tablet, Rfl: 0  •  gabapentin (NEURONTIN) 300 mg capsule, Take 2 capsules (600 mg total) by mouth 3 (three) times a day, Disp: 180 capsule, Rfl: 2  •  hydrOXYzine HCL (ATARAX) 50 mg tablet, Take 1 tablet (50 mg total) by mouth every 6 (six) hours as needed (anxiety and insomnia) (Patient not taking: Reported on 7/5/2023), Disp: 30 tablet, Rfl: 0  •  mirtazapine (REMERON) 15 mg tablet, Take 2 tablets (30 mg total) by mouth daily at bedtime, Disp: 60 tablet, Rfl: 2  •  pantoprazole (PROTONIX) 40 mg tablet, Take 1 tablet (40 mg total) by mouth daily in the early morning Do not start before June 19, 2023., Disp: 30 tablet, Rfl: 0  Family History   Problem Relation Age of Onset   • No Known Problems Mother    • No Known Problems Father       Review of Systems   Constitutional: Negative for activity change, chills and fever. HENT: Negative. Respiratory: Negative for cough, chest tightness and shortness of breath. Cardiovascular: Positive for leg swelling (Chronic B/L). Negative for chest pain. Endocrine: Negative. Genitourinary: Negative. Musculoskeletal: Positive for gait problem (Left foot pain). Skin: Negative for wound (Left TMA site resolved). Neurological:        PN   Psychiatric/Behavioral: Negative. Negative for agitation and behavioral problems. Objective:  /62   Pulse 72   Temp (!) 97.1 °F (36.2 °C) (Tympanic)   Resp 16     Physical Exam  Constitutional:       Appearance: Normal appearance. He is not ill-appearing. Comments: Anxious   Cardiovascular:      Comments: Chronic venous stasis dermatitis with B/L LE brawny edema. Diminished pedal pulses due to edema. Absent pedal hair. Pulmonary:      Effort: No respiratory distress. Musculoskeletal:         General: Tenderness (Tenderness to total plantar left foot.) present. No deformity. Normal range of motion. Comments: S/p left TMA    Skin:     Capillary Refill: Capillary refill takes less than 2 seconds. Comments: B/L LE skin is atrophic - thin, dry and shiny in appearance.    Left revisional TMA site appears fully healed. Neurological:      General: No focal deficit present. Mental Status: He is alert and oriented to person, place, and time. Comments: N/T/B to B/L LE   Psychiatric:         Mood and Affect: Mood normal.         Behavior: Behavior normal.             Wound 04/12/23 Foot Left (Active)   Wound Image   07/06/23 1508   Wound Description Epithelialization 06/22/23 1203   Katy-wound Assessment Callus;Dry;Scaly 06/22/23 1203   Wound Length (cm) 0 cm 07/06/23 1508   Wound Width (cm) 0 cm 07/06/23 1508   Wound Depth (cm) 0 cm 07/06/23 1508   Wound Surface Area (cm^2) 0 cm^2 07/06/23 1508   Wound Volume (cm^3) 0 cm^3 07/06/23 1508   Calculated Wound Volume (cm^3) 0 cm^3 07/06/23 1508   Change in Wound Size % 100 07/06/23 1508   Wound Site Closure Dylon; Sutures 05/04/23 1439   Drainage Amount None 06/22/23 1203   Drainage Description Serosanguineous 05/04/23 1439   Non-staged Wound Description Full thickness 06/01/23 1611   Treatments Cleansed 06/01/23 1611           Results from last 6 Months   Lab Units 04/05/23  1246   WOUND CULTURE  2+ Growth of Stenotrophomonas maltophilia*  2+ Growth of         Wound Instructions:  Orders Placed This Encounter   Procedures   • Wound cleansing and dressings     Your wound remains healed today. Dr. Claudene Mayotte has ordered an xray to evaluate the pain you're having in your left foot. Please get this xray completed when possible. If you have open wounds please contact us for an appointment. For diabetic foot care needs and non-wound related foot issues please see Dr. Claudene Mayotte in her office. Standing Status:   Future     Standing Expiration Date:   7/6/2024   • XR foot 3+ vw left     Standing Status:   Future     Standing Expiration Date:   7/6/2027     Scheduling Instructions:      Bring along any outside films relating to this procedure.                Kinjal Lara DPM      Portions of the record may have been created with voice recognition software. Occasional wrong word or "sound a like" substitutions may have occurred due to the inherent limitations of voice recognition software. Read the chart carefully and recognize, using context, where substitutions have occurred.

## 2023-07-10 ENCOUNTER — TELEPHONE (OUTPATIENT)
Dept: PODIATRY | Age: 58
End: 2023-07-10

## 2023-07-10 ENCOUNTER — TELEPHONE (OUTPATIENT)
Dept: PODIATRY | Facility: CLINIC | Age: 58
End: 2023-07-10

## 2023-07-10 ENCOUNTER — APPOINTMENT (OUTPATIENT)
Dept: LAB | Facility: HOSPITAL | Age: 58
End: 2023-07-10
Payer: COMMERCIAL

## 2023-07-10 DIAGNOSIS — Z89.432 STATUS POST TRANSMETATARSAL AMPUTATION OF FOOT, LEFT (HCC): ICD-10-CM

## 2023-07-10 DIAGNOSIS — Z89.432 STATUS POST TRANSMETATARSAL AMPUTATION OF FOOT, LEFT (HCC): Primary | ICD-10-CM

## 2023-07-10 LAB
CRP SERPL QL: 76.4 MG/L
ERYTHROCYTE [SEDIMENTATION RATE] IN BLOOD: 72 MM/HOUR (ref 0–19)
WBC # BLD AUTO: 6.69 THOUSAND/UL (ref 4.31–10.16)

## 2023-07-10 PROCEDURE — 85652 RBC SED RATE AUTOMATED: CPT

## 2023-07-10 PROCEDURE — 85048 AUTOMATED LEUKOCYTE COUNT: CPT

## 2023-07-10 PROCEDURE — 86140 C-REACTIVE PROTEIN: CPT

## 2023-07-10 PROCEDURE — 36415 COLL VENOUS BLD VENIPUNCTURE: CPT

## 2023-07-10 NOTE — TELEPHONE ENCOUNTER
Caller: Bret Byrd    Doctor: Yen Kidd DPM    Reason for call: Zainab Covarrubias called, his pain is a 10 when he puts any pressure on his foot. He has an appointment for Wednesday, 7/12/23. [facility].     Call back#: [phone]

## 2023-07-10 NOTE — TELEPHONE ENCOUNTER
Caller: Jase Slider    Doctor: Dr. Jarvis Castaneda    Reason for call: dr said to go for BW/at lab and they see nothing/went in chart and it is there/he will tell them now    Call back#: NA

## 2023-07-10 NOTE — TELEPHONE ENCOUNTER
Caller: Juanjo Solomon    Doctor: Dr. Seth Murcia    Reason for call: BW order not visible at [facility] lab/sending pt to [facility] lab to get this done.     Call back#: NA

## 2023-07-11 ENCOUNTER — TELEPHONE (OUTPATIENT)
Dept: PODIATRY | Facility: CLINIC | Age: 58
End: 2023-07-11

## 2023-07-11 NOTE — TELEPHONE ENCOUNTER
Caller: Jose D [facility]    Doctor/Office: Dr. Lake Rutledge    #: [phone]    Escalation: Medication Calling for patient Robert Monsalve. Patient is in a lot of pain, would like a call back today and is requesting pain meds. She also called yesterday, please call and advise.

## 2023-07-11 NOTE — TELEPHONE ENCOUNTER
Spoke to Cy informed him Dr. Verma Males believes he should go to the hospital due to the pain and how his labs came back. Patient is agreeable to going.

## 2023-07-12 ENCOUNTER — OFFICE VISIT (OUTPATIENT)
Dept: PODIATRY | Age: 58
End: 2023-07-12
Payer: COMMERCIAL

## 2023-07-12 VITALS
DIASTOLIC BLOOD PRESSURE: 60 MMHG | WEIGHT: 265 LBS | BODY MASS INDEX: 35.12 KG/M2 | OXYGEN SATURATION: 96 % | HEART RATE: 100 BPM | TEMPERATURE: 98.5 F | HEIGHT: 73 IN | SYSTOLIC BLOOD PRESSURE: 110 MMHG

## 2023-07-12 DIAGNOSIS — Z91.199 NONCOMPLIANCE: ICD-10-CM

## 2023-07-12 DIAGNOSIS — Z79.4 TYPE 2 DIABETES MELLITUS WITH DIABETIC POLYNEUROPATHY, WITH LONG-TERM CURRENT USE OF INSULIN (HCC): ICD-10-CM

## 2023-07-12 DIAGNOSIS — Z89.432 STATUS POST TRANSMETATARSAL AMPUTATION OF FOOT, LEFT (HCC): Primary | ICD-10-CM

## 2023-07-12 DIAGNOSIS — E11.42 TYPE 2 DIABETES MELLITUS WITH DIABETIC POLYNEUROPATHY, WITH LONG-TERM CURRENT USE OF INSULIN (HCC): ICD-10-CM

## 2023-07-12 PROCEDURE — 99215 OFFICE O/P EST HI 40 MIN: CPT | Performed by: STUDENT IN AN ORGANIZED HEALTH CARE EDUCATION/TRAINING PROGRAM

## 2023-07-12 RX ORDER — OMADACYCLINE 150 MG/1
2 TABLET, FILM COATED ORAL DAILY
Status: ON HOLD | COMMUNITY

## 2023-07-12 RX ORDER — FLUOXETINE 20 MG/1
2 TABLET ORAL EVERY MORNING
Status: ON HOLD | COMMUNITY
End: 2023-07-23

## 2023-07-12 RX ORDER — CARBAMAZEPINE 200 MG/1
TABLET ORAL
Status: ON HOLD | COMMUNITY
End: 2023-07-23

## 2023-07-12 RX ORDER — RIVAROXABAN 20 MG/1
TABLET, FILM COATED ORAL
Status: ON HOLD | COMMUNITY
Start: 2023-07-04

## 2023-07-12 RX ORDER — OXYCODONE HYDROCHLORIDE 5 MG/1
TABLET ORAL
Status: ON HOLD | COMMUNITY

## 2023-07-12 RX ORDER — MISOPROSTOL 200 UG/1
1 TABLET ORAL 4 TIMES DAILY
Status: ON HOLD | COMMUNITY

## 2023-07-12 NOTE — PROGRESS NOTES
Assessment/Plan:     Diagnoses and all orders for this visit:    Status post transmetatarsal amputation of foot, left (H[facility])    Type 2 diabetes mellitus with diabetic polyneuropathy, with long-term current use of insulin ([facility])    Noncompliance    Other orders  -     Xarelto 20 MG tablet  -     carBAMazepine (TEGretol) 200 mg tablet; TAKE 2 TABLETS BY MOUTH EVERY DAY AT NIGHT  -     Fluoxetine HCl, PMDD, 20 MG TABS; Take 2 tablets by mouth every morning  -     miSOPROStol (Cytotec) 200 mcg tablet; Take 1 tablet by mouth 4 (four) times a day  -     Omadacycline Tosylate (Nuzyra) 150 MG TABS; Take 2 tablets by mouth daily  -     oxyCODONE (ROXICODONE) 5 immediate release tablet; PLEASE SEE ATTACHED FOR DETAILED DIRECTIONS          IMAGES REVIEWED THIS APPT (I reviewed images and independently interpreted via PACs)  · Left foot XR 7/6/23: osseous erosions and fragmentations to remaining 1/3/4/5 metatarsals and tarsometatarsal joint dislocations     IMPRESSION:  · Left foot pain, acute. XR as above concerning for OM vs charcot   · Left foot 2nd metatarsal OM s/p 2nd metatarsal excision and revision TMA (DOS 4/7/23 & 4/12/23). Patient had incisional dehiscence and full thickness wound due to noncompliance with WB recommendations. · NIDDM, A1c 5.8% (1/3/23)  · H/o L TMA. R multiple to amputations     PLAN:  · Left foot revisional TMA site appears healed however patient does have significant pain. Given XR and ESR/CRP, I fear there is either OM vs charcot. I recommended that patient report to ED for admission for further workup due to significant pain. Recommend MRI and will need bone biopsies to assess for OM vs charcot. Hold off on abx until biopsies taken. · ESR 72, CRP 76.4, WBC WNL 7/10/23. · Protected WB in tall CAM boot left foot  · F/u during admission    Subjective:      Patient ID: Mary Cherry is a 59 y.o. male. Phu Gonzalez presents to clinic today concerning left foot pain present for about 1-2 weeks.  I saw him in wound care last week however wound to foot had been healed prior. Given pain, I ordered XR, Labs (esr/crp/cbc). I reviewed XR with significant findings, MRI was then ordered. Patient called reporting worsening pain thus I recommended he go to the ED however he did not. He presents today with swelling to the LLE and pain to foot. No open wound. He s/p left 2nd metatarsal excision (due to OM) and revision TMA (DOS 4/7/23 & 4/12/23). Patient was noncompliant with his WB and f/u recommendations. The following portions of the patient's history were reviewed and updated as appropriate: allergies, current medications, past family history, past medical history, past social history, past surgical history and problem list.    Review of Systems   Constitutional: Negative for activity change, chills and fever. HENT: Negative. Respiratory: Negative for cough, chest tightness and shortness of breath. Cardiovascular: Positive for leg swelling (Chronic B/L, L>>R today). Negative for chest pain. Endocrine: Negative. Genitourinary: Negative. Skin: Negative for wound (Left TMA site). Neurological:        PN   Psychiatric/Behavioral: Negative. Negative for agitation and behavioral problems. Objective:      /60 Comment: unable to assess  Pulse 100   Temp 98.5 °F (36.9 °C)   Ht 6' 1" (1.854 m)   Wt 120 kg (265 lb)   SpO2 96%   BMI 34.96 kg/m²          Physical Exam  Constitutional:       Appearance: Normal appearance. He is ill-appearing (chronic). Comments: Anxious   Cardiovascular:      Comments: Chronic venous stasis dermatitis with B/L LE brawny edema. Diminished pedal pulses due to edema. Absent pedal hair. Pulmonary:      Effort: No respiratory distress. Musculoskeletal:         General: No tenderness or deformity. Normal range of motion. Comments: S/p left TMA. Significant pain to left plantar and dorsal foot. There is edema to foot and LLE.     Skin:     Capillary Refill: Capillary refill takes less than 2 seconds. Comments: B/L LE skin is atrophic - thin, dry and shiny in appearance. Left revisional TMA site appears healed. No current open wound to left foot. Neurological:      General: No focal deficit present. Mental Status: He is alert and oriented to person, place, and time.       Comments: N/T/B to B/L LE   Psychiatric:         Mood and Affect: Mood normal.         Behavior: Behavior normal.

## 2023-07-13 ENCOUNTER — TELEPHONE (OUTPATIENT)
Dept: PODIATRY | Facility: CLINIC | Age: 58
End: 2023-07-13

## 2023-07-13 NOTE — TELEPHONE ENCOUNTER
Caller: Cloyce Closs    Doctor/Office: Dr. Davy Joyner    #: [phone]    Escalation: Surgery/Was to be admitted to hospital today-needs to have time to arrange work issues--please call pt back and advise as to IF SX can be RS/admission put off.  Thanks

## 2023-07-14 ENCOUNTER — TELEPHONE (OUTPATIENT)
Dept: PODIATRY | Facility: CLINIC | Age: 58
End: 2023-07-14

## 2023-07-14 NOTE — TELEPHONE ENCOUNTER
Caller: Laura Madrigal    Doctor/Office: Dr. Massimo Cardona    #: [phone]    Escalation: Surgery Calling for January Waggoner, would like to schedule a surgery for him. Please return call. Thank you.

## 2023-07-17 ENCOUNTER — TELEPHONE (OUTPATIENT)
Dept: PSYCHIATRY | Facility: CLINIC | Age: 58
End: 2023-07-17

## 2023-07-19 ENCOUNTER — TELEPHONE (OUTPATIENT)
Dept: PODIATRY | Age: 58
End: 2023-07-19

## 2023-07-19 NOTE — TELEPHONE ENCOUNTER
LVM with Adelia's response at 1:54             ----- Message from Yen Kidd DPM sent at 7/19/2023  1:38 PM EDT -----  He should go to the ED whenever he is ready  ----- Message -----  From: Flakita Zamarripa  Sent: 7/19/2023  12:07 PM EDT  To: Yen Kidd DPM    Patient stopped in the office. He still has not presented to the hospital per previous telephone encounters he had to make arrangements with work (he's the only person that can work on the machine they have). He is ready to go all arrangements are made on his end. Please advise what he is to do.  He also mentioned his MRI ordered 7/10/23 still not scheduled due to his pacemaker,  Patient's phone number [phone]

## 2023-07-21 ENCOUNTER — TELEPHONE (OUTPATIENT)
Dept: PSYCHIATRY | Facility: CLINIC | Age: 58
End: 2023-07-21

## 2023-07-23 ENCOUNTER — HOSPITAL ENCOUNTER (INPATIENT)
Facility: HOSPITAL | Age: 58
LOS: 4 days | Discharge: HOME HEALTH | DRG: 580 | End: 2023-07-27
Attending: EMERGENCY MEDICINE | Admitting: HOSPITALIST
Payer: COMMERCIAL

## 2023-07-23 ENCOUNTER — APPOINTMENT (EMERGENCY)
Dept: RADIOLOGY | Facility: HOSPITAL | Age: 58
DRG: 580 | End: 2023-07-23
Payer: COMMERCIAL

## 2023-07-23 DIAGNOSIS — F41.1 GENERALIZED ANXIETY DISORDER: ICD-10-CM

## 2023-07-23 DIAGNOSIS — M25.572 PAIN, JOINT, ANKLE AND FOOT, LEFT: ICD-10-CM

## 2023-07-23 DIAGNOSIS — F32.1 CURRENT MODERATE EPISODE OF MAJOR DEPRESSIVE DISORDER WITHOUT PRIOR EPISODE (HCC): ICD-10-CM

## 2023-07-23 DIAGNOSIS — M86.172 ACUTE OSTEOMYELITIS OF LEFT FOOT (HCC): ICD-10-CM

## 2023-07-23 DIAGNOSIS — M79.672 LEFT FOOT PAIN: Primary | ICD-10-CM

## 2023-07-23 DIAGNOSIS — Z89.432 STATUS POST PARTIAL AMPUTATION OF FOOT, LEFT (HCC): ICD-10-CM

## 2023-07-23 LAB
ALBUMIN SERPL BCP-MCNC: 3.7 G/DL (ref 3.5–5)
ALP SERPL-CCNC: 89 U/L (ref 34–104)
ALT SERPL W P-5'-P-CCNC: 8 U/L (ref 7–52)
ANION GAP SERPL CALCULATED.3IONS-SCNC: 5 MMOL/L
APTT PPP: 35 SECONDS (ref 23–37)
AST SERPL W P-5'-P-CCNC: 13 U/L (ref 13–39)
BASOPHILS # BLD AUTO: 0.05 THOUSANDS/ÂΜL (ref 0–0.1)
BASOPHILS NFR BLD AUTO: 1 % (ref 0–1)
BILIRUB SERPL-MCNC: 0.63 MG/DL (ref 0.2–1)
BUN SERPL-MCNC: 14 MG/DL (ref 5–25)
CALCIUM SERPL-MCNC: 8.6 MG/DL (ref 8.4–10.2)
CHLORIDE SERPL-SCNC: 105 MMOL/L (ref 96–108)
CO2 SERPL-SCNC: 27 MMOL/L (ref 21–32)
CREAT SERPL-MCNC: 0.86 MG/DL (ref 0.6–1.3)
EOSINOPHIL # BLD AUTO: 0.08 THOUSAND/ÂΜL (ref 0–0.61)
EOSINOPHIL NFR BLD AUTO: 1 % (ref 0–6)
ERYTHROCYTE [DISTWIDTH] IN BLOOD BY AUTOMATED COUNT: 17.8 % (ref 11.6–15.1)
GFR SERPL CREATININE-BSD FRML MDRD: 94 ML/MIN/1.73SQ M
GLUCOSE SERPL-MCNC: 105 MG/DL (ref 65–140)
HCT VFR BLD AUTO: 35.2 % (ref 36.5–49.3)
HGB BLD-MCNC: 11.1 G/DL (ref 12–17)
IMM GRANULOCYTES # BLD AUTO: 0.02 THOUSAND/UL (ref 0–0.2)
IMM GRANULOCYTES NFR BLD AUTO: 0 % (ref 0–2)
INR PPP: 1.05 (ref 0.84–1.19)
LYMPHOCYTES # BLD AUTO: 1.39 THOUSANDS/ÂΜL (ref 0.6–4.47)
LYMPHOCYTES NFR BLD AUTO: 16 % (ref 14–44)
MCH RBC QN AUTO: 25.4 PG (ref 26.8–34.3)
MCHC RBC AUTO-ENTMCNC: 31.5 G/DL (ref 31.4–37.4)
MCV RBC AUTO: 81 FL (ref 82–98)
MONOCYTES # BLD AUTO: 1.27 THOUSAND/ÂΜL (ref 0.17–1.22)
MONOCYTES NFR BLD AUTO: 15 % (ref 4–12)
NEUTROPHILS # BLD AUTO: 5.83 THOUSANDS/ÂΜL (ref 1.85–7.62)
NEUTS SEG NFR BLD AUTO: 67 % (ref 43–75)
NRBC BLD AUTO-RTO: 0 /100 WBCS
PLATELET # BLD AUTO: 292 THOUSANDS/UL (ref 149–390)
PMV BLD AUTO: 9 FL (ref 8.9–12.7)
POTASSIUM SERPL-SCNC: 3.8 MMOL/L (ref 3.5–5.3)
PROT SERPL-MCNC: 6.7 G/DL (ref 6.4–8.4)
PROTHROMBIN TIME: 13.8 SECONDS (ref 11.6–14.5)
RBC # BLD AUTO: 4.37 MILLION/UL (ref 3.88–5.62)
SODIUM SERPL-SCNC: 137 MMOL/L (ref 135–147)
WBC # BLD AUTO: 8.64 THOUSAND/UL (ref 4.31–10.16)

## 2023-07-23 PROCEDURE — 99285 EMERGENCY DEPT VISIT HI MDM: CPT

## 2023-07-23 PROCEDURE — 85025 COMPLETE CBC W/AUTO DIFF WBC: CPT | Performed by: EMERGENCY MEDICINE

## 2023-07-23 PROCEDURE — 99285 EMERGENCY DEPT VISIT HI MDM: CPT | Performed by: EMERGENCY MEDICINE

## 2023-07-23 PROCEDURE — 80053 COMPREHEN METABOLIC PANEL: CPT | Performed by: EMERGENCY MEDICINE

## 2023-07-23 PROCEDURE — 85730 THROMBOPLASTIN TIME PARTIAL: CPT | Performed by: EMERGENCY MEDICINE

## 2023-07-23 PROCEDURE — 99223 1ST HOSP IP/OBS HIGH 75: CPT | Performed by: NURSE PRACTITIONER

## 2023-07-23 PROCEDURE — 85610 PROTHROMBIN TIME: CPT | Performed by: EMERGENCY MEDICINE

## 2023-07-23 PROCEDURE — 73620 X-RAY EXAM OF FOOT: CPT

## 2023-07-23 PROCEDURE — 36415 COLL VENOUS BLD VENIPUNCTURE: CPT | Performed by: EMERGENCY MEDICINE

## 2023-07-23 RX ORDER — PANTOPRAZOLE SODIUM 40 MG/1
40 TABLET, DELAYED RELEASE ORAL
Status: DISCONTINUED | OUTPATIENT
Start: 2023-07-24 | End: 2023-07-27 | Stop reason: HOSPADM

## 2023-07-23 RX ORDER — ENOXAPARIN SODIUM 100 MG/ML
40 INJECTION SUBCUTANEOUS
Status: DISCONTINUED | OUTPATIENT
Start: 2023-07-24 | End: 2023-07-24

## 2023-07-23 RX ORDER — CEFEPIME HYDROCHLORIDE 2 G/50ML
2000 INJECTION, SOLUTION INTRAVENOUS EVERY 8 HOURS
Status: DISCONTINUED | OUTPATIENT
Start: 2023-07-23 | End: 2023-07-24

## 2023-07-23 RX ORDER — HYDROMORPHONE HCL/PF 1 MG/ML
0.5 SYRINGE (ML) INJECTION ONCE
Status: COMPLETED | OUTPATIENT
Start: 2023-07-23 | End: 2023-07-23

## 2023-07-23 RX ORDER — BUSPIRONE HYDROCHLORIDE 10 MG/1
10 TABLET ORAL 3 TIMES DAILY
Status: DISCONTINUED | OUTPATIENT
Start: 2023-07-23 | End: 2023-07-26

## 2023-07-23 RX ORDER — OXYCODONE HYDROCHLORIDE 5 MG/1
5 TABLET ORAL EVERY 6 HOURS PRN
Status: DISCONTINUED | OUTPATIENT
Start: 2023-07-23 | End: 2023-07-27 | Stop reason: HOSPADM

## 2023-07-23 RX ORDER — HYDROMORPHONE HCL/PF 1 MG/ML
0.5 SYRINGE (ML) INJECTION
Status: DISCONTINUED | OUTPATIENT
Start: 2023-07-23 | End: 2023-07-27 | Stop reason: HOSPADM

## 2023-07-23 RX ORDER — OXYCODONE HYDROCHLORIDE 10 MG/1
10 TABLET ORAL EVERY 6 HOURS PRN
Status: DISCONTINUED | OUTPATIENT
Start: 2023-07-23 | End: 2023-07-27 | Stop reason: HOSPADM

## 2023-07-23 RX ORDER — GABAPENTIN 300 MG/1
600 CAPSULE ORAL 3 TIMES DAILY
Status: DISCONTINUED | OUTPATIENT
Start: 2023-07-23 | End: 2023-07-27 | Stop reason: HOSPADM

## 2023-07-23 RX ORDER — ENOXAPARIN SODIUM 100 MG/ML
30 INJECTION SUBCUTANEOUS DAILY
Status: DISCONTINUED | OUTPATIENT
Start: 2023-07-24 | End: 2023-07-23

## 2023-07-23 RX ORDER — MIRTAZAPINE 15 MG/1
30 TABLET, FILM COATED ORAL
Status: DISCONTINUED | OUTPATIENT
Start: 2023-07-23 | End: 2023-07-26

## 2023-07-23 RX ORDER — ONDANSETRON 2 MG/ML
4 INJECTION INTRAMUSCULAR; INTRAVENOUS EVERY 8 HOURS PRN
Status: DISCONTINUED | OUTPATIENT
Start: 2023-07-23 | End: 2023-07-27 | Stop reason: HOSPADM

## 2023-07-23 RX ORDER — FERROUS SULFATE 325(65) MG
325 TABLET ORAL
Status: DISCONTINUED | OUTPATIENT
Start: 2023-07-24 | End: 2023-07-27 | Stop reason: HOSPADM

## 2023-07-23 RX ORDER — DIAZEPAM 5 MG/1
10 TABLET ORAL
Status: DISCONTINUED | OUTPATIENT
Start: 2023-07-23 | End: 2023-07-27 | Stop reason: HOSPADM

## 2023-07-23 RX ORDER — ACETAMINOPHEN 325 MG/1
650 TABLET ORAL EVERY 6 HOURS PRN
Status: DISCONTINUED | OUTPATIENT
Start: 2023-07-23 | End: 2023-07-27 | Stop reason: HOSPADM

## 2023-07-23 RX ADMIN — HYDROMORPHONE HYDROCHLORIDE 0.5 MG: 1 INJECTION, SOLUTION INTRAMUSCULAR; INTRAVENOUS; SUBCUTANEOUS at 23:03

## 2023-07-23 RX ADMIN — GABAPENTIN 600 MG: 300 CAPSULE ORAL at 23:27

## 2023-07-23 RX ADMIN — CEFEPIME HYDROCHLORIDE 2000 MG: 2 INJECTION, SOLUTION INTRAVENOUS at 23:27

## 2023-07-23 RX ADMIN — BUSPIRONE HYDROCHLORIDE 10 MG: 10 TABLET ORAL at 23:27

## 2023-07-23 RX ADMIN — MIRTAZAPINE 30 MG: 15 TABLET, FILM COATED ORAL at 23:27

## 2023-07-24 ENCOUNTER — TELEPHONE (OUTPATIENT)
Dept: PSYCHIATRY | Facility: CLINIC | Age: 58
End: 2023-07-24

## 2023-07-24 ENCOUNTER — APPOINTMENT (INPATIENT)
Dept: CT IMAGING | Facility: HOSPITAL | Age: 58
DRG: 580 | End: 2023-07-24
Payer: COMMERCIAL

## 2023-07-24 ENCOUNTER — APPOINTMENT (INPATIENT)
Dept: NON INVASIVE DIAGNOSTICS | Facility: HOSPITAL | Age: 58
DRG: 580 | End: 2023-07-24
Payer: COMMERCIAL

## 2023-07-24 ENCOUNTER — APPOINTMENT (INPATIENT)
Dept: RADIOLOGY | Facility: HOSPITAL | Age: 58
DRG: 580 | End: 2023-07-24
Payer: COMMERCIAL

## 2023-07-24 LAB
ANION GAP SERPL CALCULATED.3IONS-SCNC: 9 MMOL/L
BASOPHILS # BLD AUTO: 0.03 THOUSANDS/ÂΜL (ref 0–0.1)
BASOPHILS NFR BLD AUTO: 0 % (ref 0–1)
BUN SERPL-MCNC: 13 MG/DL (ref 5–25)
CALCIUM SERPL-MCNC: 8.6 MG/DL (ref 8.4–10.2)
CHLORIDE SERPL-SCNC: 105 MMOL/L (ref 96–108)
CO2 SERPL-SCNC: 23 MMOL/L (ref 21–32)
CREAT SERPL-MCNC: 0.86 MG/DL (ref 0.6–1.3)
CRP SERPL QL: 80 MG/L
EOSINOPHIL # BLD AUTO: 0.18 THOUSAND/ÂΜL (ref 0–0.61)
EOSINOPHIL NFR BLD AUTO: 2 % (ref 0–6)
ERYTHROCYTE [DISTWIDTH] IN BLOOD BY AUTOMATED COUNT: 18.1 % (ref 11.6–15.1)
GFR SERPL CREATININE-BSD FRML MDRD: 94 ML/MIN/1.73SQ M
GLUCOSE SERPL-MCNC: 85 MG/DL (ref 65–140)
HCT VFR BLD AUTO: 40.9 % (ref 36.5–49.3)
HGB BLD-MCNC: 12.2 G/DL (ref 12–17)
IMM GRANULOCYTES # BLD AUTO: 0.03 THOUSAND/UL (ref 0–0.2)
IMM GRANULOCYTES NFR BLD AUTO: 0 % (ref 0–2)
LYMPHOCYTES # BLD AUTO: 1.9 THOUSANDS/ÂΜL (ref 0.6–4.47)
LYMPHOCYTES NFR BLD AUTO: 19 % (ref 14–44)
MAGNESIUM SERPL-MCNC: 2.2 MG/DL (ref 1.9–2.7)
MCH RBC QN AUTO: 24.8 PG (ref 26.8–34.3)
MCHC RBC AUTO-ENTMCNC: 29.8 G/DL (ref 31.4–37.4)
MCV RBC AUTO: 83 FL (ref 82–98)
MONOCYTES # BLD AUTO: 1.64 THOUSAND/ÂΜL (ref 0.17–1.22)
MONOCYTES NFR BLD AUTO: 17 % (ref 4–12)
NEUTROPHILS # BLD AUTO: 6.18 THOUSANDS/ÂΜL (ref 1.85–7.62)
NEUTS SEG NFR BLD AUTO: 62 % (ref 43–75)
NRBC BLD AUTO-RTO: 0 /100 WBCS
PLATELET # BLD AUTO: 281 THOUSANDS/UL (ref 149–390)
PMV BLD AUTO: 8.9 FL (ref 8.9–12.7)
POTASSIUM SERPL-SCNC: 3.7 MMOL/L (ref 3.5–5.3)
PROCALCITONIN SERPL-MCNC: 0.05 NG/ML
RBC # BLD AUTO: 4.91 MILLION/UL (ref 3.88–5.62)
SODIUM SERPL-SCNC: 137 MMOL/L (ref 135–147)
WBC # BLD AUTO: 9.96 THOUSAND/UL (ref 4.31–10.16)

## 2023-07-24 PROCEDURE — 83735 ASSAY OF MAGNESIUM: CPT | Performed by: NURSE PRACTITIONER

## 2023-07-24 PROCEDURE — 84145 PROCALCITONIN (PCT): CPT | Performed by: NURSE PRACTITIONER

## 2023-07-24 PROCEDURE — 93971 EXTREMITY STUDY: CPT

## 2023-07-24 PROCEDURE — NC001 PR NO CHARGE: Performed by: STUDENT IN AN ORGANIZED HEALTH CARE EDUCATION/TRAINING PROGRAM

## 2023-07-24 PROCEDURE — 86140 C-REACTIVE PROTEIN: CPT | Performed by: NURSE PRACTITIONER

## 2023-07-24 PROCEDURE — 73701 CT LOWER EXTREMITY W/DYE: CPT

## 2023-07-24 PROCEDURE — 80048 BASIC METABOLIC PNL TOTAL CA: CPT | Performed by: NURSE PRACTITIONER

## 2023-07-24 PROCEDURE — 99233 SBSQ HOSP IP/OBS HIGH 50: CPT | Performed by: HOSPITALIST

## 2023-07-24 PROCEDURE — 85025 COMPLETE CBC W/AUTO DIFF WBC: CPT | Performed by: NURSE PRACTITIONER

## 2023-07-24 PROCEDURE — 93971 EXTREMITY STUDY: CPT | Performed by: SURGERY

## 2023-07-24 PROCEDURE — G1004 CDSM NDSC: HCPCS

## 2023-07-24 PROCEDURE — 99254 IP/OBS CNSLTJ NEW/EST MOD 60: CPT | Performed by: STUDENT IN AN ORGANIZED HEALTH CARE EDUCATION/TRAINING PROGRAM

## 2023-07-24 RX ORDER — DOCUSATE SODIUM 100 MG/1
100 CAPSULE, LIQUID FILLED ORAL 2 TIMES DAILY
Status: DISCONTINUED | OUTPATIENT
Start: 2023-07-24 | End: 2023-07-27 | Stop reason: HOSPADM

## 2023-07-24 RX ORDER — HEPARIN SODIUM 5000 [USP'U]/ML
5000 INJECTION, SOLUTION INTRAVENOUS; SUBCUTANEOUS EVERY 8 HOURS SCHEDULED
Status: DISCONTINUED | OUTPATIENT
Start: 2023-07-24 | End: 2023-07-26

## 2023-07-24 RX ADMIN — VANCOMYCIN HYDROCHLORIDE 750 MG: 750 INJECTION, SOLUTION INTRAVENOUS at 02:23

## 2023-07-24 RX ADMIN — BUSPIRONE HYDROCHLORIDE 10 MG: 10 TABLET ORAL at 08:27

## 2023-07-24 RX ADMIN — FERROUS SULFATE TAB 325 MG (65 MG ELEMENTAL FE) 325 MG: 325 (65 FE) TAB at 08:27

## 2023-07-24 RX ADMIN — CEFEPIME HYDROCHLORIDE 2000 MG: 2 INJECTION, SOLUTION INTRAVENOUS at 08:28

## 2023-07-24 RX ADMIN — HEPARIN SODIUM 5000 UNITS: 5000 INJECTION INTRAVENOUS; SUBCUTANEOUS at 21:05

## 2023-07-24 RX ADMIN — BUSPIRONE HYDROCHLORIDE 10 MG: 10 TABLET ORAL at 17:05

## 2023-07-24 RX ADMIN — DOCUSATE SODIUM 100 MG: 100 CAPSULE, LIQUID FILLED ORAL at 08:27

## 2023-07-24 RX ADMIN — DOCUSATE SODIUM 100 MG: 100 CAPSULE, LIQUID FILLED ORAL at 17:05

## 2023-07-24 RX ADMIN — VANCOMYCIN HYDROCHLORIDE 1250 MG: 1 INJECTION, POWDER, LYOPHILIZED, FOR SOLUTION INTRAVENOUS at 00:24

## 2023-07-24 RX ADMIN — MIRTAZAPINE 30 MG: 15 TABLET, FILM COATED ORAL at 21:05

## 2023-07-24 RX ADMIN — PANTOPRAZOLE SODIUM 40 MG: 40 TABLET, DELAYED RELEASE ORAL at 05:15

## 2023-07-24 RX ADMIN — HYDROMORPHONE HYDROCHLORIDE 0.5 MG: 1 INJECTION, SOLUTION INTRAMUSCULAR; INTRAVENOUS; SUBCUTANEOUS at 21:43

## 2023-07-24 RX ADMIN — HYDROMORPHONE HYDROCHLORIDE 0.5 MG: 1 INJECTION, SOLUTION INTRAMUSCULAR; INTRAVENOUS; SUBCUTANEOUS at 09:58

## 2023-07-24 RX ADMIN — GABAPENTIN 600 MG: 300 CAPSULE ORAL at 17:05

## 2023-07-24 RX ADMIN — HEPARIN SODIUM 5000 UNITS: 5000 INJECTION INTRAVENOUS; SUBCUTANEOUS at 13:56

## 2023-07-24 RX ADMIN — OXYCODONE HYDROCHLORIDE 10 MG: 10 TABLET ORAL at 08:29

## 2023-07-24 RX ADMIN — GABAPENTIN 600 MG: 300 CAPSULE ORAL at 08:27

## 2023-07-24 RX ADMIN — HYDROMORPHONE HYDROCHLORIDE 0.5 MG: 1 INJECTION, SOLUTION INTRAMUSCULAR; INTRAVENOUS; SUBCUTANEOUS at 02:24

## 2023-07-24 RX ADMIN — GABAPENTIN 600 MG: 300 CAPSULE ORAL at 21:05

## 2023-07-24 RX ADMIN — BUSPIRONE HYDROCHLORIDE 10 MG: 10 TABLET ORAL at 21:05

## 2023-07-24 RX ADMIN — OXYCODONE HYDROCHLORIDE 10 MG: 10 TABLET ORAL at 17:06

## 2023-07-24 RX ADMIN — IOHEXOL 85 ML: 350 INJECTION, SOLUTION INTRAVENOUS at 14:36

## 2023-07-24 NOTE — PROGRESS NOTES
Lizz Brown is a 59 y.o. male who is currently ordered Vancomycin IV with management by the Pharmacy Consult service. Relevant clinical data and objective / subjective history reviewed. Vancomycin Assessment:  1. Indication and Goal AUC/Trough: Bone/joint infection (goal -600, trough >10)  2. Clinical Status:  Initial dosing  3. Micro:   No results  4. Renal Function:  · SCr: 0.86 mg/dL  · CrCl: >100 mL/min  · Renal replacement: Not on dialysis  5. Days of Therapy: 1  6. Current Dose: 2000mg loading dose (25mg/kg ABW, max 2g), then  1250mg q12h  Vancomycin Plan:  1. New Dosin. Estimated AUC: 452 mcg*hr/mL  3. Estimated Trough: 14.4 mcg/mL  4. Next Level: 0600 on   5. Renal Function Monitoring: Daily BMP and [place] will continue to follow closely for s/sx of nephrotoxicity, infusion reactions and appropriateness of therapy. BMP and CBC will be ordered per protocol. We will continue to follow the patient’s culture results and clinical progress daily.     Mandy Song, Pharmacist

## 2023-07-24 NOTE — UTILIZATION REVIEW
NOTIFICATION OF INPATIENT ADMISSION   AUTHORIZATION REQUEST   SERVICING FACILITY:   [address]  Tax ID: [id number]  NPI: [id number] ATTENDING PROVIDER:  Attending Name and NPI#: Daina Chahal Wyginger [[id number]]  Address: [address] [place] [address]  Phone: [phone]   ADMISSION INFORMATION:  Place of Service: [address] [place] [address]  Place of Service Code: 21  Inpatient Admission Date/Time: 7/23/23  9:46 PM  Discharge Date/Time: No discharge date for patient encounter. Admitting Diagnosis Code/Description:  Left foot pain [M79.672]  Foot pain, left [M79.672]     UTILIZATION REVIEW CONTACT:  Rosa Elena Hernandez Utilization   Network Utilization Review Department  Phone: [phone]  Fax [phone]  Email: [email or web address]  Contact for approvals/pending authorizations, clinical reviews, and discharge. PHYSICIAN ADVISORY SERVICES:  Medical Necessity Denial & [facility] Review  Phone: [phone]  Fax: [phone]  Email: [email or web address]

## 2023-07-24 NOTE — ED PROVIDER NOTES
History  Chief Complaint   Patient presents with   • Foot Pain     Pt reports partial amputation of R foot in April. Reports he was schedule for further surgery last week and opted out. Reports pain unbearable. Sent by Dr Salo Degroot for admit     41-year-old male accompanied by spouse states he is here for admission secondary to left foot pain, planned amputation. Notes prior potation and chronic pain since about April has become markedly worse lately. Notes discontinued his oral anticoagulation planning for surgery. No chest pain or dyspnea. No hemoptysis. History provided by:  Patient  Medical Problem  Location:  Left foot  Quality:  Pain  Severity:  Severe  Onset quality:  Gradual  Timing:  Constant  Progression:  Worsening  Chronicity:  Chronic  Relieved by:  Nothing  Worsened by:  Nothing  Ineffective treatments:  Over-the-counter pain medications  Associated symptoms: no abdominal pain, no chest pain, no fever and no shortness of breath        Prior to Admission Medications   Prescriptions Last Dose Informant Patient Reported? Taking? Omadacycline Tosylate (Nuzyra) 150 MG TABS Not Taking  Yes No   Sig: Take 2 tablets by mouth daily   Patient not taking: Reported on 7/23/2023   Xarelto 20 MG tablet Past Week  Yes Yes   acetaminophen (TYLENOL) 325 mg tablet Unknown  No No   Sig: Take 2 tablets (650 mg total) by mouth every 6 (six) hours as needed for mild pain, headaches or fever   busPIRone (BUSPAR) 10 mg tablet 7/23/2023  No Yes   Sig: Take 1 tablet (10 mg total) by mouth 3 (three) times a day   Patient taking differently: Take 15 mg by mouth 3 (three) times a day   ferrous sulfate 325 (65 Fe) mg tablet 7/23/2023  No Yes   Sig: Take 1 tablet (325 mg total) by mouth daily with breakfast Do not start before June 19, 2023.    gabapentin (NEURONTIN) 300 mg capsule 7/23/2023  No Yes   Sig: Take 2 capsules (600 mg total) by mouth 3 (three) times a day   hydrOXYzine HCL (ATARAX) 50 mg tablet Not Taking  No No Sig: Take 1 tablet (50 mg total) by mouth every 6 (six) hours as needed (anxiety and insomnia)   Patient not taking: Reported on 7/5/2023   miSOPROStol (Cytotec) 200 mcg tablet Not Taking  Yes No   Sig: Take 1 tablet by mouth 4 (four) times a day   Patient not taking: Reported on 7/23/2023   mirtazapine (REMERON) 15 mg tablet 7/22/2023  No Yes   Sig: Take 2 tablets (30 mg total) by mouth daily at bedtime   oxyCODONE (ROXICODONE) 5 immediate release tablet More than a month  Yes No   Sig: PLEASE SEE ATTACHED FOR DETAILED DIRECTIONS   pantoprazole (PROTONIX) 40 mg tablet 7/23/2023  No Yes   Sig: Take 1 tablet (40 mg total) by mouth daily in the early morning Do not start before June 19, 2023.       Facility-Administered Medications: None       Past Medical History:   Diagnosis Date   • Atrial fibrillation ([address])    • Benzodiazepine withdrawal with complication ([address]) 4/13/2129   • Chronic diastolic (congestive) heart failure (HCC)    • Diabetes mellitus ([address])    • High cholesterol    • Hyperlipidemia    • Pacemaker    • Stroke [facility])        Past Surgical History:   Procedure Laterality Date   • APPENDECTOMY     • ATRIAL CARDIAC PACEMAKER INSERTION     • BARIATRIC SURGERY  05/2021   • EPIDURAL BLOCK INJECTION N/A 5/19/2022    Procedure: BLOCK / INJECTION EPIDURAL STEROID CERVICAL C7-T1;  Surgeon: Serina Berg MD;  Location:  ENDO;  Service: Pain Management    • FL GUIDED NEEDLE PLAC BX/ASP/INJ  3/22/2022   • FOOT AMPUTATION Left 4/28/2022    Procedure: LEFT TRANSMETATARSAL AMPUTATION.;  Surgeon: Osito Barton DPM;  [facility];  Service: Podiatry   • NERVE BLOCK Right 2/10/2022    Procedure: BLOCK MEDIAL BRANCH C3, C4, C5 #1;  Surgeon: Serina Berg MD;  Location: [facility];  Service: Pain Management    • NERVE BLOCK Right 3/22/2022    Procedure: BLOCK MEDIAL BRANCH C3, C4, C5 #2;  Surgeon: Serina Berg MD;  Location:  END[facility];  Service: Pain Management    • HI AMPUTATION FOOT TRANSMETARSAL Left 4/12/2023    Procedure: REVISION LEFT TRANSMETATARSAL (TMA) AMPUTATION, REMOVAL OF UNVIABLE TISSUE AND BONE,;  Surgeon: Rebecca Booth DPM;  [facility]: [facility];  Service: Podiatry   • IA AMPUTATION METATARSAL W/TOE SINGLE Left 4/7/2023    Procedure: 2ND RAY RESECTION FOOT;  Surgeon: Rebecca Booth DPM;  [facility];  Service: Podiatry   • IA AMPUTATION TOE INTERPHALANGEAL JOINT Left 11/16/2021    Procedure: AMPUTATION LESSER TOE;  Surgeon: Randy Nash DPM;  [facility];  Service: Podiatry   • RADIOFREQUENCY ABLATION Right 4/7/2022    Procedure: Right C3, C4, C5 RFA;  Surgeon: Pito Giron MD;  Location: [facility];  Service: Pain Management    • RHIZOTOMY Right 2/9/2023    Procedure: RHIZOTOMY CERVICAL MEDIAL BRANCH NERVES RIGHT C3, C4, C5;  Surgeon: Pito Giron MD;  Location: [facility];  Service: Pain Management    • TOE AMPUTATION Left     2nd toe   • TOE AMPUTATION Left 9/15/2021    Procedure: AMPUTATION LEFT 4TH TOE;  Surgeon: Randy Nash DPM;  [facility];  Service: Podiatry   • TOE AMPUTATION Right 1/12/2022    Procedure: AMPUTATION TOE;  Surgeon: Celina Arriaga DPM;  [facility];  Service: Podiatry   • TOE AMPUTATION Right 2/23/2022    Procedure: AMPUTATION TOE  RIGHT SECOND;  Surgeon: Celina Arriaga DPM;  Location: [facility] MAIN OR;  Service: Podiatry   • TOE AMPUTATION Right 6/3/2022    Procedure: AMPUTATION right 4th TOE;  Surgeon: Patricia Estevez DPM;  [facility];  Service: Podiatry       Family History   Problem Relation Age of Onset   • No Known Problems Mother    • No Known Problems Father      I have reviewed and agree with the history as documented.     E-Cigarette/Vaping   • E-Cigarette Use Never User      E-Cigarette/Vaping Substances     Social History     Tobacco Use   • Smoking status: Never   • Smokeless tobacco: Never   Vaping Use   • Vaping Use: Never used   Substance Use Topics   • Alcohol use: Never   • Drug use: Never       Review of Systems   Constitutional: Negative for fever. Respiratory: Negative for shortness of breath. Cardiovascular: Negative for chest pain. Gastrointestinal: Negative for abdominal pain. All other systems reviewed and are negative. Physical Exam  Physical Exam  Vitals and nursing note reviewed. Constitutional:       Comments: Pleasant, comfortable-appearing   HENT:      Head: Normocephalic and atraumatic. Mouth/Throat:      Mouth: Mucous membranes are moist.      Pharynx: Oropharynx is clear. Eyes:      Conjunctiva/sclera: Conjunctivae normal.      Pupils: Pupils are equal, round, and reactive to light. Cardiovascular:      Rate and Rhythm: Normal rate and regular rhythm. Heart sounds: Normal heart sounds. Pulmonary:      Effort: Pulmonary effort is normal.      Breath sounds: Normal breath sounds. Abdominal:      General: Bowel sounds are normal. There is no distension. Palpations: Abdomen is soft. Tenderness: There is no abdominal tenderness. Musculoskeletal:         General: No deformity. Cervical back: Neck supple. Right lower leg: Edema present. Left lower leg: Edema present. Comments: Left lower extremity edema, swelling worse than right, nontender, transmetatarsal amputation, skin well-healed, generally tender, no erythema or fluctuance, intact sensation and cap refill   Skin:     General: Skin is warm and dry. Neurological:      General: No focal deficit present. Mental Status: He is alert and oriented to person, place, and time. Cranial Nerves: No cranial nerve deficit. Coordination: Coordination normal.   Psychiatric:         Behavior: Behavior normal.         Thought Content:  Thought content normal.         Judgment: Judgment normal.         Vital Signs  ED Triage Vitals   Temperature Pulse Respirations Blood Pressure SpO2   07/23/23 2022 07/23/23 2022 07/23/23 2022 07/23/23 2022 07/23/23 2022   98 °F (36.7 °C) 87 16 103/63 100 %      Temp Source Heart Rate Source Patient Position - Orthostatic VS BP Location FiO2 (%)   07/23/23 2241 -- 07/23/23 2022 07/23/23 2022 --   Temporal  Lying Right arm       Pain Score       07/23/23 2022       10 - Worst Possible Pain           Vitals:    07/23/23 2241 07/24/23 0818 07/24/23 1444 07/24/23 2307   BP: 112/72 116/71 130/65 116/67   Pulse: 81 79 68 72   Patient Position - Orthostatic VS:             Visual Acuity      ED Medications  Medications   acetaminophen (TYLENOL) tablet 650 mg (has no administration in time range)   oxyCODONE (ROXICODONE) IR tablet 5 mg (has no administration in time range)   oxyCODONE (ROXICODONE) immediate release tablet 10 mg (10 mg Oral Given 7/24/23 1706)   HYDROmorphone (DILAUDID) injection 0.5 mg (0.5 mg Intravenous Given 7/24/23 2143)   ondansetron (ZOFRAN) injection 4 mg (has no administration in time range)   busPIRone (BUSPAR) tablet 10 mg (10 mg Oral Given 7/24/23 2105)   ferrous sulfate tablet 325 mg (325 mg Oral Given 7/24/23 0827)   gabapentin (NEURONTIN) capsule 600 mg (600 mg Oral Given 7/24/23 2105)   mirtazapine (REMERON) tablet 30 mg (30 mg Oral Given 7/24/23 2105)   pantoprazole (PROTONIX) EC tablet 40 mg (40 mg Oral Given 7/24/23 0515)   diazepam (VALIUM) tablet 10 mg (has no administration in time range)   docusate sodium (COLACE) capsule 100 mg (100 mg Oral Given 7/24/23 1705)   heparin (porcine) subcutaneous injection 5,000 Units (5,000 Units Subcutaneous Given 7/24/23 2105)   HYDROmorphone (DILAUDID) injection 0.5 mg (0.5 mg Intravenous Given 7/23/23 2303)   vancomycin (VANCOCIN) IVPB (premix in dextrose) 750 mg 150 mL (750 mg Intravenous New Bag 7/24/23 0223)   iohexol (OMNIPAQUE) 350 MG/ML injection (SINGLE-DOSE) 85 mL (85 mL Intravenous Given 7/24/23 1436)       Diagnostic Studies  Results Reviewed     Procedure Component Value Units Date/Time    Protime-INR [[id number]]  (Normal) Collected: 07/23/23 2129    Lab Status: Final result Specimen: Blood from Arm, Right Updated: 07/23/23 2207     Protime 13.8 seconds      INR 1.05    APTT [[id number]]  (Normal) Collected: 07/23/23 2129    Lab Status: Final result Specimen: Blood from Arm, Right Updated: 07/23/23 2207     PTT 35 seconds     Comprehensive metabolic panel [[id number]] Collected: 07/23/23 2129    Lab Status: Final result Specimen: Blood from Arm, Right Updated: 07/23/23 2153     Sodium 137 mmol/L      Potassium 3.8 mmol/L      Chloride 105 mmol/L      CO2 27 mmol/L      ANION GAP 5 mmol/L      BUN 14 mg/dL      Creatinine 0.86 mg/dL      Glucose 105 mg/dL      Calcium 8.6 mg/dL      AST 13 U/L      ALT 8 U/L      Alkaline Phosphatase 89 U/L      Total Protein 6.7 g/dL      Albumin 3.7 g/dL      Total Bilirubin 0.63 mg/dL      eGFR 94 ml/min/1.73sq m     Narrative:      [facility] guidelines for Chronic Kidney Disease (CKD):   •  Stage 1 with normal or high GFR (GFR > 90 mL/min/1.73 square meters)  •  Stage 2 Mild CKD (GFR = 60-89 mL/min/1.73 square meters)  •  Stage 3A Moderate CKD (GFR = 45-59 mL/min/1.73 square meters)  •  Stage 3B Moderate CKD (GFR = 30-44 mL/min/1.73 square meters)  •  Stage 4 Severe CKD (GFR = 15-29 mL/min/1.73 square meters)  •  Stage 5 End Stage CKD (GFR <15 mL/min/1.73 square meters)  Note: GFR calculation is accurate only with a steady state creatinine    CBC and differential [[id number]]  (Abnormal) Collected: 07/23/23 2129    Lab Status: Final result Specimen: Blood from Arm, Right Updated: 07/23/23 2135     WBC 8.64 Thousand/uL      RBC 4.37 Million/uL      Hemoglobin 11.1 g/dL      Hematocrit 35.2 %      MCV 81 fL      MCH 25.4 pg      MCHC 31.5 g/dL      RDW 17.8 %      MPV 9.0 fL      Platelets 936 Thousands/uL      nRBC 0 /100 WBCs      Neutrophils Relative 67 %      Immat GRANS % 0 %      Lymphocytes Relative 16 %      Monocytes Relative 15 %      Eosinophils Relative 1 %      Basophils Relative 1 %      Neutrophils Absolute 5.83 Thousands/µL      Immature Grans Absolute 0.02 Thousand/uL      Lymphocytes Absolute 1.39 Thousands/µL      Monocytes Absolute 1.27 Thousand/µL      Eosinophils Absolute 0.08 Thousand/µL      Basophils Absolute 0.05 Thousands/µL                  XR follow up   Final Result by Ernie N Judy Jones DO (07/24 1224)      Right sided pacer as above. No acute cardiopulmonary disease. Workstation performed: [id number]         VAS lower limb venous duplex study, unilateral/limited   Final Result by Mark Gunn MD (07/24 1157)      XR foot 2 views LEFT   ED Interpretation by Lidia Hammer DO (07/23 2128)   No subcutaneous air, large amount of bony destructive changes distally      Final Result by Hosea Avery MD (07/24 1125)      Postoperative changes with areas of mixed sclerosis and lucency some of which may be related to maturing postop changes. There does appear to be increasing osteopenia of bony fragments in in the central aspect of the postoperative bed adjacent to the    second cuneiform which could be related to developing osteomyelitis. CT study may be helpful to assess for any areas of decortication if clinically indicated      The study was marked in EPIC for immediate notification. .      Workstation performed: [id number]         CT lower extremity w contrast left    (Results Pending)              Procedures  Procedures         ED Course  ED Course as of 07/25/23 0027   Sun Jul 23, 2023 2137 WBC: 8.64                                             Medical Decision Making  Left foot pain: chronic illness or injury with exacerbation, progression, or side effects of treatment  Amount and/or Complexity of Data Reviewed  Independent Historian: mane  External Data Reviewed: notes. Labs: ordered. Decision-making details documented in ED Course. Radiology: ordered and independent interpretation performed. Decision-making details documented in ED Course.   ECG/medicine tests: ordered and independent interpretation performed. Decision-making details documented in ED Course. Risk  Decision regarding hospitalization. Disposition  Final diagnoses:   Left foot pain     Time reflects when diagnosis was documented in both MDM as applicable and the Disposition within this note     Time User Action Codes Description Comment    7/23/2023  9:43 PM Geoffrey Thompson Add [T13.086] Left foot pain     7/23/2023 10:49 PM Shamika, Deann Add [M86.172] Acute osteomyelitis of left foot ([address])     7/23/2023 10:57 PM Shamika, Deann Add [Z89.432] Status post partial amputation of foot, left ([address])     7/24/2023  5:45 AM Shamika, Deann Add [M25.572] Pain, joint, ankle and foot, left       ED Disposition     ED Disposition   Admit    Condition   Stable    Date/Time   Sun Jul 23, 2023  9:43 PM    Comment   Case was discussed with Shamika and the patient's admission status was agreed to be Admission Status: inpatient status to the service of Dr. Fatimah Chinchilla .           Follow-up Information    None         Current Discharge Medication List      CONTINUE these medications which have NOT CHANGED    Details   busPIRone (BUSPAR) 10 mg tablet Take 1 tablet (10 mg total) by mouth 3 (three) times a day  Qty: 90 tablet, Refills: 3    Associated Diagnoses: Generalized anxiety disorder      ferrous sulfate 325 (65 Fe) mg tablet Take 1 tablet (325 mg total) by mouth daily with breakfast Do not start before June 19, 2023.   Qty: 30 tablet, Refills: 0    Associated Diagnoses: Microcytic anemia      gabapentin (NEURONTIN) 300 mg capsule Take 2 capsules (600 mg total) by mouth 3 (three) times a day  Qty: 180 capsule, Refills: 2    Associated Diagnoses: Generalized anxiety disorder      mirtazapine (REMERON) 15 mg tablet Take 2 tablets (30 mg total) by mouth daily at bedtime  Qty: 60 tablet, Refills: 2    Associated Diagnoses: Generalized anxiety disorder; Current moderate episode of major depressive disorder without prior episode (HCC)      pantoprazole (PROTONIX) 40 mg tablet Take 1 tablet (40 mg total) by mouth daily in the early morning Do not start before June 19, 2023. Qty: 30 tablet, Refills: 0    Associated Diagnoses: History of bariatric surgery      Xarelto 20 MG tablet       acetaminophen (TYLENOL) 325 mg tablet Take 2 tablets (650 mg total) by mouth every 6 (six) hours as needed for mild pain, headaches or fever  Refills: 0    Associated Diagnoses: Osteomyelitis of left foot, unspecified type (HCC)      hydrOXYzine HCL (ATARAX) 50 mg tablet Take 1 tablet (50 mg total) by mouth every 6 (six) hours as needed (anxiety and insomnia)  Qty: 30 tablet, Refills: 0    Associated Diagnoses: Anxiety      miSOPROStol (Cytotec) 200 mcg tablet Take 1 tablet by mouth 4 (four) times a day      Omadacycline Tosylate (Nuzyra) 150 MG TABS Take 2 tablets by mouth daily      oxyCODONE (ROXICODONE) 5 immediate release tablet PLEASE SEE ATTACHED FOR DETAILED DIRECTIONS             No discharge procedures on file.     PDMP Review       Value Time User    PDMP Reviewed  Yes 7/5/2023  9:42 AM Hillary Bingham MD          ED Provider  Electronically Signed by           Xander Zhou DO  07/25/23 8487

## 2023-07-24 NOTE — PLAN OF CARE
Problem: SAFETY ADULT  Goal: Maintain or return to baseline ADL function  Description: INTERVENTIONS:  -  Assess patient's ability to carry out ADLs; assess patient's baseline for ADL function and identify physical deficits which impact ability to perform ADLs (bathing, care of mouth/teeth, toileting, grooming, dressing, etc.)  - Assess/evaluate cause of self-care deficits   - Assess range of motion  - Assess patient's mobility; develop plan if impaired  - Assess patient's need for assistive devices and provide as appropriate  - Encourage maximum independence but intervene and supervise when necessary  - Involve family in performance of ADLs  - Assess for home care needs following discharge   - Consider OT consult to assist with ADL evaluation and planning for discharge  - Provide patient education as appropriate  Outcome: Progressing     Problem: DISCHARGE PLANNING  Goal: Discharge to home or other facility with appropriate resources  Description: INTERVENTIONS:  - Identify barriers to discharge w/patient and caregiver  - Arrange for needed discharge resources and transportation as appropriate  - Identify discharge learning needs (meds, wound care, etc.)  - Arrange for interpretive services to assist at discharge as needed  - Refer to Case Management Department for coordinating discharge planning if the patient needs post-hospital services based on physician/advanced practitioner order or complex needs related to functional status, cognitive ability, or social support system  Outcome: Progressing     Problem: Knowledge Deficit  Goal: Patient/family/caregiver demonstrates understanding of disease process, treatment plan, medications, and discharge instructions  Description: Complete learning assessment and assess knowledge base.   Interventions:  - Provide teaching at level of understanding  - Provide teaching via preferred learning methods  Outcome: Progressing

## 2023-07-24 NOTE — CONSULTS
Consult - Podiatry   David Patel 59 y.o. male MRN: [id number]  Unit/Bed#: -01 Encounter: [id number]    Assessment/Plan     Assessment:  1. Left foot pain, acute. 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 Patient had incisional dehiscence and full thickness wound due to noncompliance with WB and f/u recommendations. Wound subsequently healed and remains healed today. 2. DM w/ PN, A1c 5.7% 6/16/23    Plan:  - Plan take patient to OR Wednesday 7/26/23 for bone biopsies (path and micro) to assess for OM vs charcot changes. Recommend holding off on abx until after surgery. Recommend CT in meantime as recommended by radiology. - XR left foot 7/23/23: post-op changes with mixed sclerosis and lucency which may be related to postop changes. There is osteopenia to central midfoot adjacent to 2nd cuneiform which could be related to developing OM. - CRP elevated at 80. No leukocytosis noted. vss   - NWB recommended however if patient cannot do this he can be WBAT CAM boot  - Rest of care per primary team    History of Present Illness     HPI:  Fiona Hardy is a 59 y.o. male w/ PMH sig for gastric bypass, A-fib chronically anticoagulated on Xarelto, CHF maintained on diuretics, DM, PPM, CVA, severe anxiety s/p left 2nd met excision and revisional TMA 4/12/23. Post-op patient was noncompliant with WB, wound care and f/u recs. Wound did heal and patient was doing well until this month when he developed severe pain and swelling to the foot. Patient was seen in my office and recommended admission for pain control and further workup of XR concerning for OM vs charcot. This was recommended 7/12/23 however patient just reported to ED yesterday 7/23/23. Notes pain and swelling baseline however with medications better. Has been fully WB in CAM boot and barefoot (ama). Denies N/V/C/F/SOB.     Inpatient consult to Podiatry  Consult performed by: Kali Hammer DPM  Consult ordered by: KATHRYN Collins        Review of Systems   Constitutional: Negative. HENT: Negative. Eyes: Negative. Respiratory: Negative. Cardiovascular: Negative. Gastrointestinal: Negative. Musculoskeletal: LLE Pain and edema   Skin: No current open wound   Neurological: PN   Psych: negative.        Historical Information   Past Medical History:   Diagnosis Date   • Atrial fibrillation ([address])    • Benzodiazepine withdrawal with complication ([address]) 0/47/7205   • Chronic diastolic (congestive) heart failure (HCC)    • Diabetes mellitus ([address])    • High cholesterol    • Hyperlipidemia    • Pacemaker    • Stroke [facility])      Past Surgical History:   Procedure Laterality Date   • APPENDECTOMY     • ATRIAL CARDIAC PACEMAKER INSERTION     • BARIATRIC SURGERY  05/2021   • EPIDURAL BLOCK INJECTION N/A 5/19/2022    Procedure: BLOCK / INJECTION EPIDURAL STEROID CERVICAL C7-T1;  Surgeon: Magalie Romero MD;  Location: [facility];  Service: Pain Management    • FL GUIDED NEEDLE PLAC BX/ASP/INJ  3/22/2022   • FOOT AMPUTATION Left 4/28/2022    Procedure: LEFT TRANSMETATARSAL AMPUTATION.;  Surgeon: Verline Osler, DPM;  [facility];  Service: Podiatry   • NERVE BLOCK Right 2/10/2022    Procedure: BLOCK MEDIAL BRANCH C3, C4, C5 #1;  Surgeon: Magalie Romero MD;  [facility]: [facility];  Service: Pain Management    • NERVE BLOCK Right 3/22/2022    Procedure: BLOCK MEDIAL BRANCH C3, C4, C5 #2;  Surgeon: Magalie Romero MD;  [facility]: [facility];  Service: Pain Management    • MS AMPUTATION FOOT TRANSMETARSAL Left 4/12/2023    Procedure: REVISION LEFT TRANSMETATARSAL (TMA) AMPUTATION, REMOVAL OF UNVIABLE TISSUE AND BONE,;  Surgeon: Kali Hammer DPM;  Location: [facility];  Service: Podiatry   • MS AMPUTATION METATARSAL W/TOE SINGLE Left 4/7/2023    Procedure: 2ND RAY RESECTION FOOT;  Surgeon: Kali Hammer DPM;  Location:  MAIN OR;  Service: Podiatry   • NH AMPUTATION TOE INTERPHALANGEAL JOINT Left 11/16/2021    Procedure: AMPUTATION LESSER TOE;  Surgeon: Tyrell Cordero DPM;  [facility];  Service: Podiatry   • RADIOFREQUENCY ABLATION Right 4/7/2022    Procedure: Right C3, C4, C5 RFA;  Surgeon: Simi Lim MD;  [facility]: [facility];  Service: Pain Management    • RHIZOTOMY Right 2/9/2023    Procedure: RHIZOTOMY CERVICAL MEDIAL BRANCH NERVES RIGHT C3, C4, C5;  Surgeon: Simi Lim MD;  [facility];  Service: Pain Management    • TOE AMPUTATION Left     2nd toe   • TOE AMPUTATION Left 9/15/2021    Procedure: AMPUTATION LEFT 4TH TOE;  Surgeon: Tryell Cordero DPM;  [facility];  Service: Podiatry   • TOE AMPUTATION Right 1/12/2022    Procedure: AMPUTATION TOE;  Surgeon: Esteban Ramos DPM;  [facility];  Service: Podiatry   • TOE AMPUTATION Right 2/23/2022    Procedure: AMPUTATION TOE  RIGHT SECOND;  Surgeon: Esteban Ramos DPM;  Location: [address] [place] [address] MAIN OR;  Service: Podiatry   • TOE AMPUTATION Right 6/3/2022    Procedure: AMPUTATION right 4th TOE;  Surgeon: Joseph Villagran DPM;  [facility];  Service: Podiatry     Social History   Social History     Substance and Sexual Activity   Alcohol Use Never     Social History     Substance and Sexual Activity   Drug Use Never     Social History     Tobacco Use   Smoking Status Never   Smokeless Tobacco Never     Family History:   Family History   Problem Relation Age of Onset   • No Known Problems Mother    • No Known Problems Father        Meds/Allergies   Medications Prior to Admission   Medication   • busPIRone (BUSPAR) 10 mg tablet   • ferrous sulfate 325 (65 Fe) mg tablet   • gabapentin (NEURONTIN) 300 mg capsule   • mirtazapine (REMERON) 15 mg tablet   • pantoprazole (PROTONIX) 40 mg tablet   • Xarelto 20 MG tablet   • acetaminophen (TYLENOL) 325 mg tablet   • hydrOXYzine HCL (ATARAX) 50 mg tablet   • miSOPROStol (Cytotec) 200 mcg tablet   • Omadacycline Tosylate (Nuzyra) 150 MG TABS   • oxyCODONE (ROXICODONE) 5 immediate release tablet     Allergies   Allergen Reactions   • Ativan [Lorazepam] Anxiety       Objective   First Vitals:   Blood Pressure: 103/63 (07/23/23 2022)  Pulse: 87 (07/23/23 2022)  Temperature: 98 °F (36.7 °C) (07/23/23 2022)  Temp Source: Temporal (07/23/23 2241)  Respirations: 16 (07/23/23 2022)  Height: 6' 1" (185.4 cm) (07/23/23 2241)  Weight - Scale: 118 kg (260 lb) (07/23/23 2022)  SpO2: 100 % (07/23/23 2022)    Current Vitals:   Blood Pressure: 116/71 (07/24/23 0818)  Pulse: 79 (07/24/23 0818)  Temperature: (!) 97.2 °F (36.2 °C) (07/24/23 0818)  Temp Source: Temporal (07/23/23 2241)  Respirations: 18 (07/23/23 2241)  Height: 6' 1" (185.4 cm) (07/23/23 2241)  Weight - Scale: 118 kg (260 lb) (07/23/23 2241)  SpO2: 97 % (07/24/23 0958)        /71   Pulse 79   Temp (!) 97.2 °F (36.2 °C)   Resp 18   Ht 6' 1" (1.854 m)   Wt 118 kg (260 lb)   SpO2 97%   BMI 34.30 kg/m²      General Appearance:    Alert, cooperative, no distress   Head:    Normocephalic, without obvious abnormality, atraumatic   Eyes:    PERRL, conjunctiva/corneas clear, EOM's intact        Nose:   Moist mucous membranes   Neck:   Supple, symmetrical, trachea midline   Back:     Symmetric   Lungs:     Respirations unlabored   Heart:    Regular rate and rhythm, S1 and S2 normal, no murmur, rub   or gallop   Abdomen:     Soft, non-tender    B/L LE EXAM   Extremities:   Left foot TMA with pain and edema. Right foot multiple toe amputation. Pulses:   Diminished due to edema. Feet are warm and well perfused. Skin:   No open wound to left TMA site. Skin is atrophic to B/L LE - thin, dry and shiny with chronic venous stasis skin changes. Neurologic:   Gross sensation is limited. Protective sensation is absent. + PN.          Lab Results:   Admission on 07/23/2023   Component Date Value   • WBC 07/23/2023 8.64    • RBC 07/23/2023 4.37    • Hemoglobin 07/23/2023 11.1 (L)    • Hematocrit 07/23/2023 35.2 (L)    • MCV 07/23/2023 81 (L)    • MCH 07/23/2023 25.4 (L)    • MCHC 07/23/2023 31.5    • RDW 07/23/2023 17.8 (H)    • MPV 07/23/2023 9.0    • Platelets 02/55/8903 292    • nRBC 07/23/2023 0    • Neutrophils Relative 07/23/2023 67    • Immat GRANS % 07/23/2023 0    • Lymphocytes Relative 07/23/2023 16    • Monocytes Relative 07/23/2023 15 (H)    • Eosinophils Relative 07/23/2023 1    • Basophils Relative 07/23/2023 1    • Neutrophils Absolute 07/23/2023 5.83    • Immature Grans Absolute 07/23/2023 0.02    • Lymphocytes Absolute 07/23/2023 1.39    • Monocytes Absolute 07/23/2023 1.27 (H)    • Eosinophils Absolute 07/23/2023 0.08    • Basophils Absolute 07/23/2023 0.05    • Sodium 07/23/2023 137    • Potassium 07/23/2023 3.8    • Chloride 07/23/2023 105    • CO2 07/23/2023 27    • ANION GAP 07/23/2023 5    • BUN 07/23/2023 14    • Creatinine 07/23/2023 0.86    • Glucose 07/23/2023 105    • Calcium 07/23/2023 8.6    • AST 07/23/2023 13    • ALT 07/23/2023 8    • Alkaline Phosphatase 07/23/2023 89    • Total Protein 07/23/2023 6.7    • Albumin 07/23/2023 3.7    • Total Bilirubin 07/23/2023 0.63    • eGFR 07/23/2023 94    • Protime 07/23/2023 13.8    • INR 07/23/2023 1.05    • PTT 07/23/2023 35    • WBC 07/24/2023 9.96    • RBC 07/24/2023 4.91    • Hemoglobin 07/24/2023 12.2    • Hematocrit 07/24/2023 40.9    • MCV 07/24/2023 83    • MCH 07/24/2023 24.8 (L)    • MCHC 07/24/2023 29.8 (L)    • RDW 07/24/2023 18.1 (H)    • MPV 07/24/2023 8.9    • Platelets 11/87/6364 281    • nRBC 07/24/2023 0    • Neutrophils Relative 07/24/2023 62    • Immat GRANS % 07/24/2023 0    • Lymphocytes Relative 07/24/2023 19    • Monocytes Relative 07/24/2023 17 (H)    • Eosinophils Relative 07/24/2023 2    • Basophils Relative 07/24/2023 0    • Neutrophils Absolute 07/24/2023 6.18    • Immature Grans Absolute 07/24/2023 0.03    • Lymphocytes Absolute 07/24/2023 1.90    • Monocytes Absolute 07/24/2023 1.64 (H)    • Eosinophils Absolute 07/24/2023 0.18    • Basophils Absolute 07/24/2023 0.03    • Sodium 07/24/2023 137    • Potassium 07/24/2023 3.7    • Chloride 07/24/2023 105    • CO2 07/24/2023 23    • ANION GAP 07/24/2023 9    • BUN 07/24/2023 13    • Creatinine 07/24/2023 0.86    • Glucose 07/24/2023 85    • Calcium 07/24/2023 8.6    • eGFR 07/24/2023 94    • Magnesium 07/24/2023 2.2    • Procalcitonin 07/24/2023 0.05    • CRP 07/24/2023 80.0 (H)                    Invalid input(s): "LABAEARO"            Imaging: I have personally reviewed pertinent films in PACS  EKG, Pathology, and Other Studies: I have personally reviewed pertinent reports.       Code Status: Level 1 - Full Code  Advance Directive and Living Will:      Power of :    POLST:

## 2023-07-24 NOTE — ASSESSMENT & PLAN NOTE
· Chronic anxiety  · Maintained on Remeron, gabapentin, BuSpar   · Follows with psychiatry in outpatient setting   · Avoid benzodiazepines as recently underwent detox at [facility] for benzodiazepine abuse

## 2023-07-24 NOTE — PROGRESS NOTES
[address] [facility]  Progress Note  Name: Luis Manuel Malhotra  MRN: [id number]  Unit/Bed#: -01 I Date of Admission: 7/23/2023   Date of Service: 7/24/2023 I Hospital Day: 1    Assessment/Plan   * Cellulitis of left lower extremity  Assessment & Plan  · POA with significant LLE swelling, erythema, tenderness to palpation starting at left TMA extending to mid calf  · Following with podiatry and postop 4/12/23- 2nd metatarsal complete resection with surgical cure for OM presumed. S/p revision TMA. He was discharged on Bactrim and reports was doing well until 7/10 when he developed severe pain in his foot. Reports he saw his podiatrist and was recommended outpatient MRI but was unable to arrange his schedule. · Presented to the ED due to severe intolerable pain in left foot and leg. Significant edema of left foot TMA and lower extremity. · Admit to medical service  · MRI left foot to evaluate for osteo/abscess  · Venous duplex LLE as patient has not taken his Xarelto for the past week as he was planning to have a surgical procedure by podiatry  · Empiric cefepime, vancomycin for suspected diabetic foot infection  · Trend fever curve, leukocytosis  · Pain control  · Patient reports Podiatry saw him earlier and X-ray showing osteo, so pending MRI likely planning on OR for further resection    Microcytic anemia  Assessment & Plan  · History gastric bypass  · Continue ferrous sulfate  · Hemoglobin baseline    Type 2 diabetes mellitus with diabetic polyneuropathy, without long-term current use of insulin ([facility])  Assessment & Plan  Lab Results   Component Value Date    HGBA1C 5.7 (H) 06/16/2023       No results for input(s): "POCGLU" in the last 72 hours.     Blood Sugar Average: Last 72 hrs:  · History diabetes mellitus  · Diet controlled per patient  · Monitor daily BMP    Anxiety  Assessment & Plan  · Chronic anxiety  · Maintained on Remeron, gabapentin, BuSpar   · Follows with psychiatry in outpatient setting   · Avoid benzodiazepines as recently underwent detox at [facility] for benzodiazepine abuse    Atrial fibrillation ([address])  Assessment & Plan  · PPM in place  · Does not require rate control medications in the outpatient setting  · Chronically anticoagulated with Xarelto, last dose over 1 week ago held for possible OR                     VTE Pharmacologic Prophylaxis:   Moderate Risk (Score 3-4) - Pharmacological DVT Prophylaxis Ordered: heparin. Patient Centered Rounds: I performed bedside rounds with nursing staff today. Discussions with Specialists or Other Care Team Provider: none    Education and Discussions with Family / Patient: Patient declined call to . Total Time Spent on Date of Encounter in care of patient: 35 minutes This time was spent on one or more of the following: performing physical exam; counseling and coordination of care; obtaining or reviewing history; documenting in the medical record; reviewing/ordering tests, medications or procedures; communicating with other healthcare professionals and discussing with patient's family/caregivers. Current Length of Stay: 1 day(s)  Current Patient Status: Inpatient   Certification Statement: The patient will continue to require additional inpatient hospital stay due to possible osteomyelitis, further evaluation  Discharge Plan: Anticipate discharge in 24-48 hrs to home. Code Status: Level 1 - Full Code    Subjective:   Patient having moderate to severe left foot pain, just received pain medication prior to exam so starting to improve, he feels anxious, no other complaints    Objective:     Vitals:   Temp (24hrs), Av.8 °F (36.6 °C), Min:97.2 °F (36.2 °C), Max:98 °F (36.7 °C)    Temp:  [97.2 °F (36.2 °C)-98 °F (36.7 °C)] 97.2 °F (36.2 °C)  HR:  [79-87] 79  Resp:  [16-18] 18  BP: (103-116)/(63-72) 116/71  SpO2:  [97 %-100 %] 97 %  Body mass index is 34.3 kg/m².      Input and Output Summary (last 24 hours): No intake or output data in the 24 hours ending 07/24/23 1108    Physical Exam:   Physical Exam  Vitals and nursing note reviewed. Constitutional:       General: He is not in acute distress. Appearance: He is well-developed. HENT:      Head: Normocephalic and atraumatic. Eyes:      Conjunctiva/sclera: Conjunctivae normal.   Cardiovascular:      Rate and Rhythm: Normal rate and regular rhythm. Heart sounds: No murmur heard. Pulmonary:      Effort: Pulmonary effort is normal. No respiratory distress. Breath sounds: Normal breath sounds. Abdominal:      Palpations: Abdomen is soft. Tenderness: There is no abdominal tenderness. Musculoskeletal:         General: No swelling. Cervical back: Neck supple. Comments: LLE s/p TMA, significantly swollen but with minimal erythema, warm to touch, no obvious drainage   Skin:     General: Skin is warm and dry. Capillary Refill: Capillary refill takes less than 2 seconds. Neurological:      Mental Status: He is alert.    Psychiatric:         Mood and Affect: Mood normal.          Additional Data:     Labs:  Results from last 7 days   Lab Units 07/24/23  0607   WBC Thousand/uL 9.96   HEMOGLOBIN g/dL 12.2   HEMATOCRIT % 40.9   PLATELETS Thousands/uL 281   NEUTROS PCT % 62   LYMPHS PCT % 19   MONOS PCT % 17*   EOS PCT % 2     Results from last 7 days   Lab Units 07/24/23  0607 07/23/23  2129   SODIUM mmol/L 137 137   POTASSIUM mmol/L 3.7 3.8   CHLORIDE mmol/L 105 105   CO2 mmol/L 23 27   BUN mg/dL 13 14   CREATININE mg/dL 0.86 0.86   ANION GAP mmol/L 9 5   CALCIUM mg/dL 8.6 8.6   ALBUMIN g/dL  --  3.7   TOTAL BILIRUBIN mg/dL  --  0.63   ALK PHOS U/L  --  89   ALT U/L  --  8   AST U/L  --  13   GLUCOSE RANDOM mg/dL 85 105     Results from last 7 days   Lab Units 07/23/23  2129   INR  1.05             Results from last 7 days   Lab Units 07/24/23  0824   PROCALCITONIN ng/ml 0.05       Lines/Drains:  Invasive Devices     Peripheral Intravenous Line  Duration           Peripheral IV 07/23/23 Right Antecubital <1 day                      Imaging: Personally reviewed the following imaging: xray(s)    Recent Cultures (last 7 days):         Last 24 Hours Medication List:   Current Facility-Administered Medications   Medication Dose Route Frequency Provider Last Rate   • acetaminophen  650 mg Oral Q6H PRN Deann S Shamika, CRNP     • busPIRone  10 mg Oral TID Deann S Shamika, CRNP     • vancomycin  12.5 mg/kg (Adjusted) Intravenous Q12H Deann S Shamika, CRNP 1,250 mg (07/24/23 0024)    And   • cefepime  2,000 mg Intravenous Q8H Deann S Shamika, CRNP 2,000 mg (07/24/23 9357)   • diazepam  10 mg Oral 30 min pre-procedure Deann S Shamika, CRNP     • docusate sodium  100 mg Oral BID Deann S Shamika, CRNP     • ferrous sulfate  325 mg Oral Daily With Breakfast Deann S Shamika, CRNP     • gabapentin  600 mg Oral TID Deann S Shamika, CRNP     • heparin (porcine)  5,000 Units Subcutaneous Q8H [facility] Deann S Shamika, CRNP     • HYDROmorphone  0.5 mg Intravenous Q3H PRN Deann S Shamika, CRNP     • mirtazapine  30 mg Oral HS Deann S Shamika, CRNP     • ondansetron  4 mg Intravenous Q8H PRN Deann S Shamika, CRNP     • oxyCODONE  10 mg Oral Q6H PRN Deann S Shamika, CRNP     • oxyCODONE  5 mg Oral Q6H PRN Deann S Shamika, CRNP     • pantoprazole  40 mg Oral Early Morning Deann S Shamika, CRNP          Today, Patient Was Seen By: Flavia Chahal DO    **Please Note: This note may have been constructed using a voice recognition system. **

## 2023-07-24 NOTE — CASE MANAGEMENT
Case Management Assessment & Discharge Planning Note    Patient name Keven Bojorquez  Location /-62 MRN [id number]  : 1965 Date 2023       Current Admission Date: 2023  Current Admission Diagnosis:Cellulitis of left lower extremity   Patient Active Problem List    Diagnosis Date Noted   • Status post partial amputation of foot, left ([address]) 2023   • Other constipation 2023   • Moderate benzodiazepine use disorder ([address]) 06/15/2023   • Microcytic anemia 06/15/2023   • Closed fracture of shaft of metatarsal bone of left foot 2023   • Cellulitis of left lower extremity 2023   • Diabetic ulcer of left midfoot associated with type 2 diabetes mellitus ([address]) 2023   • Hyperkalemia 2022   • Pacemaker 2022   • History of bariatric surgery 2022   • Encounter for perioperative consultation 2022   • Diabetic infection of left foot ([address]) 2022   • Cervical spondylosis 2022   • Cervicalgia - Right 2022   • Toe osteomyelitis, right ([address]) 2022   • Type 2 diabetes mellitus with diabetic polyneuropathy, without long-term current use of insulin ([address]) 2022   • Anxiety 2021   • Atrial fibrillation (HCC)    • Chronic osteomyelitis of left foot with draining sinus ([address]) 2021   • Pain, joint, ankle and foot, left 2021   • DAYAN (obstructive sleep apnea) 2020   • Chronic diastolic heart failure ([address]) 2020   • Hypertension 2020   • Diabetes mellitus ([address]) 2020   • Morbid obesity with BMI of 40.0-44.9, adult ([address]) 2020      LOS (days): 1  Geometric Mean LOS (GMLOS) (days):   Days to GMLOS:     OBJECTIVE:    Risk of Unplanned Readmission Score: 48.92         Current admission status: Inpatient  Referral Reason: Other (Post Acute Home Needs (VNA/DME/Infusion))    Preferred Pharmacy:   [facility]/pharmacy #[id number]- [address]   [facility] [address]  Phone: [phone] Fax: [phone]    Primary Care Provider: Nila Bey DO    Primary Insurance: Shadi Gonzalez Insurance:     ASSESSMENT:  Ronda Proxies    There are no active Health Care Proxies on file. Advance Directives  Does patient have a [address]?: No  Was patient offered paperwork?: Yes (patient declined)  Does patient currently have a Health Care decision maker?: Yes, please see Health Care Proxy section  Does patient have Advance Directives?: No  Was patient offered paperwork?: Yes (patient declined)  Primary Contact: Vera Frye, spouse         Readmission Root Cause  30 Day Readmission: No    Patient Information  Admitted from[de-identified] Home  Mental Status: Alert  During Assessment patient was accompanied by: Not accompanied during assessment  Assessment information provided by[de-identified] Patient  Primary Caregiver: Self  Support Systems: Spouse/significant other, [facility] of Residence: [address] [place] [address] do you live in?: [address] [facility] entry access options. Select all that apply.: Stairs  Number of steps to enter home. : 1  Do the steps have railings?: No  Type of Current Residence: [address] [place] [address] home  Upon entering residence, is there a bedroom on the main floor (no further steps)?: No  A bedroom is located on the following floor levels of residence (select all that apply):: 2nd Floor  Upon entering residence, is there a bathroom on the main floor (no further steps)?: No  Indicate which floors of current residence have a bathroom (select all the apply):: 2nd Floor  Number of steps to 2nd floor from main floor: One Flight  In the last 12 months, was there a time when you were not able to pay the mortgage or rent on time?: No  In the last 12 months, how many places have you lived?: 1  In the last 12 months, was there a time when you did not have a steady place to sleep or slept in a shelter (including now)?: No  Homeless/housing insecurity resource given?: N/A  Living Arrangements: Lives w/ Spouse/significant other  Is patient a ?: No    Activities of Daily Living Prior to Admission  Functional Status: Independent  Completes ADLs independently?: Yes  Ambulates independently?: Yes  Does patient use assisted devices?: No  Does patient currently own DME?: Yes  What DME does the patient currently own?: Alexandro Leyva  Does patient have a history of Outpatient Therapy (PT/OT)?: Yes  Does the patient have a history of Short-Term Rehab?: No  Does patient have a history of HHC?: Yes ([facility] and [facility] [address])  Does patient currently have [address]?: No         Patient Information Continued  Income Source: Employed  Does patient have prescription coverage?: Yes  Within the past 12 months, you worried that your food would run out before you got the money to buy more.: Never true  Within the past 12 months, the food you bought just didn't last and you didn't have money to get more.: Never true  Food insecurity resource given?: N/A  Does patient receive dialysis treatments?: No  Does patient have a history of substance abuse?: No  Does patient have a history of Mental Health Diagnosis?: Yes (anxiety)  Is patient receiving treatment for mental health?: Yes ([facility])  Has patient received inpatient treatment related to mental health in the last 2 years?: No         Means of Transportation  Means of Transport to Appts[de-identified] Drives Self  In the past 12 months, has lack of transportation kept you from medical appointments or from getting medications?: No  In the past 12 months, has lack of transportation kept you from meetings, work, or from getting things needed for daily living?: No  Was application for public transport provided?: N/A        DISCHARGE DETAILS:    Discharge planning discussed with[de-identified] patient  Freedom of Choice: Yes  Comments - Freedom of Choice: patient keegan [facility] [address] if [address] recommended  CM contacted family/caregiver?: No- see comments (patient declined)                  [address]         Is the patient interested in [facility] at discharge?: Yes  [facility] requested[de-identified] Occupational Therapy, Physical Therapy, [address] Provider[de-identified] PCP                   Treatment Team Recommendation: Home with [address] [facility]  Discharge Destination Plan[de-identified] Home with [address] [facility] at Discharge : Family               CM met with patient at the bedside, baseline information was obtained. CM discussed the role of CM in helping the patient develop a discharge plan and assist the patient in carry out their plan. Patient lives with spouse in 2 story home, independent at baseline - employed full time. Patient requests [facility] if [facility] recommended. CM to follow for CM discharge referral needs.

## 2023-07-24 NOTE — MALNUTRITION/BMI
This medical record reflects one or more clinical indicators suggestive of malnutrition. Malnutrition Findings:   Adult Malnutrition type: Chronic illness  Adult Degree of Malnutrition: Malnutrition of moderate degree  Malnutrition Characteristics: Inadequate energy, Weight loss                  360 Statement: Chronic moderate malnutrition related to poor po, recent benzo detox tx, s/p TMA as evidenced by < 75% estimated energy intake > 1 mo; 19.5% weight loss x 5 mo (2/8/23 323lb, 7/23/23 260lb). Treated with: Will d/c CCD restriction, good BG levels at this time. Will continue with 2gm Na restriction which pt was agreeable to. Will order ensure max PRO TID. Recommend daily weights for nutrition monitoring. BMI Findings: Body mass index is 34.3 kg/m². See Nutrition note dated 7/24/23 for additional details. Completed nutrition assessment is viewable in the nutrition documentation.

## 2023-07-24 NOTE — ASSESSMENT & PLAN NOTE
Lab Results   Component Value Date    HGBA1C 5.7 (H) 06/16/2023       No results for input(s): "POCGLU" in the last 72 hours.     Blood Sugar Average: Last 72 hrs:  · History diabetes mellitus  · Diet controlled per patient  · Monitor daily BMP

## 2023-07-24 NOTE — ASSESSMENT & PLAN NOTE
· PPM in place  · Does not require rate control medications in the outpatient setting  · Chronically anticoagulated with Xarelto, last dose over 1 week ago

## 2023-07-24 NOTE — UTILIZATION REVIEW
Initial Clinical Review    Admission: Date/Time/Statement:   Admission Orders (From admission, onward)     Ordered        07/23/23 2146  INPATIENT ADMISSION  Once                      Orders Placed This Encounter   Procedures   • INPATIENT ADMISSION     Standing Status:   Standing     Number of Occurrences:   1     Order Specific Question:   Level of Care     Answer:   Med Surg [16]     Order Specific Question:   Estimated length of stay     Answer:   More than 2 Midnights     Order Specific Question:   Certification     Answer:   I certify that inpatient services are medically necessary for this patient for a duration of greater than two midnights. See H&P and MD Progress Notes for additional information about the patient's course of treatment. ED Arrival Information     Expected   -    Arrival   7/23/2023 20:05    Acuity   Urgent            Means of arrival   Wheelchair    Escorted by   Spouse    Service   Hospitalist    Admission type   Emergency            Arrival complaint   severe lt foot pain             Chief Complaint   Patient presents with   • Foot Pain     Pt reports partial amputation of R foot in April. Reports he was schedule for further surgery last week and opted out. Reports pain unbearable. Sent by Dr Te Mancia for admit       Initial Presentation: 59 y.o. male to ED via [facility] from home  Present to ED with severe / intolerable pain in his left foot.  patient has not taken his Xarelto for the past week as he was planning to have a surgical procedure by podiatry   PMHX gastric bypass, A-fib chronically anticoagulated on Xarelto, CHF maintained on diuretics, DM, PPM, CVA, severe anxiety and recently was admitted to [facility] detox center for benzodiazepine abuse; foot osteomyelitis status post left TMA resection in April   Admitted to [address] [facility] with DX: Cellulitis of left lower extremity  on exam: Left lower extremity from foot to mid calf with extreme edema, erythema, tenderness from left foot healed TMA scar to mid calf - pain 10/10  PLAN: cont iv abx; pain / nausea control; monitor labs; f/u MRI; f/u venous duplex LLE; Accuchecks with [facility]      Date: 7/24/23    Day 2  Patient having moderate to severe left foot pain - pain 10/10; X-ray showing osteo  Plan: cont iv abx; pain / nausea control; monitor labs; f/u MRI; f/u venous duplex LLE; Accuchecks with [facility]      ED Triage Vitals   Temperature Pulse Respirations Blood Pressure SpO2   07/23/23 2022 07/23/23 2022 07/23/23 2022 07/23/23 2022 07/23/23 2022   98 °F (36.7 °C) 87 16 103/63 100 %      Temp Source Heart Rate Source Patient Position - Orthostatic VS BP Location FiO2 (%)   07/23/23 2241 -- 07/23/23 2022 07/23/23 2022 --   Temporal  Lying Right arm       Pain Score       07/23/23 2022       10 - Worst Possible Pain          Wt Readings from Last 1 Encounters:   07/23/23 118 kg (260 lb)     Additional Vital Signs:   Date/Time Temp Pulse Resp BP MAP (mmHg) SpO2 O2 Device Patient Position - Orthostatic VS   07/24/23 08:18:26 97.2 °F (36.2 °C) Abnormal  79 -- 116/71 86 97 % -- --   07/23/23 2241 97.9 °F (36.6 °C) 81 18 112/72 -- -- -- --   07/23/23 22:39:52 97.9 °F (36.6 °C) 81 18 112/72 85 99 % -- --   07/23/23 2022 98 °F (36.7 °C) 87 16 103/63 -- 100 % None (Room air) Lying         EKG: none obtained      Pertinent Labs/Diagnostic Test Results:   XR foot 2 views LEFT   ED Interpretation by Gisel Bryson DO (07/23 2128)   No subcutaneous air, large amount of bony destructive changes distally      MRI inpatient order    (Results Pending)   VAS lower limb venous duplex study, unilateral/limited    (Results Pending)         Results from last 7 days   Lab Units 07/24/23  0607 07/23/23 2129   WBC Thousand/uL 9.96 8.64   HEMOGLOBIN g/dL 12.2 11.1*   HEMATOCRIT % 40.9 35.2*   PLATELETS Thousands/uL 281 292   NEUTROS ABS Thousands/µL 6.18 5.83         Results from last 7 days   Lab Units 07/24/23  0607 07/23/23  2129   SODIUM mmol/L 137 137   POTASSIUM mmol/L 3.7 3.8 CHLORIDE mmol/L 105 105   CO2 mmol/L 23 27   ANION GAP mmol/L 9 5   BUN mg/dL 13 14   CREATININE mg/dL 0.86 0.86   EGFR ml/min/1.73sq m 94 94   CALCIUM mg/dL 8.6 8.6   MAGNESIUM mg/dL 2.2  --      Results from last 7 days   Lab Units 07/23/23  2129   AST U/L 13   ALT U/L 8   ALK PHOS U/L 89   TOTAL PROTEIN g/dL 6.7   ALBUMIN g/dL 3.7   TOTAL BILIRUBIN mg/dL 0.63         Results from last 7 days   Lab Units 07/24/23  0607 07/23/23 2129   GLUCOSE RANDOM mg/dL 85 105       Results from last 7 days   Lab Units 07/23/23  2129   PROTIME seconds 13.8   INR  1.05   PTT seconds 35         Results from last 7 days   Lab Units 07/24/23  0824   PROCALCITONIN ng/ml 0.05       Results from last 7 days   Lab Units 07/24/23  0607   CRP mg/L 80.0*       Present on Admission:  • Type 2 diabetes mellitus with diabetic polyneuropathy, without long-term current use of insulin (HCC)  • Atrial fibrillation (HCC)  • Anxiety  • Microcytic anemia      Admitting Diagnosis: Left foot pain [M79.672]  Foot pain, left [M79.672]     Age/Sex: 59 y.o. male     Admission Orders:  Ambulatory; Consistent Carbohydrate Diet. Blood glucose checks ACHS. Scheduled Medications:  busPIRone, 10 mg, Oral, TID  vancomycin, 12.5 mg/kg (Adjusted), Intravenous, Q12H   And  cefepime, 2,000 mg, Intravenous, Q8H  diazepam, 10 mg, Oral, 30 min pre-procedure  docusate sodium, 100 mg, Oral, BID  ferrous sulfate, 325 mg, Oral, Daily With Breakfast  gabapentin, 600 mg, Oral, TID  heparin (porcine), 5,000 Units, Subcutaneous, Q8H CRISTY  mirtazapine, 30 mg, Oral, HS  pantoprazole, 40 mg, Oral, Early Morning    HYDROmorphone (DILAUDID) injection 0.5 mg  Dose: 0.5 mg  Freq:  Once Route: IV  Start: 07/23/23 2300 End: 07/23/23 2303    Continuous IV Infusions: none     PRN Meds:  acetaminophen, 650 mg, Oral, Q6H PRN  HYDROmorphone, 0.5 mg, Intravenous, Q3H PRN  (7/23 recd x1)  (7/24 rec'd x2 so far today)   ondansetron, 4 mg, Intravenous, Q8H PRN  oxyCODONE, 10 mg, Oral, Q6H PRN  (7/24 rec'd x1 so far today)     oxyCODONE, 5 mg, Oral, Q6H PRN        IP CONSULT TO CASE MANAGEMENT  IP CONSULT TO PHARMACY  IP CONSULT TO PODIATRY    Network Utilization Review Department  ATTENTION: Please call with any questions or concerns to [phone] and carefully listen to the prompts so that you are directed to the right person. All voicemails are confidential.  Juliette Acevedo all requests for admission clinical reviews, approved or denied determinations and any other requests to dedicated fax number below belonging to the campus where the patient is receiving treatment.  List of dedicated fax numbers for the Facilities:  [place] DENIALS (Administrative/Medical Necessity) [phone] [address] (Maternity/NICU/Pediatrics) [phone]   [address] [place] [address] [phone]   [facility] [address] [facility] [phone] [address] [phone]   [address] [address] [place] [address] Nn [phone]

## 2023-07-24 NOTE — PROGRESS NOTES
Luz Marina Adler is a 59 y.o. male who is currently ordered Vancomycin IV with management by the Pharmacy Consult service. Relevant clinical data and objective / subjective history reviewed. Vancomycin Assessment:  Indication and Goal AUC/Trough: Bone/joint infection (goal -600, trough >10), -600, trough >10  Clinical Status: stable  Micro:   pending  Renal Function:  SCr: 0.86 mg/dL  CrCl: 122.9 mL/min  Renal replacement: Not on dialysis  Days of Therapy: 2  Current Dose: 1250mg iv q12h  Vancomycin Plan:  New Dosing: Estimated AUC: 460 mcg*hr/mL  Estimated Trough: 14.7 mcg/mL  Next Level: 07/25/23 0600  Renal Function Monitoring: Daily BMP and UOP  Pharmacy will continue to follow closely for s/sx of nephrotoxicity, infusion reactions and appropriateness of therapy. BMP and CBC will be ordered per protocol. We will continue to follow the patient’s culture results and clinical progress daily.     Mekhi Martinez, Pharmacist

## 2023-07-24 NOTE — CONSULTS
Vancomycin IV Pharmacy-to-Dose Consultation     Vancomycin has been discontinued. Pharmacy will sign off. Please contact or re-consult with questions.     Linnette Candelario, Pharmacist

## 2023-07-24 NOTE — H&P
[address] [facility]  H&P  Name: Kiko Salgado 59 y.o. male I MRN: [id number]  Unit/Bed#: -01 I Date of Admission: 7/23/2023   Date of Service: 7/24/2023 I Hospital Day: 1      Assessment/Plan   * Cellulitis of left lower extremity  Assessment & Plan  · POA with significant LLE swelling, erythema, tenderness to palpation starting at left TMA extending to mid calf  · Following with podiatry and postop 4/12/23- 2nd metatarsal complete resection with surgical cure for OM presumed. S/p revision TMA. He was discharged on Bactrim and reports was doing well until 7/10 when he developed severe pain in his foot. Reports he saw his podiatrist and was recommended outpatient MRI but was unable to arrange his schedule. · Presented to the ED tonight due to severe intolerable pain in left foot and leg. Significant edema of left foot TMA and lower extremity. · Admit to medical service  · MRI left foot rule out abscess or osteomyelitis  · Venous duplex LLE as patient has not taken his Xarelto for the past week as he was planning to have a surgical procedure by podiatry  · Empiric cefepime, vancomycin for suspected diabetic foot infection  · Trend fever curve, leukocytosis  · Pain control    Microcytic anemia  Assessment & Plan  · History gastric bypass  · Continue ferrous sulfate  · Hemoglobin baseline    Type 2 diabetes mellitus with diabetic polyneuropathy, without long-term current use of insulin ([facility])  Assessment & Plan  Lab Results   Component Value Date    HGBA1C 5.7 (H) 06/16/2023       No results for input(s): "POCGLU" in the last 72 hours.     Blood Sugar Average: Last 72 hrs:  · History diabetes mellitus  · Diet controlled per patient  · Monitor daily BMP    Anxiety  Assessment & Plan  · Chronic anxiety  · Maintained on Remeron, gabapentin, BuSpar   · Follows with psychiatry in outpatient setting  · Avoid benzodiazepines as recently underwent detox at [facility] for benzodiazepine abuse    Atrial fibrillation (HCC)  Assessment & Plan  · PPM in place  · Does not require rate control medications in the outpatient setting  · Chronically anticoagulated with Xarelto, last dose over 1 week ago             VTE Pharmacologic Prophylaxis:   High Risk (Score >/= 5) - Pharmacological DVT Prophylaxis Ordered: heparin. Sequential Compression Devices Ordered. Code Status: Level 1 - Full Code   Discussion with family: Updated  (wife) at bedside. Anticipated Length of Stay: Patient will be admitted on an inpatient basis with an anticipated length of stay of greater than 2 midnights secondary to Left lower extremity cellulitis. Total Time Spent on Date of Encounter in care of patient: 65 minutes This time was spent on one or more of the following: performing physical exam; counseling and coordination of care; obtaining or reviewing history; documenting in the medical record; reviewing/ordering tests, medications or procedures; communicating with other healthcare professionals and discussing with patient's family/caregivers. Chief Complaint: Severe pain left foot    History of Present Illness:  Kiko Salgado is a 59 y.o. male with a PMH of gastric bypass, A-fib chronically anticoagulated on Xarelto, CHF maintained on diuretics, DM, PPM, CVA, severe anxiety and recently was admitted to [address] [place] [address] for benzodiazepine abuse, left foot osteomyelitis status post left TMA resection in April who states he was doing very well postoperatively until this month when he developed severe pain in his left foot. Patient returned to podiatry for evaluation and was recommended MRI and surgical exploration with bone biopsy. Patient's wife at bedside states he was unable to schedule due to his occupation and now tonight pain was so severe he presented to the ED for further evaluation.   Left lower extremity from foot to mid calf with extreme edema, erythema, tenderness and presented to the medical service for further ration of left lower extremity cellulitis with possible foot abscess. Review of Systems:  Review of Systems   Constitutional: Negative for chills and fever. HENT: Negative for ear pain and sore throat. Eyes: Negative for pain and visual disturbance. Respiratory: Negative for cough and shortness of breath. Cardiovascular: Negative for chest pain and palpitations. Gastrointestinal: Negative for abdominal pain and vomiting. Genitourinary: Negative for dysuria and hematuria. Musculoskeletal: Positive for arthralgias, gait problem and joint swelling. Negative for back pain. Skin: Positive for rash. Negative for color change. Neurological: Negative for seizures and syncope. Psychiatric/Behavioral: The patient is nervous/anxious. All other systems reviewed and are negative.       Past Medical and Surgical History:   Past Medical History:   Diagnosis Date   • Atrial fibrillation ([address])    • Benzodiazepine withdrawal with complication ([address]) 6/17/8451   • Chronic diastolic (congestive) heart failure (HCC)    • Diabetes mellitus ([address])    • High cholesterol    • Hyperlipidemia    • Pacemaker    • Stroke [facility])        Past Surgical History:   Procedure Laterality Date   • APPENDECTOMY     • ATRIAL CARDIAC PACEMAKER INSERTION     • BARIATRIC SURGERY  05/2021   • EPIDURAL BLOCK INJECTION N/A 5/19/2022    Procedure: BLOCK / INJECTION EPIDURAL STEROID CERVICAL C7-T1;  Surgeon: Pito Giron MD;  Location:  ENDO;  Service: Pain Management    • FL GUIDED NEEDLE PLAC BX/ASP/INJ  3/22/2022   • FOOT AMPUTATION Left 4/28/2022    Procedure: LEFT TRANSMETATARSAL AMPUTATION.;  Surgeon: Patricia Estevez DPM;  Location[facility];  Service: Podiatry   • NERVE BLOCK Right 2/10/2022    Procedure: BLOCK MEDIAL BRANCH C3, C4, C5 #1;  Surgeon: Pito Giron MD;  Location:  ENDO;  Service: Pain Management    • NERVE BLOCK Right 3/22/2022    Procedure: BLOCK MEDIAL BRANCH C3, C4, C5 #2;  Surgeon: Marian Wyatt MD;  Location: [facility];  Service: Pain Management    • MO AMPUTATION FOOT TRANSMETARSAL Left 4/12/2023    Procedure: REVISION LEFT TRANSMETATARSAL (TMA) AMPUTATION, REMOVAL OF UNVIABLE TISSUE AND BONE,;  Surgeon: Karri Bell DPM;  Location: [facility] MAIN OR;  Service: Podiatry   • MO AMPUTATION METATARSAL W/TOE SINGLE Left 4/7/2023    Procedure: 2ND RAY RESECTION FOOT;  Surgeon: Karri Bell DPM;  Location: [facility] MAIN OR;  Service: Podiatry   • MO AMPUTATION TOE INTERPHALANGEAL JOINT Left 11/16/2021    Procedure: AMPUTATION LESSER TOE;  Surgeon: Sal Dao DPM;  [facility]: [facility];  Service: Podiatry   • RADIOFREQUENCY ABLATION Right 4/7/2022    Procedure: Right C3, C4, C5 RFA;  Surgeon: Marian Wyatt MD;  Location: [facility];  Service: Pain Management    • RHIZOTOMY Right 2/9/2023    Procedure: RHIZOTOMY CERVICAL MEDIAL BRANCH NERVES RIGHT C3, C4, C5;  Surgeon: Marian Wyatt MD;  Location: [facility];  Service: Pain Management    • TOE AMPUTATION Left     2nd toe   • TOE AMPUTATION Left 9/15/2021    Procedure: AMPUTATION LEFT 4TH TOE;  Surgeon: Sal Dao DPM;  [facility];  Service: Podiatry   • TOE AMPUTATION Right 1/12/2022    Procedure: AMPUTATION TOE;  Surgeon: Pedro Calderon DPM;  [facility]: [facility];  Service: Podiatry   • TOE AMPUTATION Right 2/23/2022    Procedure: AMPUTATION TOE  RIGHT SECOND;  Surgeon: Pedro Calderon DPM;  Location: [address] [place] [address] MAIN OR;  Service: Podiatry   • TOE AMPUTATION Right 6/3/2022    Procedure: AMPUTATION right 4th TOE;  Surgeon: Daniel Kay DPM;  [facility]: [facility];  Service: Podiatry       Meds/Allergies:  Prior to Admission medications    Medication Sig Start Date End Date Taking?  Authorizing Provider   busPIRone (BUSPAR) 10 mg tablet Take 1 tablet (10 mg total) by mouth 3 (three) times a day 7/5/23  Yes Nicole Biswas MD   ferrous sulfate 325 (65 Fe) mg tablet Take 1 tablet (325 mg total) by mouth daily with breakfast Do not start before June 19, 2023. 6/19/23  Yes Debora Hill PA-C   gabapentin (NEURONTIN) 300 mg capsule Take 2 capsules (600 mg total) by mouth 3 (three) times a day 7/5/23 10/3/23 Yes Chantelle Lewis MD   mirtazapine (REMERON) 15 mg tablet Take 2 tablets (30 mg total) by mouth daily at bedtime 7/5/23 10/3/23 Yes Chantelle Lewis MD   pantoprazole (PROTONIX) 40 mg tablet Take 1 tablet (40 mg total) by mouth daily in the early morning Do not start before June 19, 2023. 6/19/23  Yes Zahc Chapin PA-C   Xarelto 20 MG tablet  7/4/23  Yes Historical Provider, MD   acetaminophen (TYLENOL) 325 mg tablet Take 2 tablets (650 mg total) by mouth every 6 (six) hours as needed for mild pain, headaches or fever 4/13/23   Melanie Franks PA-C   hydrOXYzine HCL (ATARAX) 50 mg tablet Take 1 tablet (50 mg total) by mouth every 6 (six) hours as needed (anxiety and insomnia)  Patient not taking: Reported on 7/5/2023 6/18/23   Zach Chapin PA-C   miSOPROStol (Cytotec) 200 mcg tablet Take 1 tablet by mouth 4 (four) times a day  Patient not taking: Reported on 7/23/2023    Historical Provider, MD   Omadacycline Tosylate Jason Etta) 150 MG TABS Take 2 tablets by mouth daily  Patient not taking: Reported on 7/23/2023    Historical Provider, MD   oxyCODONE (ROXICODONE) 5 immediate release tablet PLEASE SEE ATTACHED FOR DETAILED DIRECTIONS    Historical Provider, MD   carBAMazepine (TEGretol) 200 mg tablet TAKE 2 TABLETS BY MOUTH EVERY DAY AT NIGHT  Patient not taking: Reported on 7/23/2023 7/23/23  Historical Provider, MD   Eliquis 5 MG TAKE 1 TABLET BY MOUTH IN THE MORNING AND BEFORE BEDTIME  Patient not taking: Reported on 7/23/2023 5/25/23 7/23/23  Historical Provider, MD   Fluoxetine HCl, PMDD, 20 MG TABS Take 2 tablets by mouth every morning  Patient not taking: Reported on 7/23/2023 7/23/23  Historical Provider, MD KIRK have reviewed home medications with patient personally. Allergies: Allergies   Allergen Reactions   • Ativan [Lorazepam] Anxiety       Social History:  Marital Status: /Civil Union   Patient Pre-hospital Living Situation: With spouse  Patient Pre-hospital Level of Mobility: walks  Patient Pre-hospital Diet Restrictions: none  Substance Use History:   Social History     Substance and Sexual Activity   Alcohol Use Never     Social History     Tobacco Use   Smoking Status Never   Smokeless Tobacco Never     Social History     Substance and Sexual Activity   Drug Use Never       Family History:  Family History   Problem Relation Age of Onset   • No Known Problems Mother    • No Known Problems Father        Physical Exam:     Vitals:   Blood Pressure: 112/72 (07/23/23 2241)  Pulse: 81 (07/23/23 2241)  Temperature: 97.9 °F (36.6 °C) (07/23/23 2241)  Temp Source: Temporal (07/23/23 2241)  Respirations: 18 (07/23/23 2241)  Height: 6' 1" (185.4 cm) (07/23/23 2241)  Weight - Scale: 118 kg (260 lb) (07/23/23 2241)  SpO2: 99 % (07/23/23 2239)    Physical Exam  Vitals and nursing note reviewed. Constitutional:       General: He is not in acute distress. Appearance: He is well-developed. HENT:      Head: Normocephalic and atraumatic. Eyes:      Conjunctiva/sclera: Conjunctivae normal.   Cardiovascular:      Rate and Rhythm: Normal rate and regular rhythm. Heart sounds: No murmur heard. Pulmonary:      Effort: Pulmonary effort is normal. No respiratory distress. Breath sounds: Normal breath sounds. Abdominal:      Palpations: Abdomen is soft. Tenderness: There is no abdominal tenderness. Musculoskeletal:         General: Swelling and tenderness present. Cervical back: Neck supple. Left lower leg: Edema present. Comments: Significant edema, erythema, tenderness from left foot healed TMA scar to mid calf   Skin:     General: Skin is warm and dry. Capillary Refill: Capillary refill takes less than 2 seconds. Findings: Erythema present. Neurological:      General: No focal deficit present. Mental Status: He is alert and oriented to person, place, and time. Psychiatric:         Mood and Affect: Mood normal.         Behavior: Behavior normal.       Additional Data:     Lab Results:  Results from last 7 days   Lab Units 07/23/23 2129   WBC Thousand/uL 8.64   HEMOGLOBIN g/dL 11.1*   HEMATOCRIT % 35.2*   PLATELETS Thousands/uL 292   NEUTROS PCT % 67   LYMPHS PCT % 16   MONOS PCT % 15*   EOS PCT % 1     Results from last 7 days   Lab Units 07/23/23 2129   SODIUM mmol/L 137   POTASSIUM mmol/L 3.8   CHLORIDE mmol/L 105   CO2 mmol/L 27   BUN mg/dL 14   CREATININE mg/dL 0.86   ANION GAP mmol/L 5   CALCIUM mg/dL 8.6   ALBUMIN g/dL 3.7   TOTAL BILIRUBIN mg/dL 0.63   ALK PHOS U/L 89   ALT U/L 8   AST U/L 13   GLUCOSE RANDOM mg/dL 105     Results from last 7 days   Lab Units 07/23/23 2129   INR  1.05                   Lines/Drains:  Invasive Devices     Peripheral Intravenous Line  Duration           Peripheral IV 07/23/23 Right Antecubital <1 day                    Imaging: Reviewed radiology reports from this admission including: xray(s)  XR foot 2 views LEFT   ED Interpretation by Pete Parker DO (07/23 2128)   No subcutaneous air, large amount of bony destructive changes distally      MRI inpatient order    (Results Pending)   VAS lower limb venous duplex study, unilateral/limited    (Results Pending)       EKG and Other Studies Reviewed on Admission:   All  ** Please Note: This note has been constructed using a voice recognition system.  **

## 2023-07-24 NOTE — TELEPHONE ENCOUNTER
Patients wife was calling to verify patient rescheduled todays appt, writer confirmed patients appt was rescheduled to 9/11/2023 8:15am

## 2023-07-24 NOTE — ASSESSMENT & PLAN NOTE
· PPM in place  · Does not require rate control medications in the outpatient setting  · Chronically anticoagulated with Xarelto, last dose over 1 week ago held for possible OR

## 2023-07-24 NOTE — ASSESSMENT & PLAN NOTE
· Chronic anxiety  · Maintained on Remeron, gabapentin, BuSpar   · Follows with psychiatry in outpatient setting  · Avoid benzodiazepines as recently underwent detox at [facility] for benzodiazepine abuse

## 2023-07-24 NOTE — ASSESSMENT & PLAN NOTE
· POA with significant LLE swelling, erythema, tenderness to palpation starting at left TMA extending to mid calf  · Following with podiatry and postop 4/12/23- 2nd metatarsal complete resection with surgical cure for OM presumed. S/p revision TMA. He was discharged on Bactrim and reports was doing well until 7/10 when he developed severe pain in his foot. Reports he saw his podiatrist and was recommended outpatient MRI but was unable to arrange his schedule. · Presented to the ED due to severe intolerable pain in left foot and leg. Significant edema of left foot TMA and lower extremity.   · Admit to medical service  · MRI left foot to evaluate for osteo/abscess  · Venous duplex LLE as patient has not taken his Xarelto for the past week as he was planning to have a surgical procedure by podiatry  · Empiric cefepime, vancomycin for suspected diabetic foot infection  · Trend fever curve, leukocytosis  · Pain control  · Patient reports Podiatry saw him earlier and X-ray showing osteo, so pending MRI likely planning on OR for further resection

## 2023-07-24 NOTE — PLAN OF CARE
Problem: PAIN - ADULT  Goal: Verbalizes/displays adequate comfort level or baseline comfort level  Description: Interventions:  - Encourage patient to monitor pain and request assistance  - Assess pain using appropriate pain scale  - Administer analgesics based on type and severity of pain and evaluate response  - Implement non-pharmacological measures as appropriate and evaluate response  - Consider cultural and social influences on pain and pain management  - Notify physician/advanced practitioner if interventions unsuccessful or patient reports new pain  Outcome: Progressing     Problem: INFECTION - ADULT  Goal: Absence or prevention of progression during hospitalization  Description: INTERVENTIONS:  - Assess and monitor for signs and symptoms of infection  - Monitor lab/diagnostic results  - Monitor all insertion sites, i.e. indwelling lines, tubes, and drains  - Monitor endotracheal if appropriate and nasal secretions for changes in amount and color  - [place] appropriate cooling/warming therapies per order  - Administer medications as ordered  - Instruct and encourage patient and family to use good hand hygiene technique  - Identify and instruct in appropriate isolation precautions for identified infection/condition  Outcome: Progressing  Goal: Absence of fever/infection during neutropenic period  Description: INTERVENTIONS:  - Monitor WBC    Outcome: Progressing     Problem: SAFETY ADULT  Goal: Patient will remain free of falls  Description: INTERVENTIONS:  - Educate patient/family on patient safety including physical limitations  - Instruct patient to call for assistance with activity   - Consult OT/PT to assist with strengthening/mobility   - Keep Call bell within reach  - Keep bed low and locked with side rails adjusted as appropriate  - Keep care items and personal belongings within reach  - Initiate and maintain comfort rounds  - Make Fall Risk Sign visible to staff  - Offer Toileting every 2 Hours, in advance of need  - Initiate/Maintain alarm  - Obtain necessary fall risk management equipment: socks  - Apply yellow socks and bracelet for high fall risk patients  - Consider moving patient to room near nurses station  Outcome: Progressing  Goal: Maintain or return to baseline ADL function  Description: INTERVENTIONS:  -  Assess patient's ability to carry out ADLs; assess patient's baseline for ADL function and identify physical deficits which impact ability to perform ADLs (bathing, care of mouth/teeth, toileting, grooming, dressing, etc.)  - Assess/evaluate cause of self-care deficits   - Assess range of motion  - Assess patient's mobility; develop plan if impaired  - Assess patient's need for assistive devices and provide as appropriate  - Encourage maximum independence but intervene and supervise when necessary  - Involve family in performance of ADLs  - Assess for home care needs following discharge   - Consider OT consult to assist with ADL evaluation and planning for discharge  - Provide patient education as appropriate  Outcome: Progressing  Goal: Maintains/Returns to pre admission functional level  Description: INTERVENTIONS:  - Perform BMAT or MOVE assessment daily.   - Set and communicate daily mobility goal to care team and patient/family/caregiver. - Collaborate with rehabilitation services on mobility goals if consulted  - Perform Range of Motion 3 times a day. - Reposition patient every 3 hours.   - Dangle patient 3 times a day  - Stand patient 3 times a day  - Ambulate patient 3 times a day  - Out of bed to chair 3 times a day   - Out of bed for meals 3 times a day  - Out of bed for toileting  - Record patient progress and toleration of activity level   Outcome: Progressing     Problem: DISCHARGE PLANNING  Goal: Discharge to home or other facility with appropriate resources  Description: INTERVENTIONS:  - Identify barriers to discharge w/patient and caregiver  - Arrange for needed discharge resources and transportation as appropriate  - Identify discharge learning needs (meds, wound care, etc.)  - Arrange for interpretive services to assist at discharge as needed  - Refer to Case Management Department for coordinating discharge planning if the patient needs post-hospital services based on physician/advanced practitioner order or complex needs related to functional status, cognitive ability, or social support system  Outcome: Progressing     Problem: Knowledge Deficit  Goal: Patient/family/caregiver demonstrates understanding of disease process, treatment plan, medications, and discharge instructions  Description: Complete learning assessment and assess knowledge base.   Interventions:  - Provide teaching at level of understanding  - Provide teaching via preferred learning methods  Outcome: Progressing

## 2023-07-25 ENCOUNTER — ANESTHESIA EVENT (INPATIENT)
Dept: PERIOP | Facility: HOSPITAL | Age: 58
DRG: 580 | End: 2023-07-25
Payer: COMMERCIAL

## 2023-07-25 PROBLEM — E44.0 MODERATE PROTEIN-CALORIE MALNUTRITION (HCC): Status: ACTIVE | Noted: 2023-07-25

## 2023-07-25 LAB
ANION GAP SERPL CALCULATED.3IONS-SCNC: 7 MMOL/L
BUN SERPL-MCNC: 11 MG/DL (ref 5–25)
CALCIUM SERPL-MCNC: 8.5 MG/DL (ref 8.4–10.2)
CHLORIDE SERPL-SCNC: 104 MMOL/L (ref 96–108)
CO2 SERPL-SCNC: 26 MMOL/L (ref 21–32)
CREAT SERPL-MCNC: 0.81 MG/DL (ref 0.6–1.3)
ERYTHROCYTE [DISTWIDTH] IN BLOOD BY AUTOMATED COUNT: 17.9 % (ref 11.6–15.1)
GFR SERPL CREATININE-BSD FRML MDRD: 96 ML/MIN/1.73SQ M
GLUCOSE SERPL-MCNC: 98 MG/DL (ref 65–140)
HCT VFR BLD AUTO: 36.3 % (ref 36.5–49.3)
HGB BLD-MCNC: 11.1 G/DL (ref 12–17)
MCH RBC QN AUTO: 24.7 PG (ref 26.8–34.3)
MCHC RBC AUTO-ENTMCNC: 30.6 G/DL (ref 31.4–37.4)
MCV RBC AUTO: 81 FL (ref 82–98)
PLATELET # BLD AUTO: 266 THOUSANDS/UL (ref 149–390)
PMV BLD AUTO: 9 FL (ref 8.9–12.7)
POTASSIUM SERPL-SCNC: 3.5 MMOL/L (ref 3.5–5.3)
RBC # BLD AUTO: 4.49 MILLION/UL (ref 3.88–5.62)
SODIUM SERPL-SCNC: 137 MMOL/L (ref 135–147)
WBC # BLD AUTO: 7.12 THOUSAND/UL (ref 4.31–10.16)

## 2023-07-25 PROCEDURE — 80048 BASIC METABOLIC PNL TOTAL CA: CPT | Performed by: HOSPITALIST

## 2023-07-25 PROCEDURE — 99232 SBSQ HOSP IP/OBS MODERATE 35: CPT | Performed by: FAMILY MEDICINE

## 2023-07-25 PROCEDURE — 85027 COMPLETE CBC AUTOMATED: CPT | Performed by: HOSPITALIST

## 2023-07-25 RX ADMIN — HYDROMORPHONE HYDROCHLORIDE 0.5 MG: 1 INJECTION, SOLUTION INTRAMUSCULAR; INTRAVENOUS; SUBCUTANEOUS at 21:13

## 2023-07-25 RX ADMIN — DOCUSATE SODIUM 100 MG: 100 CAPSULE, LIQUID FILLED ORAL at 16:35

## 2023-07-25 RX ADMIN — PANTOPRAZOLE SODIUM 40 MG: 40 TABLET, DELAYED RELEASE ORAL at 05:04

## 2023-07-25 RX ADMIN — HYDROMORPHONE HYDROCHLORIDE 0.5 MG: 1 INJECTION, SOLUTION INTRAMUSCULAR; INTRAVENOUS; SUBCUTANEOUS at 12:11

## 2023-07-25 RX ADMIN — FERROUS SULFATE TAB 325 MG (65 MG ELEMENTAL FE) 325 MG: 325 (65 FE) TAB at 08:34

## 2023-07-25 RX ADMIN — GABAPENTIN 600 MG: 300 CAPSULE ORAL at 21:13

## 2023-07-25 RX ADMIN — BUSPIRONE HYDROCHLORIDE 10 MG: 10 TABLET ORAL at 21:13

## 2023-07-25 RX ADMIN — HEPARIN SODIUM 5000 UNITS: 5000 INJECTION INTRAVENOUS; SUBCUTANEOUS at 13:58

## 2023-07-25 RX ADMIN — DOCUSATE SODIUM 100 MG: 100 CAPSULE, LIQUID FILLED ORAL at 08:34

## 2023-07-25 RX ADMIN — MIRTAZAPINE 30 MG: 15 TABLET, FILM COATED ORAL at 21:13

## 2023-07-25 RX ADMIN — BUSPIRONE HYDROCHLORIDE 10 MG: 10 TABLET ORAL at 16:36

## 2023-07-25 RX ADMIN — HEPARIN SODIUM 5000 UNITS: 5000 INJECTION INTRAVENOUS; SUBCUTANEOUS at 21:13

## 2023-07-25 RX ADMIN — GABAPENTIN 600 MG: 300 CAPSULE ORAL at 08:34

## 2023-07-25 RX ADMIN — GABAPENTIN 600 MG: 300 CAPSULE ORAL at 16:35

## 2023-07-25 RX ADMIN — HEPARIN SODIUM 5000 UNITS: 5000 INJECTION INTRAVENOUS; SUBCUTANEOUS at 05:04

## 2023-07-25 RX ADMIN — BUSPIRONE HYDROCHLORIDE 10 MG: 10 TABLET ORAL at 08:34

## 2023-07-25 NOTE — PLAN OF CARE
Problem: PAIN - ADULT  Goal: Verbalizes/displays adequate comfort level or baseline comfort level  Description: Interventions:  - Encourage patient to monitor pain and request assistance  - Assess pain using appropriate pain scale  - Administer analgesics based on type and severity of pain and evaluate response  - Implement non-pharmacological measures as appropriate and evaluate response  - Consider cultural and social influences on pain and pain management  - Notify physician/advanced practitioner if interventions unsuccessful or patient reports new pain  Outcome: Progressing     Problem: INFECTION - ADULT  Goal: Absence or prevention of progression during hospitalization  Description: INTERVENTIONS:  - Assess and monitor for signs and symptoms of infection  - Monitor lab/diagnostic results  - Monitor all insertion sites, i.e. indwelling lines, tubes, and drains  - Monitor endotracheal if appropriate and nasal secretions for changes in amount and color  - [place] appropriate cooling/warming therapies per order  - Administer medications as ordered  - Instruct and encourage patient and family to use good hand hygiene technique  - Identify and instruct in appropriate isolation precautions for identified infection/condition  Outcome: Progressing  Goal: Absence of fever/infection during neutropenic period  Description: INTERVENTIONS:  - Monitor WBC    Outcome: Progressing     Problem: SAFETY ADULT  Goal: Patient will remain free of falls  Description: INTERVENTIONS:  - Educate patient/family on patient safety including physical limitations  - Instruct patient to call for assistance with activity   - Consult OT/PT to assist with strengthening/mobility   - Keep Call bell within reach  - Keep bed low and locked with side rails adjusted as appropriate  - Keep care items and personal belongings within reach  - Initiate and maintain comfort rounds  - Make Fall Risk Sign visible to staff  - Offer Toileting every 2 Hours, in advance of need  - Initiate/Maintain alarm  - Obtain necessary fall risk management equipment: socks  - Apply yellow socks and bracelet for high fall risk patients  - Consider moving patient to room near nurses station  Outcome: Progressing  Goal: Maintain or return to baseline ADL function  Description: INTERVENTIONS:  -  Assess patient's ability to carry out ADLs; assess patient's baseline for ADL function and identify physical deficits which impact ability to perform ADLs (bathing, care of mouth/teeth, toileting, grooming, dressing, etc.)  - Assess/evaluate cause of self-care deficits   - Assess range of motion  - Assess patient's mobility; develop plan if impaired  - Assess patient's need for assistive devices and provide as appropriate  - Encourage maximum independence but intervene and supervise when necessary  - Involve family in performance of ADLs  - Assess for home care needs following discharge   - Consider OT consult to assist with ADL evaluation and planning for discharge  - Provide patient education as appropriate  Outcome: Progressing  Goal: Maintains/Returns to pre admission functional level  Description: INTERVENTIONS:  - Perform BMAT or MOVE assessment daily.   - Set and communicate daily mobility goal to care team and patient/family/caregiver. - Collaborate with rehabilitation services on mobility goals if consulted  - Perform Range of Motion 3 times a day. - Reposition patient every 3 hours.   - Dangle patient 3 times a day  - Stand patient 3 times a day  - Ambulate patient 3 times a day  - Out of bed to chair 3 times a day   - Out of bed for meals 3 times a day  - Out of bed for toileting  - Record patient progress and toleration of activity level   Outcome: Progressing     Problem: DISCHARGE PLANNING  Goal: Discharge to home or other facility with appropriate resources  Description: INTERVENTIONS:  - Identify barriers to discharge w/patient and caregiver  - Arrange for needed discharge resources and transportation as appropriate  - Identify discharge learning needs (meds, wound care, etc.)  - Arrange for interpretive services to assist at discharge as needed  - Refer to Case Management Department for coordinating discharge planning if the patient needs post-hospital services based on physician/advanced practitioner order or complex needs related to functional status, cognitive ability, or social support system  Outcome: Progressing     Problem: Knowledge Deficit  Goal: Patient/family/caregiver demonstrates understanding of disease process, treatment plan, medications, and discharge instructions  Description: Complete learning assessment and assess knowledge base. Interventions:  - Provide teaching at level of understanding  - Provide teaching via preferred learning methods  Outcome: Progressing     Problem: Nutrition/Hydration-ADULT  Goal: Nutrient/Hydration intake appropriate for improving, restoring or maintaining nutritional needs  Description: Monitor and assess patient's nutrition/hydration status for malnutrition. Collaborate with interdisciplinary team and initiate plan and interventions as ordered. Monitor patient's weight and dietary intake as ordered or per policy. Utilize nutrition screening tool and intervene as necessary. Determine patient's food preferences and provide high-protein, high-caloric foods as appropriate.      INTERVENTIONS:  - Monitor oral intake, urinary output, labs, and treatment plans  - Assess nutrition and hydration status and recommend course of action  - Evaluate amount of meals eaten  - Assist patient with eating if necessary   - Allow adequate time for meals  - Recommend/ encourage appropriate diets, oral nutritional supplements, and vitamin/mineral supplements  - Order, calculate, and assess calorie counts as needed  - Recommend, monitor, and adjust tube feedings and TPN/PPN based on assessed needs  - Assess need for intravenous fluids  - Provide specific nutrition/hydration education as appropriate  - Include patient/family/caregiver in decisions related to nutrition  Outcome: Progressing

## 2023-07-25 NOTE — PLAN OF CARE
Problem: SAFETY ADULT  Goal: Maintain or return to baseline ADL function  Description: INTERVENTIONS:  -  Assess patient's ability to carry out ADLs; assess patient's baseline for ADL function and identify physical deficits which impact ability to perform ADLs (bathing, care of mouth/teeth, toileting, grooming, dressing, etc.)  - Assess/evaluate cause of self-care deficits   - Assess range of motion  - Assess patient's mobility; develop plan if impaired  - Assess patient's need for assistive devices and provide as appropriate  - Encourage maximum independence but intervene and supervise when necessary  - Involve family in performance of ADLs  - Assess for home care needs following discharge   - Consider OT consult to assist with ADL evaluation and planning for discharge  - Provide patient education as appropriate  Outcome: Progressing     Problem: DISCHARGE PLANNING  Goal: Discharge to home or other facility with appropriate resources  Description: INTERVENTIONS:  - Identify barriers to discharge w/patient and caregiver  - Arrange for needed discharge resources and transportation as appropriate  - Identify discharge learning needs (meds, wound care, etc.)  - Arrange for interpretive services to assist at discharge as needed  - Refer to Case Management Department for coordinating discharge planning if the patient needs post-hospital services based on physician/advanced practitioner order or complex needs related to functional status, cognitive ability, or social support system  Outcome: Progressing     Problem: Knowledge Deficit  Goal: Patient/family/caregiver demonstrates understanding of disease process, treatment plan, medications, and discharge instructions  Description: Complete learning assessment and assess knowledge base.   Interventions:  - Provide teaching at level of understanding  - Provide teaching via preferred learning methods  Outcome: Progressing     Problem: Nutrition/Hydration-ADULT  Goal: Nutrient/Hydration intake appropriate for improving, restoring or maintaining nutritional needs  Description: Monitor and assess patient's nutrition/hydration status for malnutrition. Collaborate with interdisciplinary team and initiate plan and interventions as ordered. Monitor patient's weight and dietary intake as ordered or per policy. Utilize nutrition screening tool and intervene as necessary. Determine patient's food preferences and provide high-protein, high-caloric foods as appropriate.      INTERVENTIONS:  - Monitor oral intake, urinary output, labs, and treatment plans  - Assess nutrition and hydration status and recommend course of action  - Evaluate amount of meals eaten  - Assist patient with eating if necessary   - Allow adequate time for meals  - Recommend/ encourage appropriate diets, oral nutritional supplements, and vitamin/mineral supplements  - Order, calculate, and assess calorie counts as needed  - Recommend, monitor, and adjust tube feedings and TPN/PPN based on assessed needs  - Assess need for intravenous fluids  - Provide specific nutrition/hydration education as appropriate  - Include patient/family/caregiver in decisions related to nutrition  Outcome: Progressing

## 2023-07-25 NOTE — ASSESSMENT & PLAN NOTE
· PPM in place  · Does not require rate control medications in the outpatient setting  · Chronically anticoagulated with Xarelto, last dose over 1 week ago held for possible OR DM (diabetes mellitus) Type 2 diabetes mellitus without complication, with long-term current use of insulin Hemoptysis

## 2023-07-25 NOTE — PROGRESS NOTES
RN reporting pt with complaints regarding taste of food though is eating 100% of meals. Spoke with pt, says he did not like the meatloaf he recently received, said it had no flavor and he is not used to low sodium diet, wants to be liberalized to regular diet. Pt with hx of CHF, reported previously he was following low Na diet at home, suspect not following strictly. LLE edema noted. Also with chronic moderate malnutrition with recent significant weight loss. Will adjust to 4gm RUI for pt preference. Did emphasize importance of low sodium diet with pt. Continue with ensure max PRO BID.

## 2023-07-25 NOTE — ASSESSMENT & PLAN NOTE
· Chronic anxiety  · Maintained on Remeron, gabapentin, BuSpar   · Follows with psychiatry in outpatient setting   · Avoid benzodiazepines as recently underwent detox at [facility] for benzodiazepine abuse

## 2023-07-26 ENCOUNTER — ANESTHESIA (INPATIENT)
Dept: PERIOP | Facility: HOSPITAL | Age: 58
DRG: 580 | End: 2023-07-26
Payer: COMMERCIAL

## 2023-07-26 ENCOUNTER — APPOINTMENT (INPATIENT)
Dept: RADIOLOGY | Facility: HOSPITAL | Age: 58
DRG: 580 | End: 2023-07-26
Payer: COMMERCIAL

## 2023-07-26 LAB
GLUCOSE SERPL-MCNC: 80 MG/DL (ref 65–140)
GLUCOSE SERPL-MCNC: 98 MG/DL (ref 65–140)

## 2023-07-26 PROCEDURE — 87070 CULTURE OTHR SPECIMN AEROBIC: CPT | Performed by: STUDENT IN AN ORGANIZED HEALTH CARE EDUCATION/TRAINING PROGRAM

## 2023-07-26 PROCEDURE — 0QBP0ZX EXCISION OF LEFT METATARSAL, OPEN APPROACH, DIAGNOSTIC: ICD-10-PCS | Performed by: STUDENT IN AN ORGANIZED HEALTH CARE EDUCATION/TRAINING PROGRAM

## 2023-07-26 PROCEDURE — 88307 TISSUE EXAM BY PATHOLOGIST: CPT | Performed by: SPECIALIST

## 2023-07-26 PROCEDURE — 82948 REAGENT STRIP/BLOOD GLUCOSE: CPT

## 2023-07-26 PROCEDURE — 20220 BONE BIOPSY TROCAR/NDL SUPFC: CPT | Performed by: STUDENT IN AN ORGANIZED HEALTH CARE EDUCATION/TRAINING PROGRAM

## 2023-07-26 PROCEDURE — NC001 PR NO CHARGE: Performed by: STUDENT IN AN ORGANIZED HEALTH CARE EDUCATION/TRAINING PROGRAM

## 2023-07-26 PROCEDURE — 88311 DECALCIFY TISSUE: CPT | Performed by: SPECIALIST

## 2023-07-26 PROCEDURE — 73620 X-RAY EXAM OF FOOT: CPT

## 2023-07-26 PROCEDURE — 87205 SMEAR GRAM STAIN: CPT | Performed by: STUDENT IN AN ORGANIZED HEALTH CARE EDUCATION/TRAINING PROGRAM

## 2023-07-26 PROCEDURE — 99232 SBSQ HOSP IP/OBS MODERATE 35: CPT | Performed by: FAMILY MEDICINE

## 2023-07-26 PROCEDURE — 87075 CULTR BACTERIA EXCEPT BLOOD: CPT | Performed by: STUDENT IN AN ORGANIZED HEALTH CARE EDUCATION/TRAINING PROGRAM

## 2023-07-26 RX ORDER — FENTANYL CITRATE/PF 50 MCG/ML
50 SYRINGE (ML) INJECTION
Status: DISCONTINUED | OUTPATIENT
Start: 2023-07-26 | End: 2023-07-26 | Stop reason: HOSPADM

## 2023-07-26 RX ORDER — BUSPIRONE HYDROCHLORIDE 5 MG/1
5 TABLET ORAL ONCE
Status: COMPLETED | OUTPATIENT
Start: 2023-07-26 | End: 2023-07-26

## 2023-07-26 RX ORDER — MAGNESIUM HYDROXIDE 1200 MG/15ML
LIQUID ORAL AS NEEDED
Status: DISCONTINUED | OUTPATIENT
Start: 2023-07-26 | End: 2023-07-26 | Stop reason: HOSPADM

## 2023-07-26 RX ORDER — PROPOFOL 10 MG/ML
INJECTION, EMULSION INTRAVENOUS CONTINUOUS PRN
Status: DISCONTINUED | OUTPATIENT
Start: 2023-07-26 | End: 2023-07-26

## 2023-07-26 RX ORDER — LIDOCAINE HYDROCHLORIDE 10 MG/ML
INJECTION, SOLUTION EPIDURAL; INFILTRATION; INTRACAUDAL; PERINEURAL AS NEEDED
Status: DISCONTINUED | OUTPATIENT
Start: 2023-07-26 | End: 2023-07-26

## 2023-07-26 RX ORDER — ONDANSETRON 2 MG/ML
4 INJECTION INTRAMUSCULAR; INTRAVENOUS ONCE AS NEEDED
Status: DISCONTINUED | OUTPATIENT
Start: 2023-07-26 | End: 2023-07-26 | Stop reason: HOSPADM

## 2023-07-26 RX ORDER — CEFAZOLIN SODIUM 2 G/50ML
2000 SOLUTION INTRAVENOUS ONCE
Status: COMPLETED | OUTPATIENT
Start: 2023-07-26 | End: 2023-07-26

## 2023-07-26 RX ORDER — SODIUM CHLORIDE, SODIUM LACTATE, POTASSIUM CHLORIDE, CALCIUM CHLORIDE 600; 310; 30; 20 MG/100ML; MG/100ML; MG/100ML; MG/100ML
INJECTION, SOLUTION INTRAVENOUS CONTINUOUS PRN
Status: DISCONTINUED | OUTPATIENT
Start: 2023-07-26 | End: 2023-07-26

## 2023-07-26 RX ORDER — FENTANYL CITRATE 50 UG/ML
INJECTION, SOLUTION INTRAMUSCULAR; INTRAVENOUS AS NEEDED
Status: DISCONTINUED | OUTPATIENT
Start: 2023-07-26 | End: 2023-07-26

## 2023-07-26 RX ORDER — HEPARIN SODIUM 5000 [USP'U]/ML
5000 INJECTION, SOLUTION INTRAVENOUS; SUBCUTANEOUS EVERY 8 HOURS SCHEDULED
Status: COMPLETED | OUTPATIENT
Start: 2023-07-26 | End: 2023-07-26

## 2023-07-26 RX ORDER — HYDROXYZINE HYDROCHLORIDE 25 MG/1
25 TABLET, FILM COATED ORAL EVERY 6 HOURS PRN
Status: DISCONTINUED | OUTPATIENT
Start: 2023-07-26 | End: 2023-07-27 | Stop reason: HOSPADM

## 2023-07-26 RX ORDER — MIRTAZAPINE 15 MG/1
15 TABLET, FILM COATED ORAL
Status: DISCONTINUED | OUTPATIENT
Start: 2023-07-26 | End: 2023-07-27 | Stop reason: HOSPADM

## 2023-07-26 RX ADMIN — PANTOPRAZOLE SODIUM 40 MG: 40 TABLET, DELAYED RELEASE ORAL at 05:28

## 2023-07-26 RX ADMIN — BUSPIRONE HYDROCHLORIDE 15 MG: 10 TABLET ORAL at 21:23

## 2023-07-26 RX ADMIN — SODIUM CHLORIDE, SODIUM LACTATE, POTASSIUM CHLORIDE, AND CALCIUM CHLORIDE: .6; .31; .03; .02 INJECTION, SOLUTION INTRAVENOUS at 12:23

## 2023-07-26 RX ADMIN — HEPARIN SODIUM 5000 UNITS: 5000 INJECTION INTRAVENOUS; SUBCUTANEOUS at 21:23

## 2023-07-26 RX ADMIN — GABAPENTIN 600 MG: 300 CAPSULE ORAL at 16:26

## 2023-07-26 RX ADMIN — OXYCODONE HYDROCHLORIDE 10 MG: 10 TABLET ORAL at 23:02

## 2023-07-26 RX ADMIN — HYDROMORPHONE HYDROCHLORIDE 0.5 MG: 1 INJECTION, SOLUTION INTRAMUSCULAR; INTRAVENOUS; SUBCUTANEOUS at 20:42

## 2023-07-26 RX ADMIN — BUSPIRONE HYDROCHLORIDE 10 MG: 10 TABLET ORAL at 07:40

## 2023-07-26 RX ADMIN — GABAPENTIN 600 MG: 300 CAPSULE ORAL at 21:23

## 2023-07-26 RX ADMIN — PROPOFOL 150 MCG/KG/MIN: 10 INJECTION, EMULSION INTRAVENOUS at 12:27

## 2023-07-26 RX ADMIN — CEFAZOLIN SODIUM 2000 MG: 2 SOLUTION INTRAVENOUS at 12:23

## 2023-07-26 RX ADMIN — DOCUSATE SODIUM 100 MG: 100 CAPSULE, LIQUID FILLED ORAL at 07:40

## 2023-07-26 RX ADMIN — FENTANYL CITRATE 100 MCG: 0.05 INJECTION, SOLUTION INTRAMUSCULAR; INTRAVENOUS at 12:24

## 2023-07-26 RX ADMIN — MIRTAZAPINE 15 MG: 15 TABLET, FILM COATED ORAL at 21:23

## 2023-07-26 RX ADMIN — FERROUS SULFATE TAB 325 MG (65 MG ELEMENTAL FE) 325 MG: 325 (65 FE) TAB at 07:40

## 2023-07-26 RX ADMIN — ONDANSETRON 4 MG: 2 INJECTION INTRAMUSCULAR; INTRAVENOUS at 12:23

## 2023-07-26 RX ADMIN — BUSPIRONE HYDROCHLORIDE 5 MG: 5 TABLET ORAL at 16:26

## 2023-07-26 RX ADMIN — BUSPIRONE HYDROCHLORIDE 10 MG: 10 TABLET ORAL at 15:06

## 2023-07-26 RX ADMIN — LIDOCAINE HYDROCHLORIDE 50 MG: 10 INJECTION, SOLUTION EPIDURAL; INFILTRATION; INTRACAUDAL at 12:27

## 2023-07-26 RX ADMIN — HYDROMORPHONE HYDROCHLORIDE 0.5 MG: 1 INJECTION, SOLUTION INTRAMUSCULAR; INTRAVENOUS; SUBCUTANEOUS at 07:56

## 2023-07-26 RX ADMIN — OXYCODONE HYDROCHLORIDE 10 MG: 10 TABLET ORAL at 03:17

## 2023-07-26 RX ADMIN — GABAPENTIN 600 MG: 300 CAPSULE ORAL at 07:40

## 2023-07-26 RX ADMIN — DOCUSATE SODIUM 100 MG: 100 CAPSULE, LIQUID FILLED ORAL at 16:26

## 2023-07-26 RX ADMIN — HEPARIN SODIUM 5000 UNITS: 5000 INJECTION INTRAVENOUS; SUBCUTANEOUS at 05:28

## 2023-07-26 NOTE — PROGRESS NOTES
[address] [facility]  Progress Note  Name: Pratik Carcamo  MRN: [id number]  Unit/Bed#: -01 I Date of Admission: 7/23/2023   Date of Service: 7/26/2023 I Hospital Day: 3    Assessment/Plan   * Cellulitis of left lower extremity  Assessment & Plan  · POA with significant LLE swelling, erythema, tenderness to palpation starting at left TMA extending to mid calf  · Following with podiatry and postop 4/12/23- 2nd metatarsal complete resection with surgical cure for OM presumed. S/p revision TMA. He was discharged on Bactrim and reports was doing well until 7/10 when he developed severe pain in his foot. Reports he saw his podiatrist and was recommended outpatient MRI but was unable to arrange his schedule. · Presented to the ED due to severe intolerable pain in left foot and leg. Significant edema of left foot TMA and lower extremity. · Venous duplex LLE as patient has not taken his Xarelto for the past week as he was planning to have a surgical procedure by podiatry. neg for dvt  Ct foot shows  Multiple erosive changes seen in the cuboid, cuneiforms, at the stump of the first metatarsal with the overlying multiloculated multiseptated enhancing fluid collections, correlate clinically consider evaluation to exclude infection      Podiatry Plans to take patient to OR Wednesday 7/26/23 for bone biopsies (path and micro) to assess for OM vs charcot changes. Recommend holding off on abx until after surgery    Moderate protein-calorie malnutrition (HCC)  Assessment & Plan  Malnutrition Findings:   Adult Malnutrition type: Chronic illness  Adult Degree of Malnutrition: Malnutrition of moderate degree  Malnutrition Characteristics: Inadequate energy, Weight loss                  360 Statement: Chronic moderate malnutrition related to poor po, recent benzo detox tx, s/p TMA as evidenced by < 75% estimated energy intake > 1 mo; 19.5% weight loss x 5 mo (2/8/23 323lb, 7/23/23 260lb). Treated with:  Will d/c CCD restriction, good BG levels at this time. Will continue with 2gm Na restriction which pt was agreeable to. Will order ensure max PRO TID. Recommend daily weights for nutrition monitoring. BMI Findings: Body mass index is 34.3 kg/m². Microcytic anemia  Assessment & Plan  · History gastric bypass  · Continue ferrous sulfate  · Hemoglobin baseline    Type 2 diabetes mellitus with diabetic polyneuropathy, without long-term current use of insulin ([facility])  Assessment & Plan  Lab Results   Component Value Date    HGBA1C 5.7 (H) 06/16/2023       Recent Labs     07/26/23  1116 07/26/23  1306   POCGLU 80 98       Blood Sugar Average: Last 72 hrs:  · History diabetes mellitus  · Diet controlled per patient  · Monitor daily BMP    Anxiety  Assessment & Plan  · Chronic anxiety  · Maintained on Remeron, gabapentin, BuSpar   · Follows with psychiatry in outpatient setting   · Avoid benzodiazepines as recently underwent detox at [facility] for benzodiazepine abuse    Atrial fibrillation ([address])  Assessment & Plan  · PPM in place  · Does not require rate control medications in the outpatient setting  · Chronically anticoagulated with Xarelto, last dose over 1 week ago held for possible OR               VTE Pharmacologic Prophylaxis:   Pharmacologic: Heparin  Mechanical VTE Prophylaxis in Place: Yes    Patient Centered Rounds: I have performed bedside rounds with nursing staff today. Discussions with Specialists or Other Care Team Provider: [facility]    Education and Discussions with Family / Patient: discussed with patient    Time Spent for Care: 30 minutes. More than 50% of total time spent on counseling and coordination of care as described above.     Current Length of Stay: 3 day(s)    Current Patient Status: Inpatient   Certification Statement: The patient will continue to require additional inpatient hospital stay due to left foot cellulitis    Discharge Plan: [facility]    Code Status: Level 1 - Full Code      Subjective:   Patient complains of pain in his foot. for or today    Objective:     Vitals:   Temp (24hrs), Av.4 °F (36.3 °C), Min:97 °F (36.1 °C), Max:97.7 °F (36.5 °C)    Temp:  [97 °F (36.1 °C)-97.7 °F (36.5 °C)] 97.3 °F (36.3 °C)  HR:  [62-86] 67  Resp:  [16-20] 18  BP: ()/(58-82) 124/73  SpO2:  [95 %-100 %] 97 %  Body mass index is 34.3 kg/m². Input and Output Summary (last 24 hours): Intake/Output Summary (Last 24 hours) at 2023 1404  Last data filed at 2023 1257  Gross per 24 hour   Intake 150 ml   Output --   Net 150 ml       Physical Exam:     Physical Exam  Vitals and nursing note reviewed. Constitutional:       Appearance: Normal appearance. HENT:      Head: Normocephalic and atraumatic. Right Ear: External ear normal.      Left Ear: External ear normal.      Nose: Nose normal.      Mouth/Throat:      Pharynx: Oropharynx is clear. Cardiovascular:      Rate and Rhythm: Normal rate and regular rhythm. Heart sounds: Normal heart sounds. Pulmonary:      Effort: Pulmonary effort is normal.      Breath sounds: Normal breath sounds. Abdominal:      General: Bowel sounds are normal.      Palpations: Abdomen is soft. Tenderness: There is no abdominal tenderness. Musculoskeletal:         General: Normal range of motion. Cervical back: Normal range of motion and neck supple. Comments: Left tma with tenderness on palpation. Skin:     General: Skin is warm and dry. Capillary Refill: Capillary refill takes less than 2 seconds. Neurological:      General: No focal deficit present. Mental Status: He is alert and oriented to person, place, and time.    Psychiatric:         Mood and Affect: Mood normal.           Additional Data:     Labs:    Results from last 7 days   Lab Units 23  0458 23  0607   WBC Thousand/uL 7.12 9.96   HEMOGLOBIN g/dL 11.1* 12.2   HEMATOCRIT % 36.3* 40.9   PLATELETS Thousands/uL 266 281   NEUTROS PCT %  --  62   LYMPHS PCT %  --  19   MONOS PCT %  --  17*   EOS PCT %  --  2     Results from last 7 days   Lab Units 07/25/23  0458 07/24/23  0607 07/23/23  2129   SODIUM mmol/L 137   < > 137   POTASSIUM mmol/L 3.5   < > 3.8   CHLORIDE mmol/L 104   < > 105   CO2 mmol/L 26   < > 27   BUN mg/dL 11   < > 14   CREATININE mg/dL 0.81   < > 0.86   ANION GAP mmol/L 7   < > 5   CALCIUM mg/dL 8.5   < > 8.6   ALBUMIN g/dL  --   --  3.7   TOTAL BILIRUBIN mg/dL  --   --  0.63   ALK PHOS U/L  --   --  89   ALT U/L  --   --  8   AST U/L  --   --  13   GLUCOSE RANDOM mg/dL 98   < > 105    < > = values in this interval not displayed. Results from last 7 days   Lab Units 07/23/23  2129   INR  1.05     Results from last 7 days   Lab Units 07/26/23  1306 07/26/23  1116   POC GLUCOSE mg/dl 98 80         Results from last 7 days   Lab Units 07/24/23  0824   PROCALCITONIN ng/ml 0.05           * I Have Reviewed All Lab Data Listed Above. * Additional Pertinent Lab Tests Reviewed:  [address] Admission Reviewed    Imaging:    Imaging Reports Reviewed Today Include: none  Imaging Personally Reviewed by Myself Includes:  none    Recent Cultures (last 7 days):           Last 24 Hours Medication List:   Current Facility-Administered Medications   Medication Dose Route Frequency Provider Last Rate   • acetaminophen  650 mg Oral Q6H PRN Kinjal Lara DPM     • busPIRone  10 mg Oral TID Kinjal Lara DPM     • diazepam  10 mg Oral 30 min pre-procedure Kinjal Lara DPM     • docusate sodium  100 mg Oral BID Kinjal Lara DPM     • ferrous sulfate  325 mg Oral Daily With Breakfast Kinjal Lara DPM     • gabapentin  600 mg Oral TID Kinjal Lara DPM     • heparin (porcine)  5,000 Units Subcutaneous [facility] Kinjal Lara DPM     • HYDROmorphone  0.5 mg Intravenous Q3H PRN Kinjal Lara DPM     • mirtazapine  30 mg Oral HS Kinjal Lara DPM     • ondansetron  4 mg Intravenous Q8H PRN Kinjal Lara DPM • oxyCODONE  10 mg Oral Q6H PRN Easter Notch, DPM     • oxyCODONE  5 mg Oral Q6H PRN Easter Notch, DPM     • pantoprazole  40 mg Oral Early Morning Easter Notch, DPM          Today, Patient Was Seen By: Erick Bennett MD    ** Please Note: Dictation voice to text software may have been used in the creation of this document.  **

## 2023-07-26 NOTE — ANESTHESIA PREPROCEDURE EVALUATION
Procedure:  EXCISION BIOPSY BONE LESION EXTREMITY (Left: Foot)    Relevant Problems   ANESTHESIA (within normal limits)      CARDIO   (+) Atrial fibrillation (HCC)   (+) Hypertension   (+) Pacemaker      ENDO   (+) Type 2 diabetes mellitus with diabetic polyneuropathy, without long-term current use of insulin (HCC)      GI/HEPATIC (within normal limits)      /RENAL (within normal limits)      HEMATOLOGY   (+) Microcytic anemia      MUSCULOSKELETAL   (+) Cervical spondylosis      NEURO/PSYCH   (+) Anxiety      PULMONARY   (+) DAYAN (obstructive sleep apnea)      Cardiovascular and Mediastinum   (+) Chronic diastolic heart failure (HCC)      Endocrine   (+) Diabetic ulcer of left midfoot associated with type 2 diabetes mellitus (HCC)      Other   (+) Chronic osteomyelitis of left foot with draining sinus (HCC)   (+) History of bariatric surgery   (+) Status post partial amputation of foot, left (HCC)   (+) Toe osteomyelitis, right ([address])     Xarelto held for 1 week     EKG 6/15/2023:  Atrial fibrillation with occasional ventricular-paced complexes  Left axis deviation  Abnormal ECG  When compared with ECG of 15-ADRIENNE-2023 21:10, (unconfirmed)  Electronic ventricular pacemaker is now Present    Cardiac Device Check 5/30/2023:  Scheduled in-office CI pacemaker (single lead) interrogation d/t pt thinking settings were changed post MRI; Current settings compared to prior and it does not appear any changes were made; Presenting rhythm=irregular VS, pt on Xarelto for AF; Available EGMs for VHR episodes suggest AF w/ RVR; Battery and lead measurements stable and adequate; Auto-capture ON; 6% ;     TTE 1/2020:  IMPRESSIONS:  Technically difficult study. Normal left ventricular size and overall systolic function. EF 55%. Poor endocardial definition limits accurate regional wall motion assessment even with the use of Definity echo contrast.  No gross regional wall motion abnormalities.   Mild to moderate concentric left ventricular hypertrophy. Grossly top normal to mildly dilated right ventricle with normal function. Mild left atrial enlargement. Aortic sclerosis without stenosis. Mitral and tricuspid valves were poorly visualized. Mild tricuspid regurgitation. Lab Results   Component Value Date    WBC 7.12 07/25/2023    HGB 11.1 (L) 07/25/2023    HCT 36.3 (L) 07/25/2023    MCV 81 (L) 07/25/2023     07/25/2023     Lab Results   Component Value Date    SODIUM 137 07/25/2023    K 3.5 07/25/2023     07/25/2023    CO2 26 07/25/2023    BUN 11 07/25/2023    CREATININE 0.81 07/25/2023    GLUC 98 07/25/2023    CALCIUM 8.5 07/25/2023     Lab Results   Component Value Date    INR 1.05 07/23/2023    INR 1.34 (H) 06/07/2023    INR 1.02 04/10/2023    PROTIME 13.8 07/23/2023    PROTIME 16.7 (H) 06/07/2023    PROTIME 13.5 04/10/2023     Lab Results   Component Value Date    HGBA1C 5.7 (H) 06/16/2023            Physical Exam    Airway    Mallampati score: II  TM Distance: >3 FB  Neck ROM: full     Dental   No notable dental hx     Cardiovascular  Cardiovascular exam normal    Pulmonary  Pulmonary exam normal     Other Findings        Anesthesia Plan  ASA Score- 3     Anesthesia Type- IV sedation with anesthesia with ASA Monitors. Additional Monitors:   Airway Plan:           Plan Factors-Exercise tolerance (METS): <4 METS. Chart reviewed. EKG reviewed. Existing labs reviewed. Patient summary reviewed. Induction- intravenous. Postoperative Plan-     Informed Consent- Anesthetic plan and risks discussed with patient. I personally reviewed this patient with the CRNA. Discussed and agreed on the Anesthesia Plan with the CRNA. Warren Meier

## 2023-07-26 NOTE — ASSESSMENT & PLAN NOTE
· Chronic anxiety  · Maintained on Remeron, gabapentin, BuSpar   · Follows with psychiatry in outpatient setting   · Avoid benzodiazepines as recently underwent detox at [facility] for benzodiazepine abuse

## 2023-07-26 NOTE — ASSESSMENT & PLAN NOTE
Malnutrition Findings:   Adult Malnutrition type: Chronic illness  Adult Degree of Malnutrition: Malnutrition of moderate degree  Malnutrition Characteristics: Inadequate energy, Weight loss                  360 Statement: Chronic moderate malnutrition related to poor po, recent benzo detox tx, s/p TMA as evidenced by < 75% estimated energy intake > 1 mo; 19.5% weight loss x 5 mo (2/8/23 323lb, 7/23/23 260lb). Treated with: Will d/c CCD restriction, good BG levels at this time. Will continue with 2gm Na restriction which pt was agreeable to. Will order ensure max PRO TID. Recommend daily weights for nutrition monitoring. BMI Findings: Body mass index is 34.3 kg/m².

## 2023-07-26 NOTE — PROGRESS NOTES
PRE-Operative Note - Podiatry  David Sushma Listen 59 y.o. male MRN: [id number]  Unit/Bed#: OR POOL Encounter: [id number]    Assessment:  1. Left foot pain, acute. 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 Patient had incisional dehiscence and full thickness wound due to noncompliance with WB and f/u recommendations. Wound subsequently healed and remains healed today. 2. DM w/ PN, A1c 5.7% 6/16/23    Plan:  1. Consent signed and in chart: Left foot bone biopsies. 2. Confirmed NPO status. 3. H&P reviewed, no changes  4. Alternatives, risks, and complications discussed with patient. 5. All questions answered. No guarantees given to outcome of procedure  6. Ancef IV one dose 30 minutes pre-op. CT LLE 7/24/23: multiple erosive changes to cuboid, cuneiforms, stump of 1st metatarsal with overlying multiloculated multiseptated enhancing fluid collections, correlate clinically, consider eval to exclude infection    Subjective/Objective   Chief Complaint:   Chief Complaint   Patient presents with   • Foot Pain     Pt reports partial amputation of R foot in April. Reports he was schedule for further surgery last week and opted out. Reports pain unbearable. Sent by Dr Taco Sainz for admit       Subjective: seen resting in pre-op holding    Blood pressure 119/62, pulse 62, temperature (!) 97.4 °F (36.3 °C), temperature source Oral, resp. rate 18, height 6' 1" (1.854 m), weight 118 kg (260 lb), SpO2 100 %. ,Body mass index is 34.3 kg/m². Invasive Devices     Peripheral Intravenous Line  Duration           Peripheral IV 07/23/23 Right Antecubital 2 days                Physical Exam:   General appearance: alert, cooperative and no distress    Extremity: gross msk and nvs baseline.                Lab, Imaging and other studies:   Admission on 07/23/2023   Component Date Value   • WBC 07/23/2023 8.64    • RBC 07/23/2023 4.37    • Hemoglobin 07/23/2023 11.1 (L)    • Hematocrit 07/23/2023 35.2 (L)    • MCV 07/23/2023 81 (L)    • MCH 07/23/2023 25.4 (L)    • MCHC 07/23/2023 31.5    • RDW 07/23/2023 17.8 (H)    • MPV 07/23/2023 9.0    • Platelets 44/81/3704 292    • nRBC 07/23/2023 0    • Neutrophils Relative 07/23/2023 67    • Immat GRANS % 07/23/2023 0    • Lymphocytes Relative 07/23/2023 16    • Monocytes Relative 07/23/2023 15 (H)    • Eosinophils Relative 07/23/2023 1    • Basophils Relative 07/23/2023 1    • Neutrophils Absolute 07/23/2023 5.83    • Immature Grans Absolute 07/23/2023 0.02    • Lymphocytes Absolute 07/23/2023 1.39    • Monocytes Absolute 07/23/2023 1.27 (H)    • Eosinophils Absolute 07/23/2023 0.08    • Basophils Absolute 07/23/2023 0.05    • Sodium 07/23/2023 137    • Potassium 07/23/2023 3.8    • Chloride 07/23/2023 105    • CO2 07/23/2023 27    • ANION GAP 07/23/2023 5    • BUN 07/23/2023 14    • Creatinine 07/23/2023 0.86    • Glucose 07/23/2023 105    • Calcium 07/23/2023 8.6    • AST 07/23/2023 13    • ALT 07/23/2023 8    • Alkaline Phosphatase 07/23/2023 89    • Total Protein 07/23/2023 6.7    • Albumin 07/23/2023 3.7    • Total Bilirubin 07/23/2023 0.63    • eGFR 07/23/2023 94    • Protime 07/23/2023 13.8    • INR 07/23/2023 1.05    • PTT 07/23/2023 35    • WBC 07/24/2023 9.96    • RBC 07/24/2023 4.91    • Hemoglobin 07/24/2023 12.2    • Hematocrit 07/24/2023 40.9    • MCV 07/24/2023 83    • MCH 07/24/2023 24.8 (L)    • MCHC 07/24/2023 29.8 (L)    • RDW 07/24/2023 18.1 (H)    • MPV 07/24/2023 8.9    • Platelets 05/72/3274 281    • nRBC 07/24/2023 0    • Neutrophils Relative 07/24/2023 62    • Immat GRANS % 07/24/2023 0    • Lymphocytes Relative 07/24/2023 19    • Monocytes Relative 07/24/2023 17 (H)    • Eosinophils Relative 07/24/2023 2    • Basophils Relative 07/24/2023 0    • Neutrophils Absolute 07/24/2023 6.18    • Immature Grans Absolute 07/24/2023 0.03    • Lymphocytes Absolute 07/24/2023 1.90    • Monocytes Absolute 07/24/2023 1.64 (H)    • Eosinophils Absolute 07/24/2023 0.18    • Basophils Absolute 07/24/2023 0.03    • Sodium 07/24/2023 137    • Potassium 07/24/2023 3.7    • Chloride 07/24/2023 105    • CO2 07/24/2023 23    • ANION GAP 07/24/2023 9    • BUN 07/24/2023 13    • Creatinine 07/24/2023 0.86    • Glucose 07/24/2023 85    • Calcium 07/24/2023 8.6    • eGFR 07/24/2023 94    • Magnesium 07/24/2023 2.2    • Procalcitonin 07/24/2023 0.05    • CRP 07/24/2023 80.0 (H)    • WBC 07/25/2023 7.12    • RBC 07/25/2023 4.49    • Hemoglobin 07/25/2023 11.1 (L)    • Hematocrit 07/25/2023 36.3 (L)    • MCV 07/25/2023 81 (L)    • MCH 07/25/2023 24.7 (L)    • MCHC 07/25/2023 30.6 (L)    • RDW 07/25/2023 17.9 (H)    • Platelets 71/69/2116 266    • MPV 07/25/2023 9.0    • Sodium 07/25/2023 137    • Potassium 07/25/2023 3.5    • Chloride 07/25/2023 104    • CO2 07/25/2023 26    • ANION GAP 07/25/2023 7    • BUN 07/25/2023 11    • Creatinine 07/25/2023 0.81    • Glucose 07/25/2023 98    • Calcium 07/25/2023 8.5    • eGFR 07/25/2023 96      Imaging: I have personally reviewed pertinent films in PACS

## 2023-07-26 NOTE — UTILIZATION REVIEW
Continued Stay Review    Date: 7/26/23                           Current Patient Class: IP  Current Level of Care: MS    HPI:58 y.o. male initially admitted on 7/23/23 - DX:  Cellulitis of left lower extremity / Microcytic anemia / Type 2 diabetes mellitus with diabetic polyneuropathy, without long-term current use of insulin  / Atrial fibrillation     Assessment/Plan: c/o left foot pain -  Pain 10/10   Plan: for OR today - Left foot bone biopsies ; pain / nausea control; control (see below)     OP REPORT  SURGERY DATE: 7/26/2023  Procedure(s) (LRB): EXCISION BIOPSY BONE LESION EXTREMITY (Left)  Anesthesia Type:  Choice with 5 ml of 1% Lidocaine and 0.5% Bupivacaine in a 1:1 mixture  Operative Findings: Semi-hard feeling bone of biopsy       Vital Signs:   Date/Time Temp Pulse Resp BP MAP (mmHg) SpO2 O2 Device Cardiac ([facility])   07/26/23 13:34:52 -- 67 18 124/73 90 97 % -- --   07/26/23 1315 97.3 °F (36.3 °C) Abnormal  67 20 102/61 76 98 % None (Room air) [facility]   07/26/23 1305 -- 74 17 94/58 69 96 % -- --   07/26/23 1300 97 °F (36.1 °C) Abnormal  77 16 94/60 68 95 % None (Room air) [facility]   07/26/23 1122 97.4 °F (36.3 °C) Abnormal  -- -- -- -- -- -- --   07/26/23 1120 -- 62 18 119/62 -- 100 % None (Room air) --   07/26/23 0800 -- -- -- -- -- 100 % None (Room air) --   07/26/23 07:12:30 97.3 °F (36.3 °C) Abnormal  86 18 115/75 88 100 % -- --         Pertinent Labs/Diagnostic Results:       Results from last 7 days   Lab Units 07/25/23  0458 07/24/23  0607 07/23/23  2129   WBC Thousand/uL 7.12 9.96 8.64   HEMOGLOBIN g/dL 11.1* 12.2 11.1*   HEMATOCRIT % 36.3* 40.9 35.2*   PLATELETS Thousands/uL 266 281 292   NEUTROS ABS Thousands/µL  --  6.18 5.83       Results from last 7 days   Lab Units 07/25/23  0458 07/24/23  0607 07/23/23  2129   SODIUM mmol/L 137 137 137   POTASSIUM mmol/L 3.5 3.7 3.8   CHLORIDE mmol/L 104 105 105   CO2 mmol/L 26 23 27   ANION GAP mmol/L 7 9 5   BUN mg/dL 11 13 14   CREATININE mg/dL 0.81 0.86 0.86 EGFR ml/min/1.73sq m 96 94 94   CALCIUM mg/dL 8.5 8.6 8.6   MAGNESIUM mg/dL  --  2.2  --      Results from last 7 days   Lab Units 07/23/23  2129   AST U/L 13   ALT U/L 8   ALK PHOS U/L 89   TOTAL PROTEIN g/dL 6.7   ALBUMIN g/dL 3.7   TOTAL BILIRUBIN mg/dL 0.63     Results from last 7 days   Lab Units 07/26/23  1306 07/26/23  1116   POC GLUCOSE mg/dl 98 80     Results from last 7 days   Lab Units 07/25/23  0458 07/24/23  0607 07/23/23  2129   GLUCOSE RANDOM mg/dL 98 85 105       Results from last 7 days   Lab Units 07/23/23  2129   PROTIME seconds 13.8   INR  1.05   PTT seconds 35         Results from last 7 days   Lab Units 07/24/23  0824   PROCALCITONIN ng/ml 0.05       Results from last 7 days   Lab Units 07/24/23  0607   CRP mg/L 80.0*       Medications:   Scheduled Medications:  busPIRone, 10 mg, Oral, TID  diazepam, 10 mg, Oral, 30 min pre-procedure  docusate sodium, 100 mg, Oral, BID  ferrous sulfate, 325 mg, Oral, Daily With Breakfast  gabapentin, 600 mg, Oral, TID  heparin (porcine), 5,000 Units, Subcutaneous, Q8H CRISTY  mirtazapine, 30 mg, Oral, HS  pantoprazole, 40 mg, Oral, Early Morning  [START ON 7/27/2023] rivaroxaban, 20 mg, Oral, Daily With Breakfast  ceFAZolin (ANCEF) IVPB (premix in dextrose) 2,000 mg 50 mL  Dose: 2,000 mg  Freq: Once Route: IV  Start: 07/26/23 1215 End: 07/26/23 1223    Continuous IV Infusions:     PRN Meds:  acetaminophen, 650 mg, Oral, Q6H PRN  HYDROmorphone, 0.5 mg, Intravenous, Q3H PRN  (7/26 recd x1 so far today)   ondansetron, 4 mg, Intravenous, Q8H PRN    (7/26 recd x1 so far today)   oxyCODONE, 10 mg, Oral, Q6H PRN    (7/26 recd x1 so far today)   oxyCODONE, 5 mg, Oral, Q6H PRN        Discharge Plan: TBD    Network Utilization Review Department  ATTENTION: Please call with any questions or concerns to [phone] and carefully listen to the prompts so that you are directed to the right person.  All voicemails are confidential.  Lacey Barry all requests for admission clinical reviews, approved or denied determinations and any other requests to dedicated fax number below belonging to the campus where the patient is receiving treatment.  List of dedicated fax numbers for the Facilities:  [place] DENIALS (Administrative/Medical Necessity) [phone] [address] (Maternity/NICU/Pediatrics) [phone]   [address] [place] [address] [phone]   [facility] [phone] [address] [place] [address] [phone]   [address] [facility] [address] [address] [place] [phone]

## 2023-07-26 NOTE — ASSESSMENT & PLAN NOTE
Lab Results   Component Value Date    HGBA1C 5.7 (H) 06/16/2023       Recent Labs     07/26/23  1116 07/26/23  1306   POCGLU 80 98       Blood Sugar Average: Last 72 hrs:  · History diabetes mellitus  · Diet controlled per patient  · Monitor daily BMP Podiatry - Progress Note  Patient: Keagan Alicea 78 y o  male   MRN: [id number]  PCP: Kenneth Rivera MD  Unit/Bed#: [facility] 659-95 Encounter: [id number]  Date Of Visit: 09/10/22    ASSESSMENT:    Keagan Alicea is a 78 y o  male with:    1  Right Diabetic Foot Ulcer- [place]ctor 1  2  Right Lower extremity cellulitis   3  Type 2 Diabetes Mellitus   4  Chronic Atrial Fibrillation  5  Congestive Heart Failure   6  Essential Hypertension     PLAN:    · Plan for possible R hallux amputation given results of Ceretec scan - Increased radiotracer uptake at the right first metatarsal phalangeal joint region extending to the distal phalanx level suspicious for infection and osteomyelitis  · Extent of R leg cellulitis remains as observed on 2022  Continue with IV cefazolin and local wound care  · Acute leukocytosis, continue to trend labs/vitals  · 1/2 blood culture shows gram positive cocci in clusters  Consult placed to ID  Appreciate recommendations  · Vascular recommends angiogram after any possible surgical intervention from our side  · Continue local wound care - Betadine DSD  Wound care orders placed  Appreciate nursing assistance with dressing changes  · Elevation and offloading on green foam wedges or pillows when non-ambulatory  · Rest of care per primary team      Weightbearing status: Non-weightbearing left  foot    SUBJECTIVE:     The patient was seen, evaluated, and assessed at bedside today  The patient was awake, alert, and in no acute distress  No acute events overnight  The patient reports no pain to lower extremities b/l  Patient denies N/V/F/chills/SOB/CP        OBJECTIVE:     Vitals:   /66   Pulse (!) 148 Comment: SOD resident notified at this time  Temp (!) 97 4 °F (36 3 °C) (Oral)   Resp 16   Ht 5' 2" (1 575 m)   Wt 84 8 kg (187 lb)   SpO2 97%   BMI 34 20 kg/m²     Temp (24hrs), Av 7 °F (36 5 °C), Min:97 4 °F (36 3 °C), Max:98 3 °F (36 8 °C)      Physical Exam:     Lungs: Non labored breathing  Abdomen: Soft, non-tender  Lower Extremity:  Vascular status at baseline from admission  Neurological status at baseline from admission  Musculoskeletal status at baseline from admission  No calf tenderness noted  Wound #: 1  Location: Medial Plantar Right Hallux  Length 4 6cm: Width 3 9cm: Depth 0 1cm:   Deepest Tissue Noted in Base: Subcutaneous   Probe to Bone: No  Peripheral Skin Description: Attached  Granulation: 10% Fibrotic Tissue: 90% Necrotic Tissue: 0%   Drainage Amount: minimal, serosanguinous  Signs of Infection: Yes, r[place]-wound erythema and edema, significant malodor present  Clinical Images 09/10/22: Additional Data:     Labs:    Results from last 7 days   Lab Units 09/10/22  0742   WBC Thousand/uL 11 94*   HEMOGLOBIN g/dL 13 2   HEMATOCRIT % 40 3   PLATELETS Thousands/uL 420*   NEUTROS PCT % 78*   LYMPHS PCT % 13*   MONOS PCT % 8   EOS PCT % 1     Results from last 7 days   Lab Units 09/10/22  0742 09/09/22  0523 09/08/22  1149   POTASSIUM mmol/L 3 5   < > 3 7   CHLORIDE mmol/L 102   < > 103   CO2 mmol/L 28   < > 28   BUN mg/dL 12   < > 13   CREATININE mg/dL 0 82   < > 0 81   CALCIUM mg/dL 8 5   < > 8 4   ALK PHOS U/L  --   --  78   ALT U/L  --   --  32   AST U/L  --   --  22    < > = values in this interval not displayed  Results from last 7 days   Lab Units 09/09/22  0523   INR  4 29*       * I Have Reviewed All Lab Data Listed Above  Recent Cultures (last 7 days):     Results from last 7 days   Lab Units 09/08/22  1202 09/08/22  1149   BLOOD CULTURE  Staphylococcus coagulase negative* No Growth at 24 hrs  GRAM STAIN RESULT  Gram positive cocci in clusters*  --            Imaging: I have personally reviewed pertinent films in PACS  EKG, Pathology, and Other Studies: I have personally reviewed pertinent reports  ** Please Note: Portions of the record may have been created with voice recognition software   Occasional wrong word or "sound a like" substitutions may have occurred due to the inherent limitations of voice recognition software  Read the chart carefully and recognize, using context, where substitutions have occurred   **

## 2023-07-26 NOTE — ASSESSMENT & PLAN NOTE
· POA with significant LLE swelling, erythema, tenderness to palpation starting at left TMA extending to mid calf  · Following with podiatry and postop 4/12/23- 2nd metatarsal complete resection with surgical cure for OM presumed. S/p revision TMA. He was discharged on Bactrim and reports was doing well until 7/10 when he developed severe pain in his foot. Reports he saw his podiatrist and was recommended outpatient MRI but was unable to arrange his schedule. · Presented to the ED due to severe intolerable pain in left foot and leg. Significant edema of left foot TMA and lower extremity. · Venous duplex LLE as patient has not taken his Xarelto for the past week as he was planning to have a surgical procedure by podiatry. neg for dvt  Ct foot shows  Multiple erosive changes seen in the cuboid, cuneiforms, at the stump of the first metatarsal with the overlying multiloculated multiseptated enhancing fluid collections, correlate clinically consider evaluation to exclude infection      Podiatry Plans to take patient to OR Wednesday 7/26/23 for bone biopsies (path and micro) to assess for OM vs charcot changes.  Recommend holding off on abx until after surgery

## 2023-07-26 NOTE — ANESTHESIA POSTPROCEDURE EVALUATION
Post-Op Assessment Note    CV Status:  Stable  Pain Score: 0    Pain management: adequate     Mental Status:  Alert and awake   Hydration Status:  Euvolemic   PONV Controlled:  Controlled   Airway Patency:  Patent      Post Op Vitals Reviewed: Yes      Staff: CRNA         No notable events documented.     BP 94/60 (07/26/23 1300)    Temp (!) 97 °F (36.1 °C) (07/26/23 1300)    Pulse 77 (07/26/23 1300)   Resp 16 (07/26/23 1300)    SpO2 95 % (07/26/23 1300)

## 2023-07-26 NOTE — OP NOTE
OPERATIVE REPORT - Podiatry  PATIENT NAME: Dada Patel    :  1965  MRN: [id number]  Pt Location: OW OR ROOM 02    SURGERY DATE: 2023    Surgeon(s) and Role:     Bryan Wakefield, DPM - Primary    Pre-op Diagnosis:  Left foot pain [M79.672]  Status post partial amputation of foot, left (HCC) [Z89.432]    Post-Op Diagnosis Codes:     * Left foot pain [M79.672]     * Status post partial amputation of foot, left (HCC) [Z89.432]    Procedure(s) (LRB):  EXCISION BIOPSY BONE LESION EXTREMITY (Left)    Specimen(s):  ID Type Source Tests Collected by Time Destination   1 : left 1st metatarsal for pathology Tissue Bone TISSUE EXAM Victorine Mean, DPM 2023 1248    2 : left intermediate cuneiform bone biopsy for pathology Tissue Bone TISSUE EXAM Victorine Mean, DPM 2023 1249    3 : left 4th/5th metatarsal bone biopsy for pathology Tissue Bone TISSUE EXAM Victorine Mean, DPM 2023 1249    A : left 1st metatarsal bone biopsy for cultures Tissue Bone ANAEROBIC CULTURE AND GRAM STAIN, CULTURE, TISSUE AND GRAM STAIN Victorine Mean, DPM 2023 1240    B : left intermediate cuneiform bone biopsyfor cultures Tissue Bone ANAEROBIC CULTURE AND GRAM STAIN, CULTURE, TISSUE AND GRAM STAIN Victorine Mean, DPM 2023 1240    C : left 4th/5th metatarsal bone biopsy for cultures Tissue Bone ANAEROBIC CULTURE AND GRAM STAIN, CULTURE, TISSUE AND GRAM STAIN Victorine Mean, DPM 2023 1241        Estimated Blood Loss:   Minimal    Drains:  * No LDAs found *    Anesthesia Type:   Choice with 5 ml of 1% Lidocaine and 0.5% Bupivacaine in a 1:1 mixture    Operative Findings:  Semi-hard feeling bone of biopsy sites as above. Plan to await bone cultures for further plan. If positive, will need MRI as this is likely OM. If negative, patient can be discharged and await bone pathology as outpt.      Complications:   None    Procedure and Technique:     Under mild sedation, the patient was brought into the operating room and placed on the operating room table in the supine position. IV sedation was achieved by anesthesia team and a universal timeout was performed where all parties are in agreement of correct patient, correct procedure and correct site. A skin block at 3 different sites were performed consisting of 5 ml of 1% Lidocaine and 0.5% Bupivacaine in a 1:1 mixture. The foot was then prepped and draped in the usual aseptic manner. Under c-arm, the 1st metatarsal base, intermediate cuneiform and 4/5th metatarsal bases were identified. Next, using 3 different Jamshidi's from 3 different sites as above, the bone biopsy needle was introduced through the incision to bone into each of the bones (again from separate dorsal incisions). The trocar was removed and the core needles was rotated in clockwise and counterclockwise directions slowly advancing the needle into the bone to a depth of 1.0cm. The needle was then swiveled in a circular motion at a 30 degree angle from the introduction plain as it was extricated from the bone. The trochar was slid back into the core needle and the bone plug was expressed from the needle. The collected bone sample was divided to 3 equal parts and sent for aerobic, anaerobic cultures as well as bone biopsy (in formalin). The wounds were flushed with nss and skin reapproximated with nylon. Xeroform dsd applied. The patient tolerated the procedure and anesthesia well without immediate complications and transferred to PACU with vital signs stable. As with many limb salvage procedures, we contemplate the possibility of performing further stages to this procedure. Procedures may include debridements, delayed closure, plastic surgery techniques, or more proximal amputations. This procedure may be considered part of a multi-staged limb salvage treatment plan. I was present during the entire procedure and participated in all key aspects.     SIGNATURE: Tutu Gonzalez DPM  DATE: July 26, 2023  TIME: 6:08 PM      Portions of the record may have been created with voice recognition software. Occasional wrong word or "sound a like" substitutions may have occurred due to the inherent limitations of voice recognition software. Read the chart carefully and recognize, using context, where substitutions have occurred.

## 2023-07-26 NOTE — PLAN OF CARE
Problem: PAIN - ADULT  Goal: Verbalizes/displays adequate comfort level or baseline comfort level  Description: Interventions:  - Encourage patient to monitor pain and request assistance  - Assess pain using appropriate pain scale  - Administer analgesics based on type and severity of pain and evaluate response  - Implement non-pharmacological measures as appropriate and evaluate response  - Consider cultural and social influences on pain and pain management  - Notify physician/advanced practitioner if interventions unsuccessful or patient reports new pain  Outcome: Progressing     Problem: INFECTION - ADULT  Goal: Absence or prevention of progression during hospitalization  Description: INTERVENTIONS:  - Assess and monitor for signs and symptoms of infection  - Monitor lab/diagnostic results  - Monitor all insertion sites, i.e. indwelling lines, tubes, and drains  - Monitor endotracheal if appropriate and nasal secretions for changes in amount and color  - [place] appropriate cooling/warming therapies per order  - Administer medications as ordered  - Instruct and encourage patient and family to use good hand hygiene technique  - Identify and instruct in appropriate isolation precautions for identified infection/condition  Outcome: Progressing  Goal: Absence of fever/infection during neutropenic period  Description: INTERVENTIONS:  - Monitor WBC    Outcome: Progressing     Problem: SAFETY ADULT  Goal: Patient will remain free of falls  Description: INTERVENTIONS:  - Educate patient/family on patient safety including physical limitations  - Instruct patient to call for assistance with activity   - Consult OT/PT to assist with strengthening/mobility   - Keep Call bell within reach  - Keep bed low and locked with side rails adjusted as appropriate  - Keep care items and personal belongings within reach  - Initiate and maintain comfort rounds  - Make Fall Risk Sign visible to staff  - Offer Toileting every  Hours, in advance of need  - Initiate/Maintain alarm  - Obtain necessary fall risk management equipment:   - Apply yellow socks and bracelet for high fall risk patients  - Consider moving patient to room near nurses station  Outcome: Progressing  Goal: Maintain or return to baseline ADL function  Description: INTERVENTIONS:  -  Assess patient's ability to carry out ADLs; assess patient's baseline for ADL function and identify physical deficits which impact ability to perform ADLs (bathing, care of mouth/teeth, toileting, grooming, dressing, etc.)  - Assess/evaluate cause of self-care deficits   - Assess range of motion  - Assess patient's mobility; develop plan if impaired  - Assess patient's need for assistive devices and provide as appropriate  - Encourage maximum independence but intervene and supervise when necessary  - Involve family in performance of ADLs  - Assess for home care needs following discharge   - Consider OT consult to assist with ADL evaluation and planning for discharge  - Provide patient education as appropriate  Outcome: Progressing  Goal: Maintains/Returns to pre admission functional level  Description: INTERVENTIONS:  - Perform BMAT or MOVE assessment daily.   - Set and communicate daily mobility goal to care team and patient/family/caregiver. - Collaborate with rehabilitation services on mobility goals if consulted  - Perform Range of Motion  times a day. - Reposition patient every  hours.   - Dangle patient  times a day  - Stand patient  times a day  - Ambulate patient  times a day  - Out of bed to chair  times a day   - Out of bed for meal times a day  - Out of bed for toileting  - Record patient progress and toleration of activity level   Outcome: Progressing     Problem: DISCHARGE PLANNING  Goal: Discharge to home or other facility with appropriate resources  Description: INTERVENTIONS:  - Identify barriers to discharge w/patient and caregiver  - Arrange for needed discharge resources and transportation as appropriate  - Identify discharge learning needs (meds, wound care, etc.)  - Arrange for interpretive services to assist at discharge as needed  - Refer to Case Management Department for coordinating discharge planning if the patient needs post-hospital services based on physician/advanced practitioner order or complex needs related to functional status, cognitive ability, or social support system  Outcome: Progressing     Problem: Knowledge Deficit  Goal: Patient/family/caregiver demonstrates understanding of disease process, treatment plan, medications, and discharge instructions  Description: Complete learning assessment and assess knowledge base. Interventions:  - Provide teaching at level of understanding  - Provide teaching via preferred learning methods  Outcome: Progressing     Problem: Nutrition/Hydration-ADULT  Goal: Nutrient/Hydration intake appropriate for improving, restoring or maintaining nutritional needs  Description: Monitor and assess patient's nutrition/hydration status for malnutrition. Collaborate with interdisciplinary team and initiate plan and interventions as ordered. Monitor patient's weight and dietary intake as ordered or per policy. Utilize nutrition screening tool and intervene as necessary. Determine patient's food preferences and provide high-protein, high-caloric foods as appropriate.      INTERVENTIONS:  - Monitor oral intake, urinary output, labs, and treatment plans  - Assess nutrition and hydration status and recommend course of action  - Evaluate amount of meals eaten  - Assist patient with eating if necessary   - Allow adequate time for meals  - Recommend/ encourage appropriate diets, oral nutritional supplements, and vitamin/mineral supplements  - Order, calculate, and assess calorie counts as needed  - Recommend, monitor, and adjust tube feedings and TPN/PPN based on assessed needs  - Assess need for intravenous fluids  - Provide specific nutrition/hydration education as appropriate  - Include patient/family/caregiver in decisions related to nutrition  Outcome: Progressing

## 2023-07-26 NOTE — PLAN OF CARE
Problem: SAFETY ADULT  Goal: Maintain or return to baseline ADL function  Description: INTERVENTIONS:  -  Assess patient's ability to carry out ADLs; assess patient's baseline for ADL function and identify physical deficits which impact ability to perform ADLs (bathing, care of mouth/teeth, toileting, grooming, dressing, etc.)  - Assess/evaluate cause of self-care deficits   - Assess range of motion  - Assess patient's mobility; develop plan if impaired  - Assess patient's need for assistive devices and provide as appropriate  - Encourage maximum independence but intervene and supervise when necessary  - Involve family in performance of ADLs  - Assess for home care needs following discharge   - Consider OT consult to assist with ADL evaluation and planning for discharge  - Provide patient education as appropriate  7/25/2023 2209 by Frank Montelongo RN  Outcome: Progressing  7/25/2023 2208 by Frank Montelongo RN  Outcome: Progressing     Problem: DISCHARGE PLANNING  Goal: Discharge to home or other facility with appropriate resources  Description: INTERVENTIONS:  - Identify barriers to discharge w/patient and caregiver  - Arrange for needed discharge resources and transportation as appropriate  - Identify discharge learning needs (meds, wound care, etc.)  - Arrange for interpretive services to assist at discharge as needed  - Refer to Case Management Department for coordinating discharge planning if the patient needs post-hospital services based on physician/advanced practitioner order or complex needs related to functional status, cognitive ability, or social support system  7/25/2023 2209 by Frank Montelongo RN  Outcome: Progressing  7/25/2023 2208 by Frank Montelongo RN  Outcome: Progressing     Problem: Knowledge Deficit  Goal: Patient/family/caregiver demonstrates understanding of disease process, treatment plan, medications, and discharge instructions  Description: Complete learning assessment and assess knowledge base.  Interventions:  - Provide teaching at level of understanding  - Provide teaching via preferred learning methods  7/25/2023 2209 by Reginald Coronado RN  Outcome: Progressing  7/25/2023 2208 by Reginald Coronado RN  Outcome: Progressing

## 2023-07-27 ENCOUNTER — TELEPHONE (OUTPATIENT)
Dept: PODIATRY | Facility: CLINIC | Age: 58
End: 2023-07-27

## 2023-07-27 VITALS
OXYGEN SATURATION: 98 % | BODY MASS INDEX: 34.46 KG/M2 | WEIGHT: 260 LBS | RESPIRATION RATE: 18 BRPM | HEIGHT: 73 IN | SYSTOLIC BLOOD PRESSURE: 123 MMHG | HEART RATE: 66 BPM | DIASTOLIC BLOOD PRESSURE: 72 MMHG | TEMPERATURE: 97.2 F

## 2023-07-27 PROCEDURE — 99239 HOSP IP/OBS DSCHRG MGMT >30: CPT | Performed by: FAMILY MEDICINE

## 2023-07-27 RX ORDER — MIRTAZAPINE 15 MG/1
15 TABLET, FILM COATED ORAL
Qty: 60 TABLET | Refills: 0
Start: 2023-07-27 | End: 2023-10-25

## 2023-07-27 RX ORDER — BUSPIRONE HYDROCHLORIDE 10 MG/1
15 TABLET ORAL 3 TIMES DAILY
Start: 2023-07-27

## 2023-07-27 RX ORDER — OXYCODONE HYDROCHLORIDE 5 MG/1
5 TABLET ORAL EVERY 6 HOURS PRN
Qty: 15 TABLET | Refills: 0 | Status: SHIPPED | OUTPATIENT
Start: 2023-07-27 | End: 2023-07-31

## 2023-07-27 RX ADMIN — OXYCODONE HYDROCHLORIDE 10 MG: 10 TABLET ORAL at 08:14

## 2023-07-27 RX ADMIN — HYDROMORPHONE HYDROCHLORIDE 0.5 MG: 1 INJECTION, SOLUTION INTRAMUSCULAR; INTRAVENOUS; SUBCUTANEOUS at 05:38

## 2023-07-27 RX ADMIN — GABAPENTIN 600 MG: 300 CAPSULE ORAL at 08:07

## 2023-07-27 RX ADMIN — DOCUSATE SODIUM 100 MG: 100 CAPSULE, LIQUID FILLED ORAL at 08:07

## 2023-07-27 RX ADMIN — RIVAROXABAN 20 MG: 20 TABLET, FILM COATED ORAL at 08:07

## 2023-07-27 RX ADMIN — FERROUS SULFATE TAB 325 MG (65 MG ELEMENTAL FE) 325 MG: 325 (65 FE) TAB at 08:07

## 2023-07-27 RX ADMIN — BUSPIRONE HYDROCHLORIDE 15 MG: 10 TABLET ORAL at 08:07

## 2023-07-27 RX ADMIN — PANTOPRAZOLE SODIUM 40 MG: 40 TABLET, DELAYED RELEASE ORAL at 05:34

## 2023-07-27 NOTE — PLAN OF CARE
Problem: PAIN - ADULT  Goal: Verbalizes/displays adequate comfort level or baseline comfort level  Description: Interventions:  - Encourage patient to monitor pain and request assistance  - Assess pain using appropriate pain scale  - Administer analgesics based on type and severity of pain and evaluate response  - Implement non-pharmacological measures as appropriate and evaluate response  - Consider cultural and social influences on pain and pain management  - Notify physician/advanced practitioner if interventions unsuccessful or patient reports new pain  Outcome: Progressing     Problem: INFECTION - ADULT  Goal: Absence or prevention of progression during hospitalization  Description: INTERVENTIONS:  - Assess and monitor for signs and symptoms of infection  - Monitor lab/diagnostic results  - Monitor all insertion sites, i.e. indwelling lines, tubes, and drains  - Monitor endotracheal if appropriate and nasal secretions for changes in amount and color  - [place] appropriate cooling/warming therapies per order  - Administer medications as ordered  - Instruct and encourage patient and family to use good hand hygiene technique  - Identify and instruct in appropriate isolation precautions for identified infection/condition  Outcome: Progressing  Goal: Absence of fever/infection during neutropenic period  Description: INTERVENTIONS:  - Monitor WBC    Outcome: Progressing     Problem: SAFETY ADULT  Goal: Patient will remain free of falls  Description: INTERVENTIONS:  - Educate patient/family on patient safety including physical limitations  - Instruct patient to call for assistance with activity   - Consult OT/PT to assist with strengthening/mobility   - Keep Call bell within reach  - Keep bed low and locked with side rails adjusted as appropriate  - Keep care items and personal belongings within reach  - Initiate and maintain comfort rounds  - Make Fall Risk Sign visible to staff  - Offer Toileting every 2 Hours, in advance of need  - Initiate/Maintain bed/chair alarm  - Obtain necessary fall risk management equipment:   - Apply yellow socks and bracelet for high fall risk patients  - Consider moving patient to room near nurses station  Outcome: Progressing  Goal: Maintain or return to baseline ADL function  Description: INTERVENTIONS:  -  Assess patient's ability to carry out ADLs; assess patient's baseline for ADL function and identify physical deficits which impact ability to perform ADLs (bathing, care of mouth/teeth, toileting, grooming, dressing, etc.)  - Assess/evaluate cause of self-care deficits   - Assess range of motion  - Assess patient's mobility; develop plan if impaired  - Assess patient's need for assistive devices and provide as appropriate  - Encourage maximum independence but intervene and supervise when necessary  - Involve family in performance of ADLs  - Assess for home care needs following discharge   - Consider OT consult to assist with ADL evaluation and planning for discharge  - Provide patient education as appropriate  Outcome: Progressing  Goal: Maintains/Returns to pre admission functional level  Description: INTERVENTIONS:  - Perform BMAT or MOVE assessment daily.   - Set and communicate daily mobility goal to care team and patient/family/caregiver. - Collaborate with rehabilitation services on mobility goals if consulted  - Perform Range of Motion 3 times a day. - Reposition patient every 2 hours.   - Dangle patient 3 times a day  - Stand patient 3 times a day  - Ambulate patient 3 times a day  - Out of bed to chair 3 times a day   - Out of bed for meals 3 times a day  - Out of bed for toileting  - Record patient progress and toleration of activity level   Outcome: Progressing     Problem: DISCHARGE PLANNING  Goal: Discharge to home or other facility with appropriate resources  Description: INTERVENTIONS:  - Identify barriers to discharge w/patient and caregiver  - Arrange for needed discharge resources and transportation as appropriate  - Identify discharge learning needs (meds, wound care, etc.)  - Arrange for interpretive services to assist at discharge as needed  - Refer to Case Management Department for coordinating discharge planning if the patient needs post-hospital services based on physician/advanced practitioner order or complex needs related to functional status, cognitive ability, or social support system  Outcome: Progressing     Problem: Knowledge Deficit  Goal: Patient/family/caregiver demonstrates understanding of disease process, treatment plan, medications, and discharge instructions  Description: Complete learning assessment and assess knowledge base. Interventions:  - Provide teaching at level of understanding  - Provide teaching via preferred learning methods  Outcome: Progressing     Problem: Nutrition/Hydration-ADULT  Goal: Nutrient/Hydration intake appropriate for improving, restoring or maintaining nutritional needs  Description: Monitor and assess patient's nutrition/hydration status for malnutrition. Collaborate with interdisciplinary team and initiate plan and interventions as ordered. Monitor patient's weight and dietary intake as ordered or per policy. Utilize nutrition screening tool and intervene as necessary. Determine patient's food preferences and provide high-protein, high-caloric foods as appropriate.      INTERVENTIONS:  - Monitor oral intake, urinary output, labs, and treatment plans  - Assess nutrition and hydration status and recommend course of action  - Evaluate amount of meals eaten  - Assist patient with eating if necessary   - Allow adequate time for meals  - Recommend/ encourage appropriate diets, oral nutritional supplements, and vitamin/mineral supplements  - Order, calculate, and assess calorie counts as needed  - Recommend, monitor, and adjust tube feedings and TPN/PPN based on assessed needs  - Assess need for intravenous fluids  - Provide specific nutrition/hydration education as appropriate  - Include patient/family/caregiver in decisions related to nutrition  Outcome: Progressing

## 2023-07-27 NOTE — ASSESSMENT & PLAN NOTE
· PPM in place  · Does not require rate control medications in the outpatient setting  · Chronically anticoagulated with Xarelto, last dose over 1 week ago held for possible OR. restarted today

## 2023-07-27 NOTE — PLAN OF CARE
Problem: PAIN - ADULT  Goal: Verbalizes/displays adequate comfort level or baseline comfort level  Description: Interventions:  - Encourage patient to monitor pain and request assistance  - Assess pain using appropriate pain scale  - Administer analgesics based on type and severity of pain and evaluate response  - Implement non-pharmacological measures as appropriate and evaluate response  - Consider cultural and social influences on pain and pain management  - Notify physician/advanced practitioner if interventions unsuccessful or patient reports new pain  Outcome: Progressing     Problem: INFECTION - ADULT  Goal: Absence or prevention of progression during hospitalization  Description: INTERVENTIONS:  - Assess and monitor for signs and symptoms of infection  - Monitor lab/diagnostic results  - Monitor all insertion sites, i.e. indwelling lines, tubes, and drains  - Monitor endotracheal if appropriate and nasal secretions for changes in amount and color  - [place] appropriate cooling/warming therapies per order  - Administer medications as ordered  - Instruct and encourage patient and family to use good hand hygiene technique  - Identify and instruct in appropriate isolation precautions for identified infection/condition  Outcome: Progressing  Goal: Absence of fever/infection during neutropenic period  Description: INTERVENTIONS:  - Monitor WBC    Outcome: Progressing     Problem: SAFETY ADULT  Goal: Patient will remain free of falls  Description: INTERVENTIONS:  - Educate patient/family on patient safety including physical limitations  - Instruct patient to call for assistance with activity   - Consult OT/PT to assist with strengthening/mobility   - Keep Call bell within reach  - Keep bed low and locked with side rails adjusted as appropriate  - Keep care items and personal belongings within reach  - Initiate and maintain comfort rounds  - Make Fall Risk Sign visible to staff    - Apply yellow socks and bracelet for high fall risk patients  - Consider moving patient to room near nurses station  Outcome: Progressing  Goal: Maintain or return to baseline ADL function  Description: INTERVENTIONS:  -  Assess patient's ability to carry out ADLs; assess patient's baseline for ADL function and identify physical deficits which impact ability to perform ADLs (bathing, care of mouth/teeth, toileting, grooming, dressing, etc.)  - Assess/evaluate cause of self-care deficits   - Assess range of motion  - Assess patient's mobility; develop plan if impaired  - Assess patient's need for assistive devices and provide as appropriate  - Encourage maximum independence but intervene and supervise when necessary  - Involve family in performance of ADLs  - Assess for home care needs following discharge   - Consider OT consult to assist with ADL evaluation and planning for discharge  - Provide patient education as appropriate  Outcome: Progressing  Goal: Maintains/Returns to pre admission functional level  Description: INTERVENTIONS:  - Perform BMAT or MOVE assessment daily.   - Set and communicate daily mobility goal to care team and patient/family/caregiver.    - Collaborate with rehabilitation services on mobility goals if consulted    - Out of bed for toileting  - Record patient progress and toleration of activity level   Outcome: Progressing     Problem: DISCHARGE PLANNING  Goal: Discharge to home or other facility with appropriate resources  Description: INTERVENTIONS:  - Identify barriers to discharge w/patient and caregiver  - Arrange for needed discharge resources and transportation as appropriate  - Identify discharge learning needs (meds, wound care, etc.)  - Arrange for interpretive services to assist at discharge as needed  - Refer to Case Management Department for coordinating discharge planning if the patient needs post-hospital services based on physician/advanced practitioner order or complex needs related to functional status, cognitive ability, or social support system  Outcome: Progressing     Problem: Knowledge Deficit  Goal: Patient/family/caregiver demonstrates understanding of disease process, treatment plan, medications, and discharge instructions  Description: Complete learning assessment and assess knowledge base. Interventions:  - Provide teaching at level of understanding  - Provide teaching via preferred learning methods  Outcome: Progressing     Problem: Nutrition/Hydration-ADULT  Goal: Nutrient/Hydration intake appropriate for improving, restoring or maintaining nutritional needs  Description: Monitor and assess patient's nutrition/hydration status for malnutrition. Collaborate with interdisciplinary team and initiate plan and interventions as ordered. Monitor patient's weight and dietary intake as ordered or per policy. Utilize nutrition screening tool and intervene as necessary. Determine patient's food preferences and provide high-protein, high-caloric foods as appropriate.      INTERVENTIONS:  - Monitor oral intake, urinary output, labs, and treatment plans  - Assess nutrition and hydration status and recommend course of action  - Evaluate amount of meals eaten  - Assist patient with eating if necessary   - Allow adequate time for meals  - Recommend/ encourage appropriate diets, oral nutritional supplements, and vitamin/mineral supplements  - Order, calculate, and assess calorie counts as needed  - Recommend, monitor, and adjust tube feedings and TPN/PPN based on assessed needs  - Assess need for intravenous fluids  - Provide specific nutrition/hydration education as appropriate  - Include patient/family/caregiver in decisions related to nutrition  Outcome: Progressing

## 2023-07-27 NOTE — ASSESSMENT & PLAN NOTE
Lab Results   Component Value Date    HGBA1C 5.7 (H) 06/16/2023       Recent Labs     07/26/23  1116 07/26/23  1306   POCGLU 80 98       Blood Sugar Average: Last 72 hrs:  · History diabetes mellitus  · Diet controlled per patient

## 2023-07-27 NOTE — TELEPHONE ENCOUNTER
Caller: Bret Byrd    Doctor/Office: Dr. Inga Song    #: [phone]    E[place]: Medication/was released from hospital today, but they only gave him a few pain pills to hold him over until Dr Magalis Mann sends in a script. Please send RX to [facility] on [facility] in [address] [place] [address] and call pt back to  Let him know it was done/sent.  Thanks

## 2023-07-27 NOTE — ASSESSMENT & PLAN NOTE
· Chronic anxiety  · Maintained on Remeron, gabapentin, BuSpar   · Follows with psychiatry in outpatient setting   · Avoid benzodiazepines as [place] [facility] [place]

## 2023-07-27 NOTE — NURSING NOTE
Reviewed discharge instructions with patient. Verbalized understanding of same. Prescriptions escribed. Belonging returned to patient. Referral for home health/podiatry given. Questions answered.

## 2023-07-27 NOTE — PROGRESS NOTES
-- Patient:  -- MRN: [id number]  -- Aidin Request ID: [id number]  -- Level of care reserved: [address]  -- Partner Reserved: [address], [address] [place] [address] Rd [phone]  -- Clinical needs requested: occupational therapy, skilled nursing, physical therapy, diabetic care & teaching, disease process education, wound care & dressings, medication management  -- Geography searched: [phone]  -- Start of Service:  -- Request sent: 9:52am EDT on 7/25/2023 by Adolfo Dwyer  -- Partner reserved: 10:55am EDT on 7/27/2023 by Anamaria Fuller  -- Choice list shared: 10:55am EDT on 7/27/2023 by Anamaria Fuller

## 2023-07-27 NOTE — CASE MANAGEMENT
Case Management Assessment & Discharge Planning Note    Patient name Dolores Journey  Location /-19 MRN [id number]  : 1965 Date 2023       Current Admission Date: 2023  Current Admission Diagnosis:Cellulitis of left lower extremity   Patient Active Problem List    Diagnosis Date Noted   • Moderate protein-calorie malnutrition ([address]) 2023   • Status post partial amputation of foot, left ([address]) 2023   • Other constipation 2023   • Moderate benzodiazepine use disorder ([address]) 06/15/2023   • Microcytic anemia 06/15/2023   • Closed fracture of shaft of metatarsal bone of left foot 2023   • Cellulitis of left lower extremity 2023   • Diabetic ulcer of left midfoot associated with type 2 diabetes mellitus ([address]) 2023   • Hyperkalemia 2022   • Pacemaker 2022   • History of bariatric surgery 2022   • Encounter for perioperative consultation 2022   • Diabetic infection of left foot ([address]) 2022   • Cervical spondylosis 2022   • Cervicalgia - Right 2022   • Toe osteomyelitis, right ([address]) 2022   • Type 2 diabetes mellitus with diabetic polyneuropathy, without long-term current use of insulin ([address]) 2022   • Anxiety 2021   • Atrial fibrillation (HCC)    • Chronic osteomyelitis of left foot with draining sinus ([address]) 2021   • Pain, joint, ankle and foot, left 2021   • DAYAN (obstructive sleep apnea) 2020   • Chronic diastolic heart failure ([address]) 2020   • Hypertension 2020   • Diabetes mellitus ([address]) 2020   • Morbid obesity with BMI of 40.0-44.9, adult ([address]) 2020      LOS (days): 4  Geometric Mean LOS (GMLOS) (days): 3.20  Days to GMLOS:-0.4     OBJECTIVE:    Risk of Unplanned Readmission Score: 53.12         Current admission status: Inpatient  Referral Reason: Other (Post Acute Home Needs (VNA/DME/Infusion))    Preferred Pharmacy:   [facility]/pharmacy [address], [place] - [address]  [address]  [address] [address].  Phone: [phone] Fax: [phone]    Primary Care Provider: Kirk Spring DO    Primary Insurance: Real Garcia Insurance:     ASSESSMENT:  Ronda Proxies    There are no active Health Care Proxies on file.                                                         DISCHARGE DETAILS:    Discharge planning discussed with[de-identified] patient  Freedom of Choice: Yes  Comments - Freedom of Choice: [facility]  CM contacted family/caregiver?: No- see comments (patient declined)  Were Treatment Team discharge recommendations reviewed with patient/caregiver?: Yes  Did patient/caregiver verbalize understanding of patient care needs?: Yes            Requested [address]         Is the patient interested in [facility] at discharge?: Yes  [facility] requested[de-identified] Physical Therapy, [facility] Name[de-identified] [facility] External Referral Reason (only applicable if external HHA name selected): Patient has established relationship with provider  [facility] Provider[de-identified] PCP  Home Health Services Needed[de-identified] Evaluate Functional Status and Safety, Gait/ADL Training, Wound/Ostomy Care  Homebound Criteria Met[de-identified] Uses an Assist Device (i.e. cane, walker, etc)  Supporting Clincal Findings[de-identified] Limited Endurance    DME Referral Provided  Referral made for DME?: No    Other Referral/Resources/Interventions Provided:  Interventions: [facility]  Referral Comments: [facility]    Would you like to participate in our [facility] service program?  : No - Declined    Treatment Team Recommendation: Home with [address]  Discharge Destination Plan[de-identified] Home with [address] at Discharge : Family                             IMM Given (Date):: 07/27/23  IMM Given to[de-identified] Patient  Family notified[de-identified] appeal rights reviewed with patient           1000 CM informed patient desires to discharge today and follow up with podiatry outpatient. СВЕТЛАНА submitted [address] [facility] referral to [address] for PT and VN aftercare as patient requested agency during assessment, if required. [facility] accepted. CM contacted Podiatry Office and scheduled follow up appt for 7/31/23 at 10:00AM.     CM met with patient to discuss discharge with follow up outpatient podiatry appt Monday and [facility] in interim. Patient thankful. CM spoke to patient at the bedside, reviewed DC IMM with patient and informed that patient can stay an additional 4 hours for reconsidering appealing the discharge as the medicare rights were review on the day of discharge. Pt verbalized understanding and feels ready to go home and does not intend to stay 4 hours to reconsider. IMM placed in bin for filing.

## 2023-07-27 NOTE — ASSESSMENT & PLAN NOTE
· POA with significant LLE swelling, erythema, tenderness to palpation starting at left TMA extending to mid calf  · Following with podiatry and postop 4/12/23- 2nd metatarsal complete resection with surgical cure for OM presumed. S/p revision TMA. He was discharged on Bactrim and reports was doing well until 7/10 when he developed severe pain in his foot. Reports he saw his podiatrist and was recommended outpatient MRI but was unable to arrange his schedule. · Presented to the ED due to severe intolerable pain in left foot and leg. Significant edema of left foot TMA and lower extremity. · Venous duplex LLE as patient has not taken his Xarelto for the past week as he was planning to have a surgical procedure by podiatry. neg for dvt  Ct foot shows  Multiple erosive changes seen in the cuboid, cuneiforms, at the stump of the first metatarsal with the overlying multiloculated multiseptated enhancing fluid collections, correlate clinically consider evaluation to exclude infection    Seen by podiatry and had bone biopsy done. prelim no growth or polys. dc home today with no antibiotics and close podiatry follow up

## 2023-07-27 NOTE — DISCHARGE SUMMARY
[address] [facility]  Discharge- [address] [facility] 1965, 59 y.o. male MRN: [id number]  Unit/Bed#: MS 325Suri Encounter: [id number]  Primary Care Provider: Marie Zamudio DO   Date and time admitted to hospital: 7/23/2023  8:24 PM    * Cellulitis of left lower extremity  Assessment & Plan  · POA with significant LLE swelling, erythema, tenderness to palpation starting at left TMA extending to mid calf  · Following with podiatry and postop 4/12/23- 2nd metatarsal complete resection with surgical cure for OM presumed. S/p revision TMA. He was discharged on Bactrim and reports was doing well until 7/10 when he developed severe pain in his foot. Reports he saw his podiatrist and was recommended outpatient MRI but was unable to arrange his schedule. · Presented to the ED due to severe intolerable pain in left foot and leg. Significant edema of left foot TMA and lower extremity. · Venous duplex LLE as patient has not taken his Xarelto for the past week as he was planning to have a surgical procedure by podiatry. neg for dvt  Ct foot shows  Multiple erosive changes seen in the cuboid, cuneiforms, at the stump of the first metatarsal with the overlying multiloculated multiseptated enhancing fluid collections, correlate clinically consider evaluation to exclude infection    Seen by podiatry and had bone biopsy done. prelim no growth or polys. dc home today with no antibiotics and close podiatry follow up    Moderate protein-calorie malnutrition ([address])  Assessment & Plan  Malnutrition Findings:   Adult Malnutrition type: Chronic illness  Adult Degree of Malnutrition: Malnutrition of moderate degree  Malnutrition Characteristics: Inadequate energy, Weight loss                  360 Statement: Chronic moderate malnutrition related to poor po, recent benzo detox tx, s/p TMA as evidenced by < 75% estimated energy intake > 1 mo; 19.5% weight loss x 5 mo (2/8/23 323lb, 7/23/23 260lb). Treated with:  Will d/c CCD restriction, good BG levels at this time. Will continue with 2gm Na restriction which pt was agreeable to. Will order ensure max PRO TID. Recommend daily weights for nutrition monitoring. BMI Findings: Body mass index is 34.3 kg/m². Microcytic anemia  Assessment & Plan  · History gastric bypass  · Continue ferrous sulfate  · Hemoglobin baseline    Type 2 diabetes mellitus with diabetic polyneuropathy, without long-term current use of insulin ([facility])  Assessment & Plan  Lab Results   Component Value Date    HGBA1C 5.7 (H) 06/16/2023       Recent Labs     07/26/23  1116 07/26/23  1306   POCGLU 80 98       Blood Sugar Average: Last 72 hrs:  · History diabetes mellitus  · Diet controlled per patient    Anxiety  Assessment & Plan  · Chronic anxiety  · Maintained on Remeron, gabapentin, BuSpar   · Follows with psychiatry in outpatient setting   · Avoid benzodiazepines as recently underwent detox at [facility] for benzodiazepine abuse    Atrial fibrillation ([address])  Assessment & Plan  · PPM in place  · Does not require rate control medications in the outpatient setting  · Chronically anticoagulated with Xarelto, last dose over 1 week ago held for possible OR. restarted today            Discharging Physician / Practitioner: Cyndi Das MD  PCP: Kirk Spring DO  Admission Date:   Admission Orders (From admission, onward)     Ordered        07/23/23 2146  INPATIENT ADMISSION  Once                      Discharge Date: 07/27/23    Medical Problems     Resolved Problems  Date Reviewed: 7/27/2023   None         Consultations During Hospital Stay:  · podiatry    Procedures Performed:   Patient bone biopsy of the left foot TMA    Significant Findings / Test Results:   CT lower extremity w contrast left    Result Date: 7/25/2023  Impression: Multiple erosive changes seen in the cuboid, cuneiforms, at the stump of the first metatarsal with the overlying multiloculated multiseptated enhancing fluid collections, correlate clinically consider evaluation to exclude infection No soft tissue gas seen Periostitis distal tibia and fibula The study was marked in EPIC for significant notification. Workstation performed: [id number]     XR follow up    Result Date: 7/24/2023  Impression: Right sided pacer as above. No acute cardiopulmonary disease. Workstation performed: [id number]     XR foot 2 views LEFT    Result Date: 7/24/2023  Impression: Postoperative changes with areas of mixed sclerosis and lucency some of which may be related to maturing postop changes. There does appear to be increasing osteopenia of bony fragments in in the central aspect of the postoperative bed adjacent to the second cuneiform which could be related to developing osteomyelitis. CT study may be helpful to assess for any areas of decortication if clinically indicated The study was marked in EPIC for immediate notification. . Workstation performed: [id number]     Incidental Findings:   · none    Test Results Pending at Discharge (will require follow up):   · none     Outpatient Tests Requested:  · Outpatient follow up with podiatry    Complications:  none    Reason for Admission: Left foot pain    Hospital Course:     Lizz Brown is a 59 y.o. male patient who originally presented to the hospital on 7/23/2023 due to foot cellulitis and swelling and pain. Patient recently had TMA done and also has some phantom pain symptoms. CT foot showed erosive changes of the cuboid cuneiform and stump of the first metatarsal with some multiseptated enhancing fluid collection. Is not clear with the patient has infection or not versus Charcot foot and has had bone biopsy done. Currently holding off on antibiotics at this time and will need close follow-up outpatient with podiatry    Please see above list of diagnoses and related plan for additional information.      Condition at Discharge: good     Discharge Day Visit / Exam:     Subjective: Patient is anxious and wants to go home still has mild pain in his foot but is okay otherwise  Vitals: Blood Pressure: 123/72 (07/27/23 0728)  Pulse: 66 (07/27/23 0728)  Temperature: (!) 97.2 °F (36.2 °C) (07/27/23 0728)  Temp Source: Oral (07/26/23 1122)  Respirations: 18 (07/27/23 0728)  Height: 6' 1" (185.4 cm) (07/26/23 1120)  Weight - Scale: 118 kg (260 lb) (07/26/23 1120)  SpO2: 98 % (07/27/23 0728)  Exam:   Physical Exam  Vitals and nursing note reviewed. Constitutional:       Appearance: Normal appearance. HENT:      Head: Normocephalic and atraumatic. Right Ear: External ear normal.      Left Ear: External ear normal.      Nose: Nose normal.      Mouth/Throat:      Pharynx: Oropharynx is clear. Eyes:      Pupils: Pupils are equal, round, and reactive to light. Cardiovascular:      Rate and Rhythm: Normal rate and regular rhythm. Heart sounds: Normal heart sounds. Pulmonary:      Effort: Pulmonary effort is normal.      Breath sounds: Normal breath sounds. Abdominal:      General: Bowel sounds are normal.      Palpations: Abdomen is soft. Tenderness: There is no abdominal tenderness. Musculoskeletal:         General: Normal range of motion. Cervical back: Normal range of motion and neck supple. Comments: Left foot dressing   Skin:     General: Skin is warm and dry. Capillary Refill: Capillary refill takes less than 2 seconds. Neurological:      General: No focal deficit present. Mental Status: He is alert and oriented to person, place, and time. Psychiatric:         Mood and Affect: Mood normal.         Discussion with Family: none  Discharge instructions/Information to patient and family:   See after visit summary for information provided to patient and family. Provisions for Follow-Up Care:  See after visit summary for information related to follow-up care and any pertinent home health orders.       Disposition:     Home    For Discharges to [facility] SNF: · Not Applicable to this Patient - Not Applicable to this Patient    Planned Readmission: none     Discharge Statement:  I spent 35 minutes discharging the patient. This time was spent on the day of discharge. I had direct contact with the patient on the day of discharge. Greater than 50% of the total time was spent examining patient, answering all patient questions, arranging and discussing plan of care with patient as well as directly providing post-discharge instructions. Additional time then spent on discharge activities. Discharge Medications:  See after visit summary for reconciled discharge medications provided to patient and family.       ** Please Note: This note has been constructed using a voice recognition system **

## 2023-07-28 ENCOUNTER — TELEPHONE (OUTPATIENT)
Dept: PODIATRY | Facility: CLINIC | Age: 58
End: 2023-07-28

## 2023-07-28 DIAGNOSIS — M79.672 LEFT FOOT PAIN: Primary | ICD-10-CM

## 2023-07-28 NOTE — UTILIZATION REVIEW
NOTIFICATION OF ADMISSION DISCHARGE   This is a Notification of Discharge from [facility]. Please be advised that this patient has been discharge from our facility. Below you will find the admission and discharge date and time including the patient’s disposition. UTILIZATION REVIEW CONTACT:  Armida Velez  Utilization   Network Utilization Review Department  Phone: [phone] x carefully listen to the prompts. All voicemails are confidential.  Email: [email or web address]     ADMISSION INFORMATION  PRESENTATION DATE: 7/23/2023  8:24 PM  OBERVATION ADMISSION DATE:   INPATIENT ADMISSION DATE: 7/23/23  9:46 PM   DISCHARGE DATE: 7/27/2023  1:13 PM   DISPOSITION:Home with Home Health Care    IMPORTANT INFORMATION:  Send all requests for admission clinical reviews, approved or denied determinations and any other requests to dedicated fax number below belonging to the campus where the patient is receiving treatment.  List of dedicated fax numbers:  [place] DENIALS (Administrative/Medical Necessity) [phone] [address] [facility] (Maternity/NICU/Pediatrics) [phone]   [facility] [phone]   [facility] [phone] [address] [phone]   [address] [place] [address] [phone]   [facility] [phone]   [address] [facility] [address] [facility] [phone]   [address] [place] [address] [phone] [address] [facility] [phone] [address] [facility] [address] [facility] [phone]

## 2023-07-28 NOTE — TELEPHONE ENCOUNTER
Caller: [facility]    Doctor/Office: Dr. Inga Song    #: [phone]    Escalation: Surgery Calling on behalf of patient Nicolas Schaumann. Would like to know what the dressing change instructions are. Patient is also having a lot of pain and is requesting more pain meds. Please call and advise. Thank you.

## 2023-07-29 LAB
BACTERIA SPEC ANAEROBE CULT: NO GROWTH
BACTERIA TISS AEROBE CULT: NO GROWTH
BACTERIA TISS AEROBE CULT: NO GROWTH
GRAM STN SPEC: NORMAL
GRAM STN SPEC: NORMAL

## 2023-07-30 LAB
BACTERIA TISS AEROBE CULT: ABNORMAL
GRAM STN SPEC: ABNORMAL

## 2023-07-31 ENCOUNTER — OFFICE VISIT (OUTPATIENT)
Dept: PODIATRY | Age: 58
End: 2023-07-31

## 2023-07-31 VITALS
DIASTOLIC BLOOD PRESSURE: 76 MMHG | BODY MASS INDEX: 36.84 KG/M2 | WEIGHT: 278 LBS | HEIGHT: 73 IN | TEMPERATURE: 97 F | OXYGEN SATURATION: 98 % | SYSTOLIC BLOOD PRESSURE: 118 MMHG | HEART RATE: 65 BPM

## 2023-07-31 DIAGNOSIS — Z79.4 TYPE 2 DIABETES MELLITUS WITH DIABETIC POLYNEUROPATHY, WITH LONG-TERM CURRENT USE OF INSULIN (HCC): Primary | ICD-10-CM

## 2023-07-31 DIAGNOSIS — G89.18 POSTOPERATIVE PAIN: ICD-10-CM

## 2023-07-31 DIAGNOSIS — E11.42 TYPE 2 DIABETES MELLITUS WITH DIABETIC POLYNEUROPATHY, WITH LONG-TERM CURRENT USE OF INSULIN (HCC): Primary | ICD-10-CM

## 2023-07-31 DIAGNOSIS — Z89.432 STATUS POST TRANSMETATARSAL AMPUTATION OF FOOT, LEFT (HCC): ICD-10-CM

## 2023-07-31 DIAGNOSIS — M79.672 LEFT FOOT PAIN: ICD-10-CM

## 2023-07-31 DIAGNOSIS — Z91.199 NONCOMPLIANCE: ICD-10-CM

## 2023-07-31 PROCEDURE — 88307 TISSUE EXAM BY PATHOLOGIST: CPT | Performed by: SPECIALIST

## 2023-07-31 PROCEDURE — 88311 DECALCIFY TISSUE: CPT | Performed by: SPECIALIST

## 2023-07-31 PROCEDURE — 99024 POSTOP FOLLOW-UP VISIT: CPT | Performed by: STUDENT IN AN ORGANIZED HEALTH CARE EDUCATION/TRAINING PROGRAM

## 2023-07-31 RX ORDER — OXYCODONE HYDROCHLORIDE 10 MG/1
5 TABLET ORAL EVERY 6 HOURS PRN
Qty: 3 TABLET | Refills: 0 | Status: SHIPPED | OUTPATIENT
Start: 2023-07-31

## 2023-07-31 RX ORDER — METOLAZONE 2.5 MG/1
TABLET ORAL
COMMUNITY
Start: 2023-07-13

## 2023-07-31 NOTE — PROGRESS NOTES
Assessment/Plan:     Diagnoses and all orders for this visit:    Type 2 diabetes mellitus with diabetic polyneuropathy, with long-term current use of insulin ([address])    Status post transmetatarsal amputation of foot, left (HCC)    Left foot pain    Noncompliance    Postoperative pain  -     oxyCODONE (ROXICODONE) 10 MG TABS; Take 0.5 tablets (5 mg total) by mouth every 6 (six) hours as needed for moderate pain Max Daily Amount: 20 mg    Other orders  -     metolazone (ZAROXOLYN) 2.5 mg tablet; TAKE 1 TAB BY MOUTH ONCE A DAY ON MONDAY, WEDNESDAY, AND FRIDAY ONLY. PRIOR IMAGES:  · Left foot XR 7/6/23: osseous erosions and fragmentations to remaining 1/3/4/5 metatarsals and tarsometatarsal joint dislocations     IMPRESSION:  · Left foot pain, acute. XR as above concerning for OM vs charcot. S/p bone biopsies (path and micro) 7/26/23. · Left foot 2nd metatarsal OM s/p 2nd metatarsal excision and revision TMA (DOS 4/7/23 & 4/12/23). Patient had incisional dehiscence and full thickness wound (now healed) due to noncompliance with WB recommendations. · NIDDM, A1c 5.8% (1/3/23)     PLAN:  · Bone biopsies x3 performed to left foot 7/26/23. Micro with only 1+ in broth only (staph coag -) and   Pathology is pending. Currently there are no acute local nor systemic SOI. Only symptom patient has is pain. Plan to await pathology got definitive diagnosis (OM vs charcot)  · Protected WB in tall CAM boot left foot. Patient has been noncompliant with this. I again stressed the importance of minimal protected WB   · Steri-strips, adaptic, dsd, ACE applied. · I will provide 3 oxycodone pills due to acute post-op pain. I referred patient to pain management for chronic pain. · F/u 1 week for suture removal and review results of pathology     Subjective:      Patient ID: Elizabeth Olivia is a 59 y.o. male. Iftikhar France presents to clinic today concerning f/u left foot biopsies (path and micro).  Notes continued foot pain but has been walking on it in a surgical shoe and not CAM boot as instructed. Feels well, no N/V/C/F/CP/SOB. The following portions of the patient's history were reviewed and updated as appropriate: allergies, current medications, past family history, past medical history, past social history, past surgical history and problem list.    Review of Systems   Constitutional: Negative for activity change, chills and fever. HENT: Negative. Respiratory: Negative for cough, chest tightness and shortness of breath. Cardiovascular: Positive for leg swelling (Chronic B/L, L>>R today). Negative for chest pain. Endocrine: Negative. Genitourinary: Negative. Skin: Negative for wound (Left TMA site). Neurological:        PN   Psychiatric/Behavioral: Negative. Negative for agitation and behavioral problems. Objective:      /76 (BP Location: Left arm, Patient Position: Sitting, Cuff Size: Standard)   Pulse 65   Temp (!) 97 °F (36.1 °C)   Ht 6' 1" (1.854 m)   Wt 126 kg (278 lb)   SpO2 98%   BMI 36.68 kg/m²          Physical Exam  Constitutional:       Appearance: Normal appearance. He is ill-appearing (chronic). Comments: Anxious   Cardiovascular:      Comments: Chronic venous stasis dermatitis with B/L LE brawny edema. Diminished pedal pulses due to edema. Absent pedal hair. Pulmonary:      Effort: No respiratory distress. Musculoskeletal:         General: No tenderness or deformity. Normal range of motion. Comments: S/p left TMA. Significant pain to left plantar and dorsal foot. There is edema to foot and LLE. Skin:     Capillary Refill: Capillary refill takes less than 2 seconds. Comments: B/L LE skin is atrophic - thin, dry and shiny in appearance. Left revisional TMA site appears healed. 3 small stab incisions with sutures intact. No SOI. Neurological:      General: No focal deficit present. Mental Status: He is alert and oriented to person, place, and time. Comments: N/T/B to B/L LE   Psychiatric:         Mood and Affect: Mood normal.         Behavior: Behavior normal.

## 2023-08-01 ENCOUNTER — TELEPHONE (OUTPATIENT)
Age: 58
End: 2023-08-01

## 2023-08-01 NOTE — TELEPHONE ENCOUNTER
Call to [facility] that adaptic, dsd should be used. Office number left on machine for cb with any questions.

## 2023-08-01 NOTE — TELEPHONE ENCOUNTER
Caller: [facility]    Doctor: Hui Burnett DPM    Reason for call: Arleth Mack would like to know if she needs to do any dressing changes when she goes in to see Cy    Call back#: [phone]

## 2023-08-02 ENCOUNTER — TELEPHONE (OUTPATIENT)
Age: 58
End: 2023-08-02

## 2023-08-02 RX ORDER — BUSPIRONE HYDROCHLORIDE 15 MG/1
TABLET ORAL
COMMUNITY
Start: 2023-08-01

## 2023-08-02 RX ORDER — TRAMADOL HYDROCHLORIDE 50 MG/1
TABLET ORAL
COMMUNITY
Start: 2023-08-01

## 2023-08-02 NOTE — TELEPHONE ENCOUNTER
Caller: Rehana Bello    Doctor/Office: Dr. Mor Partida    #: [phone]    Escalation: Calling on behalf of Quinn Odonnell. Would like to know if 3rd biopsy result came back yet? I advised of upcoming appt. Please return call. Thank you.

## 2023-08-07 ENCOUNTER — HOSPITAL ENCOUNTER (OUTPATIENT)
Dept: RADIOLOGY | Facility: CLINIC | Age: 58
Discharge: HOME | End: 2023-08-07
Payer: COMMERCIAL

## 2023-08-07 ENCOUNTER — TELEPHONE (OUTPATIENT)
Age: 58
End: 2023-08-07

## 2023-08-07 ENCOUNTER — TELEPHONE (OUTPATIENT)
Dept: PSYCHIATRY | Facility: CLINIC | Age: 58
End: 2023-08-07

## 2023-08-07 ENCOUNTER — OFFICE VISIT (OUTPATIENT)
Dept: PODIATRY | Age: 58
End: 2023-08-07
Payer: COMMERCIAL

## 2023-08-07 VITALS
SYSTOLIC BLOOD PRESSURE: 122 MMHG | HEART RATE: 69 BPM | OXYGEN SATURATION: 99 % | TEMPERATURE: 98 F | WEIGHT: 284.8 LBS | BODY MASS INDEX: 37.57 KG/M2 | DIASTOLIC BLOOD PRESSURE: 74 MMHG

## 2023-08-07 DIAGNOSIS — Z89.432 STATUS POST TRANSMETATARSAL AMPUTATION OF FOOT, LEFT (HCC): ICD-10-CM

## 2023-08-07 DIAGNOSIS — Z89.432 STATUS POST TRANSMETATARSAL AMPUTATION OF FOOT, LEFT (HCC): Primary | ICD-10-CM

## 2023-08-07 DIAGNOSIS — Z79.4 TYPE 2 DIABETES MELLITUS WITH DIABETIC POLYNEUROPATHY, WITH LONG-TERM CURRENT USE OF INSULIN (HCC): ICD-10-CM

## 2023-08-07 DIAGNOSIS — E11.42 TYPE 2 DIABETES MELLITUS WITH DIABETIC POLYNEUROPATHY, WITH LONG-TERM CURRENT USE OF INSULIN (HCC): ICD-10-CM

## 2023-08-07 DIAGNOSIS — M79.672 LEFT FOOT PAIN: ICD-10-CM

## 2023-08-07 PROCEDURE — 73630 X-RAY EXAM OF FOOT: CPT

## 2023-08-07 PROCEDURE — 99214 OFFICE O/P EST MOD 30 MIN: CPT | Performed by: STUDENT IN AN ORGANIZED HEALTH CARE EDUCATION/TRAINING PROGRAM

## 2023-08-07 NOTE — PROGRESS NOTES
Assessment/Plan:     Diagnoses and all orders for this visit:    Status post transmetatarsal amputation of foot, left ([address])  -     X-ray foot left 3+ views; Future  -     NM bone scan 3 phase; Future    Type 2 diabetes mellitus with diabetic polyneuropathy, with long-term current use of insulin ([address])  -     NM bone scan 3 phase; Future    Left foot pain  -     NM bone scan 3 phase; Future    Other orders  -     traMADol (ULTRAM) 50 mg tablet  -     busPIRone (BUSPAR) 15 mg tablet          IMAGING REVIEWED TODAY (I reviewed and independently interpreted)   · Left foot XR 8/7/23: stable osseous erosions and fragmentations to remaining 1/3/4/5 metatarsals and tarsometatarsal joint dislocations    PRIOR IMAGES:  · Left foot XR 7/26/23: osseous erosions and fragmentations to remaining 1/3/4/5 metatarsals and tarsometatarsal joint dislocations     IMPRESSION:  · Left foot pain, acute. XR as above concerning for OM vs charcot. S/p bone biopsies (path and micro) 7/26/23. · Left foot 2nd metatarsal OM s/p 2nd metatarsal excision and revision TMA (DOS 4/7/23 & 4/12/23). Patient had incisional dehiscence and full thickness wound (now healed) due to noncompliance with WB recommendations. · NIDDM, A1c 5.8% (1/3/23)     PLAN:  · Bone biopsies x3 performed to left foot 7/26/23. Micro with only 1+ in broth only (staph coag -) and bone Pathology with reactive changes (per pathologist, no acute inflammation however chronic process cannot be excluded). Currently there are no open wounds, acute local nor systemic SOI. Only symptom patient has is pain. I ordered a further advanced imaging to differential btwn OM & charcot. I ordered Indium-111 WBC scan and a Tc99m-sulfur colloid scan STAT. · Protected WB in tall CAM boot left foot. Patient has been noncompliant with this.  I again stressed the importance of minimal protected WB and non weight bearing better yet  · Has appt w/ pain management for chronic pain 8/11/23  · F/u 10 days for recheck and NM scan review    Subjective:      Patient ID: Gifty Sparks is a 59 y.o. male. Nic Camarena presents to clinic today concerning f/u left foot biopsies (path and micro). Notes continued foot pain but better in CAM boot but minimally. Feels well, no N/V/C/F/CP/SOB. The following portions of the patient's history were reviewed and updated as appropriate: allergies, current medications, past family history, past medical history, past social history, past surgical history and problem list.    Review of Systems   Constitutional: Negative for activity change, chills and fever. HENT: Negative. Respiratory: Negative for cough, chest tightness and shortness of breath. Cardiovascular: Positive for leg swelling (Chronic B/L, L>>R today). Negative for chest pain. Endocrine: Negative. Genitourinary: Negative. Skin: Negative for wound (Left TMA site). Neurological:        PN   Psychiatric/Behavioral: Negative. Negative for agitation and behavioral problems. Objective:      /74 (BP Location: Left arm, Patient Position: Sitting)   Pulse 69   Temp 98 °F (36.7 °C)   Wt 129 kg (284 lb 12.8 oz)   SpO2 99%   BMI 37.57 kg/m²          Physical Exam  Constitutional:       Appearance: Normal appearance. He is ill-appearing (chronic). Comments: Anxious   Cardiovascular:      Comments: Chronic venous stasis dermatitis with B/L LE brawny edema. Diminished pedal pulses due to edema. Absent pedal hair. Pulmonary:      Effort: No respiratory distress. Musculoskeletal:         General: No tenderness or deformity. Normal range of motion. Comments: S/p left TMA. Significant pain to left plantar and dorsal foot. There is edema to foot and LLE. Skin:     Capillary Refill: Capillary refill takes less than 2 seconds. Comments: B/L LE skin is atrophic - thin, dry and shiny in appearance. Left revisional TMA site appears healed. 3 small stab incisions healed. No erythema.  No open wounds. Neurological:      General: No focal deficit present. Mental Status: He is alert and oriented to person, place, and time.       Comments: N/T/B to B/L LE   Psychiatric:         Mood and Affect: Mood normal.         Behavior: Behavior normal.

## 2023-08-07 NOTE — TELEPHONE ENCOUNTER
Caller: [facility]    Doctor/Office: León/Mallika    #: [phone]    Escalation: Appointment Calling on behalf of Victoriano Nieto, has questions about his bone scan that was ordered. She wants to be sure the orders are in the system correctly. Please return call.  Thank you

## 2023-08-09 ENCOUNTER — TELEPHONE (OUTPATIENT)
Age: 58
End: 2023-08-09

## 2023-08-09 NOTE — TELEPHONE ENCOUNTER
Caller:Kassandra    Doctor:Benja    Reason for call: Needs authorization from [place] or a name and reference number if it does not need an Yulissajane Saavedra. .    Please leave voice mail.     Call back#: [phone]

## 2023-08-09 NOTE — TELEPHONE ENCOUNTER
Caller: Heriberto Hunter    Doctor/Office: Dr. Marsha Holley    #: [phone]    Escalation: MRI/CT Authorizations Calling on behalf of Heriberto Hunter. He has a bone scan scheduled for tomorrow amd she would like to know if it needs auth? Or she needs a name and reference #? She has made more than one attempt and would like a call back as the patient is scheduled tomorrow. Thank you.

## 2023-08-09 NOTE — TELEPHONE ENCOUNTER
Called and left message for [email or web address] that order was stat and auth team working on stat order and patient should go for testing.

## 2023-08-10 ENCOUNTER — HOSPITAL ENCOUNTER (OUTPATIENT)
Dept: RADIOLOGY | Facility: HOSPITAL | Age: 58
Discharge: HOME | End: 2023-08-10
Attending: STUDENT IN AN ORGANIZED HEALTH CARE EDUCATION/TRAINING PROGRAM
Payer: COMMERCIAL

## 2023-08-10 DIAGNOSIS — E11.42 TYPE 2 DIABETES MELLITUS WITH DIABETIC POLYNEUROPATHY, WITH LONG-TERM CURRENT USE OF INSULIN (HCC): ICD-10-CM

## 2023-08-10 DIAGNOSIS — Z79.4 TYPE 2 DIABETES MELLITUS WITH DIABETIC POLYNEUROPATHY, WITH LONG-TERM CURRENT USE OF INSULIN (HCC): ICD-10-CM

## 2023-08-10 DIAGNOSIS — M79.672 LEFT FOOT PAIN: ICD-10-CM

## 2023-08-10 DIAGNOSIS — Z89.432 STATUS POST TRANSMETATARSAL AMPUTATION OF FOOT, LEFT (HCC): ICD-10-CM

## 2023-08-10 PROCEDURE — 78315 BONE IMAGING 3 PHASE: CPT

## 2023-08-10 PROCEDURE — A9503 TC99M MEDRONATE: HCPCS

## 2023-08-10 PROCEDURE — G1004 CDSM NDSC: HCPCS

## 2023-08-10 NOTE — TELEPHONE ENCOUNTER
Kassandra calling from [facility] would like to know if this has been taken care of yet? She received Payton's message but would still like to know if it has been taken care of? Please return call [phone].    Thank you

## 2023-08-11 DIAGNOSIS — E11.42 TYPE 2 DIABETES MELLITUS WITH DIABETIC POLYNEUROPATHY, WITH LONG-TERM CURRENT USE OF INSULIN (HCC): ICD-10-CM

## 2023-08-11 DIAGNOSIS — Z89.432 STATUS POST TRANSMETATARSAL AMPUTATION OF FOOT, LEFT (HCC): Primary | ICD-10-CM

## 2023-08-11 DIAGNOSIS — Z79.4 TYPE 2 DIABETES MELLITUS WITH DIABETIC POLYNEUROPATHY, WITH LONG-TERM CURRENT USE OF INSULIN (HCC): ICD-10-CM

## 2023-08-11 DIAGNOSIS — M79.672 LEFT FOOT PAIN: ICD-10-CM

## 2023-08-17 ENCOUNTER — HOSPITAL ENCOUNTER (OUTPATIENT)
Dept: RADIOLOGY | Facility: HOSPITAL | Age: 58
Discharge: HOME | End: 2023-08-17
Attending: STUDENT IN AN ORGANIZED HEALTH CARE EDUCATION/TRAINING PROGRAM
Payer: COMMERCIAL

## 2023-08-17 DIAGNOSIS — E11.42 TYPE 2 DIABETES MELLITUS WITH DIABETIC POLYNEUROPATHY, WITH LONG-TERM CURRENT USE OF INSULIN (HCC): ICD-10-CM

## 2023-08-17 DIAGNOSIS — Z79.4 TYPE 2 DIABETES MELLITUS WITH DIABETIC POLYNEUROPATHY, WITH LONG-TERM CURRENT USE OF INSULIN (HCC): ICD-10-CM

## 2023-08-17 DIAGNOSIS — Z89.432 STATUS POST TRANSMETATARSAL AMPUTATION OF FOOT, LEFT (HCC): ICD-10-CM

## 2023-08-17 DIAGNOSIS — M79.672 LEFT FOOT PAIN: ICD-10-CM

## 2023-08-17 PROCEDURE — G1004 CDSM NDSC: HCPCS

## 2023-08-17 PROCEDURE — A9569 TECHNETIUM TC-99M AUTO WBC: HCPCS

## 2023-08-17 PROCEDURE — 78800 RP LOCLZJ TUM 1 AREA 1 D IMG: CPT

## 2023-08-18 ENCOUNTER — OFFICE VISIT (OUTPATIENT)
Dept: PODIATRY | Age: 58
End: 2023-08-18
Payer: COMMERCIAL

## 2023-08-18 ENCOUNTER — OFFICE VISIT (OUTPATIENT)
Dept: PSYCHIATRY | Facility: CLINIC | Age: 58
End: 2023-08-18

## 2023-08-18 ENCOUNTER — HOSPITAL ENCOUNTER (OUTPATIENT)
Dept: RADIOLOGY | Facility: HOSPITAL | Age: 58
Discharge: HOME | End: 2023-08-18
Attending: STUDENT IN AN ORGANIZED HEALTH CARE EDUCATION/TRAINING PROGRAM
Payer: COMMERCIAL

## 2023-08-18 VITALS
WEIGHT: 273.8 LBS | SYSTOLIC BLOOD PRESSURE: 122 MMHG | BODY MASS INDEX: 36.29 KG/M2 | HEIGHT: 73 IN | TEMPERATURE: 96.6 F | HEART RATE: 68 BPM | OXYGEN SATURATION: 98 % | DIASTOLIC BLOOD PRESSURE: 76 MMHG

## 2023-08-18 DIAGNOSIS — E11.42 TYPE 2 DIABETES MELLITUS WITH DIABETIC POLYNEUROPATHY, WITH LONG-TERM CURRENT USE OF INSULIN (HCC): ICD-10-CM

## 2023-08-18 DIAGNOSIS — Z79.4 TYPE 2 DIABETES MELLITUS WITH DIABETIC POLYNEUROPATHY, WITH LONG-TERM CURRENT USE OF INSULIN (HCC): ICD-10-CM

## 2023-08-18 DIAGNOSIS — Z91.199 NONCOMPLIANCE: ICD-10-CM

## 2023-08-18 DIAGNOSIS — Z89.432 STATUS POST TRANSMETATARSAL AMPUTATION OF FOOT, LEFT (HCC): Primary | ICD-10-CM

## 2023-08-18 DIAGNOSIS — G89.18 POSTOPERATIVE PAIN: ICD-10-CM

## 2023-08-18 PROCEDURE — 99213 OFFICE O/P EST LOW 20 MIN: CPT | Performed by: STUDENT IN AN ORGANIZED HEALTH CARE EDUCATION/TRAINING PROGRAM

## 2023-08-18 NOTE — PROGRESS NOTES
Assessment/Plan:     Diagnoses and all orders for this visit:    Status post transmetatarsal amputation of foot, left (HCC)    Type 2 diabetes mellitus with diabetic polyneuropathy, with long-term current use of insulin ([address])    Noncompliance    Postoperative pain          IMAGING REVIEWED TODAY (I reviewed and independently interpreted)   · Bone scan 8/10/23: "Increased radiotracer activity in all 3 phases in the left foot, predominantly at the surgical site of the first digit and lateral midfoot. Findings may be due to Charcot neuropathy as seen on foot radiographs, however on the basis of the three-phase bone scan scintigraphic findings, osteomyelitis cannot be excluded"    PRIOR IMAGES:  · Left foot XR 7/26/23: osseous erosions and fragmentations to remaining 1/3/4/5 metatarsals and tarsometatarsal joint dislocations  · Left foot XR 8/7/23: stable osseous erosions and fragmentations to remaining 1/3/4/5 metatarsals and tarsometatarsal joint dislocations     IMPRESSION:  · Left foot pain, acute. XR as above concerning for OM vs charcot. S/p bone biopsies (path and micro) 7/26/23. Bone scan as above. Ceretec WBC pending. · Left foot 2nd metatarsal OM s/p 2nd metatarsal excision and revision TMA (DOS 4/7/23 & 4/12/23). Patient had incisional dehiscence and full thickness wound (now healed) due to noncompliance with WB recommendations. · NIDDM, A1c 5.8% (1/3/23)     PLAN:  · Bone biopsies x3 performed to left foot 7/26/23. Micro with only 1+ in broth only (staph coag -) and bone Pathology with reactive changes (per pathologist, no acute inflammation however chronic process cannot be excluded). Currently there are no open wounds, acute local nor systemic SOI. Only symptom patient has is pain and mild edema which has been resolving at this time. I ordered a further advanced imaging to differential btwn OM & charcot. Bone scan as above. Ceretec scan still pending. · Protected WB in tall CAM boot left foot. Patient has been noncompliant with this. I again stressed the importance of minimal protected WB and non weight bearing better yet  · Had appt w/ pain management for chronic pain 8/11/23 however patient did not show up  · F/u 10 days     Subjective:      Patient ID: Bang Perry is a 59 y.o. male. Stephanie Wilson presents to clinic today concerning f/u left foot biopsies (path and micro). Notes foot pain improving and better in CAM boot. Notes swelling has been improving as well. Feels well, no N/V/C/F/CP/SOB. The following portions of the patient's history were reviewed and updated as appropriate: allergies, current medications, past family history, past medical history, past social history, past surgical history and problem list.    Review of Systems   Constitutional: Negative for activity change, chills and fever. HENT: Negative. Respiratory: Negative for cough, chest tightness and shortness of breath. Cardiovascular: Positive for leg swelling (Chronic B/L, L>>R today). Negative for chest pain. Endocrine: Negative. Genitourinary: Negative. Skin: Negative for wound (Left TMA site). Neurological:        PN   Psychiatric/Behavioral: Negative. Negative for agitation and behavioral problems. Objective:      /76 (BP Location: Left arm, Patient Position: Sitting, Cuff Size: Standard)   Pulse 68   Temp (!) 96.6 °F (35.9 °C)   Ht 6' 1" (1.854 m)   Wt 124 kg (273 lb 12.8 oz)   SpO2 98%   BMI 36.12 kg/m²          Physical Exam  Constitutional:       Appearance: Normal appearance. He is ill-appearing (chronic). Comments: Anxious   Cardiovascular:      Comments: Chronic venous stasis dermatitis with B/L LE brawny edema. Diminished pedal pulses due to edema. Absent pedal hair. Pulmonary:      Effort: No respiratory distress. Musculoskeletal:         General: No tenderness or deformity. Normal range of motion. Comments: S/p left TMA. Scantpain to left plantar and dorsal foot. There is improved edema to foot and LLE. Skin:     Capillary Refill: Capillary refill takes less than 2 seconds. Comments: B/L LE skin is atrophic - thin, dry and shiny in appearance. Left revisional TMA site appears healed. 3 small stab incisions healed. No erythema. No open wounds. Neurological:      General: No focal deficit present. Mental Status: He is alert and oriented to person, place, and time.       Comments: N/T/B to B/L LE   Psychiatric:         Mood and Affect: Mood normal.         Behavior: Behavior normal.

## 2023-08-18 NOTE — PSYCH
Behavioral Health Psychotherapy Assessment    Date of Initial Psychotherapy Assessment: 08/18/23  Referral Source: Self    Preferred Name: Jeb Victoria  Preferred Pronouns: He/him  YOB: 1965 Age: 59 y.o. Sex assigned at birth: male   Gender Identity: male  Race:   Preferred Language: English    Emergency Contact:  Full Name: Heather Lazo  Relationship to Client: Wife  Contact information: [phone]    Primary Care Physician:  Mark Burt DO  [address] [place] [id number]  [phone]  Has a release of information been signed? No    Physical Health History:  Past surgical procedures:   7/26/2023 - Bone biopsy (Left)   4/12/2023 - Pr amputation foot transmetarsal (Left)   4/7/2023 - Pr amputation metatarsal w/toe single (Left)   2/9/2023 - Rhizotomy (Right)   6/3/2022 - Toe amputation (Right)   5/19/2022 - Epidural block injection (N/A)   4/28/2022 - Foot amputation (Left)   4/7/2022 - Radiofrequency ablation (Right)   3/22/2022 - Nerve block (Right)   3/22/2022 - Fl guided needle plac bx/asp/inj  2/23/2022 - Toe amputation (Right)   2/10/2022 - Nerve block (Right)   1/12/2022 - Toe amputation (Right)   11/16/2021 - Pr amputation toe interphalangeal joint (Left)   9/15/2021 - Toe amputation (Left)   05/2021 - Bariatric surgery  Date Unknown - Appendectomy  Date Unknown - Atrial cardiac pacemaker insertion  Date Unknown - Toe amputation (Left)     Do you have a history of any of the following: heart/cardiac issues and diabetes  Do you have any mobility issues? Yes.  Patient has mobility issues in the setting of foot amputations    Relevant Family History:  Patient denies any known family history of psychiatric illness, suicide attempt, or substance abuse    Presenting Problem (What brings you in?)    David (who prefers to be called Elmira Martins) is a 41-year-old male,  to his wife Truman Quintanilla  for over 32 years, has 3 sons, is currently living in a house with his wife and 2 dogs in the [facility], currently employed as a  for [employer]. Yordan Ayala reports a past medical history that includes atrial fibrillation, type 2 diabetes with diabetic polyneuropathy status post multiple foot surgeries in the setting of foot osteomyelitis, obstructive sleep apnea, hypertension, chronic diastolic heart failure, and is approximately 2 years status post bariatric surgery. Cy possesses a past psychiatric history of unspecified depression and unspecified anxiety following the death of his daughter approximately 2 years ago under uncertain circumstances (she was found with heroin in her system however there is an ongoing homicide investigation) and was previously following outpatient with a psychiatry nurse practitioner Ricarda Leblanc in [facility]. Yordan Ayala had recently presented to the [address] inpatient detox on 6/15/23 for supervised benzodiazepine withdrawal. For approximately 10 weeks prior to hospitalization he had been taking 1.5 mg to 3 mg of Ativan daily. At that time he expressed a desire to stop taking benzodiazepines and start another medication for anxiety management. He was seen by this writer on 6/16/2023 for consult psychiatry services. At that time he was started on Remeron 15 mg at nighttime for symptoms of depression and anxiety and to promote appetite (as he had lost approximately 90 lbs during the 10 weeks leading up to hospitalization), gabapentin 300 mg 3 times daily for anxiety and neuropathy, and Atarax 50 mg every 6 hours as needed for anxiety. At his most recent medication management visit on 7/5/23 Yordan Ayala reported his symptoms had significantly improved and reported that he was no longer having panic episodes. He reported that there are still many unresolved questions regarding his daughter's passing two years ago and reports that there is an ongoing homicidal investigation, which is a source of stress for him.  Yordan Ayala is also Fentanyl: No    Opiates: No    Cocaine: No    Amphetamines: No    Hallucinogens: No    Club Drugs: No    Benzodiazepines: No    Other Rx Meds: No    Marijuana: No    Tobacco/Nicotine: No    Have you experienced blackouts as a result of substance use: No    Have you had any periods of abstinence: No    Have you experienced symptoms of withdrawal: No    Have you ever overdosed on any substances?: No    Are you currently using any Medication Assisted Treatment for Substance Use: No      Compulsive Behaviors:  Compulsive Behavior Information:  None    Disordered Eating History:  Do you have a history of disordered eating: No      Social Determinants of Health:    SDOH:  Stress    Trauma and Abuse History:    Have you ever been abused: No      Legal History:    Have you ever been arrested  or had a DUI: No      Have you been incarcerated: No      Are you currently on parole/probation: No      Any current Children and Youth involvement: No      Any pending legal charges: No      Relationship History:    Current marital status:       Employment History    Are you currently employed: Yes      Sources of income/financial support:  Work     History:      Status: no history of [address] duty  Educational History:     Have you ever been diagnosed with a learning disability: No      Highest level of education:  High school graduate    Have you ever had an IEP or 504-plan: No      Do you need assistance with reading or writing: No      Recommended Treatment:     Psychotherapy:  Individual sessions    Session frequency:  Monthly      Visit start and stop times:    08/18/23

## 2023-08-18 NOTE — BH TREATMENT PLAN
Outpatient Behavioral Health Psychotherapy Treatment Plan    Aj Ureña  1965     Date of Initial Psychotherapy Assessment: 8/18/23  Date of Current Treatment Plan: 08/18/23  Treatment Plan Target Date: 6 months - 2/18/2024  Treatment Plan Expiration Date: 6 months - 2/18/2024    Diagnosis:   No diagnosis found.     Area(s) of Need: ***    Long Term Goal 1 (in the client's own words): ***    Stage of Change: {SL AMB PSYCH STAGE OF CIWXBX:40299}    Target Date for completion: 6 months - 2/18/2024     Anticipated therapeutic modalities: ***     People identified to complete this goal: ***      Objective 1: (identify the means of measuring success in meeting the objective): ***      Objective 2: (identify the means of measuring success in meeting the objective): ***      Long Term Goal 2 (in the client's own words): ***    Stage of Change: {SL AMB PSYCH STAGE OF YFJUJK:76170}    Target Date for completion: 6 months - 2/18/2024     Anticipated therapeutic modalities: ***     People identified to complete this goal: ***      Objective 1: (identify the means of measuring success in meeting the objective): ***      Objective 2: (identify the means of measuring success in meeting the objective): ***     Long Term Goal 3 (in the client's own words): ***    Stage of Change: {SL AMB PSYCH STAGE OF BTJPWB:65318}    Target Date for completion: 6 months - 2/18/2024     Anticipated therapeutic modalities: ***     People identified to complete this goal: ***      Objective 1: (identify the means of measuring success in meeting the objective): ***      Objective 2: (identify the means of measuring success in meeting the objective): ***     I am currently under the care of a . [facility] provider: Yes    My [facility] psychiatric provider is: Dr. Sonya Carter    I am currently taking psychiatric medications: Yes, as prescribed    I feel that I will be ready for discharge from mental health care when I reach the following (measurable goal/objective): ***    For children and adults who have a legal guardian:   Has there been any change to custody orders and/or guardianship status? {Yes/No/NA:48313}. If yes, attach updated documentation. I have {SL AMB PSYCH CREATED/UPDATED:79957} my Crisis Plan and have been offered a copy of this plan    [address]: Diagnosis and Treatment Plan explained to [address] [facility] [address] {acknowledge/does not acknowledge:86569} an understanding of their diagnosis. Cy Carpio {SL AMB PSYCH AGREE/DISAGREE:83581} this treatment plan.     I have been offered a copy of this Treatment Plan. yes

## 2023-08-18 NOTE — PSYCH
No Call. No Show. Charge    David Evangelista Gut no showed for his 08/18/23 therapy appointment. This writer waited 15 minutes prior to initiating no-show. Staff will call and leave message to reschedule appointment     At this time the therapy treatment plan has not been completed as the patient has not yet presented for a therapy assessment.     Lacy Mckeon MD

## 2023-08-21 ENCOUNTER — TELEPHONE (OUTPATIENT)
Dept: PSYCHIATRY | Facility: CLINIC | Age: 58
End: 2023-08-21

## 2023-08-21 NOTE — TELEPHONE ENCOUNTER
Patient's wife contacted the office inquiring about scheduling an appointment for the patient. Writer transferred the call to the resident line for further assistance.

## 2023-08-25 ENCOUNTER — OFFICE VISIT (OUTPATIENT)
Dept: PSYCHIATRY | Facility: CLINIC | Age: 58
End: 2023-08-25

## 2023-08-25 DIAGNOSIS — F43.21 COMPLICATED BEREAVEMENT: ICD-10-CM

## 2023-08-25 DIAGNOSIS — F32.1 CURRENT MODERATE EPISODE OF MAJOR DEPRESSIVE DISORDER WITHOUT PRIOR EPISODE (HCC): Primary | ICD-10-CM

## 2023-08-25 DIAGNOSIS — F41.1 GENERALIZED ANXIETY DISORDER: ICD-10-CM

## 2023-08-25 NOTE — PSYCH
Behavioral Health Psychotherapy Assessment    Date of Initial Psychotherapy Assessment: 08/25/23  Referral Source: Self  Has a release of information been signed for the referral source? NA    Preferred Name: Fina Monroy  Preferred Pronouns: He/him  YOB: 1965 Age: 59 y.o. Sex assigned at birth: male   Gender Identity: Male  Race:   Preferred Language: English    Emergency Contact:  Full Name: Sky Piña  Relationship to Client: Spouse  Contact information: [phone]    Primary Care Physician:  Lupillo Minor DO  [address] [place] [id number]  [phone]  Has a release of information been signed?  No    Physical Health History:  Past surgical procedures:     Past Surgical History:   Procedure Laterality Date   • APPENDECTOMY     • ATRIAL CARDIAC PACEMAKER INSERTION     • BARIATRIC SURGERY  05/2021   • BONE BIOPSY Left 7/26/2023    Procedure: EXCISION BIOPSY BONE LESION EXTREMITY;  Surgeon: Jessy Browne DPM;  [facility];  Service: Podiatry   • EPIDURAL BLOCK INJECTION N/A 5/19/2022    Procedure: BLOCK / INJECTION EPIDURAL STEROID CERVICAL C7-T1;  Surgeon: Mariel Brooks MD;  Location: [facility];  Service: Pain Management    • FL GUIDED NEEDLE PLAC BX/ASP/INJ  3/22/2022   • FOOT AMPUTATION Left 4/28/2022    Procedure: LEFT TRANSMETATARSAL AMPUTATION.;  Surgeon: Bismark Madden DPM;  [facility];  Service: Podiatry   • NERVE BLOCK Right 2/10/2022    Procedure: BLOCK MEDIAL BRANCH C3, C4, C5 #1;  Surgeon: Mariel Brooks MD;  [facility];  Service: Pain Management    • NERVE BLOCK Right 3/22/2022    Procedure: BLOCK MEDIAL BRANCH C3, C4, C5 #2;  Surgeon: Mariel Brooks MD;  [facility];  Service: Pain Management    • GA AMPUTATION FOOT TRANSMETARSAL Left 4/12/2023    Procedure: REVISION LEFT TRANSMETATARSAL (TMA) AMPUTATION, REMOVAL OF UNVIABLE TISSUE AND BONE,;  Surgeon: Jessy Browne DPM;  [facility];  Service: Podiatry • MI AMPUTATION METATARSAL W/TOE SINGLE Left 4/7/2023    Procedure: 2ND RAY RESECTION FOOT;  Surgeon: Erwin Arriaga DPM;  [facility];  Service: Podiatry   • MI AMPUTATION TOE INTERPHALANGEAL JOINT Left 11/16/2021    Procedure: AMPUTATION LESSER TOE;  Surgeon: Lisa Palma DPM;  [facility];  Service: Podiatry   • RADIOFREQUENCY ABLATION Right 4/7/2022    Procedure: Right C3, C4, C5 RFA;  Surgeon: Estelle Damico MD;  [facility];  Service: Pain Management    • RHIZOTOMY Right 2/9/2023    Procedure: RHIZOTOMY CERVICAL MEDIAL BRANCH NERVES RIGHT C3, C4, C5;  Surgeon: Estelle Damico MD;  [facility];  Service: Pain Management    • TOE AMPUTATION Left     2nd toe   • TOE AMPUTATION Left 9/15/2021    Procedure: AMPUTATION LEFT 4TH TOE;  Surgeon: Lisa Palma DPM;  [facility];  Service: Podiatry   • TOE AMPUTATION Right 1/12/2022    Procedure: AMPUTATION TOE;  Surgeon: Jessica Painter DPM;  [facility];  Service: Podiatry   • TOE AMPUTATION Right 2/23/2022    Procedure: AMPUTATION TOE  RIGHT SECOND;  Surgeon: Jessica Painter DPM;  Location: [address] [place] [address] MAIN OR;  Service: Podiatry   • TOE AMPUTATION Right 6/3/2022    Procedure: AMPUTATION right 4th TOE;  Surgeon: Piper Madden DPM;  [facility];  Service: Podiatry     In particular, on 4/12/23 Yahaira Alfaro underwent left transmetatarsal revision in the setting of foot osteomyelitis. He reports that following the surgery there were complications with blood loss. He reports that the experience was distressing and following the procedure he received multiple doses of Ativan in the hospital setting. Cy reports that upon discharge from the hospital in April 2023 he experienced significantly worse anxiety than he had felt in the past. He reports that for 10 weeks he has been using 1.5 mg to 3 mg of ativan on a daily basis.  He reports that he has struggled to sleep, has been frequently pacing, and has not been eating. He reports he experienced multiple panic attacks where he felt his chest tightening, experienced palpitations and shortness of breath. He presented to the ED on multiple occasions (per chart review he presented to [facility] on 4/14/23, 4/18/23, 4/19/23, 4/20/23, 5/6/23, and 6/7/23 in the setting of severe anxiety). 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 During his stay on the inpatient medical detox unit he was treated for benzodiazepine withdrawal using symptom triggered phenobarbital administration. He was seen by this writer on 6/16/2023 for consult psychiatry services. Do you have a history of any of the following: heart/cardiac issues and diabetes  Do you have any mobility issues? Yes. Patient has mild mobility issues due to multiple foot surgeries in the setting of osteomyelitis    Relevant Family History:  Patient denies any known family history of psychiatric illness, suicide attempt, or substance abuse    Presenting Problem (What brings you in?)    This is a therapy intake evaluation for the patient Rodgerestevan Infante. Thor Rivera (who prefers to be called Song Andres) is a 25-year-old male,  to his wife Jaylene Montelongo  for over 28 years, has 3 sons, is currently living in a house with his wife and 2 dogs in the [facility], currently employed as a  for [employer]. Song Andres reports a past medical history that includes atrial fibrillation, type 2 diabetes with diabetic polyneuropathy status post multiple foot surgeries in the setting of foot osteomyelitis, obstructive sleep apnea, hypertension, chronic diastolic heart failure, and is approximately 2 years status post bariatric surgery. Cy possesses a past psychiatric history of unspecified depression and unspecified anxiety.     Cy reports that he has been struggling with mental health for the past two years following the untimely passing of his daughter Garrison Palacios (she passed at the age of 19years old). Cy reports that as a teenager his daughter began dating a boyfriend who struggled with drugs and who influenced her to use drugs. Cy reports that he did his best to support his daughter and supported her through drug and alcohol rehab. He reports that about two years ago he was informed that his daughter had  and was reportedly found down by her boyfriend. Cy reports that his daughter was found to have heroin and fentanyl in her system at the time. Alejandrina Bernabe has struggled to cope with her passing since then. He reports that he becomes emotional when thinking of her. He firmly believes that she is in heaven, but laments that he is not able to see her. At this time Alejandrina Bernabe also harbors feelings of hatred towards his late daughter's boyfriend. He feels that the boyfriend was involved in her death, but states at this time he has been in contact with law enforcement and there have been no changes brought against him. Cy reports he has a 10year old grandson Abundio Samuel who lives with his late daughter's boyfriend (who is the father of the child). Cy reports that he continues to struggle to cope with the passing of his daughter. He goes to grief counseling and receives support through his Judaism (he identifies as a Evangelical). He reports he has seen a therapist in the past two years for his symptoms, but has not seen one recently. Cy reports that his family has been supportive and reports that he has childhood friends who have been supportive as well. Cy reports on a daily basis he has interests in life that he looks forward to (he enjoys working around [employer], he enjoys sports, he enjoys spending time with his family, he enjoys going to Judaism, he enjoys his job as a , he enjoys visiting [place] where he was raised and spending time with childhood friends).      Mental Health Advance Directive:  Do you currently have a Mental Health Advance Directive? No    Diagnosis:   Diagnosis ICD-10-CM Associated Orders   1. Current moderate episode of major depressive disorder without prior episode ([address])  F32.1       2. Generalized anxiety disorder  F41.1       3. Complicated bereavement  F43.21           Initial Assessment:     Current Mental Status:    Appearance: appropriate and casual      Behavior/Manner: cooperative and tearful      Affect/Mood:  Depressed and sad    Speech:  Normal, talkative and spontaneous    Oriented to: oriented to self, oriented to place and oriented to time       Clinical Symptoms    Depression: yes      Anxiety: yes      Depression Symptoms: depressed mood, restlessness, serious loss of interest in things, thoughts that death would be easier, fatigue, indecision, sleep disturbance and decreased libido      Anxiety Symptoms: excessive worry, fatigues easily, fear of losing control, nervous/anxious and restlessness      Have you ever been assaultive to others or the environment: No      Have you ever been self-injurious: No      Counseling History:  Previous Counseling or Treatment  (Mental Health or Drug & Alcohol): Yes    Previous Counseling Details:  Patient reports previously following outpatient with a psychiatry nurse practitioner Brena Klinefelter in [facility] prior to establishing care with [facility]. He goes to grief counseling and receives support through his Taoist (he identifies as a Spiritism).   Have you previously taken psychiatric medications: Yes    Previous Medications Attempted:  Ativan, Prozac, Buspar, Paxil, Ambien, Lunesta, Hydroxyzine, Remeron, Gabapentin    Suicide Risk Assessment  Have you ever had a suicide attempt: No    Have you had incidents of suicidal ideation: No    Are you currently experiencing suicidal thoughts: No      Substance Abuse/Addiction Assessment:  Alcohol: Yes    Age of First Use:  19  Amount:  Patient reports drinking beer socially   Heroin: No    Fentanyl: No    Opiates: No    Cocaine: No    Amphetamines: No    Hallucinogens: No    Club Drugs: No    Benzodiazepines: No    Other Rx Meds: No    Marijuana: No    Tobacco/Nicotine: No    Have you experienced blackouts as a result of substance use: No    Have you experienced symptoms of withdrawal: No    Have you ever overdosed on any substances?: No    Are you currently using any Medication Assisted Treatment for Substance Use: No      Disordered Eating History:  Do you have a history of disordered eating: No      Social Determinants of Health:    SDOH:  Stress    Trauma and Abuse History:    Have you ever been abused: No      Legal History:    Have you ever been arrested  or had a DUI: No      Have you been incarcerated: No      Are you currently on parole/probation: No      Any current Children and Youth involvement: No      Any pending legal charges: No      Relationship History:    Current marital status:       Relationship History:  Patient has been  to his wife María Welsh for over 28 years    Employment History    Are you currently employed: Yes      Employer/ Job title:  Currently employed as a Senior Maintenance Technichian at [employer] of income/financial support:  Work     History:      Status: no history of [address] duty  Educational History:     Have you ever been diagnosed with a learning disability: No      Highest level of education:  High school graduate    Recommended Treatment:     Psychotherapy:  Individual sessions    Session frequency:  Monthly    During today's therapy session, Cy engaged in an open and heartfelt discussion about his ongoing struggle with mental health following the untimely passing of his daughter two years ago. A notable aspect of Cy's emotions are his feelings towards his late daughter's boyfriend, whom he suspects played a role in her death.  Despite harboring these beliefs, Cy shared that no legal actions have been taken against the boyfriend. Additionally, Dameon Jones is facing the complex situation of his grandson, who is in the care of his late daughter's boyfriend - the father of the child. Currently, Dameon Jones currently struggles to "coexist" with him. He also states that he "coexists" with his wife, adding "I lost all my love for her when my daughter ". When asked to explain, he reports that shortly prior to his daughter passing she had confided in him "I don't think mom loves me". Cy expressed resistance to the idea of accepting his daughter's passing and moving forward with his life. His perspective resonates deeply with the grief he experiences, suggesting that processing this loss is an intricate and ongoing process. In the forthcoming sessions, the therapist will continue to provide a supportive environment for Cy to explore and navigate his emotions. Addressing the multifaceted aspects of his grief, including his daughter's history and the complex emotions related to her boyfriend, will be a priority. The therapist will work collaboratively with Dameon Jones to help him find ways to honor his daughter's memory while gradually integrating acceptance and healing into his journey of coping and resilience.     Visit start and stop times: 8:00 AM to 9:00 AM    This note was not shared with the patient due to this is a psychotherapy note    23

## 2023-08-25 NOTE — BH TREATMENT PLAN
Outpatient Behavioral Health Psychotherapy Treatment Plan    David Butterfield  1965     Date of Initial Psychotherapy Assessment: 8/25/23  Date of Current Treatment Plan: 8/25/23  Treatment Plan Target Date: Target Date:6 months - 2/25/2024  Treatment Plan Expiration Date: Target Date:6 months - 2/25/2024    Diagnosis:   1. Current moderate episode of major depressive disorder without prior episode ([address])        2. Generalized anxiety disorder        3. Complicated bereavement        Area(s) of Need: Reducing symptoms of anxiety and depression through grief processing, strengthening interpersonal relationships, developing coping strategies, improving distress tolerance, and exploration of emotions. Long Term Goal 1 (in the client's own words): "I have to work on accepting the passing of my daughter, but I don't want to accept it."    Stage of Change: Contemplation. Target Date for completion: 6 months. Anticipated therapeutic modalities: psychoeducation, cognitive behavioral therapy, grief counseling, mindfulness and relaxation techniques, narrative therapy, boundary setting    People identified to complete this goal: Dr. Gregg Benoit    Objective 1: By the end of 2 months, Tigre Euceda will openly discuss his resistance to accepting his daughter's passing during therapy sessions. Cognitive behavioral therapy will be utilized to help the patient identify and challenge cognitive distortions that center around the passing of his daughter. Objective 2: By the end of 6 months, Cy will demonstrate increased willingness to explore his emotions related to his daughter's passing and make progress towards acceptance. Long Term Goal 2 (in the client's own words): "I need to coexist with my daughter's boyfriend." , "I want to coexist with my wife". Stage of Change: Precontemplation. Target Date for completion: 6 months.     Anticipated therapeutic modalities: cognitive behavioral therapy, anger management and distress tolerance, psychoeducation, boundary setting    People identified to complete this goal: Dr. Cinthya Benoit. Objective 1: Within 3 months, Cy will express his feelings towards his wife as well as his late daughter's boyfriend during therapy sessions. Objective 2: By the end of 6 months, Cy will demonstrate increased insight into his feelings and actively work on finding ways to coexist with his wife and daughter's boyfriend for the sake of his grandson. Long Term Goal 3 (in the client's own words): "I want to be there for my sons."    Stage of Change: Preparation. Target Date for completion: 6 months. Anticipated therapeutic modalities: psychoeducation, cognitive behavioral therapy, grief counseling, mindfulness and relaxation techniques, narrative therapy, boundary setting    People identified to complete this goal: Dr. Cinthya Mejias    Objective 1: Within 3 months, Cindy Do will initiate conversations with his sons about their feelings and needs following their sister's passing. Objective 2: By the end of 6 months, Cy will have established improved communication and support systems with his sons, promoting a stronger familial bond during their shared grief journey. DionWilfred Scot Small acknowledges an understanding of their diagnosis. RK agrees with this treatment plan. Florina Nortonshiloh Small has been offered a copy of this Treatment Plan.     Don Lopez MD

## 2023-08-28 ENCOUNTER — TELEPHONE (OUTPATIENT)
Age: 58
End: 2023-08-28

## 2023-08-28 ENCOUNTER — OFFICE VISIT (OUTPATIENT)
Dept: PODIATRY | Age: 58
End: 2023-08-28
Payer: COMMERCIAL

## 2023-08-28 ENCOUNTER — OFFICE VISIT (OUTPATIENT)
Dept: PAIN MEDICINE | Facility: CLINIC | Age: 58
End: 2023-08-28
Payer: COMMERCIAL

## 2023-08-28 VITALS
OXYGEN SATURATION: 100 % | HEIGHT: 73 IN | DIASTOLIC BLOOD PRESSURE: 79 MMHG | TEMPERATURE: 97.8 F | SYSTOLIC BLOOD PRESSURE: 118 MMHG | HEART RATE: 59 BPM | WEIGHT: 281.2 LBS | BODY MASS INDEX: 37.27 KG/M2

## 2023-08-28 VITALS
BODY MASS INDEX: 37.28 KG/M2 | HEART RATE: 81 BPM | WEIGHT: 281.3 LBS | SYSTOLIC BLOOD PRESSURE: 110 MMHG | DIASTOLIC BLOOD PRESSURE: 64 MMHG | TEMPERATURE: 97.8 F | HEIGHT: 73 IN | OXYGEN SATURATION: 99 %

## 2023-08-28 DIAGNOSIS — G89.4 CHRONIC PAIN SYNDROME: ICD-10-CM

## 2023-08-28 DIAGNOSIS — M47.812 CERVICAL SPONDYLOSIS: ICD-10-CM

## 2023-08-28 DIAGNOSIS — M79.672 LEFT FOOT PAIN: ICD-10-CM

## 2023-08-28 DIAGNOSIS — M14.679 CHARCOT ARTHROPATHY OF MIDFOOT: ICD-10-CM

## 2023-08-28 DIAGNOSIS — S92.302A CLOSED FRACTURE OF SHAFT OF METATARSAL BONE OF LEFT FOOT, INITIAL ENCOUNTER: ICD-10-CM

## 2023-08-28 DIAGNOSIS — E11.42 TYPE 2 DIABETES MELLITUS WITH DIABETIC POLYNEUROPATHY, WITH LONG-TERM CURRENT USE OF INSULIN (HCC): ICD-10-CM

## 2023-08-28 DIAGNOSIS — G90.522 COMPLEX REGIONAL PAIN SYNDROME TYPE 1 OF LEFT LOWER EXTREMITY: ICD-10-CM

## 2023-08-28 DIAGNOSIS — M14.679 CHARCOT ARTHROPATHY OF MIDFOOT: Primary | ICD-10-CM

## 2023-08-28 DIAGNOSIS — M79.672 LEFT FOOT PAIN: Primary | ICD-10-CM

## 2023-08-28 DIAGNOSIS — Z79.4 TYPE 2 DIABETES MELLITUS WITH DIABETIC POLYNEUROPATHY, WITH LONG-TERM CURRENT USE OF INSULIN (HCC): ICD-10-CM

## 2023-08-28 DIAGNOSIS — F11.20 OPIOID USE DISORDER, MODERATE, DEPENDENCE (HCC): ICD-10-CM

## 2023-08-28 DIAGNOSIS — F41.1 GENERALIZED ANXIETY DISORDER: ICD-10-CM

## 2023-08-28 PROCEDURE — 99214 OFFICE O/P EST MOD 30 MIN: CPT | Performed by: STUDENT IN AN ORGANIZED HEALTH CARE EDUCATION/TRAINING PROGRAM

## 2023-08-28 PROCEDURE — 99215 OFFICE O/P EST HI 40 MIN: CPT | Performed by: ANESTHESIOLOGY

## 2023-08-28 RX ORDER — GABAPENTIN 300 MG/1
600 CAPSULE ORAL 3 TIMES DAILY
Qty: 180 CAPSULE | Refills: 2 | Status: SHIPPED | OUTPATIENT
Start: 2023-08-28 | End: 2023-11-26

## 2023-08-28 RX ORDER — NALOXONE HYDROCHLORIDE 4 MG/.1ML
SPRAY NASAL
Qty: 1 EACH | Refills: 1 | Status: SHIPPED | OUTPATIENT
Start: 2023-08-28 | End: 2024-08-27

## 2023-08-28 RX ORDER — METHADONE HYDROCHLORIDE 5 MG/1
2.5 TABLET ORAL EVERY 8 HOURS SCHEDULED
Qty: 45 TABLET | Refills: 0 | Status: SHIPPED | OUTPATIENT
Start: 2023-08-28 | End: 2023-09-06 | Stop reason: SDUPTHER

## 2023-08-28 NOTE — PATIENT INSTRUCTIONS
Neck Exercises   AMBULATORY CARE:   Neck exercises  help reduce neck pain, and improve neck movement and strength. Neck exercises also help prevent long-term neck problems. Call your doctor if:   Your pain does not get better, or gets worse. You have questions or concerns about your condition, care, or exercise program.    What you need to know about exercise safety:   Move slowly, gently, and smoothly. Avoid fast or jerky motions. Stand and sit the way your healthcare provider shows you. Good posture may reduce your neck pain. Check your posture often, even when you are not doing your neck exercises. Follow the exercise program recommended by your healthcare provider. He or she will tell you which exercises are best for your condition. He or she will also tell you how many repetitions to do and how often you should do the exercises. How to perform neck exercises safely:   Exercise position:  You may sit or stand while you do neck exercises. Face forward. Your shoulders should be straight and relaxed, with a good posture. Head tilts, forward and back:  Gently bow your head and try to touch your chin to your chest. Your healthcare provider may tell you to push on the back of your neck to help bow your head. Raise your chin back to the starting position. Tilt your head back as far as possible so you are looking up at the ceiling. Your healthcare provider may tell you to lift your chin to help tilt your head back. Return your head to the starting position. Head tilts, side to side:  Tilt your head, bringing your ear toward your shoulder. Then tilt your head toward the other shoulder. Head turns:  Turn your head to look over your shoulder. Tilt your chin down and try to touch it to your shoulder. Do not raise your shoulder to your chin. Face forward again. Do the same on the other side.          Head rolls:  Slowly bring your chin toward your chest. Next, roll your head to the right. Your ear should be positioned over your shoulder. Hold this position for 5 seconds. Roll your head back toward your chest and to the left into the same position. Hold for 5 seconds. Gently roll your head back and around in a clockwise [place] 3 times. Next, move your head in the reverse direction (counterclockwise) in a [place] 3 times. Do not shrug your shoulders upwards while you do this exercise. Follow up with your doctor as directed:  Write down your questions so you remember to ask them during your visits. © Copyright [facility] 2022 Information is for End User's use only and may not be sold, redistributed or otherwise used for commercial purposes. The above information is an  only. It is not intended as medical advice for individual conditions or treatments. Talk to your doctor, nurse or pharmacist before following any medical regimen to see if it is safe and effective for you. Methadone (By injection)   Methadone (METH-a-done)  Treats moderate to severe pain and narcotic drug addiction in patients who cannot take oral medicines. Brand Name(s):   There may be other brand names for this medicine. When This Medicine Should Not Be Used: This medicine is not right for everyone. You should not receive it if you had an allergic reaction to methadone, or if you have severe breathing problems or paralytic ileus. How to Use This Medicine:   Injectable  A nurse or other health provider will give you this medicine. Your doctor will prescribe your exact dose and tell you how often it should be given. This medicine is given as a shot under your skin, into a muscle, or into a vein. Drugs and Foods to Avoid:   Ask your doctor or pharmacist before using any other medicine, including over-the-counter medicines, vitamins, and herbal products. Some medicines can affect how methadone works.  Tell your doctor if you are using any of the following:  Carbamazepine, cyclobenzaprine, desipramine, didanosine, erythromycin, fluconazole, fluvoxamine, ketoconazole, metaxalone, mirtazapine, phenobarbital, phenytoin, rifampin, ritonavir, sertraline, stavudine, Evie's wort, telaprevir, tramadol, trazodone, voriconazole, or zidovudine  Blood pressure medicine  Diuretic (water pill)  Hormone medicines  Laxatives (stool softeners)  MAO inhibitor (MAOI) within the past 14 days  Medicine to treat depression, anxiety, or mental health illness  Medicine to treat heart rhythm problems  Medicine to treat HIV or AIDS  Phenothiazine medicine  Steroid medicine  Triptan medicine to treat migraine headaches  Do not drink alcohol while you are using this medicine. Tell your doctor if you use anything else that makes you sleepy. Some examples are allergy medicine, narcotic pain medicine, and alcohol. Tell your doctor if you are also using buprenorphine, butorphanol, nalbuphine, pentazocine, a benzodiazepine, or a muscle relaxer. Warnings While Using This Medicine:   Tell your doctor if you are pregnant or breastfeeding, or if you have kidney disease, liver disease, heart disease, heart rhythm problems (including long QT syndrome), breathing or lung problems (including asthma, COPD, sleep apnea), gallbladder problems, low magnesium or potassium in the blood, pancreas problems, stomach or bowel problems, thyroid problems, or trouble urinating. Tell your doctor if you also have a history of head injury, brain tumors, seizures, depression, or alcohol or drug addiction. This medicine may cause the following problems:  High risk of overdose, which can lead to death  Respiratory depression (serious breathing problem that can be life-threatening)  Sleep-related breathing problems (including sleep apnea, sleep-related hypoxemia)  Heart rhythm problems  Serotonin syndrome (when used with certain medicines)  This medicine can be habit-forming. Do not use more than your prescribed dose.  Call your doctor if you think your medicine is not working. Do not stop using this medicine suddenly. Your doctor will need to slowly decrease your dose before you stop it completely. This medicine may make you dizzy or drowsy. Do not drive or do anything else that could be dangerous until you know how this medicine affects you. Sit or lie down if you feel dizzy. Stand up carefully. This medicine may cause constipation, especially with long-term use. Ask your doctor if you should use a laxative to prevent and treat constipation. Tell any doctor or dentist who treats you that you are using this medicine. This medicine may affect certain medical test results. This medicine could cause infertility. Talk with your doctor before using this medicine if you plan to have children. Possible Side Effects While Using This Medicine:   Call your doctor right away if you notice any of these side effects: Allergic reaction: Itching or hives, swelling in your face or hands, swelling or tingling in your mouth or throat, chest tightness, trouble breathing  Anxiety, restlessness, fever, muscle spasms, twitching, diarrhea, seeing or hearing things that are not there  Blue lips, fingernails, or skin  Extreme dizziness or weakness, shallow breathing, slow or uneven heartbeat, sweating, seizures, cold or clammy skin  Fast, pounding, or uneven heartbeat  Severe confusion, lightheadedness, dizziness, fainting  Severe constipation, stomach pain, nausea, or vomiting  Trouble breathing or slow breathing  If you notice these less serious side effects, talk with your doctor:   Mild constipation, nausea, or vomiting  Mild sleepiness or tiredness  Pain, itching, burning, swelling, or a lump under your skin where the needle is placed  If you notice other side effects that you think are caused by this medicine, tell your doctor. Call your doctor for medical advice about side effects.  You may report side effects to FDA at 1-411-FDA-8163  © [employer]ha [employer] 2022 Information is for End User's use only and may not be sold, redistributed or otherwise used for commercial purposes. The above information is an  only. It is not intended as medical advice for individual conditions or treatments. Talk to your doctor, nurse or pharmacist before following any medical regimen to see if it is safe and effective for you.

## 2023-08-28 NOTE — TELEPHONE ENCOUNTER
PA REQUEST FOR METHADONE HAS BEEN SUBMITTED TO PLAN FOR COVERAGE DETERMINATION WITH SURE [employer]CRIPTS.  WILL AWAIT PLAN RESPONSE    CLINICAL QUESTIONS ANSWERED

## 2023-08-28 NOTE — PROGRESS NOTES
Assessment/Plan:     Diagnoses and all orders for this visit:    Charcot arthropathy of midfoot  -     Brace    Left foot pain  -     Brace    Type 2 diabetes mellitus with diabetic polyneuropathy, with long-term current use of insulin ([address])  -     Brace          IMAGING REVIEWED TODAY (I reviewed and independently interpreted)   · NM ceretec 8/18/23: There is only mild nonfocal left foot activity. No definite scintigraphic evidence for osteomyelitis. PRIOR IMAGES:  · Left foot XR 7/26/23: osseous erosions and fragmentations to remaining 1/3/4/5 metatarsals and tarsometatarsal joint dislocations  · Left foot XR 8/7/23: stable osseous erosions and fragmentations to remaining 1/3/4/5 metatarsals and tarsometatarsal joint dislocations  · Bone scan 8/10/23: "Increased radiotracer activity in all 3 phases in the left foot, predominantly at the surgical site of the first digit and lateral midfoot. Findings may be due to Charcot neuropathy as seen on foot radiographs, however on the basis of the three-phase bone scan scintigraphic findings, osteomyelitis cannot be excluded"     IMPRESSION:  · Left foot pain, acute. Charcot diagnosis made. · Left foot 2nd metatarsal OM s/p 2nd metatarsal excision and revision TMA (DOS 4/7/23 & 4/12/23). Patient had incisional dehiscence and full thickness wound (now healed) due to noncompliance with WB recommendations. · NIDDM, A1c 5.8% (1/3/23)     PLAN:  · Bone biopsies x3 performed to left foot 7/26/23. Micro with only 1+ in broth only (staph coag -) and bone Pathology with reactive changes (per pathologist, no acute inflammation however chronic process cannot be excluded). Bone scan likely charcot however OM could not be excluded. Ceretec scan negative for OM. Charcot is most likely diagnosis given evidence as above. Currently pain baseline however patient has been walking barefoot (AMA)  · Protected WB in tall CAM boot left foot.  Patient has still been noncompliant with this against my orders. I again stressed the importance of minimal protected WB and non weight bearing better yet  · I ordered CROW boot as patient is noncompliant  · I discussed that he should get appt with pain mngmt (as instructed prior)  · I discussed that charcot process and healing timeline   · F/u 2 weeks; will need repeat XR    Subjective:      Patient ID: Sheri Palafox is a 59 y.o. male. Jaiden Jara presents to clinic today concerning f/u left foot pain. Notes foot pain improving and better in CAM boot. Notes swelling has been improving as well. Has been walking barefoot. Feels well, no N/V/C/F/CP/SOB. The following portions of the patient's history were reviewed and updated as appropriate: allergies, current medications, past family history, past medical history, past social history, past surgical history and problem list.    Review of Systems   Constitutional: Negative for activity change, chills and fever. HENT: Negative. Respiratory: Negative for cough, chest tightness and shortness of breath. Cardiovascular: Positive for leg swelling (Chronic B/L, L>>R today). Negative for chest pain. Endocrine: Negative. Genitourinary: Negative. Skin: Negative for wound (Left TMA site). Neurological:        PN   Psychiatric/Behavioral: Negative. Negative for agitation and behavioral problems. Objective:      /79 (BP Location: Left arm, Patient Position: Sitting, Cuff Size: Standard)   Pulse 59   Temp 97.8 °F (36.6 °C)   Ht 6' 1" (1.854 m)   Wt 128 kg (281 lb 3.2 oz)   SpO2 100%   BMI 37.10 kg/m²          Physical Exam  Constitutional:       Appearance: Normal appearance. He is ill-appearing (chronic). Comments: Anxious   Cardiovascular:      Comments: Chronic venous stasis dermatitis with B/L LE brawny edema. Diminished pedal pulses due to edema. Absent pedal hair. Pulmonary:      Effort: No respiratory distress.    Musculoskeletal:         General: No tenderness or deformity. Normal range of motion. Comments: S/p left TMA. Scantpain to left plantar and dorsal foot. There is improved edema to foot and LLE. Skin:     Capillary Refill: Capillary refill takes less than 2 seconds. Comments: B/L LE skin is atrophic - thin, dry and shiny in appearance. Left revisional TMA site appears healed. 3 small stab incisions healed. No erythema. No open wounds. Neurological:      General: No focal deficit present. Mental Status: He is alert and oriented to person, place, and time.       Comments: N/T/B to B/L LE   Psychiatric:         Mood and Affect: Mood normal.         Behavior: Behavior normal.

## 2023-08-28 NOTE — LETTER
August 28, 2023     Patient: Jayde Dobbs  YOB: 1965  Date of Visit: 8/28/2023      To Whom it May Concern:    Elizabeth Larsen is under my professional care. Gretchenre Sensing was seen in my office on 8/28/2023. Isidore Sensing should not return to work until cleared by a physician. If you have any questions or concerns, please don't hesitate to call.          Sincerely,          Don Alexandre DPM        CC: No Recipients

## 2023-08-28 NOTE — TELEPHONE ENCOUNTER
Caller: Patient wife    Doctor:Benja    Reason for call: Pt is on short term disability. Does the office send the paperwork to employer and insurance or just to insurance?     Call back#: [phone]

## 2023-08-28 NOTE — TELEPHONE ENCOUNTER
Spoke with Pt, He was here today in the office. Pt aware as long as we have a fax number we can fax it to were it needs to go.

## 2023-08-28 NOTE — PROGRESS NOTES
Assessment  1. Left foot pain  -     methadone (DOLOPHINE) 5 mg tablet; Take 0.5 tablets (2.5 mg total) by mouth every 8 (eight) hours Max Daily Amount: 7.5 mg    2. Cervical spondylosis - Bilateral    3. Generalized anxiety disorder  -     gabapentin (NEURONTIN) 300 mg capsule; Take 2 capsules (600 mg total) by mouth 3 (three) times a day    4. Complex regional pain syndrome type 1 of left lower extremity  -     Rapid drug screen, urine    5. Charcot arthropathy of midfoot  -     methadone (DOLOPHINE) 5 mg tablet; Take 0.5 tablets (2.5 mg total) by mouth every 8 (eight) hours Max Daily Amount: 7.5 mg  -     Rapid drug screen, urine    6. Closed fracture of shaft of metatarsal bone of left foot, initial encounter  -     methadone (DOLOPHINE) 5 mg tablet; Take 0.5 tablets (2.5 mg total) by mouth every 8 (eight) hours Max Daily Amount: 7.5 mg  -     Rapid drug screen, urine    7. Chronic pain syndrome  -     methadone (DOLOPHINE) 5 mg tablet; Take 0.5 tablets (2.5 mg total) by mouth every 8 (eight) hours Max Daily Amount: 7.5 mg  -     naloxone (NARCAN) 4 mg/0.1 mL nasal spray; Administer 1 spray into a nostril. If no response after 2-3 minutes, give another dose in the other nostril using a new spray. 8. Opioid use disorder, moderate, dependence (HCC)  -     methadone (DOLOPHINE) 5 mg tablet; Take 0.5 tablets (2.5 mg total) by mouth every 8 (eight) hours Max Daily Amount: 7.5 mg  -     naloxone (NARCAN) 4 mg/0.1 mL nasal spray; Administer 1 spray into a nostril. If no response after 2-3 minutes, give another dose in the other nostril using a new spray. Greater than 90% relief of pain with improved ability to participate with IADLs after Right C3, C4, C5 medial branch nerve radiofrequency ablation for nearly 10 months; pain had returned and responded well to repeat procedure performed 2/9/23.  Now describes pain from left charcot foot; patient had left foot surgery and complains of severe pain in LLE particularly the peroneal nerve distribution of left foot. Tramadol and gabapentin not providing significant benefit. Risks, benefits and alternatives discussed with regard to methadone. Handouts provided; discussed opioid use agreement, reviewed and signed by both parties. Rapid UDS to be obtained today. Previously reported the following symptomatology:     Right sided neck pain described primarily arthritic features. Has yet to begin physical therapy for his neck. Cervical facet arthropathy seen on xray cervical spine. Distribution of pain follows the right C3-C6 medial branch nerve regions. + right sided facet loading maneuvers consistent with multilevel spondyloarthropathy and osteophytes seen in cervical spine. Reasonable at this time to trial medial branch blocks to target site of cervical facet mediated pain and pursue radiofrequency ablation of successful. Risks, benefits and alternatives of procedure in conjunction with multimodal pain therapy plan thoroughly discussed with patient. Questions answered to patient's satisfaction. Plan  -methadone 2.5mg TID rx; cardiac risks extensively reviewed  -opioid use agreement, reviewed and signed by both parties, risks reviewed below and narcan rx  -Rapid UDS to be obtained today.   -gabapentin increased to 600 mg t.i.d; counseled regarding sedative effects of taking this medication and provided up titration calendar. Counseled not to take medication while driving or operating heavy machinery/using stairs  -physical therapy for right-sided cervical facet arthropathy; Physician directed home exercise plan as per AAOS demonstrated and handouts provided that patient plans to participate with for 1 hour, twice a week for the next 6 weeks. -f/u 4 weeks when EKG to be obtained    There are risks associated with opioid medications, including dependence, addiction and tolerance. The patient understands and agrees to use these medications only as prescribed.  Potential side effects of the medications include, but are not limited to, constipation, drowsiness, addiction, impaired judgment and risk of fatal overdose if not taken as prescribed. The patient was warned against driving while taking sedation medications. Sharing medications is a felony. At this point in time, the patient is showing no signs of addiction, abuse, diversion or suicidal ideation. [place] Prescription Drug Monitoring Program report was reviewed and was appropriate      Complete risks and benefits including bleeding, infection, tissue reaction, nerve injury and allergic reaction were discussed. The approach was demonstrated using models and literature was provided. Verbal and written consent was obtained. My impressions and treatment recommendations were discussed in detail with the patient who verbalized understanding and had no further questions. Discharge instructions were provided. I personally saw and examined the patient and I agree with the above discussed plan of care. No orders of the defined types were placed in this encounter. History of Present Illness    Greater than 90% relief of pain with improved ability to participate with IADLs after Right C3, C4, C5 medial branch nerve radiofrequency ablation for nearly 10 months; pain had returned and responded well to repeat procedure performed 2/9/23. Now describes pain from left charcot foot; patient had left foot surgery and complains of severe pain in LLE particularly the peroneal nerve distribution of left foot. Tramadol and gabapentin not providing significant benefit. Previously reported the following symptomatology:     Wale Pate is a 59 y.o. male with pmhx of afib with pacemaker on xarelto, obesity, DAYAN, DM-2, HTN, depression/anxiety presenting with right-sided neck pain described primarily arthritic features. Describes progressive neck pain since November.   The patient describes predominantly aching, nagging, indolent crampy, stabbing axial neck pain without radicular features of electric shock-like and shooting pain. He denies any weakness numbness and paresthesias. The pain is 8/10 nature and is worse with overhead maneuvers as well as lateral rotation and extension of the neck. The patient has not yet been to physical therapy, and has failed conservative medical management including naproxen 500 mg b.i.d. and gabapentin 300 mg t.i.d. The pain is significant source of disability and compromises independent activities of daily living as well as overall function. The patient has difficulty with sleep disturbance as well since the pain often wakes him up. Has trialed gabapentin, flexeril and tylenol as well as tramadol with limited benefit but has never trialed cervical epidural steroid injections or medial branch blocks. He denies any bowel or bladder issues/incontinence, gait instability. I have personally reviewed and/or updated the patient's past medical history, past surgical history, family history, social history, current medications, allergies, and vital signs today. Review of Systems   Constitutional: Positive for activity change. HENT: Negative. Eyes: Negative. Respiratory: Negative. Cardiovascular: Negative. Gastrointestinal: Negative. Endocrine: Negative. Genitourinary: Negative. Musculoskeletal: Positive for arthralgias, myalgias, neck pain and neck stiffness. Skin: Negative. Allergic/Immunologic: Negative. Neurological: Negative for weakness and numbness. Hematological: Negative. Psychiatric/Behavioral: Negative. All other systems reviewed and are negative.       Patient Active Problem List   Diagnosis   • DAYAN (obstructive sleep apnea)   • Chronic diastolic heart failure (HCC)   • Hypertension   • Diabetes mellitus ([address])   • Morbid obesity with BMI of 40.0-44.9, adult ([facility])   • Pain, joint, ankle and foot, left   • Chronic osteomyelitis of left foot with draining sinus [facility])   • Atrial fibrillation ([address])   • Anxiety   • Type 2 diabetes mellitus with diabetic polyneuropathy, without long-term current use of insulin ([facility])   • Toe osteomyelitis, right ([facility])   • Cervical spondylosis   • Cervicalgia - Right   • Diabetic infection of left foot ([facility])   • Pacemaker   • History of bariatric surgery   • Encounter for perioperative consultation   • Hyperkalemia   • Diabetic ulcer of left midfoot associated with type 2 diabetes mellitus ([address])   • Cellulitis of left lower extremity   • Closed fracture of shaft of metatarsal bone of left foot   • Moderate benzodiazepine use disorder ([facility])   • Microcytic anemia   • Other constipation   • Status post partial amputation of foot, left ([facility])   • Moderate protein-calorie malnutrition ([facility])   • Left foot pain   • Charcot arthropathy of midfoot       Past Medical History:   Diagnosis Date   • Atrial fibrillation ([address])    • Benzodiazepine withdrawal with complication ([address]) 2/62/6344   • Chronic diastolic (congestive) heart failure ([facility])    • Diabetes mellitus ([address])    • High cholesterol    • Hyperlipidemia    • Pacemaker    • Stroke [facility])        Past Surgical History:   Procedure Laterality Date   • APPENDECTOMY     • ATRIAL CARDIAC PACEMAKER INSERTION     • BARIATRIC SURGERY  05/2021   • BONE BIOPSY Left 7/26/2023    Procedure: EXCISION BIOPSY BONE LESION EXTREMITY;  Surgeon: Don Alexandre DPM;  Location:  MAIN OR;  Service: Podiatry   • EPIDURAL BLOCK INJECTION N/A 5/19/2022    Procedure: BLOCK / INJECTION EPIDURAL STEROID CERVICAL C7-T1;  Surgeon: Yuli Kaiser MD;  Location: [facility];  Service: Pain Management    • FL GUIDED NEEDLE PLAC BX/ASP/INJ  3/22/2022   • FOOT AMPUTATION Left 4/28/2022    Procedure: LEFT TRANSMETATARSAL AMPUTATION.;  Surgeon: Iván Madden DPM;  [facility];  Service: Podiatry   • NERVE BLOCK Right 2/10/2022    Procedure: BLOCK MEDIAL BRANCH C3, C4, C5 #1;  Surgeon: Yuli Kaiser MD;  [facility]: [facility];  Service: Pain Management    • NERVE BLOCK Right 3/22/2022    Procedure: BLOCK MEDIAL BRANCH C3, C4, C5 #2;  Surgeon: Andria Chacko MD;  Location: [facility];  Service: Pain Management    • FL AMPUTATION FOOT TRANSMETARSAL Left 4/12/2023    Procedure: REVISION LEFT TRANSMETATARSAL (TMA) AMPUTATION, REMOVAL OF UNVIABLE TISSUE AND BONE,;  Surgeon: Haroon Mckeon DPM;  Location: [facility];  Service: Podiatry   • FL AMPUTATION METATARSAL W/TOE SINGLE Left 4/7/2023    Procedure: 2ND RAY RESECTION FOOT;  Surgeon: Haroon Mckeon DPM;  Location: [facility];  Service: Podiatry   • FL AMPUTATION TOE INTERPHALANGEAL JOINT Left 11/16/2021    Procedure: AMPUTATION LESSER TOE;  Surgeon: Zay Nevarez DPM;  [facility];  Service: Podiatry   • RADIOFREQUENCY ABLATION Right 4/7/2022    Procedure: Right C3, C4, C5 RFA;  Surgeon: Andria Chacko MD;  [facility];  Service: Pain Management    • RHIZOTOMY Right 2/9/2023    Procedure: RHIZOTOMY CERVICAL MEDIAL BRANCH NERVES RIGHT C3, C4, C5;  Surgeon: Andria Chacko MD;  [facility];  Service: Pain Management    • TOE AMPUTATION Left     2nd toe   • TOE AMPUTATION Left 9/15/2021    Procedure: AMPUTATION LEFT 4TH TOE;  Surgeon: Zay Nevarez DPM;  [facility];  Service: Podiatry   • TOE AMPUTATION Right 1/12/2022    Procedure: AMPUTATION TOE;  Surgeon: Helena Vasquez DPM;  [facility];  Service: Podiatry   • TOE AMPUTATION Right 2/23/2022    Procedure: AMPUTATION TOE  RIGHT SECOND;  Surgeon: Helena Vasquez DPM;  Location: [address] [place] [address] MAIN OR;  Service: Podiatry   • TOE AMPUTATION Right 6/3/2022    Procedure: AMPUTATION right 4th TOE;  Surgeon: Juliet May DPM;  [facility];  Service: Podiatry       Family History   Problem Relation Age of Onset   • No Known Problems Mother    • No Known Problems Father        Social History     Occupational History   • Occupation: Maintenance Tech     Employer: [employer] Tobacco Use   • Smoking status: Never   • Smokeless tobacco: Never   Vaping Use   • Vaping Use: Never used   Substance and Sexual Activity   • Alcohol use: Never   • Drug use: Never   • Sexual activity: Yes       Current Outpatient Medications on File Prior to Visit   Medication Sig   • busPIRone (BUSPAR) 15 mg tablet    • ferrous sulfate 325 (65 Fe) mg tablet Take 1 tablet (325 mg total) by mouth daily with breakfast Do not start before June 19, 2023. • gabapentin (NEURONTIN) 300 mg capsule Take 2 capsules (600 mg total) by mouth 3 (three) times a day   • metolazone (ZAROXOLYN) 2.5 mg tablet TAKE 1 TAB BY MOUTH ONCE A DAY ON MONDAY, WEDNESDAY, AND FRIDAY ONLY. • mirtazapine (REMERON) 15 mg tablet Take 1 tablet (15 mg total) by mouth daily at bedtime   • traMADol (ULTRAM) 50 mg tablet    • Xarelto 20 MG tablet    • acetaminophen (TYLENOL) 325 mg tablet Take 2 tablets (650 mg total) by mouth every 6 (six) hours as needed for mild pain, headaches or fever (Patient not taking: Reported on 8/18/2023)   • busPIRone (BUSPAR) 10 mg tablet Take 1.5 tablets (15 mg total) by mouth 3 (three) times a day (Patient not taking: Reported on 8/7/2023)   • oxyCODONE (ROXICODONE) 10 MG TABS Take 0.5 tablets (5 mg total) by mouth every 6 (six) hours as needed for moderate pain Max Daily Amount: 20 mg (Patient not taking: Reported on 8/7/2023)   • pantoprazole (PROTONIX) 40 mg tablet Take 1 tablet (40 mg total) by mouth daily in the early morning Do not start before June 19, 2023. (Patient not taking: Reported on 8/7/2023)     No current facility-administered medications on file prior to visit.        Allergies   Allergen Reactions   • Ativan [Lorazepam] Anxiety         Physical Exam    /64 (BP Location: Left arm, Patient Position: Sitting, Cuff Size: Adult)   Pulse 81   Temp 97.8 °F (36.6 °C)   Ht 6' 1" (1.854 m)   Wt 128 kg (281 lb 4.8 oz)   SpO2 99%   BMI 37.11 kg/m²     Constitutional: normal, well developed, well nourished, alert, in no distress and non-toxic and no overt pain behavior. and obese  Eyes: anicteric  HEENT: grossly intact  Neck: supple, symmetric, trachea midline and no masses   Pulmonary:even and unlabored  Cardiovascular:No edema or pitting edema present  Skin:Normal without rashes or lesions and well hydrated  Psychiatric:Mood and affect appropriate  Neurologic:Cranial Nerves II-XII grossly intact Sensation grossly intact; no clonus negative morton's. Reflexes 2+ and brisk. Spurling's maneuver negative bilaterally. Musculoskeletal:normal gait. 5/5 strength bilaterally with AROM in upper extremities. Significant pain with right-sided cervical facet loading bilaterally and with lateral spine rotation. TTP over right-sided cervical paraspinal muscles. Negative epifanio's test, negative gaenslen's negative SIJ loading bilaterally.     Imaging    Multilevel spondyloarthropathy/degenerative changes seen throughout cervical spine x-ray

## 2023-08-29 ENCOUNTER — TELEPHONE (OUTPATIENT)
Dept: PAIN MEDICINE | Facility: CLINIC | Age: 58
End: 2023-08-29

## 2023-08-29 NOTE — TELEPHONE ENCOUNTER
Received a fax today for a prior authorization for   Methadone HCI 5MG tablets    from [facility] pharmacy     Key: [id number] [facility]  Name: Michele Smileyjosé miguel  : 1965

## 2023-08-31 LAB
6MAM UR QL CFM: NEGATIVE NG/ML
7AMINOCLONAZEPAM UR QL CFM: NEGATIVE NG/ML
AMPHET UR QL CFM: NEGATIVE NG/ML
AMPHET UR QL CFM: NEGATIVE NG/ML
BUPRENORPHINE UR QL CFM: NEGATIVE NG/ML
BUTALBITAL UR QL CFM: NEGATIVE NG/ML
BZE UR QL CFM: NEGATIVE NG/ML
CODEINE UR QL CFM: NEGATIVE NG/ML
DESIPRAMINE UR QL CFM: NEGATIVE NG/ML
EDDP UR QL CFM: ABNORMAL NG/ML
ETHYL GLUCURONIDE UR QL CFM: NEGATIVE NG/ML
ETHYL SULFATE UR QL SCN: NEGATIVE NG/ML
EUTYLONE UR QL: NEGATIVE NG/ML
FENTANYL UR QL CFM: NEGATIVE NG/ML
GLIADIN IGG SER IA-ACNC: NEGATIVE NG/ML
GLUCOSE 30M P 50 G LAC PO SERPL-MCNC: NEGATIVE NG/ML
HYDROCODONE UR QL CFM: NEGATIVE NG/ML
HYDROCODONE UR QL CFM: NEGATIVE NG/ML
HYDROMORPHONE UR QL CFM: NEGATIVE NG/ML
LORAZEPAM UR QL CFM: NEGATIVE NG/ML
MDMA UR QL CFM: NEGATIVE NG/ML
ME-PHENIDATE UR QL CFM: NEGATIVE NG/ML
MEPERIDINE UR QL CFM: NEGATIVE NG/ML
METHADONE UR QL CFM: ABNORMAL NG/ML
METHAMPHET UR QL CFM: NEGATIVE NG/ML
MORPHINE UR QL CFM: NEGATIVE NG/ML
MORPHINE UR QL CFM: NEGATIVE NG/ML
NORBUPRENORPHINE UR QL CFM: NEGATIVE NG/ML
NORDIAZEPAM UR QL CFM: NEGATIVE NG/ML
NORFENTANYL UR QL CFM: NEGATIVE NG/ML
NORHYDROCODONE UR QL CFM: NEGATIVE NG/ML
NORHYDROCODONE UR QL CFM: NEGATIVE NG/ML
NORMEPERIDINE UR QL CFM: NEGATIVE NG/ML
NOROXYCODONE UR QL CFM: NEGATIVE NG/ML
OLANZAPINE QUANTIFICATION: NEGATIVE NG/ML
OPC-3373 QUANTIFICATION: NEGATIVE
OXAZEPAM UR QL CFM: NEGATIVE NG/ML
OXYCODONE UR QL CFM: NEGATIVE NG/ML
OXYMORPHONE UR QL CFM: NEGATIVE NG/ML
OXYMORPHONE UR QL CFM: NEGATIVE NG/ML
PARA-FLUOROFENTANYL QUANTIFICATION: NORMAL NG/ML
PHENOBARB UR QL CFM: NEGATIVE NG/ML
RESULT ALL_PRESCRIBED MEDS AND SPECIAL INSTRUCTIONS: NORMAL
SECOBARBITAL UR QL CFM: NEGATIVE NG/ML
SL AMB 4-ANPP QUANTIFICATION: NORMAL NG/ML
SL AMB 5F-ADB-M7 METABOLITE QUANTIFICATION: NEGATIVE NG/ML
SL AMB 7-OH-MITRAGYNINE (KRATOM ALKALOID) QUANTIFICATION: NEGATIVE NG/ML
SL AMB AB-FUBINACA-M3 METABOLITE QUANTIFICATION: NEGATIVE NG/ML
SL AMB ACETYL FENTANYL QUANTIFICATION: NORMAL NG/ML
SL AMB ACETYL NORFENTANYL QUANTIFICATION: NORMAL NG/ML
SL AMB ACRYL FENTANYL QUANTIFICATION: NORMAL NG/ML
SL AMB CARFENTANIL QUANTIFICATION: NORMAL NG/ML
SL AMB CLOZAPINE QUANTIFICATION: NEGATIVE NG/ML
SL AMB CTHC (MARIJUANA METABOLITE) QUANTIFICATION: NEGATIVE NG/ML
SL AMB DEXTROMETHORPHAN QUANTIFICATION: NEGATIVE NG/ML
SL AMB DEXTRORPHAN (DEXTROMETHORPHAN METABOLITE) QUANT: NEGATIVE NG/ML
SL AMB DEXTRORPHAN (DEXTROMETHORPHAN METABOLITE) QUANT: NEGATIVE NG/ML
SL AMB JWH018 METABOLITE QUANTIFICATION: NEGATIVE NG/ML
SL AMB JWH073 METABOLITE QUANTIFICATION: NEGATIVE NG/ML
SL AMB MDMB-FUBINACA-M1 METABOLITE QUANTIFICATION: NEGATIVE NG/ML
SL AMB METHYLONE QUANTIFICATION: NEGATIVE NG/ML
SL AMB N-DESMETHYL-TRAMADOL QUANTIFICATION: ABNORMAL NG/ML
SL AMB N-DESMETHYLCLOZAPINE QUANTIFICATION: NEGATIVE NG/ML
SL AMB PHENTERMINE QUANTIFICATION: NEGATIVE NG/ML
SL AMB RCS4 METABOLITE QUANTIFICATION: NEGATIVE NG/ML
SL AMB RITALINIC ACID QUANTIFICATION: NEGATIVE NG/ML
SPECIMEN DRAWN SERPL: NEGATIVE NG/ML
TAPENTADOL UR QL CFM: NEGATIVE NG/ML
TEMAZEPAM UR QL CFM: NEGATIVE NG/ML
TEMAZEPAM UR QL CFM: NEGATIVE NG/ML
TRAMADOL UR QL CFM: ABNORMAL NG/ML
URATE/CREAT 24H UR: ABNORMAL NG/ML

## 2023-09-05 ENCOUNTER — PATIENT MESSAGE (OUTPATIENT)
Dept: PAIN MEDICINE | Facility: CLINIC | Age: 58
End: 2023-09-05

## 2023-09-06 DIAGNOSIS — G89.4 CHRONIC PAIN SYNDROME: ICD-10-CM

## 2023-09-06 DIAGNOSIS — S92.302A CLOSED FRACTURE OF SHAFT OF METATARSAL BONE OF LEFT FOOT, INITIAL ENCOUNTER: ICD-10-CM

## 2023-09-06 DIAGNOSIS — F11.20 OPIOID USE DISORDER, MODERATE, DEPENDENCE (HCC): ICD-10-CM

## 2023-09-06 DIAGNOSIS — M14.679 CHARCOT ARTHROPATHY OF MIDFOOT: ICD-10-CM

## 2023-09-06 DIAGNOSIS — M79.672 LEFT FOOT PAIN: ICD-10-CM

## 2023-09-06 RX ORDER — METHADONE HYDROCHLORIDE 5 MG/1
2.5 TABLET ORAL EVERY 8 HOURS SCHEDULED
Qty: 45 TABLET | Refills: 0 | Status: SHIPPED | OUTPATIENT
Start: 2023-09-06

## 2023-09-06 NOTE — TELEPHONE ENCOUNTER
[facility] (supporting Federico Roca MD)          9/5/23  5:59 PM  Good afternoon,    I was in the office to see you for my foot pain. You prescribed methadone and sent it to  [facility] for me. However, they have still not been able to get the meds in stock. I called [facility] on [address] in [facility] and they said they have it available. Would you be able to cancel the one at [facility] and submit it to [facility] please?   Thank you,  So Reyes

## 2023-09-08 ENCOUNTER — TELEPHONE (OUTPATIENT)
Dept: PSYCHIATRY | Facility: CLINIC | Age: 58
End: 2023-09-08

## 2023-09-08 ENCOUNTER — OFFICE VISIT (OUTPATIENT)
Dept: PSYCHIATRY | Facility: CLINIC | Age: 58
End: 2023-09-08

## 2023-09-08 DIAGNOSIS — F43.21 COMPLICATED BEREAVEMENT: Primary | ICD-10-CM

## 2023-09-08 DIAGNOSIS — F41.1 GENERALIZED ANXIETY DISORDER: ICD-10-CM

## 2023-09-08 DIAGNOSIS — F32.1 CURRENT MODERATE EPISODE OF MAJOR DEPRESSIVE DISORDER WITHOUT PRIOR EPISODE (HCC): ICD-10-CM

## 2023-09-08 NOTE — TELEPHONE ENCOUNTER
Patient is calling regarding cancelling an appointment.     Date/Time: 9/11/2023 8:15am    Reason: getting brace fitted for prosthetic    Patient was rescheduled: YES [] NO [x]  If yes, when was Patient reschedule for:     Patient requesting call back to reschedule: YES [x] NO []    Patient wants to know if provider has anything later in the day on 9/11/2023

## 2023-09-08 NOTE — PSYCH
Behavioral Health Psychotherapy Progress Note    This is a therapy progress note for the patient Deangelo Petersen. Joy España (who prefers to be called Puma Jones) is a 56-year-old male,  to his wife Kae Gaitan  for over 28 years, has 3 sons, is currently living in a house with his wife and 2 dogs in the [facility], currently employed as a  for [employer] and is currently on short term disability. Puma Jones reports a past medical history that includes atrial fibrillation, type 2 diabetes with diabetic polyneuropathy status post multiple foot surgeries in the setting of foot osteomyelitis, obstructive sleep apnea, hypertension, chronic diastolic heart failure, and is approximately 2 years status post bariatric surgery. Cy possesses a past psychiatric history of unspecified depression and unspecified anxiety.     Cy reports that he has been struggling with mental health for the past two years following the untimely passing of his daughter Peggy Alvarez (she passed at the age of 21years old). Cy reports that as a teenager his daughter began dating a boyfriend who struggled with drugs and who influenced her to use drugs. Cy reports that he did his best to support his daughter and supported her through drug and alcohol rehab. He reports that about two years ago he was informed that his daughter had  and was reportedly found down by her boyfriend. Cy reports that his daughter was found to have heroin and fentanyl in her system at the time. Puma Jones has struggled to cope with her passing since then. He reports that he becomes emotional when thinking of her. He firmly believes that she is in heaven, but laments that he is not able to see her. At this time Puma Jones also harbors feelings of hatred towards his late daughter's boyfriend.  He feels that the boyfriend was involved in her death, but states at this time he has been in contact with law enforcement and there have been no changes brought against him. Cy reports he has a 10year old grandson Mohan Dey who lives with his late daughter's boyfriend (who is the father of the child). Psychotherapy Provided: Individual Psychotherapy     1. Complicated bereavement        2. Current moderate episode of major depressive disorder without prior episode ([address])        3. Generalized anxiety disorder            Goals addressed in session: Goal 1, Goal 2 and Goal 3      DATA:     During this session, Cy talked about how he has recently been having increased difficulty sleeping. He reports that over the last two weeks he often feels restless in the evening and ultimately ends up falling asleep around 3:30 AM to 4:00 AM. In conversation, Maycol Simmons reflects that this is the longest that he has remained out of work due to disability (due to complications related to his foot surgery). He identifies that he enjoys his job at [employer] (he reports he has always worked the third shift) and that it gives him structure in his life. Supportive therapy was provided. Cy reports that at his home he has an area of the house where he has all of his tools arranged. He states that his late daughter's belonging lay packed and in close proximity to his tools and how he has not been able to bring himself to unpack these belongings. He reports that he notices the boxes and it distracts him from day to day activities. Cy was encouraged as his therapy treatment progresses to try and find a time to sit down and unpack and move these belongings. Cognitive behavioral therapy techniques were utilized and it was identified to Cy that the memory of his daughter is impacting his ability to live his present life. Maycol Simmons talks about how his late daughter Javier Landin began to fall into the wrong crowd of individuals at the age of 14. He reports that she began to hang out with a cohort of girls who struggled with drugs and through this cohort of girls was introduced to disreputable male figures. Cy reports that his daughter had three unhealthy relationships during her life. She had one relationship around the age of 14 that lasted for about one year, a second relationship around the age of 23 that lasted for about one year, and a third relationship around the age of 19 that lasted for about three years (ending with her death - the third boyfriend is the father of the grandson). Cy reports that he would often learn about these relationships through his wife and states that during this time he was working frequently. He also felt that his daughter was "rebelling against her mother", "was hoping that this was a phase", and wanted to give his daughter space. For these reasons he did not talk to his daughter about his concerns. He reports that he had confronted the second boyfriend after he learned about drug use and after the second boyfriend had overdosed in the family house. Cy reports that shortly prior to his daughter's passing he had been trying to have more open conversations with his daughter about her unhealthy relationships (including her relationship at the time) and goals for the future. During these conversations his daughter had confided that she felt her mother didn't love her. During the therapy session, Cy confided that "I have put my daughter on a pedestal" and states "I feel I disappointed her". Looking back he wishes that he had communicated more with his daughter and feels that if he did she would still be alive today. Today, Song Andres is noted to have cognitive distortions of self blame. During this session, the patient was challenged to identify both positive qualities about his daughter (he states that funny, smart, and social) and negative qualities (she made bad relationship choices). Cy was challenged if his daughter had disappointed him, to which he responded "I'm angry and her for keeping us in the dark, I don't know why she did that".  During this therapy session, Cy appears to harbor the core belief that he is a bad father and that he is responsible for his daughter's death. During the session today Gareth Carrera was reminded that we cannot control the actions of others. At the conclusion of the session Gareth Carrera identifies that the his current view of his late daughter is unhealthy. It was discussed that at the subsequent therapy session we will go over possible different scenarios that may have occurred surrounding her death. During this session, this clinician used the following therapeutic modalities: Supportive psychotherapy and psychoeducation, cognitive behavioral therapy, grief counseling, narrative therapy    Substance Abuse was not addressed during this session. ASSESSMENT:  Jessie York presents with a Depressed mood. his affect is Normal range and intensity, Constricted and Tearful, which is congruent, with his mood and the content of the session. The client has made progress on their goals. Cy Gonzalez presents with a low risk of suicide, low risk of self-harm, and minimal risk of harm to others. For any risk assessment that surpasses a "low" rating, a safety plan must be developed. A safety plan was indicated: no    PLAN: Between sessions, Cy Gonzalez will continue to work on challenging his automatic thoughts as well as his negative core beliefs that he is a bad parent and is responsible for his daughter's death . At the next session, the therapist will continue to use cognitive behavioral therapy to identify cognitive distortions and work with the patient on cognitive restructuring. Behavioral Health Treatment Plan and Discharge Planning: Jessie York is aware of and agrees to continue to work on their treatment plan. They have identified and are working toward their discharge goals.  yes    Visit start and stop times: 8:00 AM to 9:00 AM    09/08/23        This note was not shared with the patient due to this is a psychotherapy note

## 2023-09-11 NOTE — PSYCH
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 mouth 3 (three) times a day  -     mirtazapine (REMERON) 30 mg tablet; Take 1 tablet (30 mg total) by mouth daily at bedtime    Complicated bereavement          Treatment Recommendations/Precautions:    Increased Buspar to 15 mg TID  PARQ was completed for buspirone (Buspar) including serotonin syndrome, rare TD/EPS, dizziness, sedation, GI distress, confusion, possible mood changes, xerostomia and visual disturbances. Started Doxepin 6 mg QHS PRN insomnia  PARQ was completed for the tricyclic antidepressant class including GI distress, sedation, dizziness, weight gain, sexual dysfunction, decreased seizure threshold, induction of nazario, serotonin syndrome, and arrhythmia. Continue Remeron 30 mg daily for symptoms of depression and anxiety  PARQ was completed for mirtazapine (Remeron) including sedation, weight gain, suicidal thoughts in patients under 22years old, and rare hyponatremia or agranulocytosis.     Continue Gabapentin 600 mg TID for anxiety and pain  PARQ for neurontin including depression/suicidality, allergic reactions (SJS, angioedema, rhabdomyolysis, eosinophilia, anaphylaxis), dizziness and somnolence, diarrhea, xerostomia, headaches, drug interactions and others.      Medication management every 3 months    Continue psychotherapy with this writer on a routine basis    Follows with family physician for medical problems, yearly physical exam and cardiac issues  Aware of 24 hour and weekend coverage for urgent situations accessed by calling [facility] main practice number    Although patient's acute lethality risk is low, long-term/chronic lethality risk is mildly elevated in the presence of moderate symptoms of depression and severe symptoms of anxiety. At the current moment, Freddy Yung is future-oriented, forward-thinking, and demonstrates ability to act in a self-preserving manner as evidenced by volitionally presenting to the clinic today, seeking treatment. At this juncture, inpatient hospitalization is not currently warranted. To mitigate future risk, patient should adhere to the recommendations of this writer, avoid alcohol/illicit substance use, utilize community-based resources and familiar support and prioritize mental health treatment. Based on today's assessment and clinical criteria, Wale Pate contracts for safety and is not an imminent risk of harm to self or others. Outpatient level of care is deemed appropriate at this present time. Freddy Yung understands that if they are no longer able to contract for safety, they need to call/contact the outpatient office including this writer, call/contact crisis and/orattend to the nearest Emergency Department for immediate evaluation.     Risk of Harm to Self:   The following ratings are based on assessment at the time of the interview  Demographic risk factors include: , male, age: over 48 or older  Historical Risk Factors include: chronic depressive symptoms, history of traumatic experiences  Current Specific Risk Factors include: diagnosis of mood disorder, chronic anxiety symptoms, health problems, recent losses (the patient's daugher passed two years ago)  Protective Factors: no current suicidal ideation, improved depressive symptoms, improved anxiety symptoms, ability to make plans for the future, outpatient psychiatric follow up established, family support established, being a parent, being , compliant with medications, compliant with mental health treatment, having a desire to be alive, personal beliefs about the meaning and value of life, supportive family, ability to contract for safety with staff, ability to communicate with staff  Weapons/Firearms: none. The following steps have been taken to ensure weapons are properly secured: not applicable  Based on today's assessment, Charley Xiao presents the following risk of harm to self: Low     Risk of Harm to Others: The following ratings are based on assessment at the time of the interview  Demographic Risk Factors include: male. Historical Risk Factors include: none. Current Specific Risk Factors include: none  Protective Factors: no current homicidal ideation, improved mood, willing to continue psychiatric treatment, good support system, supportive family, responsibilities and duties to others, being a parent, being , good self-esteem, sense of personal control  Weapons/Firearms: none. The following steps have been taken to ensure weapons are properly secured: not applicable  Based on today's assessment, Charley Xiao presents the following risk of harm to others: Minimal    Risks/Benefits      Risks, Benefits And Possible Side Effects Of Medications:    Risks, benefits, and possible side effects of medications explained to Charley Xiao and he verbalizes understanding and agreement for treatment. Controlled Medication Discussion:     Not applicable - controlled prescriptions are not prescribed by this practice    Psychotherapy Provided:     Individual psychotherapy provided: Medications, treatment progress and treatment plan reviewed with Charley Xiao. Medication changes discussed with David.   Recent stressors including ongoing struggles pertaining to the loss of his daughter were discussed with Josefina Gomez. Importance of medication and treatment compliance reviewed with David. Supportive therapy provided. Reassurance and supportive therapy provided. Treatment Plan:    Completed and signed during the session: Not applicable - Treatment Plan not due at this session    Visit Time    Visit Start Time: 10:00 AM  Visit Stop Time: 11:00 AM  Total Visit Duration: 60 minutes    This note was shared with patient. James De Anda MD 09/13/23    This note has been constructed using a voice recognition system. There may be translation, syntax, or grammatical errors.  If you have any questions, please contact the dictating provider.

## 2023-09-13 ENCOUNTER — OFFICE VISIT (OUTPATIENT)
Dept: PSYCHIATRY | Facility: CLINIC | Age: 58
End: 2023-09-13
Payer: COMMERCIAL

## 2023-09-13 VITALS — WEIGHT: 276.9 LBS | BODY MASS INDEX: 36.53 KG/M2

## 2023-09-13 DIAGNOSIS — F43.21 COMPLICATED BEREAVEMENT: ICD-10-CM

## 2023-09-13 DIAGNOSIS — F41.1 GENERALIZED ANXIETY DISORDER: ICD-10-CM

## 2023-09-13 DIAGNOSIS — F32.1 CURRENT MODERATE EPISODE OF MAJOR DEPRESSIVE DISORDER WITHOUT PRIOR EPISODE (HCC): Primary | ICD-10-CM

## 2023-09-13 PROCEDURE — 99214 OFFICE O/P EST MOD 30 MIN: CPT

## 2023-09-13 RX ORDER — DOXEPIN HYDROCHLORIDE 6 MG/1
6 TABLET ORAL
Qty: 30 TABLET | Refills: 1 | Status: SHIPPED | OUTPATIENT
Start: 2023-09-13

## 2023-09-13 RX ORDER — MIRTAZAPINE 30 MG/1
30 TABLET, FILM COATED ORAL
Qty: 90 TABLET | Refills: 0 | Status: SHIPPED | OUTPATIENT
Start: 2023-09-13 | End: 2023-12-12

## 2023-09-13 RX ORDER — BUSPIRONE HYDROCHLORIDE 15 MG/1
15 TABLET ORAL 3 TIMES DAILY
Qty: 90 TABLET | Refills: 1 | Status: SHIPPED | OUTPATIENT
Start: 2023-09-13

## 2023-09-13 NOTE — PATIENT INSTRUCTIONS
Please present for your previously scheduled appointment approximately 15 minutes prior to appointment time. If you are running late or are unable to attend your appointment, please call our [place] office at [phone] or our [facility] office at [phone] if applicable to notify the office of your absence. If you are in psychological crisis including not limited to suicidal/homicidal thoughts or thoughts of self-injury, do not hesitate to call/contact your [facility] hotline (see below) or attend to the nearest emergency department.   [facility] Crisis: [address] Crisis: [phone]  [address] Crisis: [address] Crisis: [address] Crisis: [address] Crisis: [address] Crisis: [phone]  National Suicide Prevention Hotline: 6-376.807.9880

## 2023-09-15 ENCOUNTER — HOSPITAL ENCOUNTER (OUTPATIENT)
Dept: RADIOLOGY | Facility: CLINIC | Age: 58
End: 2023-09-15
Payer: COMMERCIAL

## 2023-09-15 ENCOUNTER — OFFICE VISIT (OUTPATIENT)
Dept: PODIATRY | Age: 58
End: 2023-09-15
Payer: COMMERCIAL

## 2023-09-15 VITALS
HEART RATE: 75 BPM | HEIGHT: 73 IN | SYSTOLIC BLOOD PRESSURE: 100 MMHG | OXYGEN SATURATION: 97 % | BODY MASS INDEX: 36.5 KG/M2 | DIASTOLIC BLOOD PRESSURE: 60 MMHG | WEIGHT: 275.4 LBS | TEMPERATURE: 98 F

## 2023-09-15 DIAGNOSIS — M79.672 LEFT FOOT PAIN: ICD-10-CM

## 2023-09-15 DIAGNOSIS — E11.42 TYPE 2 DIABETES MELLITUS WITH DIABETIC POLYNEUROPATHY, WITH LONG-TERM CURRENT USE OF INSULIN (HCC): ICD-10-CM

## 2023-09-15 DIAGNOSIS — M14.679 CHARCOT ARTHROPATHY OF MIDFOOT: ICD-10-CM

## 2023-09-15 DIAGNOSIS — Z79.4 TYPE 2 DIABETES MELLITUS WITH DIABETIC POLYNEUROPATHY, WITH LONG-TERM CURRENT USE OF INSULIN (HCC): ICD-10-CM

## 2023-09-15 DIAGNOSIS — Z89.432 STATUS POST TRANSMETATARSAL AMPUTATION OF FOOT, LEFT (HCC): ICD-10-CM

## 2023-09-15 DIAGNOSIS — M79.672 LEFT FOOT PAIN: Primary | ICD-10-CM

## 2023-09-15 PROCEDURE — 73630 X-RAY EXAM OF FOOT: CPT

## 2023-09-15 PROCEDURE — 99212 OFFICE O/P EST SF 10 MIN: CPT | Performed by: STUDENT IN AN ORGANIZED HEALTH CARE EDUCATION/TRAINING PROGRAM

## 2023-09-15 NOTE — PROGRESS NOTES
Assessment/Plan:     Diagnoses and all orders for this visit:    Left foot pain  -     X-ray foot left 3+ views; Future    Status post transmetatarsal amputation of foot, left ([address])    Type 2 diabetes mellitus with diabetic polyneuropathy, with long-term current use of insulin ([facility])    Charcot arthropathy of midfoot          Imaging Reviewed Today  · Left foot XR WB 3v 9/15/23: stable post-TMA with sclerosis and lucencies, no significant acute changes from prior imaging    Prior Images  · NM ceretec 8/18/23: There is only mild nonfocal left foot activity. No definite scintigraphic evidence for osteomyelitis. · Left foot XR 7/26/23: osseous erosions and fragmentations to remaining 1/3/4/5 metatarsals and tarsometatarsal joint dislocations  · Left foot XR 8/7/23: stable osseous erosions and fragmentations to remaining 1/3/4/5 metatarsals and tarsometatarsal joint dislocations  · Bone scan 8/10/23: "Increased radiotracer activity in all 3 phases in the left foot, predominantly at the surgical site of the first digit and lateral midfoot. Findings may be due to Charcot neuropathy as seen on foot radiographs, however on the basis of the three-phase bone scan scintigraphic findings, osteomyelitis cannot be excluded"     IMPRESSION:  · Left foot pain, acute. Charcot diagnosis made. Pain has resolved, XR stable. · Left foot 2nd metatarsal OM s/p 2nd metatarsal excision and revision TMA (DOS 4/7/23 & 4/12/23). Patient had incisional dehiscence and full thickness wound (now healed) due to noncompliance with WB recommendations. · NIDDM, A1c 5.8% (1/3/23)     PLAN:  · Bone biopsies x3 performed to left foot 7/26/23. Micro with only 1+ in broth only (staph coag -) and bone Pathology with reactive changes (per pathologist, no acute inflammation however chronic process cannot be excluded). Bone scan likely charcot however OM could not be excluded. Ceretec scan negative for OM.  Charcot is most likely diagnosis given evidence as above. Currently pain has fully resolved and XR appears stable  · F/u for CROW boot as patient is noncompliant  · Pain mngmt note from 8/28/23. Currently on methadone and increased gabapentin  · I again discussed that charcot process and healing timeline. I discussed risk of ulceration. · F/u 10wks for at risk foot care (or sooner if issues arise)    Subjective:      Patient ID: Guanako Hernández is a 59 y.o. male. Vasile Fermin presents to clinic today concerning f/u left foot pain. Notes foot pain has fully resolved and he is back in shoes. Did have CROW boot fit. Feels well, no N/V/C/F/CP/SOB. The following portions of the patient's history were reviewed and updated as appropriate: allergies, current medications, past family history, past medical history, past social history, past surgical history and problem list.    Review of Systems   Constitutional: Negative for activity change, chills and fever. HENT: Negative. Respiratory: Negative for cough, chest tightness and shortness of breath. Cardiovascular: Positive for leg swelling (Chronic B/L). Negative for chest pain. Endocrine: Negative. Genitourinary: Negative. Skin: Negative for wound (Left TMA site). Neurological:        PN   Psychiatric/Behavioral: Negative. Negative for agitation and behavioral problems. Objective:      /60   Pulse 75   Temp 98 °F (36.7 °C) (Temporal)   Ht 6' 1" (1.854 m)   Wt 125 kg (275 lb 6.4 oz)   SpO2 97%   BMI 36.33 kg/m²          Physical Exam  Constitutional:       Appearance: Normal appearance. He is ill-appearing (chronic). Comments: Anxious   Cardiovascular:      Comments: Chronic venous stasis dermatitis with B/L LE brawny edema. Diminished pedal pulses due to edema. Absent pedal hair. Pulmonary:      Effort: No respiratory distress. Musculoskeletal:         General: No tenderness or deformity. Normal range of motion. Comments: S/p left TMA.  No pain to left plantar and dorsal foot. There is improved edema to foot and LLE. Skin:     Capillary Refill: Capillary refill takes less than 2 seconds. Comments: B/L LE skin is atrophic - thin, dry and shiny in appearance. Left revisional TMA site appears healed. No erythema. No open wounds. Neurological:      General: No focal deficit present. Mental Status: He is alert and oriented to person, place, and time.       Comments: N/T/B to B/L LE   Psychiatric:         Mood and Affect: Mood normal.         Behavior: Behavior normal.

## 2023-09-15 NOTE — LETTER
September 15, 2023     Patient: Kiet Starkey  YOB: 1965  Date of Visit: 9/15/2023      To Whom it May Concern:    Beulahjusjazmine Palafox is under my professional care. Brianna Talamantes was seen in my office on 9/15/2023. Brianna Talamantes may return to work without limitations. If you have any questions or concerns, please don't hesitate to call.          Sincerely,          Joyce Estevez DPM        CC: No Recipients

## 2023-09-22 ENCOUNTER — OFFICE VISIT (OUTPATIENT)
Dept: PSYCHIATRY | Facility: CLINIC | Age: 58
End: 2023-09-22

## 2023-09-22 ENCOUNTER — TELEPHONE (OUTPATIENT)
Dept: PSYCHIATRY | Facility: CLINIC | Age: 58
End: 2023-09-22

## 2023-09-22 NOTE — TELEPHONE ENCOUNTER
Patient contacted the office in regards to his appt on 9/22/23 at 8:00a. Patient stated he was stuck in heavy traffic, that why he missed his appointment. Patient expressed that he will be at the next appointment on 10/6/23 8:00a.

## 2023-09-22 NOTE — PSYCH
No Call No Show  No Charge    David Shelby did not attend today's (09/22/23) therapy appointment and did not inform clerical staff of their potential absence on days prior to the evaluation. This writer waited 15 minutes prior to declaring no show. Treatment team will attempt outreach and reschedule the patient accordingly. If the patient cannot be contacted directly, a HIPPA compliant voicemail will be left.      Treatment Plan not due at this session     Mark Mullen MD

## 2023-09-25 ENCOUNTER — TELEPHONE (OUTPATIENT)
Age: 58
End: 2023-09-25

## 2023-09-25 NOTE — TELEPHONE ENCOUNTER
Caller: Mik Mcdermott    Doctor/Office: Dr. Bipin Hoff    #: [phone]    Escalation: Care/Please return call as he cannot work due to amputation and needs letter for work stating this fact? Please call pt back/discuss/advise.  Thanks

## 2023-10-03 RX ORDER — MIRTAZAPINE 15 MG/1
TABLET, FILM COATED ORAL
COMMUNITY
Start: 2023-09-22

## 2023-10-03 RX ORDER — TRAMADOL HYDROCHLORIDE 50 MG/1
TABLET ORAL
COMMUNITY
Start: 2023-09-21

## 2023-10-03 RX ORDER — ACETAMINOPHEN AND CODEINE PHOSPHATE 300; 30 MG/1; MG/1
1 TABLET ORAL EVERY 6 HOURS PRN
COMMUNITY
Start: 2023-09-11

## 2023-10-03 RX ORDER — PANTOPRAZOLE SODIUM 40 MG/1
TABLET, DELAYED RELEASE ORAL
COMMUNITY
Start: 2023-09-12

## 2023-10-03 RX ORDER — BUSPIRONE HYDROCHLORIDE 10 MG/1
TABLET ORAL
COMMUNITY
Start: 2023-09-12

## 2023-10-05 ENCOUNTER — OFFICE VISIT (OUTPATIENT)
Dept: PSYCHIATRY | Facility: CLINIC | Age: 58
End: 2023-10-05

## 2023-10-05 DIAGNOSIS — F41.1 GENERALIZED ANXIETY DISORDER: ICD-10-CM

## 2023-10-05 DIAGNOSIS — F43.21 COMPLICATED BEREAVEMENT: ICD-10-CM

## 2023-10-05 DIAGNOSIS — F32.1 CURRENT MODERATE EPISODE OF MAJOR DEPRESSIVE DISORDER WITHOUT PRIOR EPISODE (HCC): Primary | ICD-10-CM

## 2023-10-05 NOTE — PSYCH
Behavioral Health Psychotherapy Progress Note    This is a therapy progress note for the patient Paulie Long. Kathy Arteaga (who prefers to be called Nat Treviño) is a 59-year-old male,  to his wife Deric Busby  for over 28 years, has 3 sons, is currently living in a house with his wife and 2 dogs in the [facility], currently employed as a  for [employer]. Nat Treviño reports a past medical history that includes atrial fibrillation, type 2 diabetes with diabetic polyneuropathy status post multiple foot surgeries in the setting of foot osteomyelitis, obstructive sleep apnea, hypertension, chronic diastolic heart failure, and is approximately 2 years status post bariatric surgery. Cy possesses a past psychiatric history of unspecified depression and unspecified anxiety.     Cy reports that he has been struggling with mental health for the past two years following the untimely passing of his daughter Ashley Shay (she passed at the age of 19years old). Cy reports that as a teenager his daughter began dating a boyfriend who struggled with drugs and who influenced her to use drugs. Cy reports that he did his best to support his daughter and supported her through drug and alcohol rehab. He reports that about two years ago he was informed that his daughter had  and was reportedly found down by her boyfriend. Cy reports that his daughter was found to have heroin and fentanyl in her system at the time. Nat Treviño has struggled to cope with her passing since then. He reports that he becomes emotional when thinking of her. He firmly believes that she is in heaven, but laments that he is not able to see her. At this time Nat Treviño also harbors feelings of hatred towards his late daughter's boyfriend. He feels that the boyfriend was involved in her death, but states at this time he has been in contact with law enforcement and there have been no changes brought against him.  Cy reports he has a 6 year old grandson Rema Hendirx who lives with his late daughter's boyfriend (who is the father of the child). Psychotherapy Provided: Individual Psychotherapy     1. Current moderate episode of major depressive disorder without prior episode ([address])        2. Complicated bereavement        3. Generalized anxiety disorder            Goals addressed in session: Goal 1, Goal 2 and Goal 3      DATA:     Today, Cy reports that since his last therapy appointment he has gone back to work as a  and that it has been helpful in keeping his mind busy. He reports that he enjoys his work and that it gives him a sense of purpose. He reports at times while at work his mind will trail off to thinking about his daughter. The importance of mindfulness and grounding techniques were reinforced in session. Today, Peter Licea reports that he continues to struggle over the passing of his daughter. Peter Licea states "I don't want to accept that she's gone." When he was asked what it would mean to accept her passing, he stated "then I'll have to say goodbye to her" and "I feel that I'll forget." In conversation Peter Licea continues to idealize his late daughter. He was reminded that he has placed his daughter on an unrealistic pedestal and he was challenged to identify how this image was unfair to her. He was challenged to identify how his struggles to cope with the passing of his daughter impact his day to day life and his family's life. Peter Licea was cautioned that his struggles are being seen by his grandson and that this could have a lasting impact on his grandson's mental health. Cy agreed and stated that his family had pointed out this fact as well. Peter Licea states that he has been determined to be present in the moment when he is with his grandson. Cy was challenged that if he can control his emotions and remain focused on the present when he is with his grandson that he can exert this control elsewhere.  The topic of "saying aziza" was discussed in detail. Puma Jones was reminded that nobody can see the future and it's important to make the best of every day because tomorrow may not come     During this session, this clinician used the following therapeutic modalities: Cognitive Behavioral Therapy, Mindfulness-based Strategies and Supportive Psychotherapy    Substance Abuse was not addressed during this session. ASSESSMENT:  Marek Strange presents with a Euthymic/ normal mood. his affect is Normal range and intensity, which is congruent, with his mood and the content of the session. The client has made progress on their goals. Cy Chong presents with a minimal risk of suicide, minimal risk of self-harm, and minimal risk of harm to others. For any risk assessment that surpasses a "low" rating, a safety plan must be developed. A safety plan was indicated: no    PLAN: Between sessions, Cy Chong will continue to work on mindfulness techniques to stay in the present and to avoid ruminating about his late daughter. He will continue to work on challenging the unrealistic image he has of his daughter. At the next session, the therapist will use Cognitive Behavioral Therapy, Mindfulness-based Strategies and Supportive Psychotherapy to address the patient's resistance to accepting the passing of his daughter. Behavioral Health Treatment Plan and Discharge Planning: Marek Strange is aware of and agrees to continue to work on their treatment plan. They have identified and are working toward their discharge goals.  yes    Visit start and stop times:    10/5/23 from 2:45 PM to 3:45 PM    This note was not shared with the patient due to this is a psychotherapy note

## 2023-10-06 ENCOUNTER — OFFICE VISIT (OUTPATIENT)
Dept: PODIATRY | Age: 58
End: 2023-10-06
Payer: COMMERCIAL

## 2023-10-06 VITALS
SYSTOLIC BLOOD PRESSURE: 130 MMHG | WEIGHT: 284.4 LBS | BODY MASS INDEX: 37.69 KG/M2 | OXYGEN SATURATION: 99 % | HEART RATE: 80 BPM | DIASTOLIC BLOOD PRESSURE: 68 MMHG | TEMPERATURE: 96.8 F | HEIGHT: 73 IN

## 2023-10-06 DIAGNOSIS — Z79.4 TYPE 2 DIABETES MELLITUS WITH DIABETIC POLYNEUROPATHY, WITH LONG-TERM CURRENT USE OF INSULIN (HCC): Primary | ICD-10-CM

## 2023-10-06 DIAGNOSIS — E11.42 TYPE 2 DIABETES MELLITUS WITH DIABETIC POLYNEUROPATHY, WITH LONG-TERM CURRENT USE OF INSULIN (HCC): Primary | ICD-10-CM

## 2023-10-06 DIAGNOSIS — M14.679 CHARCOT ARTHROPATHY OF MIDFOOT: ICD-10-CM

## 2023-10-06 DIAGNOSIS — Z89.432 STATUS POST TRANSMETATARSAL AMPUTATION OF FOOT, LEFT (HCC): ICD-10-CM

## 2023-10-06 PROCEDURE — 99212 OFFICE O/P EST SF 10 MIN: CPT | Performed by: STUDENT IN AN ORGANIZED HEALTH CARE EDUCATION/TRAINING PROGRAM

## 2023-10-06 NOTE — LETTER
October 6, 2023     Patient: José Miguel Infante  YOB: 1965  Date of Visit: 10/6/2023      To Whom it May Concern:    Chelsy Flores is under my professional care. Thor Rivera was seen in my office on 10/6/2023. Thor Rivera cannot perform his current job duties due to amputation, high risk of ulceration, poor balance. This is long term and for the foreseeable future. If you have any questions or concerns, please don't hesitate to call.          Sincerely,          Lorrie Granados DPM        CC: No Recipients

## 2023-10-06 NOTE — PROGRESS NOTES
Assessment/Plan:     Diagnoses and all orders for this visit:    Type 2 diabetes mellitus with diabetic polyneuropathy, with long-term current use of insulin ([address])    Charcot arthropathy of midfoot    Status post transmetatarsal amputation of foot, left ([facility])    Other orders  -     traMADol (ULTRAM) 50 mg tablet  -     pantoprazole (PROTONIX) 40 mg tablet  -     mirtazapine (REMERON) 15 mg tablet  -     busPIRone (BUSPAR) 10 mg tablet  -     acetaminophen-codeine (TYLENOL with CODEINE #3) 300-30 MG per tablet; Take 1 tablet by mouth every 6 (six) hours as needed          Prior Images  · Left foot XR WB 3v 9/15/23: stable post-TMA with sclerosis and lucencies, no significant acute changes from prior imaging  · NM ceretec 8/18/23: There is only mild nonfocal left foot activity. No definite scintigraphic evidence for osteomyelitis. · Left foot XR 7/26/23: osseous erosions and fragmentations to remaining 1/3/4/5 metatarsals and tarsometatarsal joint dislocations  · Left foot XR 8/7/23: stable osseous erosions and fragmentations to remaining 1/3/4/5 metatarsals and tarsometatarsal joint dislocations  · Bone scan 8/10/23: "Increased radiotracer activity in all 3 phases in the left foot, predominantly at the surgical site of the first digit and lateral midfoot. Findings may be due to Charcot neuropathy as seen on foot radiographs, however on the basis of the three-phase bone scan scintigraphic findings, osteomyelitis cannot be excluded"     IMPRESSION:  · Left foot pain, acute. Charcot diagnosis made. Pain has resolved, XR stable. · Left foot 2nd metatarsal OM s/p 2nd metatarsal excision and revision TMA (DOS 4/7/23 & 4/12/23). Patient had incisional dehiscence and full thickness wound (now healed) due to noncompliance with WB recommendations. · NIDDM, A1c 5.8% (1/3/23)     PLAN:  · Bone biopsies x3 performed to left foot 7/26/23.  Micro with only 1+ in broth only (staph coag -) and bone Pathology with reactive changes (per pathologist, no acute inflammation however chronic process cannot be excluded). Bone scan likely charcot however OM could not be excluded. Ceretec scan negative for OM. Charcot is most likely diagnosis given evidence as above. Currently pain has fully resolved and XR appears stable  · F/u for CROW boot   · Pain mngmt note from 8/28/23. Currently on methadone and increased gabapentin. Patient did not take methadone. Pain resolved to foot. · I again discussed that charcot process and healing timeline. I discussed risk of ulceration. · I do not feel that his current job is safe for him due to risk of ulceration, prior amputation and poor balance. · F/u 10wks for at risk foot care (or sooner if issues arise)    Subjective:      Patient ID: Rasta Tejada is a 59 y.o. male. Kenisha Mabry presents to clinic today concerning f/u left foot pain. Notes foot pain has fully resolved and he is back in shoes. Did have CROW boot fit. Feels well, no N/V/C/F/CP/SOB. Coming in today to discuss disability due to amputation and high risk for wounds and BKA. The following portions of the patient's history were reviewed and updated as appropriate: allergies, current medications, past family history, past medical history, past social history, past surgical history and problem list.    Review of Systems   Constitutional: Negative for activity change, chills and fever. HENT: Negative. Respiratory: Negative for cough, chest tightness and shortness of breath. Cardiovascular: Positive for leg swelling (Chronic B/L). Negative for chest pain. Endocrine: Negative. Genitourinary: Negative. Skin: Negative for wound (Left TMA site). Neurological:        PN   Psychiatric/Behavioral: Negative. Negative for agitation and behavioral problems.          Objective:      /68 (BP Location: Left arm, Patient Position: Sitting, Cuff Size: Standard)   Pulse 80   Temp (!) 96.8 °F (36 °C)   Ht 6' 1" (1.854 m)   Wt 129 kg (284 lb 6.4 oz)   SpO2 99%   BMI 37.52 kg/m²          Physical Exam  Constitutional:       Appearance: Normal appearance. He is ill-appearing (chronic). Comments: Anxious   Cardiovascular:      Comments: Chronic venous stasis dermatitis with B/L LE brawny edema. Diminished pedal pulses due to edema. Absent pedal hair. Pulmonary:      Effort: No respiratory distress. Musculoskeletal:         General: No tenderness or deformity. Normal range of motion. Comments: S/p left TMA. No pain to left plantar and dorsal foot. There is improved edema to foot and LLE. Skin:     Capillary Refill: Capillary refill takes less than 2 seconds. Comments: B/L LE skin is atrophic - thin, dry and shiny in appearance. Left revisional TMA site appears healed. No erythema. No open wounds. Neurological:      General: No focal deficit present. Mental Status: He is alert and oriented to person, place, and time.       Comments: N/T/B to B/L LE   Psychiatric:         Mood and Affect: Mood normal.         Behavior: Behavior normal.

## 2023-10-15 ENCOUNTER — HOSPITAL ENCOUNTER (EMERGENCY)
Facility: HOSPITAL | Age: 58
Discharge: HOME | End: 2023-10-15
Attending: EMERGENCY MEDICINE | Admitting: EMERGENCY MEDICINE
Payer: COMMERCIAL

## 2023-10-15 VITALS
BODY MASS INDEX: 37.77 KG/M2 | HEART RATE: 74 BPM | RESPIRATION RATE: 17 BRPM | HEIGHT: 73 IN | WEIGHT: 285 LBS | DIASTOLIC BLOOD PRESSURE: 67 MMHG | OXYGEN SATURATION: 100 % | SYSTOLIC BLOOD PRESSURE: 126 MMHG | TEMPERATURE: 99.3 F

## 2023-10-15 DIAGNOSIS — S16.1XXA STRAIN OF NECK MUSCLE, INITIAL ENCOUNTER: Primary | ICD-10-CM

## 2023-10-15 RX ORDER — LIDOCAINE 50 MG/G
1 PATCH TOPICAL DAILY
Qty: 15 PATCH | Refills: 0 | Status: SHIPPED | OUTPATIENT
Start: 2023-10-15 | End: 2023-10-30

## 2023-10-15 RX ORDER — LIDOCAINE 50 MG/G
1 PATCH TOPICAL ONCE
Status: DISCONTINUED | OUTPATIENT
Start: 2023-10-15 | End: 2023-10-15 | Stop reason: HOSPADM

## 2023-10-15 RX ADMIN — LIDOCAINE 1 PATCH: 700 PATCH TOPICAL at 02:00

## 2023-10-15 NOTE — ED PROVIDER NOTES
History  Chief Complaint   Patient presents with    Neck Pain     Pt having right sided neck pain x 1 week after chopping wood. Patient is a 78-year-old male presenting to the emergency department complaining of right-sided neck pain that is been bothering him for the past week, started after he was chopping and stacking wood, he was initially seen at urgent care, given a dose of Toradol and then prescribed Robaxin, he states he never picked the medication up because he figured the pain would just go away on its own, however now that it has not he presents today complaining of pain again, he reports he has tried Tylenol over-the-counter at home, he has been seen by pain management in the past and had injections in his neck which were helpful, he has not called to set up a follow-up with pain management for this in the past week, he denies any fall or traumatic injury, no numbness or tingling        Prior to Admission Medications   Prescriptions Last Dose Informant Patient Reported? Taking? Doxepin HCl 6 MG TABS   No No   Sig: Take 1 tablet (6 mg total) by mouth daily at bedtime as needed (insomnia)   Xarelto 20 MG tablet   Yes No   acetaminophen-codeine (TYLENOL with CODEINE #3) 300-30 MG per tablet   Yes No   Sig: Take 1 tablet by mouth every 6 (six) hours as needed   busPIRone (BUSPAR) 10 mg tablet   Yes No   busPIRone (BUSPAR) 15 mg tablet   No No   Sig: Take 1 tablet (15 mg total) by mouth 3 (three) times a day   ferrous sulfate 325 (65 Fe) mg tablet   No No   Sig: Take 1 tablet (325 mg total) by mouth daily with breakfast Do not start before June 19, 2023.    gabapentin (NEURONTIN) 300 mg capsule   No No   Sig: Take 2 capsules (600 mg total) by mouth 3 (three) times a day   methadone (DOLOPHINE) 5 mg tablet   No No   Sig: Take 0.5 tablets (2.5 mg total) by mouth every 8 (eight) hours Max Daily Amount: 7.5 mg   Patient not taking: Reported on 9/15/2023   metolazone (ZAROXOLYN) 2.5 mg tablet   Yes No   Sig: TAKE 1 TAB BY MOUTH ONCE A DAY ON MONDAY, WEDNESDAY, AND FRIDAY ONLY. Patient not taking: Reported on 9/15/2023   mirtazapine (REMERON) 15 mg tablet   Yes No   mirtazapine (REMERON) 30 mg tablet   No No   Sig: Take 1 tablet (30 mg total) by mouth daily at bedtime   naloxone (NARCAN) 4 mg/0.1 mL nasal spray   No No   Sig: Administer 1 spray into a nostril. If no response after 2-3 minutes, give another dose in the other nostril using a new spray.    Patient not taking: Reported on 9/15/2023   pantoprazole (PROTONIX) 40 mg tablet   Yes No   traMADol (ULTRAM) 50 mg tablet   Yes No      Facility-Administered Medications: None       Past Medical History:   Diagnosis Date    Atrial fibrillation ([address])     Benzodiazepine withdrawal with complication ([address]) 1/95/0613    Chronic diastolic (congestive) heart failure (HCC)     Diabetes mellitus ([address])     High cholesterol     Hyperlipidemia     Pacemaker     Stroke [facility])        Past Surgical History:   Procedure Laterality Date    APPENDECTOMY      ATRIAL CARDIAC PACEMAKER INSERTION      BARIATRIC SURGERY  05/2021    BONE BIOPSY Left 7/26/2023    Procedure: EXCISION BIOPSY BONE LESION EXTREMITY;  Surgeon: Wolf Del Real DPM;  Location:  MAIN OR;  Service: Podiatry    EPIDURAL BLOCK INJECTION N/A 5/19/2022    Procedure: BLOCK / INJECTION EPIDURAL STEROID CERVICAL C7-T1;  Surgeon: Guillermina Rubio MD;  [facility]: [facility];  Service: Pain Management     FL GUIDED NEEDLE PLAC BX/ASP/INJ  3/22/2022    FOOT AMPUTATION Left 4/28/2022    Procedure: LEFT TRANSMETATARSAL AMPUTATION.;  Surgeon: Denis Madden DPM;  [facility];  Service: Podiatry    NERVE BLOCK Right 2/10/2022    Procedure: BLOCK MEDIAL BRANCH C3, C4, C5 #1;  Surgeon: Guillermina Rubio MD;  [facility];  Service: Pain Management     NERVE BLOCK Right 3/22/2022    Procedure: BLOCK MEDIAL BRANCH C3, C4, C5 #2;  Surgeon: Guillermina Rubio MD;  [facility];  Service: Pain Management     ME AMPUTATION FOOT TRANSMETARSAL Left 4/12/2023    Procedure: REVISION LEFT TRANSMETATARSAL (TMA) AMPUTATION, REMOVAL OF UNVIABLE TISSUE AND BONE,;  Surgeon: Sandra Pritchett DPM;  [facility];  Service: Podiatry    OH AMPUTATION METATARSAL W/TOE SINGLE Left 4/7/2023    Procedure: 2ND RAY RESECTION FOOT;  Surgeon: Sandra Pritchett DPM;  [facility];  Service: Podiatry    OH AMPUTATION TOE INTERPHALANGEAL JOINT Left 11/16/2021    Procedure: AMPUTATION LESSER TOE;  Surgeon: Andrew Paris DPM;  [facility];  Service: Drew Raul Right 4/7/2022    Procedure: Right C3, C4, C5 RFA;  Surgeon: Mahendra Pires MD;  [facility];  Service: Pain Management     RHIZOTOMY Right 2/9/2023    Procedure: RHIZOTOMY CERVICAL MEDIAL BRANCH NERVES RIGHT C3, C4, C5;  Surgeon: Mahendra Pires MD;  [facility];  Service: Pain Management     TOE AMPUTATION Left     2nd toe    TOE AMPUTATION Left 9/15/2021    Procedure: AMPUTATION LEFT 4TH TOE;  Surgeon: Andrew Paris DPM;  [facility];  Service: Podiatry    TOE AMPUTATION Right 1/12/2022    Procedure: AMPUTATION TOE;  Surgeon: Nathaly Tran DPM;  [facility];  Service: Podiatry    TOE AMPUTATION Right 2/23/2022    Procedure: AMPUTATION TOE  RIGHT SECOND;  Surgeon: Nathaly Tran DPM;  Location: [facility] MAIN OR;  Service: Podiatry    TOE AMPUTATION Right 6/3/2022    Procedure: AMPUTATION right 4th TOE;  Surgeon: Patrick Castellon DPM;  [facility];  Service: Podiatry       Family History   Problem Relation Age of Onset    No Known Problems Mother     No Known Problems Father      I have reviewed and agree with the history as documented.     E-Cigarette/Vaping    E-Cigarette Use Never User      E-Cigarette/Vaping Substances     Social History     Tobacco Use    Smoking status: Never    Smokeless tobacco: Never   Vaping Use    Vaping Use: Never used   Substance Use Topics    Alcohol use: Never    Drug use: Never       Review of Systems   Constitutional: Negative. HENT: Negative. Eyes: Negative. Respiratory: Negative. Cardiovascular: Negative. Gastrointestinal: Negative. Endocrine: Negative. Genitourinary: Negative. Musculoskeletal:  Positive for neck pain. Skin: Negative. Allergic/Immunologic: Negative. Neurological: Negative. Hematological: Negative. Psychiatric/Behavioral: Negative. Physical Exam  Physical Exam  Constitutional:       Appearance: He is well-developed. HENT:      Head: Normocephalic and atraumatic. Eyes:      Conjunctiva/sclera: Conjunctivae normal.      Pupils: Pupils are equal, round, and reactive to light. Neck:        Comments: Tenderness to palpate in the soft tissue of the right neck laterally and in the cervical paraspinal musculature, tightness, neck is slightly side bent to the left  Cardiovascular:      Rate and Rhythm: Normal rate. Pulmonary:      Effort: Pulmonary effort is normal.   Abdominal:      Palpations: Abdomen is soft. Musculoskeletal:         General: Normal range of motion. Cervical back: Normal range of motion and neck supple. Skin:     General: Skin is warm and dry. Neurological:      Mental Status: He is alert and oriented to person, place, and time.          Vital Signs  ED Triage Vitals   Temperature Pulse Respirations Blood Pressure SpO2   10/15/23 0135 10/15/23 0133 10/15/23 0133 10/15/23 0133 10/15/23 0133   99.3 °F (37.4 °C) 74 17 126/67 100 %      Temp Source Heart Rate Source Patient Position - Orthostatic VS BP Location FiO2 (%)   10/15/23 0135 10/15/23 0133 10/15/23 0133 10/15/23 0133 --   Temporal Monitor Sitting Right arm       Pain Score       --                  Vitals:    10/15/23 0133   BP: 126/67   Pulse: 74   Patient Position - Orthostatic VS: Sitting         Visual Acuity      ED Medications  Medications   lidocaine (LIDODERM) 5 % patch 1 patch (1 patch Topical Medication Applied 10/15/23 0200)       Diagnostic Studies  Results Reviewed       None                   No orders to display              Procedures  Procedures         ED Course                                             Medical Decision Making   Patient presents with right neck pain. Given history, exam and work-up, patient likely has cervical strain. I have low suspicion for fracture, dislocation, significant ligamentous injury, septic arthritis, gout flare, new autoimmune arthropathy or gonococcal arthropathy. Problems Addressed:  Strain of neck muscle, initial encounter: acute illness or injury    Amount and/or Complexity of Data Reviewed  External Data Reviewed: notes. Risk  OTC drugs. Prescription drug management. Disposition  Final diagnoses:   Strain of neck muscle, initial encounter     Time reflects when diagnosis was documented in both MDM as applicable and the Disposition within this note       Time User Action Codes Description Comment    10/15/2023  2:05 AM Adelina Da Silva Add Louise.Dub. [id number]] Strain of neck muscle, initial encounter           ED Disposition       ED Disposition   Discharge    Condition   Stable    Date/Time   Sun Oct 15, 2023  2:05 AM    Comment   Paulie Long discharge to home/self care.                    Follow-up Information       Follow up With Specialties Details Why [address] [facility], MD Pain Medicine In 3 days  [place] [phone]              Discharge Medication List as of 10/15/2023  2:06 AM        START taking these medications    Details   lidocaine (Lidoderm) 5 % Apply 1 patch topically over 12 hours daily for 15 days Remove & Discard patch within 12 hours or as directed by MD, Starting Sun 10/15/2023, Until Mon 10/30/2023, Normal           CONTINUE these medications which have NOT CHANGED    Details   acetaminophen-codeine (TYLENOL with CODEINE #3) 300-30 MG per tablet Take 1 tablet by mouth every 6 (six) hours as needed, Starting Mon 9/11/2023, Historical Med      !! busPIRone (BUSPAR) 10 mg tablet Starting Tue 9/12/2023, Historical Med      !! busPIRone (BUSPAR) 15 mg tablet Take 1 tablet (15 mg total) by mouth 3 (three) times a day, Starting Wed 9/13/2023, Normal      Doxepin HCl 6 MG TABS Take 1 tablet (6 mg total) by mouth daily at bedtime as needed (insomnia), Starting Wed 9/13/2023, Normal      ferrous sulfate 325 (65 Fe) mg tablet Take 1 tablet (325 mg total) by mouth daily with breakfast Do not start before June 19, 2023., Starting Mon 6/19/2023, Normal      gabapentin (NEURONTIN) 300 mg capsule Take 2 capsules (600 mg total) by mouth 3 (three) times a day, Starting Mon 8/28/2023, Until Sun 11/26/2023, Normal      methadone (DOLOPHINE) 5 mg tablet Take 0.5 tablets (2.5 mg total) by mouth every 8 (eight) hours Max Daily Amount: 7.5 mg, Starting Wed 9/6/2023, Normal      metolazone (ZAROXOLYN) 2.5 mg tablet TAKE 1 TAB BY MOUTH ONCE A DAY ON MONDAY, WEDNESDAY, AND FRIDAY ONLY., Historical Med      !! mirtazapine (REMERON) 15 mg tablet Starting Fri 9/22/2023, Historical Med      !! mirtazapine (REMERON) 30 mg tablet Take 1 tablet (30 mg total) by mouth daily at bedtime, Starting Wed 9/13/2023, Until Tue 12/12/2023, Normal      naloxone (NARCAN) 4 mg/0.1 mL nasal spray Administer 1 spray into a nostril. If no response after 2-3 minutes, give another dose in the other nostril using a new spray., Normal      pantoprazole (PROTONIX) 40 mg tablet Starting Tue 9/12/2023, Historical Med      traMADol (ULTRAM) 50 mg tablet Starting Thu 9/21/2023, Historical Med      Xarelto 20 MG tablet Starting Tue 7/4/2023, Historical Med       !! - Potential duplicate medications found. Please discuss with provider.               PDMP Review         Value Time User    PDMP Reviewed  Yes 9/13/2023  7:50 AM Don Lopez MD            ED Provider  Electronically Signed by             [employer], DO  10/15/23 9771

## 2023-10-19 ENCOUNTER — OFFICE VISIT (OUTPATIENT)
Dept: PSYCHIATRY | Facility: CLINIC | Age: 58
End: 2023-10-19

## 2023-10-19 DIAGNOSIS — F32.1 CURRENT MODERATE EPISODE OF MAJOR DEPRESSIVE DISORDER WITHOUT PRIOR EPISODE (HCC): ICD-10-CM

## 2023-10-19 DIAGNOSIS — F43.21 COMPLICATED BEREAVEMENT: Primary | ICD-10-CM

## 2023-10-19 DIAGNOSIS — F41.1 GENERALIZED ANXIETY DISORDER: ICD-10-CM

## 2023-10-20 NOTE — PSYCH
Behavioral Health Psychotherapy Progress Note    This is a therapy progress note for the patient Dewanda Cooks. Bobby Whitman (who prefers to be called Peter Licea) is a 77-year-old male,  to his wife Emilie Alvarado  for over 28 years, has 3 sons, is currently living in a house with his wife and 2 dogs in the [facility], currently employed as a  for [employer]. Peter Licea reports a past medical history that includes atrial fibrillation, type 2 diabetes with diabetic polyneuropathy status post multiple foot surgeries in the setting of foot osteomyelitis, obstructive sleep apnea, hypertension, chronic diastolic heart failure, and is approximately 2 years status post bariatric surgery. Cy possesses a past psychiatric history of unspecified depression and unspecified anxiety. Cy reports that he has been struggling with mental health for the past two years following the untimely passing of his daughter Rafia Sender (she passed at the age of 21years old). Cy reports that as a teenager his daughter began dating a boyfriend who struggled with drugs and who influenced her to use drugs. Cy reports that he did his best to support his daughter and supported her through drug and alcohol rehab. He reports that about two years ago he was informed that his daughter had  and was reportedly found down by her boyfriend. Cy reports that his daughter was found to have heroin and fentanyl in her system at the time. Peter Licea has struggled to cope with her passing since then. He reports that he becomes emotional when thinking of her. He firmly believes that she is in heaven, but laments that he is not able to see her. At this time Peter Licea also harbors feelings of hatred towards his late daughter's boyfriend. He feels that the boyfriend was involved in her death, but states at this time he has been in contact with law enforcement and there have been no changes brought against him.  Cy reports he has a 6 year old grandson Brigette Gonzalez who lives with his late daughter's boyfriend (who is the father of the child). Psychotherapy Provided: Individual Psychotherapy     1. Complicated bereavement        2. Generalized anxiety disorder        3. Current moderate episode of major depressive disorder without prior episode ([address])            Goals addressed in session: Goal 1 and Goal 2     DATA:     Cy was seen for a therapy session today. He reports that "things have been rough" since his last therapy session. Cy reports at this time he continues to feel "distracted" at home as well as at his place of work. He states that his mind will continue to wander off to thinking about his daughter. He has been better able to recognize this wandering overall and has been working on mindfulness and grounding techniques. Support and encouragement was provided. At the time of interview today, Andrei Fierro reports that he has been continuing to attend a grief counseling through his [employer]. He reports that he continues to struggle during grief counseling and he states at the last grief counseling session (which was 2 days ago) he had to walk out. Cy reports that he continues to find listening to the experiences and sorrows of others too much for him to bear. During the session today, Andrei Fierro continues to report a strong desire to "fix" areas of his life. He takes great pride in his work as a  and states that in the past he has been able to "fix" equipment that no one else could. He laments that he could not "fix" his daughter, that he cannot "fix" his health (he currently struggles with complications of diabetes and has difficulties with ambulation secondary to foot amputations) and that he cannot "fix" his current life situation. Andrei Fierro continues to express feelings of blame and self-doubt.   He states multiple times that he feels that he could have done more for his daughter and could have stopped her passing from occurring. Cy himself feels that he should be punished and that he doesn't deserve happiness. Jaiden Jara continues to harbor feelings of anger and resentment towards her late daughter's boyfriend, stating that her boyfriend contributed to her death. Today, a conversation was held regarding the topics of addiction as well as the topics of boundaries. The biochemistry of addiction was discussed in detail. The impact addiction has on families was discussed in detail. It was discussed with Cy that, while the full details of his daughter's passing will likely never be known,  it is possible that his daughter was trying to stay clean, was clean for a period of time from heroin, felt overwhelmed by ongoing relationship conflicts, and used a previous dose of heroin (that in the past she had built a tolerance too) that caused respiratory depression and death. Jaiden Jara was reminded that his daughter made her own decisions and that it is unfair to blame himself for her actions, highlighting that he supported her through drug and alcohol rehab and was available when she asked for help. It was discussed with Jaiden Jara that his daughter had her own life and that there was no way he could have been present at all times. In addition, it was emphasized that his daughter would have likely resented and rejected that level of life intervention, highlighting that his daughter was previously living in the family house and then chose to move in with the boyfriend. Cy expressed understanding. Today, a conversation was held regarding surrounding the patient's grandson as well as the patient's late daughter's boyfriend. Cy states that "Bouchra Valencia loves his dad." Jaiden Jara was asked to reflect on how Bouchra Valencia would be impacted if his father were to go to long-term. Cy acknowledged the difficult situation.     At the end of the session, it was emphasized that Jaiden Jara has to work towards a "new normal." It was emphasized that the passing of his daughter will never be "fixed," however Cy can resolve to be present in the moment, present for his family, and to enjoy new life possibilities. Cy expressed understanding. During this session, this clinician used the following therapeutic modalities: Bereavement Therapy, Cognitive Behavioral Therapy, Mindfulness-based Strategies, and Supportive Psychotherapy    Substance Abuse was not addressed during this session. ASSESSMENT:  Lacy Martínez presents with a Euthymic/ normal mood. his affect is Constricted, which is congruent, with his mood and the content of the session. The client has made progress on their goals. Cy Mae presents with a low risk of suicide, low risk of self-harm, and minimal risk of harm to others. For any risk assessment that surpasses a "low" rating, a safety plan must be developed. A safety plan was indicated: no    PLAN: Between sessions, Cy Mae will continue to work on mindfulness techniques to stay in the present and to avoid ruminating about his late daughter. He will continue to work on challenging the unrealistic image she has of his daughter as well as the unrealistic image that he has of a parent. At the next session, the therapist will use Bereavement Therapy, Cognitive Behavioral Therapy, Mindfulness-based Strategies, and Supportive Psychotherapy to address the patient's resistance to accepting the passing of his daughter. Behavioral Health Treatment Plan and Discharge Planning: Lacy Martínez is aware of and agrees to continue to work on their treatment plan. They have identified and are working toward their discharge goals.  yes    Visit start and stop times: 2:45 PM to 3:45 PM    10/19/23    This note was not shared with the patient due to this is a psychotherapy note

## 2023-10-25 ENCOUNTER — OFFICE VISIT (OUTPATIENT)
Dept: PSYCHIATRY | Facility: CLINIC | Age: 58
End: 2023-10-25
Payer: COMMERCIAL

## 2023-10-25 VITALS — BODY MASS INDEX: 38 KG/M2 | WEIGHT: 288 LBS

## 2023-10-25 DIAGNOSIS — F41.1 GENERALIZED ANXIETY DISORDER: ICD-10-CM

## 2023-10-25 DIAGNOSIS — F43.21 COMPLICATED BEREAVEMENT: Primary | ICD-10-CM

## 2023-10-25 DIAGNOSIS — F32.1 CURRENT MODERATE EPISODE OF MAJOR DEPRESSIVE DISORDER WITHOUT PRIOR EPISODE (HCC): ICD-10-CM

## 2023-10-25 PROCEDURE — 99214 OFFICE O/P EST MOD 30 MIN: CPT

## 2023-10-25 RX ORDER — BUSPIRONE HYDROCHLORIDE 15 MG/1
15 TABLET ORAL 3 TIMES DAILY
Qty: 270 TABLET | Refills: 0 | Status: SHIPPED | OUTPATIENT
Start: 2023-10-25

## 2023-10-25 RX ORDER — GABAPENTIN 300 MG/1
900 CAPSULE ORAL 3 TIMES DAILY
Qty: 810 CAPSULE | Refills: 0 | Status: SHIPPED | OUTPATIENT
Start: 2023-10-25 | End: 2024-01-23

## 2023-10-25 RX ORDER — DOXEPIN HYDROCHLORIDE 10 MG/1
10 CAPSULE ORAL 2 TIMES DAILY
Qty: 60 CAPSULE | Refills: 1 | Status: SHIPPED | OUTPATIENT
Start: 2023-10-25

## 2023-10-25 NOTE — PSYCH
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 routine basis     Follows with family physician for medical problems, yearly physical exam and cardiac issues  Aware of 24 hour and weekend coverage for urgent situations accessed by calling [facility] main practice number    Risk of Harm to Self:   The following ratings are based on assessment at the time of the interview  Demographic risk factors include: , male, age: over 48 or older  Historical Risk Factors include: chronic depressive symptoms, history of traumatic experiences  Current Specific Risk Factors include: diagnosis of mood disorder, chronic anxiety symptoms, health problems, recent losses (the patient's daugher passed two years ago)  Protective Factors: no current suicidal ideation, improved depressive symptoms, improved anxiety symptoms, ability to make plans for the future, outpatient psychiatric follow up established, family support established, being a parent, being , compliant with medications, compliant with mental health treatment, having a desire to be alive, personal beliefs about the meaning and value of life, supportive family, ability to contract for safety with staff, ability to communicate with staff  Weapons/Firearms: none.  The following steps have been taken to ensure weapons are properly secured: not applicable  Based on today's assessment, Louie Barksdale presents the following risk of harm to self: Low     Risk of Harm to Others: The following ratings are based on assessment at the time of the interview  Demographic Risk Factors include: male. Historical Risk Factors include: none. Current Specific Risk Factors include: none  Protective Factors: no current homicidal ideation, improved mood, willing to continue psychiatric treatment, good support system, supportive family, responsibilities and duties to others, being a parent, being , good self-esteem, sense of personal control  Weapons/Firearms: none. The following steps have been taken to ensure weapons are properly secured: not applicable  Based on today's assessment, Louie Barksdale presents the following risk of harm to others: Minimal    Although patient's acute lethality risk is low, long-term/chronic lethality risk is mildly elevated in the presence of moderate symptoms of depression and severe symptoms of anxiety. At the current moment, Louie Barksdale is future-oriented, forward-thinking, and demonstrates ability to act in a self-preserving manner as evidenced by volitionally presenting to the clinic today, seeking treatment. At this juncture, inpatient hospitalization is not currently warranted. To mitigate future risk, patient should adhere to the recommendations of this writer, avoid alcohol/illicit substance use, utilize community-based resources and familiar support and prioritize mental health treatment. Based on today's assessment and clinical criteria, Vania Florian contracts for safety and is not an imminent risk of harm to self or others. Outpatient level of care is deemed appropriate at this present time.  Louie Barksdale understands that if they are no longer able to contract for safety, they need to call/contact the outpatient office including this writer, call/contact crisis and/orattend to the nearest Emergency Department for immediate evaluation. Risks/Benefits      Risks, Benefits And Possible Side Effects Of Medications:    Risks, benefits, and possible side effects of medications explained to Марина Benoit and he verbalizes understanding and agreement for treatment. Controlled Medication Discussion:     Not applicable - controlled prescriptions are not prescribed by this practice    Psychotherapy Provided:     Individual psychotherapy provided: Ongoing stressors including the loss of his daughter was discussed with Марина Benoit. Supportive therapy provided. Treatment Plan:    Completed and signed during the session: Not applicable - Treatment Plan not due at this session    Visit Time    Visit Start Time: 10:00AM  Visit Stop Time: 11:00AM  Total Visit Duration:  60 minutes    This note was shared with patient. Abigail Avila MD 10/25/23    This note has been constructed using a voice recognition system. There may be translation, syntax, or grammatical errors. If you have any questions, please contact the dictating provider.

## 2023-10-25 NOTE — PATIENT INSTRUCTIONS
Please present for your previously scheduled appointment approximately 15 minutes prior to appointment time. If you are running late or are unable to attend your appointment, please call our [place] office at [phone] or our [place] ([place] office at [phone] if applicable to notify the office of your absence. If you are in psychological crisis including not limited to suicidal/homicidal thoughts or thoughts of self-injury, do not hesitate to call/contact your [facility] hotline (see below) or attend to the nearest emergency department.   [facility] Crisis: [address] Crisis: [phone]  [address] Crisis: [address] Crisis: [address] Crisis: [address] Crisis: [address] Crisis: [phone]  National Suicide Prevention Hotline: 1-555.752.9079

## 2023-11-02 ENCOUNTER — OFFICE VISIT (OUTPATIENT)
Dept: PSYCHIATRY | Facility: CLINIC | Age: 58
End: 2023-11-02

## 2023-11-02 DIAGNOSIS — F32.1 CURRENT MODERATE EPISODE OF MAJOR DEPRESSIVE DISORDER WITHOUT PRIOR EPISODE (HCC): Primary | ICD-10-CM

## 2023-11-02 DIAGNOSIS — F41.1 GENERALIZED ANXIETY DISORDER: ICD-10-CM

## 2023-11-02 DIAGNOSIS — F43.21 COMPLICATED BEREAVEMENT: ICD-10-CM

## 2023-11-05 NOTE — PSYCH
Behavioral Health Psychotherapy Progress Note    This is a therapy progress note for the patient Deangelo Petersen. Joy España (who prefers to be called Puma Jones) is a 23-year-old male,  to his wife Kae Gaitan  for over 28 years, has 3 sons, is currently living in a house with his wife and 2 dogs in the [facility], currently employed as a  for [employer] and at this time is on leave from his job. Puma Jones reports a past medical history that includes atrial fibrillation, type 2 diabetes with diabetic polyneuropathy status post multiple foot surgeries in the setting of foot osteomyelitis, obstructive sleep apnea, hypertension, chronic diastolic heart failure, and is over 2 years status post bariatric surgery. At this time Puma Jones possesses a past psychiatric history of major depressive disorder, generalized anxiety disorder, and complicated bereavement. Cy reports that he has been struggling with his mental health for the past two years following the untimely passing of his daughter Peggy Alvarez (she passed at the age of 21years old). Cy reports that as a teenager his daughter began dating boyfriends struggled with drugs and who influenced her to use drugs. Cy reports that he did his best to support his daughter and supported her through drug and alcohol rehab. He reports that about two years ago he was informed that his daughter had  and was reportedly found down by her boyfriend. Cy reports that his daughter was found to have heroin and fentanyl in her system at the time. Puma Jones has struggled to cope with her passing since then. He reports that he becomes emotional when thinking of her. At this time Puma Jones also harbors feelings of hatred towards his late daughter's boyfriend. He feels that the boyfriend was involved in her death, but states at this time he has been in contact with law enforcement and there have been no changes brought against him.  Cy reports he has a 6 year old grandson Feroz Norris who lives with his late daughter's boyfriend (who is the father of the child). Psychotherapy Provided: Individual Psychotherapy     1. Current moderate episode of major depressive disorder without prior episode ([address])        2. Complicated bereavement        3. Generalized anxiety disorder            Goals addressed in session: Goal 1 and Goal 2     DATA:     Today, Cy reports that he has been feeling "better" since his last therapy appointment. He reports that he continues to search for answers in his life, including answers surrounding the passing of his daughter. He states that the possible explanation of his daughters death that was discussed at his last therapy appointment was helpful and that he is working to process what was discussed. At this time, Pat Goldstein reports that he plans to have an upcoming conversation with the . He states that at this point he is looking for closure and, if there are no plans for further investigation into the circumstances of his daughter's death, Pat Goldstein wants to move forward with his life. Supportive therapy was provided. Cy reports that he still feels distracted on a regular basis, with ruminating thoughts, and plans to take some additional time off of work. Cy reports in his free time he is making plans to help others. He states that he is considering volunteering to help speak with others who have struggled with and suffered with the loss of their loved ones. He expresses doubts that he is qualified to do so. The topic of being a teacher was discussed and Pat Goldstein was reminded that he will learn from others as they learn from him. Cy was cautioned not to over commit himself, however was encouraged that he has a story to tell about how addiction impacted his daughter's life and the life of his family. At the time of interview, Pat Goldstein reports that he enjoyed taking his grandson trick or treating this Halloween.  He reflects that Halloween was one of his daughter's favorite holidays. He reports that, while it makes him sad, he has been able to suppress his emotions and be there for his grandson. Vasile Fermin continues to state that he feels he cannot accept the passing of his daughter and feels that if he were to accept her he would then need to say goodbye to her and forget her. The topic of acceptance was discussed in detail and it was discussed that acceptance means different things to different people. Cy was encouraged, rather than to use the term accept, to decide to continue moving forward with his life while he continues to remember his daughter and honor her memory. During this session, this clinician used the following therapeutic modalities: Bereavement Therapy, Cognitive Behavioral Therapy, Mindfulness-based Strategies, and Supportive Psychotherapy    Substance Abuse was not addressed during this session. ASSESSMENT:  Olvin Sagastume presents with a Depressed mood. his affect is Constricted, which is congruent, with his mood and the content of the session. The client has made progress on their goals. Cy Sarmiento presents with a minimal risk of suicide, minimal risk of self-harm, and minimal risk of harm to others. For any risk assessment that surpasses a "low" rating, a safety plan must be developed. A safety plan was indicated: no    PLAN: Between sessions, Cy Sarmiento will continue to work on mindfulness techniques to stay in the present and to avoid ruminating about his late daughter. He will continue to work on challenging the unrealistic image she has of his daughter as well as the unrealistic image that he has of a parent. At the next session, the therapist will use Bereavement Therapy, Cognitive Behavioral Therapy, Mindfulness-based Strategies, and Supportive Psychotherapy to address the patient's resistance to accepting the passing of his daughter.      Behavioral Health Treatment Plan and Discharge Planning: Rosalinda Tucker Nils Sun is aware of and agrees to continue to work on their treatment plan. They have identified and are working toward their discharge goals.  yes    Visit start and stop times:    11/2/23 from 2:45 PM to 3:45 PM     This note was not shared with the patient due to this is a psychotherapy note

## 2023-11-07 ENCOUNTER — TELEPHONE (OUTPATIENT)
Dept: PSYCHIATRY | Facility: CLINIC | Age: 58
End: 2023-11-07

## 2023-11-07 NOTE — TELEPHONE ENCOUNTER
Medical record request was received from Department of [address] [place] [address] Drive, [address], [place] [employer], for the time frame of 4/14/2022 to Present. Will be placed in Dr. Sarmad Samayoa by the end of the day.

## 2023-11-15 ENCOUNTER — OFFICE VISIT (OUTPATIENT)
Dept: PAIN MEDICINE | Facility: CLINIC | Age: 58
End: 2023-11-15
Payer: COMMERCIAL

## 2023-11-15 VITALS
DIASTOLIC BLOOD PRESSURE: 78 MMHG | WEIGHT: 287 LBS | TEMPERATURE: 97.2 F | HEIGHT: 73 IN | SYSTOLIC BLOOD PRESSURE: 116 MMHG | HEART RATE: 63 BPM | BODY MASS INDEX: 38.04 KG/M2 | OXYGEN SATURATION: 96 %

## 2023-11-15 DIAGNOSIS — M79.672 LEFT FOOT PAIN: ICD-10-CM

## 2023-11-15 DIAGNOSIS — S16.1XXA STRAIN OF NECK MUSCLE, INITIAL ENCOUNTER: ICD-10-CM

## 2023-11-15 DIAGNOSIS — M54.12 CERVICAL RADICULOPATHY: ICD-10-CM

## 2023-11-15 DIAGNOSIS — M47.812 CERVICAL SPONDYLOSIS: ICD-10-CM

## 2023-11-15 PROCEDURE — 99214 OFFICE O/P EST MOD 30 MIN: CPT | Performed by: ANESTHESIOLOGY

## 2023-11-15 RX ORDER — LIDOCAINE HYDROCHLORIDE 10 MG/ML
5 INJECTION, SOLUTION EPIDURAL; INFILTRATION; INTRACAUDAL; PERINEURAL ONCE
OUTPATIENT
Start: 2023-11-15 | End: 2023-11-15

## 2023-11-15 RX ORDER — 0.9 % SODIUM CHLORIDE 0.9 %
1 VIAL (ML) INJECTION ONCE
OUTPATIENT
Start: 2023-11-15 | End: 2023-11-15

## 2023-11-15 RX ORDER — METHYLPREDNISOLONE ACETATE 80 MG/ML
80 INJECTION, SUSPENSION INTRA-ARTICULAR; INTRALESIONAL; INTRAMUSCULAR; PARENTERAL; SOFT TISSUE ONCE
OUTPATIENT
Start: 2023-11-15 | End: 2023-11-15

## 2023-11-15 NOTE — PATIENT INSTRUCTIONS
Neck Exercises   AMBULATORY CARE:   Neck exercises  help reduce neck pain, and improve neck movement and strength. Neck exercises also help prevent long-term neck problems. Call your doctor if:   Your pain does not get better, or gets worse. You have questions or concerns about your condition, care, or exercise program.    What you need to know about exercise safety:   Move slowly, gently, and smoothly. Avoid fast or jerky motions. Stand and sit the way your healthcare provider shows you. Good posture may reduce your neck pain. Check your posture often, even when you are not doing your neck exercises. Follow the exercise program recommended by your healthcare provider. He or she will tell you which exercises are best for your condition. He or she will also tell you how many repetitions to do and how often you should do the exercises. How to perform neck exercises safely:   Exercise position:  You may sit or stand while you do neck exercises. Face forward. Your shoulders should be straight and relaxed, with a good posture. Head tilts, forward and back:  Gently bow your head and try to touch your chin to your chest. Your healthcare provider may tell you to push on the back of your neck to help bow your head. Raise your chin back to the starting position. Tilt your head back as far as possible so you are looking up at the ceiling. Your healthcare provider may tell you to lift your chin to help tilt your head back. Return your head to the starting position. Head tilts, side to side:  Tilt your head, bringing your ear toward your shoulder. Then tilt your head toward the other shoulder. Head turns:  Turn your head to look over your shoulder. Tilt your chin down and try to touch it to your shoulder. Do not raise your shoulder to your chin. Face forward again. Do the same on the other side.          Head rolls:  Slowly bring your chin toward your chest. Next, roll your head to the right. Your ear should be positioned over your shoulder. Hold this position for 5 seconds. Roll your head back toward your chest and to the left into the same position. Hold for 5 seconds. Gently roll your head back and around in a clockwise [place] 3 times. Next, move your head in the reverse direction (counterclockwise) in a [place] 3 times. Do not shrug your shoulders upwards while you do this exercise. Follow up with your doctor as directed:  Write down your questions so you remember to ask them during your visits. © Copyright Sonja Gonzalez 2023 Information is for End User's use only and may not be sold, redistributed or otherwise used for commercial purposes. The above information is an  only. It is not intended as medical advice for individual conditions or treatments. Talk to your doctor, nurse or pharmacist before following any medical regimen to see if it is safe and effective for you.

## 2023-11-15 NOTE — PROGRESS NOTES
Assessment  1. Left foot pain  -     Ambulatory Referral to Pain Management    2. Strain of neck muscle, initial encounter  -     Ambulatory referral to Spine & Pain Management    Greater than 90% relief of pain with improved ability to participate with IADLs after Right C3, C4, C5 medial branch nerve radiofrequency ablation for nearly 10 months; pain had returned and responded well to repeat procedure performed 2/9/23. Now describes pain from left sided neck pain with radicular features in left C6 and C7 dermatome. Additionally with charcot foot; patient had left foot surgery and complains of severe pain in LLE particularly the peroneal nerve distribution of left foot. Tramadol and increased dose of gabapentin providing significant benefit, improvement in IADLs without side effects. Risks, benefits and alternatives discussed with regard to cervical NII. Handouts provided. Previously reported the following symptomatology:     Right sided neck pain described primarily arthritic features. Has yet to begin physical therapy for his neck. Cervical facet arthropathy seen on xray cervical spine. Distribution of pain follows the right C3-C6 medial branch nerve regions. + right sided facet loading maneuvers consistent with multilevel spondyloarthropathy and osteophytes seen in cervical spine. Reasonable at this time to trial medial branch blocks to target site of cervical facet mediated pain and pursue radiofrequency ablation of successful. Risks, benefits and alternatives of procedure in conjunction with multimodal pain therapy plan thoroughly discussed with patient. Questions answered to patient's satisfaction. Plan  -ILESI C7-T1 or alternate level; f/u 2 weeks post procedure; (will need xarelto 3 day med hold permission)  -gabapentin increased to 900 mg t.i.d by outside provider; counseled regarding sedative effects of taking this medication and provided up titration calendar.   Counseled not to take medication while driving or operating heavy machinery/using stairs  -physical therapy for cervical spondylosis, radiculopathy; Physician directed home exercise plan as per [employer] demonstrated and handouts provided that patient plans to participate with for 1 hour, twice a week for the next 6 weeks. There are risks associated with opioid medications, including dependence, addiction and tolerance. The patient understands and agrees to use these medications only as prescribed. Potential side effects of the medications include, but are not limited to, constipation, drowsiness, addiction, impaired judgment and risk of fatal overdose if not taken as prescribed. The patient was warned against driving while taking sedation medications. Sharing medications is a felony. At this point in time, the patient is showing no signs of addiction, abuse, diversion or suicidal ideation. [place] Prescription Drug Monitoring Program report was reviewed and was appropriate      Complete risks and benefits including bleeding, infection, tissue reaction, nerve injury and allergic reaction were discussed. The approach was demonstrated using models and literature was provided. Verbal and written consent was obtained. My impressions and treatment recommendations were discussed in detail with the patient who verbalized understanding and had no further questions. Discharge instructions were provided. I personally saw and examined the patient and I agree with the above discussed plan of care. No orders of the defined types were placed in this encounter. History of Present Illness    Greater than 90% relief of pain with improved ability to participate with IADLs after Right C3, C4, C5 medial branch nerve radiofrequency ablation for nearly 10 months; pain had returned and responded well to repeat procedure performed 2/9/23. Now describes pain from left sided neck pain with radicular features in left C6 and C7 dermatome.  Additionally with charcot foot; patient had left foot surgery and complains of severe pain in LLE particularly the peroneal nerve distribution of left foot. Tramadol and increased dose of gabapentin providing significant benefit, improvement in IADLs without side effects. Previously reported the following symptomatology:     Arthur Torres is a 59 y.o. male with pmhx of afib with pacemaker on xarelto, obesity, DAYAN, DM-2, HTN, depression/anxiety presenting with right-sided neck pain described primarily arthritic features. Describes progressive neck pain since November. The patient describes predominantly aching, nagging, indolent crampy, stabbing axial neck pain without radicular features of electric shock-like and shooting pain. He denies any weakness numbness and paresthesias. The pain is 8/10 nature and is worse with overhead maneuvers as well as lateral rotation and extension of the neck. The patient has not yet been to physical therapy, and has failed conservative medical management including naproxen 500 mg b.i.d. and gabapentin 300 mg t.i.d. The pain is significant source of disability and compromises independent activities of daily living as well as overall function. The patient has difficulty with sleep disturbance as well since the pain often wakes him up. Has trialed gabapentin, flexeril and tylenol as well as tramadol with limited benefit but has never trialed cervical epidural steroid injections or medial branch blocks. He denies any bowel or bladder issues/incontinence, gait instability. I have personally reviewed and/or updated the patient's past medical history, past surgical history, family history, social history, current medications, allergies, and vital signs today. Review of Systems   Constitutional:  Positive for activity change. HENT: Negative. Eyes: Negative. Respiratory: Negative. Cardiovascular: Negative. Gastrointestinal: Negative. Endocrine: Negative.     Genitourinary: Negative. Musculoskeletal:  Positive for arthralgias, myalgias, neck pain and neck stiffness. Skin: Negative. Allergic/Immunologic: Negative. Neurological:  Negative for weakness and numbness. Hematological: Negative. Psychiatric/Behavioral: Negative. All other systems reviewed and are negative.       Patient Active Problem List   Diagnosis    DAYAN (obstructive sleep apnea)    Chronic diastolic heart failure ([address])    Hypertension    Diabetes mellitus ([address])    Morbid obesity with BMI of 40.0-44.9, adult (HCC)    Pain, joint, ankle and foot, left    Chronic osteomyelitis of left foot with draining sinus (HCC)    Atrial fibrillation (HCC)    Anxiety    Type 2 diabetes mellitus with diabetic polyneuropathy, without long-term current use of insulin (HCC)    Toe osteomyelitis, right (HCC)    Cervical spondylosis    Cervicalgia - Right    Diabetic infection of left foot     Pacemaker    History of bariatric surgery    Encounter for perioperative consultation    Hyperkalemia    Diabetic ulcer of left midfoot associated with type 2 diabetes mellitus ([address])    Cellulitis of left lower extremity    Closed fracture of shaft of metatarsal bone of left foot    Moderate benzodiazepine use disorder (HCC)    Microcytic anemia    Other constipation    Status post partial amputation of foot, left (HCC)    Moderate protein-calorie malnutrition (HCC)    Left foot pain    Charcot arthropathy of midfoot       Past Medical History:   Diagnosis Date    Atrial fibrillation ([address])     Benzodiazepine withdrawal with complication ([address]) 8/51/7439    Chronic diastolic (congestive) heart failure (HCC)     Diabetes mellitus ([address])     High cholesterol     Hyperlipidemia     Pacemaker     Stroke [facility])        Past Surgical History:   Procedure Laterality Date    APPENDECTOMY      ATRIAL CARDIAC PACEMAKER INSERTION      BARIATRIC SURGERY  05/2021    BONE BIOPSY Left 7/26/2023    Procedure: EXCISION BIOPSY BONE LESION EXTREMITY; Surgeon: Jessy Browne DPM;  [facility];  Service: Podiatry    EPIDURAL BLOCK INJECTION N/A 5/19/2022    Procedure: BLOCK / INJECTION EPIDURAL STEROID CERVICAL C7-T1;  Surgeon: Mariel Brooks MD;  [facility]: [facility];  Service: Pain Management     FL GUIDED NEEDLE PLAC BX/ASP/INJ  3/22/2022    FOOT AMPUTATION Left 4/28/2022    Procedure: LEFT TRANSMETATARSAL AMPUTATION.;  Surgeon: Bismark Madden DPM;  [facility];  Service: Podiatry    NERVE BLOCK Right 2/10/2022    Procedure: BLOCK MEDIAL BRANCH C3, C4, C5 #1;  Surgeon: Mariel Brooks MD;  [facility];  Service: Pain Management     NERVE BLOCK Right 3/22/2022    Procedure: BLOCK MEDIAL BRANCH C3, C4, C5 #2;  Surgeon: Mariel Brooks MD;  [facility];  Service: Pain Management     FL AMPUTATION FOOT TRANSMETARSAL Left 4/12/2023    Procedure: REVISION LEFT TRANSMETATARSAL (TMA) AMPUTATION, REMOVAL OF UNVIABLE TISSUE AND BONE,;  Surgeon: Jessy Browne DPM;  [facility];  Service: Podiatry    FL AMPUTATION METATARSAL W/TOE SINGLE Left 4/7/2023    Procedure: 2ND RAY RESECTION FOOT;  Surgeon: Jessy Browne DPM;  [facility];  Service: Podiatry    FL AMPUTATION TOE INTERPHALANGEAL JOINT Left 11/16/2021    Procedure: AMPUTATION LESSER TOE;  Surgeon: Patrick Guzman DPM;  [facility];  Service: Lei Gubler Right 4/7/2022    Procedure: Right C3, C4, C5 RFA;  Surgeon: Mariel Brooks MD;  [facility];  Service: Pain Management     RHIZOTOMY Right 2/9/2023    Procedure: RHIZOTOMY CERVICAL MEDIAL BRANCH NERVES RIGHT C3, C4, C5;  Surgeon: Mariel Brooks MD;  [facility];  Service: Pain Management     TOE AMPUTATION Left     2nd toe    TOE AMPUTATION Left 9/15/2021    Procedure: AMPUTATION LEFT 4TH TOE;  Surgeon: Patrick Guzman DPM;  [facility];  Service: Podiatry    TOE AMPUTATION Right 1/12/2022    Procedure: AMPUTATION TOE;  Surgeon: Bhavik De Anda WOLF;  [facility];  Service: Podiatry    TOE AMPUTATION Right 2/23/2022    Procedure: AMPUTATION TOE  RIGHT SECOND;  Surgeon: Tracee Nicholas DPM;  Location: [address] [place] [address] MAIN OR;  Service: Podiatry    TOE AMPUTATION Right 6/3/2022    Procedure: AMPUTATION right 4th TOE;  Surgeon: Bhpuendra Green DPM;  [facility];  Service: Podiatry       Family History   Problem Relation Age of Onset    No Known Problems Mother     No Known Problems Father        Social History     Occupational History    Occupation: Maintenance Tech     Employer: [employer]   Tobacco Use    Smoking status: Never    Smokeless tobacco: Never   Vaping Use    Vaping Use: Never used   Substance and Sexual Activity    Alcohol use: Never    Drug use: Never    Sexual activity: Yes       Current Outpatient Medications on File Prior to Visit   Medication Sig    busPIRone (BUSPAR) 15 mg tablet Take 1 tablet (15 mg total) by mouth 3 (three) times a day    doxepin (SINEquan) 10 mg capsule Take 1 capsule (10 mg total) by mouth 2 (two) times a day    ferrous sulfate 325 (65 Fe) mg tablet Take 1 tablet (325 mg total) by mouth daily with breakfast Do not start before June 19, 2023.     gabapentin (NEURONTIN) 300 mg capsule Take 3 capsules (900 mg total) by mouth 3 (three) times a day    potassium chloride (KLOR-CON) 20 mEq packet Take 20 mEq by mouth 2 (two) times a day    traMADol (ULTRAM) 50 mg tablet PRN    Xarelto 20 MG tablet     lidocaine (Lidoderm) 5 % Apply 1 patch topically over 12 hours daily for 15 days Remove & Discard patch within 12 hours or as directed by MD    [DISCONTINUED] acetaminophen-codeine (TYLENOL with CODEINE #3) 300-30 MG per tablet Take 1 tablet by mouth every 6 (six) hours as needed (Patient not taking: Reported on 10/25/2023)    [DISCONTINUED] methadone (DOLOPHINE) 5 mg tablet Take 0.5 tablets (2.5 mg total) by mouth every 8 (eight) hours Max Daily Amount: 7.5 mg (Patient not taking: Reported on 9/15/2023) [DISCONTINUED] methylPREDNISolone 4 MG tablet therapy pack TAKE 6 TABLETS ON DAY 1 AS DIRECTED ON PACKAGE AND DECREASE BY 1 TAB EACH DAY FOR A TOTAL OF 6 DAYS (Patient not taking: Reported on 11/15/2023)    [DISCONTINUED] metolazone (ZAROXOLYN) 2.5 mg tablet TAKE 1 TAB BY MOUTH ONCE A DAY ON MONDAY, WEDNESDAY, AND FRIDAY ONLY. (Patient not taking: Reported on 9/15/2023)    [DISCONTINUED] mirtazapine (REMERON) 15 mg tablet  (Patient not taking: Reported on 10/25/2023)    [DISCONTINUED] naloxone (NARCAN) 4 mg/0.1 mL nasal spray Administer 1 spray into a nostril. If no response after 2-3 minutes, give another dose in the other nostril using a new spray. (Patient not taking: Reported on 9/15/2023)    [DISCONTINUED] pantoprazole (PROTONIX) 40 mg tablet  (Patient not taking: Reported on 11/15/2023)     No current facility-administered medications on file prior to visit. Allergies   Allergen Reactions    Ativan [Lorazepam] Anxiety         Physical Exam    /78 (BP Location: Left arm, Patient Position: Sitting, Cuff Size: Adult)   Pulse 63   Temp (!) 97.2 °F (36.2 °C)   Ht 6' 1" (1.854 m)   Wt 130 kg (287 lb)   SpO2 96%   BMI 37.87 kg/m²     Constitutional: normal, well developed, well nourished, alert, in no distress and non-toxic and no overt pain behavior. and obese  Eyes: anicteric  HEENT: grossly intact  Neck: supple, symmetric, trachea midline and no masses   Pulmonary:even and unlabored  Cardiovascular:No edema or pitting edema present  Skin:Normal without rashes or lesions and well hydrated  Psychiatric:Mood and affect appropriate  Neurologic:Cranial Nerves II-XII grossly intact Sensation grossly intact; no clonus negative morton's. Reflexes 2+ and brisk. Spurling's maneuver negative bilaterally. Musculoskeletal:normal gait. 5/5 strength bilaterally with AROM in upper extremities. Significant pain with right-sided cervical facet loading bilaterally and with lateral spine rotation.   TTP over right-sided cervical paraspinal muscles. Negative epifanio's test, negative gaenslen's negative SIJ loading bilaterally.     Imaging    Multilevel spondyloarthropathy/degenerative changes seen throughout cervical spine x-ray

## 2023-11-16 ENCOUNTER — TELEPHONE (OUTPATIENT)
Dept: PSYCHIATRY | Facility: CLINIC | Age: 58
End: 2023-11-16

## 2023-11-16 NOTE — TELEPHONE ENCOUNTER
Patient called to cancel appt today on 11/16 at 2:45. Writer canceled appt then transferred pt to resident line as provider is a resident to reschedule.

## 2023-11-22 ENCOUNTER — OFFICE VISIT (OUTPATIENT)
Dept: PODIATRY | Age: 58
End: 2023-11-22
Payer: COMMERCIAL

## 2023-11-22 VITALS
HEART RATE: 57 BPM | HEIGHT: 73 IN | WEIGHT: 294.8 LBS | DIASTOLIC BLOOD PRESSURE: 78 MMHG | OXYGEN SATURATION: 97 % | SYSTOLIC BLOOD PRESSURE: 106 MMHG | TEMPERATURE: 97.7 F | BODY MASS INDEX: 39.07 KG/M2

## 2023-11-22 DIAGNOSIS — Z79.4 TYPE 2 DIABETES MELLITUS WITH DIABETIC POLYNEUROPATHY, WITH LONG-TERM CURRENT USE OF INSULIN (HCC): Primary | ICD-10-CM

## 2023-11-22 DIAGNOSIS — E11.42 TYPE 2 DIABETES MELLITUS WITH DIABETIC POLYNEUROPATHY, WITH LONG-TERM CURRENT USE OF INSULIN (HCC): Primary | ICD-10-CM

## 2023-11-22 DIAGNOSIS — M14.679 CHARCOT ARTHROPATHY OF MIDFOOT: ICD-10-CM

## 2023-11-22 DIAGNOSIS — Z89.432 STATUS POST TRANSMETATARSAL AMPUTATION OF FOOT, LEFT (HCC): ICD-10-CM

## 2023-11-22 PROCEDURE — 11720 DEBRIDE NAIL 1-5: CPT | Performed by: STUDENT IN AN ORGANIZED HEALTH CARE EDUCATION/TRAINING PROGRAM

## 2023-11-22 NOTE — PROGRESS NOTES
David Anderson  1965  AT RISK FOOT CARE    1. Type 2 diabetes mellitus with diabetic polyneuropathy, with long-term current use of insulin ([facility])        2. Charcot arthropathy of midfoot        3. Status post transmetatarsal amputation of foot, left [facility])            Patient presents for at-risk foot care. Patient has no acute concerns today. Patient has significant lower extremity risk due to previous amputation. No pain, redness, swelling to left foot at all. On exam patient has thickened, hypertrophic, discolored, brittle toenails with subungual debris x2   Callus: 1 (R 5th toe)  Amputation: L TMA, R4th & partal 2/3 toes    Today's treatment includes:  Debridement of toenails x2. Using nail nipper, jomar, and curette, nails were sharply debrided, reduced in thickness and length. Devitalized nail tissue and fungal debris excised and removed. Patient tolerated well. Callus R 5th toe pared in thickness with #15 blade to more normal epithelium x1. Discussed proper shoe gear, daily inspections of feet, and general foot health with patient. Patient has Q7 findings and is recommended for at risk foot care every 9-10 weeks.     Patients most recent complete clinical exam was performed: 10/6/23

## 2023-11-27 ENCOUNTER — TELEPHONE (OUTPATIENT)
Age: 58
End: 2023-11-27

## 2023-11-27 NOTE — TELEPHONE ENCOUNTER
Caller: [facility]    Doctor: Dr. Slick Birch    Reason for call: pt was to get a crow boot/tried to mold for pt but he has a panic attack and since has said he will not get the crow boot/feels he does not need at this time. Was casted for shoes without incident.  Just for 's info    Call back#: [phone]

## 2023-11-27 NOTE — TELEPHONE ENCOUNTER
Caller:  [place]    Doctor: Daniel Dodge    Reason for call: Pt has to cancel and reschedule procedure for 11/28    Call back#: [phone]

## 2023-12-08 ENCOUNTER — OFFICE VISIT (OUTPATIENT)
Dept: PSYCHIATRY | Facility: CLINIC | Age: 58
End: 2023-12-08
Payer: COMMERCIAL

## 2023-12-08 VITALS — WEIGHT: 295 LBS | BODY MASS INDEX: 38.92 KG/M2

## 2023-12-08 DIAGNOSIS — F32.1 CURRENT MODERATE EPISODE OF MAJOR DEPRESSIVE DISORDER WITHOUT PRIOR EPISODE (HCC): Primary | ICD-10-CM

## 2023-12-08 DIAGNOSIS — F43.21 COMPLICATED BEREAVEMENT: ICD-10-CM

## 2023-12-08 DIAGNOSIS — F41.1 GENERALIZED ANXIETY DISORDER: ICD-10-CM

## 2023-12-08 PROCEDURE — 99214 OFFICE O/P EST MOD 30 MIN: CPT

## 2023-12-08 RX ORDER — DOXEPIN HYDROCHLORIDE 10 MG/1
10 CAPSULE ORAL 2 TIMES DAILY
Qty: 60 CAPSULE | Refills: 1 | Status: SHIPPED | OUTPATIENT
Start: 2023-12-08

## 2023-12-08 RX ORDER — BUSPIRONE HYDROCHLORIDE 15 MG/1
15 TABLET ORAL 3 TIMES DAILY
Qty: 270 TABLET | Refills: 0 | Status: SHIPPED | OUTPATIENT
Start: 2023-12-08

## 2023-12-08 RX ORDER — GABAPENTIN 300 MG/1
900 CAPSULE ORAL 3 TIMES DAILY
Qty: 810 CAPSULE | Refills: 0 | Status: SHIPPED | OUTPATIENT
Start: 2023-12-08 | End: 2024-03-07

## 2023-12-08 NOTE — PSYCH
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 class including GI distress, sedation, dizziness, weight gain, sexual dysfunction, decreased seizure threshold, induction of nazario, serotonin syndrome, and arrhythmia. Continue gabapentin 900 mg three times daily for anxiety, mood stability, and chronic neuropathic pain  PARQ was completed for gabapentin including sedation, dizziness, tremor, GI upset, potential for weight gain, and rare suicidal thinking. Continue BuSpar 15 mg three times daily for generalized anxiety  PARQ was completed for buspirone (Buspar) including serotonin syndrome, rare TD/EPS, dizziness, sedation, GI distress, confusion, possible mood changes, xerostomia and visual disturbances.     Risk of Harm to Self:   The following ratings are based on assessment at the time of the interview and review of medical records  Demographic risk factors include: , male, age: over 48 or older  Historical Risk Factors include: chronic depressive symptoms, history of traumatic experiences  Current Specific Risk Factors include: diagnosis of mood disorder, chronic anxiety symptoms, health problems, recent losses (the patient's daugher passed two years ago)  Protective Factors: no current suicidal ideation, improved depressive symptoms, improved anxiety symptoms, ability to make plans for the future, outpatient psychiatric follow up established, family support established, being a parent, being , compliant with medications, compliant with mental health treatment, having a desire to be alive, personal beliefs about the meaning and value of life, supportive family, ability to contract for safety with staff, ability to communicate with staff  Weapons/Firearms: none. The following steps have been taken to ensure weapons are properly secured: not applicable  Based on today's assessment, Dillon Phillips presents the following risk of harm to self: Minimal     Risk of Harm to Others: The following ratings are based on assessment at the time of the interview and a review of medical records  Demographic Risk Factors include: male. Historical Risk Factors include: none. Current Specific Risk Factors include: none  Protective Factors: no current homicidal ideation, improved mood, willing to continue psychiatric treatment, good support system, supportive family, responsibilities and duties to others, being a parent, being , good self-esteem, sense of personal control  Weapons/Firearms: none. The following steps have been taken to ensure weapons are properly secured: not applicable  Based on today's assessment, Dillon Phillips presents the following risk of harm to others: Minimal    Continue to follow with family physician and with specialists for chronic medical issues  Continue ongoing psychotherapy with this writer on a every other week basis  Aware of 24 hour and weekend coverage for urgent situations accessed by calling [facility] main practice number    Although patient's acute lethality risk is low, long-term/chronic lethality risk is mildly elevated in the presence of moderate symptoms of depression and severe symptoms of anxiety.  At the current moment, Dillon Phillips is future-oriented, forward-thinking, and demonstrates ability to act in a self-preserving manner as evidenced by volitionally presenting to the clinic today, seeking treatment. At this juncture, inpatient hospitalization is not currently warranted. To mitigate future risk, patient should adhere to the recommendations of this writer, avoid alcohol/illicit substance use, utilize community-based resources and familiar support and prioritize mental health treatment. Based on today's assessment and clinical criteria, Yordan Trejo contracts for safety and is not an imminent risk of harm to self or others. Outpatient level of care is deemed appropriate at this present time. Nilsane Heimlich understands that if they are no longer able to contract for safety, they need to call/contact the outpatient office including this writer, call/contact crisis and/orattend to the nearest Emergency Department for immediate evaluation. Risks/Benefits      Risks, Benefits And Possible Side Effects Of Medications:    Risks, benefits, and possible side effects of medications explained to Collene Heimlich and he verbalizes understanding and agreement for treatment. Controlled Medication Discussion:     Not applicable - controlled prescriptions are not prescribed by this practice    Psychotherapy Provided:     Individual psychotherapy provided: Yes  At the time of interview bereavement therapy, cognitive behavioral therapy, mindfulness based strategies, and supportive psychotherapy was utilized to help the patient come to terms with the passing of his daughter and to move forward with his life. Between sessions Astridkita Odonnell will continue to work on mindfulness techniques to stay in the present and avoid ruminating about his late daughter. Treatment Plan:    Completed and signed during the session: Yes - with David    Visit Time    Visit Start Time: 8:00 AM  Visit Stop Time: 9:00 AM  Total Visit Duration:  60 minutes    This note was shared with patient. Lj Edmonds MD 12/8/23    This note has been constructed using a voice recognition system.  There may be translation, syntax, or grammatical errors. If you have any questions, please contact the dictating provider.

## 2023-12-08 NOTE — PATIENT INSTRUCTIONS
Please present for your previously scheduled appointment approximately 15 minutes prior to appointment time. If you are running late or are unable to attend your appointment, please call our [place] office at [phone] or our [place] ([place] office at [phone] if applicable to notify the office of your absence. If you are in psychological crisis including not limited to suicidal/homicidal thoughts or thoughts of self-injury, do not hesitate to call/contact your [facility] hotline (see below) or attend to the nearest emergency department.   [facility] Crisis: [address] Crisis: [phone]  [address] Crisis: [address] Crisis: [address] Crisis: [address] Crisis: [address] Crisis: [phone]  National Suicide Prevention Hotline: 2-180.233.8323

## 2023-12-08 NOTE — BH TREATMENT PLAN
TREATMENT PLAN (Medication Management Only)        [address] [facility]    Name and Date of Birth:  Little Duncan 59 y.o. 1965  Date of Treatment Plan: December 8, 2023  Diagnosis/Diagnoses:    1. Current moderate episode of major depressive disorder without prior episode ([address] [facility] [address])    2. Generalized anxiety disorder    3. Complicated bereavement      Strengths/Personal Resources for Self-Care: Supportive family, supportive friends, taking medications as prescribed, ability to adapt to life changes, ability to communicate needs, ability to communicate well, able to understand psychiatric illness, able to listen, average or above average intelligence, financial means, general fund of knowledge, motivation for treatment, Confucianist affiliation, works skills  Area/Areas of need (in own words): "I want to work on my anxiety," "I still miss my daughter"  1. Long Term Goal: continue improvement in acceptable anxiety level, continue improvement in symptoms of depression  Target Date:6 months - 6/8/2024  Person/Persons responsible for completion of goal: Dr. Day Smith  2. Short Term Objective (s) - How will we reach this goal?:   A. Provider new recommended medication/dosage changes and/or continue medication(s): continue current medications as prescribed (gabapentin 900 mg TID, doxepin 10 mg BID, BuSpar 15 mg TID) .   Take medications as prescribed  Attend psychiatry appointments regularly  Continue psychotherapy regularly  Practice coping skills  Try coping skills using handouts provided  Try sleep hygiene techniques  Avoid alcohol   Avoid drugs   Eat a healthy diet   Exercise regularly  Try breathing exercises  Target Date:3 months - 3/8/2024  Person/Persons Responsible for Completion of Goal: David  Progress Towards Goals: stable, continuing treatment  Treatment Modality: medication management every 1 months  Review due 180 days from date of this plan: 6 months - 6/8/2024  Expected length of service: ongoing treatment  My Physician/PA/NP and I have developed this plan together and I agree to work on the goals and objectives. I understand the treatment goals that were developed for my treatment.     Nisha Lowery MD

## 2023-12-22 ENCOUNTER — OFFICE VISIT (OUTPATIENT)
Dept: PSYCHIATRY | Facility: CLINIC | Age: 58
End: 2023-12-22

## 2023-12-22 DIAGNOSIS — F43.21 COMPLICATED BEREAVEMENT: ICD-10-CM

## 2023-12-22 DIAGNOSIS — F32.1 CURRENT MODERATE EPISODE OF MAJOR DEPRESSIVE DISORDER WITHOUT PRIOR EPISODE (HCC): Primary | ICD-10-CM

## 2023-12-22 DIAGNOSIS — F41.1 GENERALIZED ANXIETY DISORDER: ICD-10-CM

## 2023-12-22 PROCEDURE — NC001 PR NO CHARGE
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
## 2024-01-05 ENCOUNTER — TELEMEDICINE (OUTPATIENT)
Dept: PSYCHIATRY | Facility: CLINIC | Age: 59
End: 2024-01-05

## 2024-01-05 ENCOUNTER — TELEPHONE (OUTPATIENT)
Dept: PSYCHIATRY | Facility: CLINIC | Age: 59
End: 2024-01-05

## 2024-01-05 DIAGNOSIS — Z91.199 NO-SHOW FOR APPOINTMENT: Primary | ICD-10-CM

## 2024-01-05 PROCEDURE — NOSHOW

## 2024-01-05 NOTE — PSYCH
No Call No Show  No Charge    David Carpio did not attend today's (01/05/24) virtual appointment and did not inform clerical staff of their potential absence on days prior to the evaluation. This writer waited on the [employer] platform for 15 minutes prior to declaring no show. This writer also called the patient's phone [phone] ([phone]) and left a message. Treatment team will attempt outreach and reschedule the patient accordingly. If the patient cannot be contacted directly, a HIPPA compliant voicemail will be left.     Treatment Plan not due at this session    Andre Rodriguez MD

## 2024-01-05 NOTE — TELEPHONE ENCOUNTER
Patients wife called and stated patient missed his appt because he was at [facility] being treated for pneumonia. Patient would like to schedule a f/u before his 1/19/2024 appt if possible

## 2024-01-08 NOTE — TELEPHONE ENCOUNTER
Called and LVM to ask patient to call resident line if they would like a sooner appt due to missing appt on 1/5.

## 2024-01-18 ENCOUNTER — TELEPHONE (OUTPATIENT)
Dept: PSYCHIATRY | Facility: CLINIC | Age: 59
End: 2024-01-18

## 2024-01-18 NOTE — TELEPHONE ENCOUNTER
Kylie called in wanting to reschedule his virtual visit tomorrow (1/19 @8:00)   Chicho wasn't sure about having enough travel time between work and home or the office. Writer stated that the appointment could be canceled but he would have to speak to someone else to reschedule. Patient decided to try to connect virtually on his smart phone through the [employer] and contact the office if there was an issue.

## 2024-01-19 ENCOUNTER — TELEMEDICINE (OUTPATIENT)
Dept: PSYCHIATRY | Facility: CLINIC | Age: 59
End: 2024-01-19

## 2024-01-19 DIAGNOSIS — F41.1 GENERALIZED ANXIETY DISORDER: ICD-10-CM

## 2024-01-19 DIAGNOSIS — F43.21 COMPLICATED BEREAVEMENT: ICD-10-CM

## 2024-01-19 DIAGNOSIS — F32.1 CURRENT MODERATE EPISODE OF MAJOR DEPRESSIVE DISORDER WITHOUT PRIOR EPISODE (HCC): Primary | ICD-10-CM

## 2024-01-19 PROCEDURE — NC001 PR NO CHARGE

## 2024-01-19 NOTE — LETTER
"Dear Cy,    I hope this message finds you well. As we have been working together for several months, I felt that it was important to send this message to you to review and reflect on the progress that we have made with regards to your mental health struggles. I understand that there will continue to be \"up and downs\" in your journey. When you are feeling down, I ask that you look back at this note.    In our discussions, we touched upon the difficult topic surrounding your daughter Shameka's passing. While the full details of her departure may remain shrouded, it's essential to consider a plausible scenario we explored. Shameka, in her efforts to stay clean, had experienced a period of sobriety from heroin. However, she may have felt overwhelmed by ongoing relationship conflicts. In an unfortunate turn of events, it's possible that she resorted to a previous dose of heroin--a substance to which she had built a tolerance in the past. Tragically, this decision would have led to respiratory depression and, ultimately, her passing. Understanding the complexity of these circumstances is undoubtedly challenging. As you navigate your thoughts and emotions, please remember that this exploration is not meant to place blame but to foster understanding and compassion. Your willingness to confront these difficult realities is commendable, and I'm here to support you through this process.     In our shared journey towards healing, I encourage you to keep a journal as a compassionate  in exploring and understanding the intrusive thoughts you may have about your beloved daughter, Shameka. I would like you to write down your feelings when you experience intense emotions of loss so that we can develop strategies to combat them. I would also encourage you to write down your regrets as a parent. It's natural for us to reflect on our parenting journey and consider if we could have done things differently. However, it's crucial to " recognize that hindsight often carries a heavy burden of unfair self-judgment.     As you put pen to paper, embrace the understanding that, despite our deepest wishes, grief and sorrow cannot bring Shameka back. Reflect on the realization that your struggles with depression not only affect yourself but also resonate within your family, including your cherished grandson.    Be kind to yourself during this process, acknowledging that it wasn't possible to be there to protect Shameka at all times. Recognize that she likely found comfort in making her own decisions and would likely have declined additional involvement in her life. It's common for us to see our loved ones, especially those no longer with us, through a lens of perfection. However, it's important to acknowledge that, like everyone, your daughter Shameka was human and, like all of us, had her faults and made decisions that had a negative impact on the family.    Moreover, open your heart to the fact that, while you couldn't save your daughter, there are people around you now who could benefit immensely from your support and care. Your presence and compassion have the power to make a meaningful difference.    Best Regards,  Dr. Andre Rodriguez MD

## 2024-01-21 NOTE — PSYCH
Virtual Psychiatry Visit - Required Documentation    Verification of patient location:  Patient is located at home ([facility]) in the following state in which I hold an active license ([place])    Encounter provider Andre Rodriguez MD    Provider located [facility] [address]  [facility] [id number] [phone]    The patient was identified by name and date of birth. David Carpio was informed that this is a telemedicine visit and that the visit is being conducted through Telephone [phone] ([phone]). In total, 15 minutes was spent on the telephone prior to the visit attempting to help the patient connect through the [employer]. All attempts were unsuccessful. In the setting of technical difficulties, the patient agreed to proceed with his therapy session through telephone.  My office door was closed. No one else was in the room. The patient has agreed to participate and understands they can discontinue the visit at any time.    Behavioral Health Psychotherapy Progress Note    This is a therapy progress note for the patient David Carpio. David (who prefers to be called Cy) is a 58-year-old male,  to his wife Maribell for over 32 years, has 3 sons, is currently living in a house with his wife and 2 dogs in the [facility], currently employed as a  for [employer]. Cy reports a past medical history that includes atrial fibrillation, type 2 diabetes with diabetic polyneuropathy status post multiple foot surgeries in the setting of foot osteomyelitis, obstructive sleep apnea, hypertension, chronic diastolic heart failure, and is over 2 years status post bariatric surgery.  At this time Cy possesses a past psychiatric history of major depressive disorder, generalized anxiety disorder, and complicated bereavement.     Cy reports that he has been struggling with  "his mental health for over the past two years following the untimely passing of his daughter Shameka (she passed at the age of 23 years old).  Cy reports that as a teenager his daughter began dating boyfriends struggled with drugs and who influenced her to use drugs. Cy reports that he did his best to support his daughter and supported her through drug and alcohol rehab. He reports that about two years ago he was informed that his daughter had  and was reportedly found down by her boyfriend. Cy reports that his daughter was found to have heroin and fentanyl in her system at the time. Cy has struggled to cope with her passing since then. He reports that he becomes emotional when thinking of her. At this time Cy also harbors feelings of hatred towards his late daughter's boyfriend. Cy reports he has a 6 year old grandson Ki who lives with his late daughter's boyfriend (who is the father of the child).     At this time, Cy continues to feel that he does not deserve to be happy. Cy continues to feel that he failed as a parent and that \"I could have done something\" to prevent the tragic passing of his daughter. He also remains with fears that being happy and moving on from his daughter's passing will dishonor her memory.      Psychotherapy Provided: Individual Psychotherapy     1. Current moderate episode of major depressive disorder without prior episode (HCC)        2. Generalized anxiety disorder        3. Complicated bereavement            Goals addressed in session: Goal 1 and Goal 2     DATA:     Today, Cy reports that \"things have been rough\" since his last office visit in December. He reports that he has been struggling more at this time with his symptoms of depression in the setting of medical stressors. Cy missed his appointment on 24 because he was hospitalized at [facility] (for approximately 5 days) in the setting of pneumonia. Cy reports at this time his shortness of breath " "continues to slowly improve. In the session today Cy was given the space to express his recent medical frustrations.    At the time of the visit, Cy reports that he continues to have intrusive and distressing thoughts surrounding his daughter. He reports that at this point in time he feels discouraged and feels that he has not made progress with accepting her passing (stating \"right now I feel the same as I did years ago\"). Cy reports that he has multiple family friends who have experienced loss and he envies that they have been able to move forward following their loss. Cy was reminded not to make unfair comparisons and to continue to focus on his own growth and development.    At the time of the session today, narrative therapy techniques were utilized. In the session today (instructing Rich to view the scenery in black and white) Cy described in detail the night before his daughter passed and the day Shameka passed away. He recounted that the night before her passing he was planning to go over to Shameka's house, however around the time he experienced these thoughts Shameka had called him and told him that she felt fine. It was highlighted to Cy that Shameka declined his help at that time. Cy then recounted the day Shameka passed. He recounted the phone call and recounted him running into his truck and driving to her house. He recounted parking and walking inside [facility] [place], where he observed EMS services attempting to revive her (Cy reports that he was on the first floor of the house and EMS was on the second floor). Cy reports screaming out in agony when the police told him that Shameka was dead. Cy reflects that he feels like screaming about his emotions now.     In the session today Cy was encouraged to find outlets to express his emotions (such as going to a quiet place and openly declaring his feelings). Cy was encouraged to admit and embrace not only the feelings of loss he has surrounding " "his daughter, but to also express his feelings of frustration towards her and the negative impact Shameka's rash decisions had on his life. Cy was reminded that he has continued to place Shameka on a pedestal and it is vital for his recovery that he see Shameka for her positive qualities and for her faults.     At the conclusion of the session today, Cy was encouraged to make a list of the reasons to accept and move on from his daughter's passing. He was encouraged to write down that no amount of grief and sorrow will bring her back. He was encouraged to write down that his depression affects not only himself, but also his family (including his grandson). Cy was reminded that he could not possibly have been there to protect Shameka at all times and she would likely have resented and resisted further intrusion in her life. He was reminded that, while he sadly could not save his daughter, there are people now who need his help.    During this session, this clinician used the following therapeutic modalities: Bereavement Therapy, Cognitive Behavioral Therapy, Gestalt Therapy Techniques, Mindfulness-based Strategies, Supportive Psychotherapy, and Narrative Therapy Techniques    Substance Abuse was not addressed during this session.     ASSESSMENT:  Cy Carpio presents with a Depressed mood. His affect was not able to be assessed due to this visit being performed via telephone.The client has made progress on their goals.    Cy Carpio presents with a minimal risk of suicide, minimal risk of self-harm, and minimal risk of harm to others.    For any risk assessment that surpasses a \"low\" rating, a safety plan must be developed.    A safety plan was indicated: no    PLAN: Between sessions, Cy Carpio will continue to work on accepting the passing of his daughter. He will continue to challenge the negative and intrusive thoughts that he harbors surrounding her passing and he will continue to work on mindfulness " strategies to live in the present. He will continue to surround himself with support through family, [employer], and grief counseling. He will work on seeing his daughter for her positive qualities and her faults and will acknowledge that her decisions had negative consequences on his life. At the next session, the therapist will use Bereavement Therapy, Cognitive Behavioral Therapy, Gestalt Therapy Techniques, Mindfulness-based Strategies, Supportive Psychotherapy, and Narrative Therapy Techniques to address these aforementioned issues.    Behavioral Health Treatment Plan and Discharge Planning: Cy Carpio is aware of and agrees to continue to work on their treatment plan. They have identified and are working toward their discharge goals. yes    Visit start and stop times: 8:15 AM to 9:10 AM    1/19/24     This note was not shared with the patient due to this is a psychotherapy note

## 2024-01-26 ENCOUNTER — TELEPHONE (OUTPATIENT)
Dept: PSYCHIATRY | Facility: CLINIC | Age: 59
End: 2024-01-26

## 2024-01-26 NOTE — TELEPHONE ENCOUNTER
Patient called and asked if he could switch his therapy appt to Thursday at 2:45 due to work.  8am does not work for patient to due to work hours.  Writer told patient that she will ask provider if that is ok to change and will call him back on Monday to let him know.

## 2024-01-31 RX ORDER — ERGOCALCIFEROL 1.25 MG/1
1 CAPSULE ORAL WEEKLY
COMMUNITY
Start: 2023-12-13

## 2024-01-31 RX ORDER — FUROSEMIDE 40 MG/1
40 TABLET ORAL EVERY MORNING
COMMUNITY
Start: 2024-01-17

## 2024-01-31 RX ORDER — ALBUTEROL SULFATE 90 UG/1
2 AEROSOL, METERED RESPIRATORY (INHALATION) EVERY 4 HOURS PRN
COMMUNITY
Start: 2024-01-09

## 2024-02-01 ENCOUNTER — OFFICE VISIT (OUTPATIENT)
Dept: PSYCHIATRY | Facility: CLINIC | Age: 59
End: 2024-02-01
Payer: COMMERCIAL

## 2024-02-01 VITALS — WEIGHT: 297.1 LBS | BODY MASS INDEX: 39.2 KG/M2

## 2024-02-01 DIAGNOSIS — F43.21 COMPLICATED BEREAVEMENT: ICD-10-CM

## 2024-02-01 DIAGNOSIS — F41.1 GENERALIZED ANXIETY DISORDER: ICD-10-CM

## 2024-02-01 DIAGNOSIS — F32.1 CURRENT MODERATE EPISODE OF MAJOR DEPRESSIVE DISORDER WITHOUT PRIOR EPISODE (HCC): Primary | ICD-10-CM

## 2024-02-01 PROCEDURE — 99214 OFFICE O/P EST MOD 30 MIN: CPT

## 2024-02-01 RX ORDER — GABAPENTIN 300 MG/1
900 CAPSULE ORAL 3 TIMES DAILY
Qty: 810 CAPSULE | Refills: 0 | Status: SHIPPED | OUTPATIENT
Start: 2024-02-01 | End: 2024-05-01

## 2024-02-01 RX ORDER — BUPROPION HYDROCHLORIDE 150 MG/1
150 TABLET ORAL DAILY
Qty: 30 TABLET | Refills: 2 | Status: SHIPPED | OUTPATIENT
Start: 2024-02-01

## 2024-02-01 RX ORDER — DOXEPIN HYDROCHLORIDE 10 MG/1
10 CAPSULE ORAL 2 TIMES DAILY
Qty: 60 CAPSULE | Refills: 1 | Status: SHIPPED | OUTPATIENT
Start: 2024-02-01

## 2024-02-01 RX ORDER — BUSPIRONE HYDROCHLORIDE 15 MG/1
15 TABLET ORAL 3 TIMES DAILY
Qty: 270 TABLET | Refills: 0 | Status: SHIPPED | OUTPATIENT
Start: 2024-02-01

## 2024-02-01 NOTE — PSYCH
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 of seizures, and eating disorders.     Continue doxepin 10 mg twice daily for symptoms of depression and anxiety as well as for sleep maintenance   PARQ was completed for the tricyclic antidepressant class including GI distress, sedation, dizziness, weight gain, sexual dysfunction, decreased seizure threshold, induction of nazario, serotonin syndrome, and arrhythmia.      Continue gabapentin 900 mg 3 times daily for anxiety, mood stability, and chronic neuropathic pain   PARQ was completed for gabapentin including sedation, dizziness, tremor, GI upset, potential for weight gain, and rare suicidal thinking.     Continue BuSpar 15 mg 3 times daily for generalized anxiety   PARQ was completed for buspirone (Buspar) including serotonin syndrome, rare TD/EPS, dizziness, sedation, GI distress, confusion, possible mood changes, xerostomia and visual disturbances.     Risk of Harm to Self:   The following ratings are based on assessment at the time of the interview and review of medical records  Demographic risk factors include: , male, age: over 50 or older  Historical Risk  Factors include: chronic depressive symptoms, history of traumatic experiences  Current Specific Risk Factors include: diagnosis of mood disorder, chronic anxiety symptoms, health problems, recent losses (the patient's daugher passed two years ago)  Protective Factors: no current suicidal ideation, improved depressive symptoms, improved anxiety symptoms, ability to make plans for the future, outpatient psychiatric follow up established, family support established, being a parent, being , compliant with medications, compliant with mental health treatment, having a desire to be alive, personal beliefs about the meaning and value of life, supportive family, ability to contract for safety with staff, ability to communicate with staff  Weapons/Firearms: none. The following steps have been taken to ensure weapons are properly secured: not applicable  Based on today's assessment, David presents the following risk of harm to self: Minimal     Risk of Harm to Others:  The following ratings are based on assessment at the time of the interview and a review of medical records  Demographic Risk Factors include: male.  Historical Risk Factors include: none.  Current Specific Risk Factors include: none  Protective Factors: no current homicidal ideation, improved mood, willing to continue psychiatric treatment, good support system, supportive family, responsibilities and duties to others, being a parent, being , good self-esteem, sense of personal control  Weapons/Firearms: none. The following steps have been taken to ensure weapons are properly secured: not applicable  Based on today's assessment, David presents the following risk of harm to others: Minimal    At the time of interview bereavement therapy, cognitive behavioral therapy, mindfulness based strategies, and supportive psychotherapy was utilized to help the patient come to terms with the passing of his daughter and to move forward with his life. Between  josé miguel Benoit will continue to work on mindfulness techniques to stay in the present and avoid ruminating about his late daughter.      Although patient's acute lethality risk is low, long-term/chronic lethality risk is mildly elevated in the presence of moderately severe symptoms of depression and severe symptoms of anxiety. At the current moment, David is future-oriented, forward-thinking, and demonstrates ability to act in a self-preserving manner as evidenced by volitionally presenting to the clinic today, seeking treatment. At this juncture, inpatient hospitalization is not currently warranted. To mitigate future risk, patient should adhere to the recommendations of this writer, avoid alcohol/illicit substance use, utilize community-based resources and familiar support and prioritize mental health treatment.     Based on today's assessment and clinical criteria, David Carpio contracts for safety and is not an imminent risk of harm to self or others. Outpatient level of care is deemed appropriate at this present time. David understands that if they are no longer able to contract for safety, they need to call/contact the outpatient office including this writer, call/contact crisis and/orattend to the nearest Emergency Department for immediate evaluation.    Risks/Benefits      Risks, Benefits And Possible Side Effects Of Medications:    Risks, benefits, and possible side effects of medications explained to David and he verbalizes understanding and agreement for treatment.    Controlled Medication Discussion:     Not applicable - controlled prescriptions are not prescribed by this practice    Psychotherapy Provided:     Individual psychotherapy provided: Yes  Recent stressors discussed with David including medical struggles as well as ongoing ruminating thoughts about his late daughter.  Supportive therapy provided.     Treatment Plan:    Completed and signed during the session: Not applicable -  Treatment Plan not due at this session    Visit Time    Visit Start Time: 1:45 PM  Visit Stop Time: 2:45 PM  Total Visit Duration: 60 minutes    This note was shared with patient.    Andre Rodriguez MD 02/01/24    This note has been constructed using a voice recognition system. There may be translation, syntax, or grammatical errors. If you have any questions, please contact the dictating provider.

## 2024-02-01 NOTE — BH CRISIS PLAN
Client Name: Cy Carpio       Client YOB: 1965    [facility] Safety Plan      Creation Date: 2/1/24    Created By: Andre Rodriguez MD       Step 1: Warning Signs:   Warning Signs   When I feel overwhelmed and confused   When I feel like I can't be there to support and help others            Step 2: Internal Coping Strategies:   Internal Coping Strategies   Going to Orthodox and praying   Tinkering around the house            Step 3: People and social settings that provide distraction:   Name Contact Information   Maribell Carpio [phone]    Places   Work   Jewish           Step 4: People whom I can ask for help during a crisis:      Name Contact Information    Maribell Carpio [phone]      Step 5: Professionals or agencies I can contact during a crisis:      Clinican/Agency Name Phone Emergency Contact    Dr. Rodriguez [phone]       [facility] Emergency Department Emergency Department Phone Emergency Department Address    [phone]        Crisis Phone Numbers:   Suicide Prevention Lifeline: Call or Text  424 Crisis Text Line: Text HOME to 189-538   Please note: Some [facility] do not have a separate number for Child/Adolescent specific crisis. If your county is not listed under Child/Adolescent, please call the adult number for your county      Adult Crisis Numbers: Child/Adolescent Crisis Numbers   [facility]: [phone] [facility]: [phone]   [facility]: [phone] [facility]: [phone]   [facility]: [phone] [place]: [phone]   [facility]: [phone] [place]: [phone]   [place]: [phone]   [facility]: [phone]   [facility]: [phone]   [place] [facility]: [phone] (daytime) [phone] (after hours, weekends, holidays)      Step 6: Making the environment safer (plan for lethal means safety):   Patient did not identify any lethal methods: Yes     Optional:  What is most important to me and worth living for?   My grandson is everything to me. He's the reason I want to live.     [facility] Safety Plan. oTsha Leos and Charles Garcia. Used with permission of the authors.

## 2024-02-01 NOTE — PATIENT INSTRUCTIONS
Please present for your previously scheduled appointment approximately 15 minutes prior to appointment time. If you are running late or are unable to attend your appointment, please call our [place] office at [phone] or our [place] office at [phone] if applicable to notify the office of your absence.    If you are in psychological crisis including not limited to suicidal/homicidal thoughts or thoughts of self-injury, do not hesitate to call/contact your County Crisis hotline (see below) or attend to the nearest emergency department.  [facility] Crisis: [phone]  [facility] Crisis: [phone]  [facility] Crisis: [phone]  [facility] Crisis: [phone]  [facility] Crisis: [phone]  [facility] Crisis: [phone]  [facility] Crisis: [phone]  National Suicide Prevention Hotline: 1-210.556.7105

## 2024-02-02 ENCOUNTER — OFFICE VISIT (OUTPATIENT)
Dept: PODIATRY | Age: 59
End: 2024-02-02
Payer: COMMERCIAL

## 2024-02-02 VITALS
HEIGHT: 73 IN | WEIGHT: 296 LBS | SYSTOLIC BLOOD PRESSURE: 118 MMHG | TEMPERATURE: 97.8 F | OXYGEN SATURATION: 98 % | HEART RATE: 73 BPM | BODY MASS INDEX: 39.23 KG/M2 | DIASTOLIC BLOOD PRESSURE: 80 MMHG

## 2024-02-02 DIAGNOSIS — B35.1 ONYCHOMYCOSIS: ICD-10-CM

## 2024-02-02 DIAGNOSIS — Z79.4 TYPE 2 DIABETES MELLITUS WITH DIABETIC POLYNEUROPATHY, WITH LONG-TERM CURRENT USE OF INSULIN (HCC): Primary | ICD-10-CM

## 2024-02-02 DIAGNOSIS — E11.42 TYPE 2 DIABETES MELLITUS WITH DIABETIC POLYNEUROPATHY, WITH LONG-TERM CURRENT USE OF INSULIN (HCC): Primary | ICD-10-CM

## 2024-02-02 DIAGNOSIS — Z89.432 STATUS POST TRANSMETATARSAL AMPUTATION OF FOOT, LEFT (HCC): ICD-10-CM

## 2024-02-02 DIAGNOSIS — L84 CALLUS: ICD-10-CM

## 2024-02-02 PROCEDURE — 11055 PARING/CUTG B9 HYPRKER LES 1: CPT | Performed by: STUDENT IN AN ORGANIZED HEALTH CARE EDUCATION/TRAINING PROGRAM

## 2024-02-02 PROCEDURE — 11720 DEBRIDE NAIL 1-5: CPT | Performed by: STUDENT IN AN ORGANIZED HEALTH CARE EDUCATION/TRAINING PROGRAM

## 2024-02-02 RX ORDER — PREDNISONE 20 MG/1
TABLET ORAL
COMMUNITY
Start: 2024-01-09

## 2024-02-02 RX ORDER — AZITHROMYCIN 250 MG/1
TABLET, FILM COATED ORAL
COMMUNITY
Start: 2024-01-05

## 2024-02-02 RX ORDER — AMOXICILLIN AND CLAVULANATE POTASSIUM 875; 125 MG/1; MG/1
TABLET, FILM COATED ORAL
COMMUNITY
Start: 2024-01-05

## 2024-02-02 NOTE — LETTER
February 2, 2024     Patient: David Carpio  YOB: 1965  Date of Visit: 2/2/2024      To Whom it May Concern:    David Carpio is under my professional care. David was seen in my office on 2/2/2024. David cannot perform his current job duties due to amputation, high risk of ulceration, poor balance. This is long term and for the foreseeable future.     If you have any questions or concerns, please don't hesitate to call.         Sincerely,          Adelia Yousif DPM        CC: No Recipients

## 2024-02-02 NOTE — PROGRESS NOTES
David Carpio  1965  AT RISK FOOT CARE    1. Type 2 diabetes mellitus with diabetic polyneuropathy, with long-term current use of insulin (HCC)        2. Status post transmetatarsal amputation of foot, left (HCC)        3. Onychomycosis        4. Callus              Patient presents for at-risk foot care.  Patient has no acute concerns today.  Patient has significant lower extremity risk due to previous amputation. No pain, redness, swelling to left foot at all.     On exam patient has thickened, hypertrophic, discolored, brittle toenails with subungual debris x2   Callus: 1 (R 5th toe)  Amputation: L TMA, R4th & partal 2/3 toes    Today's treatment includes:  Debridement of toenails x2. Using nail nipper, jomar, and curette, nails were sharply debrided, reduced in thickness and length. Devitalized nail tissue and fungal debris excised and removed. Patient tolerated well.    Callus R 5th toe pared in thickness with #15 blade to more normal epithelium x1.      Discussed proper shoe gear, daily inspections of feet, and general foot health with patient. Patient has Q7 findings and is recommended for at risk foot care every 9-10 weeks.    Patients most recent complete clinical exam was performed: 10/6/23

## 2024-02-19 ENCOUNTER — OFFICE VISIT (OUTPATIENT)
Dept: PSYCHIATRY | Facility: CLINIC | Age: 59
End: 2024-02-19

## 2024-02-19 DIAGNOSIS — F41.1 GENERALIZED ANXIETY DISORDER: ICD-10-CM

## 2024-02-19 DIAGNOSIS — F32.1 CURRENT MODERATE EPISODE OF MAJOR DEPRESSIVE DISORDER WITHOUT PRIOR EPISODE (HCC): Primary | ICD-10-CM

## 2024-02-19 DIAGNOSIS — F43.21 COMPLICATED BEREAVEMENT: ICD-10-CM

## 2024-02-19 NOTE — PSYCH
Behavioral Health Psychotherapy Progress Note    This is a therapy progress note for the patient David Carpio. David (who prefers to be called Cy) is a 58-year-old male,  to his wife Maribell for over 32 years, has 3 sons, is currently living in a house with his wife and 2 dogs in the [facility], currently employed as a  for [employer]. Cy reports a past medical history that includes atrial fibrillation, type 2 diabetes with diabetic polyneuropathy status post multiple foot surgeries in the setting of foot osteomyelitis, obstructive sleep apnea, hypertension, chronic diastolic heart failure, and is over 2 years status post bariatric surgery.  At this time Cy possesses a past psychiatric history of major depressive disorder, generalized anxiety disorder, and complicated bereavement.     Cy reports that he has been struggling with his mental health for over the past three years following the untimely passing of his daughter Shameka (she passed at the age of 23 years old).  Cy reports that as a teenager his daughter began hanging out with bad influences and dating boyfriends who struggled with drugs and who influenced her to use drugs. Cy reports that he did his best to support his daughter and supported her through drug and alcohol rehab. He reports that about three years ago he was informed that his daughter had  and was reportedly found down by her boyfriend. Cy reports that his daughter was found to have heroin and fentanyl in her system at the time. Cy has struggled to cope with her passing since then. He reports that he becomes emotional when thinking of her. At this time Cy also harbors feelings of hatred towards his late daughter's boyfriend. Cy reports he has a 6 year old grandson Ki who lives with his late daughter's boyfriend (who is the father of the child).     At this time, Cy continues to feel that he does not deserve to be  "happy. Cy continues to feel that he failed as a parent and that \"I could have done something\" to prevent the tragic passing of his daughter. He also remains with fears that being happy and moving on from his daughter's passing will dishonor her memory.      Psychotherapy Provided: Individual Psychotherapy     1. Current moderate episode of major depressive disorder without prior episode (HCC)        2. Complicated bereavement        3. Generalized anxiety disorder            Goals addressed in session: Goal 1 and Goal 2     DATA:     Today, Cy reports feeling \"lost.\" He describes his current life situation like \"being lost in a forest and constantly running into trees.\" Cy reports that this past week a remembrance service was held in [place] for his daughter (it has been three years since her passing). Cy reports that the service was \"emotional\" and \"stressful.\" Cy reports that one of the particular stressful aspects of the ceremony was that Shameka's former boyfriend was present. In session today, encouragement was provided and Cy was reminded that he continues to move through the stages of grief and he continues to process his emotions and feelings.    At the time of interview, Cy continues to feel that he failed as a parent. In session Cy was asked to describe in detail his failings as a parent throughout his daughter's life. Cy reports that when his daughter first started hanging out with a bad crowd he was not vocal about his disapproval of Shameka's friends (Cy reflects that his parents would have been more vocal) and feels he should have restricted Shameka's activities. Cy states that, in contrast, his wife Maribell was \"harsh\" towards Shameka and Cy feels that Shameka was often rebelling against her mother and that they had a strained relationship. Cy states \"I should have told her to leave Shameka alone.\" Cy was challenged to identify the contradictions in his thoughts. On one hand, he wishes " "that he was more critical of Shameka's friends and was more controlling. On the other hand, if he was critical of Shameka's friends she may have rebelled against him (as opposed to rebelling against his wife). Cy acknowledges that prior to Shameka's passing he was \"smothering her\" in the setting of his concern. Cy states that when his children were growing up he was often at work (working 60 plus hours a week). 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 Cy reports that he never did go on to become a  and he wonders how his life would have turned out if he had tried to balance both the goals of parenting and career at the same time (and perhaps he would have had to work fewer hours overall). Cy states \"I feel like I failed my daughter because I failed myself.\" Cy was challenged to identify that there are positives as well as negatives to focusing on career versus family and it is possible that, by continuing to pursue a career as a , his daughter may not have been born. At the time of the visit, Cy reports he continues to have intrusive and distressing thoughts surrounding his daughter. He has been utilizing the ERP therapy handouts to journal about these obsessive thoughts and he reports that it has been helping.     During this session, this clinician used the following therapeutic modalities: Bereavement Therapy, Cognitive Behavioral Therapy, Mindfulness-based Strategies, and Supportive Psychotherapy    Substance Abuse was not addressed during this session.     ASSESSMENT:  Cy Carpio presents with a Euthymic/ normal and Depressed mood.     his affect is Constricted, which is congruent, with his mood and the content of the session. The client has made progress on their goals.    Cy Carpio presents with a minimal " "risk of suicide, minimal risk of self-harm, and minimal risk of harm to others.    For any risk assessment that surpasses a \"low\" rating, a safety plan must be developed.    A safety plan was indicated: no    PLAN: Between sessions, Cy will continue to work on accepting the passing of his daughter.  He will continue to challenge the negative intrusive thoughts that he harbors surrounding the passing and will continue to work on mindful strategies to live in the present.  He will continue to surround himself with support through family, Gnosticism, and grief counseling.  He will work on seeing his daughter for her positive qualities and her faults and will acknowledge that her decisions had negative consequences on his family's life. At the next session, the therapist will use Bereavement Therapy, Cognitive Behavioral Therapy, Mindfulness-based Strategies, and Supportive Psychotherapy to address these ongoing struggles.    Behavioral Health Treatment Plan and Discharge Planning: Cy Carpio is aware of and agrees to continue to work on their treatment plan. They have identified and are working toward their discharge goals. yes    Visit start and stop times:    02/19/24 from 11:30 AM to 12:30 PM       This note was not shared with the patient due to this is a psychotherapy note  "

## 2024-03-04 ENCOUNTER — OFFICE VISIT (OUTPATIENT)
Dept: PSYCHIATRY | Facility: CLINIC | Age: 59
End: 2024-03-04

## 2024-03-04 DIAGNOSIS — F32.1 CURRENT MODERATE EPISODE OF MAJOR DEPRESSIVE DISORDER WITHOUT PRIOR EPISODE (HCC): Primary | ICD-10-CM

## 2024-03-04 DIAGNOSIS — F43.21 COMPLICATED BEREAVEMENT: ICD-10-CM

## 2024-03-04 DIAGNOSIS — F41.1 GENERALIZED ANXIETY DISORDER: ICD-10-CM

## 2024-03-04 PROCEDURE — NC001 PR NO CHARGE

## 2024-03-04 NOTE — PSYCH
Behavioral Health Psychotherapy Progress Note    This is a therapy progress note for the patient David Carpio. David (who prefers to be called Cy) is a 58-year-old male,  to his wife Maribell for over 32 years, has 3 sons, is currently living in a house with his wife and 2 dogs in the [facility], currently employed as a  for [employer]. Cy reports a past medical history that includes atrial fibrillation, type 2 diabetes with diabetic polyneuropathy status post multiple foot surgeries in the setting of foot osteomyelitis, obstructive sleep apnea, hypertension, chronic diastolic heart failure, and is over 2 years status post bariatric surgery.  At this time Cy possesses a past psychiatric history of major depressive disorder, generalized anxiety disorder, and complicated bereavement.     Cy reports that he has been struggling with his mental health for over the past three years following the untimely passing of his daughter Shameka (she passed at the age of 23 years old).  Cy reports that as a teenager his daughter began hanging out with bad influences and dating boyfriends who struggled with drugs and who influenced her to use drugs. Cy reports that he did his best to support his daughter and supported her through drug and alcohol rehab. He reports that about three years ago he was informed that his daughter had  and was reportedly found down by her boyfriend. Cy reports that his daughter was found to have heroin and fentanyl in her system at the time. Cy has struggled to cope with her passing since then. He reports that he becomes emotional when thinking of her. At this time Cy also harbors feelings of hatred towards his late daughter's boyfriend. Cy reports he has a 6 year old grandson Ki who lives with his late daughter's boyfriend (who is the father of the child).     At this time, Cy continues to feel that he does not deserve to be  "happy. Cy continues to feel that he failed as a parent and that \"I could have done something\" to prevent the tragic passing of his daughter. He also remains with fears that being happy and moving on from his daughter's passing will dishonor her memory.     Psychotherapy Provided: Individual Psychotherapy     1. Current moderate episode of major depressive disorder without prior episode (HCC)        2. Complicated bereavement        3. Generalized anxiety disorder            Goals addressed in session: Goal 1 and Goal 2     DATA:     During today's session, Cy discussed ongoing struggles with intrusive thoughts about his late daughter. He expressed feeling overwhelmed by grief and the lingering impact of his daughter's passing on his emotional well-being. Cy reported that despite efforts to manage his thoughts, his mind still frequently wanders back to memories of his daughter. He was encouraged to set boundaries by allocating a specific time each day to reflect on his daughter, limiting intrusive thoughts to a designated period. Cy continues to utilize the ERP worksheets and handouts to combat these ongoing obsessive thoughts.     Cy expressed difficulty in maintaining emotional connection with his wife since their daughter's passing, feeling that the love between them has diminished. Cy articulated feelings of resentment towards his wife, believing that her actions contributed to their strained relationship with their daughter. He struggled with self-blame, seeing his daughter's passing as a failure to intervene not only on the night of her passing but also in the dynamics of his marriage. Cy felt that he should have intervened more assertively to prevent the rift between his wife and daughter, acknowledging his sense of powerlessness in the face of their strained relationship. Cy disclosed contemplating the possibility of divorce, feeling that the dynamics of their relationship have irreversibly " "changed. He was encouraged to openly communicate his feelings with his wife and consider writing a letter to express his emotions and concerns.     Additionally, Cy expressed a desire to confront his grief by opening the box of his daughter's belongings, with his son present for support and closure.    In session, supportive techniques were utilized to validate Cy's feelings and provide guidance on effective communication strategies with his wife. Cy was encouraged to prioritize self-care and seek opportunities for healing and closure surrounding his daughter's passing. In session, Cy was reminded that forgiveness is a complex and multifaceted process, and it is normal to struggle with forgiving oneself and others in the aftermath of traumatic events. He was encouraged to explore the possibility of forgiveness. Cy was reminded that there is no definitive blueprint for parenting, and while he may harbor regrets about missed opportunities, it is essential to recognize that his actions may not have changed the course of events.     During this session, this clinician used the following therapeutic modalities: Bereavement Therapy, Cognitive Behavioral Therapy, Mindfulness-based Strategies, Solution-Focused Therapy, and Supportive Psychotherapy    Substance Abuse was not addressed during this session.    ASSESSMENT:  Cy Carpio presents with a Depressed mood.     his affect is Constricted, which is congruent, with his mood and the content of the session. The client has made progress on their goals.    Cy Carpio presents with a minimal risk of suicide, minimal risk of self-harm, and minimal risk of harm to others.    For any risk assessment that surpasses a \"low\" rating, a safety plan must be developed.    A safety plan was indicated: no    PLAN: Between sessions, Cy Carpio will continue to work on accepting the passing of his daughter.  He will continue to challenge the negative intrusive thoughts that " he harbors surrounding the passing and will continue to work on mindful strategies to live in the present.  He will continue to surround himself with support through family, Faith, and grief counseling.  He will work on seeing his daughter for her positive qualities and her faults and will acknowledge that her decisions had negative consequences on his family's life. At the next session, the therapist will use Bereavement Therapy, Cognitive Behavioral Therapy, Mindfulness-based Strategies, and Supportive Psychotherapy to address these ongoing struggles.    Behavioral Health Treatment Plan and Discharge Planning: Cy Carpio is aware of and agrees to continue to work on their treatment plan. They have identified and are working toward their discharge goals. yes    Visit start and stop times:    3/4/24 from 11:30 AM to 12:30 PM    This note was not shared with the patient due to this is a psychotherapy note

## 2024-03-18 ENCOUNTER — TELEMEDICINE (OUTPATIENT)
Dept: PSYCHIATRY | Facility: CLINIC | Age: 59
End: 2024-03-18

## 2024-03-18 DIAGNOSIS — F43.21 COMPLICATED BEREAVEMENT: ICD-10-CM

## 2024-03-18 DIAGNOSIS — F32.1 CURRENT MODERATE EPISODE OF MAJOR DEPRESSIVE DISORDER WITHOUT PRIOR EPISODE (HCC): Primary | ICD-10-CM

## 2024-03-18 DIAGNOSIS — F41.1 GENERALIZED ANXIETY DISORDER: ICD-10-CM

## 2024-03-18 PROCEDURE — NC001 PR NO CHARGE

## 2024-03-18 NOTE — PSYCH
Virtual Psychiatry Visit - Required Documentation    Verification of patient location:  Patient is located at home ([facility]) in the following state in which I hold an active license [place]    Encounter provider Andre Rodriguez MD    [address]  [facility] [address] [phone]    The patient was identified by name and date of birth. David Carpio was informed that this is a telemedicine visit and that the visit is being conducted through Telephone.  In total, 15 minutes was spent on the telephone prior to the visit attempting to help the patient connect through the [employer] platform.  All attempts were unsuccessful.  In the setting of technical difficulties, the patient agreed to proceed with his therapy session through telephone.  My office door was closed. No one else was in the room.  Patient acknowledged consent and understanding of privacy and security of the video platform. The patient has agreed to participate and understands they can discontinue the visit at any time.    Behavioral Health Psychotherapy Progress Note    This is a therapy progress note for the patient David Carpio. David (who prefers to be called Cy) is a 58-year-old male,  to his wife Maribell for over 32 years, has 3 sons, is currently living in a house with his wife and 2 dogs in the [facility], currently employed as a  for [employer]. Cy reports a past medical history that includes atrial fibrillation, type 2 diabetes with diabetic polyneuropathy status post multiple foot surgeries in the setting of foot osteomyelitis, obstructive sleep apnea, hypertension, chronic diastolic heart failure, and is over 2 years status post bariatric surgery.  At this time Cy possesses a past psychiatric history of major depressive disorder, generalized anxiety disorder, and  "complicated bereavement.     Cy reports that he has been struggling with his mental health for over the past three years following the untimely passing of his daughter Shameka (she passed at the age of 23 years old).  Cy reports that as a teenager his daughter began hanging out with bad influences and dating boyfriends who struggled with drugs and who influenced her to use drugs. Cy reports that he did his best to support his daughter and supported her through drug and alcohol rehab. He reports that about three years ago he was informed that his daughter had  and was reportedly found down by her boyfriend. Cy reports that his daughter was found to have heroin and fentanyl in her system at the time. Cy has struggled to cope with her passing since then. He reports that he becomes emotional when thinking of her. At this time Cy also harbors feelings of hatred towards his late daughter's boyfriend. Cy reports he has a 6 year old grandson iK who lives with his late daughter's boyfriend (who is the father of the child).     At this time, Cy continues to feel that he does not deserve to be happy. Cy continues to feel that he failed as a parent and that \"I could have done something\" to prevent the tragic passing of his daughter. He also remains with fears that being happy and moving on from his daughter's passing will dishonor her memory.     Psychotherapy Provided: Individual Psychotherapy     1. Current moderate episode of major depressive disorder without prior episode (HCC)        2. Generalized anxiety disorder        3. Complicated bereavement            Goals addressed in session: Goal 1 and Goal 2     DATA:     During today's session, Cy reported ongoing struggles with grief surrounding the passing of his late daughter Shameka, as well as intrusive thoughts about her. He continues to feel overwhelmed by grief and its impact on his emotional well-being. Cy shared that since the last session " "(about 2 weeks ago), he visited a friend in [place] he had known since childhood who confided in him about past trauma, which Cy hadn't known about before. While he listened and supported his friend, he felt upset that he couldn't fully share his struggles about his daughter. Additionally, he felt hurt by his friend's suggestion to \"move on\" from his daughter's passing.     Multiple topics were discussed in today's session, including Cy's tendency to help others while struggling to reach out for support himself. It was noted that Cy feels unable to share in grief sessions because he continues to view himself as the person who needs to help others. The importance of sharing one's life story was emphasized, with the understanding that Cy also has a story to share about how he feels his wife's actions impacted their daughter. Cy was encouraged to foster open communication with his wife so that they can work through these feelings of resentment together.     Cy also mentioned being unable to open the box of his daughter's belongings since the last visit, fearing it would bring more pain. It was acknowledged that this action might indeed bring more pain, but it could also help him embrace both positive and negative memories, facilitating ongoing processing and reflection on his daughter's life.     During the session, supportive techniques were utilized to validate Cy's feelings and provide guidance on effective coping strategies. Cy was encouraged to continue seeking support and to prioritize self-care as he navigates his grief journey.      During this session, this clinician used the following therapeutic modalities: Bereavement Therapy, Cognitive Behavioral Therapy, Mindfulness-based Strategies, Supportive Psychotherapy, and Psychodynamic Psychotherapy    Substance Abuse was not addressed during this session.    ASSESSMENT:  Cy Carpio presents with a Depressed mood.  His affect was not able " "to be assessed as this visit was conducted through the telephone.    The client has made progress on their goals.     Cy Carpio presents with a minimal risk of suicide, minimal risk of self-harm, and minimal risk of harm to others.    For any risk assessment that surpasses a \"low\" rating, a safety plan must be developed.    A safety plan was indicated: no    PLAN: Between sessions, Cy Carpio expressed a commitment to talk with his wife and open his daughter's belongings before the next session. Cy will continue to work on accepting the passing of his daughter.  He will continue to challenge the negative intrusive thoughts that he harbors surrounding the passing and will continue to work on mindful strategies to live in the present.  He will continue to surround himself with support through family, Islam, and grief counseling. At the next session, the therapist will use Bereavement Therapy, Cognitive Behavioral Therapy, Mindfulness-based Strategies, Supportive Psychotherapy, and Psychodynamic Psychotherapy  to address these ongoing struggles.    Behavioral Health Treatment Plan and Discharge Planning: Cy Carpio is aware of and agrees to continue to work on their treatment plan. They have identified and are working toward their discharge goals. yes    Visit start and stop times:    03/18/24 from 11:30 AM to 12:30 PM       This note was not shared with the patient due to this is a psychotherapy note  "

## 2024-03-19 ENCOUNTER — OFFICE VISIT (OUTPATIENT)
Dept: AUDIOLOGY | Facility: CLINIC | Age: 59
End: 2024-03-19
Payer: COMMERCIAL

## 2024-03-19 DIAGNOSIS — H90.3 SENSORY HEARING LOSS, BILATERAL: Primary | ICD-10-CM

## 2024-03-19 PROCEDURE — 92567 TYMPANOMETRY: CPT | Performed by: AUDIOLOGIST

## 2024-03-19 PROCEDURE — 92557 COMPREHENSIVE HEARING TEST: CPT | Performed by: AUDIOLOGIST

## 2024-03-19 NOTE — PROGRESS NOTES
HEARING EVALUATION    Name:  David Carpio  :  1965  Age:  58 y.o.   MRN:  [id number]  Date of Evaluation: 24     HISTORY:     Reason for visit: Difficulty Understanding    David Carpio is being seen today at the request of Dr. Leigh tabor. provider found for an initial  evaluation of hearing.  Patient reports difficulty hearing for many years, recently has become significant enough that he would like to pursue hearing aids.  Patient denies otalgia, otorrhea, dizziness, fullness, tinnitus, noise exposure, and family history of hearing loss.     EVALUATION:    Otoscopic Evaluation:   Right Ear: Unremarkable, canal clear   Left Ear: Unremarkable, canal clear    Tympanometry:   Right Ear: Type A; normal middle ear pressure and static compliance    Left Ear: Type A; normal middle ear pressure and static compliance     Speech Audiometry:  Speech Reception (SRT)   Right Ear: 55 dB HL   Left Ear: 55 dB HL    Word Recognition Scores (WRS):  Right Ear: fair (72 % correct)     Left Ear: fair (72 % correct)   Stimuli: [id number]    Pure Tone Audiometry:  Conventional pure tone audiometry from 250 - 8000 Hz  was obtained with good reliability and revealed the following:     Right Ear: Moderate sloping to profound sensorineural hearing loss (SNHL)      Left Ear: Moderate sloping to severe sensorineural hearing loss (SNHL)       *see attached audiogram    IMPRESSIONS:   Moderate hearing loss in both ears with right ear sloping to profound and left ear sloping to severe.    RECOMMENDATIONS:  Return to [facility] for F/U and Copy to Patient/Caregiver    PATIENT EDUCATION:   The results of today's results and recommendations were reviewed with the patient and his hearing thresholds were explained at length. Treatment options, including amplification and communication strategies, were discussed as appropriate. The patient voiced understanding of his test results. A hearing aid evaluation was performed today as well and patient is  interested in purchasing OtSvpply Real 2 hearing aids in [employer], 8DV domes with a 2/100  for the right ear and a 2/85  for the left ear. Patient will return to pay deposit after discussing with wife. Questions were addressed and the patient was encouraged to contact our department should concerns arise.      Donis Fair., CCC-A  Clinical Audiologist  [facility] [place]

## 2024-04-01 ENCOUNTER — TELEPHONE (OUTPATIENT)
Dept: PSYCHIATRY | Facility: CLINIC | Age: 59
End: 2024-04-01

## 2024-04-01 ENCOUNTER — OFFICE VISIT (OUTPATIENT)
Dept: PSYCHIATRY | Facility: CLINIC | Age: 59
End: 2024-04-01

## 2024-04-01 DIAGNOSIS — F32.1 CURRENT MODERATE EPISODE OF MAJOR DEPRESSIVE DISORDER WITHOUT PRIOR EPISODE (HCC): ICD-10-CM

## 2024-04-01 DIAGNOSIS — F32.1 CURRENT MODERATE EPISODE OF MAJOR DEPRESSIVE DISORDER WITHOUT PRIOR EPISODE (HCC): Primary | ICD-10-CM

## 2024-04-01 DIAGNOSIS — F43.21 COMPLICATED BEREAVEMENT: ICD-10-CM

## 2024-04-01 DIAGNOSIS — F41.1 GENERALIZED ANXIETY DISORDER: ICD-10-CM

## 2024-04-01 PROCEDURE — NC001 PR NO CHARGE

## 2024-04-01 RX ORDER — BUPROPION HYDROCHLORIDE 150 MG/1
150 TABLET ORAL DAILY
Qty: 90 TABLET | Refills: 0 | Status: SHIPPED | OUTPATIENT
Start: 2024-04-01

## 2024-04-01 NOTE — PSYCH
Behavioral Health Psychotherapy Progress Note    This is a therapy progress note for the patient David Carpio. David (who prefers to be called Cy) is a 58-year-old male,  to his wife Maribell for over 32 years, has 3 sons, is currently living in a house with his wife and 2 dogs in the [facility], currently employed as a  for [employer]. Cy reports a past medical history that includes atrial fibrillation, type 2 diabetes with diabetic polyneuropathy status post multiple foot surgeries in the setting of foot osteomyelitis, obstructive sleep apnea, hypertension, chronic diastolic heart failure, and is over 2 years status post bariatric surgery.  At this time Cy possesses a past psychiatric history of major depressive disorder, generalized anxiety disorder, and complicated bereavement.     Cy reports that he has been struggling with his mental health for over the past three years following the untimely passing of his daughter Shameka (she passed at the age of 23 years old).  Cy reports that as a teenager his daughter began hanging out with bad influences and dating boyfriends who struggled with drugs and who influenced her to use drugs. Cy reports that he did his best to support his daughter and supported her through drug and alcohol rehab. He reports that about three years ago he was informed that his daughter had  and was reportedly found down by her boyfriend. Cy reports that his daughter was found to have heroin and fentanyl in her system at the time. Cy has struggled to cope with her passing since then. He reports that he becomes emotional when thinking of her. At this time Cy also harbors feelings of hatred towards his late daughter's boyfriend. Cy reports he has a 6 year old grandson Ki who lives with his late daughter's boyfriend (who is the father of the child).     At this time, Cy continues to feel that he does not deserve to be  "happy. Cy continues to feel that he failed as a parent and that \"I could have done something\" to prevent the tragic passing of his daughter. He also remains with fears that being happy and moving on from his daughter's passing will dishonor her memory.     Psychotherapy Provided: Individual Psychotherapy     1. Current moderate episode of major depressive disorder without prior episode (HCC)        2. Generalized anxiety disorder        3. Complicated bereavement            Goals addressed in session: Goal 1 and Goal 2     DATA:     In today's therapy session, Cy delved into his ongoing struggles with grief following the passing of his daughter Shameka, expressing difficulty in forgiving his wife, himself, and his daughter's former boyfriend. Cy reports he has been planning for a weekend getaway for him and his wife to [place], [place] this upcoming weekend. He plans to have a difficult conversation with his wife about how he feels his wife's actions impacted their daughter. Cy also wants to hear how his wife has been feeling in the context of their marital struggles. Cy admitted feeling unprepared for forgiveness. The session revisited cognitive behavioral therapy principles, emphasizing the importance of communication in dispelling feelings of anxiety, insecurity, and despair. Cy was reminded that knowing is half the mcgraw and if he is able to let others know his feelings and understand the feelings of others around him, he will be able to continue moving forward with working through emotions. In session today, Cy acknowledged his tendency to lead a double life, presenting a facade in social interactions while battling depression and rumination in solitude. The session explored strategies to reconcile these conflicting aspects of his life, emphasizing the necessity of self-expression and seeking support from others. Moving forward, Cy aims to navigate this upcoming conversation with increased insight and " "emotional readiness, with goals focused on fostering authenticity in his relationships and pursuing avenues for continued healing and self-discovery.     In today's therapy session, Cy shared his recent endeavors of writing letters to both his late daughter and wife as part of his therapeutic journey, and he robles strength from the continued support of his grandson and sons.     During this session, this clinician used the following therapeutic modalities: Bereavement Therapy, Cognitive Behavioral Therapy, Mindfulness-based Strategies, Supportive Psychotherapy, and Psychodynamic Psychotherapy    Substance Abuse was not addressed during this session.     ASSESSMENT:  Cy Carpio presents with a Depressed mood.     his affect is Constricted, which is congruent, with his mood and the content of the session. The client has made progress on their goals.    Cy Carpio presents with a minimal risk of suicide, minimal risk of self-harm, and minimal risk of harm to others.    For any risk assessment that surpasses a \"low\" rating, a safety plan must be developed.    A safety plan was indicated: no    PLAN: Between sessions, Cy Carpio will continue to work on accepting the passing of his daughter.  He will continue to challenge the negative intrusive thoughts that he harbors surrounding the passing and will continue to work on mindful strategies to live in the present.  He will continue to surround himself with support through family, Hoahaoism, and grief counseling. He will continue to work on unpacking his daughter's belongings and revisiting memories (both positive and negative) about his daughter. He will continue to foster authenticity in his relationships. At the next session, the therapist will use Bereavement Therapy, Cognitive Behavioral Therapy, Mindfulness-based Strategies, Supportive Psychotherapy, and psychodynamic psychotherapy  to address these ongoing struggles.    Behavioral Health Treatment Plan and " Discharge Planning: Cy Carpio is aware of and agrees to continue to work on their treatment plan. They have identified and are working toward their discharge goals. yes    Visit start and stop times:    04/01/24 from 11:30 AM to 12:30 PM       This note was not shared with the patient due to this is a psychotherapy note

## 2024-04-01 NOTE — TELEPHONE ENCOUNTER
David Carpio reports that there have been some pharmacy difficulties with dispensing Wellbutrin and he reports that he is currently out of Wellbutrin (Wellbutrin 150 XL daily) at his time. A 90 day supply of Wellbutrin has been resent to the patient's preferred pharmacy of choice in this context.    Andre Rodriguez MD

## 2024-04-17 DIAGNOSIS — F41.1 GENERALIZED ANXIETY DISORDER: ICD-10-CM

## 2024-04-17 DIAGNOSIS — F32.1 CURRENT MODERATE EPISODE OF MAJOR DEPRESSIVE DISORDER WITHOUT PRIOR EPISODE (HCC): ICD-10-CM

## 2024-04-18 RX ORDER — BUSPIRONE HYDROCHLORIDE 15 MG/1
15 TABLET ORAL 3 TIMES DAILY
Qty: 270 TABLET | Refills: 0 | Status: SHIPPED | OUTPATIENT
Start: 2024-04-18

## 2024-04-18 RX ORDER — DOXEPIN HYDROCHLORIDE 10 MG/1
10 CAPSULE ORAL 2 TIMES DAILY
Qty: 180 CAPSULE | Refills: 0 | Status: SHIPPED | OUTPATIENT
Start: 2024-04-18 | End: 2024-04-22 | Stop reason: SDUPTHER

## 2024-04-18 RX ORDER — BUPROPION HYDROCHLORIDE 150 MG/1
150 TABLET ORAL DAILY
Qty: 90 TABLET | Refills: 0 | Status: SHIPPED | OUTPATIENT
Start: 2024-04-18 | End: 2024-04-22 | Stop reason: SDUPTHER

## 2024-04-22 ENCOUNTER — OFFICE VISIT (OUTPATIENT)
Dept: PSYCHIATRY | Facility: CLINIC | Age: 59
End: 2024-04-22
Payer: COMMERCIAL

## 2024-04-22 VITALS — BODY MASS INDEX: 39.41 KG/M2 | WEIGHT: 298.7 LBS

## 2024-04-22 DIAGNOSIS — F41.1 GENERALIZED ANXIETY DISORDER: ICD-10-CM

## 2024-04-22 DIAGNOSIS — F43.21 COMPLICATED BEREAVEMENT: ICD-10-CM

## 2024-04-22 DIAGNOSIS — F32.1 CURRENT MODERATE EPISODE OF MAJOR DEPRESSIVE DISORDER WITHOUT PRIOR EPISODE (HCC): Primary | ICD-10-CM

## 2024-04-22 PROCEDURE — 90838 PSYTX W PT W E/M 60 MIN: CPT

## 2024-04-22 PROCEDURE — 99214 OFFICE O/P EST MOD 30 MIN: CPT

## 2024-04-22 RX ORDER — GABAPENTIN 300 MG/1
900 CAPSULE ORAL 3 TIMES DAILY
Qty: 810 CAPSULE | Refills: 0 | Status: SHIPPED | OUTPATIENT
Start: 2024-04-22 | End: 2024-07-21

## 2024-04-22 RX ORDER — BUPROPION HYDROCHLORIDE 150 MG/1
300 TABLET ORAL DAILY
Start: 2024-04-22

## 2024-04-22 RX ORDER — DOXEPIN HYDROCHLORIDE 10 MG/1
CAPSULE ORAL
Start: 2024-04-22

## 2024-04-22 NOTE — PATIENT INSTRUCTIONS
Please present for your previously scheduled appointment approximately 15 minutes prior to appointment time. If you are running late or are unable to attend your appointment, please call our [place] office at [phone] or our [place] office at [phone] if applicable to notify the office of your absence.    If you are in psychological crisis including not limited to suicidal/homicidal thoughts or thoughts of self-injury, do not hesitate to call/contact your County Crisis hotline (see below) or attend to the nearest emergency department.  [facility] Crisis: [phone]  [facility] Crisis: [phone]  [facility] Crisis: [phone]  [facility] Crisis: [phone]  [facility] Crisis: [phone]  [facility] Crisis: [phone]  [facility] Crisis: [phone]  National Suicide Prevention Hotline: 1-411.906.8705

## 2024-04-22 NOTE — PSYCH
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 family, ability to contract for safety with staff, ability to communicate with staff  Weapons/Firearms: none. The following steps have been taken to ensure weapons are properly secured: not applicable  Based on today's assessment, David presents the following risk of harm to self: Minimal     Risk of Harm to Others:  The following ratings are based on assessment at the time of the interview and a review of medical records  Demographic Risk Factors include: male.  Historical Risk Factors include: none.  Current Specific Risk Factors include: none  Protective Factors: no current homicidal ideation, improved mood, willing to continue psychiatric treatment, good support system, supportive family, responsibilities and duties to others, being a parent, being , good self-esteem, sense of personal control  Weapons/Firearms: none. The following steps have been taken to ensure weapons are properly secured: not applicable  Based on today's assessment, David presents the following risk of harm to others: Minimal    Although patient's acute lethality risk is low, long-term/chronic  lethality risk is mildly elevated in the presence of moderately severe symptoms of depression and severe symptoms of anxiety. At the current moment, David is future-oriented, forward-thinking, and demonstrates ability to act in a self-preserving manner as evidenced by volitionally presenting to the clinic today, seeking treatment. At this juncture, inpatient hospitalization is not currently warranted. To mitigate future risk, patient should adhere to the recommendations of this writer, avoid alcohol/illicit substance use, utilize community-based resources and familiar support and prioritize mental health treatment.     Based on today's assessment and clinical criteria, David Carpio contracts for safety and is not an imminent risk of harm to self or others. Outpatient level of care is deemed appropriate at this present time. David understands that if they are no longer able to contract for safety, they need to call/contact the outpatient office including this writer, call/contact crisis and/orattend to the nearest Emergency Department for immediate evaluation.    Risks/Benefits      Risks, Benefits And Possible Side Effects Of Medications:    Risks, benefits, and possible side effects of medications explained to David and he verbalizes understanding and agreement for treatment.    Controlled Medication Discussion:     Not applicable - controlled prescriptions are not prescribed by this practice    Psychotherapy Provided:     Individual psychotherapy provided: Yes  Today, this session revisited cognitive behavioral therapy principles, emphasizing the importance of communication and dispelling feelings of anxiety, insecurity, and despair. Cy was reminded that knowing his half the mcgraw and if he is able to let others know his feelings and understand the feelings of others around him, he will be able to continue moving forward with working through emotions.    Treatment Plan:    Completed and signed during  the session: Not applicable - Treatment Plan not due at this session    Visit Time    Visit Start Time: 11:30 AM  Visit Stop Time: 12:30 PM  Total Visit Duration:  60 minutes    This note was shared with patient.    Andre Rodriguez MD 04/22/24    This note has been constructed using a voice recognition system. There may be translation, syntax, or grammatical errors. If you have any questions, please contact the dictating provider.

## 2024-04-29 ENCOUNTER — OFFICE VISIT (OUTPATIENT)
Dept: PSYCHIATRY | Facility: CLINIC | Age: 59
End: 2024-04-29

## 2024-04-29 DIAGNOSIS — F41.1 GENERALIZED ANXIETY DISORDER: ICD-10-CM

## 2024-04-29 DIAGNOSIS — F32.1 CURRENT MODERATE EPISODE OF MAJOR DEPRESSIVE DISORDER WITHOUT PRIOR EPISODE (HCC): Primary | ICD-10-CM

## 2024-04-29 DIAGNOSIS — F43.21 COMPLICATED BEREAVEMENT: ICD-10-CM

## 2024-04-29 RX ORDER — METOLAZONE 2.5 MG/1
2.5 TABLET ORAL
COMMUNITY
Start: 2024-04-01

## 2024-04-29 NOTE — PSYCH
Behavioral Health Psychotherapy Progress Note    This is a therapy progress note for the patient David Carpio. David (who prefers to be called Cy) is a 58-year-old male,  to his wife Maribell for over 32 years, has 3 sons, is currently living in a house with his wife and 2 dogs in the [facility], currently employed as a  for [employer]. Cy reports a past medical history that includes atrial fibrillation, type 2 diabetes with diabetic polyneuropathy status post multiple foot surgeries in the setting of foot osteomyelitis, obstructive sleep apnea, hypertension, chronic diastolic heart failure, and is over 2 years status post bariatric surgery.  At this time Cy possesses a past psychiatric history of major depressive disorder, generalized anxiety disorder, and complicated bereavement.     Cy reports that he has been struggling with his mental health for over the past three years following the untimely passing of his daughter Shameka (she passed at the age of 23 years old).  Cy reports that as a teenager his daughter began hanging out with bad influences and dating boyfriends who struggled with drugs and who influenced her to use drugs. Cy reports that he did his best to support his daughter and supported her through drug and alcohol rehab. He reports that about three years ago he was informed that his daughter had  and was reportedly found down by her boyfriend. Cy reports that his daughter was found to have heroin and fentanyl in her system at the time. Cy has struggled to cope with her passing since then. He reports that he becomes emotional when thinking of her. At this time Cy also harbors feelings of hatred towards his late daughter's boyfriend. Cy reports he has a 6 year old grandson Ki who lives with his late daughter's boyfriend (who is the father of the child).     At this time, Cy continues to feel that he does not deserve to be  "happy. Cy continues to feel that he failed as a parent and that \"I could have done something\" to prevent the tragic passing of his daughter. He also remains with fears that being happy and moving on from his daughter's passing will dishonor her memory.     Psychotherapy Provided: Individual Psychotherapy     1. Current moderate episode of major depressive disorder without prior episode (HCC)        2. Generalized anxiety disorder        3. Complicated bereavement            Goals addressed in session: Goal 1 and Goal 2     DATA:     During today's session, Cy provided updates on his progress and ongoing struggles in coping with grief following the loss of his daughter. He disclosed that he did not proceed with the planned weekend getaway with his wife to [facility]. The focus of today's session centered on the concept of setting SMART goals and identifying small, measurable steps towards healing and personal growth. Cy reports he is planning for a local weekend getaway with his wife in the coming weeks.    Cy expressed his continued difficulties in feeling better, likening his journey to seeing a door he needs to walk through but feeling as though the tunnel leading to it keeps getting longer. In response, Cy was challenged to recognize that his daughter likely experienced similar struggles with addiction, shedding light on the shared nature of human struggles and the importance of empathy in his healing process.    Furthermore, Cy was encouraged to confront his avoidance of sensitive topics within his family, acknowledging that avoiding discussions about his daughter's addiction may stem from a fear of failure or judgment. He was asked to reflect on how he would perceive feedback from others regarding his parenting mistakes, prompting him to consider the value of self-reflection and growth in the face of criticism.    Cy continues to engage in the process of sorting through his daughter's belongings, " "acknowledging the emotional difficulty of this journey. It was emphasized that while this process may be painful, it is essential for his healing journey, and he was encouraged not to shy away from confronting his emotions.    Therapeutic strategies employed during today's session included challenging cognitive distortions, fostering self-reflection, and encouraging gradual exposure to difficult emotions.     During this session, this clinician used the following therapeutic modalities: Bereavement Therapy, Cognitive Behavioral Therapy, Mindfulness-based Strategies, Solution-Focused Therapy, and Supportive Psychotherapy    Substance Abuse was not addressed during this session.    ASSESSMENT:  Cy Carpio presents with a Depressed mood.     his affect is Constricted, which is congruent, with his mood and the content of the session. The client has made progress on their goals.    Cy Carpio presents with a minimal risk of suicide, minimal risk of self-harm, and minimal risk of harm to others.    For any risk assessment that surpasses a \"low\" rating, a safety plan must be developed.    A safety plan was indicated: no    PLAN: Between sessions, Cy Carpio will continue to work on accepting the passing of his daughter.  He will continue to challenge the negative intrusive thoughts that he harbors surrounding the passing and will continue to work on mindful strategies to live in the present.  He will continue to surround himself with support through family, [facility], and grief counseling. He will continue to work on unpacking his daughter's belongings and revisiting memories (both positive and negative) about his daughter. He will continue to foster authenticity in his relationships. At the next session, the therapist will use Bereavement Therapy, Cognitive Behavioral Therapy, Mindfulness-based Strategies, Supportive Psychotherapy, and psychodynamic psychotherapy  to address these ongoing struggles.    Behavioral " Health Treatment Plan and Discharge Planning: Cy Carpio is aware of and agrees to continue to work on their treatment plan. They have identified and are working toward their discharge goals. yes    Visit start and stop times:    04/29/24 from 11:30 AM to 12:30 PM    This note was not shared with the patient due to this is a psychotherapy note

## 2024-05-01 RX ORDER — ATORVASTATIN CALCIUM 40 MG/1
TABLET, FILM COATED ORAL
COMMUNITY
Start: 2024-04-26

## 2024-05-03 ENCOUNTER — OFFICE VISIT (OUTPATIENT)
Dept: PODIATRY | Age: 59
End: 2024-05-03
Payer: COMMERCIAL

## 2024-05-03 VITALS
HEART RATE: 55 BPM | OXYGEN SATURATION: 96 % | TEMPERATURE: 98 F | WEIGHT: 302.8 LBS | SYSTOLIC BLOOD PRESSURE: 110 MMHG | DIASTOLIC BLOOD PRESSURE: 64 MMHG | BODY MASS INDEX: 39.95 KG/M2

## 2024-05-03 DIAGNOSIS — L84 CALLUS: ICD-10-CM

## 2024-05-03 DIAGNOSIS — G63 POLYNEUROPATHY ASSOCIATED WITH UNDERLYING DISEASE (HCC): ICD-10-CM

## 2024-05-03 DIAGNOSIS — Z79.4 TYPE 2 DIABETES MELLITUS WITH DIABETIC POLYNEUROPATHY, WITH LONG-TERM CURRENT USE OF INSULIN (HCC): Primary | ICD-10-CM

## 2024-05-03 DIAGNOSIS — B35.1 ONYCHOMYCOSIS: ICD-10-CM

## 2024-05-03 DIAGNOSIS — Z89.432 STATUS POST TRANSMETATARSAL AMPUTATION OF FOOT, LEFT (HCC): ICD-10-CM

## 2024-05-03 DIAGNOSIS — E11.42 TYPE 2 DIABETES MELLITUS WITH DIABETIC POLYNEUROPATHY, WITH LONG-TERM CURRENT USE OF INSULIN (HCC): Primary | ICD-10-CM

## 2024-05-03 PROCEDURE — 11055 PARING/CUTG B9 HYPRKER LES 1: CPT | Performed by: STUDENT IN AN ORGANIZED HEALTH CARE EDUCATION/TRAINING PROGRAM

## 2024-05-03 PROCEDURE — 11720 DEBRIDE NAIL 1-5: CPT | Performed by: STUDENT IN AN ORGANIZED HEALTH CARE EDUCATION/TRAINING PROGRAM

## 2024-05-03 PROCEDURE — 99212 OFFICE O/P EST SF 10 MIN: CPT | Performed by: STUDENT IN AN ORGANIZED HEALTH CARE EDUCATION/TRAINING PROGRAM

## 2024-05-03 NOTE — LETTER
May 3, 2024     Patient: David Carpio  YOB: 1965  Date of Visit: 5/3/2024      To Whom it May Concern:    David Carpio is under my professional care. David was seen in my office on 5/3/2024. David should not return to work for the foreseeable future due to inability to perform job functions due to foot partial amputation and neuropathy.    If you have any questions or concerns, please don't hesitate to call.         Sincerely,          Adelia Yousif DPM        CC: No Recipients

## 2024-05-03 NOTE — PROGRESS NOTES
David Carpio  1965  AT RISK FOOT CARE    1. Type 2 diabetes mellitus with diabetic polyneuropathy, with long-term current use of insulin ([facility])        2. Status post transmetatarsal amputation of foot, left (HCC)        3. Onychomycosis        4. Callus        5. Polyneuropathy associated with underlying disease ([facility])            Patient presents for at-risk foot care.  Patient has no acute concerns today but sultana snot worsening tingling and burning to left foot because he went back to work and has been climbing ladders.  Patient has significant lower extremity risk due to previous amputation. No pain, redness, swelling to left foot at all.     On exam patient has thickened, hypertrophic, discolored, brittle toenails with subungual debris x2   Callus: 1 (R 5th toe)  Amputation: L TMA, R4th & partal 2/3 toes    Today's treatment includes:  Debridement of toenails x2. Using nail nipper, jomar, and curette, nails were sharply debrided, reduced in thickness and length. Devitalized nail tissue and fungal debris excised and removed. Patient tolerated well.    Callus R 5th toe pared in thickness with #15 blade to more normal epithelium x1.  Report to pain mngmt for peripheral neuropathy.     Discussed proper shoe gear, daily inspections of feet, and general foot health with patient. Patient has Q7 findings and is recommended for at risk foot care every 9-10 weeks.    Patients most recent complete clinical exam was performed: 10/6/23

## 2024-05-09 ENCOUNTER — TELEPHONE (OUTPATIENT)
Age: 59
End: 2024-05-09

## 2024-05-09 NOTE — TELEPHONE ENCOUNTER
Caller: Patient     Doctor: Benja     Reason for call: Wanting to update  [employer] is the new company of his insurance threw his employer     Call back#: [phone]

## 2024-05-15 ENCOUNTER — TELEPHONE (OUTPATIENT)
Dept: PODIATRY | Facility: CLINIC | Age: 59
End: 2024-05-15

## 2024-05-15 NOTE — TELEPHONE ENCOUNTER
Caller: [employer]    Doctor/Office: Adelia Yousif DPM    #: [phone]      What needs to be faxed: Signed treatment notes from 5/3/24.  Cy is applying for [facility]    ATTN to: [employer]    Fax#: [phone]      Documents were successfully e-faxed

## 2024-05-16 ENCOUNTER — TELEPHONE (OUTPATIENT)
Age: 59
End: 2024-05-16

## 2024-05-16 NOTE — TELEPHONE ENCOUNTER
Caller: Cy Carpio    Doctor: Benja Sosa    Reason for call: Cy is calling about his disability paperwork.  Is there any way to expedite it?  His insurance is calling him about it? Thank you.     Call back#: [phone]

## 2024-05-20 ENCOUNTER — OFFICE VISIT (OUTPATIENT)
Dept: PSYCHIATRY | Facility: CLINIC | Age: 59
End: 2024-05-20

## 2024-05-20 VITALS — BODY MASS INDEX: 39.24 KG/M2 | WEIGHT: 297.4 LBS

## 2024-05-20 DIAGNOSIS — F43.21 COMPLICATED BEREAVEMENT: ICD-10-CM

## 2024-05-20 DIAGNOSIS — F41.1 GENERALIZED ANXIETY DISORDER: ICD-10-CM

## 2024-05-20 DIAGNOSIS — F32.1 CURRENT MODERATE EPISODE OF MAJOR DEPRESSIVE DISORDER WITHOUT PRIOR EPISODE (HCC): Primary | ICD-10-CM

## 2024-05-20 RX ORDER — BUSPIRONE HYDROCHLORIDE 15 MG/1
15 TABLET ORAL 3 TIMES DAILY
Qty: 270 TABLET | Refills: 0 | Status: SHIPPED | OUTPATIENT
Start: 2024-05-20

## 2024-05-20 RX ORDER — GABAPENTIN 300 MG/1
900 CAPSULE ORAL 3 TIMES DAILY
Qty: 270 CAPSULE | Refills: 2 | Status: SHIPPED | OUTPATIENT
Start: 2024-05-20 | End: 2024-08-18

## 2024-05-20 RX ORDER — BUPROPION HYDROCHLORIDE 300 MG/1
300 TABLET ORAL DAILY
Qty: 30 TABLET | Refills: 2 | Status: SHIPPED | OUTPATIENT
Start: 2024-05-20

## 2024-05-20 RX ORDER — DOXEPIN HYDROCHLORIDE 10 MG/1
CAPSULE ORAL
Qty: 90 CAPSULE | Refills: 2 | Status: SHIPPED | OUTPATIENT
Start: 2024-05-20 | End: 2024-05-23

## 2024-05-20 NOTE — PATIENT INSTRUCTIONS
Please present for your previously scheduled appointment approximately 15 minutes prior to appointment time. If you are running late or are unable to attend your appointment, please call our [place] office at [phone] or our [place] office at [phone] if applicable to notify the office of your absence.    If you are in psychological crisis including not limited to suicidal/homicidal thoughts or thoughts of self-injury, do not hesitate to call/contact your County Crisis hotline (see below) or attend to the nearest emergency department.  [facility] Crisis: [phone]  [facility] Crisis: [phone]  [facility] Crisis: [phone]  [facility] Crisis: [phone]  [facility] Crisis: [phone]  [facility] Crisis: [phone]  [facility] Crisis: [phone]  National Suicide Prevention Hotline: 1-933.685.8863

## 2024-05-20 NOTE — PSYCH
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 chronic anxiety symptoms, health problems, losses (the patient's daugher passed three years ago)  Protective Factors: no current suicidal ideation, improved depressive symptoms, improved anxiety symptoms, ability to make plans for the future, outpatient psychiatric follow up established, family support established, being a parent, being , compliant with medications, compliant with mental health treatment, having a desire to be alive, personal beliefs about the meaning and value of life, supportive family, ability to contract for safety with staff, ability to communicate with staff  Weapons/Firearms: none. The following steps have been taken to ensure weapons are properly secured: not applicable  Based on today's assessment, David presents the following risk of harm to self: Minimal     Risk of Harm to Others:  The following ratings are based on assessment at the time of the interview and a review of medical records  Demographic Risk Factors include: male.  Historical Risk Factors include: none.  Current Specific Risk Factors include: none  Protective Factors: no current homicidal ideation, improved mood, willing to continue psychiatric treatment, good support  system, supportive family, responsibilities and duties to others, being a parent, being , good self-esteem, sense of personal control  Weapons/Firearms: none. The following steps have been taken to ensure weapons are properly secured: not applicable  Based on today's assessment, David presents the following risk of harm to others: Minimal    Although patient's acute lethality risk is low, long-term/chronic lethality risk is mildly elevated in the presence of moderately severe symptoms of depression and severe symptoms of anxiety. At the current moment, David is future-oriented, forward-thinking, and demonstrates ability to act in a self-preserving manner as evidenced by volitionally presenting to the clinic today, seeking treatment. At this juncture, inpatient hospitalization is not currently warranted. To mitigate future risk, patient should adhere to the recommendations of this writer, avoid alcohol/illicit substance use, utilize community-based resources and familiar support and prioritize mental health treatment.     Based on today's assessment and clinical criteria, David Carpio contracts for safety and is not an imminent risk of harm to self or others. Outpatient level of care is deemed appropriate at this present time. David understands that if they are no longer able to contract for safety, they need to call/contact the outpatient office including this writer, call/contact crisis and/orattend to the nearest Emergency Department for immediate evaluation.    Risks/Benefits      Risks, Benefits And Possible Side Effects Of Medications:    Risks, benefits, and possible side effects of medications explained to David and he verbalizes understanding and agreement for treatment.    Controlled Medication Discussion:     Not applicable - controlled prescriptions are not prescribed by this practice    Psychotherapy Provided:     Individual psychotherapy provided: Yes  At the time of interviewCy  reports ongoing struggles with grief following the passing of his daughter Shameka. He continues to express difficulty in forgiving his wife, himself, and his daughter's boyfriend. Cy reports that since his last therapy appointment he has been more aware of his tendencies to isolate, has been able to recognize the negative impacts of isolating (reflecting on how his daughter isolated), and has been more open with his family. David reports he has started slowly to have conversations with his wife Maribell about his feelings of resentment towards her and reports that he plans to continue these conversations moving forward. Support and encouragement was provided. Cy reports that at this time he has put more emphasis on being present for his family and he reports a renewed interest to spend more time with his 3 sons and his grandson Ki. Cy reports that the family has an upcoming vacation in [facility] scheduled for August. Cy reports that when he is enjoying life and he feels positive, he does remain with feelings of guilt surrounding his daughter's passing (and feeling that he should not be happy). Cy continues to challenge these negative thoughts. In session today Cy was reminded that his daughter would want him to be happy.     Treatment Plan:    Completed and signed during the session: Yes - with David    Visit Time    Visit Start Time: 11:30 PM  Visit Stop Time: 12:30 PM  Total Visit Duration:  60 minutes    This note was shared with patient.    Andre Rodriguez MD 05/20/24    This note has been constructed using a voice recognition system. There may be translation, syntax, or grammatical errors. If you have any questions, please contact the dictating provider.

## 2024-05-20 NOTE — BH TREATMENT PLAN
TREATMENT PLAN (Medication Management Only)        [facility]    Name and Date of Birth:  David Carpio 58 y.o. 1965  Date of Treatment Plan: May 20, 2024  Diagnosis/Diagnoses:    1. Current moderate episode of major depressive disorder without prior episode (HCC)    2. Generalized anxiety disorder    3. Complicated bereavement      Strengths/Personal Resources for Self-Care: supportive family, supportive friends, taking medications as prescribed, ability to adapt to life changes, ability to communicate well, ability to understand psychiatric illness, financial means, general fund of knowledge, motivation for treatment, self-reliance, sense of humor, stable employment, willingness to work on problems, work skills.  Area/Areas of need (in own words): Continue to improve symptoms of depression and anxiety. Continue to help the patient process the passing of his daughter.  1. Long Term Goal: Continue to improve symptoms of depression and anxiety. Continue to help the patient process the passing of his daughter.  Target Date:6 months - 11/20/2024  Person/Persons responsible for completion of goal: Dr. Michael Hernandez  2.  Short Term Objective (s) - How will we reach this goal?:   A. Provider new recommended medication/dosage changes and/or continue medication(s): Continue Wellbutrin  mg daily, Continue Doxepin 10 mg daily and 20 mg at bedtime, continue BuSpar 15 mg three times daily, continue gabapentin 900 mg three times daily.  Take medications as prescribed  Attend psychiatry appointments regularly  Continue psychotherapy regularly  Practice coping skills  Try coping skills using handouts provided  Try sleep hygiene techniques  Avoid alcohol   Avoid drugs   Eat a healthy diet   Exercise regularly  Target Date:6 months - 11/20/2024  Person/Persons Responsible for Completion of Goal: David  Progress Towards Goals: continuing treatment, improving  gradually  Treatment Modality: Medication management every 1 to 3 months  Review due 180 days from date of this plan: 6 months - 11/20/2024  Expected length of service: ongoing treatment  My Physician/PA/NP and I have developed this plan together and I agree to work on the goals and objectives. I understand the treatment goals that were developed for my treatment.       No

## 2024-05-22 ENCOUNTER — TELEPHONE (OUTPATIENT)
Age: 59
End: 2024-05-22

## 2024-05-23 ENCOUNTER — TELEPHONE (OUTPATIENT)
Dept: PSYCHIATRY | Facility: CLINIC | Age: 59
End: 2024-05-23

## 2024-05-23 DIAGNOSIS — F32.1 CURRENT MODERATE EPISODE OF MAJOR DEPRESSIVE DISORDER WITHOUT PRIOR EPISODE (HCC): ICD-10-CM

## 2024-05-23 DIAGNOSIS — F41.1 GENERALIZED ANXIETY DISORDER: ICD-10-CM

## 2024-05-23 RX ORDER — DOXEPIN HYDROCHLORIDE 10 MG/1
CAPSULE ORAL
Start: 2024-05-23

## 2024-05-24 NOTE — TELEPHONE ENCOUNTER
This writer called and spoke to David Carpio's PCP Dr. Rodriguez on 5/23/24 at 4:00 PM, who wanted to explore if possible adjustments could be made to the patient's psychiatric medication regimen to assist with the patient's ongoing struggles with gastrointestinal distress and gastrointestinal reflux. These GI symptoms (noted in progress note 5/20/24) are longstanding and have been refractory to treatment following gastric bypass surgery.    In discussion, it was discussed that some studies suggests that low doses of tricyclic antidepressants can alleviate symptoms of functional dyspepsia as well as abdominal cramps. In discussion with Dr. Rodriguez, it was felt to be a beneficial option to trial a higher dose of doxepin to observe if it improved Cy's symptoms.    Following the conversation with Dr. Rodriguez, Cy was called at 4:20 PM. Following a conversation with Cy, he agreed to increase Doxepin to 10 mg daily and 30 mg at bedtime to better assist with sleep as well as to assist with gastrointestinal reflux symptoms.    Andre Rodriguez MD

## 2024-05-30 ENCOUNTER — TELEPHONE (OUTPATIENT)
Dept: PODIATRY | Facility: CLINIC | Age: 59
End: 2024-05-30

## 2024-05-30 NOTE — TELEPHONE ENCOUNTER
Caller: Maribell Carpio    Doctor/Office: Adelia Yousif DPM    #: [phone]      What needs to be faxed: [employer] is sending over new paperwork to be filled out.  They do not want any dates from 2023, only 2024  They faxed it to:  [phone]    ATTN to: Michael Abscence, attention: Deann    Fax#: [phone]      Documents were successfully e-faxed

## 2024-06-07 ENCOUNTER — TELEPHONE (OUTPATIENT)
Age: 59
End: 2024-06-07

## 2024-06-07 NOTE — TELEPHONE ENCOUNTER
Caller: Cy Carpio    Doctor and/or Office: Dr. Yousif/Mallika    #: [phone]    Escalation: Care Patient would like a return call from clinical to discuss his pins and needles feeling in his feet unil his next office visit. He also is being questioned by his insurance company if there is another job he can be doing. He stated the insurance company was sending forms to the office. Please return call. Thank you

## 2024-06-10 ENCOUNTER — OFFICE VISIT (OUTPATIENT)
Dept: PSYCHIATRY | Facility: CLINIC | Age: 59
End: 2024-06-10

## 2024-06-10 DIAGNOSIS — F43.21 COMPLICATED BEREAVEMENT: Primary | ICD-10-CM

## 2024-06-10 DIAGNOSIS — F32.1 CURRENT MODERATE EPISODE OF MAJOR DEPRESSIVE DISORDER WITHOUT PRIOR EPISODE (HCC): ICD-10-CM

## 2024-06-10 DIAGNOSIS — F41.1 GENERALIZED ANXIETY DISORDER: ICD-10-CM

## 2024-06-10 PROCEDURE — NC001 PR NO CHARGE

## 2024-06-10 NOTE — PSYCH
Behavioral Health Psychotherapy Progress Note    This is a therapy progress note for the patient David Carpio. David (who prefers to be called Cy) is a 58-year-old male,  to his wife Maribell for over 32 years, has 3 sons, is currently living in a house with his wife and 2 dogs in the [facility], currently employed as a  for [employer] (however he is presently on leave from the job in the setting of medical issues). Cy reports a past medical history that includes atrial fibrillation, type 2 diabetes with diabetic polyneuropathy status post multiple foot surgeries in the setting of foot osteomyelitis, obstructive sleep apnea, hypertension, chronic diastolic heart failure, and is over 2 years status post bariatric surgery.  At this time Cy possesses a past psychiatric history of major depressive disorder, generalized anxiety disorder, and complicated bereavement.     Cy reports that he has been struggling with his mental health for over the past three years following the untimely passing of his daughter Shameka (she passed at the age of 21 years old).  Cy reports that as a teenager his daughter began hanging out with bad influences and dating boyfriends who struggled with drugs and who influenced her to use drugs. Cy reports that he did his best to support his daughter and supported her through drug and alcohol rehab. He reports that about three years ago he was informed that his daughter had  and was reportedly found down by her boyfriend. Cy reports that his daughter was found to have heroin and fentanyl in her system at the time. Cy has struggled to cope with her passing since then. He reports that he becomes emotional when thinking of her. At this time Cy also harbors feelings of hatred towards his late daughter's boyfriend. Cy reports he has a 6 year old grandson Ki who lives with his late daughter's boyfriend (who is the father of the  "child).     At this time, Cy continues to feel that he does not deserve to be happy. Cy continues to feel that he failed as a parent and that \"I could have done something\" to prevent the tragic passing of his daughter. He also remains with fears that being happy and moving on from his daughter's passing will dishonor her memory.     Psychotherapy Provided: Individual Psychotherapy     1. Complicated bereavement        2. Current moderate episode of major depressive disorder without prior episode (HCC)        3. Generalized anxiety disorder            Goals addressed in session: Goal 1 and Goal 2     DATA:     During today's session, Cy provided updates on his progress and ongoing struggles in coping with grief following the loss of his daughter. Unfortunately, Cy has continued to struggle with carrying out his goals (opening up and sharing his feelings with his family and having a difficult conversation with his wife about his feelings that she pushed their daughter away). The patient's resistance to carrying out these goals was discussed and explored in detail today. The focus of today's session centered on the concept of setting SMART goals and identifying small, measurable steps towards healing and personal growth.     During today's session, Cy discussed recent internal conflicts about whether to reach out to his daughter-in-law (who was very close with his daughter Shameka). He believes she could provide more insight into his daughter's past, potentially helping him better understand the events leading to Shameka's heroin overdose, a loss he continues to struggle with significantly. Cy expressed resistance to this idea, fearing what his daughter-in-law might reveal, particularly the possibility that Shameka was afraid to talk to him. This session discussed the cycles of addiction and explored the likelihood that his daughter might have wanted to shield Cy from her struggles, aiming to present her best " "self to him. This session emphasized the importance of breaking the cycle of avoiding seeking help and highlighted parallels between his Shameka's struggle to reach out and his own current struggle. Cy was reminded of the importance of seeking support, especially from those who might offer valuable insights or emotional support. The session concluded with plans to continue exploring these themes.    At the conclusion of the office visit, three goals were solidified. Cy plans to reach out to his daughter-in-law to learn more about some of the struggles Shameka was experiencing prior to her passing. Cy plans to continue to work on opening up the boxes of Shameka's belonging. Cy plans to continue opening up the lines of communication to his wife Maribell and finding the time to share his feelings of resentment towards her.  It was emphasized that while this process may be painful, it is essential for his healing journey, and he was encouraged not to shy away from confronting his emotions.    Therapeutic strategies employed during today's session included challenging cognitive distortions, fostering self-reflection, and encouraging gradual exposure to difficult emotions.     During this session, this clinician used the following therapeutic modalities: Bereavement Therapy, Cognitive Behavioral Therapy, Motivational Interviewing, Solution-Focused Therapy, and Supportive Psychotherapy    Substance Abuse was not addressed during this session.    ASSESSMENT:  Cy Carpio presents with a Depressed mood.     his affect is Constricted, which is congruent, with his mood and the content of the session. The client has made progress on their goals.     Cy Carpio presents with a minimal risk of suicide, minimal risk of self-harm, and minimal risk of harm to others.    For any risk assessment that surpasses a \"low\" rating, a safety plan must be developed.    A safety plan was indicated: no    PLAN: Between sessions, Cy Carpio " will continue to work on accepting the passing of his daughter.  He will continue to challenge the negative intrusive thoughts that he harbors surrounding the passing and will continue to work on mindful strategies to live in the present.  He will continue to surround himself with support through family, Spiritism, and grief counseling. He will continue to work on unpacking his daughter's belongings and revisiting memories (both positive and negative) about his daughter. He will continue to foster authenticity in his relationships and work on opening up and sharing his feelings with his family. At the next session, the therapist will use Bereavement Therapy, Cognitive Behavioral Therapy, Motivational Interviewing, Solution-Focused Therapy, and Supportive Psychotherapy to address these ongoing struggles.    Behavioral Health Treatment Plan and Discharge Planning: Cy Carpio is aware of and agrees to continue to work on their treatment plan. They have identified and are working toward their discharge goals. yes    Visit start and stop times:    06/10/24 from 11:30 AM to 12:30 PM    This note was not shared with the patient due to this is a psychotherapy note

## 2024-06-13 ENCOUNTER — TELEPHONE (OUTPATIENT)
Age: 59
End: 2024-06-13

## 2024-06-13 NOTE — TELEPHONE ENCOUNTER
"ANITAI  S/w pt, states he is having \"pins and needles\" and throbbing pain in left foot. Pt has OV on 7/17 to further discuss, advised to cb if any further questions or concerns in the meantime. Pt verbalized understanding, appreciative of cb.  "

## 2024-06-13 NOTE — TELEPHONE ENCOUNTER
Caller: patient spouse anson    Doctor: best    Reason for call: having left foot pain. Call dropped    Call back#:

## 2024-06-26 ENCOUNTER — TELEPHONE (OUTPATIENT)
Age: 59
End: 2024-06-26

## 2024-06-26 NOTE — TELEPHONE ENCOUNTER
Caller:Maribell     Doctor and/or Office: Dr. Yousif/Megha    #: [phone]    Escalation: Appointment Patient wife called stating when the paperwork was filled out it stated Cy needs to be seen every 9 to 10 weeks which would be next week.  His employer called and stated he needs to be seen he I not sched to be seen until the last week of July  Please advise thank

## 2024-07-03 ENCOUNTER — OFFICE VISIT (OUTPATIENT)
Dept: PODIATRY | Age: 59
End: 2024-07-03
Payer: COMMERCIAL

## 2024-07-03 ENCOUNTER — HOSPITAL ENCOUNTER (OUTPATIENT)
Dept: RADIOLOGY | Facility: CLINIC | Age: 59
Discharge: HOME | End: 2024-07-03
Payer: COMMERCIAL

## 2024-07-03 VITALS
WEIGHT: 291.6 LBS | TEMPERATURE: 97.9 F | HEART RATE: 73 BPM | HEIGHT: 73 IN | BODY MASS INDEX: 38.65 KG/M2 | SYSTOLIC BLOOD PRESSURE: 96 MMHG | OXYGEN SATURATION: 99 % | DIASTOLIC BLOOD PRESSURE: 70 MMHG

## 2024-07-03 DIAGNOSIS — E11.621 DIABETIC ULCER OF TOE OF RIGHT FOOT ASSOCIATED WITH TYPE 2 DIABETES MELLITUS, WITH FAT LAYER EXPOSED (HCC): ICD-10-CM

## 2024-07-03 DIAGNOSIS — E11.42 TYPE 2 DIABETES MELLITUS WITH DIABETIC POLYNEUROPATHY, WITH LONG-TERM CURRENT USE OF INSULIN (HCC): Primary | ICD-10-CM

## 2024-07-03 DIAGNOSIS — L97.512 DIABETIC ULCER OF TOE OF RIGHT FOOT ASSOCIATED WITH TYPE 2 DIABETES MELLITUS, WITH FAT LAYER EXPOSED (HCC): ICD-10-CM

## 2024-07-03 DIAGNOSIS — L84 CALLUS: ICD-10-CM

## 2024-07-03 DIAGNOSIS — Z89.432 STATUS POST TRANSMETATARSAL AMPUTATION OF FOOT, LEFT (HCC): ICD-10-CM

## 2024-07-03 DIAGNOSIS — Z79.4 TYPE 2 DIABETES MELLITUS WITH DIABETIC POLYNEUROPATHY, WITH LONG-TERM CURRENT USE OF INSULIN (HCC): Primary | ICD-10-CM

## 2024-07-03 DIAGNOSIS — B35.1 ONYCHOMYCOSIS: ICD-10-CM

## 2024-07-03 PROCEDURE — 11042 DBRDMT SUBQ TIS 1ST 20SQCM/<: CPT | Performed by: STUDENT IN AN ORGANIZED HEALTH CARE EDUCATION/TRAINING PROGRAM

## 2024-07-03 PROCEDURE — 73660 X-RAY EXAM OF TOE(S): CPT

## 2024-07-03 PROCEDURE — 11720 DEBRIDE NAIL 1-5: CPT | Performed by: STUDENT IN AN ORGANIZED HEALTH CARE EDUCATION/TRAINING PROGRAM

## 2024-07-03 PROCEDURE — 11055 PARING/CUTG B9 HYPRKER LES 1: CPT | Performed by: STUDENT IN AN ORGANIZED HEALTH CARE EDUCATION/TRAINING PROGRAM

## 2024-07-03 PROCEDURE — 99213 OFFICE O/P EST LOW 20 MIN: CPT | Performed by: STUDENT IN AN ORGANIZED HEALTH CARE EDUCATION/TRAINING PROGRAM

## 2024-07-03 NOTE — PATIENT INSTRUCTIONS
Change right foot dressing ever 3 days with dermagran and dry dressing. Do not shower foot. Limit weight bearing. Bring surgical shoe to wound care. Call for wound care appointment.

## 2024-07-03 NOTE — PROGRESS NOTES
David Carpio  1965  AT RISK FOOT CARE    1. Type 2 diabetes mellitus with diabetic polyneuropathy, with long-term current use of insulin ([facility])        2. Status post transmetatarsal amputation of foot, left ([facility])        3. Onychomycosis        4. Diabetic ulcer of toe of right foot associated with type 2 diabetes mellitus, with fat layer exposed ([facility])  Ambulatory Referral to Wound Care    XR toe right second min 2 views      5. Callus              Patient presents for at-risk foot care.  Patient has no acute concerns today. Patient has significant lower extremity risk due to previous amputation. No pain, redness, swelling to left foot. Notes tingling to left foot the more he is on it. Patient wearing non-custom DM shoes AMA.     On exam patient has thickened, hypertrophic, discolored, brittle toenails with subungual debris x2   Callus: 1 (R 5th toe)  Amputation: L TMA, R4th & partal 2/3 toes  New right 2nd toe stump plantar DFU with fat layer exposed. No SOI. Debridement as below.     Today's treatment includes:  Debridement of toenails x2. Using nail nipper, jomar, and curette, nails were sharply debrided, reduced in thickness and length. Devitalized nail tissue and fungal debris excised and removed. Patient tolerated well.    Callus R 5th toe pared in thickness with #15 blade to more normal epithelium x1.    Report to pain mngmt for peripheral neuropathy.     Referral to wound care made for new right 2nd toe DFU. Dermagran dsd Q2-3d. Wear custom DM shoes or offloading surgical shoe. Limit ambulation. Good BS control.     XR right 2nd toe 7/3/24 2v: no acute osseous erosion nor CHRISS noted    Discussed proper shoe gear, daily inspections of feet, and general foot health with patient. Patient has Q7 findings and is recommended for at risk foot care every 9-10 weeks.    Patients most recent complete clinical exam was performed: 10/6/23      Debridement    Universal Protocol:  Consent: Verbal consent  "obtained.  Risks and benefits: risks, benefits and alternatives were discussed  Consent given by: patient  Time out: Immediately prior to procedure a \"time out\" was called to verify the correct patient, procedure, equipment, support staff and site/side marked as required.  Patient understanding: patient states understanding of the procedure being performed  Patient consent: the patient's understanding of the procedure matches consent given  Patient identity confirmed: verbally with patient    Debridement Details  Performed by: physician  Debridement type: surgical  Level of debridement: subcutaneous tissue      Post-debridement measurements  Length (cm): 1.5  Width (cm): 1.5  Depth (cm): 0.1  Percent debrided: 100%  Surface Area (cm^2): 2.25  Area Debrided (cm^2): 2.25  Volume (cm^3): 0.23    Tissue and other material debrided: subcutaneous tissue  Devitalized tissue debrided: biofilm, callus and slough  Instrument(s) utilized: blade  Technique utilized: excisionalBleeding: small  Hemostasis obtained with: pressure  Procedural pain (0-10): insensate  Post-procedural pain: insensate   Response to treatment: procedure was tolerated well  Debridement Comments: Pre debridement 1x1x.1cm.     "

## 2024-07-05 ENCOUNTER — OFFICE VISIT (OUTPATIENT)
Dept: PSYCHIATRY | Facility: CLINIC | Age: 59
End: 2024-07-05

## 2024-07-05 DIAGNOSIS — F32.1 CURRENT MODERATE EPISODE OF MAJOR DEPRESSIVE DISORDER WITHOUT PRIOR EPISODE (HCC): ICD-10-CM

## 2024-07-05 DIAGNOSIS — F41.1 GENERALIZED ANXIETY DISORDER: ICD-10-CM

## 2024-07-05 DIAGNOSIS — F43.21 COMPLICATED BEREAVEMENT: Primary | ICD-10-CM

## 2024-07-05 PROCEDURE — NC001 PR NO CHARGE

## 2024-07-05 NOTE — PATIENT INSTRUCTIONS
Please present for your previously scheduled appointment approximately 15 minutes prior to appointment time. If you are running late or are unable to attend your appointment, please call our [place] office at [phone] or our [place] office at [phone] if applicable to notify the office of your absence.    If you are in psychological crisis including not limited to suicidal/homicidal thoughts or thoughts of self-injury, do not hesitate to call/contact your County Crisis hotline (see below) or attend to the nearest emergency department.  [facility] Crisis: [phone]  [facility] Crisis: [phone]  [facility] Crisis: [phone]  [facility] Crisis: [phone]  [facility] Crisis: [phone]  [facility] Crisis: [phone]  [facility] Crisis: [phone]  National Suicide Prevention Hotline: 1-325.548.9872

## 2024-07-05 NOTE — PSYCH
"Behavioral Health Psychotherapy Progress Note    Psychotherapy Provided: Individual Psychotherapy     1. Complicated bereavement        2. Current moderate episode of major depressive disorder without prior episode (HCC)        3. Generalized anxiety disorder            Goals addressed in session: Goal 1, Goal 2    DATA:     At the time of the therapy session, unfortunately David has continued to have difficulty sharing his feelings with his family and he has been avoiding difficult conversations with his family. He has been avoiding conversations particularly with his wife, whom he feels pushed their daughter away.  David reports since the last office visit he unfortunately did not reach out to his daughter-in-law (who has offered to talk about Shameka).  He continues to state that his daughter-in-law could possibly provide more insight into his daughter Shameka's past, potentially helping him understand the events that led to Shameka's overdose.  However, he continues to be worried about what his daughter-in-law might reveal.    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  Since the last office visit, David reports he has been leading some grief counseling for members of his Religious who have also been affected by loss.  In particular, he spoke at length with 1 individual whose 23-year-old son tragically  in a car accident.  This was helpful for David as he was able to identify how parents struggle with the \"what ifs\" when a child passes away.    David reports that this past weekend was his late daughter's birthday and he states that he hosted an event at his house for family members to remember his daughter and celebrate her life.  David states that he became very emotional during this event, notably when family members commented on things that have changed over the years following her passing.  David " "continues to report fears that he will forget his daughter's passing.    David was encouraged to continue embracing difficult challenges relating to his daughter Shameka, including opening up boxes of her belongings, talking to his daughter-in-law about Shameka, and talking to his wife about his resentments.  He was encouraged to explore why he is able to let go of some items that belong to hSameka (such as the outdoor pool that she always helped set up) but not others.  The therapeutic discussion emphasized the necessity of confronting these emotions to progress towards healing.  Additionally, both the potential benefits and emotional challenges of the Voodoo involvement were discussed.  David is to continue working on opening up about his feelings.    During this session, this clinician used the following therapeutic modalities: Bereavement Therapy, Cognitive Behavioral Therapy, Motivational Interviewing, and Supportive Psychotherapy    Substance Abuse was not addressed during this session.    ASSESSMENT:  Cy Carpio presents with a Depressed mood.     his affect is Constricted, which is congruent, with his mood and the content of the session. The client has made progress on their goals.     Cy Carpio presents with a minimal risk of suicide, minimal risk of self-harm, and minimal risk of harm to others.    For any risk assessment that surpasses a \"low\" rating, a safety plan must be developed.    A safety plan was indicated: no    PLAN: Between sessions, Cy Carpio will continue to work on accepting the passing of his daughter.  He will continue to challenge the negative intrusive thoughts that he harbors surrounding the passing and will continue to work on mindful strategies to live in the present.  He will continue to surround himself with support through family, Sh[employer], and grief counseling. He will continue to work on unpacking his daughter's belongings and revisiting memories (both positive and " negative) about his daughter. He will continue to foster authenticity in his relationships and work on opening up and sharing his feelings with his family. At the next session, the therapist will use Bereavement Therapy, Cognitive Behavioral Therapy, Motivational Interviewing, Solution-Focused Therapy, and Supportive Psychotherapy to address these ongoing struggles.    Behavioral Health Treatment Plan and Discharge Planning: Cy Carpio is aware of and agrees to continue to work on their treatment plan. They have identified and are working toward their discharge goals. yes    Visit start and stop times:    07/05/24 from 11:00 PM to 12:00 PM    This note was not shared with the patient due to this is a psychotherapy note

## 2024-07-12 ENCOUNTER — OFFICE VISIT (OUTPATIENT)
Facility: CLINIC | Age: 59
End: 2024-07-12
Payer: COMMERCIAL

## 2024-07-12 VITALS
WEIGHT: 295 LBS | HEART RATE: 90 BPM | BODY MASS INDEX: 39.1 KG/M2 | TEMPERATURE: 95.9 F | HEIGHT: 73 IN | DIASTOLIC BLOOD PRESSURE: 73 MMHG | SYSTOLIC BLOOD PRESSURE: 101 MMHG | RESPIRATION RATE: 18 BRPM

## 2024-07-12 DIAGNOSIS — I83.019 VENOUS ULCER OF RIGHT LEG (HCC): ICD-10-CM

## 2024-07-12 DIAGNOSIS — E66.01 MORBID OBESITY WITH BMI OF 40.0-44.9, ADULT (HCC): ICD-10-CM

## 2024-07-12 DIAGNOSIS — E11.42 TYPE 2 DIABETES MELLITUS WITH DIABETIC POLYNEUROPATHY, WITH LONG-TERM CURRENT USE OF INSULIN (HCC): Primary | ICD-10-CM

## 2024-07-12 DIAGNOSIS — L97.919 VENOUS ULCER OF RIGHT LEG (HCC): ICD-10-CM

## 2024-07-12 DIAGNOSIS — L97.512 DIABETIC ULCER OF TOE OF RIGHT FOOT ASSOCIATED WITH TYPE 2 DIABETES MELLITUS, WITH FAT LAYER EXPOSED (HCC): ICD-10-CM

## 2024-07-12 DIAGNOSIS — Z79.4 TYPE 2 DIABETES MELLITUS WITH DIABETIC POLYNEUROPATHY, WITH LONG-TERM CURRENT USE OF INSULIN (HCC): Primary | ICD-10-CM

## 2024-07-12 DIAGNOSIS — E11.621 DIABETIC ULCER OF TOE OF RIGHT FOOT ASSOCIATED WITH TYPE 2 DIABETES MELLITUS, WITH FAT LAYER EXPOSED (HCC): ICD-10-CM

## 2024-07-12 PROCEDURE — 11042 DBRDMT SUBQ TIS 1ST 20SQCM/<: CPT | Performed by: STUDENT IN AN ORGANIZED HEALTH CARE EDUCATION/TRAINING PROGRAM

## 2024-07-12 PROCEDURE — 97597 DBRDMT OPN WND 1ST 20 CM/<: CPT | Performed by: STUDENT IN AN ORGANIZED HEALTH CARE EDUCATION/TRAINING PROGRAM

## 2024-07-12 PROCEDURE — 99213 OFFICE O/P EST LOW 20 MIN: CPT | Performed by: STUDENT IN AN ORGANIZED HEALTH CARE EDUCATION/TRAINING PROGRAM

## 2024-07-12 RX ORDER — LIDOCAINE 40 MG/G
CREAM TOPICAL ONCE
Status: COMPLETED | OUTPATIENT
Start: 2024-07-12 | End: 2024-07-12

## 2024-07-12 RX ADMIN — LIDOCAINE 1 APPLICATION: 40 CREAM TOPICAL at 15:24

## 2024-07-12 NOTE — PROGRESS NOTES
"Debridement   Wound 07/12/24 Toe D2, second Anterior;Right;Plantar    Universal Protocol:  Consent: Verbal consent obtained.  Risks and benefits: risks, benefits and alternatives were discussed  Consent given by: patient  Time out: Immediately prior to procedure a \"time out\" was called to verify the correct patient, procedure, equipment, support staff and site/side marked as required.  Patient understanding: patient states understanding of the procedure being performed  Patient consent: the patient's understanding of the procedure matches consent given  Patient identity confirmed: verbally with patient    Debridement Details  Performed by: physician  Debridement type: surgical  Level of debridement: subcutaneous tissue      Post-debridement measurements  Length (cm): 0.7  Width (cm): 0.6  Depth (cm): 0.3  Percent debrided: 100%  Surface Area (cm^2): 0.42  Area Debrided (cm^2): 0.42  Volume (cm^3): 0.13    Tissue and other material debrided: adipose and subcutaneous tissue  Devitalized tissue debrided: biofilm and callus  Instrument(s) utilized: blade  Technique utilized: excisionalBleeding: medium  Hemostasis obtained with: pressure and silver nitrate  Procedural pain (0-10): insensate  Post-procedural pain: insensate   Response to treatment: procedure was tolerated well        "

## 2024-07-12 NOTE — PROGRESS NOTES
Wound Procedure Treatment Anterior;Right;Plantar Toe D2, second    Performed by: Yamileth Ghosh RN  Authorized by: Adelia Yousif DPM    Associated wounds:   Wound 07/12/24 Toe D2, second Anterior;Right;Plantar  Wound cleansed with:  NSS  Applied primary dressing:  Dermagran  Applied secondary dressing:  Gauze  Dressing secured with:  Vasquez and Tape  Wound Procedure Treatment Right Pretibial    Performed by: Yamileth Ghosh RN  Authorized by: Adelia Yousif DPM    Associated wounds:   Wound 07/12/24 Pretibial Right  Wound cleansed with:  NSS  Applied primary dressing:  Calcium alginate and Silver  Applied secondary dressing:  Gauze  Dressing secured with:  Compression wrap  Other Assisting Provider: No    Consent given by:  Patient  Time Out:     Time out: Immediately prior to the procedure a time out was called    Patient states understanding of procedure being performed: Yes    Patient's understanding of procedure matches consent: Yes    Procedure consent matches procedure scheduled: Yes    Compression -Pre-procedure details:     Sensation:  Normal    Skin color:  WNL    Laterality:  Right    Location:  Leg    Cast type:  Multi-layer compression short leg    Supplies:  2 layer wrap (silver alginate, gauze, coflex TLC XL)    Pain:  Unchanged    Sensation:  Normal    Skin color:  WNL    Patient tolerance of procedure:  Tolerated well, no immediate complications     Multiplayer wrap procedure     Before application, IBIS and/or TBI determined to be adequate for healing and application of compression. Lower extremity washed prior to application of compression wrap. With the foot in dorsiflexed position, Coflex TLC XL was applied as per physician orders without complications or complaint of pain.  The procedure was tolerated well. Toes warm & pink post application.  Patient provided education & reinforced to observe toes for any discoloration, swelling or tingling and instructed when to report to the Wound Center or to  remove compression

## 2024-07-12 NOTE — PATIENT INSTRUCTIONS
Orders Placed This Encounter   Procedures    Wound cleansing and dressings Anterior;Right;Plantar Toe D2, second     Wash your hands with soap and water.  Remove old dressing, discard into plastic bag and place in trash.  Cleanse the wound with saline prior to applying a clean dressing. Do not use tissue or cotton balls. Do not scrub the wound. Pat dry using gauze.    Shower no   Apply dermagran to the right second toe wound.  Cover with gauze  Secure with roll gauze  Change dressing every other day     Wear surgical shoe at all times  Keep blood sugars under control  Stay off foot as much as possible     Standing Status:   Future     Standing Expiration Date:   7/19/2024    Wound cleansing and dressings Right Pretibial     Wash your hands with soap and water.  Remove old dressing, discard into plastic bag and place in trash.  Cleanse the wound with saline prior to applying a clean dressing. Do not use tissue or cotton balls. Do not scrub the wound. Pat dry using gauze.    Shower no- compression wrap must stay dry     Apply silver alginate to the right lower leg wound.  Cover with gauze  Secure with audi  Change dressing weekly     Multi-layer compression wrap Instructions- Coflex TLC XL    Keep compression wrap/wraps clean and dry. If wraps are too tight and you experience numbness/tingling, call the wound center. If after hours, remove wraps or proceed to nearest E.R. and call wound center in AM.  Wrap will be changed 1 x weekly  Avoid prolonged standing in one place.  Elevate leg(s) above the level of the heart when sitting or as much as possible.     Standing Status:   Future     Standing Expiration Date:   7/19/2024

## 2024-07-12 NOTE — PROGRESS NOTES
Patient ID: David Carpio is a 58 y.o. male Date of Birth 1965       Chief Complaint   Patient presents with    New Patient Visit     Patient presents for evaluation of right toe wound. Patient states it has been present for about 1 week where Dr Yousif noted it at a podiatry appointment.        Allergies  Ativan [lorazepam]    Diagnosis:  1. Type 2 diabetes mellitus with diabetic polyneuropathy, with long-term current use of insulin ([facility])  2. Diabetic ulcer of toe of right foot associated with type 2 diabetes mellitus, with fat layer exposed ([facility])  -     Wound cleansing and dressings Anterior;Right;Plantar Toe D2, second; Future  3. Venous ulcer of right leg ([facility])  -     lidocaine (LMX) 4 % cream  -     Wound cleansing and dressings Right Pretibial; Future  -     Debridement Right Pretibial  4. Morbid obesity with BMI of 40.0-44.9, adult ([facility])      Diagnosis ICD-10-CM Associated Orders   1. Type 2 diabetes mellitus with diabetic polyneuropathy, with long-term current use of insulin ([facility])  E11.42     Z79.4       2. Diabetic ulcer of toe of right foot associated with type 2 diabetes mellitus, with fat layer exposed ([facility])  E11.621 Wound cleansing and dressings Anterior;Right;Plantar Toe D2, second    L97.512       3. Venous ulcer of right leg ([facility])  I83.019 lidocaine (LMX) 4 % cream    L97.919 Wound cleansing and dressings Right Pretibial     Debridement Right Pretibial      4. Morbid obesity with BMI of 40.0-44.9, adult ([facility])  E66.01     Z68.41              Assessment & Plan:  See wound care orders (silver alginate coflex leg, dermagran dsd toe)  - RLE venous ulceration appears superficial without SOI. Selective dbt performed today  - R 2nd toe DFU appears with fibrous base, periwound callusing due to overload. No bone exposed. No SOI. Surgical debridement as below.  - XR right 2nd toe 7/3/24: no osseous erosion or CHRISS noted  - Bedside ABIs 0.96 B/L.   - offload in surgical shoe with offloading felt pad but  limit ambulation at all times  - We discussed at length lifestyle modifications for venous insufficiency. We recommend to not sleep in a recliner. To avoid periods of sitting with legs in a dependent position. To elevate legs and lay flat for periods in the day and at night. To take diuretics as directed. Also recommend weight loss, increased ambulation, increased activity, and monitor diet especially sodium intake. To use compression stockings.     - F/u 1 wk; call if acute SOI arise      Subjective:   Cy presents to wound care today concerning toe and leg ulcers. Present for unknown amt of time. Presents without dressings. Diabetic with PN. H/o L TMA.          The following portions of the patient's history were reviewed and updated as appropriate:   Patient Active Problem List   Diagnosis    DAYAN (obstructive sleep apnea)    Chronic diastolic heart failure ([facility])    Hypertension    Diabetes mellitus ([facility])    Morbid obesity with BMI of 40.0-44.9, adult ([facility])    Pain, joint, ankle and foot, left    Chronic osteomyelitis of left foot with draining sinus ([facility])    Atrial fibrillation ([facility])    Anxiety    Type 2 diabetes mellitus with diabetic polyneuropathy, without long-term current use of insulin ([facility])    Toe osteomyelitis, right ([facility])    Cervical spondylosis    Cervicalgia - Right    Diabetic infection of left foot ([facility])    Pacemaker    History of bariatric surgery    Encounter for perioperative consultation    Hyperkalemia    Diabetic ulcer of left midfoot associated with type 2 diabetes mellitus ([facility])    Cellulitis of left lower extremity    Closed fracture of shaft of metatarsal bone of left foot    Moderate benzodiazepine use disorder ([facility])    Microcytic anemia    Other constipation    Status post partial amputation of foot, left ([facility])    Moderate protein-calorie malnutrition ([facility])    Left foot pain    Charcot arthropathy of midfoot    Cervical strain    Cervical radiculopathy     Past Medical History:    Diagnosis Date    Atrial fibrillation (HCC)     Benzodiazepine withdrawal with complication (HCC) 6/15/2023    Chronic diastolic (congestive) heart failure (HCC)     Diabetes mellitus (HCC)     High cholesterol     Hyperlipidemia     Pacemaker     Stroke (HCC)      Past Surgical History:   Procedure Laterality Date    APPENDECTOMY      ATRIAL CARDIAC PACEMAKER INSERTION      BARIATRIC SURGERY  05/2021    BONE BIOPSY Left 7/26/2023    Procedure: EXCISION BIOPSY BONE LESION EXTREMITY;  Surgeon: Adelia Yousif DPM;  [facility];  Service: Podiatry    EPIDURAL BLOCK INJECTION N/A 5/19/2022    Procedure: BLOCK / INJECTION EPIDURAL STEROID CERVICAL C7-T1;  Surgeon: Skyler Quinn MD;  [facility]: [facility];  Service: Pain Management     FL GUIDED NEEDLE PLAC BX/ASP/INJ  3/22/2022    FOOT AMPUTATION Left 4/28/2022    Procedure: LEFT TRANSMETATARSAL AMPUTATION.;  Surgeon: Nestor Madden DPM;  [facility];  Service: Podiatry    NERVE BLOCK Right 2/10/2022    Procedure: BLOCK MEDIAL BRANCH C3, C4, C5 #1;  Surgeon: Skyler Quinn MD;  [facility];  Service: Pain Management     NERVE BLOCK Right 3/22/2022    Procedure: BLOCK MEDIAL BRANCH C3, C4, C5 #2;  Surgeon: Skyler Quinn MD;  [facility];  Service: Pain Management     NV AMPUTATION FOOT TRANSMETARSAL Left 4/12/2023    Procedure: REVISION LEFT TRANSMETATARSAL (TMA) AMPUTATION, REMOVAL OF UNVIABLE TISSUE AND BONE,;  Surgeon: Adelia Yousif DPM;  [facility];  Service: Podiatry    NV AMPUTATION METATARSAL W/TOE SINGLE Left 4/7/2023    Procedure: 2ND RAY RESECTION FOOT;  Surgeon: Adelia Yousif DPM;  [facility];  Service: Podiatry    NV AMPUTATION TOE INTERPHALANGEAL JOINT Left 11/16/2021    Procedure: AMPUTATION LESSER TOE;  Surgeon: Nestor Madden DPM;  [facility];  Service: Podiatry    RADIOFREQUENCY ABLATION Right 4/7/2022    Procedure: Right C3, C4, C5 RFA;  Surgeon: Skyler Quinn MD;   [facility];  Service: Pain Management     RHIZOTOMY Right 2/9/2023    Procedure: RHIZOTOMY CERVICAL MEDIAL BRANCH NERVES RIGHT C3, C4, C5;  Surgeon: Skyler Quinn MD;  [facility];  Service: Pain Management     TOE AMPUTATION Left     2nd toe    TOE AMPUTATION Left 9/15/2021    Procedure: AMPUTATION LEFT 4TH TOE;  Surgeon: Nestor Madden DPM;  [facility];  Service: Podiatry    TOE AMPUTATION Right 1/12/2022    Procedure: AMPUTATION TOE;  Surgeon: Hong Jolly DPM;  [facility];  Service: Podiatry    TOE AMPUTATION Right 2/23/2022    Procedure: AMPUTATION TOE  RIGHT SECOND;  Surgeon: Hong Jolly DPM;  [facility];  Service: Podiatry    TOE AMPUTATION Right 6/3/2022    Procedure: AMPUTATION right 4th TOE;  Surgeon: Nestor Madden DPM;  [facility];  Service: Podiatry     Social History     Socioeconomic History    Marital status: /Civil Union     Spouse name: None    Number of children: None    Years of education: None    Highest education level: None   Occupational History    Occupation: Maintenance Tech     Employer: [employer]   Tobacco Use    Smoking status: Never    Smokeless tobacco: Never   Vaping Use    Vaping status: Never Used   Substance and Sexual Activity    Alcohol use: Never    Drug use: Never    Sexual activity: Yes   Other Topics Concern    None   Social History Narrative    None     Social Determinants of Health     Financial Resource Strain: Low Risk  (10/19/2023)    [facility]    Overall Financial Resource Strain (CARDIA)     Difficulty of Paying Living Expenses: Not hard at all   Food Insecurity: No Food Insecurity (10/19/2023)    [facility]    Hunger Vital Sign     Worried About Running Out of Food in the Last Year: Never true     Ran Out of Food in the Last Year: Never true   Transportation  Needs: No Transportation Needs (10/19/2023)    [facility]    PRAPARE - Transportation     Lack of Transportation (Medical): No     Lack of Transportation (Non-Medical): No   Physical Activity: Sufficiently Active (10/19/2023)    Received from [facility]    Exercise Vital Sign     Days of Exercise per Week: 7 days     Minutes of Exercise per Session: 60 min   Stress: Stress Concern Present (10/19/2023)    [facility]    [employer] - Occupational Stress Questionnaire     Feeling of Stress : Very much   Social Connections: Unknown (6/18/2024)    Received from [employer] [facility]     How often do you feel lonely or isolated from those around you? (Adult - for ages 18 years and over): Not on file   Intimate Partner Violence: Not At Risk (10/19/2023)    [facility]    Humiliation, Afraid, Rape, and Kick questionnaire     Fear of Current or Ex-Partner: No     Emotionally Abused: No     Physically Abused: No     Sexually Abused: No   Housing Stability: Low Risk  (10/19/2023)    [facility]    Housing Stability Vital Sign     Unable to Pay for Housing in the Last Year: No     Number of Places Lived in the Last Year: 1     Unstable Housing in the Last Year: No        Current Outpatient Medications:     albuterol (PROVENTIL HFA,VENTOLIN HFA) 90 mcg/act inhaler, Inhale 2 puffs every 4 (four) hours as needed, Disp: , Rfl:     busPIRone (BUSPAR) 15 mg tablet, Take 1 tablet (15 mg total) by mouth 3 (three) times a day, Disp: 270 tablet, Rfl: 0    calcium citrate-vitamin D 315 mg-5 mcg tablet, Take 2 tablets by mouth 2 (two) times a day, Disp: , Rfl:     doxepin (SINEquan) 10 mg capsule, Take 1 capsule (10 mg total) by mouth in the  morning AND 3 capsules (30 mg total) daily at bedtime., Disp: , Rfl:     ergocalciferol (VITAMIN D2) 50,000 units, Take 1 capsule by mouth once a week, Disp: , Rfl:     ferrous sulfate 325 (65 Fe) mg tablet, Take 1 tablet (325 mg total) by mouth daily with breakfast Do not start before June 19, 2023., Disp: 30 tablet, Rfl: 0    furosemide (LASIX) 40 mg tablet, Take 40 mg by mouth every morning, Disp: , Rfl:     gabapentin (NEURONTIN) 300 mg capsule, Take 3 capsules (900 mg total) by mouth 3 (three) times a day, Disp: 270 capsule, Rfl: 2    Multiple Vitamins-Minerals (Mens Multivitamin) TABS, Take 1 tablet by mouth 2 (two) times a day, Disp: , Rfl:     pantoprazole (PROTONIX) 40 mg tablet, Take 40 mg by mouth daily, Disp: , Rfl:     potassium chloride (KLOR-CON) 20 mEq packet, Take 20 mEq by mouth 2 (two) times a day, Disp: , Rfl:     traMADol (ULTRAM) 50 mg tablet, PRN, Disp: , Rfl:     Xarelto 20 MG tablet, , Disp: , Rfl:     atorvastatin (LIPITOR) 40 mg tablet, , Disp: , Rfl:     buPROPion (Wellbutrin XL) 300 mg 24 hr tablet, Take 1 tablet (300 mg total) by mouth daily (Patient not taking: Reported on 7/12/2024), Disp: 30 tablet, Rfl: 2    cyanocobalamin 1,000 mcg/mL, Inject 1,000 mcg into a muscle (Patient not taking: Reported on 5/3/2024), Disp: , Rfl:     lidocaine (Lidoderm) 5 %, Apply 1 patch topically over 12 hours daily for 15 days Remove & Discard patch within 12 hours or as directed by MD, Disp: 15 patch, Rfl: 0    metolazone (ZAROXOLYN) 2.5 mg tablet, 2.5 mg Monday, Wednesday, Friday (Patient not taking: Reported on 7/12/2024), Disp: , Rfl:     predniSONE 20 mg tablet, TAKE 3 TABS FOR 3 DAYS, 2 TABS FOR 3 DAYS, 1 TAB FOR 3 DAYS, 1/2 TAB FOR 3 DAYS (Patient not taking: Reported on 5/3/2024), Disp: , Rfl:   No current facility-administered medications for this visit.  Family History   Problem Relation Age of Onset    No Known Problems Mother     No Known Problems Father       Review of Systems  "  Constitutional:  Negative for activity change, chills and fever.   HENT: Negative.     Respiratory:  Negative for cough, chest tightness and shortness of breath.    Cardiovascular:  Positive for leg swelling (Chronic B/L). Negative for chest pain.   Endocrine: Negative.    Genitourinary: Negative.    Musculoskeletal:  Negative for gait problem.   Skin:  Positive for wound.   Neurological:         PN   Psychiatric/Behavioral: Negative.  Negative for agitation and behavioral problems.        Objective:  /73   Pulse 90   Temp (!) 95.9 °F (35.5 °C)   Resp 18   Ht 6' 1\" (1.854 m)   Wt 134 kg (295 lb)   BMI 38.92 kg/m²     Physical Exam  Constitutional:       Appearance: Normal appearance. He is not ill-appearing.      Comments: Anxious   Cardiovascular:      Comments: Chronic venous stasis dermatitis with B/L LE brawny edema. Diminished pedal pulses due to edema. Absent pedal hair.   Pulmonary:      Effort: No respiratory distress.   Musculoskeletal:         General: No tenderness or deformity. Normal range of motion.      Comments: S/p left TMA    Skin:     Capillary Refill: Capillary refill takes less than 2 seconds.      Comments: B/L LE skin is atrophic - thin, dry and shiny in appearance.     Right plantar 2nd toe stump appears with full thickness fibrous ulceration with significant periwound callus. No probe or exposed bone. No purulence or SOI.     RLE with pretibial superficial venous stasis ulcer. No SOI.     Left anterior ankle intact blister.    Neurological:      General: No focal deficit present.      Mental Status: He is alert and oriented to person, place, and time.      Comments: N/T/B to B/L LE   Psychiatric:         Mood and Affect: Mood normal.         Behavior: Behavior normal.             Wound 07/12/24 Toe D2, second Anterior;Right;Plantar (Active)   Wound Image   07/12/24 1440   Wound Description Pink;Yellow 07/12/24 1442   Katy-wound Assessment Dry;Callus 07/12/24 1442   Wound Length " "(cm) 0.7 cm 07/12/24 1442   Wound Width (cm) 0.6 cm 07/12/24 1442   Wound Depth (cm) 0.3 cm 07/12/24 1442   Wound Surface Area (cm^2) 0.42 cm^2 07/12/24 1442   Wound Volume (cm^3) 0.126 cm^3 07/12/24 1442   Calculated Wound Volume (cm^3) 0.13 cm^3 07/12/24 1442   Number of underminings 1 07/12/24 1442   Undermining 1 0.4 07/12/24 1442   Undermining 1 is depth extending from 12 o clock to 12 o clock deepest at 10 o clock 07/12/24 1442   Drainage Amount Small 07/12/24 1442   Drainage Description Serosanguineous 07/12/24 1442   Non-staged Wound Description Full thickness 07/12/24 1442       Wound 07/12/24 Pretibial Right (Active)   Wound Image   07/12/24 1502   Wound Description Pink;Granulation tissue;Epithelialization 07/12/24 1504   Katy-wound Assessment Dry;Intact;Scaly 07/12/24 1504   Wound Length (cm) 2.7 cm 07/12/24 1504   Wound Width (cm) 2 cm 07/12/24 1504   Wound Depth (cm) 0.1 cm 07/12/24 1504   Wound Surface Area (cm^2) 5.4 cm^2 07/12/24 1504   Wound Volume (cm^3) 0.54 cm^3 07/12/24 1504   Calculated Wound Volume (cm^3) 0.54 cm^3 07/12/24 1504   Drainage Amount Small 07/12/24 1504   Drainage Description Serosanguineous 07/12/24 1504   Non-staged Wound Description Full thickness 07/12/24 1504         Debridement   Wound 07/12/24 Pretibial Right    Universal Protocol:  Consent: Verbal consent obtained.  Risks and benefits: risks, benefits and alternatives were discussed  Consent given by: patient  Time out: Immediately prior to procedure a \"time out\" was called to verify the correct patient, procedure, equipment, support staff and site/side marked as required.  Patient understanding: patient states understanding of the procedure being performed  Patient consent: the patient's understanding of the procedure matches consent given  Patient identity confirmed: verbally with patient    Debridement Details  Performed by: physician  Debridement type: selective      Post-debridement measurements  Length (cm): " 2.7  Width (cm): 2  Depth (cm): 0.1  Percent debrided: 100%  Surface Area (cm^2): 5.4  Area Debrided (cm^2): 5.4  Volume (cm^3): 0.54    Devitalized tissue debrided: biofilm  Instrument(s) utilized: blade  Technique utilized: nonexcisionalBleeding: small  Hemostasis obtained with: not applicable  Procedural pain (0-10): 0  Post-procedural pain: 0   Response to treatment: procedure was tolerated well               Wound Instructions:  Orders Placed This Encounter   Procedures    Wound cleansing and dressings Anterior;Right;Plantar Toe D2, second     Wash your hands with soap and water.  Remove old dressing, discard into plastic bag and place in trash.  Cleanse the wound with saline prior to applying a clean dressing. Do not use tissue or cotton balls. Do not scrub the wound. Pat dry using gauze.    Shower no   Apply dermagran to the right second toe wound.  Cover with gauze  Secure with roll gauze  Change dressing every other day     Wear surgical shoe at all times  Keep blood sugars under control  Stay off foot as much as possible     Standing Status:   Future     Standing Expiration Date:   7/19/2024    Wound cleansing and dressings Right Pretibial     Wash your hands with soap and water.  Remove old dressing, discard into plastic bag and place in trash.  Cleanse the wound with saline prior to applying a clean dressing. Do not use tissue or cotton balls. Do not scrub the wound. Pat dry using gauze.    Shower no- compression wrap must stay dry       Right leg   Apply silver alginate to the right lower leg wound.  Cover with gauze  Secure with audi  Change dressing weekly     Multi-layer compression wrap Instructions- Coflex TLC XL    Keep compression wrap/wraps clean and dry. If wraps are too tight and you experience numbness/tingling, call the wound center. If after hours, remove wraps or proceed to nearest E.R. and call wound center in AM.  Wrap will be changed 1 x weekly  Avoid prolonged standing in one  "place.  Elevate leg(s) above the level of the heart when sitting or as much as possible.      Left Leg  Elastic Tubular Stocking Spandagrip F     Tubular elastic bandage: Apply from base of toes to behind the knee. Apply in AM, may remove for sleep.  Avoid prolonged standing in one place.  Elevate leg(s) above the level of the heart when sitting or as much as possible.     Standing Status:   Future     Standing Expiration Date:   7/19/2024    Debridement Right Pretibial     This order was created via procedure documentation         Adelia Yousif DPM    Portions of the record may have been created with voice recognition software. Occasional wrong word or \"sound a like\" substitutions may have occurred due to the inherent limitations of voice recognition software. Read the chart carefully and recognize, using context, where substitutions have occurred.    "

## 2024-07-17 ENCOUNTER — OFFICE VISIT (OUTPATIENT)
Dept: PAIN MEDICINE | Facility: CLINIC | Age: 59
End: 2024-07-17
Payer: COMMERCIAL

## 2024-07-17 VITALS
DIASTOLIC BLOOD PRESSURE: 80 MMHG | HEART RATE: 68 BPM | RESPIRATION RATE: 18 BRPM | BODY MASS INDEX: 39.1 KG/M2 | HEIGHT: 73 IN | OXYGEN SATURATION: 97 % | WEIGHT: 295 LBS | TEMPERATURE: 98.7 F | SYSTOLIC BLOOD PRESSURE: 118 MMHG

## 2024-07-17 DIAGNOSIS — G89.4 CHRONIC PAIN SYNDROME: ICD-10-CM

## 2024-07-17 DIAGNOSIS — M86.472 CHRONIC OSTEOMYELITIS OF LEFT FOOT WITH DRAINING SINUS (HCC): ICD-10-CM

## 2024-07-17 DIAGNOSIS — E11.42 TYPE 2 DIABETES MELLITUS WITH DIABETIC POLYNEUROPATHY, WITHOUT LONG-TERM CURRENT USE OF INSULIN (HCC): Primary | ICD-10-CM

## 2024-07-17 DIAGNOSIS — M14.679 CHARCOT ARTHROPATHY OF MIDFOOT: ICD-10-CM

## 2024-07-17 DIAGNOSIS — Z79.891 LONG-TERM CURRENT USE OF OPIATE ANALGESIC: ICD-10-CM

## 2024-07-17 PROCEDURE — 99214 OFFICE O/P EST MOD 30 MIN: CPT | Performed by: ANESTHESIOLOGY

## 2024-07-17 RX ORDER — METHADONE HYDROCHLORIDE 5 MG/1
5 TABLET ORAL EVERY 8 HOURS SCHEDULED
Qty: 90 TABLET | Refills: 0 | Status: SHIPPED | OUTPATIENT
Start: 2024-07-17

## 2024-07-17 RX ORDER — GABAPENTIN 800 MG/1
800 TABLET ORAL 4 TIMES DAILY
Qty: 120 TABLET | Refills: 2 | Status: SHIPPED | OUTPATIENT
Start: 2024-07-17 | End: 2024-07-23 | Stop reason: SDUPTHER

## 2024-07-17 NOTE — PATIENT INSTRUCTIONS
"Patient Education     Complex regional pain syndrome   The Basics   Written by the doctors and editors at [facility]   What is complex regional pain syndrome? -- Complex regional pain syndrome (\"CRPS\") is a condition that causes pain, swelling, and other symptoms. It usually happens in just 1 part of the body, such as an arm or leg.  Both adults and children can get CRPS. In adults, it often starts after a bone fracture, injury (such as a sprain), surgery, or stroke. Doctors are not sure how or why it starts. People who have CRPS have worse pain than doctors would expect from the injury, surgery, or other medical problem. In some cases, especially in children, CRPS can start without any injury or surgery happening first.  What are the symptoms of CRPS? -- In adults, CRPS usually affects the arms. In children, CRPS most often affects the legs.  Symptoms of CRPS can include:   Pain - The pain can be burning, tingling, throbbing, or aching. It can also be severe.   Being sensitive to touch or cold, or not feeling touch or pain normally   Swelling in the body part with CRPS   Trouble moving the body part with CRPS due to pain, swelling, or stiffness   Tremor (shaking) or muscle spasm in the body part with CRPS   Skin changes - These might include changes in skin thickness, temperature, or color, or sweating more or less than usual.   Changes in the way that hair and nails grow  In most cases, the first symptoms of CRPS include pain, redness, and swelling of the affected body part. Usually, the part with CRPS feels warm at first, but might later change from warm to cold. In other people, it feels cold at first.  Is there a test for CRPS? -- No. There is no 1 test used to find CRPS.  A doctor will do an exam and ask questions. They can usually tell if a person has CRPS from the symptoms, medical history, and exam.  If a doctor is not sure if a person has CRPS, they might order imaging tests such as a bone scan, X-ray, or " MRI. These tests create pictures of the inside of the body. They can show changes to the bones, joints, or skin that might be caused by CRPS.  Doctors can also do tests that measure skin temperature, sweating, and nerve sensitivity. These tests are not usually needed. The doctor might do them if they need extra information.  For children with CRPS, doctors might do blood tests and imaging tests to make sure that a different condition is not causing the symptoms. Once they know that a child has CRPS, other tests are not usually needed.  How is CRPS treated? -- Treatment for CRPS is different for each person. Doctors can try different treatments to see what works best.  For adults, treatments include:   Learning about CRPS - For example, it is important to know that even though CRPS hurts, it does not damage the body part. Joining a support group can help a person with CRPS deal with symptoms.   Talking with a counselor - This can help reduce stress. Stress at work or home can be part of CRPS or make it worse.   Physical therapy to learn exercises and stretches, and keep the body part with CRPS working   Taking medicines to relieve pain - These can be prescription or over-the-counter medicines, depending on what the doctor or nurse thinks might work best.  If these treatments do not help, people with severe CRPS should see a doctor who specializes in treating pain. These specialists might try other treatments, such as:   Injections (shots) of numbing or pain-relieving medicines   Pain-relieving medicine given in the spine   Devices to help stop nerve signals of pain  The main treatments for children with CRPS include:   Physical and occupational therapy - This involves learning exercises, movements, and ways of doing everyday tasks.   Counseling to reduce stress and anxiety - Working with a counselor is also important to treat mental or emotional problems that could be part of CRPS.  Can CRPS be prevented? --  Sometimes. Taking vitamin C after breaking a bone or having surgery might help prevent CRPS.  In adults who have a bone fracture, injury, or stroke, keeping a body part moving can help prevent CRPS. For example, if someone with a stroke is partly paralyzed, a therapist can gently move the paralyzed parts of their body. This can be done even if the person cannot get out of bed.  All topics are updated as new evidence becomes available and our peer review process is complete.  This topic retrieved from [employer] on: Feb 26, 2024.  Topic 55470 Version 11.0  Release: 32.2.4 - C32.56  © 2024 UpToDate, Inc. and/or its affiliates. All rights reserved.  Consumer Information Use and Disclaimer   Disclaimer: This generalized information is a limited summary of diagnosis, treatment, and/or medication information. It is not meant to be comprehensive and should be used as a tool to help the user understand and/or assess potential diagnostic and treatment options. It does NOT include all information about conditions, treatments, medications, side effects, or risks that may apply to a specific patient. It is not intended to be medical advice or a substitute for the medical advice, diagnosis, or treatment of a health care provider based on the health care provider's examination and assessment of a patient's specific and unique circumstances. Patients must speak with a health care provider for complete information about their health, medical questions, and treatment options, including any risks or benefits regarding use of medications. This information does not endorse any treatments or medications as safe, effective, or approved for treating a specific patient. UpToDate, Inc. and its affiliates disclaim any warranty or liability relating to this information or the use thereof.The use of this information is governed by the Terms of Use, available at https://www.woltersiJouleuwer.com/en/know/clinical-effectiveness-terms. 2024© [employer]  [employer] and its affiliates and/or licensors. All rights reserved.  Copyright   © 2024 [employer] and/or its affiliates. All rights reserved.

## 2024-07-17 NOTE — PROGRESS NOTES
Assessment  1. Type 2 diabetes mellitus with diabetic polyneuropathy, without long-term current use of insulin (HCC)  2. Chronic osteomyelitis of left foot with draining sinus (HCC)  3. Charcot arthropathy of midfoot    Symptoms of charcot joint, significant left sided neuropathy, elements of CRPS type 1 with sudomotor, vasomotor changes, decreased ROM, allodynia and hypersensitivity. Color changes, rubor, swelling, significant hypersensitivity in LLE which satisfy [place] criteria. Not a candidate for SCS trial; has failed high dose gabapentin; risks discussed with regard to opioid therapy, informed consent obtained.  Previously reported the following symptomatology:     Right sided neck pain described primarily arthritic features. Has yet to begin physical therapy for his neck. Cervical facet arthropathy seen on xray cervical spine.  Distribution of pain follows the right C3-C6 medial branch nerve regions. + right sided facet loading maneuvers consistent with multilevel spondyloarthropathy and osteophytes seen in cervical spine. Reasonable at this time to trial medial branch blocks to target site of cervical facet mediated pain and pursue radiofrequency ablation of successful.  Risks, benefits and alternatives of procedure in conjunction with multimodal pain therapy plan thoroughly discussed with patient.  Questions answered to patient's satisfaction.    Plan  -gabapentin increased to 800 mg q.i.d by outside provider; counseled regarding sedative effects of taking this medication and provided up titration calendar.  Counseled not to take medication while driving or operating heavy machinery/using stairs  -risks regarding opioids discussed below; rapid UDS obtained, pain agreement signed by both parties after review  -methadone 5mg TID rx; advised bowel regimen; qtc 414  -f/u 4 weeks  -physical therapy for cervical spondylosis, radiculopathy; Physician directed home exercise plan as per AAOS demonstrated and  handouts provided that patient plans to participate with for 1 hour, twice a week for the next 6 weeks.     There are risks associated with opioid medications, including dependence, addiction and tolerance. The patient understands and agrees to use these medications only as prescribed. Potential side effects of the medications include, but are not limited to, constipation, drowsiness, addiction, impaired judgment and risk of fatal overdose if not taken as prescribed. The patient was warned against driving while taking sedation medications.  Sharing medications is a felony. At this point in time, the patient is showing no signs of addiction, abuse, diversion or suicidal ideation.     [place] Prescription Drug Monitoring Program report was reviewed and was appropriate      Complete risks and benefits including bleeding, infection, tissue reaction, nerve injury and allergic reaction were discussed. The approach was demonstrated using models and literature was provided. Verbal and written consent was obtained.     My impressions and treatment recommendations were discussed in detail with the patient who verbalized understanding and had no further questions.  Discharge instructions were provided. I personally saw and examined the patient and I agree with the above discussed plan of care.    No orders of the defined types were placed in this encounter.      History of Present Illness    Symptoms of charcot joint, significant left sided neuropathy, elements of CRPS type 1 with sudomotor, vasomotor changes, decreased ROM, allodynia and hypersensitivity. Color changes, rubor, swelling, significant hypersensitivity in LLE which satisfy [place] criteria. Not a candidate for SCS trial; has failed high dose gabapentin; risks discussed with regard to opioid therapy, informed consent obtained. Previously reported the following symptomatology:     David Carpio is a 58 y.o. male with pmhx of afib with pacemaker on xarelto,  obesity, DAYAN, DM-2, HTN, depression/anxiety presenting with right-sided neck pain described primarily arthritic features.  Describes progressive neck pain since November.  The patient describes predominantly aching, nagging, indolent crampy, stabbing axial neck pain without radicular features of electric shock-like and shooting pain. He denies any weakness numbness and paresthesias.  The pain is 8/10 nature and is worse with overhead maneuvers as well as lateral rotation and extension of the neck.  The patient has not yet been to physical therapy, and has failed conservative medical management including naproxen 500 mg b.i.d. and gabapentin 300 mg t.i.d. The pain is significant source of disability and compromises independent activities of daily living as well as overall function. The patient has difficulty with sleep disturbance as well since the pain often wakes him up. Has trialed gabapentin, flexeril and tylenol as well as tramadol with limited benefit but has never trialed cervical epidural steroid injections or medial branch blocks.  He denies any bowel or bladder issues/incontinence, gait instability.    I have personally reviewed and/or updated the patient's past medical history, past surgical history, family history, social history, current medications, allergies, and vital signs today.     Review of Systems   Constitutional:  Positive for activity change.   HENT: Negative.     Eyes: Negative.    Respiratory: Negative.     Cardiovascular: Negative.    Gastrointestinal: Negative.    Endocrine: Negative.    Genitourinary: Negative.    Musculoskeletal:  Positive for arthralgias, myalgias, neck pain and neck stiffness.   Skin: Negative.    Allergic/Immunologic: Negative.    Neurological:  Negative for weakness and numbness.   Hematological: Negative.    Psychiatric/Behavioral: Negative.     All other systems reviewed and are negative.      Patient Active Problem List   Diagnosis    DAYAN (obstructive sleep apnea)     Chronic diastolic heart failure (HCC)    Hypertension    Diabetes mellitus (HCC)    Morbid obesity with BMI of 40.0-44.9, adult (HCC)    Pain, joint, ankle and foot, left    Chronic osteomyelitis of left foot with draining sinus (HCC)    Atrial fibrillation (HCC)    Anxiety    Type 2 diabetes mellitus with diabetic polyneuropathy, without long-term current use of insulin (HCC)    Toe osteomyelitis, right (HCC)    Cervical spondylosis    Cervicalgia - Right    Diabetic infection of left foot (HCC)    Pacemaker    History of bariatric surgery    Encounter for perioperative consultation    Hyperkalemia    Diabetic ulcer of left midfoot associated with type 2 diabetes mellitus (HCC)    Cellulitis of left lower extremity    Closed fracture of shaft of metatarsal bone of left foot    Moderate benzodiazepine use disorder (HCC)    Microcytic anemia    Other constipation    Status post partial amputation of foot, left (HCC)    Moderate protein-calorie malnutrition (HCC)    Left foot pain    Charcot arthropathy of midfoot    Cervical strain    Cervical radiculopathy       Past Medical History:   Diagnosis Date    Atrial fibrillation (HCC)     Benzodiazepine withdrawal with complication ([facility]) 6/15/2023    Chronic diastolic (congestive) heart failure (HCC)     Diabetes mellitus (HCC)     High cholesterol     Hyperlipidemia     Pacemaker     Stroke ([facility])        Past Surgical History:   Procedure Laterality Date    APPENDECTOMY      ATRIAL CARDIAC PACEMAKER INSERTION      BARIATRIC SURGERY  05/2021    BONE BIOPSY Left 7/26/2023    Procedure: EXCISION BIOPSY BONE LESION EXTREMITY;  Surgeon: Adelia Yousif DPM;  [facility];  Service: Podiatry    EPIDURAL BLOCK INJECTION N/A 5/19/2022    Procedure: BLOCK / INJECTION EPIDURAL STEROID CERVICAL C7-T1;  Surgeon: Skyler Quinn MD;  [facility]: [facility];  Service: Pain Management     FL GUIDED NEEDLE PLAC BX/ASP/INJ  3/22/2022    FOOT AMPUTATION Left 4/28/2022    Procedure:  LEFT TRANSMETATARSAL AMPUTATION.;  Surgeon: Nestor Madden DPM;  [facility];  Service: Podiatry    NERVE BLOCK Right 2/10/2022    Procedure: BLOCK MEDIAL BRANCH C3, C4, C5 #1;  Surgeon: Skyler Quinn MD;  [facility];  Service: Pain Management     NERVE BLOCK Right 3/22/2022    Procedure: BLOCK MEDIAL BRANCH C3, C4, C5 #2;  Surgeon: Skyler Quinn MD;  [facility];  Service: Pain Management     IA AMPUTATION FOOT TRANSMETARSAL Left 4/12/2023    Procedure: REVISION LEFT TRANSMETATARSAL (TMA) AMPUTATION, REMOVAL OF UNVIABLE TISSUE AND BONE,;  Surgeon: Adelia Yousif DPM;  [facility];  Service: Podiatry    IA AMPUTATION METATARSAL W/TOE SINGLE Left 4/7/2023    Procedure: 2ND RAY RESECTION FOOT;  Surgeon: Adelia Yousif DPM;  [facility];  Service: Podiatry    IA AMPUTATION TOE INTERPHALANGEAL JOINT Left 11/16/2021    Procedure: AMPUTATION LESSER TOE;  Surgeon: Nestor Madden DPM;  [facility];  Service: Podiatry    RADIOFREQUENCY ABLATION Right 4/7/2022    Procedure: Right C3, C4, C5 RFA;  Surgeon: Skyler Quinn MD;  [facility];  Service: Pain Management     RHIZOTOMY Right 2/9/2023    Procedure: RHIZOTOMY CERVICAL MEDIAL BRANCH NERVES RIGHT C3, C4, C5;  Surgeon: Skyler Quinn MD;  [facility];  Service: Pain Management     TOE AMPUTATION Left     2nd toe    TOE AMPUTATION Left 9/15/2021    Procedure: AMPUTATION LEFT 4TH TOE;  Surgeon: Nestor Madden DPM;  [facility];  Service: Podiatry    TOE AMPUTATION Right 1/12/2022    Procedure: AMPUTATION TOE;  Surgeon: Hong Jolly DPM;  [facility];  Service: Podiatry    TOE AMPUTATION Right 2/23/2022    Procedure: AMPUTATION TOE  RIGHT SECOND;  Surgeon: Hong Jolly DPM;  [facility];  Service: Podiatry    TOE AMPUTATION Right 6/3/2022    Procedure: AMPUTATION right 4th TOE;  Surgeon: Nestor Madden DPM;  [facility];  Service: Podiatry        Family History   Problem Relation Age of Onset    No Known Problems Mother     No Known Problems Father        Social History     Occupational History    Occupation: Maintenance Tech     Employer: [employer]   Tobacco Use    Smoking status: Never    Smokeless tobacco: Never   Vaping Use    Vaping status: Never Used   Substance and Sexual Activity    Alcohol use: Never    Drug use: Never    Sexual activity: Yes       Current Outpatient Medications on File Prior to Visit   Medication Sig    busPIRone (BUSPAR) 15 mg tablet Take 1 tablet (15 mg total) by mouth 3 (three) times a day    calcium citrate-vitamin D 315 mg-5 mcg tablet Take 2 tablets by mouth 2 (two) times a day    doxepin (SINEquan) 10 mg capsule Take 1 capsule (10 mg total) by mouth in the morning AND 3 capsules (30 mg total) daily at bedtime.    ergocalciferol (VITAMIN D2) 50,000 units Take 1 capsule by mouth once a week    ferrous sulfate 325 (65 Fe) mg tablet Take 1 tablet (325 mg total) by mouth daily with breakfast Do not start before June 19, 2023.    furosemide (LASIX) 40 mg tablet Take 40 mg by mouth every morning    gabapentin (NEURONTIN) 300 mg capsule Take 3 capsules (900 mg total) by mouth 3 (three) times a day    metolazone (ZAROXOLYN) 2.5 mg tablet 2.5 mg Monday, Wednesday, Friday    Multiple Vitamins-Minerals (Mens Multivitamin) TABS Take 1 tablet by mouth 2 (two) times a day    pantoprazole (PROTONIX) 40 mg tablet Take 40 mg by mouth daily    potassium chloride (KLOR-CON) 20 mEq packet Take 20 mEq by mouth 2 (two) times a day    albuterol (PROVENTIL HFA,VENTOLIN HFA) 90 mcg/act inhaler Inhale 2 puffs every 4 (four) hours as needed (Patient not taking: Reported on 7/17/2024)    atorvastatin (LIPITOR) 40 mg tablet  (Patient not taking: Reported on 7/12/2024)    buPROPion (Wellbutrin XL) 300 mg 24 hr tablet Take 1 tablet (300 mg total) by mouth daily (Patient not taking: Reported on 7/12/2024)    cyanocobalamin 1,000 mcg/mL  "Inject 1,000 mcg into a muscle (Patient not taking: Reported on 5/3/2024)    lidocaine (Lidoderm) 5 % Apply 1 patch topically over 12 hours daily for 15 days Remove & Discard patch within 12 hours or as directed by MD    predniSONE 20 mg tablet TAKE 3 TABS FOR 3 DAYS, 2 TABS FOR 3 DAYS, 1 TAB FOR 3 DAYS, 1/2 TAB FOR 3 DAYS (Patient not taking: Reported on 5/3/2024)    traMADol (ULTRAM) 50 mg tablet PRN (Patient not taking: Reported on 7/17/2024)    Xarelto 20 MG tablet  (Patient not taking: Reported on 7/17/2024)     No current facility-administered medications on file prior to visit.       Allergies   Allergen Reactions    Ativan [Lorazepam] Anxiety         Physical Exam    /80 (BP Location: Left arm, Patient Position: Sitting, Cuff Size: Adult)   Pulse 68   Temp 98.7 °F (37.1 °C)   Resp 18   Ht 6' 1\" (1.854 m)   Wt 134 kg (295 lb)   SpO2 97%   BMI 38.92 kg/m²     Constitutional: normal, well developed, well nourished, alert, in no distress and non-toxic and no overt pain behavior. and obese  Eyes: anicteric  HEENT: grossly intact  Neck: supple, symmetric, trachea midline and no masses   Pulmonary:even and unlabored  Cardiovascular:No edema or pitting edema present  Skin:Normal without rashes or lesions and well hydrated  Psychiatric:Mood and affect appropriate  Neurologic:Cranial Nerves II-XII grossly intact Sensation grossly intact; no clonus negative morton's. Reflexes 2+ and brisk. Spurling's maneuver negative bilaterally.  Musculoskeletal:normal gait. 5/5 strength bilaterally with AROM in upper extremities.  Significant pain with right-sided cervical facet loading bilaterally and with lateral spine rotation.  TTP over right-sided cervical paraspinal muscles. Negative epifanio's test, negative gaenslen's negative SIJ loading bilaterally.    Imaging    Multilevel spondyloarthropathy/degenerative changes seen throughout cervical spine x-ray    "

## 2024-07-19 ENCOUNTER — OFFICE VISIT (OUTPATIENT)
Facility: CLINIC | Age: 59
End: 2024-07-19
Payer: COMMERCIAL

## 2024-07-19 ENCOUNTER — TELEPHONE (OUTPATIENT)
Age: 59
End: 2024-07-19

## 2024-07-19 VITALS
HEART RATE: 91 BPM | SYSTOLIC BLOOD PRESSURE: 116 MMHG | TEMPERATURE: 97.6 F | RESPIRATION RATE: 18 BRPM | DIASTOLIC BLOOD PRESSURE: 72 MMHG

## 2024-07-19 DIAGNOSIS — Z79.4 TYPE 2 DIABETES MELLITUS WITH DIABETIC POLYNEUROPATHY, WITH LONG-TERM CURRENT USE OF INSULIN (HCC): ICD-10-CM

## 2024-07-19 DIAGNOSIS — L97.512 DIABETIC ULCER OF TOE OF RIGHT FOOT ASSOCIATED WITH TYPE 2 DIABETES MELLITUS, WITH FAT LAYER EXPOSED (HCC): Primary | ICD-10-CM

## 2024-07-19 DIAGNOSIS — E11.621 DIABETIC ULCER OF TOE OF RIGHT FOOT ASSOCIATED WITH TYPE 2 DIABETES MELLITUS, WITH FAT LAYER EXPOSED (HCC): Primary | ICD-10-CM

## 2024-07-19 DIAGNOSIS — E11.42 TYPE 2 DIABETES MELLITUS WITH DIABETIC POLYNEUROPATHY, WITH LONG-TERM CURRENT USE OF INSULIN (HCC): ICD-10-CM

## 2024-07-19 PROCEDURE — 97597 DBRDMT OPN WND 1ST 20 CM/<: CPT | Performed by: STUDENT IN AN ORGANIZED HEALTH CARE EDUCATION/TRAINING PROGRAM

## 2024-07-19 NOTE — PROGRESS NOTES
Patient ID: David Carpio is a 58 y.o. male Date of Birth 1965       Chief Complaint   Patient presents with    Follow Up Wound Care Visit     Right leg and toe wound       Allergies:  Ativan [lorazepam]    Diagnosis:  1. Diabetic ulcer of toe of right foot associated with type 2 diabetes mellitus, with fat layer exposed (HCC)  -     Wound cleansing and dressings; Future  -     Wound Procedure Treatment Anterior;Right;Plantar Toe D2, second  2. Type 2 diabetes mellitus with diabetic polyneuropathy, with long-term current use of insulin (HCC)     Diagnosis ICD-10-CM Associated Orders   1. Diabetic ulcer of toe of right foot associated with type 2 diabetes mellitus, with fat layer exposed (HCC)  E11.621 Wound cleansing and dressings    L97.512 Wound Procedure Treatment Anterior;Right;Plantar Toe D2, second      2. Type 2 diabetes mellitus with diabetic polyneuropathy, with long-term current use of insulin ([facility])  E11.42     Z79.4            Assessment & Plan:  See wound orders. (dermagran dsd toe)  - RLE venous ulceration has healed  - R 2nd toe DFU appears with fibrous base, periwound callusing due to overload. No bone exposed. No SOI. Surgical debridement as below.  - XR right 2nd toe 7/3/24: no osseous erosion or CHRISS noted  - Bedside ABIs 0.96 B/L.   - offload in surgical shoe with offloading felt pad but limit ambulation at all times. Patient had not been wearing as instructed  - We discussed at length lifestyle modifications for venous insufficiency. We recommend to not sleep in a recliner. To avoid periods of sitting with legs in a dependent position. To elevate legs and lay flat for periods in the day and at night. To take diuretics as directed. Also recommend weight loss, increased ambulation, increased activity, and monitor diet especially sodium intake. To use compression stockings.     - F/u 2wks; call if acute SOI arise    Subjective:   Cy presents to wound care today concerning toe and leg ulcers.  Has surgical shoe on today but upon questioning, he is not wearing at all times with WB. Diabetic with PN. H/o L TMA.          The following portions of the patient's history were reviewed and updated as appropriate:   Patient Active Problem List   Diagnosis    DAYAN (obstructive sleep apnea)    Chronic diastolic heart failure (HCC)    Hypertension    Diabetes mellitus (HCC)    Morbid obesity with BMI of 40.0-44.9, adult ([facility])    Pain, joint, ankle and foot, left    Chronic osteomyelitis of left foot with draining sinus ([facility])    Atrial fibrillation ([facility])    Anxiety    Type 2 diabetes mellitus with diabetic polyneuropathy, without long-term current use of insulin (HCC)    Toe osteomyelitis, right ([facility])    Cervical spondylosis    Cervicalgia - Right    Diabetic infection of left foot ([facility])    Pacemaker    History of bariatric surgery    Encounter for perioperative consultation    Hyperkalemia    Diabetic ulcer of left midfoot associated with type 2 diabetes mellitus ([facility])    Cellulitis of left lower extremity    Closed fracture of shaft of metatarsal bone of left foot    Moderate benzodiazepine use disorder ([facility])    Microcytic anemia    Other constipation    Status post partial amputation of foot, left ([facility])    Moderate protein-calorie malnutrition (HCC)    Left foot pain    Charcot arthropathy of midfoot    Cervical strain    Cervical radiculopathy     Past Medical History:   Diagnosis Date    Atrial fibrillation ([facility])     Benzodiazepine withdrawal with complication ([facility]) 6/15/2023    Chronic diastolic (congestive) heart failure (HCC)     Diabetes mellitus ([facility])     High cholesterol     Hyperlipidemia     Pacemaker     Stroke ([facility])      Past Surgical History:   Procedure Laterality Date    APPENDECTOMY      ATRIAL CARDIAC PACEMAKER INSERTION      BARIATRIC SURGERY  05/2021    BONE BIOPSY Left 7/26/2023    Procedure: EXCISION BIOPSY BONE LESION EXTREMITY;  Surgeon: Adelia Yousif DPM;  Location:  MAIN OR;  Service:  Podiatry    EPIDURAL BLOCK INJECTION N/A 5/19/2022    Procedure: BLOCK / INJECTION EPIDURAL STEROID CERVICAL C7-T1;  Surgeon: Skyler Quinn MD;  [facility]: [facility];  Service: Pain Management     FL GUIDED NEEDLE PLAC BX/ASP/INJ  3/22/2022    FOOT AMPUTATION Left 4/28/2022    Procedure: LEFT TRANSMETATARSAL AMPUTATION.;  Surgeon: Nestor Madden DPM;  [facility];  Service: Podiatry    NERVE BLOCK Right 2/10/2022    Procedure: BLOCK MEDIAL BRANCH C3, C4, C5 #1;  Surgeon: Skyler Quinn MD;  [facility];  Service: Pain Management     NERVE BLOCK Right 3/22/2022    Procedure: BLOCK MEDIAL BRANCH C3, C4, C5 #2;  Surgeon: Skyler Quinn MD;  [facility];  Service: Pain Management     HI AMPUTATION FOOT TRANSMETARSAL Left 4/12/2023    Procedure: REVISION LEFT TRANSMETATARSAL (TMA) AMPUTATION, REMOVAL OF UNVIABLE TISSUE AND BONE,;  Surgeon: Adelia Yousif DPM;  [facility];  Service: Podiatry    HI AMPUTATION METATARSAL W/TOE SINGLE Left 4/7/2023    Procedure: 2ND RAY RESECTION FOOT;  Surgeon: Adelia Yousif DPM;  [facility];  Service: Podiatry    HI AMPUTATION TOE INTERPHALANGEAL JOINT Left 11/16/2021    Procedure: AMPUTATION LESSER TOE;  Surgeon: Nestor Madden DPM;  [facility];  Service: Podiatry    RADIOFREQUENCY ABLATION Right 4/7/2022    Procedure: Right C3, C4, C5 RFA;  Surgeon: Skyler Quinn MD;  [facility];  Service: Pain Management     RHIZOTOMY Right 2/9/2023    Procedure: RHIZOTOMY CERVICAL MEDIAL BRANCH NERVES RIGHT C3, C4, C5;  Surgeon: Skyler Quinn MD;  [facility];  Service: Pain Management     TOE AMPUTATION Left     2nd toe    TOE AMPUTATION Left 9/15/2021    Procedure: AMPUTATION LEFT 4TH TOE;  Surgeon: Nestor Madden DPM;  [facility];  Service: Podiatry    TOE AMPUTATION Right 1/12/2022    Procedure: AMPUTATION TOE;  Surgeon: Hong Jolly DPM;  [facility];  Service: Podiatry    TOE AMPUTATION  Right 2/23/2022    Procedure: AMPUTATION TOE  RIGHT SECOND;  Surgeon: Hong Jolly DPM;  Location:  MAIN OR;  Service: Podiatry    TOE AMPUTATION Right 6/3/2022    Procedure: AMPUTATION right 4th TOE;  Surgeon: Nestor Madden DPM;  [facility];  Service: Podiatry     Social History     Socioeconomic History    Marital status: /Civil Union     Spouse name: None    Number of children: None    Years of education: None    Highest education level: None   Occupational History    Occupation: Maintenance Tech     Employer: [employer]   Tobacco Use    Smoking status: Never    Smokeless tobacco: Never   Vaping Use    Vaping status: Never Used   Substance and Sexual Activity    Alcohol use: Never    Drug use: Never    Sexual activity: Yes   Other Topics Concern    None   Social History Narrative    None     Social Determinants of Health     Financial Resource Strain: Low Risk  (10/19/2023)    Received from [facility], [facility]    Overall Financial Resource Strain (CARDIA)     Difficulty of Paying Living Expenses: Not hard at all   Food Insecurity: No Food Insecurity (10/19/2023)    Received from [facility]    Hunger Vital Sign     Worried About Running Out of Food in the Last Year: Never true     Ran Out of Food in the Last Year: Never true   Transportation Needs: No Transportation Needs (10/19/2023)    Received from [facility]    PRAPARE - Transportation     Lack of Transportation (Medical): No     Lack of Transportation (Non-Medical): No   Physical Activity: Sufficiently Active (10/19/2023)    Received from [facility]    Exercise Vital Sign     Days of Exercise per Week: 7 days     Minutes of Exercise per Session: 60 min   Stress: Stress Concern Present (10/19/2023)    Received from [facility]    [employer] - Occupational Stress Questionnaire     Feeling of Stress : Very much   Social Connections: Unknown (6/18/2024)    Received from [facility]     How often do you feel lonely or isolated from those around you? (Adult - for ages 18 years and over): Not on file   Intimate Partner Violence: Not At Risk (10/19/2023)    [facility] from [facility], [facility]    Humiliation, Afraid, Rape, and Kick questionnaire     Fear of Current or Ex-Partner: No     Emotionally Abused: No     Physically Abused: No     Sexually Abused: No   Housing Stability: Low Risk  (10/19/2023)    [facility] from [facility], [facility]    Housing Stability Vital Sign     Unable to Pay for Housing in the Last Year: No     Number of Places Lived in the Last Year: 1     Unstable Housing in the Last Year: No        Current Outpatient Medications:     busPIRone (BUSPAR) 15 mg tablet, Take 1 tablet (15 mg total) by mouth 3 (three) times a day, Disp: 270 tablet, Rfl: 0    calcium citrate-vitamin D 315 mg-5 mcg tablet, Take 2 tablets by mouth 2 (two) times a day, Disp: , Rfl:     doxepin (SINEquan) 10 mg capsule, Take 1 capsule (10 mg total) by mouth in the morning AND 3 capsules (30 mg total) daily at bedtime., Disp: , Rfl:     ergocalciferol (VITAMIN D2) 50,000 units, Take 1 capsule by mouth once a week, Disp: , Rfl:     ferrous sulfate 325 (65 Fe) mg tablet, Take 1 tablet (325 mg total) by mouth daily with breakfast Do not start before June 19, 2023., Disp: 30 tablet, Rfl: 0    furosemide (LASIX) 40 mg tablet, Take 40 mg by mouth every morning, Disp: , Rfl:     gabapentin (NEURONTIN) 800 mg tablet, Take 1 tablet (800 mg total) by mouth 4 (four) times a day, Disp: 120 tablet, Rfl: 2    lidocaine (Lidoderm) 5 %, Apply 1 patch topically over 12 hours daily for 15 days Remove & Discard patch within 12 hours or as directed  by MD, Disp: 15 patch, Rfl: 0    methadone (DOLOPHINE) 5 mg tablet, Take 1 tablet (5 mg total) by mouth every 8 (eight) hours Medication prescribed for pain Max Daily Amount: 15 mg, Disp: 90 tablet, Rfl: 0    metolazone (ZAROXOLYN) 2.5 mg tablet, 2.5 mg Monday, Wednesday, Friday, Disp: , Rfl:     Multiple Vitamins-Minerals (Mens Multivitamin) TABS, Take 1 tablet by mouth 2 (two) times a day, Disp: , Rfl:     naloxone (NARCAN) 4 mg/0.1 mL nasal spray, Administer 1 spray into a nostril. If no response after 2-3 minutes, give another dose in the other nostril using a new spray., Disp: 1 each, Rfl: 1    pantoprazole (PROTONIX) 40 mg tablet, Take 40 mg by mouth daily, Disp: , Rfl:     potassium chloride (KLOR-CON) 20 mEq packet, Take 20 mEq by mouth 2 (two) times a day, Disp: , Rfl:     Xarelto 20 MG tablet, , Disp: , Rfl:   Family History   Problem Relation Age of Onset    No Known Problems Mother     No Known Problems Father       Review of Systems   Constitutional:  Negative for activity change, chills and fever.   HENT: Negative.     Respiratory:  Negative for cough, chest tightness and shortness of breath.    Cardiovascular:  Positive for leg swelling (Chronic B/L). Negative for chest pain.   Endocrine: Negative.    Genitourinary: Negative.    Musculoskeletal:  Negative for gait problem.   Skin:  Positive for wound.   Neurological:         PN   Psychiatric/Behavioral: Negative.  Negative for agitation and behavioral problems.          Objective:  /72   Pulse 91   Temp 97.6 °F (36.4 °C)   Resp 18     Physical Exam  Constitutional:       Appearance: Normal appearance. He is not ill-appearing.      Comments: Anxious   Cardiovascular:      Comments: Chronic venous stasis dermatitis with B/L LE brawny edema. Diminished pedal pulses due to edema. Absent pedal hair.   Pulmonary:      Effort: No respiratory distress.   Musculoskeletal:         General: No tenderness or deformity. Normal range of motion.       Comments: S/p left TMA    Skin:     Capillary Refill: Capillary refill takes less than 2 seconds.      Comments: B/L LE skin is atrophic - thin, dry and shiny in appearance.     Right plantar 2nd toe stump appears with full thickness fibrous ulceration with significant periwound callus. No probe or exposed bone. No purulence or SOI.     RLE with pretibial superficial venous stasis ulcer has healed    Neurological:      General: No focal deficit present.      Mental Status: He is alert and oriented to person, place, and time.      Comments: N/T/B to B/L LE   Psychiatric:         Mood and Affect: Mood normal.         Behavior: Behavior normal.             Wound 07/12/24 Toe D2, second Anterior;Right;Plantar (Active)   Wound Image   07/19/24 1421   Wound Description Pink;Yellow;Slough;Granulation tissue 07/19/24 1431   Katy-wound Assessment Dry;Callus 07/19/24 1431   Wound Length (cm) 0.6 cm 07/19/24 1431   Wound Width (cm) 0.5 cm 07/19/24 1431   Wound Depth (cm) 0.3 cm 07/19/24 1431   Wound Surface Area (cm^2) 0.3 cm^2 07/19/24 1431   Wound Volume (cm^3) 0.09 cm^3 07/19/24 1431   Calculated Wound Volume (cm^3) 0.09 cm^3 07/19/24 1431   Change in Wound Size % 30.77 07/19/24 1431   Number of underminings 1 07/12/24 1442   Undermining 1 0.2 07/19/24 1431   Undermining 1 is depth extending from 12-12 deepest at 2 07/19/24 1431   Drainage Amount Small 07/19/24 1431   Drainage Description Serosanguineous 07/19/24 1431   Non-staged Wound Description Full thickness 07/19/24 1431   Treatments Cleansed 07/19/24 1431       Wound 07/12/24 Pretibial Right (Active)   Wound Image   07/19/24 1425   Wound Description Eschar;Brown 07/19/24 1430   Katy-wound Assessment Yellow-brown;Edema 07/19/24 1430   Wound Length (cm) 0 cm 07/19/24 1430   Wound Width (cm) 0 cm 07/19/24 1430   Wound Depth (cm) 0 cm 07/19/24 1430   Wound Surface Area (cm^2) 0 cm^2 07/19/24 1430   Wound Volume (cm^3) 0 cm^3 07/19/24 1430   Calculated Wound Volume (cm^3)  "0 cm^3 07/19/24 1430   Change in Wound Size % 100 07/19/24 1430   Drainage Amount None 07/19/24 1430   Drainage Description Serosanguineous 07/12/24 1504   Non-staged Wound Description Not applicable 07/19/24 1430           Debridement   Wound 07/12/24 Toe D2, second Anterior;Right;Plantar    Universal Protocol:  Consent: Verbal consent obtained.  Risks and benefits: risks, benefits and alternatives were discussed  Consent given by: patient  Time out: Immediately prior to procedure a \"time out\" was called to verify the correct patient, procedure, equipment, support staff and site/side marked as required.  Patient understanding: patient states understanding of the procedure being performed  Patient consent: the patient's understanding of the procedure matches consent given  Patient identity confirmed: verbally with patient    Debridement Details  Performed by: physician  Debridement type: selective      Post-debridement measurements  Length (cm): 0.6  Width (cm): 0.5  Depth (cm): 0.3  Percent debrided: 100%  Surface Area (cm^2): 0.3  Area Debrided (cm^2): 0.3  Volume (cm^3): 0.09    Devitalized tissue debrided: biofilm, callus and slough  Instrument(s) utilized: blade  Technique utilized: nonexcisionalBleeding: small  Hemostasis obtained with: pressure  Procedural pain (0-10): insensate  Post-procedural pain: insensate   Response to treatment: procedure was tolerated well                 Wound Instructions:  Orders Placed This Encounter   Procedures    Wound cleansing and dressings     Right Second Toe  Wash your hands with soap and water.  Remove old dressing, discard into plastic bag and place in trash.  Cleanse the wound with saline prior to applying a clean dressing. Do not use tissue or cotton balls. Do not scrub the wound. Pat dry using gauze.     Shower no   Apply dermagran to the right second toe wound.  Cover with gauze  Secure with roll gauze  Change dressing every other day      Wear surgical shoe at all " "times. Do not walk without surgical shoe on at all. Never walk barefoot.  Keep blood sugars under control  Stay off foot as much as possible     Right leg   Healed.     Left and Right Leg  Elastic Tubular Stocking Spandagrip F      Tubular elastic bandage: Apply from base of toes to behind the knee. Apply in AM, may remove for sleep.  Avoid prolonged standing in one place.  Elevate leg(s) above the level of the heart when sitting or as much as possible.     Standing Status:   Future     Standing Expiration Date:   7/26/2024    Wound Procedure Treatment Anterior;Right;Plantar Toe D2, second     This order was created via procedure documentation    Debridement     This order was created via procedure documentation         Adelia Yousif DPM      Portions of the record may have been created with voice recognition software. Occasional wrong word or \"sound a like\" substitutions may have occurred due to the inherent limitations of voice recognition software. Read the chart carefully and recognize, using context, where substitutions have occurred.    "

## 2024-07-19 NOTE — TELEPHONE ENCOUNTER
Caller: Maribell Carpio    Doctor and/or Office: Dr. Yousif/Mallika    #: [phone]    [place]: Care Requesting office visit notes from the following dates to be faxed to [phone].   7-3-24  7-12-24  7-19-24  Please return call to Maribell to let her know this has been done on behalf of patient Cy. Thank you

## 2024-07-19 NOTE — PROGRESS NOTES
Wound Procedure Treatment Anterior;Right;Plantar Toe D2, second    Performed by: Tiffanie Dee RN  Authorized by: Adelia Yousif DPM    Associated wounds:   Wound 07/12/24 Toe D2, second Anterior;Right;Plantar  Wound cleansed with:  NSS  Applied primary dressing:  Dermagran  Applied secondary dressing:  Gauze  Dressing secured with:  Tape  Offloading device appllied:  Surgical shoe

## 2024-07-19 NOTE — PATIENT INSTRUCTIONS
Orders Placed This Encounter   Procedures    Wound cleansing and dressings     Right Second Toe  Wash your hands with soap and water.  Remove old dressing, discard into plastic bag and place in trash.  Cleanse the wound with saline prior to applying a clean dressing. Do not use tissue or cotton balls. Do not scrub the wound. Pat dry using gauze.     Shower no   Apply dermagran to the right second toe wound.  Cover with gauze  Secure with roll gauze  Change dressing every other day      Wear surgical shoe at all times. Do not walk without surgical shoe on at all. Never walk barefoot.  Keep blood sugars under control  Stay off foot as much as possible     Right leg   Healed.     Left and Right Leg  Elastic Tubular Stocking Spandagrip F      Tubular elastic bandage: Apply from base of toes to behind the knee. Apply in AM, may remove for sleep.  Avoid prolonged standing in one place.  Elevate leg(s) above the level of the heart when sitting or as much as possible.     Standing Status:   Future     Standing Expiration Date:   7/26/2024

## 2024-07-20 LAB

## 2024-07-23 ENCOUNTER — OFFICE VISIT (OUTPATIENT)
Dept: PSYCHIATRY | Facility: CLINIC | Age: 59
End: 2024-07-23

## 2024-07-23 VITALS — WEIGHT: 295 LBS | BODY MASS INDEX: 38.92 KG/M2

## 2024-07-23 DIAGNOSIS — F32.1 CURRENT MODERATE EPISODE OF MAJOR DEPRESSIVE DISORDER WITHOUT PRIOR EPISODE (HCC): Primary | ICD-10-CM

## 2024-07-23 DIAGNOSIS — F41.1 GENERALIZED ANXIETY DISORDER: ICD-10-CM

## 2024-07-23 DIAGNOSIS — E11.42 TYPE 2 DIABETES MELLITUS WITH DIABETIC POLYNEUROPATHY, WITHOUT LONG-TERM CURRENT USE OF INSULIN (HCC): ICD-10-CM

## 2024-07-23 PROCEDURE — NC001 PR NO CHARGE

## 2024-07-23 RX ORDER — BUPROPION HYDROCHLORIDE 300 MG/1
300 TABLET ORAL DAILY
Qty: 30 TABLET | Refills: 2 | Status: SHIPPED | OUTPATIENT
Start: 2024-07-23

## 2024-07-23 RX ORDER — DOXEPIN HYDROCHLORIDE 10 MG/1
CAPSULE ORAL
Qty: 120 CAPSULE | Refills: 2 | Status: SHIPPED | OUTPATIENT
Start: 2024-07-23

## 2024-07-23 RX ORDER — GABAPENTIN 800 MG/1
TABLET ORAL
Qty: 120 TABLET | Refills: 2 | Status: SHIPPED | OUTPATIENT
Start: 2024-07-23

## 2024-07-23 RX ORDER — BUSPIRONE HYDROCHLORIDE 15 MG/1
15 TABLET ORAL 3 TIMES DAILY
Qty: 90 TABLET | Refills: 2 | Status: SHIPPED | OUTPATIENT
Start: 2024-07-23

## 2024-07-23 NOTE — PATIENT INSTRUCTIONS
Please present for your previously scheduled appointment approximately 15 minutes prior to appointment time. If you are running late or are unable to attend your appointment, please call our [place] office at [phone] or our [place] office at [phone] if applicable to notify the office of your absence.    If you are in psychological crisis including not limited to suicidal/homicidal thoughts or thoughts of self-injury, do not hesitate to call/contact your County Crisis hotline (see below) or attend to the nearest emergency department.  [facility] Crisis: [phone]  [facility] Crisis: [phone]  [facility] Crisis: [phone]  [facility] Crisis: [phone]  [facility] Crisis: [phone]  [facility] Crisis: [phone]  [facility] Crisis: [phone]  National Suicide Prevention Hotline: 1-986.252.5394

## 2024-07-23 NOTE — PSYCH
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 decreased seizure threshold and risk with alcohol withdrawal or electrolyte disturbances.  Patient screened negative for heavy alcohol use, history of seizures, and eating disorders.     Continue doxepin 10 mg daily and 30 mg at bedtime for ongoing symptoms of anxiety and insomnia   PARQ was completed for the tricyclic antidepressant class including GI distress, sedation, dizziness, weight gain, sexual dysfunction, decreased seizure threshold, induction of nazario, serotonin syndrome, and arrhythmia.       Continue BuSpar 15 mg 3 times daily for generalized anxiety   PARQ was completed for buspirone (Buspar) including serotonin syndrome, rare TD/EPS, dizziness, sedation, GI distress, confusion, possible mood changes, xerostomia and visual disturbances.     Risk of Harm to Self:   The following ratings are based on assessment at the time of the interview and review of medical records  Demographic risk factors include: , male, age: over 50 or older  Historical Risk Factors include: chronic depressive symptoms, history of traumatic experiences  Current Specific Risk Factors include: diagnosis of mood disorder, chronic anxiety symptoms, health problems,  losses (the patient's daugher passed three years ago)  Protective Factors: no current suicidal ideation, improved depressive symptoms, improved anxiety symptoms, ability to make plans for the future, outpatient psychiatric follow up established, family support established, being a parent, being , compliant with medications, compliant with mental health treatment, having a desire to be alive, personal beliefs about the meaning and value of life, supportive family, ability to contract for safety with staff, ability to communicate with staff  Weapons/Firearms: none. The following steps have been taken to ensure weapons are properly secured: not applicable  Based on today's assessment, David presents the following risk of harm to self: Minimal     Risk of Harm to Others:  The following ratings are based on assessment at the time of the interview and a review of medical records  Demographic Risk Factors include: male.  Historical Risk Factors include: none.  Current Specific Risk Factors include: none  Protective Factors: no current homicidal ideation, improved mood, willing to continue psychiatric treatment, good support system, supportive family, responsibilities and duties to others, being a parent, being , good self-esteem, sense of personal control  Weapons/Firearms: none. The following steps have been taken to ensure weapons are properly secured: not applicable  Based on today's assessment, David presents the following risk of harm to others: Minimal    Although patient's acute lethality risk is low, long-term/chronic lethality risk is mildly elevated in the presence of moderate symptoms of depression and moderate symptoms of anxiety. At the current moment, David is future-oriented, forward-thinking, and demonstrates ability to act in a self-preserving manner as evidenced by volitionally presenting to the clinic today, seeking treatment. At this juncture, inpatient hospitalization is not  currently warranted. To mitigate future risk, patient should adhere to the recommendations of this writer, avoid alcohol/illicit substance use, utilize community-based resources and familiar support and prioritize mental health treatment.     Based on today's assessment and clinical criteria, David Carpio contracts for safety and is not an imminent risk of harm to self or others. Outpatient level of care is deemed appropriate at this present time. David understands that if they are no longer able to contract for safety, they need to call/contact the outpatient office including this writer, call/contact crisis and/orattend to the nearest Emergency Department for immediate evaluation.    Risks/Benefits      Risks, Benefits And Possible Side Effects Of Medications:    Risks, benefits, and possible side effects of medications explained to David and he verbalizes understanding and agreement for treatment.    Controlled Medication Discussion:     Not applicable - controlled prescriptions are not prescribed by this practice    Psychotherapy Provided:     Individual psychotherapy provided: Yes  At the time of interview, David reports that one of his biggest struggles is when he prepares to make positive life changes (and key examples include talking to his daughter-in-law as well as opening the boxes of his daughter's belongings).  At the time of interview today, cognitive behavioral therapy techniques were utilized and it was highlighted to the patient how his fear lies in the anticipation of these tasks as opposed to the actual completion of the tasks.  Cy expressed understanding.  At the time of interview, Cy continues to struggle with automatic negative thoughts, including the automatic negative thought of catastrophe.  He continues to work on challenging these negative thoughts.    Treatment Plan:    Completed and signed during the session: Not applicable - Treatment Plan not due at this session    Visit  Time    Visit Start Time: 11:00 AM  Visit Stop Time: 11:45 AM  Total Visit Duration:  45 minutes    This note was shared with patient.    Andre Rodriguez MD 07/23/24    This note has been constructed using a voice recognition system. There may be translation, syntax, or grammatical errors. If you have any questions, please contact the dictating provider.

## 2024-08-02 ENCOUNTER — OFFICE VISIT (OUTPATIENT)
Facility: CLINIC | Age: 59
End: 2024-08-02
Payer: COMMERCIAL

## 2024-08-02 VITALS
RESPIRATION RATE: 18 BRPM | HEART RATE: 69 BPM | DIASTOLIC BLOOD PRESSURE: 56 MMHG | TEMPERATURE: 96.2 F | SYSTOLIC BLOOD PRESSURE: 103 MMHG

## 2024-08-02 DIAGNOSIS — E11.621 DIABETIC ULCER OF TOE OF RIGHT FOOT ASSOCIATED WITH TYPE 2 DIABETES MELLITUS, WITH FAT LAYER EXPOSED (HCC): Primary | ICD-10-CM

## 2024-08-02 DIAGNOSIS — L97.512 DIABETIC ULCER OF TOE OF RIGHT FOOT ASSOCIATED WITH TYPE 2 DIABETES MELLITUS, WITH FAT LAYER EXPOSED (HCC): Primary | ICD-10-CM

## 2024-08-02 PROCEDURE — 11042 DBRDMT SUBQ TIS 1ST 20SQCM/<: CPT | Performed by: STUDENT IN AN ORGANIZED HEALTH CARE EDUCATION/TRAINING PROGRAM

## 2024-08-02 RX ORDER — LIDOCAINE 40 MG/G
CREAM TOPICAL ONCE
Status: COMPLETED | OUTPATIENT
Start: 2024-08-02 | End: 2024-08-02

## 2024-08-02 RX ADMIN — LIDOCAINE: 40 CREAM TOPICAL at 14:03

## 2024-08-02 NOTE — PROGRESS NOTES
Patient ID: David Carpio is a 59 y.o. male Date of Birth 1965       Chief Complaint   Patient presents with    Follow Up Wound Care Visit     R foot wound       Allergies:  Ativan [lorazepam]    Diagnosis:  1. Diabetic ulcer of toe of right foot associated with type 2 diabetes mellitus, with fat layer exposed (HCC)  -     lidocaine (LMX) 4 % cream  -     Wound cleansing and dressings Anterior;Right;Plantar Toe D2, second; Future     Diagnosis ICD-10-CM Associated Orders   1. Diabetic ulcer of toe of right foot associated with type 2 diabetes mellitus, with fat layer exposed (HCC)  E11.621 lidocaine (LMX) 4 % cream    L97.512 Wound cleansing and dressings Anterior;Right;Plantar Toe D2, second           Assessment & Plan:  See wound orders.  (Dermagran, betadine periwound)  - R 2nd toe DFU appears with fibrogranular base, periwound callusing due to continued overload. No bone exposed. No SOI. Surgical debridement as below.  - Bedside ABIs 0.96 B/L.   - offload in surgical shoe with offloading felt pad but limit ambulation at all times which I again stressed today. I modified the shoe more to reduce pressure  - F/u 2wks; call if acute SOI arise    Subjective:   Cy presents to wound care today concerning toe ulcer. Has surgical shoe on today and trying to wear with WB but has been walking quite a bit in the house. Diabetic with PN. H/o L TMA.          The following portions of the patient's history were reviewed and updated as appropriate:   Patient Active Problem List   Diagnosis    DAYAN (obstructive sleep apnea)    Chronic diastolic heart failure (HCC)    Hypertension    Diabetes mellitus (HCC)    Morbid obesity with BMI of 40.0-44.9, adult (Formerly [facility])    Pain, joint, ankle and foot, left    Chronic osteomyelitis of left foot with draining sinus (HCC)    Atrial fibrillation (Formerly [facility])    Anxiety    Type 2 diabetes mellitus with diabetic polyneuropathy, without long-term current use of insulin (HCC)    Toe osteomyelitis,  right (HCC)    Cervical spondylosis    Cervicalgia - Right    Diabetic infection of left foot (HCC)    Pacemaker    History of bariatric surgery    Encounter for perioperative consultation    Hyperkalemia    Diabetic ulcer of left midfoot associated with type 2 diabetes mellitus (HCC)    Cellulitis of left lower extremity    Closed fracture of shaft of metatarsal bone of left foot    Moderate benzodiazepine use disorder (HCC)    Microcytic anemia    Other constipation    Status post partial amputation of foot, left (HCC)    Moderate protein-calorie malnutrition (HCC)    Left foot pain    Charcot arthropathy of midfoot    Cervical strain    Cervical radiculopathy     Past Medical History:   Diagnosis Date    Atrial fibrillation (HCC)     Benzodiazepine withdrawal with complication (HCC) 6/15/2023    Chronic diastolic (congestive) heart failure (HCC)     Diabetes mellitus (HCC)     High cholesterol     Hyperlipidemia     Pacemaker     Stroke (HCC)      Past Surgical History:   Procedure Laterality Date    APPENDECTOMY      ATRIAL CARDIAC PACEMAKER INSERTION      BARIATRIC SURGERY  05/2021    BONE BIOPSY Left 7/26/2023    Procedure: EXCISION BIOPSY BONE LESION EXTREMITY;  Surgeon: Adelia Yousif DPM;  [facility];  Service: Podiatry    EPIDURAL BLOCK INJECTION N/A 5/19/2022    Procedure: BLOCK / INJECTION EPIDURAL STEROID CERVICAL C7-T1;  Surgeon: Skyler Quinn MD;  [facility]: [facility];  Service: Pain Management     FL GUIDED NEEDLE PLAC BX/ASP/INJ  3/22/2022    FOOT AMPUTATION Left 4/28/2022    Procedure: LEFT TRANSMETATARSAL AMPUTATION.;  Surgeon: Nestor Madden DPM;  [facility];  Service: Podiatry    NERVE BLOCK Right 2/10/2022    Procedure: BLOCK MEDIAL BRANCH C3, C4, C5 #1;  Surgeon: Skyler Quinn MD;  [facility];  Service: Pain Management     NERVE BLOCK Right 3/22/2022    Procedure: BLOCK MEDIAL BRANCH C3, C4, C5 #2;  Surgeon: Skyler Quinn MD;  [facility];   Service: Pain Management     NH AMPUTATION FOOT TRANSMETARSAL Left 4/12/2023    Procedure: REVISION LEFT TRANSMETATARSAL (TMA) AMPUTATION, REMOVAL OF UNVIABLE TISSUE AND BONE,;  Surgeon: Adelia Yousif DPM;  [facility];  Service: Podiatry    NH AMPUTATION METATARSAL W/TOE SINGLE Left 4/7/2023    Procedure: 2ND RAY RESECTION FOOT;  Surgeon: Adelia Yousif DPM;  [facility];  Service: Podiatry    NH AMPUTATION TOE INTERPHALANGEAL JOINT Left 11/16/2021    Procedure: AMPUTATION LESSER TOE;  Surgeon: Nestor Madden DPM;  [facility];  Service: Podiatry    RADIOFREQUENCY ABLATION Right 4/7/2022    Procedure: Right C3, C4, C5 RFA;  Surgeon: Skyler Quinn MD;  [facility];  Service: Pain Management     RHIZOTOMY Right 2/9/2023    Procedure: RHIZOTOMY CERVICAL MEDIAL BRANCH NERVES RIGHT C3, C4, C5;  Surgeon: Skyler Quinn MD;  [facility];  Service: Pain Management     TOE AMPUTATION Left     2nd toe    TOE AMPUTATION Left 9/15/2021    Procedure: AMPUTATION LEFT 4TH TOE;  Surgeon: Nestor Madden DPM;  [facility];  Service: Podiatry    TOE AMPUTATION Right 1/12/2022    Procedure: AMPUTATION TOE;  Surgeon: Hong Jolly DPM;  [facility];  Service: Podiatry    TOE AMPUTATION Right 2/23/2022    Procedure: AMPUTATION TOE  RIGHT SECOND;  Surgeon: Hong Jolly DPM;  [facility];  Service: Podiatry    TOE AMPUTATION Right 6/3/2022    Procedure: AMPUTATION right 4th TOE;  Surgeon: Nestor Madden DPM;  [facility];  Service: Podiatry     Social History     Socioeconomic History    Marital status: /Civil Union     Spouse name: None    Number of children: None    Years of education: None    Highest education level: None   Occupational History    Occupation:      Employer: [employer]   Tobacco Use    Smoking status: Never    Smokeless tobacco: Never   Vaping Use    Vaping status: Never Used   Substance and  Sexual Activity    Alcohol use: Never    Drug use: Never    Sexual activity: Yes   Other Topics Concern    None   Social History Narrative    None     Social Determinants of Health     Financial Resource Strain: Low Risk  (10/19/2023)    Received from [facility], [facility]    Overall Financial Resource Strain (CARDIA)     Difficulty of Paying Living Expenses: Not hard at all   Food Insecurity: No Food Insecurity (10/19/2023)    Received from [facility], [facility]    Hunger Vital Sign     Worried About Running Out of Food in the Last Year: Never true     Ran Out of Food in the Last Year: Never true   Transportation Needs: No Transportation Needs (10/19/2023)    Received from [facility], [facility]    PRAPARE - Transportation     Lack of Transportation (Medical): No     Lack of Transportation (Non-Medical): No   Physical Activity: Sufficiently Active (10/19/2023)    Received from [facility]    Exercise Vital Sign     Days of Exercise per Week: 7 days     Minutes of Exercise per Session: 60 min   Stress: Stress Concern Present (10/19/2023)    Received from [facility], [facility]    [employer] [place] [facility] [employer] - Occupational Stress Questionnaire     Feeling of Stress : Very much   Social Connections: Unknown (6/18/2024)    Received from [employer]     How often do you feel lonely or isolated from those around you? (Adult - for ages 18 years and over): Not on file   Intimate Partner Violence: Not At Risk (10/19/2023)    Received from [facility], [facility]    Humiliation, Afraid, Rape, and Kick questionnaire     Fear of Current or Ex-Partner: No     Emotionally Abused: No     Physically Abused: No     Sexually Abused: No   Housing Stability: Low Risk  (10/19/2023)    Received from [place]  [facility], [facility]    Housing Stability Vital Sign     Unable to Pay for Housing in the Last Year: No     Number of Places Lived in the Last Year: 1     Unstable Housing in the Last Year: No        Current Outpatient Medications:     buPROPion (Wellbutrin XL) 300 mg 24 hr tablet, Take 1 tablet (300 mg total) by mouth daily, Disp: 30 tablet, Rfl: 2    busPIRone (BUSPAR) 15 mg tablet, Take 1 tablet (15 mg total) by mouth 3 (three) times a day, Disp: 90 tablet, Rfl: 2    calcium citrate-vitamin D 315 mg-5 mcg tablet, Take 2 tablets by mouth 2 (two) times a day, Disp: , Rfl:     doxepin (SINEquan) 10 mg capsule, Take 1 capsule (10 mg total) by mouth in the morning AND 3 capsules (30 mg total) daily at bedtime., Disp: 120 capsule, Rfl: 2    ergocalciferol (VITAMIN D2) 50,000 units, Take 1 capsule by mouth once a week (Patient not taking: Reported on 7/23/2024), Disp: , Rfl:     ferrous sulfate 325 (65 Fe) mg tablet, Take 1 tablet (325 mg total) by mouth daily with breakfast Do not start before June 19, 2023. (Patient not taking: Reported on 7/23/2024), Disp: 30 tablet, Rfl: 0    furosemide (LASIX) 40 mg tablet, Take 40 mg by mouth every morning, Disp: , Rfl:     gabapentin (NEURONTIN) 800 mg tablet, Take 1 tablet (800 mg) in the morning, take 1 tablet (800 mg) in the afternoon, take 2 tablets (1600 mg) at bedtime, Disp: 120 tablet, Rfl: 2    lidocaine (Lidoderm) 5 %, Apply 1 patch topically over 12 hours daily for 15 days Remove & Discard patch within 12 hours or as directed by MD, Disp: 15 patch, Rfl: 0    methadone (DOLOPHINE) 5 mg tablet, Take 1 tablet (5 mg total) by mouth every 8 (eight) hours Medication prescribed for pain Max Daily Amount: 15 mg (Patient not taking: Reported on 7/23/2024), Disp: 90 tablet, Rfl: 0    metolazone (ZAROXOLYN) 2.5 mg tablet, 2.5 mg Monday, Wednesday, Friday (Patient not taking: Reported on 7/23/2024), Disp: , Rfl:     Multiple Vitamins-Minerals (Mens  Multivitamin) TABS, Take 1 tablet by mouth 2 (two) times a day, Disp: , Rfl:     naloxone (NARCAN) 4 mg/0.1 mL nasal spray, Administer 1 spray into a nostril. If no response after 2-3 minutes, give another dose in the other nostril using a new spray. (Patient not taking: Reported on 7/23/2024), Disp: 1 each, Rfl: 1    pantoprazole (PROTONIX) 40 mg tablet, Take 40 mg by mouth daily (Patient not taking: Reported on 7/23/2024), Disp: , Rfl:     potassium chloride (KLOR-CON) 20 mEq packet, Take 20 mEq by mouth 2 (two) times a day (Patient not taking: Reported on 7/23/2024), Disp: , Rfl:     Xarelto 20 MG tablet, , Disp: , Rfl:   No current facility-administered medications for this visit.  Family History   Problem Relation Age of Onset    No Known Problems Mother     No Known Problems Father       Review of Systems   Constitutional:  Negative for activity change, chills and fever.   HENT: Negative.     Respiratory:  Negative for cough, chest tightness and shortness of breath.    Cardiovascular:  Positive for leg swelling (Chronic B/L). Negative for chest pain.   Endocrine: Negative.    Genitourinary: Negative.    Musculoskeletal:  Negative for gait problem.   Skin:  Positive for wound.   Neurological:         PN   Psychiatric/Behavioral: Negative.  Negative for agitation and behavioral problems.          Objective:  /56   Pulse 69   Temp (!) 96.2 °F (35.7 °C)   Resp 18     Physical Exam  Constitutional:       Appearance: Normal appearance. He is not ill-appearing.      Comments: Anxious   Cardiovascular:      Comments: Chronic venous stasis dermatitis with B/L LE brawny edema. Diminished pedal pulses due to edema. Absent pedal hair.   Pulmonary:      Effort: No respiratory distress.   Musculoskeletal:         General: No tenderness or deformity. Normal range of motion.      Comments: S/p left TMA    Skin:     Capillary Refill: Capillary refill takes less than 2 seconds.      Comments: B/L LE skin is atrophic -  "thin, dry and shiny in appearance.     Right plantar 2nd toe stump appears with full thickness fibrous ulceration with significant periwound callus. No probe or exposed bone. No purulence or SOI.     RLE with pretibial superficial venous stasis ulcer has healed    Neurological:      General: No focal deficit present.      Mental Status: He is alert and oriented to person, place, and time.      Comments: N/T/B to B/L LE   Psychiatric:         Mood and Affect: Mood normal.         Behavior: Behavior normal.             Wound 07/12/24 Toe D2, second Anterior;Right;Plantar (Active)   Wound Image   08/02/24 1357   Wound Description Pink;Yellow;Pale 08/02/24 1359   Katy-wound Assessment Dry;Callus 08/02/24 1359   Wound Length (cm) 0.6 cm 08/02/24 1359   Wound Width (cm) 0.4 cm 08/02/24 1359   Wound Depth (cm) 0.3 cm 08/02/24 1359   Wound Surface Area (cm^2) 0.24 cm^2 08/02/24 1359   Wound Volume (cm^3) 0.072 cm^3 08/02/24 1359   Calculated Wound Volume (cm^3) 0.07 cm^3 08/02/24 1359   Change in Wound Size % 46.15 08/02/24 1359   Number of underminings 1 07/12/24 1442   Undermining 1 0.2 08/02/24 1359   Undermining 1 is depth extending from 12- 2 oclock deepest at 2 oclock 08/02/24 1359   Drainage Amount Small 08/02/24 1359   Drainage Description Serosanguineous 08/02/24 1359   Non-staged Wound Description Full thickness 07/19/24 1431   Treatments Cleansed 07/19/24 1431                         Debridement   Wound 07/12/24 Toe D2, second Anterior;Right;Plantar    Universal Protocol:  Consent: Verbal consent obtained.  Risks and benefits: risks, benefits and alternatives were discussed  Consent given by: patient  Time out: Immediately prior to procedure a \"time out\" was called to verify the correct patient, procedure, equipment, support staff and site/side marked as required.  Patient understanding: patient states understanding of the procedure being performed  Patient consent: the patient's understanding of the procedure " matches consent given  Patient identity confirmed: verbally with patient    Debridement Details  Performed by: physician  Debridement type: surgical  Level of debridement: subcutaneous tissue      Post-debridement measurements  Length (cm): 0.6  Width (cm): 0.5  Depth (cm): 0.3  Percent debrided: 100%  Surface Area (cm^2): 0.3  Area Debrided (cm^2): 0.3  Volume (cm^3): 0.09    Tissue and other material debrided: subcutaneous tissue  Devitalized tissue debrided: biofilm, callus and slough  Instrument(s) utilized: blade  Technique utilized: excisionalBleeding: small  Hemostasis obtained with: pressure  Procedural pain (0-10): insensate  Post-procedural pain: insensate   Response to treatment: procedure was tolerated well                 Wound Instructions:  Orders Placed This Encounter   Procedures    Wound cleansing and dressings Anterior;Right;Plantar Toe D2, second     Wash your hands with soap and water.  Remove old dressing, discard into plastic bag and place in trash.  Cleanse the wound with saline prior to applying a clean dressing. Do not use tissue or cotton balls. Do not scrub the wound. Pat dry using gauze.     Shower no     Apply betadine to the felipe wound  Apply dermagran to the right second toe wound.  Cover with gauze  Secure with roll gauze  Change dressing every other day      Wear surgical shoe at all times. Do not walk without surgical shoe on at all. Never walk barefoot.  Keep blood sugars under control  Stay off foot as much as possible       Left and Right Leg  Elastic Tubular Stocking Spandagrip F      Tubular elastic bandage: Apply from base of toes to behind the knee. Apply in AM, may remove for sleep.  Avoid prolonged standing in one place.  Elevate leg(s) above the level of the heart when sitting or as much as possible.     Standing Status:   Future     Standing Expiration Date:   8/9/2024    Debridement     This order was created via procedure documentation         Adelia Yousif  "DPM      Portions of the record may have been created with voice recognition software. Occasional wrong word or \"sound a like\" substitutions may have occurred due to the inherent limitations of voice recognition software. Read the chart carefully and recognize, using context, where substitutions have occurred.    "

## 2024-08-02 NOTE — PROGRESS NOTES
Wound Procedure Treatment Anterior;Right;Plantar Toe D2, second    Performed by: Grey Pemberton RN  Authorized by: Adelia Yousif DPM    Associated wounds:   Wound 07/12/24 Toe D2, second Anterior;Right;Plantar  Applied Topical: Betadine    Applied primary dressing:  Dermagran  Applied secondary dressing:  Gauze  Dressing secured with:  Vasquez and Tape

## 2024-08-02 NOTE — PATIENT INSTRUCTIONS
Orders Placed This Encounter   Procedures    Wound cleansing and dressings Anterior;Right;Plantar Toe D2, second     Wash your hands with soap and water.  Remove old dressing, discard into plastic bag and place in trash.  Cleanse the wound with saline prior to applying a clean dressing. Do not use tissue or cotton balls. Do not scrub the wound. Pat dry using gauze.     Shower no     Apply betadine to the felipe wound  Apply dermagran to the right second toe wound.  Cover with gauze  Secure with roll gauze  Change dressing every other day      Wear surgical shoe at all times. Do not walk without surgical shoe on at all. Never walk barefoot.  Keep blood sugars under control  Stay off foot as much as possible       Left and Right Leg  Elastic Tubular Stocking Spandagrip F      Tubular elastic bandage: Apply from base of toes to behind the knee. Apply in AM, may remove for sleep.  Avoid prolonged standing in one place.  Elevate leg(s) above the level of the heart when sitting or as much as possible.     Standing Status:   Future     Standing Expiration Date:   8/9/2024    Debridement     This order was created via procedure documentation

## 2024-08-07 ENCOUNTER — OFFICE VISIT (OUTPATIENT)
Dept: URGENT CARE | Facility: CLINIC | Age: 59
End: 2024-08-07
Payer: COMMERCIAL

## 2024-08-07 VITALS
RESPIRATION RATE: 16 BRPM | SYSTOLIC BLOOD PRESSURE: 90 MMHG | HEART RATE: 72 BPM | DIASTOLIC BLOOD PRESSURE: 64 MMHG | OXYGEN SATURATION: 99 % | TEMPERATURE: 97 F | BODY MASS INDEX: 39.1 KG/M2 | HEIGHT: 73 IN | WEIGHT: 295 LBS

## 2024-08-07 DIAGNOSIS — M54.50 ACUTE LEFT-SIDED LOW BACK PAIN WITHOUT SCIATICA: Primary | ICD-10-CM

## 2024-08-07 LAB
SL AMB  POCT GLUCOSE, UA: NEGATIVE
SL AMB LEUKOCYTE ESTERASE,UA: NEGATIVE
SL AMB POCT BILIRUBIN,UA: NEGATIVE
SL AMB POCT BLOOD,UA: NEGATIVE
SL AMB POCT CLARITY,UA: CLEAR
SL AMB POCT COLOR,UA: YELLOW
SL AMB POCT KETONES,UA: NEGATIVE
SL AMB POCT NITRITE,UA: NEGATIVE
SL AMB POCT PH,UA: 7
SL AMB POCT SPECIFIC GRAVITY,UA: 1.02
SL AMB POCT URINE PROTEIN: NEGATIVE
SL AMB POCT UROBILINOGEN: NEGATIVE

## 2024-08-07 PROCEDURE — 81002 URINALYSIS NONAUTO W/O SCOPE: CPT | Performed by: PHYSICIAN ASSISTANT

## 2024-08-07 PROCEDURE — 99213 OFFICE O/P EST LOW 20 MIN: CPT | Performed by: PHYSICIAN ASSISTANT

## 2024-08-07 RX ORDER — PREDNISONE 10 MG/1
TABLET ORAL
Qty: 20 TABLET | Refills: 0 | Status: SHIPPED | OUTPATIENT
Start: 2024-08-07

## 2024-08-07 NOTE — PROGRESS NOTES
[facility]        NAME: David Carpio is a 59 y.o. male  : 1965    MRN: [id number]  DATE: 2024  TIME: 3:00 PM    Assessment and Plan   Acute left-sided low back pain without sciatica [M54.50]  1. Acute left-sided low back pain without sciatica  POCT urine dip    predniSONE 10 mg tablet            Patient Instructions   There are no Patient Instructions on file for this visit.      Follow up with PCP in 3-5 days.  Proceed to  ER if symptoms worsen.    Chief Complaint     Chief Complaint   Patient presents with    Flank Pain     Developed left flank pain worsening in intensity over the past 3 days. No blood in urien         History of Present Illness       The patient presents to clinic for lower back pain that started Saturday.  He states that he first noticed the pain after he was in the pool with his grand son.  He states that he felt pain after getting out of the pool.  He describes the pain as a sharp pain located in the left lower back.  The pain does not radiate to his flank or to his lower pelvic area.  He denies any urinary symptoms.  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  He states that he was given methadone by pain management but is not taking this medication.  He is on several medications for chronic pain and psychiatric issues.        Review of Systems   Review of Systems   Musculoskeletal:  Positive for back pain. Negative for neck pain and neck stiffness.   Skin:  Negative for color change.         Current Medications       Current Outpatient Medications:     busPIRone (BUSPAR) 15 mg tablet, Take 1 tablet (15 mg total) by mouth 3 (three) times a day, Disp: 90 tablet, Rfl: 2    calcium citrate-vitamin D 315 mg-5 mcg tablet, Take 2 tablets by mouth 2 (two) times a day, Disp: , Rfl:     doxepin (SINEquan) 10 mg capsule, Take 1 capsule (10 mg total) by mouth in the morning AND 3 capsules (30 mg total) daily at  bedtime., Disp: 120 capsule, Rfl: 2    ferrous sulfate 325 (65 Fe) mg tablet, Take 1 tablet (325 mg total) by mouth daily with breakfast Do not start before June 19, 2023., Disp: 30 tablet, Rfl: 0    furosemide (LASIX) 40 mg tablet, Take 40 mg by mouth every morning, Disp: , Rfl:     lidocaine (Lidoderm) 5 %, Apply 1 patch topically over 12 hours daily for 15 days Remove & Discard patch within 12 hours or as directed by MD, Disp: 15 patch, Rfl: 0    Multiple Vitamins-Minerals (Mens Multivitamin) TABS, Take 1 tablet by mouth 2 (two) times a day, Disp: , Rfl:     pantoprazole (PROTONIX) 40 mg tablet, Take 40 mg by mouth daily, Disp: , Rfl:     potassium chloride (KLOR-CON) 20 mEq packet, Take 20 mEq by mouth 2 (two) times a day, Disp: , Rfl:     predniSONE 10 mg tablet, 4 po for 2 days, tthen 3x2 2x2 and 1x2, Disp: 20 tablet, Rfl: 0    buPROPion (Wellbutrin XL) 300 mg 24 hr tablet, Take 1 tablet (300 mg total) by mouth daily, Disp: 30 tablet, Rfl: 2    Xarelto 20 MG tablet, , Disp: , Rfl:     Current Allergies     Allergies as of 08/07/2024 - Reviewed 08/07/2024   Allergen Reaction Noted    Ativan [lorazepam] Anxiety 06/22/2023            The following portions of the patient's history were reviewed and updated as appropriate: allergies, current medications, past family history, past medical history, past social history, past surgical history and problem list.     Past Medical History:   Diagnosis Date    Atrial fibrillation (HCC)     Benzodiazepine withdrawal with complication (HCC) 06/15/2023    Chronic diastolic (congestive) heart failure (HCC)     Diabetes mellitus (HCC)     GERD (gastroesophageal reflux disease)     High cholesterol     Hyperlipidemia     Pacemaker     Stroke (HCC)        Past Surgical History:   Procedure Laterality Date    APPENDECTOMY      ATRIAL CARDIAC PACEMAKER INSERTION      BARIATRIC SURGERY  05/2021    BONE BIOPSY Left 7/26/2023    Procedure: EXCISION BIOPSY BONE LESION EXTREMITY;   Surgeon: Adelia Yousif DPM;  [facility];  Service: Podiatry    EPIDURAL BLOCK INJECTION N/A 5/19/2022    Procedure: BLOCK / INJECTION EPIDURAL STEROID CERVICAL C7-T1;  Surgeon: Skyler Quinn MD;  [facility];  Service: Pain Management     FL GUIDED NEEDLE PLAC BX/ASP/INJ  3/22/2022    FOOT AMPUTATION Left 4/28/2022    Procedure: LEFT TRANSMETATARSAL AMPUTATION.;  Surgeon: Nestor Madden DPM;  [facility];  Service: Podiatry    NERVE BLOCK Right 2/10/2022    Procedure: BLOCK MEDIAL BRANCH C3, C4, C5 #1;  Surgeon: Skyler Quinn MD;  [facility];  Service: Pain Management     NERVE BLOCK Right 3/22/2022    Procedure: BLOCK MEDIAL BRANCH C3, C4, C5 #2;  Surgeon: Skyler Quinn MD;  [facility];  Service: Pain Management     CO AMPUTATION FOOT TRANSMETARSAL Left 4/12/2023    Procedure: REVISION LEFT TRANSMETATARSAL (TMA) AMPUTATION, REMOVAL OF UNVIABLE TISSUE AND BONE,;  Surgeon: Adelia Yousif DPM;  [facility];  Service: Podiatry    CO AMPUTATION METATARSAL W/TOE SINGLE Left 4/7/2023    Procedure: 2ND RAY RESECTION FOOT;  Surgeon: Adelia Yousif DPM;  [facility];  Service: Podiatry    CO AMPUTATION TOE INTERPHALANGEAL JOINT Left 11/16/2021    Procedure: AMPUTATION LESSER TOE;  Surgeon: Nestor Madden DPM;  [facility];  Service: Podiatry    RADIOFREQUENCY ABLATION Right 4/7/2022    Procedure: Right C3, C4, C5 RFA;  Surgeon: Skyler Quinn MD;  [facility];  Service: Pain Management     RHIZOTOMY Right 2/9/2023    Procedure: RHIZOTOMY CERVICAL MEDIAL BRANCH NERVES RIGHT C3, C4, C5;  Surgeon: Skyler Quinn MD;  [facility];  Service: Pain Management     TOE AMPUTATION Left     2nd toe    TOE AMPUTATION Left 9/15/2021    Procedure: AMPUTATION LEFT 4TH TOE;  Surgeon: Nestor Madden DPM;  [facility];  Service: Podiatry    TOE AMPUTATION Right 1/12/2022    Procedure: AMPUTATION TOE;  Surgeon: Hong Jolly  "WOLF;  [facility];  Service: Podiatry    TOE AMPUTATION Right 2/23/2022    Procedure: AMPUTATION TOE  RIGHT SECOND;  Surgeon: Hong Jolly DPM;  Location:  [facility] OR;  Service: Podiatry    TOE AMPUTATION Right 6/3/2022    Procedure: AMPUTATION right 4th TOE;  Surgeon: Nestor Madden DPM;  [facility];  Service: Podiatry       Family History   Problem Relation Age of Onset    No Known Problems Mother     No Known Problems Father          Medications have been verified.        Objective   BP 90/64   Pulse 72   Temp (!) 97 °F (36.1 °C)   Resp 16   Ht 6' 1\" (1.854 m)   Wt 134 kg (295 lb)   SpO2 99%   BMI 38.92 kg/m²        Physical Exam     Physical Exam  Musculoskeletal:      Thoracic back: Decreased range of motion.      Lumbar back: No edema or bony tenderness. Decreased range of motion. Positive left straight leg raise test.        Back:       Comments: There is tenderness palpation noted in the left lower lumbar.  The patient has decreased range of motion to flexion, extension, and rotation of the LS-spine.         -Urine dip does not indicate infection.  Symptoms likely muscular.  I will treat symptomatically with prednisone.  We will need to hold off on muscle relaxers given the medications that he is on which would interfere with the muscle relaxer.          "

## 2024-08-09 ENCOUNTER — TELEPHONE (OUTPATIENT)
Age: 59
End: 2024-08-09

## 2024-08-09 RX ORDER — BUPROPION HYDROCHLORIDE 150 MG/1
150 TABLET ORAL DAILY
COMMUNITY
Start: 2024-08-07

## 2024-08-09 RX ORDER — METHADONE HYDROCHLORIDE 5 MG/1
TABLET ORAL
COMMUNITY
Start: 2024-08-07 | End: 2024-08-14

## 2024-08-09 RX ORDER — TIZANIDINE HYDROCHLORIDE 4 MG/1
1 CAPSULE, GELATIN COATED ORAL 3 TIMES DAILY PRN
COMMUNITY
Start: 2024-08-09

## 2024-08-09 RX ORDER — METOLAZONE 2.5 MG/1
TABLET ORAL
COMMUNITY
Start: 2024-08-07

## 2024-08-09 NOTE — TELEPHONE ENCOUNTER
I have reviewed the information. It appears that recently prescribed prednisone is causing panic attacks. This medication was prescribed 8/7/24 in response to flank pain.    At this time, I would defer to the patient's pain management doctor for further medication management options. If pain management has any questions about how the medications may impact mental health, they can certainly reach out to discuss.    Andre Rodriguez MD

## 2024-08-09 NOTE — TELEPHONE ENCOUNTER
Maribell, Patient spouse called in stating patient was prescribed a new medication from their pain management doctor that is causing panic attacks. The patient wanted to inform their  medication management doctor that they will not be taking it again but would like to know if there is more of something else from Dr. Higgins that they can take.    The best contact number is [phone]

## 2024-08-09 NOTE — TELEPHONE ENCOUNTER
UPDATE: I called the patient David Carpio today at 3:15 PM.    David confirms that he has been in a great deal of left flank pain this week and confirms that it began on Saturday 8/4/24. David confirms that he did see urgent care services for this 8/7/24 and was prescribed prednisone at a tapering dose (40 mg for 2 days, 30 mg for 2 days, 20 mg for 2 days, 10 mg for 2 days). David reports that the prednisone had limited effectiveness. In the context of this ongoing pain, he did take a single tablet of methadone 5 mg this morning as prescribed by his pain management doctor. He did not like how the methadone made him feel and he began to feel increasingly anxious. He reports that he also saw his PCP today and was prescribed Zanaflex 4 mg three times daily as needed for pain.    Supportive therapy was provided. I discussed with David that both the methadone and Zanaflex, while they should be used sparingly, are safe to take with his psychiatric medications.    David agrees to follow up on Tuesday 8/13/24 for ongoing psychotherapy.    Andre Rodriguez MD

## 2024-08-12 ENCOUNTER — HOSPITAL ENCOUNTER (EMERGENCY)
Facility: HOSPITAL | Age: 59
Discharge: HOME | End: 2024-08-12
Attending: EMERGENCY MEDICINE
Payer: COMMERCIAL

## 2024-08-12 ENCOUNTER — APPOINTMENT (EMERGENCY)
Dept: CT IMAGING | Facility: HOSPITAL | Age: 59
End: 2024-08-12
Payer: COMMERCIAL

## 2024-08-12 VITALS
TEMPERATURE: 98.9 F | WEIGHT: 295 LBS | DIASTOLIC BLOOD PRESSURE: 81 MMHG | HEART RATE: 94 BPM | RESPIRATION RATE: 19 BRPM | OXYGEN SATURATION: 94 % | SYSTOLIC BLOOD PRESSURE: 119 MMHG | BODY MASS INDEX: 38.92 KG/M2

## 2024-08-12 DIAGNOSIS — M54.9 BACK PAIN: Primary | ICD-10-CM

## 2024-08-12 LAB
BILIRUB UR QL STRIP: NEGATIVE
CLARITY UR: CLEAR
COLOR UR: YELLOW
GLUCOSE UR STRIP-MCNC: NEGATIVE MG/DL
HGB UR QL STRIP.AUTO: NEGATIVE
KETONES UR STRIP-MCNC: NEGATIVE MG/DL
LEUKOCYTE ESTERASE UR QL STRIP: NEGATIVE
NITRITE UR QL STRIP: NEGATIVE
PH UR STRIP.AUTO: 6 [PH]
PROT UR STRIP-MCNC: NEGATIVE MG/DL
SP GR UR STRIP.AUTO: 1.02 (ref 1–1.03)
UROBILINOGEN UR QL STRIP.AUTO: 4 E.U./DL

## 2024-08-12 PROCEDURE — 74176 CT ABD & PELVIS W/O CONTRAST: CPT

## 2024-08-12 PROCEDURE — 99285 EMERGENCY DEPT VISIT HI MDM: CPT | Performed by: EMERGENCY MEDICINE

## 2024-08-12 PROCEDURE — 81003 URINALYSIS AUTO W/O SCOPE: CPT | Performed by: EMERGENCY MEDICINE

## 2024-08-12 PROCEDURE — 99284 EMERGENCY DEPT VISIT MOD MDM: CPT

## 2024-08-12 PROCEDURE — 96372 THER/PROPH/DIAG INJ SC/IM: CPT

## 2024-08-12 RX ORDER — OXYCODONE AND ACETAMINOPHEN 5; 325 MG/1; MG/1
1 TABLET ORAL EVERY 8 HOURS PRN
Qty: 12 TABLET | Refills: 0 | Status: SHIPPED | OUTPATIENT
Start: 2024-08-12 | End: 2024-08-22

## 2024-08-12 RX ORDER — DIAZEPAM 10 MG/2ML
5 INJECTION, SOLUTION INTRAMUSCULAR; INTRAVENOUS ONCE
Status: COMPLETED | OUTPATIENT
Start: 2024-08-12 | End: 2024-08-12

## 2024-08-12 RX ORDER — KETOROLAC TROMETHAMINE 30 MG/ML
30 INJECTION, SOLUTION INTRAMUSCULAR; INTRAVENOUS ONCE
Status: COMPLETED | OUTPATIENT
Start: 2024-08-12 | End: 2024-08-12

## 2024-08-12 RX ADMIN — KETOROLAC TROMETHAMINE 30 MG: 30 INJECTION, SOLUTION INTRAMUSCULAR; INTRAVENOUS at 16:41

## 2024-08-12 RX ADMIN — DIAZEPAM 5 MG: 5 INJECTION INTRAMUSCULAR; INTRAVENOUS at 16:39

## 2024-08-12 NOTE — ED PROVIDER NOTES
History  Chief Complaint   Patient presents with    Back Pain     Last week patient was playing with grandson in pool and since has been having pain in the left lower back, was seen at urgent care and took prednisone however it did not work and still having pain. Also using Tizanidine 4 mg with no relief.      8 days of burning left low back pain.  No bowel or bladder incontinence.  No leg pain.  No leg weakness.  No urinary retention.  No dysuria.  No hematuria.  No other complaints.  States he saw urgent care where he was prescribed prednisone and had negative x-rays.  No improvement on the steroid.  Then saw primary care who also put him on tizanidine, still no improvement.  Now comes to the emergency room with the above complaint.      History provided by:  Patient   used: No    Back Pain  Pain location: Left mid back.  Quality:  Burning  Radiates to:  Does not radiate  Pain severity:  Moderate  Pain is:  Same all the time  Onset quality:  Gradual  Duration:  1 week  Timing:  Constant  Progression:  Unchanged  Chronicity:  New  Relieved by:  Nothing  Worsened by:  Nothing  Ineffective treatments:  None tried  Associated symptoms: no abdominal pain, no bladder incontinence, no bowel incontinence, no chest pain, no dysuria, no fever, no headaches, no numbness, no paresthesias, no perianal numbness, no weakness and no weight loss        Prior to Admission Medications   Prescriptions Last Dose Informant Patient Reported? Taking?   Multiple Vitamins-Minerals (Mens Multivitamin) TABS   Yes No   Sig: Take 1 tablet by mouth 2 (two) times a day   TiZANidine (ZANAFLEX) 4 MG capsule   Yes No   Sig: Take 1 capsule by mouth 3 (three) times a day as needed   Xarelto 20 MG tablet   Yes No   Patient not taking: Reported on 7/17/2024   buPROPion (WELLBUTRIN XL) 150 mg 24 hr tablet   Yes No   Sig: Take 150 mg by mouth daily   buPROPion (Wellbutrin XL) 300 mg 24 hr tablet   No No   Sig: Take 1 tablet (300 mg  total) by mouth daily   busPIRone (BUSPAR) 15 mg tablet   No No   Sig: Take 1 tablet (15 mg total) by mouth 3 (three) times a day   calcium citrate-vitamin D 315 mg-5 mcg tablet   Yes No   Sig: Take 2 tablets by mouth 2 (two) times a day   doxepin (SINEquan) 10 mg capsule   No No   Sig: Take 1 capsule (10 mg total) by mouth in the morning AND 3 capsules (30 mg total) daily at bedtime.   ferrous sulfate 325 (65 Fe) mg tablet   No No   Sig: Take 1 tablet (325 mg total) by mouth daily with breakfast Do not start before 2023.   furosemide (LASIX) 40 mg tablet   Yes No   Sig: Take 40 mg by mouth every morning   lidocaine (Lidoderm) 5 %   No No   Sig: Apply 1 patch topically over 12 hours daily for 15 days Remove & Discard patch within 12 hours or as directed by MD   methadone (DOLOPHINE) 5 mg tablet   Yes No   Sig: PLEASE SEE ATTACHED FOR DETAILED DIRECTIONS   metolazone (ZAROXOLYN) 2.5 mg tablet   Yes No   Sig: TAKE 1 TAB BY MOUTH ONCE A DAY ON MONDAY, WEDNESDAY, AND FRIDAY ONLY   naloxone (NARCAN) 4 mg/0.1 mL nasal spray   Yes No   pantoprazole (PROTONIX) 40 mg tablet   Yes No   Sig: Take 40 mg by mouth daily   potassium chloride (KLOR-CON) 20 mEq packet   Yes No   Sig: Take 20 mEq by mouth 2 (two) times a day   predniSONE 10 mg tablet   No No   Si po for 2 days, tthen 3x2 2x2 and 1x2      Facility-Administered Medications: None       Past Medical History:   Diagnosis Date    Atrial fibrillation (HCC)     Benzodiazepine withdrawal with complication (HCC) 06/15/2023    Chronic diastolic (congestive) heart failure (HCC)     Diabetes mellitus (HCC)     GERD (gastroesophageal reflux disease)     High cholesterol     Hyperlipidemia     Pacemaker     Stroke (HCC)        Past Surgical History:   Procedure Laterality Date    APPENDECTOMY      ATRIAL CARDIAC PACEMAKER INSERTION      BARIATRIC SURGERY  2021    BONE BIOPSY Left 2023    Procedure: EXCISION BIOPSY BONE LESION EXTREMITY;  Surgeon: Adelia Yousif,  WOLF;  [facility];  Service: Podiatry    EPIDURAL BLOCK INJECTION N/A 5/19/2022    Procedure: BLOCK / INJECTION EPIDURAL STEROID CERVICAL C7-T1;  Surgeon: Skyler Quinn MD;  [facility];  Service: Pain Management     FL GUIDED NEEDLE PLAC BX/ASP/INJ  3/22/2022    FOOT AMPUTATION Left 4/28/2022    Procedure: LEFT TRANSMETATARSAL AMPUTATION.;  Surgeon: Nestor Madden DPM;  [facility];  Service: Podiatry    NERVE BLOCK Right 2/10/2022    Procedure: BLOCK MEDIAL BRANCH C3, C4, C5 #1;  Surgeon: Skyler Quinn MD;  [facility];  Service: Pain Management     NERVE BLOCK Right 3/22/2022    Procedure: BLOCK MEDIAL BRANCH C3, C4, C5 #2;  Surgeon: Skyler Quinn MD;  [facility];  Service: Pain Management     FL AMPUTATION FOOT TRANSMETARSAL Left 4/12/2023    Procedure: REVISION LEFT TRANSMETATARSAL (TMA) AMPUTATION, REMOVAL OF UNVIABLE TISSUE AND BONE,;  Surgeon: Adelia Yousif DPM;  [facility];  Service: Podiatry    FL AMPUTATION METATARSAL W/TOE SINGLE Left 4/7/2023    Procedure: 2ND RAY RESECTION FOOT;  Surgeon: Adelia Yousif DPM;  [facility];  Service: Podiatry    FL AMPUTATION TOE INTERPHALANGEAL JOINT Left 11/16/2021    Procedure: AMPUTATION LESSER TOE;  Surgeon: Nestor Madden DPM;  [facility];  Service: Podiatry    RADIOFREQUENCY ABLATION Right 4/7/2022    Procedure: Right C3, C4, C5 RFA;  Surgeon: Skyler Quinn MD;  [facility];  Service: Pain Management     RHIZOTOMY Right 2/9/2023    Procedure: RHIZOTOMY CERVICAL MEDIAL BRANCH NERVES RIGHT C3, C4, C5;  Surgeon: Skyler uQinn MD;  [facility];  Service: Pain Management     TOE AMPUTATION Left     2nd toe    TOE AMPUTATION Left 9/15/2021    Procedure: AMPUTATION LEFT 4TH TOE;  Surgeon: Nestor Madden DPM;  [facility];  Service: Podiatry    TOE AMPUTATION Right 1/12/2022    Procedure: AMPUTATION TOE;  Surgeon: Hong Jolly DPM;  [facility];   Service: Podiatry    TOE AMPUTATION Right 2/23/2022    Procedure: AMPUTATION TOE  RIGHT SECOND;  Surgeon: Hong Jolly DPM;  Location:  [facility];  Service: Podiatry    TOE AMPUTATION Right 6/3/2022    Procedure: AMPUTATION right 4th TOE;  Surgeon: Nestor Madden DPM;  [facility];  Service: Podiatry       Family History   Problem Relation Age of Onset    No Known Problems Mother     No Known Problems Father      I have reviewed and agree with the history as documented.    E-Cigarette/Vaping    E-Cigarette Use Never User      E-Cigarette/Vaping Substances     Social History     Tobacco Use    Smoking status: Never     Passive exposure: Never    Smokeless tobacco: Never   Vaping Use    Vaping status: Never Used   Substance Use Topics    Alcohol use: Never    Drug use: Never       Review of Systems   Constitutional:  Negative for chills, fever and weight loss.   HENT:  Negative for ear pain, hearing loss, sore throat, trouble swallowing and voice change.    Eyes:  Negative for pain and discharge.   Respiratory:  Negative for cough, shortness of breath and wheezing.    Cardiovascular:  Negative for chest pain and palpitations.   Gastrointestinal:  Negative for abdominal pain, blood in stool, bowel incontinence, constipation, diarrhea, nausea and vomiting.   Genitourinary:  Negative for bladder incontinence, dysuria, flank pain, frequency and hematuria.   Musculoskeletal:  Positive for back pain. Negative for joint swelling, neck pain and neck stiffness.   Skin:  Negative for rash and wound.   Neurological:  Negative for dizziness, seizures, syncope, facial asymmetry, weakness, numbness, headaches and paresthesias.   Psychiatric/Behavioral:  Negative for hallucinations, self-injury and suicidal ideas.    All other systems reviewed and are negative.      Physical Exam  Physical Exam  Vitals and nursing note reviewed.   Constitutional:       General: He is not in acute distress.     Appearance: Normal  appearance. He is well-developed. He is not ill-appearing or diaphoretic.   HENT:      Head: Normocephalic and atraumatic.      Right Ear: External ear normal.      Left Ear: External ear normal.   Eyes:      General: No scleral icterus.        Right eye: No discharge.         Left eye: No discharge.      Extraocular Movements: Extraocular movements intact.      Conjunctiva/sclera: Conjunctivae normal.   Pulmonary:      Effort: Pulmonary effort is normal. No respiratory distress.   Musculoskeletal:         General: No swelling or deformity. Normal range of motion.      Cervical back: Normal range of motion and neck supple.      Comments: Reproducible left flank musculature tenderness and spasm.  Positional changes also reproduce spasm and pain.  Bilateral lower extremity strength and reflexes are normal.  Patient ambulates unassisted without difficulty.  No foot drop.   Skin:     General: Skin is dry.      Coloration: Skin is not jaundiced or pale.      Findings: No rash.   Neurological:      General: No focal deficit present.      Mental Status: He is alert and oriented to person, place, and time.      Cranial Nerves: No cranial nerve deficit.      Motor: No weakness.      Coordination: Coordination normal.      Gait: Gait normal.   Psychiatric:         Mood and Affect: Mood normal.         Behavior: Behavior normal.         Thought Content: Thought content normal.         Judgment: Judgment normal.         Vital Signs  ED Triage Vitals [08/12/24 1444]   Temperature Pulse Respirations Blood Pressure SpO2   98.9 °F (37.2 °C) 99 19 (!) 139/101 94 %      Temp Source Heart Rate Source Patient Position - Orthostatic VS BP Location FiO2 (%)   Temporal Monitor Sitting Right arm --      Pain Score       10 - Worst Possible Pain           Vitals:    08/12/24 1444   BP: (!) 139/101   Pulse: 99   Patient Position - Orthostatic VS: Sitting         Visual Acuity      ED Medications  Medications   ketorolac (TORADOL) injection  30 mg (has no administration in time range)   diazepam (VALIUM) injection 5 mg (has no administration in time range)       Diagnostic Studies  Results Reviewed       Procedure Component Value Units Date/Time    UA w Reflex to Microscopic w Reflex to Culture [[id number]]  (Abnormal) Collected: 08/12/24 1511    Lab Status: Final result Specimen: Urine, Clean Catch Updated: 08/12/24 1520     Color, UA Yellow     Clarity, UA Clear     Specific Gravity, UA 1.020     pH, UA 6.0     Leukocytes, UA Negative     Nitrite, UA Negative     Protein, UA Negative mg/dl      Glucose, UA Negative mg/dl      Ketones, UA Negative mg/dl      Urobilinogen, UA 4.0 E.U./dl      Bilirubin, UA Negative     Occult Blood, UA Negative                   CT renal stone study abdomen pelvis wo contrast   Final Result by Sterling Bhagat MD (08/12 1601)      No acute abdominopelvic findings. Specifically, no urinary tract calculi or obstructive uropathy.         Resident: Angel Tilley I, the attending radiologist, have reviewed the images and agree with the final report above.      Workstation performed: [id number]                    Procedures  Procedures         ED Course                                 SBIRT 22yo+      Flowsheet Row Most Recent Value   Initial Alcohol Screen: US AUDIT-C     1. How often do you have a drink containing alcohol? 0 Filed at: 08/12/2024 1445   2. How many drinks containing alcohol do you have on a typical day you are drinking?  0 Filed at: 08/12/2024 1445   3a. Male UNDER 65: How often do you have five or more drinks on one occasion? 0 Filed at: 08/12/2024 1445   Audit-C Score 0 Filed at: 08/12/2024 1445   ANGELO: How many times in the past year have you...    Used an illegal drug or used a prescription medication for non-medical reasons? Never Filed at: 08/12/2024 1445                      Medical Decision Making  Based on the history and medical screening exam performed the diagnostic considerations include  but are not limited to UTI, pyelonephritis, ureteral stone, muscular spasm, sciatic pain, low back pain..    Based on the work-up performed in the emergency room which includes physical examination, and which may include laboratory studies and imaging as warranted including advanced imaging such as CT scan or ultrasound, the diagnostic considerations are narrowed to exclude limb or life-threatening process.    The patient is stable for discharge.  Urinalysis negative, CT renal stone study negative, strength and reflexes preserved, patient stable for discharge.  Received IM treatment in the ER.  Prescriptions for pain medication provided.  Patient already on muscle relaxer.  Also advised to use NSAID at home.  Outpatient follow-up information for pain management provided to patient.    Amount and/or Complexity of Data Reviewed  Labs: ordered. Decision-making details documented in ED Course.     Details: Urinalysis negative  Radiology: ordered and independent interpretation performed. Decision-making details documented in ED Course.     Details: CT renal stone study negative    Risk  Prescription drug management.                 Disposition  Final diagnoses:   Back pain     Time reflects when diagnosis was documented in both MDM as applicable and the Disposition within this note       Time User Action Codes Description Comment    8/12/2024  4:36 PM Chuck Vivas Add [M54.9] Back pain           ED Disposition       ED Disposition   Discharge    Condition   Stable    Date/Time   Mon Aug 12, 2024 1636    Comment   David Carpio discharge to home/self care.                   Follow-up Information       Follow up With Specialties Details Why Contact Info    Khurram Rodriguez DO Internal Medicine   [address] [facility] [address]  [phone]      Skyler Quinn MD Pain Medicine, Interventional Radiology   [address] [facility]  [address]  [facility] [place] [facility] [address]  [phone]              Patient's Medications    Discharge Prescriptions    OXYCODONE-ACETAMINOPHEN (PERCOCET) 5-325 MG PER TABLET    Take 1 tablet by mouth every 8 (eight) hours as needed for moderate pain for up to 10 days Max Daily Amount: 3 tablets       Start Date: 8/12/2024 End Date: 8/22/2024       Order Dose: 1 tablet       Quantity: 12 tablet    Refills: 0       No discharge procedures on file.    PDMP Review         Value Time User    PDMP Reviewed  Yes 12/22/2023  5:49 PM Andre Rodriguez MD            ED Provider  Electronically Signed by             Chuck Vivas MD  08/12/24 5131

## 2024-08-13 ENCOUNTER — OFFICE VISIT (OUTPATIENT)
Dept: PSYCHIATRY | Facility: CLINIC | Age: 59
End: 2024-08-13

## 2024-08-13 NOTE — PSYCH
"Behavioral Health Psychotherapy Progress Note    Psychotherapy Provided: Individual Psychotherapy     No diagnosis found.    Goals addressed in session: {GOALS:53734}     DATA: ***  During this session, this clinician used the following therapeutic modalities: {Therapeutic Modalities:21821}    Substance Abuse {Was/was not:29929} addressed during this session. If the client is diagnosed with a co-occurring substance use disorder, please indicate any changes in the frequency or amount of use: ***. Stage of change for addressing substance use diagnoses: {Psych Substance Abuse Stage of Change:74149}    ASSESSMENT:  Cy Carpio presents with a {Psych/BH Mood:82949::\"Euthymic/ normal\"} mood.     his affect is {Psych/BH Affect:85372::\"Normal range and intensity\"}, which is {Congruent/Non Congruent:44505}, with his mood and the content of the session. The client {HAS/HAS NOT:75343} made progress on their goals.    *** Cy Carpio presents with a {PSYCH Suicide/Homicide Risk:16533} risk of suicide, {PSYCH Suicide/Homicide Risk:16200} risk of self-harm, and {PSYCH Suicide/Homicide Risk:64327} risk of harm to others.    For any risk assessment that surpasses a \"low\" rating, a safety plan must be developed.    A safety plan was indicated: {YES/NO:20200}  If yes, describe in detail ***    PLAN: Between sessions, Cy Carpio will ***. At the next session, the therapist will use {Therapeutic Modalities:57490} to address ***.    Behavioral Health Treatment Plan and Discharge Planning: yC Carpio is aware of and agrees to continue to work on their treatment plan. They have identified and are working toward their discharge goals. {YES/NO:20200}    Visit start and stop times:    08/13/24 from 8:00 AM to 9:00 AM    This note was not shared with the patient due to this is a psychotherapy note    At the time of the therapy session, unfortunately David has continued to have difficulty sharing his feelings with his family and he has " "been avoiding difficult conversations with his family. He has been avoiding conversations particularly with his wife, whom he feels pushed their daughter away.  David reports since the last office visit he unfortunately did not reach out to his daughter-in-law (who has offered to talk about Shameka).  He continues to state that his daughter-in-law could possibly provide more insight into his daughter Shameka's past, potentially helping him understand the events that led to Shameka's overdose.  However, he continues to be worried about what his daughter-in-law might reveal.     In today's session, David reports he has been actively helping others at his [employer], which provides him a sense of purpose and community.  Since the last office visit, David reports he has been leading some grief counseling for members of his [employer] who have also been affected by loss.  In particular, he spoke at length with 1 individual whose 23-year-old son tragically  in a car accident.  This was helpful for David as he was able to identify how parents struggle with the \"what ifs\" when a child passes away.     David reports that this past weekend was his late daughter's birthday and he states that he hosted an event at his house for family members to remember his daughter and celebrate her life.  David states that he became very emotional during this event, notably when family members commented on things that have changed over the years following her passing.  David continues to report fears that he will forget his daughter's passing.     David was encouraged to continue embracing difficult challenges relating to his daughter Shameka, including opening up boxes of her belongings, talking to his daughter-in-law about Shameka, and talking to his wife about his resentments.  He was encouraged to explore why he is able to let go of some items that belong to Shameka (such as the outdoor pool that she always helped set up) but not others. " " The therapeutic discussion emphasized the necessity of confronting these emotions to progress towards healing.  Additionally, both the potential benefits and emotional challenges of the Mandaen involvement were discussed.  David is to continue working on opening up about his feelings.     During this session, this clinician used the following therapeutic modalities: Bereavement Therapy, Cognitive Behavioral Therapy, Motivational Interviewing, and Supportive Psychotherapy     Substance Abuse was not addressed during this session.     ASSESSMENT:  Cy Carpio presents with a Depressed mood.      his affect is Constricted, which is congruent, with his mood and the content of the session. The client has made progress on their goals.      Cy Carpio presents with a minimal risk of suicide, minimal risk of self-harm, and minimal risk of harm to others.     For any risk assessment that surpasses a \"low\" rating, a safety plan must be developed.     A safety plan was indicated: no     PLAN: Between sessions, Cy Carpio will continue to work on accepting the passing of his daughter.  He will continue to challenge the negative intrusive thoughts that he harbors surrounding the passing and will continue to work on mindful strategies to live in the present.  He will continue to surround himself with support through family, Spiritism, and grief counseling. He will continue to work on unpacking his daughter's belongings and revisiting memories (both positive and negative) about his daughter. He will continue to foster authenticity in his relationships and work on opening up and sharing his feelings with his family. At the next session, the therapist will use Bereavement Therapy, Cognitive Behavioral Therapy, Motivational Interviewing, Solution-Focused Therapy, and Supportive Psychotherapy to address these ongoing struggles.     Behavioral Health Treatment Plan and Discharge Planning: Cy Carpio is aware of and agrees to " continue to work on their treatment plan. They have identified and are working toward their discharge goals. yes     Visit start and stop times:     07/05/24 from 11:00 PM to 12:00 PM     This note was not shared with the patient due to this is a psychotherapy note

## 2024-08-13 NOTE — PSYCH
David Carpio did not attend today's (08/13/24) therapy appointment and did not inform clerical staff of their potential absence on days prior to the evaluation. This writer waited 20 minutes prior to writing this note and declaring that the patient was not present. Treatment team will attempt outreach and reschedule the patient accordingly. If the patient cannot be contacted directly, a HIPPA compliant voicemail will be left.     Treatment Plan not due at this session    Andre Rodriguez MD

## 2024-08-14 ENCOUNTER — TELEPHONE (OUTPATIENT)
Dept: PAIN MEDICINE | Facility: CLINIC | Age: 59
End: 2024-08-14

## 2024-08-14 ENCOUNTER — OFFICE VISIT (OUTPATIENT)
Dept: PAIN MEDICINE | Facility: CLINIC | Age: 59
End: 2024-08-14
Payer: COMMERCIAL

## 2024-08-14 VITALS
HEIGHT: 73 IN | HEART RATE: 98 BPM | OXYGEN SATURATION: 96 % | BODY MASS INDEX: 39.1 KG/M2 | RESPIRATION RATE: 18 BRPM | WEIGHT: 295 LBS | DIASTOLIC BLOOD PRESSURE: 88 MMHG | SYSTOLIC BLOOD PRESSURE: 122 MMHG | TEMPERATURE: 97.6 F

## 2024-08-14 DIAGNOSIS — M54.12 CERVICAL RADICULOPATHY: ICD-10-CM

## 2024-08-14 DIAGNOSIS — E11.42 TYPE 2 DIABETES MELLITUS WITH DIABETIC POLYNEUROPATHY, WITHOUT LONG-TERM CURRENT USE OF INSULIN (HCC): Primary | ICD-10-CM

## 2024-08-14 DIAGNOSIS — M54.16 LUMBAR RADICULOPATHY: ICD-10-CM

## 2024-08-14 DIAGNOSIS — M14.679 CHARCOT ARTHROPATHY OF MIDFOOT: ICD-10-CM

## 2024-08-14 PROCEDURE — 99214 OFFICE O/P EST MOD 30 MIN: CPT | Performed by: ANESTHESIOLOGY

## 2024-08-14 RX ORDER — GABAPENTIN 800 MG/1
TABLET ORAL
COMMUNITY
Start: 2024-08-13 | End: 2024-08-14 | Stop reason: SDUPTHER

## 2024-08-14 RX ORDER — GABAPENTIN 800 MG/1
800 TABLET ORAL 4 TIMES DAILY
Qty: 360 TABLET | Refills: 3 | Status: SHIPPED | OUTPATIENT
Start: 2024-08-14

## 2024-08-14 NOTE — PROGRESS NOTES
Assessment  1. Type 2 diabetes mellitus with diabetic polyneuropathy, without long-term current use of insulin (HCC)  -     gabapentin (NEURONTIN) 800 mg tablet; Take 1 tablet (800 mg total) by mouth 4 (four) times a day  2. Cervical radiculopathy  -     gabapentin (NEURONTIN) 800 mg tablet; Take 1 tablet (800 mg total) by mouth 4 (four) times a day  3. Charcot arthropathy of midfoot  -     gabapentin (NEURONTIN) 800 mg tablet; Take 1 tablet (800 mg total) by mouth 4 (four) times a day  4. Lumbar radiculopathy  -     CT lumbar spine without contrast; Future; Expected date: 08/14/2024  -     Ambulatory referral to Physical Therapy; Future      Symptoms of charcot joint, significant left sided neuropathy, elements of CRPS type 1 with sudomotor, vasomotor changes, decreased ROM, allodynia and hypersensitivity. Color changes, rubor, swelling, significant hypersensitivity in LLE which satisfy budapest criteria. Not a candidate for SCS trial; reportedly with significantly improved pain relief taking gabapentin 800mg qid without side effects; has since tapered methadone given side effect profile. New LLE pain with radicular features in L4 dermatome accompanied by pain limited weakness, numbness and paresthesias. Complaint with home PT. No imaging available to review. Previously reported the following symptomatology:     Right sided neck pain described primarily arthritic features. Has yet to begin physical therapy for his neck. Cervical facet arthropathy seen on xray cervical spine.  Distribution of pain follows the right C3-C6 medial branch nerve regions. + right sided facet loading maneuvers consistent with multilevel spondyloarthropathy and osteophytes seen in cervical spine. Reasonable at this time to trial medial branch blocks to target site of cervical facet mediated pain and pursue radiofrequency ablation of successful.  Risks, benefits and alternatives of procedure in conjunction with multimodal pain therapy  plan thoroughly discussed with patient.  Questions answered to patient's satisfaction.    Plan  -gabapentin 800 mg q.i.d rx; counseled regarding sedative effects of taking this medication and provided up titration calendar.  Counseled not to take medication while driving or operating heavy machinery/using stairs  -CT lumbar spine; will f/u result  -physical therapy for cervical spondylosis, radiculopathy; Physician directed home exercise plan as per [employer] demonstrated and handouts provided that patient plans to participate with for 1 hour, twice a week for the next 6 weeks.     There are risks associated with opioid medications, including dependence, addiction and tolerance. The patient understands and agrees to use these medications only as prescribed. Potential side effects of the medications include, but are not limited to, constipation, drowsiness, addiction, impaired judgment and risk of fatal overdose if not taken as prescribed. The patient was warned against driving while taking sedation medications.  Sharing medications is a felony. At this point in time, the patient is showing no signs of addiction, abuse, diversion or suicidal ideation.     [place] Prescription Drug Monitoring Program report was reviewed and was appropriate      Complete risks and benefits including bleeding, infection, tissue reaction, nerve injury and allergic reaction were discussed. The approach was demonstrated using models and literature was provided. Verbal and written consent was obtained.     My impressions and treatment recommendations were discussed in detail with the patient who verbalized understanding and had no further questions.  Discharge instructions were provided. I personally saw and examined the patient and I agree with the above discussed plan of care.    New Medications Ordered This Visit   Medications    methadone (DOLOPHINE) 5 mg tablet     Sig: PLEASE SEE ATTACHED FOR DETAILED DIRECTIONS    metolazone  (ZAROXOLYN) 2.5 mg tablet     Sig: TAKE 1 TAB BY MOUTH ONCE A DAY ON MONDAY, WEDNESDAY, AND FRIDAY ONLY    naloxone (NARCAN) 4 mg/0.1 mL nasal spray    TiZANidine (ZANAFLEX) 4 MG capsule     Sig: Take 1 capsule by mouth 3 (three) times a day as needed    buPROPion (WELLBUTRIN XL) 150 mg 24 hr tablet     Sig: Take 150 mg by mouth daily    gabapentin (NEURONTIN) 800 mg tablet       History of Present Illness    Symptoms of charcot joint, significant left sided neuropathy, elements of CRPS type 1 with sudomotor, vasomotor changes, decreased ROM, allodynia and hypersensitivity. Color changes, rubor, swelling, significant hypersensitivity in LLE which satisfy [place] criteria. Not a candidate for SCS trial; reportedly with significantly improved pain relief taking gabapentin 800mg qid without side effects; has since tapered methadone given side effect profile. New LLE pain with radicular features in L4 dermatome accompanied by pain limited weakness, numbness and paresthesias. Complaint with home PT. Previously reported the following symptomatology:     David Carpio is a 59 y.o. male with pmhx of afib with pacemaker on xarelto, obesity, DAYAN, DM-2, HTN, depression/anxiety presenting with right-sided neck pain described primarily arthritic features.  Describes progressive neck pain since November.  The patient describes predominantly aching, nagging, indolent crampy, stabbing axial neck pain without radicular features of electric shock-like and shooting pain. He denies any weakness numbness and paresthesias.  The pain is 8/10 nature and is worse with overhead maneuvers as well as lateral rotation and extension of the neck.  The patient has not yet been to physical therapy, and has failed conservative medical management including naproxen 500 mg b.i.d. and gabapentin 300 mg t.i.d. The pain is significant source of disability and compromises independent activities of daily living as well as overall function. The patient  has difficulty with sleep disturbance as well since the pain often wakes him up. Has trialed gabapentin, flexeril and tylenol as well as tramadol with limited benefit but has never trialed cervical epidural steroid injections or medial branch blocks.  He denies any bowel or bladder issues/incontinence, gait instability.    I have personally reviewed and/or updated the patient's past medical history, past surgical history, family history, social history, current medications, allergies, and vital signs today.     Review of Systems   Constitutional:  Positive for activity change.   HENT: Negative.     Eyes: Negative.    Respiratory: Negative.     Cardiovascular: Negative.    Gastrointestinal: Negative.    Endocrine: Negative.    Genitourinary: Negative.    Musculoskeletal:  Positive for arthralgias, myalgias, neck pain and neck stiffness.   Skin: Negative.    Allergic/Immunologic: Negative.    Neurological:  Negative for weakness and numbness.   Hematological: Negative.    Psychiatric/Behavioral: Negative.     All other systems reviewed and are negative.      Patient Active Problem List   Diagnosis    DAYAN (obstructive sleep apnea)    Chronic diastolic heart failure ([facility])    Hypertension    Diabetes mellitus ([facility])    Morbid obesity with BMI of 40.0-44.9, adult ([facility])    Pain, joint, ankle and foot, left    Chronic osteomyelitis of left foot with draining sinus ([facility])    Atrial fibrillation ([facility])    Anxiety    Type 2 diabetes mellitus with diabetic polyneuropathy, without long-term current use of insulin ([facility])    Toe osteomyelitis, right ([facility])    Cervical spondylosis    Cervicalgia - Right    Diabetic infection of left foot ([facility])    Pacemaker    History of bariatric surgery    Encounter for perioperative consultation    Hyperkalemia    Diabetic ulcer of left midfoot associated with type 2 diabetes mellitus ([facility])    Cellulitis of left lower extremity    Closed fracture of shaft of metatarsal bone of left foot    Moderate  benzodiazepine use disorder (HCC)    Microcytic anemia    Other constipation    Status post partial amputation of foot, left (HCC)    Moderate protein-calorie malnutrition (HCC)    Left foot pain    Charcot arthropathy of midfoot    Cervical strain    Cervical radiculopathy       Past Medical History:   Diagnosis Date    Atrial fibrillation (HCC)     Benzodiazepine withdrawal with complication (HCC) 06/15/2023    Chronic diastolic (congestive) heart failure (HCC)     Diabetes mellitus (HCC)     GERD (gastroesophageal reflux disease)     High cholesterol     Hyperlipidemia     Pacemaker     Stroke (HCC)        Past Surgical History:   Procedure Laterality Date    APPENDECTOMY      ATRIAL CARDIAC PACEMAKER INSERTION      BARIATRIC SURGERY  05/2021    BONE BIOPSY Left 7/26/2023    Procedure: EXCISION BIOPSY BONE LESION EXTREMITY;  Surgeon: Adelia Yousif DPM;  [facility];  Service: Podiatry    EPIDURAL BLOCK INJECTION N/A 5/19/2022    Procedure: BLOCK / INJECTION EPIDURAL STEROID CERVICAL C7-T1;  Surgeon: Skyler Quinn MD;  [facility]: [facility];  Service: Pain Management     FL GUIDED NEEDLE PLAC BX/ASP/INJ  3/22/2022    FOOT AMPUTATION Left 4/28/2022    Procedure: LEFT TRANSMETATARSAL AMPUTATION.;  Surgeon: Nestor Madden DPM;  [facility];  Service: Podiatry    NERVE BLOCK Right 2/10/2022    Procedure: BLOCK MEDIAL BRANCH C3, C4, C5 #1;  Surgeon: Skyler Quinn MD;  [facility];  Service: Pain Management     NERVE BLOCK Right 3/22/2022    Procedure: BLOCK MEDIAL BRANCH C3, C4, C5 #2;  Surgeon: Skyler Quinn MD;  [facility];  Service: Pain Management     ND AMPUTATION FOOT TRANSMETARSAL Left 4/12/2023    Procedure: REVISION LEFT TRANSMETATARSAL (TMA) AMPUTATION, REMOVAL OF UNVIABLE TISSUE AND BONE,;  Surgeon: Adelia Yousif DPM;  [facility];  Service: Podiatry    ND AMPUTATION METATARSAL W/TOE SINGLE Left 4/7/2023    Procedure: 2ND RAY RESECTION FOOT;   Surgeon: Adelia Yousif DPM;  [facility];  Service: Podiatry    OH AMPUTATION TOE INTERPHALANGEAL JOINT Left 11/16/2021    Procedure: AMPUTATION LESSER TOE;  Surgeon: Nestor Madden DPM;  [facility];  Service: Podiatry    RADIOFREQUENCY ABLATION Right 4/7/2022    Procedure: Right C3, C4, C5 RFA;  Surgeon: Skyler Quinn MD;  [facility];  Service: Pain Management     RHIZOTOMY Right 2/9/2023    Procedure: RHIZOTOMY CERVICAL MEDIAL BRANCH NERVES RIGHT C3, C4, C5;  Surgeon: Skyler Quinn MD;  [facility];  Service: Pain Management     TOE AMPUTATION Left     2nd toe    TOE AMPUTATION Left 9/15/2021    Procedure: AMPUTATION LEFT 4TH TOE;  Surgeon: Nestor Madden DPM;  [facility];  Service: Podiatry    TOE AMPUTATION Right 1/12/2022    Procedure: AMPUTATION TOE;  Surgeon: Hong Jolly DPM;  [facility];  Service: Podiatry    TOE AMPUTATION Right 2/23/2022    Procedure: AMPUTATION TOE  RIGHT SECOND;  Surgeon: Hong Jolly DPM;  Location[facility];  Service: Podiatry    TOE AMPUTATION Right 6/3/2022    Procedure: AMPUTATION right 4th TOE;  Surgeon: Nestor Madden DPM;  [facility];  Service: Podiatry       Family History   Problem Relation Age of Onset    No Known Problems Mother     No Known Problems Father        Social History     Occupational History    Occupation: Maintenance Tech     Employer: [employer]   Tobacco Use    Smoking status: Never     Passive exposure: Never    Smokeless tobacco: Never   Vaping Use    Vaping status: Never Used   Substance and Sexual Activity    Alcohol use: Never    Drug use: Never    Sexual activity: Yes       Current Outpatient Medications on File Prior to Visit   Medication Sig    buPROPion (WELLBUTRIN XL) 150 mg 24 hr tablet Take 150 mg by mouth daily    buPROPion (Wellbutrin XL) 300 mg 24 hr tablet Take 1 tablet (300 mg total) by mouth daily    busPIRone (BUSPAR) 15 mg tablet Take 1 tablet  "(15 mg total) by mouth 3 (three) times a day    calcium citrate-vitamin D 315 mg-5 mcg tablet Take 2 tablets by mouth 2 (two) times a day    doxepin (SINEquan) 10 mg capsule Take 1 capsule (10 mg total) by mouth in the morning AND 3 capsules (30 mg total) daily at bedtime.    ferrous sulfate 325 (65 Fe) mg tablet Take 1 tablet (325 mg total) by mouth daily with breakfast Do not start before June 19, 2023.    furosemide (LASIX) 40 mg tablet Take 40 mg by mouth every morning    gabapentin (NEURONTIN) 800 mg tablet     lidocaine (Lidoderm) 5 % Apply 1 patch topically over 12 hours daily for 15 days Remove & Discard patch within 12 hours or as directed by MD    methadone (DOLOPHINE) 5 mg tablet PLEASE SEE ATTACHED FOR DETAILED DIRECTIONS    metolazone (ZAROXOLYN) 2.5 mg tablet TAKE 1 TAB BY MOUTH ONCE A DAY ON MONDAY, WEDNESDAY, AND FRIDAY ONLY    Multiple Vitamins-Minerals (Mens Multivitamin) TABS Take 1 tablet by mouth 2 (two) times a day    naloxone (NARCAN) 4 mg/0.1 mL nasal spray     oxyCODONE-acetaminophen (Percocet) 5-325 mg per tablet Take 1 tablet by mouth every 8 (eight) hours as needed for moderate pain for up to 10 days Max Daily Amount: 3 tablets    pantoprazole (PROTONIX) 40 mg tablet Take 40 mg by mouth daily    potassium chloride (KLOR-CON) 20 mEq packet Take 20 mEq by mouth 2 (two) times a day    TiZANidine (ZANAFLEX) 4 MG capsule Take 1 capsule by mouth 3 (three) times a day as needed    Xarelto 20 MG tablet     predniSONE 10 mg tablet 4 po for 2 days, tthen 3x2 2x2 and 1x2 (Patient not taking: Reported on 8/14/2024)     No current facility-administered medications on file prior to visit.       Allergies   Allergen Reactions    Ativan [Lorazepam] Anxiety         Physical Exam    /88 (BP Location: Left arm, Patient Position: Sitting, Cuff Size: Adult)   Pulse 98   Temp 97.6 °F (36.4 °C)   Resp 18   Ht 6' 1\" (1.854 m)   Wt 134 kg (295 lb)   SpO2 96%   BMI 38.92 kg/m²     Constitutional: " normal, well developed, well nourished, alert, in no distress and non-toxic and no overt pain behavior. and obese  Eyes: anicteric  HEENT: grossly intact  Neck: supple, symmetric, trachea midline and no masses   Pulmonary:even and unlabored  Cardiovascular:No edema or pitting edema present  Skin:Normal without rashes or lesions and well hydrated  Psychiatric:Mood and affect appropriate  Neurologic:Cranial Nerves II-XII grossly intact Sensation grossly intact; no clonus negative morton's. Reflexes 2+ and brisk. Spurling's maneuver negative bilaterally.  Musculoskeletal:normal gait. 5/5 strength bilaterally with AROM in upper extremities.  Significant pain with cervical and lumbar facet loading bilaterally and with lateral spine rotation.  TTP over cervical and lumbar paraspinal muscles. Negative epifanio's test, negative gaenslen's negative SIJ loading bilaterally.    Imaging    Multilevel spondyloarthropathy/degenerative changes seen throughout cervical spine x-ray

## 2024-08-14 NOTE — TELEPHONE ENCOUNTER
Caller: Maribell    Doctor: Kai    Reason for call: all visits regarding his foot need to be faxed over to Disability   With claim # [id number]    I have faxed over office dated 8/14 to #[phone]    Done     Call back#: [phone]

## 2024-08-14 NOTE — PATIENT INSTRUCTIONS
Patient Education     Neck Pain Exercises   About this topic   The neck or cervical spine has 7 spinal bones that run from the base of your skull to the upper back. These spinal bones have discs in between them. Discs act as shock absorbers. Ligaments are strong bands of tissue that hold the bones together. Many muscles surround and attach on these bones. Nerves come off of the spinal cord and exit out of small spaces in between the spinal bones. You can have neck pain if any of these are injured or damaged. Exercises may help to make this problem better.  General   Before starting with a program, ask your doctor if you are healthy enough to do these exercises. Your doctor may have you work with a  or physical therapist to make a safe exercise program to meet your needs. You should not do the exercises if they cause sharp pains, if you feel dizzy, or if you have vision changes.  Stretching Exercises   Stretching exercises keep your muscles flexible. They also stop them from getting tight. Start by doing each of these stretches 2 to 3 times. In order for your body to make changes, you will need to hold these stretches for 20 to 30 seconds. Try to do the stretches 2 to 3 times each day. Do all exercises slowly.  Passive neck stretches:  Put your left hand on top of your head. Your other arm can be at your side or behind your back. Pull your head toward your left shoulder until you feel a gentle stretch on the right side of your neck. Repeat on the other side using your other hand.  Also, try this stretch by pulling in a diagonal direction. With your left hand on top of your head, pull your head down towards the direction of your left knee. You should feel this stretch toward the back on the right side of your neck. Repeat on the other side.  Active neck stretches:  Neck front-to-back motion ? Look down to the floor until you feel a stretch in the back of your neck. Hold. Next, look up to the ceiling until you  feel a stretch in the front of your neck. Hold.  Neck side-to-side motion ? Tilt your head to the side and bring your ear to your shoulder until you feel a stretch on the other side of your neck. Hold. Next, tilt your head to the other side until you feel a stretch. Hold.  Neck turning ? Turn only your head and look over your left shoulder until you feel stretching in the right side of your neck. Hold. Now turn only your head and look over your right shoulder until you feel a stretch in the left side of your neck. Hold.  [place] stretches ? Grasp your head with the hand opposite the side you want to stretch. Pull your head to the side until you feel a stretch. Now, slowly turn your head so your chin is pointed upwards.  Chin tucks ? Stand straight or lie down on your back. Tuck your chin in and lengthen the back of your neck. Return to the starting position and repeat. It may help to stand up against a wall during this exercise. Try gently pushing your chin with two fingers while trying to flatten your neck against the wall. If you do this exercise lying down, try using a small rolled up washcloth under your neck. Push down into the washcloth when tucking in your chin.  Strengthening Exercises   Strengthening exercises keep your muscles firm and strong. Start by repeating each exercise 2 to 3 times. Work up to doing each exercise 10 times. Try to do the exercises 2 to 3 times each day. Hold each exercise for 3 to 5 seconds. Do all exercises slowly.  Shoulder blade squeezes ? Pinch your shoulder blades together on your upper back and hold 3 to 5 seconds. Relax.             What will the results be?   Less pain and stiffness  Better range of motion  Increased strength  Help you heal faster after an injury or surgery  Increase blood flow to a body part  Help you feel better and more relaxed  Give you more energy  More toned looking muscles  Better posture  Easier to do daily activities  Helpful tips   Stay active and  work out to keep your muscles strong and flexible.  Be sure you do not hold your breath when exercising. This can raise your blood pressure. If you tend to hold your breath, try counting out loud when exercising. If any exercise bothers you, stop right away.  Try swinging your arms at an easy pace for a few minutes to warm up your muscles. Do this again after exercising.  Doing exercises before a meal may be a good way to get into a routine.  Exercise may be slightly uncomfortable, but you should not have sharp pains. If you do get sharp pains, stop what you are doing. If the sharp pains continue, call your doctor.  Last Reviewed Date   2020-03-10  Consumer Information Use and Disclaimer   This generalized information is a limited summary of diagnosis, treatment, and/or medication information. It is not meant to be comprehensive and should be used as a tool to help the user understand and/or assess potential diagnostic and treatment options. It does NOT include all information about conditions, treatments, medications, side effects, or risks that may apply to a specific patient. It is not intended to be medical advice or a substitute for the medical advice, diagnosis, or treatment of a health care provider based on the health care provider's examination and assessment of a patient’s specific and unique circumstances. Patients must speak with a health care provider for complete information about their health, medical questions, and treatment options, including any risks or benefits regarding use of medications. This information does not endorse any treatments or medications as safe, effective, or approved for treating a specific patient. [employer] and its affiliates disclaim any warranty or liability relating to this information or the use thereof. The use of this information is governed by the Terms of Use, available at https://www.[employer]Epoch Entertainmentuwer.com/en/know/clinical-effectiveness-terms   Copyright   Copyright ©  2024 [employer] and its affiliates and/or licensors. All rights reserved.

## 2024-08-16 ENCOUNTER — HOSPITAL ENCOUNTER (OUTPATIENT)
Dept: RADIOLOGY | Facility: CLINIC | Age: 59
End: 2024-08-16
Payer: COMMERCIAL

## 2024-08-16 ENCOUNTER — OFFICE VISIT (OUTPATIENT)
Facility: CLINIC | Age: 59
End: 2024-08-16
Payer: COMMERCIAL

## 2024-08-16 VITALS
TEMPERATURE: 97.1 F | HEART RATE: 79 BPM | SYSTOLIC BLOOD PRESSURE: 127 MMHG | RESPIRATION RATE: 18 BRPM | DIASTOLIC BLOOD PRESSURE: 68 MMHG

## 2024-08-16 DIAGNOSIS — Z89.432 STATUS POST PARTIAL AMPUTATION OF FOOT, LEFT (HCC): ICD-10-CM

## 2024-08-16 DIAGNOSIS — E11.621 DIABETIC ULCER OF TOE OF RIGHT FOOT ASSOCIATED WITH TYPE 2 DIABETES MELLITUS, WITH FAT LAYER EXPOSED (HCC): ICD-10-CM

## 2024-08-16 DIAGNOSIS — E11.42 TYPE 2 DIABETES MELLITUS WITH DIABETIC POLYNEUROPATHY, WITH LONG-TERM CURRENT USE OF INSULIN (HCC): ICD-10-CM

## 2024-08-16 DIAGNOSIS — L97.512 DIABETIC ULCER OF TOE OF RIGHT FOOT ASSOCIATED WITH TYPE 2 DIABETES MELLITUS, WITH FAT LAYER EXPOSED (HCC): Primary | ICD-10-CM

## 2024-08-16 DIAGNOSIS — Z79.4 TYPE 2 DIABETES MELLITUS WITH DIABETIC POLYNEUROPATHY, WITH LONG-TERM CURRENT USE OF INSULIN (HCC): ICD-10-CM

## 2024-08-16 DIAGNOSIS — E11.621 DIABETIC ULCER OF TOE OF RIGHT FOOT ASSOCIATED WITH TYPE 2 DIABETES MELLITUS, WITH FAT LAYER EXPOSED (HCC): Primary | ICD-10-CM

## 2024-08-16 DIAGNOSIS — L97.512 DIABETIC ULCER OF TOE OF RIGHT FOOT ASSOCIATED WITH TYPE 2 DIABETES MELLITUS, WITH FAT LAYER EXPOSED (HCC): ICD-10-CM

## 2024-08-16 PROCEDURE — 11042 DBRDMT SUBQ TIS 1ST 20SQCM/<: CPT | Performed by: STUDENT IN AN ORGANIZED HEALTH CARE EDUCATION/TRAINING PROGRAM

## 2024-08-16 PROCEDURE — 73630 X-RAY EXAM OF FOOT: CPT

## 2024-08-16 PROCEDURE — 99214 OFFICE O/P EST MOD 30 MIN: CPT | Performed by: STUDENT IN AN ORGANIZED HEALTH CARE EDUCATION/TRAINING PROGRAM

## 2024-08-16 PROCEDURE — 73660 X-RAY EXAM OF TOE(S): CPT

## 2024-08-16 RX ORDER — LIDOCAINE 40 MG/G
CREAM TOPICAL ONCE
Status: COMPLETED | OUTPATIENT
Start: 2024-08-16 | End: 2024-08-16

## 2024-08-16 RX ADMIN — LIDOCAINE 1 APPLICATION: 40 CREAM TOPICAL at 14:14

## 2024-08-16 NOTE — PROGRESS NOTES
Patient ID: David Carpio is a 59 y.o. male Date of Birth 1965       Chief Complaint   Patient presents with    Follow Up Wound Care Visit     Right foot wound       Allergies:  Ativan [lorazepam]    Diagnosis:  1. Diabetic ulcer of toe of right foot associated with type 2 diabetes mellitus, with fat layer exposed ([facility])  -     lidocaine (LMX) 4 % cream  -     XR toe right second min 2 views; Future; Expected date: 08/16/2024  -     Sedimentation rate, automated; Future  -     C-reactive protein; Future  -     Wound cleansing and dressings Anterior;Right;Plantar Toe D2, second; Future  2. Type 2 diabetes mellitus with diabetic polyneuropathy, with long-term current use of insulin ([facility])  3. Status post partial amputation of foot, left ([facility])  -     XR foot 3+ vw left; Future; Expected date: 08/16/2024     Diagnosis ICD-10-CM Associated Orders   1. Diabetic ulcer of toe of right foot associated with type 2 diabetes mellitus, with fat layer exposed ([facility])  E11.621 lidocaine (LMX) 4 % cream    L97.512 XR toe right second min 2 views     Sedimentation rate, automated     C-reactive protein     Wound cleansing and dressings Anterior;Right;Plantar Toe D2, second      2. Type 2 diabetes mellitus with diabetic polyneuropathy, with long-term current use of insulin ([facility])  E11.42     Z79.4       3. Status post partial amputation of foot, left ([facility])  Z89.432 XR foot 3+ vw left           Assessment & Plan:  See wound orders.  (Dermagran, betadine periwound)  - R 2nd toe DFU appears with fibrogranular base, periwound callusing due to continued overload. No bone exposed. No SOI. Surgical debridement as below.  - Bedside ABIs 0.96 B/L.   - offload in surgical shoe with offloading felt pad but limit ambulation at all times which I again stressed today. I modified the shoe more to reduce pressure  - XR right 2nd toe ordered. ESR, CRP as well.   - Left foot XR ordered as well for updated imaging  - F/u 2wks; call if acute SOI  arise    Subjective:   Cy presents to wound care today concerning toe ulcer. Has surgical shoe on today and trying to wear with WB but has been walking quite a bit still. Went in the pool last week. Diabetic with PN. H/o L TMA.          The following portions of the patient's history were reviewed and updated as appropriate:   Patient Active Problem List   Diagnosis    DAYAN (obstructive sleep apnea)    Chronic diastolic heart failure ([facility])    Hypertension    Diabetes mellitus ([facility])    Morbid obesity with BMI of 40.0-44.9, adult ([facility])    Pain, joint, ankle and foot, left    Chronic osteomyelitis of left foot with draining sinus ([facility])    Atrial fibrillation ([facility])    Anxiety    Type 2 diabetes mellitus with diabetic polyneuropathy, without long-term current use of insulin ([facility])    Toe osteomyelitis, right ([facility])    Cervical spondylosis    Cervicalgia - Right    Diabetic infection of left foot ([facility])    Pacemaker    History of bariatric surgery    Encounter for perioperative consultation    Hyperkalemia    Diabetic ulcer of left midfoot associated with type 2 diabetes mellitus ([facility])    Cellulitis of left lower extremity    Closed fracture of shaft of metatarsal bone of left foot    Moderate benzodiazepine use disorder ([facility])    Microcytic anemia    Other constipation    Status post partial amputation of foot, left ([facility])    Moderate protein-calorie malnutrition ([facility])    Left foot pain    Charcot arthropathy of midfoot    Cervical strain    Cervical radiculopathy     Past Medical History:   Diagnosis Date    Atrial fibrillation ([facility])     Benzodiazepine withdrawal with complication ([facility]) 06/15/2023    Chronic diastolic (congestive) heart failure ([facility])     Diabetes mellitus ([facility])     GERD (gastroesophageal reflux disease)     High cholesterol     Hyperlipidemia     Pacemaker     Stroke ([facility])      Past Surgical History:   Procedure Laterality Date    APPENDECTOMY      ATRIAL CARDIAC PACEMAKER INSERTION      BARIATRIC SURGERY   05/2021    BONE BIOPSY Left 7/26/2023    Procedure: EXCISION BIOPSY BONE LESION EXTREMITY;  Surgeon: Adelia Yousif DPM;  [facility];  Service: Podiatry    EPIDURAL BLOCK INJECTION N/A 5/19/2022    Procedure: BLOCK / INJECTION EPIDURAL STEROID CERVICAL C7-T1;  Surgeon: Skyler Quinn MD;  [facility]: [facility];  Service: Pain Management     FL GUIDED NEEDLE PLAC BX/ASP/INJ  3/22/2022    FOOT AMPUTATION Left 4/28/2022    Procedure: LEFT TRANSMETATARSAL AMPUTATION.;  Surgeon: Nestor Madden DPM;  [facility];  Service: Podiatry    NERVE BLOCK Right 2/10/2022    Procedure: BLOCK MEDIAL BRANCH C3, C4, C5 #1;  Surgeon: Skyler Quinn MD;  [facility];  Service: Pain Management     NERVE BLOCK Right 3/22/2022    Procedure: BLOCK MEDIAL BRANCH C3, C4, C5 #2;  Surgeon: Skyler Quinn MD;  [facility];  Service: Pain Management     MO AMPUTATION FOOT TRANSMETARSAL Left 4/12/2023    Procedure: REVISION LEFT TRANSMETATARSAL (TMA) AMPUTATION, REMOVAL OF UNVIABLE TISSUE AND BONE,;  Surgeon: Adelia Yousif DPM;  [facility];  Service: Podiatry    MO AMPUTATION METATARSAL W/TOE SINGLE Left 4/7/2023    Procedure: 2ND RAY RESECTION FOOT;  Surgeon: Adelia Yousif DPM;  [facility];  Service: Podiatry    MO AMPUTATION TOE INTERPHALANGEAL JOINT Left 11/16/2021    Procedure: AMPUTATION LESSER TOE;  Surgeon: Nestor Madden DPM;  [facility];  Service: Podiatry    RADIOFREQUENCY ABLATION Right 4/7/2022    Procedure: Right C3, C4, C5 RFA;  Surgeon: Skyler Quinn MD;  [facility];  Service: Pain Management     RHIZOTOMY Right 2/9/2023    Procedure: RHIZOTOMY CERVICAL MEDIAL BRANCH NERVES RIGHT C3, C4, C5;  Surgeon: Skyler Quinn MD;  [facility];  Service: Pain Management     TOE AMPUTATION Left     2nd toe    TOE AMPUTATION Left 9/15/2021    Procedure: AMPUTATION LEFT 4TH TOE;  Surgeon: Nestor Madden DPM;  [facility];  Service: Podiatry     TOE AMPUTATION Right 1/12/2022    Procedure: AMPUTATION TOE;  Surgeon: Hong Jolly DPM;  [facility];  Service: Podiatry    TOE AMPUTATION Right 2/23/2022    Procedure: AMPUTATION TOE  RIGHT SECOND;  Surgeon: Hong Jolly DPM;  Location:  MAIN OR;  Service: Podiatry    TOE AMPUTATION Right 6/3/2022    Procedure: AMPUTATION right 4th TOE;  Surgeon: Nestor Madden DPM;  [facility];  Service: Podiatry     Social History     Socioeconomic History    Marital status: /Civil Union     Spouse name: Not on file    Number of children: Not on file    Years of education: Not on file    Highest education level: Not on file   Occupational History    Occupation: Maintenance Tech     Employer: [employer]   Tobacco Use    Smoking status: Never     Passive exposure: Never    Smokeless tobacco: Never   Vaping Use    Vaping status: Never Used   Substance and Sexual Activity    Alcohol use: Never    Drug use: Never    Sexual activity: Yes   Other Topics Concern    Not on file   Social History Narrative    Not on file     Social Determinants of Health     Financial Resource Strain: Low Risk  (10/19/2023)    [facility]    Overall Financial Resource Strain (CARDIA)     Difficulty of Paying Living Expenses: Not hard at all   Food Insecurity: No Food Insecurity (10/19/2023)    [facility], [facility]    Hunger Vital Sign     Worried About Running Out of Food in the Last Year: Never true     Ran Out of Food in the Last Year: Never true   Transportation Needs: No Transportation Needs (10/19/2023)    [facility] from [facility], [facility]    PRAPARE - Transportation     Lack of Transportation (Medical): No     Lack of Transportation (Non-Medical): No   Physical Activity: Sufficiently Active (10/19/2023)    Received from [facility]     Exercise Vital Sign     Days of Exercise per Week: 7 days     Minutes of Exercise per Session: 60 min   Stress: Stress Concern Present (10/19/2023)    [facility]    [employer] - Occupational Stress Questionnaire     Feeling of Stress : Very much   Social Connections: Unknown (6/18/2024)    Received from [facility]     How often do you feel lonely or isolated from those around you? (Adult - for ages 18 years and over): Not on file   Intimate Partner Violence: Not At Risk (10/19/2023)    [facility]    Humiliation, Afraid, Rape, and Kick questionnaire     Fear of Current or Ex-Partner: No     Emotionally Abused: No     Physically Abused: No     Sexually Abused: No   Housing Stability: Low Risk  (10/19/2023)    [facility]    Housing Stability Vital Sign     Unable to Pay for Housing in the Last Year: No     Number of Places Lived in the Last Year: 1     Unstable Housing in the Last Year: No        Current Outpatient Medications:     buPROPion (WELLBUTRIN XL) 150 mg 24 hr tablet, Take 150 mg by mouth daily, Disp: , Rfl:     buPROPion (Wellbutrin XL) 300 mg 24 hr tablet, Take 1 tablet (300 mg total) by mouth daily, Disp: 30 tablet, Rfl: 2    busPIRone (BUSPAR) 15 mg tablet, Take 1 tablet (15 mg total) by mouth 3 (three) times a day, Disp: 90 tablet, Rfl: 2    calcium citrate-vitamin D 315 mg-5 mcg tablet, Take 2 tablets by mouth 2 (two) times a day, Disp: , Rfl:     doxepin (SINEquan) 10 mg capsule, Take 1 capsule (10 mg total) by mouth in the morning AND 3 capsules (30 mg total) daily at bedtime., Disp: 120 capsule, Rfl: 2    ferrous sulfate 325 (65 Fe) mg tablet, Take 1 tablet (325 mg total) by mouth daily with breakfast Do not start before June 19, 2023., Disp: 30 tablet, Rfl: 0     furosemide (LASIX) 40 mg tablet, Take 40 mg by mouth every morning, Disp: , Rfl:     gabapentin (NEURONTIN) 800 mg tablet, Take 1 tablet (800 mg total) by mouth 4 (four) times a day, Disp: 360 tablet, Rfl: 3    lidocaine (Lidoderm) 5 %, Apply 1 patch topically over 12 hours daily for 15 days Remove & Discard patch within 12 hours or as directed by MD, Disp: 15 patch, Rfl: 0    metolazone (ZAROXOLYN) 2.5 mg tablet, TAKE 1 TAB BY MOUTH ONCE A DAY ON MONDAY, WEDNESDAY, AND FRIDAY ONLY, Disp: , Rfl:     Multiple Vitamins-Minerals (Mens Multivitamin) TABS, Take 1 tablet by mouth 2 (two) times a day, Disp: , Rfl:     naloxone (NARCAN) 4 mg/0.1 mL nasal spray, , Disp: , Rfl:     oxyCODONE-acetaminophen (Percocet) 5-325 mg per tablet, Take 1 tablet by mouth every 8 (eight) hours as needed for moderate pain for up to 10 days Max Daily Amount: 3 tablets, Disp: 12 tablet, Rfl: 0    pantoprazole (PROTONIX) 40 mg tablet, Take 40 mg by mouth daily, Disp: , Rfl:     potassium chloride (KLOR-CON) 20 mEq packet, Take 20 mEq by mouth 2 (two) times a day, Disp: , Rfl:     predniSONE 10 mg tablet, 4 po for 2 days, tthen 3x2 2x2 and 1x2 (Patient not taking: Reported on 8/14/2024), Disp: 20 tablet, Rfl: 0    TiZANidine (ZANAFLEX) 4 MG capsule, Take 1 capsule by mouth 3 (three) times a day as needed, Disp: , Rfl:     Xarelto 20 MG tablet, , Disp: , Rfl:   No current facility-administered medications for this visit.  Family History   Problem Relation Age of Onset    No Known Problems Mother     No Known Problems Father       Review of Systems   Constitutional:  Negative for activity change, chills and fever.   HENT: Negative.     Respiratory:  Negative for cough, chest tightness and shortness of breath.    Cardiovascular:  Positive for leg swelling (Chronic B/L). Negative for chest pain.   Endocrine: Negative.    Genitourinary: Negative.    Musculoskeletal:  Negative for gait problem.   Skin:  Positive for wound.   Neurological:         PN    Psychiatric/Behavioral: Negative.  Negative for agitation and behavioral problems.          Objective:  /68   Pulse 79   Temp (!) 97.1 °F (36.2 °C)   Resp 18     Physical Exam  Constitutional:       Appearance: Normal appearance. He is not ill-appearing.      Comments: Anxious   Cardiovascular:      Comments: Chronic venous stasis dermatitis with B/L LE brawny edema. Diminished pedal pulses due to edema. Absent pedal hair.   Pulmonary:      Effort: No respiratory distress.   Musculoskeletal:         General: No tenderness or deformity. Normal range of motion.      Comments: S/p left TMA    Skin:     Capillary Refill: Capillary refill takes less than 2 seconds.      Comments: B/L LE skin is atrophic - thin, dry and shiny in appearance.     Right plantar 2nd toe stump appears with full thickness fibrous ulceration with significant periwound callus. No probe or exposed bone. No purulence or SOI.     RLE with pretibial superficial venous stasis ulcer has healed    Neurological:      General: No focal deficit present.      Mental Status: He is alert and oriented to person, place, and time.      Comments: N/T/B to B/L LE   Psychiatric:         Mood and Affect: Mood normal.         Behavior: Behavior normal.             Wound 07/12/24 Toe D2, second Anterior;Right;Plantar (Active)   Wound Image   08/16/24 1408   Wound Description Pink;Yellow;Pale 08/16/24 1411   Katy-wound Assessment Dry;Callus 08/16/24 1411   Wound Length (cm) 0.7 cm 08/16/24 1411   Wound Width (cm) 0.6 cm 08/16/24 1411   Wound Depth (cm) 0.4 cm 08/16/24 1411   Wound Surface Area (cm^2) 0.42 cm^2 08/16/24 1411   Wound Volume (cm^3) 0.168 cm^3 08/16/24 1411   Calculated Wound Volume (cm^3) 0.17 cm^3 08/16/24 1411   Change in Wound Size % -30.77 08/16/24 1411   Number of underminings 1 07/12/24 1442   Undermining 1 0.2 08/02/24 1359   Undermining 1 is depth extending from 12- 2 oclock deepest at 2 oclock 08/02/24 1359   Drainage Amount Small  "08/16/24 1411   Drainage Description Serosanguineous 08/16/24 1411   Non-staged Wound Description Full thickness 08/16/24 1411   Treatments Cleansed 07/19/24 1431                         Debridement   Wound 07/12/24 Toe D2, second Anterior;Right;Plantar    Universal Protocol:  procedure performed by consultantConsent: Verbal consent obtained.  Risks and benefits: risks, benefits and alternatives were discussed  Consent given by: patient  Time out: Immediately prior to procedure a \"time out\" was called to verify the correct patient, procedure, equipment, support staff and site/side marked as required.  Patient understanding: patient states understanding of the procedure being performed  Patient consent: the patient's understanding of the procedure matches consent given  Patient identity confirmed: verbally with patient    Debridement Details  Performed by: physician  Debridement type: surgical  Level of debridement: subcutaneous tissue      Post-debridement measurements  Length (cm): 0.7  Width (cm): 0.7  Depth (cm): 0.4  Percent debrided: 100%  Surface Area (cm^2): 0.49  Area Debrided (cm^2): 0.49  Volume (cm^3): 0.2    Tissue and other material debrided: adipose and subcutaneous tissue  Devitalized tissue debrided: biofilm, callus and slough  Instrument(s) utilized: blade  Technique utilized: nonexcisional and excisionalBleeding: small  Hemostasis obtained with: pressure  Procedural pain (0-10): insensate  Post-procedural pain: insensate   Response to treatment: procedure was tolerated well                 Wound Instructions:  Orders Placed This Encounter   Procedures    Wound cleansing and dressings Anterior;Right;Plantar Toe D2, second     Wound cleansing and dressings Anterior;Right;Plantar Toe D2, second       Wash your hands with soap and water.  Remove old dressing, discard into plastic bag and place in trash.  Cleanse the wound with saline prior to applying a clean dressing. Do not use tissue or cotton " "balls. Do not scrub the wound. Pat dry using gauze.     Shower no      Apply betadine to the felipe wound  Apply dermagran to the right second toe wound.  Cover with gauze  Secure with roll gauze  Change dressing every other day      Wear surgical shoe at all times. Do not walk without surgical shoe on at all. Never walk barefoot.  Use knee scooter!!!  Keep blood sugars under control  Stay off foot as much as possible       Left and Right Leg  Elastic Tubular Stocking Spandagrip F      Tubular elastic bandage: Apply from base of toes to behind the knee. Apply in AM, may remove for sleep.  Avoid prolonged standing in one place.  Elevate leg(s) above the level of the heart when sitting or as much as possible.      Obtain blood work prior to next visit   Obtain x ray prior next visit     Standing Status:   Future     Standing Expiration Date:   8/23/2024    Debridement     This order was created via procedure documentation    XR toe right second min 2 views     Standing Status:   Future     Standing Expiration Date:   8/16/2028     Scheduling Instructions:      Bring along any outside films relating to this procedure.          XR foot 3+ vw left     WB     Standing Status:   Future     Standing Expiration Date:   8/16/2028     Scheduling Instructions:      Bring along any outside films relating to this procedure.          Sedimentation rate, automated     Standing Status:   Future     Standing Expiration Date:   8/16/2025    C-reactive protein     Standing Status:   Future     Standing Expiration Date:   8/16/2025         Adelia Yousif DPM      Portions of the record may have been created with voice recognition software. Occasional wrong word or \"sound a like\" substitutions may have occurred due to the inherent limitations of voice recognition software. Read the chart carefully and recognize, using context, where substitutions have occurred.    "

## 2024-08-16 NOTE — PROGRESS NOTES
Wound Procedure Treatment Anterior;Right;Plantar Toe D2, second    Performed by: Yamileth Ghosh RN  Authorized by: Adelia Yousif DPM    Associated wounds:   Wound 07/12/24 Toe D2, second Anterior;Right;Plantar  Applied Topical: Betadine    Applied primary dressing:  Dermagran  Applied secondary dressing:  Gauze  Dressing secured with:  Vasquez, Tape, Elastic tubular stocking and Size F  Offloading device appllied:  Surgical shoe and Foam padding

## 2024-08-16 NOTE — PATIENT INSTRUCTIONS
Orders Placed This Encounter   Procedures    Wound cleansing and dressings Anterior;Right;Plantar Toe D2, second     Wound cleansing and dressings Anterior;Right;Plantar Toe D2, second       Wash your hands with soap and water.  Remove old dressing, discard into plastic bag and place in trash.  Cleanse the wound with saline prior to applying a clean dressing. Do not use tissue or cotton balls. Do not scrub the wound. Pat dry using gauze.     Shower no      Apply betadine to the felipe wound  Apply dermagran to the right second toe wound.  Cover with gauze  Secure with roll gauze  Change dressing every other day      Wear surgical shoe at all times. Do not walk without surgical shoe on at all. Never walk barefoot.  Use knee scooter!!!  Keep blood sugars under control  Stay off foot as much as possible       Left and Right Leg  Elastic Tubular Stocking Spandagrip F      Tubular elastic bandage: Apply from base of toes to behind the knee. Apply in AM, may remove for sleep.  Avoid prolonged standing in one place.  Elevate leg(s) above the level of the heart when sitting or as much as possible.      Obtain blood work prior to next visit   Obtain x ray prior next visit     Standing Status:   Future     Standing Expiration Date:   8/23/2024    XR toe right second min 2 views     Standing Status:   Future     Standing Expiration Date:   8/16/2028     Scheduling Instructions:      Bring along any outside films relating to this procedure.          Sedimentation rate, automated     Standing Status:   Future     Standing Expiration Date:   8/16/2025    C-reactive protein     Standing Status:   Future     Standing Expiration Date:   8/16/2025

## 2024-08-21 ENCOUNTER — TELEPHONE (OUTPATIENT)
Dept: RADIOLOGY | Facility: CLINIC | Age: 59
End: 2024-08-21

## 2024-08-21 NOTE — TELEPHONE ENCOUNTER
Josette BENAVIDES  [facility]  The prior authorization request for this study has not been approved. You can schedule a peer to peer by calling [employer] [phone] Order# [id number] with the deadline date of Patient is scheduled for 8/26. The insurance determined the following:    [ ] Does not meet Medical Necessity  [ ] No prior imaging  [x ] PT or conservative treatment incomplete  [ ] Missing Labs  [ ] Frequency    our healthcare provider told us that you are having lower back pain that travels to your hip and/or leg. The request cannot be approved because:    Imaging requires six weeks of provider directed treatment to be completed. Supported treatments include (but are not limited to) drugs for swelling or pain, an in office workout (physical therapy), and/or oral or injected steroids. This must have been completed in the past three months without improved symptoms. Contact (via office visit, phone, email, or messaging) must occur after the treatment is completed. This has not been met because:  You have not completed six weeks of provider directed treatment.  The provider directed treatment did not occur within the last three months.  There was no contact with your provider after completing treatment.    If you choose not to complete a peer to peer then please reply to this message to let us know. Please notify your patient and contact central scheduling by calling [phone] to cancel the appointment and cancel the order in Epic.

## 2024-08-26 ENCOUNTER — TELEPHONE (OUTPATIENT)
Age: 59
End: 2024-08-26

## 2024-08-26 NOTE — TELEPHONE ENCOUNTER
Caller: patient spouse    Doctor/Office:     Call regarding :  Calling to reschedule CT     Call was transferred to: Central scheduling

## 2024-08-26 NOTE — TELEPHONE ENCOUNTER
Patient called in to confirm their upcoming medication management appointment details.    Writer stated tomorrow, 8/27 @9:30am in office. Patient stated they knew the office address.

## 2024-08-27 ENCOUNTER — OFFICE VISIT (OUTPATIENT)
Dept: PSYCHIATRY | Facility: CLINIC | Age: 59
End: 2024-08-27

## 2024-08-27 DIAGNOSIS — F32.1 CURRENT MODERATE EPISODE OF MAJOR DEPRESSIVE DISORDER WITHOUT PRIOR EPISODE (HCC): ICD-10-CM

## 2024-08-27 DIAGNOSIS — F41.1 GENERALIZED ANXIETY DISORDER: ICD-10-CM

## 2024-08-27 DIAGNOSIS — F43.21 COMPLICATED BEREAVEMENT: Primary | ICD-10-CM

## 2024-08-27 PROCEDURE — NC001 PR NO CHARGE

## 2024-08-27 NOTE — PSYCH
Behavioral Health Psychotherapy Progress Note    This is a therapy progress note for the patient David Carpio. David (who prefers to be called Cy) is a 59-year-old male,  to his wife Maribell for over 32 years, has 3 sons, is currently living in a house with his wife and 2 dogs in the [facility]. Cy reports a past medical history that includes atrial fibrillation, type 2 diabetes with diabetic polyneuropathy status post multiple foot surgeries in the setting of foot osteomyelitis, obstructive sleep apnea, hypertension, chronic diastolic heart failure, and is over 2 years status post bariatric surgery.  At this time Cy possesses a past psychiatric history of major depressive disorder, generalized anxiety disorder, and complicated bereavement.     At the time of intake, Cy reported that he has been struggling with his mental health for over the past three years following the untimely passing of his daughter Shameka (she passed at the age of 23 years old).  Cy reports that as a teenager his daughter began hanging out with bad influences and dating boyfriends who struggled with drugs and who influenced her to use drugs. Cy reports that he did his best to support his daughter and supported her through drug and alcohol rehab. He reports that about three years ago he was informed that his daughter had  and was reportedly found down by her boyfriend. Cy reports that his daughter was found to have heroin and fentanyl in her system at the time. Cy has struggled to cope with her passing since then. He reports that he becomes emotional when thinking of her. At this time Cy also harbors feelings of hatred towards his late daughter's boyfriend. Cy reports he has a 6 year old grandson Ki who lives with his late daughter's boyfriend (who is the father of the child).     At this time, Cy continues to feel that he does not deserve to be happy. Cy continues to feel that he failed as a parent  "and that \"I could have done something\" to prevent the tragic passing of his daughter. He also remains with fears that being happy and moving on from his daughter's passing will dishonor her memory.     Psychotherapy Provided: Individual Psychotherapy     1. Complicated bereavement        2. Current moderate episode of major depressive disorder without prior episode (HCC)        3. Generalized anxiety disorder            Goals addressed in session: Goal 1, Goal 2, and Goal 3      DATA:     David continues to experience significant grief related to the loss of his daughter, who passed away from a heroin overdose three years ago. He struggles with feelings of guilt and self-blame, particularly over a decision not to visit his daughter on the night she overdosed. David also reports ongoing conflict with his wife, whom he blames for his daughter’s association with negative influences, specifically her former boyfriend. He expressed feelings of \"disgust\" towards his wife, which came to the surface during a recent family vacation to [place], [place], with his wife, son, and son's girlfriend. Additionally, David had a recent outburst at his grandson's [email or web address] lesson, where he confronted his daughter’s former boyfriend and the boyfriend's mother.     Despite these conflicts, David desires to maintain a relationship with his grandson, which has complicated his feelings about confronting those he blames for his daughter’s death. He is aware that pursuing legal action against the former boyfriend, a possibility he has discussed with police, might negatively impact his grandson’s life. David acknowledges feeling frustrated with himself for his outbursts but sees the value in not bottling up his emotions and recognizes the importance of open communication. Moving forward, he plans to directly discuss the topic of \"regret\" with his wife to explore if she has regrets about the raising of their daughter and to reflect on if " "he feels comfortable sharing his regrests.     David  expressed a range of emotions, including anger, guilt, and frustration. He was able to articulate his feelings of blame and disgust toward his wife and the individuals he holds responsible for his daughter’s death. He demonstrated insight into the impact of his emotions on his behavior, particularly his outbursts, and his desire to improve his relationships, especially with his grandson. However, David's  ruminative thoughts about his daughter's death and his role in it suggest ongoing difficulty in moving forward.     David continues to struggle with unresolved grief and prolonged bereavement, manifesting in externalized blame towards others and internalized guilt for perceived inaction. His grief is further complicated by ongoing depressive symptoms, including self-blame and irritability, as well as anxiety about his family dynamics.    During this session, this clinician used the following therapeutic modalities: Bereavement Therapy, Cognitive Behavioral Therapy, Solution-Focused Therapy, and Supportive Psychotherapy    Substance Abuse was not addressed during this session.     ASSESSMENT:  Cy Carpio presents with a Angry and Anxious mood.     his affect is Constricted and irritable at times, which is congruent, with his mood and the content of the session. The client has made progress on their goals.    Cy Carpio presents with a minimal risk of suicide, minimal risk of self-harm, and minimal risk of harm to others.    For any risk assessment that surpasses a \"low\" rating, a safety plan must be developed.    A safety plan was indicated: no      PLAN: Between sessions, Cy Carpio will continue to explore themes of moving forward, acceptance, and forgiveness: Therapist will continue to engage David in discussions around accepting the past and focusing on the aspects of his life he can control, rather than ruminating on what could have been. " Therapist will encourage David to express his emotions constructively, especially with his wife, to reduce conflict and improve their relationship. David will practice assertiveness training to help him communicate his needs and feelings without aggression. At the next session, the therapist will use Bereavement Therapy, Cognitive Behavioral Therapy, Motivational Interviewing, and Supportive Psychotherapy to address these conflicts.    Behavioral Health Treatment Plan and Discharge Planning: Cy Carpio is aware of and agrees to continue to work on their treatment plan. They have identified and are working toward their discharge goals. yes    Visit start and stop times:    08/27/24 from 9:30 AM to 10: 30 AM    This note was not shared with the patient due to this is a psychotherapy note

## 2024-08-29 ENCOUNTER — HOSPITAL ENCOUNTER (OUTPATIENT)
Dept: CT IMAGING | Facility: HOSPITAL | Age: 59
End: 2024-08-29
Attending: ANESTHESIOLOGY
Payer: COMMERCIAL

## 2024-08-29 DIAGNOSIS — M54.16 LUMBAR RADICULOPATHY: ICD-10-CM

## 2024-08-29 PROCEDURE — 72131 CT LUMBAR SPINE W/O DYE: CPT

## 2024-08-30 ENCOUNTER — TELEPHONE (OUTPATIENT)
Age: 59
End: 2024-08-30

## 2024-08-30 DIAGNOSIS — M46.20 SPINAL ABSCESS (HCC): Primary | ICD-10-CM

## 2024-08-30 NOTE — PROGRESS NOTES
CT scan shows high suspicion for spinal abscess; stat MRI with and without contrast orders placed; patient instructed to go to [facility] ED for urgent/emergent evaluation with NSGY (stat consult placed). Patient voiced understanding, appreciative of call.

## 2024-08-30 NOTE — TELEPHONE ENCOUNTER
Caller: David wife     Doctor: Kai     Reason for call: Patient wife called advise of Dr Quinn message and wife stated she understood     Call back#: [phone]

## 2024-09-02 DIAGNOSIS — F32.1 CURRENT MODERATE EPISODE OF MAJOR DEPRESSIVE DISORDER WITHOUT PRIOR EPISODE (HCC): ICD-10-CM

## 2024-09-02 DIAGNOSIS — F41.1 GENERALIZED ANXIETY DISORDER: ICD-10-CM

## 2024-09-03 ENCOUNTER — TELEPHONE (OUTPATIENT)
Age: 59
End: 2024-09-03

## 2024-09-05 RX ORDER — DOXEPIN HYDROCHLORIDE 10 MG/1
CAPSULE ORAL
Qty: 90 CAPSULE | Refills: 2 | Status: SHIPPED | OUTPATIENT
Start: 2024-09-05

## 2024-09-05 NOTE — TELEPHONE ENCOUNTER
I have reviewed the pertinent information and refilled the appropriate medication.    Medication Refilled:    doxepin (SINEquan) 10 mg capsule          TAKE 1 CAPSULE (10 MG TOTAL) BY MOUTH IN THE MORNING AND 2 CAPSULES (20 MG TOTAL) DAILY AT BEDTIME       Andre Rodriguez MD

## 2024-09-06 ENCOUNTER — OFFICE VISIT (OUTPATIENT)
Dept: PSYCHIATRY | Facility: CLINIC | Age: 59
End: 2024-09-06

## 2024-09-06 ENCOUNTER — TELEPHONE (OUTPATIENT)
Age: 59
End: 2024-09-06

## 2024-09-06 DIAGNOSIS — F41.1 GENERALIZED ANXIETY DISORDER: ICD-10-CM

## 2024-09-06 DIAGNOSIS — F43.21 COMPLICATED BEREAVEMENT: Primary | ICD-10-CM

## 2024-09-06 DIAGNOSIS — F32.1 CURRENT MODERATE EPISODE OF MAJOR DEPRESSIVE DISORDER WITHOUT PRIOR EPISODE (HCC): ICD-10-CM

## 2024-09-06 PROCEDURE — NC001 PR NO CHARGE

## 2024-09-06 NOTE — PSYCH
Behavioral Health Psychotherapy Progress Note    This is a therapy progress note for the patient David Carpio. David (who prefers to be called Cy) is a 59-year-old male,  to his wife Maribell for over 32 years, has 3 sons, is currently living in a house with his wife and 2 dogs in the [facility]. Cy reports a past medical history that includes atrial fibrillation, type 2 diabetes with diabetic polyneuropathy status post multiple foot surgeries in the setting of foot osteomyelitis, obstructive sleep apnea, hypertension, chronic diastolic heart failure, and is over 2 years status post bariatric surgery.  At this time Cy possesses a past psychiatric history of major depressive disorder, generalized anxiety disorder, and complicated bereavement.     At the time of intake, Cy reported that he has been struggling with his mental health for over the past three years following the untimely passing of his daughter Shameka (she passed at the age of 23 years old).  Cy reports that as a teenager his daughter began hanging out with bad influences and dating boyfriends who struggled with drugs and who influenced her to use drugs. Cy reports that he did his best to support his daughter and supported her through drug and alcohol rehab. He reports that about three years ago he was informed that his daughter had  and was reportedly found down by her boyfriend. Cy reports that his daughter was found to have heroin and fentanyl in her system at the time. Cy has struggled to cope with her passing since then. He reports that he becomes emotional when thinking of her. At this time Cy also harbors feelings of hatred towards his late daughter's boyfriend. Cy reports he has a 6 year old grandson Ki who lives with his late daughter's boyfriend (who is the father of the child).     At this time, Cy continues to feel that he does not deserve to be happy. Cy continues to feel that he failed as a parent  "and that \"I could have done something\" to prevent the tragic passing of his daughter. He also remains with fears that being happy and moving on from his daughter's passing will dishonor her memory.     Psychotherapy Provided: Individual Psychotherapy     1. Complicated bereavement        2. Current moderate episode of major depressive disorder without prior episode (HCC)        3. Generalized anxiety disorder            Goals addressed in session: Goal 1, Goal 2, and Goal 3      DATA:     David reports that this week on 9/2/24 his emotional pain deepened following the recent passing of his brother from a brain aneurysm, a loss that has stirred up complicated memories of his brother’s struggles with alcoholism. In the session today, David was given space to discuss the events leading up to this death and to discuss emotions surrounding the passing.     In today’s session, David continued to express feelings of grief related to the loss of his daughter. He shared that while he wants to be reminded of his daughter, there are certain reminders he cannot bear. This internal conflict was highlighted as he discussed removing a pool that was a painful reminder of her but also admitted to being unable to part with her belongings, which remain largely untouched in her room. He recognized the contradiction in these behaviors, acknowledging that he struggles to make sense of his emotions surrounding her memory.     During the session, motivational interviewing techniques were employed to explore David's conflicting feelings. We discussed how his desire to both hold onto and avoid his daughter’s memory creates an emotional paradox, and David was challenged to reflect on the underlying reasons for these opposing reactions. He was reminded that his guilt and anger, while understandable, are impacting his ability to move forward and engage fully in life. In this context, we explored how anger can act as a poison that " "clouds judgment and blinds one to the positive aspects of life. David was encouraged to think about how he might develop healthier coping mechanisms for processing his grief and anger.     David continues to feel overwhelmed by self-blame, particularly regarding his decision not to visit his daughter the night she overdosed. He frequently stated that he is \"beating himself up\" over this perceived inaction. In response, we discussed the importance of focusing on the present and taking control of the aspects of his life he can influence. He was encouraged to prioritize his health and emotional well-being so that he can remain present for his grandson and family.     At the present, David remains deeply affected by the complex emotions surrounding his daughter’s death, his brother’s passing, and the strained relationships in his life. Despite these struggles, he continues to make steady progress in therapy, and today’s session focused on helping him navigate his emotional conflicts and begin to consider healthier ways of coping. He was receptive to the interventions and expressed a desire to continue working on these issues moving forward.     At the present, David reports that he has begun talking more with his daughter-in-law regarding the events surrounding Shameka's passing. He is also making a plan to, with the support of his daughter-in-law, go through Shameka's belongings.    During this session, this clinician used the following therapeutic modalities: Bereavement Therapy, Cognitive Behavioral Therapy, Motivational Interviewing, and Supportive Psychotherapy    Substance Abuse was not addressed during this session.    ASSESSMENT:  Cy Carpio presents with a Depressed mood.     his affect is Tearful, which is congruent, with his mood and the content of the session. The client has made progress on their goals.     Cy Carpio presents with a minimal risk of suicide, minimal risk of self-harm, and minimal " "risk of harm to others.    For any risk assessment that surpasses a \"low\" rating, a safety plan must be developed.    A safety plan was indicated: no    PLAN: Between sessions, Cy Carpio will continue to explore themes of moving forward, acceptance, and forgiveness: Therapist will continue to engage David in discussions around accepting the past and focusing on the aspects of his life he can control, rather than ruminating on what could have been. Therapist will encourage David to express his emotions constructively, especially with his wife, to reduce conflict and improve their relationship. David will practice assertiveness training to help him communicate his needs and feelings without aggression. David will continue to work on cherishing the memories of his daughter, however recognizing his daughter for her flaws. At the next session, the therapist will use Bereavement Therapy, Cognitive Behavioral Therapy, Motivational Interviewing, and Supportive Psychotherapy to address these conflicts.     Behavioral Health Treatment Plan and Discharge Planning: Cy Carpio is aware of and agrees to continue to work on their treatment plan. They have identified and are working toward their discharge goals. yes    Visit start and stop times:    09/06/24 from 11:00 PM to 11:50 PM    This note was not shared with the patient due to this is a psychotherapy note    "

## 2024-09-10 ENCOUNTER — TELEPHONE (OUTPATIENT)
Dept: PODIATRY | Age: 59
End: 2024-09-10

## 2024-09-11 ENCOUNTER — TELEPHONE (OUTPATIENT)
Dept: PODIATRY | Age: 59
End: 2024-09-11

## 2024-09-16 ENCOUNTER — OFFICE VISIT (OUTPATIENT)
Dept: PODIATRY | Age: 59
End: 2024-09-16
Payer: COMMERCIAL

## 2024-09-16 VITALS
BODY MASS INDEX: 39.84 KG/M2 | OXYGEN SATURATION: 97 % | TEMPERATURE: 98.3 F | DIASTOLIC BLOOD PRESSURE: 60 MMHG | WEIGHT: 300.6 LBS | SYSTOLIC BLOOD PRESSURE: 98 MMHG | HEIGHT: 73 IN | HEART RATE: 73 BPM

## 2024-09-16 DIAGNOSIS — B35.1 ONYCHOMYCOSIS: ICD-10-CM

## 2024-09-16 DIAGNOSIS — E11.42 TYPE 2 DIABETES MELLITUS WITH DIABETIC POLYNEUROPATHY, WITH LONG-TERM CURRENT USE OF INSULIN (HCC): Primary | ICD-10-CM

## 2024-09-16 DIAGNOSIS — Z79.4 TYPE 2 DIABETES MELLITUS WITH DIABETIC POLYNEUROPATHY, WITH LONG-TERM CURRENT USE OF INSULIN (HCC): Primary | ICD-10-CM

## 2024-09-16 DIAGNOSIS — Z89.432 STATUS POST TRANSMETATARSAL AMPUTATION OF FOOT, LEFT (HCC): ICD-10-CM

## 2024-09-16 PROCEDURE — RECHECK: Performed by: STUDENT IN AN ORGANIZED HEALTH CARE EDUCATION/TRAINING PROGRAM

## 2024-09-16 PROCEDURE — 11720 DEBRIDE NAIL 1-5: CPT | Performed by: STUDENT IN AN ORGANIZED HEALTH CARE EDUCATION/TRAINING PROGRAM

## 2024-09-16 NOTE — PROGRESS NOTES
David Carpio  1965  AT RISK FOOT CARE    1. Type 2 diabetes mellitus with diabetic polyneuropathy, with long-term current use of insulin ([facility])        2. Status post transmetatarsal amputation of foot, left (HCC)        3. Onychomycosis              Patient presents for at-risk foot care.  Patient has no acute concerns today. Patient has significant lower extremity risk due to previous amputation. No pain, redness, swelling to left foot. Notes baseline PN left foot. Was in [place] on vacation.     On exam patient has thickened, hypertrophic, discolored, brittle toenails with subungual debris x2   Callus: 1 (R 5th toe)  Amputation: L TMA, R4th & partal 2/3 toes  Right 2nd toe stump plantar DFU not assessed today    Today's treatment includes:  Debridement of toenails x2. Using nail nipper, jomar, and curette, nails were sharply debrided, reduced in thickness and length. Devitalized nail tissue and fungal debris excised and removed. Patient tolerated well.      F/u wc for toe ulcer.      Discussed proper shoe gear, daily inspections of feet, and general foot health with patient. Patient has Q7 findings and is recommended for at risk foot care every 9-10 weeks.    Patients most recent complete clinical exam was performed: 10/6/23

## 2024-09-17 ENCOUNTER — OFFICE VISIT (OUTPATIENT)
Dept: PSYCHIATRY | Facility: CLINIC | Age: 59
End: 2024-09-17

## 2024-09-17 ENCOUNTER — HOSPITAL ENCOUNTER (OUTPATIENT)
Dept: MRI IMAGING | Facility: HOSPITAL | Age: 59
Discharge: HOME | End: 2024-09-17

## 2024-09-17 ENCOUNTER — TELEPHONE (OUTPATIENT)
Facility: MEDICAL CENTER | Age: 59
End: 2024-09-17

## 2024-09-17 DIAGNOSIS — Z51.81 ENCOUNTER FOR MEDICATION MONITORING: ICD-10-CM

## 2024-09-17 DIAGNOSIS — F41.1 GENERALIZED ANXIETY DISORDER: ICD-10-CM

## 2024-09-17 DIAGNOSIS — F32.1 CURRENT MODERATE EPISODE OF MAJOR DEPRESSIVE DISORDER WITHOUT PRIOR EPISODE (HCC): Primary | ICD-10-CM

## 2024-09-17 DIAGNOSIS — F43.21 COMPLICATED BEREAVEMENT: ICD-10-CM

## 2024-09-17 DIAGNOSIS — E11.42 TYPE 2 DIABETES MELLITUS WITH DIABETIC POLYNEUROPATHY, WITHOUT LONG-TERM CURRENT USE OF INSULIN (HCC): ICD-10-CM

## 2024-09-17 PROCEDURE — NC001 PR NO CHARGE

## 2024-09-17 RX ORDER — BUSPIRONE HYDROCHLORIDE 15 MG/1
15 TABLET ORAL 3 TIMES DAILY
Qty: 90 TABLET | Refills: 2 | Status: SHIPPED | OUTPATIENT
Start: 2024-09-17

## 2024-09-17 RX ORDER — DOXEPIN HYDROCHLORIDE 50 MG/1
50 CAPSULE ORAL
Qty: 30 CAPSULE | Refills: 2 | Status: SHIPPED | OUTPATIENT
Start: 2024-09-17

## 2024-09-17 RX ORDER — GABAPENTIN 800 MG/1
800 TABLET ORAL 3 TIMES DAILY
Qty: 90 TABLET | Refills: 2 | Status: SHIPPED | OUTPATIENT
Start: 2024-09-17

## 2024-09-17 RX ORDER — BUPROPION HYDROCHLORIDE 300 MG/1
300 TABLET ORAL DAILY
Qty: 90 TABLET | Refills: 0 | Status: SHIPPED | OUTPATIENT
Start: 2024-09-17

## 2024-09-17 RX ORDER — GABAPENTIN 800 MG/1
800 TABLET ORAL 3 TIMES DAILY
Qty: 90 TABLET | Refills: 0 | Status: SHIPPED | OUTPATIENT
Start: 2024-09-17 | End: 2024-09-17

## 2024-09-17 NOTE — PSYCH
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 today, seeking treatment. At this juncture, inpatient hospitalization is not currently warranted. To mitigate future risk, patient should adhere to the recommendations of this writer, avoid alcohol/illicit substance use, utilize community-based resources and familiar support and prioritize mental health treatment.     Based on today's assessment and clinical criteria, David Carpio contracts for safety and is not an imminent risk of harm to self or others. Outpatient level of care is deemed appropriate at this present time. David understands that if they are no longer able to contract for safety, they need to call/contact the outpatient office including this writer, call/contact crisis and/orattend to the nearest Emergency Department for immediate evaluation.    Risks/Benefits      Risks, Benefits And Possible Side Effects Of Medications:    Risks, benefits, and possible side effects of medications explained to David and he verbalizes understanding and agreement for treatment.    Controlled Medication Discussion:     Not applicable - controlled prescriptions are not prescribed by this  "practice    Psychotherapy Provided:     Individual psychotherapy provided: Yes  Overall, Cy continues to experience difficulty finding юлия in his day-to-day life.  He continues to struggle with the topic of forgiveness, stating \"I blame myself for what happened\" and continues to feel that he could have done something to prevent his daughter's passing.  Overall, Cy continues to find himself in conflict with himself, his wife, and his daughter's boyfriend.  He continues to slowly work on being more vulnerable with his emotions and he has been talking with his daughter-in-law regarding the event surrounding Shameka's passing.    At the time of office visit, cognitive behavioral therapy techniques, mindfulness techniques, and motivational interviewing techniques were used to encourage the patient to continue to make positive life changes, be present for himself and his family, and to channel his distress into positive life actions    Treatment Plan:    Completed and signed during the session: Yes - with David    Visit Time    Visit Start Time: 3:00 PM  Visit Stop Time: 4:00 PM  Total Visit Duration: 60 minutes    This note was shared with patient.    Andre oRdriguez MD 09/17/24    This note has been constructed using a voice recognition system. There may be translation, syntax, or grammatical errors. If you have any questions, please contact the dictating provider.  "

## 2024-09-17 NOTE — PATIENT INSTRUCTIONS
Please present for your previously scheduled appointment approximately 15 minutes prior to appointment time. If you are running late or are unable to attend your appointment, please call our [place] office at [phone] or our [place] office at [phone] if applicable to notify the office of your absence.    If you are in psychological crisis including not limited to suicidal/homicidal thoughts or thoughts of self-injury, do not hesitate to call/contact your County Crisis hotline (see below) or attend to the nearest emergency department.  [facility] Crisis: [phone]  [facility] Crisis: [phone]  [facility] Crisis: [phone]  [facility] Crisis: [phone]  [facility] Crisis: [phone]  [facility] Crisis: [phone]  [facility] Crisis: [phone]  National Suicide Prevention Hotline: 1-238.171.2440

## 2024-09-17 NOTE — BH TREATMENT PLAN
TREATMENT PLAN (Medication Management Only)        [facility]    Name and Date of Birth:  David Carpio 59 y.o. 1965  Date of Treatment Plan: September 17, 2024  Diagnosis/Diagnoses:    1. Current moderate episode of major depressive disorder without prior episode (HCC)    2. Generalized anxiety disorder    3. Type 2 diabetes mellitus with diabetic polyneuropathy, without long-term current use of insulin (HCC)    4. Complicated bereavement    5. Encounter for medication monitoring      Strengths/Personal Resources for Self-Care: Supportive family, supportive friends, taking medications as prescribed, ability to adapt to life changes, ability to communicate well, ability to understand psychiatric illness, financial means, general fund of knowledge, motivation for treatment, self-reliance, sense of humor, stable employment, willingness to work on problems, and work skills.  Area/Areas of need (in own words): Continue to improve symptoms of depression and anxiety.  Continue to help the patient process the tragic passing of his daughter  1. Long Term Goal: Continue to improve symptoms of depression and anxiety.  Continue to help the patient process the tragic passing of his daughter.  Target Date:6 months - 3/17/2025  Person/Persons responsible for completion of goal: Dr. Michael Hernandez  2.  Short Term Objective (s) - How will we reach this goal?:   A. Provider new recommended medication/dosage changes and/or continue medication(s): Current psychiatric medications (Wellbutrin  mg daily, doxepin 50 mg at bedtime, BuSpar 15 mg 3 times daily, gabapentin 800 mg 3 times daily).  Take medications as prescribed  Attend psychiatry appointments regularly  Continue psychotherapy regularly  Practice coping skills  Try coping skills using handouts provided  Try sleep hygiene techniques  Avoid alcohol   Avoid drugs   Exercise regularly  Try relaxation  techniques  Target Date:3 months - 12/17/2024  Person/Persons Responsible for Completion of Goal: David  Progress Towards Goals: stable, continuing treatment  Treatment Modality: Medication management every 1 to 3 months  Review due 180 days from date of this plan: 6 months - 3/17/2025  Expected length of service: ongoing treatment  My Physician/PA/NP and I have developed this plan together and I agree to work on the goals and objectives. I understand the treatment goals that were developed for my treatment.

## 2024-09-17 NOTE — ASSESSMENT & PLAN NOTE
Status: Stable and controlled    At the conclusion of the office visit routine blood work was ordered including hemoglobin A1c to monitor the patient's current diabetic control    Lab Results   Component Value Date    HGBA1C 5.4 04/17/2024

## 2024-10-01 ENCOUNTER — OFFICE VISIT (OUTPATIENT)
Dept: PSYCHIATRY | Facility: CLINIC | Age: 59
End: 2024-10-01

## 2024-10-01 ENCOUNTER — HOSPITAL ENCOUNTER (OUTPATIENT)
Dept: MRI IMAGING | Facility: HOSPITAL | Age: 59
Discharge: HOME | End: 2024-10-01

## 2024-10-01 DIAGNOSIS — F32.1 CURRENT MODERATE EPISODE OF MAJOR DEPRESSIVE DISORDER WITHOUT PRIOR EPISODE (HCC): Primary | ICD-10-CM

## 2024-10-01 DIAGNOSIS — F43.21 COMPLICATED BEREAVEMENT: ICD-10-CM

## 2024-10-01 DIAGNOSIS — F41.1 GENERALIZED ANXIETY DISORDER: ICD-10-CM

## 2024-10-01 PROCEDURE — NC001 PR NO CHARGE

## 2024-10-01 NOTE — PSYCH
Behavioral Health Psychotherapy Progress Note    This is a therapy progress note for the patient David Carpio. David (who prefers to be called Cy) is a 59-year-old male,  to his wife Maribell for over 33 years, has 3 sons, is currently living in a house with his wife and 2 dogs in the [facility]. Cy reports a past medical history that includes atrial fibrillation, type 2 diabetes with diabetic polyneuropathy status post multiple foot surgeries in the setting of foot osteomyelitis, obstructive sleep apnea, hypertension, chronic diastolic heart failure, and is over 2 years status post bariatric surgery.  At this time Cy possesses a past psychiatric history of major depressive disorder, generalized anxiety disorder, and complicated bereavement.     At the time of intake, Cy reported that he has been struggling with his mental health for over the past three years following the untimely passing of his daughter Shameka (she passed at the age of 23 years old).  Cy reports that as a teenager his daughter began hanging out with bad influences and dating boyfriends who struggled with drugs and who influenced her to use drugs. Cy reports that he did his best to support his daughter and supported her through drug and alcohol rehab. He reports that about three years ago he was informed that his daughter had  and was reportedly found down by her boyfriend. Cy reports that his daughter was found to have heroin and fentanyl in her system at the time. Cy has struggled to cope with her passing since then. He reports that he becomes emotional when thinking of her. At this time Cy also harbors feelings of hatred towards his late daughter's boyfriend. Cy reports he has a 6 year old grandson Ki who lives with his late daughter's boyfriend (who is the father of the child).     At this time, Cy continues to feel that he does not deserve to be happy. Cy continues to feel that he failed as a parent  "and that \"I could have done something\" to prevent the tragic passing of his daughter. He also remains with fears that being happy and moving on from his daughter's passing will dishonor her memory.     Psychotherapy Provided: Individual Psychotherapy     1. Current moderate episode of major depressive disorder without prior episode (HCC)        2. Generalized anxiety disorder        3. Complicated bereavement            Goals addressed in session: Goal 1, Goal 2, and Goal 3      DATA:     In today's session, David discussed his ongoing emotional challenges following the passing of his daughter.  He continues to struggle with guilt and self blame, believing he could have prevented her death.  He has identified his daughter-in-law as someone who might have answers about his daughter's life and could help provide a sense of closure, however his conversations with her remain brief, with his daughter-in-law often uncomfortable discussing this topic.  He reflected on a recent interaction where he commented on a photo of his daughter Shameka.  This session explored the concept of projection, highlighting how David may be transferring his own discomfort onto others.  David expressed understanding of this dynamic.  Additionally, this session discussed the tensions between his wife and daughter, which David now recognizes as a competition for his affection.  The concept of the a lot of his complex was introduced to help him make sense of their strained relationship.  He acknowledged that he continues to \"beat myself up\" over his perceived and action in resolving these conflicts.  In today's session, practical ways to facilitate more meaningful conversations with his family were brainstormed.  The concept of recovery as a lifelong process was discussed, encouraging David to view his grief journey as an ongoing process of growth and healing.    During this session, this clinician used the following therapeutic " "modalities: Bereavement therapy, cognitive behavioral therapy, supportive therapy, and psychodynamic psychotherapy    Substance Abuse was not addressed during this session.     ASSESSMENT:  Cy Carpio presents with a Anxious mood.     his affect is Constricted, which is congruent, with his mood and the content of the session. The client has made progress on their goals.     Cy Carpio presents with a minimal risk of suicide, minimal risk of self-harm, and minimal risk of harm to others.    For any risk assessment that surpasses a \"low\" rating, a safety plan must be developed.    A safety plan was indicated: no    PLAN: Between sessions, Cy Carpio will utilize the strategies discussed today to continue to facilitate more meaningful conversations with his family.  He will continue to work on challenging his negative thoughts surrounding his daughter and will continue to work on embracing a realistic image of his daughter and her life.  The concept of recovery was discussed in detail and David will continue to work to view his grief journey is an ongoing process of growth and healing. At the next session, the therapist will use bereavement therapy, cognitive behavioral therapy, supportive psychotherapy, and psychodynamic psychotherapy techniques to address these ongoing challenges.    Behavioral Health Treatment Plan and Discharge Planning: Cy Carpio is aware of and agrees to continue to work on their treatment plan. They have identified and are working toward their discharge goals. yes    Visit start and stop times:    10/01/24 from 4:00 PM to 5:00 PM    This note was not shared with the patient due to this is a psychotherapy note  "

## 2024-10-01 NOTE — BH TREATMENT PLAN
"Outpatient Behavioral Health Psychotherapy Treatment Plan    Patient Information:  Name: Cy Caripo  YOB: 1965    Dates:  Date of Initial Psychotherapy Assessment: 8/25/23  Date of Current Treatment Plan: 10/01/2024    Treatment Plan Target Date: 04/01/2025  Treatment Plan Expiration Date: 04/01/2025    Diagnosis:  Current moderate episode of major depressive disorder without prior episode (HCC)  Generalized anxiety disorder  Complicated bereavement    Area(s) of Need:  Grieving and processing the loss of his daughter  Effective communication of emotions with family members  Living in the present and engaging with family, particularly his grandson    Long Term Goal 1 (in the client's own words): \"I want to work on accepting the passing of my daughter Shameka.\"  Stage of Change: Preparation  Target Date for completion: 04/01/2025  Anticipated therapeutic modalities:  Cognitive Behavioral Therapy (CBT)  Grief counseling  Mindfulness and acceptance-based strategies  People identified to complete this goal:  Cy Carpio  Therapist    Objective 1:  Cy will participate in weekly therapy sessions and engage in grief counseling to discuss feelings related to Shameka’s passing.  Means of measuring success: Attendance and active participation in all scheduled therapy sessions.    Objective 2:  Cy will complete assigned therapeutic exercises, such as writing letters to Shameka or maintaining a grief journal, to process his emotions.  Means of measuring success: Review and discussion of completed assignments during therapy sessions.    Long Term Goal 2 (in the client's own words): \"I want to work on discussing and processing my emotions with my family surrounding Shameka's passing.\"  Stage of Change: Preparation  Target Date for completion: 04/01/2025  Anticipated therapeutic modalities:  Communication skills training  Emotional regulation techniques  People identified to complete this goal:  Cy Carpio  Family " "members  Therapist    Objective 1:  Cy will practice discussing his emotions with his family.  Cy will set up scheduled times for him to discuss his emotions with his family  Means of measuring success: Attendance and active participation in family meetings    Objective 2:  Cy will use learned communication strategies in interactions with family members at least once every 2 weeks.  Means of measuring success: Self-reports and feedback from family members during meetings.    Long Term Goal 3 (in the client's own words): \"I want to continue to live in the present to be with my grandson and family.\"  Stage of Change: Preparation  Target Date for completion: 04/01/2025  Anticipated therapeutic modalities:  Mindfulness-based stress reduction (MBSR)  Behavioral activation  Positive psychology exercises  People identified to complete this goal:  Cy Carpio  Therapist  Family members    Objective 1:  Cy will practice mindfulness exercises daily to stay present and engaged with his family.  Means of measuring success: Keeping a daily log of mindfulness practices and discussing them in therapy sessions.  Objective 2:  Cy will plan and participate in at least one family activity per week that promotes bonding and present-moment engagement.  Means of measuring success: Self-reports and feedback from family members during therapy sessions.    Additional Information:  I am currently under the care of a [facility] psychiatric provider: yes  My [facility] psychiatric provider is: Andre Rodriguez MD    I am currently taking psychiatric medications: Wellbutrin 300 mg daily, BuSpar 15 mg 3 times daily, doxepin 50 mg at bedtime, gabapentin 800 mg 3 times daily    I feel that I will be ready for discharge from mental health care when I reach the following (measurable goal/objective): Successful completion of the above objectives and demonstrated ability to manage grief and anxiety independently.    For children " and adults who have a legal guardian:  Has there been any change to custody orders and/or guardianship status? NA. If yes, attach updated documentation.  I have updated my Crisis Plan and have been offered a copy of this plan.    Behavioral Health Treatment Plan [facility]:  Diagnosis and Treatment Plan explained to Cy Carpio.  Cy Carpio acknowledges an understanding of their diagnosis.  Cy Carpio agrees to this treatment plan.  I have been offered a copy of this Treatment Plan. Yes

## 2024-10-03 PROBLEM — M54.16 LUMBAR RADICULOPATHY: Status: ACTIVE | Noted: 2024-10-03

## 2024-10-11 NOTE — LETTER
April 24, 2023     Patient: Jerry Alvarez  YOB: 1965  Date of Visit: 4/24/2023      To Whom it May Concern:    Nat Agarwal is under my professional care  Shrutijosé miguel Childress was seen in my office on 4/24/2023   Nat Agarwal can not return to work until he is released by myself  If you have any questions or concerns, please don't hesitate to call           Sincerely,          Jose Reyna DPM        CC: Isaac Uagrte, DO
Severe asthma

## 2024-10-15 ENCOUNTER — TELEPHONE (OUTPATIENT)
Age: 59
End: 2024-10-15

## 2024-10-15 NOTE — TELEPHONE ENCOUNTER
Mrs Carpio called. She is cancelling his 3 pm appt due to being at the podiatrist. She said his toe is worse than they thought and will take a while longer. They need to reschedule appt. Writer contacted Residents and warm transferred there to reschedule.

## 2024-10-18 ENCOUNTER — OFFICE VISIT (OUTPATIENT)
Dept: PSYCHIATRY | Facility: CLINIC | Age: 59
End: 2024-10-18

## 2024-10-18 DIAGNOSIS — F43.21 COMPLICATED BEREAVEMENT: ICD-10-CM

## 2024-10-18 DIAGNOSIS — F32.1 CURRENT MODERATE EPISODE OF MAJOR DEPRESSIVE DISORDER WITHOUT PRIOR EPISODE (HCC): Primary | ICD-10-CM

## 2024-10-18 DIAGNOSIS — F41.1 GENERALIZED ANXIETY DISORDER: ICD-10-CM

## 2024-10-18 PROCEDURE — NC001 PR NO CHARGE

## 2024-10-18 NOTE — PATIENT INSTRUCTIONS
Please present for your previously scheduled appointment approximately 15 minutes prior to appointment time. If you are running late or are unable to attend your appointment, please call our [place] office at [phone] or our [place] office at [phone] if applicable to notify the office of your absence.    If you are in psychological crisis including not limited to suicidal/homicidal thoughts or thoughts of self-injury, do not hesitate to call/contact your County Crisis hotline (see below) or attend to the nearest emergency department.  [facility] Crisis: [phone]  [facility] Crisis: [phone]  [facility] Crisis: [phone]  [facility] Crisis: [phone]  [facility] Crisis: [phone]  [facility] Crisis: [phone]  [facility] Crisis: [phone]  National Suicide Prevention Hotline: 1-570.850.8146

## 2024-10-18 NOTE — PSYCH
Behavioral Health Psychotherapy Progress Note    This is a therapy progress note for the patient David Carpio. David (who prefers to be called Cy) is a 59-year-old male,  to his wife Maribell for over 33 years, has 3 sons, is currently living in a house with his wife and 2 dogs in the [facility]. Cy reports a past medical history that includes atrial fibrillation, type 2 diabetes with diabetic polyneuropathy status post multiple foot surgeries in the setting of foot osteomyelitis, obstructive sleep apnea, hypertension, chronic diastolic heart failure, and is over 2 years status post bariatric surgery.  At this time Cy possesses a past psychiatric history of major depressive disorder, generalized anxiety disorder, and complicated bereavement.     At the time of intake, Cy reported that he has been struggling with his mental health for over the past three years following the untimely passing of his daughter Shameka (she passed at the age of 23 years old).  Cy reports that as a teenager his daughter began hanging out with bad influences and dating boyfriends who struggled with drugs and who influenced her to use drugs. Cy reports that he did his best to support his daughter and supported her through drug and alcohol rehab. He reports that about three years ago he was informed that his daughter had  and was reportedly found down by her boyfriend. Cy reports that his daughter was found to have heroin and fentanyl in her system at the time. Cy has struggled to cope with her passing since then. He reports that he becomes emotional when thinking of her. At this time Cy also harbors feelings of hatred towards his late daughter's boyfriend. Cy reports he has a 6 year old grandson Ki who lives with his late daughter's boyfriend (who is the father of the child).     At this time, Cy continues to feel that he does not deserve to be happy. Cy continues to feel that he failed as a parent  "and that \"I could have done something\" to prevent the tragic passing of his daughter. He also remains with fears that being happy and moving on from his daughter's passing will dishonor her memory.     Psychotherapy Provided: Individual Psychotherapy     1. Current moderate episode of major depressive disorder without prior episode (HCC)        2. Generalized anxiety disorder        3. Complicated bereavement            Goals addressed in session: Goal 1, Goal 2, and Goal 3      DATA:     Today, David reports he continues to grapple with ongoing grief and guilt over the passing of his daughter Shameka. He remains conflicted, feeling there was something he could have done to prevent her death, which has led to an inability to forgive himself, his wife, and his daughter’s former partner, the father of his grandson Ki. This unresolved guilt is compounded by feelings of undeservingness of happiness.     Today, David reports ongoing communication struggles with his wife, describing their relationship as essentially \"cohabitating\" without any meaningful dialogue. He highlighted their emotional disconnect, noting that neither of them reaches out to the other to address their issues, which has led him to contemplate divorce. He plans to assess how the relationship progresses over the next month, with the possibility of discussing divorce in December 2024.     During the session, we discussed the importance of clarifying and communicating his expectations within the marriage, and how the lack of clear expectations may be contributing to their current impasse. David was encouraged to take ownership of his emotional healing and not rely solely on therapy to fix his marriage. It was also emphasized that his unresolved internal conflicts, particularly the guilt and self-blame surrounding his daughter's death, continue to manifest as anger and frustration, both in his marriage and his daily life. We explored ways to work " "through these conflicts, focusing on strategies for self-forgiveness and emotional resolution.     David was reminded that he struggles with the cognitive distortion of mind reading and was encouraged to reflect on how other individuals in his life may not be knowing what he is experiencing and the need to share his feelings with others.      During this session, this clinician used the following therapeutic modalities: Bereavement Therapy, Cognitive Behavioral Therapy, Motivational Interviewing, and Supportive Psychotherapy    Substance Abuse was not addressed during this session.     ASSESSMENT:  Cy Carpio presents with a Depressed mood.     his affect is Constricted, which is congruent, with his mood and the content of the session. The client has made progress on their goals.     Cy Carpio presents with a minimal risk of suicide, minimal risk of self-harm, and minimal risk of harm to others.    For any risk assessment that surpasses a \"low\" rating, a safety plan must be developed.    A safety plan was indicated: no    PLAN: Between sessions, Cy Carpio will continue to utilize the strategies discussed today to facilitate more meaningful conversations with his family.  He will continue to work on challenging his negative thoughts surrounding his daughter and will continue to work on embracing a realistic image of his daughter and her life.  He will continue to work on challenging his cognitive distortions of mind reading and will continue to work on setting clear expectations for his marriage.. At the next session, the therapist will use Bereavement Therapy, Cognitive Behavioral Therapy, Motivational Interviewing, and Supportive Psychotherapy to address these ongoing issues.    Behavioral Health Treatment Plan and Discharge Planning: Cy Carpio is aware of and agrees to continue to work on their treatment plan. They have identified and are working toward their discharge goals. yes    Visit start and " stop times:    10/18/24 from 11:00 AM to 12:00 PM    This note was not shared with the patient due to this is a psychotherapy note

## 2024-10-22 ENCOUNTER — HOSPITAL ENCOUNTER (OUTPATIENT)
Dept: RADIOLOGY | Facility: CLINIC | Age: 59
Discharge: HOME | End: 2024-10-22
Payer: COMMERCIAL

## 2024-10-22 ENCOUNTER — OFFICE VISIT (OUTPATIENT)
Facility: CLINIC | Age: 59
End: 2024-10-22
Payer: COMMERCIAL

## 2024-10-22 VITALS
HEART RATE: 71 BPM | TEMPERATURE: 97.8 F | RESPIRATION RATE: 20 BRPM | SYSTOLIC BLOOD PRESSURE: 118 MMHG | DIASTOLIC BLOOD PRESSURE: 60 MMHG

## 2024-10-22 DIAGNOSIS — E11.621 DIABETIC ULCER OF TOE OF RIGHT FOOT ASSOCIATED WITH TYPE 2 DIABETES MELLITUS, WITH FAT LAYER EXPOSED (HCC): Primary | ICD-10-CM

## 2024-10-22 DIAGNOSIS — Z79.4 TYPE 2 DIABETES MELLITUS WITH DIABETIC POLYNEUROPATHY, WITH LONG-TERM CURRENT USE OF INSULIN (HCC): ICD-10-CM

## 2024-10-22 DIAGNOSIS — L97.512 DIABETIC ULCER OF TOE OF RIGHT FOOT ASSOCIATED WITH TYPE 2 DIABETES MELLITUS, WITH FAT LAYER EXPOSED (HCC): Primary | ICD-10-CM

## 2024-10-22 DIAGNOSIS — E11.42 TYPE 2 DIABETES MELLITUS WITH DIABETIC POLYNEUROPATHY, WITH LONG-TERM CURRENT USE OF INSULIN (HCC): ICD-10-CM

## 2024-10-22 DIAGNOSIS — E11.621 DIABETIC ULCER OF TOE OF RIGHT FOOT ASSOCIATED WITH TYPE 2 DIABETES MELLITUS, WITH FAT LAYER EXPOSED (HCC): ICD-10-CM

## 2024-10-22 DIAGNOSIS — L97.512 DIABETIC ULCER OF TOE OF RIGHT FOOT ASSOCIATED WITH TYPE 2 DIABETES MELLITUS, WITH FAT LAYER EXPOSED (HCC): ICD-10-CM

## 2024-10-22 PROCEDURE — 97597 DBRDMT OPN WND 1ST 20 CM/<: CPT | Performed by: FAMILY MEDICINE

## 2024-10-22 PROCEDURE — 99213 OFFICE O/P EST LOW 20 MIN: CPT | Performed by: FAMILY MEDICINE

## 2024-10-22 PROCEDURE — 99204 OFFICE O/P NEW MOD 45 MIN: CPT | Performed by: FAMILY MEDICINE

## 2024-10-22 PROCEDURE — 73660 X-RAY EXAM OF TOE(S): CPT

## 2024-10-22 NOTE — PATIENT INSTRUCTIONS
Orders Placed This Encounter   Procedures    Debridement     This order was created via procedure documentation    Wound cleansing and dressings Anterior;Right;Plantar Toe D2, second     Wash your hands with soap and water.  Remove old dressing, discard into plastic bag and place in trash.    Cleanse the wound with mld, unscented soap (Dove)  prior to applying a clean dressing. Do not use tissue or cotton balls. Do not scrub the wound. Pat dry using gauze.    You may shower but must keep the dressing/ dressing dry. You can cover the dressing with a cast cover that can be purchased online.        Apply Acticoat  3 to the R toe wound.    Cover with gauze  Secure with audi and tape  Change dressing three times a week    Watch for signs of infection (redness,warmth to the touch, increased pain, odor, pus, swelling, fever, chills, nausea, vomiting). If you notice any of these call the wound center or proceed to the ER.     Standing Status:   Future     Standing Expiration Date:   10/29/2024    XR toe right second min 2 views     Standing Status:   Future     Standing Expiration Date:   10/22/2028     Scheduling Instructions:      Bring along any outside films relating to this procedure.

## 2024-10-22 NOTE — PROGRESS NOTES
Wound Procedure Treatment Anterior;Right;Plantar Toe D2, second    Performed by: Grey Pemberton RN  Authorized by: Teena Kurtz MD    Associated wounds:   Wound 07/12/24 Toe D2, second Anterior;Right;Plantar  Wound cleansed with:  Dakin's 0.25%  Applied secondary dressing:  Gauze  Dressing secured with:  Vasquez and Tape

## 2024-10-22 NOTE — PROGRESS NOTES
Patient ID: David Carpio is a 59 y.o. male Date of Birth 1965       Chief Complaint   Patient presents with    Follow Up Wound Care Visit    New Patient Visit     R toe wound         Allergies:  Ativan [lorazepam]    Diagnosis:      Diagnosis ICD-10-CM Associated Orders   1. Diabetic ulcer of toe of right foot associated with type 2 diabetes mellitus, with fat layer exposed ([facility])  E11.621 XR toe right second min 2 views    L97.512 Debridement Anterior;Right;Plantar Toe D2, second     Wound cleansing and dressings Anterior;Right;Plantar Toe D2, second     Wound Procedure Treatment Anterior;Right;Plantar Toe D2, second      2. Type 2 diabetes mellitus with diabetic polyneuropathy, with long-term current use of insulin ([facility])  E11.42 Wound cleansing and dressings Anterior;Right;Plantar Toe D2, second    Z79.4 Wound Procedure Treatment Anterior;Right;Plantar Toe D2, second              Assessment & Plan:  Right diabetic foot ulceration second toe stump plantar surface: When compared to his last wound center visit, measurements are increased. Wound bed with fibrous appearing granulation and hypergranulation tissue with biofilm/slough layer overlying along with significant periwound macerated callus with undermining.  No signs of acute soft tissue infection at this time.   Selective debridement  Recommend application of Acticoat 3 followed by gauze and use of offloading shoe/cam boot; as patient going for x-ray after appointment today, Dakin's wet-to-dry applied and he was instructed on how to apply the recommended dressing of Acticoat for his next dressing change  Bedside ABIs completed/updated at wound center today - R 1.02 / L 0.93  Repeat x-ray ordered as patient has not followed up in wound center since August and wound is increased in size when compared to that visit.  In addition he has not been offloading and has been regularly showering against advice (wound care orders from his last visit 8/16/2024  "reviewed)  Reviewed extreme importance of offloading and utilizing cam boot which he states he has at home.  Discussed that failure to do so places him at increased risk for complications including not only infection but loss of limb, etc... He expresses understanding and confirms he will be utilizing his offloading shoe as soon as he gets home  Prev wound center visits and recent podiatry outpatient visit (9/16/2024) reviewed   Continue regular outpatient follow-up with podiatry  Type 2 diabetes:  A1C results reviewed with the patient today. 5.4 April 2024; controlled   Reviewed extreme importance of very close monitoring of wounds. Discussed that diabetes places patients at increased risk for wound complications despite adequate cleansing and care.  Should patient experience any acute change or any of the following symptoms including but not limited to fever, chills, increased pain at the wound, swelling, purulent drainage, foul odor, etc... to immediately proceed to emergency department. Pt expresses understanding.   See below wound care orders for full details  Follow-up in 1 week with Dr. Yousif or sooner if needed    Portions of the record may have been created with voice recognition software. Occasional wrong word or \"sound alike\" substitutions may have occurred due to the inherent limitations of voice recognition software. Read the chart carefully and recognize, using context, where substitutions have occurred.    Subjective:   10/22/2024: Mr. Carpio is a 59-year-old male with a past medical history including but not limited to type 2 diabetes with neuropathy, DAYAN, hypertension, A-fib on AC with Xarelto, history of left TMA who presents to wound center today for evaluation of right toe wound.  He is a former patient of this wound center and was last seen in August 2024 by podiatry/Dr. Yousif with whom he has primarily followed thus far for wound care concerns.  He he was instructed to continue to follow-up " "in the wound center after his appointment in August however patient has not returned until today. He reports he has not followed up because he started to \"get a lot of showers and thought this would make it better\" amongst other concerns regarding traffic when he drove to the wound center, etc...  He reports that he has been walking on it but not a lot.  States that when he has a lot of walking to do he wears his cam boot that was provided to him by podiatry.  He did not wear it to his appointment today.  Has a history of neuropathy.  He has not had fever or chills.  See below for detailed ROS.        The following portions of the patient's history were reviewed and updated as appropriate:   Patient Active Problem List   Diagnosis    DAYAN (obstructive sleep apnea)    Chronic diastolic heart failure ([facility])    Hypertension    Diabetes mellitus ([facility])    Morbid obesity with BMI of 40.0-44.9, adult ([facility])    Pain, joint, ankle and foot, left    Chronic osteomyelitis of left foot with draining sinus ([facility])    Atrial fibrillation ([facility])    Anxiety    Type 2 diabetes mellitus with diabetic polyneuropathy, without long-term current use of insulin ([facility])    Toe osteomyelitis, right ([facility])    Cervical spondylosis    Cervicalgia - Right    Diabetic infection of left foot ([facility])    Pacemaker    History of bariatric surgery    Encounter for perioperative consultation    Hyperkalemia    Diabetic ulcer of left midfoot associated with type 2 diabetes mellitus ([facility])    Cellulitis of left lower extremity    Closed fracture of shaft of metatarsal bone of left foot    Moderate benzodiazepine use disorder ([facility])    Microcytic anemia    Other constipation    Status post partial amputation of foot, left ([facility])    Moderate protein-calorie malnutrition ([facility])    Left foot pain    Charcot arthropathy of midfoot    Cervical strain    Cervical radiculopathy    Lumbar radiculopathy     Past Medical History:   Diagnosis Date    Atrial fibrillation ([facility])     " Benzodiazepine withdrawal with complication ([facility]) 06/15/2023    Chronic diastolic (congestive) heart failure ([facility])     Diabetes mellitus ([facility])     GERD (gastroesophageal reflux disease)     High cholesterol     Hyperlipidemia     Pacemaker     Stroke ([facility])      Past Surgical History:   Procedure Laterality Date    APPENDECTOMY      ATRIAL CARDIAC PACEMAKER INSERTION      BARIATRIC SURGERY  05/2021    BONE BIOPSY Left 7/26/2023    Procedure: EXCISION BIOPSY BONE LESION EXTREMITY;  Surgeon: Adelia Yousif DPM;  [facility];  Service: Podiatry    EPIDURAL BLOCK INJECTION N/A 5/19/2022    Procedure: BLOCK / INJECTION EPIDURAL STEROID CERVICAL C7-T1;  Surgeon: Skyler Quinn MD;  [facility]: [facility];  Service: Pain Management     FL GUIDED NEEDLE PLAC BX/ASP/INJ  3/22/2022    FOOT AMPUTATION Left 4/28/2022    Procedure: LEFT TRANSMETATARSAL AMPUTATION.;  Surgeon: Nestor Madden DPM;  [facility];  Service: Podiatry    NERVE BLOCK Right 2/10/2022    Procedure: BLOCK MEDIAL BRANCH C3, C4, C5 #1;  Surgeon: Skyler Quinn MD;  [facility];  Service: Pain Management     NERVE BLOCK Right 3/22/2022    Procedure: BLOCK MEDIAL BRANCH C3, C4, C5 #2;  Surgeon: Skyler Quinn MD;  [facility];  Service: Pain Management     TN AMPUTATION FOOT TRANSMETARSAL Left 4/12/2023    Procedure: REVISION LEFT TRANSMETATARSAL (TMA) AMPUTATION, REMOVAL OF UNVIABLE TISSUE AND BONE,;  Surgeon: Adelia Yousif DPM;  [facility];  Service: Podiatry    TN AMPUTATION METATARSAL W/TOE SINGLE Left 4/7/2023    Procedure: 2ND RAY RESECTION FOOT;  Surgeon: Adelia Yousif DPM;  [facility];  Service: Podiatry    TN AMPUTATION TOE INTERPHALANGEAL JOINT Left 11/16/2021    Procedure: AMPUTATION LESSER TOE;  Surgeon: Nestor Madden DPM;  [facility];  Service: Podiatry    RADIOFREQUENCY ABLATION Right 4/7/2022    Procedure: Right C3, C4, C5 RFA;  Surgeon: Skyler Quinn MD;  [facility];  Service: Pain Management     RHIZOTOMY Right 2/9/2023    Procedure: RHIZOTOMY CERVICAL MEDIAL BRANCH NERVES RIGHT C3, C4, C5;  Surgeon: Skyler Quinn MD;  [facility];  Service: Pain Management     TOE AMPUTATION Left     2nd toe    TOE AMPUTATION Left 9/15/2021    Procedure: AMPUTATION LEFT 4TH TOE;  Surgeon: Nestor Madden DPM;  [facility];  Service: Podiatry    TOE AMPUTATION Right 1/12/2022    Procedure: AMPUTATION TOE;  Surgeon: Hong oJlly DPM;  [facility];  Service: Podiatry    TOE AMPUTATION Right 2/23/2022    Procedure: AMPUTATION TOE  RIGHT SECOND;  Surgeon: Hong Jolly DPM;  Location:  MAIN OR;  Service: Podiatry    TOE AMPUTATION Right 6/3/2022    Procedure: AMPUTATION right 4th TOE;  Surgeon: Nestor Madden DPM;  [facility];  Service: Podiatry     Family History   Problem Relation Age of Onset    No Known Problems Mother     No Known Problems Father       Social History     Socioeconomic History    Marital status: /Civil Union     Spouse name: None    Number of children: None    Years of education: None    Highest education level: None   Occupational History    Occupation: Maintenance Tech     Employer: [employer]   Tobacco Use    Smoking status: Never     Passive exposure: Never    Smokeless tobacco: Never   Vaping Use    Vaping status: Never Used   Substance and Sexual Activity    Alcohol use: Never    Drug use: Never    Sexual activity: Yes   Other Topics Concern    None   Social History Narrative    None     Social Determinants of Health     Financial Resource Strain: Low Risk  (10/19/2023)    [facility]    Overall Financial Resource Strain (CARDIA)     Difficulty of Paying Living Expenses: Not hard at all   Food Insecurity: No Food Insecurity (10/19/2023)    [facility]    Hunger Vital Sign      Worried About Running Out of Food in the Last Year: Never true     Ran Out of Food in the Last Year: Never true   Transportation Needs: No Transportation Needs (10/19/2023)    [facility]    PRAPARE - Transportation     Lack of Transportation (Medical): No     Lack of Transportation (Non-Medical): No   Physical Activity: Sufficiently Active (10/19/2023)    Received from [facility]    Exercise Vital Sign     Days of Exercise per Week: 7 days     Minutes of Exercise per Session: 60 min   Stress: Stress Concern Present (10/19/2023)    [facility]    [employer] - Occupational Stress Questionnaire     Feeling of Stress : Very much   Social Connections: Unknown (6/18/2024)    Received from [facility]     How often do you feel lonely or isolated from those around you? (Adult - for ages 18 years and over): Not on file   Intimate Partner Violence: Not At Risk (10/19/2023)    [facility]    Humiliation, Afraid, Rape, and Kick questionnaire     Fear of Current or Ex-Partner: No     Emotionally Abused: No     Physically Abused: No     Sexually Abused: No   Housing Stability: Low Risk  (10/19/2023)    [facility]    Housing Stability Vital Sign     Unable to Pay for Housing in the Last Year: No     Number of Places Lived in the Last Year: 1     Unstable Housing in the Last Year: No        Current Outpatient Medications:     buPROPion (Wellbutrin XL) 300 mg 24 hr tablet, Take 1 tablet (300 mg total) by mouth daily, Disp: 90 tablet, Rfl: 0    busPIRone (BUSPAR) 15 mg tablet, Take 1 tablet (15 mg total) by mouth 3 (three) times a day, Disp: 90 tablet, Rfl: 2    calcium citrate-vitamin D 315 mg-5 mcg tablet, Take 2 tablets by  mouth 2 (two) times a day, Disp: , Rfl:     doxepin (SINEquan) 50 mg capsule, Take 1 capsule (50 mg total) by mouth daily at bedtime, Disp: 30 capsule, Rfl: 2    ferrous sulfate 325 (65 Fe) mg tablet, Take 1 tablet (325 mg total) by mouth daily with breakfast Do not start before June 19, 2023., Disp: 30 tablet, Rfl: 0    furosemide (LASIX) 40 mg tablet, Take 40 mg by mouth every morning, Disp: , Rfl:     gabapentin (NEURONTIN) 800 mg tablet, Take 1 tablet (800 mg total) by mouth 3 (three) times a day, Disp: 90 tablet, Rfl: 2    metolazone (ZAROXOLYN) 2.5 mg tablet, TAKE 1 TAB BY MOUTH ONCE A DAY ON MONDAY, WEDNESDAY, AND FRIDAY ONLY, Disp: , Rfl:     Multiple Vitamins-Minerals (Mens Multivitamin) TABS, Take 1 tablet by mouth 2 (two) times a day, Disp: , Rfl:     naloxone (NARCAN) 4 mg/0.1 mL nasal spray, , Disp: , Rfl:     pantoprazole (PROTONIX) 40 mg tablet, Take 40 mg by mouth daily, Disp: , Rfl:     potassium chloride (KLOR-CON) 20 mEq packet, Take 20 mEq by mouth 2 (two) times a day, Disp: , Rfl:     Xarelto 20 MG tablet, , Disp: , Rfl:     lidocaine (Lidoderm) 5 %, Apply 1 patch topically over 12 hours daily for 15 days Remove & Discard patch within 12 hours or as directed by MD, Disp: 15 patch, Rfl: 0    Review of Systems   Constitutional:  Negative for chills and fever.   Respiratory:  Negative for shortness of breath.    Cardiovascular:  Negative for chest pain, palpitations and leg swelling.   Gastrointestinal:  Negative for vomiting.   Skin:  Positive for wound (R 2nd toe).   Neurological:  Positive for numbness (Neuropathy).   Psychiatric/Behavioral:  The patient is not nervous/anxious.        Objective:  /60   Pulse 71   Temp 97.8 °F (36.6 °C)   Resp 20         Physical Exam  Vitals reviewed.   Constitutional:       General: He is not in acute distress.     Appearance: He is not ill-appearing, toxic-appearing or diaphoretic.   Cardiovascular:      Rate and Rhythm: Normal rate.      Comments:  Though palpable, diminished DP/PT bilaterally  Pulmonary:      Effort: Pulmonary effort is normal. No respiratory distress.   Musculoskeletal:      Right Lower Extremity: (S/p right fourth toe amp; partial second toe amp)     Left Lower Extremity: (s/p L TMA)  Skin:     General: Skin is warm.      Findings: Wound (DFU R 2nd toe stump) present.             Comments: 1- When compared to his last wound center visit, measurements are increased. Wound bed with fibrous appearing granulation and hypergranulation tissue with biofilm/slough layer overlying along with significant periwound macerated callus with undermining.  No purulence or malodor.  Slight edema of the digit without induration, crepitus, fluctuance.    Bilateral lower extremities with significant evidence of venous stasis - hyperpigmentation/hemosiderin staining, mild nonpitting lower extremity edema and dry skin     Neurological:      Mental Status: He is alert and oriented to person, place, and time.   Psychiatric:         Mood and Affect: Mood normal.         Behavior: Behavior normal.         Wound 07/12/24 Toe D2, second Anterior;Right;Plantar (Active)   Wound Image   10/22/24 1429   Wound Description Pink;Yellow;Pale 08/16/24 1411   Katy-wound Assessment Dry;Callus 08/16/24 1411   Wound Length (cm) 0.7 cm 08/16/24 1411   Wound Width (cm) 0.6 cm 08/16/24 1411   Wound Depth (cm) 0.4 cm 08/16/24 1411   Wound Surface Area (cm^2) 0.42 cm^2 08/16/24 1411   Wound Volume (cm^3) 0.168 cm^3 08/16/24 1411   Calculated Wound Volume (cm^3) 0.17 cm^3 08/16/24 1411   Change in Wound Size % -30.77 08/16/24 1411   Number of underminings 1 07/12/24 1442   Undermining 1 0.2 08/02/24 1359   Undermining 1 is depth extending from 12- 2 oclock deepest at 2 oclock 08/02/24 1359   Drainage Amount Small 08/16/24 1411   Drainage Description Serosanguineous 08/16/24 1411   Non-staged Wound Description Full thickness 08/16/24 1411   Treatments Cleansed 07/19/24 1431  "      Debridement   Wound 07/12/24 Toe D2, second Anterior;Right;Plantar    Universal Protocol:  procedure performed by consultantConsent: Verbal consent obtained. Written consent obtained.  Consent given by: patient  Time out: Immediately prior to procedure a \"time out\" was called to verify the correct patient, procedure, equipment, support staff and site/side marked as required.  Patient understanding: patient states understanding of the procedure being performed  Patient identity confirmed: verbally with patient    Debridement Details  Performed by: physician  Debridement type: selective  Pain control: lidocaine 4%      Post-debridement measurements  Length (cm): 0.7  Width (cm): 0.6  Depth (cm): 0.4  Percent debrided: 100%  Surface Area (cm^2): 0.42  Area Debrided (cm^2): 0.42  Volume (cm^3): 0.17    Devitalized tissue debrided: biofilm, callus and slough  Instrument(s) utilized: blade and forceps  Bleeding: small  Hemostasis obtained with: pressure  Procedural pain (0-10): 0  Post-procedural pain: 0   Response to treatment: procedure was tolerated well             Lab Results   Component Value Date    HGBA1C 5.4 04/17/2024       Wound Instructions:  Orders Placed This Encounter   Procedures    Debridement Anterior;Right;Plantar Toe D2, second     This order was created via procedure documentation    Wound cleansing and dressings Anterior;Right;Plantar Toe D2, second     Wash your hands with soap and water.  Remove old dressing, discard into plastic bag and place in trash.    Cleanse the wound with mld, unscented soap (Dove)  prior to applying a clean dressing. Do not use tissue or cotton balls. Do not scrub the wound. Pat dry using gauze.    You may shower but must keep the dressing/ dressing dry. You can cover the dressing with a cast cover that can be purchased online.        Apply Acticoat  3 to the R toe wound.    Cover with gauze  Secure with audi and tape  Change dressing three times a week    Watch for " signs of infection (redness,warmth to the touch, increased pain, odor, pus, swelling, fever, chills, nausea, vomiting). If you notice any of these call the wound center or proceed to the ER.     Standing Status:   Future     Standing Expiration Date:   10/29/2024    Wound Procedure Treatment Anterior;Right;Plantar Toe D2, second     This order was created via procedure documentation    XR toe right second min 2 views     Standing Status:   Future     Number of Occurrences:   1     Standing Expiration Date:   10/22/2028     Scheduling Instructions:      Bring along any outside films relating to this procedure.             Teena Kurtz MD

## 2024-10-31 ENCOUNTER — OFFICE VISIT (OUTPATIENT)
Facility: CLINIC | Age: 59
End: 2024-10-31
Payer: COMMERCIAL

## 2024-10-31 ENCOUNTER — HOSPITAL ENCOUNTER (INPATIENT)
Facility: HOSPITAL | Age: 59
LOS: 1 days | Discharge: HOME | DRG: 565 | End: 2024-11-01
Attending: FAMILY MEDICINE | Admitting: FAMILY MEDICINE
Payer: COMMERCIAL

## 2024-10-31 VITALS
DIASTOLIC BLOOD PRESSURE: 60 MMHG | RESPIRATION RATE: 18 BRPM | SYSTOLIC BLOOD PRESSURE: 113 MMHG | TEMPERATURE: 97.2 F | HEART RATE: 96 BPM

## 2024-10-31 DIAGNOSIS — L08.9 DIABETIC INFECTION OF LEFT FOOT (HCC): ICD-10-CM

## 2024-10-31 DIAGNOSIS — E11.42 TYPE 2 DIABETES MELLITUS WITH DIABETIC POLYNEUROPATHY, WITHOUT LONG-TERM CURRENT USE OF INSULIN (HCC): ICD-10-CM

## 2024-10-31 DIAGNOSIS — E11.621 DIABETIC ULCER OF TOE OF RIGHT FOOT ASSOCIATED WITH TYPE 2 DIABETES MELLITUS, WITH NECROSIS OF BONE (HCC): Primary | ICD-10-CM

## 2024-10-31 DIAGNOSIS — L97.514 DIABETIC ULCER OF TOE OF RIGHT FOOT ASSOCIATED WITH TYPE 2 DIABETES MELLITUS, WITH NECROSIS OF BONE (HCC): Primary | ICD-10-CM

## 2024-10-31 DIAGNOSIS — Z79.4 TYPE 2 DIABETES MELLITUS WITH DIABETIC POLYNEUROPATHY, WITH LONG-TERM CURRENT USE OF INSULIN (HCC): ICD-10-CM

## 2024-10-31 DIAGNOSIS — E11.42 TYPE 2 DIABETES MELLITUS WITH DIABETIC POLYNEUROPATHY, WITH LONG-TERM CURRENT USE OF INSULIN (HCC): ICD-10-CM

## 2024-10-31 DIAGNOSIS — L03.031 CELLULITIS OF RIGHT TOE: ICD-10-CM

## 2024-10-31 DIAGNOSIS — M86.9 OSTEOMYELITIS (HCC): Primary | ICD-10-CM

## 2024-10-31 DIAGNOSIS — F41.1 GENERALIZED ANXIETY DISORDER: ICD-10-CM

## 2024-10-31 DIAGNOSIS — E11.628 DIABETIC INFECTION OF LEFT FOOT (HCC): ICD-10-CM

## 2024-10-31 LAB
ALBUMIN SERPL BCG-MCNC: 4.5 G/DL (ref 3.5–5)
ALP SERPL-CCNC: 110 U/L (ref 34–104)
ALT SERPL W P-5'-P-CCNC: 13 U/L (ref 7–52)
ANION GAP SERPL CALCULATED.3IONS-SCNC: 9 MMOL/L (ref 4–13)
APTT PPP: 32 SECONDS (ref 23–34)
AST SERPL W P-5'-P-CCNC: 20 U/L (ref 13–39)
BASOPHILS # BLD AUTO: 0.06 THOUSANDS/ΜL (ref 0–0.1)
BASOPHILS NFR BLD AUTO: 1 % (ref 0–1)
BILIRUB SERPL-MCNC: 0.62 MG/DL (ref 0.2–1)
BUN SERPL-MCNC: 20 MG/DL (ref 5–25)
CALCIUM SERPL-MCNC: 9.2 MG/DL (ref 8.4–10.2)
CHLORIDE SERPL-SCNC: 100 MMOL/L (ref 96–108)
CO2 SERPL-SCNC: 29 MMOL/L (ref 21–32)
CREAT SERPL-MCNC: 1.28 MG/DL (ref 0.6–1.3)
CRP SERPL QL: 18 MG/L
EOSINOPHIL # BLD AUTO: 0.13 THOUSAND/ΜL (ref 0–0.61)
EOSINOPHIL NFR BLD AUTO: 1 % (ref 0–6)
ERYTHROCYTE [DISTWIDTH] IN BLOOD BY AUTOMATED COUNT: 13.7 % (ref 11.6–15.1)
ERYTHROCYTE [SEDIMENTATION RATE] IN BLOOD: 37 MM/HOUR (ref 0–19)
GFR SERPL CREATININE-BSD FRML MDRD: 60 ML/MIN/1.73SQ M
GLUCOSE SERPL-MCNC: 94 MG/DL (ref 65–140)
HCT VFR BLD AUTO: 47.1 % (ref 36.5–49.3)
HGB BLD-MCNC: 15.5 G/DL (ref 12–17)
IMM GRANULOCYTES # BLD AUTO: 0.02 THOUSAND/UL (ref 0–0.2)
IMM GRANULOCYTES NFR BLD AUTO: 0 % (ref 0–2)
INR PPP: 0.98 (ref 0.85–1.19)
LACTATE SERPL-SCNC: 1.2 MMOL/L (ref 0.5–2)
LYMPHOCYTES # BLD AUTO: 2.85 THOUSANDS/ΜL (ref 0.6–4.47)
LYMPHOCYTES NFR BLD AUTO: 27 % (ref 14–44)
MCH RBC QN AUTO: 28.1 PG (ref 26.8–34.3)
MCHC RBC AUTO-ENTMCNC: 32.9 G/DL (ref 31.4–37.4)
MCV RBC AUTO: 86 FL (ref 82–98)
MONOCYTES # BLD AUTO: 1.35 THOUSAND/ΜL (ref 0.17–1.22)
MONOCYTES NFR BLD AUTO: 13 % (ref 4–12)
NEUTROPHILS # BLD AUTO: 6.02 THOUSANDS/ΜL (ref 1.85–7.62)
NEUTS SEG NFR BLD AUTO: 58 % (ref 43–75)
NRBC BLD AUTO-RTO: 0 /100 WBCS
PLATELET # BLD AUTO: 265 THOUSANDS/UL (ref 149–390)
PMV BLD AUTO: 9.5 FL (ref 8.9–12.7)
POTASSIUM SERPL-SCNC: 3.6 MMOL/L (ref 3.5–5.3)
PROCALCITONIN SERPL-MCNC: <0.05 NG/ML
PROT SERPL-MCNC: 7.6 G/DL (ref 6.4–8.4)
PROTHROMBIN TIME: 13.4 SECONDS (ref 12.3–15)
RBC # BLD AUTO: 5.51 MILLION/UL (ref 3.88–5.62)
SODIUM SERPL-SCNC: 138 MMOL/L (ref 135–147)
WBC # BLD AUTO: 10.43 THOUSAND/UL (ref 4.31–10.16)

## 2024-10-31 PROCEDURE — 85730 THROMBOPLASTIN TIME PARTIAL: CPT | Performed by: PHYSICIAN ASSISTANT

## 2024-10-31 PROCEDURE — 90673 RIV3 VACCINE NO PRESERV IM: CPT | Performed by: FAMILY MEDICINE

## 2024-10-31 PROCEDURE — 90471 IMMUNIZATION ADMIN: CPT | Performed by: FAMILY MEDICINE

## 2024-10-31 PROCEDURE — 99223 1ST HOSP IP/OBS HIGH 75: CPT | Performed by: NURSE PRACTITIONER

## 2024-10-31 PROCEDURE — 99213 OFFICE O/P EST LOW 20 MIN: CPT | Performed by: STUDENT IN AN ORGANIZED HEALTH CARE EDUCATION/TRAINING PROGRAM

## 2024-10-31 PROCEDURE — 99215 OFFICE O/P EST HI 40 MIN: CPT | Performed by: STUDENT IN AN ORGANIZED HEALTH CARE EDUCATION/TRAINING PROGRAM

## 2024-10-31 PROCEDURE — 99284 EMERGENCY DEPT VISIT MOD MDM: CPT

## 2024-10-31 PROCEDURE — 80053 COMPREHEN METABOLIC PANEL: CPT | Performed by: PHYSICIAN ASSISTANT

## 2024-10-31 PROCEDURE — 86140 C-REACTIVE PROTEIN: CPT | Performed by: PHYSICIAN ASSISTANT

## 2024-10-31 PROCEDURE — 83605 ASSAY OF LACTIC ACID: CPT | Performed by: PHYSICIAN ASSISTANT

## 2024-10-31 PROCEDURE — 87040 BLOOD CULTURE FOR BACTERIA: CPT | Performed by: PHYSICIAN ASSISTANT

## 2024-10-31 PROCEDURE — 85025 COMPLETE CBC W/AUTO DIFF WBC: CPT | Performed by: PHYSICIAN ASSISTANT

## 2024-10-31 PROCEDURE — 84145 PROCALCITONIN (PCT): CPT | Performed by: PHYSICIAN ASSISTANT

## 2024-10-31 PROCEDURE — 36415 COLL VENOUS BLD VENIPUNCTURE: CPT | Performed by: PHYSICIAN ASSISTANT

## 2024-10-31 PROCEDURE — 85652 RBC SED RATE AUTOMATED: CPT | Performed by: PHYSICIAN ASSISTANT

## 2024-10-31 PROCEDURE — 99285 EMERGENCY DEPT VISIT HI MDM: CPT | Performed by: PHYSICIAN ASSISTANT

## 2024-10-31 PROCEDURE — 85610 PROTHROMBIN TIME: CPT | Performed by: PHYSICIAN ASSISTANT

## 2024-10-31 RX ORDER — METRONIDAZOLE 500 MG/100ML
500 INJECTION, SOLUTION INTRAVENOUS EVERY 8 HOURS
Status: DISCONTINUED | OUTPATIENT
Start: 2024-10-31 | End: 2024-11-01

## 2024-10-31 RX ORDER — BUPROPION HYDROCHLORIDE 150 MG/1
300 TABLET ORAL DAILY
Status: CANCELLED | OUTPATIENT
Start: 2024-11-01

## 2024-10-31 RX ORDER — FERROUS SULFATE 325(65) MG
325 TABLET ORAL
Status: CANCELLED | OUTPATIENT
Start: 2024-11-01

## 2024-10-31 RX ORDER — ACETAMINOPHEN 325 MG/1
975 TABLET ORAL EVERY 8 HOURS PRN
Status: DISCONTINUED | OUTPATIENT
Start: 2024-10-31 | End: 2024-11-01 | Stop reason: HOSPADM

## 2024-10-31 RX ORDER — CEFEPIME HYDROCHLORIDE 2 G/50ML
2000 INJECTION, SOLUTION INTRAVENOUS EVERY 8 HOURS
Status: DISCONTINUED | OUTPATIENT
Start: 2024-11-01 | End: 2024-11-01

## 2024-10-31 RX ORDER — DOXEPIN HYDROCHLORIDE 25 MG/1
50 CAPSULE ORAL
Status: DISCONTINUED | OUTPATIENT
Start: 2024-10-31 | End: 2024-11-01 | Stop reason: HOSPADM

## 2024-10-31 RX ORDER — OXYCODONE HYDROCHLORIDE 5 MG/1
5 TABLET ORAL EVERY 6 HOURS PRN
Status: DISCONTINUED | OUTPATIENT
Start: 2024-10-31 | End: 2024-11-01 | Stop reason: HOSPADM

## 2024-10-31 RX ORDER — GABAPENTIN 800 MG/1
900 TABLET ORAL 3 TIMES DAILY
Status: CANCELLED | OUTPATIENT
Start: 2024-11-01

## 2024-10-31 RX ORDER — OXYCODONE HYDROCHLORIDE 5 MG/1
5 TABLET ORAL ONCE
Status: COMPLETED | OUTPATIENT
Start: 2024-10-31 | End: 2024-10-31

## 2024-10-31 RX ORDER — CEFEPIME HYDROCHLORIDE 1 G/50ML
1000 INJECTION, SOLUTION INTRAVENOUS ONCE
Status: COMPLETED | OUTPATIENT
Start: 2024-10-31 | End: 2024-10-31

## 2024-10-31 RX ORDER — BUSPIRONE HYDROCHLORIDE 5 MG/1
15 TABLET ORAL 3 TIMES DAILY
Status: CANCELLED | OUTPATIENT
Start: 2024-11-01

## 2024-10-31 RX ORDER — VANCOMYCIN HYDROCHLORIDE 1 G/200ML
1000 INJECTION, SOLUTION INTRAVENOUS EVERY 12 HOURS
Status: DISCONTINUED | OUTPATIENT
Start: 2024-11-01 | End: 2024-11-01

## 2024-10-31 RX ORDER — DOXEPIN HYDROCHLORIDE 25 MG/1
50 CAPSULE ORAL
Status: CANCELLED | OUTPATIENT
Start: 2024-10-31

## 2024-10-31 RX ORDER — GABAPENTIN 300 MG/1
900 CAPSULE ORAL 3 TIMES DAILY
Status: DISCONTINUED | OUTPATIENT
Start: 2024-10-31 | End: 2024-11-01 | Stop reason: HOSPADM

## 2024-10-31 RX ORDER — FERROUS SULFATE 325(65) MG
325 TABLET ORAL
Status: DISCONTINUED | OUTPATIENT
Start: 2024-11-01 | End: 2024-11-01 | Stop reason: HOSPADM

## 2024-10-31 RX ORDER — OXYCODONE HYDROCHLORIDE 5 MG/1
2.5 TABLET ORAL EVERY 6 HOURS PRN
Status: DISCONTINUED | OUTPATIENT
Start: 2024-10-31 | End: 2024-11-01 | Stop reason: HOSPADM

## 2024-10-31 RX ORDER — LIDOCAINE 40 MG/G
CREAM TOPICAL ONCE
Status: DISCONTINUED | OUTPATIENT
Start: 2024-10-31 | End: 2024-10-31

## 2024-10-31 RX ORDER — ENOXAPARIN SODIUM 100 MG/ML
40 INJECTION SUBCUTANEOUS DAILY
Status: DISCONTINUED | OUTPATIENT
Start: 2024-11-01 | End: 2024-11-01 | Stop reason: HOSPADM

## 2024-10-31 RX ORDER — BUPROPION HYDROCHLORIDE 150 MG/1
300 TABLET ORAL DAILY
Status: DISCONTINUED | OUTPATIENT
Start: 2024-11-01 | End: 2024-11-01 | Stop reason: HOSPADM

## 2024-10-31 RX ADMIN — DOXEPIN HYDROCHLORIDE 50 MG: 25 CAPSULE ORAL at 22:34

## 2024-10-31 RX ADMIN — GABAPENTIN 900 MG: 300 CAPSULE ORAL at 22:34

## 2024-10-31 RX ADMIN — CEFEPIME HYDROCHLORIDE 1000 MG: 1 INJECTION, SOLUTION INTRAVENOUS at 19:55

## 2024-10-31 RX ADMIN — METRONIDAZOLE 500 MG: 500 INJECTION, SOLUTION INTRAVENOUS at 23:18

## 2024-10-31 RX ADMIN — VANCOMYCIN HYDROCHLORIDE 2000 MG: 500 INJECTION, POWDER, LYOPHILIZED, FOR SOLUTION INTRAVENOUS at 20:46

## 2024-10-31 RX ADMIN — OXYCODONE HYDROCHLORIDE 5 MG: 5 TABLET ORAL at 21:20

## 2024-10-31 RX ADMIN — LIDOCAINE 1 APPLICATION: 40 CREAM TOPICAL at 14:18

## 2024-10-31 RX ADMIN — INFLUENZA A VIRUS A/WEST VIRGINIA/30/2022 (H1N1) RECOMBINANT HEMAGGLUTININ ANTIGEN, INFLUENZA A VIRUS A/MASSACHUSETTS/18/2022 (H3N2) RECOMBINANT HEMAGGLUTININ ANTIGEN, AND INFLUENZA B VIRUS B/AUSTRIA/1359417/2021 RECOMBINANT HEMAGGLUTININ ANTIGEN 0.5 ML: 45; 45; 45 INJECTION INTRAMUSCULAR at 22:46

## 2024-10-31 NOTE — PROGRESS NOTES
Wound Procedure Treatment Anterior;Right;Plantar Toe D2, second    Performed by: Yamileth Ghosh RN  Authorized by: Adelia Yousif DPM    Associated wounds:   Wound 07/12/24 Toe D2, second Anterior;Right;Plantar  Applied Topical: Betadine    Applied secondary dressing:  Gauze  Dressing secured with:  Vasquez and Tape

## 2024-10-31 NOTE — PATIENT INSTRUCTIONS
Orders Placed This Encounter   Procedures    Wound cleansing and dressings Anterior;Right;Plantar Toe D2, second     Report to the Emergency Room following today's visit for further evaluation of right toe wound.     Standing Status:   Future     Standing Expiration Date:   11/7/2024

## 2024-10-31 NOTE — ED PROVIDER NOTES
Time reflects when diagnosis was documented in both MDM as applicable and the Disposition within this note       Time User Action Codes Description Comment    10/31/2024  7:48 PM Ned Ludwig Add [M86.9] Osteomyelitis (HCC)           ED Disposition       ED Disposition   Admit    Condition   Stable    Date/Time   Thu Oct 31, 2024  7:50 PM    Comment   Case was discussed with sanchez and the patient's admission status was agreed to be Admission Status: inpatient status to the service of Dr. mi .               Assessment & Plan       Medical Decision Making  Patient is a 59-year-old male with a past medical history of diabetes who presents with a worsening diabetic wound over the right second toe.  Patient was seen by podiatry.  Is recommended that the patient proceed to go to the emergency department for further evaluation.  At this time concern for osteomyelitis.    Her podiatry note from today recommendation is inpatient admission and further imaging with MRI.  And see her note from today for more details.  Picture of wound is above.    The patient was seen in clinic and at this time discussed with michael for admission, did place orders for lab work, cefepime and vancomycin  Discussed for admission and accepted under Dr. Mi     Patient was in agreement with treatment plan.    Amount and/or Complexity of Data Reviewed  External Data Reviewed: notes.  Labs: ordered. Decision-making details documented in ED Course.  Radiology: ordered and independent interpretation performed. Decision-making details documented in ED Course.    Risk  Prescription drug management.             Medications   cefepime (MAXIPIME) IVPB (premix in dextrose) 1,000 mg 50 mL (has no administration in time range)   vancomycin (VANCOCIN) 2,000 mg in sodium chloride 0.9 % 500 mL IVPB (has no administration in time range)       ED Risk Strat Scores                           SBIRT 20yo+      Flowsheet Row Most Recent Value   Initial Alcohol  Screen: US AUDIT-C     1. How often do you have a drink containing alcohol? 0 Filed at: 10/31/2024 1920   2. How many drinks containing alcohol do you have on a typical day you are drinking?  0 Filed at: 10/31/2024 1920   3a. Male UNDER 65: How often do you have five or more drinks on one occasion? 0 Filed at: 10/31/2024 1920   3b. FEMALE Any Age, or MALE 65+: How often do you have 4 or more drinks on one occassion? 0 Filed at: 10/31/2024 1920   Audit-C Score 0 Filed at: 10/31/2024 1920   ANGELO: How many times in the past year have you...    Used an illegal drug or used a prescription medication for non-medical reasons? Never Filed at: 10/31/2024 1920                            History of Present Illness       Chief Complaint   Patient presents with    Foot Pain     Pt is under wound care, they want an mri tomorrow then amputate the toe. R foot ulcer that has be treated x 3months       Past Medical History:   Diagnosis Date    Atrial fibrillation (HCC)     Benzodiazepine withdrawal with complication (HCC) 06/15/2023    Chronic diastolic (congestive) heart failure (HCC)     Diabetes mellitus (HCC)     GERD (gastroesophageal reflux disease)     High cholesterol     Hyperlipidemia     Pacemaker     Stroke (HCC)       Past Surgical History:   Procedure Laterality Date    APPENDECTOMY      ATRIAL CARDIAC PACEMAKER INSERTION      BARIATRIC SURGERY  05/2021    BONE BIOPSY Left 7/26/2023    Procedure: EXCISION BIOPSY BONE LESION EXTREMITY;  Surgeon: Adelia Yousif DPM;  [facility];  Service: Podiatry    EPIDURAL BLOCK INJECTION N/A 5/19/2022    Procedure: BLOCK / INJECTION EPIDURAL STEROID CERVICAL C7-T1;  Surgeon: Skyler Quinn MD;  Location: [facility];  Service: Pain Management     FL GUIDED NEEDLE PLAC BX/ASP/INJ  3/22/2022    FOOT AMPUTATION Left 4/28/2022    Procedure: LEFT TRANSMETATARSAL AMPUTATION.;  Surgeon: Nestor Madden DPM;  [facility];  Service: Podiatry    NERVE BLOCK Right  2/10/2022    Procedure: BLOCK MEDIAL BRANCH C3, C4, C5 #1;  Surgeon: Skyler Quinn MD;  [facility]: [facility];  Service: Pain Management     NERVE BLOCK Right 3/22/2022    Procedure: BLOCK MEDIAL BRANCH C3, C4, C5 #2;  Surgeon: Skyler Quinn MD;  [facility];  Service: Pain Management     WA AMPUTATION FOOT TRANSMETARSAL Left 4/12/2023    Procedure: REVISION LEFT TRANSMETATARSAL (TMA) AMPUTATION, REMOVAL OF UNVIABLE TISSUE AND BONE,;  Surgeon: Adelia Yousif DPM;  [facility];  Service: Podiatry    WA AMPUTATION METATARSAL W/TOE SINGLE Left 4/7/2023    Procedure: 2ND RAY RESECTION FOOT;  Surgeon: Adelia Yousif DPM;  [facility];  Service: Podiatry    WA AMPUTATION TOE INTERPHALANGEAL JOINT Left 11/16/2021    Procedure: AMPUTATION LESSER TOE;  Surgeon: Nestor Madden DPM;  [facility];  Service: Podiatry    RADIOFREQUENCY ABLATION Right 4/7/2022    Procedure: Right C3, C4, C5 RFA;  Surgeon: Skyler Quinn MD;  [facility]: [facility];  Service: Pain Management     RHIZOTOMY Right 2/9/2023    Procedure: RHIZOTOMY CERVICAL MEDIAL BRANCH NERVES RIGHT C3, C4, C5;  Surgeon: Skyler Quinn MD;  [facility];  Service: Pain Management     TOE AMPUTATION Left     2nd toe    TOE AMPUTATION Left 9/15/2021    Procedure: AMPUTATION LEFT 4TH TOE;  Surgeon: Nestor Madden DPM;  [facility];  Service: Podiatry    TOE AMPUTATION Right 1/12/2022    Procedure: AMPUTATION TOE;  Surgeon: Hong Jolly DPM;  [facility];  Service: Podiatry    TOE AMPUTATION Right 2/23/2022    Procedure: AMPUTATION TOE  RIGHT SECOND;  Surgeon: Hong Jolly DPM;  Location: [facility];  Service: Podiatry    TOE AMPUTATION Right 6/3/2022    Procedure: AMPUTATION right 4th TOE;  Surgeon: Nestor Madden DPM;  [facility];  Service: Podiatry      Family History   Problem Relation Age of Onset    No Known Problems Mother     No Known Problems Father       Social History     Tobacco  Use    Smoking status: Never     Passive exposure: Never    Smokeless tobacco: Never   Vaping Use    Vaping status: Never Used   Substance Use Topics    Alcohol use: Never    Drug use: Never      E-Cigarette/Vaping    E-Cigarette Use Never User       E-Cigarette/Vaping Substances      I have reviewed and agree with the history as documented.     Patient is a 59-year-old male with a past medical history of diabetes who presents with a worsening diabetic wound over the right second toe.  Patient was seen by podiatry.  Is recommended that the patient proceed to go to the emergency department for further evaluation.  At this time concern for osteomyelitis.          Review of Systems   All other systems reviewed and are negative.          Objective       ED Triage Vitals [10/31/24 1918]   Temperature Pulse Blood Pressure Respirations SpO2 Patient Position - Orthostatic VS   98.3 °F (36.8 °C) 67 120/61 18 94 % Sitting      Temp Source Heart Rate Source BP Location FiO2 (%) Pain Score    Tympanic Monitor Left arm -- 5      Vitals      Date and Time Temp Pulse SpO2 Resp BP Pain Score FACES Pain Rating User   10/31/24 1918 98.3 °F (36.8 °C) 67 94 % 18 120/61 5 -- LAK            Physical Exam  Vitals and nursing note reviewed.   Constitutional:       General: He is not in acute distress.     Appearance: He is well-developed.   HENT:      Head: Normocephalic and atraumatic.   Eyes:      Pupils: Pupils are equal, round, and reactive to light.   Cardiovascular:      Rate and Rhythm: Normal rate and regular rhythm.      Pulses:           Dorsalis pedis pulses are 2+ on the right side.        Posterior tibial pulses are 2+ on the right side.   Pulmonary:      Effort: Pulmonary effort is normal.      Breath sounds: Normal breath sounds.   Abdominal:      General: Bowel sounds are normal.      Palpations: Abdomen is soft.      Tenderness: There is no abdominal tenderness.   Musculoskeletal:      Cervical back: Normal range of  motion.   Skin:     General: Skin is warm and dry.      Capillary Refill: Capillary refill takes less than 2 seconds.   Neurological:      Mental Status: He is alert and oriented to person, place, and time.   Psychiatric:         Behavior: Behavior normal.                     Results Reviewed       Procedure Component Value Units Date/Time    CBC and differential [[id number]] Collected: 10/31/24 1949    Lab Status: No result Specimen: Blood from Arm, Right     Comprehensive metabolic panel [[id number]] Collected: 10/31/24 1949    Lab Status: No result Specimen: Blood from Arm, Right     Lactic acid [[id number]] Collected: 10/31/24 1949    Lab Status: No result Specimen: Blood from Arm, Right     Procalcitonin [[id number]] Collected: 10/31/24 1949    Lab Status: No result Specimen: Blood from Arm, Right     Protime-INR [[id number]] Collected: 10/31/24 1949    Lab Status: No result Specimen: Blood from Arm, Right     APTT [[id number]] Collected: 10/31/24 1949    Lab Status: No result Specimen: Blood from Arm, Right     Blood culture #2 [[id number]] Collected: 10/31/24 1949    Lab Status: No result Specimen: Blood from Arm, Right     C-reactive protein [[id number]] Collected: 10/31/24 1949    Lab Status: No result Specimen: Blood from Arm, Right     Sedimentation rate, automated [[id number]] Collected: 10/31/24 1949    Lab Status: No result Specimen: Blood from Arm, Right     Blood culture #1 [[id number]]     Lab Status: No result Specimen: Blood             No orders to display       Procedures    ED Medication and Procedure Management   Prior to Admission Medications   Prescriptions Last Dose Informant Patient Reported? Taking?   Multiple Vitamins-Minerals (Mens Multivitamin) TABS   Yes No   Sig: Take 1 tablet by mouth 2 (two) times a day   Xarelto 20 MG tablet   Yes No   buPROPion (Wellbutrin XL) 300 mg 24 hr tablet   No No   Sig: Take 1 tablet (300 mg total) by mouth daily   busPIRone (BUSPAR) 15 mg tablet   No No   Sig:  Take 1 tablet (15 mg total) by mouth 3 (three) times a day   calcium citrate-vitamin D 315 mg-5 mcg tablet   Yes No   Sig: Take 2 tablets by mouth 2 (two) times a day   doxepin (SINEquan) 50 mg capsule   No No   Sig: Take 1 capsule (50 mg total) by mouth daily at bedtime   ferrous sulfate 325 (65 Fe) mg tablet   No No   Sig: Take 1 tablet (325 mg total) by mouth daily with breakfast Do not start before June 19, 2023.   furosemide (LASIX) 40 mg tablet   Yes No   Sig: Take 40 mg by mouth every morning   gabapentin (NEURONTIN) 800 mg tablet   No No   Sig: Take 1 tablet (800 mg total) by mouth 3 (three) times a day   lidocaine (Lidoderm) 5 %   No No   Sig: Apply 1 patch topically over 12 hours daily for 15 days Remove & Discard patch within 12 hours or as directed by MD   metolazone (ZAROXOLYN) 2.5 mg tablet   Yes No   Sig: TAKE 1 TAB BY MOUTH ONCE A DAY ON MONDAY, WEDNESDAY, AND FRIDAY ONLY   naloxone (NARCAN) 4 mg/0.1 mL nasal spray   Yes No   pantoprazole (PROTONIX) 40 mg tablet   Yes No   Sig: Take 40 mg by mouth daily   potassium chloride (KLOR-CON) 20 mEq packet   Yes No   Sig: Take 20 mEq by mouth 2 (two) times a day      Facility-Administered Medications Last Administration Doses Remaining   lidocaine (LMX) 4 % cream 10/31/2024  2:18 PM 0        Patient's Medications   Discharge Prescriptions    No medications on file     No discharge procedures on file.  ED SEPSIS DOCUMENTATION   Time reflects when diagnosis was documented in both MDM as applicable and the Disposition within this note       Time User Action Codes Description Comment    10/31/2024  7:48 PM Ned Ludwig Add [M86.9] Osteomyelitis (HCC)                  Ned Ludwig PA-C  10/31/24 1953

## 2024-10-31 NOTE — PROGRESS NOTES
Patient ID: David Carpio is a 59 y.o. male Date of Birth 1965       Chief Complaint   Patient presents with    Follow Up Wound Care Visit     Right foot wound       Allergies:  Ativan [lorazepam]    Diagnosis:  1. Diabetic ulcer of toe of right foot associated with type 2 diabetes mellitus, with necrosis of bone (HCC)  -     Wound cleansing and dressings Anterior;Right;Plantar Toe D2, second; Future  -     lidocaine (LMX) 4 % cream  -     Wound Procedure Treatment Anterior;Right;Plantar Toe D2, second  2. Type 2 diabetes mellitus with diabetic polyneuropathy, with long-term current use of insulin (HCC)  -     Wound cleansing and dressings Anterior;Right;Plantar Toe D2, second; Future  -     Wound Procedure Treatment Anterior;Right;Plantar Toe D2, second  3. Cellulitis of right toe     Diagnosis ICD-10-CM Associated Orders   1. Diabetic ulcer of toe of right foot associated with type 2 diabetes mellitus, with necrosis of bone (HCC)  E11.621 Wound cleansing and dressings Anterior;Right;Plantar Toe D2, second    L97.514 lidocaine (LMX) 4 % cream     Wound Procedure Treatment Anterior;Right;Plantar Toe D2, second      2. Type 2 diabetes mellitus with diabetic polyneuropathy, with long-term current use of insulin (HCC)  E11.42 Wound cleansing and dressings Anterior;Right;Plantar Toe D2, second    Z79.4 Wound Procedure Treatment Anterior;Right;Plantar Toe D2, second      3. Cellulitis of right toe  L03.031            Assessment & Plan:  See wound orders.  (Betadine dsd)  - R 2nd toe DFU appears with fibrogranular base, continued periwound callusing due to continued overload. There is an area of deep probe to bone concerning for clinic OM. The toe is red, hot and swollen with dorsal cellulitis. I recommended that he report to the ED for admission (will need right forefoot MRI and updated ABIs). Will need amputation likely thereafter w/ podiatry.   - Bedside ABIs 0.96 B/L.   - LEADs 4/6/23: B/L LE diffuse dz  throughout fem-pop w/o focal stenosis. RLE 1.24/92/125. LLE 1.16/TMA.   - offload in surgical shoe with offloading felt pad but limit ambulation at all times which I again stressed today. I modified the shoe more to reduce pressure  - Report to ED    Subjective:   yC presents to wound care today concerning toe ulcer. Has surgical shoe on today and trying to wear with WB but has been walking quite a bit still. Diabetic with PN. H/o L TMA.          The following portions of the patient's history were reviewed and updated as appropriate:   Patient Active Problem List   Diagnosis    DAYAN (obstructive sleep apnea)    Chronic diastolic heart failure ([facility])    Hypertension    Diabetes mellitus ([facility])    Morbid obesity with BMI of 40.0-44.9, adult ([facility])    Pain, joint, ankle and foot, left    Chronic osteomyelitis of left foot with draining sinus ([facility])    Atrial fibrillation ([facility])    Anxiety    Type 2 diabetes mellitus with diabetic polyneuropathy, without long-term current use of insulin ([facility])    Toe osteomyelitis, right ([facility])    Cervical spondylosis    Cervicalgia - Right    Diabetic infection of left foot ([facility])    Pacemaker    History of bariatric surgery    Encounter for perioperative consultation    Hyperkalemia    Diabetic ulcer of left midfoot associated with type 2 diabetes mellitus ([facility])    Cellulitis of left lower extremity    Closed fracture of shaft of metatarsal bone of left foot    Moderate benzodiazepine use disorder ([facility])    Microcytic anemia    Other constipation    Status post partial amputation of foot, left ([facility])    Moderate protein-calorie malnutrition ([facility])    Left foot pain    Charcot arthropathy of midfoot    Cervical strain    Cervical radiculopathy    Lumbar radiculopathy     Past Medical History:   Diagnosis Date    Atrial fibrillation ([facility])     Benzodiazepine withdrawal with complication ([facility]) 06/15/2023    Chronic diastolic (congestive) heart failure ([facility])     Diabetes mellitus ([facility])     GERD  (gastroesophageal reflux disease)     High cholesterol     Hyperlipidemia     Pacemaker     Stroke (HCC)      Past Surgical History:   Procedure Laterality Date    APPENDECTOMY      ATRIAL CARDIAC PACEMAKER INSERTION      BARIATRIC SURGERY  05/2021    BONE BIOPSY Left 7/26/2023    Procedure: EXCISION BIOPSY BONE LESION EXTREMITY;  Surgeon: Adelia Yousif DPM;  [facility];  Service: Podiatry    EPIDURAL BLOCK INJECTION N/A 5/19/2022    Procedure: BLOCK / INJECTION EPIDURAL STEROID CERVICAL C7-T1;  Surgeon: Skyler Quinn MD;  [facility]: [facility];  Service: Pain Management     FL GUIDED NEEDLE PLAC BX/ASP/INJ  3/22/2022    FOOT AMPUTATION Left 4/28/2022    Procedure: LEFT TRANSMETATARSAL AMPUTATION.;  Surgeon: Nestor Madden DPM;  [facility];  Service: Podiatry    NERVE BLOCK Right 2/10/2022    Procedure: BLOCK MEDIAL BRANCH C3, C4, C5 #1;  Surgeon: Skyler Quinn MD;  [facility];  Service: Pain Management     NERVE BLOCK Right 3/22/2022    Procedure: BLOCK MEDIAL BRANCH C3, C4, C5 #2;  Surgeon: Skyler Quinn MD;  [facility];  Service: Pain Management     NM AMPUTATION FOOT TRANSMETARSAL Left 4/12/2023    Procedure: REVISION LEFT TRANSMETATARSAL (TMA) AMPUTATION, REMOVAL OF UNVIABLE TISSUE AND BONE,;  Surgeon: Adelia Yousif DPM;  [facility];  Service: Podiatry    NM AMPUTATION METATARSAL W/TOE SINGLE Left 4/7/2023    Procedure: 2ND RAY RESECTION FOOT;  Surgeon: Adelia Yousif DPM;  [facility];  Service: Podiatry    NM AMPUTATION TOE INTERPHALANGEAL JOINT Left 11/16/2021    Procedure: AMPUTATION LESSER TOE;  Surgeon: Nestor Madden DPM;  [facility];  Service: Podiatry    RADIOFREQUENCY ABLATION Right 4/7/2022    Procedure: Right C3, C4, C5 RFA;  Surgeon: Skyler Quinn MD;  [facility];  Service: Pain Management     RHIZOTOMY Right 2/9/2023    Procedure: RHIZOTOMY CERVICAL MEDIAL BRANCH NERVES RIGHT C3, C4, C5;  Surgeon: Skyler  MD Kai;  [facility];  Service: Pain Management     TOE AMPUTATION Left     2nd toe    TOE AMPUTATION Left 9/15/2021    Procedure: AMPUTATION LEFT 4TH TOE;  Surgeon: Nestor Madden DPM;  [facility];  Service: Podiatry    TOE AMPUTATION Right 1/12/2022    Procedure: AMPUTATION TOE;  Surgeon: Hong Jolly DPM;  [facility];  Service: Podiatry    TOE AMPUTATION Right 2/23/2022    Procedure: AMPUTATION TOE  RIGHT SECOND;  Surgeon: Hong Jolly DPM;  [facility];  Service: Podiatry    TOE AMPUTATION Right 6/3/2022    Procedure: AMPUTATION right 4th TOE;  Surgeon: Nestor Madden DPM;  [facility];  Service: Podiatry     Social History     Socioeconomic History    Marital status: /Civil Union     Spouse name: Not on file    Number of children: Not on file    Years of education: Not on file    Highest education level: Not on file   Occupational History    Occupation:      Employer: [employer]   Tobacco Use    Smoking status: Never     Passive exposure: Never    Smokeless tobacco: Never   Vaping Use    Vaping status: Never Used   Substance and Sexual Activity    Alcohol use: Never    Drug use: Never    Sexual activity: Yes   Other Topics Concern    Not on file   Social History Narrative    Not on file     Social Determinants of Health     Financial Resource Strain: Low Risk  (10/19/2023)    [facility], [facility]    Overall Financial Resource Strain (CARDIA)     Difficulty of Paying Living Expenses: Not hard at all   Food Insecurity: No Food Insecurity (10/19/2023)    [facility], [facility]    Hunger Vital Sign     Worried About Running Out of Food in the Last Year: Never true     Ran Out of Food in the Last Year: Never true   Transportation Needs: No Transportation Needs (10/19/2023)    Received from [facility],  [facility]    PRAPARE - Transportation     Lack of Transportation (Medical): No     Lack of Transportation (Non-Medical): No   Physical Activity: Sufficiently Active (10/19/2023)    Received from [facility]    Exercise Vital Sign     Days of Exercise per Week: 7 days     Minutes of Exercise per Session: 60 min   Stress: Stress Concern Present (10/19/2023)    Received from [facility], [facility]    [employer] - Occupational Stress Questionnaire     Feeling of Stress : Very much   Social Connections: Unknown (6/18/2024)    Received from [employer]     How often do you feel lonely or isolated from those around you? (Adult - for ages 18 years and over): Not on file   Intimate Partner Violence: Not At Risk (10/19/2023)    Received from [facility], [facility]    Humiliation, Afraid, Rape, and Kick questionnaire     Fear of Current or Ex-Partner: No     Emotionally Abused: No     Physically Abused: No     Sexually Abused: No   Housing Stability: Low Risk  (10/19/2023)    [facility] from [facility], [facility]    Housing Stability Vital Sign     Unable to Pay for Housing in the Last Year: No     Number of Places Lived in the Last Year: 1     Unstable Housing in the Last Year: No        Current Outpatient Medications:     buPROPion (Wellbutrin XL) 300 mg 24 hr tablet, Take 1 tablet (300 mg total) by mouth daily, Disp: 90 tablet, Rfl: 0    busPIRone (BUSPAR) 15 mg tablet, Take 1 tablet (15 mg total) by mouth 3 (three) times a day, Disp: 90 tablet, Rfl: 2    calcium citrate-vitamin D 315 mg-5 mcg tablet, Take 2 tablets by mouth 2 (two) times a day, Disp: , Rfl:     doxepin (SINEquan) 50 mg capsule, Take 1 capsule (50 mg total) by mouth daily at bedtime, Disp: 30 capsule, Rfl: 2    ferrous sulfate 325 (65 Fe) mg tablet, Take 1 tablet (325  mg total) by mouth daily with breakfast Do not start before June 19, 2023., Disp: 30 tablet, Rfl: 0    furosemide (LASIX) 40 mg tablet, Take 40 mg by mouth every morning, Disp: , Rfl:     gabapentin (NEURONTIN) 800 mg tablet, Take 1 tablet (800 mg total) by mouth 3 (three) times a day, Disp: 90 tablet, Rfl: 2    lidocaine (Lidoderm) 5 %, Apply 1 patch topically over 12 hours daily for 15 days Remove & Discard patch within 12 hours or as directed by MD, Disp: 15 patch, Rfl: 0    metolazone (ZAROXOLYN) 2.5 mg tablet, TAKE 1 TAB BY MOUTH ONCE A DAY ON MONDAY, WEDNESDAY, AND FRIDAY ONLY, Disp: , Rfl:     Multiple Vitamins-Minerals (Mens Multivitamin) TABS, Take 1 tablet by mouth 2 (two) times a day, Disp: , Rfl:     naloxone (NARCAN) 4 mg/0.1 mL nasal spray, , Disp: , Rfl:     pantoprazole (PROTONIX) 40 mg tablet, Take 40 mg by mouth daily, Disp: , Rfl:     potassium chloride (KLOR-CON) 20 mEq packet, Take 20 mEq by mouth 2 (two) times a day, Disp: , Rfl:     Xarelto 20 MG tablet, , Disp: , Rfl:   No current facility-administered medications for this visit.  Family History   Problem Relation Age of Onset    No Known Problems Mother     No Known Problems Father       Review of Systems   Constitutional:  Negative for activity change, chills and fever.   HENT: Negative.     Respiratory:  Negative for cough, chest tightness and shortness of breath.    Cardiovascular:  Positive for leg swelling (Chronic B/L). Negative for chest pain.   Endocrine: Negative.    Genitourinary: Negative.    Musculoskeletal:  Negative for gait problem.   Skin:  Positive for wound.   Neurological:         PN   Psychiatric/Behavioral: Negative.  Negative for agitation and behavioral problems.          Objective:  /60   Pulse 96   Temp (!) 97.2 °F (36.2 °C)   Resp 18     Physical Exam  Constitutional:       Appearance: Normal appearance. He is not ill-appearing.      Comments: Anxious   Cardiovascular:      Comments: Chronic venous stasis  dermatitis with B/L LE brawny edema. Diminished pedal pulses due to edema. Absent pedal hair.   Pulmonary:      Effort: No respiratory distress.   Musculoskeletal:         General: No tenderness or deformity. Normal range of motion.      Comments: S/p left TMA    Skin:     Capillary Refill: Capillary refill takes less than 2 seconds.      Comments: B/L LE skin is atrophic - thin, dry and shiny in appearance.     Right plantar 2nd toe stump appears with full thickness fibrous ulceration with significant periwound callus. There is now edema, erythema and +probe to bone of the toe. No purulence.    RLE with pretibial superficial venous stasis ulcer has healed    Neurological:      General: No focal deficit present.      Mental Status: He is alert and oriented to person, place, and time.      Comments: N/T/B to B/L LE   Psychiatric:         Mood and Affect: Mood normal.         Behavior: Behavior normal.             Wound 07/12/24 Toe D2, second Anterior;Right;Plantar (Active)   Wound Image   10/31/24 1413   Wound Description Pink;Yellow;Pale 10/22/24 1433   Katy-wound Assessment Dry;Callus 10/22/24 1433   Wound Length (cm) 0.9 cm 10/22/24 1433   Wound Width (cm) 1.1 cm 10/22/24 1433   Wound Depth (cm) 0.2 cm 10/22/24 1433   Wound Surface Area (cm^2) 0.99 cm^2 10/22/24 1433   Wound Volume (cm^3) 0.198 cm^3 10/22/24 1433   Calculated Wound Volume (cm^3) 0.2 cm^3 10/22/24 1433   Change in Wound Size % -53.85 10/22/24 1433   Number of underminings 1 10/22/24 1433   Undermining 1 0.5 10/22/24 1433   Undermining 1 is depth extending from 11oclock to 1oclock  deepest at 1 10/22/24 1433   Drainage Amount Moderate 10/22/24 1433   Drainage Description Serosanguineous 10/22/24 1433   Non-staged Wound Description Full thickness 10/22/24 1433   Treatments Cleansed 07/19/24 1431                 Wound Instructions:  Orders Placed This Encounter   Procedures    Wound cleansing and dressings Anterior;Right;Plantar Toe D2, second      "Report to the Emergency Room following today's visit for further evaluation of right toe wound.     Standing Status:   Future     Standing Expiration Date:   11/7/2024    Wound Procedure Treatment Anterior;Right;Plantar Toe D2, second     This order was created via procedure documentation         Adelia Yousif DPM      Portions of the record may have been created with voice recognition software. Occasional wrong word or \"sound a like\" substitutions may have occurred due to the inherent limitations of voice recognition software. Read the chart carefully and recognize, using context, where substitutions have occurred.    "

## 2024-11-01 ENCOUNTER — TELEPHONE (OUTPATIENT)
Age: 59
End: 2024-11-01

## 2024-11-01 ENCOUNTER — APPOINTMENT (INPATIENT)
Dept: NON INVASIVE DIAGNOSTICS | Facility: HOSPITAL | Age: 59
DRG: 565 | End: 2024-11-01
Payer: COMMERCIAL

## 2024-11-01 ENCOUNTER — APPOINTMENT (INPATIENT)
Dept: RADIOLOGY | Facility: HOSPITAL | Age: 59
DRG: 565 | End: 2024-11-01
Payer: COMMERCIAL

## 2024-11-01 VITALS
SYSTOLIC BLOOD PRESSURE: 120 MMHG | HEART RATE: 61 BPM | RESPIRATION RATE: 18 BRPM | OXYGEN SATURATION: 100 % | DIASTOLIC BLOOD PRESSURE: 63 MMHG | HEIGHT: 73 IN | WEIGHT: 303 LBS | TEMPERATURE: 97.2 F | BODY MASS INDEX: 40.16 KG/M2

## 2024-11-01 PROBLEM — E66.9 OBESITY (BMI 30-39.9): Status: ACTIVE | Noted: 2024-11-01

## 2024-11-01 LAB
ANION GAP SERPL CALCULATED.3IONS-SCNC: 6 MMOL/L (ref 4–13)
BUN SERPL-MCNC: 15 MG/DL (ref 5–25)
CALCIUM SERPL-MCNC: 8.8 MG/DL (ref 8.4–10.2)
CHLORIDE SERPL-SCNC: 99 MMOL/L (ref 96–108)
CO2 SERPL-SCNC: 32 MMOL/L (ref 21–32)
CREAT SERPL-MCNC: 1.03 MG/DL (ref 0.6–1.3)
ERYTHROCYTE [DISTWIDTH] IN BLOOD BY AUTOMATED COUNT: 14 % (ref 11.6–15.1)
GFR SERPL CREATININE-BSD FRML MDRD: 78 ML/MIN/1.73SQ M
GLUCOSE SERPL-MCNC: 136 MG/DL (ref 65–140)
HCT VFR BLD AUTO: 46.2 % (ref 36.5–49.3)
HGB BLD-MCNC: 14.8 G/DL (ref 12–17)
MAGNESIUM SERPL-MCNC: 2 MG/DL (ref 1.9–2.7)
MCH RBC QN AUTO: 27.7 PG (ref 26.8–34.3)
MCHC RBC AUTO-ENTMCNC: 32 G/DL (ref 31.4–37.4)
MCV RBC AUTO: 87 FL (ref 82–98)
PLATELET # BLD AUTO: 220 THOUSANDS/UL (ref 149–390)
PMV BLD AUTO: 9.3 FL (ref 8.9–12.7)
POTASSIUM SERPL-SCNC: 3.3 MMOL/L (ref 3.5–5.3)
RBC # BLD AUTO: 5.34 MILLION/UL (ref 3.88–5.62)
SODIUM SERPL-SCNC: 137 MMOL/L (ref 135–147)
WBC # BLD AUTO: 7.67 THOUSAND/UL (ref 4.31–10.16)

## 2024-11-01 PROCEDURE — 85027 COMPLETE CBC AUTOMATED: CPT | Performed by: NURSE PRACTITIONER

## 2024-11-01 PROCEDURE — 83735 ASSAY OF MAGNESIUM: CPT | Performed by: NURSE PRACTITIONER

## 2024-11-01 PROCEDURE — 99239 HOSP IP/OBS DSCHRG MGMT >30: CPT | Performed by: FAMILY MEDICINE

## 2024-11-01 PROCEDURE — 93922 UPR/L XTREMITY ART 2 LEVELS: CPT | Performed by: SURGERY

## 2024-11-01 PROCEDURE — 93926 LOWER EXTREMITY STUDY: CPT

## 2024-11-01 PROCEDURE — 99255 IP/OBS CONSLTJ NEW/EST HI 80: CPT | Performed by: PHYSICIAN ASSISTANT

## 2024-11-01 PROCEDURE — 80048 BASIC METABOLIC PNL TOTAL CA: CPT | Performed by: NURSE PRACTITIONER

## 2024-11-01 PROCEDURE — 93926 LOWER EXTREMITY STUDY: CPT | Performed by: SURGERY

## 2024-11-01 RX ORDER — CEPHALEXIN 500 MG/1
1000 CAPSULE ORAL EVERY 6 HOURS SCHEDULED
Qty: 56 CAPSULE | Refills: 0 | Status: SHIPPED | OUTPATIENT
Start: 2024-11-01 | End: 2024-11-08

## 2024-11-01 RX ORDER — GABAPENTIN 800 MG/1
900 TABLET ORAL 3 TIMES DAILY
Start: 2024-11-01 | End: 2024-11-06 | Stop reason: SDUPTHER

## 2024-11-01 RX ORDER — DOXEPIN HYDROCHLORIDE 10 MG/1
10 CAPSULE ORAL 2 TIMES DAILY
COMMUNITY
Start: 2024-09-30 | End: 2024-11-06

## 2024-11-01 RX ORDER — DOXEPIN HYDROCHLORIDE 10 MG/1
10 CAPSULE ORAL 2 TIMES DAILY
Status: DISCONTINUED | OUTPATIENT
Start: 2024-11-01 | End: 2024-11-01 | Stop reason: HOSPADM

## 2024-11-01 RX ORDER — CEFAZOLIN SODIUM 2 G/50ML
2000 SOLUTION INTRAVENOUS EVERY 8 HOURS
Status: DISCONTINUED | OUTPATIENT
Start: 2024-11-01 | End: 2024-11-01 | Stop reason: HOSPADM

## 2024-11-01 RX ADMIN — BUPROPION HYDROCHLORIDE 300 MG: 150 TABLET, EXTENDED RELEASE ORAL at 08:59

## 2024-11-01 RX ADMIN — BUSPIRONE HYDROCHLORIDE 15 MG: 10 TABLET ORAL at 09:00

## 2024-11-01 RX ADMIN — BUSPIRONE HYDROCHLORIDE 15 MG: 10 TABLET ORAL at 02:18

## 2024-11-01 RX ADMIN — VANCOMYCIN HYDROCHLORIDE 1250 MG: 1 INJECTION, POWDER, LYOPHILIZED, FOR SOLUTION INTRAVENOUS at 10:19

## 2024-11-01 RX ADMIN — CEFAZOLIN SODIUM 2000 MG: 2 SOLUTION INTRAVENOUS at 14:51

## 2024-11-01 RX ADMIN — DOXEPIN HYDROCHLORIDE 10 MG: 10 CAPSULE ORAL at 09:00

## 2024-11-01 RX ADMIN — DOXEPIN HYDROCHLORIDE 10 MG: 10 CAPSULE ORAL at 02:19

## 2024-11-01 RX ADMIN — GABAPENTIN 900 MG: 300 CAPSULE ORAL at 02:18

## 2024-11-01 RX ADMIN — GABAPENTIN 900 MG: 300 CAPSULE ORAL at 09:00

## 2024-11-01 RX ADMIN — OXYCODONE HYDROCHLORIDE 5 MG: 5 TABLET ORAL at 02:19

## 2024-11-01 RX ADMIN — FERROUS SULFATE TAB 325 MG (65 MG ELEMENTAL FE) 325 MG: 325 (65 FE) TAB at 08:59

## 2024-11-01 RX ADMIN — ENOXAPARIN SODIUM 40 MG: 40 INJECTION SUBCUTANEOUS at 08:59

## 2024-11-01 RX ADMIN — METRONIDAZOLE 500 MG: 500 INJECTION, SOLUTION INTRAVENOUS at 06:06

## 2024-11-01 RX ADMIN — CEFEPIME HYDROCHLORIDE 2000 MG: 2 INJECTION, SOLUTION INTRAVENOUS at 03:09

## 2024-11-01 NOTE — PLAN OF CARE
Problem: Potential for Falls  Goal: Patient will remain free of falls  Description: INTERVENTIONS:  - Educate patient/family on patient safety including physical limitations  - Instruct patient to call for assistance with activity   - Consult OT/PT to assist with strengthening/mobility   - Keep Call bell within reach  - Keep bed low and locked with side rails adjusted as appropriate  - Keep care items and personal belongings within reach  - Initiate and maintain comfort rounds  - Make Fall Risk Sign visible to staff  - Offer Toileting every 3 Hours, in advance of need  - Apply yellow socks and bracelet for high fall risk patients  - Consider moving patient to room near nurses station  Outcome: Progressing

## 2024-11-01 NOTE — ASSESSMENT & PLAN NOTE
Presented per podiatry recommendation for nonhealing right 2nd toe stump ulcer  Discussed with podiatry also infectious disease patient has anxiety to hospitals unfortunately with his PPM MRI cannot be done inpatient till at least next week and he has anxiety to 2 hospitals and he is not willing to stay okay.  Podiatry to discharge with outpatient MRI which will be scheduled discussed with MRI.   Arterial duplex has been ordered and will be done prior to discharge  Discussed with infectious disease discharged on Keflex 1 g p.o. every 6 hours for 7 days he will follow-up with podiatry podiatry will follow-up on MRI

## 2024-11-01 NOTE — H&P
H&P - Hospitalist   Name: David Carpio 59 y.o. male I MRN: [id number]  Unit/Bed#: -01 I Date of Admission: 10/31/2024   Date of Service: 11/1/2024 I Hospital Day: 1     Assessment & Plan  Toe osteomyelitis, right (HCC)  Presented per podiatry recommendation for nonhealing right 2nd toe stump ulcer  MRI and vascular duplex ordered  Antibiotic therapy with cefepime/vancomycin/metronidazole  Appreciate podiatry consultation  Xarelto on hold  Chronic diastolic heart failure (HCC)  Wt Readings from Last 3 Encounters:   10/31/24 (!) 137 kg (303 lb)   09/16/24 (!) 136 kg (300 lb 9.6 oz)   08/14/24 134 kg (295 lb)     Preserved EF on echocardiogram from February   euvolemic on exam  Takes furosemide and metolazone as needed  Hold diuretics at this time  Monitor volume status  Atrial fibrillation (HCC)  Rate controlled atrial fibrillation  PPM placed for syncope  Rx'd Xarelto, has not taken medication in the past 2 weeks due to bruising while at work  Will need to resume when cleared postoperative      VTE Pharmacologic Prophylaxis:   High Risk (Score >/= 5) - Pharmacological DVT Prophylaxis Contraindicated. Sequential Compression Devices Ordered.  Code Status: Level 1 - Full Code   Discussion with family: Updated  (wife) at bedside.    Anticipated Length of Stay: Patient will be admitted on an inpatient basis with an anticipated length of stay of greater than 2 midnights secondary to osteomyelitis.    History of Present Illness   Chief Complaint: Right second nonhealing toe wound    David Carpio is a 59 y.o. male with a PMH of diet-controlled diabetes mellitus with last A1c 5.4, hyperlipidemia, PPM placed for syncope, A-fib noncompliant with Xarelto, severe anxiety who presents with nonhealing right second toe stump ulcer.  Following with podiatry for the past 3 months now admitted for suspected osteomyelitis and probable amputation.  Will obtain MRI, vascular duplex and formal consultation to  podiatry.  Continue antibiotic therapy and pain control..    Review of Systems   Constitutional:  Negative for chills and fever.   HENT:  Negative for ear pain and sore throat.    Eyes:  Negative for pain and visual disturbance.   Respiratory:  Negative for cough and shortness of breath.    Cardiovascular:  Negative for chest pain and palpitations.   Gastrointestinal:  Negative for abdominal pain and vomiting.   Genitourinary:  Negative for dysuria and hematuria.   Musculoskeletal:  Positive for arthralgias. Negative for back pain.   Skin:  Positive for wound. Negative for color change and rash.   Neurological:  Negative for seizures and syncope.   Psychiatric/Behavioral:  Positive for sleep disturbance. The patient is nervous/anxious.    All other systems reviewed and are negative.      Historical Information   Past Medical History:   Diagnosis Date    Atrial fibrillation (HCC)     Benzodiazepine withdrawal with complication (HCC) 06/15/2023    Chronic diastolic (congestive) heart failure (HCC)     Diabetes mellitus (HCC)     GERD (gastroesophageal reflux disease)     High cholesterol     Hyperlipidemia     Pacemaker     Stroke (HCC)      Past Surgical History:   Procedure Laterality Date    APPENDECTOMY      ATRIAL CARDIAC PACEMAKER INSERTION      BARIATRIC SURGERY  05/2021    BONE BIOPSY Left 7/26/2023    Procedure: EXCISION BIOPSY BONE LESION EXTREMITY;  Surgeon: Adelia Yousif DPM;  Location:  MAIN OR;  Service: Podiatry    EPIDURAL BLOCK INJECTION N/A 5/19/2022    Procedure: BLOCK / INJECTION EPIDURAL STEROID CERVICAL C7-T1;  Surgeon: Skyler Quinn MD;  [facility]: [facility];  Service: Pain Management     FL GUIDED NEEDLE PLAC BX/ASP/INJ  3/22/2022    FOOT AMPUTATION Left 4/28/2022    Procedure: LEFT TRANSMETATARSAL AMPUTATION.;  Surgeon: Nestor Madden DPM;  [facility];  Service: Podiatry    NERVE BLOCK Right 2/10/2022    Procedure: BLOCK MEDIAL BRANCH C3, C4, C5 #1;  Surgeon: Skyler  MD Kai;  [facility];  Service: Pain Management     NERVE BLOCK Right 3/22/2022    Procedure: BLOCK MEDIAL BRANCH C3, C4, C5 #2;  Surgeon: Skyler Quinn MD;  [facility];  Service: Pain Management     NY AMPUTATION FOOT TRANSMETARSAL Left 4/12/2023    Procedure: REVISION LEFT TRANSMETATARSAL (TMA) AMPUTATION, REMOVAL OF UNVIABLE TISSUE AND BONE,;  Surgeon: Adelia Yousif DPM;  [facility];  Service: Podiatry    NY AMPUTATION METATARSAL W/TOE SINGLE Left 4/7/2023    Procedure: 2ND RAY RESECTION FOOT;  Surgeon: Adelia Yousif DPM;  [facility];  Service: Podiatry    NY AMPUTATION TOE INTERPHALANGEAL JOINT Left 11/16/2021    Procedure: AMPUTATION LESSER TOE;  Surgeon: Nestor Madden DPM;  [facility];  Service: Podiatry    RADIOFREQUENCY ABLATION Right 4/7/2022    Procedure: Right C3, C4, C5 RFA;  Surgeon: Skyler Quinn MD;  [facility];  Service: Pain Management     RHIZOTOMY Right 2/9/2023    Procedure: RHIZOTOMY CERVICAL MEDIAL BRANCH NERVES RIGHT C3, C4, C5;  Surgeon: Skyler Quinn MD;  [facility];  Service: Pain Management     TOE AMPUTATION Left     2nd toe    TOE AMPUTATION Left 9/15/2021    Procedure: AMPUTATION LEFT 4TH TOE;  Surgeon: Nestor Madden DPM;  [facility];  Service: Podiatry    TOE AMPUTATION Right 1/12/2022    Procedure: AMPUTATION TOE;  Surgeon: Hong Jolly DPM;  [facility];  Service: Podiatry    TOE AMPUTATION Right 2/23/2022    Procedure: AMPUTATION TOE  RIGHT SECOND;  Surgeon: Hong Jolly DPM;  Location: [facility];  Service: Podiatry    TOE AMPUTATION Right 6/3/2022    Procedure: AMPUTATION right 4th TOE;  Surgeon: Nestor Madden DPM;  [facility];  Service: Podiatry     Social History     Tobacco Use    Smoking status: Never     Passive exposure: Never    Smokeless tobacco: Never   Vaping Use    Vaping status: Never Used   Substance and Sexual Activity    Alcohol use: Never     Drug use: Never    Sexual activity: Yes     E-Cigarette/Vaping    E-Cigarette Use Never User      E-Cigarette/Vaping Substances     Family History   Problem Relation Age of Onset    No Known Problems Mother     No Known Problems Father      Social History:  Marital Status: /Civil Union   Occupation:   Patient Pre-hospital Living Situation: Home  Patient Pre-hospital Level of Mobility: walks  Patient Pre-hospital Diet Restrictions: None    Meds/Allergies   I have reviewed home medications with patient personally.  Prior to Admission medications    Medication Sig Start Date End Date Taking? Authorizing Provider   buPROPion (Wellbutrin XL) 300 mg 24 hr tablet Take 1 tablet (300 mg total) by mouth daily 9/17/24  Yes Andre Rodriguez MD   busPIRone (BUSPAR) 15 mg tablet Take 1 tablet (15 mg total) by mouth 3 (three) times a day 9/17/24  Yes Andre Rodriguez MD   calcium citrate-vitamin D 315 mg-5 mcg tablet Take 2 tablets by mouth 2 (two) times a day 11/30/23  Yes Historical Provider, MD   doxepin (SINEquan) 10 mg capsule Take 10 mg by mouth 2 (two) times a day 0200 and 1000 9/30/24  Yes Historical Provider, MD   doxepin (SINEquan) 50 mg capsule Take 1 capsule (50 mg total) by mouth daily at bedtime 9/17/24  Yes Andre Rodriguez MD   ferrous sulfate 325 (65 Fe) mg tablet Take 1 tablet (325 mg total) by mouth daily with breakfast Do not start before June 19, 2023. 6/19/23  Yes Obioma Dayo Chapin PA-C   furosemide (LASIX) 40 mg tablet Take 40 mg by mouth 2 (two) times a day as needed (pt states he takes 2 times a day as needed) 1/17/24  Yes Historical Provider, MD   gabapentin (NEURONTIN) 800 mg tablet Take 1 tablet (800 mg total) by mouth 3 (three) times a day  Patient taking differently: Take 900 mg by mouth 3 (three) times a day 9/17/24  Yes Andre Rodriguez MD   Multiple Vitamins-Minerals (Mens Multivitamin) TABS Take 1 tablet by mouth 2 (two)  times a day 11/30/23  Yes Historical Provider, MD   potassium chloride (KLOR-CON) 20 mEq packet Take 20 mEq by mouth if needed (pt states he takes with lasix tablet as needed)   Yes Historical Provider, MD   Xarelto 20 MG tablet  7/4/23  Yes Historical Provider, MD   lidocaine (Lidoderm) 5 % Apply 1 patch topically over 12 hours daily for 15 days Remove & Discard patch within 12 hours or as directed by MD 10/15/23 8/14/24  Dominique Da Silva DO   metolazone (ZAROXOLYN) 2.5 mg tablet TAKE 1 TAB BY MOUTH ONCE A DAY ON MONDAY, WEDNESDAY, AND FRIDAY ONLY 8/7/24   Historical Provider, MD   naloxone (NARCAN) 4 mg/0.1 mL nasal spray  8/7/24   Historical Provider, MD   pantoprazole (PROTONIX) 40 mg tablet Take 40 mg by mouth daily  Patient not taking: Reported on 10/31/2024 11/27/23   Historical Provider, MD     Allergies   Allergen Reactions    Ativan [Lorazepam] Anxiety       Objective :  Temp:  [97.2 °F (36.2 °C)-98.3 °F (36.8 °C)] 97.2 °F (36.2 °C)  HR:  [61-96] 61  BP: (105-120)/(55-64) 120/63  Resp:  [18] 18  SpO2:  [93 %-100 %] 100 %  O2 Device: None (Room air)    Physical Exam  Vitals and nursing note reviewed.   Constitutional:       General: He is not in acute distress.     Appearance: He is well-developed.   HENT:      Head: Normocephalic and atraumatic.      Mouth/Throat:      Mouth: Mucous membranes are dry.   Eyes:      Conjunctiva/sclera: Conjunctivae normal.   Cardiovascular:      Rate and Rhythm: Normal rate. Rhythm irregular.      Heart sounds: No murmur heard.  Pulmonary:      Effort: Pulmonary effort is normal. No respiratory distress.      Breath sounds: Normal breath sounds.   Abdominal:      Palpations: Abdomen is soft.      Tenderness: There is no abdominal tenderness.   Musculoskeletal:         General: Swelling present.      Cervical back: Neck supple.      Right lower leg: Edema present.      Left lower leg: Edema present.      Comments: Left TMA, right second toe stump ulcer   Skin:     General:  Skin is warm and dry.      Capillary Refill: Capillary refill takes less than 2 seconds.      Findings: Erythema and rash present.   Neurological:      General: No focal deficit present.      Mental Status: He is alert and oriented to person, place, and time.   Psychiatric:         Mood and Affect: Mood normal.        Lines/Drains:            Lab Results: I have reviewed the following results:  Results from last 7 days   Lab Units 10/31/24  1949   WBC Thousand/uL 10.43*   HEMOGLOBIN g/dL 15.5   HEMATOCRIT % 47.1   PLATELETS Thousands/uL 265   SEGS PCT % 58   LYMPHO PCT % 27   MONO PCT % 13*   EOS PCT % 1     Results from last 7 days   Lab Units 10/31/24  1949   SODIUM mmol/L 138   POTASSIUM mmol/L 3.6   CHLORIDE mmol/L 100   CO2 mmol/L 29   BUN mg/dL 20   CREATININE mg/dL 1.28   ANION GAP mmol/L 9   CALCIUM mg/dL 9.2   ALBUMIN g/dL 4.5   TOTAL BILIRUBIN mg/dL 0.62   ALK PHOS U/L 110*   ALT U/L 13   AST U/L 20   GLUCOSE RANDOM mg/dL 94     Results from last 7 days   Lab Units 10/31/24  1949   INR  0.98         Lab Results   Component Value Date    HGBA1C 5.4 04/17/2024    HGBA1C 5.3 09/15/2023    HGBA1C 5.7 (H) 06/16/2023     Results from last 7 days   Lab Units 10/31/24  1949   LACTIC ACID mmol/L 1.2   PROCALCITONIN ng/ml <0.05       Imaging Results Review: I reviewed radiology reports from this admission including: xray(s).        ** Please Note: This note has been constructed using a voice recognition system. **

## 2024-11-01 NOTE — ASSESSMENT & PLAN NOTE
Rate controlled atrial fibrillation  PPM placed for syncope  Rx'd Xarelto, has not taken medication in the past 2 weeks due to bruising while at work  Patient to resume when going home as he is being discharged

## 2024-11-01 NOTE — ASSESSMENT & PLAN NOTE
Rate controlled atrial fibrillation  PPM placed for syncope  Rx'd Xarelto, has not taken medication in the past 2 weeks due to bruising while at work  Will need to resume when cleared postoperative

## 2024-11-01 NOTE — PROGRESS NOTES
David Carpio is a 59 y.o. male who is currently ordered Vancomycin IV with management by the Pharmacy Consult service.  Relevant clinical data and objective / subjective history reviewed.  Vancomycin Assessment:  Indication and Goal AUC/Trough: Soft tissue (goal -600, trough >10); Bone/joint infection (goal -600, trough >10)  Clinical Status: stable  Micro:   pending  Renal Function:  SCr: 1.03 mg/dL  CrCl: 109.7 mL/min  Renal replacement: Not on dialysis  Days of Therapy: 2  Current Dose: 1000mg iv q12h  Vancomycin Plan:  New Dosinmg iv q12h  Estimated AUC: 458 mcg*hr/mL  Estimated Trough: 14.1 mcg/mL  Next Level: 24 0600  Renal Function Monitoring: Daily BMP and UOP  Pharmacy will continue to follow closely for s/sx of nephrotoxicity, infusion reactions and appropriateness of therapy.  BMP and CBC will be ordered per protocol. We will continue to follow the patient’s culture results and clinical progress daily.    Guillermo Daigle, Pharmacist

## 2024-11-01 NOTE — PROGRESS NOTES
David Carpio is a 59 y.o. male who is currently ordered Vancomycin IV with management by the Pharmacy Consult service.  Relevant clinical data and objective / subjective history reviewed.  Vancomycin Assessment:  Indication and Goal AUC/Trough: Soft tissue (goal -600, trough >10); Bone/joint infection (goal -600, trough >10)  Clinical Status:  New start  Micro:   Cultures pending  Renal Function:  SCr: 1.28 mg/dL  CrCl: 88.3 mL/min  Renal replacement: Not on dialysis  Days of Therapy: 1  Current Dose: 2,000 mg IV in ED  Vancomycin Plan:  New Dosing: initiate 1,000 mg IV Q12H  Estimated AUC: 444 mcg*hr/mL  Estimated Trough: 14.3 mcg/mL  Next Level: 11/2/24 at 0600  Renal Function Monitoring: Daily BMP and UOP  Pharmacy will continue to follow closely for s/sx of nephrotoxicity, infusion reactions and appropriateness of therapy.  BMP and CBC will be ordered per protocol. We will continue to follow the patient’s culture results and clinical progress daily.    Claritza Barnhart, Pharmacist

## 2024-11-01 NOTE — PROGRESS NOTES
Vancomycin has been discontinued.  Pharmacy will sign off.  Please contact or re-consult with questions.    Guillermo Daigle, Pharmacist

## 2024-11-01 NOTE — UTILIZATION REVIEW
Initial Clinical Review    Admission: Date/Time/Statement:   Admission Orders (From admission, onward)       Ordered        10/31/24 1951  INPATIENT ADMISSION  Once                          Orders Placed This Encounter   Procedures    INPATIENT ADMISSION     Standing Status:   Standing     Number of Occurrences:   1     Order Specific Question:   Level of Care     Answer:   Med Surg [16]     Order Specific Question:   Estimated length of stay     Answer:   More than 2 Midnights     Order Specific Question:   Certification     Answer:   I certify that inpatient services are medically necessary for this patient for a duration of greater than two midnights. See H&P and MD Progress Notes for additional information about the patient's course of treatment.     ED Arrival Information       Expected   -    Arrival   10/31/2024 19:10    Acuity   Urgent              Means of arrival   Walk-In    Escorted by   Family Member    Service   Hospitalist    Admission type   Emergency              Arrival complaint   Wound Check- Right toe             Chief Complaint   Patient presents with    Foot Pain     Pt is under wound care, they want an mri tomorrow then amputate the toe. R foot ulcer that has be treated x 3months       Initial Presentation: 59 y.o. male to ED via walk-in from home  Present to ED with nonhealing right second toe stump ulcer. Following with podiatry for the past 3 months now admitted for suspected osteomyelitis and probable amputation.   PMHX: diet-controlled diabetes mellitus with last A1c 5.4, hyperlipidemia, PPM placed for syncope, A-fib noncompliant with Xarelto, severe anxiety   Admitted to [facility] with DX: Toe osteomyelitis, right   on exam: Left TMA, right second toe stump ulcer; WBC 10.43  XR Soft tissue ulceration along the stump of the second digit without convincing evidence for osteomyelitis on the current examination. If warranted MRI of the foot could be performed for further evaluation.   PLAN: cont  iv abx; pain control (see below); monitor labs; wound care; Accuchecks with ssic; f/u MRI; f/u vascular duplex; consult podiatry         Anticipated Length of Stay/Certification Statement: Patient will be admitted on an inpatient basis with an anticipated length of stay of greater than 2 midnights secondary to osteomyelitis.       Date: 11/1/24      Day 2   Discussed with podiatry also infectious disease patient has anxiety to hospitals unfortunately with his PPM MRI cannot be done inpatient till at least next week and he has anxiety to 2 hospitals and he is not willing to stay okay.  Podiatry to discharge with outpatient MRI which will be scheduled discussed with MRI.   Arterial duplex has been ordered and will be done prior to discharge  Discussed with infectious disease discharged on Keflex 1 g p.o. every 6 hours for 7 days he will follow-up with podiatry podiatry will follow-up on MRI    Discharge Summary   Hospital Course:   David Carpio is a 59 y.o. male patient who originally presented to the hospital on 10/31/2024 due to right second toe stump ulcer nonhealing concerning for osteo- admitted with iv abx and mri and arterial duplex -unfortunately secondary to his pacemaker MRI cannot be done and patient has anxiety to hospitals and requesting to be discharged per podiatry okay to be discharged on p.o. antibiotics MRI will be scheduled outpatient next week arterial duplex will be followed up after it is done discussed with infectious disease he will be discharged Keflex 1 g 4 times daily for 7 days.    Disposition: Home      ED Treatment-Medication Administration from 10/31/2024 1909 to 10/31/2024 2027         Date/Time Order Dose Route Action     10/31/2024 1955 cefepime (MAXIPIME) IVPB (premix in dextrose) 1,000 mg 50 mL 1,000 mg Intravenous New Bag            Scheduled Medications:  buPROPion, 300 mg, Oral, Daily  busPIRone, 15 mg, Oral, TID  cefepime, 2,000 mg, Intravenous, Q8H  doxepin, 10 mg, Oral,  BID  doxepin, 50 mg, Oral, HS  enoxaparin, 40 mg, Subcutaneous, Daily  ferrous sulfate, 325 mg, Oral, Daily With Breakfast  gabapentin, 900 mg, Oral, TID  metroNIDAZOLE, 500 mg, Intravenous, Q8H  vancomycin, 1,250 mg, Intravenous, Q12H      Continuous IV Infusions: None       PRN Meds:  acetaminophen, 975 mg, Oral, Q8H PRN  oxyCODONE, 2.5 mg, Oral, Q6H PRN  oxyCODONE, 5 mg, Oral, Q6H PRN  (11/1 recd x1 so far today)      ED Triage Vitals [10/31/24 1918]   Temperature Pulse Respirations Blood Pressure SpO2 Pain Score   98.3 °F (36.8 °C) 67 18 120/61 94 % 5     Weight (last 2 days)       Date/Time Weight    10/31/24 2118 137 (303)    10/31/24 1918 138 (303.79)            Vital Signs (last 3 days)       Date/Time Temp Pulse Resp BP MAP (mmHg) SpO2 O2 Device Patient Position - Orthostatic VS [place] Coma Scale Score Pain    11/01/24 0834 -- -- -- -- -- -- None (Room air) -- -- No Pain    11/01/24 06:00:12 97.2 °F (36.2 °C) 61 18 120/63 82 100 % -- -- -- --    11/01/24 02:23:07 97.3 °F (36.3 °C) 64 -- 105/55 72 93 % -- -- -- --    11/01/24 0219 -- -- -- -- -- -- -- -- -- 7    10/31/24 2122 -- -- -- -- -- 93 % None (Room air) -- 15 5    10/31/24 2118 97.7 °F (36.5 °C) -- -- -- -- -- -- -- -- --    10/31/24 2052 -- -- -- -- -- -- -- -- -- 5    10/31/24 20:39:08 97.7 °F (36.5 °C) 86 -- 105/64 78 94 % -- -- -- --    10/31/24 20:37:43 97.7 °F (36.5 °C) 94 -- 105/64 78 94 % -- -- -- --    10/31/24 1958 -- -- -- -- -- -- -- -- 15 --    10/31/24 1918 98.3 °F (36.8 °C) 67 18 120/61 -- 94 % None (Room air) Sitting -- 5              Pertinent Labs/Diagnostic Test Results:   Radiology:  VAS ARTERIAL DUPLEX-LOWER LIMB UNILATERAL    (Results Pending)   XR follow up    (Results Pending)   MRI foot/forefoot toes right wo contrast    (Results Pending)        Results from last 7 days   Lab Units 11/01/24  0815 10/31/24  1949   WBC Thousand/uL 7.67 10.43*   HEMOGLOBIN g/dL 14.8 15.5   HEMATOCRIT % 46.2 47.1   PLATELETS Thousands/uL 220 265    TOTAL NEUT ABS Thousands/µL  --  6.02        Results from last 7 days   Lab Units 11/01/24  0815 10/31/24  1949   SODIUM mmol/L 137 138   POTASSIUM mmol/L 3.3* 3.6   CHLORIDE mmol/L 99 100   CO2 mmol/L 32 29   ANION GAP mmol/L 6 9   BUN mg/dL 15 20   CREATININE mg/dL 1.03 1.28   EGFR ml/min/1.73sq m 78 60   CALCIUM mg/dL 8.8 9.2   MAGNESIUM mg/dL 2.0  --      Results from last 7 days   Lab Units 10/31/24  1949   AST U/L 20   ALT U/L 13   ALK PHOS U/L 110*   TOTAL PROTEIN g/dL 7.6   ALBUMIN g/dL 4.5   TOTAL BILIRUBIN mg/dL 0.62        Results from last 7 days   Lab Units 11/01/24  0815 10/31/24  1949   GLUCOSE RANDOM mg/dL 136 94        Results from last 7 days   Lab Units 10/31/24  1949   PROTIME seconds 13.4   INR  0.98   PTT seconds 32        Results from last 7 days   Lab Units 10/31/24  1949   PROCALCITONIN ng/ml <0.05     Results from last 7 days   Lab Units 10/31/24  1949   LACTIC ACID mmol/L 1.2        Results from last 7 days   Lab Units 10/31/24  1949   CRP mg/L 18.0*   SED RATE mm/hour 37*        Results from last 7 days   Lab Units 10/31/24  1954 10/31/24  1949   BLOOD CULTURE  Received in Microbiology Lab. Culture in Progress. Received in Microbiology Lab. Culture in Progress.          Past Medical History:   Diagnosis Date    Atrial fibrillation (HCC)     Benzodiazepine withdrawal with complication (HCC) 06/15/2023    Chronic diastolic (congestive) heart failure (HCC)     Diabetes mellitus (HCC)     GERD (gastroesophageal reflux disease)     High cholesterol     Hyperlipidemia     Pacemaker     Stroke (HCC)      Present on Admission:   Atrial fibrillation (HCC)   Chronic diastolic heart failure (HCC)   Toe osteomyelitis, right (HCC)   Type 2 diabetes mellitus with diabetic polyneuropathy, without long-term current use of insulin (HCC)      Admitting Diagnosis: Foot pain [M79.673]  Osteomyelitis (HCC) [M86.9]  Age/Sex: 59 y.o. male    Network Utilization Review Department  ATTENTION: Please call with  any questions or concerns to [phone] and carefully listen to the prompts so that you are directed to the right person. All voicemails are confidential.   For Discharge needs, contact Care Management DC Support Team at [phone] opt. [phone]  Send all requests for admission clinical reviews, approved or denied determinations and any other requests to dedicated fax number below belonging to the campus where the patient is receiving treatment. List of dedicated fax numbers for the Facilities:  FACILITY NAME UR FAX NUMBER   ADMISSION DENIALS (Administrative/Medical Necessity) [phone]   DISCHARGE SUPPORT TEAM (NETWORK) [phone]   PARENT CHILD HEALTH (Maternity/NICU/Pediatrics) [phone]   [facility] [phone]   [facility] [phone]   Pending sale to [facility] [phone]   [facility] [phone]   [facility] [phone]   [facility] [phone]   [facility] [phone]   [facility] [phone]   [facility] [phone]   [facility] [phone]   [facility] [phone]   [facility] [phone]

## 2024-11-01 NOTE — ASSESSMENT & PLAN NOTE
Presented per podiatry recommendation for nonhealing right 2nd toe stump ulcer  MRI and vascular duplex ordered  Antibiotic therapy with cefepime/vancomycin/metronidazole  Appreciate podiatry consultation  Xarelto on hold

## 2024-11-01 NOTE — ASSESSMENT & PLAN NOTE
"Lab Results   Component Value Date    HGBA1C 5.4 04/17/2024       No results for input(s): \"POCGLU\" in the last 72 hours.    Blood Sugar Average: Last 72 hrs:      "

## 2024-11-01 NOTE — ASSESSMENT & PLAN NOTE
Cellulitis of right 2nd toe due to nonhealing DFU with suspected osteomyelitis. Presented from podiatry office with progressive toe wound with cellulitis and deep probe to bone concerning for clinical OM. No prior wound cx. He is otherwise afebrile, hemodynamically stable without sepsis. Podiatry consulted. Planning for LEADs and MRI forefoot.   Discontinue IV cefepime, vanco, flagyl   Start IV cefazolin 2g q8h   Follow up podiatry recs   Follow up LEADs   Ancipitate short course of abx if surgical cure achieved

## 2024-11-01 NOTE — PLAN OF CARE
Problem: Potential for Falls  Goal: Patient will remain free of falls  Description: INTERVENTIONS:  - Educate patient/family on patient safety including physical limitations  - Instruct patient to call for assistance with activity   - Consult OT/PT to assist with strengthening/mobility   - Keep Call bell within reach  - Keep bed low and locked with side rails adjusted as appropriate  - Keep care items and personal belongings within reach  - Initiate and maintain comfort rounds  - Make Fall Risk Sign visible to staff  - Offer Toileting every 3 Hours, in advance of need  - Apply yellow socks and bracelet for high fall risk patients  - Consider moving patient to room near nurses station  Outcome: Progressing     Problem: PAIN - ADULT  Goal: Verbalizes/displays adequate comfort level or baseline comfort level  Description: Interventions:  - Encourage patient to monitor pain and request assistance  - Assess pain using appropriate pain scale  - Administer analgesics based on type and severity of pain and evaluate response  - Implement non-pharmacological measures as appropriate and evaluate response  - Consider cultural and social influences on pain and pain management  - Notify physician/advanced practitioner if interventions unsuccessful or patient reports new pain  Outcome: Progressing     Problem: INFECTION - ADULT  Goal: Absence or prevention of progression during hospitalization  Description: INTERVENTIONS:  - Assess and monitor for signs and symptoms of infection  - Monitor lab/diagnostic results  - Monitor all insertion sites, i.e. indwelling lines, tubes, and drains  - Monitor endotracheal if appropriate and nasal secretions for changes in amount and color  - [place] appropriate cooling/warming therapies per order  - Administer medications as ordered  - Instruct and encourage patient and family to use good hand hygiene technique  - Identify and instruct in appropriate isolation precautions for identified  infection/condition  Outcome: Progressing  Goal: Absence of fever/infection during neutropenic period  Description: INTERVENTIONS:  - Monitor WBC    Outcome: Progressing     Problem: SAFETY ADULT  Goal: Patient will remain free of falls  Description: INTERVENTIONS:  - Educate patient/family on patient safety including physical limitations  - Instruct patient to call for assistance with activity   - Consult OT/PT to assist with strengthening/mobility   - Keep Call bell within reach  - Keep bed low and locked with side rails adjusted as appropriate  - Keep care items and personal belongings within reach  - Initiate and maintain comfort rounds  - Make Fall Risk Sign visible to staff  - Offer Toileting every 3 Hours, in advance of need  - Apply yellow socks and bracelet for high fall risk patients  - Consider moving patient to room near nurses station  Outcome: Progressing  Goal: Maintain or return to baseline ADL function  Description: INTERVENTIONS:  -  Assess patient's ability to carry out ADLs; assess patient's baseline for ADL function and identify physical deficits which impact ability to perform ADLs (bathing, care of mouth/teeth, toileting, grooming, dressing, etc.)  - Assess/evaluate cause of self-care deficits   - Assess range of motion  - Assess patient's mobility; develop plan if impaired  - Assess patient's need for assistive devices and provide as appropriate  - Encourage maximum independence but intervene and supervise when necessary  - Involve family in performance of ADLs  - Assess for home care needs following discharge   - Consider OT consult to assist with ADL evaluation and planning for discharge  - Provide patient education as appropriate  Outcome: Progressing  Goal: Maintains/Returns to pre admission functional level  Description: INTERVENTIONS:  - Perform AM-PAC 6 Click Basic Mobility/ Daily Activity assessment daily.  - Set and communicate daily mobility goal to care team and  patient/family/caregiver.   - Collaborate with rehabilitation services on mobility goals if consulted  - Perform Range of Motion 3 times a day.  - Reposition patient every 2 hours.  - Dangle patient 3 times a day  - Stand patient 3 times a day  - Ambulate patient 3 times a day  - Out of bed to chair 3 times a day   - Out of bed for meals 3 times a day  - Out of bed for toileting  - Record patient progress and toleration of activity level   Outcome: Progressing     Problem: DISCHARGE PLANNING  Goal: Discharge to home or other facility with appropriate resources  Description: INTERVENTIONS:  - Identify barriers to discharge w/patient and caregiver  - Arrange for needed discharge resources and transportation as appropriate  - Identify discharge learning needs (meds, wound care, etc.)  - Arrange for interpretive services to assist at discharge as needed  - Refer to Case Management Department for coordinating discharge planning if the patient needs post-hospital services based on physician/advanced practitioner order or complex needs related to functional status, cognitive ability, or social support system  Outcome: Progressing     Problem: Knowledge Deficit  Goal: Patient/family/caregiver demonstrates understanding of disease process, treatment plan, medications, and discharge instructions  Description: Complete learning assessment and assess knowledge base.  Interventions:  - Provide teaching at level of understanding  - Provide teaching via preferred learning methods  Outcome: Progressing     Problem: METABOLIC, FLUID AND ELECTROLYTES - ADULT  Goal: Electrolytes maintained within normal limits  Description: INTERVENTIONS:  - Monitor labs and assess patient for signs and symptoms of electrolyte imbalances  - Administer electrolyte replacement as ordered  - Monitor response to electrolyte replacements, including repeat lab results as appropriate  - Instruct patient on fluid and nutrition as appropriate  Outcome:  Progressing  Goal: Fluid balance maintained  Description: INTERVENTIONS:  - Monitor labs   - Monitor I/O and WT  - Instruct patient on fluid and nutrition as appropriate  - Assess for signs & symptoms of volume excess or deficit  Outcome: Progressing  Goal: Glucose maintained within target range  Description: INTERVENTIONS:  - Monitor Blood Glucose as ordered  - Assess for signs and symptoms of hyperglycemia and hypoglycemia  - Administer ordered medications to maintain glucose within target range  - Assess nutritional intake and initiate nutrition service referral as needed  Outcome: Progressing     Problem: SKIN/TISSUE INTEGRITY - ADULT  Goal: Skin Integrity remains intact(Skin Breakdown Prevention)  Description: Assess:  -Perform Ramírez assessment   -Clean and moisturize skin  -Inspect skin when repositioning, toileting, and assisting with ADLS  -Assess extremities for adequate circulation and sensation     Bed Management:  -Have minimal linens on bed & keep smooth, unwrinkled  -Change linens as needed when moist or perspiring    Activity:  -Mobilize patient 3 times a day  -Encourage activity and walks on unit  -Encourage or provide ROM exercises   -Turn and reposition patient every 2 Hours  -Use appropriate equipment to lift or move patient in bed  -Instruct/ Assist with weight shifting when out of bed in chair    Skin Care:  -Avoid use of baby powder, tape, friction and shearing, hot water or constrictive clothing  -Relieve pressure over bony prominences   -Do not massage red bony areas  Outcome: Progressing  Goal: Incision(s), wounds(s) or drain site(s) healing without S/S of infection  Description: INTERVENTIONS  - Assess and document dressing, incision, wound bed, drain sites and surrounding tissue  - Provide patient and family education  - Perform skin care/dressing changes every shift/PRN  Outcome: Progressing  Goal: Pressure injury heals and does not worsen  Description: Interventions:  - Implement low  air loss mattress or specialty surface (Criteria met)  - Apply silicone foam dressing  - Limit chair time  - Use special pressure reducing interventions  - Apply fecal or urinary incontinence containment device   - Perform passive or active ROM  - Turn and reposition patient & offload bony prominences   - Utilize friction reducing device or surface for transfers   - Consider nutrition services referral as needed  Outcome: Progressing

## 2024-11-01 NOTE — CONSULTS
Telemedicine Consultation - Infectious Disease   Name: David Carpio 59 y.o. male I MRN: [id number]  Unit/Bed#: -01 I Date of Admission: 10/31/2024   Date of Service: 11/1/2024 I Hospital Day: 1     Inpatient consult to Infectious Diseases  Consult performed by: Grey Dunlap PA-C  Consult ordered by: Chiquis Anna MD        VIRTUAL CARE DOCUMENTATION:     1. This service was provided via Telemedicine using RUN Kit     2. Parties in the room with patient during teleconsult Patient only    3. Confidentiality My office door was closed     4. Participants No one else was in the room    5. Patient acknowledged consent and understanding of privacy and security of the  Telemedicine consult. I informed the patient that I have reviewed their record in Epic and presented the opportunity for them to ask any questions regarding the visit today.  The patient agreed to participate.    6. I have spent a total time of 6 minutes in caring for this patient on the day of the visit/encounter including Impressions, Counseling / Coordination of care, Documenting in the medical record, Reviewing / ordering tests, medicine, procedures  , Obtaining or reviewing history  , and Communicating with other healthcare professionals .       Physician Requesting Evaluation: Chiquis Anna MD   Reason for Evaluation / Principal Problem: right foot osteomyelitis     Assessment & Plan  Toe osteomyelitis, right (HCC)  Cellulitis of right 2nd toe due to nonhealing DFU with suspected osteomyelitis. Presented from podiatry office with progressive toe wound with cellulitis and deep probe to bone concerning for clinical OM. No prior wound cx. He is otherwise afebrile, hemodynamically stable without sepsis. Podiatry consulted. Planning for LEADs and MRI forefoot.   Discontinue IV cefepime, vanco, flagyl   Start IV cefazolin 2g q8h   Follow up podiatry recs   Follow up LEADs   Ancipitate short course of abx if surgical cure achieved    Type 2 diabetes mellitus with diabetic polyneuropathy, without long-term current use of insulin (HCC)  A1c 5.4%, indicating excellent control.  Obesity (BMI 30-39.9)  BMI 39.98. Remains a risk factor for infection.   Atrial fibrillation (HCC)  PPM in situ. Xarelto on hold.     I have discussed the above management plan in detail with the primary service.   Infectious Disease service will follow.    Antibiotics:  D2 IV cefepime, vanco flagyl     History of Present Illness   David Carpio is a 59 y.o. year old male with obesity, A-fib, CHF, diabetes, peripheral neuropathy, who presented on 10/31 with worsening right second toe wound.  Patient has been following outpatient podiatry for the last 3 months regarding nonhealing ulceration on the plantar aspect of the right second toe stump.  No recent antibiotic use.  Unfortunately during yesterday's wound care visit, second toe appeared cellulitic and wound now probes deep to bone with concern for clinical osteomyelitis.  He was brought to the ED for admission.  No prior wound cultures from the second toe.  Denies history of MRSA or drug-resistant organisms with prior foot infections.  Has multiple amputated toes on the right foot and a left TMA.  He was not septic on admission.  He denies fevers or chills.  He has peripheral neuropathy and denies pain in his foot.     A complete review of systems is negative other than that noted in the HPI.    I have reviewed the patient's PMH, PSH, Social History, Family History, Meds, and Allergies    Objective :  Temp:  [97.2 °F (36.2 °C)-98.3 °F (36.8 °C)] 97.2 °F (36.2 °C)  HR:  [61-96] 61  BP: (105-120)/(55-64) 120/63  Resp:  [18] 18  SpO2:  [93 %-100 %] 100 %  O2 Device: None (Room air)    Physical exam:  General:  No acute distress, laying in bed watching television.    Psychiatric:  Awake and alert.  Pleasant and cooperative.  HEENT: Head is atraumatic normocephalic, neck supple, mucous membranes moist.  CV: Irregularly  irregular rhythm without murmur.  Pulmonary: Lungs are clear normal respiratory excursion without accessory muscle use  Abdomen:  Soft, nontender  Extremities:  No edema.  Left TMA.  Multiple distal toe amps right foot  Skin:  No rashes.  Right second toe stump ulcer with erythema and warmth of the toe consistent with cellulitis.  : No CVA or suprapubic tenderness.  Neuro: Moves all 4 extremities, no obvious focal neurodeficit noted.      Lab Results: I have reviewed the following results:  Results from last 7 days   Lab Units 11/01/24  0815 10/31/24  1949   WBC Thousand/uL 7.67 10.43*   HEMOGLOBIN g/dL 14.8 15.5   PLATELETS Thousands/uL 220 265     Results from last 7 days   Lab Units 11/01/24  0815 10/31/24  1949   SODIUM mmol/L 137 138   POTASSIUM mmol/L 3.3* 3.6   CHLORIDE mmol/L 99 100   CO2 mmol/L 32 29   BUN mg/dL 15 20   CREATININE mg/dL 1.03 1.28   EGFR ml/min/1.73sq m 78 60   CALCIUM mg/dL 8.8 9.2   AST U/L  --  20   ALT U/L  --  13   ALK PHOS U/L  --  110*   ALBUMIN g/dL  --  4.5     Results from last 7 days   Lab Units 10/31/24  1954 10/31/24  1949   BLOOD CULTURE  Received in Microbiology Lab. Culture in Progress. Received in Microbiology Lab. Culture in Progress.     Results from last 7 days   Lab Units 10/31/24  1949   PROCALCITONIN ng/ml <0.05     Results from last 7 days   Lab Units 10/31/24  1949   CRP mg/L 18.0*               Imaging Results Review: I reviewed radiology reports from this admission including: xray(s).  Other Study Results Review: No additional pertinent studies reviewed.

## 2024-11-01 NOTE — DISCHARGE SUMMARY
"Discharge Summary - Hospitalist   Name: David Carpio 59 y.o. male I MRN: [id number]  Unit/Bed#: -01 I Date of Admission: 10/31/2024   Date of Service: 11/1/2024 I Hospital Day: 1     Assessment & Plan  Toe osteomyelitis, right (HCC)  Presented per podiatry recommendation for nonhealing right 2nd toe stump ulcer  Discussed with podiatry also infectious disease patient has anxiety to hospitals unfortunately with his PPM MRI cannot be done inpatient till at least next week and he has anxiety to 2 hospitals and he is not willing to stay okay.  Podiatry to discharge with outpatient MRI which will be scheduled discussed with MRI.   Arterial duplex has been ordered and will be done prior to discharge  Discussed with infectious disease discharged on Keflex 1 g p.o. every 6 hours for 7 days he will follow-up with podiatry podiatry will follow-up on MRI  Chronic diastolic heart failure (HCC)  Wt Readings from Last 3 Encounters:   10/31/24 (!) 137 kg (303 lb)   09/16/24 (!) 136 kg (300 lb 9.6 oz)   08/14/24 134 kg (295 lb)     Preserved EF on echocardiogram from February   euvolemic on exam  Resume his diuretics  Monitor volume status  Atrial fibrillation (HCC)  Rate controlled atrial fibrillation  PPM placed for syncope  Rx'd Xarelto, has not taken medication in the past 2 weeks due to bruising while at work  Patient to resume when going home as he is being discharged  Type 2 diabetes mellitus with diabetic polyneuropathy, without long-term current use of insulin (HCC)  Lab Results   Component Value Date    HGBA1C 5.4 04/17/2024       No results for input(s): \"POCGLU\" in the last 72 hours.    Blood Sugar Average: Last 72 hrs:      Obesity (BMI 30-39.9)         Medical Problems       Resolved Problems  Date Reviewed: 9/17/2024   None       Discharging Physician / Practitioner: Chiquis Anna MD  PCP: Khurram Rodriguez DO  Admission Date:   Admission Orders (From admission, onward)       Ordered        10/31/24 1951  " "INPATIENT ADMISSION  Once                          Discharge Date: 11/01/24    Consultations During Hospital Stay:  Podiatry  Infectious disease    Procedures Performed:   none    Significant Findings / Test Results:   Arterial duplex pending    Incidental Findings:   none    Test Results Pending at Discharge (will require follow up):   Arterial duplex      Outpatient Tests Requested:  none    Complications:  none    Reason for Admission: Right second toe stump ulcer    Hospital Course:   David Carpio is a 59 y.o. male patient who originally presented to the hospital on 10/31/2024 due to right second toe stump ulcer nonhealing concerning for osteo- admitted with iv abx and mri and arterial duplex -unfortunately secondary to his pacemaker MRI cannot be done and patient has anxiety to hospitals and requesting to be discharged per podiatry okay to be discharged on p.o. antibiotics MRI will be scheduled outpatient next week arterial duplex will be followed up after it is done discussed with infectious disease he will be discharged Keflex 1 g 4 times daily for 7 days.          Please see above list of diagnoses and related plan for additional information.     Condition at Discharge: stable    Discharge Day Visit / Exam:   Subjective:  seen and examined no complaints   Vitals: Blood Pressure: 120/63 (11/01/24 0600)  Pulse: 61 (11/01/24 0600)  Temperature: (!) 97.2 °F (36.2 °C) (11/01/24 0600)  Temp Source: Temporal (10/31/24 2118)  Respirations: 18 (11/01/24 0600)  Height: 6' 1\" (185.4 cm) (10/31/24 2118)  Weight - Scale: (!) 137 kg (303 lb) (10/31/24 2118)  SpO2: 100 % (11/01/24 0600)  Physical Exam  Vitals and nursing note reviewed.   Constitutional:       General: He is not in acute distress.     Appearance: He is well-developed.   HENT:      Head: Normocephalic and atraumatic.   Eyes:      Conjunctiva/sclera: Conjunctivae normal.   Cardiovascular:      Rate and Rhythm: Normal rate and regular rhythm.      Heart " sounds: No murmur heard.  Pulmonary:      Effort: Pulmonary effort is normal. No respiratory distress.      Breath sounds: Normal breath sounds. No wheezing or rales.   Abdominal:      General: There is no distension.      Palpations: Abdomen is soft.      Tenderness: There is no abdominal tenderness.   Musculoskeletal:         General: No swelling.      Cervical back: Neck supple.   Skin:     General: Skin is warm and dry.      Capillary Refill: Capillary refill takes less than 2 seconds.   Neurological:      Mental Status: He is alert and oriented to person, place, and time.   Psychiatric:         Mood and Affect: Mood normal.          Discussion with Family: Patient declined call to .     Discharge instructions/Information to patient and family:   See after visit summary for information provided to patient and family.      Provisions for Follow-Up Care:  See after visit summary for information related to follow-up care and any pertinent home health orders.      Mobility at time of Discharge:   Basic Mobility Inpatient Raw Score: 23  JH-HLM Goal: 7: Walk 25 feet or more  JH-HLM Achieved: 8: Walk 250 feet ot more  HLM Goal achieved. Continue to encourage appropriate mobility.     Disposition:   Home    Planned Readmission: anticipate yes     Discharge Medications:  See after visit summary for reconciled discharge medications provided to patient and/or family.      Administrative Statements   Discharge Statement:  I have spent a total time of >35 minutes in caring for this patient on the day of the visit/encounter. >30 minutes of time was spent on: Patient and family education, Documenting in the medical record, Reviewing / ordering tests, medicine, procedures  , and Communicating with other healthcare professionals .    **Please Note: This note may have been constructed using a voice recognition system**

## 2024-11-01 NOTE — TELEPHONE ENCOUNTER
Patients wife called to in regard to the appt the patient had for today. She wanted to cancel it. She stated the patient will call back when he knows when he's getting out of the hospital to reschedule.

## 2024-11-01 NOTE — UTILIZATION REVIEW
NOTIFICATION OF INPATIENT ADMISSION   AUTHORIZATION REQUEST   SERVICING FACILITY:   [place] [address]  Tax ID: [id number]  NPI: [id number] ATTENDING PROVIDER:  Attending Name and NPI#: Chiquis Anna Md [[id number]]  Address: [address] [place] [address]  Phone: [phone]   ADMISSION INFORMATION:  Place of Service: Inpatient [facility]  Place of Service Code: 21  Inpatient Admission Date/Time: 10/31/24  7:51 PM  Discharge Date/Time: No discharge date for patient encounter.  Admitting Diagnosis Code/Description:  Foot pain [M79.673]  Osteomyelitis (HCC) [M86.9]     UTILIZATION REVIEW CONTACT:  Mirna Velez, Utilization   Network Utilization Review Department  Phone: [phone]  Fax [phone]  Email: [email or web address]  Contact for approvals/pending authorizations, clinical reviews, and discharge.     PHYSICIAN ADVISORY SERVICES:  Medical Necessity Denial & Yoqd-vp-Yuuy Review  Phone: [phone]  Fax: [phone]  Email: [email or web address]     DISCHARGE SUPPORT TEAM:  For Patients Discharge Needs & Updates  Phone: [phone] opt. 2 Fax: [phone]  Email: [email or web address]

## 2024-11-01 NOTE — ASSESSMENT & PLAN NOTE
Wt Readings from Last 3 Encounters:   10/31/24 (!) 137 kg (303 lb)   09/16/24 (!) 136 kg (300 lb 9.6 oz)   08/14/24 134 kg (295 lb)     Preserved EF on echocardiogram from February   euvolemic on exam  Resume his diuretics  Monitor volume status

## 2024-11-01 NOTE — ASSESSMENT & PLAN NOTE
Wt Readings from Last 3 Encounters:   10/31/24 (!) 137 kg (303 lb)   09/16/24 (!) 136 kg (300 lb 9.6 oz)   08/14/24 134 kg (295 lb)     Preserved EF on echocardiogram from February   euvolemic on exam  Takes furosemide and metolazone as needed  Hold diuretics at this time  Monitor volume status

## 2024-11-03 LAB
BACTERIA BLD CULT: NORMAL
BACTERIA BLD CULT: NORMAL

## 2024-11-04 NOTE — UTILIZATION REVIEW
NOTIFICATION OF ADMISSION DISCHARGE   This is a Notification of Discharge from [facility]. Please be advised that this patient has been discharge from our facility. Below you will find the admission and discharge date and time including the patient’s disposition.   UTILIZATION REVIEW CONTACT:  Mirna Velez  Utilization   Network Utilization Review Department  Phone: [phone] x carefully listen to the prompts. All voicemails are confidential.  Email: [email or web address]     ADMISSION INFORMATION  PRESENTATION DATE: 10/31/2024  7:14 PM  OBERVATION ADMISSION DATE: N/A  INPATIENT ADMISSION DATE: 10/31/24  7:51 PM   DISCHARGE DATE: 11/1/2024  4:20 PM   DISPOSITION:Home/Self Care    Network Utilization Review Department  ATTENTION: Please call with any questions or concerns to [phone] and carefully listen to the prompts so that you are directed to the right person. All voicemails are confidential.   For Discharge needs, contact Care Management DC Support Team at [phone] opt. 2  Send all requests for admission clinical reviews, approved or denied determinations and any other requests to dedicated fax number below belonging to the campus where the patient is receiving treatment. List of dedicated fax numbers for the Facilities:  FACILITY NAME UR FAX NUMBER   ADMISSION DENIALS (Administrative/Medical Necessity) [phone]   DISCHARGE SUPPORT TEAM ([facility]) [phone]   PARENT CHILD HEALTH (Maternity/NICU/Pediatrics) [phone]   [facility] [phone]   [facility] [phone]   [facility] [phone]   [facility] [phone]   [facility] [phone]   [facility] [phone]   [facility] [phone]   [facility] [address] [facility]  [phone]   [facility] [phone]   [facility] [phone]   [facility] [phone]   [facility] [phone]

## 2024-11-06 ENCOUNTER — OFFICE VISIT (OUTPATIENT)
Dept: PSYCHIATRY | Facility: CLINIC | Age: 59
End: 2024-11-06

## 2024-11-06 DIAGNOSIS — F32.1 CURRENT MODERATE EPISODE OF MAJOR DEPRESSIVE DISORDER WITHOUT PRIOR EPISODE (HCC): Primary | ICD-10-CM

## 2024-11-06 DIAGNOSIS — E11.42 TYPE 2 DIABETES MELLITUS WITH DIABETIC POLYNEUROPATHY, WITHOUT LONG-TERM CURRENT USE OF INSULIN (HCC): ICD-10-CM

## 2024-11-06 DIAGNOSIS — F41.1 GENERALIZED ANXIETY DISORDER: ICD-10-CM

## 2024-11-06 DIAGNOSIS — F43.21 COMPLICATED BEREAVEMENT: ICD-10-CM

## 2024-11-06 LAB
BACTERIA BLD CULT: NORMAL
BACTERIA BLD CULT: NORMAL

## 2024-11-06 PROCEDURE — PBNCHG PB NO CHARGE PLACEHOLDER

## 2024-11-06 RX ORDER — DOXEPIN HYDROCHLORIDE 100 MG/1
100 CAPSULE ORAL
Qty: 30 CAPSULE | Refills: 2 | Status: SHIPPED | OUTPATIENT
Start: 2024-11-06

## 2024-11-06 RX ORDER — BUPROPION HYDROCHLORIDE 300 MG/1
300 TABLET ORAL DAILY
Qty: 30 TABLET | Refills: 2 | Status: SHIPPED | OUTPATIENT
Start: 2024-11-06

## 2024-11-06 RX ORDER — BUSPIRONE HYDROCHLORIDE 15 MG/1
15 TABLET ORAL 3 TIMES DAILY
Qty: 90 TABLET | Refills: 2 | Status: SHIPPED | OUTPATIENT
Start: 2024-11-06

## 2024-11-06 RX ORDER — GABAPENTIN 800 MG/1
900 TABLET ORAL 3 TIMES DAILY
Qty: 90 TABLET | Refills: 2 | Status: SHIPPED | OUTPATIENT
Start: 2024-11-06

## 2024-11-06 NOTE — PSYCH
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 contract for safety, they need to call/contact the outpatient office including this writer, call/contact crisis and/orattend to the nearest Emergency Department for immediate evaluation.    Risks/Benefits      Risks, Benefits And Possible Side Effects Of Medications:    Risks, benefits, and possible side effects of medications explained to David and he verbalizes understanding and agreement for treatment.    Controlled Medication Discussion:     Not applicable - controlled prescriptions are not prescribed by this practice    Psychotherapy Provided:     Individual psychotherapy provided: Yes  David reports that at this time he is currently retired from his job and finds himself struggling with feelings of industry versus inferiority (he previously worked as a  at [employer]).  In the context of his prison he has had struggles with ruminating thoughts surrounding his daughter and he states that moving forward he would like to pursue work on a part-time basis to keep himself occupied. 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  Unfortunately, David continues to have medical problems.  He was recently admitted to the hospital for osteomyelitis of the right foot and was discharged from the hospital 11/1/2024.  As detailed in the hospitalist discharge summary, plans are being made to obtain an MRI of the right foot (anticipated for 11/12/2024) and it is likely that the patient will require surgical amputation in the context of this infection.  At the time of interview David was given space to process his emotions surrounding his ongoing medical comorbidities and the possibility of surgical amputation.  Supportive therapy provided.     Treatment Plan:    Completed and signed during the session: Not applicable - Treatment Plan not due at this session    Visit Time    Visit Start Time: 11:00 AM  Visit Stop Time: 11:30 AM  Total Visit Duration: 30 minutes    This note was shared with patient.    Andre Rodriguez MD 11/06/24    This note has been constructed using a voice recognition system. There may be translation, syntax, or grammatical errors. If you have any questions, please contact the dictating provider.

## 2024-11-06 NOTE — ASSESSMENT & PLAN NOTE
Status: Stable, controlled    Patient is aware of the need to follow up with family physician for medical issues    Lab Results   Component Value Date    HGBA1C 5.4 04/17/2024

## 2024-11-06 NOTE — PATIENT INSTRUCTIONS
Please present for your previously scheduled appointment approximately 15 minutes prior to appointment time. If you are running late or are unable to attend your appointment, please call our [place] office at [phone] or our [place] office at [phone] if applicable to notify the office of your absence.    If you are in psychological crisis including not limited to suicidal/homicidal thoughts or thoughts of self-injury, do not hesitate to call/contact your County Crisis hotline (see below) or attend to the nearest emergency department.  [facility] Crisis: [phone]  [facility] Crisis: [phone]  [facility] Crisis: [phone]  [facility] Crisis: [phone]  [facility] Crisis: [phone]  [facility] Crisis: [phone]  [facility] Crisis: [phone]  National Suicide Prevention Hotline: 1-781.969.5130

## 2024-11-11 ENCOUNTER — TELEPHONE (OUTPATIENT)
Age: 59
End: 2024-11-11

## 2024-11-11 ENCOUNTER — TELEPHONE (OUTPATIENT)
Dept: OBGYN CLINIC | Facility: HOSPITAL | Age: 59
End: 2024-11-11

## 2024-11-11 DIAGNOSIS — L97.512 DIABETIC ULCER OF TOE OF RIGHT FOOT ASSOCIATED WITH TYPE 2 DIABETES MELLITUS, WITH FAT LAYER EXPOSED (HCC): Primary | ICD-10-CM

## 2024-11-11 DIAGNOSIS — E11.621 DIABETIC ULCER OF TOE OF RIGHT FOOT ASSOCIATED WITH TYPE 2 DIABETES MELLITUS, WITH FAT LAYER EXPOSED (HCC): Primary | ICD-10-CM

## 2024-11-11 RX ORDER — CEPHALEXIN 500 MG/1
500 CAPSULE ORAL EVERY 6 HOURS SCHEDULED
Qty: 20 CAPSULE | Refills: 0 | Status: ON HOLD | OUTPATIENT
Start: 2024-11-11 | End: 2024-11-16

## 2024-11-11 NOTE — TELEPHONE ENCOUNTER
Caller: Maribell Carpio    Doctor: Benja    Reason for call: Maribell missed a call from the office.  Can someone return her call?  Thank you.     Call back#: [phone]

## 2024-11-12 ENCOUNTER — HOSPITAL ENCOUNTER (OUTPATIENT)
Dept: RADIOLOGY | Facility: HOSPITAL | Age: 59
Discharge: HOME | End: 2024-11-12
Payer: COMMERCIAL

## 2024-11-12 DIAGNOSIS — M86.9 OSTEOMYELITIS (HCC): ICD-10-CM

## 2024-11-12 PROCEDURE — 73718 MRI LOWER EXTREMITY W/O DYE: CPT

## 2024-11-12 NOTE — NURSING NOTE
Device interrogation for MRI.  Normal device function prior to MRI.  Leads and device meet all requirements per policy for MRI.  Device programmed OVO per Cardiology for MRI.  Patient has no complaints.  Vital signs monitored throughout by BHUPINDER Moreno RN.  Normal device function post MRI.  Device reprogrammed to prior settings per Cardiology.

## 2024-11-12 NOTE — NURSING NOTE
Patient arrived to MRI suite. Alexander BRYANT EP specialist arrived and turned pacer off. Patient taken to MRI room and placed on table where there he was prepped with cardiac monitoring and SPO2 monitoring. MRI scan begun with no issues.     Scan done patient tolerated well, no issues noted. Alexander BRYANT EP specialist reprogrammed PM to prior settings. Patient left MRI suite with no issues.

## 2024-11-12 NOTE — LETTER
[facility]  [address] [place] [address]      November 20, 2024    MRN: [id number]     Phone: [phone]     Dear Mr. Carpio,    You recently had a(n) MRI performed on 11/12/2024 at  [facility] that was requested by Chiquis Anna MD. The study was reviewed by a radiologist, which is a physician who specializes in medical imaging. The radiologist issued a report describing his or her findings. In that report there was a finding that the radiologist felt warranted further discussion with your health care provider and that discussion would be beneficial to you.      The results were sent to Chiquis Anna MD on 11/14/2024  1:34 PM. We recommend that you contact Chiquis Anna MD at [phone] or set up an appointment to discuss the results of the imaging test. If you have already heard from Chiquis Anna MD regarding the results of your study, you can disregard this letter.     This letter is not meant to alarm you, but intended to encourage you to follow-up on your results with the provider that sent you for the imaging study. In addition, we have enclosed answers to frequently asked questions by other patients who have also received a letter to review results with their health care provider (see page two).      Thank you for choosing [facility] for your medical imaging needs.                                                                                                                                                        FREQUENTLY ASKED QUESTIONS    Why am I receiving this letter?  [place] State Law requires us to notify patients who have findings on imaging exams that may require more testing or follow-up with a health professional within the next 3 months.        How serious is the finding on the imaging test?  This letter is sent to all patients who may need follow-up or more testing within the next 3 months.   Receiving this letter does not necessarily mean you have a life-threatening imaging finding or disease.  Recommendations in the radiologist’s imaging report are general in nature and it is up to your healthcare provider to say whether those recommendations make sense for your situation.  You are strongly encouraged to talk to your health care provider about the results and ask whether additional steps need to be taken.    Where can I get a copy of the final report for my recent radiology exam?  To get a full copy of the report you can access your records online at [email or web address] or please contact [facility] Medical Records Department at [phone] Monday through Friday between 8 am and 6 pm.         What do I need to do now?           Please contact your health care provider who requested the imaging study to discuss what further actions (if any) are needed.  You may have already heard from (your ordering provider) in regard to this test in which case you can disregard this letter.        NOTICE IN ACCORDANCE WITH THE [place] STATE “PATIENT TEST RESULT INFORMATION ACT OF 2018”    You are receiving this notice as a result of a determination by your diagnostic imaging service that further discussions of your test results are warranted and would be beneficial to you.    The complete results of your test or tests have been or will be sent to the health care practitioner that ordered the test or tests. It is recommended that you contact your health care practitioner to discuss your results as soon as possible.

## 2024-11-14 ENCOUNTER — APPOINTMENT (EMERGENCY)
Dept: CT IMAGING | Facility: HOSPITAL | Age: 59
DRG: 617 | End: 2024-11-14
Payer: COMMERCIAL

## 2024-11-14 ENCOUNTER — HOSPITAL ENCOUNTER (INPATIENT)
Facility: HOSPITAL | Age: 59
LOS: 2 days | Discharge: HOME | DRG: 617 | End: 2024-11-16
Attending: EMERGENCY MEDICINE | Admitting: FAMILY MEDICINE
Payer: COMMERCIAL

## 2024-11-14 ENCOUNTER — APPOINTMENT (EMERGENCY)
Dept: NON INVASIVE DIAGNOSTICS | Facility: HOSPITAL | Age: 59
DRG: 617 | End: 2024-11-14
Payer: COMMERCIAL

## 2024-11-14 ENCOUNTER — TELEPHONE (OUTPATIENT)
Age: 59
End: 2024-11-14

## 2024-11-14 DIAGNOSIS — M86.9 TOE OSTEOMYELITIS, RIGHT (HCC): ICD-10-CM

## 2024-11-14 DIAGNOSIS — E11.621 DIABETIC ULCER OF TOE OF RIGHT FOOT ASSOCIATED WITH TYPE 2 DIABETES MELLITUS, WITH FAT LAYER EXPOSED (HCC): ICD-10-CM

## 2024-11-14 DIAGNOSIS — L03.115 CELLULITIS OF RIGHT LOWER EXTREMITY: ICD-10-CM

## 2024-11-14 DIAGNOSIS — L97.512 DIABETIC ULCER OF TOE OF RIGHT FOOT ASSOCIATED WITH TYPE 2 DIABETES MELLITUS, WITH FAT LAYER EXPOSED (HCC): ICD-10-CM

## 2024-11-14 DIAGNOSIS — M86.9 OSTEOMYELITIS OF SECOND TOE OF RIGHT FOOT (HCC): Primary | ICD-10-CM

## 2024-11-14 DIAGNOSIS — E66.9 OBESITY (BMI 30-39.9): ICD-10-CM

## 2024-11-14 LAB
ALBUMIN SERPL BCG-MCNC: 4.1 G/DL (ref 3.5–5)
ALP SERPL-CCNC: 87 U/L (ref 34–104)
ALT SERPL W P-5'-P-CCNC: 13 U/L (ref 7–52)
ANION GAP SERPL CALCULATED.3IONS-SCNC: 7 MMOL/L (ref 4–13)
AST SERPL W P-5'-P-CCNC: 18 U/L (ref 13–39)
BASOPHILS # BLD AUTO: 0.04 THOUSANDS/ÂΜL (ref 0–0.1)
BASOPHILS NFR BLD AUTO: 1 % (ref 0–1)
BILIRUB SERPL-MCNC: 0.81 MG/DL (ref 0.2–1)
BUN SERPL-MCNC: 13 MG/DL (ref 5–25)
CALCIUM SERPL-MCNC: 9.1 MG/DL (ref 8.4–10.2)
CHLORIDE SERPL-SCNC: 100 MMOL/L (ref 96–108)
CK SERPL-CCNC: 142 U/L (ref 39–308)
CO2 SERPL-SCNC: 32 MMOL/L (ref 21–32)
CREAT SERPL-MCNC: 0.92 MG/DL (ref 0.6–1.3)
EOSINOPHIL # BLD AUTO: 0.18 THOUSAND/ÂΜL (ref 0–0.61)
EOSINOPHIL NFR BLD AUTO: 2 % (ref 0–6)
ERYTHROCYTE [DISTWIDTH] IN BLOOD BY AUTOMATED COUNT: 14 % (ref 11.6–15.1)
GFR SERPL CREATININE-BSD FRML MDRD: 89 ML/MIN/1.73SQ M
GLUCOSE SERPL-MCNC: 125 MG/DL (ref 65–140)
HCT VFR BLD AUTO: 44.4 % (ref 36.5–49.3)
HGB BLD-MCNC: 14.3 G/DL (ref 12–17)
IMM GRANULOCYTES # BLD AUTO: 0.03 THOUSAND/UL (ref 0–0.2)
IMM GRANULOCYTES NFR BLD AUTO: 0 % (ref 0–2)
LACTATE SERPL-SCNC: 1.3 MMOL/L (ref 0.5–2)
LACTATE SERPL-SCNC: 2.1 MMOL/L (ref 0.5–2)
LYMPHOCYTES # BLD AUTO: 1.69 THOUSANDS/ÂΜL (ref 0.6–4.47)
LYMPHOCYTES NFR BLD AUTO: 22 % (ref 14–44)
MCH RBC QN AUTO: 28.2 PG (ref 26.8–34.3)
MCHC RBC AUTO-ENTMCNC: 32.2 G/DL (ref 31.4–37.4)
MCV RBC AUTO: 88 FL (ref 82–98)
MONOCYTES # BLD AUTO: 0.65 THOUSAND/ÂΜL (ref 0.17–1.22)
MONOCYTES NFR BLD AUTO: 9 % (ref 4–12)
NEUTROPHILS # BLD AUTO: 4.98 THOUSANDS/ÂΜL (ref 1.85–7.62)
NEUTS SEG NFR BLD AUTO: 66 % (ref 43–75)
NRBC BLD AUTO-RTO: 0 /100 WBCS
PLATELET # BLD AUTO: 310 THOUSANDS/UL (ref 149–390)
PMV BLD AUTO: 9.2 FL (ref 8.9–12.7)
POTASSIUM SERPL-SCNC: 3.6 MMOL/L (ref 3.5–5.3)
PROT SERPL-MCNC: 7.6 G/DL (ref 6.4–8.4)
RBC # BLD AUTO: 5.07 MILLION/UL (ref 3.88–5.62)
SODIUM SERPL-SCNC: 139 MMOL/L (ref 135–147)
WBC # BLD AUTO: 7.57 THOUSAND/UL (ref 4.31–10.16)

## 2024-11-14 PROCEDURE — 80053 COMPREHEN METABOLIC PANEL: CPT | Performed by: EMERGENCY MEDICINE

## 2024-11-14 PROCEDURE — 96365 THER/PROPH/DIAG IV INF INIT: CPT

## 2024-11-14 PROCEDURE — 99284 EMERGENCY DEPT VISIT MOD MDM: CPT

## 2024-11-14 PROCEDURE — 96375 TX/PRO/DX INJ NEW DRUG ADDON: CPT

## 2024-11-14 PROCEDURE — 36415 COLL VENOUS BLD VENIPUNCTURE: CPT | Performed by: EMERGENCY MEDICINE

## 2024-11-14 PROCEDURE — 96374 THER/PROPH/DIAG INJ IV PUSH: CPT

## 2024-11-14 PROCEDURE — 85025 COMPLETE CBC W/AUTO DIFF WBC: CPT | Performed by: EMERGENCY MEDICINE

## 2024-11-14 PROCEDURE — 87186 SC STD MICRODIL/AGAR DIL: CPT | Performed by: EMERGENCY MEDICINE

## 2024-11-14 PROCEDURE — 96367 TX/PROPH/DG ADDL SEQ IV INF: CPT

## 2024-11-14 PROCEDURE — 73701 CT LOWER EXTREMITY W/DYE: CPT

## 2024-11-14 PROCEDURE — 83605 ASSAY OF LACTIC ACID: CPT | Performed by: FAMILY MEDICINE

## 2024-11-14 PROCEDURE — 82550 ASSAY OF CK (CPK): CPT | Performed by: EMERGENCY MEDICINE

## 2024-11-14 PROCEDURE — 80061 LIPID PANEL: CPT

## 2024-11-14 PROCEDURE — 99285 EMERGENCY DEPT VISIT HI MDM: CPT | Performed by: EMERGENCY MEDICINE

## 2024-11-14 PROCEDURE — 99222 1ST HOSP IP/OBS MODERATE 55: CPT | Performed by: FAMILY MEDICINE

## 2024-11-14 PROCEDURE — 87040 BLOOD CULTURE FOR BACTERIA: CPT | Performed by: EMERGENCY MEDICINE

## 2024-11-14 PROCEDURE — 83605 ASSAY OF LACTIC ACID: CPT | Performed by: EMERGENCY MEDICINE

## 2024-11-14 PROCEDURE — 87154 CUL TYP ID BLD PTHGN 6+ TRGT: CPT | Performed by: EMERGENCY MEDICINE

## 2024-11-14 PROCEDURE — 93971 EXTREMITY STUDY: CPT

## 2024-11-14 PROCEDURE — 83036 HEMOGLOBIN GLYCOSYLATED A1C: CPT

## 2024-11-14 RX ORDER — GABAPENTIN 400 MG/1
800 CAPSULE ORAL 3 TIMES DAILY
Status: DISCONTINUED | OUTPATIENT
Start: 2024-11-14 | End: 2024-11-16 | Stop reason: HOSPADM

## 2024-11-14 RX ORDER — CEFTRIAXONE 2 G/50ML
2000 INJECTION, SOLUTION INTRAVENOUS ONCE
Status: COMPLETED | OUTPATIENT
Start: 2024-11-14 | End: 2024-11-14

## 2024-11-14 RX ORDER — BUPROPION HYDROCHLORIDE 150 MG/1
300 TABLET ORAL DAILY
Status: DISCONTINUED | OUTPATIENT
Start: 2024-11-14 | End: 2024-11-16 | Stop reason: HOSPADM

## 2024-11-14 RX ORDER — FENTANYL CITRATE 50 UG/ML
100 INJECTION, SOLUTION INTRAMUSCULAR; INTRAVENOUS ONCE
Refills: 0 | Status: COMPLETED | OUTPATIENT
Start: 2024-11-14 | End: 2024-11-14

## 2024-11-14 RX ORDER — OXYCODONE HYDROCHLORIDE 5 MG/1
5 TABLET ORAL EVERY 4 HOURS PRN
Refills: 0 | Status: DISCONTINUED | OUTPATIENT
Start: 2024-11-14 | End: 2024-11-16 | Stop reason: HOSPADM

## 2024-11-14 RX ORDER — MORPHINE SULFATE 4 MG/ML
4 INJECTION, SOLUTION INTRAMUSCULAR; INTRAVENOUS ONCE
Status: COMPLETED | OUTPATIENT
Start: 2024-11-14 | End: 2024-11-14

## 2024-11-14 RX ORDER — CEFAZOLIN SODIUM 1 G/50ML
1000 SOLUTION INTRAVENOUS EVERY 8 HOURS
Status: DISCONTINUED | OUTPATIENT
Start: 2024-11-15 | End: 2024-11-14

## 2024-11-14 RX ORDER — CEFAZOLIN SODIUM 2 G/50ML
2000 SOLUTION INTRAVENOUS EVERY 8 HOURS
Status: DISCONTINUED | OUTPATIENT
Start: 2024-11-15 | End: 2024-11-16 | Stop reason: HOSPADM

## 2024-11-14 RX ORDER — DOXEPIN HYDROCHLORIDE 25 MG/1
100 CAPSULE ORAL
Status: DISCONTINUED | OUTPATIENT
Start: 2024-11-14 | End: 2024-11-16 | Stop reason: HOSPADM

## 2024-11-14 RX ORDER — HYDROMORPHONE HCL/PF 1 MG/ML
0.5 SYRINGE (ML) INJECTION EVERY 2 HOUR PRN
Refills: 0 | Status: DISCONTINUED | OUTPATIENT
Start: 2024-11-14 | End: 2024-11-16 | Stop reason: HOSPADM

## 2024-11-14 RX ORDER — OXYCODONE HYDROCHLORIDE 10 MG/1
10 TABLET ORAL EVERY 4 HOURS PRN
Refills: 0 | Status: DISCONTINUED | OUTPATIENT
Start: 2024-11-14 | End: 2024-11-16 | Stop reason: HOSPADM

## 2024-11-14 RX ORDER — POTASSIUM CHLORIDE 20MEQ/15ML
20 LIQUID (ML) ORAL ONCE
Status: DISCONTINUED | OUTPATIENT
Start: 2024-11-14 | End: 2024-11-16 | Stop reason: HOSPADM

## 2024-11-14 RX ADMIN — GABAPENTIN 800 MG: 400 CAPSULE ORAL at 21:15

## 2024-11-14 RX ADMIN — DOXEPIN HYDROCHLORIDE 100 MG: 25 CAPSULE ORAL at 21:15

## 2024-11-14 RX ADMIN — IOHEXOL 100 ML: 350 INJECTION, SOLUTION INTRAVENOUS at 14:35

## 2024-11-14 RX ADMIN — VANCOMYCIN HYDROCHLORIDE 1500 MG: 5 INJECTION, POWDER, LYOPHILIZED, FOR SOLUTION INTRAVENOUS at 14:26

## 2024-11-14 RX ADMIN — BUSPIRONE HYDROCHLORIDE 15 MG: 10 TABLET ORAL at 21:15

## 2024-11-14 RX ADMIN — OXYCODONE HYDROCHLORIDE 10 MG: 10 TABLET ORAL at 20:39

## 2024-11-14 RX ADMIN — FENTANYL CITRATE 100 MCG: 50 INJECTION, SOLUTION INTRAMUSCULAR; INTRAVENOUS at 14:45

## 2024-11-14 RX ADMIN — CEFTRIAXONE 2000 MG: 2 INJECTION, SOLUTION INTRAVENOUS at 13:51

## 2024-11-14 RX ADMIN — FENTANYL CITRATE 100 MCG: 50 INJECTION INTRAMUSCULAR; INTRAVENOUS at 13:50

## 2024-11-14 RX ADMIN — MORPHINE SULFATE 4 MG: 4 INJECTION INTRAVENOUS at 15:29

## 2024-11-14 NOTE — ASSESSMENT & PLAN NOTE
Chronic condition-exacerbated in the acute care setting  Proceed with home regimen which includes BuSpar

## 2024-11-14 NOTE — TELEPHONE ENCOUNTER
Caller: Maribell Carpio    Doctor and/or Office: Dr. Yousif/[facility]#: [phone]    Escalation: Care/Had MRI done but no results-was told to take Cy to ER for IV ABX to be administered--no order in [facility] for this. Please call Maribell back to let her know what to do with Cy. Thanks

## 2024-11-14 NOTE — ASSESSMENT & PLAN NOTE
Wt Readings from Last 3 Encounters:   11/14/24 (!) 137 kg (303 lb)   10/31/24 (!) 137 kg (303 lb)   09/16/24 (!) 136 kg (300 lb 9.6 oz)     Weight stable from previous  Appears relatively euvolemic on presentation  Preserved EF on echocardiogram in February 2024  Hold diuretics in the perioperative period  Resume postoperatively

## 2024-11-14 NOTE — ASSESSMENT & PLAN NOTE
Patient is rate controlled on presentation  PPM placed for syncope in the past  Generally maintained on Xarelto-has not taken-hold in the setting of surgical procedure

## 2024-11-14 NOTE — H&P
H&P - Hospitalist   Name: David Carpio 59 y.o. male I MRN: [id number]  Unit/Bed#: RK I Date of Admission: 11/14/2024   Date of Service: 11/14/2024 I Hospital Day: 0     Assessment & Plan  Toe osteomyelitis, right (HCC)  Patient presents today (11/14) at advice of podiatrist for abnormal MRI  Notably, patient recently hospitalized at our institution from 10/31 - 11/1/2024 at the time with complaints of right lower extremity nonhealing right second toe stump ulcer high index of suspicion for osteomyelitis  Secondary to PPM-MRI could not be completed-patient with strong desire to leave the hospital  Therefore, was discharged on oral Keflex with MRI in the outpatient setting  MRI (11/12) revealed osteomyelitis  Patient without complaints of fever, chills, sweats    In ED, patient without SIRS criteria  MRI completed (11/12) reveals the following:Findings consistent with definite osteomyelitis involving the second toe proximal phalanx   CT obtained today in ED reveals the following: Diffuse lower leg edema with subcutaneous venous varices. No focal fluid collection. Redemonstrated destructive osseous of the distal head of the second digit proximal phalanx consistent with known osteomyelitis as demonstrated on the comparison MRI. Surrounding phlegmonous changes without discrete abscess. A hypodense region extends to the plantar aspect of the second toe  Case discussed between ED provider and podiatrist with recommendations for amputation tomorrow (11/15) admit to medicine  Received Rocephin and vancomycin in the ED    Plan:  Admit to hospitalist service  Podiatry on consult-plan for amputation tomorrow (11/15)  Continue empiric antimicrobials  Pain control  Atrial fibrillation (HCC)  Patient is rate controlled on presentation  PPM placed for syncope in the past  Generally maintained on Xarelto-has not taken-hold in the setting of surgical procedure    Chronic diastolic heart failure (HCC)  Wt Readings from Last 3  Encounters:   11/14/24 (!) 137 kg (303 lb)   10/31/24 (!) 137 kg (303 lb)   09/16/24 (!) 136 kg (300 lb 9.6 oz)     Weight stable from previous  Appears relatively euvolemic on presentation  Preserved EF on echocardiogram in February 2024  Hold diuretics in the perioperative period  Resume postoperatively        Anxiety  Chronic condition-exacerbated in the acute care setting  Proceed with home regimen which includes BuSpar    Obesity (BMI 30-39.9)  Recommend incorporating a more whole foods plant-predominant diet along with decreasing consumption of red meats and processed foods  Per AHA guidelines, recommend moderate-vigorous intensity exercise for 30 minutes a day for 5 days a week or a total of 150 min/week      VTE Pharmacologic Prophylaxis:   High Risk (Score >/= 5) - Pharmacological DVT Prophylaxis Contraindicated. Sequential Compression Devices Ordered.  Code Status: Level 1 - Full Code   Discussion with family: Patient declined call to .     Anticipated Length of Stay: Patient will be admitted on an inpatient basis with an anticipated length of stay of greater than 2 midnights secondary to osteomyelitis.    History of Present Illness   Chief Complaint: Toe pain    David Carpio is a 59 y.o. male with a PMH of hypertension, hyperlipidemia, atrial fibrillation, systemically anticoagulated, previous diabetes mellitus who presents at the advice of his podiatrist for abnormal MRI.    Notably, the patient was hospitalized at this institution 2 weeks ago for similar complaints.  At that time, evidence of lower extremity osteomyelitis.  Patient has a permanent pacemaker and could not undergo MRI in a timely manner in the inpatient setting.  Patient opted to discharge to home on oral antimicrobials until MRI could be obtained.  MRI obtained (11/12) which revealed/confirmed osteomyelitis of the right lower extremity at the toe.  Was sent in for amputation.        Review of Systems   All other systems  reviewed and are negative.      Historical Information   Past Medical History:   Diagnosis Date    Atrial fibrillation (HCC)     Benzodiazepine withdrawal with complication (HCC) 06/15/2023    Chronic diastolic (congestive) heart failure (HCC)     Diabetes mellitus (HCC)     GERD (gastroesophageal reflux disease)     High cholesterol     Hyperlipidemia     Pacemaker     Stroke (HCC)      Past Surgical History:   Procedure Laterality Date    APPENDECTOMY      ATRIAL CARDIAC PACEMAKER INSERTION      BARIATRIC SURGERY  05/2021    BONE BIOPSY Left 7/26/2023    Procedure: EXCISION BIOPSY BONE LESION EXTREMITY;  Surgeon: Adelia Yousif DPM;  [facility];  Service: Podiatry    EPIDURAL BLOCK INJECTION N/A 5/19/2022    Procedure: BLOCK / INJECTION EPIDURAL STEROID CERVICAL C7-T1;  Surgeon: Skyler Quinn MD;  [facility]: [facility];  Service: Pain Management     FL GUIDED NEEDLE PLAC BX/ASP/INJ  3/22/2022    FOOT AMPUTATION Left 4/28/2022    Procedure: LEFT TRANSMETATARSAL AMPUTATION.;  Surgeon: Nestor Madden DPM;  [facility];  Service: Podiatry    NERVE BLOCK Right 2/10/2022    Procedure: BLOCK MEDIAL BRANCH C3, C4, C5 #1;  Surgeon: Skyler Quinn MD;  [facility]: [facility];  Service: Pain Management     NERVE BLOCK Right 3/22/2022    Procedure: BLOCK MEDIAL BRANCH C3, C4, C5 #2;  Surgeon: Skyler Quinn MD;  [facility]: [facility];  Service: Pain Management     KY AMPUTATION FOOT TRANSMETARSAL Left 4/12/2023    Procedure: REVISION LEFT TRANSMETATARSAL (TMA) AMPUTATION, REMOVAL OF UNVIABLE TISSUE AND BONE,;  Surgeon: Adelia Yousif DPM;  [facility];  Service: Podiatry    KY AMPUTATION METATARSAL W/TOE SINGLE Left 4/7/2023    Procedure: 2ND RAY RESECTION FOOT;  Surgeon: Adelia Yousif DPM;  [facility];  Service: Podiatry    KY AMPUTATION TOE INTERPHALANGEAL JOINT Left 11/16/2021    Procedure: AMPUTATION LESSER TOE;  Surgeon: Nestor Madden DPM;  [facility];  Service:  Podiatry    RADIOFREQUENCY ABLATION Right 4/7/2022    Procedure: Right C3, C4, C5 RFA;  Surgeon: Skyler Quinn MD;  [facility];  Service: Pain Management     RHIZOTOMY Right 2/9/2023    Procedure: RHIZOTOMY CERVICAL MEDIAL BRANCH NERVES RIGHT C3, C4, C5;  Surgeon: Skyler Quinn MD;  [facility];  Service: Pain Management     TOE AMPUTATION Left     2nd toe    TOE AMPUTATION Left 9/15/2021    Procedure: AMPUTATION LEFT 4TH TOE;  Surgeon: Nestor Madden DPM;  [facility];  Service: Podiatry    TOE AMPUTATION Right 1/12/2022    Procedure: AMPUTATION TOE;  Surgeon: Hong Jolly DPM;  [facility];  Service: Podiatry    TOE AMPUTATION Right 2/23/2022    Procedure: AMPUTATION TOE  RIGHT SECOND;  Surgeon: Hong Jolly DPM;  Location: [facility];  Service: Podiatry    TOE AMPUTATION Right 6/3/2022    Procedure: AMPUTATION right 4th TOE;  Surgeon: Nestor Madden DPM;  [facility];  Service: Podiatry     Social History     Tobacco Use    Smoking status: Never     Passive exposure: Never    Smokeless tobacco: Never   Vaping Use    Vaping status: Never Used   Substance and Sexual Activity    Alcohol use: Never    Drug use: Never    Sexual activity: Yes     E-Cigarette/Vaping    E-Cigarette Use Never User      E-Cigarette/Vaping Substances     Family history non-contributory  Social History:    Meds/Allergies   I have reviewed home medications with a medical source (PCP, Pharmacy, other).  Prior to Admission medications    Medication Sig Start Date End Date Taking? Authorizing Provider   buPROPion (Wellbutrin XL) 300 mg 24 hr tablet Take 1 tablet (300 mg total) by mouth daily 11/6/24   Andre Rodriguez MD   busPIRone (BUSPAR) 15 mg tablet Take 1 tablet (15 mg total) by mouth 3 (three) times a day 11/6/24   Andre Rodriguez MD   calcium citrate-vitamin D 315 mg-5 mcg tablet Take 2 tablets by mouth 2 (two) times a day 11/30/23   Historical MD Palmer    cephalexin (KEFLEX) 500 mg capsule Take 1 capsule (500 mg total) by mouth every 6 (six) hours for 5 days 11/11/24 11/16/24  Adelia Yousif DPM   doxepin (SINEquan) 100 mg capsule Take 1 capsule (100 mg total) by mouth daily at bedtime 11/6/24   Andre Rodriguez MD   ferrous sulfate 325 (65 Fe) mg tablet Take 1 tablet (325 mg total) by mouth daily with breakfast Do not start before June 19, 2023. 6/19/23   Obioma Dayo Chapin PA-C   furosemide (LASIX) 40 mg tablet Take 40 mg by mouth 2 (two) times a day as needed (pt states he takes 2 times a day as needed) 1/17/24   Historical Provider, MD   gabapentin (NEURONTIN) 800 mg tablet Take 1 tablet (800 mg total) by mouth 3 (three) times a day 11/6/24   Andre Rodriguez MD   lidocaine (Lidoderm) 5 % Apply 1 patch topically over 12 hours daily for 15 days Remove & Discard patch within 12 hours or as directed by MD 10/15/23 8/14/24  Dominique Da Silva, DO   metolazone (ZAROXOLYN) 2.5 mg tablet TAKE 1 TAB BY MOUTH ONCE A DAY ON MONDAY, WEDNESDAY, AND FRIDAY ONLY 8/7/24   Historical Provider, MD   Multiple Vitamins-Minerals (Mens Multivitamin) TABS Take 1 tablet by mouth 2 (two) times a day 11/30/23   Historical Provider, MD   potassium chloride (KLOR-CON) 20 mEq packet Take 20 mEq by mouth if needed (pt states he takes with lasix tablet as needed)    Historical Provider, MD   rivaroxaban (Xarelto) 20 mg tablet Take 20 mg by mouth daily with breakfast    Historical Provider, MD     Allergies   Allergen Reactions    Ativan [Lorazepam] Anxiety       Objective :  Temp:  [97.5 °F (36.4 °C)] 97.5 °F (36.4 °C)  HR:  [73-93] 76  BP: (100-139)/(59-87) 100/63  Resp:  [18] 18  SpO2:  [95 %-100 %] 96 %  O2 Device: None (Room air)    Physical Exam   Vitals and nursing note reviewed.   Constitutional:       General: He is not in acute distress.     Appearance: He is well-developed.   HENT:      Head: Normocephalic and atraumatic.      Mouth/Throat:      Mouth: Mucous  membranes are dry.   Eyes:      Conjunctiva/sclera: Conjunctivae normal.   Cardiovascular:      Rate and Rhythm: Normal rate. Rhythm irregular.      Heart sounds: No murmur heard.  Pulmonary:      Effort: Pulmonary effort is normal. No respiratory distress.      Breath sounds: Normal breath sounds.   Abdominal:      Palpations: Abdomen is soft.      Tenderness: There is no abdominal tenderness.   Musculoskeletal:         General: Swelling present.      Cervical back: Neck supple.      Right lower leg: Edema present.      Left lower leg: Edema present.      Comments: Left TMA, right second toe stump ulcer   Skin:     General: Skin is warm and dry.      Capillary Refill: Capillary refill takes less than 2 seconds.      Findings: Erythema and rash present.   Neurological:      General: No focal deficit present.      Mental Status: He is alert and oriented to person, place, and time.   Psychiatric:         Mood and Affect: Mood normal.       Lines/Drains:            Lab Results: I have reviewed the following results:  Results from last 7 days   Lab Units 11/14/24  1340   WBC Thousand/uL 7.57   HEMOGLOBIN g/dL 14.3   HEMATOCRIT % 44.4   PLATELETS Thousands/uL 310   SEGS PCT % 66   LYMPHO PCT % 22   MONO PCT % 9   EOS PCT % 2     Results from last 7 days   Lab Units 11/14/24  1340   SODIUM mmol/L 139   POTASSIUM mmol/L 3.6   CHLORIDE mmol/L 100   CO2 mmol/L 32   BUN mg/dL 13   CREATININE mg/dL 0.92   ANION GAP mmol/L 7   CALCIUM mg/dL 9.1   ALBUMIN g/dL 4.1   TOTAL BILIRUBIN mg/dL 0.81   ALK PHOS U/L 87   ALT U/L 13   AST U/L 18   GLUCOSE RANDOM mg/dL 125             Lab Results   Component Value Date    HGBA1C 5.4 04/17/2024    HGBA1C 5.3 09/15/2023    HGBA1C 5.7 (H) 06/16/2023     Results from last 7 days   Lab Units 11/14/24  1340   LACTIC ACID mmol/L 2.1*       Imaging Results Review: I reviewed radiology reports from this admission including: MRI LE.  Other Study Results Review: EKG was reviewed.     Administrative  Statements   I have spent a total time of 44 minutes in caring for this patient on the day of the visit/encounter including Risks and benefits of tx options, Instructions for management, Importance of tx compliance, Impressions, and Reviewing / ordering tests, medicine, procedures  .    ** Please Note: This note has been constructed using a voice recognition system. **

## 2024-11-14 NOTE — ED PROVIDER NOTES
Time reflects when diagnosis was documented in both MDM as applicable and the Disposition within this note       Time User Action Codes Description Comment    11/14/2024  1:46 PM Juan Wahl Add [M86.9] Osteomyelitis of second toe of right foot (HCC)     11/14/2024  1:46 PM Juan Wahl Add [L03.115,  L02.415] Cellulitis and abscess of right lower extremity     11/14/2024  1:46 PM Juan Wahl Remove [L03.115,  L02.415] Cellulitis and abscess of right lower extremity     11/14/2024  1:46 PM Felix Wahly Add [L03.115] Cellulitis of right lower extremity           ED Disposition       ED Disposition   Admit    Condition   Stable    Date/Time   Thu Nov 14, 2024  1:46 PM    Comment                  Assessment & Plan       Medical Decision Making  Patient presented to the emergency department and a MSE was performed. The patient was evaluated for complaint related to acute right foot and leg swelling status post infectious process right great toe.  Patient is potentially at risk for, but not limited to, abscess, cellulitis, necrotizing fasciitis, osteomyelitis.  Several of these diagnoses have been evaluated and ruled out by history and physical.  As needed, patient will be further evaluated with laboratory and imaging studies.  Higher level diagnostics, such as CT imaging or ultrasound, may also be required.  Please see work-up portion of the note for further evaluation of patient's risk.  Socioeconomic factors were also considered as part of the decision-making process.  Unless otherwise stated in the chart or patient is admitted as elsewhere documented, any previously prescribed medications will be maintained.      Problems Addressed:  Cellulitis of right lower extremity: acute illness or injury  Osteomyelitis of second toe of right foot (HCC): complicated acute illness or injury with systemic symptoms that poses a threat to life or bodily functions     Details: Patient was advised of the high likelihood that  he would need an amputation of the affected toe and possibly more of the extremity    Amount and/or Complexity of Data Reviewed  Labs: ordered.  Radiology: ordered.    Risk  Prescription drug management.  Decision regarding hospitalization.  Risk Details: Patient presented to the emergency department and a MSE was performed. The patient was evaluated and diagnosed with acute osteomyelitis of the right second toe. This is a new issue that will require additional planned work-up and treatment in a hospitalized setting. As may have been required as part of this evaluation, clinical laboratory test, radiology imaging and medical testing (I.e. EKG) were ordered as necessitated by the patient's presentation. I independently reviewed these studies, imaging and testing. This patient's case is considered to be a considerable risk secondary to the above listed disease process and poses a threat to the patient's well-being and baseline function. Further in-patient diagnostic testing and management, which may include the administration of parenteral medications, is required.            ED Course as of 11/14/24 1507   Thu Nov 14, 2024   1342 MRI performed today shows osteomyelitis of the second toe.   1452 DVT study is negative.   1506 Care of this patient was discussed with medicine and podiatry.  Patient was referred for admission.       Medications   vancomycin (VANCOCIN) 1500 mg in sodium chloride 0.9% 250 mL IVPB (1,500 mg Intravenous New Bag 11/14/24 1426)   fentaNYL injection 100 mcg (100 mcg Intravenous Given 11/14/24 1350)   cefTRIAXone (ROCEPHIN) IVPB (premix in dextrose) 2,000 mg 50 mL (0 mg Intravenous Stopped 11/14/24 1421)   fentaNYL injection 100 mcg (100 mcg Intravenous Given 11/14/24 1445)   iohexol (OMNIPAQUE) 350 MG/ML injection (MULTI-DOSE) 100 mL (100 mL Intravenous Given 11/14/24 1435)       ED Risk Strat Scores                           SBIRT 20yo+      Flowsheet Row Most Recent Value   Initial Alcohol  Screen: US AUDIT-C     1. How often do you have a drink containing alcohol? 0 Filed at: 11/14/2024 1323   2. How many drinks containing alcohol do you have on a typical day you are drinking?  0 Filed at: 11/14/2024 1323   3a. Male UNDER 65: How often do you have five or more drinks on one occasion? 0 Filed at: 11/14/2024 1323   Audit-C Score 0 Filed at: 11/14/2024 1323   ANGELO: How many times in the past year have you...    Used an illegal drug or used a prescription medication for non-medical reasons? Never Filed at: 11/14/2024 1323                            History of Present Illness       Chief Complaint   Patient presents with    Evaluation of Abnormal Diagnostic Test     Patient presents to the ED with reports of an abnormal MRI of the right foot. The patient states he was evaluated on 10/31 with pain to the right toe and treated with antibiotics. He received a call today that his MRI was abnormal and to come to the ED for evaluation and treatment.        Past Medical History:   Diagnosis Date    Atrial fibrillation (HCC)     Benzodiazepine withdrawal with complication (HCC) 06/15/2023    Chronic diastolic (congestive) heart failure (HCC)     Diabetes mellitus (HCC)     GERD (gastroesophageal reflux disease)     High cholesterol     Hyperlipidemia     Pacemaker     Stroke (HCC)       Past Surgical History:   Procedure Laterality Date    APPENDECTOMY      ATRIAL CARDIAC PACEMAKER INSERTION      BARIATRIC SURGERY  05/2021    BONE BIOPSY Left 7/26/2023    Procedure: EXCISION BIOPSY BONE LESION EXTREMITY;  Surgeon: Adelia Yousif DPM;  Location:  MAIN OR;  Service: Podiatry    EPIDURAL BLOCK INJECTION N/A 5/19/2022    Procedure: BLOCK / INJECTION EPIDURAL STEROID CERVICAL C7-T1;  Surgeon: Skyler Quinn MD;  Location:  [facility];  Service: Pain Management     FL GUIDED NEEDLE PLAC BX/ASP/INJ  3/22/2022    FOOT AMPUTATION Left 4/28/2022    Procedure: LEFT TRANSMETATARSAL AMPUTATION.;  Surgeon: Nestor Bañuelos  WOLF Madden;  [facility];  Service: Podiatry    NERVE BLOCK Right 2/10/2022    Procedure: BLOCK MEDIAL BRANCH C3, C4, C5 #1;  Surgeon: Skyler Quinn MD;  [facility]: [facility];  Service: Pain Management     NERVE BLOCK Right 3/22/2022    Procedure: BLOCK MEDIAL BRANCH C3, C4, C5 #2;  Surgeon: Skyler Quinn MD;  [facility];  Service: Pain Management     ME AMPUTATION FOOT TRANSMETARSAL Left 4/12/2023    Procedure: REVISION LEFT TRANSMETATARSAL (TMA) AMPUTATION, REMOVAL OF UNVIABLE TISSUE AND BONE,;  Surgeon: Adelia Yousif DPM;  [facility];  Service: Podiatry    ME AMPUTATION METATARSAL W/TOE SINGLE Left 4/7/2023    Procedure: 2ND RAY RESECTION FOOT;  Surgeon: Adelia Yousif DPM;  [facility];  Service: Podiatry    ME AMPUTATION TOE INTERPHALANGEAL JOINT Left 11/16/2021    Procedure: AMPUTATION LESSER TOE;  Surgeon: Nestor Madden DPM;  [facility];  Service: Podiatry    RADIOFREQUENCY ABLATION Right 4/7/2022    Procedure: Right C3, C4, C5 RFA;  Surgeon: Skyler Quinn MD;  [facility];  Service: Pain Management     RHIZOTOMY Right 2/9/2023    Procedure: RHIZOTOMY CERVICAL MEDIAL BRANCH NERVES RIGHT C3, C4, C5;  Surgeon: Skyler Quinn MD;  [facility];  Service: Pain Management     TOE AMPUTATION Left     2nd toe    TOE AMPUTATION Left 9/15/2021    Procedure: AMPUTATION LEFT 4TH TOE;  Surgeon: Nestor Madden DPM;  [facility];  Service: Podiatry    TOE AMPUTATION Right 1/12/2022    Procedure: AMPUTATION TOE;  Surgeon: Hong Jolly DPM;  [facility];  Service: Podiatry    TOE AMPUTATION Right 2/23/2022    Procedure: AMPUTATION TOE  RIGHT SECOND;  Surgeon: Hong Jolly DPM;  Location: [facility];  Service: Podiatry    TOE AMPUTATION Right 6/3/2022    Procedure: AMPUTATION right 4th TOE;  Surgeon: Nestor Madden DPM;  [facility];  Service: Podiatry      Family History   Problem Relation Age of Onset    No Known  Problems Mother     No Known Problems Father       Social History     Tobacco Use    Smoking status: Never     Passive exposure: Never    Smokeless tobacco: Never   Vaping Use    Vaping status: Never Used   Substance Use Topics    Alcohol use: Never    Drug use: Never      E-Cigarette/Vaping    E-Cigarette Use Never User       E-Cigarette/Vaping Substances      I have reviewed and agree with the history as documented.     Patient is a 59-year-old male with pain and swelling to right foot and right lower extremity with compromise of skin integrity of right second toe.  Patient was recently in the hospital for infectious process and was discharged on Keflex pending MRI.  Patient had MRI 2 days ago.  Was sent back to the emergency room secondary to worsening infection.  MRI was read today as osteomyelitis.      History provided by:  Patient  Evaluation of Abnormal Diagnostic Test      Review of Systems   Constitutional:  Positive for fatigue. Negative for chills and fever.   Respiratory:  Negative for shortness of breath.    Cardiovascular:  Negative for chest pain.   Gastrointestinal:  Negative for nausea and vomiting.   Musculoskeletal:  Positive for arthralgias, gait problem and joint swelling.           Objective       ED Triage Vitals [11/14/24 1324]   Temperature Pulse Blood Pressure Respirations SpO2 Patient Position - Orthostatic VS   97.5 °F (36.4 °C) 75 139/87 18 99 % Lying      Temp Source Heart Rate Source BP Location FiO2 (%) Pain Score    Temporal Monitor Right arm -- 8      Vitals      Date and Time Temp Pulse SpO2 Resp BP Pain Score FACES Pain Rating User   11/14/24 1445 -- 73 100 % -- 116/63 9 -- CG   11/14/24 1400 -- 93 95 % -- 113/63 -- -- CG   11/14/24 1350 -- -- -- -- -- 9 --    11/14/24 1324 97.5 °F (36.4 °C) 75 99 % 18 139/87 8 -- RR            Physical Exam  Vitals and nursing note reviewed.   Constitutional:       General: He is not in acute distress.     Appearance: He is normal weight. He  is not ill-appearing or toxic-appearing.   HENT:      Head: Normocephalic and atraumatic.      Right Ear: External ear normal.      Left Ear: External ear normal.      Nose: Nose normal.      Mouth/Throat:      Mouth: Mucous membranes are moist.   Pulmonary:      Effort: Pulmonary effort is normal. No respiratory distress.   Abdominal:      General: Abdomen is flat. There is no distension.   Musculoskeletal:         General: Swelling and tenderness present. No signs of injury.      Comments: Right lower extremity swollen with drainage anteriorly from an open area.  Second toe of right foot with obvious infection.  Please see clinical images below.    Feet:      Right foot:      Skin integrity: Skin breakdown present.      Comments: Left-sided transmetatarsal amputation.    Amputation of right fourth toe.  Skin:     Coloration: Skin is not pale.   Neurological:      General: No focal deficit present.      Mental Status: He is alert.   Psychiatric:         Mood and Affect: Mood normal.         Thought Content: Thought content normal.         Judgment: Judgment normal.               Results Reviewed       Procedure Component Value Units Date/Time    Comprehensive metabolic panel [[id number]] Collected: 11/14/24 1340    Lab Status: Final result Specimen: Blood from Arm, Right Updated: 11/14/24 1420     Sodium 139 mmol/L      Potassium 3.6 mmol/L      Chloride 100 mmol/L      CO2 32 mmol/L      ANION GAP 7 mmol/L      BUN 13 mg/dL      Creatinine 0.92 mg/dL      Glucose 125 mg/dL      Calcium 9.1 mg/dL      AST 18 U/L      ALT 13 U/L      Alkaline Phosphatase 87 U/L      Total Protein 7.6 g/dL      Albumin 4.1 g/dL      Total Bilirubin 0.81 mg/dL      eGFR 89 ml/min/1.73sq m     Narrative:      [employer] guidelines for Chronic Kidney Disease (CKD):     Stage 1 with normal or high GFR (GFR > 90 mL/min/1.73 square meters)    Stage 2 Mild CKD (GFR = 60-89 mL/min/1.73 square meters)    Stage 3A  Moderate CKD (GFR = 45-59 mL/min/1.73 square meters)    Stage 3B Moderate CKD (GFR = 30-44 mL/min/1.73 square meters)    Stage 4 Severe CKD (GFR = 15-29 mL/min/1.73 square meters)    Stage 5 End Stage CKD (GFR <15 mL/min/1.73 square meters)  Note: GFR calculation is accurate only with a steady state creatinine    CK [[id number]]  (Normal) Collected: 11/14/24 1340    Lab Status: Final result Specimen: Blood from Arm, Right Updated: 11/14/24 1420     Total  U/L     Lactic acid, plasma (w/reflex if result > 2.0) [[id number]]  (Abnormal) Collected: 11/14/24 1340    Lab Status: Final result Specimen: Blood from Arm, Right Updated: 11/14/24 1419     LACTIC ACID 2.1 mmol/L     Narrative:      Result may be elevated if tourniquet was used during collection.    Lactic acid 2 Hours [[id number]]     Lab Status: No result Specimen: Blood     CBC and differential [[id number]] Collected: 11/14/24 1340    Lab Status: Final result Specimen: Blood from Arm, Right Updated: 11/14/24 1401     WBC 7.57 Thousand/uL      RBC 5.07 Million/uL      Hemoglobin 14.3 g/dL      Hematocrit 44.4 %      MCV 88 fL      MCH 28.2 pg      MCHC 32.2 g/dL      RDW 14.0 %      MPV 9.2 fL      Platelets 310 Thousands/uL      nRBC 0 /100 WBCs      Segmented % 66 %      Immature Grans % 0 %      Lymphocytes % 22 %      Monocytes % 9 %      Eosinophils Relative 2 %      Basophils Relative 1 %      Absolute Neutrophils 4.98 Thousands/µL      Absolute Immature Grans 0.03 Thousand/uL      Absolute Lymphocytes 1.69 Thousands/µL      Absolute Monocytes 0.65 Thousand/µL      Eosinophils Absolute 0.18 Thousand/µL      Basophils Absolute 0.04 Thousands/µL     Blood culture #1 [[id number]] Collected: 11/14/24 1345    Lab Status: In process Specimen: Blood from Arm, Left Updated: 11/14/24 1358    Blood culture #2 [[id number]] Collected: 11/14/24 1340    Lab Status: In process Specimen: Blood from Arm, Right Updated: 11/14/24 1358            CT lower extremity w  contrast right    (Results Pending)   VAS VENOUS DUPLEX -LOWER LIMB UNILATERAL    (Results Pending)       Procedures    ED Medication and Procedure Management   Prior to Admission Medications   Prescriptions Last Dose Informant Patient Reported? Taking?   Multiple Vitamins-Minerals (Mens Multivitamin) TABS   Yes No   Sig: Take 1 tablet by mouth 2 (two) times a day   buPROPion (Wellbutrin XL) 300 mg 24 hr tablet   No No   Sig: Take 1 tablet (300 mg total) by mouth daily   busPIRone (BUSPAR) 15 mg tablet   No No   Sig: Take 1 tablet (15 mg total) by mouth 3 (three) times a day   calcium citrate-vitamin D 315 mg-5 mcg tablet   Yes No   Sig: Take 2 tablets by mouth 2 (two) times a day   cephalexin (KEFLEX) 500 mg capsule   No No   Sig: Take 1 capsule (500 mg total) by mouth every 6 (six) hours for 5 days   doxepin (SINEquan) 100 mg capsule   No No   Sig: Take 1 capsule (100 mg total) by mouth daily at bedtime   ferrous sulfate 325 (65 Fe) mg tablet   No No   Sig: Take 1 tablet (325 mg total) by mouth daily with breakfast Do not start before June 19, 2023.   furosemide (LASIX) 40 mg tablet   Yes No   Sig: Take 40 mg by mouth 2 (two) times a day as needed (pt states he takes 2 times a day as needed)   gabapentin (NEURONTIN) 800 mg tablet   No No   Sig: Take 1 tablet (800 mg total) by mouth 3 (three) times a day   lidocaine (Lidoderm) 5 %   No No   Sig: Apply 1 patch topically over 12 hours daily for 15 days Remove & Discard patch within 12 hours or as directed by MD   metolazone (ZAROXOLYN) 2.5 mg tablet   Yes No   Sig: TAKE 1 TAB BY MOUTH ONCE A DAY ON MONDAY, WEDNESDAY, AND FRIDAY ONLY   potassium chloride (KLOR-CON) 20 mEq packet   Yes No   Sig: Take 20 mEq by mouth if needed (pt states he takes with lasix tablet as needed)   rivaroxaban (Xarelto) 20 mg tablet   Yes No   Sig: Take 20 mg by mouth daily with breakfast      Facility-Administered Medications: None     Patient's Medications   Discharge Prescriptions    No  medications on file     No discharge procedures on file.  ED SEPSIS DOCUMENTATION   Time reflects when diagnosis was documented in both MDM as applicable and the Disposition within this note       Time User Action Codes Description Comment    11/14/2024  1:46 PM Juan Wahl [M86.9] Osteomyelitis of second toe of right foot (HCC)     11/14/2024  1:46 PM Juan Wahl Add [L03.115,  L02.415] Cellulitis and abscess of right lower extremity     11/14/2024  1:46 PM Juan Wahl Remove [L03.115,  L02.415] Cellulitis and abscess of right lower extremity     11/14/2024  1:46 PM Juan Wahl Add [L03.115] Cellulitis of right lower extremity                  Juan Wahl,   11/14/24 1506       Juan Wahl,   11/14/24 1507

## 2024-11-14 NOTE — ED NOTES
L posterial tibial & R pedal pulses obtained via doppler.      Catherine Moncada RN  11/14/24 [id number]

## 2024-11-14 NOTE — TELEPHONE ENCOUNTER
Spoke to Maribell she is aware, called the ED to let them know Rich will be coming back. Maribell is asking how soon we will have the MRI results?

## 2024-11-14 NOTE — ASSESSMENT & PLAN NOTE
Patient presents today (11/14) at advice of podiatrist for abnormal MRI  Notably, patient recently hospitalized at our institution from 10/31 - 11/1/2024 at the time with complaints of right lower extremity nonhealing right second toe stump ulcer high index of suspicion for osteomyelitis  Secondary to PPM-MRI could not be completed-patient with strong desire to leave the hospital  Therefore, was discharged on oral Keflex with MRI in the outpatient setting  MRI (11/12) revealed osteomyelitis  Patient without complaints of fever, chills, sweats    In ED, patient without SIRS criteria  MRI completed (11/12) reveals the following:Findings consistent with definite osteomyelitis involving the second toe proximal phalanx   CT obtained today in ED reveals the following: Diffuse lower leg edema with subcutaneous venous varices. No focal fluid collection. Redemonstrated destructive osseous of the distal head of the second digit proximal phalanx consistent with known osteomyelitis as demonstrated on the comparison MRI. Surrounding phlegmonous changes without discrete abscess. A hypodense region extends to the plantar aspect of the second toe  Case discussed between ED provider and podiatrist with recommendations for amputation tomorrow (11/15) admit to medicine  Received Rocephin and vancomycin in the ED    Plan:  Admit to hospitalist service  Podiatry on consult-plan for amputation tomorrow (11/15)  Continue empiric antimicrobials  Pain control

## 2024-11-15 ENCOUNTER — ANESTHESIA (INPATIENT)
Dept: PERIOP | Facility: HOSPITAL | Age: 59
DRG: 617 | End: 2024-11-15
Payer: COMMERCIAL

## 2024-11-15 ENCOUNTER — ANESTHESIA EVENT (INPATIENT)
Dept: PERIOP | Facility: HOSPITAL | Age: 59
DRG: 617 | End: 2024-11-15
Payer: COMMERCIAL

## 2024-11-15 ENCOUNTER — APPOINTMENT (INPATIENT)
Dept: RADIOLOGY | Facility: HOSPITAL | Age: 59
DRG: 617 | End: 2024-11-15
Payer: COMMERCIAL

## 2024-11-15 ENCOUNTER — TELEPHONE (OUTPATIENT)
Age: 59
End: 2024-11-15

## 2024-11-15 LAB
ANION GAP SERPL CALCULATED.3IONS-SCNC: 6 MMOL/L (ref 4–13)
BUN SERPL-MCNC: 11 MG/DL (ref 5–25)
CALCIUM SERPL-MCNC: 8.3 MG/DL (ref 8.4–10.2)
CHLORIDE SERPL-SCNC: 103 MMOL/L (ref 96–108)
CHOLEST SERPL-MCNC: 155 MG/DL (ref ?–200)
CO2 SERPL-SCNC: 28 MMOL/L (ref 21–32)
CREAT SERPL-MCNC: 0.84 MG/DL (ref 0.6–1.3)
EST. AVERAGE GLUCOSE BLD GHB EST-MCNC: 120 MG/DL
GFR SERPL CREATININE-BSD FRML MDRD: 94 ML/MIN/1.73SQ M
GLUCOSE SERPL-MCNC: 93 MG/DL (ref 65–140)
GLUCOSE SERPL-MCNC: 93 MG/DL (ref 65–140)
HBA1C MFR BLD: 5.8 %
HDLC SERPL-MCNC: 38 MG/DL
LDLC SERPL CALC-MCNC: 103 MG/DL (ref 0–100)
POTASSIUM SERPL-SCNC: 3.5 MMOL/L (ref 3.5–5.3)
QRS AXIS: -79 DEGREES
QRSD INTERVAL: 124 MS
QT INTERVAL: 368 MS
QTC INTERVAL: 416 MS
SODIUM SERPL-SCNC: 137 MMOL/L (ref 135–147)
T WAVE AXIS: 61 DEGREES
TRIGL SERPL-MCNC: 69 MG/DL (ref ?–150)
VENTRICULAR RATE: 77 BPM

## 2024-11-15 PROCEDURE — 99223 1ST HOSP IP/OBS HIGH 75: CPT | Performed by: STUDENT IN AN ORGANIZED HEALTH CARE EDUCATION/TRAINING PROGRAM

## 2024-11-15 PROCEDURE — 88311 DECALCIFY TISSUE: CPT | Performed by: PATHOLOGY

## 2024-11-15 PROCEDURE — 28820 AMPUTATION OF TOE: CPT | Performed by: STUDENT IN AN ORGANIZED HEALTH CARE EDUCATION/TRAINING PROGRAM

## 2024-11-15 PROCEDURE — 82948 REAGENT STRIP/BLOOD GLUCOSE: CPT

## 2024-11-15 PROCEDURE — 0Y6R0Z0 DETACHMENT AT RIGHT 2ND TOE, COMPLETE, OPEN APPROACH: ICD-10-PCS | Performed by: STUDENT IN AN ORGANIZED HEALTH CARE EDUCATION/TRAINING PROGRAM

## 2024-11-15 PROCEDURE — 93971 EXTREMITY STUDY: CPT | Performed by: SURGERY

## 2024-11-15 PROCEDURE — 73630 X-RAY EXAM OF FOOT: CPT

## 2024-11-15 PROCEDURE — 88305 TISSUE EXAM BY PATHOLOGIST: CPT | Performed by: PATHOLOGY

## 2024-11-15 PROCEDURE — 93005 ELECTROCARDIOGRAM TRACING: CPT

## 2024-11-15 PROCEDURE — 80048 BASIC METABOLIC PNL TOTAL CA: CPT | Performed by: FAMILY MEDICINE

## 2024-11-15 PROCEDURE — 99232 SBSQ HOSP IP/OBS MODERATE 35: CPT | Performed by: FAMILY MEDICINE

## 2024-11-15 RX ORDER — FENTANYL CITRATE/PF 50 MCG/ML
25 SYRINGE (ML) INJECTION
Status: DISCONTINUED | OUTPATIENT
Start: 2024-11-15 | End: 2024-11-15 | Stop reason: HOSPADM

## 2024-11-15 RX ORDER — PROPOFOL 10 MG/ML
INJECTION, EMULSION INTRAVENOUS AS NEEDED
Status: DISCONTINUED | OUTPATIENT
Start: 2024-11-15 | End: 2024-11-15

## 2024-11-15 RX ORDER — MAGNESIUM HYDROXIDE 1200 MG/15ML
LIQUID ORAL AS NEEDED
Status: DISCONTINUED | OUTPATIENT
Start: 2024-11-15 | End: 2024-11-15 | Stop reason: HOSPADM

## 2024-11-15 RX ORDER — SODIUM CHLORIDE, SODIUM LACTATE, POTASSIUM CHLORIDE, CALCIUM CHLORIDE 600; 310; 30; 20 MG/100ML; MG/100ML; MG/100ML; MG/100ML
INJECTION, SOLUTION INTRAVENOUS CONTINUOUS PRN
Status: DISCONTINUED | OUTPATIENT
Start: 2024-11-15 | End: 2024-11-15

## 2024-11-15 RX ORDER — LIDOCAINE HYDROCHLORIDE 10 MG/ML
INJECTION, SOLUTION EPIDURAL; INFILTRATION; INTRACAUDAL; PERINEURAL AS NEEDED
Status: DISCONTINUED | OUTPATIENT
Start: 2024-11-15 | End: 2024-11-15 | Stop reason: HOSPADM

## 2024-11-15 RX ORDER — LIDOCAINE HYDROCHLORIDE 10 MG/ML
INJECTION, SOLUTION EPIDURAL; INFILTRATION; INTRACAUDAL; PERINEURAL AS NEEDED
Status: DISCONTINUED | OUTPATIENT
Start: 2024-11-15 | End: 2024-11-15

## 2024-11-15 RX ORDER — ONDANSETRON 2 MG/ML
4 INJECTION INTRAMUSCULAR; INTRAVENOUS ONCE AS NEEDED
Status: DISCONTINUED | OUTPATIENT
Start: 2024-11-15 | End: 2024-11-15 | Stop reason: HOSPADM

## 2024-11-15 RX ORDER — FENTANYL CITRATE 50 UG/ML
INJECTION, SOLUTION INTRAMUSCULAR; INTRAVENOUS AS NEEDED
Status: DISCONTINUED | OUTPATIENT
Start: 2024-11-15 | End: 2024-11-15

## 2024-11-15 RX ORDER — DOXEPIN HYDROCHLORIDE 10 MG/1
10 CAPSULE ORAL 2 TIMES DAILY
Status: DISCONTINUED | OUTPATIENT
Start: 2024-11-15 | End: 2024-11-15

## 2024-11-15 RX ORDER — HYDROMORPHONE HCL/PF 1 MG/ML
0.5 SYRINGE (ML) INJECTION
Status: DISCONTINUED | OUTPATIENT
Start: 2024-11-15 | End: 2024-11-15 | Stop reason: HOSPADM

## 2024-11-15 RX ORDER — MIDAZOLAM HYDROCHLORIDE 2 MG/2ML
INJECTION, SOLUTION INTRAMUSCULAR; INTRAVENOUS CONTINUOUS PRN
Status: DISCONTINUED | OUTPATIENT
Start: 2024-11-15 | End: 2024-11-15

## 2024-11-15 RX ORDER — CEFAZOLIN SODIUM 1 G/3ML
INJECTION, POWDER, FOR SOLUTION INTRAMUSCULAR; INTRAVENOUS AS NEEDED
Status: DISCONTINUED | OUTPATIENT
Start: 2024-11-15 | End: 2024-11-15

## 2024-11-15 RX ORDER — DOXEPIN HYDROCHLORIDE 10 MG/1
10 CAPSULE ORAL 2 TIMES DAILY
COMMUNITY
End: 2024-11-16

## 2024-11-15 RX ORDER — ALBUTEROL SULFATE 0.83 MG/ML
2.5 SOLUTION RESPIRATORY (INHALATION) ONCE AS NEEDED
Status: DISCONTINUED | OUTPATIENT
Start: 2024-11-15 | End: 2024-11-15 | Stop reason: HOSPADM

## 2024-11-15 RX ADMIN — LIDOCAINE HYDROCHLORIDE 50 MG: 10 INJECTION, SOLUTION EPIDURAL; INFILTRATION; INTRACAUDAL; PERINEURAL at 16:25

## 2024-11-15 RX ADMIN — CEFAZOLIN 1000 MG: 1 INJECTION, POWDER, FOR SOLUTION INTRAMUSCULAR; INTRAVENOUS at 16:26

## 2024-11-15 RX ADMIN — CEFAZOLIN SODIUM 2000 MG: 2 SOLUTION INTRAVENOUS at 00:34

## 2024-11-15 RX ADMIN — SODIUM CHLORIDE, SODIUM LACTATE, POTASSIUM CHLORIDE, AND CALCIUM CHLORIDE: .6; .31; .03; .02 INJECTION, SOLUTION INTRAVENOUS at 16:22

## 2024-11-15 RX ADMIN — FENTANYL CITRATE 50 MCG: 50 INJECTION INTRAMUSCULAR; INTRAVENOUS at 16:25

## 2024-11-15 RX ADMIN — PROPOFOL 100 MG: 10 INJECTION, EMULSION INTRAVENOUS at 16:25

## 2024-11-15 RX ADMIN — OXYCODONE HYDROCHLORIDE 10 MG: 10 TABLET ORAL at 00:35

## 2024-11-15 RX ADMIN — BUSPIRONE HYDROCHLORIDE 15 MG: 10 TABLET ORAL at 08:16

## 2024-11-15 RX ADMIN — DOXEPIN HYDROCHLORIDE 100 MG: 25 CAPSULE ORAL at 21:10

## 2024-11-15 RX ADMIN — OXYCODONE HYDROCHLORIDE 10 MG: 10 TABLET ORAL at 20:19

## 2024-11-15 RX ADMIN — BUSPIRONE HYDROCHLORIDE 15 MG: 10 TABLET ORAL at 20:19

## 2024-11-15 RX ADMIN — GABAPENTIN 800 MG: 400 CAPSULE ORAL at 08:16

## 2024-11-15 RX ADMIN — PROPOFOL 100 MCG/KG/MIN: 10 INJECTION, EMULSION INTRAVENOUS at 16:26

## 2024-11-15 RX ADMIN — Medication 1 TABLET: at 08:17

## 2024-11-15 RX ADMIN — GABAPENTIN 800 MG: 400 CAPSULE ORAL at 17:39

## 2024-11-15 RX ADMIN — GABAPENTIN 800 MG: 400 CAPSULE ORAL at 21:10

## 2024-11-15 RX ADMIN — BUSPIRONE HYDROCHLORIDE 15 MG: 10 TABLET ORAL at 17:39

## 2024-11-15 RX ADMIN — CEFAZOLIN SODIUM 2000 MG: 2 SOLUTION INTRAVENOUS at 16:27

## 2024-11-15 RX ADMIN — OXYCODONE HYDROCHLORIDE 10 MG: 10 TABLET ORAL at 13:40

## 2024-11-15 RX ADMIN — CEFAZOLIN SODIUM 2000 MG: 2 SOLUTION INTRAVENOUS at 08:18

## 2024-11-15 RX ADMIN — BUPROPION HYDROCHLORIDE 300 MG: 150 TABLET, EXTENDED RELEASE ORAL at 08:16

## 2024-11-15 RX ADMIN — OXYCODONE HYDROCHLORIDE 10 MG: 10 TABLET ORAL at 08:57

## 2024-11-15 NOTE — PROGRESS NOTES
Progress Note - Hospitalist   Name: David Carpio 59 y.o. male I MRN: [id number]  Unit/Bed#: -01 I Date of Admission: 11/14/2024   Date of Service: 11/15/2024 I Hospital Day: 1    Assessment & Plan  Toe osteomyelitis, right (HCC)  Patient presented (11/14) at advice of podiatrist for abnormal MRI  Notably, patient recently hospitalized at our institution from 10/31 - 11/1/2024 at the time with complaints of right lower extremity nonhealing right second toe stump ulcer high index of suspicion for osteomyelitis  Secondary to PPM-MRI could not be completed-patient with strong desire to leave the hospital  Therefore, was discharged on oral Keflex with MRI in the outpatient setting  MRI (11/12) revealed osteomyelitis  Patient without complaints of fever, chills, sweats    In ED, patient without SIRS criteria  MRI completed (11/12) reveals the following:Findings consistent with definite osteomyelitis involving the second toe proximal phalanx   CT obtained today in ED reveals the following: Diffuse lower leg edema with subcutaneous venous varices. No focal fluid collection. Redemonstrated destructive osseous of the distal head of the second digit proximal phalanx consistent with known osteomyelitis as demonstrated on the comparison MRI. Surrounding phlegmonous changes without discrete abscess. A hypodense region extends to the plantar aspect of the second toe  Case discussed between ED provider and podiatrist with recommendations for amputation tomorrow (11/15) admit to medicine  Received Rocephin and vancomycin in the ED    Plan:    Podiatry on consult-plan for amputation later today (11/15)  Continue empiric antimicrobials  Pain control  Atrial fibrillation (HCC)  Patient is rate controlled on presentation  PPM placed for syncope in the past  Generally maintained on Xarelto-has not taken-hold in the setting of surgical procedure    Chronic diastolic heart failure (HCC)  Wt Readings from Last 3 Encounters:    11/14/24 (!) 137 kg (302 lb 14.6 oz)   10/31/24 (!) 137 kg (303 lb)   09/16/24 (!) 136 kg (300 lb 9.6 oz)     Weight stable from previous  Appears relatively euvolemic on presentation  Preserved EF on echocardiogram in February 2024  Hold diuretics in the perioperative period  Resume postoperatively        Anxiety  Chronic condition-exacerbated in the acute care setting  Proceed with home regimen which includes BuSpar  Regimen verified with patient's outpatient psychiatrist    Obesity (BMI 30-39.9)  Recommend incorporating a more whole foods plant-predominant diet along with decreasing consumption of red meats and processed foods  Per AHA guidelines, recommend moderate-vigorous intensity exercise for 30 minutes a day for 5 days a week or a total of 150 min/week    VTE Pharmacologic Prophylaxis:   High Risk (Score >/= 5) - Pharmacological DVT Prophylaxis Contraindicated. Sequential Compression Devices Ordered.    Mobility:   Basic Mobility Inpatient Raw Score: 18  JH-HLM Goal: 6: Walk 10 steps or more  JH-HLM Achieved: 7: Walk 25 feet or more  JH-HLM Goal achieved. Continue to encourage appropriate mobility.    Patient Centered Rounds: I performed bedside rounds with nursing staff today.   Discussions with Specialists or Other Care Team Provider: Podiatry    Education and Discussions with Family / Patient: Patient declined call to .     Current Length of Stay: 1 day(s)  Current Patient Status: Inpatient   Certification Statement: The patient will continue to require additional inpatient hospital stay due to need for amputation-PT OT  Discharge Plan: Anticipate discharge tomorrow to home.    Code Status: Level 1 - Full Code    Subjective   Patient examined.  Eagerly awaiting toe amputation.  Without complaint.    Objective :  Temp:  [97.5 °F (36.4 °C)-99 °F (37.2 °C)] 99 °F (37.2 °C)  HR:  [72-93] 88  BP: (100-139)/(59-87) 125/65  Resp:  [17-18] 17  SpO2:  [95 %-100 %] 95 %  O2 Device: None (Room  air)    Body mass index is 39.96 kg/m².     Input and Output Summary (last 24 hours):   No intake or output data in the 24 hours ending 11/15/24 1039    Physical Exam  Constitutional:       General: He is not in acute distress.     Appearance: He is well-developed.   HENT:      Head: Normocephalic and atraumatic.      Mouth/Throat:      Mouth: Mucous membranes are dry.   Eyes:      Conjunctiva/sclera: Conjunctivae normal.   Cardiovascular:      Rate and Rhythm: Normal rate. Rhythm irregular.      Heart sounds: No murmur heard.  Pulmonary:      Effort: Pulmonary effort is normal. No respiratory distress.      Breath sounds: Normal breath sounds.   Abdominal:      Palpations: Abdomen is soft.      Tenderness: There is no abdominal tenderness.   Musculoskeletal:         General: Swelling present.      Cervical back: Neck supple.      Right lower leg: Edema present.      Left lower leg: Edema present.      Comments: Left TMA, right second toe stump ulcer   Skin:     General: Skin is warm and dry.      Capillary Refill: Capillary refill takes less than 2 seconds.      Findings: Erythema and rash present.   Neurological:      General: No focal deficit present.      Mental Status: He is alert and oriented to person, place, and time.   Psychiatric:         Mood and Affect: Mood normal.     Lines/Drains:              Lab Results: I have reviewed the following results:   Results from last 7 days   Lab Units 11/14/24  1340   WBC Thousand/uL 7.57   HEMOGLOBIN g/dL 14.3   HEMATOCRIT % 44.4   PLATELETS Thousands/uL 310   SEGS PCT % 66   LYMPHO PCT % 22   MONO PCT % 9   EOS PCT % 2     Results from last 7 days   Lab Units 11/14/24  1340   SODIUM mmol/L 139   POTASSIUM mmol/L 3.6   CHLORIDE mmol/L 100   CO2 mmol/L 32   BUN mg/dL 13   CREATININE mg/dL 0.92   ANION GAP mmol/L 7   CALCIUM mg/dL 9.1   ALBUMIN g/dL 4.1   TOTAL BILIRUBIN mg/dL 0.81   ALK PHOS U/L 87   ALT U/L 13   AST U/L 18   GLUCOSE RANDOM mg/dL 125                  Results from last 7 days   Lab Units 11/14/24  1756 11/14/24  1340   LACTIC ACID mmol/L 1.3 2.1*       Recent Cultures (last 7 days):   Results from last 7 days   Lab Units 11/14/24  1345 11/14/24  1340   BLOOD CULTURE  Received in Microbiology Lab. Culture in Progress. Received in Microbiology Lab. Culture in Progress.       Imaging Results Review: No pertinent imaging studies reviewed.  Other Study Results Review: No additional pertinent studies reviewed.    Last 24 Hours Medication List:     Current Facility-Administered Medications:     buPROPion (WELLBUTRIN XL) 24 hr tablet 300 mg, Daily    busPIRone (BUSPAR) tablet 15 mg, TID    ceFAZolin (ANCEF) IVPB (premix in dextrose) 2,000 mg 50 mL, Q8H, Last Rate: 2,000 mg (11/15/24 0818)    doxepin (SINEquan) capsule 100 mg, HS    gabapentin (NEURONTIN) capsule 800 mg, TID    HYDROmorphone (DILAUDID) injection 0.5 mg, Q2H PRN    multivitamin-minerals (CENTRUM) tablet 1 tablet, Daily    oxyCODONE (ROXICODONE) IR tablet 5 mg, Q4H PRN **OR** oxyCODONE (ROXICODONE) immediate release tablet 10 mg, Q4H PRN    potassium chloride oral solution 20 mEq, Once    Administrative Statements   Today, Patient Was Seen By: Fidel Mi, DO  I have spent a total time of 33 minutes in caring for this patient on the day of the visit/encounter including Patient and family education, Risk factor reductions, Documenting in the medical record, and Obtaining or reviewing history  .    **Please Note: This note may have been constructed using a voice recognition system.**

## 2024-11-15 NOTE — CONSULTS
Consult - Podiatry   David Carpio 59 y.o. male MRN: [id number]  Unit/Bed#: -01 Encounter: [id number]    Assessment & Plan     Assessment:  Right 2nd toe stump DFU w/ OM  DM w/ PN, A1c 5.4% 4/17/24    Plan:  - Plan for right 2nd toe amputation today 11/15/24. NPO midnight. Risks, benefits, alternatives discussed. Consent obtained at bedside with all questions and answered and addressed  - wbat toe unloading shoe (patient has at home) pre and postop  - abx on floor  - rest of care per primary team    Imaging:  - MRI right foot 11/12/24: definite osteomyelitis involving the second toe proximal phalanx. No evidence of an abscess   - LEADs 11/1/24: RLE no AOD, 1.32/nc/132. LLE 1.28/109/TMA.   - VDUS 11/14/24: no DVT  - CT RLE 11/14/24: no CHRISS nor abscess. Redemonstrated ossoeus destructive changes to distal 2nd toe prox phalanx consistent with OM.     History of Present Illness     HPI:  David Carpio is a 59 y.o. male w/ pmh sig for hypertension, hyperlipidemia, atrial fibrillation, systemically anticoagulated, DM w/ PN admitted for right 2nd toe DFU with OM. Patient was admitted 2 wks ago due to same DFU however due to pacemaker, the MRI could not be performed during that stay. MRI was performed 11/12/24 and resulted yesterday revealing OM thus I recommended admission for amputation and abx.      Inpatient consult to Podiatry  Consult performed by: Adelia Yousif DPM  Consult ordered by: Juan Wahl DO        Review of Systems   Constitutional: Negative.    HENT: Negative.    Eyes: Negative.    Respiratory: Negative.    Cardiovascular: Negative.    Gastrointestinal: Negative.    Musculoskeletal: L TMA   Skin: Right 2nd toe DFU   Neurological: PN  Psych: negative.       Historical Information   Past Medical History:   Diagnosis Date    Atrial fibrillation (HCC)     Benzodiazepine withdrawal with complication (HCC) 06/15/2023    Chronic diastolic (congestive) heart failure (HCC)     Diabetes mellitus (HCC)      GERD (gastroesophageal reflux disease)     High cholesterol     Hyperlipidemia     Pacemaker     Stroke (HCC)      Past Surgical History:   Procedure Laterality Date    APPENDECTOMY      ATRIAL CARDIAC PACEMAKER INSERTION      BARIATRIC SURGERY  05/2021    BONE BIOPSY Left 7/26/2023    Procedure: EXCISION BIOPSY BONE LESION EXTREMITY;  Surgeon: Adelia Yousif DPM;  [facility];  Service: Podiatry    EPIDURAL BLOCK INJECTION N/A 5/19/2022    Procedure: BLOCK / INJECTION EPIDURAL STEROID CERVICAL C7-T1;  Surgeon: Skyler Quinn MD;  Location: [facility];  Service: Pain Management     FL GUIDED NEEDLE PLAC BX/ASP/INJ  3/22/2022    FOOT AMPUTATION Left 4/28/2022    Procedure: LEFT TRANSMETATARSAL AMPUTATION.;  Surgeon: Nestor Madden DPM;  [facility];  Service: Podiatry    NERVE BLOCK Right 2/10/2022    Procedure: BLOCK MEDIAL BRANCH C3, C4, C5 #1;  Surgeon: Skyler Quinn MD;  [facility];  Service: Pain Management     NERVE BLOCK Right 3/22/2022    Procedure: BLOCK MEDIAL BRANCH C3, C4, C5 #2;  Surgeon: Skyler Quinn MD;  [facility];  Service: Pain Management     MT AMPUTATION FOOT TRANSMETARSAL Left 4/12/2023    Procedure: REVISION LEFT TRANSMETATARSAL (TMA) AMPUTATION, REMOVAL OF UNVIABLE TISSUE AND BONE,;  Surgeon: Adelia Yousif DPM;  [facility];  Service: Podiatry    MT AMPUTATION METATARSAL W/TOE SINGLE Left 4/7/2023    Procedure: 2ND RAY RESECTION FOOT;  Surgeon: Adelia Yousif DPM;  [facility];  Service: Podiatry    MT AMPUTATION TOE INTERPHALANGEAL JOINT Left 11/16/2021    Procedure: AMPUTATION LESSER TOE;  Surgeon: Nestor Madden DPM;  [facility];  Service: Podiatry    RADIOFREQUENCY ABLATION Right 4/7/2022    Procedure: Right C3, C4, C5 RFA;  Surgeon: Skyler Quinn MD;  [facility];  Service: Pain Management     RHIZOTOMY Right 2/9/2023    Procedure: RHIZOTOMY CERVICAL MEDIAL BRANCH NERVES RIGHT C3, C4, C5;   Surgeon: Skyler Quinn MD;  [facility];  Service: Pain Management     TOE AMPUTATION Left     2nd toe    TOE AMPUTATION Left 9/15/2021    Procedure: AMPUTATION LEFT 4TH TOE;  Surgeon: Nestor Madden DPM;  [facility];  Service: Podiatry    TOE AMPUTATION Right 1/12/2022    Procedure: AMPUTATION TOE;  Surgeon: Hong Jolly DPM;  [facility];  Service: Podiatry    TOE AMPUTATION Right 2/23/2022    Procedure: AMPUTATION TOE  RIGHT SECOND;  Surgeon: Hong Jolly DPM;  Location[facility];  Service: Podiatry    TOE AMPUTATION Right 6/3/2022    Procedure: AMPUTATION right 4th TOE;  Surgeon: Nestor Madden DPM;  [facility];  Service: Podiatry     Social History   Social History     Substance and Sexual Activity   Alcohol Use Never     Social History     Substance and Sexual Activity   Drug Use Never     Social History     Tobacco Use   Smoking Status Never    Passive exposure: Never   Smokeless Tobacco Never     Family History:   Family History   Problem Relation Age of Onset    No Known Problems Mother     No Known Problems Father        Meds/Allergies     Medications Prior to Admission:     buPROPion (Wellbutrin XL) 300 mg 24 hr tablet    busPIRone (BUSPAR) 15 mg tablet    calcium citrate-vitamin D 315 mg-5 mcg tablet    cephalexin (KEFLEX) 500 mg capsule    doxepin (SINEquan) 10 mg capsule    doxepin (SINEquan) 100 mg capsule    ferrous sulfate 325 (65 Fe) mg tablet    furosemide (LASIX) 40 mg tablet    gabapentin (NEURONTIN) 800 mg tablet    Multiple Vitamins-Minerals (Mens Multivitamin) TABS    potassium chloride (KLOR-CON) 20 mEq packet    rivaroxaban (Xarelto) 20 mg tablet    lidocaine (Lidoderm) 5 %    metolazone (ZAROXOLYN) 2.5 mg tablet  Allergies   Allergen Reactions    Ativan [Lorazepam] Anxiety       Objective   First Vitals:   Blood Pressure: 139/87 (11/14/24 1324)  Pulse: 75 (11/14/24 1324)  Temperature: 97.5 °F (36.4 °C) (11/14/24 1324)  Temp Source:  "Temporal (11/14/24 1324)  Respirations: 18 (11/14/24 1324)  Height: 6' 1\" (185.4 cm) (11/15/24 0939)  Weight - Scale: (!) 137 kg (303 lb) (11/14/24 1324)  SpO2: 99 % (11/14/24 1324)    Current Vitals:   Blood Pressure: 125/65 (11/15/24 0731)  Pulse: 88 (11/15/24 0731)  Temperature: 99 °F (37.2 °C) (11/15/24 0731)  Temp Source: Temporal (11/14/24 1650)  Respirations: 17 (11/15/24 0731)  Height: 6' 1\" (185.4 cm) (11/15/24 0939)  Weight - Scale: (!) 137 kg (302 lb 14.6 oz) (11/14/24 1647)  SpO2: 95 % (11/15/24 0900)        /65   Pulse 88   Temp 99 °F (37.2 °C)   Resp 17   Ht 6' 1\" (1.854 m)   Wt (!) 137 kg (302 lb 14.6 oz)   SpO2 95%   BMI 39.96 kg/m²      General Appearance:    Alert, cooperative, no distress   Head:    Normocephalic, without obvious abnormality, atraumatic   Eyes:    PERRL, conjunctiva/corneas clear, EOM's intact        Nose:   Moist mucous membranes   Neck:   Supple, symmetrical, trachea midline   Back:     Symmetric   Lungs:     Respirations unlabored   Heart:    Regular rate and rhythm, S1 and S2 normal, no murmur, rub   or gallop   Abdomen:     Soft, non-tender    B/L LE EXAM   Extremities:   L TMA without issues.    Pulses:   B/L LE edema. Diminished pulses due to edema.    Skin:   Right 2nd toe plantar DFU with + probe to bone. There is edema, erythema, malodor.    Neurologic:   Gross sensation is diminished. Protective sensation is absent.                 Lab Results:   Admission on 11/14/2024   Component Date Value    WBC 11/14/2024 7.57     RBC 11/14/2024 5.07     Hemoglobin 11/14/2024 14.3     Hematocrit 11/14/2024 44.4     MCV 11/14/2024 88     MCH 11/14/2024 28.2     MCHC 11/14/2024 32.2     RDW 11/14/2024 14.0     MPV 11/14/2024 9.2     Platelets 11/14/2024 310     nRBC 11/14/2024 0     Segmented % 11/14/2024 66     Immature Grans % 11/14/2024 0     Lymphocytes % 11/14/2024 22     Monocytes % 11/14/2024 9     Eosinophils Relative 11/14/2024 2     Basophils Relative " "11/14/2024 1     Absolute Neutrophils 11/14/2024 4.98     Absolute Immature Grans 11/14/2024 0.03     Absolute Lymphocytes 11/14/2024 1.69     Absolute Monocytes 11/14/2024 0.65     Eosinophils Absolute 11/14/2024 0.18     Basophils Absolute 11/14/2024 0.04     Sodium 11/14/2024 139     Potassium 11/14/2024 3.6     Chloride 11/14/2024 100     CO2 11/14/2024 32     ANION GAP 11/14/2024 7     BUN 11/14/2024 13     Creatinine 11/14/2024 0.92     Glucose 11/14/2024 125     Calcium 11/14/2024 9.1     AST 11/14/2024 18     ALT 11/14/2024 13     Alkaline Phosphatase 11/14/2024 87     Total Protein 11/14/2024 7.6     Albumin 11/14/2024 4.1     Total Bilirubin 11/14/2024 0.81     eGFR 11/14/2024 89     Blood Culture 11/14/2024 Received in Microbiology Lab. Culture in Progress.     Blood Culture 11/14/2024 Received in Microbiology Lab. Culture in Progress.     LACTIC ACID 11/14/2024 2.1 (H)     Total CK 11/14/2024 142     LACTIC ACID 11/14/2024 1.3     Cholesterol 11/14/2024 155     Triglycerides 11/14/2024 69     HDL, Direct 11/14/2024 38 (L)     LDL Calculated 11/14/2024 103 (H)     Ventricular Rate 11/15/2024 77     QRSD Interval 11/15/2024 124     QT Interval 11/15/2024 368     QTC Interval 11/15/2024 416     QRS Axis 11/15/2024 -79     T Wave [place] 11/15/2024 61              Results from last 7 days   Lab Units 11/14/24  1345 11/14/24  1340   BLOOD CULTURE  Received in Microbiology Lab. Culture in Progress. Received in Microbiology Lab. Culture in Progress.       Invalid input(s): \"LABAEARO\"            Imaging: I have personally reviewed pertinent films in PACS  EKG, Pathology, and Other Studies: I have personally reviewed pertinent reports.      Code Status: Level 1 - Full Code  Advance Directive and Living Will:      Power of :    POLST:          "

## 2024-11-15 NOTE — PHYSICAL THERAPY NOTE
PHYSICAL THERAPY TREATMENT NOTE  NAME:  David Carpio  DATE: 11/15/24       11/15/24 1533   Note Type   Note type Cancelled Session;Evaluation         PT order received, chart review completed. Pt admitted to [facility] on 11/14/2024 w/ dx of Toe osteomyelitis, right (HCC). Attempted to see pt for PT session this date, however pt RK for 2nd toe amputation. Will continue to follow pt and provide care as pt is appropriate and available.    Chyna Staples, PT,DPT

## 2024-11-15 NOTE — DISCHARGE INSTR - AVS FIRST PAGE
Discharge Instructions - Podiatry    Weight Bearing Status: weight bear for ADLs in toe unloading shoe. Limit to in house only.     Follow-up appointment instructions: Please make an appointment within one week of discharge with Dr. Yousif. Contact sooner if any increase in pain, or signs of infection occur    Patient Wound Care Instructions: Leave dressings clean, dry, and intact until 1st outpatient office visit.

## 2024-11-15 NOTE — CASE MANAGEMENT
Case Management Assessment & Discharge Planning Note    Patient name David Carpio  Location /-01 MRN [id number]  : 1965 Date 11/15/2024       Current Admission Date: 2024  Current Admission Diagnosis:Toe osteomyelitis, right (HCC)   Patient Active Problem List    Diagnosis Date Noted Date Diagnosed    Obesity (BMI 30-39.9) 2024     Lumbar radiculopathy 10/03/2024     Cervical strain 11/15/2023     Cervical radiculopathy 11/15/2023     Left foot pain 2023     Charcot arthropathy of midfoot 2023     Moderate protein-calorie malnutrition (HCC) 2023     Status post partial amputation of foot, left ([facility]) 2023     Other constipation 2023     Moderate benzodiazepine use disorder ([facility]) 06/15/2023     Microcytic anemia 06/15/2023     Closed fracture of shaft of metatarsal bone of left foot 2023     Cellulitis of left lower extremity 2023     Diabetic ulcer of left midfoot associated with type 2 diabetes mellitus ([facility]) 2023     Hyperkalemia 2022     Pacemaker 2022     History of bariatric surgery 2022     Encounter for perioperative consultation 2022     Diabetic infection of left foot ([facility]) 2022     Cervical spondylosis 2022     Cervicalgia - Right 2022     Toe osteomyelitis, right ([facility]) 2022     Type 2 diabetes mellitus with diabetic polyneuropathy, without long-term current use of insulin ([facility]) 2022     Anxiety 2021     Atrial fibrillation ([facility])      Chronic osteomyelitis of left foot with draining sinus ([facility]) 2021     Pain, joint, ankle and foot, left 2021     DAYAN (obstructive sleep apnea) 2020     Chronic diastolic heart failure ([facility]) 2020     Hypertension 2020     Diabetes mellitus ([facility]) 2020     Morbid obesity with BMI of 40.0-44.9, adult ([facility]) 2020       LOS (days): 1  Geometric Mean LOS (GMLOS) (days):   Days to GMLOS:      OBJECTIVE:  PATIENT READMITTED TO HOSPITAL  Risk of Unplanned Readmission Score: 17.89         Current admission status: Inpatient  Referral Reason: Other (Post Acute Placement (specify))    Preferred Pharmacy:   [facility]/pharm[facility] #[id number] - [place], [place] - [address] [place] [id number]  Phone: [phone] Fax: [phone]    Primary Care Provider: Khurram Rodriguez DO    Primary Insurance: [employer]  Secondary Insurance:     ASSESSMENT:  Active Health Care Proxies    There are no active Health Care Proxies on file.       Advance Directives  Does patient have a Health Care POA?: No  Was patient offered paperwork?: Yes (patient declined- in the process of completing paperwork for POA)  Does patient have Advance Directives?: No  Was patient offered paperwork?: Yes (pt in the process of completing paperwork for living will /POA)  Primary Contact: Maribell Carpio- spouse         Readmission Root Cause  30 Day Readmission: Yes  During your hospital stay, did someone (provider, nurse, ) explain your care to you in a way you could understand?: Yes  Did you feel medically stable to leave the hospital?: Yes  Were you able to pay for your medication at the pharmacy?: Yes  Did you have reliable transportation to take you to your appointments?: Yes  During previous admission, was a post-acute recommendation made?: Yes  What post-acute resources were offered?: Other (OP MRI and arterial duplex)  Patient was readmitted due to: Osteomyelitis right 2nd toe  Action Plan: Venous duplex right leg, PT/OT eval and Podiatry consult    Patient Information  Admitted from:: Home  Mental Status: Alert  During Assessment patient was accompanied by: Not accompanied during assessment  Assessment information provided by:: Patient  Primary Caregiver: Self  Support Systems: Self, Spouse/significant other, Children  County of Residence: [facility]  What [facility] do you live in?: [place]  Home entry access  options. Select all that apply.: Stairs  Number of steps to enter home.: 1  Do the steps have railings?: No  Type of Current Residence: 2 story home  Upon entering residence, is there a bedroom on the main floor (no further steps)?: No  A bedroom is located on the following floor levels of residence (select all that apply):: 2nd Floor  Upon entering residence, is there a bathroom on the main floor (no further steps)?: Yes  Number of steps to 2nd floor from main floor: One Flight  Living Arrangements: Lives w/ Spouse/significant other  Is patient a ?: No    Activities of Daily Living Prior to Admission  Functional Status: Independent  Completes ADLs independently?: Yes  Ambulates independently?: Yes  Does patient use assisted devices?: Yes  Assisted Devices (DME) used: Straight Cane, Walker  Does patient currently own DME?: Yes  What DME does the patient currently own?: Straight Cane, Walker  Does patient have a history of Outpatient Therapy (PT/OT)?: Yes  Does the patient have a history of Short-Term Rehab?: No  Does patient have a history of HHC?: Yes ([facility] and [facility])  Does patient currently have HHC?: No         Patient Information Continued  Income Source: SSI/SSD  Does patient have prescription coverage?: Yes  Does patient receive dialysis treatments?: No  Does patient have a history of substance abuse?: No  Does patient have a history of Mental Health Diagnosis?: Yes (Anxiety)  Is patient receiving treatment for mental health?: Yes (Medication managed and seen by [facility])  Has patient received inpatient treatment related to mental health in the last 2 years?: No         Means of Transportation  Means of Transport to [facility]:: Drives Self      Social Determinants of Health (SDOH)      Flowsheet Row Most Recent Value   Housing Stability    In the last 12 months, was there a time when you were not able to pay the mortgage or rent on time? N   In the past 12 months, how many  times have you moved where you were living? 0   Transportation Needs    In the past 12 months, has lack of transportation kept you from medical appointments or from getting medications? no   In the past 12 months, has lack of transportation kept you from meetings, work, or from getting things needed for daily living? No   Food Insecurity    Within the past 12 months, you worried that your food would run out before you got the money to buy more. Never true   Within the past 12 months, the food you bought just didn't last and you didn't have money to get more. Never true   Utilities    In the past 12 months has the electric, gas, oil, or water company threatened to shut off services in your home? No            DISCHARGE DETAILS:    Discharge planning discussed with:: patient        CM contacted family/caregiver?: Yes (Maribell Carpio- spouse)             Contacts  Patient Contacts: Maribell Carpio- spouse  Relationship to Patient:: Family  Contact Method: Phone  Phone Number: [phone]  Reason/Outcome: Discharge Planning         CM met with pt to review role of CM and discuss any supports needed upon discharge. Baseline information obtained, pt indicates he lives at home with his spouse in a 2 story home with 1 CHRISS. Pt indicates he is independent with ADLs, ambulates independently with a cane or walker, and still driving. PT/OT evals pending. Podiatry consult pending for osteomyelitis right 2nd toe. CM will continue to follow and address any d/c planning needs.

## 2024-11-15 NOTE — ASSESSMENT & PLAN NOTE
Chronic condition-exacerbated in the acute care setting  Proceed with home regimen which includes BuSpar  Regimen verified with patient's outpatient psychiatrist

## 2024-11-15 NOTE — ASSESSMENT & PLAN NOTE
Wt Readings from Last 3 Encounters:   11/14/24 (!) 137 kg (302 lb 14.6 oz)   10/31/24 (!) 137 kg (303 lb)   09/16/24 (!) 136 kg (300 lb 9.6 oz)     Weight stable from previous  Appears relatively euvolemic on presentation  Preserved EF on echocardiogram in February 2024  Hold diuretics in the perioperative period  Resume postoperatively

## 2024-11-15 NOTE — PLAN OF CARE
Problem: PAIN - ADULT  Goal: Verbalizes/displays adequate comfort level or baseline comfort level  Description: Interventions:  - Encourage patient to monitor pain and request assistance  - Assess pain using appropriate pain scale  - Administer analgesics based on type and severity of pain and evaluate response  - Implement non-pharmacological measures as appropriate and evaluate response  - Consider cultural and social influences on pain and pain management  - Notify physician/advanced practitioner if interventions unsuccessful or patient reports new pain  Outcome: Progressing     Problem: INFECTION - ADULT  Goal: Absence or prevention of progression during hospitalization  Description: INTERVENTIONS:  - Assess and monitor for signs and symptoms of infection  - Monitor lab/diagnostic results  - Monitor all insertion sites, i.e. indwelling lines, tubes, and drains  - Monitor endotracheal if appropriate and nasal secretions for changes in amount and color  - [place] appropriate cooling/warming therapies per order  - Administer medications as ordered  - Instruct and encourage patient and family to use good hand hygiene technique  - Identify and instruct in appropriate isolation precautions for identified infection/condition  Outcome: Progressing  Goal: Absence of fever/infection during neutropenic period  Description: INTERVENTIONS:  - Monitor WBC    Outcome: Progressing     Problem: SAFETY ADULT  Goal: Patient will remain free of falls  Description: INTERVENTIONS:  - Educate patient/family on patient safety including physical limitations  - Instruct patient to call for assistance with activity   - Consult OT/PT to assist with strengthening/mobility   - Keep Call bell within reach  - Keep bed low and locked with side rails adjusted as appropriate  - Keep care items and personal belongings within reach  - Initiate and maintain comfort rounds  - Make Fall Risk Sign visible to staff  - Offer Toileting every 2 Hours,  in advance of need  - Initiate/Maintain alarm  - Obtain necessary fall risk management equipment  - Apply yellow socks and bracelet for high fall risk patients  - Consider moving patient to room near nurses station  Outcome: Progressing  Goal: Maintain or return to baseline ADL function  Description: INTERVENTIONS:  -  Assess patient's ability to carry out ADLs; assess patient's baseline for ADL function and identify physical deficits which impact ability to perform ADLs (bathing, care of mouth/teeth, toileting, grooming, dressing, etc.)  - Assess/evaluate cause of self-care deficits   - Assess range of motion  - Assess patient's mobility; develop plan if impaired  - Assess patient's need for assistive devices and provide as appropriate  - Encourage maximum independence but intervene and supervise when necessary  - Involve family in performance of ADLs  - Assess for home care needs following discharge   - Consider OT consult to assist with ADL evaluation and planning for discharge  - Provide patient education as appropriate  Outcome: Progressing  Goal: Maintains/Returns to pre admission functional level  Description: INTERVENTIONS:  - Perform AM-PAC 6 Click Basic Mobility/ Daily Activity assessment daily.  - Set and communicate daily mobility goal to care team and patient/family/caregiver.   - Collaborate with rehabilitation services on mobility goals if consulted  - Reposition patient every 2 hours.  - Stand patient 3 times a day  - Ambulate patient 3 times a day  - Out of bed to chair 3 times a day   - Out of bed for meals 3 times a day  - Out of bed for toileting  - Record patient progress and toleration of activity level   Outcome: Progressing     Problem: DISCHARGE PLANNING  Goal: Discharge to home or other facility with appropriate resources  Description: INTERVENTIONS:  - Identify barriers to discharge w/patient and caregiver  - Arrange for needed discharge resources and transportation as appropriate  -  Identify discharge learning needs (meds, wound care, etc.)  - Arrange for interpretive services to assist at discharge as needed  - Refer to Case Management Department for coordinating discharge planning if the patient needs post-hospital services based on physician/advanced practitioner order or complex needs related to functional status, cognitive ability, or social support system  Outcome: Progressing     Problem: Knowledge Deficit  Goal: Patient/family/caregiver demonstrates understanding of disease process, treatment plan, medications, and discharge instructions  Description: Complete learning assessment and assess knowledge base.  Interventions:  - Provide teaching at level of understanding  - Provide teaching via preferred learning methods  Outcome: Progressing     Problem: Nutrition/Hydration-ADULT  Goal: Nutrient/Hydration intake appropriate for improving, restoring or maintaining nutritional needs  Description: Monitor and assess patient's nutrition/hydration status for malnutrition. Collaborate with interdisciplinary team and initiate plan and interventions as ordered.  Monitor patient's weight and dietary intake as ordered or per policy. Utilize nutrition screening tool and intervene as necessary. Determine patient's food preferences and provide high-protein, high-caloric foods as appropriate.     INTERVENTIONS:  - Monitor oral intake, urinary output, labs, and treatment plans  - Assess nutrition and hydration status and recommend course of action  - Evaluate amount of meals eaten  - Assist patient with eating if necessary   - Allow adequate time for meals  - Recommend/ encourage appropriate diets, oral nutritional supplements, and vitamin/mineral supplements  - Order, calculate, and assess calorie counts as needed  - Recommend, monitor, and adjust tube feedings and TPN/PPN based on assessed needs  - Assess need for intravenous fluids  - Provide specific nutrition/hydration education as appropriate  -  Include patient/family/caregiver in decisions related to nutrition  Outcome: Progressing

## 2024-11-15 NOTE — OCCUPATIONAL THERAPY NOTE
Occupational Therapy         Patient Name: David Carpio  Today's Date: 11/15/2024         11/15/24 2717   Note Type   Note type Cancelled Session   Cancel Reasons Patient to operating room       Chart reviewed. Attempted to see pt for OT session this PM. Pt in operating room for procedure at this time. Will continue to follow case and address as appropriate and as time allows.

## 2024-11-15 NOTE — UTILIZATION REVIEW
Initial Clinical Review    Admission: Date/Time/Statement:   Admission Orders (From admission, onward)       Ordered        11/14/24 1506  INPATIENT ADMISSION  Once                          Orders Placed This Encounter   Procedures    INPATIENT ADMISSION     Standing Status:   Standing     Number of Occurrences:   1     Level of Care:   Med Surg [16]     Estimated length of stay:   More than 2 Midnights     Certification:   I certify that inpatient services are medically necessary for this patient for a duration of greater than two midnights. See H&P and MD Progress Notes for additional information about the patient's course of treatment.     ED Arrival Information       Expected   11/14/2024     Arrival   11/14/2024 13:07    Acuity   Urgent              Means of arrival   Walk-In    Escorted by   Self    Service   Hospitalist    Admission type   Emergency              Arrival complaint   osteomyelitis             Chief Complaint   Patient presents with    Evaluation of Abnormal Diagnostic Test     Patient presents to the ED with reports of an abnormal MRI of the right foot. The patient states he was evaluated on 10/31 with pain to the right toe and treated with antibiotics. He received a call today that his MRI was abnormal and to come to the ED for evaluation and treatment.        Initial Presentation: 59 y.o. male to ED via walk-in from home  Present to ED at the advice of his podiatrist for abnormal MRI. Was sent in for amputation.   Notably, patient recently hospitalized at our institution from 10/31 - 11/1/2024 at the time with complaints of right lower extremity nonhealing right second toe stump ulcer high index of suspicion for osteomyelitis  Secondary to PPM-MRI could not be completed-patient with strong desire to leave the hospital  Therefore, was discharged on oral Keflex with MRI in the outpatient setting  MRI (11/12) revealed osteomyelitis  PMHX: hypertension, hyperlipidemia, atrial fibrillation,  systemically anticoagulated, previous diabetes mellitus   Admitted to [facility] with DX: oe osteomyelitis, right   on exam: B/L LE edema;  Left TMA, right second toe stump ulcer; LA 2.1; pain 8/10  CT reveals the following: Diffuse lower leg edema with subcutaneous venous varices. No focal fluid collection. Redemonstrated destructive osseous of the distal head of the second digit proximal phalanx consistent with known osteomyelitis as demonstrated on the comparison MRI. Surrounding phlegmonous changes without discrete abscess. A hypodense region extends to the plantar aspect of the second toe   PLAN: cont iv abx; monitor labs; pain control (see below);  Case discussed between ED provider and podiatrist with recommendations for amputation tomorrow (11/15); podiatry consult     Anticipated Length of Stay/Certification Statement: Patient will be admitted on an inpatient basis with an anticipated length of stay of greater than 2 midnights secondary to osteomyelitis.       Date: 11/15/24      Day 2   awaiting toe amputation. T 99; pain 1/10  Plan: cont iv abx; monitor labs; pain control (see below); podiatry consult       ED Treatment-Medication Administration from 11/14/2024 1130 to 11/14/2024 1639         Date/Time Order Dose Route Action     11/14/2024 1350 fentaNYL injection 100 mcg 100 mcg Intravenous Given     11/14/2024 1351 cefTRIAXone (ROCEPHIN) IVPB (premix in dextrose) 2,000 mg 50 mL 2,000 mg Intravenous New Bag     11/14/2024 1426 vancomycin (VANCOCIN) 1500 mg in sodium chloride 0.9% 250 mL IVPB 1,500 mg Intravenous New Bag     11/14/2024 1445 fentaNYL injection 100 mcg 100 mcg Intravenous Given     11/14/2024 1435 iohexol (OMNIPAQUE) 350 MG/ML injection (MULTI-DOSE) 100 mL 100 mL Intravenous Given     11/14/2024 1529 morphine injection 4 mg 4 mg Intravenous Given            Scheduled Medications:  buPROPion, 300 mg, Oral, Daily  busPIRone, 15 mg, Oral, TID  cefazolin, 2,000 mg, Intravenous, Q8H  doxepin, 100 mg,  Oral, HS  gabapentin, 800 mg, Oral, TID  multivitamin-minerals, 1 tablet, Oral, Daily  potassium chloride, 20 mEq, Oral, Once      Continuous IV Infusions: None       PRN Meds:  HYDROmorphone, 0.5 mg, Intravenous, Q2H PRN  oxyCODONE, 5 mg, Oral, Q4H PRN   Or  oxyCODONE, 10 mg, Oral, Q4H PRN  (11/14 recd x1)  (11/15 recd x2 so far today)       ED Triage Vitals [11/14/24 1324]   Temperature Pulse Respirations Blood Pressure SpO2 Pain Score   97.5 °F (36.4 °C) 75 18 139/87 99 % 8     Weight (last 2 days)       Date/Time Weight    11/14/24 1647 137 (302.91)    11/14/24 1324 137 (303)            Vital Signs (last 3 days)       Date/Time Temp Pulse Resp BP MAP (mmHg) SpO2 O2 Device Patient Position - Orthostatic VS [place] Coma Scale Score Pain    11/15/24 0900 -- -- -- -- -- 95 % None (Room air) -- 15 --    11/15/24 0857 -- -- -- -- -- -- -- -- -- 8    11/15/24 07:31:42 99 °F (37.2 °C) 88 17 125/65 85 97 % -- -- -- --    11/15/24 0035 -- -- -- -- -- -- -- -- -- 10 - Worst Possible Pain    11/14/24 23:47:46 -- 72 -- 131/65 87 95 % -- -- -- --    11/14/24 2039 -- -- -- -- -- -- -- -- -- 9    11/14/24 1700 -- -- -- -- -- 95 % None (Room air) -- 15 --    11/14/24 16:50:51 97.9 °F (36.6 °C) 72 18 107/70 82 96 % None (Room air) -- -- --    11/14/24 1630 -- 76 -- 100/63 74 96 % -- -- -- --    11/14/24 1605 -- 80 -- 106/64 77 95 % -- -- -- --    11/14/24 1529 -- -- -- -- -- -- -- -- -- 6    11/14/24 1500 -- 80 -- 128/59 85 100 % -- -- -- --    11/14/24 1445 -- 73 -- 116/63 84 100 % -- -- -- 9    11/14/24 1400 -- 93 -- 113/63 83 95 % -- -- -- --    11/14/24 1350 -- -- -- -- -- -- -- -- -- 9    11/14/24 1324 97.5 °F (36.4 °C) 75 18 139/87 -- 99 % None (Room air) Lying -- 8              Pertinent Labs/Diagnostic Test Results:   Radiology:  CT lower extremity w contrast right   Final Interpretation by Patrice Cuevas MD (11/14 1520)      Diffuse lower leg edema with subcutaneous venous varices.   No focal fluid collection.       Redemonstrated destructive osseous of the distal head of the second digit proximal phalanx consistent with known osteomyelitis as demonstrated on the comparison MRI.   Surrounding phlegmonous changes without discrete abscess.   A hypodense region extends to the plantar aspect of the second toe, correlate for associated wound.      The study was marked in EPIC for immediate notification.         Workstation performed: [id number]         VAS VENOUS DUPLEX -LOWER LIMB UNILATERAL    (Results Pending)     Cardiology:  ECG 12 lead   Final Result by Janelle Vital MD (11/15 0931)   Atrial fibrillation   When compared with ECG of 15-Jhonny-2023 21:13,   Atrial fibrillation has replaced Electronic ventricular pacemaker   Confirmed by Janelle Vital ([id number]) on 11/15/2024 9:31:33 AM          Results from last 7 days   Lab Units 11/14/24  1340   WBC Thousand/uL 7.57   HEMOGLOBIN g/dL 14.3   HEMATOCRIT % 44.4   PLATELETS Thousands/uL 310   TOTAL NEUT ABS Thousands/µL 4.98        Results from last 7 days   Lab Units 11/14/24  1340   SODIUM mmol/L 139   POTASSIUM mmol/L 3.6   CHLORIDE mmol/L 100   CO2 mmol/L 32   ANION GAP mmol/L 7   BUN mg/dL 13   CREATININE mg/dL 0.92   EGFR ml/min/1.73sq m 89   CALCIUM mg/dL 9.1     Results from last 7 days   Lab Units 11/14/24  1340   AST U/L 18   ALT U/L 13   ALK PHOS U/L 87   TOTAL PROTEIN g/dL 7.6   ALBUMIN g/dL 4.1   TOTAL BILIRUBIN mg/dL 0.81        Results from last 7 days   Lab Units 11/14/24  1340   GLUCOSE RANDOM mg/dL 125        Results from last 7 days   Lab Units 11/14/24  1340   HEMOGLOBIN A1C % 5.8*   EAG mg/dl 120        Results from last 7 days   Lab Units 11/14/24  1340   CK TOTAL U/L 142        Results from last 7 days   Lab Units 11/14/24  1756 11/14/24  1340   LACTIC ACID mmol/L 1.3 2.1*         Results from last 7 days   Lab Units 11/14/24  1345 11/14/24  1340   BLOOD CULTURE  Received in Microbiology Lab. Culture in Progress. Received in Microbiology Lab. Culture in  Progress.          Past Medical History:   Diagnosis Date    Atrial fibrillation (HCC)     Benzodiazepine withdrawal with complication (HCC) 06/15/2023    Chronic diastolic (congestive) heart failure (HCC)     Diabetes mellitus (HCC)     GERD (gastroesophageal reflux disease)     High cholesterol     Hyperlipidemia     Pacemaker     Stroke (HCC)      Present on Admission:   Toe osteomyelitis, right (HCC)   Atrial fibrillation (HCC)   Chronic diastolic heart failure (HCC)   Obesity (BMI 30-39.9)   Anxiety      Admitting Diagnosis: Abnormal laboratory test [R89.9]  Toe osteomyelitis, right (HCC) [M86.9]  Cellulitis of right lower extremity [L03.115]  Osteomyelitis of second toe of right foot (HCC) [M86.9]  Age/Sex: 59 y.o. male    Network Utilization Review Department  ATTENTION: Please call with any questions or concerns to [phone] and carefully listen to the prompts so that you are directed to the right person. All voicemails are confidential.   For Discharge needs, contact Care Management DC Support Team at [phone] opt. [phone]  Send all requests for admission clinical reviews, approved or denied determinations and any other requests to dedicated fax number below belonging to the [place] where the patient is receiving treatment. List of dedicated fax numbers for the Facilities:  FACILITY NAME UR FAX NUMBER   ADMISSION DENIALS (Administrative/Medical Necessity) [phone]   DISCHARGE SUPPORT TEAM (NETWORK) [phone]   PARENT CHILD HEALTH (Maternity/NICU/Pediatrics) [phone]   [facility] [phone]   [facility] [phone]   [facility] [phone]   [facility] [phone]   [facility] [phone]   [facility] [phone]   [facility] [phone]   [facility] [phone]   [facility]  [facility] [phone]   [facility] [phone]   [facility] [phone]   [facility] [phone]

## 2024-11-15 NOTE — QUICK NOTE
Patient returned from PACU and is alert and oriented. No complaints of pain. Denied numbness or tingling. RLE elevated while in bed on pillow and bottom of bed elevated. Patient asking to eat dinner.

## 2024-11-15 NOTE — ASSESSMENT & PLAN NOTE
Patient presented (11/14) at advice of podiatrist for abnormal MRI  Notably, patient recently hospitalized at our institution from 10/31 - 11/1/2024 at the time with complaints of right lower extremity nonhealing right second toe stump ulcer high index of suspicion for osteomyelitis  Secondary to PPM-MRI could not be completed-patient with strong desire to leave the hospital  Therefore, was discharged on oral Keflex with MRI in the outpatient setting  MRI (11/12) revealed osteomyelitis  Patient without complaints of fever, chills, sweats    In ED, patient without SIRS criteria  MRI completed (11/12) reveals the following:Findings consistent with definite osteomyelitis involving the second toe proximal phalanx   CT obtained today in ED reveals the following: Diffuse lower leg edema with subcutaneous venous varices. No focal fluid collection. Redemonstrated destructive osseous of the distal head of the second digit proximal phalanx consistent with known osteomyelitis as demonstrated on the comparison MRI. Surrounding phlegmonous changes without discrete abscess. A hypodense region extends to the plantar aspect of the second toe  Case discussed between ED provider and podiatrist with recommendations for amputation tomorrow (11/15) admit to medicine  Received Rocephin and vancomycin in the ED    Plan:    Podiatry on consult-plan for amputation later today (11/15)  Continue empiric antimicrobials  Pain control

## 2024-11-15 NOTE — PLAN OF CARE
Problem: PAIN - ADULT  Goal: Verbalizes/displays adequate comfort level or baseline comfort level  Description: Interventions:  - Encourage patient to monitor pain and request assistance  - Assess pain using appropriate pain scale  - Administer analgesics based on type and severity of pain and evaluate response  - Implement non-pharmacological measures as appropriate and evaluate response  - Consider cultural and social influences on pain and pain management  - Notify physician/advanced practitioner if interventions unsuccessful or patient reports new pain  Outcome: Progressing     Problem: INFECTION - ADULT  Goal: Absence or prevention of progression during hospitalization  Description: INTERVENTIONS:  - Assess and monitor for signs and symptoms of infection  - Monitor lab/diagnostic results  - Monitor all insertion sites, i.e. indwelling lines, tubes, and drains  - Monitor endotracheal if appropriate and nasal secretions for changes in amount and color  - [place] appropriate cooling/warming therapies per order  - Administer medications as ordered  - Instruct and encourage patient and family to use good hand hygiene technique  - Identify and instruct in appropriate isolation precautions for identified infection/condition  Outcome: Progressing  Goal: Absence of fever/infection during neutropenic period  Description: INTERVENTIONS:  - Monitor WBC    Outcome: Progressing     Problem: SAFETY ADULT  Goal: Patient will remain free of falls  Description: INTERVENTIONS:  - Educate patient/family on patient safety including physical limitations  - Instruct patient to call for assistance with activity   - Consult OT/PT to assist with strengthening/mobility   - Keep Call bell within reach  - Keep bed low and locked with side rails adjusted as appropriate  - Keep care items and personal belongings within reach  - Initiate and maintain comfort rounds  - Make Fall Risk Sign visible to staff  - Offer Toileting every 2 Hours,  in advance of need  - Initiate/Maintain bed alarm  - Obtain necessary fall risk management equipment  - Apply yellow socks and bracelet for high fall risk patients  - Consider moving patient to room near nurses station  Outcome: Progressing  Goal: Maintain or return to baseline ADL function  Description: INTERVENTIONS:  -  Assess patient's ability to carry out ADLs; assess patient's baseline for ADL function and identify physical deficits which impact ability to perform ADLs (bathing, care of mouth/teeth, toileting, grooming, dressing, etc.)  - Assess/evaluate cause of self-care deficits   - Assess range of motion  - Assess patient's mobility; develop plan if impaired  - Assess patient's need for assistive devices and provide as appropriate  - Encourage maximum independence but intervene and supervise when necessary  - Involve family in performance of ADLs  - Assess for home care needs following discharge   - Consider OT consult to assist with ADL evaluation and planning for discharge  - Provide patient education as appropriate  Outcome: Progressing  Goal: Maintains/Returns to pre admission functional level  Description: INTERVENTIONS:  - Perform AM-PAC 6 Click Basic Mobility/ Daily Activity assessment daily.  - Set and communicate daily mobility goal to care team and patient/family/caregiver.   - Collaborate with rehabilitation services on mobility goals if consulted  - Perform Range of Motion 3 times a day.  - Reposition patient every 2 hours.  - Dangle patient 3 times a day  - Stand patient 3 times a day  - Ambulate patient 3 times a day  - Out of bed to chair 3 times a day   - Out of bed for meals 3 times a day  - Out of bed for toileting  - Record patient progress and toleration of activity level   Outcome: Progressing     Problem: DISCHARGE PLANNING  Goal: Discharge to home or other facility with appropriate resources  Description: INTERVENTIONS:  - Identify barriers to discharge w/patient and caregiver  -  Arrange for needed discharge resources and transportation as appropriate  - Identify discharge learning needs (meds, wound care, etc.)  - Arrange for interpretive services to assist at discharge as needed  - Refer to Case Management Department for coordinating discharge planning if the patient needs post-hospital services based on physician/advanced practitioner order or complex needs related to functional status, cognitive ability, or social support system  Outcome: Progressing     Problem: Knowledge Deficit  Goal: Patient/family/caregiver demonstrates understanding of disease process, treatment plan, medications, and discharge instructions  Description: Complete learning assessment and assess knowledge base.  Interventions:  - Provide teaching at level of understanding  - Provide teaching via preferred learning methods  Outcome: Progressing

## 2024-11-15 NOTE — PLAN OF CARE
Problem: PAIN - ADULT  Goal: Verbalizes/displays adequate comfort level or baseline comfort level  Description: Interventions:  - Encourage patient to monitor pain and request assistance  - Assess pain using appropriate pain scale  - Administer analgesics based on type and severity of pain and evaluate response  - Implement non-pharmacological measures as appropriate and evaluate response  - Consider cultural and social influences on pain and pain management  - Notify physician/advanced practitioner if interventions unsuccessful or patient reports new pain  Outcome: Progressing     Problem: INFECTION - ADULT  Goal: Absence or prevention of progression during hospitalization  Description: INTERVENTIONS:  - Assess and monitor for signs and symptoms of infection  - Monitor lab/diagnostic results  - Monitor all insertion sites, i.e. indwelling lines, tubes, and drains  - Monitor endotracheal if appropriate and nasal secretions for changes in amount and color  - [place] appropriate cooling/warming therapies per order  - Administer medications as ordered  - Instruct and encourage patient and family to use good hand hygiene technique  - Identify and instruct in appropriate isolation precautions for identified infection/condition  Outcome: Progressing  Goal: Absence of fever/infection during neutropenic period  Description: INTERVENTIONS:  - Monitor WBC    Outcome: Progressing     Problem: SAFETY ADULT  Goal: Patient will remain free of falls  Description: INTERVENTIONS:  - Educate patient/family on patient safety including physical limitations  - Instruct patient to call for assistance with activity   - Consult OT/PT to assist with strengthening/mobility   - Keep Call bell within reach  - Keep bed low and locked with side rails adjusted as appropriate  - Keep care items and personal belongings within reach  - Initiate and maintain comfort rounds  - Make Fall Risk Sign visible to staff  - Offer Toileting every 2 Hours,  in advance of need  - Consider moving patient to room near nurses station  Outcome: Progressing  Goal: Maintain or return to baseline ADL function  Description: INTERVENTIONS:  -  Assess patient's ability to carry out ADLs; assess patient's baseline for ADL function and identify physical deficits which impact ability to perform ADLs (bathing, care of mouth/teeth, toileting, grooming, dressing, etc.)  - Assess/evaluate cause of self-care deficits   - Assess range of motion  - Assess patient's mobility; develop plan if impaired  - Assess patient's need for assistive devices and provide as appropriate  - Encourage maximum independence but intervene and supervise when necessary  - Involve family in performance of ADLs  - Assess for home care needs following discharge   - Consider OT consult to assist with ADL evaluation and planning for discharge  - Provide patient education as appropriate  Outcome: Progressing  Goal: Maintains/Returns to pre admission functional level  Description: INTERVENTIONS:  - Perform AM-PAC 6 Click Basic Mobility/ Daily Activity assessment daily.  - Set and communicate daily mobility goal to care team and patient/family/caregiver.   - Collaborate with rehabilitation services on mobility goals if consulted  - Perform Range of Motion 3 times a day.  - Reposition patient every 2 hours.  - Dangle patient 3 times a day  - Stand patient 3 times a day  - Ambulate patient 3 times a day  - Out of bed to chair 3 times a day   - Out of bed for meals 3 times a day  - Out of bed for toileting  - Record patient progress and toleration of activity level   Outcome: Progressing     Problem: DISCHARGE PLANNING  Goal: Discharge to home or other facility with appropriate resources  Description: INTERVENTIONS:  - Identify barriers to discharge w/patient and caregiver  - Arrange for needed discharge resources and transportation as appropriate  - Identify discharge learning needs (meds, wound care, etc.)  - Arrange  for interpretive services to assist at discharge as needed  - Refer to Case Management Department for coordinating discharge planning if the patient needs post-hospital services based on physician/advanced practitioner order or complex needs related to functional status, cognitive ability, or social support system  Outcome: Progressing     Problem: Knowledge Deficit  Goal: Patient/family/caregiver demonstrates understanding of disease process, treatment plan, medications, and discharge instructions  Description: Complete learning assessment and assess knowledge base.  Interventions:  - Provide teaching at level of understanding  - Provide teaching via preferred learning methods  Outcome: Progressing

## 2024-11-15 NOTE — OP NOTE
OPERATIVE REPORT  PATIENT NAME: David Carpio    :  1965  MRN: [id number]  Pt Location: OW OR ROOM 02    SURGERY DATE: 11/15/2024    Surgeons and Role:     * Adelia Yousif DPM - Primary    Preop Diagnosis:  Osteomyelitis of second toe of right foot (HCC) [M86.9]    Post-Op Diagnosis Codes:     * Osteomyelitis of second toe of right foot (HCC) [M86.9]    Procedure(s):  Right - AMPUTATION RIGHT 2ND TOE    Specimen(s):  ID Type Source Tests Collected by Time Destination   1 : RIGHT SECOND TOE Tissue Toe, Right TISSUE EXAM Adelia Yousif DPM 11/15/2024 1635        Estimated Blood Loss:   Minimal    Drains:  * No LDAs found *    Anesthesia Type:   Choice    Operative Indications:  Osteomyelitis of second toe of right foot (HCC) [M86.9]    Operative Findings:  Consistent with diagnosis - soft, necrotic bone of the right 2nd toe stump with ulcer directly communicating with proximal phalanx. S/p amputation at 2nd MTPJ with surgical cure for OM presumed.     WBAT ADLs only in toe unloading shoe (which patient has already). 7d po abx per SLIM upon d/c.    Complications:   None    Procedure and Technique:    Under mild sedation, the patient was brought into the operating room and remained on stretcher in supine position. A time out was performed to confirm the correct patient, procedure and site with all parties in agreement. Following IV sedation, a right 2nd toe block was performed consisting of 10ml of 1% Lidocaine. The foot was then scrubbed, prepped and draped in the usual aseptic manner.     Attention was directed to the right 2nd toe stump (site had prior partial toe amputation) where a fishmouth incision was made through skin just distal to the metatarsal-phalangeal joint to bone with a #15 blade. The proximal phalanx was disarticulated at the MTPJ and passed off the field for gross tissue exam - the proximal phalanx was soft, necrotic, infected appearing with erosion. The wound was flushed with nss. The head  of the 2nd metatarsal hard and viable. Deep dermal layer closed with vicryl, skin closed with nylon. Skin was friable and with chronic venous stasis/lymphedema skin changes. The skin was cleansed and dried. Xeroform dsd ACE applied.     The patient tolerated the procedure and anesthesia well and was transported to the PACU with vital signs stable.     As with many limb salvage procedures, we contemplate the possibility of performing further stages to this procedure. Procedures may include debridements, delayed closure, plastic surgery techniques, or more proximal amputations. This procedure may be considered part of a multi-staged limb salvage treatment plan.       I was present for the entire procedure.       Patient Disposition:  PACU              SIGNATURE: Adelia Yousif DPM  DATE: November 15, 2024  TIME: 4:52 PM

## 2024-11-15 NOTE — ANESTHESIA POSTPROCEDURE EVALUATION
Post-Op Assessment Note    CV Status:  Stable    Pain management: adequate       Mental Status:  Sleepy   Hydration Status:  Euvolemic   PONV Controlled:  Controlled   Airway Patency:  Patent     Post Op Vitals Reviewed: Yes    No anethesia notable event occurred.    Staff: CRNA           Last Filed PACU Vitals:  Vitals Value Taken Time   Temp 99.6    Pulse 78    BP 93/52    Resp 18    SpO2 98%        Modified Sandy:  No data recorded

## 2024-11-15 NOTE — ANESTHESIA PREPROCEDURE EVALUATION
Procedure:  AMPUTATION TOE (Right: Toe)    Denies DAYAN but listed as a medical diagnosis    Denies the following: CP/SOB with exertion, asthma, COPD, stroke/TIA, seizure      Relevant Problems   CARDIO   (+) Atrial fibrillation (HCC)   (+) Hypertension   (+) Pacemaker      ENDO   (+) Type 2 diabetes mellitus with diabetic polyneuropathy, without long-term current use of insulin (HCC)      HEMATOLOGY   (+) Microcytic anemia      MUSCULOSKELETAL   (+) Cervical spondylosis   (+) Cervical strain   (+) Charcot arthropathy of midfoot      NEURO/PSYCH   (+) Anxiety   (+) Type 2 diabetes mellitus with diabetic polyneuropathy, without long-term current use of insulin (HCC)      PULMONARY   (+) DAYAN (obstructive sleep apnea)      Other   (+) Chronic osteomyelitis of left foot with draining sinus (HCC)   (+) Toe osteomyelitis, right (HCC)      BMI 40    Atrial fibrillation  When compared with ECG of 15-Jhonny-2023 21:13,  Atrial fibrillation has replaced Electronic ventricular pacemaker      Physical Exam    Airway    Mallampati score: II  TM Distance: >3 FB  Neck ROM: full     Dental   No notable dental hx     Cardiovascular  Rhythm: irregular    Pulmonary  Pulmonary exam normal     Other Findings        Anesthesia Plan  ASA Score- 3     Anesthesia Type- IV sedation with anesthesia with ASA Monitors.         Additional Monitors:     Airway Plan:            Plan Factors-Exercise tolerance (METS): >4 METS.    Chart reviewed. EKG reviewed. Imaging results reviewed. Existing labs reviewed. Patient summary reviewed.    Patient is not a current smoker.      Obstructive sleep apnea risk education given perioperatively.        Induction- intravenous.    Postoperative Plan-     Perioperative Resuscitation Plan - Level 1 - Full Code.       Informed Consent- Anesthetic plan and risks discussed with patient.  I personally reviewed this patient with the CRNA. Discussed and agreed on the Anesthesia Plan with the CRNA..

## 2024-11-15 NOTE — TELEPHONE ENCOUNTER
Patient called in regards to wanting to r/s appt that he had cancelled for today due to be hospitalized. Writer informed patient that they would relay the message and have someone call back to r/s. Patient was looking for next week if surgery goes well.

## 2024-11-16 VITALS
RESPIRATION RATE: 17 BRPM | BODY MASS INDEX: 40.15 KG/M2 | SYSTOLIC BLOOD PRESSURE: 93 MMHG | DIASTOLIC BLOOD PRESSURE: 58 MMHG | HEART RATE: 68 BPM | HEIGHT: 73 IN | WEIGHT: 302.91 LBS | OXYGEN SATURATION: 93 % | TEMPERATURE: 97.9 F

## 2024-11-16 LAB
ERYTHROCYTE [DISTWIDTH] IN BLOOD BY AUTOMATED COUNT: 14.1 % (ref 11.6–15.1)
HCT VFR BLD AUTO: 41.1 % (ref 36.5–49.3)
HGB BLD-MCNC: 13.1 G/DL (ref 12–17)
MCH RBC QN AUTO: 27.8 PG (ref 26.8–34.3)
MCHC RBC AUTO-ENTMCNC: 31.9 G/DL (ref 31.4–37.4)
MCV RBC AUTO: 87 FL (ref 82–98)
PLATELET # BLD AUTO: 271 THOUSANDS/UL (ref 149–390)
PMV BLD AUTO: 9.2 FL (ref 8.9–12.7)
RBC # BLD AUTO: 4.71 MILLION/UL (ref 3.88–5.62)
WBC # BLD AUTO: 6.89 THOUSAND/UL (ref 4.31–10.16)

## 2024-11-16 PROCEDURE — 85027 COMPLETE CBC AUTOMATED: CPT

## 2024-11-16 PROCEDURE — 97116 GAIT TRAINING THERAPY: CPT

## 2024-11-16 PROCEDURE — 97163 PT EVAL HIGH COMPLEX 45 MIN: CPT

## 2024-11-16 PROCEDURE — 99235 HOSP IP/OBS SAME DATE MOD 70: CPT | Performed by: FAMILY MEDICINE

## 2024-11-16 PROCEDURE — 97760 ORTHOTIC MGMT&TRAING 1ST ENC: CPT

## 2024-11-16 PROCEDURE — 97166 OT EVAL MOD COMPLEX 45 MIN: CPT

## 2024-11-16 RX ORDER — CEPHALEXIN 500 MG/1
500 CAPSULE ORAL EVERY 6 HOURS SCHEDULED
Qty: 20 CAPSULE | Refills: 0 | Status: SHIPPED | OUTPATIENT
Start: 2024-11-16 | End: 2024-11-22

## 2024-11-16 RX ORDER — OXYCODONE HYDROCHLORIDE 5 MG/1
5 TABLET ORAL EVERY 4 HOURS PRN
Qty: 15 TABLET | Refills: 0 | Status: SHIPPED | OUTPATIENT
Start: 2024-11-16 | End: 2024-11-26

## 2024-11-16 RX ADMIN — CEFAZOLIN SODIUM 2000 MG: 2 SOLUTION INTRAVENOUS at 07:58

## 2024-11-16 RX ADMIN — OXYCODONE HYDROCHLORIDE 10 MG: 10 TABLET ORAL at 04:16

## 2024-11-16 RX ADMIN — Medication 1 TABLET: at 08:00

## 2024-11-16 RX ADMIN — BUSPIRONE HYDROCHLORIDE 15 MG: 10 TABLET ORAL at 08:00

## 2024-11-16 RX ADMIN — HYDROMORPHONE HYDROCHLORIDE 0.5 MG: 1 INJECTION, SOLUTION INTRAMUSCULAR; INTRAVENOUS; SUBCUTANEOUS at 05:26

## 2024-11-16 RX ADMIN — GABAPENTIN 800 MG: 400 CAPSULE ORAL at 08:00

## 2024-11-16 RX ADMIN — BUPROPION HYDROCHLORIDE 300 MG: 150 TABLET, EXTENDED RELEASE ORAL at 08:00

## 2024-11-16 RX ADMIN — CEFAZOLIN SODIUM 2000 MG: 2 SOLUTION INTRAVENOUS at 00:13

## 2024-11-16 NOTE — ASSESSMENT & PLAN NOTE
Wt Readings from Last 3 Encounters:   11/14/24 (!) 137 kg (302 lb 14.6 oz)   10/31/24 (!) 137 kg (303 lb)   09/16/24 (!) 136 kg (300 lb 9.6 oz)     Weight stable from previous  Appears relatively euvolemic on presentation  Preserved EF on echocardiogram in February 2024  Held diuretics in the perioperative period  Resumed postoperatively

## 2024-11-16 NOTE — ASSESSMENT & PLAN NOTE
Patient presented (11/14) at advice of podiatrist for abnormal MRI  Notably, patient recently hospitalized at our institution from 10/31 - 11/1/2024 at the time with complaints of right lower extremity nonhealing right second toe stump ulcer high index of suspicion for osteomyelitis  Secondary to PPM-MRI could not be completed-patient with strong desire to leave the hospital  Therefore, was discharged on oral Keflex with MRI in the outpatient setting  MRI (11/12) revealed osteomyelitis  Patient without complaints of fever, chills, sweats    In ED, patient without SIRS criteria  MRI completed (11/12) reveals the following:Findings consistent with definite osteomyelitis involving the second toe proximal phalanx   CT obtained today in ED reveals the following: Diffuse lower leg edema with subcutaneous venous varices. No focal fluid collection. Redemonstrated destructive osseous of the distal head of the second digit proximal phalanx consistent with known osteomyelitis as demonstrated on the comparison MRI. Surrounding phlegmonous changes without discrete abscess. A hypodense region extends to the plantar aspect of the second toe  Case discussed between ED provider and podiatrist with recommendations for amputation tomorrow (11/15) admit to medicine  Received Rocephin and vancomycin in the ED    Plan:  Patient postoperative day 1 status post S/p amputation at 2nd MTPJ with surgical cure for OM presumed.   7 days oral Keflex  Follow-up with podiatry as directed

## 2024-11-16 NOTE — DISCHARGE SUMMARY
Discharge Summary - Hospitalist   Name: David Carpio 59 y.o. male I MRN: [id number]  Unit/Bed#: -01 I Date of Admission: 11/14/2024   Date of Service: 11/16/2024 I Hospital Day: 2     Assessment & Plan  Toe osteomyelitis, right (HCC)  Patient presented (11/14) at advice of podiatrist for abnormal MRI  Notably, patient recently hospitalized at our institution from 10/31 - 11/1/2024 at the time with complaints of right lower extremity nonhealing right second toe stump ulcer high index of suspicion for osteomyelitis  Secondary to PPM-MRI could not be completed-patient with strong desire to leave the hospital  Therefore, was discharged on oral Keflex with MRI in the outpatient setting  MRI (11/12) revealed osteomyelitis  Patient without complaints of fever, chills, sweats    In ED, patient without SIRS criteria  MRI completed (11/12) reveals the following:Findings consistent with definite osteomyelitis involving the second toe proximal phalanx   CT obtained today in ED reveals the following: Diffuse lower leg edema with subcutaneous venous varices. No focal fluid collection. Redemonstrated destructive osseous of the distal head of the second digit proximal phalanx consistent with known osteomyelitis as demonstrated on the comparison MRI. Surrounding phlegmonous changes without discrete abscess. A hypodense region extends to the plantar aspect of the second toe  Case discussed between ED provider and podiatrist with recommendations for amputation tomorrow (11/15) admit to medicine  Received Rocephin and vancomycin in the ED    Plan:  Patient postoperative day 1 status post S/p amputation at 2nd MTPJ with surgical cure for OM presumed.   7 days oral Keflex  Follow-up with podiatry as directed  Atrial fibrillation (HCC)  Patient is rate controlled on presentation  PPM placed for syncope in the past  Generally maintained on Xarelto-has not taken-held in the setting of surgical procedure-resume upon  discharge    Chronic diastolic heart failure (HCC)  Wt Readings from Last 3 Encounters:   11/14/24 (!) 137 kg (302 lb 14.6 oz)   10/31/24 (!) 137 kg (303 lb)   09/16/24 (!) 136 kg (300 lb 9.6 oz)     Weight stable from previous  Appears relatively euvolemic on presentation  Preserved EF on echocardiogram in February 2024  Held diuretics in the perioperative period  Resumed postoperatively        Anxiety  Chronic condition-exacerbated in the acute care setting  Proceed with home regimen which includes BuSpar  Regimen verified with patient's outpatient psychiatrist    Obesity (BMI 30-39.9)  Recommend incorporating a more whole foods plant-predominant diet along with decreasing consumption of red meats and processed foods  Per AHA guidelines, recommend moderate-vigorous intensity exercise for 30 minutes a day for 5 days a week or a total of 150 min/week     Medical Problems       Resolved Problems  Date Reviewed: 11/6/2024   None         MESSAGE TO PCP (Khurram Rodriguez DO) FOR FOLLOW UP:   Thank you for allowing us to participate in the care of your patient, David Carpio, who was hospitalized from 11/14/2024 through 11/16/24 with the admitting diagnosis of right toe osteomyelitis.  Patient was evaluated by podiatry and is postoperative day #1 status post amputation.  Without difficulty ambulating postprocedure.  Eager for discharge to home.    Medication Changes:  Will be discharged on a total of 7 days of antimicrobials-oral Keflex  Outpatient testing recommended:  None  If you have any additional questions or would like to discuss further, please feel free to contact me.  Fidel Mi DO  [facility] Internal Medicine, Hospitalist, [phone]     Admission Date:   Admission Orders (From admission, onward)       Ordered        11/14/24 1506  INPATIENT ADMISSION  Once                          Discharge Date: 11/16/24    Consultations During Hospital Stay:  Podiatry    Procedures Performed:    Amputation    Significant Findings / Test Results:       Incidental Findings:        Test Results Pending at Discharge (will require follow up):        Complications: None    Reason for Admission: Right lower extremity wound    Hospital Course:   David Carpio is a 59 y.o. male patient who originally presented to the hospital on 11/14/2024 due to right lower extremity wound.  MRI revealed osteomyelitis.  Underwent amputation.  Uncomplicated.      Please see above list of diagnoses and related plan for additional information.     Condition at Discharge: good    Discharge Day Visit / Exam:     Discussion with Family: Patient declined call to .     Discharge instructions/Information to patient and family:   See after visit summary for information provided to patient and family.      Provisions for Follow-Up Care:  See after visit summary for information related to follow-up care and any pertinent home health orders.      Mobility at time of Discharge:   Basic Mobility Inpatient Raw Score: 18  JH-HLM Goal: 6: Walk 10 steps or more  JH-HLM Achieved: 7: Walk 25 feet or more       Disposition:   Home    Planned Readmission: No    Discharge Medications:  See after visit summary for reconciled discharge medications provided to patient and/or family.      Administrative Statements   Discharge Statement:  I have spent a total time of 44 minutes in caring for this patient on the day of the visit/encounter. >30 minutes of time was spent on: Risks and benefits of tx options, Patient and family education, Importance of tx compliance, Impressions, Documenting in the medical record, and Communicating with other healthcare professionals .    **Please Note: This note may have been constructed using a voice recognition system**

## 2024-11-16 NOTE — ANESTHESIA POSTPROCEDURE EVALUATION
Post-Op Assessment Note            No anethesia notable event occurred.            Last Filed PACU Vitals:  Vitals Value Taken Time   Temp 99.6 °F (37.6 °C) 11/15/24 1650   Pulse 78 11/15/24 1705   /59 11/15/24 1705   Resp 15 11/15/24 1705   SpO2 94 % 11/15/24 1705       Modified Sandy:  Activity: 2 (11/15/2024  5:05 PM)  Respiration: 2 (11/15/2024  5:05 PM)  Circulation: 2 (11/15/2024  5:05 PM)  Consciousness: 2 (11/15/2024  5:05 PM)  Oxygen Saturation: 2 (11/15/2024  5:05 PM)  Modified Sandy Score: 10 (11/15/2024  5:05 PM)

## 2024-11-16 NOTE — QUICK NOTE
Patient's IV removed x2 and catheters intact. AVS reviewed with patient including medications, follow up appointments, and restrictions post op. Patient verbalized understanding and escorted out on foot.

## 2024-11-16 NOTE — PLAN OF CARE
Problem: PAIN - ADULT  Goal: Verbalizes/displays adequate comfort level or baseline comfort level  Description: Interventions:  - Encourage patient to monitor pain and request assistance  - Assess pain using appropriate pain scale  - Administer analgesics based on type and severity of pain and evaluate response  - Implement non-pharmacological measures as appropriate and evaluate response  - Consider cultural and social influences on pain and pain management  - Notify physician/advanced practitioner if interventions unsuccessful or patient reports new pain  Outcome: Progressing     Problem: INFECTION - ADULT  Goal: Absence or prevention of progression during hospitalization  Description: INTERVENTIONS:  - Assess and monitor for signs and symptoms of infection  - Monitor lab/diagnostic results  - Monitor all insertion sites, i.e. indwelling lines, tubes, and drains  - Monitor endotracheal if appropriate and nasal secretions for changes in amount and color  - [place] appropriate cooling/warming therapies per order  - Administer medications as ordered  - Instruct and encourage patient and family to use good hand hygiene technique  - Identify and instruct in appropriate isolation precautions for identified infection/condition  Outcome: Progressing  Goal: Absence of fever/infection during neutropenic period  Description: INTERVENTIONS:  - Monitor WBC    Outcome: Progressing     Problem: SAFETY ADULT  Goal: Patient will remain free of falls  Description: INTERVENTIONS:  - Educate patient/family on patient safety including physical limitations  - Instruct patient to call for assistance with activity   - Consult OT/PT to assist with strengthening/mobility   - Keep Call bell within reach  - Keep bed low and locked with side rails adjusted as appropriate  - Keep care items and personal belongings within reach  - Initiate and maintain comfort rounds  - Make Fall Risk Sign visible to staff  - Offer Toileting every 2 Hours,  in advance of need  - Initiate/Maintain alarm  - Obtain necessary fall risk management equipment  - Apply yellow socks and bracelet for high fall risk patients  - Consider moving patient to room near nurses station  Outcome: Progressing  Goal: Maintain or return to baseline ADL function  Description: INTERVENTIONS:  -  Assess patient's ability to carry out ADLs; assess patient's baseline for ADL function and identify physical deficits which impact ability to perform ADLs (bathing, care of mouth/teeth, toileting, grooming, dressing, etc.)  - Assess/evaluate cause of self-care deficits   - Assess range of motion  - Assess patient's mobility; develop plan if impaired  - Assess patient's need for assistive devices and provide as appropriate  - Encourage maximum independence but intervene and supervise when necessary  - Involve family in performance of ADLs  - Assess for home care needs following discharge   - Consider OT consult to assist with ADL evaluation and planning for discharge  - Provide patient education as appropriate  Outcome: Progressing  Goal: Maintains/Returns to pre admission functional level  Description: INTERVENTIONS:  - Perform AM-PAC 6 Click Basic Mobility/ Daily Activity assessment daily.  - Set and communicate daily mobility goal to care team and patient/family/caregiver.   - Collaborate with rehabilitation services on mobility goals if consulted  - Reposition patient every 2 hours.  - Stand patient 3 times a day  - Ambulate patient 3 times a day  - Out of bed to chair 3 times a day   - Out of bed for meals 3 times a day  - Out of bed for toileting  - Record patient progress and toleration of activity level   Outcome: Progressing     Problem: DISCHARGE PLANNING  Goal: Discharge to home or other facility with appropriate resources  Description: INTERVENTIONS:  - Identify barriers to discharge w/patient and caregiver  - Arrange for needed discharge resources and transportation as appropriate  -  Identify discharge learning needs (meds, wound care, etc.)  - Arrange for interpretive services to assist at discharge as needed  - Refer to Case Management Department for coordinating discharge planning if the patient needs post-hospital services based on physician/advanced practitioner order or complex needs related to functional status, cognitive ability, or social support system  Outcome: Progressing     Problem: Knowledge Deficit  Goal: Patient/family/caregiver demonstrates understanding of disease process, treatment plan, medications, and discharge instructions  Description: Complete learning assessment and assess knowledge base.  Interventions:  - Provide teaching at level of understanding  - Provide teaching via preferred learning methods  Outcome: Progressing     Problem: Nutrition/Hydration-ADULT  Goal: Nutrient/Hydration intake appropriate for improving, restoring or maintaining nutritional needs  Description: Monitor and assess patient's nutrition/hydration status for malnutrition. Collaborate with interdisciplinary team and initiate plan and interventions as ordered.  Monitor patient's weight and dietary intake as ordered or per policy. Utilize nutrition screening tool and intervene as necessary. Determine patient's food preferences and provide high-protein, high-caloric foods as appropriate.     INTERVENTIONS:  - Monitor oral intake, urinary output, labs, and treatment plans  - Assess nutrition and hydration status and recommend course of action  - Evaluate amount of meals eaten  - Assist patient with eating if necessary   - Allow adequate time for meals  - Recommend/ encourage appropriate diets, oral nutritional supplements, and vitamin/mineral supplements  - Order, calculate, and assess calorie counts as needed  - Recommend, monitor, and adjust tube feedings and TPN/PPN based on assessed needs  - Assess need for intravenous fluids  - Provide specific nutrition/hydration education as appropriate  -  Include patient/family/caregiver in decisions related to nutrition  Outcome: Progressing

## 2024-11-16 NOTE — PHYSICAL THERAPY NOTE
PHYSICAL THERAPY EVALUATION  NAME:  David Carpio  DATE: 11/16/24    AGE:   59 y.o.  Mrn:   [id number]  ADMIT DX:  Abnormal laboratory test [R89.9]  Toe osteomyelitis, right (HCC) [M86.9]  Cellulitis of right lower extremity [L03.115]  Osteomyelitis of second toe of right foot (HCC) [M86.9]    Past Medical History:   Diagnosis Date    Atrial fibrillation (HCC)     Benzodiazepine withdrawal with complication (HCC) 06/15/2023    Chronic diastolic (congestive) heart failure (HCC)     Diabetes mellitus (HCC)     GERD (gastroesophageal reflux disease)     High cholesterol     Hyperlipidemia     Pacemaker     Stroke (HCC)      Length Of Stay: 2  Performed at least 2 patient identifiers during session: Name and Birthday  PHYSICAL THERAPY EVALUATION :    11/16/24 1020   PT Last Visit   PT Visit Date 11/16/24   Note Type   Note type Evaluation;Orthotic Management/Training   Type of Brace   Brace Applied Darco Wedge Shoe (Toe Unloading)   Additional Brace Ordered No   Patient Position When Brace Applied Seated   Education   Education Provided Yes;Family or social support of family present for education by provider   End of Consult   Patient Position at End of Consult Bedside chair;All needs within reach   Pain Assessment   Pain Assessment Tool 0-10   Pain Score No Pain   Restrictions/Precautions   Weight Bearing Precautions Per Order Yes   RLE Weight Bearing Per Order WBAT  (ADLs only in toe unloading shoe- Clarified with Dr. Yousif via secure chair. Patient is able to ambulate inside the house to access home environment prn and to car and into doctor appointments only.)   Braces or Orthoses Other (Comment)  (right toe offloading shoe, left shoe filler with h/o TMA April 2023)   Other Precautions Chair Alarm;Bed Alarm;Fall Risk;Hard of hearing   Home Living   Type of Home House  (1 CHRISS)   Home Layout Two level;Performs ADLs on one level;Able to live on main level with bedroom/bathroom  (FF R HR)   Bathroom Shower/Tub  "Tub/shower unit   Bathroom Toilet Standard   Bathroom Equipment Grab bars in shower;Hand-held shower;Shower chair   Home Equipment Walker  (rollator)   Additional Comments Reports living in a 2SH with 1 CHRISS and not using an AD pTA.   Prior Function   Level of Iron Independent with functional mobility;Independent with ADLs;Independent with IADLS   Lives With Spouse   Receives Help From Family   IADLs Independent with medication management;Independent with driving;Independent with meal prep   Falls in the last 6 months 0   Vocational Retired  (Semi-retired, )   Comments Reports being independent with movility, ADLs, IADLs.   General   Additional Pertinent History Pt is s/p Right - AMPUTATION RIGHT 2ND TOE  10/15/24. Pt is WBAT ADLs only in toe unloading shoe. Clarified with Dr. Yousif via secure chair. Patient is able to ambulate inside the house to access home environment prn (educated patient to not ambulate laps in home but just prn for daily activities), to car and into doctor appointments.   Cognition   Orientation Level Oriented X4   Following Commands Follows all commands and directions without difficulty   Comments repeated cues at times due to [place]   Subjective   Subjective \"I don't have a shoe like that at home.\" (requested order for toe unloading shoe)   RLE Assessment   RLE Assessment WFL  (4+/5)   LLE Assessment   LLE Assessment WFL  (4+/5)   Coordination   Rapid Alternating Movements Intact   Light Touch   RLE Light Touch Impaired   RLE Light Touch Comments absent to mid calf   LLE Light Touch Impaired   LLE Light Touch Comments absent to mid calf   Bed Mobility   Supine to Sit 6  Modified independent   Additional items HOB elevated;Bedrails   Additional Comments HOB elevated > 30 degrees. bedrails prn. sitting EOB, educated on use of toe unloading shoe right foot and proper use for transfers, ambulation.   Transfers   Sit to Stand 5  Supervision   Additional items Increased " "time required;Verbal cues   Stand to Sit 5  Supervision   Additional items Increased time required;Verbal cues   Stand pivot   (SBA)   Additional items Assist x 1;Increased time required;Verbal cues   Additional Comments no AD. wide ZURI upon standing. pt wiht left shoe with filler on and right toe unloading shoe. spt without AD with SBA with pt guarded, cues for technique and sequence.   Ambulation/Elevation   Gait pattern Improper Weight shift;Wide ZURI;Decreased R stance;Excessively slow;Short stride;Step to;Step through pattern   Gait Assistance   (SBA)   Additional items Verbal cues   Assistive Device None   Distance ambulated 45'x1 without AD with wide ZURI, step to to step through pattern with verbal cues for step to patern to avoid inc pressure and wt on anterior aspect of foot and avoid \"clicking\". pt verbalized understanding and demonstrated with verbal cues. pt wiht wide ZURI and guarded, decreased arm swing.   Balance   Static Sitting Good   Dynamic Sitting Fair +   Static Standing Fair   Dynamic Standing Fair -   Ambulatory Fair -   Activity Tolerance   Activity Tolerance Patient tolerated treatment well   Medical Staff Made Aware Fidelia CROOKS   Nurse Made Aware Enoc MONCADA   Assessment   Prognosis Good   Problem List Decreased strength;Decreased endurance;Impaired balance;Decreased mobility;Decreased safety awareness;Impaired sensation;Obesity;Decreased skin integrity;Orthopedic restrictions   Barriers to Discharge Comments step to enter home, WB limitations, fall risk   Goals   Patient Goals \"Go home and get back to work eventually\"   STG Expiration Date 11/30/24   PT Treatment Day 1   Plan   Treatment/Interventions ADL retraining;Functional transfer training;LE strengthening/ROM;Elevations;Therapeutic exercise;Endurance training;Patient/family training;Equipment eval/education;Bed mobility;Gait training;Compensatory technique education;Spoke to nursing;Spoke to case management;OT   PT Frequency 2-3x/wk "   Discharge Recommendation   Rehab Resource Intensity Level, PT No post-acute rehabilitation needs  (no rehab needs until cleared by podiatry for further WB)   Equipment Recommended   (cane recommended, pt reports he plans to purchase bariatric cane as current canes to provide patient do not accommodate current wt capacity)   Additional Comments recommend use of spc. educated patient to ambulate in home only when needed to access home, not to complete laps within home to facilitate healing of right foot. discussed use of toe unloading shoe with all mobility. pt verbalized understanding.   AM-PAC Basic Mobility Inpatient   Turning in Flat Bed Without Bedrails 4   Lying on Back to Sitting on Edge of Flat Bed Without Bedrails 4   Moving Bed to Chair 3   Standing Up From Chair Using Arms 3   Walk in Room 3   Climb 3-5 Stairs With Railing 3   Basic Mobility Inpatient Raw Score 20   Basic Mobility Standardized Score 43.99   [facility] Highest Level Of Mobility   -HLM Goal 6: Walk 10 steps or more   -HLM Achieved 7: Walk 25 feet or more   Additional Treatment Session   Start Time 1046   End Time 1056   Treatment Assessment Pt tolerated session well. He was able to ambulate with decreased assistance wiht use of spc. He demonstrated improved balance. he requires cues for sequence wiht stair completion. He is limited by decreased strenght, balance, endurance. He will continue to beenfit from PTs ervices to maximize LOF.   Equipment Use initiated use of spc. sit<>stand with spc with supervision. ambulated 25'x1 and 20'x1 with viral spc with supervision with min verbal cues for sequence with cane and step to pattern. completed simulated car transfer with spc with supervision. completed 6 inch step with spc with cues to ascend with R LE and descend with R LE to facilitate safe completion wiht right toe unloading shoe. completed 1st trial with steadying assistance and then other 2 trials with SBA. cues for sequence.  completed 4 steps with HR and spc with step to pattern with right LE up and right LE down with SBA. pt reports he will purchase spc for use at home. verbalized understanding for in house ambulation, ambulaiton short distance to car and into appointments only. Reviewed daily skin checks to prevent further risk of impaired skin integrity given poor sensation. Pt verbalized understanding.    End of Consult   Patient Position at End of Consult Bedside chair;Bed/Chair alarm activated       Pt requires PT/OT co-eval due to medical complexity, safety concerns, fall risk, significant assistance with mobility and/or cognitive-behavioral impairments.    (Please find full objective findings from PT assessment regarding body systems outlined above).     Assessment: Pt is a 59 y.o. male seen for PT evaluation s/p admission to [facility] on 11/14/2024 with Toe osteomyelitis, right (HCC).  Order placed for PT services.  Upon evaluation: Pt is presenting with impaired functional mobility due to decreased strength, decreased endurance, impaired balance, gait deviations, altered sensation, decreased safety awareness, impaired hearing, orthopedic restrictions, fall risk, LE edema, and impaired skin integrity requiring  supervision to stand by assistance for transfers and stand by assistance for ambulation with out AD . Pt's clinical presentation is currently unpredictable given the functional mobility deficits above, especially weakness, edema of extremities, decreased skin integrity, decreased endurance, impaired balance, gait deviations, altered sensation, decreased safety awareness, and orthopedic restrictions, coupled with fall risks as indicated by AM-PAC 6-Clicks: 20/24 as well as impaired balance, polypharmacy, limited sensation/neuropathy, and obesity and combined with medical complications of readmission to hospital, need for input for mobility technique/safety, and Pt recently admitted 10/31 - 11/1/2024 at  the time with complaints of right lower extremity nonhealing right second toe stump ulcer high index of suspicion for osteomyelitis Secondary to PPM-MRI could not be completed-patient with strong desire to leave the hospital Therefore, was discharged on oral Keflex with MRI in the outpatient setting MRI (11/12) revealed osteomyelitis pt is now s/p right 2nd toe amp .  Pt's PMHx and comorbidities that may affect physical performance and progress include: A fib, anemia, anxiety, CHF, DM, HTN, obesity, neuropathy, and osteomyelitis left foot s/p TMA, cervical spondylosis, pacemaker, CVA . Personal factors affecting pt at time of IE include: step(s) to enter environment, inability to perform IADLs, inability to perform ADLs, inability to navigate level surfaces without external assistance, inability to navigate community distances, and h/o noncompliance with recommendations per notes . Pt will benefit from continued skilled PT services to address deficits as defined above and to maximize level of functional mobility to facilitate return toward PLOF and improved QOL. From PT/mobility standpoint, recommendation at time of d/c would be No Post-Acute Rehabilitation Needs, with family and/or caregiver support, and with cane in order to reduce fall risk and maximize pt's functional independence and consistency with mobility. Recommend trial with cane next 1-2 sessions and ther ex next 1-2 sessions.       The patient's AM-PAC Basic Mobility Inpatient Short Form Raw Score is 20. A Raw score of greater than 16 suggests the patient may benefit from discharge to home. Please also refer to the recommendation of the Physical Therapist for safe discharge planning.       Goals: Pt will: Perform bed mobility tasks with independent to reposition in bed and prepare for transfers. Pt will perform transfers with modified Independent to decrease burden of care and decrease risk for falls and prepare for ambulation. Pt will ambulate with  LRAD for 50' with  modified Independent  to decrease burden of care, decrease risk for falls, improve activity tolerance, and improve gait quality and to access home environment. Pt will complete 1 step with LRAD and >/= 4 steps with unilateral handrail with modified Independent to return to home with CHRISS and decrease risk for falls. Pt will participate in objective balance assessment to determine baseline fall risk. Pt will increase B LE strength >/= 1/2 MMT grade to facilitate functional mobility.      Fe Cook, PT,DPT     Pt recently admitted 10/31 - 11/1/2024 at the time with complaints of right lower extremity nonhealing right second toe stump ulcer high index of suspicion for osteomyelitis Secondary to PPM-MRI could not be completed-patient with strong desire to leave the hospital Therefore, was discharged on oral Keflex with MRI in the outpatient setting MRI (11/12) revealed osteomyelitis.

## 2024-11-16 NOTE — PLAN OF CARE
Problem: PAIN - ADULT  Goal: Verbalizes/displays adequate comfort level or baseline comfort level  Description: Interventions:  - Encourage patient to monitor pain and request assistance  - Assess pain using appropriate pain scale  - Administer analgesics based on type and severity of pain and evaluate response  - Implement non-pharmacological measures as appropriate and evaluate response  - Consider cultural and social influences on pain and pain management  - Notify physician/advanced practitioner if interventions unsuccessful or patient reports new pain  11/15/2024 2309 by Sonia Pantoja RN  Outcome: Progressing  11/15/2024 2309 by Sonia Pantoja RN  Outcome: Progressing     Problem: INFECTION - ADULT  Goal: Absence or prevention of progression during hospitalization  Description: INTERVENTIONS:  - Assess and monitor for signs and symptoms of infection  - Monitor lab/diagnostic results  - Monitor all insertion sites, i.e. indwelling lines, tubes, and drains  - Monitor endotracheal if appropriate and nasal secretions for changes in amount and color  - Mobile appropriate cooling/warming therapies per order  - Administer medications as ordered  - Instruct and encourage patient and family to use good hand hygiene technique  - Identify and instruct in appropriate isolation precautions for identified infection/condition  11/15/2024 2309 by Sonia Pantoja RN  Outcome: Progressing  11/15/2024 2309 by Sonia Pantoja RN  Outcome: Progressing     Problem: DISCHARGE PLANNING  Goal: Discharge to home or other facility with appropriate resources  Description: INTERVENTIONS:  - Identify barriers to discharge w/patient and caregiver  - Arrange for needed discharge resources and transportation as appropriate  - Identify discharge learning needs (meds, wound care, etc.)  - Arrange for interpretive services to assist at discharge as needed  - Refer to Case Management Department for coordinating discharge planning if the  patient needs post-hospital services based on physician/advanced practitioner order or complex needs related to functional status, cognitive ability, or social support system  11/15/2024 2309 by Sonia Pantoja RN  Outcome: Progressing  11/15/2024 2309 by Sonia Pantoja RN  Outcome: Progressing     Problem: Nutrition/Hydration-ADULT  Goal: Nutrient/Hydration intake appropriate for improving, restoring or maintaining nutritional needs  Description: Monitor and assess patient's nutrition/hydration status for malnutrition. Collaborate with interdisciplinary team and initiate plan and interventions as ordered.  Monitor patient's weight and dietary intake as ordered or per policy. Utilize nutrition screening tool and intervene as necessary. Determine patient's food preferences and provide high-protein, high-caloric foods as appropriate.     INTERVENTIONS:  - Monitor oral intake, urinary output, labs, and treatment plans  - Assess nutrition and hydration status and recommend course of action  - Evaluate amount of meals eaten  - Assist patient with eating if necessary   - Allow adequate time for meals  - Recommend/ encourage appropriate diets, oral nutritional supplements, and vitamin/mineral supplements  - Order, calculate, and assess calorie counts as needed  - Recommend, monitor, and adjust tube feedings and TPN/PPN based on assessed needs  - Assess need for intravenous fluids  - Provide specific nutrition/hydration education as appropriate  - Include patient/family/caregiver in decisions related to nutrition  11/15/2024 2309 by Sonia Pantoja RN  Outcome: Progressing  11/15/2024 2309 by Sonia Pantoja RN  Outcome: Progressing     Problem: Knowledge Deficit  Goal: Patient/family/caregiver demonstrates understanding of disease process, treatment plan, medications, and discharge instructions  Description: Complete learning assessment and assess knowledge base.  Interventions:  - Provide teaching at level of  understanding  - Provide teaching via preferred learning methods  11/15/2024 2309 by Sonia Pantoja RN  Outcome: Progressing  11/15/2024 2309 by Sonia Pantoja, RN  Outcome: Progressing

## 2024-11-16 NOTE — UTILIZATION REVIEW
SURGERY DATE: 11/15/2024     Procedure(s):  Right - AMPUTATION RIGHT 2ND TOE    Operative Findings:  Consistent with diagnosis - soft, necrotic bone of the right 2nd toe stump with ulcer directly communicating with proximal phalanx. S/p amputation at 2nd MTPJ with surgical cure for OM presumed.       11/16  D/C  home

## 2024-11-16 NOTE — ASSESSMENT & PLAN NOTE
Patient is rate controlled on presentation  PPM placed for syncope in the past  Generally maintained on Xarelto-has not taken-held in the setting of surgical procedure-resume upon discharge

## 2024-11-16 NOTE — OCCUPATIONAL THERAPY NOTE
Occupational Therapy Evaluation     Patient Name: David Carpio  Today's Date: 11/16/2024  Problem List  Principal Problem:    Toe osteomyelitis, right (HCC)  Active Problems:    Chronic diastolic heart failure (HCC)    Atrial fibrillation (HCC)    Anxiety    Obesity (BMI 30-39.9)    Past Medical History  Past Medical History:   Diagnosis Date    Atrial fibrillation (HCC)     Benzodiazepine withdrawal with complication (HCC) 06/15/2023    Chronic diastolic (congestive) heart failure (HCC)     Diabetes mellitus (HCC)     GERD (gastroesophageal reflux disease)     High cholesterol     Hyperlipidemia     Pacemaker     Stroke (HCC)      Past Surgical History  Past Surgical History:   Procedure Laterality Date    APPENDECTOMY      ATRIAL CARDIAC PACEMAKER INSERTION      BARIATRIC SURGERY  05/2021    BONE BIOPSY Left 7/26/2023    Procedure: EXCISION BIOPSY BONE LESION EXTREMITY;  Surgeon: Adelia Yousif DPM;  [facility];  Service: Podiatry    EPIDURAL BLOCK INJECTION N/A 5/19/2022    Procedure: BLOCK / INJECTION EPIDURAL STEROID CERVICAL C7-T1;  Surgeon: Skyler Quinn MD;  [facility]: [facility];  Service: Pain Management     FL GUIDED NEEDLE PLAC BX/ASP/INJ  3/22/2022    FOOT AMPUTATION Left 4/28/2022    Procedure: LEFT TRANSMETATARSAL AMPUTATION.;  Surgeon: Nestor Madden DPM;  [facility];  Service: Podiatry    NERVE BLOCK Right 2/10/2022    Procedure: BLOCK MEDIAL BRANCH C3, C4, C5 #1;  Surgeon: Skyler Quinn MD;  [facility];  Service: Pain Management     NERVE BLOCK Right 3/22/2022    Procedure: BLOCK MEDIAL BRANCH C3, C4, C5 #2;  Surgeon: Skyler Quinn MD;  [facility];  Service: Pain Management     IN AMPUTATION FOOT TRANSMETARSAL Left 4/12/2023    Procedure: REVISION LEFT TRANSMETATARSAL (TMA) AMPUTATION, REMOVAL OF UNVIABLE TISSUE AND BONE,;  Surgeon: Adelia Yousif DPM;  [facility];  Service: Podiatry    IN AMPUTATION METATARSAL W/TOE SINGLE Left  4/7/2023    Procedure: 2ND RAY RESECTION FOOT;  Surgeon: Adelia Yousif DPM;  [facility];  Service: Podiatry    IA AMPUTATION TOE INTERPHALANGEAL JOINT Left 11/16/2021    Procedure: AMPUTATION LESSER TOE;  Surgeon: Nestor Madden DPM;  [facility];  Service: Podiatry    RADIOFREQUENCY ABLATION Right 4/7/2022    Procedure: Right C3, C4, C5 RFA;  Surgeon: Skyler Quinn MD;  [facility];  Service: Pain Management     RHIZOTOMY Right 2/9/2023    Procedure: RHIZOTOMY CERVICAL MEDIAL BRANCH NERVES RIGHT C3, C4, C5;  Surgeon: Skyler Quinn MD;  [facility];  Service: Pain Management     TOE AMPUTATION Left     2nd toe    TOE AMPUTATION Left 9/15/2021    Procedure: AMPUTATION LEFT 4TH TOE;  Surgeon: Nestor Madden DPM;  [facility];  Service: Podiatry    TOE AMPUTATION Right 1/12/2022    Procedure: AMPUTATION TOE;  Surgeon: Hong Jolly DPM;  [facility];  Service: Podiatry    TOE AMPUTATION Right 2/23/2022    Procedure: AMPUTATION TOE  RIGHT SECOND;  Surgeon: Hong Jolly DPM;  [facility];  Service: Podiatry    TOE AMPUTATION Right 6/3/2022    Procedure: AMPUTATION right 4th TOE;  Surgeon: Nestor Madden DPM;  [facility];  Service: Podiatry         11/16/24 1021   OT Last Visit   OT Visit Date 11/16/24   Note Type   Note type Evaluation   Pain Assessment   Pain Assessment Tool 0-10   Pain Score No Pain   Restrictions/Precautions   Weight Bearing Precautions Per Order Yes   RLE Weight Bearing Per Order (S)  WBAT  (in toe offloading shoe for ADLS ONLY (household distances & out to Dr. Vinson) per podiatry)   LLE Weight Bearing Per Order   (h/o TMA)   Braces or Orthoses Other (Comment)  (Toe offloading shoe RLE)   Other Precautions Hard of hearing;Fall Risk;WBS   Home Living   Type of Home House   Home Layout Two level;Performs ADLs on one level;Able to live on main level with bedroom/bathroom   Bathroom Shower/Tub Tub/shower unit    Bathroom Toilet Standard   Bathroom Equipment Grab bars in shower;Hand-held shower;Shower chair   Home Equipment Walker;Other (Comment)  (rollator)   Additional Comments Pt resides with spouse in a 2SH with 1 CHRISS.   Prior Function   Level of [place] Independent with functional mobility;Independent with ADLs;Independent with IADLS   Lives With Spouse   Receives Help From Family   IADLs Independent with medication management;Independent with driving;Independent with meal prep   Falls in the last 6 months 0   Vocational Retired  (Semi-retired, )   Comments PTA, Pt reports being I with ADLs/IADLs/no AD/ +. Pt with supportive spouse able to assist upon DC.   Lifestyle   Autonomy I with ADLs/IADLs/no AD/ +   Reciprocal Relationships Supportive spouse   Service to Others Retired   Intrinsic Gratification Taking dogs for a walk and hiking   ADL   Where Assessed Edge of bed   Eating Assistance 7  Independent   Grooming Assistance 7  Independent   UB Bathing Assistance 5  Supervision/Setup   LB Bathing Assistance 4  Minimal Assistance   UB Dressing Assistance 5  Supervision/Setup   LB Dressing Assistance 4  Minimal Assistance   LB Dressing Deficit Don/doff R shoe;Don/doff L shoe;Setup   Toileting Assistance  5  Supervision/Setup   Functional Assistance 5  Supervision/Setup   Functional Deficit Increased time to complete;Supervision/safety;Setup   Additional Comments Pt educated on safety with ADLs upon DC. OT rec use of SC when bathing for safety and use of stool/assist from spouse to don shoe. Pt educated on compensatory techniques with IADLs. Pt reports spouse can assist with LB ADLs upon DC.   Bed Mobility   Supine to Sit 6  Modified independent   Additional items HOB elevated;Bedrails   Sit to Supine Unable to assess   Additional Comments Pt greeted supine in bed.   Transfers   Sit to Stand 5  Supervision   Additional items Increased time required;Verbal cues   Stand to Sit 5   Supervision   Additional items Increased time required;Verbal cues   Additional Comments no AD   Functional Mobility   Functional Mobility 5  Supervision   Additional Comments CS with functional mobility with SPC. Pt requires cues for safety with toe offloading shoe.   Additional items SPC   Balance   Static Sitting Fair +   Dynamic Sitting Fair   Static Standing Fair -   Dynamic Standing Fair -   Ambulatory Fair -   Activity Tolerance   Activity Tolerance Patient tolerated treatment well   Medical Staff Made Aware Co-eval with SANJUANITA Miramontes 2* to Pt's medical complexity, decreased endurance, and post-surgical day 1.   Nurse Made Aware RN cleared/updated.   RUE Assessment   RUE Assessment WFL   LUE Assessment   LUE Assessment WFL   Hand Function   Gross Motor Coordination Functional   Fine Motor Coordination Functional   Sensation   Light Touch Partial deficits in the LLE;Partial deficits in the RLE   Psychosocial   Psychosocial (WDL) WDL   Cognition   Overall Cognitive Status WFL   Arousal/Participation Alert;Cooperative   Attention Within functional limits   Orientation Level Oriented X4   Memory Within functional limits   Following Commands Follows all commands and directions without difficulty   Comments Pt very pleasant and cooperative during OT session. Pt [place] and benefits from one step simple commands/increased time for processing & response.   Assessment   Limitation Decreased ADL status;Decreased UE ROM;Decreased UE strength;Decreased Safe judgement during ADL;Decreased endurance;Decreased cognition;Decreased high-level ADLs;Decreased self-care trans   Prognosis Fair   Assessment Pt is a 59 y.o. male who presented to [facility] on 11/14/2024 with acute OM of R 2nd toe. Pt s/p R 2nd toe amputation on 11/15. Pt WBAT on RLE ADLs only in toe unloading shoe. Pt  has a past medical history of Atrial fibrillation (HCC), Benzodiazepine withdrawal with complication (HCC) (06/15/2023), Chronic diastolic (congestive) heart  failure (HCC), Diabetes mellitus (HCC), GERD (gastroesophageal reflux disease), High cholesterol, Hyperlipidemia, Pacemaker, and Stroke (HCC). Pt greeted bedside for OT evaluation on 11/16/24. Pt resides with spouse in a 2SH with 1 CHRISS. PTA, Pt reports being I with ADLs/IADLs/no AD/ +. Pt with supportive spouse able to assist upon DC. Pt demonstrating the following occupational performance levels: S with UB ADLs, min-mod A with LB ADLs, mod I with bed mobility, S with functional transfers, and CS with functional mobility with SPC. Pt educated on safety, energy conservation, and compensatory techniques with ADLs/IADLs upon DC. Pt with supportive spouse able to assist with LB ADLs prn upon DC.  Pt encouraged to participate in ADLs/functional mobility with nursing/restorative staff during hospitalization. Pt with no further acute OT concerns. Pt would benefit from returning home with increased social support upon DC to maximize safety and independence with ADLs and functional tasks of choice. DC skilled OT services.   Goals   Patient Goals To go home.   Plan   OT Frequency Eval only   Discharge Recommendation   Rehab Resource Intensity Level, OT No post-acute rehabilitation needs   Equipment Recommended   (use of SC)   Additional Comments  The patient's raw score on the AM-PAC Daily Activity Inpatient Short Form is 21. A raw score of less than 19 suggests the patient may benefit from discharge to post-acute rehabilitation services. Please refer to the recommendation of the Occupational Therapist for safe discharge planning.   AM-PAC Daily Activity Inpatient   Lower Body Dressing 3   Bathing 3   Toileting 3   Upper Body Dressing 4   Grooming 4   Eating 4   Daily Activity Raw Score 21   Daily Activity Standardized Score (Calc for Raw Score >=11) 44.27   AM-PAC Applied Cognition Inpatient   Following a Speech/Presentation 4   Understanding Ordinary Conversation 4   Taking Medications 4   Remembering Where Things Are  Placed or Put Away 4   Remembering List of 4-5 Errands 4   Taking Care of Complicated Tasks 3   Applied Cognition Raw Score 23   Applied Cognition Standardized Score 53.08   End of Consult   Education Provided Yes   Patient Position at End of Consult Bedside chair;All needs within reach   Nurse Communication Nurse aware of consult     Fidelia Lisa MS, OTR/L

## 2024-11-17 ENCOUNTER — RESULTS FOLLOW-UP (OUTPATIENT)
Dept: EMERGENCY DEPT | Facility: HOSPITAL | Age: 59
End: 2024-11-17

## 2024-11-17 LAB
BACTERIA BLD CULT: ABNORMAL
GRAM STN SPEC: ABNORMAL
S AUREUS DNA BLD POS QL NAA+NON-PROBE: DETECTED

## 2024-11-18 ENCOUNTER — TELEPHONE (OUTPATIENT)
Age: 59
End: 2024-11-18

## 2024-11-18 ENCOUNTER — HOSPITAL ENCOUNTER (INPATIENT)
Facility: HOSPITAL | Age: 59
LOS: 4 days | Discharge: HOME HEALTH | DRG: 872 | End: 2024-11-22
Attending: EMERGENCY MEDICINE | Admitting: FAMILY MEDICINE
Payer: COMMERCIAL

## 2024-11-18 DIAGNOSIS — R78.81 BACTEREMIA: Primary | ICD-10-CM

## 2024-11-18 DIAGNOSIS — M79.89 RIGHT LEG SWELLING: ICD-10-CM

## 2024-11-18 DIAGNOSIS — R78.81 BACTEREMIA: ICD-10-CM

## 2024-11-18 LAB
ALBUMIN SERPL BCG-MCNC: 4.1 G/DL (ref 3.5–5)
ALP SERPL-CCNC: 93 U/L (ref 34–104)
ALT SERPL W P-5'-P-CCNC: 14 U/L (ref 7–52)
ANION GAP SERPL CALCULATED.3IONS-SCNC: 9 MMOL/L (ref 4–13)
AST SERPL W P-5'-P-CCNC: 23 U/L (ref 13–39)
BASOPHILS # BLD AUTO: 0.05 THOUSANDS/ÂΜL (ref 0–0.1)
BASOPHILS NFR BLD AUTO: 1 % (ref 0–1)
BILIRUB SERPL-MCNC: 0.92 MG/DL (ref 0.2–1)
BUN SERPL-MCNC: 12 MG/DL (ref 5–25)
CALCIUM SERPL-MCNC: 8.9 MG/DL (ref 8.4–10.2)
CHLORIDE SERPL-SCNC: 102 MMOL/L (ref 96–108)
CO2 SERPL-SCNC: 26 MMOL/L (ref 21–32)
CREAT SERPL-MCNC: 0.96 MG/DL (ref 0.6–1.3)
CRP SERPL QL: 33.3 MG/L
EOSINOPHIL # BLD AUTO: 0.22 THOUSAND/ÂΜL (ref 0–0.61)
EOSINOPHIL NFR BLD AUTO: 4 % (ref 0–6)
ERYTHROCYTE [DISTWIDTH] IN BLOOD BY AUTOMATED COUNT: 13.5 % (ref 11.6–15.1)
ERYTHROCYTE [SEDIMENTATION RATE] IN BLOOD: 69 MM/HOUR (ref 0–19)
GFR SERPL CREATININE-BSD FRML MDRD: 84 ML/MIN/1.73SQ M
GLUCOSE SERPL-MCNC: 77 MG/DL (ref 65–140)
GLUCOSE SERPL-MCNC: 85 MG/DL (ref 65–140)
HCT VFR BLD AUTO: 43.4 % (ref 36.5–49.3)
HGB BLD-MCNC: 14.2 G/DL (ref 12–17)
IMM GRANULOCYTES # BLD AUTO: 0.02 THOUSAND/UL (ref 0–0.2)
IMM GRANULOCYTES NFR BLD AUTO: 0 % (ref 0–2)
LYMPHOCYTES # BLD AUTO: 2.04 THOUSANDS/ÂΜL (ref 0.6–4.47)
LYMPHOCYTES NFR BLD AUTO: 33 % (ref 14–44)
MCH RBC QN AUTO: 27.8 PG (ref 26.8–34.3)
MCHC RBC AUTO-ENTMCNC: 32.7 G/DL (ref 31.4–37.4)
MCV RBC AUTO: 85 FL (ref 82–98)
MONOCYTES # BLD AUTO: 0.57 THOUSAND/ÂΜL (ref 0.17–1.22)
MONOCYTES NFR BLD AUTO: 9 % (ref 4–12)
NEUTROPHILS # BLD AUTO: 3.2 THOUSANDS/ÂΜL (ref 1.85–7.62)
NEUTS SEG NFR BLD AUTO: 53 % (ref 43–75)
NRBC BLD AUTO-RTO: 0 /100 WBCS
PLATELET # BLD AUTO: 328 THOUSANDS/UL (ref 149–390)
PMV BLD AUTO: 8.7 FL (ref 8.9–12.7)
POTASSIUM SERPL-SCNC: 3.8 MMOL/L (ref 3.5–5.3)
PROT SERPL-MCNC: 7.6 G/DL (ref 6.4–8.4)
RBC # BLD AUTO: 5.1 MILLION/UL (ref 3.88–5.62)
SODIUM SERPL-SCNC: 137 MMOL/L (ref 135–147)
WBC # BLD AUTO: 6.1 THOUSAND/UL (ref 4.31–10.16)

## 2024-11-18 PROCEDURE — 85025 COMPLETE CBC W/AUTO DIFF WBC: CPT | Performed by: EMERGENCY MEDICINE

## 2024-11-18 PROCEDURE — 82948 REAGENT STRIP/BLOOD GLUCOSE: CPT

## 2024-11-18 PROCEDURE — 99284 EMERGENCY DEPT VISIT MOD MDM: CPT

## 2024-11-18 PROCEDURE — 99285 EMERGENCY DEPT VISIT HI MDM: CPT | Performed by: EMERGENCY MEDICINE

## 2024-11-18 PROCEDURE — 86140 C-REACTIVE PROTEIN: CPT | Performed by: EMERGENCY MEDICINE

## 2024-11-18 PROCEDURE — 99222 1ST HOSP IP/OBS MODERATE 55: CPT | Performed by: FAMILY MEDICINE

## 2024-11-18 PROCEDURE — 80053 COMPREHEN METABOLIC PANEL: CPT | Performed by: EMERGENCY MEDICINE

## 2024-11-18 PROCEDURE — 96365 THER/PROPH/DIAG IV INF INIT: CPT

## 2024-11-18 PROCEDURE — 87040 BLOOD CULTURE FOR BACTERIA: CPT | Performed by: EMERGENCY MEDICINE

## 2024-11-18 PROCEDURE — 36415 COLL VENOUS BLD VENIPUNCTURE: CPT | Performed by: EMERGENCY MEDICINE

## 2024-11-18 PROCEDURE — 85652 RBC SED RATE AUTOMATED: CPT | Performed by: EMERGENCY MEDICINE

## 2024-11-18 RX ORDER — FUROSEMIDE 40 MG/1
40 TABLET ORAL 2 TIMES DAILY PRN
Status: DISCONTINUED | OUTPATIENT
Start: 2024-11-18 | End: 2024-11-18

## 2024-11-18 RX ORDER — BUPROPION HYDROCHLORIDE 150 MG/1
300 TABLET ORAL DAILY
Status: DISCONTINUED | OUTPATIENT
Start: 2024-11-19 | End: 2024-11-22 | Stop reason: HOSPADM

## 2024-11-18 RX ORDER — MAGNESIUM HYDROXIDE/ALUMINUM HYDROXICE/SIMETHICONE 120; 1200; 1200 MG/30ML; MG/30ML; MG/30ML
30 SUSPENSION ORAL EVERY 4 HOURS PRN
Status: DISCONTINUED | OUTPATIENT
Start: 2024-11-18 | End: 2024-11-22 | Stop reason: HOSPADM

## 2024-11-18 RX ORDER — FERROUS SULFATE 325(65) MG
325 TABLET ORAL
Status: DISCONTINUED | OUTPATIENT
Start: 2024-11-19 | End: 2024-11-22 | Stop reason: HOSPADM

## 2024-11-18 RX ORDER — CEFAZOLIN SODIUM 2 G/50ML
2000 SOLUTION INTRAVENOUS EVERY 8 HOURS
Status: DISCONTINUED | OUTPATIENT
Start: 2024-11-19 | End: 2024-11-22 | Stop reason: HOSPADM

## 2024-11-18 RX ORDER — FUROSEMIDE 20 MG/1
20 TABLET ORAL DAILY
Status: DISCONTINUED | OUTPATIENT
Start: 2024-11-18 | End: 2024-11-22 | Stop reason: HOSPADM

## 2024-11-18 RX ORDER — INSULIN LISPRO 100 [IU]/ML
2-12 INJECTION, SOLUTION INTRAVENOUS; SUBCUTANEOUS
Status: DISCONTINUED | OUTPATIENT
Start: 2024-11-19 | End: 2024-11-22 | Stop reason: HOSPADM

## 2024-11-18 RX ORDER — CEFAZOLIN SODIUM 2 G/50ML
2000 SOLUTION INTRAVENOUS ONCE
Status: COMPLETED | OUTPATIENT
Start: 2024-11-18 | End: 2024-11-18

## 2024-11-18 RX ORDER — GABAPENTIN 400 MG/1
800 CAPSULE ORAL 3 TIMES DAILY
Status: DISCONTINUED | OUTPATIENT
Start: 2024-11-18 | End: 2024-11-22 | Stop reason: HOSPADM

## 2024-11-18 RX ORDER — HEPARIN SODIUM 5000 [USP'U]/ML
5000 INJECTION, SOLUTION INTRAVENOUS; SUBCUTANEOUS EVERY 8 HOURS SCHEDULED
Status: DISCONTINUED | OUTPATIENT
Start: 2024-11-18 | End: 2024-11-19

## 2024-11-18 RX ORDER — OXYCODONE AND ACETAMINOPHEN 5; 325 MG/1; MG/1
1 TABLET ORAL ONCE
Refills: 0 | Status: COMPLETED | OUTPATIENT
Start: 2024-11-18 | End: 2024-11-18

## 2024-11-18 RX ORDER — ACETAMINOPHEN 325 MG/1
650 TABLET ORAL EVERY 6 HOURS PRN
Status: DISCONTINUED | OUTPATIENT
Start: 2024-11-18 | End: 2024-11-22 | Stop reason: HOSPADM

## 2024-11-18 RX ORDER — DOXEPIN HYDROCHLORIDE 25 MG/1
100 CAPSULE ORAL
Status: DISCONTINUED | OUTPATIENT
Start: 2024-11-18 | End: 2024-11-22 | Stop reason: HOSPADM

## 2024-11-18 RX ORDER — OXYCODONE HYDROCHLORIDE 5 MG/1
5 TABLET ORAL EVERY 6 HOURS PRN
Refills: 0 | Status: DISCONTINUED | OUTPATIENT
Start: 2024-11-18 | End: 2024-11-22 | Stop reason: HOSPADM

## 2024-11-18 RX ORDER — POTASSIUM CHLORIDE 1500 MG/1
20 TABLET, EXTENDED RELEASE ORAL DAILY
Status: DISCONTINUED | OUTPATIENT
Start: 2024-11-18 | End: 2024-11-22 | Stop reason: HOSPADM

## 2024-11-18 RX ADMIN — FUROSEMIDE 40 MG: 40 TABLET ORAL at 20:29

## 2024-11-18 RX ADMIN — ALUMINUM HYDROXIDE, MAGNESIUM HYDROXIDE, AND DIMETHICONE 30 ML: 200; 20; 200 SUSPENSION ORAL at 22:48

## 2024-11-18 RX ADMIN — GABAPENTIN 800 MG: 400 CAPSULE ORAL at 22:41

## 2024-11-18 RX ADMIN — BUSPIRONE HYDROCHLORIDE 15 MG: 10 TABLET ORAL at 22:41

## 2024-11-18 RX ADMIN — OXYCODONE HYDROCHLORIDE AND ACETAMINOPHEN 1 TABLET: 5; 325 TABLET ORAL at 18:47

## 2024-11-18 RX ADMIN — CEFAZOLIN SODIUM 2000 MG: 2 SOLUTION INTRAVENOUS at 18:46

## 2024-11-18 RX ADMIN — HEPARIN SODIUM 5000 UNITS: 5000 INJECTION, SOLUTION INTRAVENOUS; SUBCUTANEOUS at 22:41

## 2024-11-18 RX ADMIN — DOXEPIN HYDROCHLORIDE 100 MG: 25 CAPSULE ORAL at 22:46

## 2024-11-18 RX ADMIN — POTASSIUM CHLORIDE 20 MEQ: 1500 TABLET, EXTENDED RELEASE ORAL at 22:40

## 2024-11-18 NOTE — UTILIZATION REVIEW
NOTIFICATION OF INPATIENT ADMISSION   AUTHORIZATION REQUEST   SERVICING FACILITY:   [place] [address]  Tax ID: [id number]  NPI: [id number] ATTENDING PROVIDER:  Attending Name and NPI#: Fidel Mi Do [[id number]]  Address: [address] [place] [address]  Phone: [phone]   ADMISSION INFORMATION:  Place of Service: Inpatient Acute [facility]  Place of Service Code: 21  Inpatient Admission Date/Time: 11/14/24  3:06 PM  Discharge Date/Time: 11/16/2024  3:38 PM  Admitting Diagnosis Code/Description:  Abnormal laboratory test [R89.9]  Toe osteomyelitis, right (HCC) [M86.9]  Cellulitis of right lower extremity [L03.115]  Osteomyelitis of second toe of right foot (HCC) [M86.9]     UTILIZATION REVIEW CONTACT:  Mirna Velez, Utilization   Network Utilization Review Department  Phone: [phone]  Fax [phone]  Email: [email or web address]  Contact for approvals/pending authorizations, clinical reviews, and discharge.     PHYSICIAN ADVISORY SERVICES:  Medical Necessity Denial & [id number]-ib-Qhmw Review  Phone: [phone]  Fax: [phone]  Email: [email or web address]     DISCHARGE SUPPORT TEAM:  For Patients Discharge Needs & Updates  Phone: [phone] opt. 2 Fax: [phone]  Email: [email or web address]

## 2024-11-18 NOTE — TELEPHONE ENCOUNTER
Called and left a message for wife Maribell asking that she return the call to our office.     Please refer to Dr. Yousif's note

## 2024-11-18 NOTE — TELEPHONE ENCOUNTER
----- Message from Adelia Yousif DPM sent at 11/18/2024 11:42 AM EST -----  Please call the patient regarding his abnormal result. Infectious disease is recommending that he go to the ED for an urgent inpatient admission for repeat blood cx, TTE, IV cefazolin 2 q8h, final abx to be determined by above work up.

## 2024-11-18 NOTE — TELEPHONE ENCOUNTER
Patient called office requesting to r/s appt that was cancelled due to being in the hospital. Writer tried to warm transfer call and was unable to connect. Please contact patient once available to schedule

## 2024-11-18 NOTE — ED NOTES
Toe amputation re-dressed by ED provider with gauze and cling.     Allison A Schoener, RN  11/18/24 9323

## 2024-11-18 NOTE — RESULT ENCOUNTER NOTE
Please call the patient regarding his abnormal result. Infectious disease is recommending that he go to the ED for an urgent inpatient admission for repeat blood cx, TTE, IV cefazolin 2 q8h, final abx to be determined by above work up.

## 2024-11-18 NOTE — ED PROVIDER NOTES
Time reflects when diagnosis was documented in both MDM as applicable and the Disposition within this note       Time User Action Codes Description Comment    11/18/2024  7:21 PM Carlitos Guidry Add [R78.81] Bacteremia           ED Disposition       ED Disposition   Admit    Condition   Stable    Date/Time   Mon Nov 18, 2024  7:21 PM    Comment                  Assessment & Plan       Medical Decision Making  1824: Patient had blood culture come back positive.  Podiatry had reached out to infectious disease before his arrival for recommendations, they have given recommendations of Ancef 2 g IV every 8, INÉS and repeat blood cultures.  Plan to check basic labs, blood cultures, sed rate and CRP.  I will empirically place on Ancef.  I will consult hospitalist for admission.  The patient does have some unilateral right Swelling and at risk for DVT, currently after hours for ultrasonography.  Order placed to have ultrasound completed of the right lower extremity tomorrow.  Patient denies chest pain or shortness of breath.  I will defer anticoagulant use to the hospitalist and podiatry services due to his recent surgery.    1920: Labs reviewed.  Discussed with hospitalist for admission.    Amount and/or Complexity of Data Reviewed  External Data Reviewed: labs, radiology and notes.  Labs: ordered.  Discussion of management or test interpretation with external provider(s): Dr. Adams Wolf  Prescription drug management.  Decision regarding hospitalization.             Medications   ceFAZolin (ANCEF) IVPB (premix in dextrose) 2,000 mg 50 mL (0 mg Intravenous Stopped 11/18/24 1923)   oxyCODONE-acetaminophen (PERCOCET) 5-325 mg per tablet 1 tablet (1 tablet Oral Given 11/18/24 1847)       ED Risk Strat Scores                           SBIRT 20yo+      Flowsheet Row Most Recent Value   Initial Alcohol Screen: US AUDIT-C     1. How often do you have a drink containing alcohol? 0 Filed at: 11/18/2024 1839   2. How many  drinks containing alcohol do you have on a typical day you are drinking?  0 Filed at: 11/18/2024 1839   3a. Male UNDER 65: How often do you have five or more drinks on one occasion? 0 Filed at: 11/18/2024 1839   3b. FEMALE Any Age, or MALE 65+: How often do you have 4 or more drinks on one occassion? 0 Filed at: 11/18/2024 1839   Audit-C Score 0 Filed at: 11/18/2024 1839   ANGELO: How many times in the past year have you...    Used an illegal drug or used a prescription medication for non-medical reasons? Never Filed at: 11/18/2024 1839                            History of Present Illness       Chief Complaint   Patient presents with    Abnormal Lab     Patient sent in by PCP for abnormal blood cultures. Denies fevers/chills at home. Recent right toe amputation last week and EGD done today.       Past Medical History:   Diagnosis Date    Atrial fibrillation (HCC)     Benzodiazepine withdrawal with complication (HCC) 06/15/2023    Chronic diastolic (congestive) heart failure (HCC)     Diabetes mellitus (HCC)     GERD (gastroesophageal reflux disease)     High cholesterol     Hyperlipidemia     Pacemaker     Stroke (HCC)       Past Surgical History:   Procedure Laterality Date    APPENDECTOMY      ATRIAL CARDIAC PACEMAKER INSERTION      BARIATRIC SURGERY  05/2021    BONE BIOPSY Left 7/26/2023    Procedure: EXCISION BIOPSY BONE LESION EXTREMITY;  Surgeon: Adelia Yousif DPM;  Location:  MAIN OR;  Service: Podiatry    EPIDURAL BLOCK INJECTION N/A 5/19/2022    Procedure: BLOCK / INJECTION EPIDURAL STEROID CERVICAL C7-T1;  Surgeon: Skyler Quinn MD;  [facility];  Service: Pain Management     FL GUIDED NEEDLE PLAC BX/ASP/INJ  3/22/2022    FOOT AMPUTATION Left 4/28/2022    Procedure: LEFT TRANSMETATARSAL AMPUTATION.;  Surgeon: Nestor Madden DPM;  [facility];  Service: Podiatry    NERVE BLOCK Right 2/10/2022    Procedure: BLOCK MEDIAL BRANCH C3, C4, C5 #1;  Surgeon: Skyler Quinn MD;   [facility];  Service: Pain Management     NERVE BLOCK Right 3/22/2022    Procedure: BLOCK MEDIAL BRANCH C3, C4, C5 #2;  Surgeon: Skyler Quinn MD;  [facility];  Service: Pain Management     CA AMPUTATION FOOT TRANSMETARSAL Left 4/12/2023    Procedure: REVISION LEFT TRANSMETATARSAL (TMA) AMPUTATION, REMOVAL OF UNVIABLE TISSUE AND BONE,;  Surgeon: Adelia Yousif DPM;  [facility];  Service: Podiatry    CA AMPUTATION METATARSAL W/TOE SINGLE Left 4/7/2023    Procedure: 2ND RAY RESECTION FOOT;  Surgeon: Adelia Yousif DPM;  [facility];  Service: Podiatry    CA AMPUTATION TOE INTERPHALANGEAL JOINT Left 11/16/2021    Procedure: AMPUTATION LESSER TOE;  Surgeon: Nestor Madden DPM;  [facility];  Service: Podiatry    RADIOFREQUENCY ABLATION Right 4/7/2022    Procedure: Right C3, C4, C5 RFA;  Surgeon: Skyler Quinn MD;  [facility];  Service: Pain Management     RHIZOTOMY Right 2/9/2023    Procedure: RHIZOTOMY CERVICAL MEDIAL BRANCH NERVES RIGHT C3, C4, C5;  Surgeon: Skyler Quinn MD;  [facility];  Service: Pain Management     TOE AMPUTATION Left     2nd toe    TOE AMPUTATION Left 9/15/2021    Procedure: AMPUTATION LEFT 4TH TOE;  Surgeon: Nestor Madden DPM;  [facility];  Service: Podiatry    TOE AMPUTATION Right 1/12/2022    Procedure: AMPUTATION TOE;  Surgeon: Hong Jolly DPM;  [facility];  Service: Podiatry    TOE AMPUTATION Right 2/23/2022    Procedure: AMPUTATION TOE  RIGHT SECOND;  Surgeon: Hong Jolly DPM;  Location: [facility];  Service: Podiatry    TOE AMPUTATION Right 6/3/2022    Procedure: AMPUTATION right 4th TOE;  Surgeon: Nestor Madden DPM;  [facility];  Service: Podiatry    TOE AMPUTATION Right 11/15/2024    Procedure: AMPUTATION RIGHT 2ND TOE;  Surgeon: Adelia Yousif DPM;  [facility];  Service: Podiatry      Family History   Problem Relation Age of Onset    No Known Problems Mother     No  Known Problems Father       Social History     Tobacco Use    Smoking status: Never     Passive exposure: Never    Smokeless tobacco: Never   Vaping Use    Vaping status: Never Used   Substance Use Topics    Alcohol use: Never    Drug use: Never      E-Cigarette/Vaping    E-Cigarette Use Never User       E-Cigarette/Vaping Substances      I have reviewed and agree with the history as documented.       History provided by:  Medical records and patient  Medical Problem  Location:  Positive blood culture  Severity:  Moderate  Onset quality:  Gradual  Duration:  1 day  Timing:  Constant  Progression:  Unchanged  Chronicity:  New  Context:  Patient recently admitted for toe amputation, osteomyelitis, blood culture returned positive today, the patient was called to give results, PCP had notified him to return to the emergency room.  Relieved by:  Nothing  Worsened by:  Nothing  Ineffective treatments:  Currently on Keflex  Associated symptoms: no abdominal pain, no chest pain, no cough, no ear pain, no fatigue, no fever, no headaches, no nausea, no rash, no rhinorrhea, no shortness of breath, no sore throat and no vomiting        Review of Systems   Constitutional:  Negative for appetite change, chills, fatigue and fever.   HENT:  Negative for ear pain, rhinorrhea, sore throat and trouble swallowing.    Eyes:  Negative for pain, discharge and visual disturbance.   Respiratory:  Negative for cough, chest tightness and shortness of breath.    Cardiovascular:  Positive for leg swelling. Negative for chest pain and palpitations.   Gastrointestinal:  Negative for abdominal pain, nausea and vomiting.   Endocrine: Negative for polydipsia, polyphagia and polyuria.   Genitourinary:  Negative for difficulty urinating, dysuria, hematuria and testicular pain.   Musculoskeletal:  Negative for arthralgias and back pain.   Skin:  Negative for color change and rash.   Allergic/Immunologic: Negative for immunocompromised state.    Neurological:  Negative for dizziness, seizures, syncope, weakness and headaches.   Hematological:  Negative for adenopathy.   Psychiatric/Behavioral:  Negative for confusion and dysphoric mood.    All other systems reviewed and are negative.          Objective       ED Triage Vitals [11/18/24 1835]   Temperature Pulse Blood Pressure Respirations SpO2 Patient Position - Orthostatic VS   (!) 96.7 °F (35.9 °C) 89 123/81 17 95 % Sitting      Temp Source Heart Rate Source BP Location FiO2 (%) Pain Score    Temporal Monitor -- -- 4      Vitals      Date and Time Temp Pulse SpO2 Resp BP Pain Score FACES Pain Rating User   11/18/24 1930 -- 73 94 % -- 103/64 -- --    11/18/24 1915 -- 76 93 % -- 102/55 -- --    11/18/24 1900 -- 81 94 % -- 103/62 -- --    11/18/24 1847 -- -- -- -- -- 5 --    11/18/24 1845 -- 79 92 % -- 123/74 -- --    11/18/24 1835 96.7 °F (35.9 °C) 89 95 % 17 123/81 4 -- AS            Physical Exam  Vitals and nursing note reviewed.   Constitutional:       General: He is not in acute distress.     Appearance: Normal appearance. He is not ill-appearing, toxic-appearing or diaphoretic.   HENT:      Head: Normocephalic and atraumatic.      Nose: Nose normal. No congestion or rhinorrhea.      Mouth/Throat:      Mouth: Mucous membranes are moist.      Pharynx: Oropharynx is clear. No oropharyngeal exudate or posterior oropharyngeal erythema.   Eyes:      General:         Right eye: No discharge.         Left eye: No discharge.   Cardiovascular:      Rate and Rhythm: Normal rate and regular rhythm.      Pulses: Normal pulses.      Heart sounds: Normal heart sounds. No murmur heard.     No gallop.   Pulmonary:      Effort: Pulmonary effort is normal. No respiratory distress.      Breath sounds: Normal breath sounds. No stridor. No wheezing, rhonchi or rales.   Chest:      Chest wall: No tenderness.   Abdominal:      General: Bowel sounds are normal. There is no distension.      Palpations: Abdomen is  soft. There is no mass.      Tenderness: There is no abdominal tenderness. There is no right CVA tenderness, left CVA tenderness, guarding or rebound.      Hernia: No hernia is present.   Musculoskeletal:         General: Normal range of motion.      Cervical back: Normal range of motion and neck supple.      Right lower leg: Edema present.      Comments: Bilateral chronic venous insufficiency dermatitis, unilateral right calf swelling.  Recent inspissation of the right second toe, sutures in place, partial amputation to the right third toe chronic, amputation of the right fourth toe chronic.   Skin:     General: Skin is warm and dry.      Capillary Refill: Capillary refill takes less than 2 seconds.   Neurological:      General: No focal deficit present.      Mental Status: He is alert and oriented to person, place, and time.      Cranial Nerves: No cranial nerve deficit.      Sensory: No sensory deficit.      Motor: No weakness.      Coordination: Coordination normal.      Gait: Gait normal.      Deep Tendon Reflexes: Reflexes normal.   Psychiatric:         Mood and Affect: Mood normal.         Behavior: Behavior normal.         Thought Content: Thought content normal.         Judgment: Judgment normal.         Results Reviewed       Procedure Component Value Units Date/Time    Comprehensive metabolic panel [[id number]] Collected: 11/18/24 1841    Lab Status: Final result Specimen: Blood from Arm, Left Updated: 11/18/24 1905     Sodium 137 mmol/L      Potassium 3.8 mmol/L      Chloride 102 mmol/L      CO2 26 mmol/L      ANION GAP 9 mmol/L      BUN 12 mg/dL      Creatinine 0.96 mg/dL      Glucose 85 mg/dL      Calcium 8.9 mg/dL      AST 23 U/L      ALT 14 U/L      Alkaline Phosphatase 93 U/L      Total Protein 7.6 g/dL      Albumin 4.1 g/dL      Total Bilirubin 0.92 mg/dL      eGFR 84 ml/min/1.73sq m     Narrative:      National Kidney Disease Foundation guidelines for Chronic Kidney Disease (CKD):     Stage 1 with  normal or high GFR (GFR > 90 mL/min/1.73 square meters)    Stage 2 Mild CKD (GFR = 60-89 mL/min/1.73 square meters)    Stage 3A Moderate CKD (GFR = 45-59 mL/min/1.73 square meters)    Stage 3B Moderate CKD (GFR = 30-44 mL/min/1.73 square meters)    Stage 4 Severe CKD (GFR = 15-29 mL/min/1.73 square meters)    Stage 5 End Stage CKD (GFR <15 mL/min/1.73 square meters)  Note: GFR calculation is accurate only with a steady state creatinine    C-reactive protein [[id number]]  (Abnormal) Collected: 11/18/24 1841    Lab Status: Final result Specimen: Blood from Arm, Left Updated: 11/18/24 1905     CRP 33.3 mg/L     Sedimentation rate, automated [[id number]]  (Abnormal) Collected: 11/18/24 1841    Lab Status: Final result Specimen: Blood from Arm, Left Updated: 11/18/24 1851     Sed Rate 69 mm/hour     CBC and differential [[id number]]  (Abnormal) Collected: 11/18/24 1841    Lab Status: Final result Specimen: Blood from Arm, Left Updated: 11/18/24 1848     WBC 6.10 Thousand/uL      RBC 5.10 Million/uL      Hemoglobin 14.2 g/dL      Hematocrit 43.4 %      MCV 85 fL      MCH 27.8 pg      MCHC 32.7 g/dL      RDW 13.5 %      MPV 8.7 fL      Platelets 328 Thousands/uL      nRBC 0 /100 WBCs      Segmented % 53 %      Immature Grans % 0 %      Lymphocytes % 33 %      Monocytes % 9 %      Eosinophils Relative 4 %      Basophils Relative 1 %      Absolute Neutrophils 3.20 Thousands/µL      Absolute Immature Grans 0.02 Thousand/uL      Absolute Lymphocytes 2.04 Thousands/µL      Absolute Monocytes 0.57 Thousand/µL      Eosinophils Absolute 0.22 Thousand/µL      Basophils Absolute 0.05 Thousands/µL     Blood culture #2 [[id number]] Collected: 11/18/24 1841    Lab Status: In process Specimen: Blood from Arm, Left Updated: 11/18/24 1846    Blood culture #1 [[id number]] Collected: 11/18/24 1841    Lab Status: In process Specimen: Blood from Arm, Right Updated: 11/18/24 1846            VAS VENOUS DUPLEX -LOWER LIMB UNILATERAL    (Results  Pending)       Procedures    ED Medication and Procedure Management   Prior to Admission Medications   Prescriptions Last Dose Informant Patient Reported? Taking?   Multiple Vitamins-Minerals (Mens Multivitamin) TABS   Yes No   Sig: Take 1 tablet by mouth 2 (two) times a day   buPROPion (Wellbutrin XL) 300 mg 24 hr tablet   No No   Sig: Take 1 tablet (300 mg total) by mouth daily   busPIRone (BUSPAR) 15 mg tablet   No No   Sig: Take 1 tablet (15 mg total) by mouth 3 (three) times a day   calcium citrate-vitamin D 315 mg-5 mcg tablet   Yes No   Sig: Take 2 tablets by mouth 2 (two) times a day   cephalexin (KEFLEX) 500 mg capsule   No No   Sig: Take 1 capsule (500 mg total) by mouth every 6 (six) hours for 7 days   doxepin (SINEquan) 100 mg capsule   No No   Sig: Take 1 capsule (100 mg total) by mouth daily at bedtime   ferrous sulfate 325 (65 Fe) mg tablet   No No   Sig: Take 1 tablet (325 mg total) by mouth daily with breakfast Do not start before June 19, 2023.   furosemide (LASIX) 40 mg tablet   Yes No   Sig: Take 40 mg by mouth 2 (two) times a day as needed (pt states he takes 2 times a day as needed)   gabapentin (NEURONTIN) 800 mg tablet   No No   Sig: Take 1 tablet (800 mg total) by mouth 3 (three) times a day   lidocaine (Lidoderm) 5 %   No No   Sig: Apply 1 patch topically over 12 hours daily for 15 days Remove & Discard patch within 12 hours or as directed by MD   metolazone (ZAROXOLYN) 2.5 mg tablet   Yes No   Sig: TAKE 1 TAB BY MOUTH ONCE A DAY ON MONDAY, WEDNESDAY, AND FRIDAY ONLY   oxyCODONE (ROXICODONE) 5 immediate release tablet   No No   Sig: Take 1 tablet (5 mg total) by mouth every 4 (four) hours as needed for moderate pain for up to 10 days Max Daily Amount: 30 mg   potassium chloride (KLOR-CON) 20 mEq packet   Yes No   Sig: Take 20 mEq by mouth if needed (pt states he takes with lasix tablet as needed)   rivaroxaban (Xarelto) 20 mg tablet   Yes No   Sig: Take 20 mg by mouth daily with breakfast       Facility-Administered Medications: None     Patient's Medications   Discharge Prescriptions    No medications on file     No discharge procedures on file.  ED SEPSIS DOCUMENTATION   Time reflects when diagnosis was documented in both MDM as applicable and the Disposition within this note       Time User Action Codes Description Comment    11/18/2024  7:21 PM Carlitos Guidry Add [R78.81] Bacteremia                  Carlitos Guidry MD  11/18/24 1953

## 2024-11-18 NOTE — UTILIZATION REVIEW
NOTIFICATION OF ADMISSION DISCHARGE   This is a Notification of Discharge from [facility]. Please be advised that this patient has been discharge from our facility. Below you will find the admission and discharge date and time including the patient’s disposition.   UTILIZATION REVIEW CONTACT:  Mirna Velez  Utilization   Network Utilization Review Department  Phone: [phone] x carefully listen to the prompts. All voicemails are confidential.  Email: [email or web address]     ADMISSION INFORMATION  PRESENTATION DATE: 11/14/2024  1:18 PM  OBERVATION ADMISSION DATE: N/A  INPATIENT ADMISSION DATE: 11/14/24  3:06 PM   DISCHARGE DATE: 11/16/2024  3:38 PM   DISPOSITION:Home/Self Care    Network Utilization Review Department  ATTENTION: Please call with any questions or concerns to [phone] and carefully listen to the prompts so that you are directed to the right person. All voicemails are confidential.   For Discharge needs, contact Care Management DC Support Team at [phone] opt. 2  Send all requests for admission clinical reviews, approved or denied determinations and any other requests to dedicated fax number below belonging to the campus where the patient is receiving treatment. List of dedicated fax numbers for the Facilities:  FACILITY NAME UR FAX NUMBER   ADMISSION DENIALS (Administrative/Medical Necessity) [phone]   DISCHARGE SUPPORT TEAM ([facility]) [phone]   PARENT CHILD HEALTH (Maternity/NICU/Pediatrics) [phone]   [facility] [phone]   [facility] [phone]   [facility] [phone]   [facility] [phone]   [facility] [phone]   [facility] [phone]   [facility] [phone]   [facility]  [phone]   [facility] [phone]   [facility] [phone]   [facility] [phone]   [facility] [phone]

## 2024-11-19 ENCOUNTER — TELEPHONE (OUTPATIENT)
Facility: CLINIC | Age: 59
End: 2024-11-19

## 2024-11-19 ENCOUNTER — APPOINTMENT (INPATIENT)
Dept: NON INVASIVE DIAGNOSTICS | Facility: HOSPITAL | Age: 59
DRG: 872 | End: 2024-11-19
Payer: COMMERCIAL

## 2024-11-19 LAB
ANION GAP SERPL CALCULATED.3IONS-SCNC: 7 MMOL/L (ref 4–13)
AORTIC ROOT: 3.7 CM
APICAL FOUR CHAMBER EJECTION FRACTION: 57 %
ASCENDING AORTA: 3 CM
BACTERIA BLD CULT: NORMAL
BNP SERPL-MCNC: 95 PG/ML (ref 0–100)
BSA FOR ECHO PROCEDURE: 2.57 M2
BUN SERPL-MCNC: 11 MG/DL (ref 5–25)
CALCIUM SERPL-MCNC: 8.6 MG/DL (ref 8.4–10.2)
CHLORIDE SERPL-SCNC: 103 MMOL/L (ref 96–108)
CO2 SERPL-SCNC: 28 MMOL/L (ref 21–32)
CREAT SERPL-MCNC: 0.85 MG/DL (ref 0.6–1.3)
ERYTHROCYTE [DISTWIDTH] IN BLOOD BY AUTOMATED COUNT: 13.5 % (ref 11.6–15.1)
FRACTIONAL SHORTENING: 35 (ref 28–44)
GFR SERPL CREATININE-BSD FRML MDRD: 94 ML/MIN/1.73SQ M
GLUCOSE SERPL-MCNC: 102 MG/DL (ref 65–140)
GLUCOSE SERPL-MCNC: 107 MG/DL (ref 65–140)
GLUCOSE SERPL-MCNC: 152 MG/DL (ref 65–140)
GLUCOSE SERPL-MCNC: 90 MG/DL (ref 65–140)
GLUCOSE SERPL-MCNC: 91 MG/DL (ref 65–140)
HCT VFR BLD AUTO: 43.2 % (ref 36.5–49.3)
HGB BLD-MCNC: 14 G/DL (ref 12–17)
INTERVENTRICULAR SEPTUM IN DIASTOLE (PARASTERNAL SHORT AXIS VIEW): 1.1 CM
INTERVENTRICULAR SEPTUM: 1.1 CM (ref 0.6–1.1)
LAAS-AP2: 25.1 CM2
LAAS-AP4: 21.1 CM2
LEFT ATRIUM SIZE: 3.7 CM
LEFT ATRIUM VOLUME (MOD BIPLANE): 69 ML
LEFT ATRIUM VOLUME INDEX (MOD BIPLANE): 26.7 ML/M2
LEFT INTERNAL DIMENSION IN SYSTOLE: 3.4 CM (ref 2.1–4)
LEFT VENTRICLE DIASTOLIC VOLUME (MOD BIPLANE): 95 ML
LEFT VENTRICLE DIASTOLIC VOLUME INDEX (MOD BIPLANE): 37 ML/M2
LEFT VENTRICLE SYSTOLIC VOLUME (MOD BIPLANE): 39 ML
LEFT VENTRICLE SYSTOLIC VOLUME INDEX (MOD BIPLANE): 15.2 ML/M2
LEFT VENTRICULAR INTERNAL DIMENSION IN DIASTOLE: 5.2 CM (ref 3.5–6)
LEFT VENTRICULAR POSTERIOR WALL IN END DIASTOLE: 1 CM
LEFT VENTRICULAR STROKE VOLUME: 83 ML
LV EF: 59 %
LVSV (TEICH): 83 ML
MCH RBC QN AUTO: 27.5 PG (ref 26.8–34.3)
MCHC RBC AUTO-ENTMCNC: 32.4 G/DL (ref 31.4–37.4)
MCV RBC AUTO: 85 FL (ref 82–98)
PLATELET # BLD AUTO: 319 THOUSANDS/UL (ref 149–390)
PMV BLD AUTO: 8.8 FL (ref 8.9–12.7)
POTASSIUM SERPL-SCNC: 3.5 MMOL/L (ref 3.5–5.3)
RBC # BLD AUTO: 5.1 MILLION/UL (ref 3.88–5.62)
RIGHT VENTRICLE ID DIMENSION: 4.3 CM
SL CV LEFT ATRIUM LENGTH A2C: 6.7 CM
SL CV LV EF: 65
SL CV PED ECHO LEFT VENTRICLE DIASTOLIC VOLUME (MOD BIPLANE) 2D: 129 ML
SL CV PED ECHO LEFT VENTRICLE SYSTOLIC VOLUME (MOD BIPLANE) 2D: 46 ML
SODIUM SERPL-SCNC: 138 MMOL/L (ref 135–147)
TR MAX PG: 21 MMHG
TR PEAK VELOCITY: 2.3 M/S
TRICUSPID VALVE PEAK REGURGITATION VELOCITY: 2.3 M/S
WBC # BLD AUTO: 5.67 THOUSAND/UL (ref 4.31–10.16)

## 2024-11-19 PROCEDURE — 93971 EXTREMITY STUDY: CPT | Performed by: SURGERY

## 2024-11-19 PROCEDURE — 99255 IP/OBS CONSLTJ NEW/EST HI 80: CPT | Performed by: INTERNAL MEDICINE

## 2024-11-19 PROCEDURE — 93306 TTE W/DOPPLER COMPLETE: CPT | Performed by: INTERNAL MEDICINE

## 2024-11-19 PROCEDURE — 83880 ASSAY OF NATRIURETIC PEPTIDE: CPT | Performed by: INTERNAL MEDICINE

## 2024-11-19 PROCEDURE — 82948 REAGENT STRIP/BLOOD GLUCOSE: CPT

## 2024-11-19 PROCEDURE — 85027 COMPLETE CBC AUTOMATED: CPT | Performed by: FAMILY MEDICINE

## 2024-11-19 PROCEDURE — 99232 SBSQ HOSP IP/OBS MODERATE 35: CPT | Performed by: INTERNAL MEDICINE

## 2024-11-19 PROCEDURE — 93971 EXTREMITY STUDY: CPT

## 2024-11-19 PROCEDURE — 99223 1ST HOSP IP/OBS HIGH 75: CPT | Performed by: INTERNAL MEDICINE

## 2024-11-19 PROCEDURE — 93306 TTE W/DOPPLER COMPLETE: CPT

## 2024-11-19 PROCEDURE — 80048 BASIC METABOLIC PNL TOTAL CA: CPT | Performed by: FAMILY MEDICINE

## 2024-11-19 RX ORDER — DOXEPIN HYDROCHLORIDE 10 MG/1
10 CAPSULE ORAL
Status: DISCONTINUED | OUTPATIENT
Start: 2024-11-19 | End: 2024-11-22 | Stop reason: HOSPADM

## 2024-11-19 RX ORDER — POLYETHYLENE GLYCOL 3350 17 G/17G
17 POWDER, FOR SOLUTION ORAL DAILY
Status: DISCONTINUED | OUTPATIENT
Start: 2024-11-19 | End: 2024-11-22 | Stop reason: HOSPADM

## 2024-11-19 RX ORDER — DOXEPIN HYDROCHLORIDE 10 MG/1
10 CAPSULE ORAL
COMMUNITY

## 2024-11-19 RX ORDER — DOCUSATE SODIUM 100 MG/1
100 CAPSULE, LIQUID FILLED ORAL 2 TIMES DAILY
Status: DISCONTINUED | OUTPATIENT
Start: 2024-11-19 | End: 2024-11-22 | Stop reason: HOSPADM

## 2024-11-19 RX ADMIN — CEFAZOLIN SODIUM 2000 MG: 2 SOLUTION INTRAVENOUS at 22:18

## 2024-11-19 RX ADMIN — CEFAZOLIN SODIUM 2000 MG: 2 SOLUTION INTRAVENOUS at 14:16

## 2024-11-19 RX ADMIN — GABAPENTIN 800 MG: 400 CAPSULE ORAL at 09:15

## 2024-11-19 RX ADMIN — FERROUS SULFATE TAB 325 MG (65 MG ELEMENTAL FE) 325 MG: 325 (65 FE) TAB at 09:15

## 2024-11-19 RX ADMIN — DOCUSATE SODIUM 100 MG: 100 CAPSULE, LIQUID FILLED ORAL at 18:29

## 2024-11-19 RX ADMIN — GABAPENTIN 800 MG: 400 CAPSULE ORAL at 18:30

## 2024-11-19 RX ADMIN — POTASSIUM CHLORIDE 20 MEQ: 1500 TABLET, EXTENDED RELEASE ORAL at 09:15

## 2024-11-19 RX ADMIN — BUSPIRONE HYDROCHLORIDE 15 MG: 10 TABLET ORAL at 09:15

## 2024-11-19 RX ADMIN — POLYETHYLENE GLYCOL 3350 17 G: 17 POWDER, FOR SOLUTION ORAL at 18:29

## 2024-11-19 RX ADMIN — BUSPIRONE HYDROCHLORIDE 15 MG: 10 TABLET ORAL at 22:12

## 2024-11-19 RX ADMIN — CEFAZOLIN SODIUM 2000 MG: 2 SOLUTION INTRAVENOUS at 05:51

## 2024-11-19 RX ADMIN — BUSPIRONE HYDROCHLORIDE 15 MG: 10 TABLET ORAL at 18:29

## 2024-11-19 RX ADMIN — HEPARIN SODIUM 5000 UNITS: 5000 INJECTION, SOLUTION INTRAVENOUS; SUBCUTANEOUS at 05:51

## 2024-11-19 RX ADMIN — DOXEPIN HYDROCHLORIDE 10 MG: 10 CAPSULE ORAL at 09:22

## 2024-11-19 RX ADMIN — GABAPENTIN 800 MG: 400 CAPSULE ORAL at 22:16

## 2024-11-19 RX ADMIN — RIVAROXABAN 20 MG: 20 TABLET, FILM COATED ORAL at 14:16

## 2024-11-19 RX ADMIN — DOXEPIN HYDROCHLORIDE 100 MG: 25 CAPSULE ORAL at 22:12

## 2024-11-19 RX ADMIN — BUPROPION HYDROCHLORIDE 300 MG: 150 TABLET, EXTENDED RELEASE ORAL at 09:15

## 2024-11-19 NOTE — ASSESSMENT & PLAN NOTE
Recent toe osteomyelitis with amputation, blood cultures positive for MSSA  Ancef 2 g IV every 8 hours  Consult ID

## 2024-11-19 NOTE — ASSESSMENT & PLAN NOTE
History of multiple daytime pauses, status post dual-chamber PPM May 16, 2017  Followed by [facility] cardiology  Plan for INÉS in a.m. to assess pacemaker lead infection in the setting of MSSA bacteremia.

## 2024-11-19 NOTE — UTILIZATION REVIEW
Initial Clinical Review    Admission: Date/Time/Statement:   Admission Orders (From admission, onward)       Ordered        11/18/24 1924  INPATIENT ADMISSION  Once                          Orders Placed This Encounter   Procedures    INPATIENT ADMISSION     Standing Status:   Standing     Number of Occurrences:   1     Level of Care:   Med Surg [16]     Estimated length of stay:   More than 2 Midnights     Certification:   I certify that inpatient services are medically necessary for this patient for a duration of greater than two midnights. See H&P and MD Progress Notes for additional information about the patient's course of treatment.     ED Arrival Information       Expected   -    Arrival   11/18/2024 18:22    Acuity   Urgent              Means of arrival   Walk-In    Escorted by   Spouse    Service   Hospitalist    Admission type   Emergency              Arrival complaint   sent by pcp - blood culture             Chief Complaint   Patient presents with    Abnormal Lab     Patient sent in by PCP for abnormal blood cultures. Denies fevers/chills at home. Recent right toe amputation last week and EGD done today.       Initial Presentation: 59 y.o. male presents to the ED from his PCP office for + blood cultures for MSSA. Pt. recently had amputation of his right second toe last week. Had EGD done today. PMH: atrial fibrillation, chronic diastolic heart failure. On exam: Rt foot bandage in placed, + edema in LE's. Vitals in the ED 95% on room air, /81, HR 89, Temp 96.7 F. Pt denies fevers or chills. Has been on Keflex. Given in the ED IV Ancef x 1 dose and oxycodone/acetaminophen x 1 tab for pain. Abnormal labs: CRP 33.3, Sed rate 69. Plan: admit for bacteremia, repeat BC's, INÉS, infectious disease consult, IV ABX, US to r/I DVT due to swelling, podiatry consult, monitor labs.     Anticipated Length of Stay/Certification Statement: Patient will be admitted on an inpatient basis with an anticipated length of  stay of greater than 2 midnights secondary to bacteremia.     Date: 11/19/24   Day 2: Infectious disease consult: Single blood culture on 11/14 with MSSA.  Source of bacteremia is likely right toe cellulitis, osteo.  Patient has been on oral Keflex since then following surgical cure of right toe osteo.  However, patient has a pacemaker in place and would need to rule out endovascular/device infection.  He is systemically well, afebrile without leukocytosis. Repeat BC's. Continue IV cefazolin 2 gm Q 8hrs. Rec INÉS, anticipate 6 weeks of IV antibiotics, consult IR for venous access once BC results are negative  for 48 hours. Check daily CBC, CMP.Rec MRI toe ulcer, noted worsening right leg swelling since amputation of second MTPJ. Continue limb elevation, trial IV diuretics.       Cardiology Consult: There are no contraindications to the procedure. Plan for INÉS at 9 am 11/20/2024. NPO after midnight except for meds with sips of water.  Continue Xarelto for stroke prevention in the setting of persistent a fib.    Hospitalist: Currently denies any foot pain. Rt second t oe amputation with surgical sutures in place with overlying erythema w/o drainage. Bilat LE edema RT> left. Chronics stasis dermatitis to bilat LE's.. Denies any chest pain shortness of breath. Remains afebrile.       Certification Statement: The patient will continue to require additional inpatient hospital stay due to management of bacteremia and ongoing workup . Will start patient on Lasix 20 mg daily and Klor-Con 20 mill equivalents daily  Echocardiogram showed normal ejection fraction with no valvular pathology.  Continue with current dose of diuretics.       ED Treatment-Medication Administration from 11/18/2024 1820 to 11/18/2024 1954         Date/Time Order Dose Route Action     11/18/2024 1846 ceFAZolin (ANCEF) IVPB (premix in dextrose) 2,000 mg 50 mL 2,000 mg Intravenous New Bag     11/18/2024 1847 oxyCODONE-acetaminophen (PERCOCET) 5-325 mg per  tablet 1 tablet 1 tablet Oral Given            Scheduled Medications:  buPROPion, 300 mg, Oral, Daily  busPIRone, 15 mg, Oral, TID  cefazolin, 2,000 mg, Intravenous, Q8H  doxepin, 10 mg, Oral, After Breakfast  doxepin, 100 mg, Oral, HS  ferrous sulfate, 325 mg, Oral, Daily With Breakfast  furosemide, 20 mg, Oral, Daily  gabapentin, 800 mg, Oral, TID  heparin (porcine), 5,000 Units, Subcutaneous, Q8H CRISTY  insulin lispro, 2-12 Units, Subcutaneous, TID AC  potassium chloride, 20 mEq, Oral, Daily      Continuous IV Infusions: none      PRN Meds:  acetaminophen, 650 mg, Oral, Q6H PRN  aluminum-magnesium hydroxide-simethicone, 30 mL, Oral, Q4H PRN  oxyCODONE, 5 mg, Oral, Q6H PRN      ED Triage Vitals [11/18/24 1835]   Temperature Pulse Respirations Blood Pressure SpO2 Pain Score   (!) 96.7 °F (35.9 °C) 89 17 123/81 95 % 4     Weight (last 2 days)       Date/Time Weight    11/19/24 1030 138 (305)    11/18/24 20:16:56 138 (305.34)    11/18/24 1835 139 (306.22)            Vital Signs (last 3 days)       Date/Time Temp Pulse Resp BP MAP (mmHg) SpO2 O2 Device Patient Position - Orthostatic VS Winter Coma Scale Score Pain    11/19/24 1030 -- 63 -- 100/60 -- 96 % -- -- -- --    11/19/24 08:02:29 98.1 °F (36.7 °C) 59 20 100/57 71 91 % -- Sitting -- --    11/18/24 22:50:29 97.9 °F (36.6 °C) 70 18 121/66 84 97 % None (Room air) Sitting -- No Pain    11/18/24 20:16:56 98.8 °F (37.1 °C) 66 18 125/64 84 97 % None (Room air) Sitting 15 No Pain    11/18/24 2004 -- -- -- -- -- -- -- -- -- No Pain    11/18/24 1930 -- 73 -- 103/64 78 94 % -- -- -- --    11/18/24 1915 -- 76 -- 102/55 71 93 % -- -- -- --    11/18/24 1900 -- 81 -- 103/62 77 94 % -- -- -- --    11/18/24 1855 -- -- -- -- -- -- -- -- 15 --    11/18/24 1847 -- -- -- -- -- -- -- -- -- 5    11/18/24 1845 -- 79 -- 123/74 91 92 % -- -- -- --    11/18/24 1835 96.7 °F (35.9 °C) 89 17 123/81 -- 95 % None (Room air) Sitting -- 4              Pertinent Labs/Diagnostic Test Results:    Radiology:  VAS VENOUS DUPLEX -LOWER LIMB UNILATERAL   Final Interpretation by Eloy Phillips MD (11/19 1036)        Cardiology:  Echo complete w/ contrast if indicated   Final Result by Collin Cook MD (11/19 1241)        Patient is in atrial fibrillation.     Left Ventricle: Left ventricular cavity size is normal. Wall thickness    is normal. The left ventricular ejection fraction is 65%. Systolic    function is normal. Wall motion is normal.     Left Atrium: The atrium is mildly dilated.     Right Atrium: The atrium is mildly dilated.     No valvular pathology noted.  No evidence of endocarditis on the valves    or the pacing lead.           GI:  No orders to display           Results from last 7 days   Lab Units 11/19/24  0531 11/18/24  1841 11/16/24  0508 11/14/24  1340   WBC Thousand/uL 5.67 6.10 6.89 7.57   HEMOGLOBIN g/dL 14.0 14.2 13.1 14.3   HEMATOCRIT % 43.2 43.4 41.1 44.4   PLATELETS Thousands/uL 319 328 271 310   TOTAL NEUT ABS Thousands/µL  --  3.20  --  4.98         Results from last 7 days   Lab Units 11/19/24  0531 11/18/24  1841 11/15/24  1229 11/14/24  1340   SODIUM mmol/L 138 137 137 139   POTASSIUM mmol/L 3.5 3.8 3.5 3.6   CHLORIDE mmol/L 103 102 103 100   CO2 mmol/L 28 26 28 32   ANION GAP mmol/L 7 9 6 7   BUN mg/dL 11 12 11 13   CREATININE mg/dL 0.85 0.96 0.84 0.92   EGFR ml/min/1.73sq m 94 84 94 89   CALCIUM mg/dL 8.6 8.9 8.3* 9.1     Results from last 7 days   Lab Units 11/18/24  1841 11/14/24  1340   AST U/L 23 18   ALT U/L 14 13   ALK PHOS U/L 93 87   TOTAL PROTEIN g/dL 7.6 7.6   ALBUMIN g/dL 4.1 4.1   TOTAL BILIRUBIN mg/dL 0.92 0.81     Results from last 7 days   Lab Units 11/19/24  1215 11/19/24  0801 11/18/24  2119 11/15/24  1500   POC GLUCOSE mg/dl 90 107 77 93     Results from last 7 days   Lab Units 11/19/24  0531 11/18/24  1841 11/15/24  1229 11/14/24  1340   GLUCOSE RANDOM mg/dL 102 85 93 125         Results from last 7 days   Lab Units 11/14/24  1340   HEMOGLOBIN  A1C % 5.8*   EAG mg/dl 120               Results from last 7 days   Lab Units 11/14/24  1340   CK TOTAL U/L 142               Results from last 7 days   Lab Units 11/14/24  1756 11/14/24  1340   LACTIC ACID mmol/L 1.3 2.1*             Results from last 7 days   Lab Units 11/19/24  0531   BNP pg/mL 95                   Results from last 7 days   Lab Units 11/18/24  1841   CRP mg/L 33.3*   SED RATE mm/hour 69*                     Results from last 7 days   Lab Units 11/18/24  1841 11/14/24  1345 11/14/24  1340   BLOOD CULTURE  Received in Microbiology Lab. Culture in Progress.  Received in Microbiology Lab. Culture in Progress. Staphylococcus aureus* No Growth After 4 Days.   GRAM STAIN RESULT   --  Gram positive cocci in clusters*  --                    Past Medical History:   Diagnosis Date    Atrial fibrillation (HCC)     Benzodiazepine withdrawal with complication (HCC) 06/15/2023    Chronic diastolic (congestive) heart failure (HCC)     Diabetes mellitus (HCC)     GERD (gastroesophageal reflux disease)     High cholesterol     Hyperlipidemia     Pacemaker     Stroke (HCC)      Present on Admission:   MSSA Bacteremia   Toe osteomyelitis, right (HCC)   Atrial fibrillation (HCC)   Chronic diastolic heart failure (HCC)   Diabetes mellitus (HCC)   Pacemaker   Hypokalemia   Anxiety   Right leg swelling      Admitting Diagnosis: Bacteremia [R78.81]  Age/Sex: 59 y.o. male    Network Utilization Review Department  ATTENTION: Please call with any questions or concerns to [phone] and carefully listen to the prompts so that you are directed to the right person. All voicemails are confidential.   For Discharge needs, contact Care Management DC Support Team at [phone] opt. 2  Send all requests for admission clinical reviews, approved or denied determinations and any other requests to dedicated fax number below belonging to the campus where the patient is receiving treatment. List of dedicated fax numbers for the  Facilities:  FACILITY NAME UR FAX NUMBER   ADMISSION DENIALS (Administrative/Medical Necessity) [phone]   DISCHARGE SUPPORT TEAM (NETWORK) [phone]   PARENT CHILD HEALTH (Maternity/NICU/Pediatrics) [phone]   [facility] [phone]   [facility] [phone]   [facility] [phone]   General acute hospital [phone]   [facility] [phone]   [facility] [phone]   [facility] [phone]   [facility] [phone]   [facility] [phone]   [facility] [phone]   [facility] [phone]   [facility] [phone]

## 2024-11-19 NOTE — ASSESSMENT & PLAN NOTE
Lab Results   Component Value Date    HGBA1C 5.8 (H) 11/14/2024       Recent Labs     11/18/24 2119 11/19/24  0801   POCGLU 77 107       Blood Sugar Average: Last 72 hrs:  (P) 92  Risk factor for infection

## 2024-11-19 NOTE — ASSESSMENT & PLAN NOTE
Dual chamber pacemaker impant in 2017 for daytime pauses/syncope in the setting of a fib. Device is at risk for bacteremic seeding

## 2024-11-19 NOTE — H&P
H&P - Hospitalist   Name: David Carpio 59 y.o. male I MRN: [id number]  Unit/Bed#: -01 I Date of Admission: 11/18/2024   Date of Service: 11/18/2024 I Hospital Day: 0     Assessment & Plan  Bacteremia  Recent toe osteomyelitis with amputation, blood cultures positive for MSSA  Ancef 2 g IV every 8 hours  Consult ID  Right leg swelling  Right leg is swollen compared to left leg, ordered venous duplex to rule out DVT.  Patient has been off of his Eliquis for atrial fibs  Toe osteomyelitis, right (HCC)  Status post amputation of right second toe on November 15, 2024    Atrial fibrillation ([facility])  Has been off his Xarelto due to procedures and had upper endoscopy today.  Will leave off Xarelto just in case he needs a INÉS    Chronic diastolic heart failure ([facility])  Takes as needed Lasix and metolazone  Is currently followed by [facility] cardiology  Patient with some chronic edema and patient states his abdomen is little bit distended  Will start patient on Lasix 20 mg daily and Klor-Con 20 mill equivalents daily    Wt Readings from Last 3 Encounters:   11/18/24 (!) 138 kg (305 lb 5.4 oz)   11/14/24 (!) 137 kg (302 lb 14.6 oz)   10/31/24 (!) 137 kg (303 lb)     Diabetes mellitus ([facility])  Patient is not on any medications at this time  Will do insulin sliding scale, as he is pre-diabetic with an infections    Lab Results   Component Value Date    HGBA1C 5.8 (H) 11/14/2024       Pacemaker  History of multiple daytime pauses, status post dual-chamber PPM May 16, 2017  Followed by [facility] cardiology  Morbid obesity with BMI of 40.0-44.9, adult ([facility])  BMI of 40.28  Hypokalemia  Will do Klor-Con 20 meq daily with his Lasix.  Patient is prone to being hypokalemic when he takes a diuretic  Anxiety  Continue BuSpar, Wellbutrin and gabapentin      Disposition  #1  Cefazolin 2 g IV every 8 hours  #2   Infectious disease consultation  #3   Echocardiogram  #4   Venous duplex of right lower extremity  #5   started Lasix 20 mg daily and  Klor-Con 20 mEq daily  #6   insulin sliding scale        VTE Pharmacologic Prophylaxis: VTE Score: 4 Moderate Risk (Score 3-4) - Pharmacological DVT Prophylaxis Ordered: heparin.  Code Status: Level 3 - DNAR and DNI   Discussion with family: Updated  (wife) at bedside.    Anticipated Length of Stay: Patient will be admitted on an inpatient basis with an anticipated length of stay of greater than 2 midnights secondary to bacteremia.    History of Present Illness   Chief Complaint: Positive blood cultures    David Carpio is a 59 y.o. male with a PMH of recent right second toe osteomyelitis status post amputation, atrial fibs, chronic diastolic heart failure who presents with abnormal blood cultures.  Patient had a EGD done today.  Currently has no complaints and is feeling well.  Patient with recent second toe amputation last week.    Review of Systems   Constitutional: Negative.    HENT: Negative.     Respiratory: Negative.     Cardiovascular: Negative.    Gastrointestinal: Negative.    Musculoskeletal:         Right foot pain   Skin: Negative.    Neurological: Negative.    Psychiatric/Behavioral: Negative.         Historical Information   Past Medical History:   Diagnosis Date    Atrial fibrillation (HCC)     Benzodiazepine withdrawal with complication (HCC) 06/15/2023    Chronic diastolic (congestive) heart failure (HCC)     Diabetes mellitus (HCC)     GERD (gastroesophageal reflux disease)     High cholesterol     Hyperlipidemia     Pacemaker     Stroke (HCC)      Past Surgical History:   Procedure Laterality Date    APPENDECTOMY      ATRIAL CARDIAC PACEMAKER INSERTION      BARIATRIC SURGERY  05/2021    BONE BIOPSY Left 7/26/2023    Procedure: EXCISION BIOPSY BONE LESION EXTREMITY;  Surgeon: Adelia Yousif DPM;  Location:  MAIN OR;  Service: Podiatry    EPIDURAL BLOCK INJECTION N/A 5/19/2022    Procedure: BLOCK / INJECTION EPIDURAL STEROID CERVICAL C7-T1;  Surgeon: Skyler Quinn MD;   Location: [facility];  Service: Pain Management     FL GUIDED NEEDLE PLAC BX/ASP/INJ  3/22/2022    FOOT AMPUTATION Left 4/28/2022    Procedure: LEFT TRANSMETATARSAL AMPUTATION.;  Surgeon: Nestor Madden DPM;  [facility];  Service: Podiatry    NERVE BLOCK Right 2/10/2022    Procedure: BLOCK MEDIAL BRANCH C3, C4, C5 #1;  Surgeon: Skyler Quinn MD;  [facility]: [facility];  Service: Pain Management     NERVE BLOCK Right 3/22/2022    Procedure: BLOCK MEDIAL BRANCH C3, C4, C5 #2;  Surgeon: Skyler Quinn MD;  [facility];  Service: Pain Management     MI AMPUTATION FOOT TRANSMETARSAL Left 4/12/2023    Procedure: REVISION LEFT TRANSMETATARSAL (TMA) AMPUTATION, REMOVAL OF UNVIABLE TISSUE AND BONE,;  Surgeon: Adelia Yousif DPM;  [facility];  Service: Podiatry    MI AMPUTATION METATARSAL W/TOE SINGLE Left 4/7/2023    Procedure: 2ND RAY RESECTION FOOT;  Surgeon: Adelia Yousif DPM;  [facility];  Service: Podiatry    MI AMPUTATION TOE INTERPHALANGEAL JOINT Left 11/16/2021    Procedure: AMPUTATION LESSER TOE;  Surgeon: Nestor Madden DPM;  [facility];  Service: Podiatry    RADIOFREQUENCY ABLATION Right 4/7/2022    Procedure: Right C3, C4, C5 RFA;  Surgeon: Skyler Quinn MD;  [facility]: [facility];  Service: Pain Management     RHIZOTOMY Right 2/9/2023    Procedure: RHIZOTOMY CERVICAL MEDIAL BRANCH NERVES RIGHT C3, C4, C5;  Surgeon: Skyler Quinn MD;  [facility];  Service: Pain Management     TOE AMPUTATION Left     2nd toe    TOE AMPUTATION Left 9/15/2021    Procedure: AMPUTATION LEFT 4TH TOE;  Surgeon: Nestor Madden DPM;  [facility];  Service: Podiatry    TOE AMPUTATION Right 1/12/2022    Procedure: AMPUTATION TOE;  Surgeon: Hong Jolly DPM;  [facility];  Service: Podiatry    TOE AMPUTATION Right 2/23/2022    Procedure: AMPUTATION TOE  RIGHT SECOND;  Surgeon: Hong Jolly DPM;  Location: [facility];  Service: Podiatry    TOE AMPUTATION  Right 6/3/2022    Procedure: AMPUTATION right 4th TOE;  Surgeon: Nestor Madden DPM;  [facility];  Service: Podiatry    TOE AMPUTATION Right 11/15/2024    Procedure: AMPUTATION RIGHT 2ND TOE;  Surgeon: Adelia Yousif DPM;  Location:  MAIN OR;  Service: Podiatry     Social History     Tobacco Use    Smoking status: Never     Passive exposure: Never    Smokeless tobacco: Never   Vaping Use    Vaping status: Never Used   Substance and Sexual Activity    Alcohol use: Never    Drug use: Never    Sexual activity: Yes     E-Cigarette/Vaping    E-Cigarette Use Never User      E-Cigarette/Vaping Substances     Family history non-contributory  Social History:  Marital Status: /Civil Union   Patient Pre-hospital Living Situation: Home  Patient Pre-hospital Level of Mobility: walks  Patient Pre-hospital Diet Restrictions:     Meds/Allergies   I have reviewed home medications with patient personally.  Prior to Admission medications    Medication Sig Start Date End Date Taking? Authorizing Provider   buPROPion (Wellbutrin XL) 300 mg 24 hr tablet Take 1 tablet (300 mg total) by mouth daily 11/6/24  Yes Andre Rodriguez MD   busPIRone (BUSPAR) 15 mg tablet Take 1 tablet (15 mg total) by mouth 3 (three) times a day 11/6/24  Yes Andre Rodriguez MD   cephalexin (KEFLEX) 500 mg capsule Take 1 capsule (500 mg total) by mouth every 6 (six) hours for 7 days 11/16/24 11/23/24 Yes Fidel Mi DO   doxepin (SINEquan) 100 mg capsule Take 1 capsule (100 mg total) by mouth daily at bedtime 11/6/24  Yes Andre Rodriguez MD   gabapentin (NEURONTIN) 800 mg tablet Take 1 tablet (800 mg total) by mouth 3 (three) times a day 11/6/24  Yes Andre Rodriguez MD   calcium citrate-vitamin D 315 mg-5 mcg tablet Take 2 tablets by mouth 2 (two) times a day 11/30/23   Historical Provider, MD   ferrous sulfate 325 (65 Fe) mg tablet Take 1 tablet (325 mg total) by mouth daily with  breakfast Do not start before June 19, 2023. 6/19/23   Obioma Dayo Chapin PA-C   furosemide (LASIX) 40 mg tablet Take 40 mg by mouth 2 (two) times a day as needed (pt states he takes 2 times a day as needed) 1/17/24   Historical Provider, MD   lidocaine (Lidoderm) 5 % Apply 1 patch topically over 12 hours daily for 15 days Remove & Discard patch within 12 hours or as directed by MD 10/15/23 8/14/24  Dominique Da Silva DO   metolazone (ZAROXOLYN) 2.5 mg tablet TAKE 1 TAB BY MOUTH ONCE A DAY ON MONDAY, WEDNESDAY, AND FRIDAY ONLY 8/7/24   Historical Provider, MD   Multiple Vitamins-Minerals (Mens Multivitamin) TABS Take 1 tablet by mouth 2 (two) times a day 11/30/23   Historical Provider, MD   oxyCODONE (ROXICODONE) 5 immediate release tablet Take 1 tablet (5 mg total) by mouth every 4 (four) hours as needed for moderate pain for up to 10 days Max Daily Amount: 30 mg 11/16/24 11/26/24  Fidel Mi,    potassium chloride (KLOR-CON) 20 mEq packet Take 20 mEq by mouth if needed (pt states he takes with lasix tablet as needed)    Historical Provider, MD   rivaroxaban (Xarelto) 20 mg tablet Take 20 mg by mouth daily with breakfast    Historical Provider, MD     Allergies   Allergen Reactions    Ativan [Lorazepam] Anxiety       Objective :  Temp:  [96.7 °F (35.9 °C)-98.8 °F (37.1 °C)] 98.8 °F (37.1 °C)  HR:  [66-89] 66  BP: (102-125)/(55-81) 125/64  Resp:  [17-18] 18  SpO2:  [92 %-97 %] 97 %  O2 Device: None (Room air)    Physical Exam  Constitutional:       Appearance: He is obese.   HENT:      Head: Normocephalic and atraumatic.      Mouth/Throat:      Mouth: Mucous membranes are moist.      Pharynx: Oropharynx is clear.   Eyes:      Extraocular Movements: Extraocular movements intact.      Conjunctiva/sclera: Conjunctivae normal.   Cardiovascular:      Rate and Rhythm: Normal rate and regular rhythm.      Pulses: Normal pulses.      Heart sounds: Normal heart sounds.   Pulmonary:      Effort: Pulmonary  effort is normal.      Breath sounds: Normal breath sounds.   Abdominal:      Palpations: Abdomen is soft.      Tenderness: There is no abdominal tenderness. There is no guarding.   Musculoskeletal:         General: Swelling present.      Right lower leg: Edema present.      Left lower leg: Edema present.   Skin:     Comments: Bilateral skin changes, venous stasis, right foot is bandaged   Neurological:      General: No focal deficit present.      Mental Status: He is alert and oriented to person, place, and time. Mental status is at baseline.   Psychiatric:         Mood and Affect: Mood normal.         Behavior: Behavior normal.         Thought Content: Thought content normal.                 Lab Results: I have reviewed the following results:  Results from last 7 days   Lab Units 11/18/24  1841   WBC Thousand/uL 6.10   HEMOGLOBIN g/dL 14.2   HEMATOCRIT % 43.4   PLATELETS Thousands/uL 328   SEGS PCT % 53   LYMPHO PCT % 33   MONO PCT % 9   EOS PCT % 4     Results from last 7 days   Lab Units 11/18/24  1841   SODIUM mmol/L 137   POTASSIUM mmol/L 3.8   CHLORIDE mmol/L 102   CO2 mmol/L 26   BUN mg/dL 12   CREATININE mg/dL 0.96   ANION GAP mmol/L 9   CALCIUM mg/dL 8.9   ALBUMIN g/dL 4.1   TOTAL BILIRUBIN mg/dL 0.92   ALK PHOS U/L 93   ALT U/L 14   AST U/L 23   GLUCOSE RANDOM mg/dL 85         Results from last 7 days   Lab Units 11/15/24  1500   POC GLUCOSE mg/dl 93     Lab Results   Component Value Date    HGBA1C 5.8 (H) 11/14/2024    HGBA1C 5.4 04/17/2024    HGBA1C 5.3 09/15/2023     Results from last 7 days   Lab Units 11/14/24  1756 11/14/24  1340   LACTIC ACID mmol/L 1.3 2.1*             Administrative Statements       Medical decision making: Moderate  Diagnosis addressed: 1 acute complicated medical problem with bacteremia  Data:   Reviewed  CBC, CMP, blood cultures, lactic acid  Ordered CBC, BMP,  Reviewed external notes from podiatry  Discussion of management with ER provider: IV Ancef            Risk:  Prescription drug management: IV Ancef  Discussion for hospitalization with ER provider: Patient with positive blood cultures, requiring IV antibiotics, ID consult and echocardiogram          ** Please Note: This note has been constructed using a voice recognition system. **

## 2024-11-19 NOTE — ASSESSMENT & PLAN NOTE
Will do Klor-Con 20 meq daily with his Lasix.  Patient is prone to being hypokalemic when he takes a diuretic   follow-up BMP in AM.

## 2024-11-19 NOTE — ASSESSMENT & PLAN NOTE
Right leg is swollen compared to left leg, ordered venous duplex to rule out DVT.  Patient has been off of his Xarelto.  Follow-up on ultrasound.  Resume Xarelto.

## 2024-11-19 NOTE — ASSESSMENT & PLAN NOTE
Takes as needed Lasix and metolazone  Is currently followed by [facility] cardiology  Patient with some chronic edema and patient states his abdomen is little bit distended  Will start patient on Lasix 20 mg daily and Klor-Con 20 mill equivalents daily  Echocardiogram showed normal ejection fraction with no valvular pathology.  Continue with current dose of diuretics.    Wt Readings from Last 3 Encounters:   11/19/24 (!) 138 kg (305 lb)   11/14/24 (!) 137 kg (302 lb 14.6 oz)   10/31/24 (!) 137 kg (303 lb)

## 2024-11-19 NOTE — ASSESSMENT & PLAN NOTE
History of multiple daytime pauses, status post dual-chamber PPM May 16, 2017  Followed by [facility] cardiology

## 2024-11-19 NOTE — UTILIZATION REVIEW
NOTIFICATION OF INPATIENT ADMISSION   AUTHORIZATION REQUEST   SERVICING FACILITY:   [place] [address]  Tax ID: [id number]  NPI: [id number] ATTENDING PROVIDER:  Attending Name and NPI#: Renu Castellon Md [[id number]]  Address: [address] [place] [address]  Phone: [phone]   ADMISSION INFORMATION:  Place of Service: Inpatient [facility]  Place of Service Code: 21  Inpatient Admission Date/Time: 11/18/24  7:24 PM  Discharge Date/Time: No discharge date for patient encounter.  Admitting Diagnosis Code/Description:  Bacteremia [R78.81]     UTILIZATION REVIEW CONTACT:  Mirna Velez Utilization   Network Utilization Review Department  Phone: [phone]  Fax [phone]  Email: [email or web address]  Contact for approvals/pending authorizations, clinical reviews, and discharge.     PHYSICIAN ADVISORY SERVICES:  Medical Necessity Denial & Tcjv-am-Iypr Review  Phone: [phone]  Fax: [phone]  Email: [email or web address]     DISCHARGE SUPPORT TEAM:  For Patients Discharge Needs & Updates  Phone: [phone] opt. 2 Fax: [phone]  Email: [email or web address]

## 2024-11-19 NOTE — ASSESSMENT & PLAN NOTE
Will do Klor-Con 20 meq daily with his Lasix.  Patient is prone to being hypokalemic when he takes a diuretic

## 2024-11-19 NOTE — ASSESSMENT & PLAN NOTE
Recent hospitalization for right second toe osteomyelitis status post amputation.  Single blood culture from hospitalization on 11 4 with MSSA.  Patient has been on oral Keflex since discharge assuming surgical cure of right toe osteomyelitis.  However he does have a pacemaker in place.  Currently does not have any fever, leukocytosis or any systemic signs of infection.  No increasing drainage or discharge noted from 2 wounds with sutures in place.    Continue with high-dose IV cefazolin 2 g every 8 hourly   Follow-up on repeat blood cultures to see clearance of bacteremia   Transthoracic echo without any valvular abnormalities.    Cardiology consulted for INÉS.    Infectious disease input appreciated   Anticipate 6 weeks of IV antibiotics   Once repeat blood cultures are negative for 48 hours, IR will be consulted for PICC line placement.

## 2024-11-19 NOTE — ASSESSMENT & PLAN NOTE
Patient is not on any medications at this time  Continue with serial blood glucose monitoring and sliding scale insulin  Lab Results   Component Value Date    HGBA1C 5.8 (H) 11/14/2024

## 2024-11-19 NOTE — ASSESSMENT & PLAN NOTE
Takes as needed Lasix and metolazone  [facility]  Patient with some chronic edema and patient states his abdomen is little bit distended  Will start patient on Lasix 20 mg daily and Klor-Con 20 mill equivalents daily    Wt Readings from Last 3 Encounters:   11/18/24 (!) 138 kg (305 lb 5.4 oz)   11/14/24 (!) 137 kg (302 lb 14.6 oz)   10/31/24 (!) 137 kg (303 lb)

## 2024-11-19 NOTE — CONSULTS
Consult Cardiology    David Carpio 1965, 59 y.o. male MRN: [id number]    Unit/Bed#: -01 Encounter: [id number]    Attending Provider: Renu Castellon MD   Primary Care Provider: Khurram Rodriguez DO   Date admitted to hospital: 11/18/2024       Inpatient consult to Cardiology  Consult performed by: KATHRYN Brizuela  Consult ordered by: Renu Castellon MD          * MSSA Bacteremia  Assessment & Plan  + blood cultures with MSSA  INÉS requested for evaluation to exclude endocarditis.  There are no contraindications to the procedure. Plan for INÉS at 9 am 11/20/2024.  NPO after midnight except for meds with sips of water.  Continue Xarelto for stroke prevention in the setting of persistent a fib.      Pacemaker  Assessment & Plan  PPM for indication of sick sinus syndrome placed at [facility] in 2017              Physician Requesting Consult: Renu Castellon MD    Reason for Consult / Principal Problem: INÉS      HPI: David Carpio is a 59 y.o. year old male who has a history of permanent atrial fibrillation, SSS s/p PPM 2017, HFpEF, non-obstructive CAD on cardiac CTA 8/2024, T2DM, HTN, HLD, syncope, obesity, DAYAN on BIPAP who follows with cardiologist Dr. Aguilar with [facility].           Patient presented to [facility] ER 11/18/2024 due to positive blood cultures which were collected on an admission for right toe osteomyelitis 11/14-11/16/2024. Blood cultures grew MSSA. ID has been consulted and requested TTE and INÉS for evaluation to exclude endocarditis. Transthoracic echocardiogram today is negative for obvious endocarditis.    At the time of my evaluation he is resting comfortably in bed. He has been eating and drinking normally today. He did undergo an outpatient EGD by his GI at [facility] yesterday due to c/o dysphagia, however the EGD showed normal esophageal structure.       Review of Systems   Constitutional:  Negative for chills and fever.   HENT:  Negative for ear pain and sore throat.     Eyes:  Negative for pain and visual disturbance.   Respiratory:  Negative for cough and shortness of breath.    Cardiovascular:  Negative for chest pain and palpitations.   Gastrointestinal:  Negative for abdominal pain and vomiting.   Genitourinary:  Negative for dysuria and hematuria.   Musculoskeletal:  Negative for arthralgias and back pain.   Skin:  Negative for color change and rash.   Neurological:  Negative for seizures and syncope.   All other systems reviewed and are negative.       Historical Information     Past Medical History:   Diagnosis Date    Atrial fibrillation (HCC)     Benzodiazepine withdrawal with complication (HCC) 06/15/2023    Chronic diastolic (congestive) heart failure (HCC)     Diabetes mellitus (HCC)     GERD (gastroesophageal reflux disease)     High cholesterol     Hyperlipidemia     Pacemaker     Stroke (HCC)      Past Surgical History:   Procedure Laterality Date    APPENDECTOMY      ATRIAL CARDIAC PACEMAKER INSERTION      BARIATRIC SURGERY  05/2021    BONE BIOPSY Left 7/26/2023    Procedure: EXCISION BIOPSY BONE LESION EXTREMITY;  Surgeon: Adelia Yousif DPM;  [facility];  Service: Podiatry    EPIDURAL BLOCK INJECTION N/A 5/19/2022    Procedure: BLOCK / INJECTION EPIDURAL STEROID CERVICAL C7-T1;  Surgeon: Skyler Quinn MD;  Location: [facility];  Service: Pain Management     FL GUIDED NEEDLE PLAC BX/ASP/INJ  3/22/2022    FOOT AMPUTATION Left 4/28/2022    Procedure: LEFT TRANSMETATARSAL AMPUTATION.;  Surgeon: Nestor Madden DPM;  [facility];  Service: Podiatry    NERVE BLOCK Right 2/10/2022    Procedure: BLOCK MEDIAL BRANCH C3, C4, C5 #1;  Surgeon: Skyler Quinn MD;  [facility];  Service: Pain Management     NERVE BLOCK Right 3/22/2022    Procedure: BLOCK MEDIAL BRANCH C3, C4, C5 #2;  Surgeon: Skyler Quinn MD;  [facility];  Service: Pain Management     AR AMPUTATION FOOT TRANSMETARSAL Left 4/12/2023    Procedure: REVISION LEFT  TRANSMETATARSAL (TMA) AMPUTATION, REMOVAL OF UNVIABLE TISSUE AND BONE,;  Surgeon: Adelia Yousif DPM;  [facility];  Service: Podiatry    MI AMPUTATION METATARSAL W/TOE SINGLE Left 4/7/2023    Procedure: 2ND RAY RESECTION FOOT;  Surgeon: Adelia Yousif DPM;  [facility];  Service: Podiatry    MI AMPUTATION TOE INTERPHALANGEAL JOINT Left 11/16/2021    Procedure: AMPUTATION LESSER TOE;  Surgeon: Nestor Madden DPM;  [facility];  Service: Podiatry    RADIOFREQUENCY ABLATION Right 4/7/2022    Procedure: Right C3, C4, C5 RFA;  Surgeon: Skyler Quinn MD;  [facility];  Service: Pain Management     RHIZOTOMY Right 2/9/2023    Procedure: RHIZOTOMY CERVICAL MEDIAL BRANCH NERVES RIGHT C3, C4, C5;  Surgeon: Skyler Quinn MD;  [facility];  Service: Pain Management     TOE AMPUTATION Left     2nd toe    TOE AMPUTATION Left 9/15/2021    Procedure: AMPUTATION LEFT 4TH TOE;  Surgeon: Nestor Madden DPM;  [facility];  Service: Podiatry    TOE AMPUTATION Right 1/12/2022    Procedure: AMPUTATION TOE;  Surgeon: Hong Jolly DPM;  [facility];  Service: Podiatry    TOE AMPUTATION Right 2/23/2022    Procedure: AMPUTATION TOE  RIGHT SECOND;  Surgeon: Hong Jolly DPM;  Location: [facility];  Service: Podiatry    TOE AMPUTATION Right 6/3/2022    Procedure: AMPUTATION right 4th TOE;  Surgeon: Nestor Madden DPM;  [facility];  Service: Podiatry    TOE AMPUTATION Right 11/15/2024    Procedure: AMPUTATION RIGHT 2ND TOE;  Surgeon: Adelia Yousif DPM;  [facility];  Service: Podiatry     Social History     Substance and Sexual Activity   Alcohol Use Never     Social History     Substance and Sexual Activity   Drug Use Never     Social History     Tobacco Use   Smoking Status Never    Passive exposure: Never   Smokeless Tobacco Never       Family History:   Family History   Problem Relation Age of Onset    No Known Problems Mother     No Known  "Problems Father        Meds/Allergies     current meds:   Current Facility-Administered Medications:     acetaminophen (TYLENOL) tablet 650 mg, Q6H PRN    aluminum-magnesium hydroxide-simethicone (MAALOX) oral suspension 30 mL, Q4H PRN    buPROPion (WELLBUTRIN XL) 24 hr tablet 300 mg, Daily    busPIRone (BUSPAR) tablet 15 mg, TID    ceFAZolin (ANCEF) IVPB (premix in dextrose) 2,000 mg 50 mL, Q8H, Last Rate: 2,000 mg (24 0551)    doxepin (SINEquan) capsule 10 mg, After Breakfast    doxepin (SINEquan) capsule 100 mg, HS    ferrous sulfate tablet 325 mg, Daily With Breakfast    furosemide (LASIX) tablet 20 mg, Daily    gabapentin (NEURONTIN) capsule 800 mg, TID    insulin lispro (HumALOG/ADMELOG) 100 units/mL subcutaneous injection 2-12 Units, TID AC **AND** Fingerstick Glucose (POCT), TID AC    oxyCODONE (ROXICODONE) IR tablet 5 mg, Q6H PRN    potassium chloride (Klor-Con M20) CR tablet 20 mEq, Daily    rivaroxaban (XARELTO) tablet 20 mg, Daily With Breakfast       Allergies   Allergen Reactions    Ativan [Lorazepam] Anxiety       Objective     Vitals: Blood pressure 100/60, pulse 63, temperature 98.1 °F (36.7 °C), temperature source Temporal, resp. rate 20, height 6' 1\" (1.854 m), weight (!) 138 kg (305 lb), SpO2 96%., Body mass index is 40.24 kg/m².    Orthostatic Blood Pressures      Flowsheet Row Most Recent Value   Blood Pressure 100/60 filed at 2024 1030   Patient Position - Orthostatic VS Sitting filed at 2024 0802            Systolic (24hrs), Av , Min:100 , Max:125     Diastolic (24hrs), Av, Min:55, Max:81        Intake/Output Summary (Last 24 hours) at 2024 1313  Last data filed at 2024 0601  Gross per 24 hour   Intake 240 ml   Output 1125 ml   Net -885 ml       Weight (last 2 days)       Date/Time Weight    24 1030 138 (305)    24 20:16:56 138 (305.34)    24 1835 139 (306.22)            Invasive Devices       Peripheral Intravenous Line  Duration       "       Peripheral IV 24 Left;Ventral (anterior) Forearm <1 day                    Physical Exam  Vitals and nursing note reviewed.   Constitutional:       General: He is not in acute distress.     Appearance: He is well-developed. He is obese.   HENT:      Head: Normocephalic and atraumatic.   Eyes:      Conjunctiva/sclera: Conjunctivae normal.   Cardiovascular:      Rate and Rhythm: Normal rate. Rhythm irregularly irregular.      Heart sounds: No murmur heard.     Comments: Evidence of chronic venous stasis to bilateral lower legs  Pulmonary:      Effort: Pulmonary effort is normal. No respiratory distress.      Breath sounds: Normal breath sounds.   Abdominal:      Palpations: Abdomen is soft.      Tenderness: There is no abdominal tenderness.   Musculoskeletal:         General: No swelling.      Cervical back: Neck supple.      Right lower le+ Edema present.      Comments: Bilateral right and left foot amputations   Skin:     General: Skin is warm and dry.      Capillary Refill: Capillary refill takes less than 2 seconds.   Neurological:      Mental Status: He is alert.   Psychiatric:         Mood and Affect: Mood normal.              Laboratory Results:    Results from last 7 days   Lab Units 24  1340   CK TOTAL U/L 142       CBC with diff:   Results from last 7 days   Lab Units 24  0531 24  1841 24  0508 24  1340   WBC Thousand/uL 5.67 6.10 6.89 7.57   HEMOGLOBIN g/dL 14.0 14.2 13.1 14.3   HEMATOCRIT % 43.2 43.4 41.1 44.4   MCV fL 85 85 87 88   PLATELETS Thousands/uL 319 328 271 310   RBC Million/uL 5.10 5.10 4.71 5.07   MCH pg 27.5 27.8 27.8 28.2   MCHC g/dL 32.4 32.7 31.9 32.2   RDW % 13.5 13.5 14.1 14.0   MPV fL 8.8* 8.7* 9.2 9.2   NRBC AUTO /100 WBCs  --  0  --  0         CMP:  Results from last 7 days   Lab Units 24  0531 24  1841 11/15/24  1229 24  1340   POTASSIUM mmol/L 3.5 3.8 3.5 3.6   CHLORIDE mmol/L 103 102 103 100   CO2 mmol/L 28 26 28 32  "  BUN mg/dL 11 12 11 13   CREATININE mg/dL 0.85 0.96 0.84 0.92   CALCIUM mg/dL 8.6 8.9 8.3* 9.1   AST U/L  --  23  --  18   ALT U/L  --  14  --  13   ALK PHOS U/L  --  93  --  87   EGFR ml/min/1.73sq m 94 84 94 89       BMP:  Results from last 7 days   Lab Units 24  0531 24  1841 11/15/24  1229   POTASSIUM mmol/L 3.5 3.8 3.5   CHLORIDE mmol/L 103 102 103   CO2 mmol/L 28 26 28   BUN mg/dL 11 12 11   CREATININE mg/dL 0.85 0.96 0.84   CALCIUM mg/dL 8.6 8.9 8.3*       BNP:    Recent Labs     24  0531   BNP 95     Hemoglobin A1C:   Results from last 7 days   Lab Units 24  1340   HEMOGLOBIN A1C % 5.8*       Lipid Profile:   Lab Results   Component Value Date    CHOLESTEROL 155 2024     Lab Results   Component Value Date    HDL 38 (L) 2024     Lab Results   Component Value Date    TRIG 69 2024     No results found for: \"NONHDLC\"     Cardiac testing:     Results for orders placed during the hospital encounter of 20    Echo complete with contrast if indicated    [place] [address]    Echocardiogram  2D, M-mode, Doppler, and Color Doppler    Study date:  2020    Patient: CHAR BRUNSON  MR number: [id number]  Account number: [id number]  : 1965  Age: 54 years  Gender: Male  Status: Inpatient  Location: [facility] Echo Lab  Height: 74 in  Weight: 402 lb  BP: 121/ 63 mmHg    Indications: Atrial Fibrillation    Diagnoses: I48.0 - Atrial fibrillation    Sonographer:  ARTHUR Mendez  Referring Physician:  Jethro Crawford MD  Group:  [facility]  Interpreting Physician:  Salud Walsh DO    IMPRESSIONS:  Technically difficult study.  Normal left ventricular size and overall systolic function. EF 55%.  Poor endocardial definition limits accurate regional wall motion assessment even with the use of Definity echo contrast.  No gross regional wall motion " abnormalities.  Mild to moderate concentric left ventricular hypertrophy.  Grossly top normal to mildly dilated right ventricle with normal function.  Mild left atrial enlargement.  Aortic sclerosis without stenosis.  Mitral and tricuspid valves were poorly visualized.  Mild tricuspid regurgitation.    SUMMARY    LEFT VENTRICLE:  Size was normal.  Systolic function was normal. Ejection fraction was estimated to be 55 %.  Although no diagnostic regional wall motion abnormality was identified, this possibility cannot be completely excluded on the basis of this study.  Wall thickness was mildly to moderately increased.  Left ventricular diastolic function not assessed due to atrial fibrillation.    RIGHT VENTRICLE:  Visually the right ventricle appears top normal to mildly dilated however it is not well visualized and measurements could not be taken.  Systolic function was normal.    LEFT ATRIUM:  The atrium was mildly dilated.    RIGHT ATRIUM:  Not well visualized.    AORTIC VALVE:  Aortic sclerosis without stenosis.  There was no regurgitation.    TRICUSPID VALVE:  Not well visualized.  There was trace regurgitation.    COMPARISONS:  Compared to prior report dated 3/5/14 there is no significant change.    HISTORY: PRIOR HISTORY: DAYAN, SOB, CHF, DM2, Morbid Obesity, HTN    PROCEDURE: The study was performed in the [facility] Echo Lab. This was a routine study. The study included complete 2D imaging, M-mode, complete spectral Doppler, and color Doppler. The heart rate was 63 bpm, at the start of the  study. Intravenous contrast ( 1.0 ml of Definity) was administered. Echocardiographic views were limited due to poor acoustic window availability, decreased penetration, and lung interference. This was a technically difficult study.    LEFT VENTRICLE: Size was normal. Systolic function was normal. Ejection fraction was estimated to be 55 %. Although no diagnostic regional wall motion abnormality was identified,  this possibility cannot be completely excluded on the basis  of this study. Wall thickness was mildly to moderately increased. DOPPLER: Left ventricular diastolic function not assessed due to atrial fibrillation.    RIGHT VENTRICLE: Visually the right ventricle appears top normal to mildly dilated however it is not well visualized and measurements could not be taken. Systolic function was normal.    LEFT ATRIUM: The atrium was mildly dilated.    RIGHT ATRIUM: Not well visualized.    MITRAL VALVE: Not well visualized.    AORTIC VALVE: Aortic sclerosis without stenosis. DOPPLER: There was no regurgitation.    TRICUSPID VALVE: Not well visualized. DOPPLER: There was trace regurgitation.    PULMONIC VALVE: Not well visualized.    PERICARDIUM: Not well visualized.    AORTA: The root exhibited normal size.    SYSTEMIC VEINS: IVC: Not assessed.    SYSTEM MEASUREMENT TABLES    2D  %FS: 30.04 %  AV Diam: 2.77 cm  Ao asc: 3.16 cm  EDV(Teich): 133.05 ml  EF(Teich): 56.86 %  ESV(Teich): 57.4 ml  IVSd: 1.48 cm  LA Diam: 4.41 cm  LVEDV MOD A4C: 74.9 ml  LVEF MOD A4C: 59.12 %  LVESV MOD A4C: 30.62 ml  LVIDd: 5.26 cm  LVIDs: 3.68 cm  LVLd A4C: 7.37 cm  LVLs A4C: 6.27 cm  LVPWd: 1.27 cm  RWT: 0.48  SV MOD A4C: 44.28 ml  SV(Teich): 75.65 ml    PW  E': 0.13 m/s    [facility] Accredited Echocardiography Laboratory    Prepared and electronically signed by    Salud Walsh DO  Signed 23-Jan-2020 12:52:12    Imaging: Results Review Statement: I reviewed radiology reports from this admission including: Echocardiogram.    Echo complete w/ contrast if indicated  Result Date: 11/19/2024  Narrative:   Patient is in atrial fibrillation.   Left Ventricle: Left ventricular cavity size is normal. Wall thickness is normal. The left ventricular ejection fraction is 65%. Systolic function is normal. Wall motion is normal.   Left Atrium: The atrium is mildly dilated.   Right Atrium: The atrium is mildly dilated.   No valvular  pathology noted.  No evidence of endocarditis on the valves or the pacing lead.     VAS VENOUS DUPLEX -LOWER LIMB UNILATERAL  Result Date: 11/19/2024  Narrative:  [facility] REPORT CLINICAL: Indications: Patient presents with right lower extremity swelling s/p recent right second toe amputation. Operative History: 2021-11-16 Left Lesser toe amputation 2021-09-15 Left 4th toe amputation Left 2nd toe amputation Cardiac pacemaker Left TMA Right Toe amputation Risk Factors The patient has history of Obesity, HTN, Diabetes (NIDDM (oral meds)) and Hyperlipidemia.   CONCLUSION:  Impression: RIGHT LOWER LIMB No evidence of acute or chronic deep vein thrombosis. No evidence of superficial thrombophlebitis noted. Doppler evaluation shows a normal response to augmentation maneuvers. Popliteal, posterior tibial and anterior tibial arterial Doppler waveforms are triphasic.  LEFT LOWER LIMB LIMITED Evaluation shows no evidence of thrombus in the common femoral vein. Doppler evaluation shows a normal response to augmentation maneuvers.  Tech Note: There is an echogenic structure located in the right inguinal region measuring approximately 4.21 cm suggestive of enlarged lymphatic channels.  Technical findings were posted to chart.  SIGNATURE: Electronically Signed by: KISHORE RAJPUT on 2024-11-19 10:36:30 AM    XR foot 3+ vw right  Result Date: 11/16/2024  Narrative: XR FOOT 3+ VW RIGHT INDICATION: postop. COMPARISON: 10/22/2024 FINDINGS: No acute fracture or dislocation. The phalanges of the second and fourth toes are absent. The middle and distal phalanges of the third toe are absent. No focal areas of bone destruction. Calcaneal spurring is present. No lytic or blastic osseous lesion. Unremarkable soft tissues.     Impression: No acute osseous abnormality. Postoperative changes. Workstation performed: [id number]     VAS VENOUS DUPLEX -LOWER LIMB UNILATERAL  Result Date: 11/15/2024  Narrative:  [facility]  REPORT CLINICAL: Indications: Patient presents with right lower extremity edema in the setting of right second toe osteomyelitis. Operative History: 2021-11-16 Left Lesser toe amputation 2021-09-15 Left 4th toe amputation Left 2nd toe amputation Cardiac pacemaker Left TMA Right Toe amputation Risk Factors The patient has history of Obesity, HTN, Diabetes (NIDDM (oral meds)) and Hyperlipidemia.   CONCLUSION:  Impression: RIGHT LOWER LIMB No evidence of acute or chronic deep vein thrombosis . No evidence of superficial thrombophlebitis noted. Doppler evaluation shows a normal response to augmentation maneuvers. Popliteal, posterior tibial and anterior tibial arterial Doppler waveforms are triphasic.  LEFT LOWER LIMB LIMITED Evaluation shows no evidence of thrombus in the common femoral vein. Doppler evaluation shows a normal response to augmentation maneuvers.  Tech Note: There is an echogenic structure located in the right inguinal region measuring approximately 3.5 cm suggestive of enlarged lymphatic channels.  Technical findings were given to Dr. Wahl via [employer] Secure Chat.  SIGNATURE: Electronically Signed by: MARLEN WINN DO on 2024-11-15 12:22:58 PM    CT lower extremity w contrast right  Result Date: 11/14/2024  Narrative: CT right lower extremity with IV contrast INDICATION: edema. COMPARISON: MRI right foot without contrast 11/12/2024 TECHNIQUE: CT examination of the above was performed.  This examination, like all CT scans performed in the [facility], was performed utilizing techniques to minimize radiation dose exposure, including the use of iterative reconstruction  and automated exposure control software.  Multiplanar 2D reformatted images were created from the source data. IV Contrast: 100 mL of iohexol (OMNIPAQUE) Rad dose  1330 mGy-cm FINDINGS: OSSEOUS STRUCTURES: Destructive changes involving the second toe proximal phalanx consistent with osteomyelitis as demonstrated on the  recent comparison MRI. Diffuse phlegmonous changes about this toe without definitive focal abscess. A region of hypodensity extends to the plantar skin surface (sagittal 602:30), correlate for underlying wound. Several right toe amputations. VISUALIZED MUSCULATURE:  Unremarkable. SOFT TISSUES: Diffuse lower leg edema and subcutaneous varices without focal fluid collection identified. No soft tissue gas. OTHER PERTINENT FINDINGS: Patent lower leg arterial runoff.     Impression: Diffuse lower leg edema with subcutaneous venous varices. No focal fluid collection. Redemonstrated destructive osseous of the distal head of the second digit proximal phalanx consistent with known osteomyelitis as demonstrated on the comparison MRI. Surrounding phlegmonous changes without discrete abscess. A hypodense region extends to the plantar aspect of the second toe, correlate for associated wound. The study was marked in EPIC for immediate notification. Workstation performed: [id number]     MRI foot/forefoot toes right wo contrast  Result Date: 11/14/2024  Narrative: MRI FOOT/FOREFOOT TOES RIGHT WO CONTRAST INDICATION:   M86.9: Osteomyelitis, unspecified. COMPARISON: Right second toe radiograph 10/22/2024 TECHNIQUE:  Multiplanar/multisequence MR of the right foot was performed. FINDINGS: SUBCUTANEOUS TISSUES: Soft tissue irregularity over the residual second toe with subjacent T1 and T2 isointense signal that abuts the bone (image #6/14). No evidence of a focal fluid collection. Diffuse subcutaneous edema. BONES: T1 marrow replacement and T2 hyperintensity in the second proximal phalanx (images #6/14 and #5/14). Redemonstrated postsurgical changes of second and third toe partial amputations and fourth toe amputation. FIRST MTP JOINT: Mild osteoarthritis. SESAMOID BONES: Normal marrow signal. OTHER ARTICULAR SURFACES: Normal. PLANTAR FASCIA:  Intact. LISFRANC LIGAMENT:  Intact. FOREFOOT TENDONS: Intact. INTERMETATARSAL REGIONS: No  bursitis or Arora's neuroma. MUSCULATURE: Diffuse fatty atrophy and T2 hyperintensity of the visualized intrinsic foot musculature, consistent with subacute-on-chronic denervation changes.     Impression: Findings consistent with definite osteomyelitis involving the second toe proximal phalanx. No evidence of an abscess. Redemonstrated postsurgical changes of second and third toe partial amputations and fourth toe amputation. The study was marked in EPIC for immediate notification. Workstation performed: [id number]     XR follow up  Result Date: 11/2/2024  Narrative: XR FOLLOW UP (NO CHARGE) INDICATION: check pacer leads- pre mri. COMPARISON: None FINDINGS: Right-sided single lead cardiac device with intact lead. Mild right basilar opacity is likely scarring/atelectasis. No pneumothorax or pleural effusion. Normal cardiomediastinal silhouette. Bones are unremarkable for age. Normal upper abdomen.     Impression: No acute cardiopulmonary disease. Right-sided single lead cardiac device with intact lead. Workstation performed: [id number]     VAS ARTERIAL DUPLEX-LOWER LIMB UNILATERAL  Result Date: 11/1/2024  Narrative:  [facility] REPORT CLINICAL: Indications: Patient presents with osteomyelitis of the right second toe, evaluation of healing potential. Operative History: 2021-11-16 Left Lesser toe amputation 2021-09-15 Left 4th toe amputation Left 2nd toe amputation Cardiac pacemaker Left TMA Right Toe amputation Risk Factors The patient has history of Obesity, HTN, Diabetes (NIDDM (oral meds)) and Hyperlipidemia. Clinical Right Pressure:  104/ mm Hg, Left Pressure:  91/ mm Hg.  FINDINGS:  Right                  PSV (cm/s)  Common Femoral Artery         116  Prox Profunda                  57  Prox SFA                       90  Mid SFA                        80  Dist SFA                       91  Proximal Pop                   86  Distal Pop                     83  Dist Post Tibial               89  Prox.  Ant. Tibial              58  Dist. Ant. Tibial              90     CONCLUSION:  Impression: RIGHT LOWER LIMB: This resting evaluation shows no evidence of significant lower extremity arterial occlusive disease. Ankle/Brachial index: 1.32  , which is in the normal category. Prior: 1.24 Metatarsal pressure unreliable due to poorly compressible tibial vessels Great toe pressure of 132 mmHg, within the healing range. PVR/ PPG tracings are normal.  LEFT LOWER LIMB: Ankle/Brachial index: 1.28  , which is in the normal category. Prior: 1.35 Metatarsal pressure of 109 mmHg Great toe pressure not obtained due to TMA. PVR/ PPG tracings are normal.  Compared to previous study on 4/6/2023 , there is no significant change  SIGNATURE: Electronically Signed by: HARLAN REEVES DO, RPVI on 2024-11-01 02:51:18 PM    XR toe right second min 2 views  Result Date: 10/26/2024  Narrative: XR TOE RIGHT SECOND MIN 2 VIEWS INDICATION: E11.621: Type 2 diabetes mellitus with foot ulcer L97.512: Non-pressure chronic ulcer of other part of right foot with fat layer exposed. COMPARISON: 8/16/2024 FINDINGS: No acute fracture or dislocation. Status post amputation of the distal phalanges of the second and third digits as well as resection of the fourth digit at the level of the MTP joint. Cortical margins are sharp. No bernie osseous erosive change identified. No lytic or blastic osseous lesion. Soft tissue ulceration along the second digit stump.     Impression: Soft tissue ulceration along the stump of the second digit without convincing evidence for osteomyelitis on the current examination. If warranted MRI of the foot could be performed for further evaluation. Computerized Assisted Algorithm (CAA) may have been used to analyze all applicable images. Workstation performed: [id number]       EKG reviewed personally: EKG: rate controlled atrial fibrillation.       Code Status: Level 3 - DNAR and DNI

## 2024-11-19 NOTE — ASSESSMENT & PLAN NOTE
Status post amputation of right second toe on November 15, 2024  Pathology pending.  Presumed surgical cure per operative notes  Has some worsening leg swelling.  No additional drainage or discharge from the wound.  Trial of Lasix 20 mg daily  Follow-up on venous Doppler ultrasound  Podiatry evaluation will be obtained for wound evaluation.

## 2024-11-19 NOTE — PROGRESS NOTES
Progress Note - Hospitalist   Name: David Carpio 59 y.o. male I MRN: [id number]  Unit/Bed#: -01 I Date of Admission: 11/18/2024   Date of Service: 11/19/2024 I Hospital Day: 1    Assessment & Plan  MSSA Bacteremia  Recent hospitalization for right second toe osteomyelitis status post amputation.  Single blood culture from hospitalization on 11 4 with MSSA.  Patient has been on oral Keflex since discharge assuming surgical cure of right toe osteomyelitis.  However he does have a pacemaker in place.  Currently does not have any fever, leukocytosis or any systemic signs of infection.  No increasing drainage or discharge noted from 2 wounds with sutures in place.    Continue with high-dose IV cefazolin 2 g every 8 hourly   Follow-up on repeat blood cultures to see clearance of bacteremia   Transthoracic echo without any valvular abnormalities.    Cardiology consulted for INÉS.    Infectious disease input appreciated   Anticipate 6 weeks of IV antibiotics   Once repeat blood cultures are negative for 48 hours, IR will be consulted for PICC line placement.    Right leg swelling  Right leg is swollen compared to left leg, ordered venous duplex to rule out DVT.  Patient has been off of his Xarelto.  Follow-up on ultrasound.  Resume Xarelto.  Toe osteomyelitis, right (HCC)  Status post amputation of right second toe on November 15, 2024  Pathology pending.  Presumed surgical cure per operative notes  Has some worsening leg swelling.  No additional drainage or discharge from the wound.  Trial of Lasix 20 mg daily  Follow-up on venous Doppler ultrasound  Podiatry evaluation will be obtained for wound evaluation.    Atrial fibrillation (HCC)  Has been off his Xarelto due to procedures and had upper endoscopy today.  Will resume   Chronic diastolic heart failure (HCC)  Takes as needed Lasix and metolazone  Is currently followed by [facility] cardiology  Patient with some chronic edema and patient states his abdomen is little  bit distended  Will start patient on Lasix 20 mg daily and Klor-Con 20 mill equivalents daily  Echocardiogram showed normal ejection fraction with no valvular pathology.  Continue with current dose of diuretics.    Wt Readings from Last 3 Encounters:   11/19/24 (!) 138 kg (305 lb)   11/14/24 (!) 137 kg (302 lb 14.6 oz)   10/31/24 (!) 137 kg (303 lb)     Diabetes mellitus (HCC)  Patient is not on any medications at this time  Continue with serial blood glucose monitoring and sliding scale insulin  Lab Results   Component Value Date    HGBA1C 5.8 (H) 11/14/2024       Pacemaker  History of multiple daytime pauses, status post dual-chamber PPM May 16, 2017  Followed by [facility] cardiology  Plan for INÉS in a.m. to assess pacemaker lead infection in the setting of MSSA bacteremia.  Morbid obesity with BMI of 40.0-44.9, adult (HCC)  BMI of 40.28  Hypokalemia  Will do Klor-Con 20 meq daily with his Lasix.  Patient is prone to being hypokalemic when he takes a diuretic   follow-up BMP in AM.    Anxiety  Continue BuSpar, Wellbutrin and doxepin.    VTE Pharmacologic Prophylaxis: VTE Score: 4 High Risk (Score >/= 5) - Pharmacological DVT Prophylaxis Ordered: rivaroxaban (Xarelto). Sequential Compression Devices Ordered.    Mobility:   Basic Mobility Inpatient Raw Score: 24  JH-HLM Goal: 8: Walk 250 feet or more  JH-HLM Achieved: 7: Walk 25 feet or more  JH-HLM Goal achieved. Continue to encourage appropriate mobility.    Patient Centered Rounds: I performed bedside rounds with nursing staff today.   Discussions with Specialists or Other Care Team Provider: Discussed with cardiology, infectious disease    Education and Discussions with Family / Patient: Patient declined call to .     Current Length of Stay: 1 day(s)  Current Patient Status: Inpatient   Certification Statement: The patient will continue to require additional inpatient hospital stay due to management of bacteremia and ongoing workup as documented  above  Discharge Plan: Anticipate discharge in 48-72 hrs to home with home services.    Code Status: Level 3 - DNAR and DNI    Subjective   Patient seen and examined at bedside.  Currently denies any foot pain.  Denies any chest pain shortness of breath.  Remains afebrile.    Objective :  Temp:  [96.7 °F (35.9 °C)-98.8 °F (37.1 °C)] 98.1 °F (36.7 °C)  HR:  [59-89] 63  BP: (100-125)/(55-81) 100/60  Resp:  [17-20] 20  SpO2:  [91 %-97 %] 96 %  O2 Device: None (Room air)    Body mass index is 40.24 kg/m².     Input and Output Summary (last 24 hours):     Intake/Output Summary (Last 24 hours) at 11/19/2024 1301  Last data filed at 11/19/2024 0601  Gross per 24 hour   Intake 240 ml   Output 1125 ml   Net -885 ml       Physical Exam  Constitutional:       General: He is not in acute distress.     Appearance: He is obese.   HENT:      Head: Normocephalic.      Nose: Nose normal.      Mouth/Throat:      Mouth: Mucous membranes are moist.   Eyes:      Extraocular Movements: Extraocular movements intact.      Pupils: Pupils are equal, round, and reactive to light.   Cardiovascular:      Rate and Rhythm: Normal rate and regular rhythm.   Pulmonary:      Effort: Pulmonary effort is normal.   Abdominal:      General: Abdomen is flat.      Palpations: Abdomen is soft.   Musculoskeletal:      Comments: Right second toe amputation with surgical sutures in place with overlying erythema without drainage or discharge.  Bilateral lower extremity swelling right greater than left.  Chronic stasis dermatitis bilateral lower extremities.   Skin:     General: Skin is warm.   Neurological:      General: No focal deficit present.      Mental Status: He is alert and oriented to person, place, and time. Mental status is at baseline.   Psychiatric:         Mood and Affect: Mood normal.           Lines/Drains:              Lab Results: I have reviewed the following results:   Results from last 7 days   Lab Units 11/19/24  0531 11/18/24  1841   WBC  Thousand/uL 5.67 6.10   HEMOGLOBIN g/dL 14.0 14.2   HEMATOCRIT % 43.2 43.4   PLATELETS Thousands/uL 319 328   SEGS PCT %  --  53   LYMPHO PCT %  --  33   MONO PCT %  --  9   EOS PCT %  --  4     Results from last 7 days   Lab Units 11/19/24  0531 11/18/24  1841   SODIUM mmol/L 138 137   POTASSIUM mmol/L 3.5 3.8   CHLORIDE mmol/L 103 102   CO2 mmol/L 28 26   BUN mg/dL 11 12   CREATININE mg/dL 0.85 0.96   ANION GAP mmol/L 7 9   CALCIUM mg/dL 8.6 8.9   ALBUMIN g/dL  --  4.1   TOTAL BILIRUBIN mg/dL  --  0.92   ALK PHOS U/L  --  93   ALT U/L  --  14   AST U/L  --  23   GLUCOSE RANDOM mg/dL 102 85         Results from last 7 days   Lab Units 11/19/24  1215 11/19/24  0801 11/18/24  2119 11/15/24  1500   POC GLUCOSE mg/dl 90 107 77 93     Results from last 7 days   Lab Units 11/14/24  1340   HEMOGLOBIN A1C % 5.8*     Results from last 7 days   Lab Units 11/14/24  1756 11/14/24  1340   LACTIC ACID mmol/L 1.3 2.1*       Recent Cultures (last 7 days):   Results from last 7 days   Lab Units 11/18/24  1841 11/14/24  1345 11/14/24  1340   BLOOD CULTURE  Received in Microbiology Lab. Culture in Progress.  Received in Microbiology Lab. Culture in Progress. Staphylococcus aureus* No Growth After 4 Days.   GRAM STAIN RESULT   --  Gram positive cocci in clusters*  --              Last 24 Hours Medication List:     Current Facility-Administered Medications:     acetaminophen (TYLENOL) tablet 650 mg, Q6H PRN    aluminum-magnesium hydroxide-simethicone (MAALOX) oral suspension 30 mL, Q4H PRN    buPROPion (WELLBUTRIN XL) 24 hr tablet 300 mg, Daily    busPIRone (BUSPAR) tablet 15 mg, TID    ceFAZolin (ANCEF) IVPB (premix in dextrose) 2,000 mg 50 mL, Q8H, Last Rate: 2,000 mg (11/19/24 0551)    doxepin (SINEquan) capsule 10 mg, After Breakfast    doxepin (SINEquan) capsule 100 mg, HS    ferrous sulfate tablet 325 mg, Daily With Breakfast    furosemide (LASIX) tablet 20 mg, Daily    gabapentin (NEURONTIN) capsule 800 mg, TID    heparin  (porcine) subcutaneous injection 5,000 Units, Q8H CRISTY    insulin lispro (HumALOG/ADMELOG) 100 units/mL subcutaneous injection 2-12 Units, TID AC **AND** Fingerstick Glucose (POCT), TID AC    oxyCODONE (ROXICODONE) IR tablet 5 mg, Q6H PRN    potassium chloride (Klor-Con M20) CR tablet 20 mEq, Daily    Administrative Statements   Today, Patient Was Seen By: Renu Castellon MD      **Please Note: This note may have been constructed using a voice recognition system.**

## 2024-11-19 NOTE — PLAN OF CARE
Problem: PAIN - ADULT  Goal: Verbalizes/displays adequate comfort level or baseline comfort level  Description: Interventions:  - Encourage patient to monitor pain and request assistance  - Assess pain using appropriate pain scale  - Administer analgesics based on type and severity of pain and evaluate response  - Implement non-pharmacological measures as appropriate and evaluate response  - Consider cultural and social influences on pain and pain management  - Notify physician/advanced practitioner if interventions unsuccessful or patient reports new pain  Outcome: Progressing     Problem: INFECTION - ADULT  Goal: Absence or prevention of progression during hospitalization  Description: INTERVENTIONS:  - Assess and monitor for signs and symptoms of infection  - Monitor lab/diagnostic results  - Monitor all insertion sites, i.e. indwelling lines, tubes, and drains  - Monitor endotracheal if appropriate and nasal secretions for changes in amount and color  - [place] appropriate cooling/warming therapies per order  - Administer medications as ordered  - Instruct and encourage patient and family to use good hand hygiene technique  - Identify and instruct in appropriate isolation precautions for identified infection/condition  Outcome: Progressing  Goal: Absence of fever/infection during neutropenic period  Description: INTERVENTIONS:  - Monitor WBC    Outcome: Progressing     Problem: SAFETY ADULT  Goal: Patient will remain free of falls  Description: INTERVENTIONS:  - Educate patient/family on patient safety including physical limitations  - Instruct patient to call for assistance with activity   - Consult OT/PT to assist with strengthening/mobility   - Keep Call bell within reach  - Keep bed low and locked with side rails adjusted as appropriate  - Keep care items and personal belongings within reach  - Initiate and maintain comfort rounds  - Make Fall Risk Sign visible to staff  - Offer Toileting every  Hours,  in advance of need  - Initiate/Maintain alarm  - Obtain necessary fall risk management equipment:   - Apply yellow socks and bracelet for high fall risk patients  - Consider moving patient to room near nurses station  Outcome: Progressing  Goal: Maintain or return to baseline ADL function  Description: INTERVENTIONS:  -  Assess patient's ability to carry out ADLs; assess patient's baseline for ADL function and identify physical deficits which impact ability to perform ADLs (bathing, care of mouth/teeth, toileting, grooming, dressing, etc.)  - Assess/evaluate cause of self-care deficits   - Assess range of motion  - Assess patient's mobility; develop plan if impaired  - Assess patient's need for assistive devices and provide as appropriate  - Encourage maximum independence but intervene and supervise when necessary  - Involve family in performance of ADLs  - Assess for home care needs following discharge   - Consider OT consult to assist with ADL evaluation and planning for discharge  - Provide patient education as appropriate  Outcome: Progressing  Goal: Maintains/Returns to pre admission functional level  Description: INTERVENTIONS:  - Perform AM-PAC 6 Click Basic Mobility/ Daily Activity assessment daily.  - Set and communicate daily mobility goal to care team and patient/family/caregiver.   - Collaborate with rehabilitation services on mobility goals if consulted  - Perform Range of Motion  times a day.  - Reposition patient every  hours.  - Dangle patient times a day  - Stand patient  times a day  - Ambulate patient  times a day  - Out of bed to chair  times a day   - Out of bed for meals  times a day  - Out of bed for toileting  - Record patient progress and toleration of activity level   Outcome: Progressing     Problem: DISCHARGE PLANNING  Goal: Discharge to home or other facility with appropriate resources  Description: INTERVENTIONS:  - Identify barriers to discharge w/patient and caregiver  - Arrange for  needed discharge resources and transportation as appropriate  - Identify discharge learning needs (meds, wound care, etc.)  - Arrange for interpretive services to assist at discharge as needed  - Refer to Case Management Department for coordinating discharge planning if the patient needs post-hospital services based on physician/advanced practitioner order or complex needs related to functional status, cognitive ability, or social support system  Outcome: Progressing     Problem: Knowledge Deficit  Goal: Patient/family/caregiver demonstrates understanding of disease process, treatment plan, medications, and discharge instructions  Description: Complete learning assessment and assess knowledge base.  Interventions:  - Provide teaching at level of understanding  - Provide teaching via preferred learning methods  Outcome: Progressing

## 2024-11-19 NOTE — ASSESSMENT & PLAN NOTE
Has been off his Xarelto due to procedures and had upper endoscopy today.  Will leave off Xarelto just in case he needs a INÉS

## 2024-11-19 NOTE — ASSESSMENT & PLAN NOTE
Right leg is swollen compared to left leg, ordered venous duplex to rule out DVT.  Patient has been off of his Eliquis for atrial fibs

## 2024-11-19 NOTE — ASSESSMENT & PLAN NOTE
+ blood cultures with MSSA  INÉS requested for evaluation to exclude endocarditis.  There are no contraindications to the procedure. Plan for INÉS at 9 am 11/20/2024.  NPO after midnight except for meds with sips of water.  Continue Xarelto for stroke prevention in the setting of persistent a fib.

## 2024-11-19 NOTE — ASSESSMENT & PLAN NOTE
Single blood culture on 11/14 with MSSA.  Source of bacteremia is likely right toe cellulitis, osteo.  Patient has been on oral Keflex since then following surgical cure of right toe osteo.  However, patient has a pacemaker in place and would need to rule out endovascular/device infection.  He is systemically well, afebrile without leukocytosis     Continue cefazolin 2 every 8 hours   Follow-up repeat blood cultures   Check TTE, recommend INÉS.  If there is no evidence of pacemaker involvement/he has rapid clearance of bacteremia then may be reasonable to retain cardiac device   Anticipate 6 weeks of IV antibiotics, consult IR for venous access once repeat blood cultures are negative for 48 hours.  Final duration of antibiotics to be determined by above workup.   Check daily CBC, CMP while inpatient to monitor for any evolving antibiotic toxicity or treatment failure   Continue supportive care, monitor clinical course

## 2024-11-19 NOTE — ASSESSMENT & PLAN NOTE
In the setting of nonhealing right toe ulcer.  Likely source of bacteremia.  MRI noted right proximal phalanx osteo and he is s/p toe amputation at second MTPJ with presumed surgical cure. Notes some worsening right leg swelling since surgery     Continue wound care, additional management per podiatry   FU RLE duplex   Continue limb elevation, consider trial of iv diuretics, may require compression wraps if cleared by podiatry   Serial exams

## 2024-11-19 NOTE — ASSESSMENT & PLAN NOTE
Patient is not on any medications at this time  Will do insulin sliding scale, as he is pre-diabetic with an infections    Lab Results   Component Value Date    HGBA1C 5.8 (H) 11/14/2024

## 2024-11-19 NOTE — CONSULTS
Consultation - Infectious Disease   Name: David Carpio 59 y.o. male I MRN: [id number]  Unit/Bed#: -01 I Date of Admission: 11/18/2024   Date of Service: 11/19/2024 I Hospital Day: 1   Inpatient consult to Infectious Diseases  Consult performed by: Idalmis Hoffman MD  Consult ordered by: Kolton Lamas MD          VIRTUAL CARE DOCUMENTATION:     1. This service was provided via Telemedicine using : [employer] kit    2. Parties in the room with patient during teleconsult : Patient only    3. Confidentiality : My office door was closed    4. Participants : No one else was in the room    5. Patient acknowledged consent and understanding of privacy and security of the  Telemedicine consult. I informed the patient that I have reviewed their record in Epic and presented the opportunity for them to ask any questions regarding the visit today.  The patient agreed to participate.    6. Time spent 7 minutes       Physician Requesting Evaluation: Renu Castellon MD   Reason for Evaluation / Principal Problem: Bacteremia    Assessment & Plan  Bacteremia  Single blood culture on 11/14 with MSSA.  Source of bacteremia is likely right toe cellulitis, osteo.  Patient has been on oral Keflex since then following surgical cure of right toe osteo.  However, patient has a pacemaker in place and would need to rule out endovascular/device infection.  He is systemically well, afebrile without leukocytosis     Continue cefazolin 2 every 8 hours   Follow-up repeat blood cultures   Check TTE, recommend INÉS.  If there is no evidence of pacemaker involvement/he has rapid clearance of bacteremia then may be reasonable to retain cardiac device   Anticipate 6 weeks of IV antibiotics, consult IR for venous access once repeat blood cultures are negative for 48 hours.  Final duration of antibiotics to be determined by above workup.   Check daily CBC, CMP while inpatient to monitor for any evolving antibiotic toxicity or treatment  failure   Continue supportive care, monitor clinical course  Toe osteomyelitis, right (HCC)  In the setting of nonhealing right toe ulcer.  Likely source of bacteremia.  MRI noted right proximal phalanx osteo and he is s/p toe amputation at second MTPJ with presumed surgical cure. Notes some worsening right leg swelling since surgery     Continue wound care, additional management per podiatry   FU RLE duplex   Continue limb elevation, consider trial of iv diuretics, may require compression wraps if cleared by podiatry   Serial exams  Diabetes mellitus (HCC)  Lab Results   Component Value Date    HGBA1C 5.8 (H) 11/14/2024       Recent Labs     11/18/24 2119 11/19/24  0801   POCGLU 77 107       Blood Sugar Average: Last 72 hrs:  (P) 92  Risk factor for infection  Morbid obesity with BMI of 40.0-44.9, adult (HCC)  Risk factor for delayed wound healing  Pacemaker  Dual chamber pacemaker impant in 2017 for daytime pauses/syncope in the setting of a fib. Device is at risk for bacteremic seeding    Reviewed recommendations to continue antibiotics with primary team who is in agreement. Will follow.  Please call with concerns or any changes in clinical status or new test results.      HPI: David Carpio is a 59 y.o. year old male with type 2 diabetes, morbid obesity, peripheral neuropathy, type 2 diabetes.   He was recently hospitalized on 10/31 with cellulitis of right second toe in the setting of a nonhealing foot ulcer.  Osteomyelitis was suspected clinically but patient wished to be discharged home and have workup done as an outpatient.  He was discharged on p.o. Keflex.  An MRI was obtained on 11/12 which was suspicious for osteomyelitis of the proximal phalanx.  He was admitted on 11/14 and underwent second toe amputation at the level of the MTPJ for presumed surgical cure of osteo.  Patient was discharged on 11/16 on p.o. Keflex for 7 days.  Following discharge, single blood culture from 11/14 has been finalized as  MSSA and he was advised to return to the hospital for further evaluation  He had been doing relatively well at home with no fever or chills but has noted right leg swelling.  He was afebrile without leukocytosis and was admitted on IV cefazolin.  Infectious disease is being consulted for diagnostic work up and antibiotic management.    Review of Systems  Pertinent positives and negatives as noted in HPI. Rest complete 12 point system-based review of systems is otherwise negative.    PAST MEDICAL HISTORY:  Past Medical History:   Diagnosis Date    Atrial fibrillation (HCC)     Benzodiazepine withdrawal with complication (HCC) 06/15/2023    Chronic diastolic (congestive) heart failure (HCC)     Diabetes mellitus (HCC)     GERD (gastroesophageal reflux disease)     High cholesterol     Hyperlipidemia     Pacemaker     Stroke (HCC)      Past Surgical History:   Procedure Laterality Date    APPENDECTOMY      ATRIAL CARDIAC PACEMAKER INSERTION      BARIATRIC SURGERY  05/2021    BONE BIOPSY Left 7/26/2023    Procedure: EXCISION BIOPSY BONE LESION EXTREMITY;  Surgeon: Adelia Yousif DPM;  [facility];  Service: Podiatry    EPIDURAL BLOCK INJECTION N/A 5/19/2022    Procedure: BLOCK / INJECTION EPIDURAL STEROID CERVICAL C7-T1;  Surgeon: Skyler Quinn MD;  Location: [facility];  Service: Pain Management     FL GUIDED NEEDLE PLAC BX/ASP/INJ  3/22/2022    FOOT AMPUTATION Left 4/28/2022    Procedure: LEFT TRANSMETATARSAL AMPUTATION.;  Surgeon: Nestor Madden DPM;  [facility];  Service: Podiatry    NERVE BLOCK Right 2/10/2022    Procedure: BLOCK MEDIAL BRANCH C3, C4, C5 #1;  Surgeon: Skyler Quinn MD;  [facility];  Service: Pain Management     NERVE BLOCK Right 3/22/2022    Procedure: BLOCK MEDIAL BRANCH C3, C4, C5 #2;  Surgeon: Skyler Quinn MD;  [facility];  Service: Pain Management     SC AMPUTATION FOOT TRANSMETARSAL Left 4/12/2023    Procedure: REVISION LEFT TRANSMETATARSAL  (TMA) AMPUTATION, REMOVAL OF UNVIABLE TISSUE AND BONE,;  Surgeon: Adelia Yousif DPM;  [facility];  Service: Podiatry    AZ AMPUTATION METATARSAL W/TOE SINGLE Left 4/7/2023    Procedure: 2ND RAY RESECTION FOOT;  Surgeon: Adelia Yousif DPM;  [facility];  Service: Podiatry    AZ AMPUTATION TOE INTERPHALANGEAL JOINT Left 11/16/2021    Procedure: AMPUTATION LESSER TOE;  Surgeon: Nestor Madden DPM;  [facility];  Service: Podiatry    RADIOFREQUENCY ABLATION Right 4/7/2022    Procedure: Right C3, C4, C5 RFA;  Surgeon: Skyler Quinn MD;  [facility];  Service: Pain Management     RHIZOTOMY Right 2/9/2023    Procedure: RHIZOTOMY CERVICAL MEDIAL BRANCH NERVES RIGHT C3, C4, C5;  Surgeon: Skyler Quinn MD;  [facility];  Service: Pain Management     TOE AMPUTATION Left     2nd toe    TOE AMPUTATION Left 9/15/2021    Procedure: AMPUTATION LEFT 4TH TOE;  Surgeon: Nestor Madden DPM;  [facility];  Service: Podiatry    TOE AMPUTATION Right 1/12/2022    Procedure: AMPUTATION TOE;  Surgeon: Hong Jolly DPM;  [facility];  Service: Podiatry    TOE AMPUTATION Right 2/23/2022    Procedure: AMPUTATION TOE  RIGHT SECOND;  Surgeon: Hong Jolly DPM;  Location[facility];  Service: Podiatry    TOE AMPUTATION Right 6/3/2022    Procedure: AMPUTATION right 4th TOE;  Surgeon: Nestor Madden DPM;  [facility];  Service: Podiatry    TOE AMPUTATION Right 11/15/2024    Procedure: AMPUTATION RIGHT 2ND TOE;  Surgeon: Adelia Yousif DPM;  [facility];  Service: Podiatry       FAMILY HISTORY:  Non-contributory    SOCIAL HISTORY:  Social History   /Civil Union  Social History     Substance and Sexual Activity   Alcohol Use Never     Social History     Substance and Sexual Activity   Drug Use Never     Social History     Tobacco Use   Smoking Status Never    Passive exposure: Never   Smokeless Tobacco Never       ALLERGIES:  Allergies   Allergen  Reactions    Ativan [Lorazepam] Anxiety       MEDICATIONS:  All current active medications have been reviewed.    Physical Exam     Temp:  [96.7 °F (35.9 °C)-98.8 °F (37.1 °C)] 98.1 °F (36.7 °C)  HR:  [59-89] 59  Resp:  [17-20] 20  BP: (100-125)/(55-81) 100/57  SpO2:  [91 %-97 %] 91 %  Temp (24hrs), Av.9 °F (36.6 °C), Min:96.7 °F (35.9 °C), Max:98.8 °F (37.1 °C)  Current: Temperature: 98.1 °F (36.7 °C)    Intake/Output Summary (Last 24 hours) at 2024 0827  Last data filed at 2024 0601  Gross per 24 hour   Intake 240 ml   Output 1125 ml   Net -885 ml         Physical exam findings reported by bedside and primary medical team staff    General Appearance:  Appearing well, nontoxic, and in no distress, appears stated age   Lungs:   Clear to auscultation bilaterally, no audible wheezes, rhonchi and rales, respirations unlabored   Chest Wall:  No tenderness or deformity, pacemaker site c/d/i   Heart:  Regular rate and rhythm, S1, S2 normal, no murmur, rub or gallop   Abdomen:   Soft, non-tender, non-distended, positive bowel sounds, no masses, no organomegaly    No CVA tenderness   Extremities: Extremities normal, atraumatic, no cyanosis, clubbing , 1-2+ R>LLE edema   Skin: Skin color, texture, turgor normal, no rashes or lesions. R second toe incision site c/d/i   Neurologic: Alert and oriented times 3       Invasive Devices:   Peripheral IV 24 Left;Ventral (anterior) Forearm (Active)   Site Assessment WDL;Clean;Dry;Intact 24   Dressing Type Transparent 24   Line Status Flushed & Clamped 24   Dressing Status Dry;Intact;Clean 24   Dressing Change Due 24   Reason Not Rotated Not due 24       Labs, Imaging, & Other Studies     Lab Results:    I have personally reviewed pertinent labs.    Results from last 7 days   Lab Units 24  0531 24  1841 24  0508   WBC Thousand/uL 5.67 6.10 6.89   HEMOGLOBIN g/dL 14.0  14.2 13.1   PLATELETS Thousands/uL 319 328 271     Results from last 7 days   Lab Units 11/19/24  0531 11/18/24  1841 11/15/24  1229 11/14/24  1340   POTASSIUM mmol/L 3.5 3.8   < > 3.6   CHLORIDE mmol/L 103 102   < > 100   CO2 mmol/L 28 26   < > 32   BUN mg/dL 11 12   < > 13   CREATININE mg/dL 0.85 0.96   < > 0.92   EGFR ml/min/1.73sq m 94 84   < > 89   CALCIUM mg/dL 8.6 8.9   < > 9.1   AST U/L  --  23  --  18   ALT U/L  --  14  --  13   ALK PHOS U/L  --  93  --  87    < > = values in this interval not displayed.     Results from last 7 days   Lab Units 11/18/24  1841 11/14/24  1345 11/14/24  1340   BLOOD CULTURE  Received in Microbiology Lab. Culture in Progress.  Received in Microbiology Lab. Culture in Progress. Staphylococcus aureus* No Growth After 4 Days.   GRAM STAIN RESULT   --  Gram positive cocci in clusters*  --        Imaging Studies:   I have personally reviewed pertinent imaging study reports and images in PACS.      EKG, Pathology, and Other Studies:   I have personally reviewed pertinent reports and reviewed external records.    Counseling/Coordination of care:       Total 90 minutes in evaluation of the patient and communication with the patient via telehealth of which 50 minutes was in counseling/coordination of care.  Extensive review of the medical records in epic including review of the notes, radiographs, and laboratory results.  My recommendations were discussed with the patient in detail who verbalized understanding.

## 2024-11-20 ENCOUNTER — APPOINTMENT (INPATIENT)
Dept: NON INVASIVE DIAGNOSTICS | Facility: HOSPITAL | Age: 59
DRG: 872 | End: 2024-11-20
Payer: COMMERCIAL

## 2024-11-20 ENCOUNTER — APPOINTMENT (INPATIENT)
Dept: RADIOLOGY | Facility: HOSPITAL | Age: 59
DRG: 872 | End: 2024-11-20
Payer: COMMERCIAL

## 2024-11-20 LAB
ANION GAP SERPL CALCULATED.3IONS-SCNC: 4 MMOL/L (ref 4–13)
BASOPHILS # BLD AUTO: 0.04 THOUSANDS/ÂΜL (ref 0–0.1)
BASOPHILS NFR BLD AUTO: 1 % (ref 0–1)
BUN SERPL-MCNC: 13 MG/DL (ref 5–25)
CALCIUM SERPL-MCNC: 8.4 MG/DL (ref 8.4–10.2)
CHLORIDE SERPL-SCNC: 106 MMOL/L (ref 96–108)
CO2 SERPL-SCNC: 30 MMOL/L (ref 21–32)
CREAT SERPL-MCNC: 0.96 MG/DL (ref 0.6–1.3)
EOSINOPHIL # BLD AUTO: 0.2 THOUSAND/ÂΜL (ref 0–0.61)
EOSINOPHIL NFR BLD AUTO: 3 % (ref 0–6)
ERYTHROCYTE [DISTWIDTH] IN BLOOD BY AUTOMATED COUNT: 13.5 % (ref 11.6–15.1)
GFR SERPL CREATININE-BSD FRML MDRD: 84 ML/MIN/1.73SQ M
GLUCOSE SERPL-MCNC: 141 MG/DL (ref 65–140)
GLUCOSE SERPL-MCNC: 83 MG/DL (ref 65–140)
GLUCOSE SERPL-MCNC: 86 MG/DL (ref 65–140)
GLUCOSE SERPL-MCNC: 91 MG/DL (ref 65–140)
HCT VFR BLD AUTO: 45.1 % (ref 36.5–49.3)
HGB BLD-MCNC: 14.3 G/DL (ref 12–17)
IMM GRANULOCYTES # BLD AUTO: 0.02 THOUSAND/UL (ref 0–0.2)
IMM GRANULOCYTES NFR BLD AUTO: 0 % (ref 0–2)
LYMPHOCYTES # BLD AUTO: 2.15 THOUSANDS/ÂΜL (ref 0.6–4.47)
LYMPHOCYTES NFR BLD AUTO: 33 % (ref 14–44)
MCH RBC QN AUTO: 27.7 PG (ref 26.8–34.3)
MCHC RBC AUTO-ENTMCNC: 31.7 G/DL (ref 31.4–37.4)
MCV RBC AUTO: 87 FL (ref 82–98)
MONOCYTES # BLD AUTO: 0.54 THOUSAND/ÂΜL (ref 0.17–1.22)
MONOCYTES NFR BLD AUTO: 8 % (ref 4–12)
NEUTROPHILS # BLD AUTO: 3.55 THOUSANDS/ÂΜL (ref 1.85–7.62)
NEUTS SEG NFR BLD AUTO: 55 % (ref 43–75)
NRBC BLD AUTO-RTO: 0 /100 WBCS
PLATELET # BLD AUTO: 308 THOUSANDS/UL (ref 149–390)
PMV BLD AUTO: 8.7 FL (ref 8.9–12.7)
POTASSIUM SERPL-SCNC: 3.8 MMOL/L (ref 3.5–5.3)
RBC # BLD AUTO: 5.17 MILLION/UL (ref 3.88–5.62)
SL CV LV EF: 60
SODIUM SERPL-SCNC: 140 MMOL/L (ref 135–147)
WBC # BLD AUTO: 6.5 THOUSAND/UL (ref 4.31–10.16)

## 2024-11-20 PROCEDURE — 99233 SBSQ HOSP IP/OBS HIGH 50: CPT | Performed by: INTERNAL MEDICINE

## 2024-11-20 PROCEDURE — B246ZZZ ULTRASONOGRAPHY OF RIGHT AND LEFT HEART: ICD-10-PCS | Performed by: INTERNAL MEDICINE

## 2024-11-20 PROCEDURE — 93325 DOPPLER ECHO COLOR FLOW MAPG: CPT | Performed by: INTERNAL MEDICINE

## 2024-11-20 PROCEDURE — 93312 ECHO TRANSESOPHAGEAL: CPT

## 2024-11-20 PROCEDURE — 80048 BASIC METABOLIC PNL TOTAL CA: CPT | Performed by: INTERNAL MEDICINE

## 2024-11-20 PROCEDURE — 99232 SBSQ HOSP IP/OBS MODERATE 35: CPT | Performed by: INTERNAL MEDICINE

## 2024-11-20 PROCEDURE — 93320 DOPPLER ECHO COMPLETE: CPT | Performed by: INTERNAL MEDICINE

## 2024-11-20 PROCEDURE — 93312 ECHO TRANSESOPHAGEAL: CPT | Performed by: INTERNAL MEDICINE

## 2024-11-20 PROCEDURE — 82948 REAGENT STRIP/BLOOD GLUCOSE: CPT

## 2024-11-20 PROCEDURE — 85025 COMPLETE CBC W/AUTO DIFF WBC: CPT | Performed by: INTERNAL MEDICINE

## 2024-11-20 PROCEDURE — 99231 SBSQ HOSP IP/OBS SF/LOW 25: CPT | Performed by: STUDENT IN AN ORGANIZED HEALTH CARE EDUCATION/TRAINING PROGRAM

## 2024-11-20 PROCEDURE — 73630 X-RAY EXAM OF FOOT: CPT

## 2024-11-20 PROCEDURE — NC001 PR NO CHARGE: Performed by: INTERNAL MEDICINE

## 2024-11-20 RX ORDER — LIDOCAINE HYDROCHLORIDE 20 MG/ML
INJECTION, SOLUTION EPIDURAL; INFILTRATION; INTRACAUDAL; PERINEURAL AS NEEDED
Status: DISCONTINUED | OUTPATIENT
Start: 2024-11-20 | End: 2024-11-20

## 2024-11-20 RX ORDER — MIDAZOLAM HYDROCHLORIDE 2 MG/2ML
INJECTION, SOLUTION INTRAMUSCULAR; INTRAVENOUS AS NEEDED
Status: DISCONTINUED | OUTPATIENT
Start: 2024-11-20 | End: 2024-11-20

## 2024-11-20 RX ORDER — DEXMEDETOMIDINE HYDROCHLORIDE 4 UG/ML
INJECTION, SOLUTION INTRAVENOUS AS NEEDED
Status: DISCONTINUED | OUTPATIENT
Start: 2024-11-20 | End: 2024-11-20

## 2024-11-20 RX ORDER — PROPOFOL 10 MG/ML
INJECTION, EMULSION INTRAVENOUS AS NEEDED
Status: DISCONTINUED | OUTPATIENT
Start: 2024-11-20 | End: 2024-11-20

## 2024-11-20 RX ORDER — SODIUM CHLORIDE, SODIUM LACTATE, POTASSIUM CHLORIDE, CALCIUM CHLORIDE 600; 310; 30; 20 MG/100ML; MG/100ML; MG/100ML; MG/100ML
INJECTION, SOLUTION INTRAVENOUS CONTINUOUS PRN
Status: DISCONTINUED | OUTPATIENT
Start: 2024-11-20 | End: 2024-11-20

## 2024-11-20 RX ADMIN — GABAPENTIN 800 MG: 400 CAPSULE ORAL at 11:46

## 2024-11-20 RX ADMIN — BUSPIRONE HYDROCHLORIDE 15 MG: 10 TABLET ORAL at 21:21

## 2024-11-20 RX ADMIN — PROPOFOL 50 MG: 10 INJECTION, EMULSION INTRAVENOUS at 08:51

## 2024-11-20 RX ADMIN — PROPOFOL 20 MG: 10 INJECTION, EMULSION INTRAVENOUS at 08:53

## 2024-11-20 RX ADMIN — BUSPIRONE HYDROCHLORIDE 15 MG: 10 TABLET ORAL at 17:14

## 2024-11-20 RX ADMIN — LIDOCAINE HYDROCHLORIDE 100 MG: 20 INJECTION, SOLUTION EPIDURAL; INFILTRATION; INTRACAUDAL; PERINEURAL at 08:48

## 2024-11-20 RX ADMIN — PROPOFOL 30 MG: 10 INJECTION, EMULSION INTRAVENOUS at 08:50

## 2024-11-20 RX ADMIN — DOXEPIN HYDROCHLORIDE 100 MG: 25 CAPSULE ORAL at 21:21

## 2024-11-20 RX ADMIN — DOCUSATE SODIUM 100 MG: 100 CAPSULE, LIQUID FILLED ORAL at 17:14

## 2024-11-20 RX ADMIN — PROPOFOL 20 MG: 10 INJECTION, EMULSION INTRAVENOUS at 08:55

## 2024-11-20 RX ADMIN — PROPOFOL 30 MG: 10 INJECTION, EMULSION INTRAVENOUS at 08:54

## 2024-11-20 RX ADMIN — PHENYLEPHRINE HYDROCHLORIDE 200 MCG: 10 INJECTION INTRAVENOUS at 09:08

## 2024-11-20 RX ADMIN — PROPOFOL 30 MG: 10 INJECTION, EMULSION INTRAVENOUS at 08:52

## 2024-11-20 RX ADMIN — GABAPENTIN 800 MG: 400 CAPSULE ORAL at 17:14

## 2024-11-20 RX ADMIN — FUROSEMIDE 20 MG: 20 TABLET ORAL at 11:46

## 2024-11-20 RX ADMIN — GABAPENTIN 800 MG: 400 CAPSULE ORAL at 21:21

## 2024-11-20 RX ADMIN — PROPOFOL 100 MG: 10 INJECTION, EMULSION INTRAVENOUS at 08:48

## 2024-11-20 RX ADMIN — DEXMEDETOMIDINE HYDROCHLORIDE 12 MCG: 400 INJECTION INTRAVENOUS at 08:42

## 2024-11-20 RX ADMIN — SODIUM CHLORIDE, SODIUM LACTATE, POTASSIUM CHLORIDE, AND CALCIUM CHLORIDE: .6; .31; .03; .02 INJECTION, SOLUTION INTRAVENOUS at 08:38

## 2024-11-20 RX ADMIN — CEFAZOLIN SODIUM 2000 MG: 2 SOLUTION INTRAVENOUS at 23:28

## 2024-11-20 RX ADMIN — MIDAZOLAM 2 MG: 1 INJECTION INTRAMUSCULAR; INTRAVENOUS at 08:44

## 2024-11-20 RX ADMIN — RIVAROXABAN 20 MG: 20 TABLET, FILM COATED ORAL at 11:46

## 2024-11-20 RX ADMIN — BUPROPION HYDROCHLORIDE 300 MG: 150 TABLET, EXTENDED RELEASE ORAL at 11:46

## 2024-11-20 RX ADMIN — PHENYLEPHRINE HYDROCHLORIDE 200 MCG: 10 INJECTION INTRAVENOUS at 09:10

## 2024-11-20 RX ADMIN — PHENYLEPHRINE HYDROCHLORIDE 200 MCG: 10 INJECTION INTRAVENOUS at 08:57

## 2024-11-20 RX ADMIN — CEFAZOLIN SODIUM 2000 MG: 2 SOLUTION INTRAVENOUS at 06:20

## 2024-11-20 RX ADMIN — PHENYLEPHRINE HYDROCHLORIDE 200 MCG: 10 INJECTION INTRAVENOUS at 08:47

## 2024-11-20 RX ADMIN — DOCUSATE SODIUM 100 MG: 100 CAPSULE, LIQUID FILLED ORAL at 11:46

## 2024-11-20 RX ADMIN — CEFAZOLIN SODIUM 2000 MG: 2 SOLUTION INTRAVENOUS at 17:14

## 2024-11-20 RX ADMIN — BUSPIRONE HYDROCHLORIDE 15 MG: 10 TABLET ORAL at 11:46

## 2024-11-20 RX ADMIN — POTASSIUM CHLORIDE 20 MEQ: 1500 TABLET, EXTENDED RELEASE ORAL at 11:47

## 2024-11-20 RX ADMIN — PROPOFOL 20 MG: 10 INJECTION, EMULSION INTRAVENOUS at 08:49

## 2024-11-20 NOTE — PLAN OF CARE
Problem: PAIN - ADULT  Goal: Verbalizes/displays adequate comfort level or baseline comfort level  Description: Interventions:  - Encourage patient to monitor pain and request assistance  - Assess pain using appropriate pain scale  - Administer analgesics based on type and severity of pain and evaluate response  - Implement non-pharmacological measures as appropriate and evaluate response  - Consider cultural and social influences on pain and pain management  - Notify physician/advanced practitioner if interventions unsuccessful or patient reports new pain  Outcome: Progressing     Problem: INFECTION - ADULT  Goal: Absence or prevention of progression during hospitalization  Description: INTERVENTIONS:  - Assess and monitor for signs and symptoms of infection  - Monitor lab/diagnostic results  - Monitor all insertion sites, i.e. indwelling lines, tubes, and drains  - Monitor endotracheal if appropriate and nasal secretions for changes in amount and color  - [place] appropriate cooling/warming therapies per order  - Administer medications as ordered  - Instruct and encourage patient and family to use good hand hygiene technique  - Identify and instruct in appropriate isolation precautions for identified infection/condition  Outcome: Progressing  Goal: Absence of fever/infection during neutropenic period  Description: INTERVENTIONS:  - Monitor WBC    Outcome: Progressing     Problem: SAFETY ADULT  Goal: Patient will remain free of falls  Description: INTERVENTIONS:  - Educate patient/family on patient safety including physical limitations  - Instruct patient to call for assistance with activity   - Consult OT/PT to assist with strengthening/mobility   - Keep Call bell within reach  - Keep bed low and locked with side rails adjusted as appropriate  - Keep care items and personal belongings within reach  - Initiate and maintain comfort rounds  - Apply yellow socks and bracelet for high fall risk patients  - Consider  moving patient to room near nurses station  Outcome: Progressing  Goal: Maintain or return to baseline ADL function  Description: INTERVENTIONS:  -  Assess patient's ability to carry out ADLs; assess patient's baseline for ADL function and identify physical deficits which impact ability to perform ADLs (bathing, care of mouth/teeth, toileting, grooming, dressing, etc.)  - Assess/evaluate cause of self-care deficits   - Assess range of motion  - Assess patient's mobility; develop plan if impaired  - Assess patient's need for assistive devices and provide as appropriate  - Encourage maximum independence but intervene and supervise when necessary  - Involve family in performance of ADLs  - Assess for home care needs following discharge   - Consider OT consult to assist with ADL evaluation and planning for discharge  - Provide patient education as appropriate  Outcome: Progressing  Goal: Maintains/Returns to pre admission functional level  Description: INTERVENTIONS:  - Perform AM-PAC 6 Click Basic Mobility/ Daily Activity assessment daily.  - Set and communicate daily mobility goal to care team and patient/family/caregiver.   - Collaborate with rehabilitation services on mobility goals if consulted  - Perform Range of Motion 3 times a day.  - Reposition patient every 2 hours.  - Dangle patient 3 times a day  - Stand patient 3 times a day  - Ambulate patient 3 times a day  - Out of bed to chair 3 times a day   - Out of bed for meals 3 times a day  - Out of bed for toileting  - Record patient progress and toleration of activity level   Outcome: Progressing     Problem: DISCHARGE PLANNING  Goal: Discharge to home or other facility with appropriate resources  Description: INTERVENTIONS:  - Identify barriers to discharge w/patient and caregiver  - Arrange for needed discharge resources and transportation as appropriate  - Identify discharge learning needs (meds, wound care, etc.)  - Arrange for interpretive services to  assist at discharge as needed  - Refer to Case Management Department for coordinating discharge planning if the patient needs post-hospital services based on physician/advanced practitioner order or complex needs related to functional status, cognitive ability, or social support system  Outcome: Progressing     Problem: Knowledge Deficit  Goal: Patient/family/caregiver demonstrates understanding of disease process, treatment plan, medications, and discharge instructions  Description: Complete learning assessment and assess knowledge base.  Interventions:  - Provide teaching at level of understanding  - Provide teaching via preferred learning methods  Outcome: Progressing

## 2024-11-20 NOTE — PROGRESS NOTES
Progress Note - Infectious Disease   Name: David Carpio 59 y.o. male I MRN: [id number]  Unit/Bed#: -01 I Date of Admission: 11/18/2024   Date of Service: 11/20/2024 I Hospital Day: 2           VIRTUAL CARE DOCUMENTATION:     1. This service was provided via Telemedicine using : [employer] kit    2. Parties in the room with patient during teleconsult : Patient only    3. Confidentiality : My office door was closed    4. Participants : No one else was in the room    5. Patient acknowledged consent and understanding of privacy and security of the  Telemedicine consult. I informed the patient that I have reviewed their record in Epic and presented the opportunity for them to ask any questions regarding the visit today.  The patient agreed to participate.    6. Time spent 10 minutes       Assessment & Plan  MSSA Bacteremia  Single blood culture on 11/14 with MSSA.  Source of bacteremia is likely right toe cellulitis, osteo.  Patient has been on oral Keflex since then following surgical cure of right toe osteo and repeat blood cx are negative so far.    Patient has a pacemaker in place and would need to rule out endovascular/device infection, INÉS is fortunately negative for valvular/lead vegetation.  He is systemically well, afebrile without leukocytosis     Continue cefazolin 2 every 8 hours   Follow-up repeat blood cultures   If blood cx remain negative and INÉS is without vegetation, then may be reasonable to retain cardiac device. However, patient was counseled on high risk of device seeding with staph bacteremia and device removal is favored. Recommend evaluation by patient's primary cardiology team.   Anticipate 6 weeks of IV antibiotics, consult IR for venous access once repeat blood cultures are negative for 48 hours.  Final duration of antibiotics to be determined by above workup.    Check daily CBC, CMP while inpatient to monitor for any evolving antibiotic toxicity or treatment failure   Continue  supportive care, monitor clinical course  Toe osteomyelitis, right (HCC)  In the setting of nonhealing right toe ulcer.  Likely source of bacteremia.  MRI noted right proximal phalanx osteo and he is s/p toe amputation at second MTPJ with presumed surgical cure. Notes some worsening right leg swelling since surgery     Continue wound care, additional management per podiatry   FU RLE duplex   Continue limb elevation, consider trial of iv diuretics, may require compression wraps if cleared by podiatry   Serial exams  Diabetes mellitus (HCC)  Lab Results   Component Value Date    HGBA1C 5.8 (H) 2024       Recent Labs     24  1215 24  1702 245 24  1012   POCGLU 90 91 152* 83       Blood Sugar Average: Last 72 hrs:  (P) 100  Risk factor for infection  Morbid obesity with BMI of 40.0-44.9, adult (HCC)  Risk factor for delayed wound healing  Pacemaker  Dual chamber pacemaker impant in 2017 for sick sinus syndrome in the setting of a fib. Device is at risk for bacteremic seeding      Reviewed recommendations to continue abx and cardiology evaluation  with primary team who is in agreement. Will follow.  Please call with concerns or any changes in clinical status or new test results.        Subjective:  The patient has no complaints.  Denies fevers, chills, or sweats.  Denies nausea, vomiting, or diarrhea.    Antibiotics:  cefazolin    Physical Exam     Temp:  [97.1 °F (36.2 °C)-97.9 °F (36.6 °C)] 97.7 °F (36.5 °C)  HR:  [52-72] 52  Resp:  [16-22] 16  BP: ()/(47-79) 117/67  SpO2:  [93 %-99 %] 96 %  Temp (24hrs), Av.5 °F (36.4 °C), Min:97.1 °F (36.2 °C), Max:97.9 °F (36.6 °C)  Current: Temperature: 97.7 °F (36.5 °C)    Intake/Output Summary (Last 24 hours) at 2024 1025  Last data filed at 2024 0855  Gross per 24 hour   Intake 1380 ml   Output --   Net 1380 ml       Physical exam findings reported by bedside and primary medical team staff      General Appearance:   Appearing well, nontoxic, and in no distress, appears stated age   Lungs:   Clear to auscultation bilaterally, no audible wheezes, rhonchi and rales, respirations unlabored   Heart:  Regular rate and rhythm, S1, S2 normal, no murmur, rub or gallop   Abdomen:   Soft, non-tender, non-distended, positive bowel sounds, no masses, no organomegaly    No CVA tenderness   Extremities: Extremities normal, atraumatic, no cyanosis, clubbing , 2+ RLE > LLE edema   Skin: Chronic venous hyperpigmentation of RLE,  foot wound in dressing   Neurologic: Alert and oriented times 3,         Invasive Devices:   Peripheral IV 11/18/24 Left;Ventral (anterior) Forearm (Active)   Site Assessment WDL 11/20/24 0933   Dressing Type Transparent 11/20/24 0933   Line Status Blood return noted;Flushed;Infusing 11/20/24 0933   Dressing Status Clean;Dry;Intact 11/20/24 0933   Dressing Change Due 11/22/24 11/18/24 2016   Reason Not Rotated Not due 11/19/24 2000       Labs, Imaging, & Other Studies     Lab Results:    I have personally reviewed pertinent labs.    Results from last 7 days   Lab Units 11/20/24  0621 11/19/24  0531 11/18/24  1841   WBC Thousand/uL 6.50 5.67 6.10   HEMOGLOBIN g/dL 14.3 14.0 14.2   PLATELETS Thousands/uL 308 319 328     Results from last 7 days   Lab Units 11/20/24  0621 11/19/24  0531 11/18/24  1841 11/15/24  1229 11/14/24  1340   POTASSIUM mmol/L 3.8   < > 3.8   < > 3.6   CHLORIDE mmol/L 106   < > 102   < > 100   CO2 mmol/L 30   < > 26   < > 32   BUN mg/dL 13   < > 12   < > 13   CREATININE mg/dL 0.96   < > 0.96   < > 0.92   EGFR ml/min/1.73sq m 84   < > 84   < > 89   CALCIUM mg/dL 8.4   < > 8.9   < > 9.1   AST U/L  --   --  23  --  18   ALT U/L  --   --  14  --  13   ALK PHOS U/L  --   --  93  --  87    < > = values in this interval not displayed.     Results from last 7 days   Lab Units 11/18/24  1841 11/14/24  1345 11/14/24  1340   BLOOD CULTURE  No Growth at 24 hrs.  No Growth at 24 hrs. Staphylococcus aureus* No  Growth After 5 Days.   GRAM STAIN RESULT   --  Gram positive cocci in clusters*  --        Imaging Studies:   I have personally reviewed pertinent imaging study reports and images in PACS.      EKG, Pathology, and Other Studies:   I have personally reviewed pertinent reports.        Counseling/Coordination of care:       Total 50 minutes in evaluation of the patient and communication with the patient via telehealth of which 30 minutes was in counseling/coordination of care.  Extensive review of the medical records in epic including review of the notes, radiographs, and laboratory results.  My recommendations were discussed with the patient in detail who verbalized understanding.

## 2024-11-20 NOTE — PLAN OF CARE
Problem: PAIN - ADULT  Goal: Verbalizes/displays adequate comfort level or baseline comfort level  Description: Interventions:  - Encourage patient to monitor pain and request assistance  - Assess pain using appropriate pain scale  - Administer analgesics based on type and severity of pain and evaluate response  - Implement non-pharmacological measures as appropriate and evaluate response  - Consider cultural and social influences on pain and pain management  - Notify physician/advanced practitioner if interventions unsuccessful or patient reports new pain  Outcome: Progressing     Problem: INFECTION - ADULT  Goal: Absence or prevention of progression during hospitalization  Description: INTERVENTIONS:  - Assess and monitor for signs and symptoms of infection  - Monitor lab/diagnostic results  - Monitor all insertion sites, i.e. indwelling lines, tubes, and drains  - Monitor endotracheal if appropriate and nasal secretions for changes in amount and color  - [place]quey appropriate cooling/warming therapies per order  - Administer medications as ordered  - Instruct and encourage patient and family to use good hand hygiene technique  - Identify and instruct in appropriate isolation precautions for identified infection/condition  Outcome: Progressing  Goal: Absence of fever/infection during neutropenic period  Description: INTERVENTIONS:  - Monitor WBC    Outcome: Progressing     Problem: SAFETY ADULT  Goal: Patient will remain free of falls  Description: INTERVENTIONS:  - Educate patient/family on patient safety including physical limitations  - Instruct patient to call for assistance with activity   - Consult OT/PT to assist with strengthening/mobility   - Keep Call bell within reach  - Keep bed low and locked with side rails adjusted as appropriate  - Keep care items and personal belongings within reach  - Initiate and maintain comfort rounds  - Apply yellow socks and bracelet for high fall risk patients  - Consider  moving patient to room near nurses station  Outcome: Progressing  Goal: Maintain or return to baseline ADL function  Description: INTERVENTIONS:  -  Assess patient's ability to carry out ADLs; assess patient's baseline for ADL function and identify physical deficits which impact ability to perform ADLs (bathing, care of mouth/teeth, toileting, grooming, dressing, etc.)  - Assess/evaluate cause of self-care deficits   - Assess range of motion  - Assess patient's mobility; develop plan if impaired  - Assess patient's need for assistive devices and provide as appropriate  - Encourage maximum independence but intervene and supervise when necessary  - Involve family in performance of ADLs  - Assess for home care needs following discharge   - Consider OT consult to assist with ADL evaluation and planning for discharge  - Provide patient education as appropriate  Outcome: Progressing  Goal: Maintains/Returns to pre admission functional level  Description: INTERVENTIONS:  - Perform AM-PAC 6 Click Basic Mobility/ Daily Activity assessment daily.  - Set and communicate daily mobility goal to care team and patient/family/caregiver.   - Collaborate with rehabilitation services on mobility goals if consulted  - Perform Range of Motion 3 times a day.  - Reposition patient every 2 hours.  - Dangle patient 3 times a day  - Stand patient 3 times a day  - Ambulate patient 3 times a day  - Out of bed to chair 3 times a day   - Out of bed for meals 3 times a day  - Out of bed for toileting  - Record patient progress and toleration of activity level   Outcome: Progressing     Problem: DISCHARGE PLANNING  Goal: Discharge to home or other facility with appropriate resources  Description: INTERVENTIONS:  - Identify barriers to discharge w/patient and caregiver  - Arrange for needed discharge resources and transportation as appropriate  - Identify discharge learning needs (meds, wound care, etc.)  - Arrange for interpretive services to  assist at discharge as needed  - Refer to Case Management Department for coordinating discharge planning if the patient needs post-hospital services based on physician/advanced practitioner order or complex needs related to functional status, cognitive ability, or social support system  Outcome: Progressing     Problem: Knowledge Deficit  Goal: Patient/family/caregiver demonstrates understanding of disease process, treatment plan, medications, and discharge instructions  Description: Complete learning assessment and assess knowledge base.  Interventions:  - Provide teaching at level of understanding  - Provide teaching via preferred learning methods  Outcome: Progressing

## 2024-11-20 NOTE — ASSESSMENT & PLAN NOTE
Takes as needed Lasix and metolazone  Is currently followed by [facility] cardiology  Patient with some chronic edema and patient states his abdomen is little bit distended  Will start patient on Lasix 20 mg daily and Klor-Con 20 mill equivalents daily  Echocardiogram showed normal ejection fraction with no valvular pathology.  Continue with current dose of diuretics.    Wt Readings from Last 3 Encounters:   11/20/24 (!) 138 kg (305 lb)   11/14/24 (!) 137 kg (302 lb 14.6 oz)   10/31/24 (!) 137 kg (303 lb)

## 2024-11-20 NOTE — ASSESSMENT & PLAN NOTE
Recent hospitalization for right second toe osteomyelitis status post amputation.  Single blood culture from hospitalization on 11/14 with MSSA.  Patient has been on oral Keflex since discharge assuming surgical cure of right toe osteomyelitis.  However he does have a pacemaker in place.  Currently does not have any fever, leukocytosis or any systemic signs of infection.  No increasing drainage or discharge noted from 2 wounds with sutures in place.    Continue with high-dose IV cefazolin 2 g every 8 hourly   Follow-up on repeat blood cultures to see clearance of bacteremia.  If blood cultures remain negative for 48 hours through tonight then IR will be consulted for PICC line placement.  Transthoracic echo without any valvular abnormalities.    11/20 INÉS - no evidence of any bacterial vegetations  Infectious disease input appreciated   Anticipate 6 weeks of IV antibiotics .  Infectious disease recommended pacemaker to be discontinued however discussed extensively with patient who wants to hold off on that till he discusses with his outpatient cardiologist and is agreeable to 6 weeks of IV antibiotics.  Once repeat blood cultures are negative for 48 hours, IR will be consulted for PICC line placement 11/21.    Case management consulted for home health care and home IV antibiotics set up.  Further duration /dosing of IV antibiotics to be decided by infectious disease.

## 2024-11-20 NOTE — ASSESSMENT & PLAN NOTE
Status post amputation of right second toe on November 15, 2024  Pathology pending.  Presumed surgical cure per operative notes  Has some worsening leg swelling.  No additional drainage or discharge from the wound.  Trial of Lasix 20 mg daily  11/20 - venous Doppler ultrasound negative for DVT   Podiatry evaluation appreciated -recommended weightbearing as tolerated only in toe unloading shoe with lower extremity elevation and Xeroform dressing changes.  Close outpatient podiatry follow-up upon discharge.

## 2024-11-20 NOTE — ASSESSMENT & PLAN NOTE
"History of multiple daytime pauses, status post dual-chamber PPM May 16, 2017  Followed by [facility] cardiology  Plan for INÉS in a.m. to assess pacemaker lead infection in the setting of MSSA bacteremia.  11/20 - INÉS impression \"All valves were adequately visualized and showed no evidence of endocarditis.  Mild mitral regurgitation was noted.  Spontaneous left atrial contrast was noted secondary to atrial fibrillation.  Left atrial appendage was free of thrombus. Pacing lead was adequately visualized and showed no evidence of any bacterial vegetations\"  Discussed with patient regarding pacemaker removal however he wants to talk to his cardiologist at [facility] after discharge and wants to hold off on that.  He is agreeable to 6 weeks of IV antibiotics via PICC line as recommended by infectious disease.  Recommend close outpatient follow-up with cardiology within 1 to 2 weeks of discharge.  "

## 2024-11-20 NOTE — CONSULTS
Consult - Podiatry   David Carpio 59 y.o. male MRN: [id number]  Unit/Bed#: -01 Encounter: [id number]    Assessment & Plan     Assessment:  Right 2nd toe stump DFU w/ OM s/p amputation (DOS 11/15/24)  DM w/ PN, A1c 5.4% 4/17/24  Bacteremia     Plan:  - Right 2nd toe amputation site appears well coapted with dried sanguinous drainage. There are a few sutures broken to plantar incision site making high risk dehiscence. No SOI. C/W LWC   - XR right foot 11/20/24: stable postop changes s/p 2nd toe amp  - Abx per ID  - WBAT ADLs only in toe unloading shoe   - Elevate LE.   - Xeroform dsd, ACE toes to tibial tuberosity RLE  - Diuresis if able per SLIM  - Rest of care per primary team    Imaging  - LEADs 11/1/24: RLE no AOD, 1.32/nc/132. LLE 1.28/109/TMA.   - VDUS 11/19/24: no DVT    History of Present Illness     HPI:  David Carpio is a 59 y.o. male w/ pmh sig forDM, obesity, PVD, PN admitted for bacteremia. He was recently admitted for right 2nd toe OM and had amputation 11/15/24. Upon d/c, his blood cultures came back + thus he was instructed to go to ED/be admitted for further eval.     Inpatient consult to Podiatry  Consult performed by: Adelia Yousif DPM  Consult ordered by: Renu Castellon MD        Review of Systems   Constitutional: Negative.    HENT: Negative.    Eyes: Negative.    Respiratory: Negative.    Cardiovascular: Negative.    Gastrointestinal: Negative.    Musculoskeletal: s/p R 2nd toe amp   Skin:R toe amp wound   Neurological: PN   Psych: negative.       Historical Information   Past Medical History:   Diagnosis Date    Atrial fibrillation (HCC)     Benzodiazepine withdrawal with complication (HCC) 06/15/2023    Chronic diastolic (congestive) heart failure (HCC)     Diabetes mellitus (HCC)     GERD (gastroesophageal reflux disease)     High cholesterol     Hyperlipidemia     Pacemaker     Stroke (HCC)      Past Surgical History:   Procedure Laterality Date    APPENDECTOMY      ATRIAL  CARDIAC PACEMAKER INSERTION      BARIATRIC SURGERY  05/2021    BONE BIOPSY Left 7/26/2023    Procedure: EXCISION BIOPSY BONE LESION EXTREMITY;  Surgeon: Adelia Yousif DPM;  [facility];  Service: Podiatry    EPIDURAL BLOCK INJECTION N/A 5/19/2022    Procedure: BLOCK / INJECTION EPIDURAL STEROID CERVICAL C7-T1;  Surgeon: Skyler Quinn MD;  [facility]: [facility];  Service: Pain Management     FL GUIDED NEEDLE PLAC BX/ASP/INJ  3/22/2022    FOOT AMPUTATION Left 4/28/2022    Procedure: LEFT TRANSMETATARSAL AMPUTATION.;  Surgeon: Nestor Madden DPM;  [facility];  Service: Podiatry    NERVE BLOCK Right 2/10/2022    Procedure: BLOCK MEDIAL BRANCH C3, C4, C5 #1;  Surgeon: Skyler Quinn MD;  [facility];  Service: Pain Management     NERVE BLOCK Right 3/22/2022    Procedure: BLOCK MEDIAL BRANCH C3, C4, C5 #2;  Surgeon: Skyler Quinn MD;  [facility];  Service: Pain Management     MD AMPUTATION FOOT TRANSMETARSAL Left 4/12/2023    Procedure: REVISION LEFT TRANSMETATARSAL (TMA) AMPUTATION, REMOVAL OF UNVIABLE TISSUE AND BONE,;  Surgeon: Adelia Yousif DPM;  [facility];  Service: Podiatry    MD AMPUTATION METATARSAL W/TOE SINGLE Left 4/7/2023    Procedure: 2ND RAY RESECTION FOOT;  Surgeon: Adelia Yousif DPM;  [facility];  Service: Podiatry    MD AMPUTATION TOE INTERPHALANGEAL JOINT Left 11/16/2021    Procedure: AMPUTATION LESSER TOE;  Surgeon: Nestor Madden DPM;  [facility];  Service: Podiatry    RADIOFREQUENCY ABLATION Right 4/7/2022    Procedure: Right C3, C4, C5 RFA;  Surgeon: Skyler Quinn MD;  [facility];  Service: Pain Management     RHIZOTOMY Right 2/9/2023    Procedure: RHIZOTOMY CERVICAL MEDIAL BRANCH NERVES RIGHT C3, C4, C5;  Surgeon: Skyler Quinn MD;  [facility];  Service: Pain Management     TOE AMPUTATION Left     2nd toe    TOE AMPUTATION Left 9/15/2021    Procedure: AMPUTATION LEFT 4TH TOE;  Surgeon: Nestor  WOLF Madden;  [facility];  Service: Podiatry    TOE AMPUTATION Right 1/12/2022    Procedure: AMPUTATION TOE;  Surgeon: Hong Jolly DPM;  [facility];  Service: Podiatry    TOE AMPUTATION Right 2/23/2022    Procedure: AMPUTATION TOE  RIGHT SECOND;  Surgeon: Hong Jolly DPM;  Location: [facility];  Service: Podiatry    TOE AMPUTATION Right 6/3/2022    Procedure: AMPUTATION right 4th TOE;  Surgeon: Nestor Madden DPM;  [facility];  Service: Podiatry    TOE AMPUTATION Right 11/15/2024    Procedure: AMPUTATION RIGHT 2ND TOE;  Surgeon: Adelia Yousif DPM;  [facility];  Service: Podiatry     Social History   Social History     Substance and Sexual Activity   Alcohol Use Never     Social History     Substance and Sexual Activity   Drug Use Never     Social History     Tobacco Use   Smoking Status Never    Passive exposure: Never   Smokeless Tobacco Never     Family History:   Family History   Problem Relation Age of Onset    No Known Problems Mother     No Known Problems Father        Meds/Allergies     Medications Prior to Admission:     buPROPion (Wellbutrin XL) 300 mg 24 hr tablet    busPIRone (BUSPAR) 15 mg tablet    cephalexin (KEFLEX) 500 mg capsule    doxepin (SINEquan) 10 mg capsule    doxepin (SINEquan) 100 mg capsule    gabapentin (NEURONTIN) 800 mg tablet    calcium citrate-vitamin D 315 mg-5 mcg tablet    ferrous sulfate 325 (65 Fe) mg tablet    furosemide (LASIX) 40 mg tablet    lidocaine (Lidoderm) 5 %    metolazone (ZAROXOLYN) 2.5 mg tablet    Multiple Vitamins-Minerals (Mens Multivitamin) TABS    oxyCODONE (ROXICODONE) 5 immediate release tablet    potassium chloride (KLOR-CON) 20 mEq packet    rivaroxaban (Xarelto) 20 mg tablet  Allergies   Allergen Reactions    Ativan [Lorazepam] Anxiety       Objective   First Vitals:   Blood Pressure: 123/81 (11/18/24 1835)  Pulse: 89 (11/18/24 1835)  Temperature: (!) 96.7 °F (35.9 °C) (11/18/24 1835)  Temp Source:  "Temporal (11/18/24 1835)  Respirations: 17 (11/18/24 1835)  Height: 6' 1\" (185.4 cm) (11/18/24 2016)  Weight - Scale: (!) 139 kg (306 lb 3.5 oz) (11/18/24 1835)  SpO2: 95 % (11/18/24 1835)    Current Vitals:   Blood Pressure: 108/61 (11/20/24 0910)  Pulse: (!) 54 (11/20/24 0910)  Temperature: (!) 97.1 °F (36.2 °C) (11/20/24 0903)  Temp Source: Oral (11/20/24 0743)  Respirations: 18 (11/20/24 0903)  Height: 6' 1\" (185.4 cm) (11/20/24 0743)  Weight - Scale: (!) 138 kg (305 lb) (11/20/24 0743)  SpO2: 97 % (11/20/24 0910)        /61   Pulse (!) 54   Temp (!) 97.1 °F (36.2 °C)   Resp 18   Ht 6' 1\" (1.854 m)   Wt (!) 138 kg (305 lb)   SpO2 97%   BMI 40.24 kg/m²      General Appearance:    Alert, cooperative, no distress   Head:    Normocephalic, without obvious abnormality, atraumatic   Eyes:    PERRL, conjunctiva/corneas clear, EOM's intact        Nose:   Moist mucous membranes   Neck:   Supple, symmetrical, trachea midline   Back:     Symmetric   Lungs:     Respirations unlabored   Heart:    Regular rate and rhythm, S1 and S2 normal, no murmur, rub   or gallop   Abdomen:     Soft, non-tender    B/L LE EXAM   Extremities:   B/L LE edema. S/p right 2nd toe amputation and L TMA   Pulses:   Diminished due to edema   Skin:   Right foot with 2nd toe amputation site appears well coapted with dried sanguinous drainage. There are a few sutures broken to plantar incision site making high risk dehiscence. No SOI.  RLE without acute erythema   Neurologic:   Gross sensation is diminished. Protective sensation is absent.           Lab Results:   Admission on 11/18/2024   Component Date Value    WBC 11/18/2024 6.10     RBC 11/18/2024 5.10     Hemoglobin 11/18/2024 14.2     Hematocrit 11/18/2024 43.4     MCV 11/18/2024 85     MCH 11/18/2024 27.8     MCHC 11/18/2024 32.7     RDW 11/18/2024 13.5     MPV 11/18/2024 8.7 (L)     Platelets 11/18/2024 328     nRBC 11/18/2024 0     Segmented % 11/18/2024 53     Immature Grans % " 11/18/2024 0     Lymphocytes % 11/18/2024 33     Monocytes % 11/18/2024 9     Eosinophils Relative 11/18/2024 4     Basophils Relative 11/18/2024 1     Absolute Neutrophils 11/18/2024 3.20     Absolute Immature Grans 11/18/2024 0.02     Absolute Lymphocytes 11/18/2024 2.04     Absolute Monocytes 11/18/2024 0.57     Eosinophils Absolute 11/18/2024 0.22     Basophils Absolute 11/18/2024 0.05     Sodium 11/18/2024 137     Potassium 11/18/2024 3.8     Chloride 11/18/2024 102     CO2 11/18/2024 26     ANION GAP 11/18/2024 9     BUN 11/18/2024 12     Creatinine 11/18/2024 0.96     Glucose 11/18/2024 85     Calcium 11/18/2024 8.9     AST 11/18/2024 23     ALT 11/18/2024 14     Alkaline Phosphatase 11/18/2024 93     Total Protein 11/18/2024 7.6     Albumin 11/18/2024 4.1     Total Bilirubin 11/18/2024 0.92     eGFR 11/18/2024 84     Sed Rate 11/18/2024 69 (H)     CRP 11/18/2024 33.3 (H)     Blood Culture 11/18/2024 No Growth at 24 hrs.     Blood Culture 11/18/2024 No Growth at 24 hrs.     BSA 11/19/2024 2.57     A4C EF 11/19/2024 57     LV Diastolic Volume (BP) 11/19/2024 95     LV Systolic Volume (BP) 11/19/2024 39     EF 11/19/2024 59     LVIDd 11/19/2024 5.20     LVIDS 11/19/2024 3.40     IVSd 11/19/2024 1.10     LVPWd 11/19/2024 1.00     FS 11/19/2024 35     LA Volume Index (BP) 11/19/2024 26.7     RVID d 11/19/2024 4.3     LA size 11/19/2024 3.7     LA length (A2C) 11/19/2024 6.70     LA volume (BP) 11/19/2024 69     TR Peak Eugenio 11/19/2024 2.3     Triscuspid Valve Regurgi* 11/19/2024 21.0     Ao root 11/19/2024 3.70     Asc Ao 11/19/2024 3     Tricuspid valve peak reg* 11/19/2024 2.30     Left ventricular stroke * 11/19/2024 83.00     IVS 11/19/2024 1.1     LEFT VENTRICLE SYSTOLIC * 11/19/2024 46     LV DIASTOLIC VOLUME (MOD* 11/19/2024 129     Left Atrium Area-systoli* 11/19/2024 21.1     Left Atrium Area-systoli* 11/19/2024 25.1     LVSV, 2D 11/19/2024 83     LV Diastolic Volume Inde* 11/19/2024 37.0     LV Systolic  Volume Index* 11/19/2024 15.2     LV EF 11/19/2024 65     POC Glucose 11/18/2024 77     Sodium 11/19/2024 138     Potassium 11/19/2024 3.5     Chloride 11/19/2024 103     CO2 11/19/2024 28     ANION GAP 11/19/2024 7     BUN 11/19/2024 11     Creatinine 11/19/2024 0.85     Glucose 11/19/2024 102     Calcium 11/19/2024 8.6     eGFR 11/19/2024 94     WBC 11/19/2024 5.67     RBC 11/19/2024 5.10     Hemoglobin 11/19/2024 14.0     Hematocrit 11/19/2024 43.2     MCV 11/19/2024 85     MCH 11/19/2024 27.5     MCHC 11/19/2024 32.4     RDW 11/19/2024 13.5     Platelets 11/19/2024 319     MPV 11/19/2024 8.8 (L)     POC Glucose 11/19/2024 107     BNP 11/19/2024 95     POC Glucose 11/19/2024 90     POC Glucose 11/19/2024 91     POC Glucose 11/19/2024 152 (H)     WBC 11/20/2024 6.50     RBC 11/20/2024 5.17     Hemoglobin 11/20/2024 14.3     Hematocrit 11/20/2024 45.1     MCV 11/20/2024 87     MCH 11/20/2024 27.7     MCHC 11/20/2024 31.7     RDW 11/20/2024 13.5     MPV 11/20/2024 8.7 (L)     Platelets 11/20/2024 308     nRBC 11/20/2024 0     Segmented % 11/20/2024 55     Immature Grans % 11/20/2024 0     Lymphocytes % 11/20/2024 33     Monocytes % 11/20/2024 8     Eosinophils Relative 11/20/2024 3     Basophils Relative 11/20/2024 1     Absolute Neutrophils 11/20/2024 3.55     Absolute Immature Grans 11/20/2024 0.02     Absolute Lymphocytes 11/20/2024 2.15     Absolute Monocytes 11/20/2024 0.54     Eosinophils Absolute 11/20/2024 0.20     Basophils Absolute 11/20/2024 0.04     Sodium 11/20/2024 140     Potassium 11/20/2024 3.8     Chloride 11/20/2024 106     CO2 11/20/2024 30     ANION GAP 11/20/2024 4     BUN 11/20/2024 13     Creatinine 11/20/2024 0.96     Glucose 11/20/2024 91     Calcium 11/20/2024 8.4     eGFR 11/20/2024 84        Results from last 7 days   Lab Units 11/14/24  1345   GRAM STAIN RESULT  Gram positive cocci in clusters*       Results from last 7 days   Lab Units 11/18/24  1841 11/14/24  1345 11/14/24  1340  "  BLOOD CULTURE  No Growth at 24 hrs.  No Growth at 24 hrs. Staphylococcus aureus* No Growth After 5 Days.       Invalid input(s): \"LABAEARO\"            Imaging: I have personally reviewed pertinent films in PACS  EKG, Pathology, and Other Studies: I have personally reviewed pertinent reports.      Code Status: Level 3 - DNAR and DNI  Advance Directive and Living Will:      Power of :    POLST:          "

## 2024-11-20 NOTE — ASSESSMENT & PLAN NOTE
Single blood culture on 11/14 with MSSA.  Source of bacteremia is likely right toe cellulitis, osteo.  Patient has been on oral Keflex since then following surgical cure of right toe osteo and repeat blood cx are negative so far.    Patient has a pacemaker in place and would need to rule out endovascular/device infection, INÉS is fortunately negative for valvular/lead vegetation.  He is systemically well, afebrile without leukocytosis     Continue cefazolin 2 every 8 hours   Follow-up repeat blood cultures   If blood cx remain negative and INÉS is without vegetation, then may be reasonable to retain cardiac device. However, patient was counseled on high risk of device seeding with staph bacteremia and device removal is favored. Recommend evaluation by patient's primary cardiology team.   Anticipate 6 weeks of IV antibiotics, consult IR for venous access once repeat blood cultures are negative for 48 hours.  Final duration of antibiotics to be determined by above workup.    Check daily CBC, CMP while inpatient to monitor for any evolving antibiotic toxicity or treatment failure   Continue supportive care, monitor clinical course

## 2024-11-20 NOTE — ASSESSMENT & PLAN NOTE
Dual chamber pacemaker impant in 2017 for sick sinus syndrome in the setting of a fib. Device is at risk for bacteremic seeding

## 2024-11-20 NOTE — ANESTHESIA POSTPROCEDURE EVALUATION
Post-Op Assessment Note    CV Status:  Stable  Pain Score: 0    Pain management: adequate       Mental Status:  Alert   PONV Controlled:  None   Airway Patency:  Patent  Two or more mitigation strategies used for obstructive sleep apnea   Post Op Vitals Reviewed: Yes    No anethesia notable event occurred.    Staff: Anesthesiologist           Last Filed PACU Vitals:  Vitals Value Taken Time   Temp 97.1 °F (36.2 °C) 11/20/24 0948   Pulse 61 11/20/24 0947   BP 98/54 11/20/24 0945   Resp 18 11/20/24 0948   SpO2 96 % 11/20/24 0947   Vitals shown include unfiled device data.    Modified Sandy:  Activity: 2 (11/20/2024  9:48 AM)  Respiration: 2 (11/20/2024  9:48 AM)  Circulation: 2 (11/20/2024  9:48 AM)  Consciousness: 1 (11/20/2024  9:48 AM)  Oxygen Saturation: 1 (11/20/2024  9:48 AM)  Modified Sandy Score: 8 (11/20/2024  9:48 AM)

## 2024-11-20 NOTE — ASSESSMENT & PLAN NOTE
Lab Results   Component Value Date    HGBA1C 5.8 (H) 11/14/2024       Recent Labs     11/19/24  1215 11/19/24  1702 11/19/24 2055 11/20/24  1012   POCGLU 90 91 152* 83       Blood Sugar Average: Last 72 hrs:  (P) 100  Risk factor for infection

## 2024-11-20 NOTE — ASSESSMENT & PLAN NOTE
Right leg is swollen compared to left leg, ordered venous duplex to rule out DVT.  Patient has been off of his Xarelto. .  Resume Xarelto.  11/20 - venous Doppler ultrasound negative for DVT   Received Lasix 20 mg x 1 dose today.  Will give additional dose in AM.  Continue with lower extremity elevation for edema control.

## 2024-11-20 NOTE — PROCEDURES
INÉS was requested by ID in view of bacteremia.  Transthoracic echocardiogram showed no evidence of endocarditis.  Patient has a permanent pacemaker.  INÉS was performed under propofol given by anesthesia.  The probe was passed without any difficulty.  All valves were adequately visualized and showed no evidence of endocarditis.  Mild mitral regurgitation was noted.  Spontaneous left atrial contrast was noted secondary to atrial fibrillation.  Left atrial appendage was free of thrombus.  Pacing lead was adequately visualized and showed no evidence of any bacterial vegetations.  Patient tolerated the procedure well with no complications.

## 2024-11-20 NOTE — ASSESSMENT & PLAN NOTE
Patient is not on any medications at this time  Continue with serial blood glucose monitoring and sliding scale insulin  Lab Results   Component Value Date    HGBA1C 5.8 (H) 11/14/2024   Sugars remained stable on current regimen.

## 2024-11-20 NOTE — PROGRESS NOTES
Progress Note - Hospitalist   Name: David Carpio 59 y.o. male I MRN: [id number]  Unit/Bed#: -01 I Date of Admission: 11/18/2024   Date of Service: 11/20/2024 I Hospital Day: 2    Assessment & Plan  MSSA Bacteremia  Recent hospitalization for right second toe osteomyelitis status post amputation.  Single blood culture from hospitalization on 11/14 with MSSA.  Patient has been on oral Keflex since discharge assuming surgical cure of right toe osteomyelitis.  However he does have a pacemaker in place.  Currently does not have any fever, leukocytosis or any systemic signs of infection.  No increasing drainage or discharge noted from 2 wounds with sutures in place.    Continue with high-dose IV cefazolin 2 g every 8 hourly   Follow-up on repeat blood cultures to see clearance of bacteremia.  If blood cultures remain negative for 48 hours through tonight then IR will be consulted for PICC line placement.  Transthoracic echo without any valvular abnormalities.    11/20 INÉS - no evidence of any bacterial vegetations  Infectious disease input appreciated   Anticipate 6 weeks of IV antibiotics .  Infectious disease recommended pacemaker to be discontinued however discussed extensively with patient who wants to hold off on that till he discusses with his outpatient cardiologist and is agreeable to 6 weeks of IV antibiotics.  Once repeat blood cultures are negative for 48 hours, IR will be consulted for PICC line placement 11/21.    Case management consulted for home health care and home IV antibiotics set up.  Further duration /dosing of IV antibiotics to be decided by infectious disease.  Right leg swelling  Right leg is swollen compared to left leg, ordered venous duplex to rule out DVT.  Patient has been off of his Xarelto. .  Resume Xarelto.  11/20 - venous Doppler ultrasound negative for DVT   Received Lasix 20 mg x 1 dose today.  Will give additional dose in AM.  Continue with lower extremity elevation for edema  "control.  Toe osteomyelitis, right (HCC)  Status post amputation of right second toe on November 15, 2024  Pathology pending.  Presumed surgical cure per operative notes  Has some worsening leg swelling.  No additional drainage or discharge from the wound.  Trial of Lasix 20 mg daily  11/20 - venous Doppler ultrasound negative for DVT   Podiatry evaluation appreciated -recommended weightbearing as tolerated only in toe unloading shoe with lower extremity elevation and Xeroform dressing changes.  Close outpatient podiatry follow-up upon discharge.  Atrial fibrillation (HCC)  Has been off his Xarelto due to procedures and had upper endoscopy today.  Will resume   Chronic diastolic heart failure (HCC)  Takes as needed Lasix and metolazone  Is currently followed by [facility] cardiology  Patient with some chronic edema and patient states his abdomen is little bit distended  Will start patient on Lasix 20 mg daily and Klor-Con 20 mill equivalents daily  Echocardiogram showed normal ejection fraction with no valvular pathology.  Continue with current dose of diuretics.    Wt Readings from Last 3 Encounters:   11/20/24 (!) 138 kg (305 lb)   11/14/24 (!) 137 kg (302 lb 14.6 oz)   10/31/24 (!) 137 kg (303 lb)     Diabetes mellitus (HCC)  Patient is not on any medications at this time  Continue with serial blood glucose monitoring and sliding scale insulin  Lab Results   Component Value Date    HGBA1C 5.8 (H) 11/14/2024   Sugars remained stable on current regimen.    Pacemaker  History of multiple daytime pauses, status post dual-chamber PPM May 16, 2017  Followed by [facility] cardiology  Plan for INÉS in a.m. to assess pacemaker lead infection in the setting of MSSA bacteremia.  11/20 - INÉS impression \"All valves were adequately visualized and showed no evidence of endocarditis.  Mild mitral regurgitation was noted.  Spontaneous left atrial contrast was noted secondary to atrial fibrillation.  Left atrial appendage was free of thrombus. " "Pacing lead was adequately visualized and showed no evidence of any bacterial vegetations\"  Discussed with patient regarding pacemaker removal however he wants to talk to his cardiologist at [facility] after discharge and wants to hold off on that.  He is agreeable to 6 weeks of IV antibiotics via PICC line as recommended by infectious disease.  Recommend close outpatient follow-up with cardiology within 1 to 2 weeks of discharge.  Morbid obesity with BMI of 40.0-44.9, adult (HCC)  BMI of 40.28  Hypokalemia  Will do Klor-Con 20 meq daily with his Lasix.  Patient is prone to being hypokalemic when he takes a diuretic   follow-up BMP in AM.    Anxiety  Continue BuSpar, Wellbutrin and doxepin.    VTE Pharmacologic Prophylaxis: VTE Score: 4 High Risk (Score >/= 5) - Pharmacological DVT Prophylaxis Ordered: rivaroxaban (Xarelto). Sequential Compression Devices Ordered.    Mobility:   Basic Mobility Inpatient Raw Score: 24  JH-HLM Goal: 8: Walk 250 feet or more  JH-HLM Achieved: 7: Walk 25 feet or more  JH-HLM Goal achieved. Continue to encourage appropriate mobility.    Patient Centered Rounds: I performed bedside rounds with nursing staff today.   Discussions with Specialists or Other Care Team Provider: Discussed with infectious disease.    Education and Discussions with Family / Patient: Patient declined call to .     Current Length of Stay: 2 day(s)  Current Patient Status: Inpatient   Certification Statement: The patient will continue to require additional inpatient hospital stay due to monitoring of blood cultures and ongoing IV antibiotic  Discharge Plan: Anticipate discharge in 24-48 hrs to home with home services.    Code Status: Level 3 - DNAR and DNI    Subjective   Pt seen and examined at bedside. Admits to RLE edema that is slightly improved since yesterday. Denies any pain of the right foot. Denies chest pain and dyspnea.  Remains afebrile.    Objective :  Temp:  [97.6 °F (36.4 °C)-97.9 °F (36.6 " °C)] 97.6 °F (36.4 °C)  HR:  [58-63] 59  BP: (100-119)/(59-68) 106/68  Resp:  [18] 18  SpO2:  [94 %-97 %] 97 %  O2 Device: None (Room air)    Body mass index is 40.24 kg/m².     Input and Output Summary (last 24 hours):     Intake/Output Summary (Last 24 hours) at 11/20/2024 0812  Last data filed at 11/19/2024 2248  Gross per 24 hour   Intake 1280 ml   Output --   Net 1280 ml       Physical Exam  Constitutional:       General: He is not in acute distress.     Appearance: He is obese. He is not ill-appearing.   HENT:      Head: Normocephalic and atraumatic.   Cardiovascular:      Rate and Rhythm: Normal rate and regular rhythm.      Heart sounds: No murmur heard.     No friction rub. No gallop.   Pulmonary:      Effort: Pulmonary effort is normal. No respiratory distress.      Breath sounds: Normal breath sounds. No wheezing, rhonchi or rales.   Musculoskeletal:         General: Swelling present. No tenderness.      Right lower leg: Edema present.      Left lower leg: Edema present.      Comments: Right second toe amputation - dressing clean, dry, intact  B/L LE swelling right greater than left  Chronic stasis dermatitis B/L LE       Skin:     General: Skin is warm and dry.   Neurological:      Mental Status: He is alert and oriented to person, place, and time.   Psychiatric:         Mood and Affect: Mood normal.         Thought Content: Thought content normal.         Judgment: Judgment normal.           Lines/Drains:              Lab Results: I have reviewed the following results:   Results from last 7 days   Lab Units 11/20/24  0621   WBC Thousand/uL 6.50   HEMOGLOBIN g/dL 14.3   HEMATOCRIT % 45.1   PLATELETS Thousands/uL 308   SEGS PCT % 55   LYMPHO PCT % 33   MONO PCT % 8   EOS PCT % 3     Results from last 7 days   Lab Units 11/20/24  0621 11/19/24  0531 11/18/24  1841   SODIUM mmol/L 140   < > 137   POTASSIUM mmol/L 3.8   < > 3.8   CHLORIDE mmol/L 106   < > 102   CO2 mmol/L 30   < > 26   BUN mg/dL 13   < > 12    CREATININE mg/dL 0.96   < > 0.96   ANION GAP mmol/L 4   < > 9   CALCIUM mg/dL 8.4   < > 8.9   ALBUMIN g/dL  --   --  4.1   TOTAL BILIRUBIN mg/dL  --   --  0.92   ALK PHOS U/L  --   --  93   ALT U/L  --   --  14   AST U/L  --   --  23   GLUCOSE RANDOM mg/dL 91   < > 85    < > = values in this interval not displayed.         Results from last 7 days   Lab Units 11/19/24  2055 11/19/24  1702 11/19/24  1215 11/19/24  0801 11/18/24  2119 11/15/24  1500   POC GLUCOSE mg/dl 152* 91 90 107 77 93     Results from last 7 days   Lab Units 11/14/24  1340   HEMOGLOBIN A1C % 5.8*     Results from last 7 days   Lab Units 11/14/24  1756 11/14/24  1340   LACTIC ACID mmol/L 1.3 2.1*       Recent Cultures (last 7 days):   Results from last 7 days   Lab Units 11/18/24  1841 11/14/24  1345 11/14/24  1340   BLOOD CULTURE  No Growth at 24 hrs.  No Growth at 24 hrs. Staphylococcus aureus* No Growth After 5 Days.   GRAM STAIN RESULT   --  Gram positive cocci in clusters*  --              Last 24 Hours Medication List:     Current Facility-Administered Medications:     acetaminophen (TYLENOL) tablet 650 mg, Q6H PRN    aluminum-magnesium hydroxide-simethicone (MAALOX) oral suspension 30 mL, Q4H PRN    buPROPion (WELLBUTRIN XL) 24 hr tablet 300 mg, Daily    busPIRone (BUSPAR) tablet 15 mg, TID    ceFAZolin (ANCEF) IVPB (premix in dextrose) 2,000 mg 50 mL, Q8H, Last Rate: 2,000 mg (11/20/24 0620)    docusate sodium (COLACE) capsule 100 mg, BID    doxepin (SINEquan) capsule 10 mg, After Breakfast    doxepin (SINEquan) capsule 100 mg, HS    ferrous sulfate tablet 325 mg, Daily With Breakfast    furosemide (LASIX) tablet 20 mg, Daily    gabapentin (NEURONTIN) capsule 800 mg, TID    insulin lispro (HumALOG/ADMELOG) 100 units/mL subcutaneous injection 2-12 Units, TID AC **AND** Fingerstick Glucose (POCT), TID AC    oxyCODONE (ROXICODONE) IR tablet 5 mg, Q6H PRN    polyethylene glycol (MIRALAX) packet 17 g, Daily    potassium chloride (Klor-Con  M20) CR tablet 20 mEq, Daily    rivaroxaban (XARELTO) tablet 20 mg, Daily With Breakfast    Administrative Statements   Today, Patient Was Seen By: Renu Castellon MD      **Please Note: This note may have been constructed using a voice recognition system.**

## 2024-11-20 NOTE — ANESTHESIA PREPROCEDURE EVALUATION
Procedure:  INÉS    Relevant Problems   CARDIO   (+) Atrial fibrillation (HCC)   (+) Hypertension   (+) Pacemaker      ENDO   (+) Type 2 diabetes mellitus with diabetic polyneuropathy, without long-term current use of insulin (HCC)      HEMATOLOGY   (+) Microcytic anemia      MUSCULOSKELETAL   (+) Cervical spondylosis   (+) Cervical strain   (+) Charcot arthropathy of midfoot      NEURO/PSYCH   (+) Anxiety   (+) Type 2 diabetes mellitus with diabetic polyneuropathy, without long-term current use of insulin (HCC)      PULMONARY   (+) DAYAN (obstructive sleep apnea)      Other   (+) Chronic osteomyelitis of left foot with draining sinus (HCC)   (+) Toe osteomyelitis, right (HCC)      BMI 40      Patient is in atrial fibrillation.    Left Ventricle: Left ventricular cavity size is normal. Wall thickness is normal. The left ventricular ejection fraction is 65%. Systolic function is normal. Wall motion is normal.    Left Atrium: The atrium is mildly dilated.    Right Atrium: The atrium is mildly dilated.    No valvular pathology noted.  No evidence of endocarditis on the valves or the pacing lead    Atrial fibrillation  When compared with ECG of 15-Jhonny-2023 21:13,  Atrial fibrillation has replaced Electronic ventricular pacemaker  Physical Exam    Airway    Mallampati score: II  TM Distance: >3 FB  Neck ROM: full     Dental   No notable dental hx     Cardiovascular  Cardiovascular exam normal    Pulmonary  Pulmonary exam normal     Other Findings        Anesthesia Plan  ASA Score- 3     Anesthesia Type- IV sedation with anesthesia with ASA Monitors.         Additional Monitors:     Airway Plan:            Plan Factors-Exercise tolerance (METS): >4 METS.    Chart reviewed. EKG reviewed. Imaging results reviewed. Existing labs reviewed. Patient summary reviewed.    Patient is not a current smoker.      Obstructive sleep apnea risk education given perioperatively.        Induction- intravenous.    Postoperative Plan-      Perioperative Resuscitation Plan - Level 1 - Full Code.       Informed Consent-

## 2024-11-21 ENCOUNTER — APPOINTMENT (INPATIENT)
Dept: INTERVENTIONAL RADIOLOGY/VASCULAR | Facility: HOSPITAL | Age: 59
DRG: 872 | End: 2024-11-21
Attending: INTERNAL MEDICINE
Payer: COMMERCIAL

## 2024-11-21 LAB
ANION GAP SERPL CALCULATED.3IONS-SCNC: 5 MMOL/L (ref 4–13)
BASOPHILS # BLD AUTO: 0.05 THOUSANDS/ÂΜL (ref 0–0.1)
BASOPHILS NFR BLD AUTO: 1 % (ref 0–1)
BUN SERPL-MCNC: 14 MG/DL (ref 5–25)
CALCIUM SERPL-MCNC: 8.7 MG/DL (ref 8.4–10.2)
CHLORIDE SERPL-SCNC: 107 MMOL/L (ref 96–108)
CO2 SERPL-SCNC: 29 MMOL/L (ref 21–32)
CREAT SERPL-MCNC: 1.02 MG/DL (ref 0.6–1.3)
EOSINOPHIL # BLD AUTO: 0.2 THOUSAND/ÂΜL (ref 0–0.61)
EOSINOPHIL NFR BLD AUTO: 3 % (ref 0–6)
ERYTHROCYTE [DISTWIDTH] IN BLOOD BY AUTOMATED COUNT: 13.8 % (ref 11.6–15.1)
GFR SERPL CREATININE-BSD FRML MDRD: 78 ML/MIN/1.73SQ M
GLUCOSE SERPL-MCNC: 81 MG/DL (ref 65–140)
GLUCOSE SERPL-MCNC: 81 MG/DL (ref 65–140)
GLUCOSE SERPL-MCNC: 91 MG/DL (ref 65–140)
GLUCOSE SERPL-MCNC: 92 MG/DL (ref 65–140)
GLUCOSE SERPL-MCNC: 94 MG/DL (ref 65–140)
HCT VFR BLD AUTO: 45.8 % (ref 36.5–49.3)
HGB BLD-MCNC: 14.7 G/DL (ref 12–17)
IMM GRANULOCYTES # BLD AUTO: 0.01 THOUSAND/UL (ref 0–0.2)
IMM GRANULOCYTES NFR BLD AUTO: 0 % (ref 0–2)
LYMPHOCYTES # BLD AUTO: 2.15 THOUSANDS/ÂΜL (ref 0.6–4.47)
LYMPHOCYTES NFR BLD AUTO: 33 % (ref 14–44)
MCH RBC QN AUTO: 28.2 PG (ref 26.8–34.3)
MCHC RBC AUTO-ENTMCNC: 32.1 G/DL (ref 31.4–37.4)
MCV RBC AUTO: 88 FL (ref 82–98)
MONOCYTES # BLD AUTO: 0.63 THOUSAND/ÂΜL (ref 0.17–1.22)
MONOCYTES NFR BLD AUTO: 10 % (ref 4–12)
NEUTROPHILS # BLD AUTO: 3.45 THOUSANDS/ÂΜL (ref 1.85–7.62)
NEUTS SEG NFR BLD AUTO: 53 % (ref 43–75)
NRBC BLD AUTO-RTO: 0 /100 WBCS
PLATELET # BLD AUTO: 305 THOUSANDS/UL (ref 149–390)
PMV BLD AUTO: 9.1 FL (ref 8.9–12.7)
POTASSIUM SERPL-SCNC: 4.3 MMOL/L (ref 3.5–5.3)
RBC # BLD AUTO: 5.21 MILLION/UL (ref 3.88–5.62)
SODIUM SERPL-SCNC: 141 MMOL/L (ref 135–147)
WBC # BLD AUTO: 6.49 THOUSAND/UL (ref 4.31–10.16)

## 2024-11-21 PROCEDURE — 99233 SBSQ HOSP IP/OBS HIGH 50: CPT | Performed by: INTERNAL MEDICINE

## 2024-11-21 PROCEDURE — 75827 VEIN X-RAY CHEST: CPT

## 2024-11-21 PROCEDURE — 82948 REAGENT STRIP/BLOOD GLUCOSE: CPT

## 2024-11-21 PROCEDURE — 06H033Z INSERTION OF INFUSION DEVICE INTO INFERIOR VENA CAVA, PERCUTANEOUS APPROACH: ICD-10-PCS | Performed by: RADIOLOGY

## 2024-11-21 PROCEDURE — 88305 TISSUE EXAM BY PATHOLOGIST: CPT | Performed by: PATHOLOGY

## 2024-11-21 PROCEDURE — 80048 BASIC METABOLIC PNL TOTAL CA: CPT | Performed by: INTERNAL MEDICINE

## 2024-11-21 PROCEDURE — 85025 COMPLETE CBC W/AUTO DIFF WBC: CPT | Performed by: INTERNAL MEDICINE

## 2024-11-21 PROCEDURE — 99232 SBSQ HOSP IP/OBS MODERATE 35: CPT | Performed by: INTERNAL MEDICINE

## 2024-11-21 PROCEDURE — 88311 DECALCIFY TISSUE: CPT | Performed by: PATHOLOGY

## 2024-11-21 PROCEDURE — C1751 CATH, INF, PER/CENT/MIDLINE: HCPCS

## 2024-11-21 PROCEDURE — 36573 INSJ PICC RS&I 5 YR+: CPT

## 2024-11-21 RX ORDER — LIDOCAINE WITH 8.4% SOD BICARB 0.9%(10ML)
SYRINGE (ML) INJECTION AS NEEDED
Status: COMPLETED | OUTPATIENT
Start: 2024-11-21 | End: 2024-11-21

## 2024-11-21 RX ORDER — CEFAZOLIN SODIUM 2 G/50ML
2000 SOLUTION INTRAVENOUS EVERY 8 HOURS
Qty: 5700 ML | Refills: 0 | Status: SHIPPED | OUTPATIENT
Start: 2024-11-21 | End: 2024-12-29

## 2024-11-21 RX ORDER — CEFAZOLIN SODIUM 2 G/50ML
2000 SOLUTION INTRAVENOUS EVERY 8 HOURS
Qty: 5700 ML | Refills: 0 | Status: SHIPPED | OUTPATIENT
Start: 2024-11-21 | End: 2024-11-21

## 2024-11-21 RX ADMIN — GABAPENTIN 800 MG: 400 CAPSULE ORAL at 21:49

## 2024-11-21 RX ADMIN — CEFAZOLIN SODIUM 2000 MG: 2 SOLUTION INTRAVENOUS at 23:38

## 2024-11-21 RX ADMIN — DOXEPIN HYDROCHLORIDE 100 MG: 25 CAPSULE ORAL at 21:49

## 2024-11-21 RX ADMIN — POTASSIUM CHLORIDE 20 MEQ: 1500 TABLET, EXTENDED RELEASE ORAL at 08:19

## 2024-11-21 RX ADMIN — CEFAZOLIN SODIUM 2000 MG: 2 SOLUTION INTRAVENOUS at 06:00

## 2024-11-21 RX ADMIN — FUROSEMIDE 20 MG: 20 TABLET ORAL at 08:20

## 2024-11-21 RX ADMIN — IOHEXOL 20 ML: 350 INJECTION, SOLUTION INTRAVENOUS at 11:48

## 2024-11-21 RX ADMIN — BUSPIRONE HYDROCHLORIDE 15 MG: 10 TABLET ORAL at 21:49

## 2024-11-21 RX ADMIN — Medication 5 ML: at 11:25

## 2024-11-21 RX ADMIN — FERROUS SULFATE TAB 325 MG (65 MG ELEMENTAL FE) 325 MG: 325 (65 FE) TAB at 08:19

## 2024-11-21 RX ADMIN — CEFAZOLIN SODIUM 2000 MG: 2 SOLUTION INTRAVENOUS at 15:49

## 2024-11-21 RX ADMIN — RIVAROXABAN 20 MG: 20 TABLET, FILM COATED ORAL at 08:19

## 2024-11-21 NOTE — PROGRESS NOTES
Patient:    MRN:  [id number]    Bing Request ID:  [id number]    Level of care reserved:  Infusion    Partner Reserved:  [facility] [place] [address] [phone]    Clinical needs requested:    Geography searched:  [phone]    Start of Service:    Request sent:  11:39am EST on 11/21/2024 by Lisa Worthington    Partner reserved:  12:11pm EST on 11/21/2024 by Lisa Worthington    Choice list shared:

## 2024-11-21 NOTE — PROGRESS NOTES
Progress Note - Hospitalist   Name: David Carpio 59 y.o. male I MRN: [id number]  Unit/Bed#: -01 I Date of Admission: 11/18/2024   Date of Service: 11/21/2024 I Hospital Day: 3    Assessment & Plan  MSSA Bacteremia  Recent hospitalization for right second toe osteomyelitis status post amputation.  Single blood culture from hospitalization on 11/14 with MSSA.  Patient has been on oral Keflex since discharge assuming surgical cure of right toe osteomyelitis.  However he does have a pacemaker in place.  Currently does not have any fever, leukocytosis or any systemic signs of infection.  No increasing drainage or discharge noted from 2 wounds with sutures in place.    Continue with high-dose IV cefazolin 2 g every 8 hourly   Follow-up on repeat blood cultures to see clearance of bacteremia.  If blood cultures remain negative for 48 hours through tonight then IR will be consulted for PICC line placement.  Transthoracic echo without any valvular abnormalities.    11/20 INÉS - no evidence of any bacterial vegetations  Infectious disease input appreciated   Anticipate 6 weeks of IV antibiotics .  Infectious disease recommended pacemaker to be discontinued however discussed extensively with patient who wants to hold off on that till he discusses with his outpatient cardiologist and is agreeable to 6 weeks of IV antibiotics.  Blood cultures were negative at 48 hours. IR placed a PICC line in the right arm today, 11/21.  Case management consulted for home health care and home IV antibiotics set up. Will be discharged tomorrow after receiving his morning dose of IV antibiotics.  Per ID, treat with IV Cefazolin 2g every 8 hours for 6 weeks (through 12/29). Weekly CBC/diff and CMP needed while on IV antibiotics.  Right leg swelling  Right leg is swollen compared to left leg, ordered venous duplex to rule out DVT.  Patient has been off of his Xarelto. Resume Xarelto.  11/20 - venous Doppler ultrasound negative for DVT  "  Received Lasix 20 mg x 1 dose today and yesterday (11/20). Will give additional dose in AM.    Continue with lower extremity elevation for edema control.  Toe osteomyelitis, right (HCC)  Status post amputation of right second toe on November 15, 2024  Pathology pending.  Presumed surgical cure per operative notes  Has some worsening leg swelling.  No additional drainage or discharge from the wound.  Trial of Lasix 20 mg daily  11/20 - venous Doppler ultrasound negative for DVT   Podiatry evaluation appreciated -recommended weightbearing as tolerated only in toe unloading shoe with lower extremity elevation and Xeroform dressing changes. Close outpatient podiatry follow-up upon discharge.  Atrial fibrillation (HCC)  Has been off his Xarelto due to procedures and had upper endoscopy.  Resume Xarelto.  Chronic diastolic heart failure (HCC)  Takes as needed Lasix and metolazone  Is currently followed by [facility] cardiology  Patient with some chronic edema and patient states his abdomen is little bit distended  Will start patient on Lasix 20 mg daily and Klor-Con 20 mill equivalents daily  Echocardiogram showed normal ejection fraction with no valvular pathology.  Continue with current dose of diuretics.    Wt Readings from Last 3 Encounters:   11/20/24 (!) 138 kg (305 lb)   11/14/24 (!) 137 kg (302 lb 14.6 oz)   10/31/24 (!) 137 kg (303 lb)     Diabetes mellitus (HCC)  Patient is not on any medications at this time  Continue with serial blood glucose monitoring and sliding scale insulin  Lab Results   Component Value Date    HGBA1C 5.8 (H) 11/14/2024   Sugars remained stable on current regimen.    Pacemaker  History of multiple daytime pauses, status post dual-chamber PPM May 16, 2017  Followed by [facility] cardiology  Plan for INÉS in a.m. to assess pacemaker lead infection in the setting of MSSA bacteremia.  11/20 - INÉS impression \"All valves were adequately visualized and showed no evidence of endocarditis.  Mild mitral " "regurgitation was noted.  Spontaneous left atrial contrast was noted secondary to atrial fibrillation.  Left atrial appendage was free of thrombus. Pacing lead was adequately visualized and showed no evidence of any bacterial vegetations\"  Discussed with patient regarding pacemaker removal however he wants to talk to his cardiologist at [facility] after discharge and wants to hold off on that.  He is agreeable to 6 weeks of IV antibiotics via PICC line as recommended by infectious disease.  Recommend close outpatient follow-up with cardiology within 1 to 2 weeks of discharge.  Morbid obesity with BMI of 40.0-44.9, adult (HCC)  BMI of 40.28  Hypokalemia  Will do Klor-Con 20 meq daily with his Lasix.  Patient is prone to being hypokalemic when he takes a diuretic   Follow-up BMP in AM.  Anxiety  Continue BuSpar, Wellbutrin and doxepin.    VTE Pharmacologic Prophylaxis: VTE Score: 4 High Risk (Score >/= 5) - Pharmacological DVT Prophylaxis Ordered: rivaroxaban (Xarelto). Sequential Compression Devices Ordered.    Mobility:   Basic Mobility Inpatient Raw Score: 24  JH-HLM Goal: 8: Walk 250 feet or more  JH-HLM Achieved: 7: Walk 25 feet or more  JH-HLM Goal achieved. Continue to encourage appropriate mobility.    Patient Centered Rounds: I performed bedside rounds with nursing staff today.   Discussions with Specialists or Other Care Team Provider: As per the infectious disease    Education and Discussions with Family / Patient: Patient declined call to .     Current Length of Stay: 3 day(s)  Current Patient Status: Inpatient   Certification Statement: The patient will continue to require additional inpatient hospital stay due to outpatient IV antibiotics and home care set up  Discharge Plan: Anticipate discharge tomorrow to home with home services.    Code Status: Level 3 - DNAR and DNI    Subjective   Pt seen and examined at bedside. Denies any foot pain. Admits that B/L LE edema is improving. Denies SOB and " chest pain. Afebrile. Denies fever and chills.    Objective :  Temp:  [97.1 °F (36.2 °C)-97.7 °F (36.5 °C)] 97.7 °F (36.5 °C)  HR:  [52-78] 78  BP: ()/(47-79) 125/63  Resp:  [16-22] 18  SpO2:  [85 %-100 %] 100 %  O2 Device: None (Room air)    Body mass index is 40.24 kg/m².     Input and Output Summary (last 24 hours):     Intake/Output Summary (Last 24 hours) at 11/21/2024 0805  Last data filed at 11/20/2024 1700  Gross per 24 hour   Intake 340 ml   Output --   Net 340 ml       Physical Exam  Vitals reviewed.   Constitutional:       General: He is not in acute distress.     Appearance: Normal appearance. He is obese. He is not ill-appearing.   HENT:      Head: Normocephalic and atraumatic.   Cardiovascular:      Rate and Rhythm: Normal rate and regular rhythm.      Heart sounds: No murmur heard.     No friction rub. No gallop.   Pulmonary:      Effort: Pulmonary effort is normal. No respiratory distress.      Breath sounds: Normal breath sounds. No wheezing, rhonchi or rales.   Musculoskeletal:         General: Swelling and tenderness present.      Right lower leg: Edema present.      Left lower leg: Edema present.      Comments: Right second toe amputation - dressing clean, dry, intact  B/L LE swelling, right greater than left, improving  Chronic stasis dermatitis B/L LE             Skin:     General: Skin is warm and dry.   Neurological:      Mental Status: He is alert and oriented to person, place, and time.   Psychiatric:         Mood and Affect: Mood normal.         Behavior: Behavior normal.         Thought Content: Thought content normal.         Judgment: Judgment normal.           Lines/Drains:              Lab Results: I have reviewed the following results:   Results from last 7 days   Lab Units 11/21/24  0514   WBC Thousand/uL 6.49   HEMOGLOBIN g/dL 14.7   HEMATOCRIT % 45.8   PLATELETS Thousands/uL 305   SEGS PCT % 53   LYMPHO PCT % 33   MONO PCT % 10   EOS PCT % 3     Results from last 7 days    Lab Units 11/21/24  0514 11/19/24  0531 11/18/24  1841   SODIUM mmol/L 141   < > 137   POTASSIUM mmol/L 4.3   < > 3.8   CHLORIDE mmol/L 107   < > 102   CO2 mmol/L 29   < > 26   BUN mg/dL 14   < > 12   CREATININE mg/dL 1.02   < > 0.96   ANION GAP mmol/L 5   < > 9   CALCIUM mg/dL 8.7   < > 8.9   ALBUMIN g/dL  --   --  4.1   TOTAL BILIRUBIN mg/dL  --   --  0.92   ALK PHOS U/L  --   --  93   ALT U/L  --   --  14   AST U/L  --   --  23   GLUCOSE RANDOM mg/dL 94   < > 85    < > = values in this interval not displayed.         Results from last 7 days   Lab Units 11/20/24  2145 11/20/24  1632 11/20/24  1012 11/19/24  2055 11/19/24  1702 11/19/24  1215 11/19/24  0801 11/18/24  2119 11/15/24  1500   POC GLUCOSE mg/dl 141* 86 83 152* 91 90 107 77 93     Results from last 7 days   Lab Units 11/14/24  1340   HEMOGLOBIN A1C % 5.8*     Results from last 7 days   Lab Units 11/14/24  1756 11/14/24  1340   LACTIC ACID mmol/L 1.3 2.1*       Recent Cultures (last 7 days):   Results from last 7 days   Lab Units 11/18/24  1841 11/14/24  1345 11/14/24  1340   BLOOD CULTURE  No Growth at 48 hrs.  No Growth at 48 hrs. Staphylococcus aureus* No Growth After 5 Days.   GRAM STAIN RESULT   --  Gram positive cocci in clusters*  --              Last 24 Hours Medication List:     Current Facility-Administered Medications:     acetaminophen (TYLENOL) tablet 650 mg, Q6H PRN    aluminum-magnesium hydroxide-simethicone (MAALOX) oral suspension 30 mL, Q4H PRN    buPROPion (WELLBUTRIN XL) 24 hr tablet 300 mg, Daily    busPIRone (BUSPAR) tablet 15 mg, TID    ceFAZolin (ANCEF) IVPB (premix in dextrose) 2,000 mg 50 mL, Q8H, Last Rate: 2,000 mg (11/21/24 0600)    docusate sodium (COLACE) capsule 100 mg, BID    doxepin (SINEquan) capsule 10 mg, After Breakfast    doxepin (SINEquan) capsule 100 mg, HS    ferrous sulfate tablet 325 mg, Daily With Breakfast    furosemide (LASIX) tablet 20 mg, Daily    gabapentin (NEURONTIN) capsule 800 mg, TID    insulin  lispro (HumALOG/ADMELOG) 100 units/mL subcutaneous injection 2-12 Units, TID AC **AND** Fingerstick Glucose (POCT), TID AC    oxyCODONE (ROXICODONE) IR tablet 5 mg, Q6H PRN    polyethylene glycol (MIRALAX) packet 17 g, Daily    potassium chloride (Klor-Con M20) CR tablet 20 mEq, Daily    rivaroxaban (XARELTO) tablet 20 mg, Daily With Breakfast    Administrative Statements   Today, Patient Was Seen By: Renu Castellon MD      **Please Note: This note may have been constructed using a voice recognition system.**

## 2024-11-21 NOTE — DISCHARGE INSTRUCTIONS
Peripherally Inserted Central Catheter     WHAT YOU NEED TO KNOW:   A PICC is an IV placed into a large blood vessel near your heart. It is usually inserted through a blood vessel in your arm. Your PICC may have multiple ports. Ports are tubes where you can inject medicine. A PICC can stay in place for several weeks or months. You may need a PICC to get nutrition, medicine, or fluids. Blood samples can be removed from your PICC and sent to the lab for tests.   DISCHARGE INSTRUCTIONS:    [facility],    Contact Interventional Radiology at [phone]   RAYA PATIENTS: Contact Interventional Radiology at [phone]   MICHAEL PATIENTS: Contact Interventional Radiology at [phone] if:  Blood soaks through your bandage.    Your arm or leg feels warm, tender, and painful. It may look swollen and red.   You have trouble moving your arm.    Your catheter falls out.  You have a fever or swelling, redness, pain, or pus where the catheter was inserted.  Persistent nausea or vomiting.    You cannot flush your catheter, or you feel pain when you flush your catheter.    You see a hole or crack in the tubing of your catheter.    You see fluid leaking from the insertion site.    You run out of supplies to care for your catheter.    You have questions or concerns about your condition or care.

## 2024-11-21 NOTE — ASSESSMENT & PLAN NOTE
In the setting of nonhealing right toe ulcer.  Likely source of bacteremia.  MRI noted right proximal phalanx osteo and he is s/p toe amputation at second MTPJ on 11/15 with presumed surgical cure. Notes some worsening right leg swelling since surgery     Continue wound care, additional management per podiatry   Recommend  limb elevation, diuretics, may require compression wraps if cleared by podiatry   Serial exams

## 2024-11-21 NOTE — CASE MANAGEMENT
Case Management Discharge Planning Note    Patient name David Carpio  Location /-01 MRN [id number]  : 1965 Date 2024       Current Admission Date: 2024  Current Admission Diagnosis:MSSA Bacteremia   Patient Active Problem List    Diagnosis Date Noted Date Diagnosed    MSSA Bacteremia 2024     Right leg swelling 2024     Obesity (BMI 30-39.9) 2024     Lumbar radiculopathy 10/03/2024     Cervical strain 11/15/2023     Cervical radiculopathy 11/15/2023     Left foot pain 2023     Charcot arthropathy of midfoot 2023     Moderate protein-calorie malnutrition (HCC) 2023     Status post partial amputation of foot, left ([facility]) 2023     Other constipation 2023     Moderate benzodiazepine use disorder ([facility]) 06/15/2023     Microcytic anemia 06/15/2023     Closed fracture of shaft of metatarsal bone of left foot 2023     Cellulitis of left lower extremity 2023     Diabetic ulcer of left midfoot associated with type 2 diabetes mellitus ([facility]) 2023     Hyperkalemia 2022     Pacemaker 2022     History of bariatric surgery 2022     Encounter for perioperative consultation 2022     Diabetic infection of left foot (HCC) 2022     Cervical spondylosis 2022     Cervicalgia - Right 2022     Toe osteomyelitis, right ([facility]) 2022     Type 2 diabetes mellitus with diabetic polyneuropathy, without long-term current use of insulin ([facility]) 2022     Anxiety 2021     Hypokalemia 2021     Atrial fibrillation ([facility])      Chronic osteomyelitis of left foot with draining sinus ([facility]) 2021     Pain, joint, ankle and foot, left 2021     DAYAN (obstructive sleep apnea) 2020     Chronic diastolic heart failure ([facility]) 2020     Hypertension 2020     Diabetes mellitus ([facility]) 2020     Morbid obesity with BMI of 40.0-44.9, adult ([facility]) 2020       LOS (days):  3  Geometric Mean LOS (GMLOS) (days):   Days to GMLOS:     OBJECTIVE:  Risk of Unplanned Readmission Score: 23.17         Current admission status: Inpatient   Preferred Pharmacy:   [facility]/pharmacy #[id number] - [place] - [address] [address]  Phone: [phone] Fax: [phone]    [facility] - [place] - [address]  [address]  [place] [address]  Phone: [phone] Fax: [phone]    Primary Care Provider: Khurram Rodriguez DO    Primary Insurance: [employer]  Secondary Insurance:     DISCHARGE DETAILS:    Discharge planning discussed with:: patient  Freedom of Choice: Yes  Comments - Freedom of Choice: [facility] able to accept pt for [facility]BT- [facility] unable to provide IV [facility]BT infusion they don't accept pts insurance.   [facility] reserved in aidin for SN for IV ABT.   PICC line measurements uploaded to [facility] via aidin.     [facility] referral placed for home infusion in aidin.    [facility] able to accept pt for home infusion, call received from [facility], they will process script and call with price and time medication can be delivered to pts home.    [facility] able to deliver home infusion IV ABT today between 1530 -1630 to patients home. Questioned price of IV ABT- per infusion company-  The benefits came back, it's covered at 100%, the patient does not owe anything. Pt aware.       CM placed call to [facility], they are unable to provide a home care nurse after 1630. They put pt on the schedule for 11/22/24 1500 dose of IV ABT. Patient aware and agreeable. [facility] confirmed SN for 11/22/24 at 1500.

## 2024-11-21 NOTE — PROGRESS NOTES
Progress Note - Infectious Disease   Name: David Carpio 59 y.o. male I MRN: [id number]  Unit/Bed#: -01 I Date of Admission: 11/18/2024   Date of Service: 11/21/2024 I Hospital Day: 3           VIRTUAL CARE DOCUMENTATION:     1. This service was provided via Telemedicine using : [employer] tv kit    2. Parties in the room with patient during teleconsult : Patient only    3. Confidentiality : My office door was closed    4. Participants : No one else was in the room    5. Patient acknowledged consent and understanding of privacy and security of the  Telemedicine consult. I informed the patient that I have reviewed their record in Epic and presented the opportunity for them to ask any questions regarding the visit today.  The patient agreed to participate.    6. Time spent 5 minutes       Assessment & Plan  MSSA Bacteremia  Single blood culture on 11/14 with MSSA.  Source of bacteremia is right toe cellulitis, osteo.  Patient has been on oral Keflex following surgical cure of right toe osteo   Patient has a pacemaker in place and would need to rule out endovascular/device infection, INÉS is fortunately negative for valvular/lead vegetation and repeat blood cx are negative.  He is systemically well, afebrile without leukocytosis     Continue cefazolin 2 every 8 hours   Follow-up repeat blood cultures  Consult IR for venous access which can be removed after completing iv antibiotics  Anticipate 6 weeks of iv antibiotics through 12/26  Patient will need weekly labs - CBC/diff, CMP while on iv antibitics  ID fu recommended within 2 weeks of discharge   Recommend outpatient fu with patient's cardiologist - discussed with patient that given alternative source for bacteremia , negative INÉS and rapid clearance of bacteremia it may be reasonable to retain cardiac device. However, patient was counseled on high risk of device seeding with staph bacteremia and device removal is still recommended. Patient currently wishes to  discuss this with his cardiologist at [facility] as an outpatient - if device is retained then will need close monitoring after completion of iv abx to rule out relapsed bacteremia with surveillance blood cx and clinical monitoring   Continue supportive care, monitor clinical course  Toe osteomyelitis, right (HCC)  In the setting of nonhealing right toe ulcer.  Likely source of bacteremia.  MRI noted right proximal phalanx osteo and he is s/p toe amputation at second MTPJ on 11/15 with presumed surgical cure. Notes some worsening right leg swelling since surgery     Continue wound care, additional management per podiatry   Recommend  limb elevation, diuretics, may require compression wraps if cleared by podiatry   Serial exams  Diabetes mellitus (HCC)  Lab Results   Component Value Date    HGBA1C 5.8 (H) 2024       Recent Labs     24  1012 24  1632 24  2145 24  0806   POCGLU 83 86 141* 91       Blood Sugar Average: Last 72 hrs:  (P) 102  Risk factor for infection  Morbid obesity with BMI of 40.0-44.9, adult (HCC)  Risk factor for delayed wound healing  Pacemaker  Dual chamber pacemaker impant in 2017 for sick sinus syndrome in the setting of a fib. Device is at risk for bacteremic seeding      Reviewed recommendations to continue abx and cardiology evaluation  with primary team who is in agreement. Will sign off.  Please call with concerns or any changes in clinical status or new test results.        Subjective:  The patient has no complaints.  Denies fevers, chills, or sweats.  Denies nausea, vomiting, or diarrhea.    Antibiotics:  cefazolin    Physical Exam     Temp:  [97.1 °F (36.2 °C)-97.7 °F (36.5 °C)] 97.7 °F (36.5 °C)  HR:  [52-78] 64  Resp:  [16-22] 18  BP: ()/(47-79) 113/72  SpO2:  [85 %-100 %] 100 %  Temp (24hrs), Av.4 °F (36.3 °C), Min:97.1 °F (36.2 °C), Max:97.7 °F (36.5 °C)  Current: Temperature: 97.7 °F (36.5 °C)    Intake/Output Summary (Last 24 hours) at 2024  0825  Last data filed at 11/20/2024 1700  Gross per 24 hour   Intake 340 ml   Output --   Net 340 ml       Physical exam findings reported by bedside and primary medical team staff      General Appearance:  Appearing well, nontoxic, and in no distress, appears stated age   Lungs:   Clear to auscultation bilaterally, no audible wheezes, rhonchi and rales, respirations unlabored   Heart:  Regular rate and rhythm, S1, S2 normal, no murmur, rub or gallop   Abdomen:   Soft, non-tender, non-distended, positive bowel sounds, no masses, no organomegaly    No CVA tenderness   Extremities: Extremities normal, atraumatic, no cyanosis, clubbing , 2+ RLE > LLE edema   Skin: Chronic venous hyperpigmentation of RLE,  foot wound in dressing   Neurologic: Alert and oriented times 3,         Invasive Devices:   Peripheral IV 11/18/24 Left;Ventral (anterior) Forearm (Active)   Site Assessment WDL 11/20/24 0933   Dressing Type Transparent 11/20/24 0933   Line Status Blood return noted;Flushed;Infusing 11/20/24 0933   Dressing Status Clean;Dry;Intact 11/20/24 0933   Dressing Change Due 11/22/24 11/18/24 2016   Reason Not Rotated Not due 11/19/24 2000       Labs, Imaging, & Other Studies     Lab Results:    I have personally reviewed pertinent labs.    Results from last 7 days   Lab Units 11/21/24  0514 11/20/24  0621 11/19/24  0531   WBC Thousand/uL 6.49 6.50 5.67   HEMOGLOBIN g/dL 14.7 14.3 14.0   PLATELETS Thousands/uL 305 308 319     Results from last 7 days   Lab Units 11/21/24  0514 11/19/24  0531 11/18/24  1841 11/15/24  1229 11/14/24  1340   POTASSIUM mmol/L 4.3   < > 3.8   < > 3.6   CHLORIDE mmol/L 107   < > 102   < > 100   CO2 mmol/L 29   < > 26   < > 32   BUN mg/dL 14   < > 12   < > 13   CREATININE mg/dL 1.02   < > 0.96   < > 0.92   EGFR ml/min/1.73sq m 78   < > 84   < > 89   CALCIUM mg/dL 8.7   < > 8.9   < > 9.1   AST U/L  --   --  23  --  18   ALT U/L  --   --  14  --  13   ALK PHOS U/L  --   --  93  --  87    < > = values  in this interval not displayed.     Results from last 7 days   Lab Units 11/18/24  1841 11/14/24  1345 11/14/24  1340   BLOOD CULTURE  No Growth at 48 hrs.  No Growth at 48 hrs. Staphylococcus aureus* No Growth After 5 Days.   GRAM STAIN RESULT   --  Gram positive cocci in clusters*  --        Imaging Studies:   I have personally reviewed pertinent imaging study reports and images in PACS.      EKG, Pathology, and Other Studies:   I have personally reviewed pertinent reports.        Counseling/Coordination of care:       Total 50 minutes in evaluation of the patient and communication with the patient via telehealth of which 30 minutes was in counseling/coordination of care.  Extensive review of the medical records in epic including review of the notes, radiographs, and laboratory results.  My recommendations were discussed with the patient in detail who verbalized understanding.

## 2024-11-21 NOTE — PLAN OF CARE
Problem: PAIN - ADULT  Goal: Verbalizes/displays adequate comfort level or baseline comfort level  Description: Interventions:  - Encourage patient to monitor pain and request assistance  - Assess pain using appropriate pain scale  - Administer analgesics based on type and severity of pain and evaluate response  - Implement non-pharmacological measures as appropriate and evaluate response  - Consider cultural and social influences on pain and pain management  - Notify physician/advanced practitioner if interventions unsuccessful or patient reports new pain  Outcome: Progressing     Problem: SAFETY ADULT  Goal: Patient will remain free of falls  Description: INTERVENTIONS:  - Educate patient/family on patient safety including physical limitations  - Instruct patient to call for assistance with activity   - Consult OT/PT to assist with strengthening/mobility   - Keep Call bell within reach  - Keep bed low and locked with side rails adjusted as appropriate  - Keep care items and personal belongings within reach  - Initiate and maintain comfort rounds  - Make Fall Risk Sign visible to staff  - Offer Toileting every 2 Hours, in advance of need  - Initiate/Maintain bed/chair alarm  - Obtain necessary fall risk management equipment  - Apply yellow socks and bracelet for high fall risk patients  - Consider moving patient to room near nurses station  Outcome: Progressing  Goal: Maintain or return to baseline ADL function  Description: INTERVENTIONS:  -  Assess patient's ability to carry out ADLs; assess patient's baseline for ADL function and identify physical deficits which impact ability to perform ADLs (bathing, care of mouth/teeth, toileting, grooming, dressing, etc.)  - Assess/evaluate cause of self-care deficits   - Assess range of motion  - Assess patient's mobility; develop plan if impaired  - Assess patient's need for assistive devices and provide as appropriate  - Encourage maximum independence but intervene and  supervise when necessary  - Involve family in performance of ADLs  - Assess for home care needs following discharge   - Consider OT consult to assist with ADL evaluation and planning for discharge  - Provide patient education as appropriate  Outcome: Progressing  Goal: Maintains/Returns to pre admission functional level  Description: INTERVENTIONS:  - Perform AM-PAC 6 Click Basic Mobility/ Daily Activity assessment daily.  - Set and communicate daily mobility goal to care team and patient/family/caregiver.   - Collaborate with rehabilitation services on mobility goals if consulted  - Perform Range of Motion 3 times a day.  - Reposition patient every 2 hours.  - Dangle patient 3 times a day  - Stand patient 3 times a day  - Ambulate patient 3 times a day  - Out of bed to chair 3 times a day   - Out of bed for meals 3 times a day  - Out of bed for toileting  - Record patient progress and toleration of activity level   Outcome: Progressing     Problem: DISCHARGE PLANNING  Goal: Discharge to home or other facility with appropriate resources  Description: INTERVENTIONS:  - Identify barriers to discharge w/patient and caregiver  - Arrange for needed discharge resources and transportation as appropriate  - Identify discharge learning needs (meds, wound care, etc.)  - Arrange for interpretive services to assist at discharge as needed  - Refer to Case Management Department for coordinating discharge planning if the patient needs post-hospital services based on physician/advanced practitioner order or complex needs related to functional status, cognitive ability, or social support system  Outcome: Progressing

## 2024-11-21 NOTE — PROCEDURES
Venous Access Line Insertion    Date/Time: 11/21/2024 11:29 AM    Performed by: Grey Ribera MD  Authorized by: Renu Castellon MD    Patient location:  IR  Other Assisting Provider: Yes (comment) (Virginie Brown)    Consent:     Consent obtained:  Verbal and written    Consent given by:  Patient    Risks discussed:  Infection, bleeding, pneumothorax, nerve damage, incorrect placement and arterial puncture    Alternatives discussed:  No treatment  Universal protocol:     Procedure explained and questions answered to patient or proxy's satisfaction: yes      Immediately prior to procedure, a time out was called: yes      Relevant documents present and verified: yes      Test results available and properly labeled: yes      Radiology Images displayed and confirmed.  If images not available, report reviewed: yes      Required blood products, implants, devices, and special equipment available: yes      Site/side marked: yes      Patient identity confirmed:  Verbally with patient and arm band  Pre-procedure details:     Hand hygiene: Hand hygiene performed prior to insertion      Sterile barrier technique: All elements of maximal sterile technique followed      Skin preparation:  ChloraPrep    Skin preparation agent: Skin preparation agent completely dried prior to procedure    Procedure details:     Complex Venous Access Line Type: PICC      Complex Venous Access Line Indications: long term antibiotics      Catheter tip vessel location: inferior vena cava      Orientation:  Right    Location:  Basilic    Procedural supplies:  Single lumen    Catheter size:  4 Fr    Total catheter length (cm):  25cm    Catheter out on skin (cm):  0    Max flow rate:  999    Arm circumference:  36    Patient evaluated for contraindications to access (i.e. fistula, thrombosis, etc): Yes      Approach: percutaneous technique used      Patient position:  Flat    Ultrasound image availability:  Images available in PACS    Sterile  ultrasound techniques: Sterile gel and sterile probe covers were used      Number of attempts:  1    Successful placement: yes      Landmarks identified: yes      Vessel of catheter tip end:  Chest Xray needed to confirm placement  Anesthesia (see MAR for exact dosages):     Anesthesia method:  None    Sedation type:  Anxiolysis  Post-procedure details:     Post-procedure:  Dressing applied    Assessment:  Blood return through all ports, no pneumothorax on x-ray, placement verification pending x-ray result, placement verified by x-ray and free fluid flow    Patient tolerance of procedure:  Tolerated well, no immediate complications    Observer: Yes      Observer name:  Mariposa BRYANT

## 2024-11-21 NOTE — ASSESSMENT & PLAN NOTE
"History of multiple daytime pauses, status post dual-chamber PPM May 16, 2017  Followed by [facility] cardiology  Plan for INÉS in a.m. to assess pacemaker lead infection in the setting of MSSA bacteremia.  11/20 - INÉS impression \"All valves were adequately visualized and showed no evidence of endocarditis.  Mild mitral regurgitation was noted.  Spontaneous left atrial contrast was noted secondary to atrial fibrillation.  Left atrial appendage was free of thrombus. Pacing lead was adequately visualized and showed no evidence of any bacterial vegetations\"  Discussed with patient regarding pacemaker removal however he wants to talk to his cardiologist at [facility] after discharge and wants to hold off on that.  He is agreeable to 6 weeks of IV antibiotics via PICC line as recommended by infectious disease.  Recommend close outpatient follow-up with cardiology within 1 to 2 weeks of discharge.  "

## 2024-11-21 NOTE — ASSESSMENT & PLAN NOTE
Single blood culture on 11/14 with MSSA.  Source of bacteremia is right toe cellulitis, osteo.  Patient has been on oral Keflex following surgical cure of right toe osteo   Patient has a pacemaker in place and would need to rule out endovascular/device infection, INÉS is fortunately negative for valvular/lead vegetation and repeat blood cx are negative.  He is systemically well, afebrile without leukocytosis     Continue cefazolin 2 every 8 hours   Follow-up repeat blood cultures  Consult IR for venous access which can be removed after completing iv antibiotics  Anticipate 6 weeks of iv antibiotics through 12/26  Patient will need weekly labs - CBC/diff, CMP while on iv antibitics  ID fu recommended within 2 weeks of discharge   Recommend outpatient fu with patient's cardiologist - discussed with patient that given alternative source for bacteremia , negative INÉS and rapid clearance of bacteremia it may be reasonable to retain cardiac device. However, patient was counseled on high risk of device seeding with staph bacteremia and device removal is still recommended. Patient currently wishes to discuss this with his cardiologist at [facility] as an outpatient - if device is retained then will need close monitoring after completion of iv abx to rule out relapsed bacteremia with surveillance blood cx and clinical monitoring   Continue supportive care, monitor clinical course   no

## 2024-11-21 NOTE — BRIEF OP NOTE (RAD/CATH)
INTERVENTIONAL RADIOLOGY PROCEDURE NOTE    Date: 11/21/2024    Procedure: Right short PICC  Procedure Summary       Date:  Room / Location:     Anesthesia Start:  Anesthesia Stop:     Procedure:  Diagnosis:     Scheduled Providers:  Responsible Provider:     Anesthesia Type: Not recorded ASA Status: Not recorded            Preoperative diagnosis:   1. Bacteremia    2. Right leg swelling    3. MSSA Bacteremia         Postoperative diagnosis: Same.    Surgeon: Grey Ribera MD     Assistant: None. No qualified resident was available.    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  OK to use.    Complications: None immediate.    Anesthesia: local

## 2024-11-21 NOTE — PROGRESS NOTES
Patient:    MRN:  [id number]    Aidin Request ID:  [id number]    Level of care reserved:  Home Health Agency    Partner Reserved:  [facility] [place] [address] [phone]    Clinical needs requested:    Geography searched:  [phone]    Start of Service:    Request sent:  8:49am EST on 11/21/2024 by Lisa Worthington    Partner reserved:  11:36am EST on 11/21/2024 by Lisa Worthington    Choice list shared:  11:36am EST on 11/21/2024 by Lisa Worthington

## 2024-11-21 NOTE — ASSESSMENT & PLAN NOTE
Lab Results   Component Value Date    HGBA1C 5.8 (H) 11/14/2024       Recent Labs     11/20/24  1012 11/20/24  1632 11/20/24  2145 11/21/24  0806   POCGLU 83 86 141* 91       Blood Sugar Average: Last 72 hrs:  (P) 102  Risk factor for infection

## 2024-11-21 NOTE — ASSESSMENT & PLAN NOTE
Right leg is swollen compared to left leg, ordered venous duplex to rule out DVT.  Patient has been off of his Xarelto. Resume Xarelto.  11/20 - venous Doppler ultrasound negative for DVT   Received Lasix 20 mg x 1 dose today and yesterday (11/20). Will give additional dose in AM.    Continue with lower extremity elevation for edema control.

## 2024-11-21 NOTE — ASSESSMENT & PLAN NOTE
Takes as needed Lasix and metolazone  Is currently followed by [facility] cardiology  Patient with some chronic edema and patient states his abdomen is little bit distended  Will start patient on Lasix 20 mg daily and Klor-Con 20 mill equivalents daily  Echocardiogram showed normal ejection fraction with no valvular pathology.  Continue with current dose of diuretics.    Wt Readings from Last 3 Encounters:   11/20/24 (!) 138 kg (305 lb)   11/14/24 (!) 137 kg (302 lb 14.6 oz)   10/31/24 (!) 137 kg (303 lb)

## 2024-11-21 NOTE — CASE MANAGEMENT
Case Management Assessment & Discharge Planning Note    Patient name David Carpio  Location /-01 MRN [id number]  : 1965 Date 2024       Current Admission Date: 2024  Current Admission Diagnosis:MSSA Bacteremia   Patient Active Problem List    Diagnosis Date Noted Date Diagnosed    MSSA Bacteremia 2024     Right leg swelling 2024     Obesity (BMI 30-39.9) 2024     Lumbar radiculopathy 10/03/2024     Cervical strain 11/15/2023     Cervical radiculopathy 11/15/2023     Left foot pain 2023     Charcot arthropathy of midfoot 2023     Moderate protein-calorie malnutrition (HCC) 2023     Status post partial amputation of foot, left ([facility]) 2023     Other constipation 2023     Moderate benzodiazepine use disorder ([facility]) 06/15/2023     Microcytic anemia 06/15/2023     Closed fracture of shaft of metatarsal bone of left foot 2023     Cellulitis of left lower extremity 2023     Diabetic ulcer of left midfoot associated with type 2 diabetes mellitus ([facility]) 2023     Hyperkalemia 2022     Pacemaker 2022     History of bariatric surgery 2022     Encounter for perioperative consultation 2022     Diabetic infection of left foot ([facility]) 2022     Cervical spondylosis 2022     Cervicalgia - Right 2022     Toe osteomyelitis, right ([facility]) 2022     Type 2 diabetes mellitus with diabetic polyneuropathy, without long-term current use of insulin ([facility]) 2022     Anxiety 2021     Hypokalemia 2021     Atrial fibrillation ([facility])      Chronic osteomyelitis of left foot with draining sinus ([facility]) 2021     Pain, joint, ankle and foot, left 2021     DAYAN (obstructive sleep apnea) 2020     Chronic diastolic heart failure ([facility]) 2020     Hypertension 2020     Diabetes mellitus ([facility]) 2020     Morbid obesity with BMI of 40.0-44.9, adult ([facility]) 2020        LOS (days): 3  Geometric Mean LOS (GMLOS) (days):   Days to GMLOS:     OBJECTIVE:  PATIENT READMITTED TO HOSPITAL  Risk of Unplanned Readmission Score: 23.17         Current admission status: Inpatient       Preferred Pharmacy:   [facility]/pharmacy #[id number] - [place] - [address]  [address] [address]  Phone: [phone] Fax: [phone]    [facility] [place] - [address]  [address]  [place] [address]  Phone: [phone] Fax: [phone]    Primary Care Provider: Khurram Rodriguez DO    Primary Insurance: RANJAN  Secondary Insurance:     ASSESSMENT:  Active Health Care Proxies    There are no active Health Care Proxies on file.       Advance Directives  Does patient have a Health Care POA?: No  Was patient offered paperwork?: Yes (per patient in the process of finishing paperwork)  Does patient have Advance Directives?: No  Was patient offered paperwork?: Yes (pt declined)  Primary Contact: Maribell Carpio- spouse         Readmission Root Cause  30 Day Readmission: Yes  During your hospital stay, did someone (provider, nurse, ) explain your care to you in a way you could understand?: Yes  Did you feel medically stable to leave the hospital?: Yes  Were you able to pay for your medication at the pharmacy?: Yes  Did you have reliable transportation to take you to your appointments?: Yes  During previous admission, was a post-acute recommendation made?: No  Patient was readmitted due to: + blood cultures  Action Plan: Repeat blood cultures, consult ID, INÉS, plan is for PICC line to be placed and d/c home with VNA for IV ABT    Patient Information  Admitted from:: Home  Mental Status: Alert  During Assessment patient was accompanied by: Not accompanied during assessment  Assessment information provided by:: Patient  Primary Caregiver: Self  Support Systems: Self, Spouse/significant other, Children  [facility]  [facility] do you live [place]?: [place]  Home entry access options. Select all that apply.: Stairs  Number of steps to enter home.: 1  Do the steps have railings?: Yes  Type of Current Residence: 2 story home  Upon entering residence, is there a bedroom on the main floor (no further steps)?: No  A bedroom is located on the following floor levels of residence (select all that apply):: 2nd Floor  Upon entering residence, is there a bathroom on the main floor (no further steps)?: Yes  Number of steps to 2nd floor from main floor: One Flight  Living Arrangements: Lives w/ Spouse/significant other  Is patient a ?: No    Activities of Daily Living Prior to Admission  Functional Status: Independent  Completes ADLs independently?: Yes  Ambulates independently?: Yes  Does patient use assisted devices?: Yes  Assisted Devices (DME) used: Straight Cane, Walker  Does patient currently own DME?: Yes  What DME does the patient currently own?: Straight Cane, Walker  Does patient have a history of Outpatient Therapy (PT/OT)?: No  Does the patient have a history of Short-Term Rehab?: No  Does patient have a history of HHC?: Yes ([facility])  Does patient currently have HHC?: No         Patient Information Continued  Income Source: Employed  Does patient have prescription coverage?: Yes  Does patient receive dialysis treatments?: No  Does patient have a history of substance abuse?: No  Does patient have a history of Mental Health Diagnosis?: Yes (Anxiety)  Is patient receiving treatment for mental health?: Yes (medication managed and [facility] for counseling)  Has patient received inpatient treatment related to mental health in the last 2 years?: No         Means of Transportation  Means of Transport to [facility]:: Drives Self      Social Determinants of Health (SDOH)      Flowsheet Row Most Recent Value   Housing Stability    In the last 12 months, was there a time when you were not  able to pay the mortgage or rent on time? N   In the past 12 months, how many times have you moved where you were living? 0   At any time in the past 12 months, were you homeless or living in a shelter (including now)? N   Transportation Needs    In the past 12 months, has lack of transportation kept you from medical appointments or from getting medications? no   In the past 12 months, has lack of transportation kept you from meetings, work, or from getting things needed for daily living? No   Food Insecurity    Within the past 12 months, you worried that your food would run out before you got the money to buy more. Never true   Within the past 12 months, the food you bought just didn't last and you didn't have money to get more. Never true   Utilities    In the past 12 months has the electric, gas, oil, or water company threatened to shut off services in your home? No            DISCHARGE DETAILS:    Discharge planning discussed with:: patient  Freedom of Choice: Yes  Comments - Freedom of Choice: agreeable to place blanket referral for VNA and [facility] for infusion- pt will need IV ABT at home.     Were Treatment Team discharge recommendations reviewed with patient/caregiver?: Yes  Did patient/caregiver verbalize understanding of patient care needs?: Yes  Were patient/caregiver advised of the risks associated with not following Treatment Team discharge recommendations?: Yes    Contacts  Patient Contacts: Maribell Carpio- spouse  Relationship to Patient:: Family  Contact Method: Phone  Phone Number: [phone]  Reason/Outcome: Discharge Planning    Requested Home Health Care         Is the patient interested in HHC at discharge?: Yes  Home Health Discipline requested:: Nursing  Home Health Follow-Up Provider:: PCP  Home Health Services Needed:: Administration of IV, IM or SC Medications, Central Line Care  Homebound Criteria Met:: Uses an Assist Device (i.e. cane, walker, etc), Immunosuppressed  Supporting Clincal  Findings:: Limited Endurance         Other Referral/Resources/Interventions Provided:  Interventions: HHC, Home Infusion         Treatment Team Recommendation: Home with Home Health Care  Discharge Destination Plan:: Home with Home Health Care  Transport at Discharge : Family

## 2024-11-21 NOTE — ASSESSMENT & PLAN NOTE
Recent hospitalization for right second toe osteomyelitis status post amputation.  Single blood culture from hospitalization on 11/14 with MSSA.  Patient has been on oral Keflex since discharge assuming surgical cure of right toe osteomyelitis.  However he does have a pacemaker in place.  Currently does not have any fever, leukocytosis or any systemic signs of infection.  No increasing drainage or discharge noted from 2 wounds with sutures in place.    Continue with high-dose IV cefazolin 2 g every 8 hourly   Follow-up on repeat blood cultures to see clearance of bacteremia.  If blood cultures remain negative for 48 hours through tonight then IR will be consulted for PICC line placement.  Transthoracic echo without any valvular abnormalities.    11/20 INÉS - no evidence of any bacterial vegetations  Infectious disease input appreciated   Anticipate 6 weeks of IV antibiotics .  Infectious disease recommended pacemaker to be discontinued however discussed extensively with patient who wants to hold off on that till he discusses with his outpatient cardiologist and is agreeable to 6 weeks of IV antibiotics.  Blood cultures were negative at 48 hours. IR placed a PICC line in the right arm today, 11/21.  Case management consulted for home health care and home IV antibiotics set up. Will be discharged tomorrow after receiving his morning dose of IV antibiotics.  Per ID, treat with IV Cefazolin 2g every 8 hours for 6 weeks (through 12/29). Weekly CBC/diff and CMP needed while on IV antibiotics.

## 2024-11-22 VITALS
OXYGEN SATURATION: 95 % | BODY MASS INDEX: 40.42 KG/M2 | RESPIRATION RATE: 18 BRPM | HEIGHT: 73 IN | DIASTOLIC BLOOD PRESSURE: 72 MMHG | SYSTOLIC BLOOD PRESSURE: 127 MMHG | WEIGHT: 305 LBS | TEMPERATURE: 98.1 F | HEART RATE: 58 BPM

## 2024-11-22 DIAGNOSIS — R78.81 BACTEREMIA: Primary | ICD-10-CM

## 2024-11-22 LAB — GLUCOSE SERPL-MCNC: 101 MG/DL (ref 65–140)

## 2024-11-22 PROCEDURE — 82948 REAGENT STRIP/BLOOD GLUCOSE: CPT

## 2024-11-22 PROCEDURE — 99239 HOSP IP/OBS DSCHRG MGMT >30: CPT | Performed by: INTERNAL MEDICINE

## 2024-11-22 RX ORDER — FUROSEMIDE 40 MG/1
20 TABLET ORAL 2 TIMES DAILY
Qty: 60 TABLET | Refills: 0 | Status: SHIPPED | OUTPATIENT
Start: 2024-11-22

## 2024-11-22 RX ORDER — POTASSIUM CHLORIDE 1.5 G/1.58G
40 POWDER, FOR SOLUTION ORAL DAILY
Qty: 30 EACH | Refills: 0 | Status: SHIPPED | OUTPATIENT
Start: 2024-11-22

## 2024-11-22 RX ORDER — DOCUSATE SODIUM 100 MG/1
100 CAPSULE, LIQUID FILLED ORAL 2 TIMES DAILY
Qty: 60 CAPSULE | Refills: 0 | Status: SHIPPED | OUTPATIENT
Start: 2024-11-22

## 2024-11-22 RX ORDER — ACETAMINOPHEN 325 MG/1
650 TABLET ORAL EVERY 6 HOURS PRN
Qty: 30 TABLET | Refills: 0 | Status: SHIPPED | OUTPATIENT
Start: 2024-11-22

## 2024-11-22 RX ADMIN — DOXEPIN HYDROCHLORIDE 10 MG: 10 CAPSULE ORAL at 08:42

## 2024-11-22 RX ADMIN — BUSPIRONE HYDROCHLORIDE 15 MG: 10 TABLET ORAL at 08:42

## 2024-11-22 RX ADMIN — FUROSEMIDE 20 MG: 20 TABLET ORAL at 08:42

## 2024-11-22 RX ADMIN — FERROUS SULFATE TAB 325 MG (65 MG ELEMENTAL FE) 325 MG: 325 (65 FE) TAB at 08:43

## 2024-11-22 RX ADMIN — CEFAZOLIN SODIUM 2000 MG: 2 SOLUTION INTRAVENOUS at 07:46

## 2024-11-22 RX ADMIN — GABAPENTIN 800 MG: 400 CAPSULE ORAL at 08:42

## 2024-11-22 RX ADMIN — DOCUSATE SODIUM 100 MG: 100 CAPSULE, LIQUID FILLED ORAL at 08:42

## 2024-11-22 RX ADMIN — BUPROPION HYDROCHLORIDE 300 MG: 150 TABLET, EXTENDED RELEASE ORAL at 08:42

## 2024-11-22 RX ADMIN — RIVAROXABAN 20 MG: 20 TABLET, FILM COATED ORAL at 08:42

## 2024-11-22 RX ADMIN — POTASSIUM CHLORIDE 20 MEQ: 1500 TABLET, EXTENDED RELEASE ORAL at 08:42

## 2024-11-22 NOTE — ASSESSMENT & PLAN NOTE
Recent hospitalization for right second toe osteomyelitis status post amputation.  Single blood culture from hospitalization on 11/14 with MSSA.  Patient has been on oral Keflex since discharge assuming surgical cure of right toe osteomyelitis.  However he does have a pacemaker in place.  Currently does not have any fever, leukocytosis or any systemic signs of infection.  No increasing drainage or discharge noted from 2 wounds with sutures in place.    Continue with high-dose IV cefazolin 2 g every 8 hourly   Follow-up on repeat blood cultures to see clearance of bacteremia.  If blood cultures remain negative for 48 hours through tonight then IR will be consulted for PICC line placement.  Transthoracic echo without any valvular abnormalities.    11/20 INÉS - no evidence of any bacterial vegetations  Infectious disease input appreciated   Anticipate 6 weeks of IV antibiotics .  Infectious disease recommended pacemaker to be discontinued however discussed extensively with patient who wants to hold off on that till he discusses with his outpatient cardiologist and is agreeable to 6 weeks of IV antibiotics.  Blood cultures were negative at 48 hours. IR placed a PICC line in the right arm 11/21.  Case management consulted for home health care and home IV antibiotics set up.  Stable for discharge today with PICC line and home IV antibiotics per ID, treat with IV Cefazolin 2g every 8 hours for 6 weeks (through 12/29). Weekly CBC/diff and CMP needed while on IV antibiotics.  Outpatient follow up scheduled with infectious disease and podiatry.

## 2024-11-22 NOTE — ASSESSMENT & PLAN NOTE
Right leg is swollen compared to left leg, ordered venous duplex to rule out DVT.  Patient has been off of his Xarelto. Resume Xarelto.  11/20 - venous Doppler ultrasound negative for DVT   Received Lasix 20 mg x 1 dose today and yesterday (11/20). Will give additional dose in AM.    Continue with lower extremity elevation for edema control.  Continue with furosemide 20 mg twice daily upon discharge.

## 2024-11-22 NOTE — CASE MANAGEMENT
Case Management Discharge Planning Note    Patient name David Carpio  Location /-01 MRN [id number]  : 1965 Date 2024       Current Admission Date: 2024  Current Admission Diagnosis:MSSA Bacteremia   Patient Active Problem List    Diagnosis Date Noted Date Diagnosed    MSSA Bacteremia 2024     Right leg swelling 2024     Obesity (BMI 30-39.9) 2024     Lumbar radiculopathy 10/03/2024     Cervical strain 11/15/2023     Cervical radiculopathy 11/15/2023     Left foot pain 2023     Charcot arthropathy of midfoot 2023     Moderate protein-calorie malnutrition (HCC) 2023     Status post partial amputation of foot, left ([facility]) 2023     Other constipation 2023     Moderate benzodiazepine use disorder ([facility]) 06/15/2023     Microcytic anemia 06/15/2023     Closed fracture of shaft of metatarsal bone of left foot 2023     Cellulitis of left lower extremity 2023     Diabetic ulcer of left midfoot associated with type 2 diabetes mellitus ([facility]) 2023     Hyperkalemia 2022     Pacemaker 2022     History of bariatric surgery 2022     Encounter for perioperative consultation 2022     Diabetic infection of left foot (HCC) 2022     Cervical spondylosis 2022     Cervicalgia - Right 2022     Toe osteomyelitis, right ([facility]) 2022     Type 2 diabetes mellitus with diabetic polyneuropathy, without long-term current use of insulin ([facility]) 2022     Anxiety 2021     Hypokalemia 2021     Atrial fibrillation ([facility])      Chronic osteomyelitis of left foot with draining sinus ([facility]) 2021     Pain, joint, ankle and foot, left 2021     DAYAN (obstructive sleep apnea) 2020     Chronic diastolic heart failure ([facility]) 2020     Hypertension 2020     Diabetes mellitus ([facility]) 2020     Morbid obesity with BMI of 40.0-44.9, adult ([facility]) 2020       LOS (days):  4  Geometric Mean LOS (GMLOS) (days):   Days to GMLOS:     OBJECTIVE:  Risk of Unplanned Readmission Score: 24.17         Current admission status: Inpatient   Preferred Pharmacy:   [id number] - [place] - [address] [address]  Phone: [phone] Fax: [phone]    [facility] - [place] - [address]  [address]  [place] [address]  Phone: [phone] Fax: [phone]    Primary Care Provider: Khurram Rodriguez DO    Primary Insurance: [employer]  Secondary Insurance:     DISCHARGE DETAILS:    Discharge planning discussed with:: patient  Freedom of Choice: Yes  Comments - Freedom of Choice: [facility] for SN to administer IV ABT and PICC line care.  CM contacted family/caregiver?: Yes   Confirmed with patient, IV antibiotics were delivered to patients home on 11/21/24 and patient spoke with [facility] care, and confirmed a SN will be at his home 11/22/24 at 1500 to administer IV antibiotics. Pts son will provide transportation to home when ready for discharge.           Contacts  Patient Contacts: Maribell Carpio- spouse  Relationship to Patient:: Family  Contact Method: Phone  Phone Number: [phone]  Reason/Outcome: Discharge Planning                        Treatment Team Recommendation: Home with Home Health Care  Discharge Destination Plan:: Home with Home Health Care  Transport at Discharge : Family- pt son                              IMM Given (Date):: 11/22/24  IMM Given to:: Patient   CM spoke to patient at the bedside, reviewed DC IMM with patient and informed that patient can stay an additional 4 hours for reconsidering appealing the discharge as the medicare rights were review on the day of discharge. Pt verbalized understanding and feels ready to go home and does not intend to stay 4 hours to reconsider.

## 2024-11-22 NOTE — DISCHARGE SUMMARY
Discharge Summary - Hospitalist   Name: David Carpio 59 y.o. male I MRN: [id number]  Unit/Bed#: -01 I Date of Admission: 11/18/2024   Date of Service: 11/22/2024 I Hospital Day: 4     Assessment & Plan  MSSA Bacteremia  Recent hospitalization for right second toe osteomyelitis status post amputation.  Single blood culture from hospitalization on 11/14 with MSSA.  Patient has been on oral Keflex since discharge assuming surgical cure of right toe osteomyelitis.  However he does have a pacemaker in place.  Currently does not have any fever, leukocytosis or any systemic signs of infection.  No increasing drainage or discharge noted from 2 wounds with sutures in place.    Continue with high-dose IV cefazolin 2 g every 8 hourly   Follow-up on repeat blood cultures to see clearance of bacteremia.  If blood cultures remain negative for 48 hours through tonight then IR will be consulted for PICC line placement.  Transthoracic echo without any valvular abnormalities.    11/20 INÉS - no evidence of any bacterial vegetations  Infectious disease input appreciated   Anticipate 6 weeks of IV antibiotics .  Infectious disease recommended pacemaker to be discontinued however discussed extensively with patient who wants to hold off on that till he discusses with his outpatient cardiologist and is agreeable to 6 weeks of IV antibiotics.  Blood cultures were negative at 48 hours. IR placed a PICC line in the right arm 11/21.  Case management consulted for home health care and home IV antibiotics set up.  Stable for discharge today with PICC line and home IV antibiotics per ID, treat with IV Cefazolin 2g every 8 hours for 6 weeks (through 12/29). Weekly CBC/diff and CMP needed while on IV antibiotics.  Outpatient follow up scheduled with infectious disease and podiatry.  Right leg swelling  Right leg is swollen compared to left leg, ordered venous duplex to rule out DVT.  Patient has been off of his Xarelto. Resume  Xarelto.  11/20 - venous Doppler ultrasound negative for DVT   Received Lasix 20 mg x 1 dose today and yesterday (11/20). Will give additional dose in AM.    Continue with lower extremity elevation for edema control.  Continue with furosemide 20 mg twice daily upon discharge.  Toe osteomyelitis, right (HCC)  Status post amputation of right second toe on November 15, 2024  Pathology pending.  Presumed surgical cure per operative notes  Has some worsening leg swelling.  No additional drainage or discharge from the wound.  Trial of Lasix 20 mg daily  11/20 - venous Doppler ultrasound negative for DVT   Podiatry evaluation appreciated -recommended weightbearing as tolerated only in toe unloading shoe with lower extremity elevation and Xeroform dressing changes. Close outpatient podiatry follow-up upon discharge.  Atrial fibrillation (HCC)  Has been off his Xarelto due to procedures and had upper endoscopy.  Resume Xarelto.  Chronic diastolic heart failure (HCC)  Takes as needed Lasix and metolazone  Is currently followed by [facility] cardiology  Patient with some chronic edema and patient states his abdomen is little bit distended  Will continue patient on Lasix 20 mg twice daily and Klor-Con 20 mill equivalents daily  Echocardiogram showed normal ejection fraction with no valvular pathology.  Continue with current dose of diuretics.    Wt Readings from Last 3 Encounters:   11/20/24 (!) 138 kg (305 lb)   11/14/24 (!) 137 kg (302 lb 14.6 oz)   10/31/24 (!) 137 kg (303 lb)     Diabetes mellitus (HCC)  Patient is not on any medications at this time  Continue with serial blood glucose monitoring and sliding scale insulin  Lab Results   Component Value Date    HGBA1C 5.8 (H) 11/14/2024   Sugars remained stable on current regimen.    Pacemaker  History of multiple daytime pauses, status post dual-chamber PPM May 16, 2017  Followed by [facility] cardiology  Plan for INÉS in a.m. to assess pacemaker lead infection in the setting of MSSA  "bacteremia.  11/20 - INÉS impression \"All valves were adequately visualized and showed no evidence of endocarditis.  Mild mitral regurgitation was noted.  Spontaneous left atrial contrast was noted secondary to atrial fibrillation.  Left atrial appendage was free of thrombus. Pacing lead was adequately visualized and showed no evidence of any bacterial vegetations\"  Discussed with patient regarding pacemaker removal however he wants to talk to his cardiologist at [facility] after discharge and wants to hold off on that.  He is agreeable to 6 weeks of IV antibiotics via PICC line as recommended by infectious disease.  Recommend close outpatient follow-up with cardiology within 1 to 2 weeks of discharge.  Morbid obesity with BMI of 40.0-44.9, adult ([facility])  BMI of 40.28  Hypokalemia  Will do Klor-Con 20 meq daily with his Lasix.  Patient is prone to being hypokalemic when he takes a diuretic   Follow-up BMP in AM.  Anxiety  Continue BuSpar, Wellbutrin and doxepin.     Medical Problems       Resolved Problems  Date Reviewed: 11/19/2024   None         MESSAGE TO PCP (Khurram Rodriguez DO) FOR FOLLOW UP:   Thank you for allowing us to participate in the care of your patient, David Carpio, who was hospitalized from 11/18/2024 through 11/22/24 with the admitting diagnosis of MSSA bacteremia.  INÉS was unremarkable.  Patient is being discharged on 6 weeks of IV cefazolin to complete course of treatment through 12/29 via PICC line.  Home health care was set up on discharge..    If you have any additional questions or would like to discuss further, please feel free to contact me.  Renu Castellon MD  [facility], Hospitalist, [phone]     Admission Date:   Admission Orders (From admission, onward)       Ordered        11/18/24 1924  INPATIENT ADMISSION  Once                          Discharge Date: 11/22/24    Consultations During Hospital Stay:  Infectious disease  Podiatry  Cardiology    Procedures Performed: " "  INÉS negative for vegetation  .  Repeat blood cultures negative for 48 to 72 hours    Significant Findings / Test Results:   NA    Incidental Findings:    NA      Test Results Pending at Discharge (will require follow up):   NA     Complications:  None     Reason for Admission: Positive blood cultures    Hospital Course:   David Carpio is a 59 y.o. male patient who originally presented to the hospital on 11/18/2024 due to MSSA bacteremia from blood culture of recent hospitalization.  Patient had recently undergone right second toe amputation with presumed surgical cure of osteomyelitis however in view of positive cultures he was admitted for IV antibiotics.  Repeat blood cultures during this hospitalization remain negative to date.  He underwent a transthoracic and transesophageal echocardiogram which was negative for any valvular vegetations.  In view of his pacemaker, infectious disease recommended pacemaker to be preferably discontinued however patient did not want that and was agreeable to 6 weeks of IV antibiotic.  PICC line was placed and home IV antibiotics was set up.  Patient is being discharged home on IV cefazolin 2 g every 8 hourly to complete course of treatment through 12/29.  He will be following up with infectious disease and podiatry upon discharge.  Will get weekly blood work while on IV antibiotics with close infectious disease follow-up.  Patient is being discharged in stable condition.  Home health care has been set up for discharge.          Please see above list of diagnoses and related plan for additional information.     Condition at Discharge: stable    Discharge Day Visit / Exam:   Subjective: Remains afebrile.  Tolerating antibiotics well.  No acute events.  Vitals: Blood Pressure: 127/72 (11/22/24 0739)  Pulse: 58 (11/22/24 0739)  Temperature: 98.1 °F (36.7 °C) (11/22/24 0739)  Temp Source: Temporal (11/22/24 0739)  Respirations: 18 (11/22/24 0739)  Height: 6' 1\" (185.4 cm) (11/20/24 " 0900)  Weight - Scale: (!) 138 kg (305 lb) (11/20/24 0900)  SpO2: 95 % (11/22/24 0745)  Physical Exam  Constitutional:       Appearance: He is obese.   HENT:      Head: Normocephalic.      Nose: Nose normal.      Mouth/Throat:      Mouth: Mucous membranes are moist.   Eyes:      Extraocular Movements: Extraocular movements intact.      Pupils: Pupils are equal, round, and reactive to light.   Cardiovascular:      Rate and Rhythm: Normal rate and regular rhythm.   Pulmonary:      Effort: Pulmonary effort is normal.      Breath sounds: Normal breath sounds.   Abdominal:      General: Abdomen is flat.   Musculoskeletal:      Cervical back: Neck supple.      Comments: Right foot dressing in place.  Improved bilateral lower extremity edema.  PICC line in place.   Neurological:      General: No focal deficit present.      Mental Status: He is alert and oriented to person, place, and time. Mental status is at baseline.   Psychiatric:         Mood and Affect: Mood normal.          Discussion with Family: Patient declined call to .     Discharge instructions/Information to patient and family:   See after visit summary for information provided to patient and family.      Provisions for Follow-Up Care:  See after visit summary for information related to follow-up care and any pertinent home health orders.      Mobility at time of Discharge:   Basic Mobility Inpatient Raw Score: 24  JH-HLM Goal: 8: Walk 250 feet or more  JH-HLM Achieved: 8: Walk 250 feet ot more  HLM Goal achieved. Continue to encourage appropriate mobility.     Disposition:   Home with VNA Services (Reminder: Complete face to face encounter)    Planned Readmission: no    Discharge Medications:  See after visit summary for reconciled discharge medications provided to patient and/or family.      Administrative Statements   Discharge Statement:  I have spent a total time of >30 minutes in caring for this patient on the day of the visit/encounter.  .    **Please Note: This note may have been constructed using a voice recognition system**

## 2024-11-22 NOTE — ASSESSMENT & PLAN NOTE
"History of multiple daytime pauses, status post dual-chamber PPM May 16, 2017  Followed by [facility] cardiology  Plan for INÉS in a.m. to assess pacemaker lead infection in the setting of MSSA bacteremia.  11/20 - INÉS impression \"All valves were adequately visualized and showed no evidence of endocarditis.  Mild mitral regurgitation was noted.  Spontaneous left atrial contrast was noted secondary to atrial fibrillation.  Left atrial appendage was free of thrombus. Pacing lead was adequately visualized and showed no evidence of any bacterial vegetations\"  Discussed with patient regarding pacemaker removal however he wants to talk to his cardiologist at [facility] after discharge and wants to hold off on that.  He is agreeable to 6 weeks of IV antibiotics via PICC line as recommended by infectious disease.  Recommend close outpatient follow-up with cardiology within 1 to 2 weeks of discharge.  "

## 2024-11-22 NOTE — PROGRESS NOTES
-Called and spoke to Scot from [facility]. Faxed lab and D/C PICC line orders to [phone] She has no additional questions.  
OPAT NOTE    AP ONLY CAMPUSES ARE: [place], [place], and [place].   In these cases, physician is only cosigning notes.    Supervising/Discharge provider: Dr. Hoffman    Diagnosis: MSSA Bacteremia    Drug:Cefazolin 2g IV q8h    Labs/Frequency: weekly CBCd, CMP    End Date:12/29/2024    Infusion/VNA/SNF contact:  -[facility] infusion   Phone:[phone]  Fax:[phone]    -[facility]   Phone: [phone]    Next appointment: 12/04/2024    RN assigned: Aj Avila    
none

## 2024-11-22 NOTE — PLAN OF CARE
Problem: PAIN - ADULT  Goal: Verbalizes/displays adequate comfort level or baseline comfort level  Description: Interventions:  - Encourage patient to monitor pain and request assistance  - Assess pain using appropriate pain scale  - Administer analgesics based on type and severity of pain and evaluate response  - Implement non-pharmacological measures as appropriate and evaluate response  - Consider cultural and social influences on pain and pain management  - Notify physician/advanced practitioner if interventions unsuccessful or patient reports new pain  Outcome: Progressing     Problem: INFECTION - ADULT  Goal: Absence or prevention of progression during hospitalization  Description: INTERVENTIONS:  - Assess and monitor for signs and symptoms of infection  - Monitor lab/diagnostic results  - Monitor all insertion sites, i.e. indwelling lines, tubes, and drains  - Monitor endotracheal if appropriate and nasal secretions for changes in amount and color  - Marion appropriate cooling/warming therapies per order  - Administer medications as ordered  - Instruct and encourage patient and family to use good hand hygiene technique  - Identify and instruct in appropriate isolation precautions for identified infection/condition  Outcome: Progressing     Problem: SAFETY ADULT  Goal: Patient will remain free of falls  Description: INTERVENTIONS:  - Educate patient/family on patient safety including physical limitations  - Instruct patient to call for assistance with activity   - Consult OT/PT to assist with strengthening/mobility   - Keep Call bell within reach  - Keep bed low and locked with side rails adjusted as appropriate  - Keep care items and personal belongings within reach  - Initiate and maintain comfort rounds  - Make Fall Risk Sign visible to staff  - Offer Toileting every 2 Hours, in advance of need  - Initiate/Maintain bed/chair alarm  - Obtain necessary fall risk management equipment  - Apply yellow socks  and bracelet for high fall risk patients  - Consider moving patient to room near nurses station  Outcome: Progressing     Problem: DISCHARGE PLANNING  Goal: Discharge to home or other facility with appropriate resources  Description: INTERVENTIONS:  - Identify barriers to discharge w/patient and caregiver  - Arrange for needed discharge resources and transportation as appropriate  - Identify discharge learning needs (meds, wound care, etc.)  - Arrange for interpretive services to assist at discharge as needed  - Refer to Case Management Department for coordinating discharge planning if the patient needs post-hospital services based on physician/advanced practitioner order or complex needs related to functional status, cognitive ability, or social support system  Outcome: Progressing     Problem: Knowledge Deficit  Goal: Patient/family/caregiver demonstrates understanding of disease process, treatment plan, medications, and discharge instructions  Description: Complete learning assessment and assess knowledge base.  Interventions:  - Provide teaching at level of understanding  - Provide teaching via preferred learning methods  Outcome: Progressing

## 2024-11-22 NOTE — ASSESSMENT & PLAN NOTE
Takes as needed Lasix and metolazone  Is currently followed by [facility] cardiology  Patient with some chronic edema and patient states his abdomen is little bit distended  Will continue patient on Lasix 20 mg twice daily and Klor-Con 20 mill equivalents daily  Echocardiogram showed normal ejection fraction with no valvular pathology.  Continue with current dose of diuretics.    Wt Readings from Last 3 Encounters:   11/20/24 (!) 138 kg (305 lb)   11/14/24 (!) 137 kg (302 lb 14.6 oz)   10/31/24 (!) 137 kg (303 lb)

## 2024-11-23 LAB
BACTERIA BLD CULT: NORMAL
BACTERIA BLD CULT: NORMAL

## 2024-11-24 ENCOUNTER — RESULTS FOLLOW-UP (OUTPATIENT)
Dept: EMERGENCY DEPT | Facility: HOSPITAL | Age: 59
End: 2024-11-24

## 2024-11-25 ENCOUNTER — TELEPHONE (OUTPATIENT)
Age: 59
End: 2024-11-25

## 2024-11-25 ENCOUNTER — OFFICE VISIT (OUTPATIENT)
Dept: PODIATRY | Age: 59
End: 2024-11-25
Payer: COMMERCIAL

## 2024-11-25 VITALS
DIASTOLIC BLOOD PRESSURE: 76 MMHG | SYSTOLIC BLOOD PRESSURE: 124 MMHG | RESPIRATION RATE: 92 BRPM | BODY MASS INDEX: 38.43 KG/M2 | TEMPERATURE: 98.1 F | HEART RATE: 75 BPM | HEIGHT: 73 IN | WEIGHT: 290 LBS

## 2024-11-25 DIAGNOSIS — E11.42 TYPE 2 DIABETES MELLITUS WITH DIABETIC POLYNEUROPATHY, WITH LONG-TERM CURRENT USE OF INSULIN (HCC): Primary | ICD-10-CM

## 2024-11-25 DIAGNOSIS — Z89.421 STATUS POST AMPUTATION OF LESSER TOE OF RIGHT FOOT (HCC): ICD-10-CM

## 2024-11-25 DIAGNOSIS — Z79.4 TYPE 2 DIABETES MELLITUS WITH DIABETIC POLYNEUROPATHY, WITH LONG-TERM CURRENT USE OF INSULIN (HCC): Primary | ICD-10-CM

## 2024-11-25 PROCEDURE — 99213 OFFICE O/P EST LOW 20 MIN: CPT | Performed by: STUDENT IN AN ORGANIZED HEALTH CARE EDUCATION/TRAINING PROGRAM

## 2024-11-25 RX ORDER — POTASSIUM CHLORIDE 1500 MG/1
TABLET, EXTENDED RELEASE ORAL
COMMUNITY
Start: 2024-11-22

## 2024-11-25 NOTE — PROGRESS NOTES
S/ Patient here for post-op appointment s/p right 2nd toe amputation (DOS 11/15/24) due to OM. Denies pain out of proportion at the surgical site, active strikethrough drainage, fevers, chills, nightsweats, SOB, chest pain, nor calf pain. The patient is in good spirits. Patient relates compliance with surgical shoe, elevation, and keeping dressing clean, dry and intact.    On 6wks abx for MSSA bacteremia per ID (through 12/29/24). Recent hospital stays reviewed.     O/ The patient appears in NAD / non-toxic. Primary dressing and splint/cast taken down for wound inspection. VSS. No signs of infection. No active drainage. Normal post-op edema. No necrosis, dehiscence.     I/ satisfactory post-op condition    P/   Sutures/Staples left intact  Xeroform DSD Applied to be Kept C/D/I  WBAT toe unloading shoe for ADLs only    F/u 1wk for likely suture removal. Call if any increase in pain, fevers, calf pain, shortness of breath, or general distress is noted. Patient instructed to go to ER if call is not returned immediately.

## 2024-11-25 NOTE — TELEPHONE ENCOUNTER
Patient called to schedule an appt with Dr Rodriguez. Writer was able to warm transfer to the resident line for assistance.

## 2024-11-25 NOTE — UTILIZATION REVIEW
NOTIFICATION OF ADMISSION DISCHARGE   This is a Notification of Discharge from [facility]. Please be advised that this patient has been discharge from our facility. Below you will find the admission and discharge date and time including the patient’s disposition.   UTILIZATION REVIEW CONTACT:  Mirna Velez  Utilization   Network Utilization Review Department  Phone: [phone] x carefully listen to the prompts. All voicemails are confidential.  Email: [email or web address]     ADMISSION INFORMATION  PRESENTATION DATE: 11/18/2024  6:32 PM  OBERVATION ADMISSION DATE: N/A  INPATIENT ADMISSION DATE: 11/18/24  7:24 PM   DISCHARGE DATE: 11/22/2024  9:57 AM   DISPOSITION:Home with Home Health Care    Network Utilization Review Department  ATTENTION: Please call with any questions or concerns to [phone] and carefully listen to the prompts so that you are directed to the right person. All voicemails are confidential.   For Discharge needs, contact Care Management DC Support Team at [phone] opt. 2  Send all requests for admission clinical reviews, approved or denied determinations and any other requests to dedicated fax number below belonging to the campus where the patient is receiving treatment. List of dedicated fax numbers for the Facilities:  FACILITY NAME UR FAX NUMBER   ADMISSION DENIALS (Administrative/Medical Necessity) [phone]   DISCHARGE SUPPORT TEAM ([facility]) [phone]   PARENT CHILD HEALTH (Maternity/NICU/Pediatrics) [phone]   [facility] [phone]   [facility] [phone]   [facility] [phone]   [facility] [phone]   [facility] [phone]   [facility] [phone]   [facility] [phone]   [facility]  [facility] [phone]   [facility] [phone]   [facility] [phone]   [facility] [phone]   [facility] [phone]

## 2024-11-29 ENCOUNTER — TELEPHONE (OUTPATIENT)
Age: 59
End: 2024-11-29

## 2024-11-29 NOTE — TELEPHONE ENCOUNTER
Caller: Cy Carpio    Doctor: Dr. Yousif    Reason for call: attached to LM about RS post op appt/RS     Call back#: NA

## 2024-11-29 NOTE — TELEPHONE ENCOUNTER
Caller: Cy Carpio    Doctor and/or Office: Dr. Yousif/Mallika    #: [phone]    Escalation: Surgery Patient had to cancel his post op to get his stitches out. He is having another surgery on that date. Please return call to get his post op rescheduled to have stitches out. Thank you

## 2024-11-29 NOTE — TELEPHONE ENCOUNTER
Caller: Cy Carpio    Doctor: Dr. Yousif    Reason for call: Post op appt/we LM to call us to schedule/RS for 12/3    Call back#: [phone]/no need for return call

## 2024-12-02 NOTE — ASSESSMENT & PLAN NOTE
Dual chamber pacemaker impant in 2017 for sick sinus syndrome in the setting of a fib. Device is at risk for bacteremic seeding if it remains in place. INÉS fortunately negative for valvular lead/vegetation. Repeat blood cultures negative. Patient now s/p pacemaker removal/lead extraction on 12/2 with [facility] cardiology. Per operative note, the capsule pocket was clean. The distal end of the lead broke off during extraction and patient had to go back to the OR on 12/3 for removal of retained segment of pacer lead. Does not appear any cultures were taken.   Follow-up repeat blood cultures to ensure they remained clear post-removal  Follow-up with outpatient cardiology at [facility]

## 2024-12-02 NOTE — ASSESSMENT & PLAN NOTE
Lab Results   Component Value Date    HGBA1C 5.8 (H) 11/14/2024   Relatively well controlled. Remains a risk factor for development of infection and poor wound healing. Recommend ongoing tight glycemic control.

## 2024-12-02 NOTE — ASSESSMENT & PLAN NOTE
Could affect antibiotic dosing. Will dose adjust antibiotic as needed. Cefazolin dosing as above.

## 2024-12-02 NOTE — ASSESSMENT & PLAN NOTE
In the setting of nonhealing right toe ulcer.  Likely source of bacteremia.  MRI noted right proximal phalanx osteo and he is s/p toe amputation at second MTPJ on 11/15 with presumed surgical cure. States he still has stitches in his foot.   Continue wound care  Ongoing follow-up/management per podiatry

## 2024-12-02 NOTE — PROGRESS NOTES
Name: David Carpio      : 1965      MRN: [id number]  Encounter Provider: Flavia Russell PA-C  Encounter Date: 2024   Encounter department: [facility]  :  Assessment & Plan  MSSA bacteremia  Single blood culture on  with MSSA.  Source of bacteremia is right toe cellulitis, osteo.  Patient had been on oral Keflex following surgical cure of right toe osteo. Patient has a pacemaker in place and would need to rule out endovascular/device infection, INÉS is fortunately negative for valvular/lead vegetation and repeat blood cx are negative. Patient followed up with outpatient cardiologist at [facility] who recommended pacemaker explantation and lead extraction which he underwent on  as below. Per EP, no further device implantation planned. Most recent labs reviewed from 12/3:   Continue cefazolin 2 every 8 hours through 24 to complete a 6 week total antibiotic course  Will re-check peripheral blood cultures post removal of pacemaker to ensure they remain clear.   PICC to be removed after completion of IV antibiotic course on   Continue weekly labs including CBCd and CMP while on IV cefazolin course  Return to care in 2 weeks  Orders:    Blood culture; Future    Blood culture; Future    Toe osteomyelitis, right (HCC)  In the setting of nonhealing right toe ulcer.  Likely source of bacteremia.  MRI noted right proximal phalanx osteo and he is s/p toe amputation at second MTPJ on 11/15 with presumed surgical cure. States he still has stitches in his foot.   Continue wound care  Ongoing follow-up/management per podiatry         Pacemaker  Dual chamber pacemaker impant in  for sick sinus syndrome in the setting of a fib. Device is at risk for bacteremic seeding if it remains in place. INÉS fortunately negative for valvular lead/vegetation. Repeat blood cultures negative. Patient now s/p pacemaker removal/lead extraction on  with [facility] cardiology. Per operative note,  the capsule pocket was clean. The distal end of the lead broke off during extraction and patient had to go back to the OR on 12/3 for removal of retained segment of pacer lead. Does not appear any cultures were taken.   Follow-up repeat blood cultures to ensure they remained clear post-removal  Follow-up with outpatient cardiology at [facility]       Type 2 diabetes mellitus with other circulatory complication, without long-term current use of insulin ([facility])    Lab Results   Component Value Date    HGBA1C 5.8 (H) 11/14/2024   Relatively well controlled. Remains a risk factor for development of infection and poor wound healing. Recommend ongoing tight glycemic control.          Obesity (BMI 30-39.9)  Could affect antibiotic dosing. Will dose adjust antibiotic as needed. Cefazolin dosing as above.          History of Present Illness     KSENIA Carpio is a 59 y.o. male with pmhx of type 2 diabetes c/b peripheral neuropathy and non-healing right foot ulcer, obesity, sick sinus syndrome s/p pacemaker placed in 2017, who was recently hospitalized at [facility] for amputation of right second toe and was found to have MSSA bacteremia. Per my record review, patient was initially hospitalized on 10/31 with cellulitis of right second toe in the setting of a nonhealing foot ulcer. Osteomyelitis was suspected clinically but patient wished to be discharged home and have workup done as an outpatient.  He was discharged on p.o. Keflex.  An MRI was obtained on 11/12 which was suspicious for osteomyelitis of the proximal phalanx.  He was admitted again on 11/14 and underwent second toe amputation at the level of the MTPJ for presumed surgical cure of osteo.  Patient was discharged on 11/16 on p.o. Keflex for 7 days.  However, following discharge, a single blood culture from 11/14 resulted positive for MSSA and he was advised to return to the hospital for further evaluation. Patient underwent a INÉS in setting of having a pacemaker  in place. INÉS was fortunately negative for valvular/lead vegetation and repeat blood cx resulted negative. It was recommended that he still have his pacemaker removed given concern for potential bacterial seeding. Patient wished to follow-up with his outpatient cardiologist at [facility] prior to removal of pacemaker. Patient was discharged on IV cefazolin 2g q8h to complete a 6 week total IV antibiotic course through 12/26/24. He now presents to ID office for follow-up.     Patient followed up with his outpatient cardiologist on 11/25. Recommended pacemaker explantation and lead extraction which he underwent on 12/2. Per EP, no further device implantation planned.     Reports soreness of his right chest wall post pacemaker removal. No fevers or chills. No nausea, vomiting, diarrhea, CP, SOB, abdominal pain. Appetite is ok. No rashes. No new symptoms. States his foot is healing well, still has stitches in place.     History obtained from: patient    Review of Systems  Medical History Reviewed by provider this encounter:  Tobacco  Allergies  Meds  Problems  Med Hx  Surg Hx  Fam Hx     .  Current Outpatient Medications on File Prior to Visit   Medication Sig Dispense Refill    acetaminophen (TYLENOL) 325 mg tablet Take 2 tablets (650 mg total) by mouth every 6 (six) hours as needed for mild pain, headaches or fever 30 tablet 0    buPROPion (Wellbutrin XL) 300 mg 24 hr tablet Take 1 tablet (300 mg total) by mouth daily 30 tablet 2    busPIRone (BUSPAR) 15 mg tablet Take 1 tablet (15 mg total) by mouth 3 (three) times a day 90 tablet 2    calcium citrate-vitamin D 315 mg-5 mcg tablet Take 2 tablets by mouth 2 (two) times a day      ceFAZolin (ANCEF) 2000 mg IVPB Inject 2,000 mg into a catheter in a vein over 30 minutes at 100 mL/hr every 8 (eight) hours 5700 mL 0    docusate sodium (COLACE) 100 mg capsule Take 1 capsule (100 mg total) by mouth 2 (two) times a day 60 capsule 0    doxepin (SINEquan) 10 mg capsule Take 10  mg by mouth daily with breakfast      doxepin (SINEquan) 100 mg capsule Take 1 capsule (100 mg total) by mouth daily at bedtime 30 capsule 2    ferrous sulfate 325 (65 Fe) mg tablet Take 1 tablet (325 mg total) by mouth daily with breakfast Do not start before June 19, 2023. 30 tablet 0    furosemide (LASIX) 40 mg tablet Take 0.5 tablets (20 mg total) by mouth 2 (two) times a day 60 tablet 0    gabapentin (NEURONTIN) 800 mg tablet Take 1 tablet (800 mg total) by mouth 3 (three) times a day 90 tablet 2    metolazone (ZAROXOLYN) 2.5 mg tablet Take 2.5 mg by mouth      Multiple Vitamins-Minerals (Mens Multivitamin) TABS Take 1 tablet by mouth 2 (two) times a day      potassium chloride (Klor-Con M20) 20 mEq tablet       potassium chloride (KLOR-CON) 20 mEq packet Take 40 mEq by mouth daily 30 each 0    rivaroxaban (Xarelto) 20 mg tablet Take 20 mg by mouth daily with breakfast      rosuvastatin (CRESTOR) 5 mg tablet Take 5 mg by mouth daily      lidocaine (Lidoderm) 5 % Apply 1 patch topically over 12 hours daily for 15 days Remove & Discard patch within 12 hours or as directed by MD 15 patch 0     No current facility-administered medications on file prior to visit.      Social History     Tobacco Use    Smoking status: Never     Passive exposure: Never    Smokeless tobacco: Never   Vaping Use    Vaping status: Never Used   Substance and Sexual Activity    Alcohol use: Never    Drug use: Never    Sexual activity: Yes        Objective   /64 (BP Location: Left arm, Patient Position: Sitting, Cuff Size: Standard)   Pulse (!) 49   Temp (!) 97.2 °F (36.2 °C) (Temporal)   Wt 136 kg (299 lb 3.2 oz)   SpO2 93%   BMI 39.47 kg/m²      Physical Exam  General Appearance:  Alert, interactive, nontoxic, no acute distress. Sitting upright on exam table, appears comfortable   Throat: Oropharynx moist without lesions.    Lungs:   Clear to auscultation bilaterally; no wheezes, rhonchi or rales; respirations unlabored on room  air.   Heart:  RRR; no murmur, rub or gallop.   Chest wall: Right chest wall with bandages in place, tender with palpation. No significant surrounding erythema. No warmth with palpation. No drainage through bandages.    Abdomen:   Soft, non-tender, non-distended, positive bowel sounds.     Extremities: No clubbing or cyanosis. Trace edema. Chronic venous stasis changes to bilateral lower extremities noted. RUE PICC CDI. No erythema or tenderness with palpation.   Skin: No new rashes, lesions, or draining wounds noted on exposed skin.     Labs, Imaging, & Other studies:   All pertinent labs and imaging studies were personally reviewed by me including  Results from last 7 days   Lab Units 12/02/24  1352   HEMOGLOBIN g/dL 14.4   PLATELETS thou/cmm 226     Results from last 7 days   Lab Units 12/03/24  0406   POTASSIUM mmol/L 3.8   CHLORIDE mmol/L 102   CO2 mmol/L 29   BUN mg/dL 13   CREATININE mg/dL 0.90   EGFR  97   CALCIUM mg/dL 8.9           Flavia Russell PA-C  [facility]

## 2024-12-04 ENCOUNTER — OFFICE VISIT (OUTPATIENT)
Dept: INFECTIOUS DISEASES | Facility: CLINIC | Age: 59
End: 2024-12-04
Payer: COMMERCIAL

## 2024-12-04 ENCOUNTER — TELEPHONE (OUTPATIENT)
Age: 59
End: 2024-12-04

## 2024-12-04 VITALS
TEMPERATURE: 97.2 F | HEART RATE: 49 BPM | BODY MASS INDEX: 39.47 KG/M2 | OXYGEN SATURATION: 93 % | DIASTOLIC BLOOD PRESSURE: 64 MMHG | SYSTOLIC BLOOD PRESSURE: 118 MMHG | WEIGHT: 299.2 LBS

## 2024-12-04 DIAGNOSIS — R78.81 MSSA BACTEREMIA: Primary | ICD-10-CM

## 2024-12-04 DIAGNOSIS — Z95.0 PACEMAKER: ICD-10-CM

## 2024-12-04 DIAGNOSIS — E11.59 TYPE 2 DIABETES MELLITUS WITH OTHER CIRCULATORY COMPLICATION, WITHOUT LONG-TERM CURRENT USE OF INSULIN (HCC): ICD-10-CM

## 2024-12-04 DIAGNOSIS — M86.9 TOE OSTEOMYELITIS, RIGHT (HCC): ICD-10-CM

## 2024-12-04 DIAGNOSIS — B95.61 MSSA BACTEREMIA: Primary | ICD-10-CM

## 2024-12-04 DIAGNOSIS — E66.9 OBESITY (BMI 30-39.9): ICD-10-CM

## 2024-12-04 PROCEDURE — 99214 OFFICE O/P EST MOD 30 MIN: CPT | Performed by: PHYSICIAN ASSISTANT

## 2024-12-04 NOTE — TELEPHONE ENCOUNTER
Warm transfer to POD   [facility] Procedure Discharge Instructions    Today you saw:   Dr. Irene Burnett      Your procedure:  Epidural steroid injection        Medications used:  Lidocaine (anesthetic)   Kenalog (steroid)  Omnipaque (contrast) Saline        Be cautious when walking as numbness and/or weakness in the legs may occur up to 6-8 hours after the procedure due to effect of the local anesthetic  Do not drive for 6 hours. The effect of the local anesthetic could slow your reflexes.   Avoid strenuous activity for the first 24 hours. You may resume your regular activities after that.   You may shower, however avoid swimming, tub baths or hot tubs for 24 hours following your procedure  You may have a mild to moderate increase in pain for several days following the injection.    You may use ice packs for 10-15 minutes, 3 to 4 times a day at the injection site for comfort  Do not use heat to painful areas for 6 to 8 hours. This will give the local anesthetic time to wear off and prevent you from accidentally burning your skin.  Unless you have been directed to avoid the use of anti-inflammatory medications (NSAIDS-ibuprofen, Aleve, Motrin), you may use these medications or Tylenol for pain control if needed.   With diabetes, check your blood sugar more frequently than usual as your blood sugar may be higher than normal for 10-14 days following a steroid injection. Contact your doctor who manages your diabetes if your blood sugar is higher than usual  Possible side effects of steroids that you may experience include flushing, elevated blood pressure, increased appetite, mild headaches and restlessness.  All of these symptoms will get better with time.  It may take up to 14 days for the steroid medication to start working although you may feel the effect as early as a few days after the procedure.   Follow up with Dr Burnett in 4-6 weeks    If you experience any of the following, call the pain center line during work  hours at [phone] or on-call physician after hours at [phone]:  -Fever over 100 degree F  -Swelling, bleeding, redness, drainage, warmth at the injection site  -Progressive weakness or numbness in your legs  -Unusual new onset of pain that is not improving

## 2024-12-04 NOTE — PATIENT INSTRUCTIONS
Continue cefazolin 2 every 8 hours through 12/26/24 to complete a 6 week total antibiotic course  Will re-check peripheral blood cultures post removal of pacemaker to ensure they remain clear.   PICC to be removed after completion of IV antibiotic course on 12/27  Continue weekly labs including CBCd and CMP while on IV cefazolin course  Return to care in 2 weeks

## 2024-12-06 ENCOUNTER — TREATMENT (OUTPATIENT)
Dept: INFECTIOUS DISEASES | Facility: CLINIC | Age: 59
End: 2024-12-06

## 2024-12-06 ENCOUNTER — NURSE TRIAGE (OUTPATIENT)
Age: 59
End: 2024-12-06

## 2024-12-06 DIAGNOSIS — R78.81 BACTEREMIA: Primary | ICD-10-CM

## 2024-12-06 NOTE — TELEPHONE ENCOUNTER
-Called and left message for Mr. Carpio. Call was concerning his progress with getting his PICC line assessed at an ED as he did not contact the ID office confirming receipt of previous message to do so. Message again instructed him to seek evaluation at an ED to have his PICC line assessed so he may resume his home IV antibiotic therapy. Call back requested to discuss his progress.

## 2024-12-06 NOTE — TELEPHONE ENCOUNTER
[place] nurse from [facility] called states she was unable to reach the patient to follow up in regards to the previous call about the PICC line. She wanted to inform the office and states if office has any updates regarding the PICC can call [phone] opt [phone]

## 2024-12-06 NOTE — TELEPHONE ENCOUNTER
-Called and spoke with Mrs. Bueno regarding the [facility] situation [facility]. Current recommendations explained to her. She verbalizes her understanding and indicated she will contact her  and instruct him to contact the ID office. She has no additional questions at this time.

## 2024-12-06 NOTE — TELEPHONE ENCOUNTER
"Reason for Disposition  • Central line (e.g., [place], [place], [place]) not running and no improvement after using Care Advice (i.e., can't flush line)    Additional Information  • IV not running or running slowly    Answer Assessment - Initial Assessment Questions  1. SYMPTOM:  \"What's the main symptom you're concerned about?\" (e.g., pain, redness, swelling, pus)      PICC not flushing  2. ONSET: \"When did the symptoms  start?\"      Last night  3. IV TYPE: \"What kind of IV line do you have?\" (e.g., central line, PICC, peripheral IV)      PICC line  4. IV LOCATION - SITE: \"Where does the IV enter your body?\"      Right arm  5. IV START DATE: \"When was this IV put in?\"      11/21  6. IV REASON: \"Why do you have this IV line?\"      IV abx  7. IV FUNCTION: \"Describe how the IV is running?\" (e.g., running normally, running slowly, not running, unable to flush)       Not flushing  8. PAIN: \"Is there any pain?\" If Yes, ask: \"How bad is the pain?\" (Scale 1-10; or mild, moderate, severe) \"Describe the pain.\" (e.g., burning, throbbing, shooting, sharp, etc.)      Denies  9. SWELLING: \"Is there any swelling at your IV site?\"       Denies  10. FEVER: \"Do you have a fever?\" If Yes, ask: \"What is your temperature, how was it measured, and when did it start?\"         No  11. OTHER SYMPTOMS: \"Do you have any other symptoms?\" (e.g., shaking chills, weakness)        No  12. OTHER       [facility] is unsure when they come next    Protocols used: IV Site and Other Symptoms-Adult-OH, IV Not Running or Running Slowly-Adult-OH    "

## 2024-12-06 NOTE — TELEPHONE ENCOUNTER
Pt calling regarding PICC line. Pt states beginning last night he has been unable to flush his line. He did miss his dose of cefazolin last night and is due for another dose this AM. Pt was provided heparin and did instill this into the line this AM, however, he is still unable to flush the line. Pt denies redness, swelling, warmth or pain around site.     Dressing changes are being completed by [facility]. Pt states he thinks they are coming today but he is uncertain.     Called [facility] to find out if they will be seeing pt today. She states that pt's services are on hold due to hospitalization. Nurse was last out to see him on 11/26. Informed that pt is not hospitalized, he was discharged from the hospital on 11/22. She will discuss with the nurse and then call us back.

## 2024-12-06 NOTE — TELEPHONE ENCOUNTER
-Called and left a message for Mr. Carpio to discuss this PICC line issue. Call back requested to discuss possible troubleshooting options.

## 2024-12-06 NOTE — TELEPHONE ENCOUNTER
-Called and left a message for Mrs. Carpio. Call was concerning her husbands progress to have his PICC line assessed at an ED. Informed her that it is important to inform the ID office of their status as it is important he not miss doses of his IV antibiotic. Call back requested .

## 2024-12-06 NOTE — TELEPHONE ENCOUNTER
David returned call. I advised him a nurse will call back as they were unavailable at this time. Please advise.

## 2024-12-06 NOTE — TELEPHONE ENCOUNTER
-Contacted [facility] and spoke with Erin. She verified IV site and line care orders. Indicated that patient could present to infusion center by 1500 to receive line care. If Mr. Carpio is unable then it will be important to present to the ED to have line care preformed to no miss any additional doses of IV antibiotics.    -Called and left a message for Mr. Carpio. Instructed him to proceed  to the [facility] by 1500 to receive line care and if he can not make it by then he should proceed to the ED to have this line care completed. Call back requested to discuss this with Laura Shirin.

## 2024-12-06 NOTE — TELEPHONE ENCOUNTER
-Called and spoke with Mr. Carpio. He explained his current situation related to his PICC line not flushing. After troubleshooting, determination was to work on having patient present to infusion center to have IV site and line care preformed.     -Provider contacted via epic secure chat and discussed situation. Provider agrees patient should be seen at infusion center. [place] orders placed.

## 2024-12-09 ENCOUNTER — TELEPHONE (OUTPATIENT)
Dept: INFECTIOUS DISEASES | Facility: CLINIC | Age: 59
End: 2024-12-09

## 2024-12-09 ENCOUNTER — HOSPITAL ENCOUNTER (EMERGENCY)
Facility: HOSPITAL | Age: 59
Discharge: HOME | End: 2024-12-09
Attending: EMERGENCY MEDICINE
Payer: COMMERCIAL

## 2024-12-09 VITALS
HEIGHT: 73 IN | SYSTOLIC BLOOD PRESSURE: 149 MMHG | BODY MASS INDEX: 38.43 KG/M2 | DIASTOLIC BLOOD PRESSURE: 94 MMHG | OXYGEN SATURATION: 96 % | TEMPERATURE: 98.1 F | WEIGHT: 290 LBS | HEART RATE: 77 BPM | RESPIRATION RATE: 16 BRPM

## 2024-12-09 VITALS
SYSTOLIC BLOOD PRESSURE: 96 MMHG | DIASTOLIC BLOOD PRESSURE: 54 MMHG | TEMPERATURE: 97.7 F | OXYGEN SATURATION: 94 % | WEIGHT: 305.12 LBS | BODY MASS INDEX: 40.26 KG/M2 | HEART RATE: 57 BPM | RESPIRATION RATE: 17 BRPM

## 2024-12-09 DIAGNOSIS — R78.81 BACTEREMIA: ICD-10-CM

## 2024-12-09 DIAGNOSIS — Z95.828 S/P PICC CENTRAL LINE PLACEMENT: ICD-10-CM

## 2024-12-09 DIAGNOSIS — Z45.2 ENCOUNTER FOR ASSESSMENT OF PERIPHERALLY INSERTED CENTRAL VENOUS CATHETER (PICC): Primary | ICD-10-CM

## 2024-12-09 DIAGNOSIS — T82.898A OCCLUSION OF PERIPHERALLY INSERTED CENTRAL CATHETER (PICC) LINE, INITIAL ENCOUNTER (HCC): Primary | ICD-10-CM

## 2024-12-09 DIAGNOSIS — R78.81 BACTEREMIA: Primary | ICD-10-CM

## 2024-12-09 PROCEDURE — 99284 EMERGENCY DEPT VISIT MOD MDM: CPT | Performed by: EMERGENCY MEDICINE

## 2024-12-09 PROCEDURE — 99284 EMERGENCY DEPT VISIT MOD MDM: CPT

## 2024-12-09 PROCEDURE — 99283 EMERGENCY DEPT VISIT LOW MDM: CPT | Performed by: EMERGENCY MEDICINE

## 2024-12-09 RX ORDER — HEPARIN SODIUM (PORCINE) LOCK FLUSH IV SOLN 100 UNIT/ML 100 UNIT/ML
100 SOLUTION INTRAVENOUS ONCE
Status: DISCONTINUED | OUTPATIENT
Start: 2024-12-09 | End: 2024-12-09

## 2024-12-09 RX ORDER — SULFAMETHOXAZOLE AND TRIMETHOPRIM 800; 160 MG/1; MG/1
2 TABLET ORAL EVERY 12 HOURS SCHEDULED
Qty: 12 TABLET | Refills: 0 | Status: SHIPPED | OUTPATIENT
Start: 2024-12-09 | End: 2024-12-09 | Stop reason: SDUPTHER

## 2024-12-09 RX ORDER — SULFAMETHOXAZOLE AND TRIMETHOPRIM 800; 160 MG/1; MG/1
2 TABLET ORAL EVERY 12 HOURS SCHEDULED
Qty: 68 TABLET | Refills: 0 | Status: SHIPPED | OUTPATIENT
Start: 2024-12-09 | End: 2024-12-26

## 2024-12-09 NOTE — ED PROVIDER NOTES
Time reflects when diagnosis was documented in both MDM as applicable and the Disposition within this note       Time User Action Codes Description Comment    12/9/2024  3:32 AM Rajat Harris Add [T82.898A] Occlusion of peripherally inserted central catheter (PICC) line, initial encounter (HCC)           ED Disposition       ED Disposition   Discharge    Condition   Stable    Date/Time   Mon Dec 9, 2024  3:32 AM    Comment   David Carpio discharge to home/self care.                   Assessment & Plan       Medical Decision Making  Patient presented to the ED for evaluation of occluded PICC line which his desire was to have removed in the emergency department and to follow-up with interventional radiology at another facility to have PICC line replaced.  Offered and recommended to attempt to unclog the PICC line using tPA intracatheter.  Patient declined this treatment.  Patient opted to leave PICC line in place and leave this ED without further treatment with the plan to follow-up with interventional radiology at [facility] later today.  Patient is clinically stable and appropriate for this plan of action.  Advise prompt follow-up for further evaluation and management of occluded PICC line return precautions and anticipatory guidance discussed.      Problems Addressed:  Occlusion of peripherally inserted central catheter (PICC) line, initial encounter (HCC): acute illness or injury        ED Course as of 12/09/24 0549   Mon Dec 09, 2024   0238 Will attempt to unclog PICC line using intracatheter tPA in the ED at this time.     0332 Patient was offered attempt at unclogging the PICC line using intracatheter tPA however he declined this and at this time he is stating he wishes to go to [facility] later today during regular hours to have his PICC line managed by the IR team there.  Patient is primarily requesting to have the PICC line removed in the ED I advised him that as I do not know that it is  clearly dysfunctional he should keep it and have it evaluated by IR later today as planned and they could remove it before placing a new line if required.         Medications - No data to display      ED Risk Strat Scores                                               History of Present Illness       Chief Complaint   Patient presents with    Medical Problem     Per pt his PICC will not flush for 3 days and has not been able to infuse his antibiotics. Pt PICC present in right arm. Denies fevers.       Past Medical History:   Diagnosis Date    Atrial fibrillation (HCC)     Benzodiazepine withdrawal with complication (HCC) 06/15/2023    Chronic diastolic (congestive) heart failure (HCC)     Diabetes mellitus (HCC)     GERD (gastroesophageal reflux disease)     High cholesterol     Hyperlipidemia     Pacemaker     Stroke (HCC)       Past Surgical History:   Procedure Laterality Date    APPENDECTOMY      ATRIAL CARDIAC PACEMAKER INSERTION      BARIATRIC SURGERY  05/2021    BONE BIOPSY Left 7/26/2023    Procedure: EXCISION BIOPSY BONE LESION EXTREMITY;  Surgeon: Adelia Yousif DPM;  [facility];  Service: Podiatry    EPIDURAL BLOCK INJECTION N/A 5/19/2022    Procedure: BLOCK / INJECTION EPIDURAL STEROID CERVICAL C7-T1;  Surgeon: Skyler Quinn MD;  Location: [facility];  Service: Pain Management     FL GUIDED NEEDLE PLAC BX/ASP/INJ  3/22/2022    FOOT AMPUTATION Left 4/28/2022    Procedure: LEFT TRANSMETATARSAL AMPUTATION.;  Surgeon: Nestor Madden DPM;  [facility];  Service: Podiatry    IR PICC PLACEMENT SINGLE LUMEN  11/21/2024    NERVE BLOCK Right 2/10/2022    Procedure: BLOCK MEDIAL BRANCH C3, C4, C5 #1;  Surgeon: Skyler Quinn MD;  [facility];  Service: Pain Management     NERVE BLOCK Right 3/22/2022    Procedure: BLOCK MEDIAL BRANCH C3, C4, C5 #2;  Surgeon: Skyler Quinn MD;  [facility];  Service: Pain Management     WI AMPUTATION FOOT TRANSMETARSAL Left 4/12/2023     Procedure: REVISION LEFT TRANSMETATARSAL (TMA) AMPUTATION, REMOVAL OF UNVIABLE TISSUE AND BONE,;  Surgeon: Adelia Yousif DPM;  [facility];  Service: Podiatry    SC AMPUTATION METATARSAL W/TOE SINGLE Left 4/7/2023    Procedure: 2ND RAY RESECTION FOOT;  Surgeon: Adelia Yousif DPM;  [facility];  Service: Podiatry    SC AMPUTATION TOE INTERPHALANGEAL JOINT Left 11/16/2021    Procedure: AMPUTATION LESSER TOE;  Surgeon: Nestor Madden DPM;  [facility];  Service: Podiatry    RADIOFREQUENCY ABLATION Right 4/7/2022    Procedure: Right C3, C4, C5 RFA;  Surgeon: Skyler Quinn MD;  [facility];  Service: Pain Management     RHIZOTOMY Right 2/9/2023    Procedure: RHIZOTOMY CERVICAL MEDIAL BRANCH NERVES RIGHT C3, C4, C5;  Surgeon: Skyler Quinn MD;  [facility];  Service: Pain Management     TOE AMPUTATION Left     2nd toe    TOE AMPUTATION Left 9/15/2021    Procedure: AMPUTATION LEFT 4TH TOE;  Surgeon: Nestor Madden DPM;  [facility];  Service: Podiatry    TOE AMPUTATION Right 1/12/2022    Procedure: AMPUTATION TOE;  Surgeon: Hong Jolly DPM;  [facility];  Service: Podiatry    TOE AMPUTATION Right 2/23/2022    Procedure: AMPUTATION TOE  RIGHT SECOND;  Surgeon: Hong Jolly DPM;  Location: [facility];  Service: Podiatry    TOE AMPUTATION Right 6/3/2022    Procedure: AMPUTATION right 4th TOE;  Surgeon: Nestor Madden DPM;  [facility];  Service: Podiatry    TOE AMPUTATION Right 11/15/2024    Procedure: AMPUTATION RIGHT 2ND TOE;  Surgeon: Adelia Yousif DPM;  [facility];  Service: Podiatry      Family History   Problem Relation Age of Onset    No Known Problems Mother     No Known Problems Father       Social History     Tobacco Use    Smoking status: Never     Passive exposure: Never    Smokeless tobacco: Never   Vaping Use    Vaping status: Never Used   Substance Use Topics    Alcohol use: Not Currently    Drug use: Not  Currently      E-Cigarette/Vaping    E-Cigarette Use Never User       E-Cigarette/Vaping Substances      I have reviewed and agree with the history as documented.     Patient is a 59-year-old male presents to the emergency department requesting to have his right upper extremity PICC line removed due to the fact that it has not been working for him to infuse antibiotics over the past 3 days.  Patient reports he has been trying to get in contact with infectious disease and IR in regards to this but has been having difficulty obtaining follow-up.  Patient reports he was supposed to have a visiting nurse come out to service the catheter but no one came out.  No fevers no other symptoms.  Patient reports being unable to flush medication or flushes through catheter.  No arm pain or swelling or redness around catheter.       History provided by:  Patient  Medical Problem  Associated symptoms: no chest pain, no fever, no nausea, no shortness of breath and no vomiting        Review of Systems   Constitutional:  Negative for fever.   Respiratory:  Negative for shortness of breath.    Cardiovascular:  Negative for chest pain.   Gastrointestinal:  Negative for nausea and vomiting.   All other systems reviewed and are negative.          Objective       ED Triage Vitals [12/09/24 0221]   Temperature Pulse Blood Pressure Respirations SpO2 Patient Position - Orthostatic VS   98.1 °F (36.7 °C) 77 149/94 16 96 % --      Temp Source Heart Rate Source BP Location FiO2 (%) Pain Score    Temporal Monitor Left arm -- No Pain      Vitals      Date and Time Temp Pulse SpO2 Resp BP Pain Score FACES Pain Rating User   12/09/24 0221 98.1 °F (36.7 °C) 77 96 % 16 149/94 No Pain -- NM            Physical Exam  Vitals and nursing note reviewed.   Constitutional:       General: He is not in acute distress.     Appearance: Normal appearance.   HENT:      Head: Normocephalic and atraumatic.      Nose: Nose normal.   Eyes:      Conjunctiva/sclera:  Conjunctivae normal.   Pulmonary:      Effort: Pulmonary effort is normal. No respiratory distress.   Skin:     General: Skin is dry.   Neurological:      General: No focal deficit present.      Mental Status: He is alert and oriented to person, place, and time.         Results Reviewed       None            No orders to display       Procedures    ED Medication and Procedure Management   Prior to Admission Medications   Prescriptions Last Dose Informant Patient Reported? Taking?   Multiple Vitamins-Minerals (Mens Multivitamin) TABS   Yes No   Sig: Take 1 tablet by mouth 2 (two) times a day   acetaminophen (TYLENOL) 325 mg tablet   No No   Sig: Take 2 tablets (650 mg total) by mouth every 6 (six) hours as needed for mild pain, headaches or fever   buPROPion (Wellbutrin XL) 300 mg 24 hr tablet   No No   Sig: Take 1 tablet (300 mg total) by mouth daily   busPIRone (BUSPAR) 15 mg tablet   No No   Sig: Take 1 tablet (15 mg total) by mouth 3 (three) times a day   calcium citrate-vitamin D 315 mg-5 mcg tablet   Yes No   Sig: Take 2 tablets by mouth 2 (two) times a day   ceFAZolin (ANCEF) 2000 mg IVPB   No No   Sig: Inject 2,000 mg into a catheter in a vein over 30 minutes at 100 mL/hr every 8 (eight) hours   docusate sodium (COLACE) 100 mg capsule   No No   Sig: Take 1 capsule (100 mg total) by mouth 2 (two) times a day   doxepin (SINEquan) 10 mg capsule   Yes No   Sig: Take 10 mg by mouth daily with breakfast   doxepin (SINEquan) 100 mg capsule   No No   Sig: Take 1 capsule (100 mg total) by mouth daily at bedtime   ferrous sulfate 325 (65 Fe) mg tablet   No No   Sig: Take 1 tablet (325 mg total) by mouth daily with breakfast Do not start before June 19, 2023.   furosemide (LASIX) 40 mg tablet   No No   Sig: Take 0.5 tablets (20 mg total) by mouth 2 (two) times a day   gabapentin (NEURONTIN) 800 mg tablet   No No   Sig: Take 1 tablet (800 mg total) by mouth 3 (three) times a day   lidocaine (Lidoderm) 5 %   No No   Sig:  Apply 1 patch topically over 12 hours daily for 15 days Remove & Discard patch within 12 hours or as directed by MD mitchellzone (ZAROXOLYN) 2.5 mg tablet   Yes No   Sig: Take 2.5 mg by mouth   potassium chloride (KLOR-CON) 20 mEq packet   No No   Sig: Take 40 mEq by mouth daily   potassium chloride (Klor-Con M20) 20 mEq tablet   Yes No   rivaroxaban (Xarelto) 20 mg tablet   Yes No   Sig: Take 20 mg by mouth daily with breakfast   rosuvastatin (CRESTOR) 5 mg tablet   Yes No   Sig: Take 5 mg by mouth daily      Facility-Administered Medications: None     Discharge Medication List as of 12/9/2024  3:32 AM        CONTINUE these medications which have NOT CHANGED    Details   acetaminophen (TYLENOL) 325 mg tablet Take 2 tablets (650 mg total) by mouth every 6 (six) hours as needed for mild pain, headaches or fever, Starting Fri 11/22/2024, Normal      buPROPion (Wellbutrin XL) 300 mg 24 hr tablet Take 1 tablet (300 mg total) by mouth daily, Starting Wed 11/6/2024, Normal      busPIRone (BUSPAR) 15 mg tablet Take 1 tablet (15 mg total) by mouth 3 (three) times a day, Starting Wed 11/6/2024, Normal      calcium citrate-vitamin D 315 mg-5 mcg tablet Take 2 tablets by mouth 2 (two) times a day, Starting Thu 11/30/2023, Historical Med      ceFAZolin (ANCEF) 2000 mg IVPB Inject 2,000 mg into a catheter in a vein over 30 minutes at 100 mL/hr every 8 (eight) hours, Starting u 11/21/2024, Until Sun 12/29/2024, Print      docusate sodium (COLACE) 100 mg capsule Take 1 capsule (100 mg total) by mouth 2 (two) times a day, Starting Fri 11/22/2024, Normal      !! doxepin (SINEquan) 10 mg capsule Take 10 mg by mouth daily with breakfast, Historical Med      !! doxepin (SINEquan) 100 mg capsule Take 1 capsule (100 mg total) by mouth daily at bedtime, Starting Wed 11/6/2024, Normal      ferrous sulfate 325 (65 Fe) mg tablet Take 1 tablet (325 mg total) by mouth daily with breakfast Do not start before June 19, 2023., Starting Mon  6/19/2023, Normal      furosemide (LASIX) 40 mg tablet Take 0.5 tablets (20 mg total) by mouth 2 (two) times a day, Starting Fri 11/22/2024, Normal      gabapentin (NEURONTIN) 800 mg tablet Take 1 tablet (800 mg total) by mouth 3 (three) times a day, Starting Wed 11/6/2024, Normal      lidocaine (Lidoderm) 5 % Apply 1 patch topically over 12 hours daily for 15 days Remove & Discard patch within 12 hours or as directed by MD, Starting Sun 10/15/2023, Until Mon 11/25/2024, Normal      metolazone (ZAROXOLYN) 2.5 mg tablet Take 2.5 mg by mouth, Starting Mon 11/25/2024, Historical Med      Multiple Vitamins-Minerals (Mens Multivitamin) TABS Take 1 tablet by mouth 2 (two) times a day, Starting Thu 11/30/2023, Historical Med      potassium chloride (Klor-Con M20) 20 mEq tablet Historical Med      potassium chloride (KLOR-CON) 20 mEq packet Take 40 mEq by mouth daily, Starting Fri 11/22/2024, Normal      rivaroxaban (Xarelto) 20 mg tablet Take 20 mg by mouth daily with breakfast, Historical Med      rosuvastatin (CRESTOR) 5 mg tablet Take 5 mg by mouth daily, Starting Mon 11/25/2024, Historical Med       !! - Potential duplicate medications found. Please discuss with provider.        No discharge procedures on file.  ED SEPSIS DOCUMENTATION   Time reflects when diagnosis was documented in both MDM as applicable and the Disposition within this note       Time User Action Codes Description Comment    12/9/2024  3:32 AM Rajat Harris Add [T82.898A] Occlusion of peripherally inserted central catheter (PICC) line, initial encounter (HCC)                  aRjat Harris DO  12/09/24 0549

## 2024-12-09 NOTE — TELEPHONE ENCOUNTER
"-Called and spoke with Mr. Carpio regarding his recent call into the office regarding his PICC line. Mr. Carpio indicated he presented to the ED this morning and \"they would not do anything about my PICC line not working\". He called into the office requesting if the line can be removed. Mr. Carpio indicated that he waited until this morning (12/09/2024) to have his line assessed rather than have it evaluated on 12/06/24 as he \"was waiting for the visiting nurse to show up to speak with them about. They never did, so I kept trying to use a heparin flush to open the line\". Mr. Carpio has become frustrated with his PICC line and was requesting to have it removed. Informed him that the provider would be contacted to discuss his case and receive recommendations.    -Contacted Dr. Hoffman and informed her of situation and received recommendations.  "

## 2024-12-09 NOTE — ED PROVIDER NOTES
Time reflects when diagnosis was documented in both MDM as applicable and the Disposition within this note       Time User Action Codes Description Comment    12/9/2024 11:46 AM Carlitos Guidry Add [Z45.2] Encounter for assessment of peripherally inserted central venous catheter (PICC)     12/9/2024 11:46 AM Carlitos Guidry Add [Z95.828] S/P PICC central line placement           ED Disposition       ED Disposition   Discharge    Condition   Stable    Date/Time   Mon Dec 9, 2024 11:47 AM    Comment   David Carpio discharge to home/self care.                   Assessment & Plan       Medical Decision Making  1130: Patient appears well, vital signs reviewed.  Patient presents for a blocked right arm PICC line.  I was able to flush the PICC line twice with 10 cc saline without difficulty.  The site is without evidence of infection.  The patient has been instructed on care of the PICC line.  There is no findings to suggest obstruction.  I will replace PICC dressing, close follow-up with his physicians.  Stable for discharge.             Medications - No data to display    ED Risk Strat Scores                           SBIRT 20yo+      Flowsheet Row Most Recent Value   Initial Alcohol Screen: US AUDIT-C     1. How often do you have a drink containing alcohol? 0 Filed at: 12/09/2024 1133   2. How many drinks containing alcohol do you have on a typical day you are drinking?  0 Filed at: 12/09/2024 1133   3a. Male UNDER 65: How often do you have five or more drinks on one occasion? 0 Filed at: 12/09/2024 1133   3b. FEMALE Any Age, or MALE 65+: How often do you have 4 or more drinks on one occassion? 0 Filed at: 12/09/2024 1133   Audit-C Score 0 Filed at: 12/09/2024 1133   ANGELO: How many times in the past year have you...    Used an illegal drug or used a prescription medication for non-medical reasons? Never Filed at: 12/09/2024 1133                            History of Present Illness       Chief Complaint   Patient  presents with    PICC Line Problem     Patient reports he came to ED at the direction of infectious disease due to picc line occlusion. Patient reports has not been able to flush picc for 3 days. Patient is to have q8 hr AntBx infusions.       Past Medical History:   Diagnosis Date    Atrial fibrillation (HCC)     Benzodiazepine withdrawal with complication (HCC) 06/15/2023    Chronic diastolic (congestive) heart failure (HCC)     Diabetes mellitus (HCC)     GERD (gastroesophageal reflux disease)     High cholesterol     Hyperlipidemia     Pacemaker     Stroke (HCC)       Past Surgical History:   Procedure Laterality Date    APPENDECTOMY      ATRIAL CARDIAC PACEMAKER INSERTION      BARIATRIC SURGERY  05/2021    BONE BIOPSY Left 7/26/2023    Procedure: EXCISION BIOPSY BONE LESION EXTREMITY;  Surgeon: Adelia Yousif DPM;  [facility];  Service: Podiatry    EPIDURAL BLOCK INJECTION N/A 5/19/2022    Procedure: BLOCK / INJECTION EPIDURAL STEROID CERVICAL C7-T1;  Surgeon: Skyler Quinn MD;  Location: [facility];  Service: Pain Management     FL GUIDED NEEDLE PLAC BX/ASP/INJ  3/22/2022    FOOT AMPUTATION Left 4/28/2022    Procedure: LEFT TRANSMETATARSAL AMPUTATION.;  Surgeon: Nestor Madden DPM;  [facility];  Service: Podiatry    IR PICC PLACEMENT SINGLE LUMEN  11/21/2024    NERVE BLOCK Right 2/10/2022    Procedure: BLOCK MEDIAL BRANCH C3, C4, C5 #1;  Surgeon: Skyler Quinn MD;  [facility];  Service: Pain Management     NERVE BLOCK Right 3/22/2022    Procedure: BLOCK MEDIAL BRANCH C3, C4, C5 #2;  Surgeon: Skyler Quinn MD;  [facility];  Service: Pain Management     NE AMPUTATION FOOT TRANSMETARSAL Left 4/12/2023    Procedure: REVISION LEFT TRANSMETATARSAL (TMA) AMPUTATION, REMOVAL OF UNVIABLE TISSUE AND BONE,;  Surgeon: Adelia Yousif DPM;  [facility];  Service: Podiatry    NE AMPUTATION METATARSAL W/TOE SINGLE Left 4/7/2023    Procedure: 2ND RAY RESECTION FOOT;   Surgeon: Adelia Yousif DPM;  [facility];  Service: Podiatry    IL AMPUTATION TOE INTERPHALANGEAL JOINT Left 11/16/2021    Procedure: AMPUTATION LESSER TOE;  Surgeon: Nestor Madden DPM;  [facility];  Service: Podiatry    RADIOFREQUENCY ABLATION Right 4/7/2022    Procedure: Right C3, C4, C5 RFA;  Surgeon: Skyler Quinn MD;  [facility];  Service: Pain Management     RHIZOTOMY Right 2/9/2023    Procedure: RHIZOTOMY CERVICAL MEDIAL BRANCH NERVES RIGHT C3, C4, C5;  Surgeon: Skyler Quinn MD;  [facility];  Service: Pain Management     TOE AMPUTATION Left     2nd toe    TOE AMPUTATION Left 9/15/2021    Procedure: AMPUTATION LEFT 4TH TOE;  Surgeon: Nestor Madden DPM;  [facility];  Service: Podiatry    TOE AMPUTATION Right 1/12/2022    Procedure: AMPUTATION TOE;  Surgeon: Hong Jolly DPM;  [facility];  Service: Podiatry    TOE AMPUTATION Right 2/23/2022    Procedure: AMPUTATION TOE  RIGHT SECOND;  Surgeon: Hong Jolly DPM;  [facility];  Service: Podiatry    TOE AMPUTATION Right 6/3/2022    Procedure: AMPUTATION right 4th TOE;  Surgeon: Nestor Madden DPM;  [facility];  Service: Podiatry    TOE AMPUTATION Right 11/15/2024    Procedure: AMPUTATION RIGHT 2ND TOE;  Surgeon: Adelia Yousif DPM;  [facility];  Service: Podiatry      Family History   Problem Relation Age of Onset    No Known Problems Mother     No Known Problems Father       Social History     Tobacco Use    Smoking status: Never     Passive exposure: Never    Smokeless tobacco: Never   Vaping Use    Vaping status: Never Used   Substance Use Topics    Alcohol use: Not Currently    Drug use: Not Currently      E-Cigarette/Vaping    E-Cigarette Use Never User       E-Cigarette/Vaping Substances      I have reviewed and agree with the history as documented.       History provided by:  Medical records and patient  Medical Problem  Location:  Blocked PICC  line  Severity:  Moderate  Onset quality:  Sudden  Duration:  4 days  Timing:  Constant  Progression:  Unchanged  Chronicity:  New  Context:  Patient has a history of osteomyelitis of his right foot, bacteremia, was placed for PICC line 2 weeks ago and has been doing IV antibiotics at home.  Patient states the PICC line became blocked 4 days prior to arrival despite flushing and heparin use.  Seen in the emergency room for similar last night.  Relieved by:  Nothing  Worsened by:  Nothing  Ineffective treatments:  Saline flushes and Hep-Lock  Associated symptoms: no abdominal pain, no chest pain, no congestion, no cough, no diarrhea, no ear pain, no fatigue, no fever, no headaches, no loss of consciousness, no myalgias, no nausea, no rash, no rhinorrhea, no shortness of breath, no sore throat, no vomiting and no wheezing        Review of Systems   Constitutional:  Negative for appetite change, chills, fatigue and fever.   HENT:  Negative for congestion, ear pain, rhinorrhea, sore throat and trouble swallowing.    Eyes:  Negative for pain, discharge and visual disturbance.   Respiratory:  Negative for cough, chest tightness, shortness of breath and wheezing.    Cardiovascular:  Negative for chest pain and palpitations.   Gastrointestinal:  Negative for abdominal pain, diarrhea, nausea and vomiting.   Endocrine: Negative for polydipsia, polyphagia and polyuria.   Genitourinary:  Negative for difficulty urinating, dysuria, hematuria and testicular pain.   Musculoskeletal:  Negative for arthralgias, back pain and myalgias.   Skin:  Negative for color change and rash.   Allergic/Immunologic: Negative for immunocompromised state.   Neurological:  Negative for dizziness, seizures, loss of consciousness, syncope, weakness and headaches.   Hematological:  Negative for adenopathy.   Psychiatric/Behavioral:  Negative for confusion and dysphoric mood.    All other systems reviewed and are negative.          Objective       ED  Triage Vitals   Temperature Pulse Blood Pressure Respirations SpO2 Patient Position - Orthostatic VS   12/09/24 1135 12/09/24 1131 12/09/24 1131 12/09/24 1131 12/09/24 1131 12/09/24 1131   97.7 °F (36.5 °C) 69 127/69 17 98 % Sitting      Temp Source Heart Rate Source BP Location FiO2 (%) Pain Score    12/09/24 1135 12/09/24 1131 -- -- 12/09/24 1131    Temporal Monitor   No Pain      Vitals      Date and Time Temp Pulse SpO2 Resp BP Pain Score FACES Pain Rating User   12/09/24 1145 -- 57 94 % -- 96/54 -- -- MB   12/09/24 1135 97.7 °F (36.5 °C) -- -- -- -- -- -- AS   12/09/24 1131 -- 69 98 % 17 127/69 No Pain -- AS            Physical Exam  Vitals and nursing note reviewed.   Constitutional:       General: He is not in acute distress.     Appearance: Normal appearance. He is obese. He is not ill-appearing, toxic-appearing or diaphoretic.   HENT:      Head: Normocephalic and atraumatic.      Nose: Nose normal. No congestion or rhinorrhea.      Mouth/Throat:      Mouth: Mucous membranes are moist.      Pharynx: Oropharynx is clear. No oropharyngeal exudate or posterior oropharyngeal erythema.   Eyes:      General:         Right eye: No discharge.         Left eye: No discharge.   Cardiovascular:      Rate and Rhythm: Normal rate and regular rhythm.      Pulses: Normal pulses.      Heart sounds: Normal heart sounds. No murmur heard.     No gallop.      Comments: Single-lumen PICC line right upper arm, site without evidence of infection or tenderness.  Pulmonary:      Effort: Pulmonary effort is normal. No respiratory distress.      Breath sounds: Normal breath sounds. No stridor. No wheezing, rhonchi or rales.   Chest:      Chest wall: No tenderness.   Abdominal:      General: Bowel sounds are normal. There is no distension.      Palpations: Abdomen is soft. There is no mass.      Tenderness: There is no abdominal tenderness. There is no right CVA tenderness, left CVA tenderness, guarding or rebound.      Hernia: No  hernia is present.   Musculoskeletal:         General: Normal range of motion.      Cervical back: Normal range of motion and neck supple.      Right lower leg: Edema present.      Left lower leg: Edema present.      Comments: Amputation right toes   Skin:     General: Skin is warm and dry.      Capillary Refill: Capillary refill takes less than 2 seconds.      Comments: Chronic venous insufficiency changes bilateral lower extremities   Neurological:      General: No focal deficit present.      Mental Status: He is alert and oriented to person, place, and time.      Cranial Nerves: No cranial nerve deficit.      Sensory: No sensory deficit.      Motor: No weakness.      Coordination: Coordination normal.      Gait: Gait normal.      Deep Tendon Reflexes: Reflexes normal.   Psychiatric:         Mood and Affect: Mood normal.         Behavior: Behavior normal.         Thought Content: Thought content normal.         Judgment: Judgment normal.         Results Reviewed       None            No orders to display       Procedures    ED Medication and Procedure Management   Prior to Admission Medications   Prescriptions Last Dose Informant Patient Reported? Taking?   Multiple Vitamins-Minerals (Mens Multivitamin) TABS   Yes No   Sig: Take 1 tablet by mouth 2 (two) times a day   acetaminophen (TYLENOL) 325 mg tablet   No No   Sig: Take 2 tablets (650 mg total) by mouth every 6 (six) hours as needed for mild pain, headaches or fever   buPROPion (Wellbutrin XL) 300 mg 24 hr tablet   No No   Sig: Take 1 tablet (300 mg total) by mouth daily   busPIRone (BUSPAR) 15 mg tablet   No No   Sig: Take 1 tablet (15 mg total) by mouth 3 (three) times a day   calcium citrate-vitamin D 315 mg-5 mcg tablet   Yes No   Sig: Take 2 tablets by mouth 2 (two) times a day   ceFAZolin (ANCEF) 2000 mg IVPB   No No   Sig: Inject 2,000 mg into a catheter in a vein over 30 minutes at 100 mL/hr every 8 (eight) hours   docusate sodium (COLACE) 100 mg  capsule   No No   Sig: Take 1 capsule (100 mg total) by mouth 2 (two) times a day   doxepin (SINEquan) 10 mg capsule   Yes No   Sig: Take 10 mg by mouth daily with breakfast   doxepin (SINEquan) 100 mg capsule   No No   Sig: Take 1 capsule (100 mg total) by mouth daily at bedtime   ferrous sulfate 325 (65 Fe) mg tablet   No No   Sig: Take 1 tablet (325 mg total) by mouth daily with breakfast Do not start before June 19, 2023.   furosemide (LASIX) 40 mg tablet   No No   Sig: Take 0.5 tablets (20 mg total) by mouth 2 (two) times a day   gabapentin (NEURONTIN) 800 mg tablet   No No   Sig: Take 1 tablet (800 mg total) by mouth 3 (three) times a day   lidocaine (Lidoderm) 5 %   No No   Sig: Apply 1 patch topically over 12 hours daily for 15 days Remove & Discard patch within 12 hours or as directed by MD   metolazone (ZAROXOLYN) 2.5 mg tablet   Yes No   Sig: Take 2.5 mg by mouth   potassium chloride (KLOR-CON) 20 mEq packet   No No   Sig: Take 40 mEq by mouth daily   potassium chloride (Klor-Con M20) 20 mEq tablet   Yes No   rivaroxaban (Xarelto) 20 mg tablet   Yes No   Sig: Take 20 mg by mouth daily with breakfast   rosuvastatin (CRESTOR) 5 mg tablet   Yes No   Sig: Take 5 mg by mouth daily   sulfamethoxazole-trimethoprim (BACTRIM DS) 800-160 mg per tablet   No No   Sig: Take 2 tablets by mouth every 12 (twelve) hours for 17 days      Facility-Administered Medications: None     Discharge Medication List as of 12/9/2024 11:47 AM        CONTINUE these medications which have NOT CHANGED    Details   acetaminophen (TYLENOL) 325 mg tablet Take 2 tablets (650 mg total) by mouth every 6 (six) hours as needed for mild pain, headaches or fever, Starting Fri 11/22/2024, Normal      buPROPion (Wellbutrin XL) 300 mg 24 hr tablet Take 1 tablet (300 mg total) by mouth daily, Starting Wed 11/6/2024, Normal      busPIRone (BUSPAR) 15 mg tablet Take 1 tablet (15 mg total) by mouth 3 (three) times a day, Starting Wed 11/6/2024, Normal       calcium citrate-vitamin D 315 mg-5 mcg tablet Take 2 tablets by mouth 2 (two) times a day, Starting Thu 11/30/2023, Historical Med      ceFAZolin (ANCEF) 2000 mg IVPB Inject 2,000 mg into a catheter in a vein over 30 minutes at 100 mL/hr every 8 (eight) hours, Starting Thu 11/21/2024, Until Sun 12/29/2024, Print      docusate sodium (COLACE) 100 mg capsule Take 1 capsule (100 mg total) by mouth 2 (two) times a day, Starting Fri 11/22/2024, Normal      !! doxepin (SINEquan) 10 mg capsule Take 10 mg by mouth daily with breakfast, Historical Med      !! doxepin (SINEquan) 100 mg capsule Take 1 capsule (100 mg total) by mouth daily at bedtime, Starting Wed 11/6/2024, Normal      ferrous sulfate 325 (65 Fe) mg tablet Take 1 tablet (325 mg total) by mouth daily with breakfast Do not start before June 19, 2023., Starting Mon 6/19/2023, Normal      furosemide (LASIX) 40 mg tablet Take 0.5 tablets (20 mg total) by mouth 2 (two) times a day, Starting Fri 11/22/2024, Normal      gabapentin (NEURONTIN) 800 mg tablet Take 1 tablet (800 mg total) by mouth 3 (three) times a day, Starting Wed 11/6/2024, Normal      lidocaine (Lidoderm) 5 % Apply 1 patch topically over 12 hours daily for 15 days Remove & Discard patch within 12 hours or as directed by MD, Starting Sun 10/15/2023, Until Mon 11/25/2024, Normal      metolazone (ZAROXOLYN) 2.5 mg tablet Take 2.5 mg by mouth, Starting Mon 11/25/2024, Historical Med      Multiple Vitamins-Minerals (Mens Multivitamin) TABS Take 1 tablet by mouth 2 (two) times a day, Starting Thu 11/30/2023, Historical Med      potassium chloride (Klor-Con M20) 20 mEq tablet Historical Med      potassium chloride (KLOR-CON) 20 mEq packet Take 40 mEq by mouth daily, Starting Fri 11/22/2024, Normal      rivaroxaban (Xarelto) 20 mg tablet Take 20 mg by mouth daily with breakfast, Historical Med      rosuvastatin (CRESTOR) 5 mg tablet Take 5 mg by mouth daily, Starting Mon 11/25/2024, Historical Med       sulfamethoxazole-trimethoprim (BACTRIM DS) 800-160 mg per tablet Take 2 tablets by mouth every 12 (twelve) hours for 17 days, Starting Mon 12/9/2024, Until Thu 12/26/2024, Normal       !! - Potential duplicate medications found. Please discuss with provider.        No discharge procedures on file.  ED SEPSIS DOCUMENTATION   Time reflects when diagnosis was documented in both MDM as applicable and the Disposition within this note       Time User Action Codes Description Comment    12/9/2024 11:46 AM Carlitos Guidry Add [Z45.2] Encounter for assessment of peripherally inserted central venous catheter (PICC)     12/9/2024 11:46 AM Carlitos Guidry Add [Z95.828] S/P PICC central line placement                  Carlitos Guidry MD  12/09/24 2548

## 2024-12-09 NOTE — TELEPHONE ENCOUNTER
Incoming call:    Cy would like to let ANNETTE Avila know that his PICC is now working after returning to ER.   Inquired about preventing a recurrence. Educated with routine line flushing.

## 2024-12-09 NOTE — TELEPHONE ENCOUNTER
Incoming call:    Patient presented to ER this morning (2am) for PICC problems. States that the medications placed did not work and is looking to having it replaced today (has not had treatment since Thursday.     Routing.

## 2024-12-09 NOTE — TELEPHONE ENCOUNTER
-Called and spoke with Mr. Carpio to relay Dr. Hoffman's recommendations. Educated and  patient that Dr. Hoffman recommends IV antibiotics to complete the 6 week course. In light of his recent admission and pacemaker removal. Informed him that per Dr. Hoffman there was no intra-op or INÉS evidence of endocarditis but due to his MSSA bacteremia it is recommended to complete IV antibiotic therapy.     -Further discussed that if he adamantly wants the picc out then Dr. Hoffman can proceed with continuing treatment with po bactrim to finish out the 6 week course . He was educated on the risks of PO therapy. If Mr. Carpio chooses PO therapy, he is at risk for worsening kidney function with the high dose bactrim so he would need CBC, CMP on Friday (12/13/2024)  and close monitoring. He should avoid potassium supplements while taking bactrim. Recommend fu in ID clinic before 12/26 . He will need repeat blood cultures 2 weeks after completing his antibiotic course.    -After relaying Dr. Hoffman's recommendations and educating the patient on his options, he indicated that he would present to the ED for PICC line evaluation once more. Additionally he was reminded that as of 12/06/2024 he may also present to [facility] to have IV site line care/flush protocol completed to avoid having this situation occur again. He and his wife verbalizes their understanding and have no further questions at this time.

## 2024-12-12 RX ORDER — OXYCODONE HYDROCHLORIDE 5 MG/1
5 TABLET ORAL
COMMUNITY
Start: 2024-12-03

## 2024-12-12 NOTE — TELEPHONE ENCOUNTER
-Called and left a message for Mr. Carpio.     -Call was to follow up with him regarding his PICC line and his home IV antibiotic infusions. Call back requested to discuss how he is doing.

## 2024-12-13 ENCOUNTER — OFFICE VISIT (OUTPATIENT)
Dept: PODIATRY | Age: 59
End: 2024-12-13
Payer: COMMERCIAL

## 2024-12-13 ENCOUNTER — TELEPHONE (OUTPATIENT)
Dept: INFECTIOUS DISEASES | Facility: CLINIC | Age: 59
End: 2024-12-13

## 2024-12-13 VITALS
HEART RATE: 71 BPM | HEIGHT: 73 IN | DIASTOLIC BLOOD PRESSURE: 68 MMHG | TEMPERATURE: 97.8 F | BODY MASS INDEX: 39.89 KG/M2 | SYSTOLIC BLOOD PRESSURE: 118 MMHG | WEIGHT: 301 LBS | OXYGEN SATURATION: 98 %

## 2024-12-13 DIAGNOSIS — Z89.421 STATUS POST AMPUTATION OF LESSER TOE OF RIGHT FOOT (HCC): ICD-10-CM

## 2024-12-13 DIAGNOSIS — Z79.4 TYPE 2 DIABETES MELLITUS WITH DIABETIC POLYNEUROPATHY, WITH LONG-TERM CURRENT USE OF INSULIN (HCC): Primary | ICD-10-CM

## 2024-12-13 DIAGNOSIS — E11.42 TYPE 2 DIABETES MELLITUS WITH DIABETIC POLYNEUROPATHY, WITH LONG-TERM CURRENT USE OF INSULIN (HCC): Primary | ICD-10-CM

## 2024-12-13 DIAGNOSIS — B35.1 ONYCHOMYCOSIS: ICD-10-CM

## 2024-12-13 PROCEDURE — 11720 DEBRIDE NAIL 1-5: CPT | Performed by: STUDENT IN AN ORGANIZED HEALTH CARE EDUCATION/TRAINING PROGRAM

## 2024-12-13 PROCEDURE — 99212 OFFICE O/P EST SF 10 MIN: CPT | Performed by: STUDENT IN AN ORGANIZED HEALTH CARE EDUCATION/TRAINING PROGRAM

## 2024-12-13 NOTE — ASSESSMENT & PLAN NOTE
· POA with significant LLE swelling, erythema, tenderness to palpation starting at left TMA extending to mid calf  · Following with podiatry and postop 4/12/23- 2nd metatarsal complete resection with surgical cure for OM presumed. S/p revision TMA. He was discharged on Bactrim and reports was doing well until 7/10 when he developed severe pain in his foot. Reports he saw his podiatrist and was recommended outpatient MRI but was unable to arrange his schedule. · Presented to the ED tonight due to severe intolerable pain in left foot and leg. Significant edema of left foot TMA and lower extremity.   · Admit to medical service  · MRI left foot rule out abscess or osteomyelitis  · Venous duplex LLE as patient has not taken his Xarelto for the past week as he was planning to have a surgical procedure by podiatry  · Empiric cefepime, vancomycin for suspected diabetic foot infection  · Trend fever curve, leukocytosis  · Pain control show

## 2024-12-13 NOTE — PROGRESS NOTES
S/ Patient here for post-op appointment s/p right 2nd toe amputation (DOS 11/15/24) due to OM. Denies pain out of proportion at the surgical site, active strikethrough drainage, fevers, chills, nightsweats, SOB, chest pain, nor calf pain. The patient is in good spirits. Patient relates compliance with surgical shoe, elevation, and keeping dressing clean, dry and intact.    On 6wks abx for MSSA bacteremia per ID (through 12/29/24). Recent hospital stays reviewed.     O/ The patient appears in NAD / non-toxic. Primary dressing and splint/cast taken down for wound inspection. VSS. No signs of infection. No active drainage. Normal post-op edema. No necrosis, dehiscence. Site healed without issue    I/ satisfactory post-op condition    P/   Sutures/Staples removed  WBAT regular shoe  Monitor for ulceration  Toenails x2 debrided in length and thickness with nail nipper and jomar (Q7)    F/u 10wks for RFC. Call if any increase in pain, fevers, calf pain, shortness of breath, or general distress is noted. Patient instructed to go to ER if call is not returned immediately.

## 2024-12-13 NOTE — TELEPHONE ENCOUNTER
-Called and left a message for Mr. Carpio. Message contained information provided by Jessica from [facility] to resume care. Informed him to please contact Jessica at [phone] to provide verbal confirmation that he is discharged from the hospital.

## 2024-12-13 NOTE — TELEPHONE ENCOUNTER
-Called and spoke with Claritza from [facility] regarding Mr. Carpio's care. Claritza indicated their records indicated he was admitted to [facility] and did not receive any information from [facility] case management related to the resumption of care. Informed her that the patient is indeed discharged from the hospital and has been for some time. Claritza requested a new referral to resume care. Informed her that new orders related to the patients weekly lab work and general PICC line care could be provided. She indicated that would work and she would speak to her supervisor regarding this situation. She additionally indicated she was going to contact the patient as well. Weekly lab orders were faxed to [phone]

## 2024-12-13 NOTE — TELEPHONE ENCOUNTER
Received a call from Jessica at [facility]. She indicated that at this time she needs verbal confirmation from the patient to resume care. She called to see if there were any other phone numbers on file for the patient. Confirmed the phone numbers on file are the numbers she tried and informed her that there are no additional phone numbers on file. She indicated she will attempt to contact the patient again and requested if the infectious disease office could also attempt to contact and inform him of the requirements Jessica has to resume care.

## 2024-12-13 NOTE — TELEPHONE ENCOUNTER
-Called and spoke with Mr. Carpio regarding his PICC line. He indicated that his line has been functioning well since it was evaluated at the ED. He did question when he was supposed to receive a visit from the visiting nurses who were assigned his case. He was informed that they should be coming weekly to redress his PICC line and collect weekly labs. He indicated that the [facility] has not been there in some time. Informed him that [facility] will be contacted.

## 2024-12-16 ENCOUNTER — TELEPHONE (OUTPATIENT)
Age: 59
End: 2024-12-16

## 2024-12-16 ENCOUNTER — APPOINTMENT (OUTPATIENT)
Dept: LAB | Facility: HOSPITAL | Age: 59
End: 2024-12-16
Payer: COMMERCIAL

## 2024-12-16 DIAGNOSIS — R78.81 MSSA BACTEREMIA: ICD-10-CM

## 2024-12-16 DIAGNOSIS — R78.81 BACTEREMIA: ICD-10-CM

## 2024-12-16 DIAGNOSIS — B95.61 MSSA BACTEREMIA: ICD-10-CM

## 2024-12-16 LAB
ALBUMIN SERPL BCG-MCNC: 4.1 G/DL (ref 3.5–5)
ALP SERPL-CCNC: 89 U/L (ref 34–104)
ALT SERPL W P-5'-P-CCNC: 11 U/L (ref 7–52)
ANION GAP SERPL CALCULATED.3IONS-SCNC: 5 MMOL/L (ref 4–13)
AST SERPL W P-5'-P-CCNC: 24 U/L (ref 13–39)
BASOPHILS # BLD AUTO: 0.07 THOUSANDS/ÂΜL (ref 0–0.1)
BASOPHILS NFR BLD AUTO: 1 % (ref 0–1)
BILIRUB SERPL-MCNC: 0.46 MG/DL (ref 0.2–1)
BUN SERPL-MCNC: 15 MG/DL (ref 5–25)
CALCIUM SERPL-MCNC: 9 MG/DL (ref 8.4–10.2)
CHLORIDE SERPL-SCNC: 105 MMOL/L (ref 96–108)
CO2 SERPL-SCNC: 28 MMOL/L (ref 21–32)
CREAT SERPL-MCNC: 0.78 MG/DL (ref 0.6–1.3)
EOSINOPHIL # BLD AUTO: 0.18 THOUSAND/ÂΜL (ref 0–0.61)
EOSINOPHIL NFR BLD AUTO: 3 % (ref 0–6)
ERYTHROCYTE [DISTWIDTH] IN BLOOD BY AUTOMATED COUNT: 14.5 % (ref 11.6–15.1)
GFR SERPL CREATININE-BSD FRML MDRD: 97 ML/MIN/1.73SQ M
GLUCOSE P FAST SERPL-MCNC: 93 MG/DL (ref 65–99)
HCT VFR BLD AUTO: 45.6 % (ref 36.5–49.3)
HGB BLD-MCNC: 14.5 G/DL (ref 12–17)
IMM GRANULOCYTES # BLD AUTO: 0.01 THOUSAND/UL (ref 0–0.2)
IMM GRANULOCYTES NFR BLD AUTO: 0 % (ref 0–2)
LYMPHOCYTES # BLD AUTO: 2.08 THOUSANDS/ÂΜL (ref 0.6–4.47)
LYMPHOCYTES NFR BLD AUTO: 34 % (ref 14–44)
MCH RBC QN AUTO: 27.3 PG (ref 26.8–34.3)
MCHC RBC AUTO-ENTMCNC: 31.8 G/DL (ref 31.4–37.4)
MCV RBC AUTO: 86 FL (ref 82–98)
MONOCYTES # BLD AUTO: 0.56 THOUSAND/ÂΜL (ref 0.17–1.22)
MONOCYTES NFR BLD AUTO: 9 % (ref 4–12)
NEUTROPHILS # BLD AUTO: 3.22 THOUSANDS/ÂΜL (ref 1.85–7.62)
NEUTS SEG NFR BLD AUTO: 53 % (ref 43–75)
NRBC BLD AUTO-RTO: 0 /100 WBCS
PLATELET # BLD AUTO: 214 THOUSANDS/UL (ref 149–390)
PMV BLD AUTO: 9.3 FL (ref 8.9–12.7)
POTASSIUM SERPL-SCNC: 4 MMOL/L (ref 3.5–5.3)
PROT SERPL-MCNC: 7.1 G/DL (ref 6.4–8.4)
RBC # BLD AUTO: 5.31 MILLION/UL (ref 3.88–5.62)
SODIUM SERPL-SCNC: 138 MMOL/L (ref 135–147)
WBC # BLD AUTO: 6.12 THOUSAND/UL (ref 4.31–10.16)

## 2024-12-16 PROCEDURE — 87040 BLOOD CULTURE FOR BACTERIA: CPT

## 2024-12-16 PROCEDURE — 80053 COMPREHEN METABOLIC PANEL: CPT

## 2024-12-16 PROCEDURE — 85025 COMPLETE CBC W/AUTO DIFF WBC: CPT

## 2024-12-16 PROCEDURE — 36415 COLL VENOUS BLD VENIPUNCTURE: CPT

## 2024-12-17 ENCOUNTER — OFFICE VISIT (OUTPATIENT)
Dept: PSYCHIATRY | Facility: CLINIC | Age: 59
End: 2024-12-17

## 2024-12-17 DIAGNOSIS — F43.21 COMPLICATED BEREAVEMENT: ICD-10-CM

## 2024-12-17 DIAGNOSIS — F41.1 GENERALIZED ANXIETY DISORDER: ICD-10-CM

## 2024-12-17 DIAGNOSIS — F32.1 CURRENT MODERATE EPISODE OF MAJOR DEPRESSIVE DISORDER WITHOUT PRIOR EPISODE (HCC): Primary | ICD-10-CM

## 2024-12-17 PROCEDURE — PBNCHG PB NO CHARGE PLACEHOLDER

## 2024-12-17 NOTE — ASSESSMENT & PLAN NOTE
Single blood culture on 11/14 with MSSA.  Source of bacteremia is right toe cellulitis, osteo.  Patient had been on oral Keflex following surgical cure of right toe osteo. Patient has a pacemaker in place and would need to rule out endovascular/device infection, INÉS is fortunately negative for valvular/lead vegetation and repeat blood cx are negative. Patient followed up with outpatient cardiologist at [facility] who recommended pacemaker explantation and lead extraction which he underwent on 12/2 as below. Per EP, no further device implantation planned. Most recent labs reviewed from 12/16: WBC WNL at 6.12, serum creatinine WNL at 0.78, LFT WNL, Blood culture x 2 sent and currently NGTD x 48h. This is reassuring that his blood stream remained clear after removal of his pacemaker. Given blood cultures remain clear, would finish out 6 week IV antibiotic course as planned.   Continue cefazolin 2 every 8 hours through 12/26/24 to complete a 6 week total antibiotic course  PICC to be removed after completion of IV antibiotic course on 12/27  Continue weekly labs including CBCd and CMP while on IV cefazolin course  Return to care as needed  Orders:    Discontinue PICC line; Future

## 2024-12-17 NOTE — ASSESSMENT & PLAN NOTE
In the setting of nonhealing right toe ulcer.  Likely source of bacteremia.  MRI noted right proximal phalanx osteo and he is s/p toe amputation at second MTPJ on 11/15 with presumed surgical cure.   Continue wound care  Ongoing follow-up/management per podiatry  Ongoing follow-up with podiatry for local care/management/evaluation of feet in setting of diabetes in attempt to prevent future infections

## 2024-12-17 NOTE — PROGRESS NOTES
Name: David Carpio      : 1965      MRN: [id number]  Encounter Provider: Flavia Russell PA-C  Encounter Date: 2024   Encounter department: [facility]  :  Assessment & Plan  MSSA Bacteremia  Single blood culture on  with MSSA.  Source of bacteremia is right toe cellulitis, osteo.  Patient had been on oral Keflex following surgical cure of right toe osteo. Patient has a pacemaker in place and would need to rule out endovascular/device infection, INÉS is fortunately negative for valvular/lead vegetation and repeat blood cx are negative. Patient followed up with outpatient cardiologist at [facility] who recommended pacemaker explantation and lead extraction which he underwent on  as below. Per EP, no further device implantation planned. Most recent labs reviewed from : WBC WNL at 6.12, serum creatinine WNL at 0.78, LFT WNL, Blood culture x 2 sent and currently NGTD x 48h. This is reassuring that his blood stream remained clear after removal of his pacemaker. Given blood cultures remain clear, would finish out 6 week IV antibiotic course as planned.   Continue cefazolin 2 every 8 hours through 24 to complete a 6 week total antibiotic course  PICC to be removed after completion of IV antibiotic course on   Continue weekly labs including CBCd and CMP while on IV cefazolin course  Return to care as needed  Orders:    Discontinue PICC line; Future    Toe osteomyelitis, right (HCC)  In the setting of nonhealing right toe ulcer.  Likely source of bacteremia.  MRI noted right proximal phalanx osteo and he is s/p toe amputation at second MTPJ on 11/15 with presumed surgical cure.   Continue wound care  Ongoing follow-up/management per podiatry  Ongoing follow-up with podiatry for local care/management/evaluation of feet in setting of diabetes in attempt to prevent future infections       Pacemaker  Dual chamber pacemaker impant in  for sick sinus syndrome in the  setting of a fib. Device is at risk for bacteremic seeding if it remains in place. INÉS fortunately negative for valvular lead/vegetation. Repeat blood cultures negative. Patient now s/p pacemaker removal/lead extraction on 12/2 with [facility] cardiology. Per operative note, the capsule pocket was clean. The distal end of the lead broke off during extraction and patient had to go back to the OR on 12/3 for removal of retained segment of pacer lead. Does not appear any cultures were taken.   Follow-up repeat blood cultures obtained on 12/16 to ensure they remain clear post-removal; currently NGTD x 48h  Follow-up with outpatient cardiology at [facility]       Type 2 diabetes mellitus with other circulatory complication, without long-term current use of insulin ([facility])    Lab Results   Component Value Date    HGBA1C 5.8 (H) 11/14/2024   Relatively well controlled. Remains a risk factor for development of infection and poor wound healing. Recommend ongoing tight glycemic control.          Obesity (BMI 30-39.9)  Could affect antibiotic dosing. Will dose adjust antibiotic as needed. Cefazolin dosing as above.            Antibiotics:   IV cefazolin    History of Present Illness   David Carpio is a 59 y.o. male with pmhx of type 2 diabetes c/b peripheral neuropathy and non-healing right foot ulcer, obesity, sick sinus syndrome s/p pacemaker placed in 2017, who was recently hospitalized at [facility] for amputation of right second toe and was found to have MSSA bacteremia. Per my record review, patient was initially hospitalized on 10/31 with cellulitis of right second toe in the setting of a nonhealing foot ulcer. Osteomyelitis was suspected clinically but patient wished to be discharged home and have workup done as an outpatient.  He was discharged on p.o. Keflex.  An MRI was obtained on 11/12 which was suspicious for osteomyelitis of the proximal phalanx.  He was admitted again on 11/14 and underwent second toe amputation at  the level of the MTPJ for presumed surgical cure of osteo.  Patient was discharged on 11/16 on p.o. Keflex for 7 days.  However, following discharge, a single blood culture from 11/14 resulted positive for MSSA and he was advised to return to the hospital for further evaluation. Patient underwent a INÉS in setting of having a pacemaker in place. INÉS was fortunately negative for valvular/lead vegetation and repeat blood cx resulted negative. It was recommended that he still have his pacemaker removed given concern for potential bacterial seeding. Patient wished to follow-up with his outpatient cardiologist at [facility] prior to removal of pacemaker. Patient was discharged on IV cefazolin 2g q8h to complete a 6 week total IV antibiotic course through 12/26/24. Patient followed up with his outpatient cardiologist on 11/25. Recommended pacemaker explantation and lead extraction which he underwent on 12/2. Per EP, no further device implantation planned. Patient now presents to ID clinic for follow-up.     Patient had difficulty flushing his PICC line. He presented to the ED on 12/9 for this. Line was flushed twice with 10 cc normal saline. He indicated that his line has been functioning well since it was evaluated at the ED.     Patient states he wants his PICC line out now. States it is now functioning just fine, but infusing the antibiotics every 8 hours has been a lot of work for him and he's alone at home. Discussed that he needs to complete the last week of his IV course and then the PICC line can be removed. He only has 1 week remaining. Patient is agreeable to this. Nursing in office flushed the line today and had no difficulties. Patient denies having any fevers, chills, N/V/D, CP, SOB, abdominal pain. Tolerating IV cefazolin without difficulty. No new symptoms.     ROS:  A complete review of systems is negative other than that noted above in the HPI.    Medical History Reviewed by provider this encounter:  Tobacco   Allergies  Meds  Problems  Med Hx  Surg Hx  Fam Hx     .  Current Outpatient Medications on File Prior to Visit   Medication Sig Dispense Refill    acetaminophen (TYLENOL) 325 mg tablet Take 2 tablets (650 mg total) by mouth every 6 (six) hours as needed for mild pain, headaches or fever 30 tablet 0    buPROPion (Wellbutrin XL) 300 mg 24 hr tablet Take 1 tablet (300 mg total) by mouth daily 30 tablet 2    busPIRone (BUSPAR) 15 mg tablet Take 1 tablet (15 mg total) by mouth 3 (three) times a day 90 tablet 2    calcium citrate-vitamin D 315 mg-5 mcg tablet Take 2 tablets by mouth 2 (two) times a day      ceFAZolin (ANCEF) 2000 mg IVPB Inject 2,000 mg into a catheter in a vein over 30 minutes at 100 mL/hr every 8 (eight) hours 5700 mL 0    docusate sodium (COLACE) 100 mg capsule Take 1 capsule (100 mg total) by mouth 2 (two) times a day 60 capsule 0    doxepin (SINEquan) 10 mg capsule Take 10 mg by mouth daily with breakfast      doxepin (SINEquan) 100 mg capsule Take 1 capsule (100 mg total) by mouth daily at bedtime 30 capsule 2    ferrous sulfate 325 (65 Fe) mg tablet Take 1 tablet (325 mg total) by mouth daily with breakfast Do not start before June 19, 2023. 30 tablet 0    furosemide (LASIX) 40 mg tablet Take 0.5 tablets (20 mg total) by mouth 2 (two) times a day 60 tablet 0    gabapentin (NEURONTIN) 800 mg tablet Take 1 tablet (800 mg total) by mouth 3 (three) times a day 90 tablet 2    metolazone (ZAROXOLYN) 2.5 mg tablet Take 2.5 mg by mouth      Multiple Vitamins-Minerals (Mens Multivitamin) TABS Take 1 tablet by mouth 2 (two) times a day      oxyCODONE (ROXICODONE) 5 immediate release tablet Take 5 mg by mouth      potassium chloride (Klor-Con M20) 20 mEq tablet       potassium chloride (KLOR-CON) 20 mEq packet Take 40 mEq by mouth daily 30 each 0    rivaroxaban (Xarelto) 20 mg tablet Take 20 mg by mouth daily with breakfast      rosuvastatin (CRESTOR) 5 mg tablet Take 5 mg by mouth daily       sulfamethoxazole-trimethoprim (BACTRIM DS) 800-160 mg per tablet Take 2 tablets by mouth every 12 (twelve) hours for 17 days 68 tablet 0    lidocaine (Lidoderm) 5 % Apply 1 patch topically over 12 hours daily for 15 days Remove & Discard patch within 12 hours or as directed by MD 15 patch 0     No current facility-administered medications on file prior to visit.      Social History     Tobacco Use    Smoking status: Never     Passive exposure: Never    Smokeless tobacco: Never   Vaping Use    Vaping status: Never Used   Substance and Sexual Activity    Alcohol use: Not Currently    Drug use: Not Currently    Sexual activity: Not on file        Objective   /58 (BP Location: Left arm, Patient Position: Sitting, Cuff Size: Standard)   Pulse 67   Temp 97.5 °F (36.4 °C) (Temporal)   Wt (!) 137 kg (301 lb 3.2 oz)   SpO2 96%   BMI 39.74 kg/m²      General: Alert, interactive, appearing well, nontoxic, no acute distress. Sitting upright on exam table.   Throat: Oropharynx moist without lesions.   Lungs: Clear to auscultation bilaterally; no audible wheezes, rhonchi or rales; respirations unlabored, on room air.   Heart: RRR; no murmur, rub or gallop  Abdomen: Soft, non-tender, non-distended, positive bowel sounds.    Extremities: No clubbing, cyanosis. Trace edema of bilateral lower extremities. Chronic venous stasis changes noted to bilateral lower extremities.   Skin: No new rashes or lesions noted to exposed skin. No new draining wounds.    Lab Results: I have personally reviewed pertinent labs.  Lab Results   Component Value Date    K 4.0 12/16/2024     12/16/2024    CO2 28 12/16/2024    BUN 15 12/16/2024    CREATININE 0.78 12/16/2024    GLUF 93 12/16/2024    CALCIUM 9.0 12/16/2024    CORRECTEDCA 9.5 06/16/2023    AST 24 12/16/2024    ALT 11 12/16/2024    ALKPHOS 89 12/16/2024    EGFR 97 12/16/2024     Lab Results   Component Value Date    WBC 6.12 12/16/2024    HGB 14.5 12/16/2024    HCT 45.6  12/16/2024    MCV 86 12/16/2024     12/16/2024     Lab Results   Component Value Date    ESR 69 (H) 11/18/2024       Radiology Results Review : No pertinent imaging studies reviewed.      Flavia Russell PA-C  [facility]

## 2024-12-17 NOTE — ASSESSMENT & PLAN NOTE
Dual chamber pacemaker impant in 2017 for sick sinus syndrome in the setting of a fib. Device is at risk for bacteremic seeding if it remains in place. INÉS fortunately negative for valvular lead/vegetation. Repeat blood cultures negative. Patient now s/p pacemaker removal/lead extraction on 12/2 with [facility] cardiology. Per operative note, the capsule pocket was clean. The distal end of the lead broke off during extraction and patient had to go back to the OR on 12/3 for removal of retained segment of pacer lead. Does not appear any cultures were taken.   Follow-up repeat blood cultures obtained on 12/16 to ensure they remain clear post-removal; currently NGTD x 48h  Follow-up with outpatient cardiology at [facility]

## 2024-12-17 NOTE — PSYCH
Behavioral Health Psychotherapy Progress Note    This is a therapy progress note for the patient David Carpio. David (who prefers to be called Cy) is a 59-year-old male,  to his wife Maribell for over 33 years, has 3 sons, is currently living in a house with his wife and 2 dogs in the [facility]. Cy reports a past medical history that includes atrial fibrillation, type 2 diabetes with diabetic polyneuropathy status post multiple foot surgeries in the setting of foot osteomyelitis, obstructive sleep apnea, hypertension, chronic diastolic heart failure, and is over 2 years status post bariatric surgery.  At this time Cy possesses a past psychiatric history of major depressive disorder, generalized anxiety disorder, and complicated bereavement.     At the time of intake, Cy reported that he has been struggling with his mental health for over the past three years following the untimely passing of his daughter Shameka (she passed at the age of 23 years old).  Cy reports that as a teenager his daughter began hanging out with bad influences and dating boyfriends who struggled with drugs and who influenced her to use drugs. Cy reports that he did his best to support his daughter and supported her through drug and alcohol rehab. He reports that about three years ago he was informed that his daughter had  and was reportedly found down by her boyfriend. Cy reports that his daughter was found to have heroin and fentanyl in her system at the time. Cy has struggled to cope with her passing since then. He reports that he becomes emotional when thinking of her. At this time Cy also harbors feelings of hatred towards his late daughter's boyfriend. Cy reports he has a 6 year old grandson Ki who lives with his late daughter's boyfriend (who is the father of the child).     At this time, Cy continues to feel that he does not deserve to be happy. Cy continues to feel that he failed as a parent  "and that \"I could have done something\" to prevent the tragic passing of his daughter. He also remains with fears that being happy and moving on from his daughter's passing will dishonor her memory.     Psychotherapy Provided: Individual Psychotherapy     1. Current moderate episode of major depressive disorder without prior episode (HCC)        2. Generalized anxiety disorder        3. Complicated bereavement            Goals addressed in session: Goal 1, Goal 2, and Goal 3      DATA:     At the time David discussed recent hospitalizations due to recurrent foot osteomyelitis, resulting in a foot amputation, subsequent infection, PICC line placement, and subsequent pacemaker removal.  He expressed frustration and emotional fatigue regarding his medical care and hospital experiences.  He also shared updates about his upcoming cardiology appointment.  In session today, David was given space to process these recent stressful events.    At this time, relationship challenges between David and his wife Maribell remain a significant source of distress.  At the time of interview today, David reiterated his plan to make a decision about the future of their 33-year marriage by the end of 2024.  He stated he currently believes reconciliation with his wife is not possible, stating he cannot forgive her for how she treated their daughter Shameka.    In session today, David was encouraged to reflect on the coping skills that he used during his hospitalizations and identify strategies that helped him manage stress.  Space was given to process his frustrations and feelings of helplessness about his physical health.    In session today, parallels between unresolved grief over his daughter and his current marital struggles were discussed.  In session today, the concept of closure was highlighted, emphasizing while he may not have achieved closure with his daughter, he has an opportunity of closure in his marriage, should " "reconciliation be unattainable.    In session today, the dynamics of imbalanced relationships were explored.  David clearly identified that he cannot control the emotions or decisions of others, including his wife or his late daughter.  Examples from modern social dynamics, such as \"ghosting,\" were discussed to illustrate the importance of accepting situations where closure is not possible and focusing on self growth.    This session guided David in recognizing that love cannot be forced, and in situations where love and mutual support is not possible, acceptance and personal decisions become paramount.  The importance of making decisions for one's own wellbeing rather than attempting to change or control other's emotions or actions was emphasized.    David engaged actively in the session and demonstrated insight into the need for boundaries and autonomy in relationships.    During this session, this clinician used the following therapeutic modalities: Bereavement Therapy, Cognitive Behavioral Therapy, and Supportive Psychotherapy    Substance Abuse was not addressed during this session    ASSESSMENT:  Cy Carpio presents with a Depressed mood.     his affect is Constricted, which is congruent, with his mood and the content of the session. The client has made progress on their goals.     Cy Carpio presents with a none risk of suicide, none risk of self-harm, and none risk of harm to others.    For any risk assessment that surpasses a \"low\" rating, a safety plan must be developed.    A safety plan was indicated: no    PLAN: Between sessions, Cy Carpio will continue to reflect further on his feelings about reconciliation and closure, particularly in his marriage, and to explore potential steps towards closure if he decides to proceed with ending the relationship with his life.  David will continue to work on reframing guilt and grief to promote self compassion and support his role in his grandson's " life.. At the next session, the therapist will use Bereavement Therapy, Cognitive Behavioral Therapy, and Supportive Psychotherapy to address these ongoing challenges.    Behavioral Health Treatment Plan and Discharge Planning: Cy Carpio is aware of and agrees to continue to work on their treatment plan. They have identified and are working toward their discharge goals. yes    Depression Follow-up Plan Completed: Yes    Visit start and stop times:    12/17/24 from 9:30 AM to 10:30 AM    This note was not shared with the patient due to this is a psychotherapy note

## 2024-12-19 ENCOUNTER — OFFICE VISIT (OUTPATIENT)
Dept: INFECTIOUS DISEASES | Facility: CLINIC | Age: 59
End: 2024-12-19
Payer: COMMERCIAL

## 2024-12-19 VITALS
DIASTOLIC BLOOD PRESSURE: 58 MMHG | SYSTOLIC BLOOD PRESSURE: 122 MMHG | BODY MASS INDEX: 39.74 KG/M2 | TEMPERATURE: 97.5 F | OXYGEN SATURATION: 96 % | HEART RATE: 67 BPM | WEIGHT: 301.2 LBS

## 2024-12-19 DIAGNOSIS — M86.9 TOE OSTEOMYELITIS, RIGHT (HCC): ICD-10-CM

## 2024-12-19 DIAGNOSIS — E11.59 TYPE 2 DIABETES MELLITUS WITH OTHER CIRCULATORY COMPLICATION, WITHOUT LONG-TERM CURRENT USE OF INSULIN (HCC): ICD-10-CM

## 2024-12-19 DIAGNOSIS — Z95.0 PACEMAKER: ICD-10-CM

## 2024-12-19 DIAGNOSIS — R78.81 BACTEREMIA: Primary | ICD-10-CM

## 2024-12-19 DIAGNOSIS — E66.9 OBESITY (BMI 30-39.9): ICD-10-CM

## 2024-12-19 PROCEDURE — 99214 OFFICE O/P EST MOD 30 MIN: CPT | Performed by: PHYSICIAN ASSISTANT

## 2024-12-19 NOTE — PATIENT INSTRUCTIONS
Continue cefazolin 2 every 8 hours through 12/26/24 to complete a 6 week total antibiotic course  PICC to be removed after completion of IV antibiotic course on 12/26 or 12/27  Continue weekly labs including CBCd and CMP while on IV cefazolin course  Repeat blood cultures remain no growth thus far. I will call if there is late growth and antibiotics need to be adjusted.  Return to care as needed.

## 2024-12-21 LAB
BACTERIA BLD CULT: NORMAL
BACTERIA BLD CULT: NORMAL

## 2024-12-27 ENCOUNTER — TELEPHONE (OUTPATIENT)
Age: 59
End: 2024-12-27

## 2024-12-27 NOTE — TELEPHONE ENCOUNTER
The patient contacted the office, requesting to reschedule his appt for today. The writer transferred the call to the resident department for assistance.

## 2025-01-03 ENCOUNTER — OFFICE VISIT (OUTPATIENT)
Dept: PSYCHIATRY | Facility: CLINIC | Age: 60
End: 2025-01-03

## 2025-01-03 DIAGNOSIS — F32.1 CURRENT MODERATE EPISODE OF MAJOR DEPRESSIVE DISORDER WITHOUT PRIOR EPISODE (HCC): Primary | ICD-10-CM

## 2025-01-03 DIAGNOSIS — F41.1 GENERALIZED ANXIETY DISORDER: ICD-10-CM

## 2025-01-03 DIAGNOSIS — F43.21 COMPLICATED BEREAVEMENT: ICD-10-CM

## 2025-01-03 PROCEDURE — PBNCHG PB NO CHARGE PLACEHOLDER

## 2025-01-03 NOTE — PSYCH
Behavioral Health Psychotherapy Progress Note    This is a therapy progress note for the patient David Carpio. David (who prefers to be called Cy) is a 59-year-old male,  to his wife Maribell for over 33 years, has 3 sons, is currently living in a house with his wife and 2 dogs in the [facility]. Cy reports a past medical history that includes atrial fibrillation, type 2 diabetes with diabetic polyneuropathy status post multiple foot surgeries in the setting of foot osteomyelitis, obstructive sleep apnea, hypertension, chronic diastolic heart failure, and is over 2 years status post bariatric surgery.  At this time Cy possesses a past psychiatric history of major depressive disorder, generalized anxiety disorder, and complicated bereavement.     At the time of intake, Cy reported that he has been struggling with his mental health for over the past three years following the untimely passing of his daughter Shameka (she passed at the age of 23 years old).  Cy reports that as a teenager his daughter began hanging out with bad influences and dating boyfriends who struggled with drugs and who influenced her to use drugs. Cy reports that he did his best to support his daughter and supported her through drug and alcohol rehab. He reports that about three years ago he was informed that his daughter had  and was reportedly found down by her boyfriend. Cy reports that his daughter was found to have heroin and fentanyl in her system at the time. Cy has struggled to cope with her passing since then. He reports that he becomes emotional when thinking of her. At this time Cy also harbors feelings of hatred towards his late daughter's boyfriend. Cy reports he has a 6 year old grandson Ki who lives with his late daughter's boyfriend (who is the father of the child).     At this time, Cy continues to feel that he does not deserve to be happy. Cy continues to feel that he failed as a parent  "and that \"I could have done something\" to prevent the tragic passing of his daughter. He also remains with fears that being happy and moving on from his daughter's passing will dishonor her memory.      Psychotherapy Provided: Individual Psychotherapy     1. Current moderate episode of major depressive disorder without prior episode (HCC)        2. Generalized anxiety disorder        3. Complicated bereavement            Goals addressed in session: Goal 1, Goal 2, and Goal 3      DATA:     Today, David reported that this holiday season has been difficult, marking another Tamia without his daughter. Despite these challenges, he has been making efforts to prioritize the people in his life and engage in positive coping strategies.    A significant portion of the session focused on David's feelings of guilt. He reflected on the day leading up to his daughter's death, sharing that he had called her three times. By the third call, she insisted that she was fine. He remained with a lingering feeling that something was wrong. He continues to believe that he should have gone to her house that day, feeling strongly that he could have prevented the tragedy.    During this session, the concept of relationships being a two way street was discussed in detail. It was highlighted that, despite one party's actions, they cannot change how the other party feels. It was also highlighted how relationships demand not only effort, but mutual respect. aDvid agreed, identifying that at the time he wanted to respect his daughter's boundaries. David reflected that, looking back, he feels that he could not have prevented his daughter from moving out of the house, stating that his daughter Shameka had remained in conflict with his wife. David reflected on how he personally had needed to move away from home due to conflicts with his stepfather.    This writer discussed with David the concept of distance in a relationship, " "especially when a relationship becomes overwhelming and one sided. This writer encouraged David to view the need for distance not as letting go of his late daughter, but as a necessary step so that their relationship can be healthy without being overwhelmed by guilt. David expressed that he continues to honor his daughter's memory by placing flowers on a table in the house every week. This session suggested reducing the frequency to every other week as a way of respecting both his ongoing grief and the need to create space for healing.    During this session, this clinician used the following therapeutic modalities: Bereavement Therapy, Cognitive Behavioral Therapy, Cognitive Processing Therapy, Motivational Interviewing, Solution-Focused Therapy, and Supportive Psychotherapy    Substance Abuse was not addressed during this session.     ASSESSMENT:  Cy Carpio presents with a Depressed mood.     his affect is Constricted, which is congruent, with his mood and the content of the session. The client has made progress on their goals.    Cy Carpio presents with a none risk of suicide, none risk of self-harm, and none risk of harm to others.    For any risk assessment that surpasses a \"low\" rating, a safety plan must be developed.    A safety plan was indicated: no    PLAN: Between sessions, Cy Carpio will continue to work through processing the loss of his daughter. He will reflect on ways that he can create distance between himself and his late daughter so that their relationship can be healthy without being overwhelmed by guilt. At the next session, the therapist will use Bereavement Therapy, Cognitive Behavioral Therapy, Cognitive Processing Therapy, Motivational Interviewing, Solution-Focused Therapy, and Supportive Psychotherapy to address these ongoing issues.    Behavioral Health Treatment Plan and Discharge Planning: Cy Carpio is aware of and agrees to continue to work on their treatment plan. " They have identified and are working toward their discharge goals. yes    Depression Follow-up Plan Completed: Yes    Visit start and stop times:    01/03/25 from 9:30 AM to 10:30 AM    This note was not shared with the patient due to this is a psychotherapy note

## 2025-01-10 ENCOUNTER — OFFICE VISIT (OUTPATIENT)
Dept: PSYCHIATRY | Facility: CLINIC | Age: 60
End: 2025-01-10

## 2025-01-10 DIAGNOSIS — F32.1 CURRENT MODERATE EPISODE OF MAJOR DEPRESSIVE DISORDER WITHOUT PRIOR EPISODE (HCC): Primary | ICD-10-CM

## 2025-01-10 DIAGNOSIS — F41.1 GENERALIZED ANXIETY DISORDER: ICD-10-CM

## 2025-01-10 DIAGNOSIS — F43.21 COMPLICATED BEREAVEMENT: ICD-10-CM

## 2025-01-10 PROCEDURE — PBNCHG PB NO CHARGE PLACEHOLDER

## 2025-01-10 NOTE — PATIENT INSTRUCTIONS
Please present for your previously scheduled appointment approximately 15 minutes prior to appointment time. If you are running late or are unable to attend your appointment, please call our [place] office at [phone] or our [place] office at [phone] if applicable to notify the office of your absence.    If you are in psychological crisis including not limited to suicidal/homicidal thoughts or thoughts of self-injury, do not hesitate to call/contact your County Crisis hotline (see below) or attend to the nearest emergency department.  [facility] Crisis: [phone]  [facility] Crisis: [phone]  [facility] Crisis: [phone]  [facility] Crisis: [phone]  [facility] Crisis: [phone]  [facility] Crisis: [phone]  [facility] Crisis: [phone]  National Suicide Prevention Hotline: 1-609.948.4173

## 2025-01-10 NOTE — PSYCH
Behavioral Health Psychotherapy Progress Note    This is a therapy progress note for the patient David Carpio. David (who prefers to be called Cy) is a 59-year-old male,  to his wife Maribell for over 33 years, has 3 sons, is currently living in a house with his wife and 2 dogs in the [facility]. Cy reports a past medical history that includes atrial fibrillation, type 2 diabetes with diabetic polyneuropathy status post multiple foot surgeries in the setting of foot osteomyelitis, obstructive sleep apnea, hypertension, chronic diastolic heart failure, and is over 2 years status post bariatric surgery.  At this time Cy possesses a past psychiatric history of major depressive disorder, generalized anxiety disorder, and complicated bereavement.     At the time of intake, Cy reported that he has been struggling with his mental health for over the past three years following the untimely passing of his daughter Shameka (she passed at the age of 21 years old).  Cy reports that as a teenager his daughter began hanging out with bad influences and dating boyfriends who struggled with drugs and who influenced her to use drugs. Cy reports that he did his best to support his daughter and supported her through drug and alcohol rehab. He reports that about three years ago he was informed that his daughter had  and was reportedly found down by her boyfriend. Cy reports that his daughter was found to have heroin and fentanyl in her system at the time. Cy has struggled to cope with her passing since then. He reports that he becomes emotional when thinking of her. At this time Cy also harbors feelings of hatred towards his late daughter's boyfriend. Cy reports he has a 6 year old grandson Ki who lives with his late daughter's boyfriend (who is the father of the child).     At this time, Cy continues to feel that he does not deserve to be happy. Cy continues to feel that he failed as a parent  "and that \"I could have done something\" to prevent the tragic passing of his daughter. He also remains with fears that being happy and moving on from his daughter's passing will dishonor her memory.     Psychotherapy Provided: Individual Psychotherapy     1. Current moderate episode of major depressive disorder without prior episode (HCC)        2. Generalized anxiety disorder        3. Complicated bereavement            Goals addressed in session: Goal 1, Goal 2, and Goal 3      DATA:     Today, David reported ongoing efforts to create emotional distance from his late daughter. He shared that he has reduced the frequency of placing flowers on a table in his house to every other week as a way of honoring her memory.  While he acknowledged this as progress, he continues to struggle with guilt and the fear that moving forward will dishonor her memory.  Cognitive reframing techniques were utilized and David was encouraged to review this distance as a way of maintaining a healthy relationship, not discarding a relationship.    David reflected on his emotions today, admitting that he feels disappointment towards his late daughter for her actions.  However, he expressed guilt for feeling this way, as he perceives these emotions as inappropriate.  He described a lifelong habit of denying his own emotions and placing them on the back burner to prioritize the needs of others.  In session, he acknowledged that this pattern has resulted in one-sided and overwhelming relationships where his needs and feelings are overlooked.    During this session, the importance of David acknowledging and validating his emotions was emphasized.  It was emphasized that all his feelings are valid, regardless of their nature.  The use of \"I statements\" was discussed in detail, reinforcing that David is entitled to his reality and his emotional responses.  It was also highlighted that suppressing emotions and neglecting 1's own needs " "contributes to unhealthy, one-sided relationships.    Ultimately, David was encouraged to advocate for his feelings and boundaries as a step towards fostering mutual respect and healthier interpersonal dynamics.    During this session, this clinician used the following therapeutic modalities: Bereavement Therapy, Cognitive Behavioral Therapy, Cognitive Processing Therapy, Motivational Interviewing, Solution-Focused Therapy, and Supportive Psychotherapy    Substance Abuse was not addressed during this session.    ASSESSMENT:  Cy Carpio presents with a Euthymic/ normal mood.     his affect is Constricted, which is congruent, with his mood and the content of the session. The client has made progress on their goals.    Cy Carpio presents with a none risk of suicide, none risk of self-harm, and none risk of harm to others.    For any risk assessment that surpasses a \"low\" rating, a safety plan must be developed.    A safety plan was indicated: no      PLAN: Between sessions, Cy Carpio will continue exploring and validating his emotional responses, practice and enforce the use of \"I statements\", utilize boundary setting and self reflection.. At the next session, the therapist will use  Bereavement Therapy, Cognitive Behavioral Therapy, Cognitive Processing Therapy, Motivational Interviewing, Solution-Focused Therapy, and Supportive Psychotherapy to address these ongoing struggles.    Behavioral Health Treatment Plan and Discharge Planning: Cy Carpio is aware of and agrees to continue to work on their treatment plan. They have identified and are working toward their discharge goals. yes    Depression Follow-up Plan Completed: Yes    Visit start and stop times: 10:30 AM to 11:30 AM    01/10/25       This note was not shared with the patient due to this is a psychotherapy note  "

## 2025-01-10 NOTE — BH CRISIS PLAN
Client Name: Cy Carpio       Client YOB: 1965    [facility] Safety Plan      Creation Date: 2/1/24 Update Date: 1/10/25   Created By: Andre Rodriguez MD Last Updated By: Andre Rodriguez MD      Step 1: Warning Signs:   Warning Signs   When I feel overwhelmed and confused   When I feel like I can't be there to support and help others            Step 2: Internal Coping Strategies:   Internal Coping Strategies   Going to [employer] and praying   Tinkering around the house            Step 3: People and social settings that provide distraction:   Name Contact Information   Maribell Carpio [phone]    Places   Work   Buddhism           Step 4: People whom I can ask for help during a crisis:      Name Contact Information    Maribell Carpio [phone]      Step 5: Professionals or agencies I can contact during a crisis:      Clinican/Agency Name Phone Emergency Contact    Dr. Rodriguez [phone]       Local Emergency Department Emergency Department Phone Emergency Department Address    [phone]        Crisis Phone Numbers:   Suicide Prevention Lifeline: Call or Text  763 Crisis Text Line: Text HOME to 561-521   Please note: Some [facility] do not have a separate number for Child/Adolescent specific crisis. If your county is not listed under Child/Adolescent, please call the adult number for your county      Adult Crisis Numbers: Child/Adolescent Crisis Numbers   [facility]: [phone] [facility]: [phone]   [facility]: [phone] [facility]: [phone]   [facility]: [phone] [place]: [phone]   [facility]: [phone] [place]: [phone]   [place] [facility]: [phone]   [facility]: [phone]   [facility]: [phone]   [facility]: [phone] (daytime) [phone] (after hours, weekends, holidays)      Step 6: Making the environment safer (plan for lethal means  safety):   Patient did not identify any lethal methods: Yes     Optional: What is most important to me and worth living for?   My grandson is everything to me. He's the reason I want to live.     [place] Safety Plan. Tosha Leos and Charles Garcia. Used with permission of the authors.

## 2025-01-21 ENCOUNTER — OFFICE VISIT (OUTPATIENT)
Dept: PSYCHIATRY | Facility: CLINIC | Age: 60
End: 2025-01-21

## 2025-01-21 DIAGNOSIS — F43.21 COMPLICATED BEREAVEMENT: ICD-10-CM

## 2025-01-21 DIAGNOSIS — F41.1 GENERALIZED ANXIETY DISORDER: ICD-10-CM

## 2025-01-21 DIAGNOSIS — F32.1 CURRENT MODERATE EPISODE OF MAJOR DEPRESSIVE DISORDER WITHOUT PRIOR EPISODE (HCC): Primary | ICD-10-CM

## 2025-01-21 PROCEDURE — PBNCHG PB NO CHARGE PLACEHOLDER

## 2025-01-21 NOTE — PSYCH
Behavioral Health Psychotherapy Progress Note    This is a therapy progress note for the patient David Carpio. David (who prefers to be called Cy) is a 59-year-old male,  to his wife Maribell for over 33 years, has 3 sons, is currently living in a house with his wife and 2 dogs in the [facility]. Cy reports a past medical history that includes atrial fibrillation, type 2 diabetes with diabetic polyneuropathy status post multiple foot surgeries in the setting of foot osteomyelitis, obstructive sleep apnea, hypertension, chronic diastolic heart failure, and is over 2 years status post bariatric surgery.  At this time Cy possesses a past psychiatric history of major depressive disorder, generalized anxiety disorder, and complicated bereavement.     At the time of intake, Cy reported that he has been struggling with his mental health for over the past three years following the untimely passing of his daughter Shameka (she passed at the age of 23 years old).  Cy reports that as a teenager his daughter began hanging out with bad influences and dating boyfriends who struggled with drugs and who influenced her to use drugs. Cy reports that he did his best to support his daughter and supported her through drug and alcohol rehab. He reports that about three years ago he was informed that his daughter had  and was reportedly found down by her boyfriend. Cy reports that his daughter was found to have heroin and fentanyl in her system at the time. Cy has struggled to cope with her passing since then. He reports that he becomes emotional when thinking of her. At this time Cy also harbors feelings of hatred towards his late daughter's boyfriend. Cy reports he has a 6 year old grandson Ki who lives with his late daughter's boyfriend (who is the father of the child).     At this time, Cy continues to feel that he does not deserve to be happy. Cy continues to feel that he failed as a parent  "and that \"I could have done something\" to prevent the tragic passing of his daughter. He also remains with fears that being happy and moving on from his daughter's passing will dishonor her memory.     Psychotherapy Provided: Individual Psychotherapy     1. Current moderate episode of major depressive disorder without prior episode (HCC)        2. Generalized anxiety disorder        3. Complicated bereavement            Goals addressed in session: Goal 1, Goal 2, and Goal 3      DATA:     Today, David reported that, since his last office appointment, he had a meaningful interaction with a longtime friend (who he has known for approximately 50 years) who experienced a tragic loss (this friend lost his nephew) around the time that David's daughter Shameka passed away.  This friend expressed similar feelings of regret and self blame, which resonated deeply with David.  This conversation prompted him to reflect on the parallels between their experiences and to consider the role of acceptance and hope in navigating grief. He spoke about encouraging his friend to seek support from family members, demonstrating his understanding of the value of social connections during difficult times.    In session, David discussed the possibility of finding a balance between monitoring his daughter's memory and continuing to live his life.  He shared a realization that, although the tragedy of his daughter's passing cannot be undone, it is still possible to find meaning and purpose in the days ahead.  He reflected that there was a longtime friend that he had wanted to spend time with who unfortunately passed away.  David reflected on the importance of living in the moment.    David acknowledged his tendency to keep others at a distance due to his fear of pushing them away if he reveals his emotions.  He was reminded that supportive relationships thrived on mutual understanding and that sharing his vulnerabilities could " "strengthen, rather than harm, his actions.  During the session, David expressed a desire to reconnect with his daughter-in-law, discussed the events surrounding his daughter's passing, indicating a step towards healing and closure.    David reports that, since last office visit, he informed his wife that he is no longer in love with her.  At this time David states that he is in the process of making plans for separation.  We discussed what separation might look like from his wife moving forward.    The session concluded with a plan to continue fostering David social connections, challenges believes about guilt and self worth, and support his efforts to communicate openly with loved ones.  Future sessions will focus in on deepening these connections and addressing barriers to self forgiveness and emotional growth.    During this session, this clinician used the following therapeutic modalities: Bereavement Therapy, Cognitive Behavioral Therapy, Solution-Focused Therapy, and Supportive Psychotherapy    Substance Abuse was not addressed during this session.     ASSESSMENT:  Cy Carpio presents with a Anxious mood.     his affect is Constricted, which is congruent, with his mood and the content of the session. The client has made progress on their goals.    Cy Carpio presents with a none risk of suicide, none risk of self-harm, and none risk of harm to others.    For any risk assessment that surpasses a \"low\" rating, a safety plan must be developed.    A safety plan was indicated: no    PLAN: The session concluded with a plan to continue fostering Alvaros social connections, challenges believes about guilt and self worth, and support his efforts to communicate openly with loved ones.  Future sessions will focus in on deepening these connections and addressing barriers to self forgiveness and emotional growth. At the next session, the therapist will use Bereavement Therapy, Cognitive Behavioral Therapy, " Solution-Focused Therapy, and Supportive Psychotherapy to address these ongoing issues.    Behavioral Health Treatment Plan and Discharge Planning: Cy Carpio is aware of and agrees to continue to work on their treatment plan. They have identified and are working toward their discharge goals. yes    Depression Follow-up Plan Completed: Yes    Visit start and stop times:    01/21/25 from 10:15 AM to 11:15 AM    This note was not shared with the patient due to this is a psychotherapy note

## 2025-01-27 ENCOUNTER — OFFICE VISIT (OUTPATIENT)
Dept: URGENT CARE | Facility: CLINIC | Age: 60
End: 2025-01-27
Payer: COMMERCIAL

## 2025-01-27 VITALS
BODY MASS INDEX: 39.89 KG/M2 | HEIGHT: 73 IN | WEIGHT: 301 LBS | RESPIRATION RATE: 18 BRPM | TEMPERATURE: 97 F | HEART RATE: 61 BPM | OXYGEN SATURATION: 98 % | SYSTOLIC BLOOD PRESSURE: 110 MMHG | DIASTOLIC BLOOD PRESSURE: 70 MMHG

## 2025-01-27 DIAGNOSIS — M54.2 NECK PAIN ON RIGHT SIDE: Primary | ICD-10-CM

## 2025-01-27 PROCEDURE — 99213 OFFICE O/P EST LOW 20 MIN: CPT

## 2025-01-27 PROCEDURE — 96372 THER/PROPH/DIAG INJ SC/IM: CPT

## 2025-01-27 RX ORDER — KETOROLAC TROMETHAMINE 30 MG/ML
30 INJECTION, SOLUTION INTRAMUSCULAR; INTRAVENOUS ONCE
Status: COMPLETED | OUTPATIENT
Start: 2025-01-27 | End: 2025-01-27

## 2025-01-27 RX ORDER — CYCLOBENZAPRINE HCL 5 MG
5 TABLET ORAL 3 TIMES DAILY PRN
Qty: 15 TABLET | Refills: 0 | Status: SHIPPED | OUTPATIENT
Start: 2025-01-27 | End: 2025-02-01

## 2025-01-27 RX ADMIN — KETOROLAC TROMETHAMINE 30 MG: 30 INJECTION, SOLUTION INTRAMUSCULAR; INTRAVENOUS at 13:08

## 2025-01-27 NOTE — PSYCH
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 translation, syntax, or grammatical errors. If you have any questions, please contact the dictating provider.

## 2025-01-27 NOTE — PROGRESS NOTES
[facility]        NAME: David Carpio is a 59 y.o. male  : 1965    MRN: [id number]  DATE: 2025  TIME: 1:04 PM    Assessment and Plan   Neck pain on right side [M54.2]  1. Neck pain on right side  ketorolac (TORADOL) injection 30 mg    cyclobenzaprine (FLEXERIL) 5 mg tablet        Patient states he has been off of blood thinners for at least 6 months and his heart has been good without a-fib and pacemaker removed. He states he has been on muscle relaxants previously without difficulties or side effects. Will do low dose and went over precautions.     Patient Instructions     Wait at least 6 hours after injection before taking NSAID such as ibuprofen  Take muscle relaxants as prescribed. Home use only! No driving or alcohol use after taking  Heat as needed  Can apply IcyHot or Lidoderm patches   Light stretching as tolerated    Follow up with PCP in 3-5 days.  Proceed to  ER if symptoms worsen.    If tests are performed, our office will contact you with results only if changes need to made to the care plan discussed with you at the visit. [facility]    Chief Complaint     Chief Complaint   Patient presents with    Neck Pain     Right side of neck pain radiating to right shoulder X 3 days Took advil no relief. Has hx of neck pain for which he gets injections         History of Present Illness       Patient is presenting with right sided neck pain radiating to right shoulder x 3 days.  He states he has a history of neck pain for which she gets injections for.  He states he has been taking Advil with minimal relief.  He states that he woke up with it 3 days ago.  He denies any injury or heavy lifting.  He states he has a hard time moving his neck due to stiffness and pain.  He denies any radiation of pain down his arms or numbness or tingling.    Neck Pain         Review of Systems   Review of Systems   Constitutional: Negative.    Respiratory:  Negative.     Cardiovascular: Negative.    Musculoskeletal:  Positive for neck pain.         Current Medications       Current Outpatient Medications:     acetaminophen (TYLENOL) 325 mg tablet, Take 2 tablets (650 mg total) by mouth every 6 (six) hours as needed for mild pain, headaches or fever, Disp: 30 tablet, Rfl: 0    buPROPion (Wellbutrin XL) 300 mg 24 hr tablet, Take 1 tablet (300 mg total) by mouth daily, Disp: 30 tablet, Rfl: 2    busPIRone (BUSPAR) 15 mg tablet, Take 1 tablet (15 mg total) by mouth 3 (three) times a day, Disp: 90 tablet, Rfl: 2    calcium citrate-vitamin D 315 mg-5 mcg tablet, Take 2 tablets by mouth 2 (two) times a day, Disp: , Rfl:     cyclobenzaprine (FLEXERIL) 5 mg tablet, Take 1 tablet (5 mg total) by mouth 3 (three) times a day as needed for muscle spasms for up to 5 days, Disp: 15 tablet, Rfl: 0    docusate sodium (COLACE) 100 mg capsule, Take 1 capsule (100 mg total) by mouth 2 (two) times a day, Disp: 60 capsule, Rfl: 0    doxepin (SINEquan) 10 mg capsule, Take 10 mg by mouth daily with breakfast, Disp: , Rfl:     doxepin (SINEquan) 100 mg capsule, Take 1 capsule (100 mg total) by mouth daily at bedtime, Disp: 30 capsule, Rfl: 2    ferrous sulfate 325 (65 Fe) mg tablet, Take 1 tablet (325 mg total) by mouth daily with breakfast Do not start before June 19, 2023., Disp: 30 tablet, Rfl: 0    furosemide (LASIX) 40 mg tablet, Take 0.5 tablets (20 mg total) by mouth 2 (two) times a day, Disp: 60 tablet, Rfl: 0    gabapentin (NEURONTIN) 800 mg tablet, Take 1 tablet (800 mg total) by mouth 3 (three) times a day, Disp: 90 tablet, Rfl: 2    metolazone (ZAROXOLYN) 2.5 mg tablet, Take 2.5 mg by mouth, Disp: , Rfl:     Multiple Vitamins-Minerals (Mens Multivitamin) TABS, Take 1 tablet by mouth 2 (two) times a day, Disp: , Rfl:     potassium chloride (Klor-Con M20) 20 mEq tablet, , Disp: , Rfl:     potassium chloride (KLOR-CON) 20 mEq packet, Take 40 mEq by mouth daily, Disp: 30 each, Rfl: 0     rosuvastatin (CRESTOR) 5 mg tablet, Take 5 mg by mouth daily, Disp: , Rfl:     lidocaine (Lidoderm) 5 %, Apply 1 patch topically over 12 hours daily for 15 days Remove & Discard patch within 12 hours or as directed by MD, Disp: 15 patch, Rfl: 0    oxyCODONE (ROXICODONE) 5 immediate release tablet, Take 5 mg by mouth, Disp: , Rfl:     Current Facility-Administered Medications:     ketorolac (TORADOL) injection 30 mg, 30 mg, Intramuscular, Once,     Current Allergies     Allergies as of 01/27/2025 - Reviewed 01/27/2025   Allergen Reaction Noted    Ativan [lorazepam] Anxiety 06/22/2023            The following portions of the patient's history were reviewed and updated as appropriate: allergies, current medications, past family history, past medical history, past social history, past surgical history and problem list.     Past Medical History:   Diagnosis Date    Atrial fibrillation (HCC)     Benzodiazepine withdrawal with complication (HCC) 06/15/2023    Chronic diastolic (congestive) heart failure (HCC)     Diabetes mellitus (HCC)     GERD (gastroesophageal reflux disease)     High cholesterol     Hyperlipidemia     Pacemaker     Stroke (HCC)        Past Surgical History:   Procedure Laterality Date    APPENDECTOMY      ATRIAL CARDIAC PACEMAKER INSERTION      BARIATRIC SURGERY  05/2021    BONE BIOPSY Left 7/26/2023    Procedure: EXCISION BIOPSY BONE LESION EXTREMITY;  Surgeon: Adelia Yousif DPM;  [facility];  Service: Podiatry    EPIDURAL BLOCK INJECTION N/A 5/19/2022    Procedure: BLOCK / INJECTION EPIDURAL STEROID CERVICAL C7-T1;  Surgeon: Skyler Quinn MD;  [facility]: [facility];  Service: Pain Management     FL GUIDED NEEDLE PLAC BX/ASP/INJ  3/22/2022    FOOT AMPUTATION Left 4/28/2022    Procedure: LEFT TRANSMETATARSAL AMPUTATION.;  Surgeon: Nestor Madden DPM;  [facility];  Service: Podiatry    IR PICC PLACEMENT SINGLE LUMEN  11/21/2024    NERVE BLOCK Right 2/10/2022    Procedure:  BLOCK MEDIAL BRANCH C3, C4, C5 #1;  Surgeon: Skyler Quinn MD;  [facility];  Service: Pain Management     NERVE BLOCK Right 3/22/2022    Procedure: BLOCK MEDIAL BRANCH C3, C4, C5 #2;  Surgeon: Skyler Quinn MD;  [facility];  Service: Pain Management     DE AMPUTATION FOOT TRANSMETARSAL Left 4/12/2023    Procedure: REVISION LEFT TRANSMETATARSAL (TMA) AMPUTATION, REMOVAL OF UNVIABLE TISSUE AND BONE,;  Surgeon: Adelia Yousif DPM;  [facility];  Service: Podiatry    DE AMPUTATION METATARSAL W/TOE SINGLE Left 4/7/2023    Procedure: 2ND RAY RESECTION FOOT;  Surgeon: Adelia Yousif DPM;  [facility];  Service: Podiatry    DE AMPUTATION TOE INTERPHALANGEAL JOINT Left 11/16/2021    Procedure: AMPUTATION LESSER TOE;  Surgeon: Nestor Madden DPM;  [facility];  Service: Podiatry    RADIOFREQUENCY ABLATION Right 4/7/2022    Procedure: Right C3, C4, C5 RFA;  Surgeon: Skyler Quinn MD;  [facility];  Service: Pain Management     RHIZOTOMY Right 2/9/2023    Procedure: RHIZOTOMY CERVICAL MEDIAL BRANCH NERVES RIGHT C3, C4, C5;  Surgeon: Skyler Quinn MD;  [facility];  Service: Pain Management     TOE AMPUTATION Left     2nd toe    TOE AMPUTATION Left 9/15/2021    Procedure: AMPUTATION LEFT 4TH TOE;  Surgeon: Nestor Madden DPM;  [facility];  Service: Podiatry    TOE AMPUTATION Right 1/12/2022    Procedure: AMPUTATION TOE;  Surgeon: Hong Jolly DPM;  [facility];  Service: Podiatry    TOE AMPUTATION Right 2/23/2022    Procedure: AMPUTATION TOE  RIGHT SECOND;  Surgeon: Hong Jolly DPM;  [facility];  Service: Podiatry    TOE AMPUTATION Right 6/3/2022    Procedure: AMPUTATION right 4th TOE;  Surgeon: Nestor Madden DPM;  [facility];  Service: Podiatry    TOE AMPUTATION Right 11/15/2024    Procedure: AMPUTATION RIGHT 2ND TOE;  Surgeon: Adelia Yousif DPM;  [facility];  Service: Podiatry       Family  "History   Problem Relation Age of Onset    No Known Problems Mother     No Known Problems Father          Medications have been verified.        Objective   /70   Pulse 61   Temp (!) 97 °F (36.1 °C)   Resp 18   Ht 6' 1\" (1.854 m)   Wt (!) 137 kg (301 lb)   SpO2 98%   BMI 39.71 kg/m²        Physical Exam     Physical Exam  Constitutional:       Appearance: Normal appearance.   Neck:      Comments: Limited ROM with turning neck to the right  Cardiovascular:      Rate and Rhythm: Normal rate.      Pulses: Normal pulses.   Pulmonary:      Effort: Pulmonary effort is normal.   Musculoskeletal:      Cervical back: Neck supple. Tenderness (R cervical paraspinal) present.   Lymphadenopathy:      Cervical: No cervical adenopathy.   Neurological:      General: No focal deficit present.      Mental Status: He is alert and oriented to person, place, and time. Mental status is at baseline.                   "

## 2025-01-27 NOTE — PATIENT INSTRUCTIONS
Wait at least 6 hours after injection before taking NSAID such as ibuprofen  Take muscle relaxants as prescribed. Home use only! No driving or alcohol use after taking  Heat as needed  Can apply IcyHot or Lidoderm patches   Light stretching as tolerated    Follow up with PCP in 3-5 days.  Proceed to  ER if symptoms worsen.    If tests are performed, our office will contact you with results only if changes need to made to the care plan discussed with you at the visit. You can review your full results on [facility].   Detail Level: Zone Patient Reported Weight (Optional - Include Units): 115 Anticipated Starting Dosage (Optional): 40mg Daily

## 2025-01-28 ENCOUNTER — OFFICE VISIT (OUTPATIENT)
Dept: PSYCHIATRY | Facility: CLINIC | Age: 60
End: 2025-01-28

## 2025-01-28 VITALS
SYSTOLIC BLOOD PRESSURE: 102 MMHG | WEIGHT: 307 LBS | BODY MASS INDEX: 40.5 KG/M2 | DIASTOLIC BLOOD PRESSURE: 72 MMHG | HEART RATE: 84 BPM

## 2025-01-28 DIAGNOSIS — F99 INSOMNIA DUE TO OTHER MENTAL DISORDER: ICD-10-CM

## 2025-01-28 DIAGNOSIS — F32.1 CURRENT MODERATE EPISODE OF MAJOR DEPRESSIVE DISORDER WITHOUT PRIOR EPISODE (HCC): Primary | ICD-10-CM

## 2025-01-28 DIAGNOSIS — F51.05 INSOMNIA DUE TO OTHER MENTAL DISORDER: ICD-10-CM

## 2025-01-28 DIAGNOSIS — F41.1 GENERALIZED ANXIETY DISORDER: ICD-10-CM

## 2025-01-28 DIAGNOSIS — F43.21 COMPLICATED BEREAVEMENT: ICD-10-CM

## 2025-01-28 PROCEDURE — PBNCHG PB NO CHARGE PLACEHOLDER

## 2025-01-28 RX ORDER — GABAPENTIN 800 MG/1
900 TABLET ORAL 3 TIMES DAILY
Qty: 90 TABLET | Refills: 2 | Status: SHIPPED | OUTPATIENT
Start: 2025-01-28

## 2025-01-28 RX ORDER — BUPROPION HYDROCHLORIDE 300 MG/1
300 TABLET ORAL DAILY
Qty: 30 TABLET | Refills: 2 | Status: SHIPPED | OUTPATIENT
Start: 2025-01-28

## 2025-01-28 RX ORDER — ESZOPICLONE 1 MG/1
1 TABLET, FILM COATED ORAL
Qty: 30 TABLET | Refills: 0 | Status: SHIPPED | OUTPATIENT
Start: 2025-01-28

## 2025-01-28 RX ORDER — BUSPIRONE HYDROCHLORIDE 15 MG/1
15 TABLET ORAL 3 TIMES DAILY
Qty: 90 TABLET | Refills: 2 | Status: SHIPPED | OUTPATIENT
Start: 2025-01-28

## 2025-01-28 RX ORDER — DOXEPIN HYDROCHLORIDE 100 MG/1
100 CAPSULE ORAL
Qty: 30 CAPSULE | Refills: 2 | Status: SHIPPED | OUTPATIENT
Start: 2025-01-28

## 2025-01-28 NOTE — PATIENT INSTRUCTIONS
Please present for your previously scheduled appointment approximately 15 minutes prior to appointment time. If you are running late or are unable to attend your appointment, please call our [place] office at [phone] or our [place] office at [phone] if applicable to notify the office of your absence.    If you are in psychological crisis including not limited to suicidal/homicidal thoughts or thoughts of self-injury, do not hesitate to call/contact your County Crisis hotline (see below) or attend to the nearest emergency department.  [facility] Crisis: [phone]  [facility] Crisis: [phone]  [facility] Crisis: [phone]  [facility] Crisis: [phone]  [facility] Crisis: [phone]  [facility] Crisis: [phone]  [facility] Crisis: [phone]  National Suicide Prevention Hotline: 1-676.974.1243

## 2025-01-28 NOTE — BH TREATMENT PLAN
TREATMENT PLAN (Medication Management Only)        [facility]    Name and Date of Birth:  David Carpio 59 y.o. 1965  Date of Treatment Plan: January 28, 2025  Diagnosis/Diagnoses:    1. Current moderate episode of major depressive disorder without prior episode (HCC)    2. Complicated bereavement    3. Generalized anxiety disorder    4. Insomnia due to other mental disorder      Strengths/Personal Resources for Self-Care: supportive family, supportive friends, taking medications as prescribed, ability to adapt to life changes, ability to communicate needs, ability to communicate well, ability to listen, ability to reason, ability to understand psychiatric illness, average or above intelligence, general fund of knowledge, good physical health, good understanding of illness, motivation for treatment, willingness to work on problems.  Area/Areas of need (in own words): Improve symptoms of depression and anxiety. Continue to improve ongoing struggles with sleep.  1. Long Term Goal: Improve symptoms of depression and anxiety. Continue to improve ongoing struggles with sleep..  Target Date:6 months - 7/28/2025  Person/Persons responsible for completion of goal: Dr. Michael Hernandez  2.  Short Term Objective (s) - How will we reach this goal?:   A. Provider new recommended medication/dosage changes and/or continue medication(s): Continue Wellbutrin 300 mg XL daily, Buspar 15 mg three times daily, Doxepin 100 mg at bedtime, gabapentin 800 mg three times a day, Lunesta 1 mg at bedtime as needed for insomnia  Take medications as prescribed  Attend psychiatry appointments regularly  Continue psychotherapy regularly  Practice coping skills  Try coping skills using handouts provided  Try sleep hygiene techniques  Avoid alcohol   Avoid drugs   Eat a healthy diet   Exercise regularly  Try relaxation techniques  Target Date:3 months - 4/28/2025  Person/Persons Responsible  for Completion of Goal: David  Progress Towards Goals: stable, continuing treatment  Treatment Modality: Medication management every 1 to 3 months  Review due 180 days from date of this plan: 6 months - 7/28/2025  Expected length of service: ongoing treatment  My Physician/PA/NP and I have developed this plan together and I agree to work on the goals and objectives. I understand the treatment goals that were developed for my treatment.

## 2025-02-06 NOTE — PSYCH
Behavioral Health Psychotherapy Progress Note    This is a therapy progress note for the patient David Carpio. David (who prefers to be called Cy) is a 59-year-old male,  to his wife Maribell for over 33 years, has 3 sons, is currently living in a house with his wife and 2 dogs in the [facility]. Cy reports a past medical history that includes atrial fibrillation, type 2 diabetes with diabetic polyneuropathy status post multiple foot surgeries in the setting of foot osteomyelitis, obstructive sleep apnea, hypertension, chronic diastolic heart failure, and is over 2 years status post bariatric surgery.  At this time Cy possesses a past psychiatric history of major depressive disorder, generalized anxiety disorder, and complicated bereavement.     At the time of intake, Cy reported that he has been struggling with his mental health for over the past four years following the untimely passing of his daughter Shameka (she passed at the age of 23 years old).  Cy reports that as a teenager his daughter began hanging out with bad influences and dating boyfriends who struggled with drugs and who influenced her to use drugs. Cy reports that he did his best to support his daughter and supported her through drug and alcohol rehab. He reports that about three years ago he was informed that his daughter had  and was reportedly found down by her boyfriend. Cy reports that his daughter was found to have heroin and fentanyl in her system at the time. Cy has struggled to cope with her passing since then. He reports that he becomes emotional when thinking of her. At this time Cy also harbors feelings of hatred towards his late daughter's boyfriend. Cy reports he has a 7 year old grandson Ki who lives with his late daughter's boyfriend (who is the father of the child).     At this time, Cy continues to feel that he does not deserve to be happy. Cy continues to feel that he failed as a parent  "and that \"I could have done something\" to prevent the tragic passing of his daughter. He also remains with fears that being happy and moving on from his daughter's passing will dishonor her memory.     Psychotherapy Provided: Individual Psychotherapy     1. Current moderate episode of major depressive disorder without prior episode (HCC)        2. Generalized anxiety disorder        3. Complicated bereavement            Goals addressed in session: Goal 1, Goal 2, and Goal 3      DATA:     David remains with feelings of self blame and the belief that he does not deserve happiness. He remains with ongoing significant distress in the relationships with his wife and the father of his grandson. He states that he \"cannot stand\" either of them. He continues to consider the topic of divorce, however he remains largely in the marriage due to his desire to pursue joint custody of his grandson, Ki. He believes that, by staying in the marriage, he may have a better chance of securing custody in the future.  Despite this, he acknowledges that his wife does not want to share this goal and has no interest in seeking custody.  In session, it was identified that David has placed himself in a situation where his primary reason for staying is not shared by his partner, creating an ongoing source of frustration and emotional conflict.    During the session, David was guided to explore the limitations of his control over the situation and was encouraged to identify alternative paths forward.  Despite his consideration of divorce, he expresses fears of retaliation from both his wife and the father of his grandson, adding to his reluctance to take this action.  This fear, along with his ongoing grief, has kept him feeling trapped and unable to make a definitive decision about his future.    To help David's.  Last reflect some questions may clear.  Psychoeducation was provided on problem solving on the acceptance of uncertainty " "and complex life situations.    During this session, this clinician used the following therapeutic modalities: Bereavement Therapy, Cognitive Behavioral Therapy, Solution-Focused Therapy, and Supportive Psychotherapy    Substance Abuse was not addressed during this session.    ASSESSMENT:  Cy Carpio presents with a Depressed mood.     his affect is Constricted, which is congruent, with his mood and the content of the session. The client has made progress on their goals.     Cy Carpio presents with a none risk of suicide, none risk of self-harm, and none risk of harm to others.    For any risk assessment that surpasses a \"low\" rating, a safety plan must be developed.    A safety plan was indicated: no      PLAN: Between sessions, Cy Carpio will focus on addressing cognitive distortions related to self blame and grief.  The questions he has regarding divorce will be reviewed to facilitate clarity and decision making.  Further discussions will explore strategies for managing conflict and fear of retaliation. At the next session, the therapist will use Bereavement Therapy, Cognitive Behavioral Therapy, Solution-Focused Therapy, and Supportive Psychotherapy to address these ongoing issues.    Behavioral Health Treatment Plan and Discharge Planning: Cy Carpio is aware of and agrees to continue to work on their treatment plan. They have identified and are working toward their discharge goals. yes    Depression Follow-up Plan Completed: Yes    Visit start and stop times:    02/07/25 from 11:00 AM to 12:00 PM    This note was not shared with the patient due to this is a psychotherapy note     "

## 2025-02-07 ENCOUNTER — OFFICE VISIT (OUTPATIENT)
Dept: PSYCHIATRY | Facility: CLINIC | Age: 60
End: 2025-02-07

## 2025-02-07 DIAGNOSIS — F41.1 GENERALIZED ANXIETY DISORDER: ICD-10-CM

## 2025-02-07 DIAGNOSIS — F32.1 CURRENT MODERATE EPISODE OF MAJOR DEPRESSIVE DISORDER WITHOUT PRIOR EPISODE (HCC): Primary | ICD-10-CM

## 2025-02-07 DIAGNOSIS — F43.21 COMPLICATED BEREAVEMENT: ICD-10-CM

## 2025-02-07 PROCEDURE — PBNCHG PB NO CHARGE PLACEHOLDER

## 2025-02-14 ENCOUNTER — OFFICE VISIT (OUTPATIENT)
Dept: PSYCHIATRY | Facility: CLINIC | Age: 60
End: 2025-02-14

## 2025-02-14 ENCOUNTER — OFFICE VISIT (OUTPATIENT)
Dept: PODIATRY | Age: 60
End: 2025-02-14

## 2025-02-14 VITALS — WEIGHT: 302.7 LBS | HEIGHT: 73 IN | BODY MASS INDEX: 40.12 KG/M2

## 2025-02-14 DIAGNOSIS — E11.42 TYPE 2 DIABETES MELLITUS WITH DIABETIC POLYNEUROPATHY, WITH LONG-TERM CURRENT USE OF INSULIN (HCC): Primary | ICD-10-CM

## 2025-02-14 DIAGNOSIS — F32.1 CURRENT MODERATE EPISODE OF MAJOR DEPRESSIVE DISORDER WITHOUT PRIOR EPISODE (HCC): Primary | ICD-10-CM

## 2025-02-14 DIAGNOSIS — F43.21 COMPLICATED BEREAVEMENT: ICD-10-CM

## 2025-02-14 DIAGNOSIS — B35.1 ONYCHOMYCOSIS: ICD-10-CM

## 2025-02-14 DIAGNOSIS — L84 CALLUS: ICD-10-CM

## 2025-02-14 DIAGNOSIS — Z89.421 STATUS POST AMPUTATION OF LESSER TOE OF RIGHT FOOT (HCC): ICD-10-CM

## 2025-02-14 DIAGNOSIS — F41.1 GENERALIZED ANXIETY DISORDER: ICD-10-CM

## 2025-02-14 DIAGNOSIS — Z79.4 TYPE 2 DIABETES MELLITUS WITH DIABETIC POLYNEUROPATHY, WITH LONG-TERM CURRENT USE OF INSULIN (HCC): Primary | ICD-10-CM

## 2025-02-14 DIAGNOSIS — Z89.432 STATUS POST TRANSMETATARSAL AMPUTATION OF FOOT, LEFT (HCC): ICD-10-CM

## 2025-02-14 PROCEDURE — 11055 PARING/CUTG B9 HYPRKER LES 1: CPT | Performed by: STUDENT IN AN ORGANIZED HEALTH CARE EDUCATION/TRAINING PROGRAM

## 2025-02-14 PROCEDURE — 11720 DEBRIDE NAIL 1-5: CPT | Performed by: STUDENT IN AN ORGANIZED HEALTH CARE EDUCATION/TRAINING PROGRAM

## 2025-02-14 PROCEDURE — RECHECK: Performed by: STUDENT IN AN ORGANIZED HEALTH CARE EDUCATION/TRAINING PROGRAM

## 2025-02-14 PROCEDURE — PBNCHG PB NO CHARGE PLACEHOLDER

## 2025-02-14 NOTE — PATIENT INSTRUCTIONS
Please present for your previously scheduled appointment approximately 15 minutes prior to appointment time. If you are running late or are unable to attend your appointment, please call our [place] office at [phone] or our [place] office at [phone] if applicable to notify the office of your absence.    If you are in psychological crisis including not limited to suicidal/homicidal thoughts or thoughts of self-injury, do not hesitate to call/contact your County Crisis hotline (see below) or attend to the nearest emergency department.  [facility] Crisis: [phone]  [facility] Crisis: [phone]  [facility] Crisis: [phone]  [facility] Crisis: [phone]  [facility] Crisis: [phone]  [facility] Crisis: [phone]  [facility] Crisis: [phone]  National Suicide Prevention Hotline: 1-528.560.9226

## 2025-02-14 NOTE — PROGRESS NOTES
David Carpio  1965  AT RISK FOOT CARE    1. Type 2 diabetes mellitus with diabetic polyneuropathy, with long-term current use of insulin ([facility])  Diabetic Shoe    Diabetic Shoe Inserts      2. Status post amputation of lesser toe of right foot ([facility])  Diabetic Shoe    Diabetic Shoe Inserts      3. Onychomycosis        4. Callus        5. Status post transmetatarsal amputation of foot, left (HCC)  Diabetic Shoe    Diabetic Shoe Inserts          Patient presents for at-risk foot care.  Patient has no acute concerns today.  Patient has significant lower extremity risk due to previous amputation.    On exam patient has thickened, hypertrophic, discolored, brittle toenails with subungual debris x2   Callus: 1 (L submet 1)  Amputation: L TMA, R 2nd/4th toe     Today's treatment includes:  Debridement of toenails x2. Using nail nipper, jomar, and curette, nails were sharply debrided, reduced in thickness and length. Devitalized nail tissue and fungal debris excised and removed. Patient tolerated well.    Callus pared x1 with #15 blade to more normal epitheliuem    Custom DM shoes and inserts rx'd (with left forefoot filler and submet 1 cutout at [facility])    Discussed proper shoe gear, daily inspections of feet, and general foot health with patient. Patient has Q7  findings and is recommended for at risk foot care every 9-10 weeks.    Patients most recent complete clinical exam was performed 2/14/25      Diabetic Foot Exam    Patient's shoes and socks removed.    Right Foot/Ankle   Right Foot Inspection  Skin Exam: skin intact and dry skin. No warmth, no callus, no erythema, no maceration, no abnormal color, no pre-ulcer, no ulcer and no callus.     Toe Exam: right toe deformity (R toe amputations, multiple).     Sensory   Vibration: absent  Proprioception: absent  Monofilament testing: absent    Vascular  Capillary refills: < 3 seconds  The right DP pulse is 1+. The right PT pulse is 0.     Left Foot/Ankle  Left Foot  Inspection  Skin Exam: skin intact, dry skin and callus (submet 1). No warmth, no erythema, no maceration, normal color, no pre-ulcer and no ulcer.     Toe Exam: left toe deformity (L TMA).     Sensory   Vibration: absent  Proprioception: absent  Monofilament testing: absent    Vascular  Capillary refills: < 3 seconds  The left DP pulse is 1+. The left PT pulse is 0.     Assign Risk Category  Deformity present  Loss of protective sensation  Weak pulses  Risk: 3

## 2025-02-14 NOTE — PSYCH
Behavioral Health Psychotherapy Progress Note    This is a therapy progress note for the patient David Carpio. David (who prefers to be called Cy) is a 59-year-old male,  to his wife Maribell for over 33 years, has 3 sons, is currently living in a house with his wife and 2 dogs in the [facility]. Cy reports a past medical history that includes atrial fibrillation, type 2 diabetes with diabetic polyneuropathy status post multiple foot surgeries in the setting of foot osteomyelitis, obstructive sleep apnea, hypertension, chronic diastolic heart failure, and is over 2 years status post bariatric surgery.  At this time Cy possesses a past psychiatric history of major depressive disorder, generalized anxiety disorder, and complicated bereavement.     At the time of intake, Cy reported that he has been struggling with his mental health for over the past four years following the untimely passing of his daughter Shameka (she passed at the age of 23 years old).  Cy reports that as a teenager his daughter began hanging out with bad influences and dating boyfriends who struggled with drugs and who influenced her to use drugs. Cy reports that he did his best to support his daughter and supported her through drug and alcohol rehab. He reports that about three years ago he was informed that his daughter had  and was reportedly found down by her boyfriend. Cy reports that his daughter was found to have heroin and fentanyl in her system at the time. Cy has struggled to cope with her passing since then. He reports that he becomes emotional when thinking of her. At this time Cy also harbors feelings of hatred towards his late daughter's boyfriend. Cy reports he has a 7 year old grandson Ki who lives with his late daughter's boyfriend (who is the father of the child).     At this time, Cy continues to feel that he does not deserve to be happy. Cy continues to feel that he failed as a parent  "and that \"I could have done something\" to prevent the tragic passing of his daughter. He also remains with fears that being happy and moving on from his daughter's passing will dishonor her memory.     Psychotherapy Provided: Individual Psychotherapy     1. Current moderate episode of major depressive disorder without prior episode (HCC)        2. Generalized anxiety disorder        3. Complicated bereavement            Goals addressed in session: Goal 1, Goal 2, and Goal 3      DATA:     Today, David reported feeling glad that the [employer] won the Super Bowl.  He shared that he hosted a small gathering at his house with some immediate family members.  He acknowledged that celebrating with his family brought him moments of юлия.  Despite this, he continues to express anger and resentment towards his late daughter's former boyfriend as well as his wife.  He described his daily mindset is waking up and telling himself, \"I have to think of ways to get through the day.\"      David reported feeling increasingly emotional, stating that the 4th year anniversary of his daughter's death will be tomorrow 2/15/25. He reports he will hold a small ceremony where he releases balloons into the andrew. He was given space to process his emotions and the rituals that he continues to perform to honor her passing.    David remains interested in pursuing custody of Ki.  However, he noted that his wife currently does not wish to pursue custody, which has contributed to his ongoing contemplation of divorce.  In discussing custody, it was noted that if he and his wife filed together, they would have a stronger legal case.  However, David was reminded that he can only control his own actions, not  the actions of others.      The concept of deserving was explored in session with David.  David has repeatedly stated that he does not believe he deserves happiness, however today he expressed that he believes he deserves custody of his " "grandson. This was processed in depth, highlighting that he views both his late daughter and his grandson as sources of юлия.  He was encouraged to acknowledge the contradiction in his beliefs, that by asserting that he deserves custody, he is also indirectly stating that he deserves happiness.  This was gently explored to challenge his rigid self perceptions.    During this session, this clinician used the following therapeutic modalities: Bereavement Therapy, Cognitive Behavioral Therapy, Cognitive Processing Therapy, Solution-Focused Therapy, and Supportive Psychotherapy    Substance Abuse was not addressed during this session.     ASSESSMENT:  Cy Carpio presents with a Depressed mood.     his affect is Constricted, which is congruent, with his mood and the content of the session. The client has made progress on their goals.     Cy Carpio presents with a none risk of suicide, none risk of self-harm, and none risk of harm to others.    For any risk assessment that surpasses a \"low\" rating, a safety plan must be developed.    A safety plan was indicated: no    PLAN: Between sessions, Cy Carpio will continue to engage in self reflection and on the regarding his emotional needs and desires.  Therapy will continue to focus on processing grief, challenging self punishing thoughts, and fostering emotional acceptance. At the next session, the therapist will use Bereavement Therapy, Cognitive Behavioral Therapy, Cognitive Processing Therapy, Solution-Focused Therapy, and Supportive Psychotherapy to address these challenges.    Behavioral Health Treatment Plan and Discharge Planning: Cy Carpio is aware of and agrees to continue to work on their treatment plan. They have identified and are working toward their discharge goals. yes    Depression Follow-up Plan Completed: Yes    Visit start and stop times:    02/14/25 from 11:00 AM to 12:00 PM    This note was not shared with the patient due to this is a " psychotherapy note

## 2025-02-28 ENCOUNTER — OFFICE VISIT (OUTPATIENT)
Dept: PSYCHIATRY | Facility: CLINIC | Age: 60
End: 2025-02-28

## 2025-02-28 DIAGNOSIS — F41.1 GENERALIZED ANXIETY DISORDER: ICD-10-CM

## 2025-02-28 DIAGNOSIS — F32.1 CURRENT MODERATE EPISODE OF MAJOR DEPRESSIVE DISORDER WITHOUT PRIOR EPISODE (HCC): Primary | ICD-10-CM

## 2025-02-28 DIAGNOSIS — F43.21 COMPLICATED BEREAVEMENT: ICD-10-CM

## 2025-02-28 PROCEDURE — PBNCHG PB NO CHARGE PLACEHOLDER

## 2025-02-28 NOTE — PATIENT INSTRUCTIONS
Please present for your previously scheduled appointment approximately 15 minutes prior to appointment time. If you are running late or are unable to attend your appointment, please call our [place] office at [phone] or our [place] office at [phone] if applicable to notify the office of your absence.    If you are in psychological crisis including not limited to suicidal/homicidal thoughts or thoughts of self-injury, do not hesitate to call/contact your County Crisis hotline (see below) or attend to the nearest emergency department.  [facility] Crisis: [phone]  [facility] Crisis: [phone]  [facility] Crisis: [phone]  [facility] Crisis: [phone]  [facility] Crisis: [phone]  [facility] Crisis: [phone]  [facility] Crisis: [phone]  National Suicide Prevention Hotline: 1-877.157.3674

## 2025-02-28 NOTE — PSYCH
Behavioral Health Psychotherapy Progress Note    This is a therapy progress note for the patient David Carpio. David (who prefers to be called Cy) is a 59-year-old male,  to his wife Maribell for over 33 years, has 3 sons, is currently living in a house with his wife and 2 dogs in the [facility]. Cy reports a past medical history that includes atrial fibrillation, type 2 diabetes with diabetic polyneuropathy status post multiple foot surgeries in the setting of foot osteomyelitis, obstructive sleep apnea, hypertension, chronic diastolic heart failure, and is over 2 years status post bariatric surgery.  At this time Cy possesses a past psychiatric history of major depressive disorder, generalized anxiety disorder, and complicated bereavement.     At the time of intake, Cy reported that he has been struggling with his mental health for over the past four years following the untimely passing of his daughter Shameka (she passed at the age of 23 years old).  Cy reports that as a teenager his daughter began hanging out with bad influences and dating boyfriends who struggled with drugs and who influenced her to use drugs. Cy reports that he did his best to support his daughter and supported her through drug and alcohol rehab. He reports that about three years ago he was informed that his daughter had  and was reportedly found down by her boyfriend. Cy reports that his daughter was found to have heroin and fentanyl in her system at the time. Cy has struggled to cope with her passing since then. He reports that he becomes emotional when thinking of her. At this time Cy also harbors feelings of hatred towards his late daughter's boyfriend. Cy reports he has a 7 year old grandson Ki who lives with his late daughter's boyfriend (who is the father of the child).     At this time, Cy continues to feel that he does not deserve to be happy. Cy continues to feel that he failed as a parent  "and that \"I could have done something\" to prevent the tragic passing of his daughter. He also remains with fears that being happy and moving on from his daughter's passing will dishonor her memory.     Psychotherapy Provided: Individual Psychotherapy     1. Current moderate episode of major depressive disorder without prior episode (HCC)        2. Generalized anxiety disorder        3. Complicated bereavement            Goals addressed in session: Goal 1, Goal 2, and Goal 3      DATA:     In session today, David reflected on the recent anniversary of his daughter's passing on February 15.  He described the day as \"very emotional.\"  He participated in a ceremony with his wife, grandson, youngest son, and his late daughter's former boyfriend, during which they wrote messages on balloons and released them into the andrew.  While he found the event to be deeply emotional, he acknowledged the importance of community support for processing grief, even though he initially wished to commemorate the day solely with his grandson.    In session today, David identified four primary struggles that continue to weigh on him: His perceived failure to act on the night of his daughter's passing, persistent self blame, ongoing difficulties in his relationships with his wife and daughter's former boyfriend, and a strong desire for custody of his grandson.    Throughout the session, we explored how David has been revisiting past memories of his daughter, including photos and videos.  While the process has been emotionally challenging, he noted that, with time, he is better able to tolerate doing certain images.  He shared his plan to take the next step by opening a box of his daughter's belongings.  Additionally, he reported slowly opening up to his daughter-in-law, who had a close relationship with his late daughter.    We discussed the importance of confronting painful memories as part of the grieving process.  Cognitive " "restructuring was used to challenge David's automatic negative thoughts, particularly regarding his parenting style.  He expressed regret that he was not stricter with his daughter, believing this may have contributed to her struggles.  This belief was challenged by highlighting that, despite being stricter with his sons, they still engaged in mischief, reinforcing the idea that, at some point, individuals are responsible for their own choices.    During this session, this clinician used the following therapeutic modalities: Bereavement Therapy, Cognitive Behavioral Therapy, Mindfulness-based Strategies, Solution-Focused Therapy, and Supportive Psychotherapy    Substance Abuse was not addressed during this session.     ASSESSMENT:  Cy Carpio presents with a Euthymic/ normal mood.     his affect is Normal range and intensity, which is congruent, with his mood and the content of the session. The client has made progress on their goals.    Cy Carpio presents with a none risk of suicide, none risk of self-harm, and none risk of harm to others.    For any risk assessment that surpasses a \"low\" rating, a safety plan must be developed.    A safety plan was indicated: no    PLAN: Between sessions, Cy Carpio will continue to engage in difficult but necessary grief work.  He is taking gradual steps towards confronting painful memories with supportive family members.  Moving forward, he will continue to explore his daughter's belongings at his own pace and work on improving communication with his family surrounding his needs.  Future sessions will focus on further reducing self blame, strengthening coping mechanisms, and addressing concerns about custody. At the next session, the therapist will use Bereavement Therapy, Cognitive Behavioral Therapy, Mindfulness-based Strategies, Solution-Focused Therapy, and Supportive Psychotherapy to address these ongoing struggles.    Behavioral Health Treatment Plan and Discharge " Planning: Cy Caripo is aware of and agrees to continue to work on their treatment plan. They have identified and are working toward their discharge goals. yes    Depression Follow-up Plan Completed: Yes    Visit start and stop times:    02/28/25 from 11:00 AM to 12:00 PM    This note was not shared with the patient due to this is a psychotherapy note

## 2025-03-07 ENCOUNTER — OFFICE VISIT (OUTPATIENT)
Dept: PSYCHIATRY | Facility: CLINIC | Age: 60
End: 2025-03-07

## 2025-03-07 DIAGNOSIS — F99 INSOMNIA DUE TO OTHER MENTAL DISORDER: ICD-10-CM

## 2025-03-07 DIAGNOSIS — F43.21 COMPLICATED BEREAVEMENT: ICD-10-CM

## 2025-03-07 DIAGNOSIS — F51.05 INSOMNIA DUE TO OTHER MENTAL DISORDER: ICD-10-CM

## 2025-03-07 DIAGNOSIS — F41.1 GENERALIZED ANXIETY DISORDER: ICD-10-CM

## 2025-03-07 DIAGNOSIS — F32.1 CURRENT MODERATE EPISODE OF MAJOR DEPRESSIVE DISORDER WITHOUT PRIOR EPISODE (HCC): Primary | ICD-10-CM

## 2025-03-07 PROCEDURE — PBNCHG PB NO CHARGE PLACEHOLDER

## 2025-03-07 RX ORDER — BUSPIRONE HYDROCHLORIDE 15 MG/1
15 TABLET ORAL 3 TIMES DAILY
Qty: 90 TABLET | Refills: 2 | Status: SHIPPED | OUTPATIENT
Start: 2025-03-07

## 2025-03-07 RX ORDER — BUPROPION HYDROCHLORIDE 300 MG/1
300 TABLET ORAL DAILY
Qty: 30 TABLET | Refills: 2 | Status: SHIPPED | OUTPATIENT
Start: 2025-03-07

## 2025-03-07 RX ORDER — DOXEPIN HYDROCHLORIDE 25 MG/1
25 CAPSULE ORAL 3 TIMES DAILY
Qty: 90 CAPSULE | Refills: 2 | Status: SHIPPED | OUTPATIENT
Start: 2025-03-07

## 2025-03-07 RX ORDER — GABAPENTIN 800 MG/1
800 TABLET ORAL 3 TIMES DAILY
Qty: 90 TABLET | Refills: 2 | Status: SHIPPED | OUTPATIENT
Start: 2025-03-07

## 2025-03-07 RX ORDER — ESZOPICLONE 2 MG/1
2 TABLET, FILM COATED ORAL
Qty: 30 TABLET | Refills: 0 | Status: SHIPPED | OUTPATIENT
Start: 2025-03-07 | End: 2025-03-07 | Stop reason: ALTCHOICE

## 2025-03-07 NOTE — PATIENT INSTRUCTIONS
Please present for your previously scheduled appointment approximately 15 minutes prior to appointment time. If you are running late or are unable to attend your appointment, please call our [place] office at [phone] or our [place] office at [phone] if applicable to notify the office of your absence.    If you are in psychological crisis including not limited to suicidal/homicidal thoughts or thoughts of self-injury, do not hesitate to call/contact your County Crisis hotline (see below) or attend to the nearest emergency department.  [facility] Crisis: [phone]  [facility] Crisis: [phone]  [facility] Crisis: [phone]  [facility] Crisis: [phone]  [facility] Crisis: [phone]  [facility] Crisis: [phone]  [facility] Crisis: [phone]  National Suicide Prevention Hotline: 1-530.918.7258
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
## 2025-03-11 ENCOUNTER — TELEPHONE (OUTPATIENT)
Age: 60
End: 2025-03-11

## 2025-03-11 ENCOUNTER — OFFICE VISIT (OUTPATIENT)
Dept: PSYCHIATRY | Facility: CLINIC | Age: 60
End: 2025-03-11

## 2025-03-11 DIAGNOSIS — F32.1 CURRENT MODERATE EPISODE OF MAJOR DEPRESSIVE DISORDER WITHOUT PRIOR EPISODE (HCC): ICD-10-CM

## 2025-03-11 DIAGNOSIS — F43.21 COMPLICATED BEREAVEMENT: Primary | ICD-10-CM

## 2025-03-11 DIAGNOSIS — F41.1 GENERALIZED ANXIETY DISORDER: ICD-10-CM

## 2025-03-11 PROCEDURE — PBNCHG PB NO CHARGE PLACEHOLDER

## 2025-03-11 NOTE — PSYCH
Behavioral Health Psychotherapy Progress Note    This is a therapy progress note for the patient David Carpio. David (who prefers to be called Cy) is a 59-year-old male,  to his wife Maribell for over 33 years, has 3 sons, is currently living in a house with his wife and 2 dogs in the [facility]. Cy reports a past medical history that includes atrial fibrillation, type 2 diabetes with diabetic polyneuropathy status post multiple foot surgeries in the setting of foot osteomyelitis, obstructive sleep apnea, hypertension, chronic diastolic heart failure, and is over 2 years status post bariatric surgery.  At this time Cy possesses a past psychiatric history of major depressive disorder, generalized anxiety disorder, and complicated bereavement.     At the time of intake, Cy reported that he has been struggling with his mental health for over the past four years following the untimely passing of his daughter Shameka (she passed at the age of 23 years old).  Cy reports that as a teenager his daughter began hanging out with bad influences and dating boyfriends who struggled with drugs and who influenced her to use drugs. Cy reports that he did his best to support his daughter and supported her through drug and alcohol rehab. He reports that about three years ago he was informed that his daughter had  and was reportedly found down by her boyfriend. Cy reports that his daughter was found to have heroin and fentanyl in her system at the time. Cy has struggled to cope with her passing since then. He reports that he becomes emotional when thinking of her. At this time Cy also harbors feelings of hatred towards his late daughter's boyfriend. Cy reports he has a 7 year old grandson Ki who lives with his late daughter's boyfriend (who is the father of the child).     At this time, Cy continues to feel that he does not deserve to be happy. Cy continues to feel that he failed as a parent  "and that \"I could have done something\" to prevent the tragic passing of his daughter. He also remains with fears that being happy and moving on from his daughter's passing will dishonor her memory.     Psychotherapy Provided: Individual Psychotherapy     1. Complicated bereavement        2. Generalized anxiety disorder        3. Current moderate episode of major depressive disorder without prior episode (HCC)            Goals addressed in session: Goal 1, Goal 2, and Goal 3      DATA:     In session today, David continues to express ongoing struggles with coping.  He maintains persistent believes that he does not deserve to feel happy or relaxed and continues to ruminate on the idea that he could have done something to prevent his daughter's passing. Despite these feelings, he acknowledged experiencing a few recent nights of improved sleep, though he continues to report difficulty fully relaxing.    A central theme that was explored in today's session was David's belief that his grief is unique and that others are not struggling with the loss in the same way that he has. This belief was challenged by highlighting the impact of his daughter's passing on the family, noting that his wife has not returned to [employer] since the loss, and his eldest son had experienced significant struggles with anxiety afterwards. These examples helped emphasize that grief manifests differently for different individuals.    David shared that he has been interacting with his daughter-in-law, who recently shared previously unseen pictures of his daughter.  He noted that these photos revealed aspects of her life, such as her having dyed her hair, which he was unaware of.  He reflected on the sense of exclusion that he feels, believing that certain parts of his daughters life that was hidden from him.  He stated that while learning this new information may upset him, he feels strongly that he would rather know the truth and be comforted " "by uncertainty.  He plans to discuss these pictures in more detail with his daughter-in-law this weekend.    During this session, this clinician used the following therapeutic modalities: Bereavement Therapy, Cognitive Behavioral Therapy, Cognitive Processing Therapy, Solution-Focused Therapy, and Supportive Psychotherapy    Substance Abuse was not addressed during this session.    ASSESSMENT:  Cy Carpio presents with a Anxious mood.     his affect is Constricted, which is congruent, with his mood and the content of the session. The client has made progress on their goals.    Cy Carpio presents with a none risk of suicide, none risk of self-harm, and none risk of harm to others.    For any risk assessment that surpasses a \"low\" rating, a safety plan must be developed.    A safety plan was indicated: no    PLAN: Between sessions, Cy Carpio will continue to explore themes of grief, self forgiveness, and emotional honesty.  He will continue to process difficult emotions that may arise following the conversation with his daughter-in-law.  He will continue to make ongoing efforts to improve sleep hygiene and relaxation techniques.  At the next session, the therapist will use Cognitive Behavioral Therapy, Cognitive Processing Therapy, Mindfulness-based Strategies, Solution-Focused Therapy, and Supportive Psychotherapy to address these ongoing struggles.    Behavioral Health Treatment Plan and Discharge Planning: Cy Carpio is aware of and agrees to continue to work on their treatment plan. They have identified and are working toward their discharge goals. yes    Depression Follow-up Plan Completed: Yes    Visit start and stop times:    03/11/25 from 1:00 PM to 2:00 PM    This note was not shared with the patient due to this is a psychotherapy note       "

## 2025-03-21 ENCOUNTER — TELEPHONE (OUTPATIENT)
Age: 60
End: 2025-03-21

## 2025-03-21 NOTE — TELEPHONE ENCOUNTER
Patient called in looking to reschedule their 3/25  appointment.    Writer was unable to transfer the call to the resident  and stated a message would be sent and they would contact them back to reschedule at their earliest convenience.    Patient stated they would like the Friday appointment if still available that was mentioned in voicemail left.

## 2025-03-24 ENCOUNTER — TELEPHONE (OUTPATIENT)
Age: 60
End: 2025-03-24

## 2025-03-24 NOTE — TELEPHONE ENCOUNTER
Patient contacted the office to reschedule a follow up visit with provider Anupam Rodriguez. He is looking for Friday availablitiy.  Please return call to reschedule f/u. Thank you.

## 2025-03-28 ENCOUNTER — OFFICE VISIT (OUTPATIENT)
Dept: PSYCHIATRY | Facility: CLINIC | Age: 60
End: 2025-03-28

## 2025-03-28 DIAGNOSIS — F43.21 COMPLICATED BEREAVEMENT: ICD-10-CM

## 2025-03-28 DIAGNOSIS — F32.1 CURRENT MODERATE EPISODE OF MAJOR DEPRESSIVE DISORDER WITHOUT PRIOR EPISODE (HCC): Primary | ICD-10-CM

## 2025-03-28 DIAGNOSIS — F41.1 GENERALIZED ANXIETY DISORDER: ICD-10-CM

## 2025-03-28 PROCEDURE — PBNCHG PB NO CHARGE PLACEHOLDER

## 2025-03-28 NOTE — PSYCH
Behavioral Health Psychotherapy Progress Note    This is a therapy progress note for the patient David Carpio. David (who prefers to be called Cy) is a 59-year-old male,  to his wife Maribell for over 33 years, has 3 sons, is currently living in a house with his wife and 2 dogs in the [facility]. Cy reports a past medical history that includes atrial fibrillation, type 2 diabetes with diabetic polyneuropathy status post multiple foot surgeries in the setting of foot osteomyelitis, obstructive sleep apnea, hypertension, chronic diastolic heart failure, and is over 2 years status post bariatric surgery.  At this time Cy possesses a past psychiatric history of major depressive disorder, generalized anxiety disorder, and complicated bereavement.     At the time of intake, Cy reported that he has been struggling with his mental health for over the past four years following the untimely passing of his daughter Shameka (she passed at the age of 21 years old).  Cy reports that as a teenager his daughter began hanging out with bad influences and dating boyfriends who struggled with drugs and who influenced her to use drugs. Cy reports that he did his best to support his daughter and supported her through drug and alcohol rehab. He reports that about three years ago he was informed that his daughter had  and was reportedly found down by her boyfriend. Cy reports that his daughter was found to have heroin and fentanyl in her system at the time. Cy has struggled to cope with her passing since then. He reports that he becomes emotional when thinking of her. At this time Cy also harbors feelings of hatred towards his late daughter's boyfriend. Cy reports he has a 7 year old grandson Ki who lives with his late daughter's boyfriend (who is the father of the child).     At this time, Cy continues to feel that he does not deserve to be happy. Cy continues to feel that he failed as a parent  "and that \"I could have done something\" to prevent the tragic passing of his daughter. He also remains with fears that being happy and moving on from his daughter's passing will dishonor her memory.     Psychotherapy Provided: Individual Psychotherapy     1. Current moderate episode of major depressive disorder without prior episode (HCC)        2. Generalized anxiety disorder        3. Complicated bereavement            Goals addressed in session: Goal 1, Goal 2, and Goal 3      DATA:     Today David reported a significant positive experience.  Last weekend, he was able to sit and enjoy a comedy show with his wife, ex-wife, 2 sons, and daughter-in-law.  He noted that this was the first time in a long time that he genuinely felt enjoyment in his desire to be around others.  He identified this is a step forward, and positive reinforcement was provided. David shared that he has been making slow progress and discussing his late daughter's life with his daughter-in-law, who knew her well.  He expressed anxiety about what he might learn in these conversations and he reported that when he gets emotional, communication tends to shut down, leading others to disengage.     This session today explored the subconscious dynamic of David suffering as a means of keeping his late daughter's memory life within the family.  This session discussed his fear that healing would lead to his family forgetting his daughter's passing.  He was encouraged to build confidence that his daughter's memory will persist regardless of his emotional state. In the session, motivational interviewing techniques, including rolling with resistance, were utilized to empower David to continue expressing his emotions despite potential discomfort from others.  He was encouraged to communicate openly about his belief that he is the primary person keeping his daughter's memory life, reinforcing the importance of authenticity and his grieving process. " "Subsequent therapy sessions will strengthen emotional regulation skills to prevent communications at times.  At this time David continues to make small steps and processing grief with the family dynamic.    During this session, this clinician used the following therapeutic modalities: Bereavement Therapy, Cognitive Behavioral Therapy, Cognitive Processing Therapy, Motivational Interviewing, Solution-Focused Therapy, and Supportive Psychotherapy    Substance Abuse was not addressed during this session.    ASSESSMENT:  Cy Carpio presents with a Depressed mood.     his affect is Constricted, which is congruent, with his mood and the content of the session. The client has made progress on their goals.    Cy Carpio presents with a none risk of suicide, none risk of self-harm, and none risk of harm to others.    For any risk assessment that surpasses a \"low\" rating, a safety plan must be developed.    A safety plan was indicated: no    PLAN: Between sessions, Cy Carpio will continue to explore fears around healing and his daughter's memory.  He will continue  gradual, intentional conversations with family members about his daughter. At the next session, the therapist will use Bereavement Therapy, Cognitive Behavioral Therapy, Cognitive Processing Therapy, Motivational Interviewing, Solution-Focused Therapy, and Supportive Psychotherapy to address these ongoing struggles.    Behavioral Health Treatment Plan and Discharge Planning: Cy Carpio is aware of and agrees to continue to work on their treatment plan. They have identified and are working toward their discharge goals. yes    Depression Follow-up Plan Completed: Yes    Visit start and stop times:    03/28/25 from 11 AM to 12 PM    This note was not shared with the patient due to this is a psychotherapy note       "

## 2025-04-04 ENCOUNTER — TELEPHONE (OUTPATIENT)
Age: 60
End: 2025-04-04

## 2025-04-04 NOTE — TELEPHONE ENCOUNTER
Patient is calling regarding cancelling an appointment.    Date/Time: 4/4/2025 at 11 am    Reason: Pt is sick    Patient was rescheduled: YES [] NO [x]  If yes, when was Patient reschedule for: n/a    Patient requesting call back to reschedule: YES [] NO [x]      Pt will call to reschedule when he is feeling better.

## 2025-04-11 ENCOUNTER — TELEPHONE (OUTPATIENT)
Age: 60
End: 2025-04-11

## 2025-04-11 NOTE — TELEPHONE ENCOUNTER
Patient is calling regarding cancelling an appointment.    Date/Time: 4/11/25 at 11 am    Reason: Pt is still not feeling up to par from the virus he had and he is worried about coming in.    Patient was rescheduled: YES [] NO [x]  If yes, when was Patient reschedule for: Writer asked pt if he would want to do virtual and he declined and stated he is just not feeling well enough yet.    Patient requesting call back to reschedule: YES [] NO [x] Pt stated he would call back to reschedule.

## 2025-04-14 ENCOUNTER — TELEPHONE (OUTPATIENT)
Age: 60
End: 2025-04-14

## 2025-04-14 NOTE — TELEPHONE ENCOUNTER
Patient called in to reschedule their medication management appointmetn now that they are feeling better.    Writer stated a message would be sent to the resident  to reach out for further assistance.

## 2025-04-15 ENCOUNTER — TELEPHONE (OUTPATIENT)
Age: 60
End: 2025-04-15

## 2025-04-22 ENCOUNTER — OFFICE VISIT (OUTPATIENT)
Dept: PSYCHIATRY | Facility: CLINIC | Age: 60
End: 2025-04-22

## 2025-04-22 DIAGNOSIS — F43.21 COMPLICATED BEREAVEMENT: ICD-10-CM

## 2025-04-22 DIAGNOSIS — F32.1 CURRENT MODERATE EPISODE OF MAJOR DEPRESSIVE DISORDER WITHOUT PRIOR EPISODE (HCC): Primary | ICD-10-CM

## 2025-04-22 DIAGNOSIS — F41.1 GENERALIZED ANXIETY DISORDER: ICD-10-CM

## 2025-04-22 PROCEDURE — PBNCHG PB NO CHARGE PLACEHOLDER
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
## 2025-04-25 RX ORDER — OMEPRAZOLE 20 MG/1
20 CAPSULE, DELAYED RELEASE ORAL EVERY MORNING
COMMUNITY
Start: 2025-04-08

## 2025-04-28 ENCOUNTER — PROCEDURE VISIT (OUTPATIENT)
Dept: PODIATRY | Age: 60
End: 2025-04-28
Payer: COMMERCIAL

## 2025-04-28 VITALS — WEIGHT: 294.8 LBS | HEIGHT: 73 IN | BODY MASS INDEX: 39.07 KG/M2

## 2025-04-28 DIAGNOSIS — E11.42 TYPE 2 DIABETES MELLITUS WITH DIABETIC POLYNEUROPATHY, WITH LONG-TERM CURRENT USE OF INSULIN (HCC): ICD-10-CM

## 2025-04-28 DIAGNOSIS — Z89.432 STATUS POST TRANSMETATARSAL AMPUTATION OF FOOT, LEFT (HCC): ICD-10-CM

## 2025-04-28 DIAGNOSIS — Z79.4 TYPE 2 DIABETES MELLITUS WITH DIABETIC POLYNEUROPATHY, WITH LONG-TERM CURRENT USE OF INSULIN (HCC): ICD-10-CM

## 2025-04-28 DIAGNOSIS — Z89.421 STATUS POST AMPUTATION OF LESSER TOE OF RIGHT FOOT (HCC): ICD-10-CM

## 2025-04-28 DIAGNOSIS — B35.1 ONYCHOMYCOSIS: ICD-10-CM

## 2025-04-28 DIAGNOSIS — G63 POLYNEUROPATHY ASSOCIATED WITH UNDERLYING DISEASE (HCC): Primary | ICD-10-CM

## 2025-04-28 PROCEDURE — 11720 DEBRIDE NAIL 1-5: CPT | Performed by: STUDENT IN AN ORGANIZED HEALTH CARE EDUCATION/TRAINING PROGRAM

## 2025-04-28 PROCEDURE — 99212 OFFICE O/P EST SF 10 MIN: CPT | Performed by: STUDENT IN AN ORGANIZED HEALTH CARE EDUCATION/TRAINING PROGRAM

## 2025-04-28 NOTE — PROGRESS NOTES
David Carpio  1965  AT RISK FOOT CARE    1. Polyneuropathy associated with underlying disease ([facility])        2. Onychomycosis        3. Type 2 diabetes mellitus with diabetic polyneuropathy, with long-term current use of insulin ([facility])  Diabetic Shoe Inserts    Diabetic Shoe      4. Status post amputation of lesser toe of right foot ([facility])  Diabetic Shoe Inserts    Diabetic Shoe      5. Status post transmetatarsal amputation of foot, left ([facility])  Diabetic Shoe Inserts    Diabetic Shoe          Patient presents for at-risk foot care.  Patient has no acute concerns today but notes his left foot has started to get intermittent cold sensations.  Patient has significant lower extremity risk due to previous amputation.    Got custom DM shoes/inserts but looking for a different style for summer.     On exam patient has thickened, hypertrophic, discolored, brittle toenails with subungual debris x2   Callus: 1 (L submet 1) (non today)  Amputation: L TMA, R 2nd/4th toe     Today's treatment includes:  Debridement of toenails x2. Using nail nipper, jomar, and curette, nails were sharply debrided, reduced in thickness and length. Devitalized nail tissue and fungal debris excised and removed. Patient tolerated well.      I discussed symptoms to left foot as relating to his known peripheral neuropathy. He should make appt with pain mngmt to discuss inc gabapentin possibly.     Custom DM shoes and inserts rx'd again (with left forefoot filler and submet 1 cutout at [facility])    Discussed proper shoe gear, daily inspections of feet, and general foot health with patient. Patient has Q7  findings and is recommended for at risk foot care every 9-10 weeks.    Patients most recent complete clinical exam was performed 2/14/25

## 2025-04-28 NOTE — PSYCH
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      Risks, Benefits And Possible Side Effects Of Medications:    Risks, benefits, and possible side effects of medications explained to David and he verbalizes understanding and agreement for treatment.    Controlled Medication Discussion:     David has been filling controlled prescriptions on time as prescribed according to [place] Prescription Drug Monitoring Program    Psychotherapy Provided:     Individual psychotherapy provided: Yes  Recent stressors discussed with David including medical stressors, relationship stressors, family conflicts.  Supportive therapy provided.     Treatment Plan:    Completed and signed during the session: Not applicable - Treatment Plan not due at this session    Visit Time    Visit Start Time: 10:15 AM  Visit Stop Time: 10:45 AM  Total Visit Duration:  30 minutes    This note was shared with patient.    Andre Rodriguez MD 04/29/25    This note has been constructed using a voice recognition system. There may be translation, syntax, or grammatical errors. If you have any questions, please contact the dictating provider.

## 2025-04-29 ENCOUNTER — OFFICE VISIT (OUTPATIENT)
Dept: PSYCHIATRY | Facility: CLINIC | Age: 60
End: 2025-04-29

## 2025-04-29 VITALS — WEIGHT: 293.5 LBS | BODY MASS INDEX: 38.72 KG/M2

## 2025-04-29 DIAGNOSIS — F99 INSOMNIA DUE TO OTHER MENTAL DISORDER: ICD-10-CM

## 2025-04-29 DIAGNOSIS — F51.05 INSOMNIA DUE TO OTHER MENTAL DISORDER: ICD-10-CM

## 2025-04-29 DIAGNOSIS — F43.21 COMPLICATED BEREAVEMENT: ICD-10-CM

## 2025-04-29 DIAGNOSIS — F41.1 GENERALIZED ANXIETY DISORDER: ICD-10-CM

## 2025-04-29 DIAGNOSIS — F32.1 CURRENT MODERATE EPISODE OF MAJOR DEPRESSIVE DISORDER WITHOUT PRIOR EPISODE (HCC): Primary | ICD-10-CM

## 2025-04-29 PROCEDURE — PBNCHG PB NO CHARGE PLACEHOLDER

## 2025-04-29 RX ORDER — BUSPIRONE HYDROCHLORIDE 15 MG/1
15 TABLET ORAL 3 TIMES DAILY
Qty: 90 TABLET | Refills: 2 | Status: SHIPPED | OUTPATIENT
Start: 2025-04-29

## 2025-04-29 RX ORDER — DOXEPIN HYDROCHLORIDE 25 MG/1
25 CAPSULE ORAL 3 TIMES DAILY
Qty: 90 CAPSULE | Refills: 2 | Status: SHIPPED | OUTPATIENT
Start: 2025-04-29

## 2025-04-29 RX ORDER — CLONAZEPAM 0.25 MG/1
0.25 TABLET, ORALLY DISINTEGRATING ORAL
Qty: 30 TABLET | Refills: 0 | Status: SHIPPED | OUTPATIENT
Start: 2025-04-29

## 2025-04-29 RX ORDER — GABAPENTIN 800 MG/1
800 TABLET ORAL 3 TIMES DAILY
Qty: 90 TABLET | Refills: 2 | Status: SHIPPED | OUTPATIENT
Start: 2025-04-29

## 2025-04-29 RX ORDER — METOLAZONE 2.5 MG/1
2.5 TABLET ORAL DAILY PRN
COMMUNITY

## 2025-04-29 RX ORDER — BUPROPION HYDROCHLORIDE 300 MG/1
300 TABLET ORAL DAILY
Qty: 30 TABLET | Refills: 2 | Status: SHIPPED | OUTPATIENT
Start: 2025-04-29

## 2025-04-29 NOTE — PATIENT INSTRUCTIONS
Please present for your previously scheduled appointment approximately 15 minutes prior to appointment time. If you are running late or are unable to attend your appointment, please call our [place] office at [phone] or our [place] office at [phone] if applicable to notify the office of your absence.    If you are in psychological crisis including not limited to suicidal/homicidal thoughts or thoughts of self-injury, do not hesitate to call/contact your County Crisis hotline (see below) or attend to the nearest emergency department.  [facility] Crisis: [phone]  [facility] Crisis: [phone]  [facility] Crisis: [phone]  [facility] Crisis: [phone]  [facility] Crisis: [phone]  [facility] Crisis: [phone]  [facility] Crisis: [phone]  National Suicide Prevention Hotline: 1-166.807.1181

## 2025-05-09 ENCOUNTER — OFFICE VISIT (OUTPATIENT)
Dept: PSYCHIATRY | Facility: CLINIC | Age: 60
End: 2025-05-09

## 2025-05-09 DIAGNOSIS — F41.1 GENERALIZED ANXIETY DISORDER: ICD-10-CM

## 2025-05-09 DIAGNOSIS — F43.21 COMPLICATED BEREAVEMENT: ICD-10-CM

## 2025-05-09 DIAGNOSIS — F32.1 CURRENT MODERATE EPISODE OF MAJOR DEPRESSIVE DISORDER WITHOUT PRIOR EPISODE (HCC): Primary | ICD-10-CM

## 2025-05-09 PROCEDURE — PBNCHG PB NO CHARGE PLACEHOLDER

## 2025-05-13 ENCOUNTER — OFFICE VISIT (OUTPATIENT)
Dept: PSYCHIATRY | Facility: CLINIC | Age: 60
End: 2025-05-13

## 2025-05-13 DIAGNOSIS — F41.1 GENERALIZED ANXIETY DISORDER: ICD-10-CM

## 2025-05-13 DIAGNOSIS — F43.21 COMPLICATED BEREAVEMENT: ICD-10-CM

## 2025-05-13 DIAGNOSIS — F32.1 CURRENT MODERATE EPISODE OF MAJOR DEPRESSIVE DISORDER WITHOUT PRIOR EPISODE (HCC): Primary | ICD-10-CM

## 2025-05-13 PROCEDURE — PBNCHG PB NO CHARGE PLACEHOLDER

## 2025-05-14 NOTE — PSYCH
"Behavioral Health Psychotherapy Progress Note    This is a therapy progress note for the patient David Carpio. David (who prefers to be called Cy) is a 59-year-old male,  to his wife Maribell for over 33 years, has 3 sons, is currently living in a house with his wife and 2 dogs in the [facility]. Cy reports a past medical history that includes atrial fibrillation, type 2 diabetes with diabetic polyneuropathy status post multiple foot surgeries in the setting of foot osteomyelitis, obstructive sleep apnea, hypertension, chronic diastolic heart failure, and is over 2 years status post bariatric surgery.  At this time Cy possesses a past psychiatric history of major depressive disorder, generalized anxiety disorder, and complicated bereavement.     Cy reported that he has been struggling with his mental health for over the past four years following the untimely passing of his daughter Shameka (she passed at the age of 23 years old).  Cy reports that as a teenager his daughter began hanging out with bad influences and dating boyfriends who struggled with drugs and who influenced her to use drugs. Cy reports that he did his best to support his daughter and supported her through drug and alcohol rehab. He reports that about three years ago he was informed that his daughter had  and was reportedly found down by her boyfriend. Cy reports that his daughter was found to have heroin and fentanyl in her system at the time. Cy has struggled to cope with her passing since then. He reports that he becomes emotional when thinking of her. At this time Cy also harbors feelings of hatred towards his late daughter's boyfriend. Cy reports he has a 7 year old grandson Ki who lives with his late daughter's boyfriend (who is the father of the child).     At this time, Cy continues to feel that he does not deserve to be happy. Cy continues to feel that he failed as a parent and that \"I could have " "done something\" to prevent the tragic passing of his daughter. He also remains with fears that being happy and moving on from his daughter's passing will dishonor her memory.     Psychotherapy Provided: Individual Psychotherapy     1. Current moderate episode of major depressive disorder without prior episode (HCC)        2. Generalized anxiety disorder        3. Complicated bereavement            Goals addressed in session: Goal 1, Goal 2, and Goal 3      DATA:     Today, David reports he continues to navigate his complex emotions surrounding the passing of his daughter Shameka.  He reports that in particular he continues to slowly uncover information during the time leading up to Shameka's passing, which is something that causes him distress.  He remains with some feelings of indecision, wondering if he should venture forward to learn information surrounding the time of her passing.  At the time of interview, cognitive restructuring therapy techniques were performed and it was discussed with the patient that in therapy the goal will continue to be to process his emotions and grief as he learns more information. It was discussed that continuing to confront his fears and emotions will be things that move him forward, and it was highlighted to him that avoiding these topics in the past has served to prolong his sadness and guilt. It was also highlighted in the past he has placed his daughter on an idealistic pedestal and learning more will allow him to appreciate his daughter for both her positives and negatives.    Since the last office visit, David reports that he has talked to his daughter-in-law as well as his niece.  He reports that he learned from his niece in the time leading up to Shameka's passing she had become more distant and her personality had changed.  David reports he plans to continue talking to his niece to find out more information.  David also reports that he has been talking with his " "daughter-in-law.  He reports that he continues to have difficulty bringing up topics to her, however reports that he had an approximately 1 hour conversation this past week.    In session today, the events of his daughter's  were processed with David.  He reports that, when he had arrived to the  parlor, he had not recognized his daughter due to her dying her hair.  The fact that Shameka had dyed her hair is something that continues to distress David greatly, as it highlights to him the concerns that he was not as involved in her life as he wanted to be.  He continues to blame himself and feels that he was working too much and that distracted him from his family.    In the session today, David discussed Mother's Day.  He reports he spent Mother's Day with his grandson.  He reports that he did get his wife a small gift ornament (of a cow) for decoration for her garden. David reports that he continues to weigh the pros and cons of the topic of divorce.  He states that at this time he continues to be unable to forgive his wife.    At this time, David reports he continues to experience nightmares.  He reports that he wakes up from nightmares in a rage.  He does not always remember the content of his nightmares.  He reports that he has had thoughts about sharing his nightmares with his wife, however at this time he is resistant to do so, believing that sharing these thoughts with his wife with the \"a form of forgiveness.\" David was reminded that his goal, whether he stays in the marriage or he proceed with should he proceed with separation and divorce, would be to remain cordial with his wife. In this context he was encouraged to contemplate the idea of sharing more of his emotions with his wife.    During this session, this clinician used the following therapeutic modalities: Bereavement Therapy, Cognitive Behavioral Therapy, Cognitive Processing Therapy, Solution-Focused Therapy, and Supportive " "Psychotherapy    Substance Abuse was not addressed during this session.     ASSESSMENT:  Cy Carpio presents with a Euthymic/ normal and Anxious mood.     his affect is anxious, which is congruent, with his mood and the content of the session. The client has made progress on their goals.    Cy Carpio presents with a none risk of suicide, none risk of self-harm, and none risk of harm to others.    For any risk assessment that surpasses a \"low\" rating, a safety plan must be developed.    A safety plan was indicated: no    PLAN: Between sessions, Cy Carpio will continue to learn information about the time surrounding his daughter's passing. He will continue to process his emotions with family members, including his daughter in law. He will continue to work on being authentic to himself and acknowledging his emotions. He will continue to work on challenging his negative and self defeating thoughts. At the next session, the therapist will use Bereavement Therapy, Cognitive Behavioral Therapy, Solution-Focused Therapy, and Supportive Psychotherapy to address these ongoing themes.    Behavioral Health Treatment Plan and Discharge Planning: Cy Carpio is aware of and agrees to continue to work on their treatment plan. They have identified and are working toward their discharge goals. yes    Depression Follow-up Plan Completed: Yes    Visit start and stop times:    5/13/25 from 11:45 AM to 12:45 PM    This note was not shared with the patient due to this is a psychotherapy note  "

## 2025-05-14 NOTE — PATIENT INSTRUCTIONS
Please present for your previously scheduled appointment approximately 15 minutes prior to appointment time. If you are running late or are unable to attend your appointment, please call our [place] office at [phone] or our [place] office at [phone] if applicable to notify the office of your absence.    If you are in psychological crisis including not limited to suicidal/homicidal thoughts or thoughts of self-injury, do not hesitate to call/contact your County Crisis hotline (see below) or attend to the nearest emergency department.  [facility] Crisis: [phone]  [facility] Crisis: [phone]  [facility] Crisis: [phone]  [facility] Crisis: [phone]  [facility] Crisis: [phone]  [facility] Crisis: [phone]  [facility] Crisis: [phone]  National Suicide Prevention Hotline: 1-273.156.5648

## 2025-05-23 ENCOUNTER — TELEPHONE (OUTPATIENT)
Age: 60
End: 2025-05-23

## 2025-05-23 NOTE — PROGRESS NOTES
[address] [facility]  Progress Note  Name: Melvin Rahman  MRN: [id number]  Unit/Bed#: -01 I Date of Admission: 7/23/2023   Date of Service: 7/25/2023 I Hospital Day: 2    Assessment/Plan   * Cellulitis of left lower extremity  Assessment & Plan  · POA with significant LLE swelling, erythema, tenderness to palpation starting at left TMA extending to mid calf  · Following with podiatry and postop 4/12/23- 2nd metatarsal complete resection with surgical cure for OM presumed. S/p revision TMA. He was discharged on Bactrim and reports was doing well until 7/10 when he developed severe pain in his foot. Reports he saw his podiatrist and was recommended outpatient MRI but was unable to arrange his schedule. · Presented to the ED due to severe intolerable pain in left foot and leg. Significant edema of left foot TMA and lower extremity. · Venous duplex LLE as patient has not taken his Xarelto for the past week as he was planning to have a surgical procedure by podiatry. neg for dvt  Ct foot shows  Multiple erosive changes seen in the cuboid, cuneiforms, at the stump of the first metatarsal with the overlying multiloculated multiseptated enhancing fluid collections, correlate clinically consider evaluation to exclude infection      Podiatry Plans to take patient to OR Wednesday 7/26/23 for bone biopsies (path and micro) to assess for OM vs charcot changes. Recommend holding off on abx until after surgery    Microcytic anemia  Assessment & Plan  · History gastric bypass  · Continue ferrous sulfate  · Hemoglobin baseline    Type 2 diabetes mellitus with diabetic polyneuropathy, without long-term current use of insulin ([facility])  Assessment & Plan  Lab Results   Component Value Date    HGBA1C 5.7 (H) 06/16/2023       No results for input(s): "POCGLU" in the last 72 hours.     Blood Sugar Average: Last 72 hrs:  · History diabetes mellitus  · Diet controlled per patient  · Monitor daily BMP    Anxiety  Assessment & Plan  · Chronic anxiety  · Maintained on Remeron, gabapentin, BuSpar   · Follows with psychiatry in outpatient setting   · Avoid benzodiazepines as recently underwent detox at [facility] for benzodiazepine abuse    Atrial fibrillation ([address])  Assessment & Plan  · PPM in place  · Does not require rate control medications in the outpatient setting  · Chronically anticoagulated with Xarelto, last dose over 1 week ago held for possible OR               VTE Pharmacologic Prophylaxis:   Pharmacologic: Heparin  Mechanical VTE Prophylaxis in Place: Yes    Patient Centered Rounds: I have performed bedside rounds with nursing staff today. Discussions with Specialists or Other Care Team Provider: none    Education and Discussions with Family / Patient: dw patient    Time Spent for Care: 45 minutes. More than 50% of total time spent on counseling and coordination of care as described above. Current Length of Stay: 2 day(s)    Current Patient Status: Inpatient   Certification Statement: The patient will continue to require additional inpatient hospital stay due to left foot pain and cellulitis    Discharge Plan: or on wednesday    Code Status: Level 1 - Full Code      Subjective:   Patient complains of 6 x 10 pain in his left foot and swelling. He appears to be quite anxious as well. Complaining of phantom limb pain in the left foot    Objective:     Vitals:   Temp (24hrs), Av.1 °F (36.2 °C), Min:96.8 °F (36 °C), Max:97.2 °F (36.2 °C)    Temp:  [96.8 °F (36 °C)-97.2 °F (36.2 °C)] 97.2 °F (36.2 °C)  HR:  [68-72] 72  Resp:  [16-18] 18  BP: (100-130)/(59-67) 100/59  SpO2:  [91 %-100 %] 91 %  Body mass index is 34.3 kg/m². Input and Output Summary (last 24 hours):     No intake or output data in the 24 hours ending 23 1337    Physical Exam:     Physical Exam  Vitals and nursing note reviewed. Constitutional:       Appearance: Normal appearance.    HENT:      Head: Normocephalic and atraumatic. Right Ear: External ear normal.      Left Ear: External ear normal.      Nose: Nose normal.      Mouth/Throat:      Pharynx: Oropharynx is clear. Cardiovascular:      Rate and Rhythm: Normal rate and regular rhythm. Heart sounds: Normal heart sounds. Pulmonary:      Effort: Pulmonary effort is normal.      Breath sounds: Normal breath sounds. Abdominal:      General: Bowel sounds are normal.      Palpations: Abdomen is soft. Tenderness: There is no abdominal tenderness. Musculoskeletal:         General: Normal range of motion. Cervical back: Normal range of motion and neck supple. Comments: Left foot s/p TMA incision healed. Mild swelling and mild erythema noted   Skin:     General: Skin is warm and dry. Capillary Refill: Capillary refill takes less than 2 seconds. Neurological:      General: No focal deficit present. Mental Status: He is alert and oriented to person, place, and time. Psychiatric:         Mood and Affect: Mood normal.           Additional Data:     Labs:    Results from last 7 days   Lab Units 07/25/23 0458 07/24/23  0607   WBC Thousand/uL 7.12 9.96   HEMOGLOBIN g/dL 11.1* 12.2   HEMATOCRIT % 36.3* 40.9   PLATELETS Thousands/uL 266 281   NEUTROS PCT %  --  62   LYMPHS PCT %  --  19   MONOS PCT %  --  17*   EOS PCT %  --  2     Results from last 7 days   Lab Units 07/25/23 0458 07/24/23  0607 07/23/23 2129   SODIUM mmol/L 137   < > 137   POTASSIUM mmol/L 3.5   < > 3.8   CHLORIDE mmol/L 104   < > 105   CO2 mmol/L 26   < > 27   BUN mg/dL 11   < > 14   CREATININE mg/dL 0.81   < > 0.86   ANION GAP mmol/L 7   < > 5   CALCIUM mg/dL 8.5   < > 8.6   ALBUMIN g/dL  --   --  3.7   TOTAL BILIRUBIN mg/dL  --   --  0.63   ALK PHOS U/L  --   --  89   ALT U/L  --   --  8   AST U/L  --   --  13   GLUCOSE RANDOM mg/dL 98   < > 105    < > = values in this interval not displayed.      Results from last 7 days   Lab Units 07/23/23 2129   INR  1.05 Results from last 7 days   Lab Units 07/24/23  0824   PROCALCITONIN ng/ml 0.05           * I Have Reviewed All Lab Data Listed Above. * Additional Pertinent Lab Tests Reviewed: [address] Admission Reviewed    Imaging:    Imaging Reports Reviewed Today Include: CT left foot  Imaging Personally Reviewed by Myself Includes: cT left foot    Recent Cultures (last 7 days):           Last 24 Hours Medication List:   Current Facility-Administered Medications   Medication Dose Route Frequency Provider Last Rate   • acetaminophen  650 mg Oral Q6H PRN Deann S Shamika, CRNP     • busPIRone  10 mg Oral TID Deann S Shamika, CRNP     • diazepam  10 mg Oral 30 min pre-procedure Deann S Shamika, CRNP     • docusate sodium  100 mg Oral BID Deann S Shamika, CRNP     • ferrous sulfate  325 mg Oral Daily With Breakfast Deann S Shamika, CRNP     • gabapentin  600 mg Oral TID Deann S Shamika, CRNP     • heparin (porcine)  5,000 Units Subcutaneous Q8H [facility] Deann S Shamika, CRNP     • HYDROmorphone  0.5 mg Intravenous Q3H PRN Deann S Shamika, CRNP     • mirtazapine  30 mg Oral HS Deann S Shamika, CRNP     • ondansetron  4 mg Intravenous Q8H PRN Deann S Shamika, CRNP     • oxyCODONE  10 mg Oral Q6H PRN Deann S Shamika, CRNP     • oxyCODONE  5 mg Oral Q6H PRN Deann S Shamika, CRNP     • pantoprazole  40 mg Oral Early Morning Deann S Shamika, CRNP          Today, Patient Was Seen By: Chilango Valenzuela MD    ** Please Note: Dictation voice to text software may have been used in the creation of this document.  ** hair removal not indicated

## 2025-05-29 ENCOUNTER — TELEPHONE (OUTPATIENT)
Age: 60
End: 2025-05-29

## 2025-05-29 NOTE — TELEPHONE ENCOUNTER
The below encounter is in regard to Rich, and billing for services with Behavioral Health.    Maribell called in regards to patient billing, and would like to know how the office submits the billing for patient appointments.     She spoke with [facility] billing department today, who has re-directed her to the clerical department for specifics. The billing department has not yet received a bill for the patient to review/refer to in order to assist her.    Please call Maribell and Cy:  [phone]  Maribell is listed on the Medical Consent form in Media ( 5.3.2024)    Encounter has been routed to clerical for further assistance.    Thank you.

## 2025-05-30 ENCOUNTER — OFFICE VISIT (OUTPATIENT)
Dept: PSYCHIATRY | Facility: CLINIC | Age: 60
End: 2025-05-30

## 2025-05-30 DIAGNOSIS — F43.21 COMPLICATED BEREAVEMENT: ICD-10-CM

## 2025-05-30 DIAGNOSIS — F41.1 GENERALIZED ANXIETY DISORDER: ICD-10-CM

## 2025-05-30 DIAGNOSIS — F32.1 CURRENT MODERATE EPISODE OF MAJOR DEPRESSIVE DISORDER WITHOUT PRIOR EPISODE (HCC): Primary | ICD-10-CM

## 2025-05-30 PROCEDURE — PBNCHG PB NO CHARGE PLACEHOLDER

## 2025-05-30 NOTE — PSYCH
"Behavioral Health Psychotherapy Progress Note    This is a therapy progress note for the patient David Carpio. David (who prefers to be called Cy) is a 59-year-old male,  to his wife Maribell for over 33 years, has 3 sons, is currently living in a house with his wife and 2 dogs in the [facility]. Cy reports a past medical history that includes atrial fibrillation, type 2 diabetes with diabetic polyneuropathy status post multiple foot surgeries in the setting of foot osteomyelitis, obstructive sleep apnea, hypertension, chronic diastolic heart failure, and is over 2 years status post bariatric surgery.  At this time Cy possesses a past psychiatric history of major depressive disorder, generalized anxiety disorder, and complicated bereavement.     Cy reported that he has been struggling with his mental health for over the past four years following the untimely passing of his daughter Shameka (she passed at the age of 23 years old).  Cy reports that as a teenager his daughter began hanging out with bad influences and dating boyfriends who struggled with drugs and who influenced her to use drugs. Cy reports that he did his best to support his daughter and supported her through drug and alcohol rehab. He reports that about three years ago he was informed that his daughter had  and was reportedly found down by her boyfriend. Cy reports that his daughter was found to have heroin and fentanyl in her system at the time. Cy has struggled to cope with her passing since then. He reports that he becomes emotional when thinking of her. At this time Cy also harbors feelings of hatred towards his late daughter's boyfriend. Cy reports he has a 7 year old grandson Ki who lives with his late daughter's boyfriend (who is the father of the child).     At this time, Cy continues to feel that he does not deserve to be happy. Cy continues to feel that he failed as a parent and that \"I could have " "done something\" to prevent the tragic passing of his daughter. He also remains with fears that being happy and moving on from his daughter's passing will dishonor her memory.     Psychotherapy Provided: Individual Psychotherapy     1. Current moderate episode of major depressive disorder without prior episode (HCC)        2. Generalized anxiety disorder        3. Complicated bereavement            Goals addressed in session: Goal 1, Goal 2, and Goal 3      DATA:     Today, David continues to experience chronic grief, residual trauma, and unresolved anger, particularly related to complex family dynamics.  In the session today, David recounted significant events from his past, including being given up by his birth mother at the age of 11 to be raised by an uncle.  David reports that this uncle later struggled with addiction and  from overdose after being rejected by David's birth mother.  David described a distant and strained relationship with his mother and expressed unresolved anger towards both her and his wife (whom he perceives as having been emotionally distant from their late daughter).  These struggles appear to be foundational to his difficulty with forgiveness and mistrust of emotional support from others.    Despite these challenges, David reported experiencing moments of positive emotional connection, including recent quality time spent with his granddaughter, who is graduating from high school this Friday.  He shared that she has significant medical issues and is awaiting a heart transplant.  David attributes her resilience and positive outlook to the supportive parenting she received, which he finds comforting.    David was engaged and emotionally expressive throughout the session.  Affect was congruent with the content discussed, ranging from somber and reflective to briefly helpful.  He was able to reflect insightfully on past and present experiences, demonstrating improved " "capacity to compartmentalize distressing emotions in certain situations.    In session today, was discussed how others in his life may withhold information to David out of fear of causing him pain.  David was encouraged to adopt an \"I need to know\" mindset to promote transparency and build trust.  It was emphasized that David that confronting discomfort would facilitate healing.  This writer validated David's efforts to engage with his family, highlighting these moments of signs of slow but meaningful progress.    During this session, this clinician used the following therapeutic modalities: Bereavement Therapy, Cognitive Behavioral Therapy, Motivational Interviewing, Solution-Focused Therapy, and Supportive Psychotherapy    Substance Abuse was not addressed during this session.    ASSESSMENT:  Cy Carpio presents with a Anxious mood.     his affect is anxious, which is congruent, with his mood and the content of the session. The client has made progress on their goals.    Cy Carpio presents with a none risk of suicide, none risk of self-harm, and none risk of harm to others.    For any risk assessment that surpasses a \"low\" rating, a safety plan must be developed.    A safety plan was indicated: no    PLAN: Between sessions, Cy Carpio will continue to process his emotions, explore forgiveness, legacy, and relationship boundaries, embrace moments of юлия. At the next session, the therapist will use Bereavement Therapy, Cognitive Behavioral Therapy, Solution-Focused Therapy, and Supportive Psychotherapy to address these ongoing challenges.    Behavioral Health Treatment Plan and Discharge Planning: Cy Carpio is aware of and agrees to continue to work on their treatment plan. They have identified and are working toward their discharge goals. yes    Depression Follow-up Plan Completed: Yes    Visit start and stop times:    05/30/25 from 11:00 AM to 12:00 PM    This note was not shared with the patient " due to this is a psychotherapy note

## 2025-06-03 ENCOUNTER — OFFICE VISIT (OUTPATIENT)
Dept: PSYCHIATRY | Facility: CLINIC | Age: 60
End: 2025-06-03

## 2025-06-03 DIAGNOSIS — F41.1 GENERALIZED ANXIETY DISORDER: ICD-10-CM

## 2025-06-03 DIAGNOSIS — F32.1 CURRENT MODERATE EPISODE OF MAJOR DEPRESSIVE DISORDER WITHOUT PRIOR EPISODE (HCC): Primary | ICD-10-CM

## 2025-06-03 DIAGNOSIS — F43.21 COMPLICATED BEREAVEMENT: ICD-10-CM

## 2025-06-03 PROCEDURE — PBNCHG PB NO CHARGE PLACEHOLDER

## 2025-06-03 NOTE — PSYCH
"Behavioral Health Psychotherapy Progress Note    This is a therapy progress note for the patient David Carpio. David (who prefers to be called Cy) is a 59-year-old male,  to his wife Maribell for over 33 years, has 3 sons, is currently living in a house with his wife and 2 dogs in the [facility]. Cy reports a past medical history that includes atrial fibrillation, type 2 diabetes with diabetic polyneuropathy status post multiple foot surgeries in the setting of foot osteomyelitis, obstructive sleep apnea, hypertension, chronic diastolic heart failure, and is over 2 years status post bariatric surgery.  At this time Cy possesses a past psychiatric history of major depressive disorder, generalized anxiety disorder, and complicated bereavement.     Cy reported that he has been struggling with his mental health for over the past four years following the untimely passing of his daughter Shameka (she passed at the age of 23 years old).  Cy reports that as a teenager his daughter began hanging out with bad influences and dating boyfriends who struggled with drugs and who influenced her to use drugs. Cy reports that he did his best to support his daughter and supported her through drug and alcohol rehab. He reports that about three years ago he was informed that his daughter had  and was reportedly found down by her boyfriend. Cy reports that his daughter was found to have heroin and fentanyl in her system at the time. Cy has struggled to cope with her passing since then. He reports that he becomes emotional when thinking of her. At this time Cy also harbors feelings of hatred towards his late daughter's boyfriend. Cy reports he has a 7 year old grandson Ki who lives with his late daughter's boyfriend (who is the father of the child).     At this time, Cy continues to feel that he does not deserve to be happy. Cy continues to feel that he failed as a parent and that \"I could have " "done something\" to prevent the tragic passing of his daughter. He also remains with fears that being happy and moving on from his daughter's passing will dishonor her memory.     Psychotherapy Provided: Individual Psychotherapy     1. Current moderate episode of major depressive disorder without prior episode (HCC)        2. Generalized anxiety disorder        3. Complicated bereavement            Goals addressed in session: Goal 1, Goal 2, and Goal 3      DATA:     In today's session, David discussed ongoing themes of self blame, emotional suppression, and interpersonal disengagement.  He shared that there continue to be times when he feels that he is  \"going through the motions\" and he tends to block out the opinion of others.    In today's session, David continues to express a belief that he does not deserve to be happy, despite recognizing some improvements in his mood overall.  David reported an upcoming, emotionally significant conversation scheduled for Friday with his daughter-in-law Pardeep and his middle son Andre.  He believes both hold important answers about the final days of his daughter's life.  He expressed a desire to understand as much as possible about that time.  David also continues to revisit the detail that his daughter dyed her hair shortly before her passing, and observation he at the time did not notice, which may symbolize unresolved or repressed aspects of his grief.  The concept of repression was discussed in this context.    Overall, David's ability to initiate and plan for difficult but meaningful conversations with his daughter-in-law reflects a potential movement towards processing unresolved grief.    In today's session, it was discussed that the idea of helping others, even in difficult circumstances with tragic outcomes, does not diminish the good intentions or care that were present.  David was highlighted to identify that, although his daughter tragically passed " "away, that he did everything at the time with the interest of his daughter and truly wanted the best for her and truly wanted to support her.    Overall, David was encouraged to pursue his scheduled conversation with his daughter-in-law and middle son, and to allow himself the space to ask the questions he needs for closure.  In addition, this session highlighted the therapeutic importance of expecting emotional complexity, including the repression of painful memories.    During this session, this clinician used the following therapeutic modalities: Bereavement Therapy, Cognitive Behavioral Therapy, Solution-Focused Therapy, and Supportive Psychotherapy    Substance Abuse was not addressed during this session.     ASSESSMENT:  Cy Carpio presents with a Anxious mood.     his affect is anxious, which is congruent, with his mood and the content of the session. The client has made progress on their goals.    Cy Carpio presents with a none risk of suicide, none risk of self-harm, and none risk of harm to others.    For any risk assessment that surpasses a \"low\" rating, a safety plan must be developed.    A safety plan was indicated: no    PLAN: Between sessions, Cy Carpio will continue to explore the topic of forgiveness and self compassion.  He will continue to advocate for himself to ask the questions he needs to find closure with regards to his daughter's passing. At the next session, the therapist will use Bereavement Therapy, Cognitive Behavioral Therapy, Solution-Focused Therapy, and Supportive Psychotherapy to address these ongoing concepts.    Behavioral Health Treatment Plan and Discharge Planning: Cy Carpio is aware of and agrees to continue to work on their treatment plan. They have identified and are working toward their discharge goals. yes    Depression Follow-up Plan Completed: Yes    Visit start and stop times:    06/03/25 from 3:00 PM to 4:00 PM    This note was not shared with the patient " due to this is a psychotherapy note

## 2025-06-03 NOTE — PATIENT INSTRUCTIONS
Please present for your previously scheduled appointment approximately 15 minutes prior to appointment time. If you are running late or are unable to attend your appointment, please call our [place] office at [phone] or our [place] office at [phone] if applicable to notify the office of your absence.    If you are in psychological crisis including not limited to suicidal/homicidal thoughts or thoughts of self-injury, do not hesitate to call/contact your County Crisis hotline (see below) or attend to the nearest emergency department.  [facility] Crisis: [phone]  [facility] Crisis: [phone]  [facility] Crisis: [phone]  [facility] Crisis: [phone]  [facility] Crisis: [phone]  [facility] Crisis: [phone]  [facility] Crisis: [phone]  National Suicide Prevention Hotline: 1-289.838.4576

## 2025-06-09 ENCOUNTER — TELEPHONE (OUTPATIENT)
Age: 60
End: 2025-06-09

## 2025-06-09 NOTE — TELEPHONE ENCOUNTER
Patient called and requested to reschedule appt for 6/10. Writer transferred him to resident  for assistance.

## 2025-06-10 ENCOUNTER — OFFICE VISIT (OUTPATIENT)
Dept: PSYCHIATRY | Facility: CLINIC | Age: 60
End: 2025-06-10

## 2025-06-10 ENCOUNTER — TELEPHONE (OUTPATIENT)
Age: 60
End: 2025-06-10

## 2025-06-10 DIAGNOSIS — F41.1 GENERALIZED ANXIETY DISORDER: ICD-10-CM

## 2025-06-10 DIAGNOSIS — F43.21 COMPLICATED BEREAVEMENT: ICD-10-CM

## 2025-06-10 DIAGNOSIS — F32.1 CURRENT MODERATE EPISODE OF MAJOR DEPRESSIVE DISORDER WITHOUT PRIOR EPISODE (HCC): Primary | ICD-10-CM

## 2025-06-10 PROCEDURE — PBNCHG PB NO CHARGE PLACEHOLDER

## 2025-06-13 NOTE — PSYCH
".Behavioral Health Psychotherapy Progress Note    This is a therapy progress note for the patient David Carpio. David (who prefers to be called Cy) is a 59-year-old male,  to his wife Maribell for over 33 years, has 3 sons, is currently living in a house with his wife and 2 dogs in the [facility]. Cy reports a past medical history that includes atrial fibrillation, type 2 diabetes with diabetic polyneuropathy status post multiple foot surgeries in the setting of foot osteomyelitis, obstructive sleep apnea, hypertension, chronic diastolic heart failure, and is over 2 years status post bariatric surgery.  At this time Cy possesses a past psychiatric history of major depressive disorder, generalized anxiety disorder, and complicated bereavement.     Cy reported that he has been struggling with his mental health for over the past four years following the untimely passing of his daughter Shameka (she passed at the age of 23 years old).  Cy reports that as a teenager his daughter began hanging out with bad influences and dating boyfriends who struggled with drugs and who influenced her to use drugs. Cy reports that he did his best to support his daughter and supported her through drug and alcohol rehab. He reports that about three years ago he was informed that his daughter had  and was reportedly found down by her boyfriend. Cy reports that his daughter was found to have heroin and fentanyl in her system at the time. Cy has struggled to cope with her passing since then. He reports that he becomes emotional when thinking of her. At this time Cy also harbors feelings of hatred towards his late daughter's boyfriend. Cy reports he has a 7 year old grandson Ki who lives with his late daughter's boyfriend (who is the father of the child).     At this time, Cy continues to feel that he does not deserve to be happy. Cy continues to feel that he failed as a parent and that \"I could have " "done something\" to prevent the tragic passing of his daughter. He also remains with fears that being happy and moving on from his daughter's passing will dishonor her memory.     Psychotherapy Provided: Individual Psychotherapy     1. Current moderate episode of major depressive disorder without prior episode (HCC)        2. Generalized anxiety disorder        3. Complicated bereavement            Goals addressed in session: Goal 1, Goal 2, and Goal 3      DATA:     Today, David talked about his granddaughter's graduation. He reports many mixed feelings surrounding this graduation. He reports a sense of pride in his granddaughter and how she overcomes her struggles. He also reports feelings of sadness that his daughter was not alive to see this momentous occasion. David reports that, during the ceremony, he cried for both his daughter as well as for his granddaughter. In session today, David continues to reflect that he is not able to be fully present in the moment for life events, continuing to have ruminations. In session today, David continues to reflect that he is generally not able to be direct with others about his emotions.    Ultimately, it was highlighted to David that from a psychodynamic perspective he pushes people away so that he doesn't have to open himself up to the possibility of future loss.    In session today, an extensive discussion was held regarding the multiple attachment styles. It was highlighted in communication that David generally possesses a more avoidant communication style, craving closeness yet struggling to open up. It was highlighted that this attachment style can impede his ability to form lasting and enduring relationships at times. A multitude of strategies to continue to foster open and direct communication were discussion.    During this session, this clinician used the following therapeutic modalities: Bereavement Therapy, Cognitive Behavioral Therapy, Supportive " "Psychotherapy, and Attachment Theory Therapies    Substance Abuse was not addressed during this session.     ASSESSMENT:  Cy Carpio presents with a Anxious mood.     his affect is anxious, which is congruent, with his mood and the content of the session. The client has made progress on their goals.    Cy Carpio presents with a none risk of suicide, none risk of self-harm, and none risk of harm to others.    For any risk assessment that surpasses a \"low\" rating, a safety plan must be developed.    A safety plan was indicated: no    PLAN: Between sessions, Cy Carpio will continue to work on understanding his communication and attachment style, using that personal knowledge to modify his communication patterns and fostering more open communication in the future. At the next session, the therapist will use Bereavement Therapy, Cognitive Behavioral Therapy, Supportive Psychotherapy, and Attachment Theory Therapies to address these ongoing struggles.    Behavioral Health Treatment Plan and Discharge Planning: Cy Carpio is aware of and agrees to continue to work on their treatment plan. They have identified and are working toward their discharge goals. yes    Depression Follow-up Plan Completed: Yes    Visit start and stop times:    6/10/25 from 5:00 PM to 6:00 PM    This note was not shared with the patient due to this is a psychotherapy note       "

## 2025-06-16 ENCOUNTER — OFFICE VISIT (OUTPATIENT)
Dept: PSYCHIATRY | Facility: CLINIC | Age: 60
End: 2025-06-16

## 2025-06-16 VITALS
SYSTOLIC BLOOD PRESSURE: 106 MMHG | BODY MASS INDEX: 40.07 KG/M2 | WEIGHT: 303.7 LBS | HEART RATE: 67 BPM | DIASTOLIC BLOOD PRESSURE: 66 MMHG

## 2025-06-16 DIAGNOSIS — F41.1 GENERALIZED ANXIETY DISORDER: ICD-10-CM

## 2025-06-16 DIAGNOSIS — F32.1 CURRENT MODERATE EPISODE OF MAJOR DEPRESSIVE DISORDER WITHOUT PRIOR EPISODE (HCC): Primary | ICD-10-CM

## 2025-06-16 DIAGNOSIS — F43.21 COMPLICATED BEREAVEMENT: ICD-10-CM

## 2025-06-16 DIAGNOSIS — F99 INSOMNIA DUE TO OTHER MENTAL DISORDER: ICD-10-CM

## 2025-06-16 DIAGNOSIS — F51.05 INSOMNIA DUE TO OTHER MENTAL DISORDER: ICD-10-CM

## 2025-06-16 PROCEDURE — PBNCHG PB NO CHARGE PLACEHOLDER

## 2025-06-16 RX ORDER — BUSPIRONE HYDROCHLORIDE 15 MG/1
15 TABLET ORAL 3 TIMES DAILY
Qty: 270 TABLET | Refills: 0 | Status: SHIPPED | OUTPATIENT
Start: 2025-06-16

## 2025-06-16 RX ORDER — GABAPENTIN 800 MG/1
800 TABLET ORAL 3 TIMES DAILY
Qty: 270 TABLET | Refills: 0 | Status: SHIPPED | OUTPATIENT
Start: 2025-06-16

## 2025-06-16 RX ORDER — BUPROPION HYDROCHLORIDE 300 MG/1
300 TABLET ORAL DAILY
Qty: 90 TABLET | Refills: 0 | Status: SHIPPED | OUTPATIENT
Start: 2025-06-16

## 2025-06-16 RX ORDER — CLONAZEPAM 0.5 MG/1
0.5 TABLET ORAL
Qty: 90 TABLET | Refills: 0 | Status: SHIPPED | OUTPATIENT
Start: 2025-06-16

## 2025-06-16 NOTE — PATIENT INSTRUCTIONS
Please present for your previously scheduled appointment approximately 15 minutes prior to appointment time. If you are running late or are unable to attend your appointment, please call our [place] office at [phone] or our [place] office at [phone] if applicable to notify the office of your absence.    If you are in psychological crisis including not limited to suicidal/homicidal thoughts or thoughts of self-injury, do not hesitate to call/contact your County Crisis hotline (see below) or attend to the nearest emergency department.  [facility] Crisis: [phone]  [facility] Crisis: [phone]  [facility] Crisis: [phone]  [facility] Crisis: [phone]  [facility] Crisis: [phone]  [facility] Crisis: [phone]  [facility] Crisis: [phone]  National Suicide Prevention Hotline: 1-623.957.1836

## 2025-06-16 NOTE — PSYCH
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 contract for safety, they need to call/contact the outpatient office including this writer, call/contact crisis and/orattend to the nearest Emergency Department for immediate evaluation.    Risks/Benefits      Risks, Benefits And Possible Side Effects Of Medications:    Risks, benefits, and possible side effects of medications explained to David and he verbalizes understanding and agreement for treatment.    Controlled Medication Discussion:     David has been filling controlled prescriptions on time as prescribed according to [place] Prescription Drug Monitoring Program    Psychotherapy Provided:     Individual psychotherapy provided: Yes  Overall, at the time of interview, the topic of respect was discussed in extended detail.  It was discussed that both romantic and platonic relationships require respect.  It was highlighted to David that he likely relented from demanding to see his daughter on the night of her passing because he respected her space and respected her to make good decisions.  It was also highlighted to David that being constantly available to his  "daughter would have also served to undermine the respect that she had for him (further distancing them from each other).  An extended conversation was had about how outcomes can indeed be separate from the process.  David was encouraged to reflect on that, despite our attempts to do \"everything right,\" bad outcomes can still occur.  David expressed understanding.    Treatment Plan:    Completed and signed during the session: Not applicable - Treatment Plan not due at this session    Visit Time    Visit Start Time: 11:00 AM  Visit Stop Time: 11:30 AM  Total Visit Duration: 30 minutes    This note was shared with patient.    Andre Rodriguez MD 06/16/25    This note has been constructed using a voice recognition system. There may be translation, syntax, or grammatical errors. If you have any questions, please contact the dictating provider.       [1]   Past Medical History:  Diagnosis Date    Atrial fibrillation (HCC)     Benzodiazepine withdrawal with complication (HCC) 06/15/2023    Chronic diastolic (congestive) heart failure (HCC)     Diabetes mellitus (HCC)     GERD (gastroesophageal reflux disease)     High cholesterol     Hyperlipidemia     Pacemaker     Stroke (HCC)    [2]   Allergies  Allergen Reactions    Ativan [Lorazepam] Anxiety     "

## 2025-06-17 NOTE — TELEPHONE ENCOUNTER
"Regarding: Foot Infected/ Abx request  ----- Message from Sanjuana Barone sent at 5/9/2023  6:44 PM EDT -----  \"My  went to see his PCP today and was told that his foot was infected   He was referred to call his podiatrist to prescribe antibiotics\"    " Called patient to schedule surgery and discuss details. No answer. Left detailed message asking for a call back.

## 2025-06-24 RX ORDER — ATORVASTATIN CALCIUM 40 MG/1
40 TABLET, FILM COATED ORAL EVERY MORNING
COMMUNITY
Start: 2025-04-28

## 2025-06-30 ENCOUNTER — OFFICE VISIT (OUTPATIENT)
Dept: PSYCHIATRY | Facility: CLINIC | Age: 60
End: 2025-06-30
Payer: COMMERCIAL

## 2025-06-30 DIAGNOSIS — F32.2 CURRENT SEVERE EPISODE OF MAJOR DEPRESSIVE DISORDER WITHOUT PSYCHOTIC FEATURES WITHOUT PRIOR EPISODE (HCC): Primary | ICD-10-CM

## 2025-06-30 DIAGNOSIS — F51.05 INSOMNIA DUE TO MENTAL DISORDER: ICD-10-CM

## 2025-06-30 DIAGNOSIS — F41.1 GENERALIZED ANXIETY DISORDER: ICD-10-CM

## 2025-06-30 DIAGNOSIS — F43.21 COMPLICATED BEREAVEMENT: ICD-10-CM

## 2025-06-30 PROBLEM — F33.1 MAJOR DEPRESSIVE DISORDER, RECURRENT, MODERATE (HCC): Status: ACTIVE | Noted: 2025-06-30

## 2025-06-30 PROBLEM — F32.1 CURRENT MODERATE EPISODE OF MAJOR DEPRESSIVE DISORDER WITHOUT PRIOR EPISODE (HCC): Chronic | Status: ACTIVE | Noted: 2025-06-30

## 2025-06-30 PROBLEM — F32.1 CURRENT MODERATE EPISODE OF MAJOR DEPRESSIVE DISORDER WITHOUT PRIOR EPISODE (HCC): Status: ACTIVE | Noted: 2025-06-30

## 2025-06-30 PROBLEM — F33.1 MAJOR DEPRESSIVE DISORDER, RECURRENT, MODERATE (HCC): Status: RESOLVED | Noted: 2025-06-30 | Resolved: 2025-06-30

## 2025-06-30 PROCEDURE — 99214 OFFICE O/P EST MOD 30 MIN: CPT | Performed by: PSYCHIATRY & NEUROLOGY

## 2025-06-30 NOTE — BH TREATMENT PLAN
TREATMENT PLAN (Medication Management Only)        [facility]    Name and Date of Birth:  David Carpio 59 y.o. 1965  MRN: [id number]  Date of Treatment Plan: June 30, 2025  Diagnosis/Diagnoses:    No diagnosis found.  Strengths/Personal Resources for Self-Care: supportive family, supportive friends, taking medications as prescribed, ability to communicate needs, ability to communicate well, financial security, strong akyla, work skills.  Area/Areas of need (in own words): anxiety symptoms, depressive symptoms, lack of sleep, poor appetite, poor memory, bereavement  1. Long Term Goal:   improve anxiety, improve depression, improve acceptance of need for psychiatric medications, maintain acceptance of need for psychiatric treatment, Feel better about self, improvement in bereavement symptoms.  Target Date:6 months - 12/30/2025  Person/Persons responsible for completion of goal: David  2.  Short Term Objective (s) - How will we reach this goal?:   A.  Provider new recommended medication/dosage changes and/or continue medication(s): continue current medications as prescribed Wellbutrin XL, Buspar, and Klonopin.  B.  Attend psychotherapy regularly..  C.  Call family when needed..  Target Date:6 months - 12/30/2025  Person/Persons Responsible for Completion of Goal: David  Progress Towards Goals: limited progress  Treatment Modality: medication management every 1 months, referral for individual psychotherapy, medication education at every visit  Review due 180 days from date of this plan: 6 months - 12/30/2025  Expected length of service: ongoing treatment unless revised  My Physician/PA/NP and I have developed this plan together and I agree to work on the goals and objectives. I understand the treatment goals that were developed for my treatment.   Electronic Signatures: on file (unless signed below)    Lei Bhakta DO 06/30/25

## 2025-06-30 NOTE — ASSESSMENT & PLAN NOTE
Patient currently is experiencing severe depressive symptoms.  He described that he has a depressed mood, poor sleep, poor appetite, feelings of guilt, and has decreased energy.  This is most likely in part due to his daughter's birthday being today.  Important dates related to loss of loved ones can cause significant distress and worsen bereavement symptoms.  His PHQ-9 was 20, increased from 10 at last appointment.    It was discussed with the patient that we could potentially increase his Wellbutrin or add an SSRI in the future but for now we will hold off on medication adjustments due to the current stressors she is undergoing.    Medications:  Continue Wellbutrin  mg daily for depressive symptoms    PARQ completed including induction of nazario, decreased seizure threshold and risk with alcohol or electrolyte disturbances, headaches, hypertension and cardiovascular effects, GI distress, weight loss, agitation/activation, dizziness, tremor, anxiety, potential for drug interactions, and others.

## 2025-06-30 NOTE — BH CRISIS PLAN
Client Name: Cy Carpio       Client YOB: 1965    Deric[facility] Safety Plan      Creation Date: 2/1/24 Update Date: 1/10/25   Created By: Andre Rodriguez MD Last Updated By: Andre Rodriguez MD      Step 1: Warning Signs:   Warning Signs   When I feel overwhelmed and confused   When I feel like I can't be there to support and help others            Step 2: Internal Coping Strategies:   Internal Coping Strategies   Going to [employer] and praying   Tinkering around the house            Step 3: People and social settings that provide distraction:   Name Contact Information   Maribell Carpio [phone]    Places   Work   [employer]           Step 4: People whom I can ask for help during a crisis:      Name Contact Information    Maribell Carpio [phone]      Step 5: Professionals or agencies I can contact during a crisis:      Clinican/Agency Name Phone Emergency Contact    Dr. Rodriguez [phone]       Local Emergency Department Emergency Department Phone Emergency Department Address    [phone]        Crisis Phone Numbers:   Suicide Prevention Lifeline: Call or Text  121 Crisis Text Line: Text HOME to 845-820   Please note: Some [facility] do not have a separate number for Child/Adolescent specific crisis. If your county is not listed under Child/Adolescent, please call the adult number for your county      Adult Crisis Numbers: Child/Adolescent Crisis Numbers   [facility]: [phone] [facility]: [phone]   [facility]: [phone] [facility]: [phone]   [facility]: [phone] [place]: [phone]   [facility]: [phone] [place]: [phone]   [place] [facility]: [phone]   [facility]: [phone]   [facility]: [phone]   [facility]: [phone] (daytime) [phone] (after hours, weekends, holidays)      Step 6: Making the environment safer (plan for lethal means  safety):   Patient did not identify any lethal methods: Yes     Optional: What is most important to me and worth living for?   My grandson is everything to me. He's the reason I want to live.     [place] Safety Plan. Tosha Leos and Charles Garcia. Used with permission of the authors.

## 2025-06-30 NOTE — ASSESSMENT & PLAN NOTE
It was discussed with the patient about continuing psychotherapy and the patient was in agreement.

## 2025-06-30 NOTE — ASSESSMENT & PLAN NOTE
Patient's sleep continues to be poor and he continues to experience significant nightmares with occasional sleepwalking.  He does feel the Klonopin has helped him fall asleep but he continues to have frequent nighttime awakenings.    Discussed with patient the risks of physical and psychological dependence, withdrawal, sedation, respiratory depression, impairment in judgment and motor function, depression, mood changes, and others discussed.

## 2025-06-30 NOTE — PSYCH
PSYCHIATRIC EVALUATION     Name: David Carpio      : 1965      MRN: [id number]  Encounter Provider: Lei Bhakta DO  Encounter Date: 2025   Encounter department: [facility]    Insurance: Payor: [employer] / Plan: [employer] PPO / Product Type: HMO Commercial /      Reason for visit: ALEX:  Assessment & Plan  Current moderate episode of major depressive disorder without prior episode (HCC)  Patient currently is experiencing severe depressive symptoms.  He described that he has a depressed mood, poor sleep, poor appetite, feelings of guilt, and has decreased energy.  This is most likely in part due to his daughter's birthday being today.  Important dates related to loss of loved ones can cause significant distress and worsen bereavement symptoms.  His PHQ-9 was 20, increased from 10 at last appointment.    It was discussed with the patient that we could potentially increase his Wellbutrin or add an SSRI in the future but for now we will hold off on medication adjustments due to the current stressors she is undergoing.    Medications:  Continue Wellbutrin  mg daily for depressive symptoms    PARQ completed including induction of nazario, decreased seizure threshold and risk with alcohol or electrolyte disturbances, headaches, hypertension and cardiovascular effects, GI distress, weight loss, agitation/activation, dizziness, tremor, anxiety, potential for drug interactions, and others.        Generalized anxiety disorder  Patient continues to feel significant anxiety regarding the safety of his family in general everyday stressors.    Medications:  Continue BuSpar 15 mg 3 times daily for anxiety symptoms  Continue Klonopin 0.5 mg at night for insomnia and anxiety symptoms    Discussed with patient the risks of physical and psychological dependence, withdrawal, sedation, respiratory depression, impairment in judgment and motor function, depression,  "mood changes, and others discussed.  PARQ completed including serotonin syndrome, rare TD/EPS, dizziness, sedation, GI distress, confusion, possible mood changes, xerostomia and visual disturbances.        Complicated bereavement  It was discussed with the patient about continuing psychotherapy and the patient was in agreement.       Insomnia due to mental disorder  Patient's sleep continues to be poor and he continues to experience significant nightmares with occasional sleepwalking.  He does feel the Klonopin has helped him fall asleep but he continues to have frequent nighttime awakenings.    Discussed with patient the risks of physical and psychological dependence, withdrawal, sedation, respiratory depression, impairment in judgment and motor function, depression, mood changes, and others discussed.           Treatment Recommendations/Precautions:    Educated about diagnosis and treatment modalities. Verbalizes understanding and agreement with the treatment plan.  Discussed self monitoring of symptoms, and symptom monitoring tools.  Discussed medications and if treatment adjustment was needed or desired.  Aware of 24 hour and weekend coverage for urgent situations accessed by calling [facility] main practice number  I am scheduling this patient out for greater than 3 months: No    Medications Risks/Benefits:      Risks, Benefits And Possible Side Effects Of Medications:    Risks, benefits, and possible side effects of medications explained to Cy and he (or legal representative) verbalizes understanding and agreement for treatment.    Controlled Medication Discussion:     Cy has been filling controlled prescriptions on time as prescribed according to [place] Prescription Drug Monitoring Program.      History of Present Illness     Chief Complaint / reason for visit: \" Today is my daughter's birthday and I have not been doing well recently because of that\"      Cy is a 59 y.o. " "male, currently retired, who lives with his wife, has 3 adult sons who presents for psychiatric evaluation due to a transition of care from Dr. Rodriguez.  Patient has a past medical history A-fib, type 2 diabetes mellitus with neuropathy status post multiple foot surgeries, sleep apnea, hypertension, chronic diastolic heart failure, status post bariatric surgery.  He has past psychiatric history of major depressive disorder, generalized anxiety disorder, and complicated bereavement.    Patient discussed that he continues to suffer from bereavement related to his daughter's death which happened 3 years ago.  He explained that his daughter  from a drug overdose on the night that she was supposed to leave her boyfriend and come back home to live with the patient.  He added that there was a criminal investigation into whether the boyfriend had caused his daughter's death but that the police could not find enough evidence to charge him with a crime.  Explained that he continues to have to see this boyfriend because he has custody over his grandson.  He denied homicidal ideation but does feel negative thoughts towards the boyfriend.  Today is his daughter's birthday and over the last weeks his psychiatric symptoms have been worsening.    He feels that his depression has not improved and if anything has worsened.  He described his mood as \"really bad.\"  He reports that his sleep continues to be poor and that he is sleeping minimally.  He also continues to have intense nightmares with sleepwalking.  He expressed that since increasing the Klonopin to 0.5 mg he is noticing that he feels like he is sleeping deeper but he feels this may be contributing to worsening nightmares.  He recently had a nightmare of commanding his dogs to attack his daughter's boyfriend.  Continues to report that overall he is able to fall asleep but has trouble staying asleep.  He also reports that his appetite has been poor.  He described that he " is barely eating 3 meals a day.  He also has been experiencing regurgitation after some meals.  He is following up with his PCP regarding this issue.  Despite this he has noticed that he has gained some weight.  He also reports worsening fatigue.  He denied anhedonia.  He continues to have feelings of guilt regarding his daughter's death.  He also is reporting that he continues to experience generalized anxiety symptoms.  He finds that he constantly is worrying about the safety of his 3 adult children.  His sons have adapted to his concerns and to check in with him frequently.    Patient reported that to celebrate his daughters birthday and life he would be spending time with his grandson today.  Stated that every year since her death they have been writing messages on balloons and releasing them into the andrew.  His grandson is one of his main sources of люия and motivation in life.  He also discussed that he is planning on potentially returning back to work because he feels that he retired too early.  He is looking forward to potentially going back to work but does report that he has been having worsening memory over the last year.  Expressed that he is concerned that he will not feel to perform his job functions due to his poor memory.  He discussed that he is no longer planning on  his wife and that he and her are working on their relationship.    Patient also discussed that he benefited greatly from grief counseling at his [employer] and he has since become a grief counselor himself.  He does feel like he helps himself while he is helping others.  He feels very strongly about his kayla in his [employer] as a great source of support for him.    Psychiatric Review Of Systems:    Sleep changes: Still is poor. Nightmares have worsened.  Appetite changes: Appetite has been poor.   Weight changes: increased  Energy/anergy: decreased  Interest/pleasure/anhedonia: yes, spends time with family, works around  house.  Somatic symptoms: no  Anxiety/panic: daily anxiety symptoms  Gracia: no  Guilty/hopeless: yes  Self injurious behavior/risky behavior: no  Suicidal ideation: no  Homicidal ideation: no  Auditory hallucinations: no   Visual hallucinations: no  Other hallucinations: no  Delusional thinking: no  Eating disorder history: no  Obsessive/compulsive symptoms: no    Review Of Systems: A review of systems is obtained and is negative except for the pertinent positives listed in HPI/Subjective above.      Current Rating Scores:     PHQ-9: 20  ANDRES-7: 14    Areas of Improvement: reviewed in HPI/Subjective Section and reviewed in Assessment and Plan Section      Historical Information      Past Psychiatric History:     Previous diagnoses include:   MDD, ANDRES, complicated bereavement  Prior outpatient psychiatric treatment:  Was seen by Nabila Martinez in [place] prior to being seen by Dr. Michael KRUSE since 2023  Prior therapy:  Has gone through grief counseling   Prior inpatient psychiatric treatment:  None  Prior suicide attempts: None  Prior self harm: None  Prior violence or aggression: None  Previous psychotropic medication trials: Ativan, Prozac, BuSpar, Paxil, Ambien, Lunesta, hydroxyzine, Remeron, gabapentin    Traumatic History:     Abuse:none  Other Traumatic Events: The patient reported seeing a lot of violent behavior from other people while growing up in [facility].  Does not endorse any nightmares, avoidance behavior, or flashbacks related to these events.    Family Psychiatric History:     Family History[1]    Substance Use History:    Tobacco, Alcohol and Drug Use History     Tobacco Use    Smoking status: Never     Passive exposure: Never    Smokeless tobacco: Never   Vaping Use    Vaping status: Never Used   Substance Use Topics    Alcohol use: Not Currently    Drug use: Not Currently     Alcohol Use: Not At Risk (10/19/2023)    Received from [facility]    AUDIT-C     Frequency of  Alcohol Consumption: Monthly or less     Average Number of Drinks: Patient does not drink     Frequency of Binge Drinking: Never     Additional Substance Use Detail:    Alcohol use: denies use  Recreational drug use:   Cocaine: denies use  Heroin: denies use  Cannabis: denies use  Other drugs:   denies use  Longest clean time: not applicable  History of Inpatient/Outpatient rehabilitation program: no  Smoking history: denies use  Use of caffeine: None    Social History:    Education: technical college  Learning Disabilities: none  Marital History:   Children: 3 living adult sons.  1  daughter.  Living Arrangement: lives in home with wife  Occupational History: Currently retired but planning to go back to work as a .   Functioning Relationships: good support system  Legal History: none   History: None  Access to firearms: Yes. Locked and ammo is .    Social History     Socioeconomic History    Marital status: /Civil Union     Spouse name: Not on file    Number of children: Not on file    Years of education: Not on file    Highest education level: Not on file   Occupational History    Occupation: Maintenance Tech     Employer: [employer]   Other Topics Concern    Not on file   Social History Narrative    Not on file     Past Medical History[2]  Past Surgical History[3]  Allergies: Allergies[4]    Current Outpatient Medications   Medication Instructions    acetaminophen (TYLENOL) 650 mg, Oral, Every 6 hours PRN    atorvastatin (LIPITOR) 40 mg, Every morning    buPROPion (WELLBUTRIN XL) 300 mg, Oral, Daily    busPIRone (BUSPAR) 15 mg, Oral, 3 times daily    calcium citrate-vitamin D 315 mg-5 mcg tablet 2 tablets, 2 times daily    clonazePAM (KLONOPIN) 0.5 mg, Oral, Daily at bedtime    ferrous sulfate 325 mg, Oral, Daily with breakfast    furosemide (LASIX) 20 mg, Oral, 2 times daily    gabapentin (NEURONTIN) 800 mg, Oral, 3 times daily    hyoscyamine  "(LEVSIN/SL) 0.125 mg    lidocaine (Lidoderm) 5 % 1 patch, Topical, Daily, Remove & Discard patch within 12 hours or as directed by MD    metolazone (ZAROXOLYN) 2.5 mg, Daily PRN    Multiple Vitamins-Minerals (Mens Multivitamin) TABS 1 tablet, 2 times daily    omeprazole (PRILOSEC) 20 mg, Every morning    potassium chloride (KLOR-CON) 20 mEq packet 40 mEq, Oral, Daily    rosuvastatin (CRESTOR) 5 mg, Daily        Medical History Reviewed by provider this encounter:  Tobacco  Meds  Problems  Med Hx  Surg Hx  Fam Hx         Objective   There were no vitals taken for this visit.     Mental Status Evaluation:    Appearance age appropriate, casually dressed, looks stated age, overweight   Behavior cooperative, calm, tearful at times   Speech normal rate, normal volume, normal pitch, spontaneous   Mood \"Really bad.\"   Affect constricted, occasional brightness   Thought Processes organized, goal directed   Thought Content no overt delusions   Perceptual Disturbances: no auditory hallucinations, no visual hallucinations   Abnormal Thoughts  Risk Potential Suicidal ideation - None  Homicidal ideation - None  Potential for aggression - Not at present   Orientation oriented to person, place, time/date, and situation   Memory recent and remote memory grossly intact   Consciousness alert and awake   Attention Span Concentration Span attention span and concentration are age appropriate   Intellect appears to be of average intelligence   Insight intact   Judgement intact   Muscle Strength and  Gait normal muscle strength and normal muscle tone, normal gait and normal balance   Motor activity no abnormal movements   Language no difficulty naming common objects, no difficulty repeating a phrase, no difficulty writing a sentence   Fund of Knowledge adequate knowledge of current events  adequate fund of knowledge regarding past history  adequate fund of knowledge regarding vocabulary          Laboratory Results: I have personally " reviewed all pertinent laboratory/tests results    Last Visit Labs:   No visits with results within 1 Month(s) from this visit.   Latest known visit with results is:   Appointment on 12/16/2024   Component Date Value    WBC 12/16/2024 6.12     RBC 12/16/2024 5.31     Hemoglobin 12/16/2024 14.5     Hematocrit 12/16/2024 45.6     MCV 12/16/2024 86     MCH 12/16/2024 27.3     MCHC 12/16/2024 31.8     RDW 12/16/2024 14.5     MPV 12/16/2024 9.3     Platelets 12/16/2024 214     nRBC 12/16/2024 0     Segmented % 12/16/2024 53     Immature Grans % 12/16/2024 0     Lymphocytes % 12/16/2024 34     Monocytes % 12/16/2024 9     Eosinophils Relative 12/16/2024 3     Basophils Relative 12/16/2024 1     Absolute Neutrophils 12/16/2024 3.22     Absolute Immature Grans 12/16/2024 0.01     Absolute Lymphocytes 12/16/2024 2.08     Absolute Monocytes 12/16/2024 0.56     Eosinophils Absolute 12/16/2024 0.18     Basophils Absolute 12/16/2024 0.07     Sodium 12/16/2024 138     Potassium 12/16/2024 4.0     Chloride 12/16/2024 105     CO2 12/16/2024 28     ANION GAP 12/16/2024 5     BUN 12/16/2024 15     Creatinine 12/16/2024 0.78     Glucose, Fasting 12/16/2024 93     Calcium 12/16/2024 9.0     AST 12/16/2024 24     ALT 12/16/2024 11     Alkaline Phosphatase 12/16/2024 89     Total Protein 12/16/2024 7.1     Albumin 12/16/2024 4.1     Total Bilirubin 12/16/2024 0.46     eGFR 12/16/2024 97     Blood Culture 12/16/2024 No Growth After 5 Days.     Blood Culture 12/16/2024 No Growth After 5 Days.        Suicide/Homicide Risk Assessment:    Risk of Harm to Self:  The following ratings are based on assessment at the time of the interview  Demographic Risk Factors include: , male, age: over 50 or older  Historical Risk Factors include: chronic depressive symptoms, history of anxiety, history of traumatic experiences  Recent Specific Risk Factors include: diagnosis of depression, mental illness diagnosis, current depressive symptoms,  significant anxiety symptoms, unable to visualize a realistic positive future, feelings of guilt or self blame, hopelessness  Protective Factors: no current suicidal ideation, able to make plans for the future, able to manage anger well, access to mental health treatment, being a parent, being , compliant with medications, compliant with mental health treatment, connection to community, connection to own children, cultural beliefs discouraging suicide, good self-esteem, resiliency, strong relationships, supportive family  Weapons/Firearms: guns. The following steps have been taken to ensure weapons are properly secured: locked, ammunition   Based on today's assessment, Cy presents the following risk of harm to self: low    Risk of Harm to Others:  The following ratings are based on assessment at the time of the interview  Demographic Risk Factors include: male  Historical Risk Factors include: none  Recent Specific Risk Factors include: none  Protective Factors: no current homicidal ideation, able to manage anger well, access to mental health treatment, being a parent, being , compliant with medications, compliant with mental health treatment, connection to community, connection to own children  Weapons/Firearms: guns. The following steps have been taken to ensure weapons are properly secured: locked, ammunition   Based on today's assessment, Cy presents the following risk of harm to others: minimal    The following interventions are recommended: Continue medication management. No other intervention changes indicated at this time.    Treatment Plan:    Completed and signed during the session: Yes - with Cy.    Depression Follow-up Plan Completed: No    Note Share: This note was shared with patient.    Administrative Statements   Administrative Statements   I have spent a total time of 90 minutes in caring for this patient on the day of the visit/encounter including Diagnostic  "results, Risks and benefits of tx options, Instructions for management, and Patient and family education.    Visit Time  Visit Start Time: 2:40  Visit Stop Time: 4:35  Total Visit Duration: 115 minutes    Portions of the record may have been created with voice recognition software. Occasional wrong word or \"sound a like\" substitutions may have occurred due to the inherent limitations of voice recognition software. Read the chart carefully and recognize, using context, where substitutions have occurred.    Lei Bhakta DO 06/30/25         [1]   Family History  Problem Relation Name Age of Onset    No Known Problems Mother      No Known Problems Father     [2]   Past Medical History:  Diagnosis Date    Atrial fibrillation (HCC)     Benzodiazepine withdrawal with complication (HCC) 06/15/2023    Chronic diastolic (congestive) heart failure (HCC)     Diabetes mellitus (HCC)     GERD (gastroesophageal reflux disease)     High cholesterol     Hyperlipidemia     Pacemaker     Stroke (HCC)    [3]   Past Surgical History:  Procedure Laterality Date    APPENDECTOMY      ATRIAL CARDIAC PACEMAKER INSERTION      BARIATRIC SURGERY  05/2021    BONE BIOPSY Left 7/26/2023    Procedure: EXCISION BIOPSY BONE LESION EXTREMITY;  Surgeon: Adelia Yousif DPM;  [facility];  Service: Podiatry    EPIDURAL BLOCK INJECTION N/A 5/19/2022    Procedure: BLOCK / INJECTION EPIDURAL STEROID CERVICAL C7-T1;  Surgeon: Skyler Quinn MD;  Location: [facility];  Service: Pain Management     FL GUIDED NEEDLE PLAC BX/ASP/INJ  3/22/2022    FOOT AMPUTATION Left 4/28/2022    Procedure: LEFT TRANSMETATARSAL AMPUTATION.;  Surgeon: Nestor Madden DPM;  [facility];  Service: Podiatry    IR PICC PLACEMENT SINGLE LUMEN  11/21/2024    NERVE BLOCK Right 2/10/2022    Procedure: BLOCK MEDIAL BRANCH C3, C4, C5 #1;  Surgeon: Skyler Quinn MD;  [facility]: [facility];  Service: Pain Management     NERVE BLOCK Right 3/22/2022    Procedure: " BLOCK MEDIAL BRANCH C3, C4, C5 #2;  Surgeon: Skyler Quinn MD;  [facility];  Service: Pain Management     NC AMPUTATION FOOT TRANSMETARSAL Left 4/12/2023    Procedure: REVISION LEFT TRANSMETATARSAL (TMA) AMPUTATION, REMOVAL OF UNVIABLE TISSUE AND BONE,;  Surgeon: Adelia Yousif DPM;  [facility];  Service: Podiatry    NC AMPUTATION METATARSAL W/TOE SINGLE Left 4/7/2023    Procedure: 2ND RAY RESECTION FOOT;  Surgeon: Adelia Yousif DPM;  [facility];  Service: Podiatry    NC AMPUTATION TOE INTERPHALANGEAL JOINT Left 11/16/2021    Procedure: AMPUTATION LESSER TOE;  Surgeon: Nestor Madden DPM;  [facility];  Service: Podiatry    RADIOFREQUENCY ABLATION Right 4/7/2022    Procedure: Right C3, C4, C5 RFA;  Surgeon: Skyler Quinn MD;  [facility];  Service: Pain Management     RHIZOTOMY Right 2/9/2023    Procedure: RHIZOTOMY CERVICAL MEDIAL BRANCH NERVES RIGHT C3, C4, C5;  Surgeon: Skyler Quinn MD;  [facility];  Service: Pain Management     TOE AMPUTATION Left     2nd toe    TOE AMPUTATION Left 9/15/2021    Procedure: AMPUTATION LEFT 4TH TOE;  Surgeon: Nestor Madden DPM;  [facility];  Service: Podiatry    TOE AMPUTATION Right 1/12/2022    Procedure: AMPUTATION TOE;  Surgeon: Hong Jolly DPM;  [facility];  Service: Podiatry    TOE AMPUTATION Right 2/23/2022    Procedure: AMPUTATION TOE  RIGHT SECOND;  Surgeon: Hong Jolly DPM;  [facility]: [facility];  Service: Podiatry    TOE AMPUTATION Right 6/3/2022    Procedure: AMPUTATION right 4th TOE;  Surgeon: Nestor Madden DPM;  [facility];  Service: Podiatry    TOE AMPUTATION Right 11/15/2024    Procedure: AMPUTATION RIGHT 2ND TOE;  Surgeon: Adelia Yousif DPM;  [facility];  Service: Podiatry   [4]   Allergies  Allergen Reactions    Ativan [Lorazepam] Anxiety

## 2025-06-30 NOTE — ASSESSMENT & PLAN NOTE
Patient continues to feel significant anxiety regarding the safety of his family in general everyday stressors.    Medications:  Continue BuSpar 15 mg 3 times daily for anxiety symptoms  Continue Klonopin 0.5 mg at night for insomnia and anxiety symptoms    Discussed with patient the risks of physical and psychological dependence, withdrawal, sedation, respiratory depression, impairment in judgment and motor function, depression, mood changes, and others discussed.  PARQ completed including serotonin syndrome, rare TD/EPS, dizziness, sedation, GI distress, confusion, possible mood changes, xerostomia and visual disturbances.

## 2025-07-02 ENCOUNTER — APPOINTMENT (OUTPATIENT)
Dept: URGENT CARE | Facility: CLINIC | Age: 60
End: 2025-07-02

## 2025-07-07 ENCOUNTER — OFFICE VISIT (OUTPATIENT)
Dept: CARDIOLOGY CLINIC | Facility: CLINIC | Age: 60
End: 2025-07-07
Payer: COMMERCIAL

## 2025-07-07 VITALS
BODY MASS INDEX: 40.02 KG/M2 | HEART RATE: 73 BPM | SYSTOLIC BLOOD PRESSURE: 104 MMHG | DIASTOLIC BLOOD PRESSURE: 58 MMHG | TEMPERATURE: 97.5 F | OXYGEN SATURATION: 96 % | WEIGHT: 302 LBS | HEIGHT: 73 IN

## 2025-07-07 DIAGNOSIS — R73.03 PREDIABETES: ICD-10-CM

## 2025-07-07 DIAGNOSIS — E66.01 CLASS 2 SEVERE OBESITY DUE TO EXCESS CALORIES WITH SERIOUS COMORBIDITY AND BODY MASS INDEX (BMI) OF 39.0 TO 39.9 IN ADULT (HCC): ICD-10-CM

## 2025-07-07 DIAGNOSIS — I10 PRIMARY HYPERTENSION: ICD-10-CM

## 2025-07-07 DIAGNOSIS — E66.812 CLASS 2 SEVERE OBESITY DUE TO EXCESS CALORIES WITH SERIOUS COMORBIDITY AND BODY MASS INDEX (BMI) OF 39.0 TO 39.9 IN ADULT (HCC): ICD-10-CM

## 2025-07-07 DIAGNOSIS — R60.0 LOCALIZED EDEMA: ICD-10-CM

## 2025-07-07 DIAGNOSIS — I48.21 PERMANENT ATRIAL FIBRILLATION (HCC): Primary | Chronic | ICD-10-CM

## 2025-07-07 DIAGNOSIS — R78.81 BACTEREMIA: ICD-10-CM

## 2025-07-07 PROCEDURE — 93000 ELECTROCARDIOGRAM COMPLETE: CPT | Performed by: INTERNAL MEDICINE

## 2025-07-07 PROCEDURE — 99214 OFFICE O/P EST MOD 30 MIN: CPT | Performed by: INTERNAL MEDICINE

## 2025-07-07 RX ORDER — ROSUVASTATIN CALCIUM 5 MG/1
5 TABLET, COATED ORAL DAILY
Qty: 90 TABLET | Refills: 3 | Status: SHIPPED | OUTPATIENT
Start: 2025-07-07

## 2025-07-07 RX ORDER — SPIRONOLACTONE 25 MG/1
25 TABLET ORAL DAILY
Qty: 90 TABLET | Refills: 3 | Status: SHIPPED | OUTPATIENT
Start: 2025-07-07

## 2025-07-07 RX ORDER — FUROSEMIDE 40 MG/1
TABLET ORAL
Qty: 100 TABLET | Refills: 3 | Status: SHIPPED | OUTPATIENT
Start: 2025-07-07

## 2025-07-07 NOTE — PROGRESS NOTES
[facility] [place]  [address]  [address]  [facility] [address] [phone]    Patient Name: David Carpio  YOB: 1965 ;male  MR No: [id number]        Diagnosis ICD-10-CM Associated Orders   1. Permanent atrial fibrillation (Formerly [facility])  I48.21 POCT ECG     rivaroxaban (Xarelto) 20 mg tablet      2. Primary hypertension  I10       3. Localized edema  R60.0 spironolactone (ALDACTONE) 25 mg tablet     furosemide (LASIX) 40 mg tablet      4. Bacteremia  R78.81       5. Prediabetes  R73.03 rosuvastatin (CRESTOR) 5 mg tablet      6. Class 2 severe obesity due to excess calories with serious comorbidity and body mass index (BMI) of 39.0 to 39.9 in adult (Formerly [facility])  E66.812     E66.01     Z68.39            Assessment and recommendations:    1. Permanent atrial fibrillation (HCC)  -     POCT ECG  -     rivaroxaban (Xarelto) 20 mg tablet; Take 1 tablet (20 mg total) by mouth daily with breakfast  2. Primary hypertension  3. Localized edema  -     spironolactone (ALDACTONE) 25 mg tablet; Take 1 tablet (25 mg total) by mouth daily  -     furosemide (LASIX) 40 mg tablet; Take 1 tablet every day.  If the weight is more than 2 to 3 pounds compared to the previous day, take an additional tablet that day.  Follow the same pattern.  4. Bacteremia  5. Prediabetes  -     rosuvastatin (CRESTOR) 5 mg tablet; Take 1 tablet (5 mg total) by mouth daily  6. Class 2 severe obesity due to excess calories with serious comorbidity and body mass index (BMI) of 39.0 to 39.9 in adult (Formerly [facility])     Patient remains in rate control asymptomatic atrial fibrillation.  He tells me that he has not taken his Xarelto for several months as he was changing his primary care physician.  I have advised him to restart the Xarelto based on his [id number] score.  New prescription was sent.  Echocardiogram from November 2024 showed normal left ventricular systolic function with mild biatrial enlargement  and no significant valvular pathology.  Patient continues to have lower extremity chronic edema which is likely secondary to his obesity as he had no evidence of any significantly elevated filling pressures on his echocardiogram.  He is not taking his diuretics as he should.  I have advised him to take 40 mg of Lasix every day and to take an additional 40 if his weight is more than 2-1/2 to 3 pounds compared to the previous day.  I will also start him on spironolactone 25 mg daily and discontinue his potassium supplementation.  I have advised him to use metolazone only if needed no more than twice a week.  Most recent labs showed stable kidney function and normal electrolytes.  Patient needs more aggressive lifestyle modification for weight loss.  He also tells me that he is not taking the rosuvastatin for no obvious reason.  I have advised him to restart the rosuvastatin in view of mild plaque in his LAD.  Available for any local work.    CHIEF COMPLAINT:      Permanent atrial fibrillation, sick sinus syndrome status post permanent pacemaker, hypertension    HPI:     59-year-old male with past medically significant for permanent atrial fibrillation, hypertension, prediabetes, sick sinus syndrome status post permanent pacemaker implantation in [facility] in 2017, type 2 diabetes mellitus, hypertension, hyperlipidemia, mild single-vessel non-obstructive CAD in the LAD on cardiac CTA 8/2024 ,DAYAN on BIPAP , recurrent syncope , presents for routine follow-up visit.  He is doing well from cardiac standpoint and denies any chest pain, orthopnea, palpitation or syncope.  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   Review of record reveals the patient was hospitalized in July 2024 with osteomyelitis of the right toe and underwent INÉS to rule out bacteremia.  Venous duplex study from November 2024 showed no evidence of DVT in either of the legs.   Arterial duplex study from November 2024 showed no evidence of significant occlusive disease with right IBIS of 1.52 and left IBIS of 1.28.  Past Medical History[1]       CURRENT  MEDICATIONS:    Current Medications[2]    ALLERGIES  Allergies   Allergen Reactions    Ativan [Lorazepam] Anxiety       Lab Results   Component Value Date    LDLCALC 103 (H) 11/14/2024    HDL 38 (L) 11/14/2024    CHOLESTEROL 155 11/14/2024    TRIG 69 11/14/2024    CREATININE 1.2 06/04/2025    CREATININE 0.78 12/16/2024    K 3.9 06/04/2025    K 4.0 12/16/2024    SODIUM 139 06/04/2025    SODIUM 138 12/16/2024    TSH 1.98 04/17/2024       I have personally reviewed the most recent ECG from today which showed atrial fibrillation at 63 beats a minute, left axis deviation, pulmonary disease pattern, incomplete right bundle branch block.      REVIEW OF SYSTEMS   Positive for: Bilateral lower extremity edema, arthritis  Negative for: All remaining as reviewed below and in HPI.    SYSTEM SYMPTOMS REVIEWED:  General--weight change, fever, night sweats  Respiratory--cough, wheezing, shortness of breath, sputum production  Cardiovascular--chest pain, syncope, dyspnea on exertion, edema, decline in exercise tolerance, claudication   Gastrointestinal--persistent vomiting, diarrhea, abdominal distention, blood in stool   Muscular or skeletal--joint pain or swelling   Neurologic--headaches, syncope, abnormal movement  Hematologic--history of easy bruising and bleeding   Endocrine--thyroid enlargement, heat or cold intolerance, polyuria   Psychiatric--anxiety, depression     General physical examination:    General appearance: Alert, no acute distress, appears stated age.  Moderate obesity  HEENT: Mucous membranes are moist.  No obvious abnormality noted.  Neck: Supple with no lymphadenopathy.  No JVD.  Carotid pulses are intact.  No carotid bruit.  Cardiovascular system: Irregular rhythm.  Normal S1 and S2.  No murmurs.  No rubs or gallops. Extremities: 1+  "pitting edema bilateral lower extremities with extensive hyperpigmentation and mild cyanosis..  Pulmonary: Respirations unlabored.  Good air entry bilaterally.  Clear to auscultation bilaterally.  Gastrointestinal: Abdomen is soft and nontender.  Bowel sounds are positive.  Musculoskeletal: Upper Extremities: Normal upper motor strength. Lower Extremity: Normal motor strength. Gait: Normal.   Skin: Skin is warm. No rashes or lesions.  Neurological: Patient is alert and oriented with no gross motor deficits.  Psychiatric: Mood is normal.  Behavior is normal.    Vitals:    07/07/25 1532   BP: 104/58   Pulse: 73   Temp: 97.5 °F (36.4 °C)   SpO2: 96%      Body mass index is 39.84 kg/m².  Wt Readings from Last 3 Encounters:   07/07/25 (!) 137 kg (302 lb)   06/16/25 (!) 138 kg (303 lb 11.2 oz)   04/29/25 133 kg (293 lb 8 oz)             Collin Cook MD, FACC, EDWIGE    Portions of the record  have been created with voice recognition software.  Occasional grammatical mistakes or wrong word or \"sound alike\" substitutions may have occurred due to the inherent limitations of voice recognition software. Please reach out to me directly for any clarifications.          [1]   Past Medical History:  Diagnosis Date    Atrial fibrillation (HCC)     Benzodiazepine withdrawal with complication (HCC) 06/15/2023    Chronic diastolic (congestive) heart failure (HCC)     Diabetes mellitus (HCC)     GERD (gastroesophageal reflux disease)     High cholesterol     Hyperlipidemia     Pacemaker     Stroke (HCC)    [2]   Current Outpatient Medications:     acetaminophen (TYLENOL) 325 mg tablet, Take 2 tablets (650 mg total) by mouth every 6 (six) hours as needed for mild pain, headaches or fever, Disp: 30 tablet, Rfl: 0    atorvastatin (LIPITOR) 40 mg tablet, Take 40 mg by mouth every morning, Disp: , Rfl:     buPROPion (Wellbutrin XL) 300 mg 24 hr tablet, Take 1 tablet (300 mg total) by mouth daily, Disp: 90 tablet, Rfl: 0    busPIRone (BUSPAR) " 15 mg tablet, Take 1 tablet (15 mg total) by mouth 3 (three) times a day, Disp: 270 tablet, Rfl: 0    calcium citrate-vitamin D 315 mg-5 mcg tablet, Take 2 tablets by mouth in the morning and 2 tablets in the evening., Disp: , Rfl:     clonazePAM (KlonoPIN) 0.5 mg tablet, Take 1 tablet (0.5 mg total) by mouth daily at bedtime, Disp: 90 tablet, Rfl: 0    ferrous sulfate 325 (65 Fe) mg tablet, Take 1 tablet (325 mg total) by mouth daily with breakfast Do not start before June 19, 2023., Disp: 30 tablet, Rfl: 0    furosemide (LASIX) 40 mg tablet, Take 1 tablet every day.  If the weight is more than 2 to 3 pounds compared to the previous day, take an additional tablet that day.  Follow the same pattern., Disp: 100 tablet, Rfl: 3    gabapentin (NEURONTIN) 800 mg tablet, Take 1 tablet (800 mg total) by mouth 3 (three) times a day, Disp: 270 tablet, Rfl: 0    hyoscyamine (LEVSIN/SL) 0.125 mg SL tablet, Take 0.125 mg by mouth, Disp: , Rfl:     metolazone (ZAROXOLYN) 2.5 mg tablet, Take 2.5 mg by mouth daily as needed (edema), Disp: , Rfl:     Multiple Vitamins-Minerals (Mens Multivitamin) TABS, Take 1 tablet by mouth in the morning and 1 tablet in the evening., Disp: , Rfl:     omeprazole (PriLOSEC) 20 mg delayed release capsule, Take 20 mg by mouth every morning, Disp: , Rfl:     rivaroxaban (Xarelto) 20 mg tablet, Take 1 tablet (20 mg total) by mouth daily with breakfast, Disp: 90 tablet, Rfl: 3    rosuvastatin (CRESTOR) 5 mg tablet, Take 1 tablet (5 mg total) by mouth daily, Disp: 90 tablet, Rfl: 3    spironolactone (ALDACTONE) 25 mg tablet, Take 1 tablet (25 mg total) by mouth daily, Disp: 90 tablet, Rfl: 3

## 2025-07-07 NOTE — PATIENT INSTRUCTIONS
I would like you to stop the potassium chloride tablet.  I would like you to take Lasix 40 mg every day and weigh yourself and if your weight is more than 2 to 3 pounds compared to the previous day, take an additional Lasix that day.  I am also starting you on spironolactone 25 mg once a day

## 2025-07-08 ENCOUNTER — TELEPHONE (OUTPATIENT)
Dept: CARDIOLOGY CLINIC | Facility: CLINIC | Age: 60
End: 2025-07-08

## 2025-07-16 ENCOUNTER — OFFICE VISIT (OUTPATIENT)
Dept: PSYCHIATRY | Facility: CLINIC | Age: 60
End: 2025-07-16
Payer: COMMERCIAL

## 2025-07-16 DIAGNOSIS — F51.05 INSOMNIA DUE TO MENTAL DISORDER: ICD-10-CM

## 2025-07-16 DIAGNOSIS — F41.1 GENERALIZED ANXIETY DISORDER: ICD-10-CM

## 2025-07-16 DIAGNOSIS — F32.2 CURRENT SEVERE EPISODE OF MAJOR DEPRESSIVE DISORDER WITHOUT PSYCHOTIC FEATURES WITHOUT PRIOR EPISODE (HCC): Primary | ICD-10-CM

## 2025-07-16 DIAGNOSIS — F43.21 COMPLICATED BEREAVEMENT: ICD-10-CM

## 2025-07-16 PROCEDURE — 99214 OFFICE O/P EST MOD 30 MIN: CPT | Performed by: PSYCHIATRY & NEUROLOGY

## 2025-07-16 NOTE — ASSESSMENT & PLAN NOTE
Patient continues to feel significant anxiety regarding the safety of his family in general everyday stressors.  Please ruminating thoughts are significant enough that sometimes they cause nausea.  They also seem to really good moments that he is having.    The patient has been taking Klonopin as needed for both anxiety and insomnia.  I discussed with him that he should start taking it daily stop with anxious symptoms.     Medications:  Continue BuSpar 15 mg 3 times daily for anxiety symptoms  PARQ completed including serotonin syndrome, rare TD/EPS, dizziness, sedation, GI distress, confusion, possible mood changes, xerostomia and visual disturbances.  Was taking Klonopin 0.5 mg as needed but will now start taking Klonopin 5 mg nightly for insomnia and anxiety symptoms  Discussed with patient the risks of physical and psychological dependence, withdrawal, sedation, respiratory depression, impairment in judgment and motor function, depression, mood changes, and others discussed.

## 2025-07-16 NOTE — PSYCH
MEDICATION MANAGEMENT NOTE    Name: David Carpio      : 1965      MRN: [id number]  Encounter Provider: Lei Bhakta DO  Encounter Date: 2025   Encounter department: [facility]    Insurance: Payor: [employer] / Plan: [employer] HMO PPO / Product Type: HMO Commercial /      Reason for Visit: No chief complaint on file.  :  Assessment & Plan  Current severe episode of major depressive disorder without psychotic features without prior episode (HCC)  Cy Carpio is a 59-year-old male with past psychiatric history of MDD, ANDRES, and complicated bereavement who presents for medication management follow-up.  The patient continues to have significant depressive symptoms including depressed mood, feelings of guilt, poor sleep with nightmares, poor appetite, poor energy, and poor memory.  He is particularly concerned about his memory issues and has delayed starting a new job because of these issues.  PHQ-9 was 18.    Patient is open to potentially changing or adjusting antidepressants in the future.  For now he is more concerned about his anxiety.     Medications:  Continue Wellbutrin  mg daily for depressive symptoms  PARQ completed including induction of nazario, decreased seizure threshold and risk with alcohol or electrolyte disturbances, headaches, hypertension and cardiovascular effects, GI distress, weight loss, agitation/activation, dizziness, tremor, anxiety, potential for drug interactions, and others.       Generalized anxiety disorder  Patient continues to feel significant anxiety regarding the safety of his family in general everyday stressors.  Please ruminating thoughts are significant enough that sometimes they cause nausea.  They also seem to really good moments that he is having.    The patient has been taking Klonopin as needed for both anxiety and insomnia.  I discussed with him that he should start taking it daily stop with anxious  symptoms.     Medications:  Continue BuSpar 15 mg 3 times daily for anxiety symptoms  PARQ completed including serotonin syndrome, rare TD/EPS, dizziness, sedation, GI distress, confusion, possible mood changes, xerostomia and visual disturbances.  Was taking Klonopin 0.5 mg as needed but will now start taking Klonopin 5 mg nightly for insomnia and anxiety symptoms  Discussed with patient the risks of physical and psychological dependence, withdrawal, sedation, respiratory depression, impairment in judgment and motor function, depression, mood changes, and others discussed.          Complicated bereavement  Will be planning psychotherapy  Continue SSRI therapy which has been shown to assist with depressive sequelae related to bereavement.       Insomnia due to mental disorder  Patient's sleep continues to be poor and he continues to experience significant nightmares with occasional sleepwalking.      Medications:  Was taking Klonopin 0.5 mg as needed but will now start taking Klonopin 5 mg nightly for insomnia and anxiety symptoms  Discussed with patient the risks of physical and psychological dependence, withdrawal, sedation, respiratory depression, impairment in judgment and motor function, depression, mood changes, and others discussed.            Treatment Recommendations:    Educated about diagnosis and treatment modalities. Verbalizes understanding and agreement with the treatment plan.  Discussed self monitoring of symptoms, and symptom monitoring tools.  Discussed medications and if treatment adjustment was needed or desired.  Aware of 24 hour and weekend coverage for urgent situations accessed by calling [facility] main practice number  I am scheduling this patient out for greater than 3 months: No    Medications Risks/Benefits:      Risks, Benefits And Possible Side Effects Of Medications:    Risks, benefits, and possible side effects of medications explained to Rich and he (or legal  "representative) verbalizes understanding and agreement for treatment.    Controlled Medication Discussion:     Cy has been filling controlled prescriptions on time as prescribed according to [place] Prescription Drug Monitoring Program.      History of Present Illness     Cy is seen today for a follow up for major depressive disorder, generalized anxiety disorder, and complex bereavement. Today he is describing his mood is \"subtle.\"  He discussed that his depression and anxiety are unimproved from the last visit.  He continues to have a depressed mood every day.  He notes that he has some good moments but those good moments will usually be plagued by ruminating thoughts that are negative.  In addition to a depressed mood he continues to suffer from poor sleep due to nightmares.  The nightmares are intense enough that he feels afraid to go to sleep.  Additional depressive symptoms include continued poor energy, poor appetite, and feelings of guilt.  He denied suicidal ideation.  He denied anhedonia and endorsed he still continues to enjoy spending time with his family, going to [place], and doing yard and housework.  He also endorsed that he continues to have memory trouble which is causing him alarm.  He was offered a job recently but has delayed starting it due to concerns about his memory affecting his work.  He also noted that he has been forgetting people's names and forgetting directions when he is out driving.  He does feel this memory has been worsening as his depression has worsened.  He currently feels the only way his depression would improve his \"if my daughter walked through the door right now.\"  Regarding anxiety he continues to have anxious thoughts that are difficult to manage.  Mostly his anxious thoughts have to do with worrying about the safety of loved ones.  His ruminating thoughts tend to upset any good moments that he is having.  Occasionally this anxiety is significant enough that he " "experiences nausea.  He also discussed that he has had  some new stressors occur in his life.  Recently his father-in-law was in a major accident and is currently in the hospital.  He also found out that his granddaughter is going to require a heart transplant because she has Ba syndrome.    He did discuss that he had a nice 4th of July with his grandson (Ki).  He took him out on a boat in [place] to watch the fireworks.  However, he did note that at some point he was feeling depressed because he kept thinking to himself \"my daughter should be here for this.\"  He also discussed that currently there is some stress about figuring out where his grandson is going to go to school because his daughter's ex-boyfriend currently lives in [place].  He would like his grandson to go to the same school that he and the rest of his family has gone through for multiple generations.  But because of this recent move he does not live within the city  limits to go to the school.  They are currently looking into [employer].  He has an upcoming vacation planned with his grandson.  They will be going to [place] together.      Psychiatric Review Of Systems:     Sleep changes: Still is poor. Nightmares have worsened.  Appetite changes: Appetite has been poor.   Weight changes: increased  Energy/anergy: decreased  Interest/pleasure/anhedonia: yes, spends time with family, works around house.  Somatic symptoms: no  Anxiety/panic: daily anxiety symptoms  Gracia: no  Guilty/hopeless: yes  Self injurious behavior/risky behavior: no  Suicidal ideation: no  Homicidal ideation: no  Auditory hallucinations: no   Visual hallucinations: no  Other hallucinations: no  Delusional thinking: no  Eating disorder history: no  Obsessive/compulsive symptoms: no    Traumatic History:      Abuse:none  Other Traumatic Events: The patient reported seeing a lot of violent behavior from other people while growing up in [facility].  Does not endorse any " nightmares, avoidance behavior, or flashbacks related to these events.    Current Rating Scores:     Current PHQ-9 is 18.    Areas of Improvement: reviewed in HPI/Subjective Section and reviewed in Assessment and Plan Section        Past Medical History[1]  Past Surgical History[2]  Allergies: Allergies[3]    Current Outpatient Medications   Medication Instructions    acetaminophen (TYLENOL) 650 mg, Oral, Every 6 hours PRN    atorvastatin (LIPITOR) 40 mg, Every morning    buPROPion (WELLBUTRIN XL) 300 mg, Oral, Daily    busPIRone (BUSPAR) 15 mg, Oral, 3 times daily    calcium citrate-vitamin D 315 mg-5 mcg tablet 2 tablets, 2 times daily    clonazePAM (KLONOPIN) 0.5 mg, Oral, Daily at bedtime    ferrous sulfate 325 mg, Oral, Daily with breakfast    furosemide (LASIX) 40 mg tablet Take 1 tablet every day.  If the weight is more than 2 to 3 pounds compared to the previous day, take an additional tablet that day.  Follow the same pattern.    gabapentin (NEURONTIN) 800 mg, Oral, 3 times daily    hyoscyamine (LEVSIN/SL) 0.125 mg    metolazone (ZAROXOLYN) 2.5 mg, Daily PRN    Multiple Vitamins-Minerals (Mens Multivitamin) TABS 1 tablet, 2 times daily    omeprazole (PRILOSEC) 20 mg, Every morning    rivaroxaban (XARELTO) 20 mg, Oral, Daily with breakfast    rosuvastatin (CRESTOR) 5 mg, Oral, Daily    spironolactone (ALDACTONE) 25 mg, Oral, Daily        Substance Abuse History:      Alcohol use: denies use  Recreational drug use:   Cocaine: denies use  Heroin: denies use  Cannabis: denies use  Other drugs:   denies use  Longest clean time: not applicable  History of Inpatient/Outpatient rehabilitation program: no  Smoking history: denies use  Use of caffeine: None    Tobacco, Alcohol and Drug Use History     Tobacco Use    Smoking status: Never     Passive exposure: Never    Smokeless tobacco: Never   Vaping Use    Vaping status: Never Used   Substance Use Topics    Alcohol use: Not Currently    Drug use: Not Currently      Alcohol Use: Not At Risk (10/19/2023)    Received from [facility]    AUDIT-C     Frequency of Alcohol Consumption: Monthly or less     Average Number of Drinks: Patient does not drink     Frequency of Binge Drinking: Never     Past Psychiatric History:      Previous diagnoses include:   MDD, ANDRES, complicated bereavement  Prior outpatient psychiatric treatment:  Was seen by Nabila Martinez in [place] prior to being seen by Dr. Michael KRUSE since   Prior therapy:  Has gone through grief counseling   Prior inpatient psychiatric treatment:  None  Prior suicide attempts: None  Prior self harm: None  Prior violence or aggression: None  Previous psychotropic medication trials: Ativan, Prozac, BuSpar, Paxil, Ambien, Lunesta, hydroxyzine, Remeron, gabapentin    Social History:    Education: technical college  Learning Disabilities: none  Marital History:   Children: 3 living adult sons.  1  daughter.  Living Arrangement: lives in home with wife  Occupational History: Currently retired but planning to go back to work as a .   Functioning Relationships: good support system  Legal History: none   History: None  Access to firearms: Yes. Locked and ammo is .    Social History     Socioeconomic History    Marital status: /Civil Union     Spouse name: Not on file    Number of children: Not on file    Years of education: Not on file    Highest education level: Not on file   Occupational History    Occupation:      Employer: [employer]   Other Topics Concern    Not on file   Social History Narrative    Not on file        Family Psychiatric History:     Family History[4]    Medical History Reviewed by provider this encounter:          Objective   There were no vitals taken for this visit.     Mental Status Evaluation:    Appearance age appropriate, casually dressed, looks stated age, overweight   Behavior cooperative,  "calm, tearful at times   Speech normal rate, normal volume, normal pitch, spontaneous   Mood \"Subtle\"   Affect constricted, occasional brightness, tearful at times   Thought Processes organized, goal directed   Thought Content no overt delusions   Perceptual Disturbances: no auditory hallucinations, no visual hallucinations   Abnormal Thoughts  Risk Potential Suicidal ideation - None  Homicidal ideation - None  Potential for aggression - Not at present   Orientation oriented to person, place, time/date, and situation   Memory recent and remote memory grossly intact   Consciousness alert and awake   Attention Span Concentration Span attention span and concentration are age appropriate   Intellect appears to be of average intelligence   Insight intact   Judgement intact   Muscle Strength and  Gait normal muscle strength and normal muscle tone, normal gait and normal balance   Motor activity no abnormal movements   Language no difficulty naming common objects, no difficulty repeating a phrase, no difficulty writing a sentence   Fund of Knowledge adequate knowledge of current events  adequate fund of knowledge regarding past history  adequate fund of knowledge regarding vocabulary        Laboratory Results: I have personally reviewed all pertinent laboratory/tests results    Last Visit Labs:   No visits with results within 1 Month(s) from this visit.   Latest known visit with results is:   Appointment on 12/16/2024   Component Date Value    WBC 12/16/2024 6.12     RBC 12/16/2024 5.31     Hemoglobin 12/16/2024 14.5     Hematocrit 12/16/2024 45.6     MCV 12/16/2024 86     MCH 12/16/2024 27.3     MCHC 12/16/2024 31.8     RDW 12/16/2024 14.5     MPV 12/16/2024 9.3     Platelets 12/16/2024 214     nRBC 12/16/2024 0     Segmented % 12/16/2024 53     Immature Grans % 12/16/2024 0     Lymphocytes % 12/16/2024 34     Monocytes % 12/16/2024 9     Eosinophils Relative 12/16/2024 3     Basophils Relative 12/16/2024 1     Absolute " Neutrophils 12/16/2024 3.22     Absolute Immature Grans 12/16/2024 0.01     Absolute Lymphocytes 12/16/2024 2.08     Absolute Monocytes 12/16/2024 0.56     Eosinophils Absolute 12/16/2024 0.18     Basophils Absolute 12/16/2024 0.07     Sodium 12/16/2024 138     Potassium 12/16/2024 4.0     Chloride 12/16/2024 105     CO2 12/16/2024 28     ANION GAP 12/16/2024 5     BUN 12/16/2024 15     Creatinine 12/16/2024 0.78     Glucose, Fasting 12/16/2024 93     Calcium 12/16/2024 9.0     AST 12/16/2024 24     ALT 12/16/2024 11     Alkaline Phosphatase 12/16/2024 89     Total Protein 12/16/2024 7.1     Albumin 12/16/2024 4.1     Total Bilirubin 12/16/2024 0.46     eGFR 12/16/2024 97     Blood Culture 12/16/2024 No Growth After 5 Days.     Blood Culture 12/16/2024 No Growth After 5 Days.        Suicide/Homicide Risk Assessment:    Risk of Harm to Self:  The following ratings are based on assessment at the time of the interview  Demographic Risk Factors include: , male, age: over 50 or older  Historical Risk Factors include: chronic depressive symptoms, history of anxiety, history of traumatic experiences  Recent Specific Risk Factors include: diagnosis of depression, mental illness diagnosis, current depressive symptoms, significant anxiety symptoms, unable to visualize a realistic positive future, feelings of guilt or self blame, hopelessness  Protective Factors: no current suicidal ideation, able to make plans for the future, able to manage anger well, access to mental health treatment, being a parent, being , compliant with medications, compliant with mental health treatment, connection to community, connection to own children, cultural beliefs discouraging suicide, good self-esteem, resiliency, strong relationships, supportive family  Weapons/Firearms: guns. The following steps have been taken to ensure weapons are properly secured: locked, ammunition   Based on today's assessment, Cy presents the  following risk of harm to self: low    Risk of Harm to Others:  The following ratings are based on assessment at the time of the interview  Demographic Risk Factors include: male  Historical Risk Factors include: none  Recent Specific Risk Factors include: none  Protective Factors: no current homicidal ideation, able to manage anger well, access to mental health treatment, being a parent, being , compliant with medications, compliant with mental health treatment, connection to community, connection to own children  Weapons/Firearms: guns. The following steps have been taken to ensure weapons are properly secured: locked, ammunition   Based on today's assessment, Cy presents the following risk of harm to others: minimal    The following interventions are recommended: Continue medication management. Continue psychotherapy. No other intervention changes indicated at this time.    Psychotherapy Provided:     Individual psychotherapy provided: Yes Supportive therapy provided.     Treatment Plan:    Completed and signed during the session: Not applicable - Treatment Plan not due at this session.    Goals: Progress towards Treatment Plan goals - Yes, progressing, as evidenced by subjective findings in HPI/Subjective Section and in Assessment and Plan Section    Depression Follow-up Plan Completed: No    Note Share:    This note was not shared with the patient due to reasonable likelihood of causing patient harm    Administrative Statements   Administrative Statements   I have spent a total time of 60 minutes in caring for this patient on the day of the visit/encounter including Diagnostic results, Prognosis, Risks and benefits of tx options, Instructions for management, Patient and family education, Importance of tx compliance, Risk factor reductions, Impressions, and Counseling / Coordination of care.    Visit Time  Visit Start Time: 3:15  Visit Stop Time: 4:15  Total Visit Duration: 60  "minutes    Portions of the record may have been created with voice recognition software. Occasional wrong word or \"sound a like\" substitutions may have occurred due to the inherent limitations of voice recognition software. Read the chart carefully and recognize, using context, where substitutions have occurred.    Lei Bhakta DO 07/16/25         [1]   Past Medical History:  Diagnosis Date    Atrial fibrillation (HCC)     Benzodiazepine withdrawal with complication (HCC) 06/15/2023    Chronic diastolic (congestive) heart failure (HCC)     Diabetes mellitus (HCC)     GERD (gastroesophageal reflux disease)     High cholesterol     Hyperlipidemia     Pacemaker     Stroke (HCC)    [2]   Past Surgical History:  Procedure Laterality Date    APPENDECTOMY      ATRIAL CARDIAC PACEMAKER INSERTION      BARIATRIC SURGERY  05/2021    BONE BIOPSY Left 7/26/2023    Procedure: EXCISION BIOPSY BONE LESION EXTREMITY;  Surgeon: Adelia Yousif DPM;  [facility];  Service: Podiatry    EPIDURAL BLOCK INJECTION N/A 5/19/2022    Procedure: BLOCK / INJECTION EPIDURAL STEROID CERVICAL C7-T1;  Surgeon: Skyler Quinn MD;  Location: [facility];  Service: Pain Management     FL GUIDED NEEDLE PLAC BX/ASP/INJ  3/22/2022    FOOT AMPUTATION Left 4/28/2022    Procedure: LEFT TRANSMETATARSAL AMPUTATION.;  Surgeon: Nestor Madden DPM;  [facility];  Service: Podiatry    IR PICC PLACEMENT SINGLE LUMEN  11/21/2024    NERVE BLOCK Right 2/10/2022    Procedure: BLOCK MEDIAL BRANCH C3, C4, C5 #1;  Surgeon: Skyler Quinn MD;  [facility]: [facility];  Service: Pain Management     NERVE BLOCK Right 3/22/2022    Procedure: BLOCK MEDIAL BRANCH C3, C4, C5 #2;  Surgeon: Skyler Quinn MD;  [facility];  Service: Pain Management     OR AMPUTATION FOOT TRANSMETARSAL Left 4/12/2023    Procedure: REVISION LEFT TRANSMETATARSAL (TMA) AMPUTATION, REMOVAL OF UNVIABLE TISSUE AND BONE,;  Surgeon: Adelia Yousif DPM;  [facility];  Service: Podiatry    AK AMPUTATION METATARSAL W/TOE SINGLE Left 4/7/2023    Procedure: 2ND RAY RESECTION FOOT;  Surgeon: Adelia Yousif DPM;  [facility];  Service: Podiatry    AK AMPUTATION TOE INTERPHALANGEAL JOINT Left 11/16/2021    Procedure: AMPUTATION LESSER TOE;  Surgeon: Nestor Madden DPM;  [facility];  Service: Podiatry    RADIOFREQUENCY ABLATION Right 4/7/2022    Procedure: Right C3, C4, C5 RFA;  Surgeon: Skyler Quinn MD;  [facility];  Service: Pain Management     RHIZOTOMY Right 2/9/2023    Procedure: RHIZOTOMY CERVICAL MEDIAL BRANCH NERVES RIGHT C3, C4, C5;  Surgeon: Skyler Quinn MD;  [facility];  Service: Pain Management     TOE AMPUTATION Left     2nd toe    TOE AMPUTATION Left 9/15/2021    Procedure: AMPUTATION LEFT 4TH TOE;  Surgeon: Nestor Madden DPM;  [facility];  Service: Podiatry    TOE AMPUTATION Right 1/12/2022    Procedure: AMPUTATION TOE;  Surgeon: Hong Jolly DPM;  [facility];  Service: Podiatry    TOE AMPUTATION Right 2/23/2022    Procedure: AMPUTATION TOE  RIGHT SECOND;  Surgeon: Hong Jolly DPM;  [facility];  Service: Podiatry    TOE AMPUTATION Right 6/3/2022    Procedure: AMPUTATION right 4th TOE;  Surgeon: Nestor Madden DPM;  [facility];  Service: Podiatry    TOE AMPUTATION Right 11/15/2024    Procedure: AMPUTATION RIGHT 2ND TOE;  Surgeon: Adelia Yousif DPM;  [facility];  Service: Podiatry   [3]   Allergies  Allergen Reactions    Ativan [Lorazepam] Anxiety   [4]   Family History  Problem Relation Name Age of Onset    No Known Problems Mother      No Known Problems Father

## 2025-07-16 NOTE — ASSESSMENT & PLAN NOTE
Patient's sleep continues to be poor and he continues to experience significant nightmares with occasional sleepwalking.      Medications:  Was taking Klonopin 0.5 mg as needed but will now start taking Klonopin 5 mg nightly for insomnia and anxiety symptoms  Discussed with patient the risks of physical and psychological dependence, withdrawal, sedation, respiratory depression, impairment in judgment and motor function, depression, mood changes, and others discussed.

## 2025-07-16 NOTE — ASSESSMENT & PLAN NOTE
Will be planning psychotherapy  Continue SSRI therapy which has been shown to assist with depressive sequelae related to bereavement.

## 2025-07-17 ENCOUNTER — PROCEDURE VISIT (OUTPATIENT)
Dept: PODIATRY | Age: 60
End: 2025-07-17
Payer: COMMERCIAL

## 2025-07-17 VITALS — HEIGHT: 73 IN | BODY MASS INDEX: 39.57 KG/M2 | WEIGHT: 298.6 LBS

## 2025-07-17 DIAGNOSIS — Z79.4 TYPE 2 DIABETES MELLITUS WITH DIABETIC POLYNEUROPATHY, WITH LONG-TERM CURRENT USE OF INSULIN (HCC): ICD-10-CM

## 2025-07-17 DIAGNOSIS — L84 CALLUS: ICD-10-CM

## 2025-07-17 DIAGNOSIS — B35.1 ONYCHOMYCOSIS: Primary | ICD-10-CM

## 2025-07-17 DIAGNOSIS — E11.42 TYPE 2 DIABETES MELLITUS WITH DIABETIC POLYNEUROPATHY, WITH LONG-TERM CURRENT USE OF INSULIN (HCC): ICD-10-CM

## 2025-07-17 PROCEDURE — 11055 PARING/CUTG B9 HYPRKER LES 1: CPT | Performed by: STUDENT IN AN ORGANIZED HEALTH CARE EDUCATION/TRAINING PROGRAM

## 2025-07-17 PROCEDURE — 11720 DEBRIDE NAIL 1-5: CPT | Performed by: STUDENT IN AN ORGANIZED HEALTH CARE EDUCATION/TRAINING PROGRAM

## 2025-07-17 NOTE — PROGRESS NOTES
David Carpio  1965  AT RISK FOOT CARE    1. Onychomycosis        2. Type 2 diabetes mellitus with diabetic polyneuropathy, with long-term current use of insulin (HCC)        3. Callus              Patient presents for at-risk foot care.  Patient has no acute concerns today.  Patient has significant lower extremity risk due to previous amputation.    Got custom DM shoes/inserts but note wearing daily.     On exam patient has thickened, hypertrophic, discolored, brittle toenails with subungual debris x2   Callus: 1 (L submet 1)   Amputation: L TMA, R 2nd/4th toe     Today's treatment includes:  Debridement of toenails x2. Using nail nipper, jomar, and curette, nails were sharply debrided, reduced in thickness and length. Devitalized nail tissue and fungal debris excised and removed. Patient tolerated well.      Callus x1 pare din thickness to slightly damaged epithelium. Callus pad, aquaphor, and silicone border foam daily. I recommended patient wear his custom DM shoes/inserts with left 1st met cutout to avoid ulceration here. May need B/L met pads PRN for next shoe rx.    Discussed proper shoe gear, daily inspections of feet, and general foot health with patient. Patient has Q7  findings and is recommended for at risk foot care every 9-10 weeks.    Patients most recent complete clinical exam was performed 2/14/25

## 2025-08-05 ENCOUNTER — TELEPHONE (OUTPATIENT)
Age: 60
End: 2025-08-05

## 2025-08-06 ENCOUNTER — OFFICE VISIT (OUTPATIENT)
Dept: PSYCHIATRY | Facility: CLINIC | Age: 60
End: 2025-08-06

## 2025-08-06 ENCOUNTER — TELEPHONE (OUTPATIENT)
Age: 60
End: 2025-08-06

## 2025-08-06 VITALS
HEART RATE: 76 BPM | DIASTOLIC BLOOD PRESSURE: 78 MMHG | WEIGHT: 287.3 LBS | SYSTOLIC BLOOD PRESSURE: 113 MMHG | BODY MASS INDEX: 37.9 KG/M2

## 2025-08-06 DIAGNOSIS — F41.1 GENERALIZED ANXIETY DISORDER: ICD-10-CM

## 2025-08-06 DIAGNOSIS — F51.05 INSOMNIA DUE TO OTHER MENTAL DISORDER: ICD-10-CM

## 2025-08-06 DIAGNOSIS — F32.2 CURRENT SEVERE EPISODE OF MAJOR DEPRESSIVE DISORDER WITHOUT PSYCHOTIC FEATURES WITHOUT PRIOR EPISODE (HCC): Primary | ICD-10-CM

## 2025-08-06 DIAGNOSIS — F43.21 COMPLICATED BEREAVEMENT: ICD-10-CM

## 2025-08-06 DIAGNOSIS — F51.05 INSOMNIA DUE TO MENTAL DISORDER: ICD-10-CM

## 2025-08-06 DIAGNOSIS — F99 INSOMNIA DUE TO OTHER MENTAL DISORDER: ICD-10-CM

## 2025-08-06 PROCEDURE — PBNCHG PB NO CHARGE PLACEHOLDER

## 2025-08-06 PROCEDURE — PBNCHG PB NO CHARGE PLACEHOLDER: Performed by: PSYCHIATRY & NEUROLOGY

## 2025-08-06 RX ORDER — FLUVOXAMINE MALEATE 50 MG
50 TABLET ORAL
Qty: 30 TABLET | Refills: 0 | Status: SHIPPED | OUTPATIENT
Start: 2025-08-06

## 2025-08-06 RX ORDER — GABAPENTIN 800 MG/1
800 TABLET ORAL 3 TIMES DAILY
Qty: 270 TABLET | Refills: 0 | Status: SHIPPED | OUTPATIENT
Start: 2025-08-06

## 2025-08-06 RX ORDER — BUSPIRONE HYDROCHLORIDE 15 MG/1
15 TABLET ORAL 3 TIMES DAILY
Qty: 270 TABLET | Refills: 0 | Status: SHIPPED | OUTPATIENT
Start: 2025-08-06

## 2025-08-07 ENCOUNTER — TELEPHONE (OUTPATIENT)
Age: 60
End: 2025-08-07

## (undated) DEVICE — 10FR FRAZIER SUCTION HANDLE: Brand: CARDINAL HEALTH

## (undated) DEVICE — DRAPE TOWEL: Brand: CONVERTORS

## (undated) DEVICE — GLOVE INDICATOR PI UNDERGLOVE SZ 8 BLUE

## (undated) DEVICE — INTENDED FOR TISSUE SEPARATION, AND OTHER PROCEDURES THAT REQUIRE A SHARP SURGICAL BLADE TO PUNCTURE OR CUT.: Brand: BARD-PARKER ® SAFETYLOCK CARBON RIB-BACK BLADES

## (undated) DEVICE — GLOVE SRG BIOGEL 6.5

## (undated) DEVICE — PAD GROUNDING IONIC RF DISP

## (undated) DEVICE — BETHLEHEM UNIVERSAL  MIONR EXT: Brand: CARDINAL HEALTH

## (undated) DEVICE — UTILITY MARKER,BLACK WITH LABELS: Brand: DEVON

## (undated) DEVICE — SUT VICRYL 3-0 SH 27 IN J416H

## (undated) DEVICE — STERILE POLYISOPRENE POWDER-FREE SURGICAL GLOVES WITH EMOLLIENT COATING: Brand: PROTEXIS

## (undated) DEVICE — SUT PROLENE 4-0 PS-2 18 IN 8682H

## (undated) DEVICE — STOCKINETTE REGULAR

## (undated) DEVICE — WET SKIN PREP TRAY: Brand: MEDLINE INDUSTRIES, INC.

## (undated) DEVICE — NEEDLE 25G X 1 1/2

## (undated) DEVICE — GAUZE SPONGES,16 PLY: Brand: CURITY

## (undated) DEVICE — Device

## (undated) DEVICE — NEEDLE 18 G X 1 1/2

## (undated) DEVICE — SPONGE STICK WITH PVP-I: Brand: KENDALL

## (undated) DEVICE — GLOVE SRG LF STRL BGL SKNSNS 7 PF

## (undated) DEVICE — GAUZE SPONGES,USP TYPE VII GAUZE, 12 PLY: Brand: CURITY

## (undated) DEVICE — NEEDLE BLUNT 18 G X 1 1/2IN

## (undated) DEVICE — OCCLUSIVE GAUZE STRIP,3% BISMUTH TRIBROMOPHENATE IN PETROLATUM BLEND: Brand: XEROFORM

## (undated) DEVICE — NEEDLE 18 G X 1 1/2 SAFETY

## (undated) DEVICE — TELFA ADHESIVE ISLAND DRESSING: Brand: TELFA

## (undated) DEVICE — ACE WRAP 6 IN STERILE

## (undated) DEVICE — GLOVE SRG BIOGEL 7.5

## (undated) DEVICE — CURITY NON-ADHERENT STRIPS: Brand: CURITY

## (undated) DEVICE — GLOVE INDICATOR PI UNDERGLOVE SZ 7 BLUE

## (undated) DEVICE — INTENDED FOR TISSUE SEPARATION, AND OTHER PROCEDURES THAT REQUIRE A SHARP SURGICAL BLADE TO PUNCTURE OR CUT.: Brand: BARD-PARKER ® CARBON RIB-BACK BLADES

## (undated) DEVICE — CHLORAPREP HI-LITE 10.5ML ORANGE

## (undated) DEVICE — SPONGE LAP 18 X 18 IN STRL RFD

## (undated) DEVICE — GLOVE SRG LF STRL BGL SKNSNS 7.5 PF

## (undated) DEVICE — PLASTIC ADHESIVE BANDAGE: Brand: CURITY

## (undated) DEVICE — DRAPE SHEET THREE QUARTER

## (undated) DEVICE — SUT ETHILON 3-0 PS-1 18 IN 1663G

## (undated) DEVICE — ACE WRAP 4 IN UNSTERILE

## (undated) DEVICE — SKIN MARKER DUAL TIP WITH RULER CAP, FLEXIBLE RULER AND LABELS: Brand: DEVON

## (undated) DEVICE — CAST PADDING 4 IN SYNTHETIC NON-STRL

## (undated) DEVICE — GLOVE SRG BIOGEL 7

## (undated) DEVICE — SUT VICRYL 3-0 PS-2 27 IN J427H

## (undated) DEVICE — SYRINGE 10ML LL

## (undated) DEVICE — CUFF TOURNIQUET 18 X 4 IN QUICK CONNECT DISP 1 BLADDER

## (undated) DEVICE — MEDI-VAC YANKAUER SUCTION HANDLE W/BULBOUS AND CONTROL VENT: Brand: CARDINAL HEALTH

## (undated) DEVICE — SUT PROLENE 3-0 PS1 18 IN 8663G

## (undated) DEVICE — NEEDLE SPINAL 22G X 3.5IN  QUINCKE

## (undated) DEVICE — 4-PORT MANIFOLD: Brand: NEPTUNE 2

## (undated) DEVICE — KERLIX BANDAGE ROLL: Brand: KERLIX

## (undated) DEVICE — STRETCH BANDAGE: Brand: CURITY

## (undated) DEVICE — 2000CC GUARDIAN II: Brand: GUARDIAN

## (undated) DEVICE — SUT ETHILON 2-0 FS 18 IN 664H

## (undated) DEVICE — ACE WRAP 6 IN UNSTERILE

## (undated) DEVICE — SINGLE PORT MANIFOLD: Brand: NEPTUNE 2

## (undated) DEVICE — CURITY STRETCH BANDAGE: Brand: CURITY

## (undated) DEVICE — PREVENA INCISION MANAGEMENT SYSTEM- PEEL & PLACE DRESSING: Brand: PREVENA™ PEEL & PLACE™

## (undated) DEVICE — ABDOMINAL PAD: Brand: DERMACEA

## (undated) DEVICE — SUT ETHILON 3-0 FS-1 18 IN 663G

## (undated) DEVICE — GLOVE INDICATOR PI UNDERGLOVE SZ 7.5 BLUE

## (undated) DEVICE — SUT ETHILON 4-0 PS-2 18 IN 1667H

## (undated) DEVICE — COBAN 4 IN STERILE

## (undated) DEVICE — BLADE SAGITTAL 25.6 X 9.5MM

## (undated) DEVICE — TUBING SUCTION 5MM X 12 FT

## (undated) DEVICE — TRAY EPID PERIFIX CUSTOM

## (undated) DEVICE — SCD SEQUENTIAL COMPRESSION COMFORT SLEEVE MEDIUM KNEE LENGTH: Brand: KENDALL SCD

## (undated) DEVICE — INTENDED FOR TISSUE SEPARATION, AND OTHER PROCEDURES THAT REQUIRE A SHARP SURGICAL BLADE TO PUNCTURE OR CUT.: Brand: BARD-PARKER SAFETY BLADES SIZE 15, STERILE

## (undated) DEVICE — FLEXIBLE ADHESIVE BANDAGE,X-LARGE: Brand: CURITY

## (undated) DEVICE — STANDARD SURGICAL GOWN, L: Brand: CONVERTORS

## (undated) DEVICE — TRAY EPID CONT PERIFIX 18G X 3.5IN 10ML CLSD TIP

## (undated) DEVICE — IV EXTENSION TUBING SMALL BORE

## (undated) DEVICE — DISPOSABLE OR TOWEL: Brand: CARDINAL HEALTH

## (undated) DEVICE — PLUMEPEN PRO 10FT

## (undated) DEVICE — ELECTRODE RF 10CM

## (undated) DEVICE — JAMSHIDI BONE MARROW BIOPSY/ASPIRATION NEEDLE, WITH LUER-LOCK ADAPTER 11GX4 ASP: Brand: JAMSHIDI

## (undated) DEVICE — SYRINGE 5ML LL

## (undated) DEVICE — SYRINGE LOR 8ML PLASTIC LL

## (undated) DEVICE — STOCKINETTE 2P PREROLLD 6X60

## (undated) DEVICE — ACE WRAP 4 IN STERILE

## (undated) DEVICE — THE SIMPULSE SOLO SYSTEM WITH ULTREX RETRACTABLE SPLASH SHIELD TIP: Brand: SIMPULSE SOLO

## (undated) DEVICE — IRRIGATOR DISPOSABLE SUCTION

## (undated) DEVICE — BANDAGE, ESMARK LF STR 4"X9'(20/CS): Brand: CYPRESS